# Patient Record
Sex: FEMALE | Race: BLACK OR AFRICAN AMERICAN | Employment: OTHER | URBAN - METROPOLITAN AREA
[De-identification: names, ages, dates, MRNs, and addresses within clinical notes are randomized per-mention and may not be internally consistent; named-entity substitution may affect disease eponyms.]

---

## 2017-03-13 LAB
FLUAV AG NPH QL IA: NEGATIVE
FLUBV AG NOSE QL IA: POSITIVE

## 2017-03-13 PROCEDURE — 87804 INFLUENZA ASSAY W/OPTIC: CPT | Performed by: EMERGENCY MEDICINE

## 2017-03-13 PROCEDURE — 99282 EMERGENCY DEPT VISIT SF MDM: CPT

## 2017-03-14 ENCOUNTER — HOSPITAL ENCOUNTER (EMERGENCY)
Age: 62
Discharge: HOME | End: 2017-03-14
Attending: EMERGENCY MEDICINE
Payer: COMMERCIAL

## 2017-03-14 ENCOUNTER — APPOINTMENT (OUTPATIENT)
Dept: GENERAL RADIOLOGY | Age: 62
End: 2017-03-14
Attending: PHYSICIAN ASSISTANT
Payer: COMMERCIAL

## 2017-03-14 VITALS
SYSTOLIC BLOOD PRESSURE: 159 MMHG | BODY MASS INDEX: 24.41 KG/M2 | OXYGEN SATURATION: 99 % | RESPIRATION RATE: 18 BRPM | HEART RATE: 68 BPM | TEMPERATURE: 99.3 F | WEIGHT: 125 LBS | DIASTOLIC BLOOD PRESSURE: 84 MMHG

## 2017-03-14 DIAGNOSIS — J10.1 INFLUENZA B: Primary | ICD-10-CM

## 2017-03-14 PROCEDURE — 71010 XR CHEST PORT: CPT

## 2017-03-14 RX ORDER — OSELTAMIVIR PHOSPHATE 75 MG/1
75 CAPSULE ORAL 2 TIMES DAILY
Qty: 10 CAP | Refills: 0 | Status: SHIPPED | OUTPATIENT
Start: 2017-03-14 | End: 2017-03-19

## 2017-03-14 NOTE — LETTER
NOTIFICATION OF RETURN TO WORK 
 
3/14/2017 2:42 AM 
 
Ms. Brian Garcia [address] [place] [id number] Malika Chavez To Whom It May Concern: 
 
Brian Garcia was under the care of [facility]. She will be able to return to work on Friday, 3/17/17 If there are questions or concerns please have the patient contact our office. Sincerely, Art Fuchs PA-C

## 2017-03-14 NOTE — ED NOTES
Discharge instruction included to drink plenty of fluids and rest.  Pt voiced understanding.   Pt ambulated out of er

## 2017-03-14 NOTE — DISCHARGE INSTRUCTIONS
Influenza (Flu): Care Instructions  Your Care Instructions  Influenza (flu) is an infection in the lungs and breathing passages. It is caused by the influenza virus. There are different strains, or types, of the flu virus from year to year. Unlike the common cold, the flu comes on suddenly and the symptoms, such as a cough, congestion, fever, chills, fatigue, aches, and pains, are more severe. These symptoms may last up to 10 days. Although the flu can make you feel very sick, it usually doesn't cause serious health problems. Home treatment is usually all you need for flu symptoms. But your doctor may prescribe antiviral medicine to prevent other health problems, such as pneumonia, from developing. Older people and those who have a long-term health condition, such as lung disease, are most at risk for having pneumonia or other health problems. Follow-up care is a key part of your treatment and safety. Be sure to make and go to all appointments, and call your doctor if you are having problems. Its also a good idea to know your test results and keep a list of the medicines you take. How can you care for yourself at home? · Get plenty of rest.  · Drink plenty of fluids, enough so that your urine is light yellow or clear like water. If you have kidney, heart, or liver disease and have to limit fluids, talk with your doctor before you increase the amount of fluids you drink. · Take an over-the-counter pain medicine if needed, such as acetaminophen (Tylenol), ibuprofen (Advil, Motrin), or naproxen (Aleve), to relieve fever, headache, and muscle aches. Read and follow all instructions on the label. No one younger than 20 should take aspirin. It has been linked to Reye syndrome, a serious illness. · Do not smoke. Smoking can make the flu worse. If you need help quitting, talk to your doctor about stop-smoking programs and medicines. These can increase your chances of quitting for good.   · Breathe moist air from a hot shower or from a sink filled with hot water to help clear a stuffy nose. · Before you use cough and cold medicines, check the label. These medicines may not be safe for young children or for people with certain health problems. · If the skin around your nose and lips becomes sore, put some petroleum jelly on the area. · To ease coughing:  ¨ Drink fluids to soothe a scratchy throat. ¨ Suck on cough drops or plain hard candy. ¨ Take an over-the-counter cough medicine that contains dextromethorphan to help you get some sleep. Read and follow all instructions on the label. ¨ Raise your head at night with an extra pillow. This may help you rest if coughing keeps you awake. · Take any prescribed medicine exactly as directed. Call your doctor if you think you are having a problem with your medicine. To avoid spreading the flu  · Wash your hands regularly, and keep your hands away from your face. · Stay home from school, work, and other public places until you are feeling better and your fever has been gone for at least 24 hours. The fever needs to have gone away on its own without the help of medicine. · Ask people living with you to talk to their doctors about preventing the flu. They may get antiviral medicine to keep from getting the flu from you. · To prevent the flu in the future, get a flu vaccine every fall. Encourage people living with you to get the vaccine. · Cover your mouth when you cough or sneeze. When should you call for help? Call 911 anytime you think you may need emergency care. For example, call if:  · You have severe trouble breathing. Call your doctor now or seek immediate medical care if:  · You have new or worse trouble breathing. · You seem to be getting much sicker. · You feel very sleepy or confused. · You have a new or higher fever. · You get a new rash.   Watch closely for changes in your health, and be sure to contact your doctor if:  · You begin to get better and then get worse. · You are not getting better after 1 week. Where can you learn more? Go to http://teresa-mehul.info/. Enter E462 in the search box to learn more about \"Influenza (Flu): Care Instructions. \"  Current as of: May 23, 2016  Content Version: 11.1  © 0596-2903 [employer]. Care instructions adapted under license by [employer] (which disclaims liability or warranty for this information). If you have questions about a medical condition or this instruction, always ask your healthcare professional. [address] any warranty or liability for your use of this information.

## 2017-03-14 NOTE — ED PROVIDER NOTES
HPI Comments: Claudean Hawthorn is a 60 y.o. female with a pertinent history of HTN, HLD, CVA, seizures who presents to the emergency department for evaluation of nasal congestion, cough productive of green sputum, fever, body aches, and chills x 2 days. Pt states she began having diarrhea today. Son at home tested positive for flu. Pt denies any headache, dizziness or light headedness, CP or discomfort, SOB, n/v/c, abd pain, back pain, diaphoresis, melena/hematochezia, dysuria, hematuria, frequency, focal weakness/numbness/tingling, or rash. Patient has no other complaints at this time. PCP: Renita Pereira MD           Past Medical History:   Diagnosis Date    Hypertension     Neurological disorder     Seizures ([facility]     Stroke ([facility] 2009       History reviewed. No pertinent surgical history. Family History:   Problem Relation Age of Onset    Diabetes Other     Stroke Other        Social History     Social History    Marital status:      Spouse name: N/A    Number of children: N/A    Years of education: N/A     Occupational History    Not on file. Social History Main Topics    Smoking status: Never Smoker    Smokeless tobacco: Not on file    Alcohol use Yes      Comment: Socially     Drug use: No    Sexual activity: Not on file     Other Topics Concern    Not on file     Social History Narrative         ALLERGIES: Tomato; Aspirin; Ciprofloxacin; Pcn [penicillins]; and Sulfa (sulfonamide antibiotics)    Review of Systems   Constitutional: Positive for fever. Negative for chills. HENT: Positive for congestion and rhinorrhea. Negative for sore throat. Respiratory: Positive for cough. Negative for shortness of breath. Cardiovascular: Negative for chest pain. Gastrointestinal: Positive for diarrhea. Negative for abdominal pain, blood in stool, constipation, nausea and vomiting. Genitourinary: Negative for dysuria, frequency and hematuria.    Musculoskeletal: Positive for myalgias. Negative for back pain. Skin: Negative for rash and wound. Neurological: Negative for dizziness, numbness and headaches. Vitals:    03/13/17 2130 03/14/17 0150 03/14/17 0152   BP: (!) 166/94  159/84   Pulse: 71  68   Resp: 17  18   Temp: (!) 100.5 °F (38.1 °C)  99.3 °F (37.4 °C)   SpO2: 98% 99% 99%   Weight: 56.7 kg (125 lb)              Physical Exam   Constitutional: She is oriented to person, place, and time. She appears well-developed and well-nourished. No distress. HENT:   Head: Normocephalic and atraumatic. Nose: Mucosal edema and rhinorrhea present. Mouth/Throat: Oropharynx is clear and moist.   Eyes: Conjunctivae and EOM are normal. Pupils are equal, round, and reactive to light. Right eye exhibits no discharge. Left eye exhibits no discharge. No scleral icterus. Neck: Normal range of motion. Neck supple. No thyromegaly present. Cardiovascular: Normal rate and intact distal pulses. Exam reveals no gallop and no friction rub. No murmur heard. Pulmonary/Chest: Effort normal and breath sounds normal. No respiratory distress. She has no wheezes. She has no rales. She exhibits no tenderness. Lungs CTAB   Lymphadenopathy:     She has no cervical adenopathy. Neurological: She is alert and oriented to person, place, and time. Skin: Skin is warm and dry. No rash noted. She is not diaphoretic. Psychiatric: She has a normal mood and affect. Her behavior is normal.   Nursing note and vitals reviewed. MDM  Number of Diagnoses or Management Options  Influenza B: new and requires workup  Diagnosis management comments: Differential Diagnosis:  influenza, mononucleosis, acute bronchitis, URI, streptococcal pharyngitis, pertussis, pneumonia, asthma exacerbation, allergic rhinitis      Plan:  Pt presents ambulatory in NAD, vitals wnl. Exam and HPI consistent with influenza. Pt is positive for Flu B.  CXR shows no acute process, pending radiology read.   Will DC home with tamiflu. At this time, patient is stable and appropriate for discharge home. Patient demonstrates understanding of current diagnoses and is in agreement with the treatment plan. They are advised that while the likelihood of serious underlying condition is low at this point given the evaluation performed today, we cannot fully rule it out. They are advised to immediately return with any new symptoms or worsening of current condition. All questions have been answered. Patient is given educational material regarding their diagnoses, including danger symptoms and when to return to the ED. Amount and/or Complexity of Data Reviewed  Clinical lab tests: ordered and reviewed  Tests in the radiology section of CPT®: ordered and reviewed  Review and summarize past medical records: yes  Independent visualization of images, tracings, or specimens: yes (NAP noted by me)    Risk of Complications, Morbidity, and/or Mortality  Presenting problems: moderate  Diagnostic procedures: moderate  Management options: moderate    Patient Progress  Patient progress: improved    ED Course       Procedures    -------------------------------------------------------------------------------------------------------------------  Orders:  Orders Placed This Encounter    INFLUENZA A & B AG (RAPID TEST)     Standing Status:   Standing     Number of Occurrences:   1    XR CHEST PORT     Standing Status:   Standing     Number of Occurrences:   1     Order Specific Question:   Reason for Exam     Answer:   productive cough    oseltamivir (TAMIFLU) 75 mg capsule     Sig: Take 1 Cap by mouth two (2) times a day for 5 days.      Dispense:  10 Cap     Refill:  0        Lab Results:   Recent Results (from the past 12 hour(s))   INFLUENZA A & B AG (RAPID TEST)    Collection Time: 03/13/17  9:35 PM   Result Value Ref Range    Influenza A Antigen NEGATIVE  NEG      Influenza B Antigen POSITIVE (A) NEG       Radiology Results:  XR CHEST PORT (Results Pending)     Progress Notes:  2:15 AM:  Breana Wright PA-C was at the pt's bedside, assessed the pt and answered the pt's questions regarding treatment.    -------------------------------------------------------------------------------------------------------------------    Disposition:  Diagnosis:   1. Influenza B        Disposition: [place] Home    Follow-up Information     Follow up With Details Comments [address], [place] Call in 2 days As needed For follow-up [address] [address] [address] [facility]      [facility] Go to As needed, If symptoms worsen [facility] Roslyn Ennis [facility]  [phone]          Patient's Medications   Start Taking    OSELTAMIVIR (TAMIFLU) 75 MG CAPSULE    Take 1 Cap by mouth two (2) times a day for 5 days. Continue Taking    AMLODIPINE (NORVASC) 10 MG TABLET    Take 1 Tab by mouth daily. ATORVASTATIN (LIPITOR) 40 MG TABLET    Take 1 Tab by mouth nightly. CLOPIDOGREL (PLAVIX) 75 MG TABLET    Take 75 mg by mouth daily. CYANOCOBALAMIN (VITAMIN B12) 500 MCG TABLET    Take 1 Tab by mouth daily. FOLIC ACID (FOLVITE) 1 MG TABLET    Take 1 Tab by mouth daily. HYDROCODONE-ACETAMINOPHEN (NORCO) 5-325 MG PER TABLET    Take 1 Tab by mouth every four (4) hours as needed for Pain. Max Daily Amount: 6 Tabs. HYDROXYPROPYL METHYLCELLULOSE (GENTEAL) 0.2 % OPHTHALMIC SOLUTION    Administer 2 Drops to both eyes as needed. LEVETIRACETAM (KEPPRA) 750 MG TABLET    Take 1 Tab by mouth two (2) times a day. LISINOPRIL (PRINIVIL, ZESTRIL) 10 MG TABLET    Take 10 mg by mouth daily. MELOXICAM (MOBIC) 15 MG TABLET    Take 1 Tab by mouth daily (with breakfast). METHOCARBAMOL (ROBAXIN-750) 750 MG TABLET    Take 1 Tab by mouth three (3) times daily. PANTOPRAZOLE (PROTONIX) 40 MG TABLET    Take 1 Tab by mouth Daily (before breakfast).    These Medications have changed    No medications on file   Stop Taking No medications on file

## 2017-05-15 ENCOUNTER — HOSPITAL ENCOUNTER (OUTPATIENT)
Dept: CT IMAGING | Age: 62
Discharge: HOME | End: 2017-05-15
Attending: NURSE PRACTITIONER
Payer: COMMERCIAL

## 2017-05-15 ENCOUNTER — HOSPITAL ENCOUNTER (OUTPATIENT)
Dept: NEUROLOGY | Age: 62
Discharge: HOME | End: 2017-05-15
Attending: NURSE PRACTITIONER
Payer: COMMERCIAL

## 2017-05-15 DIAGNOSIS — R56.9 SEIZURES (HCC): ICD-10-CM

## 2017-05-15 DIAGNOSIS — F05 CONFUSION AFTER A SEIZURE: ICD-10-CM

## 2017-05-15 LAB — CREAT UR-MCNC: 1.1 MG/DL (ref 0.6–1.3)

## 2017-05-15 PROCEDURE — 82565 ASSAY OF CREATININE: CPT

## 2017-05-15 PROCEDURE — 74011636320 HC RX REV CODE- 636/320

## 2017-05-15 PROCEDURE — 95819 EEG AWAKE AND ASLEEP: CPT

## 2017-05-15 PROCEDURE — 70470 CT HEAD/BRAIN W/O & W/DYE: CPT

## 2017-05-15 RX ADMIN — IOPAMIDOL 75 ML: 612 INJECTION, SOLUTION INTRAVENOUS at 14:21

## 2017-05-17 NOTE — PROCEDURES
Bill David    Name:  Mandy Mendez  MR#:  [id number]  :  1955  Account #:  [de-identified]  Date of Adm:  05/15/2017  Date of Service:  05/15/2017      ELECTROENCEPHALOGRAM NUMBER: Robby Leslie . REFERRING: Dr. Glenis Bhakta: This is an apparently wakeful EEG on this 23-year-old  patient being evaluated for seizure disorder. The patient's son stated  that seizures began after his first stroke. The patient has had a total of  2 strokes. The patient claims to have lost her ability to taste and smell  with her last grand mal seizure. PAST MEDICAL HISTORY: Includes stroke, hypertension. MEDICATIONS  1. Keppra. 2. Topamax. ELECTROENCEPHALOGRAM REPORT: The predominant apparently  wakeful background consists of a mixture of 20 to 25 microvolts,  irregular but symmetrical, 8-9 Hz waves mixed with frequently  occurring 15-20 microvolts, 4-5 Hz activity. Bifrontal 2 to 3 microvolts,  16-20 Hz activity is seen which is symmetrical.    During what appears to be drowsiness, the posterior rhythm consists of  a mixture of 10-20 microvolts, 7-8 and 10 to 20 microvolts, 3-4 Hz  waves. Central vertex sharp waves are identified which are  symmetrical.    Step-flash photic stimulation was performed which caused no driving  response. IMPRESSION: Abnormal wakeful and drowsy electroencephalogram  due to the presence of some generalized slow wave activity indicative  of a diffuse encephalopathic process. No epileptiform activity was  identified.         Brenda Rodriguez MD    MG / LLV  D:  2017   13:11  T:  2017   20:42  Job #:  [id number]

## 2017-06-22 ENCOUNTER — HOSPITAL ENCOUNTER (OUTPATIENT)
Dept: LAB | Age: 62
Discharge: HOME | End: 2017-06-22
Payer: COMMERCIAL

## 2017-06-22 LAB
25(OH)D3 SERPL-MCNC: 11.1 NG/ML (ref 30–100)
ALBUMIN SERPL BCP-MCNC: 4.1 G/DL (ref 3.4–5)
ALBUMIN/GLOB SERPL: 1.3 {RATIO} (ref 0.8–1.7)
ALP SERPL-CCNC: 97 U/L (ref 45–117)
ALT SERPL-CCNC: 15 U/L (ref 13–56)
AMMONIA PLAS-SCNC: 27 UMOL/L (ref 11–32)
AMYLASE SERPL-CCNC: 133 U/L (ref 25–115)
ANION GAP BLD CALC-SCNC: 9 MMOL/L (ref 3–18)
AST SERPL W P-5'-P-CCNC: 16 U/L (ref 15–37)
BASOPHILS # BLD AUTO: 0.1 K/UL (ref 0–0.1)
BASOPHILS # BLD: 1 % (ref 0–2)
BILIRUB SERPL-MCNC: 0.3 MG/DL (ref 0.2–1)
BUN SERPL-MCNC: 15 MG/DL (ref 7–18)
BUN/CREAT SERPL: 14 (ref 12–20)
CALCIUM SERPL-MCNC: 9 MG/DL (ref 8.5–10.1)
CHLORIDE SERPL-SCNC: 109 MMOL/L (ref 100–108)
CHOLEST SERPL-MCNC: 146 MG/DL
CO2 SERPL-SCNC: 23 MMOL/L (ref 21–32)
CREAT SERPL-MCNC: 1.1 MG/DL (ref 0.6–1.3)
DIFFERENTIAL METHOD BLD: ABNORMAL
EOSINOPHIL # BLD: 0.2 K/UL (ref 0–0.4)
EOSINOPHIL NFR BLD: 4 % (ref 0–5)
ERYTHROCYTE [DISTWIDTH] IN BLOOD BY AUTOMATED COUNT: 13.6 % (ref 11.6–14.5)
FOLATE SERPL-MCNC: 5.8 NG/ML (ref 3.1–17.5)
GLOBULIN SER CALC-MCNC: 3.1 G/DL (ref 2–4)
GLUCOSE SERPL-MCNC: 91 MG/DL (ref 74–99)
HCT VFR BLD AUTO: 36.1 % (ref 35–45)
HDLC SERPL-MCNC: 68 MG/DL (ref 40–60)
HDLC SERPL: 2.1 {RATIO} (ref 0–5)
HGB BLD-MCNC: 12.2 G/DL (ref 12–16)
LDLC SERPL CALC-MCNC: 68.2 MG/DL (ref 0–100)
LIPASE SERPL-CCNC: 456 U/L (ref 73–393)
LIPID PROFILE,FLP: ABNORMAL
LYMPHOCYTES # BLD AUTO: 23 % (ref 21–52)
LYMPHOCYTES # BLD: 1.4 K/UL (ref 0.9–3.6)
MCH RBC QN AUTO: 30 PG (ref 24–34)
MCHC RBC AUTO-ENTMCNC: 33.8 G/DL (ref 31–37)
MCV RBC AUTO: 88.9 FL (ref 74–97)
MONOCYTES # BLD: 0.6 K/UL (ref 0.05–1.2)
MONOCYTES NFR BLD AUTO: 9 % (ref 3–10)
NEUTS SEG # BLD: 3.9 K/UL (ref 1.8–8)
NEUTS SEG NFR BLD AUTO: 63 % (ref 40–73)
PLATELET # BLD AUTO: 226 K/UL (ref 135–420)
PMV BLD AUTO: 12 FL (ref 9.2–11.8)
POTASSIUM SERPL-SCNC: 3.6 MMOL/L (ref 3.5–5.5)
PROT SERPL-MCNC: 7.2 G/DL (ref 6.4–8.2)
RBC # BLD AUTO: 4.06 M/UL (ref 4.2–5.3)
SODIUM SERPL-SCNC: 141 MMOL/L (ref 136–145)
TRIGL SERPL-MCNC: 49 MG/DL (ref ?–150)
TSH SERPL DL<=0.05 MIU/L-ACNC: 2.38 UIU/ML (ref 0.36–3.74)
VIT B12 SERPL-MCNC: 507 PG/ML (ref 211–911)
VLDLC SERPL CALC-MCNC: 9.8 MG/DL
WBC # BLD AUTO: 6.2 K/UL (ref 4.6–13.2)

## 2017-06-22 PROCEDURE — 84443 ASSAY THYROID STIM HORMONE: CPT | Performed by: NURSE PRACTITIONER

## 2017-06-22 PROCEDURE — 82140 ASSAY OF AMMONIA: CPT | Performed by: NURSE PRACTITIONER

## 2017-06-22 PROCEDURE — 82150 ASSAY OF AMYLASE: CPT | Performed by: NURSE PRACTITIONER

## 2017-06-22 PROCEDURE — 82607 VITAMIN B-12: CPT | Performed by: NURSE PRACTITIONER

## 2017-06-22 PROCEDURE — 36415 COLL VENOUS BLD VENIPUNCTURE: CPT | Performed by: NURSE PRACTITIONER

## 2017-06-22 PROCEDURE — 80177 DRUG SCRN QUAN LEVETIRACETAM: CPT | Performed by: NURSE PRACTITIONER

## 2017-06-22 PROCEDURE — 82306 VITAMIN D 25 HYDROXY: CPT | Performed by: NURSE PRACTITIONER

## 2017-06-22 PROCEDURE — 80053 COMPREHEN METABOLIC PANEL: CPT | Performed by: NURSE PRACTITIONER

## 2017-06-22 PROCEDURE — 83690 ASSAY OF LIPASE: CPT | Performed by: NURSE PRACTITIONER

## 2017-06-22 PROCEDURE — 80061 LIPID PANEL: CPT | Performed by: NURSE PRACTITIONER

## 2017-06-22 PROCEDURE — 85025 COMPLETE CBC W/AUTO DIFF WBC: CPT | Performed by: NURSE PRACTITIONER

## 2017-06-25 LAB — LEVETIRACETAM SERPL-MCNC: 7.6 UG/ML (ref 10–40)

## 2017-07-02 ENCOUNTER — HOSPITAL ENCOUNTER (EMERGENCY)
Age: 62
Discharge: HOME | End: 2017-07-02
Attending: EMERGENCY MEDICINE
Payer: COMMERCIAL

## 2017-07-02 VITALS
OXYGEN SATURATION: 100 % | TEMPERATURE: 98.6 F | HEIGHT: 60 IN | BODY MASS INDEX: 23.56 KG/M2 | HEART RATE: 66 BPM | SYSTOLIC BLOOD PRESSURE: 146 MMHG | RESPIRATION RATE: 16 BRPM | DIASTOLIC BLOOD PRESSURE: 80 MMHG | WEIGHT: 120 LBS

## 2017-07-02 DIAGNOSIS — L50.9 URTICARIA: ICD-10-CM

## 2017-07-02 DIAGNOSIS — R21 RASH: Primary | ICD-10-CM

## 2017-07-02 PROCEDURE — 99281 EMR DPT VST MAYX REQ PHY/QHP: CPT

## 2017-07-02 RX ORDER — HYDROXYZINE PAMOATE 25 MG/1
25 CAPSULE ORAL
Qty: 20 CAP | Refills: 0 | Status: SHIPPED | OUTPATIENT
Start: 2017-07-02 | End: 2017-07-16

## 2017-07-02 RX ORDER — TRIAMCINOLONE ACETONIDE 1 MG/G
CREAM TOPICAL 2 TIMES DAILY
Qty: 15 G | Refills: 0 | Status: SHIPPED | OUTPATIENT
Start: 2017-07-02 | End: 2020-09-08

## 2017-07-02 RX ORDER — PREDNISONE 20 MG/1
20 TABLET ORAL DAILY
Qty: 5 TAB | Refills: 0 | Status: SHIPPED | OUTPATIENT
Start: 2017-07-02 | End: 2017-07-07

## 2017-07-02 NOTE — DISCHARGE INSTRUCTIONS
Hives: Care Instructions  Your Care Instructions  Hives are raised, red, itchy patches of skin. They are also called wheals or welts. They usually have red borders and pale centers. Hives range in size from ¼ inch to 3 inches or more across. They may seem to move from place to place on the skin. Several hives may form a large area of raised, red skin. You can get hives after an insect sting, after taking medicine or eating certain foods, or because of infection or stress. Other causes include plants, things you breathe in, makeup, heat, cold, sunlight, and latex. You cannot spread hives to other people. Hives may last a few minutes or a few days, but a single spot may last less than 36 hours. Follow-up care is a key part of your treatment and safety. Be sure to make and go to all appointments, and call your doctor if you are having problems. It's also a good idea to know your test results and keep a list of the medicines you take. How can you care for yourself at home? · Avoid whatever you think may have caused your hives, such as a certain food or medicine. However, you may not know the cause. · Put a cool, wet towel on the area to relieve itching. · Take an over-the-counter antihistamine, such as diphenhydramine (Benadryl), cetirizine (Zyrtec), or loratadine (Claritin), to help stop the hives and calm the itching. Read and follow directions on the label. These medicines can make you feel sleepy. Do not drive while using them. · Stay away from strong soaps, detergents, and chemicals. These can make itching worse. When should you call for help? Call 911 anytime you think you may need emergency care. For example, call if:  · You have symptoms of a severe allergic reaction. These may include:  ¨ Sudden raised, red areas (hives) all over your body. ¨ Swelling of the throat, mouth, lips, or tongue. ¨ Trouble breathing. ¨ Passing out (losing consciousness).  Or you may feel very lightheaded or suddenly feel weak, confused, or restless. Call your doctor now or seek immediate medical care if:  · You have symptoms of an allergic reaction, such as:  ¨ A rash or hives (raised, red areas on the skin). ¨ Itching. ¨ Swelling. ¨ Belly pain, nausea, or vomiting. · You get hives after you start a new medicine. · Hives have not gone away after 24 hours. Watch closely for changes in your health, and be sure to contact your doctor if:  · You do not get better as expected. Where can you learn more? Go to http://teresaBridge Semiconductormehul.info/. Enter P381 in the search box to learn more about \"Hives: Care Instructions. \"  Current as of: March 20, 2017  Content Version: 11.3  © 8669-2611 "[employer]". Care instructions adapted under license by [employer] (which disclaims liability or warranty for this information). If you have questions about a medical condition or this instruction, always ask your healthcare professional. [address] [address] any warranty or liability for your use of this information. Rash: Care Instructions  Your Care Instructions  A rash is any irritation or inflammation of the skin. Rashes have many possible causes, including allergy, infection, illness, heat, and emotional stress. Follow-up care is a key part of your treatment and safety. Be sure to make and go to all appointments, and call your doctor if you are having problems. Its also a good idea to know your test results and keep a list of the medicines you take. How can you care for yourself at home? · Wash the area with water only. Soap can make dryness and itching worse. Pat dry. · Put cold, wet cloths on the rash to reduce itching. · Keep cool, and stay out of the sun. · Leave the rash open to the air as much of the time as possible. · Sometimes petroleum jelly (Vaseline) can help relieve the discomfort caused by a rash. A moisturizing lotion, such as Cetaphil, also may help. Calamine lotion may help for rashes caused by contact with something (such as a plant or soap) that irritated the skin. Use it 3 or 4 times a day. · If your doctor prescribed a cream, use it as directed. If your doctor prescribed medicine, take it exactly as directed. · If your rash itches so badly that it interferes with your normal activities, take an over-the-counter antihistamine, such as diphenhydramine (Benadryl) or loratadine (Claritin). Read and follow all instructions on the label. When should you call for help? Call your doctor now or seek immediate medical care if:  · You have signs of infection, such as:  ¨ Increased pain, swelling, warmth, or redness. ¨ Red streaks leading from the area. ¨ Pus draining from the area. ¨ A fever. · You have joint pain along with the rash. Watch closely for changes in your health, and be sure to contact your doctor if:  · Your rash is changing or getting worse. For example, call if you have pain along with the rash, the rash is spreading, or you have new blisters. · You do not get better after 1 week. Where can you learn more? Go to http://teresa-mehul.info/. Enter R916 in the search box to learn more about \"Rash: Care Instructions. \"  Current as of: October 13, 2016  Content Version: 11.3  © 9079-2305 [employer]. Care instructions adapted under license by [employer] (which disclaims liability or warranty for this information). If you have questions about a medical condition or this instruction, always ask your healthcare professional. [address] [address] any warranty or liability for your use of this information. Ecorithm Activation    Thank you for requesting access to Ecorithm. Please follow the instructions below to securely access and download your online medical record.  Ecorithm allows you to send messages to your doctor, view your test results, renew your prescriptions, schedule appointments, and more.    How Do I Sign Up? 1. In your internet browser, go to [email or web address]  2. Click on the First Time User? Click Here link in the Sign In box. You will be redirect to the New Member Sign Up page. 3. Enter your Appy Couple Access Code exactly as it appears below. You will not need to use this code after youve completed the sign-up process. If you do not sign up before the expiration date, you must request a new code. MyChart Access Code: Activation code not generated  Current Appy Couple Status: Patient Declined (This is the date your MyChart access code will )    4. Enter the last four digits of your Social Security Number (xxxx) and Date of Birth (mm/dd/yyyy) as indicated and click Submit. You will be taken to the next sign-up page. 5. Create a [employer]t ID. This will be your Appy Couple login ID and cannot be changed, so think of one that is secure and easy to remember. 6. Create a Appy Couple password. You can change your password at any time. 7. Enter your Password Reset Question and Answer. This can be used at a later time if you forget your password. 8. Enter your e-mail address. You will receive e-mail notification when new information is available in [address] 9. Click Sign Up. You can now view and download portions of your medical record. 10. Click the Download Summary menu link to download a portable copy of your medical information. Additional Information    If you have questions, please visit the Frequently Asked Questions section of the Appy Couple website at [email or web address] Remember, Appy Couple is NOT to be used for urgent needs. For medical emergencies, dial 911.

## 2017-07-02 NOTE — ED PROVIDER NOTES
HPI Comments: 5:34 PM Bhargav Mccarty is a 60 y.o. Female with a hx of HTN who presents to the ED c/o intermittent rashes to all four extremities over the past week and a half. The rash is currently only present on her upper extremities. She describes her rash as itchy. She is concerned that she may be having an allergic reaction. She has tried taking Benadryl and applying an OTC anti-itch cream with no relief. There is no one at home with similar rashes. She denies changes in medications, changes in diet, SOB, facial swelling, N/V, and any further complaints. The history is provided by the patient. Past Medical History:   Diagnosis Date    Hypertension     Neurological disorder     [facility] [address]     Stroke ([facility] [address] 2009       No past surgical history on file. Family History:   Problem Relation Age of Onset    Diabetes Other     Stroke Other        Social History     Social History    Marital status:      Spouse name: N/A    Number of children: N/A    Years of education: N/A     Occupational History    Not on file. Social History Main Topics    Smoking status: Never Smoker    Smokeless tobacco: Not on file    Alcohol use Yes      Comment: Socially     Drug use: No    Sexual activity: Not on file     Other Topics Concern    Not on file     Social History Narrative         ALLERGIES: Tomato; Aspirin; Ciprofloxacin; Pcn [penicillins]; and Sulfa (sulfonamide antibiotics)    Review of Systems   Constitutional: Negative. Negative for chills and fever. HENT: Negative. Negative for congestion, facial swelling and sore throat. Eyes: Negative. Respiratory: Negative. Negative for cough, shortness of breath and wheezing. Cardiovascular: Negative. Negative for chest pain and leg swelling. Gastrointestinal: Negative. Negative for abdominal pain and nausea. Endocrine: Negative. Genitourinary: Negative. Negative for dysuria and hematuria. Musculoskeletal: Negative. Negative for back pain. Skin: Positive for rash (pruritic rash). Negative for wound. Allergic/Immunologic: Negative. Neurological: Negative. Negative for syncope, light-headedness and headaches. Hematological: Negative. Psychiatric/Behavioral: Negative. Negative for behavioral problems. The patient is not nervous/anxious. All other systems reviewed and are negative. Vitals:    07/02/17 1731   BP: 146/80   Pulse: 66   Resp: 16   Temp: 98.6 °F (37 °C)   SpO2: 100%   Weight: 54.4 kg (120 lb)   Height: 5' (1.524 m)            Physical Exam   Constitutional: She is oriented to person, place, and time. She appears well-developed and well-nourished. No distress. HENT:   Head: Normocephalic. Neck: Normal range of motion. Neck supple. Cardiovascular: Normal rate, regular rhythm and normal heart sounds. Exam reveals no gallop and no friction rub. No murmur heard. Pulmonary/Chest: Effort normal and breath sounds normal. No stridor. No respiratory distress. She has no wheezes. She has no rales. Musculoskeletal: Normal range of motion. Neurological: She is alert and oriented to person, place, and time. Coordination normal.   Gait is steady. Able to ambulate without difficulty. Skin: Skin is warm and dry. Rash noted. She is not diaphoretic. Mild urticarial rash noted to the arms bilaterally, no scabbing, purulence or lesions noted between the webspaces of the fingers   Psychiatric: She has a normal mood and affect. Her behavior is normal. Thought content normal.   Nursing note and vitals reviewed. MDM  Number of Diagnoses or Management Options  Diagnosis management comments: Impression:  Rash, urticaria     Patient is stable for discharge at this time. Rx for triamcinolone, vistaril and short course of prednisone given. Rest and follow-up with PCP this week. Return to the ED immediately for any new or worsening sx.   Nadeen Gore PA-C 5:55 PM     Risk of Complications, Morbidity, and/or Mortality  Presenting problems: low  Diagnostic procedures: low  Management options: low    Patient Progress  Patient progress: stable    ED Course       Procedures    Vitals:  Patient Vitals for the past 12 hrs:   Temp Pulse Resp BP SpO2   07/02/17 1731 98.6 °F (37 °C) 66 16 146/80 100 %     Pulse ox reviewed and WNL    Medications ordered:   Medications - No data to display      Progress notes, Consult notes or additional Procedure notes:   5:38 PM  Discussed findings, as well as, diagnosis and plan for discharge. Pt verbalizes understanding and agreement with plan. All questions addressed at this time. Disposition:  Diagnosis: No diagnosis found. Disposition: Discharge    Follow-up Information     None           Patient's Medications   Start Taking    No medications on file   Continue Taking    AMLODIPINE (NORVASC) 10 MG TABLET    Take 1 Tab by mouth daily. ATORVASTATIN (LIPITOR) 40 MG TABLET    Take 1 Tab by mouth nightly. CLOPIDOGREL (PLAVIX) 75 MG TABLET    Take 75 mg by mouth daily. CYANOCOBALAMIN (VITAMIN B12) 500 MCG TABLET    Take 1 Tab by mouth daily. FOLIC ACID (FOLVITE) 1 MG TABLET    Take 1 Tab by mouth daily. HYDROCODONE-ACETAMINOPHEN (NORCO) 5-325 MG PER TABLET    Take 1 Tab by mouth every four (4) hours as needed for Pain. Max Daily Amount: 6 Tabs. HYDROXYPROPYL METHYLCELLULOSE (GENTEAL) 0.2 % OPHTHALMIC SOLUTION    Administer 2 Drops to both eyes as needed. LEVETIRACETAM (KEPPRA) 750 MG TABLET    Take 1 Tab by mouth two (2) times a day. LISINOPRIL (PRINIVIL, ZESTRIL) 10 MG TABLET    Take 10 mg by mouth daily. MELOXICAM (MOBIC) 15 MG TABLET    Take 1 Tab by mouth daily (with breakfast). METHOCARBAMOL (ROBAXIN-750) 750 MG TABLET    Take 1 Tab by mouth three (3) times daily. PANTOPRAZOLE (PROTONIX) 40 MG TABLET    Take 1 Tab by mouth Daily (before breakfast).    These Medications have changed    No medications on file   Stop Taking    No medications on file         SCRIBE ATTESTATION STATEMENT  Documented by: Cammy Maurice for, and in the presence of, Nadeen Medina PA-C 5:37 PM     Signed by: Allan Reyna, 07/02/17 5:37 PM    PROVIDER ATTESTATION STATEMENT  I personally performed the services described in the documentation, reviewed the documentation, as recorded by the scribe in my presence, and it accurately and completely records my words and actions.   Nadeen Medina PA-C

## 2017-07-12 ENCOUNTER — HOSPITAL ENCOUNTER (OUTPATIENT)
Dept: MRI IMAGING | Age: 62
Discharge: HOME | End: 2017-07-12
Attending: NURSE PRACTITIONER
Payer: COMMERCIAL

## 2017-07-12 ENCOUNTER — HOSPITAL ENCOUNTER (OUTPATIENT)
Dept: CT IMAGING | Age: 62
Discharge: HOME | End: 2017-07-12
Attending: NURSE PRACTITIONER
Payer: COMMERCIAL

## 2017-07-12 DIAGNOSIS — R10.84 DIFFUSE ABDOMINAL PAIN: ICD-10-CM

## 2017-07-12 DIAGNOSIS — R41.0 CONFUSION AND DISORIENTATION: ICD-10-CM

## 2017-07-12 PROCEDURE — 74160 CT ABDOMEN W/CONTRAST: CPT

## 2017-07-12 PROCEDURE — 74011250636 HC RX REV CODE- 250/636: Performed by: RADIOLOGY

## 2017-07-12 PROCEDURE — 70553 MRI BRAIN STEM W/O & W/DYE: CPT

## 2017-07-12 PROCEDURE — 74011636320 HC RX REV CODE- 636/320: Performed by: RADIOLOGY

## 2017-07-12 PROCEDURE — A9585 GADOBUTROL INJECTION: HCPCS | Performed by: RADIOLOGY

## 2017-07-12 RX ADMIN — IOPAMIDOL 80 ML: 612 INJECTION, SOLUTION INTRAVENOUS at 17:40

## 2017-07-12 RX ADMIN — GADOBUTROL 5 ML: 604.72 INJECTION INTRAVENOUS at 17:05

## 2017-10-24 ENCOUNTER — HOSPITAL ENCOUNTER (EMERGENCY)
Age: 62
Discharge: HOME | End: 2017-10-24
Attending: EMERGENCY MEDICINE
Payer: COMMERCIAL

## 2017-10-24 ENCOUNTER — APPOINTMENT (OUTPATIENT)
Dept: CT IMAGING | Age: 62
End: 2017-10-24
Attending: EMERGENCY MEDICINE
Payer: COMMERCIAL

## 2017-10-24 VITALS
WEIGHT: 114 LBS | TEMPERATURE: 98.8 F | BODY MASS INDEX: 22.26 KG/M2 | OXYGEN SATURATION: 99 % | HEART RATE: 63 BPM | RESPIRATION RATE: 16 BRPM | DIASTOLIC BLOOD PRESSURE: 85 MMHG | SYSTOLIC BLOOD PRESSURE: 180 MMHG

## 2017-10-24 DIAGNOSIS — R56.9 SEIZURE (HCC): Primary | ICD-10-CM

## 2017-10-24 LAB
ANION GAP SERPL CALC-SCNC: 7 MMOL/L (ref 3–18)
BASOPHILS # BLD: 0 K/UL (ref 0–0.06)
BASOPHILS NFR BLD: 0 % (ref 0–2)
BUN SERPL-MCNC: 17 MG/DL (ref 7–18)
BUN/CREAT SERPL: 15 (ref 12–20)
CALCIUM SERPL-MCNC: 9.3 MG/DL (ref 8.5–10.1)
CHLORIDE SERPL-SCNC: 109 MMOL/L (ref 100–108)
CO2 SERPL-SCNC: 25 MMOL/L (ref 21–32)
CREAT SERPL-MCNC: 1.14 MG/DL (ref 0.6–1.3)
DIFFERENTIAL METHOD BLD: NORMAL
EOSINOPHIL # BLD: 0.1 K/UL (ref 0–0.4)
EOSINOPHIL NFR BLD: 1 % (ref 0–5)
ERYTHROCYTE [DISTWIDTH] IN BLOOD BY AUTOMATED COUNT: 13.8 % (ref 11.6–14.5)
GLUCOSE SERPL-MCNC: 91 MG/DL (ref 74–99)
HCT VFR BLD AUTO: 37.6 % (ref 35–45)
HGB BLD-MCNC: 12.5 G/DL (ref 12–16)
LYMPHOCYTES # BLD: 2.1 K/UL (ref 0.9–3.6)
LYMPHOCYTES NFR BLD: 24 % (ref 21–52)
MCH RBC QN AUTO: 29.8 PG (ref 24–34)
MCHC RBC AUTO-ENTMCNC: 33.2 G/DL (ref 31–37)
MCV RBC AUTO: 89.5 FL (ref 74–97)
MONOCYTES # BLD: 0.7 K/UL (ref 0.05–1.2)
MONOCYTES NFR BLD: 8 % (ref 3–10)
NEUTS SEG # BLD: 5.9 K/UL (ref 1.8–8)
NEUTS SEG NFR BLD: 67 % (ref 40–73)
PLATELET # BLD AUTO: 174 K/UL (ref 135–420)
PMV BLD AUTO: 11.6 FL (ref 9.2–11.8)
POTASSIUM SERPL-SCNC: 3.3 MMOL/L (ref 3.5–5.5)
RBC # BLD AUTO: 4.2 M/UL (ref 4.2–5.3)
SODIUM SERPL-SCNC: 141 MMOL/L (ref 136–145)
WBC # BLD AUTO: 8.9 K/UL (ref 4.6–13.2)

## 2017-10-24 PROCEDURE — 80048 BASIC METABOLIC PNL TOTAL CA: CPT | Performed by: EMERGENCY MEDICINE

## 2017-10-24 PROCEDURE — 80177 DRUG SCRN QUAN LEVETIRACETAM: CPT | Performed by: EMERGENCY MEDICINE

## 2017-10-24 PROCEDURE — 96375 TX/PRO/DX INJ NEW DRUG ADDON: CPT

## 2017-10-24 PROCEDURE — 96365 THER/PROPH/DIAG IV INF INIT: CPT

## 2017-10-24 PROCEDURE — 74011250636 HC RX REV CODE- 250/636: Performed by: EMERGENCY MEDICINE

## 2017-10-24 PROCEDURE — 70450 CT HEAD/BRAIN W/O DYE: CPT

## 2017-10-24 PROCEDURE — 99283 EMERGENCY DEPT VISIT LOW MDM: CPT

## 2017-10-24 PROCEDURE — 85025 COMPLETE CBC W/AUTO DIFF WBC: CPT | Performed by: EMERGENCY MEDICINE

## 2017-10-24 PROCEDURE — 96361 HYDRATE IV INFUSION ADD-ON: CPT

## 2017-10-24 RX ORDER — HYDRALAZINE HYDROCHLORIDE 50 MG/1
50 TABLET, FILM COATED ORAL 2 TIMES DAILY
Status: ON HOLD | COMMUNITY
End: 2020-09-17 | Stop reason: SDUPTHER

## 2017-10-24 RX ORDER — LORAZEPAM 2 MG/ML
1 INJECTION INTRAMUSCULAR
Status: COMPLETED | OUTPATIENT
Start: 2017-10-24 | End: 2017-10-24

## 2017-10-24 RX ORDER — LEVETIRACETAM 5 MG/ML
1000 INJECTION INTRAVASCULAR EVERY 12 HOURS
Status: DISCONTINUED | OUTPATIENT
Start: 2017-10-24 | End: 2017-10-24 | Stop reason: HOSPADM

## 2017-10-24 RX ORDER — LEVETIRACETAM 1000 MG/1
1000 TABLET ORAL 2 TIMES DAILY
Qty: 60 TAB | Refills: 0 | Status: SHIPPED | OUTPATIENT
Start: 2017-10-24 | End: 2022-01-13

## 2017-10-24 RX ORDER — ERGOCALCIFEROL 1.25 MG/1
50000 CAPSULE ORAL 2 TIMES WEEKLY
COMMUNITY

## 2017-10-24 RX ORDER — POTASSIUM CHLORIDE 20 MEQ/1
40 TABLET, EXTENDED RELEASE ORAL
Status: DISCONTINUED | OUTPATIENT
Start: 2017-10-24 | End: 2017-10-24 | Stop reason: HOSPADM

## 2017-10-24 RX ADMIN — LEVETIRACETAM 1000 MG: 5 INJECTION INTRAVENOUS at 15:57

## 2017-10-24 RX ADMIN — SODIUM CHLORIDE 1000 ML: 9 INJECTION, SOLUTION INTRAVENOUS at 13:21

## 2017-10-24 RX ADMIN — LORAZEPAM 1 MG: 2 INJECTION INTRAMUSCULAR; INTRAVENOUS at 13:20

## 2017-10-24 NOTE — ED NOTES
Pt stated that home prescription for keppra was the dose she currently takes, Dr Kathe Finch notified, pt updated that we will have to discuss further with her neurologist and that she will be discharged once we get an answer. Son at bedside, safety intact.

## 2017-10-24 NOTE — ED TRIAGE NOTES
Patient states she was supposed to go to her neurologist today, missed her appointment and was told to come here to have her blood checked. She has recently started having seizure in September and had another one in October. She has had 2 or 3 since then. Her doctor wanted to make sure her levels were ok. She can't remember having the seizures.

## 2017-10-24 NOTE — ED PROVIDER NOTES
HPI Comments: 12:47 PM  Tin Garcia is a 60 y.o. female with a PMHx of seizures, CVA, neurological disorder, and HTN presents to the ED sent by Dr. Leo Best (neurologist) for blood level check since pt missed her appointment today and her next appointment is in approx 1 week. States she has had approx 3-4 seizure episodes 1 month ago. When asked what kind of Szs she has, pt explains she has \"forgetfull Szs\" where she does not remember having seizure activity, notes she knows when they are coming on. States her son normally founds her on the floor after an episode. Explains that her Sz sxs are as follows: hiccups, stuttering, slurred speech, shaking, holding her arm across her chest as if it was broken, and staring into space. Verbalizes she is Rx'ed 500mg Keppra bid. Explains her first \"Sz episode was 2 months after her first CVA in 2012. \"     Pt also reports HA x 2 days and dizziness. States she took Tylenol this morning with some relief. Admits to occasional EtOH use, denies tobacco or recreational drug use. No other acute symptoms or complaints were noted. PCP: Marla Kennedy NP  Neurologist: Dr. Leo Best [facility])      The history is provided by the patient. Past Medical History:   Diagnosis Date    Hypertension     Neurological disorder     Seizures ([facility] [address]     Stroke ([facility] [address] 2009       No past surgical history on file. Family History:   Problem Relation Age of Onset    Diabetes Other     Stroke Other        Social History     Social History    Marital status:      Spouse name: N/A    Number of children: N/A    Years of education: N/A     Occupational History    Not on file.      Social History Main Topics    Smoking status: Never Smoker    Smokeless tobacco: Never Used    Alcohol use Yes      Comment: Socially     Drug use: No    Sexual activity: Not on file     Other Topics Concern    Not on file     Social History Narrative         ALLERGIES: Tomato; Aspirin; Ciprofloxacin; Pcn [penicillins]; and Sulfa (sulfonamide antibiotics)    Review of Systems   Constitutional: Negative for chills and fever. Respiratory: Negative for shortness of breath. Cardiovascular: Negative for chest pain. Gastrointestinal: Negative for diarrhea, nausea and vomiting. Neurological: Positive for dizziness and headaches. All other systems reviewed and are negative. Vitals:    10/24/17 1230   BP: 180/85   Pulse: 63   Resp: 16   Temp: 98.8 °F (37.1 °C)   SpO2: 99%   Weight: 51.7 kg (114 lb)            Physical Exam   Constitutional: She is oriented to person, place, and time. She appears well-developed and well-nourished. No distress. HENT:   Head: Normocephalic and atraumatic. Eyes: Conjunctivae and EOM are normal. Right eye exhibits no discharge. Left eye exhibits no discharge. No scleral icterus. Neck: Normal range of motion. Neck supple. No tracheal deviation present. Cardiovascular: Normal rate, regular rhythm and normal heart sounds. No murmur heard. Pulmonary/Chest: Effort normal and breath sounds normal. No respiratory distress. She has no wheezes. She has no rales. Abdominal: Soft. She exhibits no distension. There is no tenderness. There is no rebound and no guarding. Musculoskeletal: Normal range of motion. She exhibits no edema or deformity. Neurological: She is alert and oriented to person, place, and time. No cranial nerve deficit. Intermittent myoclonic jerks. Mild dysarthria    Skin: Skin is warm and dry. She is not diaphoretic. Psychiatric: She has a normal mood and affect. Her behavior is normal. Judgment and thought content normal.        MDM  Number of Diagnoses or Management Options  Seizure [facility]):   Diagnosis management comments: Pt with seizure d/o with increased seizure frequency s/p keppra load and dose to be increased to 1000mg BID.   5:42 PM  Upon discharge pt noted she actually already takes 1000mg of keppra twice daily.   Discussed with [facility] neurologist Dr. Naveed Fernandes who recommended continuing at this dose and follow up with Dr. Claude Castillo. Dr. Claude Castillo will call pt to discuss. ED Course       Procedures    Vitals:  Patient Vitals for the past 12 hrs:   Temp Pulse Resp BP SpO2   10/24/17 1230 98.8 °F (37.1 °C) 63 16 180/85 99 %       Medications Ordered:  Medications   levETIRAcetam (KEPPRA) 500 mg in saline (iso-osm) 100 ml IVPB (1,000 mg IntraVENous New Bag 10/24/17 1557)   LORazepam (ATIVAN) injection 1 mg (1 mg IntraVENous Given 10/24/17 1320)   sodium chloride 0.9 % bolus infusion 1,000 mL (0 mL IntraVENous IV Completed 10/24/17 1430)       Lab Findings:  Recent Results (from the past 12 hour(s))   CBC WITH AUTOMATED DIFF    Collection Time: 10/24/17  1:02 PM   Result Value Ref Range    WBC 8.9 4.6 - 13.2 K/uL    RBC 4.20 4. 20 - 5.30 M/uL    HGB 12.5 12.0 - 16.0 g/dL    HCT 37.6 35.0 - 45.0 %    MCV 89.5 74.0 - 97.0 FL    MCH 29.8 24.0 - 34.0 PG    MCHC 33.2 31.0 - 37.0 g/dL    RDW 13.8 11.6 - 14.5 %    PLATELET 487 425 - 944 K/uL    MPV 11.6 9.2 - 11.8 FL    NEUTROPHILS 67 40 - 73 %    LYMPHOCYTES 24 21 - 52 %    MONOCYTES 8 3 - 10 %    EOSINOPHILS 1 0 - 5 %    BASOPHILS 0 0 - 2 %    ABS. NEUTROPHILS 5.9 1.8 - 8.0 K/UL    ABS. LYMPHOCYTES 2.1 0.9 - 3.6 K/UL    ABS. MONOCYTES 0.7 0.05 - 1.2 K/UL    ABS. EOSINOPHILS 0.1 0.0 - 0.4 K/UL    ABS.  BASOPHILS 0.0 0.0 - 0.06 K/UL    DF AUTOMATED     METABOLIC PANEL, BASIC    Collection Time: 10/24/17  1:02 PM   Result Value Ref Range    Sodium 141 136 - 145 mmol/L    Potassium 3.3 (L) 3.5 - 5.5 mmol/L    Chloride 109 (H) 100 - 108 mmol/L    CO2 25 21 - 32 mmol/L    Anion gap 7 3.0 - 18 mmol/L    Glucose 91 74 - 99 mg/dL    BUN 17 7.0 - 18 MG/DL    Creatinine 1.14 0.6 - 1.3 MG/DL    BUN/Creatinine ratio 15 12 - 20      GFR est AA 58 (L) >60 ml/min/1.73m2    GFR est non-AA 48 (L) >60 ml/min/1.73m2    Calcium 9.3 8.5 - 10.1 MG/DL     X-ray, CT or radiology findings or impressions:  CT HEAD WO CONT   Final Result  IMPRESSION:   [No evidence of intracranial hemorrhages or any other definable acute  intracranial process.     Redemonstration of large right parietal old infarction with encephalomalacia  volume loss.     Redemonstration of old lacunar infarction in left thalamus and left lentiform  Nucleus. As read by the radiologist.       Progress notes, consult notes, or additional procedure notes:  2:40 PM Consult: I discussed care with Dr. Magui Handy (oncall neurologist for Dr. Maurisio Hagen). It was a standard discussion including patient history, chief complaint, available diagnostic results, and predicted treatment course. Recommends loading pt 1g Keppa IV increasing to 1000 bid. Discussed plan with pt. Pt understands this and will follow up with Dr. Baron Ordonez. Diagnosis:   1. Seizure [facility])        Disposition: Discharged      Follow-up Information     Follow up With Details Comments [address] Benjamin Munoz NP Schedule an appointment as soon as possible for a visit  [address] [facility]  [address]      [facility]  If symptoms worsen, As needed [address]  [phone]           Patient's Medications   Start Taking    LEVETIRACETAM (KEPPRA) 1,000 MG TABLET    Take 1 Tab by mouth two (2) times a day. Continue Taking    AMLODIPINE (NORVASC) 10 MG TABLET    Take 1 Tab by mouth daily. ATORVASTATIN (LIPITOR) 40 MG TABLET    Take 1 Tab by mouth nightly. CLOPIDOGREL (PLAVIX) 75 MG TABLET    Take 75 mg by mouth daily. CYANOCOBALAMIN (VITAMIN B12) 500 MCG TABLET    Take 1 Tab by mouth daily. ERGOCALCIFEROL (VITAMIN D2) 50,000 UNIT CAPSULE    Take 50,000 Units by mouth two (2) days a week. FOLIC ACID (FOLVITE) 1 MG TABLET    Take 1 Tab by mouth daily. HYDRALAZINE (APRESOLINE) 50 MG TABLET    Take 50 mg by mouth two (2) times a day.     HYDROCODONE-ACETAMINOPHEN (NORCO) 5-325 MG PER TABLET    Take 1 Tab by mouth every four (4) hours as needed for Pain. Max Daily Amount: 6 Tabs. HYDROXYPROPYL METHYLCELLULOSE (GENTEAL) 0.2 % OPHTHALMIC SOLUTION    Administer 2 Drops to both eyes as needed. LEVETIRACETAM (KEPPRA) 750 MG TABLET    Take 1 Tab by mouth two (2) times a day. LISINOPRIL (PRINIVIL, ZESTRIL) 10 MG TABLET    Take 10 mg by mouth daily. MELOXICAM (MOBIC) 15 MG TABLET    Take 1 Tab by mouth daily (with breakfast). METHOCARBAMOL (ROBAXIN-750) 750 MG TABLET    Take 1 Tab by mouth three (3) times daily. PANTOPRAZOLE (PROTONIX) 40 MG TABLET    Take 1 Tab by mouth Daily (before breakfast). TRIAMCINOLONE ACETONIDE (KENALOG) 0.1 % TOPICAL CREAM    Apply  to affected area two (2) times a day. use thin layer   These Medications have changed    No medications on file   Stop Taking    No medications on file       Scribe Attestation      Nick Denson acting as a scribe for and in the presence of Prince Ochoa MD     October 24, 2017 at 1:44 PM       Provider Attestation:      I personally performed the services described in the documentation, reviewed the documentation, as recorded by the scribe in my presence, and it accurately and completely records my words and actions.  October 24, 2017 at 1:44 PM - Prince Ochoa MD

## 2017-10-24 NOTE — ED NOTES
Pt discharged per MD order. Pt verbalized understanding of discharge instructions and follow up care. Prescription education given. Pt wheeled out of ED by ED staff.

## 2017-10-24 NOTE — DISCHARGE INSTRUCTIONS
Seizure: Care Instructions  Your Care Instructions    Seizures are caused by abnormal patterns of electrical signals in the brain. They are different for each person. Seizures can affect movement, speech, vision, or awareness. Some people have only slight shaking of a hand and do not pass out. Other people may pass out and have violent shaking of the whole body. Some people appear to stare into space. They are awake, but they can't respond normally. Later, they may not remember what happened. You may need tests to identify the type and cause of the seizures. A seizure may occur only once, or you may have them more than one time. Taking medicines as directed and following up with your doctor may help keep you from having more seizures. The doctor has checked you carefully, but problems can develop later. If you notice any problems or new symptoms, get medical treatment right away. Follow-up care is a key part of your treatment and safety. Be sure to make and go to all appointments, and call your doctor if you are having problems. It's also a good idea to know your test results and keep a list of the medicines you take. How can you care for yourself at home? · Be safe with medicines. Take your medicines exactly as prescribed. Call your doctor if you think you are having a problem with your medicine. · Do not do any activity that could be dangerous to you or others until your doctor says it is safe to do so. For example, do not drive a car, operate machinery, swim, or climb ladders. · Be sure that anyone treating you for any health problem knows that you have had a seizure and what medicines you are taking for it. · Identify and avoid things that may make you more likely to have a seizure. These may include lack of sleep, alcohol or drug use, stress, or not eating. · Make sure you go to your follow-up appointment. When should you call for help? Call 911 anytime you think you may need emergency care. For example, call if:  · You have another seizure. · You have more than one seizure in 24 hours. · You have new symptoms, such as trouble walking, speaking, or thinking clearly. Call your doctor now or seek immediate medical care if:  · You are not acting normally. Watch closely for changes in your health, and be sure to contact your doctor if you have any problems. Where can you learn more? Go to http://teresa-mehul.info/. Enter M232 in the search box to learn more about \"Seizure: Care Instructions. \"  Current as of: October 14, 2016  Content Version: 11.3  © 6168-5686 [employer]. Care instructions adapted under license by [employer] (which disclaims liability or warranty for this information). If you have questions about a medical condition or this instruction, always ask your healthcare professional. [address] any warranty or liability for your use of this information.

## 2017-10-24 NOTE — LETTER
[facility] 
[facility] EMERGENCY DEPT 
[address] [facility] [place] [id number] [phone] Work/School Note Date: 10/24/2017 To Whom It May concern: 
 
Aziza Gonzales was seen and treated today in the emergency room by the following provider(s): 
Attending Provider: Josr Recio MD. Aziza Gonzales may return to work after being cleared by her neurologist.  
 
Sincerely, Belinda Awan RN

## 2017-10-26 LAB — LEVETIRACETAM SERPL-MCNC: 89 UG/ML (ref 10–40)

## 2017-11-07 ENCOUNTER — HOSPITAL ENCOUNTER (EMERGENCY)
Age: 62
Discharge: HOME | End: 2017-11-07
Attending: EMERGENCY MEDICINE
Payer: COMMERCIAL

## 2017-11-07 VITALS
HEART RATE: 72 BPM | WEIGHT: 115 LBS | SYSTOLIC BLOOD PRESSURE: 132 MMHG | OXYGEN SATURATION: 98 % | TEMPERATURE: 98.6 F | HEIGHT: 60 IN | RESPIRATION RATE: 15 BRPM | BODY MASS INDEX: 22.58 KG/M2 | DIASTOLIC BLOOD PRESSURE: 101 MMHG

## 2017-11-07 DIAGNOSIS — G40.909 RECURRENT SEIZURES (HCC): Primary | ICD-10-CM

## 2017-11-07 LAB
ANION GAP BLD CALC-SCNC: 12 MMOL/L (ref 10–20)
APPEARANCE UR: CLEAR
BASOPHILS # BLD: 0.1 K/UL (ref 0–0.1)
BASOPHILS NFR BLD: 1 % (ref 0–2)
BILIRUB UR QL: NEGATIVE
BUN BLD-MCNC: 31 MG/DL (ref 7–18)
CA-I BLD-MCNC: 1.07 MMOL/L (ref 1.12–1.32)
CHLORIDE BLD-SCNC: 113 MMOL/L (ref 100–108)
CO2 BLD-SCNC: 24 MMOL/L (ref 19–24)
COLOR UR: YELLOW
CREAT UR-MCNC: 1.1 MG/DL (ref 0.6–1.3)
DIFFERENTIAL METHOD BLD: ABNORMAL
EOSINOPHIL # BLD: 0.2 K/UL (ref 0–0.4)
EOSINOPHIL NFR BLD: 3 % (ref 0–5)
EPITH CASTS URNS QL MICRO: NORMAL /LPF (ref 0–5)
ERYTHROCYTE [DISTWIDTH] IN BLOOD BY AUTOMATED COUNT: 13.9 % (ref 11.6–14.5)
GLUCOSE BLD STRIP.AUTO-MCNC: 84 MG/DL (ref 74–106)
GLUCOSE UR STRIP.AUTO-MCNC: NEGATIVE MG/DL
HCT VFR BLD AUTO: 35.1 % (ref 35–45)
HCT VFR BLD CALC: 39 % (ref 36–49)
HGB BLD-MCNC: 11.4 G/DL (ref 12–16)
HGB BLD-MCNC: 13.3 G/DL (ref 12–16)
HGB UR QL STRIP: NEGATIVE
KETONES UR QL STRIP.AUTO: NEGATIVE MG/DL
LEUKOCYTE ESTERASE UR QL STRIP.AUTO: ABNORMAL
LYMPHOCYTES # BLD: 1.9 K/UL (ref 0.9–3.6)
LYMPHOCYTES NFR BLD: 28 % (ref 21–52)
MCH RBC QN AUTO: 29.4 PG (ref 24–34)
MCHC RBC AUTO-ENTMCNC: 32.5 G/DL (ref 31–37)
MCV RBC AUTO: 90.5 FL (ref 74–97)
MONOCYTES # BLD: 0.4 K/UL (ref 0.05–1.2)
MONOCYTES NFR BLD: 6 % (ref 3–10)
NEUTS SEG # BLD: 4.2 K/UL (ref 1.8–8)
NEUTS SEG NFR BLD: 62 % (ref 40–73)
NITRITE UR QL STRIP.AUTO: NEGATIVE
PH UR STRIP: 6 [PH] (ref 5–8)
PLATELET # BLD AUTO: 182 K/UL (ref 135–420)
PMV BLD AUTO: 11.8 FL (ref 9.2–11.8)
POTASSIUM BLD-SCNC: 4.8 MMOL/L (ref 3.5–5.5)
PROT UR STRIP-MCNC: NEGATIVE MG/DL
RBC # BLD AUTO: 3.88 M/UL (ref 4.2–5.3)
RBC #/AREA URNS HPF: NORMAL /HPF (ref 0–5)
SODIUM BLD-SCNC: 143 MMOL/L (ref 136–145)
SP GR UR REFRACTOMETRY: 1.02 (ref 1–1.03)
UROBILINOGEN UR QL STRIP.AUTO: 1 EU/DL (ref 0.2–1)
WBC # BLD AUTO: 6.8 K/UL (ref 4.6–13.2)
WBC URNS QL MICRO: NORMAL /HPF (ref 0–4)

## 2017-11-07 PROCEDURE — 96374 THER/PROPH/DIAG INJ IV PUSH: CPT

## 2017-11-07 PROCEDURE — 99285 EMERGENCY DEPT VISIT HI MDM: CPT

## 2017-11-07 PROCEDURE — 74011250636 HC RX REV CODE- 250/636: Performed by: EMERGENCY MEDICINE

## 2017-11-07 PROCEDURE — 80047 BASIC METABLC PNL IONIZED CA: CPT

## 2017-11-07 PROCEDURE — 85025 COMPLETE CBC W/AUTO DIFF WBC: CPT | Performed by: EMERGENCY MEDICINE

## 2017-11-07 PROCEDURE — 81001 URINALYSIS AUTO W/SCOPE: CPT | Performed by: EMERGENCY MEDICINE

## 2017-11-07 RX ORDER — LEVETIRACETAM 10 MG/ML
1000 INJECTION INTRAVASCULAR
Status: COMPLETED | OUTPATIENT
Start: 2017-11-07 | End: 2017-11-07

## 2017-11-07 RX ADMIN — LEVETIRACETAM 1000 MG: 10 INJECTION INTRAVENOUS at 11:49

## 2017-11-07 NOTE — ED PROVIDER NOTES
HPI Comments: 11:29 AM: Cale Solomon is a 60y.o. year old female with hx of stroke, HTN, and seizures presenting to the ED via EMT with c/o multiple episodes of seizure sx onset 3 days. Family states pt had several episodes over the weekend. Per EMT, pt was seizing upon arrival and Ativan 2mg IV was given PTA. Pt denies any current pain. HPI limited by pt current condition. The history is provided by the patient, a relative and the EMS personnel. The history is limited by the condition of the patient. Past Medical History:   Diagnosis Date    Hypertension     Neurological disorder     Seizures ([facility]     Stroke ([facility] 2009       History reviewed. No pertinent surgical history. Family History:   Problem Relation Age of Onset    Diabetes Other     Stroke Other        Social History     Social History    Marital status:      Spouse name: N/A    Number of children: N/A    Years of education: N/A     Occupational History    Not on file. Social History Main Topics    Smoking status: Never Smoker    Smokeless tobacco: Never Used    Alcohol use Yes      Comment: Socially     Drug use: No    Sexual activity: Not on file     Other Topics Concern    Not on file     Social History Narrative         ALLERGIES: Tomato; Aspirin; Ciprofloxacin; Pcn [penicillins]; and Sulfa (sulfonamide antibiotics)    Review of Systems   Unable to perform ROS: Other       Vitals:    11/07/17 1134   BP: 157/70   Pulse: 69   Resp: 17   Temp: 98.6 °F (37 °C)   SpO2: 100%   Weight: 52.2 kg (115 lb)   Height: 5' (1.524 m)            Physical Exam   Constitutional: She appears well-developed and well-nourished. No distress. Drowsy but follows commands    HENT:   Head: Normocephalic and atraumatic.    Right Ear: External ear normal.   Left Ear: External ear normal.   Nose: Nose normal.   Mouth/Throat: Oropharynx is clear and moist.   Eyes: Conjunctivae and EOM are normal. Pupils are equal, round, and reactive to light. No scleral icterus. Neck: Normal range of motion. Neck supple. No JVD present. No tracheal deviation present. No thyromegaly present. Cardiovascular: Normal rate, regular rhythm, normal heart sounds and intact distal pulses. Exam reveals no gallop and no friction rub. No murmur heard. Pulmonary/Chest: Effort normal and breath sounds normal. She exhibits no tenderness. Abdominal: Soft. Bowel sounds are normal. She exhibits no distension. There is no tenderness. There is no rebound and no guarding. Musculoskeletal: She exhibits no edema or tenderness. No deformity    Lymphadenopathy:     She has no cervical adenopathy. Neurological: No cranial nerve deficit. Coordination normal.   Drowsy, moving all 4 extremities, globally weak   Skin: Skin is warm and dry. Nursing note and vitals reviewed. MDM  Number of Diagnoses or Management Options  Diagnosis management comments: Pt is a 60yo female with a hx of seizure disorder, HTN presents after having a witnessed seizure at home. EMS noted her to have active seizures so was given ativan. Pt is now drowsy without trauma. Will trend her labs, give IV keppra and discuss compliance with patient and family.  Adelaida Lindsey DO 11:56 AM      ED Course       Procedures    Vitals:  Patient Vitals for the past 12 hrs:   Temp Pulse Resp BP SpO2   11/07/17 1134 98.6 °F (37 °C) 69 17 157/70 100 %       Medications Ordered:  Medications   levETIRAcetam (KEPPRA) 1,000 mg in saline (iso-osm) 100 ml IVPB (0 mg IntraVENous IV Completed 11/7/17 1204)       Lab Findings:  Recent Results (from the past 12 hour(s))   CBC WITH AUTOMATED DIFF    Collection Time: 11/07/17 11:47 AM   Result Value Ref Range    WBC 6.8 4.6 - 13.2 K/uL    RBC 3.88 (L) 4.20 - 5.30 M/uL    HGB 11.4 (L) 12.0 - 16.0 g/dL    HCT 35.1 35.0 - 45.0 %    MCV 90.5 74.0 - 97.0 FL    MCH 29.4 24.0 - 34.0 PG    MCHC 32.5 31.0 - 37.0 g/dL    RDW 13.9 11.6 - 14.5 %    PLATELET 304 129 - 420 K/uL    MPV 11.8 9.2 - 11.8 FL    NEUTROPHILS 62 40 - 73 %    LYMPHOCYTES 28 21 - 52 %    MONOCYTES 6 3 - 10 %    EOSINOPHILS 3 0 - 5 %    BASOPHILS 1 0 - 2 %    ABS. NEUTROPHILS 4.2 1.8 - 8.0 K/UL    ABS. LYMPHOCYTES 1.9 0.9 - 3.6 K/UL    ABS. MONOCYTES 0.4 0.05 - 1.2 K/UL    ABS. EOSINOPHILS 0.2 0.0 - 0.4 K/UL    ABS. BASOPHILS 0.1 0.0 - 0.1 K/UL    DF AUTOMATED     POC CHEM8    Collection Time: 11/07/17  2:24 PM   Result Value Ref Range    CO2, POC 24 19 - 24 MMOL/L    Glucose, POC 84 74 - 106 MG/DL    BUN, POC 31 (H) 7 - 18 MG/DL    Creatinine, POC 1.1 0.6 - 1.3 MG/DL    GFRAA, POC >60 >60 ml/min/1.73m2    GFRNA, POC 50 (L) >60 ml/min/1.73m2    Sodium,  136 - 145 MMOL/L    Potassium, POC 4.8 3.5 - 5.5 MMOL/L    Calcium, ionized (POC) 1.07 (L) 1.12 - 1.32 MMOL/L    Chloride,  (H) 100 - 108 MMOL/L    Anion gap, POC 12 10 - 20      Hematocrit, POC 39 36 - 49 %    Hemoglobin, POC 13.3 12 - 16 G/DL   URINALYSIS W/ RFLX MICROSCOPIC    Collection Time: 11/07/17  5:49 PM   Result Value Ref Range    Color YELLOW      Appearance CLEAR      Specific gravity 1.016 1.005 - 1.030      pH (UA) 6.0 5.0 - 8.0      Protein NEGATIVE  NEG mg/dL    Glucose NEGATIVE  NEG mg/dL    Ketone NEGATIVE  NEG mg/dL    Bilirubin NEGATIVE  NEG      Blood NEGATIVE  NEG      Urobilinogen 1.0 0.2 - 1.0 EU/dL    Nitrites NEGATIVE  NEG      Leukocyte Esterase SMALL (A) NEG     URINE MICROSCOPIC ONLY    Collection Time: 11/07/17  5:49 PM   Result Value Ref Range    WBC 1 to 4 0 - 4 /hpf    RBC 0 to 2 0 - 5 /hpf    Epithelial cells FEW 0 - 5 /lpf         Progress notes, consult notes, or additional procedure notes:  3:52 PM  Discussed case with son. Pt had several seizures since Friday.  tried new medication plan and more seizures resulted. Pt ran out of meds on Thurs with son states might have triggered episodes. Pt is now taking all medications at night.   There is another medication pt Neurologist wants her to try but hasn't been picked up yet. Pt is sleeping well and son states she hasn't been recently. Reevaluation of the patient:   5:43 PM - Reviewed medications with pt son. Pt is better and back to baseline. Will discharge after UA results come back. Diagnosis:   1. Recurrent seizures ([address]        Disposition: Discharge    Follow-up Information     None           Patient's Medications   Start Taking    No medications on file   Continue Taking    AMLODIPINE (NORVASC) 10 MG TABLET    Take 1 Tab by mouth daily. ATORVASTATIN (LIPITOR) 40 MG TABLET    Take 1 Tab by mouth nightly. CLOPIDOGREL (PLAVIX) 75 MG TABLET    Take 75 mg by mouth daily. CYANOCOBALAMIN (VITAMIN B12) 500 MCG TABLET    Take 1 Tab by mouth daily. ERGOCALCIFEROL (VITAMIN D2) 50,000 UNIT CAPSULE    Take 50,000 Units by mouth two (2) days a week. FOLIC ACID (FOLVITE) 1 MG TABLET    Take 1 Tab by mouth daily. HYDRALAZINE (APRESOLINE) 50 MG TABLET    Take 50 mg by mouth two (2) times a day. HYDROCODONE-ACETAMINOPHEN (NORCO) 5-325 MG PER TABLET    Take 1 Tab by mouth every four (4) hours as needed for Pain. Max Daily Amount: 6 Tabs. HYDROXYPROPYL METHYLCELLULOSE (GENTEAL) 0.2 % OPHTHALMIC SOLUTION    Administer 2 Drops to both eyes as needed. LEVETIRACETAM (KEPPRA) 1,000 MG TABLET    Take 1 Tab by mouth two (2) times a day. LEVETIRACETAM (KEPPRA) 750 MG TABLET    Take 1 Tab by mouth two (2) times a day. LISINOPRIL (PRINIVIL, ZESTRIL) 10 MG TABLET    Take 10 mg by mouth daily. MELOXICAM (MOBIC) 15 MG TABLET    Take 1 Tab by mouth daily (with breakfast). METHOCARBAMOL (ROBAXIN-750) 750 MG TABLET    Take 1 Tab by mouth three (3) times daily. PANTOPRAZOLE (PROTONIX) 40 MG TABLET    Take 1 Tab by mouth Daily (before breakfast). TRIAMCINOLONE ACETONIDE (KENALOG) 0.1 % TOPICAL CREAM    Apply  to affected area two (2) times a day.  use thin layer   These Medications have changed    No medications on file   Stop Taking    No medications on file       Scribe Dea Oneill [address] acting as a scribe for and in the presence of Celia Laura MD      November 07, 2017 at 12:03 PM       Provider Attestation:      I personally performed the services described in the documentation, reviewed the documentation, as recorded by the scribe in my presence, and it accurately and completely records my words and actions.  November 07, 2017 at 12:03 PM - Celia Laura MD

## 2017-11-07 NOTE — ED TRIAGE NOTES
The patient presents with complaint of a seizure today. Per medic, James Jackson was seizing when we got there. We gave Ativan 2mg IV PTA. \"  The patient is currently responsive to verbal commands. Denies any pain at present.

## 2017-11-07 NOTE — DISCHARGE INSTRUCTIONS
Epilepsy: Care Instructions  Your Care Instructions    Epilepsy is a common condition that causes repeated seizures. The seizures are caused by bursts of electrical activity in the brain that aren't normal. Seizures may cause problems with muscle control, movement, speech, vision, or awareness. They can be scary. Epilepsy affects each person differently. Some people have only a few seizures. Others get them more often. If you know what triggers a seizure, you may be able to avoid having one. You can take medicines to control and reduce seizures. You and your doctor will need to find the right combination, schedule, and dose of medicine. This may take time and careful changes. Seizures may get worse and happen more often over time. Follow-up care is a key part of your treatment and safety. Be sure to make and go to all appointments, and call your doctor if you are having problems. It's also a good idea to know your test results and keep a list of the medicines you take. How can you care for yourself at home? · Be safe with medicines. Take your medicines exactly as prescribed. Call your doctor if you think you are having a problem with your medicine. · Make a treatment plan with your doctor. Be sure to follow your plan. · Try to identify and avoid things that may make you more likely to have a seizure. These may include:  ¨ Not getting enough sleep. ¨ Using drugs or alcohol. ¨ Being emotionally stressed. ¨ Skipping meals. · Keep a record of any seizures you have. Note the date, time of day, and any details about the seizure that you can remember. Your doctor can use this information to plan or adjust your medicine or other treatment. · Be sure that any doctor treating you for another condition knows that you have epilepsy. Each doctor should know what medicines you are taking, if any. · Wear a medical ID bracelet. You can buy this at most [employer].  If you have a seizure that leaves you unconscious or unable to speak for yourself, this bracelet will let those who are treating you know that you have epilepsy. · Talk to your doctor about whether it is safe for you to do certain activities, such as drive or swim. When should you call for help? Call 911 anytime you think you may need emergency care. For example, call if:  ? · A seizure does not stop as it normally does. ? · You have new symptoms such as:  ¨ Numbness, tingling, or weakness on one side of your body or face. ¨ Vision changes. ¨ Trouble speaking or thinking clearly. ?Call your doctor now or seek immediate medical care if:  ? · You have a fever. ? · You have a severe headache. ? Watch closely for changes in your health, and be sure to contact your doctor if:  ? · The normal pattern or features of your seizures change. Where can you learn more? Go to http://teresa-mehul.info/. Amber Carrillo in the search box to learn more about \"Epilepsy: Care Instructions. \"  Current as of: October 14, 2016  Content Version: 11.4  © 6905-4728 Healthwise, Incorporated. Care instructions adapted under license by [employer] (which disclaims liability or warranty for this information). If you have questions about a medical condition or this instruction, always ask your healthcare professional. [place] [address] any warranty or liability for your use of this information.

## 2017-11-08 NOTE — ED NOTES
Pt discharged per MD order. Pt verbalized understanding of discharge instructions and follow up care. Pt wheeled out of ED by ED staff.

## 2018-03-06 ENCOUNTER — APPOINTMENT (OUTPATIENT)
Dept: CT IMAGING | Age: 63
End: 2018-03-06
Attending: PHYSICIAN ASSISTANT
Payer: COMMERCIAL

## 2018-03-06 ENCOUNTER — HOSPITAL ENCOUNTER (EMERGENCY)
Age: 63
Discharge: HOME | End: 2018-03-06
Attending: EMERGENCY MEDICINE
Payer: COMMERCIAL

## 2018-03-06 VITALS
DIASTOLIC BLOOD PRESSURE: 85 MMHG | SYSTOLIC BLOOD PRESSURE: 173 MMHG | HEART RATE: 81 BPM | RESPIRATION RATE: 20 BRPM | TEMPERATURE: 98.1 F | OXYGEN SATURATION: 100 %

## 2018-03-06 DIAGNOSIS — Z86.73 HISTORY OF CVA (CEREBROVASCULAR ACCIDENT): ICD-10-CM

## 2018-03-06 DIAGNOSIS — R07.89 ATYPICAL CHEST PAIN: Primary | ICD-10-CM

## 2018-03-06 DIAGNOSIS — Z86.59 HISTORY OF ANXIETY: ICD-10-CM

## 2018-03-06 LAB
ALBUMIN SERPL-MCNC: 4.2 G/DL (ref 3.4–5)
ALBUMIN/GLOB SERPL: 1.2 {RATIO} (ref 0.8–1.7)
ALP SERPL-CCNC: 93 U/L (ref 45–117)
ALT SERPL-CCNC: 15 U/L (ref 13–56)
AMMONIA PLAS-SCNC: 18 UMOL/L (ref 11–32)
ANION GAP SERPL CALC-SCNC: 7 MMOL/L (ref 3–18)
AST SERPL-CCNC: 19 U/L (ref 15–37)
ATRIAL RATE: 68 BPM
BASOPHILS # BLD: 0.1 K/UL (ref 0–0.1)
BASOPHILS NFR BLD: 1 % (ref 0–2)
BILIRUB SERPL-MCNC: 0.4 MG/DL (ref 0.2–1)
BUN SERPL-MCNC: 16 MG/DL (ref 7–18)
BUN/CREAT SERPL: 14 (ref 12–20)
CALCIUM SERPL-MCNC: 9.6 MG/DL (ref 8.5–10.1)
CALCULATED P AXIS, ECG09: 49 DEGREES
CALCULATED R AXIS, ECG10: -15 DEGREES
CALCULATED T AXIS, ECG11: 105 DEGREES
CHLORIDE SERPL-SCNC: 107 MMOL/L (ref 100–108)
CK MB CFR SERPL CALC: NORMAL % (ref 0–4)
CK MB SERPL-MCNC: <1 NG/ML (ref 5–25)
CK SERPL-CCNC: 123 U/L (ref 26–192)
CO2 SERPL-SCNC: 27 MMOL/L (ref 21–32)
CREAT SERPL-MCNC: 1.16 MG/DL (ref 0.6–1.3)
DIAGNOSIS, 93000: NORMAL
DIFFERENTIAL METHOD BLD: ABNORMAL
EOSINOPHIL # BLD: 0.3 K/UL (ref 0–0.4)
EOSINOPHIL NFR BLD: 4 % (ref 0–5)
ERYTHROCYTE [DISTWIDTH] IN BLOOD BY AUTOMATED COUNT: 14.2 % (ref 11.6–14.5)
GLOBULIN SER CALC-MCNC: 3.5 G/DL (ref 2–4)
GLUCOSE SERPL-MCNC: 81 MG/DL (ref 74–99)
HCT VFR BLD AUTO: 39.3 % (ref 35–45)
HGB BLD-MCNC: 13.3 G/DL (ref 12–16)
INR PPP: 1 (ref 0.8–1.2)
LYMPHOCYTES # BLD: 1.2 K/UL (ref 0.9–3.6)
LYMPHOCYTES NFR BLD: 18 % (ref 21–52)
MAGNESIUM SERPL-MCNC: 2.2 MG/DL (ref 1.6–2.6)
MCH RBC QN AUTO: 29.7 PG (ref 24–34)
MCHC RBC AUTO-ENTMCNC: 33.8 G/DL (ref 31–37)
MCV RBC AUTO: 87.7 FL (ref 74–97)
MONOCYTES # BLD: 0.5 K/UL (ref 0.05–1.2)
MONOCYTES NFR BLD: 8 % (ref 3–10)
NEUTS SEG # BLD: 4.9 K/UL (ref 1.8–8)
NEUTS SEG NFR BLD: 69 % (ref 40–73)
P-R INTERVAL, ECG05: 160 MS
PLATELET # BLD AUTO: 214 K/UL (ref 135–420)
PMV BLD AUTO: 11.7 FL (ref 9.2–11.8)
POTASSIUM SERPL-SCNC: 3.2 MMOL/L (ref 3.5–5.5)
PROT SERPL-MCNC: 7.7 G/DL (ref 6.4–8.2)
PROTHROMBIN TIME: 12.4 SEC (ref 11.5–15.2)
Q-T INTERVAL, ECG07: 388 MS
QRS DURATION, ECG06: 74 MS
QTC CALCULATION (BEZET), ECG08: 412 MS
RBC # BLD AUTO: 4.48 M/UL (ref 4.2–5.3)
SODIUM SERPL-SCNC: 141 MMOL/L (ref 136–145)
TROPONIN I SERPL-MCNC: 0.03 NG/ML (ref 0–0.04)
TROPONIN I SERPL-MCNC: 0.03 NG/ML (ref 0–0.04)
VENTRICULAR RATE, ECG03: 68 BPM
WBC # BLD AUTO: 7 K/UL (ref 4.6–13.2)

## 2018-03-06 PROCEDURE — 99284 EMERGENCY DEPT VISIT MOD MDM: CPT

## 2018-03-06 PROCEDURE — 70450 CT HEAD/BRAIN W/O DYE: CPT

## 2018-03-06 PROCEDURE — 80053 COMPREHEN METABOLIC PANEL: CPT | Performed by: PHYSICIAN ASSISTANT

## 2018-03-06 PROCEDURE — 83735 ASSAY OF MAGNESIUM: CPT | Performed by: PHYSICIAN ASSISTANT

## 2018-03-06 PROCEDURE — 85610 PROTHROMBIN TIME: CPT | Performed by: PHYSICIAN ASSISTANT

## 2018-03-06 PROCEDURE — 82550 ASSAY OF CK (CPK): CPT | Performed by: PHYSICIAN ASSISTANT

## 2018-03-06 PROCEDURE — 82140 ASSAY OF AMMONIA: CPT | Performed by: PHYSICIAN ASSISTANT

## 2018-03-06 PROCEDURE — 93005 ELECTROCARDIOGRAM TRACING: CPT

## 2018-03-06 PROCEDURE — 85025 COMPLETE CBC W/AUTO DIFF WBC: CPT | Performed by: PHYSICIAN ASSISTANT

## 2018-03-06 PROCEDURE — 74011250637 HC RX REV CODE- 250/637: Performed by: PHYSICIAN ASSISTANT

## 2018-03-06 RX ORDER — HYDROXYZINE 50 MG/1
50 TABLET, FILM COATED ORAL
Qty: 20 TAB | Refills: 0 | Status: SHIPPED | OUTPATIENT
Start: 2018-03-06 | End: 2020-09-08

## 2018-03-06 RX ORDER — LORAZEPAM 1 MG/1
1 TABLET ORAL
Status: COMPLETED | OUTPATIENT
Start: 2018-03-06 | End: 2018-03-06

## 2018-03-06 RX ORDER — POTASSIUM CHLORIDE 20 MEQ/1
20 TABLET, EXTENDED RELEASE ORAL
Status: COMPLETED | OUTPATIENT
Start: 2018-03-06 | End: 2018-03-06

## 2018-03-06 RX ORDER — LORAZEPAM 2 MG/ML
1 INJECTION INTRAMUSCULAR
Status: DISCONTINUED | OUTPATIENT
Start: 2018-03-06 | End: 2018-03-06

## 2018-03-06 RX ADMIN — POTASSIUM CHLORIDE 20 MEQ: 20 TABLET, EXTENDED RELEASE ORAL at 12:20

## 2018-03-06 RX ADMIN — LORAZEPAM 1 MG: 1 TABLET ORAL at 12:37

## 2018-03-06 NOTE — ED TRIAGE NOTES
Per son: \"Since yesterday she has been having the hiccups today she woke up talking regular however by the time she got to work she started making these noises. \" Patient noted to have repetitive  \"Shrilling noises\"

## 2018-03-06 NOTE — DISCHARGE INSTRUCTIONS
Anxiety Disorder: Care Instructions  Your Care Instructions    Anxiety is a normal reaction to stress. Difficult situations can cause you to have symptoms such as sweaty palms and a nervous feeling. In an anxiety disorder, the symptoms are far more severe. Constant worry, muscle tension, trouble sleeping, nausea and diarrhea, and other symptoms can make normal daily activities difficult or impossible. These symptoms may occur for no reason, and they can affect your work, school, or social life. Medicines, counseling, and self-care can all help. Follow-up care is a key part of your treatment and safety. Be sure to make and go to all appointments, and call your doctor if you are having problems. It's also a good idea to know your test results and keep a list of the medicines you take. How can you care for yourself at home? · Take medicines exactly as directed. Call your doctor if you think you are having a problem with your medicine. · Go to your counseling sessions and follow-up appointments. · Recognize and accept your anxiety. Then, when you are in a situation that makes you anxious, say to yourself, \"This is not an emergency. I feel uncomfortable, but I am not in danger. I can keep going even if I feel anxious. \"  · Be kind to your body:  ¨ Relieve tension with exercise or a massage. ¨ Get enough rest.  ¨ Avoid alcohol, caffeine, nicotine, and illegal drugs. They can increase your anxiety level and cause sleep problems. ¨ Learn and do relaxation techniques. See below for more about these techniques. · Engage your mind. Get out and do something you enjoy. Go to a funny movie, or take a walk or hike. Plan your day. Having too much or too little to do can make you anxious. · Keep a record of your symptoms. Discuss your fears with a good friend or family member, or join a support group for people with similar problems. Talking to others sometimes relieves stress.   · Get involved in social groups, or volunteer to help others. Being alone sometimes makes things seem worse than they are. · Get at least 30 minutes of exercise on most days of the week to relieve stress. Walking is a good choice. You also may want to do other activities, such as running, swimming, cycling, or playing tennis or team sports. Relaxation techniques  Do relaxation exercises 10 to 20 minutes a day. You can play soothing, relaxing music while you do them, if you wish. · Tell others in your house that you are going to do your relaxation exercises. Ask them not to disturb you. · Find a comfortable place, away from all distractions and noise. · Lie down on your back, or sit with your back straight. · Focus on your breathing. Make it slow and steady. · Breathe in through your nose. Breathe out through either your nose or mouth. · Breathe deeply, filling up the area between your navel and your rib cage. Breathe so that your belly goes up and down. · Do not hold your breath. · Breathe like this for 5 to 10 minutes. Notice the feeling of calmness throughout your whole body. As you continue to breathe slowly and deeply, relax by doing the following for another 5 to 10 minutes:  · Tighten and relax each muscle group in your body. You can begin at your toes and work your way up to your head. · Imagine your muscle groups relaxing and becoming heavy. · Empty your mind of all thoughts. · Let yourself relax more and more deeply. · Become aware of the state of calmness that surrounds you. · When your relaxation time is over, you can bring yourself back to alertness by moving your fingers and toes and then your hands and feet and then stretching and moving your entire body. Sometimes people fall asleep during relaxation, but they usually wake up shortly afterward. · Always give yourself time to return to full alertness before you drive a car or do anything that might cause an accident if you are not fully alert.  Never play a relaxation tape while you drive a car. When should you call for help? Call 911 anytime you think you may need emergency care. For example, call if:  ? · You feel you cannot stop from hurting yourself or someone else. ? Keep the numbers for these national suicide hotlines: 8-312-710-TALK (3-367.106.8162) and 5-726-BNMYEHZ (5-975.638.6418). If you or someone you know talks about suicide or feeling hopeless, get help right away. ? Watch closely for changes in your health, and be sure to contact your doctor if:  ? · You have anxiety or fear that affects your life. ? · You have symptoms of anxiety that are new or different from those you had before. Where can you learn more? Go to http://teresa-mehul.info/. Enter P754 in the search box to learn more about \"Anxiety Disorder: Care Instructions. \"  Current as of: May 12, 2017  Content Version: 11.4  © 9055-9127 Healthwise, Incorporated. Care instructions adapted under license by [employer] (which disclaims liability or warranty for this information). If you have questions about a medical condition or this instruction, always ask your healthcare professional. [address] any warranty or liability for your use of this information.

## 2018-03-06 NOTE — ED TRIAGE NOTES
Patient arrived from home c/o anxiety and depression. Patient states she does not currently take medications at this time. Patient resting on stretcher.

## 2018-03-06 NOTE — LETTER
[address] [place] [address] Dr [facility] EMERGENCY DEPT 
[address] [facility] [id number] 
[phone] Work/School Note Date: 3/6/2018 To Whom It May concern: 
 
Karlos Pump was seen and treated today in the emergency room by the following provider(s): 
Physician Assistant: Melyssa Guallpa PA-C. Karlos Pump may return to work on Feb 8 th, 2018. Sincerely, MING Barlow

## 2018-03-06 NOTE — ED PROVIDER NOTES
EMERGENCY DEPARTMENT HISTORY AND PHYSICAL EXAM    Date: 3/6/2018  Patient Name: Royce Brunner    History of Presenting Illness     Chief Complaint   Patient presents with    Anxiety         History Provided By: Patient and family member    Chief Complaint: CP  Duration: 1 Days  Timing:  Constant  Location: generalized anterior chest  Quality: unable to specify  Severity: Moderate  Modifying Factors: none  Associated Symptoms: hiccuping, verbal outbursts, not speaking      Additional History (Context): Royce Brunner is a 60 y.o. female with CVA, HTN, seizures who presents with CP, HA, and difficulty communicating. Cp constant, tender, since 7:00 AM.  When asked questions, pt makes noises but does not answer; is able to nod or shake head. Per family member, believes pt is having a panic attack. States since yesterday pt has been \"hiccuping more\" and acting strangely, but able to communicate and speak normally; woke up this morning, got ready for work, was speaking normally with intermittent verbal outbursts and \"shrilling noises\"; work contacted family member stating that pt was not doing well and was sent home; has not been speaking normally (only making noises) since approx 7:30 AM.  + h/o seizure, no witnessed seizure.  + h/o CVA x 2. Pt admits to feeling anxiety at this time. PCP: Reece Sands NP    Current Outpatient Prescriptions   Medication Sig Dispense Refill    hydrOXYzine HCl (ATARAX) 50 mg tablet Take 1 Tab by mouth four (4) times daily as needed for Itching. 20 Tab 0    hydrALAZINE (APRESOLINE) 50 mg tablet Take 50 mg by mouth two (2) times a day.  ergocalciferol (VITAMIN D2) 50,000 unit capsule Take 50,000 Units by mouth two (2) days a week.  levETIRAcetam (KEPPRA) 1,000 mg tablet Take 1 Tab by mouth two (2) times a day. 60 Tab 0    triamcinolone acetonide (KENALOG) 0.1 % topical cream Apply  to affected area two (2) times a day.  use thin layer 15 g 0    HYDROcodone-acetaminophen (NORCO) 5-325 mg per tablet Take 1 Tab by mouth every four (4) hours as needed for Pain. Max Daily Amount: 6 Tabs. 12 Tab 0    methocarbamol (ROBAXIN-750) 750 mg tablet Take 1 Tab by mouth three (3) times daily. 15 Tab 0    meloxicam (MOBIC) 15 mg tablet Take 1 Tab by mouth daily (with breakfast). 30 Tab 0    clopidogrel (PLAVIX) 75 mg tablet Take 75 mg by mouth daily.  lisinopril (PRINIVIL, ZESTRIL) 10 mg tablet Take 10 mg by mouth daily.  amLODIPine (NORVASC) 10 mg tablet Take 1 Tab by mouth daily. 30 Tab 6    atorvastatin (LIPITOR) 40 mg tablet Take 1 Tab by mouth nightly. 30 Tab 0    levETIRAcetam (KEPPRA) 750 mg tablet Take 1 Tab by mouth two (2) times a day. (Patient taking differently: Take 500 mg by mouth two (2) times a day.) 60 Tab 0    hydroxypropyl methylcellulose (GENTEAL) 0.2 % ophthalmic solution Administer 2 Drops to both eyes as needed. 15 mL 0    cyanocobalamin (VITAMIN B12) 500 mcg tablet Take 1 Tab by mouth daily. 30 Tab 0    folic acid (FOLVITE) 1 mg tablet Take 1 Tab by mouth daily. 30 Tab 0    pantoprazole (PROTONIX) 40 mg tablet Take 1 Tab by mouth Daily (before breakfast). 30 Tab 0       Past History     Past Medical History:  Past Medical History:   Diagnosis Date    Hypertension     Neurological disorder     [facility] [address]     Stroke ([facility] [address] 2009       Past Surgical History:  No past surgical history on file. Family History:  Family History   Problem Relation Age of Onset    Diabetes Other     Stroke Other        Social History:  Social History   Substance Use Topics    Smoking status: Never Smoker    Smokeless tobacco: Never Used    Alcohol use Yes      Comment: Socially        Allergies: Allergies   Allergen Reactions    Tomato Hives     Allergic to eating tomato.     Aspirin Nausea and Vomiting    Ciprofloxacin Rash    Pcn [Penicillins] Rash and Hives    Sulfa (Sulfonamide Antibiotics) Hives and Rash         Review of Systems Review of Systems   Respiratory: Positive for chest tightness. Negative for cough and shortness of breath. Cardiovascular: Positive for chest pain. Gastrointestinal: Negative for abdominal pain, nausea and vomiting. Musculoskeletal: Negative for myalgias. Skin: Negative for color change and rash. Neurological: Positive for headaches. Psychiatric/Behavioral: The patient is nervous/anxious. All other systems reviewed and are negative. All Other Systems Negative  Physical Exam     Vitals:    03/06/18 0906   BP: 173/85   Pulse: 81   Resp: 20   Temp: 98.1 °F (36.7 °C)   SpO2: 100%     Physical Exam   Constitutional: She is oriented to person, place, and time. She appears well-developed and well-nourished. HENT:   Head: Normocephalic and atraumatic. Eyes: Conjunctivae are normal. No scleral icterus. Neck: Normal range of motion. Neck supple. No JVD present. Cardiovascular: Normal rate, regular rhythm, normal heart sounds and intact distal pulses. Pulmonary/Chest: Effort normal and breath sounds normal. No respiratory distress. She exhibits tenderness (diffuse, to light touch). She exhibits no mass, no bony tenderness, no laceration, no edema and no swelling. Abdominal: Soft. Normal appearance and bowel sounds are normal. There is no tenderness. There is no rigidity, no rebound, no guarding and no CVA tenderness. Musculoskeletal: Normal range of motion. Neurological: She is alert and oriented to person, place, and time. She has normal strength. She is not disoriented. No cranial nerve deficit (3-12 grossly intact) or sensory deficit. A&O x 4  Pt making noises but intermittently expressing appropriate words/answers  CN 3-12 grossly intact  Grossly symmetrical and intact movements in BUE/BLE, no appreciable unilateral weakness or pronator drift, hand  5/5 and symmetrical  BUE/BLE SILT   Skin: Skin is warm and dry.    Psychiatric: Judgment and thought content normal.   Nursing note and vitals reviewed. Diagnostic Study Results     Labs -     Recent Results (from the past 12 hour(s))   EKG, 12 LEAD, INITIAL    Collection Time: 03/06/18 10:14 AM   Result Value Ref Range    Ventricular Rate 68 BPM    Atrial Rate 68 BPM    P-R Interval 160 ms    QRS Duration 74 ms    Q-T Interval 388 ms    QTC Calculation (Bezet) 412 ms    Calculated P Axis 49 degrees    Calculated R Axis -15 degrees    Calculated T Axis 105 degrees    Diagnosis       Normal sinus rhythm  T wave abnormality, consider lateral ischemia  Abnormal ECG  When compared with ECG of 04-OCT-2016 15:24,  No significant change was found     CBC WITH AUTOMATED DIFF    Collection Time: 03/06/18 10:16 AM   Result Value Ref Range    WBC 7.0 4.6 - 13.2 K/uL    RBC 4.48 4.20 - 5.30 M/uL    HGB 13.3 12.0 - 16.0 g/dL    HCT 39.3 35.0 - 45.0 %    MCV 87.7 74.0 - 97.0 FL    MCH 29.7 24.0 - 34.0 PG    MCHC 33.8 31.0 - 37.0 g/dL    RDW 14.2 11.6 - 14.5 %    PLATELET 369 413 - 957 K/uL    MPV 11.7 9.2 - 11.8 FL    NEUTROPHILS 69 40 - 73 %    LYMPHOCYTES 18 (L) 21 - 52 %    MONOCYTES 8 3 - 10 %    EOSINOPHILS 4 0 - 5 %    BASOPHILS 1 0 - 2 %    ABS. NEUTROPHILS 4.9 1.8 - 8.0 K/UL    ABS. LYMPHOCYTES 1.2 0.9 - 3.6 K/UL    ABS. MONOCYTES 0.5 0.05 - 1.2 K/UL    ABS. EOSINOPHILS 0.3 0.0 - 0.4 K/UL    ABS. BASOPHILS 0.1 0.0 - 0.1 K/UL    DF AUTOMATED     METABOLIC PANEL, COMPREHENSIVE    Collection Time: 03/06/18 10:16 AM   Result Value Ref Range    Sodium 141 136 - 145 mmol/L    Potassium 3.2 (L) 3.5 - 5.5 mmol/L    Chloride 107 100 - 108 mmol/L    CO2 27 21 - 32 mmol/L    Anion gap 7 3.0 - 18 mmol/L    Glucose 81 74 - 99 mg/dL    BUN 16 7.0 - 18 MG/DL    Creatinine 1.16 0.6 - 1.3 MG/DL    BUN/Creatinine ratio 14 12 - 20      GFR est AA 57 (L) >60 ml/min/1.73m2    GFR est non-AA 47 (L) >60 ml/min/1.73m2    Calcium 9.6 8.5 - 10.1 MG/DL    Bilirubin, total 0.4 0.2 - 1.0 MG/DL    ALT (SGPT) 15 13 - 56 U/L    AST (SGOT) 19 15 - 37 U/L    Alk.  phosphatase 93 45 - 117 U/L    Protein, total 7.7 6.4 - 8.2 g/dL    Albumin 4.2 3.4 - 5.0 g/dL    Globulin 3.5 2.0 - 4.0 g/dL    A-G Ratio 1.2 0.8 - 1.7     PROTHROMBIN TIME + INR    Collection Time: 03/06/18 10:16 AM   Result Value Ref Range    Prothrombin time 12.4 11.5 - 15.2 sec    INR 1.0 0.8 - 1.2     CARDIAC PANEL,(CK, CKMB & TROPONIN)    Collection Time: 03/06/18 10:16 AM   Result Value Ref Range     26 - 192 U/L    CK - MB <1.0 <3.6 ng/ml    CK-MB Index  0.0 - 4.0 %     CALCULATION NOT PERFORMED WHEN RESULT IS BELOW LINEAR LIMIT    Troponin-I, Qt. 0.03 0.0 - 0.045 NG/ML   MAGNESIUM    Collection Time: 03/06/18 10:16 AM   Result Value Ref Range    Magnesium 2.2 1.6 - 2.6 mg/dL   TROPONIN I    Collection Time: 03/06/18 12:45 PM   Result Value Ref Range    Troponin-I, Qt. 0.03 0.0 - 0.045 NG/ML       Radiologic Studies -   CT HEAD WO CONT   Final Result        CT Results  (Last 48 hours)               03/06/18 1053  CT HEAD WO CONT Final result    Impression:  IMPRESSION:        No acute findings. Stable exam.       Chronic right parietal infarct. Chronic left thalamus lacunar infarct. Other   findings of chronic microvascular disease. Narrative:  CT OF THE HEAD WITHOUT CONTRAST. CLINICAL HISTORY: Speech changes or aphasia, new or progressive. TECHNIQUE: Helical scan obtained of the head were obtained from the skull vertex   through the base of the skull without intravenous contrast.    All CT scans are   performed using dose optimization techniques as appropriate to the performed   exam including the following: Automated exposure control, adjustment of mA   and/or kV according to patient size, and use of iterative reconstructive   technique. COMPARISON: 10/24/2017. FINDINGS:        Stable chronic right parietal infarct with encephalomalacia and compensatory   enlargement of the posterior horns of right lateral ventricle. Remainder of the   ventricle is within normal limits.  No acute intracranial hemorrhage. Additional   periventricular and deep white matter hypodensities, and small left thalamus   lacunar infarct are stable. No mass effect. The visualized paranasal sinuses are   clear. The mastoid air cells are clear. The visualized bony structures are   unremarkable. CXR Results  (Last 48 hours)    None            Medical Decision Making   I am the first provider for this patient. I reviewed the vital signs, available nursing notes, past medical history, past surgical history, family history and social history. Vital Signs-Reviewed the patient's vital signs. Pulse Oximetry Analysis - 100% on RA    EKG: Interpreted by the EP. Ventricular Rate 68 BPM Preliminary      Atrial Rate 68 BPM Preliminary     P-R Interval 160 ms Preliminary     QRS Duration 74 ms Preliminary     Q-T Interval 388 ms Preliminary     QTC Calculation (Bezet) 412 ms Preliminary     Calculated P Axis 49 degrees Preliminary     Calculated R Axis -15 degrees Preliminary     Calculated T Axis 105 degrees Preliminary     Diagnosis   Preliminary     Normal sinus rhythm   T wave abnormality, consider lateral ischemia   Abnormal ECG   When compared with ECG of 04-OCT-2016 15:24,   No significant change was found        Records Reviewed: Nursing Notes and Old Medical Records    Procedures:  Procedures    Provider Notes (Medical Decision Making):   60 y.o. presenting with CP and verbal outbursts/difficulty speaking. Does not appear CVA-like, exam benign. VSS. Will do labs, CT, ativan and reassess.      9:40 AM d/w attending Dr Teran Seen, pt with same strange speech patterns and no real communication, sternal rub performed and pt now verbalizing coherently; ?psychogenic versus anxiety, but will eval for intracranial abnormalities  12:36 PM checked on patient, still having strange verbal outbursts but speaking more clearly, has not has ativan yet; pending ammonia and repeat trop, IV line blew; ordered ativan PO  1:50 PM pt sleeping in room, rousable, speaking clearly without verbal outbursts or strange sounds, reported all labs and imaging negative (save slightly low K+ for which KDur was given), EKG WNL, trop x 2 negative, CT head without acute changes; per pt, all sx's (CP, HA, verbal outbursts) resolved. Presentation not c/w ACS, CVA, PE.  PERC only positive for age, but no SOB and VSS. Will d/c with hydroxyzine PRN for anxiety and rec'd close f/u ASAP with PCP, pt voiced understanding and agreement with plan    MED RECONCILIATION:  No current facility-administered medications for this encounter. Current Outpatient Prescriptions   Medication Sig    hydrOXYzine HCl (ATARAX) 50 mg tablet Take 1 Tab by mouth four (4) times daily as needed for Itching.  hydrALAZINE (APRESOLINE) 50 mg tablet Take 50 mg by mouth two (2) times a day.  ergocalciferol (VITAMIN D2) 50,000 unit capsule Take 50,000 Units by mouth two (2) days a week.  levETIRAcetam (KEPPRA) 1,000 mg tablet Take 1 Tab by mouth two (2) times a day.  triamcinolone acetonide (KENALOG) 0.1 % topical cream Apply  to affected area two (2) times a day. use thin layer    HYDROcodone-acetaminophen (NORCO) 5-325 mg per tablet Take 1 Tab by mouth every four (4) hours as needed for Pain. Max Daily Amount: 6 Tabs.  methocarbamol (ROBAXIN-750) 750 mg tablet Take 1 Tab by mouth three (3) times daily.  meloxicam (MOBIC) 15 mg tablet Take 1 Tab by mouth daily (with breakfast).  clopidogrel (PLAVIX) 75 mg tablet Take 75 mg by mouth daily.  lisinopril (PRINIVIL, ZESTRIL) 10 mg tablet Take 10 mg by mouth daily.  amLODIPine (NORVASC) 10 mg tablet Take 1 Tab by mouth daily.  atorvastatin (LIPITOR) 40 mg tablet Take 1 Tab by mouth nightly.  levETIRAcetam (KEPPRA) 750 mg tablet Take 1 Tab by mouth two (2) times a day.  (Patient taking differently: Take 500 mg by mouth two (2) times a day.)    hydroxypropyl methylcellulose (GENTEAL) 0.2 % ophthalmic solution Administer 2 Drops to both eyes as needed.  cyanocobalamin (VITAMIN B12) 500 mcg tablet Take 1 Tab by mouth daily.  folic acid (FOLVITE) 1 mg tablet Take 1 Tab by mouth daily.  pantoprazole (PROTONIX) 40 mg tablet Take 1 Tab by mouth Daily (before breakfast). Disposition:  Home    DISCHARGE NOTE:     Pt has been reexamined. Feeling much better. Patient has no new complaints, changes, or physical findings. Care plan outlined and precautions discussed. Results of exam, labs, imaging were reviewed with the patient. All medications were reviewed with the patient; will d/c home with hydroxyzine. All of pt's questions and concerns were addressed. Patient was instructed and agrees to follow up with PCP, as well as to return to the ED upon further deterioration. Patient is ready to go home. Follow-up Information     Follow up With Details Comments 137 Benjamin Munoz NP Schedule an appointment as soon as possible for a visit in 1 day  [address] [facility]  [address] [facility] EMERGENCY DEPT Go to As needed [address]  [phone]          Current Discharge Medication List      START taking these medications    Details   hydrOXYzine HCl (ATARAX) 50 mg tablet Take 1 Tab by mouth four (4) times daily as needed for Itching. Qty: 20 Tab, Refills: 0           Diagnosis     Clinical Impression:   1. Atypical chest pain    2. History of anxiety    3.  History of CVA (cerebrovascular accident)

## 2018-05-11 ENCOUNTER — HOSPITAL ENCOUNTER (OUTPATIENT)
Dept: PHYSICAL THERAPY | Age: 63
Discharge: HOME | End: 2018-05-11
Payer: COMMERCIAL

## 2018-05-11 PROCEDURE — G8978 MOBILITY CURRENT STATUS: HCPCS

## 2018-05-11 PROCEDURE — G8979 MOBILITY GOAL STATUS: HCPCS

## 2018-05-11 PROCEDURE — 97162 PT EVAL MOD COMPLEX 30 MIN: CPT

## 2018-05-11 PROCEDURE — 97110 THERAPEUTIC EXERCISES: CPT

## 2018-05-11 NOTE — PROGRESS NOTES
PT DAILY TREATMENT NOTE - [facility]     Patient Name: Yeni Newby  Date:2018  : 1955  [x]  Patient  Verified  Payor: Kimberly Booker / Plan: Patsie Belt PPO / Product Type: PPO /    In time:2:36  Out time:3:24  Total Treatment Time (min): 48  Visit #: 1 of 18    Treatment Area: Gait instability [R26.81]    SUBJECTIVE  Pain Level (0-10 scale): 0  Any medication changes, allergies to medications, adverse drug reactions, diagnosis change, or new procedure performed?: [x] No    [] Yes (see summary sheet for update)  Subjective functional status/changes:   [] No changes reported  See POC    OBJECTIVE    28 min [x]Eval                  []Re-Eval     20 min Therapeutic Exercise:  [] See flow sheet : HEP instruction and demonstration, pt education regarding anatomy and physiology of the LEs and of the brain and how it relates to the pt's condition, education on using an AD for safety. Rationale: increase ROM and increase strength to improve the patients ability to tolerate ADLs          With   [] TE   [] TA   [] neuro   [] other: Patient Education: [x] Review HEP    [] Progressed/Changed HEP based on:   [] positioning   [] body mechanics   [] transfers   [] heat/ice application    [] other:      Other Objective/Functional Measures: See evaluation. Pain Level (0-10 scale) post treatment: 0    ASSESSMENT/Changes in Function: Pt given HEP handout to perform. Pt understood exercises in HEP handout. Pt demonstrated decreased strength and impaired gait/balance. Tinetti score 12/28 points. Pt is a high fall risk. Educated pt and her son regarding obtaining an AD ([address] or [place]) to improve the pt's safety. Pt seems hesitent to use an AD secondary to affecting her \"independence\". Educated pt regarding the risks of falls and injury with ambulating without an AD and how it may help her independence. Speech therapy may be beneficial as well secondary to pt and pt's son report of pt having memory and cognitive deficits.  Pt would benefit from physical therapy to improve the above impairments to help the pt return to performing ADLs, function activities, and improve the pt's fall risk. Patient will continue to benefit from skilled PT services to modify and progress therapeutic interventions, address functional mobility deficits, address ROM deficits, address strength deficits, analyze and address soft tissue restrictions, analyze and cue movement patterns, analyze and modify body mechanics/ergonomics, address imbalance/dizziness and instruct in home and community integration to attain remaining goals.      [x]  See Plan of Care  []  See progress note/recertification  []  See Discharge Summary         Progress towards goals / Updated goals:  See POC    PLAN  [x]  Upgrade activities as tolerated     [x]  Continue plan of care  [x]  Update interventions per flow sheet       []  Discharge due to:_  []  Other:_      Jagdeep Vega, PT 5/11/2018  3:30 PM    Future Appointments  Date Time Provider Nuzhat Astudillo   5/11/2018 2:30 PM Jagdeep Vega PT [id number]

## 2018-05-11 NOTE — PROGRESS NOTES
[facility] [employer] [facility] [place]  [address] [place] [address]  [phone] fax    Plan of Care/ Statement of Necessity for Physical Therapy Services  Patient name: Candie Ross Start of Care: 2018   Referral source: Maikol Merrill MD : 1955    Medical Diagnosis: Gait instability [R26.81]   Onset Date:     Treatment Diagnosis: impaired gait and balance   Prior Hospitalization: see medical history Provider#: [id number]   Medications: Verified on Patient summary List    Comorbidities: Visual impairment, HTN, weight change of more than 10 lbs recently, hx seizures   Prior Level of Function: Independent with work tasks. Lives with son that helps pt with ADLs at times. Lives in 1 story home. The Plan of Care and following information is based on the information from the initial evaluation. Assessment/ key information:   Pt is a 58year old female who presents to therapy today with impaired gait/balance. Pt's son reports that the pt had a CVA in  and had therapy afterwards with good response. Pt's son reports that the pt has fallen several times over the past few months and refuses to use an AD. Pt also reports that the pt has been having seizures and the last seizure was a few weeks ago. Pt presents to the clinic with shuffling gait without an AD. Pt demonstrated decreased strength and impaired gait/balance. Tinetti score 12/28 points. Pt is a high fall risk. Educated pt and her son regarding obtaining an AD ([facility] or ) to improve the pt's safety. Pt seems hesitent to use an AD secondary to affecting her \"independence\". Educated pt regarding the risks of falls and injury with ambulating without an AD and how it may help her independence. Speech therapy may be beneficial as well secondary to pt and pt's son report of pt having memory and cognitive deficits.  Pt would benefit from physical therapy to improve the above impairments to help the pt return to performing ADLs, function activities, and improve the pt's fall risk. Evaluation Complexity History HIGH Complexity :3+ comorbidities / personal factors will impact the outcome/ POC ; Examination MEDIUM Complexity : 3 Standardized tests and measures addressing body structure, function, activity limitation and / or participation in recreation  ;Presentation MEDIUM Complexity : Evolving with changing characteristics  ; Clinical Decision Making MEDIUM Complexity : FOTO score of 26-74  Overall Complexity Rating: MEDIUM  Problem List: pain affecting function, decrease ROM, decrease strength, impaired gait/ balance, decrease ADL/ functional abilitiies, decrease activity tolerance, decrease flexibility/ joint mobility and decrease transfer abilities   Treatment Plan may include any combination of the following: Therapeutic exercise, Therapeutic activities, Neuromuscular re-education, Physical agent/modality, Gait/balance training, Manual therapy, Patient education, Self Care training, Functional mobility training, Home safety training and Stair training  Patient / Family readiness to learn indicated by: asking questions, trying to perform skills and interest  Persons(s) to be included in education: patient (P) and family support person (FSP);list pt's son  Barriers to Learning/Limitations: yes;  cognitive and sensory deficits-vision/hearing/speech  Patient Goal (s): walk better  Patient Self Reported Health Status: fair  Rehabilitation Potential: fair    Short Term Goals: To be accomplished in 4 treatments:  1. Pt will report compliance and independence to [facility] to help the pt manage their pain and symptoms. Long Term Goals: To be accomplished in 18 treatments:  1. Pt will improve tinetti score to 18/28 points to decrease the pt's fall risk. 2. Pt and/or pt's son will report having no falls over the past 4 weeks to improve safety around the home.   3. Pt will ambulate 200 ft with SPC safely with no LOB, SBA, on level surfaces with heel-to-toe gait pattern to improve gait quality needed for work tasks. Frequency / Duration: Patient to be seen 2-3 times per week for 18 treatments. Patient/ Caregiver education and instruction: Diagnosis, prognosis, self care, activity modification, exercises and other AD instruction/education   [x]  Plan of care has been reviewed with PTA    G-Codes (GP)  Mobility   Current  CK= 40-59%   Goal  CK= 40-59%    The severity rating is based on clinical judgment and the FOTO score. Certification Period: 5/11/2018 - 7/9/2018    Jim Arteaga, PT 5/11/2018 3:15 PM  _____________________________________________________________________  I certify that the above Therapy Services are being furnished while the patient is under my care. I agree with the treatment plan and certify that this therapy is necessary.     Physician's Signature:____________________  Date:__________Time:______    Please sign and return to [facility] [employer]  [address] [place] [address]  [phone] fax

## 2018-05-22 ENCOUNTER — HOSPITAL ENCOUNTER (OUTPATIENT)
Dept: PHYSICAL THERAPY | Age: 63
Discharge: HOME | End: 2018-05-22
Payer: COMMERCIAL

## 2018-05-22 PROCEDURE — 97530 THERAPEUTIC ACTIVITIES: CPT

## 2018-05-22 PROCEDURE — 97116 GAIT TRAINING THERAPY: CPT

## 2018-05-22 PROCEDURE — 97110 THERAPEUTIC EXERCISES: CPT

## 2018-05-22 NOTE — PROGRESS NOTES
PT DAILY TREATMENT NOTE - [facility]     Patient Name: Gaby Lopez  Date:2018  : 1955  [x]  Patient  Verified  Payor: Melissa Valentine / Plan: Anais Putnam PPO / Product Type: PPO /    In time: 1:05   Out time: 2:05  Total Treatment Time (min): 60  Total Timed Codes (min): 60  1:1 Treatment Time ( only): 45  Visit #: 2 of 18    Treatment Area: Gait instability [R26.81]  Unspecified abnormalities of gait and mobility [R26.9]    SUBJECTIVE  Pain Level (0-10 scale): 0  Any medication changes, allergies to medications, adverse drug reactions, diagnosis change, or new procedure performed?: [x] No    [] Yes (see summary sheet for update)  Subjective functional status/changes:   [] No changes reported  Reports having testing down for her seizures last week. OBJECTIVE    26 min Therapeutic Exercise:  [x] See flow sheet :    Rationale: increase ROM and increase strength to improve the patients ability to tolerate ADLs    20 min Therapeutic Activity:  []  See flow sheet :   Rationale: BP monitoring. 4 min Neuromuscular Re-education:  [x]  See flow sheet :    Rationale: increase strength, improve coordination, improve balance and increase proprioception  to improve the patients ability to tolerate functional tasks     10 min Gait Training:  Attempting ambulation with SPC (see assessment below); pt education regarding B ankle DF with gait. Rationale: improve safety with gait. With   [] TE   [] TA   [] neuro   [] other: Patient Education: [x] Review HEP    [] Progressed/Changed HEP based on:   [] positioning   [] body mechanics   [] transfers   [] heat/ice application    [] other:      Other Objective/Functional Measures: Initiated exercises/interventions in flow sheet. Limited safety awareness noted with gait. BP: 190/98 mmHg pre session. Took BP again 3 mins later and decreased to 182/94 mmHg. Had mild difficulty with following commands from therapist today with exercises.  BP post session 190/101 mmHg, took 3 mins later 192/104 mmHg. Took 2 mins later manually and was 194/94 mmHg. Attempted ambulation with SPC: pt at first carried the [facility]. Pt given verbal and visual cues for 2 and 3 point gait pattern with SPC and pt unable to place the [facility] correctly for the 2 point or 3 point pattern with ambulation even with these cues and had several LOB. Pain Level (0-10 scale) post treatment: 0    ASSESSMENT/Changes in Function: Discussed BP with the pt's son and the pt's son stated that the BP was elevated last week when the pt was having the tests done for the seizures. Pt's son stated that they will contact her PCP regarding her BP when they get home. We will continue to monitor the pt's BP in the clinic as well. Pt unsafe with ambulation with SPC at this time. Continue POC as tolerated. Patient will continue to benefit from skilled PT services to modify and progress therapeutic interventions, address functional mobility deficits, address ROM deficits, address strength deficits, analyze and address soft tissue restrictions, analyze and cue movement patterns, analyze and modify body mechanics/ergonomics, address imbalance/dizziness and instruct in home and community integration to attain remaining goals. []  See Plan of Care  []  See progress note/recertification  []  See Discharge Summary         Progress towards goals / Updated goals:  Short Term Goals: To be accomplished in 4 treatments:  1. Pt will report compliance and independence to HEP to help the pt manage their pain and symptoms. Long Term Goals: To be accomplished in 18 treatments:  1. Pt will improve tinetti score to 18/28 points to decrease the pt's fall risk. 2. Pt and/or pt's son will report having no falls over the past 4 weeks to improve safety around the home. 3. Pt will ambulate 200 ft with SPC safely with no LOB, SBA, on level surfaces with heel-to-toe gait pattern to improve gait quality needed for work tasks. PLAN  [x]  Upgrade activities as tolerated     [x]  Continue plan of care  [x]  Update interventions per flow sheet       []  Discharge due to:_  []  Other:_      Nabil Manuel, PT 5/22/2018  1:17 PM    Future Appointments  Date Time Provider Nuzhat Astudillo   5/25/2018 12:00 PM [facility] PT [facility] [address] [facility]   5/30/2018 12:00 PM [facility] PT [facility] [address] [facility]   6/1/2018 1:00 PM [facility] PT [facility] [address] [facility]   6/6/2018 12:30 PM Kelton Hughes, PTA [id number] [facility]   6/6/2018 1:00 PM Stacy Hodge SLP [facility]   6/8/2018 1:00 PM Kelton Hughes, PTA [facility]

## 2018-05-30 ENCOUNTER — HOSPITAL ENCOUNTER (OUTPATIENT)
Dept: PHYSICAL THERAPY | Age: 63
Discharge: HOME | End: 2018-05-30
Payer: COMMERCIAL

## 2018-05-30 NOTE — PROGRESS NOTES
PT DAILY TREATMENT NOTE - [facility]     Patient Name: Xiomy Rebolledo  Date:2018  : 1955  [x]  Patient  Verified  Payor: Diana Robles / Plan: Kale Masterson PPO / Product Type: PPO /    In time:12:00  Out time:12:25Total Treatment Time (min): NC  Total Timed Codes (min): NC  1:1 Treatment Time (MC only): NC   Visit #: NA of 10    Treatment Area: Gait instability [R26.81]  Unspecified abnormalities of gait and mobility [R26.9]    SUBJECTIVE  Pain Level (0-10 scale): 0/10  Any medication changes, allergies to medications, adverse drug reactions, diagnosis change, or new procedure performed?: [x] No    [] Yes (see summary sheet for update)  Subjective functional status/changes:   [] No changes reported  Pt not seen today 2/2 increased BP of 195/99 and 2nd reading at 205/107. Pt is advised as well as her son to take pt to the MD immediately. She is also advised that if the MD can not assess her , or they can not contact the MD to go the ER immediately. Educated pts CG son, to please call office to see if continued skilled PT is indicated at this time. Progress towards goals / Updated goals  Short Term Goals: To be accomplished in 4 treatments:  1. Pt will report compliance and independence to HEP to help the pt manage their pain and symptoms.                       MJJR Term Goals: To be accomplished in 18 treatments:  1. Pt will improve tinetti score to 18/28 points to decrease the pt's fall risk. 2. Pt and/or pt's son will report having no falls over the past 4 weeks to improve safety around the home. 3. Pt will ambulate 200 ft with SPC safely with no LOB, SBA, on level surfaces with heel-to-toe gait pattern to improve gait quality needed for work tasks.     PLAN  [x]  Upgrade activities as tolerated     [x]  Continue plan of care  []  Update interventions per flow sheet       []  Discharge due to:_  []  Other:_      Sharonangelicazane Reynolds 2018  10:53 AM    Future Appointments  Date Time Provider Nuzhat Astudillo 5/30/2018 12:00 PM [facility] PT [facility] [place] [address] [facility]   6/1/2018 1:00 PM [facility] PT [facility] [place] [address] [facility]   6/6/2018 12:30 PM Kina Mackey PTA [id number] [facility]   6/6/2018 1:00 PM TERESA Lund [facility]   6/8/2018 1:00 PM Kina Mackey PTA [facility]

## 2018-06-01 ENCOUNTER — HOSPITAL ENCOUNTER (OUTPATIENT)
Dept: PHYSICAL THERAPY | Age: 63
End: 2018-06-01
Payer: COMMERCIAL

## 2018-06-06 ENCOUNTER — HOSPITAL ENCOUNTER (OUTPATIENT)
Dept: PHYSICAL THERAPY | Age: 63
Discharge: HOME | End: 2018-06-06
Payer: COMMERCIAL

## 2018-06-06 PROCEDURE — G9186 MOTOR SPEECH GOAL STATUS: HCPCS

## 2018-06-06 PROCEDURE — 97110 THERAPEUTIC EXERCISES: CPT

## 2018-06-06 PROCEDURE — 92507 TX SP LANG VOICE COMM INDIV: CPT

## 2018-06-06 PROCEDURE — 92522 EVALUATE SPEECH PRODUCTION: CPT

## 2018-06-06 PROCEDURE — G8999 MOTOR SPEECH CURRENT STATUS: HCPCS

## 2018-06-06 NOTE — PROGRESS NOTES
ST DAILY TREATMENT NOTE    Patient Name: Edith Jack  Date:2018  : 1955  [x]  Patient  Verified  Payor: Debria Gaucher / Plan: Bonnie Huynh PPO / Product Type: PPO /   In time: 1:00  Out time:2:00  Total Treatment Time (min): 60  Visit #: 1 of 24    SUBJECTIVE  Pain Level (0-10 scale): 0  Any medication changes, allergies to medications, adverse drug reactions, diagnosis change, or new procedure performed?: [] No    [] Yes (see summary sheet for update)  Subjective functional status/changes:   [] No changes reported  Pt is unaware of her speech decline. However, her eldest son is present at today's [facility] reporting decreased speech intelligibility, reduced vocal intensity, and increased dysfluencies following multiple seizures. He reports she usually will have a seizure and her speech will return to baseline, however, it has not this time. Date of Onset: Mid may 2018  Social History: Part-time  at the [employer]; Patient lives with her son.    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; her son reports what is described to be  Mild-mod short term memory impairment at baseline    OBJECTIVE    OUTPATIENT SPEECH-LANGUAGE EVALUATION    Receptive Language:  Receptive vocabulary    [x] WNL    [] Impaired [] Mild [] Mod [] Severe  Reliability of yes/no responses to questions [x] WNL    [] Impaired [] Mild [] Mod [] Severe   Reliability of responses to complex ideation [] WNL    [x] Impaired [x] Mild [x] Mod [] Severe  Auditory retention/processing   [] WNL    [x] Impaired [x] Mild [] Mod [] Severe  Follow commands [x] 1 Step [x] 2 Step [] 3 Step [] complex  Objective Information:    Expressive Language  Automatic speech   [x] WNL    [] Impaired [] Mild [] Mod [] Severe  Able to identify objects/pictures  [x] WNL    [] Impaired [] Mild [] Mod [] Severe  Preservations    [x] WNL    [] Impaired [] Mild [] Mod [] Severe  Word retrieval    [] WNL    [x] Impaired [x] Mild [] Mod [] Severe  Paraphasias   [x]None[] Literal    [] Phenomic   [] Jargon   [] Semantic  Able to make needs known at level [x] Word   [x] Phrase   [x] Sentence   [x] Gesture        [] Unable   Objective Information:    Writing: [] L or [x] R [] WNL    [] Impaired @ [] Word [] Sentence [] Paragraph    Reading:  [] WNL    [x] Impaired @ [] Word [x] Sentence [x] Paragraph \" I can't recall what I read\". Cognition:  Attention: [x] Alert    [] Drowsy  Orientation: [x] Person   [x] Place    [] Time  Memory: [] WNL    [x] Impaired ST   [] Impaired LT  Comments:    Executive Functions:  Problem Solving: [] WNL     [x] Impaired [x] Mild [x] Mod [] Severe  Neglect:  [x] None    [] Left   [] Right  Self-regulation  [] Appropriate   [] Impulsive   [x] Requires verbal cues  Awareness:  [] Poor initiation   [x] Disinhibition   [] Constant supervision        Speech:  Oral Verbal Apraxia: [x] None    [] Mild    [] Moderate    [] Severe   Dysarthria:  [] None    [x] Mild    [x] Moderate    [] Severe   Intelligibility:  [] WNL    [x] Words   [x] Phrases   [x] Sentences   [x] Conversation      % Intelligible:70%  Voice:   [x] WNL    [] Hoarse   [] Breathy   Comments:  Fluency:  [] WNL    [x] Dysfluent: interjection and whole/ part word dysfluencies    The Frenchay Dysarthria Assessment 2nd Edition (FDA-2) was administered. Results are scored by a rating scale form from an A (normal function) to an E (no function) by 7 sections, including:  Reflexes, Respiration, lips, Palate, Laryngeal, Tongue, and Intelligibility. Results are as indicated:    Reflexes- Cough A, Swallow A, Dribble/Drool A  Respiration: At Rest A, At Speech D  Lips: At Rest A-B, Spread A, Seal C, Alternate A-B, In speech B-C  Palate: Fluids A, Maintenance B, In-Speech A  Laryngeal: Time C, Pitch B, Volume C-D ,  In speech B ,  Tongue:  At rest: C, Protrusion: B, Elevation: C-D, Lateral: B, Alternate: A, In Speech: B-C  Intelligibility: Words: C, Sentences: C, Conversation: C    A= Normal for age, B = Mild abnormality noticeable to skilled observer, C= Abnormality obvious but can perform task/movements with reasonable approximation, D= Some production of task but poor in quality, unable to sustain, inaccurate or extremely labored, E= Unable to undertake/ movement/ sound      Patient/Caregiver instruction/education: [x] Review HEP    [] Progressed/Changed    HEP/Handouts given: comp strategies to increase speech intelligibility. Pain Level (0-10 scale) post treatment: 0    ASSESSMENT  [x]  See Plan of Care     PLAN  []  Upgrade activities as tolerated     [x]  Continue plan of care  []  Discharge due to:__  [x] Other: Pt/ caregiver education provided in order to establish individualized POC with STGS and LTG. Additionally, tx initiated with teaching comp strategies to be implemented for HEP to increase speech intelligibility.      Carson Bruce, SLP 6/6/2018  1:09 PM  [place], [id number] [facility]  Speech-Language Pathologist

## 2018-06-06 NOTE — PROGRESS NOTES
PT DAILY TREATMENT NOTE - [facility]     Patient Name: Rupert Mckeon  Date:2018  : 1955  [x]  Patient  Verified  Payor: Allyssa Singer / Plan: Gabriel Sommer PPO / Product Type: PPO /    In time:12:30  Out time:1:00  Total Treatment Time (min): 30  Total Timed Codes (min): 30  1:1 Treatment Time ( only): 30   Visit #: 3 of 18    Treatment Area: Gait instability [R26.81]  Unspecified abnormalities of gait and mobility [R26.9]    SUBJECTIVE  Pain Level (0-10 scale): 5/10  Any medication changes, allergies to medications, adverse drug reactions, diagnosis change, or new procedure performed?: [x] No    [] Yes (see summary sheet for update)  Subjective functional status/changes:   [] No changes reported  Pt reports she has been working on picking up her feet more when she walks. She states that her son wanted her to start speech therapy for the stuttering and clicking of her mouth. OBJECTIVE    30 min Therapeutic Exercise:  [x] See flow sheet :   Rationale: increase ROM, increase strength, improve coordination, improve balance and increase proprioception to improve the patients ability to improve ease of ambulation with decreased fall risk          With   [] TE   [] TA   [] neuro   [] other: Patient Education: [x] Review HEP    [] Progressed/Changed HEP based on:   [] positioning   [] body mechanics   [] transfers   [] heat/ice application    [] other:      Other Objective/Functional Measures:   BP: 174/87  HR: 65 bpm  No LOB with balance challenges on foam  Continues to ambulate with decreased heel strike  Able to perform sit to stands from low table height  No difficulty with exercises  Able to perform step taps without LOB     Pain Level (0-10 scale) post treatment: 0/10    ASSESSMENT/Changes in Function: Pt progressing with static balance without difficulty  But continues to ambulate with decreased heel strike increasing her risk for falls. She is compliant with her HEP.  Will continue working on strength and dynamic balance for safety with ambulation. Patient will continue to benefit from skilled PT services to modify and progress therapeutic interventions, address functional mobility deficits, address ROM deficits, address strength deficits, analyze and address soft tissue restrictions, analyze and cue movement patterns, analyze and modify body mechanics/ergonomics, assess and modify postural abnormalities and address imbalance/dizziness to attain remaining goals. Progress towards goals / Updated goals:  Short Term Goals: To be accomplished in 4 treatments:  1. Pt will report compliance and independence to [facility] to help the pt manage their pain and symptoms.                       KTRT Term Goals: To be accomplished in 18 treatments:  1. Pt will improve tinetti score to 18/28 points to decrease the pt's fall risk. 2. Pt and/or pt's son will report having no falls over the past 4 weeks to improve safety around the home. 3. Pt will ambulate 200 ft with SPC safely with no LOB, SBA, on level surfaces with heel-to-toe gait pattern to improve gait quality needed for work tasks.      PLAN  [x]  Upgrade activities as tolerated     [x]  Continue plan of care  []  Update interventions per flow sheet       []  Discharge due to:_  []  Other:_      Myrna Constantino PTA 6/6/2018  1:02 PM    Future Appointments  Date Time Provider Nuzhat Astudillo   6/8/2018 1:00 PM Myrna Constantino PTA [facility]

## 2018-06-07 NOTE — PROGRESS NOTES
[facility] - [facility] [place]  [address] [facility]  [phone] fax    Plan of Care/ Statement of Necessity for Speech Therapy Services    Patient name: Mariel July Start of Care: 2018   Referral source: Gavi Mishra MD : 1955    Medical Diagnosis: Dysarthria and anarthria [R47.1]   Onset Date: 2018    Treatment Diagnosis: dysarthria R 47.1 and dysfluency disorder R47.82   Prior Hospitalization: see medical history Provider#: [id number]   Medications: Verified on Patient summary List    Comorbidities: Visual impairment, HTN, weight change of more than 10 lbs recently, hx seizures   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 Assessment/ key information: This 59 y/o female with a hx of CVA that impacted her speech previously was referred to O/P ST d/t pt's son c/o decreased speech intelligibility, reduced vocal intensity, and increased dysfluencies following multiple seizures. He reports she usually will have a seizure and her speech will return to baseline, however, it has not this time. Pt is unaware of her reduced intelligibility, vocal intensity, and dysfluencies. The Frenchay Dysarthria Assessment 2nd Edition (FDA-2) was administered. Results are scored by a rating scale form from an A (normal function) to an E (no function) by 7 sections, including:  Reflexes, Respiration, lips, Palate, Laryngeal, Tongue, and Intelligibility. Results are as indicated:     Reflexes- Cough A, Swallow A, Dribble/Drool A  Respiration: At Rest A, At Speech D  Lips:  At Rest A-B, Spread A, Seal C, Alternate A-B, In speech B-C  Palate: Fluids A, Maintenance B, In-Speech A  Laryngeal: Time C, Pitch B, Volume C-D ,  In speech B ,  Tongue: At rest: C, Protrusion: B, Elevation: C-D, Lateral: B, Alternate: A, In Speech: B-C  Intelligibility: Words: C, Sentences: C, Conversation: C     A= Normal for age, B = Mild abnormality noticeable to skilled observer, C= Abnormality obvious but can perform task/movements with reasonable approximation, D= Some production of task but poor in quality, unable to sustain, inaccurate or extremely labored, E= Unable to undertake/ movement/ sound    Based on the subjective and objective data stated above, Ms Xiomara Parada presents with a mild-mod dysarthria and dysfluency disorder likely to be related to her neurological history. Though she is unaware of her deficits, this SLP feels Ms Xiomara Parada would benefit from skilled ST to increase functional speech ineligibility and fluency warranted for improved and safe ADLS of vocal communication. Problem List:      []aphasic  [x]dysarthric  []dysphagic       []alexic  []agraphic  []dysphonia       [x]dysfluency   []Cognitive-Linguistic Disorder       []other   Treatment Plan may include any combination of the following: Oral Motor Therapeutic Excerise, Dysarthria Treatment, Patient Education and Other: Compensatory strategy training. Patient / Family readiness to learn indicated by: trying to perform skills    Persons(s) to be included in education:   patient (P) and family support person (FSP);list Nakia Kaba    Barriers to Learning/Limitations: yes;  cognitive and sensory deficits-vision/hearing/speech    Patient Goal (s): Pt's son reports he'd like to be able to understand her better and more efficently when she is trying to express herself    Patient Self Reported Health Status: fair    Rehabilitation Potential: good    Short Term Goals:  To be accomplished in 4-6 weeks   1) Pt will recall and demonstrate comp strategies (increase intensity, slow down, rate, and over-articulate) with 100% acc in 4/4 sessions  to improve speech intelligibility. 2. Pt will reduce dysfluencies by utilizing comp strategies to demonstrate fluent speech in oral reading and conversation in 80% off opportunities when provided with min-modA. 3. Pt will demonstrate controlled variation of vocal intensity by counting 1-5 from a whisper to yelling  with ~80% acc Min A to increase control of vocal intensity and self awareness warranted for improved speech intelligbility. 4. Pt will increase quality and length of phonation through use of diaphragmatic breathing by sustaining ah utilizing  for 15 seconds in 4/4 sessions to increase effective breath support/ coordination warranted for improved speech intelligibility. 5. The patient will utilize comp strategies at the word, phrase, and sentence level ar through articulatory drills with  90% acc ineligibility when provided with min-mod cues. 6. Pt will complete x20 repetitions of various oral-motor exercises, including: labial retraction, labial protrusion, alternate labial retraction/protrusion, buccal holds, tongue press,   Lingual elevation, lingual tracing, lingual depression, lingual alternate, etc to improve oral strength and ROM for speech. Long Term Goals: To be accomplished in 10-12 weeks   1. Patient will develop functional and intelligible speech with use of compensatory strategies through the use of adequate labial/ lingual function, adequate breath support for vocal intensity, increased articulatory precision, and speech prosody with North and >90% intelligibility. Frequency / Duration: Patient to be seen 2 times per week for 12 weeks:    G Codes: Motor:  Current  CK= 40-59%   Goal  CI= 1-19%      The severity rating is based on the following outcomes:    National Outcomes Measures (NOMS); clinical judgement; FDA-2    Patient/ Caregiver education and instruction: Diagnosis and  Prognosis. Pt/ caregiver education provided in order to establish individualized POC with STGS and LTG. Additionally, tx initiated with teaching comp strategies to be implemented for HEP to increase speech intelligibility.      Certification Period: 6/6/2018-10/10/2018    TERESA Johnson 6/6/18 4:07 PM  MA, CCC-SLP  Speech-Language Pathologist    ________________________________________________________________________    I certify that the above Therapy Services are being furnished while the patient is under my care. I agree with the treatment plan and certify that this therapy is necessary.     Physician's Signature:____________________  Date:___________Time:_________    Please sign and return to [facility] - [facility]  [address] [place] [address]  [phone] fax     Thank you

## 2018-06-08 ENCOUNTER — HOSPITAL ENCOUNTER (OUTPATIENT)
Dept: PHYSICAL THERAPY | Age: 63
Discharge: HOME | End: 2018-06-08
Payer: COMMERCIAL

## 2018-06-08 PROCEDURE — 97112 NEUROMUSCULAR REEDUCATION: CPT

## 2018-06-08 PROCEDURE — 97110 THERAPEUTIC EXERCISES: CPT

## 2018-06-08 NOTE — PROGRESS NOTES
PT DAILY TREATMENT NOTE - [facility]     Patient Name: Yeni Newby  Date:2018  : 1955  [x]  Patient  Verified  Payor: Kimberly Booker / Plan: Patsie Belt PPO / Product Type: PPO /    In time: 1:00  Out time:1:40  Total Treatment Time (min): 40  Total Timed Codes (min): 40  1:1 Treatment Time ( only): 30   Visit #: 4 of 18    Treatment Area: Gait instability [R26.81]  Unspecified abnormalities of gait and mobility [R26.9]    SUBJECTIVE  Pain Level (0-10 scale): 0/10  Any medication changes, allergies to medications, adverse drug reactions, diagnosis change, or new procedure performed?: [x] No    [] Yes (see summary sheet for update)  Subjective functional status/changes:   [] No changes reported  Pt reports no current pain and that she is trying to build up her leg muscles. She states no falls but still occasionally needs to remember to  her feet.        OBJECTIVE    20 min Therapeutic Exercise:  [x] See flow sheet :   Rationale: increase ROM, increase strength, improve coordination, improve balance and increase proprioception to improve the patients ability to ambulate with decreased fall risk    20 min Neuromuscular Re-education:  [x]  See flow sheet : dynamic gait (heel walking, toe walking, backwards walking, side stepping) balance challenges per flow sheet   Rationale: increase ROM, increase strength, improve coordination, improve balance and increase proprioception  to improve the patients ability to ambulate with decreased fall risk          With   [] TE   [] TA   [] neuro   [] other: Patient Education: [x] Review HEP    [] Progressed/Changed HEP based on:   [] positioning   [] body mechanics   [] transfers   [] heat/ice application    [] other:      Other Objective/Functional Measures:   No LOB with static balance activities  Challenged with heel walking  Cues with side stepping to keep toes pointing forwards  No LOB with ambulation  Added single leg bridges for glute strengthening     Pain Level (0-10 scale) post treatment: 0/10    ASSESSMENT/Changes in Function: Pt continues to progress towards goals with compliance of HEP and no recent falls. Her static balance has been good but she has some difficulty with dynamic balance. Will work to improve B LE strength and ease of ambulation for decreased fall risk. Patient will continue to benefit from skilled PT services to modify and progress therapeutic interventions, address functional mobility deficits, address ROM deficits, address strength deficits, analyze and address soft tissue restrictions, analyze and cue movement patterns, analyze and modify body mechanics/ergonomics, assess and modify postural abnormalities, address imbalance/dizziness and instruct in home and community integration to attain remaining goals. Progress towards goals / Updated goals:  Short Term Goals: To be accomplished in 4 treatments:  1. Pt will report compliance and independence to HEP to help the pt manage their pain and symptoms.   Met (6/8/18)                   Long Term Goals: To be accomplished in 18 treatments:  1. Pt will improve tinetti score to 18/28 points to decrease the pt's fall risk. 2. Pt and/or pt's son will report having no falls over the past 4 weeks to improve safety around the home. 3. Pt will ambulate 200 ft with SPC safely with no LOB, SBA, on level surfaces with heel-to-toe gait pattern to improve gait quality needed for work tasks.      PLAN  [x]  Upgrade activities as tolerated     [x]  Continue plan of care  []  Update interventions per flow sheet       []  Discharge due to:_  []  Other:_      Anupam Jv, PTA 6/8/2018  1:19 PM    Future Appointments  Date Time Provider Nuzhat Astudillo   6/18/2018 10:30 AM Claudette Or, TERESA [facility]   6/20/2018 2:30 PM Claudette OrTERESA  [facility]   6/20/2018 3:30 PM Valerio Clarke, PT [facility]   6/25/2018 3:15 PM Claudette Or, SLP [facility]   6/25/2018 4:00 PM Adilson Oneill Daisha Medina, PT [id number] [facility]   6/27/2018 11:15 AM Carine Tatum, SLP [facility]ODHLHL [facility]   6/27/2018 12:00 PM Chandra Kohli PTA [facility]

## 2018-06-18 ENCOUNTER — APPOINTMENT (OUTPATIENT)
Dept: PHYSICAL THERAPY | Age: 63
End: 2018-06-18
Payer: COMMERCIAL

## 2018-06-20 ENCOUNTER — HOSPITAL ENCOUNTER (OUTPATIENT)
Dept: PHYSICAL THERAPY | Age: 63
End: 2018-06-20
Payer: COMMERCIAL

## 2018-06-20 ENCOUNTER — HOSPITAL ENCOUNTER (OUTPATIENT)
Dept: PHYSICAL THERAPY | Age: 63
Discharge: HOME | End: 2018-06-20
Payer: COMMERCIAL

## 2018-06-20 PROCEDURE — 92507 TX SP LANG VOICE COMM INDIV: CPT

## 2018-06-20 NOTE — PROGRESS NOTES
ST DAILY TREATMENT NOTE    Patient Name: Marisol Marrufo  Date:2018  : 1955  [x]  Patient  Verified  Payor: Payor: Argelia Sierra / Plan: Katia Santacruz PPO / Product Type: PPO /   In time: 2:40  Out time: 3:40  Total Treatment Time (min): 60  Visit #: 2 of 24    Treatment Diagnosis: Dysarthria and anarthria [R47.1]    SUBJECTIVE   Pain Level (0-10 scale): headache 4  Any medication changes, allergies to medications, adverse drug reactions, diagnosis change, or new procedure performed?: [x] No    [] Yes (see summary sheet for update)    Subjective functional status/changes:   [] No changes reported  Pt c/o a headache and being dizzy    OBJECTIVE  Treatment provided includes:  Increase/Improve:  []  Voice Quality []  Cognitive Linguistic Skills []  Laryngeal/Pharyngeal Exercises   []  Vocal Loudness []  Reading Comprehension []  Swallowing Skills    []  Vocal Cord Function []  Auditory Comprehension [x]  Oral Motor Skills   []  Resonance []  Writing Skills []  Compensatory strategies    [x]  Speech Intelligibility []  Expressive Language []  Attention   []  Breath Support/Coord. []  Receptive language []  Memory   []  Articulation []  Safety Awareness [x] Pt caregiver   []  Fluency []  Word Retrieval []        Treatment Provided:  Re-teach comp strategies to improve overall speech intelligibility; Implemented OMES    Patient/Caregiver  Education: [x] Review HEP      HEP/Handouts given: OMES  Pain Level (0-10 scale) post treatment: headache 6    ASSESSMENT   At the end of today's session, pt was observed slightly twitching with increased dysfluencies. Pt c/o becoming very dizzy. Pt's son was in the waiting room and came to the treatment room. He reported she frequently has seizures, but is compliant with her medication. She was able to to walk so he took her to the car, as he suspected it may be a mild seizure coming on. Withheld physical therapy today.      []   Improving appropriately and progressing toward goals  [x]   Improving slowly and progressing toward goals  []   Approximating goals/maximum potential  [x]   Continues to benefit from skilled therapy to address remaining functional deficits  []   Not progressing toward goals and plan of care will be adjusted    Patient will continue to benefit from skilled therapy to address remaining functional deficits: dysarthria    Progress towards goals / Updated goals:  1) Pt will recall and demonstrate comp strategies (increase intensity, slow down, rate, and over-articulate) with 100% acc in 4/4 sessions  to improve speech intelligibility. Current 6/20:Re-teaching of comp strategies d/t 0% recall  2. Pt will reduce dysfluencies by utilizing comp strategies to demonstrate fluent speech in oral reading and conversation in 80% off opportunities when provided with min-modA. Current 6/20: Not targeted this date. 3. Pt will demonstrate controlled variation of vocal intensity by counting 1-5 from a whisper to yelling  with ~80% acc Min A to increase control of vocal intensity and self awareness warranted for improved speech intelligbility. Current 6/20:Not targeted this date. 4. Pt will increase quality and length of phonation through use of diaphragmatic breathing by sustaining ah utilizing  for 15 seconds in 4/4 sessions to increase effective breath support/ coordination warranted for improved speech intelligibility. Current 6/20:  Not targeted this date. 5. The patient will utilize comp strategies at the word, phrase, and sentence level ar through articulatory drills with  90% acc ineligibility when provided with min-mod cues. Current 6/20: Not targeted this date.    6. Pt will complete x20 repetitions of various oral-motor exercises, including: labial retraction, labial protrusion, alternate labial retraction/protrusion, buccal holds, tongue press, Lingual elevation, lingual tracing, lingual depression, lingual alternate, etc to improve oral strength and ROM for speech.   Current 6/20: Skilled Instruction provided to implement HEP with mod-maxA      PLAN  [x]  Continue plan of care  []  Modify Goals/Treatment Plan      []  Discharge due to:  [] Other:    TERESA Montenegro 6/20/2018  2:43 PM  MA, [address] Pathologist    Future Appointments  Date Time Provider Nuzhat Wilda   6/20/2018 3:30 PM Cammie Rivers, PT [facility]   6/25/2018 3:15 PM TERESA Mont[place]ro [facility]   6/25/2018 4:00 PM Cammie Rivers, PT [id number] [facility]   6/27/2018 11:15 AM TERESA Montenegro [facility]   6/27/2018 12:00 PM Salvador Celaya, PTA [facility]

## 2018-06-25 ENCOUNTER — HOSPITAL ENCOUNTER (OUTPATIENT)
Dept: PHYSICAL THERAPY | Age: 63
Discharge: HOME | End: 2018-06-25
Payer: COMMERCIAL

## 2018-06-25 PROCEDURE — 92507 TX SP LANG VOICE COMM INDIV: CPT

## 2018-06-25 PROCEDURE — 97112 NEUROMUSCULAR REEDUCATION: CPT

## 2018-06-25 NOTE — PROGRESS NOTES
PT DAILY TREATMENT NOTE - [facility]     Patient Name: Shannan Deluca  Date:2018  : 1955  [x]  Patient  Verified  Payor: Renetta Millan / Plan: Hennie Goodpasture PPO / Product Type: PPO /    In time: 4:00  Out time: 4:44  Total Treatment Time (min): 44  Total Timed Codes (min): 44  1:1 Treatment Time ( only): 20  Visit #: 5 of 18    Treatment Area: Gait instability [R26.81]  Unspecified abnormalities of gait and mobility [R26.9]    SUBJECTIVE  Pain Level (0-10 scale): 0, slight HA  Any medication changes, allergies to medications, adverse drug reactions, diagnosis change, or new procedure performed?: [x] No    [] Yes (see summary sheet for update)  Subjective functional status/changes:   [] No changes reported  Pt reports having a HA and some lightheadedness today. OBJECTIVE    36 min Therapeutic Exercise:  [x] See flow sheet :   Rationale: increase ROM, increase strength, improve coordination, improve balance and increase proprioception to improve the patients ability to ambulate with decreased fall risk    10 min Neuromuscular Re-education:  [x]  See flow sheet : tinetti test   Rationale: increase ROM, increase strength, improve coordination, improve balance and increase proprioception  to improve the patients ability to ambulate with decreased fall risk          With   [x] TE   [] TA   [] neuro   [] other: Patient Education: [x] Review HEP    [] Progressed/Changed HEP based on:   [] positioning   [] body mechanics   [] transfers   [] heat/ice application    [x] other: see assessment below. Other Objective/Functional Measures: See goals below. Pain Level (0-10 scale) post treatment: 0    ASSESSMENT/Changes in Function: See re-certification. Talked to the pt's son regarding the pt's seizure  frequency and he states that he thinks the pt is having some panic attacks with may be mimicking seizures.  Educated pt to discuss this with their MD. Also discussed the pt's BP with her son and that it is still elevated at times in the clinic. Pt has missed several appointments secondary to other health problems/issues. Pt's tinetti score did improve, but she continues to have impaired balance and tends to drag her feet at times with gait. Pt continues to have difficulty with sit to stands without her UEs and educated pt to continue to use her UEs at this time to avoid falling backward into the chair. We will plan on continuing therapy at this time to improve the pt's fall risk and safety. Patient will continue to benefit from skilled PT services to modify and progress therapeutic interventions, address functional mobility deficits, address ROM deficits, address strength deficits, analyze and address soft tissue restrictions, analyze and cue movement patterns, analyze and modify body mechanics/ergonomics, assess and modify postural abnormalities, address imbalance/dizziness and instruct in home and community integration to attain remaining goals. Progress towards goals / Updated goals:  Short Term Goals: To be accomplished in 4 treatments:  1. Pt will report compliance and independence to [facility] to help the pt manage their pain and symptoms.   Met (6/8/18)                   Long Term Goals: To be accomplished in 18 treatments:  1. Pt will improve tinetti score to 18/28 points to decrease the pt's fall risk. MET 18/28 points 6/25/2018  2. Pt and/or pt's son will report having no falls over the past 4 weeks to improve safety around the home. Not formally assessed but improvement in tinetti score noted 6/25/2018  3. Pt will ambulate 200 ft with SPC safely with no LOB, SBA, on level surfaces with heel-to-toe gait pattern to improve gait quality needed for work tasks.  Progressing, ambulated in the clinic today with no AD, mild foot drop on B feet (right>left today) with no LOB 6/25/2018    PLAN  [x]  Upgrade activities as tolerated     [x]  Continue plan of care  [x]  Update interventions per flow sheet       [] Discharge due to:_  []  Other:_      Freya Gomes, PT 6/25/2018  4:56 PM    Future Appointments  Date Time Provider Nuzhat Astudillo   6/25/2018 3:15 PM TERESA Mendez [facility]   6/25/2018 4:00 PM Freya Gomes, PT [id number] [facility]   6/27/2018 11:15 AM TERESA Mendez JRIMDCI [facility]   6/27/2018 12:00 PM Taryn Corona, PTA [facility]

## 2018-06-25 NOTE — PROGRESS NOTES
ST DAILY TREATMENT NOTE    Patient Name: Dashawn Alexandra  Date:2018  : 1955  [x]  Patient  Verified  Payor: Payor: Africa Rhodes / Plan: Nikia Thompson PPO / Product Type: PPO /   In time: 3:15 Out time: 4:00  Total Treatment Time (min): 45  Visit #: 3 of 24  Treatment Diagnosis: Dysarthria and anarthria [R47.1]    SUBJECTIVE  Pain Level (0-10 scale): 0  Any medication changes, allergies to medications, adverse drug reactions, diagnosis change, or new procedure performed?: [x] No    [] Yes (see summary sheet for update)    Subjective functional status/changes:   [] No changes reported  Pt reports feeling a little light headed, but \"okay\". She stated she worked a little on her OMES; SLP re-education HEP compliance for best results. OBJECTIVE  Treatment provided includes:  Increase/Improve:  []  Voice Quality []  Cognitive Linguistic Skills []  Laryngeal/Pharyngeal Exercises   [x]  Vocal Loudness []  Reading Comprehension []  Swallowing Skills    []  Vocal Cord Function []  Auditory Comprehension []  Oral Motor Skills   []  Resonance []  Writing Skills [x]  Compensatory strategies    [x]  Speech Intelligibility []  Expressive Language []  Attention   [x]  Breath Support/Coord. []  Receptive language []  Memory   []  Articulation []  Safety Awareness [x] Pt education   []  Fluency []  Word Retrieval []        Treatment Provided:  Review comp strategies; utilize comp strategies with bi syllabic words; intensity re-calibration/training with modeling and SPL meter. Patient/Caregiver  Education: [x] Review HEP      HEP/Handouts given: Increasing controlled vocal intensity; bu-syllabic words to read with comp strategies.      Pain Level (0-10 scale) post treatment: 0    ASSESSMENT     []   Improving appropriately and progressing toward goals  [x]   Improving slowly and progressing toward goals  []   Approximating goals/maximum potential  [x]   Continues to benefit from skilled therapy to address remaining functional deficits  []   Not progressing toward goals and plan of care will be adjusted    Patient will continue to benefit from skilled therapy to address remaining functional deficits: dysarthria; dysfluencies    Progress towards goals / Updated goals:  1) Pt will recall and demonstrate comp strategies (increase intensity, slow down, rate, and over-articulate) with 100% acc in 4/4 sessions  to improve speech intelligibility. Current 6/25:Re-teaching of comp strategies d/t 0% recall. Pt reported \"communication, motivation, sanitation\". 2. Pt will reduce dysfluencies by utilizing comp strategies to demonstrate fluent speech in oral reading and conversation in 80% off opportunities when provided with min-modA. Current 6/25: Not targeted this date. 3. Pt will demonstrate controlled variation of vocal intensity by counting 1-5 from a whisper to yelling  with ~80% acc Min A to increase control of vocal intensity and self awareness warranted for improved speech intelligbility. Current 6/25: Max instruction provided with re-calibration by modeling and use of SPL meter: 50% acc donna and increasing to 82% acc with maxA. 4. Pt will increase quality and length of phonation through use of diaphragmatic breathing by sustaining ah utilizing  for 15 seconds in 4/4 sessions to increase effective breath support/ coordination warranted for improved speech intelligibility. Current 6/25:  Not targeted this date. 5. The patient will utilize comp strategies at the word, phrase, and sentence level ar through articulatory drills with  90% acc ineligibility when provided with min-mod cues. Current 6/25: Bi-syllabic words: 82% acc following re-teaching of comp strategies and 90% acc when provided with mod cues/reps.    6. Pt will complete x20 repetitions of various oral-motor exercises, including: labial retraction, labial protrusion, alternate labial retraction/protrusion, buccal holds, tongue press, Lingual elevation, lingual tracing, lingual depression, lingual alternate, etc to improve oral strength and ROM for speech. Current 6/25:Not targeted this date.      PLAN  [x]  Continue plan of care  []  Modify Goals/Treatment Plan      []  Discharge due to:  [] Other:    TERESA Bailey 6/25/2018  3:21 PM  MA, [address] Pathologist    Future Appointments  Date Time Provider Nuzhat Astudillo   6/25/2018 4:00 PM Cliff Baird PT [facility]   6/27/2018 11:15 AM TERESA Bailey [facility]   6/27/2018 12:00 PM Sundar Zavaleta PTA [facility]

## 2018-06-25 NOTE — PROGRESS NOTES
[facility] - [facility] [place]  [address] [place] [address]  [phone] fax    Continued Plan of Care/ Re-certification for Physical Therapy Services    Patient name: Michael Lazar Start of Care: 2018   Referral source: Genny Lazar MD : 1955                         Medical Diagnosis: Gait instability [R26.81] Onset Date:                          Treatment Diagnosis: impaired gait and balance   Prior Hospitalization: see medical history Provider#: [id number]   Medications: Verified on Patient summary List    Comorbidities: Visual impairment, HTN, weight change of more than 10 lbs recently, hx seizures   Prior Level of Function: Independent with work tasks. Lives with son that helps pt with ADLs at times. Lives in 1 story home. Visits from Start of Care: 4    Missed Visits: 3    The Plan of Care and following information is based on the patient's current status:  Goal:will report compliance and independence to HEP to help the pt manage their pain and symptoms. Status at last note/certification: MET  Current Status: met    Goal:Pt will improve tinetti score to 18/28 points to decrease the pt's fall risk  Status at last note/certification: MET 69/59 points  Current Status: met    Goal:Pt and/or pt's son will report having no falls over the past 4 weeks to improve safety around the home  Status at last note/certification:Not formally assessed but improvement in tinetti score noted   Current Status: not met    Goal:Pt will ambulate 200 ft with SPC safely with no LOB, SBA, on level surfaces with heel-to-toe gait pattern to improve gait quality needed for work tasks. Status at last note/certification: Progressing, ambulated in the clinic today with no AD, mild foot drop on B feet (right>left today) with no LOB   Current Status: not met    Key functional changes: Tinetti score 18/28 points.  Continues to have instability/balance deficits with static and dynamic mobility. Pt continues to be a fall risk at this time. Problems/ barriers to goal attainment: other health conditions. Problem List: pain affecting function, decrease ROM, decrease strength, impaired gait/ balance, decrease ADL/ functional abilitiies, decrease activity tolerance, decrease flexibility/ joint mobility and decrease transfer abilities    Treatment Plan: Therapeutic exercise, Therapeutic activities, Neuromuscular re-education, Physical agent/modality, Gait/balance training, Manual therapy, Patient education, Self Care training, Functional mobility training, Home safety training and Stair training     Patient Goal (s) has been updated and includes: walk better     Goals for this certification period to be accomplished in 16 treatments:  1. Pt will improve tinetti score to 21/28 points to decrease the pt's fall risk. 2. Pt and/or pt's son will report having no falls over the past 4 weeks to improve safety around the home. 3. Pt will ambulate 200 ft with LRAD safely with no LOB, SBA, on level surfaces with heel-to-toe gait pattern to improve gait quality needed for work tasks. 4. Pt will perform a sit to stand from a chair/plinth with 1 or no UE, with 1 attempt, no posterior LOB to improve ease of transfers. Frequency / Duration: Patient to be seen 2 times per week for 16 treatments:    Assessment / Recommendations:  Talked to the pt's son regarding the pt's seizure  frequency and he states that he thinks the pt is having some panic attacks with may be mimicking seizures. Educated pt to discuss this with their MD. Also discussed the pt's BP with her son and that it is still elevated at times in the clinic. Pt has missed several appointments secondary to other health problems/issues. Pt's tinetti score did improve, but she continues to have impaired balance and tends to drag her feet at times with gait.  Pt continues to have difficulty with sit to stands without her UEs and educated pt to continue to use her UEs at this time to avoid falling backward into the chair. We will plan on continuing therapy at this time to improve the pt's fall risk and safety. G-Codes (GP)  Mobility  [id number] Current  CK= 40-59%  [id number] Goal  CK= 40-59%    The severity rating is based on clinical judgment and the FOTO score. Certification Period: 6/25/2018 - 8/24/2018    Cheyanne Blanchardlers, PT 6/25/2018 4:59 PM    ________________________________________________________________________  I certify that the above Therapy Services are being furnished while the patient is under my care. I agree with the treatment plan and certify that this therapy is necessary. [] I have read the above and request that my patient continue as recommended.   [] I have read the above report and request that my patient continue therapy with the following changes/special instructions: _______________________________________  [] I have read the above report and request that my patient be discharged from therapy    Physician's Signature:________________________________Date:___________Time:__________    Please sign and return to [facility] [employer] [facility] [place]  [address] [facility]  [phone] fax

## 2018-06-27 ENCOUNTER — HOSPITAL ENCOUNTER (OUTPATIENT)
Dept: PHYSICAL THERAPY | Age: 63
Discharge: HOME | End: 2018-06-27
Payer: COMMERCIAL

## 2018-06-27 PROCEDURE — 97110 THERAPEUTIC EXERCISES: CPT

## 2018-06-27 PROCEDURE — 92507 TX SP LANG VOICE COMM INDIV: CPT

## 2018-06-27 NOTE — PROGRESS NOTES
ST DAILY TREATMENT NOTE    Patient Name: Amaury Nelson  Date:2018  : 1955  [x]  Patient  Verified  Payor: Payor: Sb Lynch / Plan: Neal Thomas PPO / Product Type: PPO /   In time: 11:30 Out time:12:00  Total Treatment Time (min): 30  Visit #: 4 of 24    Treatment Diagnosis: Dysarthria and anarthria [R47.1]    SUBJECTIVE  Pain Level (0-10 scale): 0  Any medication changes, allergies to medications, adverse drug reactions, diagnosis change, or new procedure performed?: [] No    [] Yes (see summary sheet for update)    Subjective functional status/changes:   [] No changes reported  She reports her son said, \" yea, you are sounding a little better\". OBJECTIVE  Treatment provided includes:  Increase/Improve:  []  Voice Quality []  Cognitive Linguistic Skills []  Laryngeal/Pharyngeal Exercises   []  Vocal Loudness []  Reading Comprehension []  Swallowing Skills    []  Vocal Cord Function []  Auditory Comprehension []  Oral Motor Skills   []  Resonance []  Writing Skills [x]  Compensatory strategies    [x]  Speech Intelligibility []  Expressive Language []  Attention   []  Breath Support/Coord. []  Receptive language []  Memory   [x]  Articulation []  Safety Awareness []    []  Fluency []  Word Retrieval []        Treatment Provided:  Recall and implement comp strategies to increase speech intelligibility    Patient/Caregiver  Education: [x] Review HEP      HEP/Handouts given: multisyllabic words.        Pain Level (0-10 scale) post treatment: 0    ASSESSMENT     []   Improving appropriately and progressing toward goals  [x]   Improving slowly and progressing toward goals  []   Approximating goals/maximum potential  [x]   Continues to benefit from skilled therapy to address remaining functional deficits  []   Not progressing toward goals and plan of care will be adjusted    Patient will continue to benefit from skilled therapy to address remaining functional deficits: dysarthria; dysfluencies    Progress towards goals / Updated goals:  1) Pt will recall and demonstrate comp strategies (increase intensity, slow down, rate, and over-articulate) with 100% acc in 4/4 sessions  to improve speech intelligibility. Current 6/27: 33% acc donna   2. Pt will reduce dysfluencies by utilizing comp strategies to demonstrate fluent speech in oral reading and conversation in 80% off opportunities when provided with min-modA. Current 6/27: Not targeted this date. 3. Pt will demonstrate controlled variation of vocal intensity by counting 1-5 from a whisper to yelling  with ~80% acc Min A to increase control of vocal intensity and self awareness warranted for improved speech intelligbility. Current 6/27: Not targeted this date. 4. Pt will increase quality and length of phonation through use of diaphragmatic breathing by sustaining ah utilizing  for 15 seconds in 4/4 sessions to increase effective breath support/ coordination warranted for improved speech intelligibility. Current 6/27:  Not targeted this date. 5. The patient will utilize comp strategies at the word, phrase, and sentence level ar through articulatory drills with  90% acc ineligibility when provided with min-mod cues. Current 6/27: Bi-syllabic words: 80% acc donna and 90% acc when provided with [place]; max A to implement multisyllabic words. 6. Pt will complete x20 repetitions of various oral-motor exercises, including: labial retraction, labial protrusion, alternate labial retraction/protrusion, buccal holds, tongue press, Lingual elevation, lingual tracing, lingual depression, lingual alternate, etc to improve oral strength and ROM for speech. Current 6/27:Not targeted this date.      PLAN  [x]  Continue plan of care  []  Modify Goals/Treatment Plan      []  Discharge due to:  [] Other:    Madhuri Escalante, SLP 6/27/2018  11:34 AM  MA, [facility]  Speech-Language Pathologist    Future Appointments  Date Time Provider Nuzhat Astudillo   6/27/2018 12:00 PM Shauna Hudson, PTA [facility]

## 2018-06-27 NOTE — PROGRESS NOTES
PT DAILY TREATMENT NOTE - [facility]     Patient Name: Gregory Castellanos  Date:2018  : 1955  [x]  Patient  Verified  Payor: Lory Soriano / Plan: Letha Ganser PPO / Product Type: PPO /    In time: 12:04  Out time:12:40  Total Treatment Time (min): 36  Total Timed Codes (min): 36  1:1 Treatment Time ( only): 32   Visit #: 6 of 18    Treatment Area: Gait instability [R26.81]  Unspecified abnormalities of gait and mobility [R26.9]    SUBJECTIVE  Pain Level (0-10 scale): 0/10  Any medication changes, allergies to medications, adverse drug reactions, diagnosis change, or new procedure performed?: [x] No    [] Yes (see summary sheet for update)  Subjective functional status/changes:   [] No changes reported  Pt reports no pain today and was happy because the doctor told her she gained 7 pounds. She wants to get more strength and growth in her legs and butt for her balance and looks. OBJECTIVE    36 min Therapeutic Exercise:  [x] See flow sheet :   Rationale: increase ROM, increase strength, improve coordination, improve balance and increase proprioception to improve the patients ability to ambulate with decreased fall risk          With   [] TE   [] TA   [] neuro   [] other: Patient Education: [x] Review HEP    [] Progressed/Changed HEP based on:   [] positioning   [] body mechanics   [] transfers   [] heat/ice application    [] other:      Other Objective/Functional Measures:   /89  HR: 61 bpm  Ambulated from bars to clinic on heels to work on [employer] endurance/strength for carryover to gait; also ambulated on toes for calf strengthening  Challenged with SL bridges  Added step ups without UE assist for strength/balance  No increased pain with session     Pain Level (0-10 scale) post treatment: 0/10    ASSESSMENT/Changes in Function: Pt continues to ambulate with decreased heel strike which increases her risks for falls from catching toes on carpet. She has improving strength and static balance.  Will continue working on dynamic balance to decrease fall risk and improve ease of working. Patient will continue to benefit from skilled PT services to modify and progress therapeutic interventions, address functional mobility deficits, address ROM deficits, address strength deficits, analyze and address soft tissue restrictions, analyze and cue movement patterns, analyze and modify body mechanics/ergonomics, assess and modify postural abnormalities, address imbalance/dizziness and instruct in home and community integration to attain remaining goals. Goals for this certification period to be accomplished in 16 treatments:  1. Pt will improve tinetti score to 21/28 points to decrease the pt's fall risk.   2. Pt and/or pt's son will report having no falls over the past 4 weeks to improve safety around the home. 3. Pt will ambulate 200 ft with LRAD safely with no LOB, SBA, on level surfaces with heel-to-toe gait pattern to improve gait quality needed for work tasks. 4. Pt will perform a sit to stand from a chair/plinth with 1 or no UE, with 1 attempt, no posterior LOB to improve ease of transfers. PLAN  [x]  Upgrade activities as tolerated     [x]  Continue plan of care  []  Update interventions per flow sheet       []  Discharge due to:_  []  Other:_      [employer], PTA 6/27/2018  12:46 PM    No future appointments.

## 2018-07-06 ENCOUNTER — HOSPITAL ENCOUNTER (OUTPATIENT)
Dept: PHYSICAL THERAPY | Age: 63
End: 2018-07-06
Payer: COMMERCIAL

## 2018-07-06 ENCOUNTER — APPOINTMENT (OUTPATIENT)
Dept: PHYSICAL THERAPY | Age: 63
End: 2018-07-06
Payer: COMMERCIAL

## 2018-07-09 ENCOUNTER — HOSPITAL ENCOUNTER (OUTPATIENT)
Dept: PHYSICAL THERAPY | Age: 63
Discharge: HOME | End: 2018-07-09
Payer: COMMERCIAL

## 2018-07-09 PROCEDURE — 97110 THERAPEUTIC EXERCISES: CPT

## 2018-07-09 NOTE — PROGRESS NOTES
PT DAILY TREATMENT NOTE     Patient Name: Tom Riojas  Date:2018  : 1955  [x]  Patient  Verified  Payor: Antia Matson / Plan: Nomi Tian PPO / Product Type: PPO /    In time:1232  Out time:1310  Total Treatment Time (min): 38  Total Timed Codes: 18 min  1:1 Treatment time: 18 min  Visit #: 7 of 18    Treatment Area: Gait instability [R26.81]  Unspecified abnormalities of gait and mobility [R26.9]    SUBJECTIVE  Pain Level (0-10 scale): 0  Any medication changes, allergies to medications, adverse drug reactions, diagnosis change, or new procedure performed?: [x] No    [] Yes (see summary sheet for update)  Subjective functional status/changes:   [] No changes reported  Pt reports having HA, \"it's nothing that I can't stand. \"    OBJECTIVE      18 min Therapeutic Exercise:  [] See flow sheet :   Rationale: increase ROM, increase strength, improve coordination, improve balance and increase proprioception to improve the patients ability to ambulate safely in community. With   [x] TE   [] TA   [] neuro   [] other: Patient Education: [x] Review HEP    [] Progressed/Changed HEP based on:   [] positioning   [] body mechanics   [] transfers   [] heat/ice application    [] other:      Other Objective/Functional Measures: 174/87 BP 68 HR automatic cuff seated. FOTO score this session: 55    Pain Level (0-10 scale) post treatment: 0    ASSESSMENT/Changes in Function: Focused on LE strengthening and gait training in // bars to improve quality of gait and safety. Pt required extensive vc's and tc's to complete tasks correctly. Pt remains at risk of falls due to poor technique when walking, pt is not performing strong heel strike and walks with shortened steps. Pt would benefit to continue to address gait deficits next session to improve safety.     Patient will continue to benefit from skilled PT services to modify and progress therapeutic interventions, address functional mobility deficits, address strength deficits and analyze and cue movement patterns to attain remaining goals. [x]  See Plan of Care  []  See progress note/recertification  []  See Discharge Summary         Progress towards goals / Updated goals:  Goals for this certification period to be accomplished in 16 treatments:  1. Pt will improve tinetti score to 21/28 points to decrease the pt's fall risk.   2. Pt and/or pt's son will report having no falls over the past 4 weeks to improve safety around the home. 3. Pt will ambulate 200 ft with LRAD safely with no LOB, SBA, on level surfaces with heel-to-toe gait pattern to improve gait quality needed for work tasks. 4. Pt will perform a sit to stand from a chair/plinth with 1 or no UE, with 1 attempt, no posterior LOB to improve ease of transfers.      PLAN  []  Upgrade activities as tolerated     [x]  Continue plan of care  []  Update interventions per flow sheet       []  Discharge due to:_  []  Other:_      Sofiya Palomo 7/9/2018  12:28 PM    Future Appointments  Date Time Provider Nuzhat Astudillo   7/9/2018 12:30 PM Sofiya Palomo [facility]

## 2018-07-13 ENCOUNTER — HOSPITAL ENCOUNTER (OUTPATIENT)
Dept: PHYSICAL THERAPY | Age: 63
Discharge: HOME | End: 2018-07-13
Payer: COMMERCIAL

## 2018-07-13 PROCEDURE — 97110 THERAPEUTIC EXERCISES: CPT

## 2018-07-13 PROCEDURE — 97530 THERAPEUTIC ACTIVITIES: CPT

## 2018-07-13 PROCEDURE — 97112 NEUROMUSCULAR REEDUCATION: CPT

## 2018-07-13 NOTE — PROGRESS NOTES
PT DAILY TREATMENT NOTE     Patient Name: Enedelia Seed  Date:2018  : 1955  [x]  Patient  Verified  Payor: Esvin Ring / Plan: Jerolyn Gaucher PPO / Product Type: PPO /    In time: 3:00   Out time: 3:36  Total Treatment Time (min): 36  Total Timed Codes (min): 36  1:1 Treatment time (min): 23  Visit #: 8 of 18    Treatment Area: Gait instability [R26.81]  Unspecified abnormalities of gait and mobility [R26.9]    SUBJECTIVE  Pain Level (0-10 scale): 0  Any medication changes, allergies to medications, adverse drug reactions, diagnosis change, or new procedure performed?: [x] No    [] Yes (see summary sheet for update)  Subjective functional status/changes:   [] No changes reported  Pt reports she still has some trouble with gait. OBJECTIVE    28 min Therapeutic Exercise:  [x] See flow sheet :   Rationale: increase ROM, increase strength, improve coordination, improve balance and increase proprioception to improve the patients ability to ambulate safely in community. 8 min Neuromuscular Re-education:  [x]  See flow sheet : step over hurdles with cueing (see assessment below). Rationale: increase ROM, increase strength, improve coordination, improve balance and increase proprioception  to improve the patients ability to tolerate ADLs           With   [x] TE   [] TA   [] neuro   [] other: Patient Education: [x] Review HEP    [] Progressed/Changed HEP based on:   [] positioning   [] body mechanics   [] transfers   [] heat/ice application    [] other:      Other Objective/Functional Measures: Increased resistance on the stationary bike and added 2# to standing exercises to improve strength in the LEs. Added step step ups to improve foot placement when stepping. Pain Level (0-10 scale) post treatment: 0    ASSESSMENT/Changes in Function: Needed verbal cueing for placement of her feet with step overs to make sure she does not take too big of a step and hit the ren with her feet.  B UE support used for step over small hurdles. Performed step ups with no UE support and needed SBA to monitor for balance with this exercise (no LOB noted). Educated pt to use a handrail with stairs at home and at work to improve balance and safety. Continue POC as tolerated. Patient will continue to benefit from skilled PT services to modify and progress therapeutic interventions, address functional mobility deficits, address ROM deficits, address strength deficits, analyze and address soft tissue restrictions, analyze and cue movement patterns, analyze and modify body mechanics/ergonomics, address imbalance/dizziness and instruct in home and community integration to attain remaining goals. []  See Plan of Care  []  See progress note/recertification  []  See Discharge Summary         Progress towards goals / Updated goals:  Goals for this certification period to be accomplished in 16 treatments:  1. Pt will improve tinetti score to 21/28 points to decrease the pt's fall risk.   2. Pt and/or pt's son will report having no falls over the past 4 weeks to improve safety around the home. 3. Pt will ambulate 200 ft with LRAD safely with no LOB, SBA, on level surfaces with heel-to-toe gait pattern to improve gait quality needed for work tasks. Continues to have impaired gait pattern with ambulation 7/13/2018  4. Pt will perform a sit to stand from a chair/plinth with 1 or no UE, with 1 attempt, no posterior LOB to improve ease of transfers.      PLAN  [x]  Upgrade activities as tolerated     [x]  Continue plan of care  [x]  Update interventions per flow sheet       []  Discharge due to:_  []  Other:_      Heladio Sanchez PT 7/13/2018  3:14 PM    Future Appointments  Date Time Provider Nuzhat Astudillo   7/13/2018 3:00 PM Heladio Sanchez PT [facility]   7/17/2018 1:00 PM Carlos Yip, TERESA [facility]   7/17/2018 2:00 PM Heladio Sanchez PT [facility]   7/19/2018 12:30 PM [facility] [address] [facility]   7/24/2018 4:45 PM TERESA Devine [facility]   7/24/2018 5:30 PM Luz Elena Tadeo [facility]   7/26/2018 4:45 PM TERESA Devine [facility]   7/26/2018 5:30 PM [facility] [address] [facility]   7/31/2018 2:30 PM TERESA Devine [facility]   7/31/2018 3:30 PM Luz Elena Tadeo [facility]

## 2018-07-17 ENCOUNTER — HOSPITAL ENCOUNTER (OUTPATIENT)
Dept: PHYSICAL THERAPY | Age: 63
End: 2018-07-17
Payer: COMMERCIAL

## 2018-07-17 ENCOUNTER — APPOINTMENT (OUTPATIENT)
Dept: PHYSICAL THERAPY | Age: 63
End: 2018-07-17
Payer: COMMERCIAL

## 2018-07-19 ENCOUNTER — APPOINTMENT (OUTPATIENT)
Dept: PHYSICAL THERAPY | Age: 63
End: 2018-07-19
Payer: COMMERCIAL

## 2018-07-19 ENCOUNTER — HOSPITAL ENCOUNTER (OUTPATIENT)
Dept: PHYSICAL THERAPY | Age: 63
Discharge: HOME | End: 2018-07-19
Payer: COMMERCIAL

## 2018-07-19 PROCEDURE — G8999 MOTOR SPEECH CURRENT STATUS: HCPCS

## 2018-07-19 PROCEDURE — G9186 MOTOR SPEECH GOAL STATUS: HCPCS

## 2018-07-19 PROCEDURE — 92507 TX SP LANG VOICE COMM INDIV: CPT

## 2018-07-19 NOTE — PROGRESS NOTES
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 180/100. Her BP has been in vicinity for the last 2 weeks. She did get a BP machine and has been using it. She said that she documented her reading when it was high but she stopped when it started to come down. She said that when her BP was at 180/100 that he told her to take her meds and lie down. He did not notify her MD of the BP being that high. She has an appointment with  Rose Marquez NP for Dr. Ivette Wharton this coming Monday. When queried about drinking caffeine she stated that she still has 3 regular caffeinated  Pepsis daily. (This SLP remembers teaching her about the effects of caffeine when she was coming to tx several years ago and asked her to switch to decaf). Patient stated that she needs the caffeine to wake her up and get her going. Son said that he has been going over the multi-syllabic words with patient and she has been doing well with them.    OBJECTIVE  Treatment provided includes:  Increase/Improve:  []  Voice Quality []  Cognitive Linguistic Skills []  Laryngeal/Pharyngeal Exercises   []  Vocal Loudness []  Reading Comprehension []  Swallowing Skills    []  Vocal Cord Function []  Auditory Comprehension []  Oral Motor Skills   []  Resonance []  Writing Skills [x]  Compensatory strategies    [x]  Speech Intelligibility []  Expressive Language []  Attention   []  Breath Support/Coord. []  Receptive language []  Memory   [x]  Articulation [x]  Safety Awareness []    []  Fluency []  Word Retrieval []      Treatment Provided:  SLP took patient's blood pressure with automatic cuff. 201/100 HR 63. Had tech Bao take her BP manually and it was 200/100. Instructed patient regarding only have decaf cola and decrease the amount as caffeine can elevate BP. SLP contacted Dr. Ayush Pinto office regarding patient's recent and current BP. Office aid Romi Marshall said he would get the message to her and call back. Tech took patient to waiting area and kept her comfortable while trying to reach her son who had dropped her off, and also her best friend. She also contacted patient's PCP [facility]. She said that she was not all that worried about it that it runs high, to take it again here (taken  4:08) and was 201/100. Her son and he said that her BP has been running high for years, he keeps an eye on it and when it is high he gives her another BP pill. It is 25 mg. He was advised to give her the PB pill when she gets home, and if her BP has not come down to call PCP. He said that he has been keeping a log even though patient has not. Asked him if he knew the s/sx of a stroke and he was able to state them except for blurred vision (he and patient did not know that sign). They were educated regarding s/sx of a heart attack and that they signs can be different for a woman. PCP was called again and notified of patient's continued high BP reading. She that son should keep an eye on it. Patient/Caregiver  Education: [x] Review HEP  See above instructions. Patient to be able to tell speech strategies given to increase her speech intelligibility. HEP/Handouts given: multisyllabic words.        Pain Level (0-10 scale) post treatment: 0    ASSESSMENT Patient complained of elevated BP for the past two weeks. Taking it today in tx her BP was severely high. her referring MD and PCP were notified and aware of the situation. Patient did not exhibit further s/sx of new onset CVA, neurological changes while she was here. []   Improving appropriately and progressing toward goals  [x]   Improving slowly and progressing toward goals  []   Approximating goals/maximum potential  [x]   Continues to benefit from skilled therapy to address remaining functional deficits  []   Not progressing toward goals and plan of care will be adjusted    Patient will continue to benefit from skilled therapy to address remaining functional deficits: dysarthria; dysfluencies    Progress towards goals / Updated goals:  1) Pt will recall and demonstrate comp strategies (increase intensity, slow down, rate, and over-articulate) with 100% acc in 4/4 sessions  to improve speech intelligibility. Current 7/19: Not targeted this date. 2. Pt will reduce dysfluencies by utilizing comp strategies to demonstrate fluent speech in oral reading and conversation in 80% off opportunities when provided with min-modA. Current 7/19: Not targeted this date. 3. Pt will demonstrate controlled variation of vocal intensity by counting 1-5 from a whisper to yelling  with ~80% acc Min A to increase control of vocal intensity and self awareness warranted for improved speech intelligbility. Current 7/19: Not targeted this date. 4. Pt will increase quality and length of phonation through use of diaphragmatic breathing by sustaining ah utilizing  for 15 seconds in 4/4 sessions to increase effective breath support/ coordination warranted for improved speech intelligibility. Current 7/19:  Not targeted this date. 5. The patient will utilize comp strategies at the word, phrase, and sentence level ar through articulatory drills with  90% acc ineligibility when provided with min-mod cues.   Current 7/19: Not targeted this date. 6. Pt will complete x20 repetitions of various oral-motor exercises, including: labial retraction, labial protrusion, alternate labial retraction/protrusion, buccal holds, tongue press, Lingual elevation, lingual tracing, lingual depression, lingual alternate, etc to improve oral strength and ROM for speech. Current 7/19:Not targeted this date.      PLAN  [x]  Continue plan of care  []  Modify Goals/Treatment Plan      []  Discharge due to:  [] Other:    TERESA Hamilton 7/19/2018  2:30 PM  MA, [address] Pathologist    Future Appointments  Date Time Provider Nuzhat Astudillo   7/19/2018 3:30 PM [facility] PT [facility] [address] [facility]   7/24/2018 4:45 PM TERESA Hamilton [facility]   7/24/2018 5:30 PM Lyle Doyle [id number] [facility]   7/26/2018 4:45 PM TERESA Hamilton [facility]   7/26/2018 5:30 PM [facility] PT [facility] [address] [facility]   7/31/2018 2:30 PM TERESA Hamilton [facility]   7/31/2018 3:30 PM Caesar Jolley [facility]

## 2018-07-24 ENCOUNTER — APPOINTMENT (OUTPATIENT)
Dept: PHYSICAL THERAPY | Age: 63
End: 2018-07-24
Payer: COMMERCIAL

## 2018-07-26 ENCOUNTER — HOSPITAL ENCOUNTER (OUTPATIENT)
Dept: PHYSICAL THERAPY | Age: 63
Discharge: HOME | End: 2018-07-26
Payer: COMMERCIAL

## 2018-07-26 ENCOUNTER — HOSPITAL ENCOUNTER (OUTPATIENT)
Dept: PHYSICAL THERAPY | Age: 63
End: 2018-07-26
Payer: COMMERCIAL

## 2018-07-26 PROCEDURE — 92507 TX SP LANG VOICE COMM INDIV: CPT

## 2018-07-26 NOTE — PROGRESS NOTES
[facility] [place]  [address] [facility]  [phone] fax    Medicare Progress Report    Patient name: Amaury Nelson Start of Care: 2018   Referral source: Meenakshi Waller MD : 1955                         Medical Diagnosis: Dysarthria and anarthria [R47.1] Onset Date: 2018                         Treatment Diagnosis: dysarthria R 47.1 and dysfluency disorder R47.82   Prior Hospitalization: see medical history Provider#: [id number]   Medications: Verified on Patient summary List    Comorbidities: Visual impairment, HTN, weight change of more than 10 lbs recently, hx seizures   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 180/100. Her BP has been in vicinity for the last 2 weeks. She did get a BP machine and has been using it. She said that she documented her reading when it was high but she stopped when it started to come down. She said that when her BP was at 180/100 that he told her to take her meds and lie down. He did not notify her MD of the BP being that high. She has an appointment with  Austin Gallegos NP for Dr. Judy Salmon this coming Monday. When queried about drinking caffeine she stated that she still has 3 regular caffeinated  Pepsis daily. (This SLP remembers teaching her about the effects of caffeine when she was coming to tx several years ago and asked her to switch to decaf).  Patient stated that she needs the caffeine to wake her up and get her going. Son said that he has been going over the multi-syllabic words with patient and she has been doing well with them.        Goal:1) Pt will recall and demonstrate comp strategies (increase intensity, slow down, rate, and over-articulate) with 100% acc in 4/4 sessions  to improve speech intelligibility. Status at last note/certification: see initial evaluation. Patient exhibited mild-mod dysarthric speech and dysfluency disorder, unaware of her deficits, not using compensatory strategies, speaking at a very fast rate, blended word boundaries, inadequate loudness, poor respiratory speech coordination with shallow cervical breath. Current Status: not met 6/25/18: Re-teaching of comp strategies d/t 0% recall. Pt reported \"communication, motivation, sanitation\". 6/27/18: 33% acc donna  Revise goal    Goal:2. Pt will reduce dysfluencies by utilizing comp strategies to demonstrate fluent speech in oral reading and conversation in 80% of opportunities when provided with min-modA. Status at last note/certification: see initial evaluation. Patient exhibited mild-mod dysarthric speech and dysfluency disorder, unaware of her deficits, not using compensatory strategies, speaking at a very fast rate, blended word boundaries, inadequate loudness, poor respiratory speech coordination with shallow cervical breath. Current Status: not met. Goal not directly targeted due to limited attendance since the intital evaluation and work on other goals. The goal was indirectly targeted with the use of compensatory strategies related to slowing rate of speech. 4/5 sessions targeted tx goals, session 5 dealt with patient's elevated BP. Revise goal    Goal:3. Pt will demonstrate controlled variation of vocal intensity by counting 1-5 from a whisper to yelling  with ~80% acc Min A to increase control of vocal intensity and self awareness warranted for improved speech intelligbility.      Status at last note/certification: see initial evaluation. Moderately decreased difficulty controlling loudness, too soft which negatively impacts her speech intelligibility. Current Status:  not met Goal targeted 1 session due to limited attendance since the initial evaluation and work on other goals. Max instruction provided with re-calibration by modeling and use of SPL meter: 50% acc donna and increasing to 82% acc with maxA. Continue goal    Goal:4. Pt will increase quality and length of phonation through use of diaphragmatic breathing by sustaining ah utilizing  for 15 seconds in 4/4 sessions to increase effective breath support/ coordination warranted for improved speech intelligibility. Status at last note/certification: see initial evaluation. Patient exhibited mild-mod dysarthric speech and dysfluency disorder, unaware of her deficits, not using compensatory strategies, speaking at a very fast rate, blended word boundaries, inadequate loudness, poor respiratory speech coordination with shallow cervical breath. Current Status: not met Goal not targeted due to limited attendance since the initial evaluation and work on other goals. Continue goal    Goal:5. The patient will utilize comp strategies at the word, phrase, and sentence level through articulatory drills with  90% acc ineligibility when provided with min-mod cues. Status at last note/certification: see initial evaluation. Patient exhibited mild-mod dysarthric speech and dysfluency disorder, unaware of her deficits, not using compensatory strategies, speaking at a very fast rate, blended word boundaries, inadequate loudness, poor respiratory speech coordination with shallow cervical breath. Current Status: not met  Bi-syllabic words: 80% acc donna and 90% acc when provided with North; max A to implement multisyllabic words.   Continue goal    Goal:6. Pt will complete x20 repetitions of various oral-motor exercises, including: labial retraction, labial protrusion, alternate labial retraction/protrusion, buccal holds, tongue press,   Lingual elevation, lingual tracing, lingual depression, lingual alternate, etc to improve oral strength and ROM for speech. Status at last note/certification: see initial evaluation. Labial/lingual ROM strength/coordination A-C on FDA-2    Current Status: not met/ Goal not targeted due to limited attendance since the initial evaluation and work on other goals. Continue goal      Status at Long Term Goal:   Goal:1. Patient will develop functional and intelligible speech with use of compensatory strategies through the use of adequate labial/ lingual function, adequate breath support for vocal intensity, increased articulatory precision, and speech prosody with North and >90% intelligibility. Status at last note/certification: see initial evaluation. Patient exhibited mild-mod dysarthric speech and dysfluency disorder, unaware of her deficits, not using compensatory strategies, speaking at a very fast rate, blended word boundaries, inadequate loudness, poor respiratory speech coordination with shallow cervical breath. C= Abnormality obvious but can perform task/movements with reasonable approximation, D= Some production of task but poor in quality, unable to sustain, inaccurate or extremely labored,   Labial/lingual ROM strength/coordination A-C on FDA-2    Key functional changes: Patient is progressing with production of bi-syllabic words with good speech intelligibility in the structured setting in the treatment room. Patient son stated that patient is practicing them at home and improving with them in that setting as well. Patient remains motivated to improve her speech and fluency skills to improve her communication efficiency and QOL. Problems/ barriers to goal attainment: decreased attendance, disease process,cognitive and sensory deficits-vision/hearing/speech .      Assessment Patient has improved with use of compensatory speech strategies for production of bi-syllabic words. Recommendations: It is recommended that patient continue with medically necessary skilled speech therapy services to address the above mentioned deficits. Problem List:    []aphasic  [x]dysarthric  []dysphagic       []alexic  []agraphic  []dysphonia       [x]dysfluency   []Cognitive-Linguistic Disorder       []other        Treatment Plan: Oral Motor Therapeutic Excerise, Dysarthria Treatment and Patient Education, fluency treatment    Patient Goal (s) has been updated and includes:  Patient's son wants patient to be able to be more understood and have an easier time with communication. Updated Goals to be accomplished in 4-6  weeks:  Short Term Goals:   1) Pt will recall and demonstrate comp strategies (increase intensity, slow down, rate, and over-articulate) with 80% acc with min cues  in 4/4 sessions  to improve speech intelligibility. 2. Pt will reduce dysfluencies by utilizing comp strategies to demonstrate fluent speech in oral reading and conversation in 80% of opportunities when provided with min-modA. 3. Pt will demonstrate controlled variation of vocal intensity by counting 1-5 from a whisper to yelling  with ~80% acc Min A to increase control of vocal intensity and self awareness warranted for improved speech intelligbility. 4. Pt will increase quality and length of phonation through use of diaphragmatic breathing by sustaining ah utilizing  for 15 seconds in 4/4 sessions to increase effective breath support/ coordination warranted for improved speech intelligibility. 5. The patient will utilize comp strategies at the word, phrase, and sentence level ar through articulatory drills with  90% acc ineligibility when provided with min-mod cues.   6. Pt will complete x20 repetitions of various oral-motor exercises, including: labial retraction, labial protrusion, alternate labial retraction/protrusion, buccal holds, tongue press, Lingual elevation, lingual tracing, lingual depression, lingual alternate, etc with min cures to improve oral strength and ROM for speech. Long Term Goal:   Goal:1. Patient will develop functional and intelligible speech with use of compensatory strategies through the use of adequate labial/ lingual function, adequate breath support for vocal intensity, increased articulatory precision, and speech prosody with North and >90% intelligibility with min cues .     Frequency / Duration: Patient to be seen 2 times per week for 12 weeks:    G Codes:  [id number] Current  CK= 40-59%   Goal  CI= 1-19%      The severity rating is based on the following outcomes:    National Outcomes Measures (NOMS), clinical judgement, FDA-2      TERESA Castellanos 7/19/18  4:30 pm

## 2018-07-26 NOTE — PROGRESS NOTES
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 She could not recall the name of it. She did not change her BP and said that it is uncontrollable but made no changes. Subjective functional status/changes:   [] No changes reported  Patient went to her PCP on . She increased her seizure med at night to 2 full pills (increased one half). No other changes. Patient took her BP right before she came to therapy 170/90. She said that her son said that she is talking too soft. OBJECTIVE  Treatment provided includes:  Increase/Improve:  []  Voice Quality []  Cognitive Linguistic Skills []  Laryngeal/Pharyngeal Exercises   []  Vocal Loudness []  Reading Comprehension []  Swallowing Skills    []  Vocal Cord Function []  Auditory Comprehension []  Oral Motor Skills   []  Resonance []  Writing Skills [x]  Compensatory strategies    [x]  Speech Intelligibility []  Expressive Language []  Attention   []  Breath Support/Coord. []  Receptive language []  Memory   [x]  Articulation [x]  Safety Awareness []    []  Fluency []  Word Retrieval []      Treatment Provided:   pt state/demo comp speech strategies  Loudness variation with counting  Vowel /ah prolongations  Use of speech strategies for bi-syllabic words,  Fluency strategy of slowing rate of speech for words. Patient/Caregiver  Education: [x] Review HEP   Patient to be able to tell speech strategies given to increase her speech intelligibility. HEP/Handouts given: multisyllabic words, oral reading speaking at the top of your breath taking a good belly breath. Pain Level (0-10 scale) post treatment: 0    ASSESSMENT   Patient required mod -max A to state her speech strategies. She did use them when producing bi-syllabic words from visual reading stimulus. Her fluency improved when she slowed down and was instructed to speak at the top of her breath. She was able to provide 4-5 different loudness level with counting 1-5. ecall memory str  []   Improving appropriately and progressing toward goals  [x]   Improving slowly and progressing toward goals  []   Approximating goals/maximum potential  [x]   Continues to benefit from skilled therapy to address remaining functional deficits  []   Not progressing toward goals and plan of care will be adjusted    Patient will continue to benefit from skilled therapy to address remaining functional deficits: dysarthria; dysfluencies    Progress towards goals / Updated goals:  1) Pt will recall and demonstrate comp strategies (increase intensity, slow down, rate, and over-articulate) with 80% acc with min cues in 4/4 sessions  to improve speech intelligibility. Current 7/26:  33% acc with min-mod cues  2. Pt will reduce dysfluencies by utilizing comp strategies to demonstrate fluent speech in oral reading and conversation in 80% of opportunities when provided with min-modA. Current 7/26:  decreased dysfluency by 50% with production of bi-syllabic words with slowing her rate of speech and speaking at the top of her breath with mod cues  3. Pt will demonstrate controlled variation of vocal intensity by counting 1-5 from a whisper to yelling  with ~80% acc Min A to increase control of vocal intensity and self awareness warranted for improved speech intelligbility. Current 7/26:  60-80% acc with mod cues/modeling. 4. Pt will increase quality and length of phonation through use of diaphragmatic breathing by sustaining ah utilizing  for 15 seconds in 4/4 sessions to increase effective breath support/ coordination warranted for improved speech intelligibility. Current 7/26:  16 seconds, 13 sec with encouragement, min cues to relax upper body, shoulders, neck which she was tensing. 5. The patient will utilize comp strategies at the word, phrase, and sentence level  through articulatory drills with  90% acc intelligibility when provided with min-mod cues. Current 7/26: bi-syllabic words 85% acc ilyand 90% acc when provided with min A; max A to implement multisyllabic words. Continue goal   6. Pt will complete x20 repetitions of various oral-motor exercises, including: labial retraction, labial protrusion, alternate labial retraction/protrusion, buccal holds, tongue press, Lingual elevation, lingual tracing, lingual depression, lingual alternate, etc with min cues to improve oral strength and ROM for speech. Current 7/26:Not targeted this date. PLAN  [x]  Continue plan of care  []  Modify Goals/Treatment Plan      []  Discharge due to:  [] Other: asked patient to bring updated list of her meds.      TERESA Castellanos 7/26/2018  4:45 PM  MA, [facility]-SLP  Speech-Language Pathologist    Future Appointments  Date Time Provider Nuzhat Astudillo   7/26/2018 5:30 PM [facility] PT [facility] [address] [facility]   7/31/2018 2:30 PM TERESA Castellanos [facility]   7/31/2018 3:30 PM Forbes Riedel [facility]

## 2018-07-31 ENCOUNTER — APPOINTMENT (OUTPATIENT)
Dept: PHYSICAL THERAPY | Age: 63
End: 2018-07-31
Payer: COMMERCIAL

## 2018-08-14 ENCOUNTER — HOSPITAL ENCOUNTER (OUTPATIENT)
Dept: PHYSICAL THERAPY | Age: 63
Discharge: HOME | End: 2018-08-14
Payer: COMMERCIAL

## 2018-08-16 ENCOUNTER — HOSPITAL ENCOUNTER (OUTPATIENT)
Dept: PHYSICAL THERAPY | Age: 63
Discharge: HOME | End: 2018-08-16
Payer: COMMERCIAL

## 2018-08-16 PROCEDURE — 97110 THERAPEUTIC EXERCISES: CPT

## 2018-08-16 NOTE — PROGRESS NOTES
PT DAILY TREATMENT NOTE - [facility]     Patient Name: Wero Rey  Date:2018  : 1955  [x]  Patient  Verified  Payor: Jj Pickard / Plan: [employer] Payment PPO / Product Type: PPO /    In time:4:30  Out time:5:25  Total Treatment Time (min): 55  Total Timed Codes (min): 45  1:1 Treatment Time ( only): 45   Visit #:10  of 16    Treatment Area: Gait instability [R26.81]  Unspecified abnormalities of gait and mobility [R26.9]    SUBJECTIVE  Pain Level (0-10 scale): 0/10  Any medication changes, allergies to medications, adverse drug reactions, diagnosis change, or new procedure performed?: [] No    [x] Yes (see summary sheet for update) Pt with increase of BP medication to 3 times daily. She does not recall the name of the medication, but states she takes it morning, after work and at bedtime now. This is also confirmed by the patient's son. Subjective functional status/changes:   [] No changes reported    Pt reported that approximately3 wks ago, pt was found crawling across the grass by her neighbor and apparently she was unaware of her surroundings. She reports that her neighbor called for her son to come help get her up as she was about to crawl under the neighbors car. Pt had no recollection of this event. Pts son reportedly got her in the house and gave her medication according to the patient. She has not had an episode since this time. OBJECTIVE      45 min Therapeutic Exercise:  [x] See flow sheet :   Rationale: increase ROM, increase strength, improve coordination and improve balance to improve the patients ability to return to functional independence. With   [x] TE   [] TA   [] neuro   [] other: Patient Education: [x] Review HEP    [] Progressed/Changed HEP based on:   [] positioning   [] body mechanics   [] transfers   [] heat/ice application    [x] other:      Other Objective/Functional Measures: Pt was able to tolerate todays program without difficulty.  She was anxious to participate and she required slight cues with technique and pacing. Pain Level (0-10 scale) post treatment: 0/10     ASSESSMENT/Changes in Function: Pt with improved BP today, initial reading is 166/89 and ending session reading is 170/90. Pt should improve with additional frequency of medications. Pt twas educated at length regarding the need to take medications as directed and to take BP frequently throughout the day. Patient will continue to benefit from skilled PT services to modify and progress therapeutic interventions, address functional mobility deficits, address ROM deficits, address strength deficits, analyze and address soft tissue restrictions, analyze and cue movement patterns, analyze and modify body mechanics/ergonomics, assess and modify postural abnormalities and address imbalance/dizziness to attain remaining goals. [x]  See Plan of Care  []  See progress note/recertification  []  See Discharge Summary         Progress towards goals / Updated goals:  Goals for this certification period to be accomplished in 16 treatments:  1. Pt will improve tinetti score to 21/28 points to decrease the pt's fall risk.   2. Pt and/or pt's son will report having no falls over the past 4 weeks to improve safety around the home. Pt had event 3 weeks ago 8/16/18 [facility]  3. Pt will ambulate 200 ft with LRAD safely with no LOB, SBA, on level surfaces with heel-to-toe gait pattern to improve gait quality needed for work tasks. Continues to have impaired gait pattern with ambulation 7/13/2018, improving 8/16/18 without AD and 200 ft with good alternating swing, good step length and width and improved balance to fair+. 4. Pt will perform a sit to stand from a chair/plinth with 1 or no UE, with 1 attempt, no posterior LOB to improve ease of transfers. Goal met 8/16/18, pt was able to perform 10 sit to stand transfers with UE on knees in 26 seconds without posterior falling backwards into chair and good quad control.     PLAN  [x] Upgrade activities as tolerated     [x]  Continue plan of care  [x]  Update interventions per flow sheet       []  Discharge due to:_  []  Other:_      Guillermo Erickson 8/16/2018  4:49 PM    Future Appointments  Date Time Provider Nuzhat Astudillo   8/21/2018 3:45 PM TERESA Orellana [facility]   8/30/2018 5:15 PM TERESA Orellana [facility]   8/31/2018 4:30 PM [facility] PT [address] [facility]

## 2018-08-24 ENCOUNTER — HOSPITAL ENCOUNTER (EMERGENCY)
Age: 63
Discharge: HOME | End: 2018-08-24
Attending: EMERGENCY MEDICINE | Admitting: EMERGENCY MEDICINE
Payer: COMMERCIAL

## 2018-08-24 VITALS
TEMPERATURE: 98 F | OXYGEN SATURATION: 99 % | BODY MASS INDEX: 23.44 KG/M2 | DIASTOLIC BLOOD PRESSURE: 80 MMHG | HEART RATE: 75 BPM | WEIGHT: 120 LBS | SYSTOLIC BLOOD PRESSURE: 130 MMHG | RESPIRATION RATE: 16 BRPM

## 2018-08-24 DIAGNOSIS — M43.6 MUSCULAR TORTICOLLIS: Primary | ICD-10-CM

## 2018-08-24 PROCEDURE — 99283 EMERGENCY DEPT VISIT LOW MDM: CPT

## 2018-08-24 PROCEDURE — 74011250637 HC RX REV CODE- 250/637: Performed by: EMERGENCY MEDICINE

## 2018-08-24 RX ORDER — ACETAMINOPHEN 325 MG/1
650 TABLET ORAL
Status: COMPLETED | OUTPATIENT
Start: 2018-08-24 | End: 2018-08-24

## 2018-08-24 RX ORDER — BACLOFEN 10 MG/1
10 TABLET ORAL ONCE
Status: COMPLETED | OUTPATIENT
Start: 2018-08-24 | End: 2018-08-24

## 2018-08-24 RX ORDER — DIAZEPAM 5 MG/1
5 TABLET ORAL
Status: COMPLETED | OUTPATIENT
Start: 2018-08-24 | End: 2018-08-24

## 2018-08-24 RX ORDER — BACLOFEN 20 MG/1
20 TABLET ORAL 3 TIMES DAILY
Qty: 15 TAB | Refills: 0 | Status: SHIPPED | OUTPATIENT
Start: 2018-08-24 | End: 2018-08-29

## 2018-08-24 RX ADMIN — ACETAMINOPHEN 650 MG: 325 TABLET ORAL at 21:13

## 2018-08-24 RX ADMIN — BACLOFEN 10 MG: 10 TABLET ORAL at 21:14

## 2018-08-24 RX ADMIN — DIAZEPAM 5 MG: 5 TABLET ORAL at 21:13

## 2018-08-24 NOTE — LETTER
NOTIFICATION RETURN TO WORK / SCHOOL 
 
8/24/2018 10:44 PM 
 
Ms. Lisbeth Ferris [address] [place] [id number] To Whom It May Concern: 
 
Lisbeth Ferris is currently under the care of [facility] EM[facility]GENCY DE[facility]. She will return to work/school on: 08/28/2018 If there are questions or concerns please have the patient contact our office.  
 
 
 
Sincerely, 
 
 
 
 
 
Rogelio Del Castillo MSN, RN, CLARITA

## 2018-08-24 NOTE — ED TRIAGE NOTES
Pt complaining of neck pain since yesterday. Pt states she is having trouble moving her neck.  Pt speech is her normal.

## 2018-08-24 NOTE — LETTER
[address] [place] [address] Dr [facility] EMERGENCY DEPT 
[address] [facility] [id number] [phone] Work/School Note Date: 8/24/2018 To Whom It May concern: 
 
Anthony Bianchi was seen and treated today in the emergency room by the following provider(s): 
Attending Provider: Sara Merrill MD. Anthony Bianchi may return to work on 8/28/18. Sincerely, Sara Merrill MD

## 2018-08-25 NOTE — ED NOTES
Received patient in room 19. Patient appears in no distress. Patient does appears uncomfortable with her neck rotated to the right and increasing pain to move. Reviewed poc with patient and son at bedside. Questions encouraged and answered.   Both verbalized an understanding

## 2018-08-25 NOTE — ED PROVIDER NOTES
EMERGENCY DEPARTMENT HISTORY AND PHYSICAL EXAM    8:26 PM      Date: 8/24/2018  Patient Name: Jovan Swift    History of Presenting Illness     Chief Complaint   Patient presents with    Neck Pain         History Provided By: Patient and Patient's Son    Chief Complaint: neck pain  Duration:  2 days  Timing:  acute  Location: left sided neck  Quality: radiates downwards when leaning or turning. Severity: severe  Modifying Factors: pain with twisting, turning, leaning over  Associated Symptoms: no numbness, weakness, or tingling to any extremities. Additional History (Context): Jovan Swift is a 58 y.o. female with a history of stroke, uncontrolled hypertension, seizures presents to the ED complaining of acute, severe, left-sided neck pain that she woke up with 2 days ago. Patient states that she had woken up Wednesday morning with the neck pain and inability to turn her head without pain. Her pain is exacerbated by not only turning and twisting, but also leaning over. The pain radiates downwards when she moves around. Pain has not improved since onset. Nothing alleviates her pain. She denies any numbness, weakness, or tingling to her extremities. PCP: iWlber Patten NP    Current Outpatient Prescriptions   Medication Sig Dispense Refill    baclofen (LIORESAL) 20 mg tablet Take 1 Tab by mouth three (3) times daily for 5 days. 15 Tab 0    hydrOXYzine HCl (ATARAX) 50 mg tablet Take 1 Tab by mouth four (4) times daily as needed for Itching. 20 Tab 0    hydrALAZINE (APRESOLINE) 50 mg tablet Take 50 mg by mouth two (2) times a day.  ergocalciferol (VITAMIN D2) 50,000 unit capsule Take 50,000 Units by mouth two (2) days a week.  levETIRAcetam (KEPPRA) 1,000 mg tablet Take 1 Tab by mouth two (2) times a day. 60 Tab 0    triamcinolone acetonide (KENALOG) 0.1 % topical cream Apply  to affected area two (2) times a day.  use thin layer 15 g 0    HYDROcodone-acetaminophen (NORCO) 5-325 mg per tablet Take 1 Tab by mouth every four (4) hours as needed for Pain. Max Daily Amount: 6 Tabs. 12 Tab 0    meloxicam (MOBIC) 15 mg tablet Take 1 Tab by mouth daily (with breakfast). 30 Tab 0    clopidogrel (PLAVIX) 75 mg tablet Take 75 mg by mouth daily.  lisinopril (PRINIVIL, ZESTRIL) 10 mg tablet Take 10 mg by mouth daily.  amLODIPine (NORVASC) 10 mg tablet Take 1 Tab by mouth daily. 30 Tab 6    atorvastatin (LIPITOR) 40 mg tablet Take 1 Tab by mouth nightly. 30 Tab 0    levETIRAcetam (KEPPRA) 750 mg tablet Take 1 Tab by mouth two (2) times a day. (Patient taking differently: Take 500 mg by mouth two (2) times a day.) 60 Tab 0    hydroxypropyl methylcellulose (GENTEAL) 0.2 % ophthalmic solution Administer 2 Drops to both eyes as needed. 15 mL 0    cyanocobalamin (VITAMIN B12) 500 mcg tablet Take 1 Tab by mouth daily. 30 Tab 0    folic acid (FOLVITE) 1 mg tablet Take 1 Tab by mouth daily. 30 Tab 0    pantoprazole (PROTONIX) 40 mg tablet Take 1 Tab by mouth Daily (before breakfast). 30 Tab 0       Past History     Past Medical History:  Past Medical History:   Diagnosis Date    Hypertension     Neurological disorder     [facility] [address]     Stroke ([facility] [address] 2009       Past Surgical History:  No past surgical history on file. Family History:  Family History   Problem Relation Age of Onset    Diabetes Other     Stroke Other        Social History:  Social History   Substance Use Topics    Smoking status: Never Smoker    Smokeless tobacco: Never Used    Alcohol use Yes      Comment: Socially        Allergies: Allergies   Allergen Reactions    Tomato Hives     Allergic to eating tomato.  Aspirin Nausea and Vomiting    Ciprofloxacin Rash    Pcn [Penicillins] Rash and Hives    Sulfa (Sulfonamide Antibiotics) Hives and Rash         Review of Systems     Review of Systems   Constitutional: Negative for activity change and appetite change. HENT: Negative for congestion.     Eyes: Negative for visual disturbance. Respiratory: Negative for cough and shortness of breath. Cardiovascular: Negative for chest pain. Gastrointestinal: Negative for abdominal pain, diarrhea, nausea and vomiting. Genitourinary: Negative for dysuria. Musculoskeletal: Positive for neck pain. Negative for arthralgias and myalgias. Skin: Negative for rash. Neurological: Negative for weakness and numbness. (-) tingling to extremities   All other systems reviewed and are negative. Physical Exam     Visit Vitals    BP (!) 188/92    Pulse 75    Temp 97.8 °F (36.6 °C)    Resp 16    Wt 54.4 kg (120 lb)    SpO2 99%    BMI 23.44 kg/m2       Physical Exam   Constitutional: She is oriented to person, place, and time. She appears well-developed and well-nourished. HENT:   Head: Normocephalic and atraumatic. Mouth/Throat: Oropharynx is clear and moist.   Eyes: Conjunctivae are normal.   Neck: Normal range of motion. Neck supple. No JVD present. Left trapezius was tense and tender to touch. The head turned to the right with limited mobility. Cardiovascular: Normal rate, regular rhythm, normal heart sounds, intact distal pulses and normal pulses. No murmur heard. Pulses normal in all extremities. Pulmonary/Chest: Effort normal and breath sounds normal.   Abdominal: Soft. Bowel sounds are normal. She exhibits no distension. There is no tenderness. Musculoskeletal: Normal range of motion. She exhibits no deformity. Cervical back: She exhibits no tenderness. No c-spine tenderness    Lymphadenopathy:     She has no cervical adenopathy. Neurological: She is alert and oriented to person, place, and time. She has normal strength. No sensory deficit. Coordination normal.   Normal sensation and strength in all extremities. Skin: Skin is warm and dry. No rash noted. Psychiatric: She has a normal mood and affect. Nursing note and vitals reviewed.         Diagnostic Study Results     Labs -  No results found for this or any previous visit (from the past 12 hour(s)). Radiologic Studies -   No orders to display         Medical Decision Making   I am the first provider for this patient. I reviewed the vital signs, available nursing notes, past medical history, past surgical history, family history and social history. Vital Signs - Reviewed the patient's vital signs. Pulse Oximetry Analysis -  99% on room air (Interpretation) wnl    Cardiac Monitor:  Rate: 75 bpm  Rhythm:  Normal Sinus Rhythm    Records Reviewed: Nursing Notes (Time of Review: 8:26 PM)    ED Course: Progress Notes, Reevaluation, and Consults:    10:27 PM - I have reassessed the patient. Patient is feeling improved. Patient will be prescribed baclofen, in instructed to use moist heat and acetaminophen. Patient was discharged in stable condition. Patient is to return to emergency department if any new or worsening condition. Provider Notes (Medical Decision Making): The patient is a 58year old female with a history of stroke, uncontrolled hypertension presenting with left sided neck pain onset 2 days ago. Patient woke up with the neck pain on Wednesday morning and continues to have pain with minimal movement of head, neck. No numbness, weakness, or paraesthesias to extremities. No shortness of breath. Patient with noted speech complications which is her baseline. Differential Diagnosis: consistent with muscular torticollis caused by physical activity, sleeping position and constant teeth grinding. Not consistent with carotid dissection, infection or trauma. Treatments: Baclofen, Valium, Tylenol    Diagnosis     Clinical Impression:   1.  Muscular torticollis        Disposition: Discharge to home    Follow-up Information     Follow up With Details Comments 137 Benjamin Munoz NP Schedule an appointment as soon as possible for a visit in 2 days for follow up of your neck pain [address] [place] [address] Osteopathy [address]      [facility] Go to As needed, If symptoms worsen [address] [place] [id number]  [phone]           Patient's Medications   Start Taking    BACLOFEN (LIORESAL) 20 MG TABLET    Take 1 Tab by mouth three (3) times daily for 5 days. Continue Taking    AMLODIPINE (NORVASC) 10 MG TABLET    Take 1 Tab by mouth daily. ATORVASTATIN (LIPITOR) 40 MG TABLET    Take 1 Tab by mouth nightly. CLOPIDOGREL (PLAVIX) 75 MG TABLET    Take 75 mg by mouth daily. CYANOCOBALAMIN (VITAMIN B12) 500 MCG TABLET    Take 1 Tab by mouth daily. ERGOCALCIFEROL (VITAMIN D2) 50,000 UNIT CAPSULE    Take 50,000 Units by mouth two (2) days a week. FOLIC ACID (FOLVITE) 1 MG TABLET    Take 1 Tab by mouth daily. HYDRALAZINE (APRESOLINE) 50 MG TABLET    Take 50 mg by mouth two (2) times a day. HYDROCODONE-ACETAMINOPHEN (NORCO) 5-325 MG PER TABLET    Take 1 Tab by mouth every four (4) hours as needed for Pain. Max Daily Amount: 6 Tabs. HYDROXYPROPYL METHYLCELLULOSE (GENTEAL) 0.2 % OPHTHALMIC SOLUTION    Administer 2 Drops to both eyes as needed. HYDROXYZINE HCL (ATARAX) 50 MG TABLET    Take 1 Tab by mouth four (4) times daily as needed for Itching. LEVETIRACETAM (KEPPRA) 1,000 MG TABLET    Take 1 Tab by mouth two (2) times a day. LEVETIRACETAM (KEPPRA) 750 MG TABLET    Take 1 Tab by mouth two (2) times a day. LISINOPRIL (PRINIVIL, ZESTRIL) 10 MG TABLET    Take 10 mg by mouth daily. MELOXICAM (MOBIC) 15 MG TABLET    Take 1 Tab by mouth daily (with breakfast). PANTOPRAZOLE (PROTONIX) 40 MG TABLET    Take 1 Tab by mouth Daily (before breakfast). TRIAMCINOLONE ACETONIDE (KENALOG) 0.1 % TOPICAL CREAM    Apply  to affected area two (2) times a day. use thin layer   These Medications have changed    No medications on file   Stop Taking    METHOCARBAMOL (ROBAXIN-750) 750 MG TABLET    Take 1 Tab by mouth three (3) times daily. _______________________________    Attestations:  Scribe Attestation     Anay Printers acting as a scribe for and in the presence of Dimitri Middleton MD      August 24, 2018 at 8:26 PM       Provider Attestation:      I personally performed the services described in the documentation, reviewed the documentation, as recorded by the scribe in my presence, and it accurately and completely records my words and actions.  August 24, 2018 at 8:26 PM - Dimitri Middleton MD  _______________________________

## 2018-08-25 NOTE — ED NOTES
Reviewed discharge instructions with patient and son at bedside, including new medications and suggested follow-up. Questions encouraged and answered.   Patient and son verbalized an understanding

## 2018-08-25 NOTE — DISCHARGE INSTRUCTIONS
Torticollis: Care Instructions  Your Care Instructions  Torticollis is a severe tightness of the muscles on one side of the neck. The tight muscles can make the head turn to one side, lean to one side, or be pulled forward or backward. It is also called wryneck. Your doctor asked questions about your health and examined you. You may also have had X-rays or other tests. If your doctor thinks another medical problem is causing your tight neck muscles, you may need more tests. Torticollis usually gets better with home care. Your doctor may have you take medicine to relieve pain or relax your muscles. He or she may suggest exercise and physical therapy to help increase flexibility and relieve stress. Your doctor may also have you wear a special collar, called a cervical collar, for a day or two. The collar may help make your neck more comfortable. Follow-up care is a key part of your treatment and safety. Be sure to make and go to all appointments, and call your doctor if you are having problems. It's also a good idea to know your test results and keep a list of the medicines you take. How can you care for yourself at home? · Be safe with medicines. Read and follow all instructions on the label. ¨ If the doctor gave you a prescription medicine for pain, take it as prescribed. ¨ If you are not taking a prescription pain medicine, ask your doctor if you can take an over-the-counter medicine. · Try using a heating pad on a low or medium setting for 15 to 20 minutes every 2 or 3 hours. Try a warm shower in place of one session with the heating pad. · Try using an ice pack for 10 to 15 minutes every 2 to 3 hours. Put a thin cloth between the ice and your skin. · If your doctor recommends a cervical collar, wear it exactly as directed. When should you call for help?   Call your doctor now or seek immediate medical care if:    · You have new or worse numbness in your arms, buttocks, or legs.     · You have new or worse weakness in your arms or legs.     · Your neck pain gets worse.     · You lose bladder or bowel control.    Watch closely for changes in your health, and be sure to contact your doctor if:    · You do not get better as expected. Where can you learn more? Go to http://teresa-mehul.info/. Enter E928 in the search box to learn more about \"Torticollis: Care Instructions. \"  Current as of: November 29, 2017  Content Version: 11.7  © 8570-8681 [employer]. Care instructions adapted under license by [employer] (which disclaims liability or warranty for this information). If you have questions about a medical condition or this instruction, always ask your healthcare professional. [address] any warranty or liability for your use of this information.

## 2018-08-27 ENCOUNTER — HOSPITAL ENCOUNTER (OUTPATIENT)
Dept: PHYSICAL THERAPY | Age: 63
Discharge: HOME | End: 2018-08-27
Payer: COMMERCIAL

## 2018-08-27 PROCEDURE — G9186 MOTOR SPEECH GOAL STATUS: HCPCS

## 2018-08-27 PROCEDURE — 97110 THERAPEUTIC EXERCISES: CPT

## 2018-08-27 PROCEDURE — G8979 MOBILITY GOAL STATUS: HCPCS

## 2018-08-27 PROCEDURE — G8999 MOTOR SPEECH CURRENT STATUS: HCPCS

## 2018-08-27 PROCEDURE — G8978 MOBILITY CURRENT STATUS: HCPCS

## 2018-08-27 PROCEDURE — 92507 TX SP LANG VOICE COMM INDIV: CPT

## 2018-08-27 PROCEDURE — 97164 PT RE-EVAL EST PLAN CARE: CPT

## 2018-08-27 NOTE — PROGRESS NOTES
[facility] - [facility] [employer]  [address] [facility]  [phone] fax    Continued Plan of Care/ Re-certification for Physical Therapy Services    Patient name: Corinna Mancilla Start of Care:  18   Referral source: Pal Ring MD : [id number]   Medical/Treatment Diagnosis: Gait instability [R26.81]  Unspecified abnormalities of gait and mobility [R26.9] Onset Date:     Prior Hospitalization: see medical history Provider#: [id number]   Medications: Verified on Patient Summary List    Comorbidities: Visual impairment, HTN, weight change of more than 10 lbs recently, hx seizures   Prior Level of Function: Independent with work tasks. Lives with son that helps pt with ADLs at times. Lives in 1 story home. Visits from Start of Care: 11    Missed Visits: 3    The Plan of Care and following information is based on the patient's current status:  Goal: Pt will improve tinetti score to 21/28 points to decrease the pt's fall risk.   Status at last note/certification:   Current Status: met    Goal: Pt and/or pt's son will report having no falls over the past 4 weeks to improve safety around the home. Status at last note/certification: frequent falls  Current Status: not met    Goal: Pt will ambulate 200 ft with LRAD safely with no LOB, SBA, on level surfaces with heel-to-toe gait pattern to improve gait quality needed for work tasks. Status at last note/certification: unstable  Current Status: not met    Goal: Pt will perform a sit to stand from a chair/plinth with 1 or no UE, with 1 attempt, no posterior LOB to improve ease of transfers. Status at last note/certification: unable  Current Status: not met    Key functional changes:  Pt.  Has improving Tinetti score indicating improving balance      Problems/ barriers to goal attainment:  Gap in treatment     Problem List: pain affecting function, decrease ROM, decrease strength, impaired gait/ balance, decrease ADL/ functional abilitiies, decrease activity tolerance, decrease flexibility/ joint mobility and decrease transfer abilities    Treatment Plan: Therapeutic exercise, Therapeutic activities, Neuromuscular re-education, Physical agent/modality, Gait/balance training, Manual therapy, Patient education, Self Care training, Functional mobility training and Home safety training     Patient Goal (s) has been updated and includes: to walk better     Goals for this certification period to be accomplished in 10 treatments:  1. Patient will improve FOTO score by 2 points in order to demonstrate a significant improvement in function. 2. Patient will perform 5 sit to stand transfers in 30 seconds in order to increase ease of transfers at home. 3. Pt and/or pt's son will report having no falls over the past 4 weeks to improve safety around the home. 4. Pt will ambulate 200 ft with LRAD safely with no LOB, SBA, on level surfaces with heel-to-toe gait pattern to improve gait quality needed for work tasks. Frequency / Duration: Patient to be seen 2 times per week for 10 treatments    Assessment / Recommendations: pt. Is progressing slowly towards goals. She had significant gap in treatment which has affected her progress. She continues to have falls at home. She has no significant change in FOTO score at 55 points. She demonstrates improving sit to stand transfers without UE support but has significant posterior lean at times. She ambulates without AD with poor foot clearance at times, she is able to correct with verbal cues. She also continues to have difficulty with stair negotiation. Her Tinetti score improved to 23/28 indicating a significant improvement in her balance. Skilled PT is medically necessary in order to improve ambulation and transfers for improved safety at home.      G-Codes (GP)  Mobility  [id number] Current  CK= 40-59%  [id number] Goal  CK= 40-59%    The severity rating is based on clinical judgment and the FOTO score. Certification Period: 8/27/18-10/26/18    Ana Maria Roth, PT 8/27/2018 4:46 PM    ________________________________________________________________________  I certify that the above Therapy Services are being furnished while the patient is under my care. I agree with the treatment plan and certify that this therapy is necessary. [] I have read the above and request that my patient continue as recommended.   [] I have read the above report and request that my patient continue therapy with the following changes/special instructions: _______________________________________  [] I have read the above report and request that my patient be discharged from therapy    Physician's Signature:____________Date:_________TIME:________    ** Signature, Date and Time must be completed for valid certification **    Please sign and return to [facility] [employer]  [address] [facility]  [phone] fax

## 2018-08-27 NOTE — PROGRESS NOTES
PT DAILY TREATMENT NOTE - [facility]     Patient Name: Khurram Porter  Date:2018  : 1955  [x]  Patient  Verified  Payor: Malathi Gordillo / Plan: Chaya López PPO / Product Type: PPO /    In time: 4:02  Out time: 4:30  Total Treatment Time (min): 28  Total Timed Codes (min): 28  1:1 Treatment Time ( only): 28   Visit #: 11 of 16    Treatment Area: Gait instability [R26.81]  Unspecified abnormalities of gait and mobility [R26.9]    SUBJECTIVE  Pain Level (0-10 scale): 0/10  Any medication changes, allergies to medications, adverse drug reactions, diagnosis change, or new procedure performed?: [x] No    [] Yes (see summary sheet for update)  Subjective functional status/changes:   [] No changes reported  Pt. Reports she had a fall earlier this month. She reports she doesn't recall falling but her son found her. OBJECTIVE    18 min -Eval                  X -Re-Eval       10 min Therapeutic Exercise:  [x] See flow sheet :   Rationale: increase ROM and increase strength to improve the patients ability to increase ease of ADLs          With   [x] TE   [] TA   [] neuro   [] other: Patient Education: [x] Review HEP    [] Progressed/Changed HEP based on:   [] positioning   [] body mechanics   [] transfers   [] heat/ice application    [] other:      Other Objective/Functional Measures:   Tinetti:   Sit to stand transfer: 1st attempt excessive posterior lean. Second attempt performed independently with no UE support  Pt.  Was challenged with clearing steps descending and was unable to ascend with reciprocal pattern    Pain Level (0-10 scale) post treatment: 0/10    ASSESSMENT/Changes in Function:      []  See Plan of Care  [x]  See progress note/recertification  []  See Discharge Summary         Progress towards goals / Updated goals:  See re-cert    PLAN  []  Upgrade activities as tolerated     [x]  Continue plan of care  []  Update interventions per flow sheet       []  Discharge due to:_  []  Other:_ Blanka Ling, PT 8/27/2018  4:04 PM    Future Appointments  Date Time Provider Nuzhat Astudillo   8/30/2018 5:15 PM Angeles Lynch, SLP MMCPTPB [facility]   9/5/2018 4:45 PM Susan Credit, SLP FOCIEWP [facility]   9/5/2018 5:30 PM Thililibeth Marino ERUHOQL [facility]   9/7/2018 3:15 PM Susan Credit, SLP MMCPTPB [facility]   9/7/2018 4:00 PM [facility] [address] MMCPTPB [facility]   9/11/2018 4:15 PM Susan Credit, SLP MMCPTPB [facility]   9/11/2018 5:00 PM Roxy Marino XICWOHP [facility]   9/13/2018 2:00 PM Amy Mccrary MMCPTPB [facility]   9/13/2018 3:00 PM [facility] [address] MMCPTPB [facility]   9/18/2018 3:00 PM [facility] PT [address] [facility]   9/18/2018 3:30 PM Amy Mccrary ZDOPDCX [facility]   9/20/2018 3:00 PM [facility] PT [address] MMCPTPB [facility]   9/20/2018 3:30 PM Amy Mccrary MMCPTPB [facility]   9/25/2018 4:15 PM Clarke Birch MMCPTPB [facility]   9/25/2018 5:00 PM Diego Marino [facility]CPTPB [facility]

## 2018-08-27 NOTE — PROGRESS NOTES
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 Pt reports that her son told her she had x3 \"inside attacks\" and x2 seizures. Progress Towards Goals:  Goal: 1 ) Pt will recall and demonstrate comp strategies (increase intensity, slow down, rate, and over-articulate) with 80% acc with min cues in 4/4 sessions  to improve speech intelligibility  Status at last note/certification: Re-teaching of comp strategies d/t 0% recall. Pt reported \"communication, motivation, sanitation\". 18: 33% acc donna  Revise goal  Current Status: not met; 4-  Continue goal    Goal: 2. Pt will reduce dysfluencies by utilizing comp strategies to demonstrate fluent speech in oral reading and conversation in 80% of opportunities when provided with min-modA. Status at last note/certification: Goal not directly targeted due to limited attendance since the intital evaluation and work on other goals.  The goal was indirectly targeted with the use of compensatory strategies related to slowing rate of speech. 4/5 sessions targeted tx goals, session 5 dealt with patient's elevated BP. Revise goal  Current Status: not met 7/26 decreased dysfluency by 50% with production of bi-syllabic words with slowing her rate of speech and speaking at the top of her breath with mod cues and 8/27: decreased dysfluency with implementation of pacing board during simple structured conversational speech with 90% acc when given min-mod cues to  [employer] board effectively.  Continue goal    Goal: 3. Pt will demonstrate controlled variation of vocal intensity by counting 1-5 from a whisper to yelling  with ~80% acc Min A to increase control of vocal intensity and self awareness warranted for improved speech intelligbility.    Status at last note/certification: Goal targeted 1 session due to limited attendance since the initial evaluation and work on other goals. Max instruction provided with re-calibration by modeling and use of SPL meter: 50% acc donna and increasing to 82% acc with maxA. Continue goal  Current Status: not met; 60-80% acc with mod cues/modeling.   Continue goal    Goal:  4. Pt will increase quality and length of phonation through use of diaphragmatic breathing by sustaining ah utilizing  for 15 seconds in 4/4 sessions to increase effective breath support/ coordination warranted for improved speech intelligibility. Status at last note/certification:  Current Status: met; x4 trails with an average of 16.9 seconds; Max skilled instruction re diaphragmatic breathing to improve speech intelligibility and phonation. -MET/DC    Goal: 5. The patient will utilize comp strategies at the word, phrase, and sentence level  through articulatory drills with  90% acc intelligibility when provided with min-mod cues. Status at last note/certification: Goal not targeted due to limited attendance since the initial evaluation and work on other goals.  Continue goal   Current Status: not met; bi-syllabic words 85% acc ilyand 90% acc when provided with min A; max A to implement multisyllabic words.  Continue goal    Goal: 6. Pt will complete x20 repetitions of various oral-motor exercises, including: labial retraction, labial protrusion, alternate labial retraction/protrusion, buccal holds, tongue press, Lingual elevation, lingual tracing, lingual depression, lingual alternate, etc with min cues to improve oral strength and ROM for speech. Status at last note/certification:  not met/ Goal not targeted due to limited attendance since the initial evaluation and work on other goals. Continue goal  Current Status: not met/ Goal not targeted due to limited attendance since the initial evaluation and work on other goals. Continue goal        Key functional changes: Pt reports she's stuttering a lot more, to the point it distracts her and then she can't remember what she was trying to say in the first place. Pt reports that her son told her she had x3 \"inside attacks\" and x2 seizures. Increased stuttering in isolation, but improved with utilization of comp strategies. Problems/ barriers to goal attainment: Problems include limited attendance d/t poor health and recurrent seizures. Assessment / Recommendations: Pt would benefit from continued skilled ST as it is medically necessary to improve speech for communication, safety, independence and QOL. Problem List:    []aphasic  [x]dysarthric  []dysphagic       []alexic  []agraphic  []dysphonia       []dysfluency   []Cognitive-Linguistic Disorder       []other        Treatment Plan: Dysarthria Treatment and Patient Education    Patient Goal (s) has been updated and includes: STG had been MET. Continue all other STGS. Updated Goals to be accomplished in 4-6 weeks:  1) Pt will recall and demonstrate comp strategies (increase intensity, slow down, rate, and over-articulate) with 80% acc with min cues  in 4/4 sessions  to improve speech intelligibility.   2. Pt will reduce dysfluencies by utilizing comp strategies to demonstrate fluent speech in oral reading and conversation in 80% of opportunities when provided with min-modA. 3. Pt will demonstrate controlled variation of vocal intensity by counting 1-5 from a whisper to yelling  with ~80% acc Min A to increase control of vocal intensity and self awareness warranted for improved speech intelligbility. 4. The patient will utilize comp strategies at the word, phrase, and sentence level ar through articulatory drills with  90% acc ineligibility when provided with min-mod cues. 5. Pt will complete x20 repetitions of various oral-motor exercises, including: labial retraction, labial protrusion, alternate labial retraction/protrusion, buccal holds, tongue press,   Lingual elevation, lingual tracing, lingual depression, lingual alternate, etc with min cures to improve oral strength and ROM for speech.     Frequency / Duration: Patient to be seen  times per 2 week for 12 weeks:    G Codes:   Current  CK= 40-59%   Goal  CI= 1-19%      The severity rating is based on the following outcomes:    National Outcomes Measures (NOMS); clinical judgment          ASSESSMENT/RECOMMENDATIONS:   [x]Continue therapy per initial plan/protocol at a frequency of  2 x per week for 12weeks  []Continue therapy with the following recommended changes:_____________________      _____________________________________________________________________  []Discontinue therapy progressing towards or have reached established goals  []Discontinue therapy due to lack of appreciable progress towards goals  []Discontinue therapy due to lack of attendance or compliance  []Await Physician's recommendations/decisions regarding therapy  []Other:________________________________________________________________    Thank you for this referral.   Ankita Karimi SLP 8/27/2018 4:22 PM  MA, CCC-SLP  Speech-Language Pathologist    NOTE TO PHYSICIAN:  PLEASE COMPLETE THE ORDERS BELOW AND   FAX TO [facility]: [phone]  If you are unable to process this request in 24 hours please contact our office: [phone]    []  I have read the above report and request that my patient continue as recommended. []  I have read the above report and request that my patient continue therapy with the following changes/special instructions:________________________________________  []I have read the above report and request that my patient be discharged from therapy.     [de-identified] Signature:____________Date:_________TIME:________    [employer], Date and Time must be completed for valid certification **

## 2018-08-27 NOTE — PROGRESS NOTES
ST DAILY TREATMENT NOTE    Patient Name: Claudene Distad  Date:2018  : 1955  [x]  Patient  Verified  Payor: Payor: Nely Camacho / Plan: Yolanda Lantigua PPO / Product Type: PPO /   In time: 3:15 Out time: 4:00  Total Treatment Time (min): 45  Visit #: 7 of 24    Treatment Diagnosis: Dysarthria and anarthria [R47.1]    SUBJECTIVE   Pain Level (0-10 scale): 0  Any medication changes, allergies to medications, adverse drug reactions, diagnosis change, or new procedure performed?: [] No    [] Yes (see summary sheet for update)    Subjective functional status/changes:   [] No changes reported  Pt reports she's stuttering a lot more, to the point it distracts her and then she can't remember what she was trying to say in the first place. Pt reports that her son told her she had x3 \"inside attacks\" and x2 seizures. OBJECTIVE  Treatment provided includes:  Increase/Improve:  []  Voice Quality []  Cognitive Linguistic Skills []  Laryngeal/Pharyngeal Exercises   []  Vocal Loudness []  Reading Comprehension []  Swallowing Skills    []  Vocal Cord Function []  Auditory Comprehension []  Oral Motor Skills   []  Resonance []  Writing Skills [x]  Compensatory strategies    [x]  Speech Intelligibility []  Expressive Language []  Attention   [x]  Breath Support/Coord. []  Receptive language []  Memory   []  Articulation []  Safety Awareness [x] Pt eeucaiton   []  Fluency []  Word Retrieval []        Treatment Provided:  Pt education re pacing board and diaphragmatic breathing.  Review and utilize comp strategies for improved speech intelligibility    Patient/Caregiver  Education: [x] Review HEP      HEP/Handouts given: pacing board  Pain Level (0-10 scale) post treatment: 0    ASSESSMENT     []   Improving appropriately and progressing toward goals  [x]   Improving slowly and progressing toward goals  []   Approximating goals/maximum potential  [x]   Continues to benefit from skilled therapy to address remaining functional deficits  []   Not progressing toward goals and plan of care will be adjusted    Patient will continue to benefit from skilled therapy to address remaining functional deficits: dysarthria    Progress towards goals / Updated goals:  1) Pt will recall and demonstrate comp strategies (increase intensity, slow down, rate, and over-articulate) with 80% acc with min cues in 4/4 sessions  to improve speech intelligibility. Current 8/27: Recall/demo with 100% acc with min latency delay (recall)  2. Pt will reduce dysfluencies by utilizing comp strategies to demonstrate fluent speech in oral reading and conversation in 80% of opportunities when provided with min-modA. Current 8/27:  decreased dysfluency with implementation of pacing board during simple structured conversational speech with 90% acc when given min-mod cues to  [employer] effectively. 3. Pt will demonstrate controlled variation of vocal intensity by counting 1-5 from a whisper to yelling  with ~80% acc Min A to increase control of vocal intensity and self awareness warranted for improved speech intelligbility. Current 8/27: Not targeted this date. 4. Pt will increase quality and length of phonation through use of diaphragmatic breathing by sustaining ah utilizing  for 15 seconds in 4/4 sessions to increase effective breath support/ coordination warranted for improved speech intelligibility. Current 8/27: x4 trails with an average of 16.9 seconds; Max skilled instruction re diaphragmatic breathing to improve speech intelligibility and phonation. 5. The patient will utilize comp strategies at the word, phrase, and sentence level  through articulatory drills with  90% acc intelligibility when provided with min-mod cues.   Current 8/27: Not targeted this date.      6. Pt will complete x20 repetitions of various oral-motor exercises, including: labial retraction, labial protrusion, alternate labial retraction/protrusion, buccal holds, tongue press, Lingual elevation, lingual tracing, lingual depression, lingual alternate, etc with min cues to improve oral strength and ROM for speech. Current 8/27: Not targeted this date.         PLAN  []  Continue plan of care  []  Modify Goals/Treatment Plan      []  Discharge due to:  [] Other:    TERESA Cabrera 8/27/2018  3:33 PM  MA, [address] Pathologist    Future Appointments  Date Time Provider Nuzhat Astudillo   8/27/2018 4:00 PM Toro Rodrigues PT [facility]CPTP[facility]   8/30/2018 5:15 PM TERESA Cabrera [facility]   9/5/2018 4:45 PM TERESA Molina [facility]   9/5/2018 5:30 PM Thiterephchauncey Paredesa Raw [facility]   9/7/2018 3:15 PM TERESA Molina [facility]   9/7/2018 4:00 PM [facility] [address] [facility]   9/11/2018 4:15 PM TERESA Molina MSWSAFI [facility]   9/11/2018 5:00 PM Thienphuc Corinda Raw CNBBMQN [facility]   9/13/2018 2:00 PM [place] Ridgel [facility]   9/13/2018 3:00 PM [facility] PT [address] [facility]   9/18/2018 3:00 PM [facility] PT [address] [facility]   9/18/2018 3:30 PM [place] Ridgel QBJRRUA [facility]   9/20/2018 3:00 PM [facility] PT [address] [facility]   9/20/2018 3:30 PM Margie Gipson [facility]   9/25/2018 4:15 PM Celia Birch [facility]   9/25/2018 5:00 PM Roxy Delatorre [facility]

## 2018-08-31 ENCOUNTER — APPOINTMENT (OUTPATIENT)
Dept: PHYSICAL THERAPY | Age: 63
End: 2018-08-31
Payer: COMMERCIAL

## 2018-09-05 ENCOUNTER — APPOINTMENT (OUTPATIENT)
Dept: PHYSICAL THERAPY | Age: 63
End: 2018-09-05

## 2018-09-05 ENCOUNTER — HOSPITAL ENCOUNTER (OUTPATIENT)
Dept: PHYSICAL THERAPY | Age: 63
End: 2018-09-05

## 2018-09-05 ENCOUNTER — HOME HEALTH ADMISSION (OUTPATIENT)
Dept: HOME HEALTH SERVICES | Facility: HOME HEALTH | Age: 63
End: 2018-09-05
Payer: COMMERCIAL

## 2018-09-07 ENCOUNTER — APPOINTMENT (OUTPATIENT)
Dept: PHYSICAL THERAPY | Age: 63
End: 2018-09-07

## 2018-09-07 ENCOUNTER — HOME CARE VISIT (OUTPATIENT)
Dept: HOME HEALTH SERVICES | Facility: HOME HEALTH | Age: 63
End: 2018-09-07

## 2018-09-08 ENCOUNTER — HOME CARE VISIT (OUTPATIENT)
Dept: SCHEDULING | Facility: HOME HEALTH | Age: 63
End: 2018-09-08
Payer: COMMERCIAL

## 2018-09-08 PROCEDURE — 400013 HH SOC

## 2018-09-08 PROCEDURE — G0299 HHS/HOSPICE OF RN EA 15 MIN: HCPCS

## 2018-09-08 PROCEDURE — 3331090001 HH PPS REVENUE CREDIT

## 2018-09-08 PROCEDURE — 3331090002 HH PPS REVENUE DEBIT

## 2018-09-09 PROCEDURE — 3331090002 HH PPS REVENUE DEBIT

## 2018-09-09 PROCEDURE — 3331090001 HH PPS REVENUE CREDIT

## 2018-09-10 ENCOUNTER — HOME CARE VISIT (OUTPATIENT)
Dept: HOME HEALTH SERVICES | Facility: HOME HEALTH | Age: 63
End: 2018-09-10
Payer: COMMERCIAL

## 2018-09-10 PROCEDURE — 3331090001 HH PPS REVENUE CREDIT

## 2018-09-10 PROCEDURE — 3331090002 HH PPS REVENUE DEBIT

## 2018-09-10 NOTE — PROGRESS NOTES
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 Pt will recall and demonstrate comp strategies (increase intensity, slow down, rate, and over-articulate) with 80% acc with min cues in 4/4 sessions  to improve speech intelligibility  Status at last note/certification: not met; -  Continue goal   Status at discharge:  not met; change in medical status. Pt is now receiving  ST      Goal: 2. Pt will reduce dysfluencies by utilizing comp strategies to demonstrate fluent speech in oral reading and conversation in 80% of opportunities when provided with min-modA. Status at last note/certification: not met  decreased dysfluency by 50% with production of bi-syllabic words with slowing her rate of speech and speaking at the top of her breath with mod cues and : decreased dysfluency with implementation of pacing board during simple structured conversational speech with 90% acc when given min-mod cues to [employer] effectively.  Continue goal  Status at discharge: not met; change in medical status.  Pt is now receiving  ST      Goal: 3. Pt will demonstrate controlled variation of vocal intensity by counting 1-5 from a whisper to yelling  with ~80% acc Min A to increase control of vocal intensity and self awareness warranted for improved speech intelligbility.    Status at last note/certification: not met; 60-80% acc with mod cues/modeling.   Continue goal  Status at discharge: not met; change in medical status. Pt is now receiving  ST    Goal: 4. The patient will utilize comp strategies at the word, phrase, and sentence level  through articulatory drills with  90% acc intelligibility when provided with min-mod cues. Status at last note/certification: Goal not targeted due to limited attendance since the initial evaluation and work on other goals. Continue goal  Status at last note/certification: not met; bi-syllabic words 85% acc ilyand 90% acc when provided with min A; max A to implement multisyllabic words.  Continue goal   Status at discharge: not met; change in medical status. Pt is now receiving  ST      Goal5:. Pt will complete x20 repetitions of various oral-motor exercises, including: labial retraction, labial protrusion, alternate labial retraction/protrusion, buccal holds, tongue press, Lingual elevation, lingual tracing, lingual depression, lingual alternate, etc with min cues to improve oral strength and ROM for speech. Status at last note/certification: not met/ Goal not targeted due to limited attendance since the initial evaluation and work on other goals. Continue goal  Status at discharge: not met; change in medical status. Pt is now receiving  ST        ASSESSMENT:   Change in medical status, resulted to tx services being delivered through [facility]. D/C Outpatient services at this time. This SLP will signoff.        The severity rating is based on the following outcomes:    National Outcomes Measures (NOMS); clinical judgment    RECOMMENDATIONS:  [x]Discontinue therapy: []Patient has reached or is progressing toward set goals      []Patient is non-compliant or has abdicated      []Due to lack of appreciable progress towards set goals      [x]Other: change in services;  [id number] [address], SLP 9/10/2018 2:00 PM  MA, [address] [facility] [place]  Speech-Language Pathologist

## 2018-09-10 NOTE — PROGRESS NOTES
[facility] - [facility]  [address] [place] [address]  [phone] fax    Physical Therapy Discharge Summary    Patient name: Cale Solomon Start of Care: 18   Referral source: Vee Gonzalez MD : 1955   Medical/Treatment Diagnosis: Gait instability [R26.81]  Unspecified abnormalities of gait and mobility [R26.9] Onset Date:      Prior Hospitalization: see medical history Provider#: [id number]   Medications: Verified on Patient Summary List    Comorbidities:  Visual impairment, HTN, weight change of more than 10 lbs recently, hx seizures   Prior Level of Function: Independent with work tasks. Lives with son that helps pt with ADLs at times. Lives in [address] home. Visits from Start of Care: 11    Missed Visits: 4    Reporting Period : 18 to 18    Summary of Care:  Goal: Patient will improve FOTO score by 2 points in order to demonstrate a significant improvement in function. Status at last note/certification: 55  Status at discharge: not met    Goal: Patient will perform 5 sit to stand transfers in 30 seconds in order to increase ease of transfers at home. Status at last note/certification: unable  Status at discharge: not met    Goal: Pt and/or pt's son will report having no falls over the past 4 weeks to improve safety around the home. Status at last note/certification: reports having falls  Status at discharge: not met    Goal: Pt will ambulate 200 ft with LRAD safely with no LOB, SBA, on level surfaces with heel-to-toe gait pattern to improve gait quality needed for work tasks. Status at last note/certification: unable  Status at discharge: not met    Pt. Did not return to PT after last progress note. She reports she was admitted to a [facility] hospital because of breathing problems and anxiety.  She reports her physician ordered home health PT.       ASSESSMENT/RECOMMENDATIONS:  [x]Discontinue therapy: []Patient has reached or is progressing toward set goals      []Patient is non-compliant or has abdicated      []Due to lack of appreciable progress towards set goals      X: self D/C, getting home health PT    Rin Panda PT 9/10/2018 1:41 PM

## 2018-09-11 ENCOUNTER — HOME CARE VISIT (OUTPATIENT)
Dept: HOME HEALTH SERVICES | Facility: HOME HEALTH | Age: 63
End: 2018-09-11
Payer: COMMERCIAL

## 2018-09-11 ENCOUNTER — HOME CARE VISIT (OUTPATIENT)
Dept: SCHEDULING | Facility: HOME HEALTH | Age: 63
End: 2018-09-11
Payer: COMMERCIAL

## 2018-09-11 ENCOUNTER — APPOINTMENT (OUTPATIENT)
Dept: PHYSICAL THERAPY | Age: 63
End: 2018-09-11

## 2018-09-11 VITALS
RESPIRATION RATE: 20 BRPM | TEMPERATURE: 97 F | HEART RATE: 88 BPM | DIASTOLIC BLOOD PRESSURE: 80 MMHG | SYSTOLIC BLOOD PRESSURE: 150 MMHG | OXYGEN SATURATION: 92 %

## 2018-09-11 VITALS
SYSTOLIC BLOOD PRESSURE: 164 MMHG | OXYGEN SATURATION: 96 % | HEART RATE: 71 BPM | TEMPERATURE: 98.3 F | DIASTOLIC BLOOD PRESSURE: 86 MMHG

## 2018-09-11 VITALS
HEART RATE: 60 BPM | DIASTOLIC BLOOD PRESSURE: 77 MMHG | SYSTOLIC BLOOD PRESSURE: 167 MMHG | TEMPERATURE: 98.3 F | OXYGEN SATURATION: 99 %

## 2018-09-11 PROCEDURE — 3331090001 HH PPS REVENUE CREDIT

## 2018-09-11 PROCEDURE — 3331090002 HH PPS REVENUE DEBIT

## 2018-09-11 PROCEDURE — G0151 HHCP-SERV OF PT,EA 15 MIN: HCPCS

## 2018-09-11 PROCEDURE — G0153 HHCP-SVS OF S/L PATH,EA 15MN: HCPCS

## 2018-09-11 PROCEDURE — G0300 HHS/HOSPICE OF LPN EA 15 MIN: HCPCS

## 2018-09-12 ENCOUNTER — HOME CARE VISIT (OUTPATIENT)
Dept: HOME HEALTH SERVICES | Facility: HOME HEALTH | Age: 63
End: 2018-09-12
Payer: COMMERCIAL

## 2018-09-12 VITALS
OXYGEN SATURATION: 96 % | SYSTOLIC BLOOD PRESSURE: 135 MMHG | TEMPERATURE: 98.2 F | HEART RATE: 57 BPM | DIASTOLIC BLOOD PRESSURE: 90 MMHG

## 2018-09-12 PROCEDURE — 3331090002 HH PPS REVENUE DEBIT

## 2018-09-12 PROCEDURE — 3331090001 HH PPS REVENUE CREDIT

## 2018-09-13 ENCOUNTER — APPOINTMENT (OUTPATIENT)
Dept: PHYSICAL THERAPY | Age: 63
End: 2018-09-13

## 2018-09-13 ENCOUNTER — HOME CARE VISIT (OUTPATIENT)
Dept: HOME HEALTH SERVICES | Facility: HOME HEALTH | Age: 63
End: 2018-09-13
Payer: COMMERCIAL

## 2018-09-13 PROCEDURE — 3331090001 HH PPS REVENUE CREDIT

## 2018-09-13 PROCEDURE — 3331090002 HH PPS REVENUE DEBIT

## 2018-09-13 PROCEDURE — G0152 HHCP-SERV OF OT,EA 15 MIN: HCPCS

## 2018-09-14 ENCOUNTER — HOME CARE VISIT (OUTPATIENT)
Dept: SCHEDULING | Facility: HOME HEALTH | Age: 63
End: 2018-09-14
Payer: COMMERCIAL

## 2018-09-14 PROCEDURE — 3331090002 HH PPS REVENUE DEBIT

## 2018-09-14 PROCEDURE — 3331090001 HH PPS REVENUE CREDIT

## 2018-09-15 PROCEDURE — 3331090001 HH PPS REVENUE CREDIT

## 2018-09-15 PROCEDURE — 3331090002 HH PPS REVENUE DEBIT

## 2018-09-16 PROCEDURE — 3331090001 HH PPS REVENUE CREDIT

## 2018-09-16 PROCEDURE — 3331090002 HH PPS REVENUE DEBIT

## 2018-09-17 ENCOUNTER — HOME CARE VISIT (OUTPATIENT)
Dept: SCHEDULING | Facility: HOME HEALTH | Age: 63
End: 2018-09-17
Payer: COMMERCIAL

## 2018-09-17 PROCEDURE — 3331090002 HH PPS REVENUE DEBIT

## 2018-09-17 PROCEDURE — 3331090001 HH PPS REVENUE CREDIT

## 2018-09-17 PROCEDURE — G0158 HHC OT ASSISTANT EA 15: HCPCS

## 2018-09-18 ENCOUNTER — HOME CARE VISIT (OUTPATIENT)
Dept: SCHEDULING | Facility: HOME HEALTH | Age: 63
End: 2018-09-18
Payer: COMMERCIAL

## 2018-09-18 ENCOUNTER — APPOINTMENT (OUTPATIENT)
Dept: PHYSICAL THERAPY | Age: 63
End: 2018-09-18

## 2018-09-18 VITALS
DIASTOLIC BLOOD PRESSURE: 91 MMHG | HEART RATE: 60 BPM | SYSTOLIC BLOOD PRESSURE: 151 MMHG | TEMPERATURE: 98.4 F | OXYGEN SATURATION: 93 %

## 2018-09-18 VITALS — DIASTOLIC BLOOD PRESSURE: 82 MMHG | OXYGEN SATURATION: 75 % | HEART RATE: 89 BPM | SYSTOLIC BLOOD PRESSURE: 160 MMHG

## 2018-09-18 PROCEDURE — 3331090001 HH PPS REVENUE CREDIT

## 2018-09-18 PROCEDURE — 3331090002 HH PPS REVENUE DEBIT

## 2018-09-18 PROCEDURE — G0153 HHCP-SVS OF S/L PATH,EA 15MN: HCPCS

## 2018-09-18 PROCEDURE — G0300 HHS/HOSPICE OF LPN EA 15 MIN: HCPCS

## 2018-09-19 ENCOUNTER — HOME CARE VISIT (OUTPATIENT)
Dept: SCHEDULING | Facility: HOME HEALTH | Age: 63
End: 2018-09-19
Payer: COMMERCIAL

## 2018-09-19 VITALS — OXYGEN SATURATION: 99 % | DIASTOLIC BLOOD PRESSURE: 90 MMHG | HEART RATE: 66 BPM | SYSTOLIC BLOOD PRESSURE: 170 MMHG

## 2018-09-19 PROCEDURE — G0158 HHC OT ASSISTANT EA 15: HCPCS

## 2018-09-19 PROCEDURE — 3331090002 HH PPS REVENUE DEBIT

## 2018-09-19 PROCEDURE — 3331090001 HH PPS REVENUE CREDIT

## 2018-09-20 ENCOUNTER — APPOINTMENT (OUTPATIENT)
Dept: PHYSICAL THERAPY | Age: 63
End: 2018-09-20

## 2018-09-20 ENCOUNTER — HOME CARE VISIT (OUTPATIENT)
Dept: SCHEDULING | Facility: HOME HEALTH | Age: 63
End: 2018-09-20
Payer: COMMERCIAL

## 2018-09-20 VITALS
DIASTOLIC BLOOD PRESSURE: 90 MMHG | TEMPERATURE: 97.8 F | HEART RATE: 58 BPM | OXYGEN SATURATION: 99 % | SYSTOLIC BLOOD PRESSURE: 176 MMHG

## 2018-09-20 PROCEDURE — 3331090002 HH PPS REVENUE DEBIT

## 2018-09-20 PROCEDURE — 3331090001 HH PPS REVENUE CREDIT

## 2018-09-21 ENCOUNTER — HOME CARE VISIT (OUTPATIENT)
Dept: SCHEDULING | Facility: HOME HEALTH | Age: 63
End: 2018-09-21
Payer: COMMERCIAL

## 2018-09-21 PROCEDURE — 3331090001 HH PPS REVENUE CREDIT

## 2018-09-21 PROCEDURE — G0300 HHS/HOSPICE OF LPN EA 15 MIN: HCPCS

## 2018-09-21 PROCEDURE — 3331090002 HH PPS REVENUE DEBIT

## 2018-09-22 VITALS
TEMPERATURE: 97.4 F | SYSTOLIC BLOOD PRESSURE: 142 MMHG | HEART RATE: 60 BPM | DIASTOLIC BLOOD PRESSURE: 84 MMHG | OXYGEN SATURATION: 96 %

## 2018-09-22 PROCEDURE — 3331090001 HH PPS REVENUE CREDIT

## 2018-09-22 PROCEDURE — 3331090002 HH PPS REVENUE DEBIT

## 2018-09-23 PROCEDURE — 3331090001 HH PPS REVENUE CREDIT

## 2018-09-23 PROCEDURE — 3331090002 HH PPS REVENUE DEBIT

## 2018-09-24 ENCOUNTER — HOME CARE VISIT (OUTPATIENT)
Dept: SCHEDULING | Facility: HOME HEALTH | Age: 63
End: 2018-09-24
Payer: COMMERCIAL

## 2018-09-24 PROCEDURE — 3331090002 HH PPS REVENUE DEBIT

## 2018-09-24 PROCEDURE — G0153 HHCP-SVS OF S/L PATH,EA 15MN: HCPCS

## 2018-09-24 PROCEDURE — 3331090001 HH PPS REVENUE CREDIT

## 2018-09-25 ENCOUNTER — APPOINTMENT (OUTPATIENT)
Dept: PHYSICAL THERAPY | Age: 63
End: 2018-09-25

## 2018-09-25 VITALS
TEMPERATURE: 98.2 F | OXYGEN SATURATION: 96 % | HEART RATE: 72 BPM | SYSTOLIC BLOOD PRESSURE: 156 MMHG | DIASTOLIC BLOOD PRESSURE: 67 MMHG

## 2018-09-25 PROCEDURE — 3331090001 HH PPS REVENUE CREDIT

## 2018-09-25 PROCEDURE — 3331090002 HH PPS REVENUE DEBIT

## 2018-09-26 ENCOUNTER — HOME CARE VISIT (OUTPATIENT)
Dept: SCHEDULING | Facility: HOME HEALTH | Age: 63
End: 2018-09-26
Payer: COMMERCIAL

## 2018-09-26 PROCEDURE — 3331090002 HH PPS REVENUE DEBIT

## 2018-09-26 PROCEDURE — 3331090001 HH PPS REVENUE CREDIT

## 2018-09-27 ENCOUNTER — HOME CARE VISIT (OUTPATIENT)
Dept: SCHEDULING | Facility: HOME HEALTH | Age: 63
End: 2018-09-27
Payer: COMMERCIAL

## 2018-09-27 PROCEDURE — G0152 HHCP-SERV OF OT,EA 15 MIN: HCPCS

## 2018-09-27 PROCEDURE — 3331090002 HH PPS REVENUE DEBIT

## 2018-09-27 PROCEDURE — 3331090001 HH PPS REVENUE CREDIT

## 2018-09-28 ENCOUNTER — HOME CARE VISIT (OUTPATIENT)
Dept: SCHEDULING | Facility: HOME HEALTH | Age: 63
End: 2018-09-28
Payer: COMMERCIAL

## 2018-09-28 PROCEDURE — 3331090002 HH PPS REVENUE DEBIT

## 2018-09-28 PROCEDURE — G0153 HHCP-SVS OF S/L PATH,EA 15MN: HCPCS

## 2018-09-28 PROCEDURE — 3331090001 HH PPS REVENUE CREDIT

## 2018-09-28 PROCEDURE — 3331090003 HH PPS REVENUE ADJ

## 2018-09-29 PROCEDURE — 3331090001 HH PPS REVENUE CREDIT

## 2018-09-29 PROCEDURE — 3331090002 HH PPS REVENUE DEBIT

## 2018-09-30 PROCEDURE — 3331090002 HH PPS REVENUE DEBIT

## 2018-09-30 PROCEDURE — 3331090001 HH PPS REVENUE CREDIT

## 2018-10-01 PROCEDURE — 3331090002 HH PPS REVENUE DEBIT

## 2018-10-01 PROCEDURE — 3331090001 HH PPS REVENUE CREDIT

## 2018-10-02 PROCEDURE — 3331090002 HH PPS REVENUE DEBIT

## 2018-10-02 PROCEDURE — 3331090001 HH PPS REVENUE CREDIT

## 2018-10-03 PROCEDURE — 3331090002 HH PPS REVENUE DEBIT

## 2018-10-03 PROCEDURE — 3331090001 HH PPS REVENUE CREDIT

## 2018-10-04 PROCEDURE — 3331090002 HH PPS REVENUE DEBIT

## 2018-10-04 PROCEDURE — 3331090001 HH PPS REVENUE CREDIT

## 2018-10-05 PROCEDURE — 3331090001 HH PPS REVENUE CREDIT

## 2018-10-05 PROCEDURE — 3331090002 HH PPS REVENUE DEBIT

## 2018-10-06 PROCEDURE — 3331090001 HH PPS REVENUE CREDIT

## 2018-10-06 PROCEDURE — 3331090002 HH PPS REVENUE DEBIT

## 2018-10-07 PROCEDURE — 3331090002 HH PPS REVENUE DEBIT

## 2018-10-07 PROCEDURE — 3331090001 HH PPS REVENUE CREDIT

## 2018-10-08 PROCEDURE — 3331090002 HH PPS REVENUE DEBIT

## 2018-10-08 PROCEDURE — 3331090001 HH PPS REVENUE CREDIT

## 2018-10-09 PROCEDURE — 3331090002 HH PPS REVENUE DEBIT

## 2018-10-09 PROCEDURE — 3331090001 HH PPS REVENUE CREDIT

## 2018-10-10 PROCEDURE — 3331090002 HH PPS REVENUE DEBIT

## 2018-10-10 PROCEDURE — 3331090001 HH PPS REVENUE CREDIT

## 2018-10-11 PROCEDURE — 3331090002 HH PPS REVENUE DEBIT

## 2018-10-11 PROCEDURE — 3331090001 HH PPS REVENUE CREDIT

## 2018-10-12 PROCEDURE — 3331090002 HH PPS REVENUE DEBIT

## 2018-10-12 PROCEDURE — 3331090001 HH PPS REVENUE CREDIT

## 2018-10-13 PROCEDURE — 3331090002 HH PPS REVENUE DEBIT

## 2018-10-13 PROCEDURE — 3331090001 HH PPS REVENUE CREDIT

## 2018-10-14 PROCEDURE — 3331090002 HH PPS REVENUE DEBIT

## 2018-10-14 PROCEDURE — 3331090001 HH PPS REVENUE CREDIT

## 2018-10-15 PROCEDURE — 3331090002 HH PPS REVENUE DEBIT

## 2018-10-15 PROCEDURE — 3331090001 HH PPS REVENUE CREDIT

## 2018-10-16 PROCEDURE — 3331090002 HH PPS REVENUE DEBIT

## 2018-10-16 PROCEDURE — 3331090001 HH PPS REVENUE CREDIT

## 2018-10-17 PROCEDURE — 3331090001 HH PPS REVENUE CREDIT

## 2018-10-17 PROCEDURE — 3331090002 HH PPS REVENUE DEBIT

## 2018-10-18 PROCEDURE — 3331090001 HH PPS REVENUE CREDIT

## 2018-10-18 PROCEDURE — 3331090002 HH PPS REVENUE DEBIT

## 2018-10-19 PROCEDURE — 3331090001 HH PPS REVENUE CREDIT

## 2018-10-19 PROCEDURE — 3331090002 HH PPS REVENUE DEBIT

## 2018-10-20 PROCEDURE — 3331090002 HH PPS REVENUE DEBIT

## 2018-10-20 PROCEDURE — 3331090001 HH PPS REVENUE CREDIT

## 2018-10-21 PROCEDURE — 3331090001 HH PPS REVENUE CREDIT

## 2018-10-21 PROCEDURE — 3331090002 HH PPS REVENUE DEBIT

## 2018-10-22 PROCEDURE — 3331090001 HH PPS REVENUE CREDIT

## 2018-10-22 PROCEDURE — 3331090002 HH PPS REVENUE DEBIT

## 2018-10-23 PROCEDURE — 3331090002 HH PPS REVENUE DEBIT

## 2018-10-23 PROCEDURE — 3331090001 HH PPS REVENUE CREDIT

## 2018-10-24 PROCEDURE — 3331090001 HH PPS REVENUE CREDIT

## 2018-10-24 PROCEDURE — 3331090002 HH PPS REVENUE DEBIT

## 2018-10-25 PROCEDURE — 3331090001 HH PPS REVENUE CREDIT

## 2018-10-25 PROCEDURE — 3331090002 HH PPS REVENUE DEBIT

## 2018-10-26 PROCEDURE — 3331090001 HH PPS REVENUE CREDIT

## 2018-10-26 PROCEDURE — 3331090002 HH PPS REVENUE DEBIT

## 2018-10-27 PROCEDURE — 3331090002 HH PPS REVENUE DEBIT

## 2018-10-27 PROCEDURE — 3331090001 HH PPS REVENUE CREDIT

## 2018-10-28 VITALS
SYSTOLIC BLOOD PRESSURE: 162 MMHG | TEMPERATURE: 99 F | DIASTOLIC BLOOD PRESSURE: 70 MMHG | OXYGEN SATURATION: 100 % | HEART RATE: 63 BPM

## 2018-10-28 PROCEDURE — 3331090001 HH PPS REVENUE CREDIT

## 2018-10-28 PROCEDURE — 3331090002 HH PPS REVENUE DEBIT

## 2018-10-29 PROCEDURE — 3331090002 HH PPS REVENUE DEBIT

## 2018-10-29 PROCEDURE — 3331090001 HH PPS REVENUE CREDIT

## 2018-11-20 ENCOUNTER — HOSPITAL ENCOUNTER (EMERGENCY)
Age: 63
Discharge: NURSING, REHAB OR LONG-TERM CARE | End: 2018-11-20
Attending: EMERGENCY MEDICINE
Payer: MEDICARE

## 2018-11-20 ENCOUNTER — APPOINTMENT (OUTPATIENT)
Dept: CT IMAGING | Age: 63
End: 2018-11-20
Attending: EMERGENCY MEDICINE
Payer: MEDICARE

## 2018-11-20 VITALS
TEMPERATURE: 98.3 F | HEART RATE: 79 BPM | OXYGEN SATURATION: 97 % | WEIGHT: 117 LBS | SYSTOLIC BLOOD PRESSURE: 154 MMHG | BODY MASS INDEX: 18.8 KG/M2 | HEIGHT: 66 IN | DIASTOLIC BLOOD PRESSURE: 73 MMHG | RESPIRATION RATE: 16 BRPM

## 2018-11-20 DIAGNOSIS — S16.1XXA STRAIN OF NECK MUSCLE, INITIAL ENCOUNTER: Primary | ICD-10-CM

## 2018-11-20 DIAGNOSIS — S09.90XA CLOSED HEAD INJURY, INITIAL ENCOUNTER: ICD-10-CM
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
## 2018-11-20 PROCEDURE — 74011250637 HC RX REV CODE- 250/637: Performed by: EMERGENCY MEDICINE

## 2018-11-20 PROCEDURE — 70450 CT HEAD/BRAIN W/O DYE: CPT

## 2018-11-20 PROCEDURE — 72125 CT NECK SPINE W/O DYE: CPT

## 2018-11-20 PROCEDURE — 99284 EMERGENCY DEPT VISIT MOD MDM: CPT

## 2018-11-20 RX ORDER — CYCLOBENZAPRINE HCL 10 MG
10 TABLET ORAL
Qty: 15 TAB | Refills: 0 | Status: SHIPPED | OUTPATIENT
Start: 2018-11-20 | End: 2019-12-09

## 2018-11-20 RX ORDER — CYCLOBENZAPRINE HCL 10 MG
10 TABLET ORAL
Status: COMPLETED | OUTPATIENT
Start: 2018-11-20 | End: 2018-11-20

## 2018-11-20 RX ORDER — ACETAMINOPHEN AND CODEINE PHOSPHATE 300; 30 MG/1; MG/1
1 TABLET ORAL
Status: COMPLETED | OUTPATIENT
Start: 2018-11-20 | End: 2018-11-20

## 2018-11-20 RX ORDER — ACETAMINOPHEN AND CODEINE PHOSPHATE 300; 30 MG/1; MG/1
1 TABLET ORAL
Qty: 8 TAB | Refills: 0 | Status: SHIPPED | OUTPATIENT
Start: 2018-11-20 | End: 2019-12-09

## 2018-11-20 RX ADMIN — ACETAMINOPHEN AND CODEINE PHOSPHATE 1 TABLET: 300; 30 TABLET ORAL at 21:33

## 2018-11-20 RX ADMIN — CYCLOBENZAPRINE HYDROCHLORIDE 10 MG: 10 TABLET, FILM COATED ORAL at 20:14

## 2018-11-21 NOTE — ED NOTES
I have reviewed discharge instructions with patient. The patient verbalized understanding. Patient armband removed and shredded. No acute distress noted at this time, pt alert and oriented. Stable at time of discharge. Vital signs stable. Pt is being discharged with 2 prescriptions at this time. Son helped pt get dressed. RN wheeled pt to ruth and son to return pt to skilled nursing facility rehab.

## 2018-11-21 NOTE — DISCHARGE INSTRUCTIONS
Neck Strain: Care Instructions  Your Care Instructions    You have strained the muscles and ligaments in your neck. A sudden, awkward movement can strain the neck. This often occurs with falls or car accidents or during certain sports. Everyday activities like working on a computer or sleeping can also cause neck strain if they force you to hold your neck in an awkward position for a long time. It is common for neck pain to get worse for a day or two after an injury, but it should start to feel better after that. You may have more pain and stiffness for several days before it gets better. This is expected. It may take a few weeks or longer for it to heal completely. Good home treatment can help you get better faster and avoid future neck problems. Follow-up care is a key part of your treatment and safety. Be sure to make and go to all appointments, and call your doctor if you are having problems. It's also a good idea to know your test results and keep a list of the medicines you take. How can you care for yourself at home? · If you were given a neck brace (cervical collar) to limit neck motion, wear it as instructed for as many days as your doctor tells you to. Do not wear it longer than you were told to. Wearing a brace for too long can make neck stiffness worse and weaken the neck muscles. · You can try using heat or ice to see if it helps. ? Try using a heating pad on a low or medium setting for 15 to 20 minutes every 2 to 3 hours. Try a warm shower in place of one session with the heating pad. You can also buy single-use heat wraps that last up to 8 hours. ? You can also try an ice pack for 10 to 15 minutes every 2 to 3 hours. · Take pain medicines exactly as directed. ? If the doctor gave you a prescription medicine for pain, take it as prescribed. ? If you are not taking a prescription pain medicine, ask your doctor if you can take an over-the-counter medicine.   · Gently rub the area to relieve pain and help with blood flow. Do not massage the area if it hurts to do so. · Do not do anything that makes the pain worse. Take it easy for a couple of days. You can do your usual activities if they do not hurt your neck or put it at risk for more stress or injury. · Try sleeping on a special neck pillow. Place it under your neck, not under your head. Placing a tightly rolled-up towel under your neck while you sleep will also work. If you use a neck pillow or rolled towel, do not use your regular pillow at the same time. · To prevent future neck pain, do exercises to stretch and strengthen your neck and back. Learn how to use good posture, safe lifting techniques, and proper body mechanics. When should you call for help? Call 911 anytime you think you may need emergency care. For example, call if:    · You are unable to move an arm or a leg at all.   [facility] your doctor now or seek immediate medical care if:    · You have new or worse symptoms in your arms, legs, chest, belly, or buttocks. Symptoms may include:  ? Numbness or tingling. ? Weakness. ? Pain.     · You lose bladder or bowel control.    Watch closely for changes in your health, and be sure to contact your doctor if:    · You are not getting better as expected. Where can you learn more? Go to http://teresa-mehul.info/. Enter M253 in the search box to learn more about \"Neck Strain: Care Instructions. \"  Current as of: November 29, 2017  Content Version: 11.8  © 6166-2680 Healthwise, Incorporated. Care instructions adapted under license by [employer] (which disclaims liability or warranty for this information). If you have questions about a medical condition or this instruction, always ask your healthcare professional. [place] [address] any warranty or liability for your use of this information.            13 Ways to Prevent Falls in the Hospital  When you're in the hospital, your risk of falling may be higher than normal. Medicines can make you dizzy. Or illness and treatments may cause you to feel weak and confused, making it harder to move around. But there are ways you can prevent falls during your stay. Things you can do to prevent a fall at the hospital   Bring nonskid socks, slippers, or shoes that stay on your feet. If you do not have these, ask the nurse for a pair of nonskid socks. Bring your walker or cane, if you use one at home. Or ask the hospital to provide one during your stay. Ask your doctor or nurse if your treatments or medicines will increase your risk of a fall. Some hospitals will check your risk when you are admitted to the hospital.    Tell your doctor or nurse if medicines make you dizzy, weak, or lightheaded. If you feel this way, do not try to get up on your own. Call the nurse for help. Call the nurse for help getting out of bed if you are on crutches or have trouble walking. Do not try to do it on your own until the nurse says it's ok and you feel sure you are able. When your nurse says it's ok, get up slowly. Sit up first and count to 10 before you get out of bed. Put on your eyeglasses before you get out of bed, if you wear them. If you need help getting to the bathroom, call the nurse before you have an urgent need to go. If you get fluids through a vein (IV), you may need to go to the bathroom more often. Things the hospital staff can do to prevent a fall   Keep needed items close by. Your phone, the nurse call light or button, and anything you need to help you walk should be close to you. Keep your bed low and locked. Your bed should be low enough so that you don't have problems getting out of bed. The wheels on your bed should be locked so the bed can't move. Explain what is safe. Your nurse or doctor will tell you what you can safely do and how often you need to get up and move around. Keep your room neat.  Your room should not have any wet or slippery areas. There should be nothing in the way of going to the bathroom or hallway. Light up the room. Your room should have good lighting. Make sure there is a night light in your bathroom. Current as of: Isaura 3, 2018  Content Version: 11.8  © 9563-2223 [employer]. Care instructions adapted under license by [employer] (which disclaims liability or warranty for this information). If you have questions about a medical condition or this instruction, always ask your healthcare professional. [place] [address] any warranty or liability for your use of this information. Closed Head Injury: After Your Visit  Your Care Instructions  You have had a head injury. Often, people cannot remember what happened right before or right after a head injury. Some head injuries can make you pass out, or lose consciousness, for a few seconds or minutes right after the injury. You need to have someone watch you closely for the next 24 hours. Contact your regular doctor to discuss follow-up care. Follow-up care is a key part of your treatment and safety. Be sure to make and go to all appointments, and call your doctor if you are having problems. It's also a good idea to know your test results and keep a list of the medicines you take. How can you care for yourself at home? · Have another adult watch you closely for the next 24 hours. That person should check for signs that your head injury is getting worse. · Put ice or a cold pack on the sore area for 10 to 20 minutes at a time. Put a thin cloth between the ice and your skin. · Take an over-the-counter pain medicine, such as acetaminophen (Tylenol), ibuprofen (Advil, Motrin), or naproxen (Aleve). Read and follow all instructions on the label. · You may sleep. If your doctor tells you to, have another adult check you at the suggested times to make sure you are able to wake up, recognize the other adult, and act normally.   · Take it easy for the next few days or longer if you are not feeling well. · Do not drink any alcohol for at least the next 24 hours. What is postconcussive syndrome? If you have had a mild concussion, you may have a mild headache or just feel \"not quite right. \" These symptoms are common and usually go away on their own over a few days to 4 weeks. Sometimes after a concussion you may feel as if you are not functioning as well as you did before the injury, and you may develop new symptoms. This is called postconcussive syndrome. You may:  · Have changes in your ability to solve problems, think, concentrate, or remember. · Have headaches. · Have changes in your sleep patterns, such as not being able to sleep or sleeping all the time. · Have changes in your personality. · Lack interest in your daily activities. · Become easily angered or anxious for no clear reason. · Have changes in your sex drive. · Lose your sense of taste or smell. · Be dizzy, lightheaded, or unsteady and find it hard to stand or walk. When should you call for help? Call 911 anytime you think you may need emergency care. For example, call if:  · You have twitching, jerking, or a seizure. · You suddenly cannot walk or stand. · You passed out (lost consciousness). · You are confused, do not know where you are, or are very sleepy or hard to wake up. Call your doctor now or seek immediate medical care if:  · You continue to vomit after 2 hours, or you have new vomiting. · You have a new watery (not like mucus from a cold) or bloody fluid coming from your nose or ears. · You have new weakness or numbness in any part of your body. · You have trouble walking. · Your headaches get worse. · Your vision changes. Watch closely for changes in your health, and be sure to contact your doctor if:  · You do not get better as expected. Where can you learn more?    Go to Jianjian.be  Enter B594 in the search box to learn more about \"Closed Head Injury: After Your Visit. \"   © 3905-6004 [employer], Incorporated. Care instructions adapted under license by [employer] (which disclaims liability or warranty for this information). This care instruction is for use with your licensed healthcare professional. If you have questions about a medical condition or this instruction, always ask your healthcare professional. [place] [address] any warranty or liability for your use of this information.   Content Version: 6.5.562271; Last Revised: June 27, 2012

## 2018-11-21 NOTE — ED NOTES
Pt resting on stretcher in a position with some comfort, partial discomfort due to c-collar. Son at bedside. Call bell within reach. No needs expressed at this time. Will continue to monitor pt and carry out orders.

## 2018-11-21 NOTE — ED PROVIDER NOTES
EMERGENCY DEPARTMENT HISTORY AND PHYSICAL EXAM    7:35 PM      Date: 11/20/2018  Patient Name: Dominic Pino    History of Presenting Illness     Chief Complaint   Patient presents with    Fall     c/o head and neck pain         History Provided By: Patient    Chief Complaint: Neck pain  Duration:  Today  Timing:  Acute  Location: Head  Quality: Aching  Severity: Moderate  Modifying Factors: No modifying or aggravating factors were reported. Associated Symptoms: denies any other associated signs or symptoms      Additional History (Context): Dominic Pino is a 58 y.o. female with h/o seizures, pinched nerve and headaches who presents to the ED with c/o acute neck pain today. Describes neck pain when she turns her head to the side. Pt said that \"she was in the bathroom and turned to come back out and fell down on the floor and could not get back up so she held onto the counter and pulled herself back up and then went and lied down on the bed. \" 7 days ago Pt was in a rehab facility for a seizure she had. Pt's son states that 5 days ago [facility] Caban took her medication and when she came into the living room she was reaching out and grasping for things like she couldn't see and she was walking more to her right side\" for which she was admitted to [facility] for 4 days and has been at [facility] home care for physical therapy since then. Son also states that Pt is currently in speech therapy and currently her speech is \"low and off and when she has seizures she stutters but lately it is dragging. \" Denies hitting head. No modifying or aggravating factors were reported. No other concerns or symptoms at this time. PCP: Roya VALDEZ NP    Current Outpatient Medications   Medication Sig Dispense Refill    cyclobenzaprine (FLEXERIL) 10 mg tablet Take 1 Tab by mouth three (3) times daily as needed for Muscle Spasm(s). 15 Tab 0    acetaminophen (TYLENOL) 500 mg tablet every six (6) hours as needed for Pain. Take 2 caplets every 6 hrs      OXcarbaxepine (TRILEPTAL) 600 mg tablet Take 600 mg by mouth two (2) times a day.  OXcarbaxepine (TRILEPTAL) 600 mg tablet Take 600 mg by mouth two (2) times a day.  phenytoin ER (DILANTIN ER) 100 mg ER capsule Take 100 mg by mouth daily.  divalproex DR (DEPAKOTE) 250 mg tablet Take 250 mg by mouth daily.  hydrOXYzine HCl (ATARAX) 50 mg tablet Take 1 Tab by mouth four (4) times daily as needed for Itching. 20 Tab 0    hydrALAZINE (APRESOLINE) 50 mg tablet Take 50 mg by mouth two (2) times a day.  ergocalciferol (VITAMIN D2) 50,000 unit capsule Take 50,000 Units by mouth two (2) days a week.  levETIRAcetam (KEPPRA) 1,000 mg tablet Take 1 Tab by mouth two (2) times a day. 60 Tab 0    triamcinolone acetonide (KENALOG) 0.1 % topical cream Apply  to affected area two (2) times a day. use thin layer 15 g 0    clopidogrel (PLAVIX) 75 mg tablet Take 75 mg by mouth daily.  lisinopril (PRINIVIL, ZESTRIL) 10 mg tablet Take 10 mg by mouth daily.  amLODIPine (NORVASC) 10 mg tablet Take 1 Tab by mouth daily. 30 Tab 6    atorvastatin (LIPITOR) 40 mg tablet Take 1 Tab by mouth nightly. 30 Tab 0    hydroxypropyl methylcellulose (GENTEAL) 0.2 % ophthalmic solution Administer 2 Drops to both eyes as needed. 15 mL 0    cyanocobalamin (VITAMIN B12) 500 mcg tablet Take 1 Tab by mouth daily. 30 Tab 0    folic acid (FOLVITE) 1 mg tablet Take 1 Tab by mouth daily. 30 Tab 0       Past History     Past Medical History:  Past Medical History:   Diagnosis Date    Hypertension     Neurological disorder     [facility] [address] [facility]     Stroke ([facility] [address] [facility] 2009       Past Surgical History:  No past surgical history on file.     Family History:  Family History   Problem Relation Age of Onset    Diabetes Other     Stroke Other        Social History:  Social History     Tobacco Use    Smoking status: Never Smoker    Smokeless tobacco: Never Used   Substance Use Topics    Alcohol use: Yes     Comment: Socially     Drug use: No       Allergies: Allergies   Allergen Reactions    Tomato Hives     Allergic to eating tomato.  Aspirin Nausea and Vomiting    Ciprofloxacin Rash    Pcn [Penicillins] Rash and Hives    Sulfa (Sulfonamide Antibiotics) Hives and Rash         Review of Systems       Review of Systems   Constitutional: Negative for activity change, fatigue and fever. HENT: Negative for congestion and rhinorrhea. Eyes: Negative for visual disturbance. Respiratory: Negative for shortness of breath. Cardiovascular: Negative for chest pain and palpitations. Gastrointestinal: Negative for abdominal pain, diarrhea, nausea and vomiting. Genitourinary: Negative for dysuria and hematuria. Musculoskeletal: Positive for neck pain. Negative for back pain. Skin: Negative for rash. Neurological: Negative for dizziness and light-headedness. All other systems reviewed and are negative. Physical Exam     Visit Vitals  /73 (BP 1 Location: Left arm, BP Patient Position: Sitting)   Pulse 79   Temp 98.3 °F (36.8 °C)   Resp 16   Ht 5' 6\" (1.676 m)   Wt 53.1 kg (117 lb)   SpO2 97%   BMI 18.88 kg/m²       Physical Exam   Constitutional: She is oriented to person, place, and time. She appears well-developed and well-nourished. No distress. HENT:   Head: Normocephalic and atraumatic. Right Ear: External ear normal.   Left Ear: External ear normal.   Nose: Nose normal.   Mouth/Throat: Oropharynx is clear and moist.   Eyes: Conjunctivae and EOM are normal. Pupils are equal, round, and reactive to light. No scleral icterus. Neck: No JVD present. No tracheal deviation present. No thyromegaly present. C-collar in place, poorly localized pain    Cardiovascular: Normal rate, regular rhythm, normal heart sounds and intact distal pulses. Exam reveals no gallop and no friction rub. No murmur heard.   Pulmonary/Chest: Effort normal and breath sounds normal. She exhibits no tenderness. Abdominal: Soft. Bowel sounds are normal. She exhibits no distension. There is no tenderness. There is no rebound and no guarding. Musculoskeletal: Normal range of motion. She exhibits no edema or tenderness. Lymphadenopathy:     She has no cervical adenopathy. Neurological: She is alert and oriented to person, place, and time. No cranial nerve deficit. Coordination normal.   Stuttering speech, chronic weakness, no clear arm or leg drift, gait not observed    Skin: Skin is warm and dry. Psychiatric: She has a normal mood and affect. Her behavior is normal. Judgment and thought content normal.   Supportive family at the bedside    Nursing note and vitals reviewed. Diagnostic Study Results     Radiologic Studies -   CT SPINE CERV WO CONT   Final Result      CT HEAD WO CONT   Final Result      CT Head:  1. No evidence for acute intracranial process. .     2. 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     3. Moderate white matter disease, presumed chronic microvascular ischemic  changes. Colonic lacunar infarct at the left thalamus. white matter disease,  presumed chronic ischemic. CT C-Spine:  No evidence of acute fracture or malalignment.     Slight reversal of the usual cervical lordosis, may be positional or due to  muscle strain/spasm.     Degenerative findings are as described. Medical Decision Making   I am the first provider for this patient. I reviewed the vital signs, available nursing notes, past medical history, past surgical history, family history and social history. Vital Signs-Reviewed the patient's vital signs. Pulse Oximetry Analysis -  97% on room air, stable. Cardiac Monitor:  Rate: 79 BPM  Rhythm:  Normal Sinus Rhythm. Records Reviewed: Nursing Notes and Old Medical Records (Time of Review: 7:35 PM)    ED Course: Progress Notes, Reevaluation, and Consults:  CT scans are reassuring for acute process.   Will proceed with close outpatient care to her rehab facility with her son and to return if at all worsened or concerned. Astrid Jones, DO 11:34 AM    Provider Notes (Medical Decision Making):   MDM  Number of Diagnoses or Management Options  Diagnosis management comments: Pt is a 57 yo female with a hx of ICH, partial complex seizures, gait abnormality now is in acute care rehab facility and should walk with assistance. She decided to get up and fell. She complains of HA and neck pain. Pt has a c-collar in place, will CT head, CT spine, pain control then reevaluate. Astrid Jones, DO 7:55 PM        Diagnosis     Clinical Impression:   1. Strain of neck muscle, initial encounter    2. Fall, initial encounter    3. Closed head injury, initial encounter        Disposition: Discharge. Follow-up Information     Follow up With Specialties Details Why [address], NP Nurse Practitioner In 2 days For Emergency Department Follow-Up [address] [facility]  [address]      [facility] EMERGENCY DEPT Emergency Medicine  As needed, If symptoms worsen [address] [place] [id number]  [phone]              Medication List      START taking these medications    cyclobenzaprine 10 mg tablet  Commonly known as:  FLEXERIL  Take 1 Tab by mouth three (3) times daily as needed for Muscle Spasm(s). CHANGE how you take these medications    levETIRAcetam 1,000 mg tablet  Commonly known as:  KEPPRA  Take 1 Tab by mouth two (2) times a day. What changed:  Another medication with the same name was removed. Continue taking this medication, and follow the directions you see here. CONTINUE taking these medications    acetaminophen 500 mg tablet  Commonly known as:  TYLENOL     amLODIPine 10 mg tablet  Commonly known as:  NORVASC  Take 1 Tab by mouth daily. atorvastatin 40 mg tablet  Commonly known as:  LIPITOR  Take 1 Tab by mouth nightly.      clopidogrel 75 mg Tab  Commonly known as:  PLAVIX     cyanocobalamin 500 mcg tablet  Commonly known as:  VITAMIN B12  Take 1 Tab by mouth daily. divalproex  mg tablet  Commonly known as:  DEPAKOTE     folic acid 1 mg tablet  Commonly known as:  FOLVITE  Take 1 Tab by mouth daily. hydrALAZINE 50 mg tablet  Commonly known as:  APRESOLINE     hydroxypropyl methylcellulose 0.2 % ophthalmic solution  Commonly known as:  GENTEAL  Administer 2 Drops to both eyes as needed. hydrOXYzine HCl 50 mg tablet  Commonly known as:  ATARAX  Take 1 Tab by mouth four (4) times daily as needed for Itching. lisinopril 10 mg tablet  Commonly known as:  PRINIVIL, ZESTRIL     * OXcarbaxepine 600 mg tablet  Commonly known as:  TRILEPTAL     * OXcarbaxepine 600 mg tablet  Commonly known as:  TRILEPTAL     phenytoin  mg ER capsule  Commonly known as:  DILANTIN ER     triamcinolone acetonide 0.1 % topical cream  Commonly known as:  KENALOG  Apply  to affected area two (2) times a day. use thin layer     VITAMIN D2 50,000 unit capsule  Generic drug:  ergocalciferol         * This list has 2 medication(s) that are the same as other medications prescribed for you. Read the directions carefully, and ask your doctor or other care provider to review them with you.             STOP taking these medications    HYDROcodone-acetaminophen 5-325 mg per tablet  Commonly known as:  NORCO     meloxicam 15 mg tablet  Commonly known as:  MOBIC     pantoprazole 40 mg tablet  Commonly known as:  PROTONIX           Where to Get Your Medications      Information about where to get these medications is not yet available    Ask your nurse or doctor about these medications  · cyclobenzaprine 10 mg tablet       _______________________________       Scribe Ashley Waldron acting as a scribe for and in the presence of Destiny Owen MD      November 20, 2018 at 7:35 PM       Provider Attestation:      I personally performed the services described in the documentation, reviewed the documentation, as recorded by the scribe in my presence, and it accurately and completely records my words and actions.  November 20, 2018 at 7:35 PM - Ethan Devine MD    _______________________________

## 2018-12-17 ENCOUNTER — HOSPITAL ENCOUNTER (OUTPATIENT)
Dept: PHYSICAL THERAPY | Age: 63
Discharge: HOME | End: 2018-12-17
Payer: MEDICARE

## 2018-12-17 PROCEDURE — G8978 MOBILITY CURRENT STATUS: HCPCS

## 2018-12-17 PROCEDURE — 97110 THERAPEUTIC EXERCISES: CPT

## 2018-12-17 PROCEDURE — G8979 MOBILITY GOAL STATUS: HCPCS

## 2018-12-17 PROCEDURE — 97162 PT EVAL MOD COMPLEX 30 MIN: CPT

## 2018-12-17 NOTE — PROGRESS NOTES
[facility] - Jaz Contreras  [address] [facility]  [phone] fax    Plan of Care/ Statement of Necessity for Physical Therapy Services    Patient name: Gudelia Harley Start of Care: 2018   Referral source: Noah Frankel, MD : 1955    Medical Diagnosis: Gait instability [R26.81]  Unspecified abnormalities of gait and mobility [R26.9]  Payor: Laisha Solders / Plan: [facility] [employer] PPO / Product Type: PPO /  Onset Date: 10/15/18    Treatment Diagnosis: difficulty with ambulation   Prior Hospitalization: see medical history Provider#: [id number]   Medications: Verified on Patient summary List    Comorbidities: CVA, HTN   Prior Level of Function: Ind with ambulation, lives with 2 sons      The Plan of Care and following information is based on the information from the initial evaluation. Assessment/ key information:  Pt. Is a 58year old female c/o difficulty with walking and feeling off balance following a seizure in October. She reports she was in hospital for about a week and then had physical therapy at another facility. She is in a wheelchair mainly but does walk short distances at home. She reports leaning to right side when walking. She occasionally needs assistance to get dressed as well. She presents with mild decrease in B LE strength. She has decreased left ankle mobility compared to right side. During sit to stand transfer pt. Has excessive posterior lean. 30 second sit to stand test was 3x. She also has loss of balance with Rhomberg with eyes closed. During ambulation she has instability with shuffle pattern. Skilled PT is medically necessary in order to improve balance and ambulation for improved safety and independence at home.     Evaluation Complexity History MEDIUM  Complexity : 1-2 comorbidities / personal factors will impact the outcome/ POC ; Examination MEDIUM Complexity : 3 Standardized tests and measures addressing body structure, function, activity limitation and / or participation in recreation  ;Presentation MEDIUM Complexity : Evolving with changing characteristics  ; Clinical Decision Making MEDIUM Complexity : FOTO score of 26-74  Overall Complexity Rating: MEDIUM  Problem List: pain affecting function, decrease ROM, decrease strength, impaired gait/ balance, decrease ADL/ functional abilitiies, decrease activity tolerance, decrease flexibility/ joint mobility and decrease transfer abilities   Treatment Plan may include any combination of the following: Therapeutic exercise, Therapeutic activities, Neuromuscular re-education, Physical agent/modality, Gait/balance training, Manual therapy, Patient education, Self Care training, Functional mobility training and Home safety training  Patient / Family readiness to learn indicated by: asking questions  Persons(s) to be included in education: patient (P)  Barriers to Learning/Limitations: None  Patient Goal (s): to have better balance  Patient Self Reported Health Status: fair  Rehabilitation Potential: good    Short Term Goals: To be accomplished in 1 weeks:  1. Patient will demonstrate compliance with HEP in order to improve LE strength    Long Term Goals: To be accomplished in 8 weeks:  1. Patient will improve FOTO score by 10 points in order to demonstrate a significant improvement in function. 2. Patient will improve 30 second sit to stand test time to 6x in order to increase ease of transfers at home. 3. Patient will ambulate 200 feet with LRAD without listing in order to increase ease of ambulation at home. 4. Patient will perform Rhomberg eyes closed balance with eyes closed for 30 seconds without LOB in order to demonstrate improving balance. Frequency / Duration: Patient to be seen 2-3 times per week for 8 weeks.     Patient/ Caregiver education and instruction: Diagnosis, prognosis, exercises   [x]  Plan of care has been reviewed with DENIS    G-Codes (GP)  Mobility   Current CL= 60-79%   Goal  CK= 40-59%  The severity rating is based on clinical judgment and the FOTO score. Certification Period: 12/17/18-2/15/19    Gregory Baez PT 12/17/2018 11:04 AM    ________________________________________________________________________    I certify that the above Therapy Services are being furnished while the patient is under my care. I agree with the treatment plan and certify that this therapy is necessary.     Physician's Signature:____________Date:_________TIME:________    ** Signature, Date and Time must be completed for valid certification **    Please sign and return to [facility] - [employer]  [address] [facility]  [phone] fax

## 2018-12-17 NOTE — PROGRESS NOTES
PT DAILY TREATMENT NOTE/NEURO EVAL 10-18    Patient Name: Liliya Cruz  Date:2018  : 1955  [x]  Patient  Verified  Payor: Sara Motta / Plan: Estiven Patton PPO / Product Type: PPO /    In time: 10:05  Out time: 10:50  Total Treatment Time (min): 45  Visit #: 1 of     Medicare/BCBS Only   Total Timed Codes (min):  8 1:1 Treatment Time:  45     Treatment Area: Gait instability [R26.81]  Unspecified abnormalities of gait and mobility [R26.9]    SUBJECTIVE  Pain Level (0-10 scale):  0/10  []constant []intermittent []improving []worsening []no change since onset    Any medication changes, allergies to medications, adverse drug reactions, diagnosis change, or new procedure performed?: [x] No    [] Yes (see summary sheet for update)  Subjective functional status/changes:     Mechanism of Injury:  Reports off balance when walking. Had PT in [facility]. Had a seizure. Was in hospital for a little over a week. Also had rehab at another facility. Walks to bathroom at home and short distances but mainly in wheelchair. Goes to right side when walking. Leans to right in sitting. Help with supine to sit transfers. Occasional assistance with getting dressed. Doesn't have a walker or cane right now. Living Situation: lives with 2 sons. No stairs  Pt Goals: to walk better.    Cognition: A & O x 4    Other:    OBJECTIVE/EXAMINATION    10 min Therapeutic Exercise:  [] See flow sheet : HEP   Rationale: increase ROM and increase strength to improve the patients ability to increase ease of ambulation           With   [x] TE   [] TA   [] neuro   [] other: Patient Education: [x] Review HEP    [] Progressed/Changed HEP based on:   [] positioning   [] body mechanics   [] transfers   [] heat/ice application    [] other:      Physical Therapy Evaluation - Neurologic    Gait: [] Normal    [x] Abnormal    Device: no AD      Describe: decreased step length, shuffle pattern, instability     Strength (MMT): Hip L (1-5) R (1-5)   Hip Flexion 4 4   Hip Ext     Hip ABD     Hip ADD     Hip ER     Hip IR       Knee L (1-5) R (1-5)   Knee Flexion 4 4   Knee Extension 4 4   Ankle PF 4 4   Ankle DF 4 4   Other       Decreased left ankle DF PROM        Sitting Balance: Static:  [] Good    [x] Fair    [] Poor leans to right     Dynamic:   [] Good    [x] Fair    [] Poor        Standing Balance: Static:   [] Good    [x] Fair    [] Poor     Dynamic:   [] Good    [] Fair    [] Poor        Protective Extension:  [] Present    [] Delayed    [] Absent          Functional Mobility      Bed Mobility:      Scooting: mod I       Rolling: mod I       Sit-Supine: mod I      Transfers:       Sit-Stand: mod I with excessive posterior lean         Behavior: [x] Cooperative    [] Impulsive    [] Agitated    [] Perseverative    [] Confused   Oriented x:    Cognition: [] One Step Commands   [x] Multiple Commands   [] Displays Neglect [] R  [] L    Other test /comments:  Decreased balance with Rhomberg, LOB with eyes closed  30 second sit to stand test 3x       Pain Level (0-10 scale) post treatment: 0/10    ASSESSMENT/Changes in Function:      [x]  See Plan of Care  []  See progress note/recertification  []  See Discharge Summary         Progress towards goals / Updated goals:  See POC    PLAN  []  Upgrade activities as tolerated     [x]  Continue plan of care  []  Update interventions per flow sheet       []  Discharge due to:_  []  Other:_      Reece Ruiz PT 12/17/2018  10:11 AM

## 2018-12-19 ENCOUNTER — HOSPITAL ENCOUNTER (OUTPATIENT)
Dept: PHYSICAL THERAPY | Age: 63
End: 2018-12-19
Payer: MEDICARE

## 2018-12-21 ENCOUNTER — HOSPITAL ENCOUNTER (OUTPATIENT)
Dept: PHYSICAL THERAPY | Age: 63
Discharge: HOME | End: 2018-12-21
Payer: MEDICARE

## 2018-12-21 PROCEDURE — 97112 NEUROMUSCULAR REEDUCATION: CPT

## 2018-12-21 PROCEDURE — 97110 THERAPEUTIC EXERCISES: CPT

## 2018-12-21 NOTE — PROGRESS NOTES
PT DAILY TREATMENT NOTE 10-18    Patient Name: Grace Mcqueen  Date:2018  : 1955  [x]  Patient  Verified  Payor: Stanley Norton / Plan: Laron Phalen PPO / Product Type: PPO /    In time: 11:28  Out time: 12:00  Total Treatment Time (min): 32  Visit #: 2 of     Medicare/BC Only   Total Timed Codes (min):  32 1:1 Treatment Time:  32       Treatment Area: Gait instability [R26.81]  Unspecified abnormalities of gait and mobility [R26.9]    SUBJECTIVE  Pain Level (0-10 scale): 0/10  Any medication changes, allergies to medications, adverse drug reactions, diagnosis change, or new procedure performed?: [x] No    [] Yes (see summary sheet for update)  Subjective functional status/changes:   [] No changes reported  Pt. Reports she is doing pretty good today. She reports she has been doing her exercises at home. OBJECTIVE    22 min Therapeutic Exercise:  [x] See flow sheet :   Rationale: increase ROM and increase strength to improve the patients ability to increase ease of ADLs     10 min Neuromuscular Re-education:  [x]  See flow sheet : standing balance activities, side stepping, backward walking, stepping over small hurdles   Rationale: increase strength, improve coordination and improve balance  to improve the patients ability to increase ease of ambulation           With   [x] TE   [] TA   [] neuro   [] other: Patient Education: [x] Review HEP    [] Progressed/Changed HEP based on:   [] positioning   [] body mechanics   [] transfers   [] heat/ice application    [] other:      Other Objective/Functional Measures:   Pt. Ambulated into clinic today with no AD  She was challenged with Rhomberg eyes closed on foam  Pt. Had difficulty with stepping over small ren with left foot first     Pain Level (0-10 scale) post treatment: 0/10    ASSESSMENT/Changes in Function:  Pt.  Initiated PT and is compliant with her HEP    Patient will continue to benefit from skilled PT services to modify and progress therapeutic interventions, address functional mobility deficits, address ROM deficits, address strength deficits, analyze and address soft tissue restrictions, analyze and cue movement patterns, analyze and modify body mechanics/ergonomics and assess and modify postural abnormalities to attain remaining goals. Progress towards goals / Updated goals:  Short Term Goals: To be accomplished in 1 weeks:  1. Patient will demonstrate compliance with HEP in order to improve LE strength  Met (12/21/18)     Long Term Goals: To be accomplished in 8 weeks:  1. Patient will improve FOTO score by 10 points in order to demonstrate a significant improvement in function. 2. Patient will improve 30 second sit to stand test time to 6x in order to increase ease of transfers at home. 3. Patient will ambulate 200 feet with LRAD without listing in order to increase ease of ambulation at home.    4. Patient will perform Rhomberg eyes closed balance with eyes closed for 30 seconds without LOB in order to demonstrate improving balance.            PLAN  []  Upgrade activities as tolerated     [x]  Continue plan of care  []  Update interventions per flow sheet       []  Discharge due to:_  []  Other:_      Ashley Olmedoite, PT 12/21/2018  11:34 AM    Future Appointments   Date Time Provider Nuzhat Astudillo   1/3/2019 11:30 AM Cleavon Pat, PTA [facility]   1/7/2019 12:30 PM Cleavon Pat, PTA [facility]   1/10/2019  4:00 PM Gracy Bro, PT [facility]   1/14/2019 11:30 AM Gracy Bro PT [facility]   1/17/2019 12:30 PM Cleavon Pat, PTA [facility]   1/21/2019 11:00 AM Gracy Bro, PT [facility]   1/24/2019 11:30 AM Cleavon Pat, PTA [facility]   1/28/2019 12:00 PM Gracy Bro PT [facility]   1/31/2019 11:30 AM Cleavon Pat, PTA [facility]

## 2018-12-24 ENCOUNTER — APPOINTMENT (OUTPATIENT)
Dept: PHYSICAL THERAPY | Age: 63
End: 2018-12-24
Payer: MEDICARE

## 2018-12-27 ENCOUNTER — APPOINTMENT (OUTPATIENT)
Dept: PHYSICAL THERAPY | Age: 63
End: 2018-12-27
Payer: MEDICARE

## 2018-12-31 ENCOUNTER — APPOINTMENT (OUTPATIENT)
Dept: PHYSICAL THERAPY | Age: 63
End: 2018-12-31
Payer: MEDICARE

## 2019-01-03 ENCOUNTER — HOSPITAL ENCOUNTER (OUTPATIENT)
Dept: PHYSICAL THERAPY | Age: 64
Discharge: HOME | End: 2019-01-03
Payer: MEDICARE

## 2019-01-03 PROCEDURE — 97112 NEUROMUSCULAR REEDUCATION: CPT

## 2019-01-03 PROCEDURE — 97110 THERAPEUTIC EXERCISES: CPT

## 2019-01-03 NOTE — PROGRESS NOTES
PT DAILY TREATMENT NOTE 10-18    Patient Name: Светлана Coto  Date:1/3/2019  : 1955  [x]  Patient  Verified  Payor: Celso Mukherjee / Plan: Hung Jung PPO / Product Type: PPO /    In time: 11:35  Out time: 12:08  Total Treatment Time (min): 33  Visit #: 3 of 16-    Medicare/[facility] Only   Total Timed Codes (min):  33 1:1 Treatment Time:  28       Treatment Area: Gait instability [R26.81]  Unspecified abnormalities of gait and mobility [R26.9]    SUBJECTIVE  Pain Level (0-10 scale): 0/10  Any medication changes, allergies to medications, adverse drug reactions, diagnosis change, or new procedure performed?: [x] No    [] Yes (see summary sheet for update)  Subjective functional status/changes:   [] No changes reported  I got to learn to teach myself to walk in heels again. OBJECTIVE    23/18 min Therapeutic Exercise:  [x] See flow sheet :   Rationale: increase ROM and increase strength to improve the patients ability to increase ease of ADLs     10 min Neuromuscular Re-education:  [x]  See flow sheet : standing balance activities, side stepping, backward walking, stepping over small hurdles   Rationale: increase strength, improve coordination and improve balance  to improve the patients ability to increase ease of ambulation           With   [x] TE   [] TA   [] neuro   [] other: Patient Education: [x] Review HEP    [] Progressed/Changed HEP based on:   [] positioning   [] body mechanics   [] transfers   [] heat/ice application    [] other:      Other Objective/Functional Measures:   - Decreased balance with left foot posterior during MSR  - Decreased balance with EC/foam  - Difficulty with left LE stepping over hurdles, noting circumduction    Pain Level (0-10 scale) post treatment: 0/10    ASSESSMENT/Changes in Function:    Patient tolerated session well. Decreased balnce with increased left LE weightbearing. Noting trunk rotation with sidestepping and NBOS with all ambulation.     Patient will continue to benefit from skilled PT services to modify and progress therapeutic interventions, address functional mobility deficits, address ROM deficits, address strength deficits, analyze and address soft tissue restrictions, analyze and cue movement patterns, analyze and modify body mechanics/ergonomics and assess and modify postural abnormalities to attain remaining goals. Progress towards goals / Updated goals:  Short Term Goals: To be accomplished in 1 weeks:  1. Patient will demonstrate compliance with HEP in order to improve LE strength  Met (12/21/18)     Long Term Goals: To be accomplished in 8 weeks:  1. Patient will improve FOTO score by 10 points in order to demonstrate a significant improvement in function. 2. Patient will improve 30 second sit to stand test time to 6x in order to increase ease of transfers at home. 3. Patient will ambulate 200 feet with LRAD without listing in order to increase ease of ambulation at home. 4. Patient will perform Rhomberg eyes closed balance with eyes closed for 30 seconds without LOB in order to demonstrate improving balance.      PLAN  []  Upgrade activities as tolerated     [x]  Continue plan of care  []  Update interventions per flow sheet       []  Discharge due to:_  []  Other:_      LIU Espinosa 1/3/2019  12:08 PM    Future Appointments   Date Time Provider Nuzhat Astudillo   1/7/2019 12:30 PM Yolanda Georgia, PTA [facility]   1/10/2019  4:00 PM Paola Castro, PT [facility]   1/14/2019 11:30 AM Paola Castro, PT [facility]   1/17/2019 12:30 PM Yolanda Georgia, PTA [facility]   1/21/2019 11:00 AM Paola Castro, PT [facility]   1/24/2019 11:30 AM Yolanda Gun, PTA [facility]   1/28/2019 12:00 PM Paola Castro, PT [facility]   1/31/2019 11:30 AM [facility] Gun[facility]

## 2019-01-07 ENCOUNTER — HOSPITAL ENCOUNTER (OUTPATIENT)
Dept: PHYSICAL THERAPY | Age: 64
Discharge: HOME | End: 2019-01-07
Payer: MEDICARE

## 2019-01-07 PROCEDURE — 97110 THERAPEUTIC EXERCISES: CPT

## 2019-01-07 NOTE — PROGRESS NOTES
PT DAILY TREATMENT NOTE 10-18    Patient Name: Yung Zelaya  Date:2019  : 1955  [x]  Patient  Verified  Payor: Kaci Gonzalez / Plan: Carol Romero PPO / Product Type: PPO /    In time:12:30  Out time: 1:05  Total Treatment Time (min): 35  Visit #: 4 of     Medicare/BCBS Only   Total Timed Codes (min):  35 1:1 Treatment Time:  30       Treatment Area: Gait instability [R26.81]  Unspecified abnormalities of gait and mobility [R26.9]    SUBJECTIVE  Pain Level (0-10 scale): 0/10  Any medication changes, allergies to medications, adverse drug reactions, diagnosis change, or new procedure performed?: [x] No    [] Yes (see summary sheet for update)  Subjective functional status/changes:   [] No changes reported  Pt reports she had a seizure last Monday which resulted in a fall in the bathroom. Her son gave her her seizure medicine and helped her to bed. She states usually if she falls It is because of a seizure which she doesn't know when they are going to happen.      OBJECTIVE    35 min Therapeutic Exercise:  [x] See flow sheet :   Rationale: increase ROM, increase strength, improve coordination, improve balance and increase proprioception to improve the patients ability to decrease fall risk with ambulation          With   [] TE   [] TA   [] neuro   [] other: Patient Education: [x] Review HEP    [] Progressed/Changed HEP based on:   [] positioning   [] body mechanics   [] transfers   [] heat/ice application    [] other:      Other Objective/Functional Measures:   No LOB with balance exercises in the // today  Challenged with hip extension and abduction on the ana machine; guarding for safety and chair nearby for balance if needed  Educated on calf stretching to assist with lifting feet when walking  No pain or difficulty with walking in the gym today     Pain Level (0-10 scale) post treatment: 0/10    ASSESSMENT/Changes in Function: Pt is progressing with static balance but had a recent fall due to a seizure not LOB. She demonstrates decreased hip/ankle strength. Will work to improve static and dynamic balance for safety within the home and community. Progress towards goals / Updated goals:  Short Term Goals: To be accomplished in 1 weeks:  1. Patient will demonstrate compliance with HEP in order to improve LE strength  Met (12/21/18)     Long Term Goals: To be accomplished in 8 weeks:  1. Patient will improve FOTO score by 10 points in order to demonstrate a significant improvement in function. 2. Patient will improve 30 second sit to stand test time to 6x in order to increase ease of transfers at home. 3. Patient will ambulate 200 feet with LRAD without listing in order to increase ease of ambulation at home.    4. Patient will perform Rhomberg eyes closed balance with eyes closed for 30 seconds without LOB in order to demonstrate improving balance.     PLAN  [x]  Upgrade activities as tolerated     [x]  Continue plan of care  []  Update interventions per flow sheet       []  Discharge due to:_  []  Other:_      Dashawn Harley PTA 1/7/2019  9:01 AM    Future Appointments   Date Time Provider Nuzhat Astudillo   1/7/2019 12:30 PM Shalonda Clemons, PTA [facility]   1/10/2019  4:00 PM Tangela Sorto, PT [facility]   1/14/2019 11:30 AM Tangela Sorto, PT [facility]   1/17/2019 12:30 PM Shalonda Clemons, PTA [facility]   1/21/2019 11:00 AM Tangela Sorto, PT [facility]   1/24/2019 11:30 AM Shalonda Clemons, PTA [facility]   1/28/2019 12:00 PM Tangela Sorto, PT [facility]   1/31/2019 11:30 AM Shalonda Clemons, PTA [facility]

## 2019-01-10 ENCOUNTER — HOSPITAL ENCOUNTER (OUTPATIENT)
Dept: PHYSICAL THERAPY | Age: 64
Discharge: HOME | End: 2019-01-10
Payer: MEDICARE

## 2019-01-10 PROCEDURE — 97112 NEUROMUSCULAR REEDUCATION: CPT

## 2019-01-10 PROCEDURE — 97110 THERAPEUTIC EXERCISES: CPT

## 2019-01-10 NOTE — PROGRESS NOTES
PT DAILY TREATMENT NOTE 10-18    Patient Name: Yung Zelaya  Date:1/10/2019  : 1955  [x]  Patient  Verified  Payor: Kaci Gonzalez / Plan: Carol Romero PPO / Product Type: PPO /    In time: 4:00  Out time: 4:30  Total Treatment Time (min): 30  Visit #: 5 of     Medicare/BCBS Only   Total Timed Codes (min):  30 1:1 Treatment Time:  30       Treatment Area: Gait instability [R26.81]  Unspecified abnormalities of gait and mobility [R26.9]    SUBJECTIVE  Pain Level (0-10 scale):  0/10  Any medication changes, allergies to medications, adverse drug reactions, diagnosis change, or new procedure performed?: [x] No    [] Yes (see summary sheet for update)  Subjective functional status/changes:   [] No changes reported  Pt. Reports she is doing pretty good. She reports her walking is getting better but she continues to drag her feet sometimes. OBJECTIVE    20 min Therapeutic Exercise:  [x] See flow sheet :   Rationale: increase ROM and increase strength to improve the patients ability to increase ease of ambulation      10 min Neuromuscular Re-education:  [x]  See flow sheet : standing balance activities, ambulation with head turns, backwards walking, side steps   Rationale: increase strength, improve coordination and improve balance  to improve the patients ability to increase ease of ambulation           With   [x] TE   [] TA   [] neuro   [] other: Patient Education: [x] Review HEP    [] Progressed/Changed HEP based on:   [] positioning   [] body mechanics   [] transfers   [] heat/ice application    [] other:      Other Objective/Functional Measures:   30 second sit to stand test: 9x  Pt. Was challenged with hipx3 with orange band and was given orange band for HEP  Rhomberg eyes closed: 30 seconds   Pt. Had instability with ambulation with head turns  Mild decrease in gait speed with dual cognitive tasks    Pain Level (0-10 scale) post treatment: 0/10    ASSESSMENT/Changes in Function:  Pt.  Is progressing well with physical therapy. She demonstrates significant improvement in her sit to stand transfers and ambulation. She also demonstrates significant improvement in her balance. Patient will continue to benefit from skilled PT services to modify and progress therapeutic interventions, address functional mobility deficits, address ROM deficits, address strength deficits, analyze and address soft tissue restrictions, analyze and cue movement patterns, analyze and modify body mechanics/ergonomics and assess and modify postural abnormalities to attain remaining goals. Progress towards goals / Updated goals:  Short Term Goals: To be accomplished in 1 weeks:  1. Patient will demonstrate compliance with HEP in order to improve LE strength  Met (12/21/18)     Long Term Goals: To be accomplished in 8 weeks:  1. Patient will improve FOTO score by 10 points in order to demonstrate a significant improvement in function. 2. Patient will improve 30 second sit to stand test time to 6x in order to increase ease of transfers at home. Met: 9x (1/10/19)  3. Patient will ambulate 200 feet with LRAD without listing in order to increase ease of ambulation at home.    4. Patient will perform Rhomberg eyes closed balance with eyes closed for 30 seconds without LOB in order to demonstrate improving balance.   Met (1/10/19)      PLAN  []  Upgrade activities as tolerated     [x]  Continue plan of care  []  Update interventions per flow sheet       []  Discharge due to:_  []  Other:_      Araceli Owen PT 1/10/2019  3:59 PM    Future Appointments   Date Time Provider Nuzhat Astudillo   1/10/2019  4:00 PM Eleanor Engel PT [facility]   1/14/2019 11:30 AM Eleanor Engel PT [facility]   1/17/2019 12:30 PM Efraín Quiñones PTA [facility]   1/21/2019 11:00 AM Eleanor Engel PT [facility]   1/24/2019 11:30 AM Efraín Quiñones PTA [facility]   1/28/2019 12:00 PM Eleanor Engel PT [facility] 1/31/2019 11:30 AM Gabriela Aguilar PTA [facility]

## 2019-01-14 ENCOUNTER — HOSPITAL ENCOUNTER (OUTPATIENT)
Dept: PHYSICAL THERAPY | Age: 64
Discharge: HOME | End: 2019-01-14
Payer: MEDICARE

## 2019-01-14 PROCEDURE — 97112 NEUROMUSCULAR REEDUCATION: CPT

## 2019-01-14 PROCEDURE — 97110 THERAPEUTIC EXERCISES: CPT

## 2019-01-14 NOTE — PROGRESS NOTES
PT DAILY TREATMENT NOTE 10-18    Patient Name: Stevenson Jarrell  Date:2019  : 1955  [x]  Patient  Verified  Payor: Woodrow Felton / Plan: Charles Keita PPO / Product Type: PPO /    In time: 11:40  Out time: 12:05  Total Treatment Time (min): 25  Visit #: 6 of -    Medicare/BCBS Only   Total Timed Codes (min):  25 1:1 Treatment Time:  25       Treatment Area: Gait instability [R26.81]  Unspecified abnormalities of gait and mobility [R26.9]    SUBJECTIVE  Pain Level (0-10 scale): 0/10  Any medication changes, allergies to medications, adverse drug reactions, diagnosis change, or new procedure performed?: [x] No    [] Yes (see summary sheet for update)  Subjective functional status/changes:   [] No changes reported  Pt. Reports she has a little bit of a headache today. She reports having a panic attack last night and a seizure the other day    OBJECTIVE    15 min Therapeutic Exercise:  [x] See flow sheet :   Rationale: increase ROM and increase strength to improve the patients ability to increase ease of ADLs     10 min Neuromuscular Re-education:  [x]  See flow sheet : side stepping, backwards ambulation, ambulation with head turns, ambulation with eyes closed   Rationale: increase ROM, increase strength and improve coordination  to improve the patients ability to increase ease of ADLs          With   [x] TE   [] TA   [] neuro   [] other: Patient Education: [x] Review HEP    [] Progressed/Changed HEP based on:   [] positioning   [] body mechanics   [] transfers   [] heat/ice application    [] other:      Other Objective/Functional Measures:   Pt. Has significant list to right side during ambulation with eyes closed  She required no UE support for sit to stand transfers   LOB with MSR eyes closed on foam    Pain Level (0-10 scale) post treatment: 0/10    ASSESSMENT/Changes in Function:  Pt. Is progressing well with physical therapy. She demonstrates improving balance and improving ambulation.  She also has increased ease of transfers    Patient will continue to benefit from skilled PT services to modify and progress therapeutic interventions, address functional mobility deficits, address ROM deficits, address strength deficits, analyze and address soft tissue restrictions, analyze and cue movement patterns, analyze and modify body mechanics/ergonomics and assess and modify postural abnormalities to attain remaining goals. Progress towards goals / Updated goals:  Short Term Goals: To be accomplished in 1 weeks:  1. Patient will demonstrate compliance with HEP in order to improve LE strength  Met (12/21/18)     Long Term Goals: To be accomplished in 8 weeks:  1. Patient will improve FOTO score by 10 points in order to demonstrate a significant improvement in function. 2. Patient will improve 30 second sit to stand test time to 6x in order to increase ease of transfers at home. Met: 9x (1/10/19)  3. Patient will ambulate 200 feet with LRAD without listing in order to increase ease of ambulation at home.    4. Patient will perform Rhomberg eyes closed balance with eyes closed for 30 seconds without LOB in order to demonstrate improving balance.   Met (1/10/19)        PLAN  []  Upgrade activities as tolerated     [x]  Continue plan of care  [x]  Update interventions per flow sheet : curbs, ambulation on uneven surfaces  []  Discharge due to:_  []  Other:_      Maya Dodson, PT 1/14/2019  11:46 AM    Future Appointments   Date Time Provider Nuzhat Astudillo   1/17/2019 12:30 PM Alma Truong PTA [facility]   1/21/2019 11:00 AM Hilda Davis PT [facility]   1/24/2019 11:30 AM Alma Truong PTA [facility]   1/28/2019 12:00 PM Hilda Davis PT [facility]   1/31/2019 11:30 AM Alma Truong PTA [facility]

## 2019-01-17 ENCOUNTER — HOSPITAL ENCOUNTER (OUTPATIENT)
Dept: PHYSICAL THERAPY | Age: 64
Discharge: HOME | End: 2019-01-17
Payer: MEDICARE

## 2019-01-17 PROCEDURE — 97110 THERAPEUTIC EXERCISES: CPT

## 2019-01-17 NOTE — PROGRESS NOTES
PT DAILY TREATMENT NOTE 10-18    Patient Name: Jose Eduardo Way  Date:2019  : 1955  [x]  Patient  Verified  Payor: Royal Parry / Plan: Rebecca Conn PPO / Product Type: PPO /    In time:12:12  Out time:12:38  Total Treatment Time (min): 26  Visit #: 7 of     Medicare/BCBS Only   Total Timed Codes (min):  26 1:1 Treatment Time:  26       Treatment Area: Gait instability [R26.81]  Unspecified abnormalities of gait and mobility [R26.9]    SUBJECTIVE  Pain Level (0-10 scale): 0/10  Any medication changes, allergies to medications, adverse drug reactions, diagnosis change, or new procedure performed?: [x] No    [] Yes (see summary sheet for update)  Subjective functional status/changes:   [] No changes reported  Pt reports no current pain. She states she took her seizure medicine but was rushing and didn't take her BP medicine but will when she gets home. She states she is still having difficulty remembering to  her feet when she walks. She notes she only tends to fall when she has a seizure not due to LOB.     OBJECTIVE    26 min Therapeutic Exercise:  [x] See flow sheet :   Rationale: increase ROM, increase strength, improve coordination, improve balance and increase proprioception to improve the patients ability to decrease fall risk with ambulation          With   [] TE   [] TA   [] neuro   [] other: Patient Education: [x] Review HEP    [] Progressed/Changed HEP based on:   [] positioning   [] body mechanics   [] transfers   [] heat/ice application    [] other:      Other Objective/Functional Measures:   BP: 183/81  Educated on importance of taking medication on time  Challenged with left SLS compared to right  Occasional dragging of feet but corrects with verbal cues for heel strike  Challenged with coordination initially with TRX squats; improved with stool placed behind and told to sit to stool  Challenged with core strength for quadruped donkey and fire hydrants; tactile cues to perform correctly  Cues with side stepping to keep feet forward to correctly engage glutes     Pain Level (0-10 scale) post treatment: 0/10    ASSESSMENT/Changes in Function: Pt is progressing well towards goals in therapy with improved gait but still needing some cues for heel strike. She has improving balance with SLS and overall strength. Will continue to work on dynamic balance and strength for decreased fall risk with household and community ambulation. Progress towards goals / Updated goals:  Short Term Goals: To be accomplished in 1 weeks:  1. Patient will demonstrate compliance with HEP in order to improve LE strength  Met (12/21/18)     Long Term Goals: To be accomplished in 8 weeks:  1. Patient will improve FOTO score by 10 points in order to demonstrate a significant improvement in function. 2. Patient will improve 30 second sit to stand test time to 6x in order to increase ease of transfers at home.   Met: 9x (1/10/19)  3. Patient will ambulate 200 feet with LRAD without listing in order to increase ease of ambulation at home.    4. Patient will perform Rhomberg eyes closed balance with eyes closed for 30 seconds without LOB in order to demonstrate improving balance.   Met (1/10/19)        PLAN  [x]  Upgrade activities as tolerated     [x]  Continue plan of care  []  Update interventions per flow sheet       []  Discharge due to:_  []  Other:_      Checo Montoya PTA 1/17/2019  1:04 PM    Future Appointments   Date Time Provider Nuzhat Astudillo   1/21/2019 11:00 AM Antonieta Mendez PT [facility]   1/24/2019 11:30 AM April Hu PTA [facility]   1/28/2019 12:00 PM Antonieta Mendez PT [facility]   1/31/2019 11:30 AM April Hu PTA [facility]

## 2019-01-21 ENCOUNTER — HOSPITAL ENCOUNTER (OUTPATIENT)
Dept: PHYSICAL THERAPY | Age: 64
Discharge: HOME | End: 2019-01-21
Payer: MEDICARE

## 2019-01-21 PROCEDURE — 97164 PT RE-EVAL EST PLAN CARE: CPT

## 2019-01-21 PROCEDURE — 97110 THERAPEUTIC EXERCISES: CPT

## 2019-01-21 NOTE — PROGRESS NOTES
[facility] - [place]  [address]  [phone] fax    Medicare Progress Report    Patient name: Khadijah Mcintosh Start of Care:  18   Referral source: Yaneth Mehta MD : 1955   Medical/Treatment Diagnosis: Gait instability [R26.81]  Unspecified abnormalities of gait and mobility [R26.9]  Payor: Mirela Suttone / Plan: [employer] PPO / Product Type: PPO /  Onset Date: 10/15/18     Prior Hospitalization: see medical history Provider#: [id number]   Medications: Verified on Patient Summary List    Comorbidities:  CVA, HTN  Prior Level of Function: Ind with ambulation, lives with 2 sons  Visits from Start of Care: 8    Missed Visits: 1    Reporting Period: 18 to 19    Subjective Reports: pt. Reports she is doing pretty good    Current Status/ treatment goals Objective measures   1. Patient will improve FOTO score by 10 points in order to demonstrate a significant improvement in function. [] met                 [x] not met  [] progressing  eval: 62    Current: 58   2. Patient will improve 30 second sit to stand test time to 6x in order to increase ease of transfers at home. [x] met                 [] not met  [] progressing Eval: 3x    Current: 9x   3. Patient will ambulate 200 feet with LRAD without listing in order to increase ease of ambulation at home. [] met                 [] not met  [x] progressing Eval: short distances. Decreased step length, shuffle pattern, instability    Current: occasional decrease in step length   4. Patient will perform Rhomberg eyes closed balance with eyes closed for 30 seconds without LOB in order to demonstrate improving balance. [x] met                 [] not met  [] progressing Eval: LOB with eyes clsoed    Current: able to perform     Key functional changes: pt. Has improving balance and ambulation      Problems/ barriers to goal attainment: none     Assessment / Recommendations: pt.  Is progressing well with physical therapy. She demonstrates improving stability during ambulation but continues to have occasional foot drag. 30 second sit to stand test improved to 9x. She also demonstrates ability to perform Rhomberg EC balance. FOTO score had no significant change at 58 points. Skilled PT is medically necessary in order to improve balance and LE strength for increased ease of ambulation and improve independence at home. Problem List: pain affecting function, decrease ROM, decrease strength, impaired gait/ balance, decrease ADL/ functional abilitiies, decrease activity tolerance, decrease flexibility/ joint mobility and decrease transfer abilities   Treatment Plan: Therapeutic exercise, Therapeutic activities, Neuromuscular re-education, Physical agent/modality, Gait/balance training, Manual therapy, Patient education, Self Care training, Functional mobility training and Home safety training    Patient Goal (s) has been updated and includes: to have less pain     Updated Goals to be accomplished in  8-16 treatments:  1. Patient will improve FOTO score by 10 points in order to demonstrate a significant improvement in function. 2. Patient will improve 30 second sit to stand test time to 10x in order to increase ease of transfers at home. 3. Patient will improve FGA score by 4 points in order to demonstrate a significant improvement in balance.      Frequency / Duration: Patient to be seen 2-3 times per week for 8-16 visits    Maya Dodson, PT 1/21/2019 9:21 AM

## 2019-01-21 NOTE — PROGRESS NOTES
PT DAILY TREATMENT NOTE 10-18    Patient Name: Maida King  Date:2019  : 1955  [x]  Patient  Verified  Payor: Ethan Haines / Plan: Sigrid Douglas PPO / Product Type: PPO /    In time: 11:09  Out time: 11:55  Total Treatment Time (min): 46  Visit #: 8 of     Medicare/BCBS Only   Total Timed Codes (min):  30 1:1 Treatment Time:  46       Treatment Area: Gait instability [R26.81]  Unspecified abnormalities of gait and mobility [R26.9]    SUBJECTIVE  Pain Level (0-10 scale):  0/10  Any medication changes, allergies to medications, adverse drug reactions, diagnosis change, or new procedure performed?: [x] No    [] Yes (see summary sheet for update)  Subjective functional status/changes:   [] No changes reported  Pt. Reports she is doing pretty good today. She reports no recent falls    OBJECTIVE    16 min -Eval                  X -Re-Eval       30 min Therapeutic Exercise:  [x] See flow sheet :   Rationale: increase ROM and increase strength to improve the patients ability to increase ease of ADLs          With   [x] TE   [] TA   [] neuro   [] other: Patient Education: [x] Review HEP    [] Progressed/Changed HEP based on:   [] positioning   [] body mechanics   [] transfers   [] heat/ice application    [] other:      Other Objective/Functional Measures:    FOTO: 62  Ambulation: ambulates with occasional toe drag  FGA: 20/30    Pain Level (0-10 scale) post treatment:  0/10    ASSESSMENT/Changes in Function:      []  See Plan of Care  [x]  See progress note/recertification  []  See Discharge Summary         Progress towards goals / Updated goals:  See progress note    PLAN  []  Upgrade activities as tolerated     [x]  Continue plan of care  []  Update interventions per flow sheet       []  Discharge due to:_  []  Other:_      Ila Maynard, PT 2019  9:21 AM    Future Appointments   Date Time Provider Nuzhat Astudillo   2019 11:00 AM Jose Eduardo Ramirez, PT [facility]   2019 [facility]:30 [facility]   1/28/2019 12:00 PM [facility]   1/31/2019 11:30 AM [facility] Angel [facility]

## 2019-01-24 ENCOUNTER — HOSPITAL ENCOUNTER (OUTPATIENT)
Dept: PHYSICAL THERAPY | Age: 64
Discharge: HOME | End: 2019-01-24
Payer: MEDICARE

## 2019-01-28 ENCOUNTER — HOSPITAL ENCOUNTER (OUTPATIENT)
Dept: PHYSICAL THERAPY | Age: 64
Discharge: HOME | End: 2019-01-28
Payer: MEDICARE

## 2019-01-28 PROCEDURE — 97110 THERAPEUTIC EXERCISES: CPT

## 2019-01-28 PROCEDURE — 97116 GAIT TRAINING THERAPY: CPT

## 2019-01-28 PROCEDURE — 97112 NEUROMUSCULAR REEDUCATION: CPT

## 2019-01-28 NOTE — PROGRESS NOTES
PT DAILY TREATMENT NOTE 10-18    Patient Name: Robby Pandya  Date:2019  : 1955  [x]  Patient  Verified  Payor: Edgardo Wills / Plan: Gideon Mattson PPO / Product Type: PPO /    In time:12:00  Out time:12:56  Total Treatment Time (min): 56  Visit #: 9 of     Medicare/BCBS Only   Total Timed Codes (min):  56 1:1 Treatment Time:  56       Treatment Area: Gait instability [R26.81]  Unspecified abnormalities of gait and mobility [R26.9]    SUBJECTIVE  Pain Level (0-10 scale): 0/10  Any medication changes, allergies to medications, adverse drug reactions, diagnosis change, or new procedure performed?: [x] No    [] Yes (see summary sheet for update)  Subjective functional status/changes:   [] No changes reported  Feeling better (in response to seizure in clinic 19). Has contacted Dr to change seizure medication; has appointmentt in February (date uncertain). Pt reports she is also almost out of seizure medication. OBJECTIVE    20 min Therapeutic Exercise:  [x] See flow sheet :   Rationale: increase ROM and increase strength to improve the patients ability to return to PLOF. 21 min Neuromuscular Re-education: Balance: Rhomberg + MSR, on foam, EO + EC, dynamic balance on foam.    Rationale: improve coordination, improve balance and increase proprioception for increased ind with ADLs. 15 min Gait Trainin feet x 8 times with obstacle step overs & cone pick ups with SBA on level surfaces. & with cognitive distractions   Rationale: to improve the patients ability to ambulate home & community with decreased risk for falls. With   [x] TE   [] TA   [] neuro   [] other: Patient Education: [x] Review HEP    [] Progressed/Changed HEP based on:   [] positioning   [] body mechanics   [] transfers   [] heat/ice application    [] other:      Other Objective/Functional Measures:   BP: measured manually: 187/107mmHg, after 5 min rest: 185/92mmHg. following bike 185/90.     Pain Level (0-10 scale) post treatment: 0/10    ASSESSMENT/Changes in Function:   - pt able to complete balance & gait activities with SBA and minimal LOB; each self recovered. - pt requires verbal cues and demonstration to step over hurdles rather than circumducting LEs for clearance   - pt demos good balance on foam with EC; 0LOB, 0 UE support  - pt had no c/o dizziness, SOB or increased pain throughout session  - Patient will continue to benefit from skilled PT services to modify and progress therapeutic interventions, address functional mobility deficits, address ROM deficits, address strength deficits, analyze and cue movement patterns, analyze and modify body mechanics/ergonomics and address imbalance/dizziness to attain remaining goals. Progress towards goals / Updated goals:  Updated Goals to be accomplished in  8-16 treatments:  1. Patient will improve FOTO score by 10 points in order to demonstrate a significant improvement in function. 2. Patient will improve 30 second sit to stand test time to 10x in order to increase ease of transfers at home. 3. Patient will improve FGA score by 4 points in order to demonstrate a significant improvement in balance.      PLAN  [x]  Upgrade activities as tolerated     [x]  Continue plan of care  []  Update interventions per flow sheet       []  Discharge due to:_  []  Other:_      Augustin Perez, SPT  1/28/2019  8:56 AM    Future Appointments   Date Time Provider Nuzhat Astudillo   1/28/2019 12:00 PM Julio C Nicholas, PT [facility]   1/31/2019 11:30 AM Buzz Chen, PTA [facility]

## 2019-01-31 ENCOUNTER — HOSPITAL ENCOUNTER (OUTPATIENT)
Dept: PHYSICAL THERAPY | Age: 64
Discharge: HOME | End: 2019-01-31
Payer: MEDICARE

## 2019-01-31 PROCEDURE — 97112 NEUROMUSCULAR REEDUCATION: CPT

## 2019-01-31 PROCEDURE — 97110 THERAPEUTIC EXERCISES: CPT

## 2019-01-31 NOTE — PROGRESS NOTES
PT DAILY TREATMENT NOTE 10-18    Patient Name: Deniz Back  Date:2019  : 1955  [x]  Patient  Verified  Payor: Oralia Bowels / Plan: Tray Bermudez PPO / Product Type: PPO /    In time:11:31  Out time: 12:00  Total Treatment Time (min): 29  Visit #: 10 of 16-    Medicare/BCBS Only   Total Timed Codes (min):  29 1:1 Treatment Time:  25       Treatment Area: Gait instability [R26.81]  Unspecified abnormalities of gait and mobility [R26.9]    SUBJECTIVE  Pain Level (0-10 scale): 0/10  Any medication changes, allergies to medications, adverse drug reactions, diagnosis change, or new procedure performed?: [x] No    [] Yes (see summary sheet for update)  Subjective functional status/changes:   [] No changes reported  Pt reports no current pain. She states her son isn't having to tell her as much to  her feet. She wants to get back to wearing high heels. The son is asking how many more visits of therapy.     OBJECTIVE    14 min Therapeutic Exercise:  [x] See flow sheet :   Rationale: increase ROM, increase strength, improve coordination, improve balance and increase proprioception to improve the patients ability to decrease fall risk with ambulation     15 min Neuromuscular Re-education:  [x]  See flow sheet :   Rationale: increase ROM, increase strength, improve coordination, improve balance and increase proprioception  to improve the patients ability to decrease fall risk with ambulation          With   [] TE   [] TA   [] neuro   [] other: Patient Education: [x] Review HEP    [] Progressed/Changed HEP based on:   [] positioning   [] body mechanics   [] transfers   [] heat/ice application    [] other:      Other Objective/Functional Measures:   BP: 163/81  HR: 62 bpm  30 seconds sit to stand: 10 times  Ambulation with head turns, on toes to simulate heels, obstacle course, cone weaving, picking up items and fast/slow walking  No LOB with balance challenges  Educated son on 2 more weeks then D/C    Pain Level (0-10 scale) post treatment: 0/10    ASSESSMENT/Changes in Function: Pt is progressing with both static and dynamic balance. She has improved heel strike with ambulation with less verbal cueing. Will continue for 2 more weeks to work on higher level balance challenges to ensure safety for home and then D/C pending objective testing with FGA and pt compliance with HEP. Progress towards goals / Updated goals:  Updated Goals to be accomplished in  8-16 treatments:  1. Patient will improve FOTO score by 10 points in order to demonstrate a significant improvement in function. 2. Patient will improve 30 second sit to stand test time to 10x in order to increase ease of transfers at home. Met 10 times (1/31/19)  3.  Patient will improve FGA score by 4 points in order to demonstrate a significant improvement in balance.     PLAN  [x]  Upgrade activities as tolerated     [x]  Continue plan of care  []  Update interventions per flow sheet       []  Discharge due to:_  []  Other:_      Amanda Coffey PTA 1/31/2019  11:19 AM    Future Appointments   Date Time Provider Nuzhat Astudillo   1/31/2019 11:30 AM Almeda Osler, PTA [facility]

## 2019-02-06 ENCOUNTER — HOSPITAL ENCOUNTER (OUTPATIENT)
Dept: PHYSICAL THERAPY | Age: 64
End: 2019-02-06
Payer: MEDICARE

## 2019-02-07 ENCOUNTER — APPOINTMENT (OUTPATIENT)
Dept: GENERAL RADIOLOGY | Age: 64
End: 2019-02-07
Attending: EMERGENCY MEDICINE
Payer: MEDICARE

## 2019-02-07 ENCOUNTER — HOSPITAL ENCOUNTER (EMERGENCY)
Age: 64
Discharge: HOME | End: 2019-02-07
Attending: EMERGENCY MEDICINE
Payer: MEDICARE

## 2019-02-07 ENCOUNTER — APPOINTMENT (OUTPATIENT)
Dept: PHYSICAL THERAPY | Age: 64
End: 2019-02-07
Payer: MEDICARE

## 2019-02-07 VITALS
DIASTOLIC BLOOD PRESSURE: 83 MMHG | HEART RATE: 64 BPM | OXYGEN SATURATION: 100 % | RESPIRATION RATE: 16 BRPM | SYSTOLIC BLOOD PRESSURE: 161 MMHG | TEMPERATURE: 98 F
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
## 2019-02-07 DIAGNOSIS — S30.0XXA CONTUSION OF LOWER BACK, INITIAL ENCOUNTER: ICD-10-CM

## 2019-02-07 PROCEDURE — 72110 X-RAY EXAM L-2 SPINE 4/>VWS: CPT

## 2019-02-07 PROCEDURE — 99282 EMERGENCY DEPT VISIT SF MDM: CPT

## 2019-02-07 RX ORDER — HYDROCODONE BITARTRATE AND ACETAMINOPHEN 5; 325 MG/1; MG/1
TABLET ORAL
Qty: 8 TAB | Refills: 0 | Status: SHIPPED | OUTPATIENT
Start: 2019-02-07 | End: 2019-12-09

## 2019-02-07 RX ORDER — LIDOCAINE 50 MG/G
PATCH TOPICAL
Qty: 30 EACH | Refills: 0 | Status: SHIPPED | OUTPATIENT
Start: 2019-02-07 | End: 2019-08-04

## 2019-02-07 NOTE — ED TRIAGE NOTES
Pt arrived c/o lower back pain on the left side r/t falling out of bed on Tuesday, has been taking tylenol at home for pain, is able to ambulate, no issues with bowel or bladder

## 2019-02-07 NOTE — ED PROVIDER NOTES
EMERGENCY DEPARTMENT HISTORY AND PHYSICAL EXAM    Date: 2/7/2019  Patient Name: Yung Zelaya    History of Presenting Illness     Chief Complaint   Patient presents with    Back Pain         History Provided By: Patient    Chief Complaint: Back pain  Duration: 2 Days  Timing:  Constant  Location: Lower right back  Quality: Not obtained  Severity: Moderate  Modifying Factors: Tylenol did not provide relief. Associated Symptoms: denies any other associated signs or symptoms      Additional History (Context): 6:55 PM Yung Zelaya is a 58 y.o. female with h/o HTN and other nwho presents to ED complaining of constant, moderate, lower right back pain onset 2 days. The patient reports that she fell out of bed on Tuesday. She describes the pain as non-radiating. The patient notes that Tylenol did not provide relief. She denies fever, bowel incontinence, and IV drug use. No other concerns or symptoms at this time. PCP: Santo VALDEZ NP        Current Outpatient Medications   Medication Sig Dispense Refill    lidocaine (LIDODERM) 5 % Apply patch to the affected area for 12 hours a day and remove for 12 hours a day. 30 Each 0    HYDROcodone-acetaminophen (NORCO) 5-325 mg per tablet Take 1 tablet PO every 6 hours as needed for pain control. If over the counter ibuprofen or acetaminophen was suggested, then only take the vicodin for pain not well controlled with the over the counter medication. 8 Tab 0    cyclobenzaprine (FLEXERIL) 10 mg tablet Take 1 Tab by mouth three (3) times daily as needed for Muscle Spasm(s). 15 Tab 0    acetaminophen-codeine (TYLENOL-CODEINE #3) 300-30 mg per tablet Take 1 Tab by mouth every four (4) hours as needed for Pain for up to 8 doses. Max Daily Amount: 6 Tabs. 8 Tab 0    acetaminophen (TYLENOL) 500 mg tablet every six (6) hours as needed for Pain. Take 2 caplets every 6 hrs      OXcarbaxepine (TRILEPTAL) 600 mg tablet Take 600 mg by mouth two (2) times a day.       OXcarbaxepine (TRILEPTAL) 600 mg tablet Take 600 mg by mouth two (2) times a day.  phenytoin ER (DILANTIN ER) 100 mg ER capsule Take 100 mg by mouth daily.  divalproex DR (DEPAKOTE) 250 mg tablet Take 250 mg by mouth daily.  hydrOXYzine HCl (ATARAX) 50 mg tablet Take 1 Tab by mouth four (4) times daily as needed for Itching. 20 Tab 0    hydrALAZINE (APRESOLINE) 50 mg tablet Take 50 mg by mouth two (2) times a day.  ergocalciferol (VITAMIN D2) 50,000 unit capsule Take 50,000 Units by mouth two (2) days a week.  levETIRAcetam (KEPPRA) 1,000 mg tablet Take 1 Tab by mouth two (2) times a day. 60 Tab 0    triamcinolone acetonide (KENALOG) 0.1 % topical cream Apply  to affected area two (2) times a day. use thin layer 15 g 0    clopidogrel (PLAVIX) 75 mg tablet Take 75 mg by mouth daily.  lisinopril (PRINIVIL, ZESTRIL) 10 mg tablet Take 10 mg by mouth daily.  amLODIPine (NORVASC) 10 mg tablet Take 1 Tab by mouth daily. 30 Tab 6    atorvastatin (LIPITOR) 40 mg tablet Take 1 Tab by mouth nightly. 30 Tab 0    hydroxypropyl methylcellulose (GENTEAL) 0.2 % ophthalmic solution Administer 2 Drops to both eyes as needed. 15 mL 0    cyanocobalamin (VITAMIN B12) 500 mcg tablet Take 1 Tab by mouth daily. 30 Tab 0    folic acid (FOLVITE) 1 mg tablet Take 1 Tab by mouth daily. 30 Tab 0       Past History     Past Medical History:  Past Medical History:   Diagnosis Date    Hypertension     Neurological disorder     [facility] [address]     Stroke ([facility] [address] 2009       Past Surgical History:  No past surgical history on file. Family History:  Family History   Problem Relation Age of Onset    Diabetes Other     Stroke Other        Social History:  Social History     Tobacco Use    Smoking status: Never Smoker    Smokeless tobacco: Never Used   Substance Use Topics    Alcohol use: Yes     Comment: Socially     Drug use: No       Allergies:   Allergies   Allergen Reactions    Tomato Hives Allergic to eating tomato.  Aspirin Nausea and Vomiting    Ciprofloxacin Rash    Pcn [Penicillins] Rash and Hives    Sulfa (Sulfonamide Antibiotics) Hives and Rash         Review of Systems   Review of Systems   Constitutional: Negative for fever. Gastrointestinal:        Negative for bowel incontinence. Musculoskeletal: Positive for back pain. All other systems reviewed and are negative. All Other Systems Negative  Physical Exam     Vitals:    02/07/19 1744   BP: 161/83   Pulse: 64   Resp: 16   Temp: 98 °F (36.7 °C)   SpO2: 100%     Physical Exam   Constitutional: She is oriented to person, place, and time. She appears well-developed. HENT:   Head: Normocephalic and atraumatic. Eyes: Pupils are equal, round, and reactive to light. Neck: No JVD present. No tracheal deviation present. No thyromegaly present. Cardiovascular: Normal rate, regular rhythm and normal heart sounds. Exam reveals no gallop and no friction rub. No murmur heard. Pulmonary/Chest: Effort normal and breath sounds normal. No stridor. No respiratory distress. She has no wheezes. She has no rales. She exhibits no tenderness. Abdominal: Soft. She exhibits no distension and no mass. There is no tenderness. There is no rebound and no guarding. Musculoskeletal: She exhibits tenderness. She exhibits no edema. Mid-midline lumbar spine TTP. BLE strength 5/5. No visible contusion. Slow gait. Lymphadenopathy:     She has no cervical adenopathy. Neurological: She is alert and oriented to person, place, and time. Skin: Skin is warm and dry. No rash noted. No erythema. No pallor. Psychiatric: She has a normal mood and affect. Her behavior is normal. Thought content normal.   Nursing note and vitals reviewed. Diagnostic Study Results     Labs -   No results found for this or any previous visit (from the past 12 hour(s)).     Radiologic Studies -   XR SPINE LUMB MIN 4 V    (Results Pending)     CT Results (Last 48 hours)    None        CXR Results  (Last 48 hours)    None            Medical Decision Making   I am the first provider for this patient. I reviewed the vital signs, available nursing notes, past medical history, past surgical history, family history and social history. Vital Signs-Reviewed the patient's vital signs. Pulse Oximetry Analysis - 100% on room air    Cardiac Monitor:  Rate: 64  Rhythm: NSR    Records Reviewed: Nursing Notes    Procedures:  Procedures    Provider Notes (Medical Decision Making): nothing acute on films. Treat pain. MED RECONCILIATION:  No current facility-administered medications for this encounter. Current Outpatient Medications   Medication Sig    lidocaine (LIDODERM) 5 % Apply patch to the affected area for 12 hours a day and remove for 12 hours a day.  HYDROcodone-acetaminophen (NORCO) 5-325 mg per tablet Take 1 tablet PO every 6 hours as needed for pain control. If over the counter ibuprofen or acetaminophen was suggested, then only take the vicodin for pain not well controlled with the over the counter medication.  cyclobenzaprine (FLEXERIL) 10 mg tablet Take 1 Tab by mouth three (3) times daily as needed for Muscle Spasm(s).  acetaminophen-codeine (TYLENOL-CODEINE #3) 300-30 mg per tablet Take 1 Tab by mouth every four (4) hours as needed for Pain for up to 8 doses. Max Daily Amount: 6 Tabs.  acetaminophen (TYLENOL) 500 mg tablet every six (6) hours as needed for Pain. Take 2 caplets every 6 hrs    OXcarbaxepine (TRILEPTAL) 600 mg tablet Take 600 mg by mouth two (2) times a day.  OXcarbaxepine (TRILEPTAL) 600 mg tablet Take 600 mg by mouth two (2) times a day.  phenytoin ER (DILANTIN ER) 100 mg ER capsule Take 100 mg by mouth daily.  divalproex DR (DEPAKOTE) 250 mg tablet Take 250 mg by mouth daily.  hydrOXYzine HCl (ATARAX) 50 mg tablet Take 1 Tab by mouth four (4) times daily as needed for Itching.     hydrALAZINE (APRESOLINE) 50 mg tablet Take 50 mg by mouth two (2) times a day.  ergocalciferol (VITAMIN D2) 50,000 unit capsule Take 50,000 Units by mouth two (2) days a week.  levETIRAcetam (KEPPRA) 1,000 mg tablet Take 1 Tab by mouth two (2) times a day.  triamcinolone acetonide (KENALOG) 0.1 % topical cream Apply  to affected area two (2) times a day. use thin layer    clopidogrel (PLAVIX) 75 mg tablet Take 75 mg by mouth daily.  lisinopril (PRINIVIL, ZESTRIL) 10 mg tablet Take 10 mg by mouth daily.  amLODIPine (NORVASC) 10 mg tablet Take 1 Tab by mouth daily.  atorvastatin (LIPITOR) 40 mg tablet Take 1 Tab by mouth nightly.  hydroxypropyl methylcellulose (GENTEAL) 0.2 % ophthalmic solution Administer 2 Drops to both eyes as needed.  cyanocobalamin (VITAMIN B12) 500 mcg tablet Take 1 Tab by mouth daily.  folic acid (FOLVITE) 1 mg tablet Take 1 Tab by mouth daily. Disposition:  home    DISCHARGE NOTE:   7:06 PM    Pt has been reexamined. Patient has no new complaints, changes, or physical findings. Care plan outlined and precautions discussed. Results of x-rays were reviewed with the patient. All medications were reviewed with the patient; will d/c home with lidoderm, vicodin. All of pt's questions and concerns were addressed. Patient was instructed and agrees to follow up with PCP, as well as to return to the ED upon further deterioration. Patient is ready to go home. Follow-up Information     Follow up With Specialties Details Why [address], NP Nurse Practitioner Schedule an appointment as soon as possible for a visit in 1 day  [address] [phone]            Current Discharge Medication List      START taking these medications    Details   lidocaine (LIDODERM) 5 % Apply patch to the affected area for 12 hours a day and remove for 12 hours a day.   Qty: 30 Each, Refills: 0      HYDROcodone-acetaminophen (NORCO) 5-325 mg per tablet Take 1 tablet PO every 6 hours as needed for pain control. If over the counter ibuprofen or acetaminophen was suggested, then only take the vicodin for pain not well controlled with the over the counter medication. Qty: 8 Tab, Refills: 0    Associated Diagnoses: Contusion of lower back, initial encounter; Fall, initial encounter             Diagnosis     Clinical Impression:   1. Fall, initial encounter    2. Contusion of lower back, initial encounter          Scribe Attestation     Artemio Ramirez acting as a scribe for and in the presence of Ana María Shrestha      February 07, 2019 at 6:52 PM       Provider Attestation:      I personally performed the services described in the documentation, reviewed the documentation, as recorded by the scribe in my presence, and it accurately and completely records my words and actions.  February 07, 2019 at 6:52 PM - Ana María Shrestha

## 2019-02-07 NOTE — DISCHARGE INSTRUCTIONS
Patient Education        Preventing Falls: Care Instructions  Your Care Instructions    Getting around your home safely can be a challenge if you have injuries or health problems that make it easy for you to fall. Loose rugs and furniture in walkways are among the dangers for many older people who have problems walking or who have poor eyesight. People who have conditions such as arthritis, osteoporosis, or dementia also have to be careful not to fall. You can make your home safer with a few simple measures. Follow-up care is a key part of your treatment and safety. Be sure to make and go to all appointments, and call your doctor if you are having problems. It's also a good idea to know your test results and keep a list of the medicines you take. How can you care for yourself at home? Taking care of yourself  · You may get dizzy if you do not drink enough water. To prevent dehydration, drink plenty of fluids, enough so that your urine is light yellow or clear like water. Choose water and other caffeine-free clear liquids. If you have kidney, heart, or liver disease and have to limit fluids, talk with your doctor before you increase the amount of fluids you drink. · Exercise regularly to improve your strength, muscle tone, and balance. Walk if you can. Swimming may be a good choice if you cannot walk easily. · Have your vision and hearing checked each year or any time you notice a change. If you have trouble seeing and hearing, you might not be able to avoid objects and could lose your balance. · Know the side effects of the medicines you take. Ask your doctor or pharmacist whether the medicines you take can affect your balance. Sleeping pills or sedatives can affect your balance. · Limit the amount of alcohol you drink. Alcohol can impair your balance and other senses. · Ask your doctor whether calluses or corns on your feet need to be removed.  If you wear loose-fitting shoes because of calluses or corns, you can lose your balance and fall. · Talk to your doctor if you have numbness in your feet. Preventing falls at home  · Remove raised doorway thresholds, throw rugs, and clutter. Repair loose carpet or raised areas in the floor. · Move furniture and electrical cords to keep them out of walking paths. · Use nonskid floor wax, and wipe up spills right away, especially on ceramic tile floors. · If you use a walker or cane, put rubber tips on it. If you use crutches, clean the bottoms of them regularly with an abrasive pad, such as steel wool. · Keep your house well lit, especially [place], and outside walkways. Use night-lights in areas such as hallways and bathrooms. Add extra light switches or use remote switches (such as switches that go on or off when you clap your hands) to make it easier to turn lights on if you have to get up during the night. · Install sturdy handrails on stairways. · Move items in your cabinets so that the things you use a lot are on the lower shelves (about waist level). · Keep a cordless phone and a flashlight with new batteries by your bed. If possible, put a phone in each of the main rooms of your house, or carry a cell phone in case you fall and cannot reach a phone. Or, you can wear a device around your neck or wrist. You push a button that sends a signal for help. · Wear low-heeled shoes that fit well and give your feet good support. Use footwear with nonskid soles. Check the heels and soles of your shoes for wear. Repair or replace worn heels or soles. · Do not wear socks without shoes on wood floors. · Walk on the grass when the sidewalks are slippery. If you live in an area that gets snow and ice in the winter, sprinkle salt on slippery steps and sidewalks. Preventing falls in the bath  · Install grab bars and nonskid mats inside and outside your shower or tub and near the toilet and sinks. · Use shower chairs and bath benches.   · Use a hand-held shower head that will allow you to sit while showering. · Get into a tub or shower by putting the weaker leg in first. Get out of a tub or shower with your strong side first.  · Repair loose toilet seats and consider installing a raised toilet seat to make getting on and off the toilet easier. · Keep your bathroom door unlocked while you are in the shower. Where can you learn more? Go to http://teresa-mehul.info/. Enter [phone] [id number] [phone] in the search box to learn more about \"Preventing Falls: Care Instructions. \"  Current as of: March 15, 2018  Content Version: 11.9  © 9999-8459 [employer], [employer]. Care instructions adapted under license by [employer] (which disclaims liability or warranty for this information). If you have questions about a medical condition or this instruction, always ask your healthcare professional. [address] any warranty or liability for your use of this information.

## 2019-02-08 NOTE — ED NOTES
Patient was given discharge instruction and went over all paperwork. Patient signed discharge paperwork. Acknowledged understanding of all instructions.

## 2019-02-13 ENCOUNTER — HOSPITAL ENCOUNTER (OUTPATIENT)
Dept: PHYSICAL THERAPY | Age: 64
Discharge: HOME | End: 2019-02-13
Payer: MEDICARE

## 2019-02-13 PROCEDURE — 97112 NEUROMUSCULAR REEDUCATION: CPT

## 2019-02-13 PROCEDURE — 97110 THERAPEUTIC EXERCISES: CPT

## 2019-02-13 NOTE — PROGRESS NOTES
PT DAILY TREATMENT NOTE 10-18    Patient Name: Yun Knutson  Date:2019  : 1955  [x]  Patient  Verified  Payor: Jim Toribio / Plan: Deana Fabian PPO / Product Type: PPO /    In time:1:13  Out time:1:40  Total Treatment Time (min): 27  Visit #: 11 of -    Medicare/BCBS Only   Total Timed Codes (min):  27 1:1 Treatment Time:  27       Treatment Area: Gait instability [R26.81]  Unspecified abnormalities of gait and mobility [R26.9]    SUBJECTIVE  Pain Level (0-10 scale): 5/10  Any medication changes, allergies to medications, adverse drug reactions, diagnosis change, or new procedure performed?: [x] No    [] Yes (see summary sheet for update)  Subjective functional status/changes:   [] No changes reported  \"I fell yesterday and my back feels a little tighter\". OBJECTIVE    17 min Therapeutic Exercise:  [x] See flow sheet :   Rationale: increase ROM and increase strength to improve the patients ability to perform ADL's safely. 10 min Neuromuscular Re-education:  [x]  See flow sheet :   Rationale: improve coordination, improve balance and increase proprioception  to improve the patients ability to ambulate safely. With   [x] TE   [] TA   [] neuro   [] other: Patient Education: [x] Review HEP    [] Progressed/Changed HEP based on:   [] positioning   [] body mechanics   [] transfers   [] heat/ice application    [] other:      Other Objective/Functional Measures: Pt performed TE's and neuro re-ed without increased pain. Pain Level (0-10 scale) post treatment: 4/10    ASSESSMENT/Changes in Function: Pt performed TE's and neuro re-ed following verbal and tactile cues. During dynamic gait pt did well and did not stumble but took very small strides. Pt reports falling yesterday over her coffee table. She states her back has been a little tight today due to the fall.      Patient will continue to benefit from skilled PT services to modify and progress therapeutic interventions, address functional mobility deficits, address ROM deficits, address strength deficits, analyze and address soft tissue restrictions and analyze and cue movement patterns to attain remaining goals. []  See Plan of Care  []  See progress note/recertification  []  See Discharge Summary         Progress towards goals / Updated goals:  Updated Goals to be accomplished in  8-16 treatments:  1. Patient will improve FOTO score by 10 points in order to demonstrate a significant improvement in function. 2. Patient will improve 30 second sit to stand test time to 10x in order to increase ease of transfers at home. Met 10 times (1/31/19)  3.  Patient will improve FGA score by 4 points in order to demonstrate a significant improvement in balance.          PLAN  []  Upgrade activities as tolerated     []  Continue plan of care  []  Update interventions per flow sheet       []  Discharge due to:_  []  Other:_      Bianca Houston PTA 2/13/2019  12:38 PM    Future Appointments   Date Time Provider Nuzhat Astudillo   2/13/2019  1:00 PM Karen Koch [facility]   2/14/2019  3:30 PM Chasity Rodriguez PTA [facility]

## 2019-02-14 ENCOUNTER — HOSPITAL ENCOUNTER (OUTPATIENT)
Dept: PHYSICAL THERAPY | Age: 64
Discharge: HOME | End: 2019-02-14
Payer: MEDICARE

## 2019-02-14 PROCEDURE — 97116 GAIT TRAINING THERAPY: CPT

## 2019-02-14 PROCEDURE — 97112 NEUROMUSCULAR REEDUCATION: CPT

## 2019-02-14 NOTE — PROGRESS NOTES
PT DAILY TREATMENT NOTE 10-18    Patient Name: Marisol Marrufo  Date:2019  : 1955  [x]  Patient  Verified  Payor: Argelia Sierra / Plan: Katia Santacruz PPO / Product Type: PPO /    In time: 03:28  Out time: 04:02  Total Treatment Time (min): 34  Visit #: 12 of -    Medicare/BCBS Only   Total Timed Codes (min):  34 1:1 Treatment Time:  34       Treatment Area: Gait instability [R26.81]  Unspecified abnormalities of gait and mobility [R26.9]    SUBJECTIVE  Pain Level (0-10 scale): 0  Any medication changes, allergies to medications, adverse drug reactions, diagnosis change, or new procedure performed?: [x] No    [] Yes (see summary sheet for update)  Subjective functional status/changes:   [] No changes reported  Doing good; had a fall from reaching too far over the coffee table the other day, but no injuries. OBJECTIVE    15 min Neuromuscular Re-education: narrow HANSA + Romberg on foam EO + EC, reaching outside HANSA + across midline from various heights, ball toss on foam with narrow HANSA + Romberg    Rationale: improve coordination, improve balance and increase proprioception  to improve the patients ability to decrease risk of falls while completing ADLs. 19 min Gait Training:  Step over hurdles & cone pick ups from floor + reaching on non-compliant surface; 30\" x 2. Curb negotiation & walking on grass + concrete + up and down grassy hill; ~300\" no AD, supervision/congact guard assist at hill. Rationale: to increase ind with home & community ambulation with decreased risk for falls.            With   [] TE   [] TA   [x] neuro   [] other: Patient Education: [x] Review HEP    [] Progressed/Changed HEP based on:   [] positioning   [] body mechanics   [] transfers   [] heat/ice application    [x] other: reaching strategy; fall recovery from floor      Other Objective/Functional Measures:   - 0 LOB on foam balance   - LOB x 1 in reaching activity; self recovered with stepping strategy  - 0 LOB in outside walking + curb negotiation      Pain Level (0-10 scale) post treatment: 0/10    ASSESSMENT/Changes in Function:   Pt meets all static & dynamic balance challenges well; one LOB with reaching activity self recovered with appropriate stepping strategy. Outside ambulation with curb negotiations show good/fair balance; minor loss of foot un uneven grass with self recovery. Pt continues to demo decrease step width & length. Pt initially demos narrow HANSA in reaching activities; educated on significance for wider HANSA for improved balance. Discussed with pt working on fall recovery on subsequent visits due to her report of needing to call her son to help her get up from the floor following last fall. Patient will continue to benefit from skilled PT services to modify and progress therapeutic interventions, address functional mobility deficits, address strength deficits, analyze and cue movement patterns, analyze and modify body mechanics/ergonomics and address imbalance/dizziness to attain remaining goals. Progress towards goals / Updated goals:  Updated Goals to be accomplished in  8-16 treatments:  1. Patient will improve FOTO score by 10 points in order to demonstrate a significant improvement in function. 2. Patient will improve 30 second sit to stand test time to 10x in order to increase ease of transfers at home. Met 10 times (19)  3.  Patient will improve FGA score by 4 points in order to demonstrate a significant improvement in balance.     PLAN  [x]  Upgrade activities as tolerated     [x]  Continue plan of care  []  Update interventions per flow sheet       []  Discharge due to:_  []  Other:_      Douglas Bullock, SPT 2019  2:06 PM    Future Appointments   Date Time Provider Nuzhat Astudillo   2019  3:30 PM Selene Truong PT [facility]   2019 12:00 PM Iona Crouch PTA [facility]   2019  3:00 PM Iona Crouch PTA [facility]

## 2019-02-21 ENCOUNTER — HOSPITAL ENCOUNTER (OUTPATIENT)
Dept: PHYSICAL THERAPY | Age: 64
Discharge: HOME | End: 2019-02-21
Payer: MEDICARE

## 2019-02-21 PROCEDURE — 97530 THERAPEUTIC ACTIVITIES: CPT

## 2019-02-21 PROCEDURE — 97110 THERAPEUTIC EXERCISES: CPT

## 2019-02-21 NOTE — PROGRESS NOTES
PT DAILY TREATMENT NOTE 10-18    Patient Name: Doris Quezada  Date:2019  : 1955  [x]  Patient  Verified  Payor: Naheed Ghotra / Plan: Nicki Ou PPO / Product Type: PPO /    In time:2:56 Out time:3:30  Total Treatment Time (min): 34  Visit #: 13 of 16-    Medicare/BCBS Only   Total Timed Codes (min):  34 1:1 Treatment Time:  25       Treatment Area: Gait instability [R26.81]  Unspecified abnormalities of gait and mobility [R26.9]    SUBJECTIVE  Pain Level (0-10 scale): 0/10  Any medication changes, allergies to medications, adverse drug reactions, diagnosis change, or new procedure performed?: [x] No    [] Yes (see summary sheet for update)  Subjective functional status/changes:   [] No changes reported  Pt reports no current pain but did have some in her back making her bed the other day when lifting the mattress. She states she feels like her balance is better but doesn't think she would be able to walk long distances or wear heels. Her son states he will do exercises with her 2-3 times a week to maintain strength gains. OBJECTIVE    24 min Therapeutic Exercise:  [x] See flow sheet :   Rationale: increase ROM, increase strength, improve coordination, improve balance and increase proprioception to improve the patients ability to decrease fall risk with ambulation    10 min Therapeutic Activity:  [x]  See flow sheet : FGA   Rationale: increase ROM, increase strength, improve coordination, improve balance and increase proprioception  to improve the patients ability to decrease fall risk with ambulation           With   [] TE   [] TA   [] neuro   [] other: Patient Education: [x] Review HEP    [] Progressed/Changed HEP based on:   [] positioning   [] body mechanics   [] transfers   [] heat/ice application    [] other:      Other Objective/Functional Measures:    FOTO: 58  FGA:  Reviewed final HEP    Pain Level (0-10 scale) post treatment: 0/10    ASSESSMENT/Changes in Function: Pt progressed in therapy subjectively with improvement in strength and balance. She has improved ease of transfers with decreased fall risk. She did not have a significant change in her FGA or FOTO scores. She is independent with a HEP and has good familial support to continue at home. Will D/C at this time as skilled PT is no longer required. Progress towards goals / Updated goals:  Updated Goals to be accomplished in  8-16 treatments:  1. Patient will improve FOTO score by 10 points in order to demonstrate a significant improvement in function. Not met; no significant change  2. Patient will improve 30 second sit to stand test time to 10x in order to increase ease of transfers at home. Met 10 times (1/31/19)  3. Patient will improve FGA score by 4 points in order to demonstrate a significant improvement in balance.  NOt met 1 point improvement (2/21/19)    PLAN  [x]  Upgrade activities as tolerated     [x]  Continue plan of care  []  Update interventions per flow sheet       []  Discharge due to:_  []  Other:_      Mumtaz Cooper, PTA 2/21/2019  3:01 PM    No future appointments.

## 2019-02-27 NOTE — PROGRESS NOTES
[facility] - Rigoberto Tovar  [address] [facility]  [phone] fax    Physical Therapy Discharge Summary    Patient name: Yun Knutson Start of Care:  18   Referral source: Jaime Garibay MD : 1955   Medical/Treatment Diagnosis: Gait instability [R26.81]  Unspecified abnormalities of gait and mobility [R26.9]  Payor: Jim Toribio / [facility] / Product Type: PPO /  Onset Date: 10/15/18     Prior Hospitalization: see medical history Provider#: [id number]   Medications: Verified on Patient Summary List    Comorbidities:  CVA, HTN  Prior Level of Function: Ind with ambulation, lives with 2 sons    Visits from Start of Care: 14    Missed Visits: 3    Reporting Period : 19 to 19    Summary of Care:  Goal: Patient will improve FOTO score by 10 points in order to demonstrate a significant improvement in function. Status at last note/certification: 62  Status at discharge: not met    Goal: Patient will improve 30 second sit to stand test time to 10x in order to increase ease of transfers at home. Status at last note/certification: 9x  Status at discharge: not met    Goal: Patient will improve FGA score by 4 points in order to demonstrate a significant improvement in balance. Status at last note/certification: 8818  Status at discharge: met    Patient has progressed well with physical therapy. She demonstrates improving balance and increased ease of transfers despite no significant change in FOTO score at 62 points. 30 second sit to stand test improved to 10x. FGA score also improved slightly to 21/30 points. She was educated on continuing with her HEP following D/C.      ASSESSMENT/RECOMMENDATIONS:  [x]Discontinue therapy: [x]Patient has reached or is progressing toward set goals      []Patient is non-compliant or has abdicated      []Due to lack of appreciable progress towards set goals    Umu Lebron, PT 2019 5:17 PM

## 2019-08-02 ENCOUNTER — APPOINTMENT (OUTPATIENT)
Dept: GENERAL RADIOLOGY | Age: 64
End: 2019-08-02
Attending: EMERGENCY MEDICINE
Payer: MEDICARE

## 2019-08-02 ENCOUNTER — HOSPITAL ENCOUNTER (OUTPATIENT)
Age: 64
Setting detail: OBSERVATION
Discharge: HOME | End: 2019-08-04
Attending: EMERGENCY MEDICINE | Admitting: INTERNAL MEDICINE
Payer: MEDICARE

## 2019-08-02 DIAGNOSIS — G40.901 STATUS EPILEPTICUS (HCC): Primary | ICD-10-CM

## 2019-08-02 LAB
ALBUMIN SERPL-MCNC: 4.1 G/DL (ref 3.4–5)
ALBUMIN/GLOB SERPL: 1.2 {RATIO} (ref 0.8–1.7)
ALP SERPL-CCNC: 65 U/L (ref 45–117)
ALT SERPL-CCNC: 11 U/L (ref 13–56)
AMORPH CRY URNS QL MICRO: ABNORMAL
ANION GAP SERPL CALC-SCNC: 22 MMOL/L (ref 3–18)
APPEARANCE UR: ABNORMAL
AST SERPL-CCNC: 11 U/L (ref 10–38)
BACTERIA URNS QL MICRO: ABNORMAL /HPF
BASOPHILS # BLD: 0 K/UL (ref 0–0.06)
BASOPHILS NFR BLD: 0 % (ref 0–3)
BILIRUB SERPL-MCNC: 0.4 MG/DL (ref 0.2–1)
BILIRUB UR QL: NEGATIVE
BUN SERPL-MCNC: 13 MG/DL (ref 7–18)
BUN/CREAT SERPL: 9 (ref 12–20)
CALCIUM SERPL-MCNC: 9.4 MG/DL (ref 8.5–10.1)
CHLORIDE SERPL-SCNC: 110 MMOL/L (ref 100–111)
CO2 SERPL-SCNC: 11 MMOL/L (ref 21–32)
COLOR UR: YELLOW
CREAT SERPL-MCNC: 1.39 MG/DL (ref 0.6–1.3)
DIFFERENTIAL METHOD BLD: ABNORMAL
EOSINOPHIL # BLD: 0.2 K/UL (ref 0–0.4)
EOSINOPHIL NFR BLD: 1 % (ref 0–5)
EPITH CASTS URNS QL MICRO: ABNORMAL /LPF (ref 0–5)
ERYTHROCYTE [DISTWIDTH] IN BLOOD BY AUTOMATED COUNT: 14.7 % (ref 11.6–14.5)
GLOBULIN SER CALC-MCNC: 3.5 G/DL (ref 2–4)
GLUCOSE SERPL-MCNC: 119 MG/DL (ref 74–99)
GLUCOSE UR STRIP.AUTO-MCNC: NEGATIVE MG/DL
HCT VFR BLD AUTO: 37 % (ref 35–45)
HGB BLD-MCNC: 12.1 G/DL (ref 12–16)
HGB UR QL STRIP: NEGATIVE
KETONES UR QL STRIP.AUTO: ABNORMAL MG/DL
LEUKOCYTE ESTERASE UR QL STRIP.AUTO: NEGATIVE
LYMPHOCYTES # BLD: 3.6 K/UL (ref 0.8–3.5)
LYMPHOCYTES NFR BLD: 20 % (ref 20–51)
MCH RBC QN AUTO: 30.3 PG (ref 24–34)
MCHC RBC AUTO-ENTMCNC: 32.7 G/DL (ref 31–37)
MCV RBC AUTO: 92.7 FL (ref 74–97)
MONOCYTES # BLD: 2 K/UL (ref 0–1)
MONOCYTES NFR BLD: 11 % (ref 2–9)
NEUTS SEG # BLD: 12.3 K/UL (ref 1.8–8)
NEUTS SEG NFR BLD: 68 % (ref 42–75)
NITRITE UR QL STRIP.AUTO: NEGATIVE
PH UR STRIP: 5 [PH] (ref 5–8)
PLATELET # BLD AUTO: 254 K/UL (ref 135–420)
PLATELET COMMENTS,PCOM: ABNORMAL
PMV BLD AUTO: 11.7 FL (ref 9.2–11.8)
POTASSIUM SERPL-SCNC: 3.7 MMOL/L (ref 3.5–5.5)
PROT SERPL-MCNC: 7.6 G/DL (ref 6.4–8.2)
PROT UR STRIP-MCNC: 300 MG/DL
RBC # BLD AUTO: 3.99 M/UL (ref 4.2–5.3)
RBC #/AREA URNS HPF: ABNORMAL /HPF (ref 0–5)
RBC MORPH BLD: ABNORMAL
SODIUM SERPL-SCNC: 143 MMOL/L (ref 136–145)
SP GR UR REFRACTOMETRY: 1.02 (ref 1–1.03)
UROBILINOGEN UR QL STRIP.AUTO: 1 EU/DL (ref 0.2–1)
WBC # BLD AUTO: 18.1 K/UL (ref 4.6–13.2)
WBC URNS QL MICRO: ABNORMAL /HPF (ref 0–4)

## 2019-08-02 PROCEDURE — 74011250636 HC RX REV CODE- 250/636: Performed by: INTERNAL MEDICINE

## 2019-08-02 PROCEDURE — 96376 TX/PRO/DX INJ SAME DRUG ADON: CPT

## 2019-08-02 PROCEDURE — 74011000250 HC RX REV CODE- 250: Performed by: EMERGENCY MEDICINE

## 2019-08-02 PROCEDURE — 74011250636 HC RX REV CODE- 250/636

## 2019-08-02 PROCEDURE — 96375 TX/PRO/DX INJ NEW DRUG ADDON: CPT

## 2019-08-02 PROCEDURE — 74011250636 HC RX REV CODE- 250/636: Performed by: EMERGENCY MEDICINE

## 2019-08-02 PROCEDURE — 96361 HYDRATE IV INFUSION ADD-ON: CPT

## 2019-08-02 PROCEDURE — 81001 URINALYSIS AUTO W/SCOPE: CPT

## 2019-08-02 PROCEDURE — 71045 X-RAY EXAM CHEST 1 VIEW: CPT

## 2019-08-02 PROCEDURE — 85025 COMPLETE CBC W/AUTO DIFF WBC: CPT

## 2019-08-02 PROCEDURE — 65390000012 HC CONDITION CODE 44 OBSERVATION

## 2019-08-02 PROCEDURE — 65660000004 HC RM CVT STEPDOWN

## 2019-08-02 PROCEDURE — 96365 THER/PROPH/DIAG IV INF INIT: CPT

## 2019-08-02 PROCEDURE — 96368 THER/DIAG CONCURRENT INF: CPT

## 2019-08-02 PROCEDURE — 80053 COMPREHEN METABOLIC PANEL: CPT

## 2019-08-02 PROCEDURE — 74011000258 HC RX REV CODE- 258: Performed by: EMERGENCY MEDICINE

## 2019-08-02 PROCEDURE — 93005 ELECTROCARDIOGRAM TRACING: CPT

## 2019-08-02 PROCEDURE — 99285 EMERGENCY DEPT VISIT HI MDM: CPT

## 2019-08-02 RX ORDER — LORAZEPAM 2 MG/ML
INJECTION INTRAMUSCULAR
Status: COMPLETED
Start: 2019-08-02 | End: 2019-08-02

## 2019-08-02 RX ORDER — SODIUM CHLORIDE 9 MG/ML
75 INJECTION, SOLUTION INTRAVENOUS CONTINUOUS
Status: DISCONTINUED | OUTPATIENT
Start: 2019-08-02 | End: 2019-08-04 | Stop reason: HOSPADM

## 2019-08-02 RX ORDER — BISACODYL 5 MG
10 TABLET, DELAYED RELEASE (ENTERIC COATED) ORAL DAILY PRN
Status: DISCONTINUED | OUTPATIENT
Start: 2019-08-02 | End: 2019-08-04 | Stop reason: HOSPADM

## 2019-08-02 RX ORDER — ONDANSETRON 2 MG/ML
4 INJECTION INTRAMUSCULAR; INTRAVENOUS
Status: DISCONTINUED | OUTPATIENT
Start: 2019-08-02 | End: 2019-08-04 | Stop reason: HOSPADM

## 2019-08-02 RX ORDER — DIVALPROEX SODIUM 250 MG/1
500 TABLET, EXTENDED RELEASE ORAL DAILY
Status: DISCONTINUED | OUTPATIENT
Start: 2019-08-03 | End: 2019-08-03

## 2019-08-02 RX ORDER — MIDAZOLAM HYDROCHLORIDE 1 MG/ML
2 INJECTION, SOLUTION INTRAMUSCULAR; INTRAVENOUS ONCE
Status: DISCONTINUED | OUTPATIENT
Start: 2019-08-02 | End: 2019-08-02

## 2019-08-02 RX ORDER — LORAZEPAM 2 MG/ML
2 INJECTION INTRAMUSCULAR
Status: COMPLETED | OUTPATIENT
Start: 2019-08-02 | End: 2019-08-02

## 2019-08-02 RX ADMIN — SODIUM CHLORIDE 2000 MG: 900 INJECTION, SOLUTION INTRAVENOUS at 15:41

## 2019-08-02 RX ADMIN — SODIUM CHLORIDE 1000 MG PE: 900 INJECTION, SOLUTION INTRAVENOUS at 16:12

## 2019-08-02 RX ADMIN — VALPROATE SODIUM 250 MG: 100 INJECTION, SOLUTION INTRAVENOUS at 16:33

## 2019-08-02 RX ADMIN — LORAZEPAM 2 MG: 2 INJECTION INTRAMUSCULAR at 14:16

## 2019-08-02 RX ADMIN — LORAZEPAM 2 MG: 2 INJECTION INTRAMUSCULAR; INTRAVENOUS at 14:08

## 2019-08-02 RX ADMIN — LORAZEPAM 2 MG: 2 INJECTION INTRAMUSCULAR at 14:08

## 2019-08-02 RX ADMIN — LORAZEPAM 2 MG: 2 INJECTION INTRAMUSCULAR; INTRAVENOUS at 14:16

## 2019-08-02 RX ADMIN — SODIUM CHLORIDE 75 ML/HR: 900 INJECTION, SOLUTION INTRAVENOUS at 20:32

## 2019-08-02 NOTE — LETTER
NOTIFICATION RETURN TO WORK / SCHOOL 
 
8/2/2019 8:42 PM 
 
Ms. Daniela Herbert [address] [place] [id number] To Whom It May Concern: 
 
Daniela Herbert is currently under the care of [facility] EMERGENCY DEPT. Ezequiel Abad, family member, will return to work/school on: 8/4/19 If there are questions or concerns please have the patient contact our office. Sincerely, Long Turner PA-C

## 2019-08-02 NOTE — H&P
Date of Admission: 8/2/2019      Assessment:   Seizure disorder: at baseline has 2-3 seizures per month; no further seizures since initial presentation in the ED. Given IV keppra and Depakote as well as intra-nasal Ativan  HTN  Prior CVA: on ASA, lipitor, Plavix    Plan:   Discussed with Neurology- Dr. Herrera Fail  Will give Depakote 250 today, then 500mg starting in the am  Start Keppra 1000mg po bid as of tomorrow am  Aspiration precautions  Continue home medications  CBC, BMP in am      Braeden Reed D.O. Internal Medicine and Infectious Diseases      Subjective:    Patient is a 71 y. o.female who is being evaluated for seizures. Ms. Penelope Sexton is a 72 yo female with hx HTN, CVA and seizures who is presenting to day with seizures. Her history is provided by her son at bedside as the patient is confused and in a post-ictal state. Son states that the patient had run out of her seizure medications on Monday. He notes that this morning she had woken up with a headache and then had a seizure a few hours later. He notes that she had fallen but did not complain of any particular pain. She had at least 3 seizures today, two of which were generalized tonic-clonic seizures. Son notes that she has had 2-3 seizures over the past 2 months. Past Medical History:   Diagnosis Date    Hypertension     Neurological disorder     [facility] [address]     Stroke ([facility] [address] 2009     No past surgical history on file. Family History   Problem Relation Age of Onset    Diabetes Other     Stroke Other      Medications reviewed as below:   Current Facility-Administered Medications   Medication Dose Route Frequency Provider Last Rate Last Dose    [START ON 8/3/2019] divalproex ER (DEPAKOTE ER) 24 hour tablet 500 mg  500 mg Oral DAILY Caryle Hill, MD         Current Outpatient Medications   Medication Sig Dispense Refill    lidocaine (LIDODERM) 5 % Apply patch to the affected area for 12 hours a day and remove for 12 hours a day.  30 Each 0    HYDROcodone-acetaminophen (NORCO) 5-325 mg per tablet Take 1 tablet PO every 6 hours as needed for pain control. If over the counter ibuprofen or acetaminophen was suggested, then only take the vicodin for pain not well controlled with the over the counter medication. 8 Tab 0    cyclobenzaprine (FLEXERIL) 10 mg tablet Take 1 Tab by mouth three (3) times daily as needed for Muscle Spasm(s). 15 Tab 0    acetaminophen-codeine (TYLENOL-CODEINE #3) 300-30 mg per tablet Take 1 Tab by mouth every four (4) hours as needed for Pain for up to 8 doses. Max Daily Amount: 6 Tabs. 8 Tab 0    acetaminophen (TYLENOL) 500 mg tablet every six (6) hours as needed for Pain. Take 2 caplets every 6 hrs      OXcarbaxepine (TRILEPTAL) 600 mg tablet Take 600 mg by mouth two (2) times a day.  OXcarbaxepine (TRILEPTAL) 600 mg tablet Take 600 mg by mouth two (2) times a day.  phenytoin ER (DILANTIN ER) 100 mg ER capsule Take 100 mg by mouth daily.  divalproex DR (DEPAKOTE) 250 mg tablet Take 250 mg by mouth daily.  hydrOXYzine HCl (ATARAX) 50 mg tablet Take 1 Tab by mouth four (4) times daily as needed for Itching. 20 Tab 0    hydrALAZINE (APRESOLINE) 50 mg tablet Take 50 mg by mouth two (2) times a day.  ergocalciferol (VITAMIN D2) 50,000 unit capsule Take 50,000 Units by mouth two (2) days a week.  levETIRAcetam (KEPPRA) 1,000 mg tablet Take 1 Tab by mouth two (2) times a day. 60 Tab 0    triamcinolone acetonide (KENALOG) 0.1 % topical cream Apply  to affected area two (2) times a day. use thin layer 15 g 0    clopidogrel (PLAVIX) 75 mg tablet Take 75 mg by mouth daily.  lisinopril (PRINIVIL, ZESTRIL) 10 mg tablet Take 10 mg by mouth daily.  amLODIPine (NORVASC) 10 mg tablet Take 1 Tab by mouth daily. 30 Tab 6    atorvastatin (LIPITOR) 40 mg tablet Take 1 Tab by mouth nightly.  30 Tab 0    hydroxypropyl methylcellulose (GENTEAL) 0.2 % ophthalmic solution Administer 2 Drops to both eyes as needed. 15 mL 0    cyanocobalamin (VITAMIN B12) 500 mcg tablet Take 1 Tab by mouth daily. 30 Tab 0    folic acid (FOLVITE) 1 mg tablet Take 1 Tab by mouth daily. 30 Tab 0     Allergies   Allergen Reactions    Tomato Hives     Allergic to eating tomato.     Aspirin Nausea and Vomiting    Ciprofloxacin Rash    Pcn [Penicillins] Rash and Hives    Sulfa (Sulfonamide Antibiotics) Hives and Rash     Social History     Socioeconomic History    Marital status:      Spouse name: Not on file    Number of children: Not on file    Years of education: Not on file    Highest education level: Not on file   Occupational History    Not on file   Social Needs    Financial resource strain: Not on file    Food insecurity:     Worry: Not on file     Inability: Not on file    Transportation needs:     Medical: Not on file     Non-medical: Not on file   Tobacco Use    Smoking status: Never Smoker    Smokeless tobacco: Never Used   Substance and Sexual Activity    Alcohol use: Yes     Comment: Socially     Drug use: No    Sexual activity: Not on file   Lifestyle    Physical activity:     Days per week: Not on file     Minutes per session: Not on file    Stress: Not on file   Relationships    Social connections:     Talks on phone: Not on file     Gets together: Not on file     Attends Congregational service: Not on file     Active member of club or organization: Not on file     Attends meetings of clubs or organizations: Not on file     Relationship status: Not on file    Intimate partner violence:     Fear of current or ex partner: Not on file     Emotionally abused: Not on file     Physically abused: Not on file     Forced sexual activity: Not on file   Other Topics Concern    Not on file   Social History Narrative    Not on file        Review of Systems    unable to assess due to clinical condition        Objective:        Visit Vitals  /66   Pulse 73   Temp 99.6 °F (37.6 °C)   Resp 16   Wt 49 kg (108 lb)   SpO2 100%   BMI 17.43 kg/m²     Temp (24hrs), Av.6 °F (37.6 °C), Min:99.6 °F (37.6 °C), Max:99.6 °F (37.6 °C)        General:   lethargic, difficult to wake up   Skin:   no rashes or skin lesions noted on limited exam   HEENT:  Normocephalic, atraumatic, PERRL, EOMI, no scleral icterus or pallor; no conjunctival hemmohage;  nasal and oral mucous are moist and without evidence of lesions. No thrush. Dentitionfair. Neck supple, no bruits. Lymph Nodes:   no cervical, axillary or inguinal adenopathy   Lungs:   non-labored, bilaterally clear to aspiration- no crackles wheezes rales or rhonchi   Heart:  RRR, s1 and s2; no murmurs rubs or gallops, no edema, + pedal pulses   Abdomen:  soft, non-distended, active bowel sounds, no hepatomegaly, no splenomegaly. Non-tender   Genitourinary:  deferred   Extremities:   no clubbing, cyanosis; no joint effusions or swelling; Full ROM of all large joints to the upper and lower extremities; muscle mass appropriate for age   Neurologic:  Post-ictal; moves extremities independently, withdraws to pain. Psychiatric:   calm, arousalbe       Labs: Results:   Chemistry Recent Labs     19  1425   *      K 3.7      CO2 11*   BUN 13   CREA 1.39*   CA 9.4   AGAP 22*   BUCR 9*   AP 65   TP 7.6   ALB 4.1   GLOB 3.5   AGRAT 1.2      CBC w/Diff Recent Labs     19  1425   WBC 18.1*   RBC 3.99*   HGB 12.1   HCT 37.0      GRANS 68   LYMPH 20   EOS 1            Lab Results   Component Value Date/Time    Specimen Description: STOOL 2014 06:45 AM    Specimen Description: CLEAN CATCH 2014 03:00 PM    Specimen Description: BLOOD 2014 12:10 PM    Specimen Description: BLOOD 2014 11:48 AM    Lab Results   Component Value Date/Time    Culture result:  2014 06:45 AM     Toxigenic C. difficile NEGATIVE                         C. difficile target DNA sequences are not detected.     Culture result:  2014 03:00 PM     81010 COLONIES/mL STAPHYLOCOCCUS SPECIES, COAGULASE NEGATIVE    Culture result: NO GROWTH 6 DAYS 02/02/2014 12:10 PM    Culture result: NO GROWTH 6 DAYS 02/02/2014 11:48 AM          Imaging:      All imaging reviewed from Admission to present as per radiology interpretation in [facility]

## 2019-08-02 NOTE — ED PROVIDER NOTES
EMERGENCY DEPARTMENT HISTORY AND PHYSICAL EXAM    1:49 PM      Date: 8/2/2019  Patient Name: Meenakshi Santo    History of Presenting Illness     Chief Complaint   Patient presents with    Seizure         History Provided By: Patient's Son and EMS    Chief Complaint: seizure   Duration:  Minutes  Timing:  Acute  Location: NA  Quality: NA  Severity: Mild  Modifying Factors: none  Associated Symptoms: denies any other associated signs or symptoms      Additional History (Context): Meenakshi Santo is a 71 y.o. female with seizure and CVA who presents with seizure. Patient has now had 3 seizures today. First 1 was a focal seizure second 2 were generalized tonic-clonic. After the second seizure EMS was called, they report patient was postictal was starting to arouse, however when brought into the ED she then began to have a second generalized tonic-clonic seizure. Has not had any meds today from EMS. Last seizure was about 2 weeks ago. Chart review shows patient seen by neurology, Annia Knapp, per the office note is on Trileptal and Depakote. PCP: Inez VALDEZ NP    Current Facility-Administered Medications   Medication Dose Route Frequency Provider Last Rate Last Dose    fosphenytoin (CEREBYX) 1,000 mg PE in 0.9% sodium chloride 100 mL IVPB  1,000 mg PE IntraVENous NOW [place] Garrett, DO   Stopped at 08/02/19 1700    valproate (DEPACON) 250 mg in 0.9% sodium chloride 50 mL IVPB  250 mg IntraVENous ONCE Duchaparrita Lois M, DO 50 mL/hr at 08/02/19 1633 250 mg at 08/02/19 1633     Current Outpatient Medications   Medication Sig Dispense Refill    lidocaine (LIDODERM) 5 % Apply patch to the affected area for 12 hours a day and remove for 12 hours a day. 30 Each 0    HYDROcodone-acetaminophen (NORCO) 5-325 mg per tablet Take 1 tablet PO every 6 hours as needed for pain control.   If over the counter ibuprofen or acetaminophen was suggested, then only take the vicodin for pain not well controlled with the over the counter medication. 8 Tab 0    cyclobenzaprine (FLEXERIL) 10 mg tablet Take 1 Tab by mouth three (3) times daily as needed for Muscle Spasm(s). 15 Tab 0    acetaminophen-codeine (TYLENOL-CODEINE #3) 300-30 mg per tablet Take 1 Tab by mouth every four (4) hours as needed for Pain for up to 8 doses. Max Daily Amount: 6 Tabs. 8 Tab 0    acetaminophen (TYLENOL) 500 mg tablet every six (6) hours as needed for Pain. Take 2 caplets every 6 hrs      OXcarbaxepine (TRILEPTAL) 600 mg tablet Take 600 mg by mouth two (2) times a day.  OXcarbaxepine (TRILEPTAL) 600 mg tablet Take 600 mg by mouth two (2) times a day.  phenytoin ER (DILANTIN ER) 100 mg ER capsule Take 100 mg by mouth daily.  divalproex DR (DEPAKOTE) 250 mg tablet Take 250 mg by mouth daily.  hydrOXYzine HCl (ATARAX) 50 mg tablet Take 1 Tab by mouth four (4) times daily as needed for Itching. 20 Tab 0    hydrALAZINE (APRESOLINE) 50 mg tablet Take 50 mg by mouth two (2) times a day.  ergocalciferol (VITAMIN D2) 50,000 unit capsule Take 50,000 Units by mouth two (2) days a week.  levETIRAcetam (KEPPRA) 1,000 mg tablet Take 1 Tab by mouth two (2) times a day. 60 Tab 0    triamcinolone acetonide (KENALOG) 0.1 % topical cream Apply  to affected area two (2) times a day. use thin layer 15 g 0    clopidogrel (PLAVIX) 75 mg tablet Take 75 mg by mouth daily.  lisinopril (PRINIVIL, ZESTRIL) 10 mg tablet Take 10 mg by mouth daily.  amLODIPine (NORVASC) 10 mg tablet Take 1 Tab by mouth daily. 30 Tab 6    atorvastatin (LIPITOR) 40 mg tablet Take 1 Tab by mouth nightly. 30 Tab 0    hydroxypropyl methylcellulose (GENTEAL) 0.2 % ophthalmic solution Administer 2 Drops to both eyes as needed. 15 mL 0    cyanocobalamin (VITAMIN B12) 500 mcg tablet Take 1 Tab by mouth daily. 30 Tab 0    folic acid (FOLVITE) 1 mg tablet Take 1 Tab by mouth daily.  30 Tab 0       Past History     Past Medical History:  Past Medical History: Diagnosis Date    Hypertension     Neurological disorder     Se[facility] ([facility] [address]     Stroke ([facility] 2009       Past Surgical History:  No past surgical history on file. Family History:  Family History   Problem Relation Age of Onset    Diabetes Other     Stroke Other        Social History:  Social History     Tobacco Use    Smoking status: Never Smoker    Smokeless tobacco: Never Used   Substance Use Topics    Alcohol use: Yes     Comment: Socially     Drug use: No       Allergies: Allergies   Allergen Reactions    Tomato Hives     Allergic to eating tomato.  Aspirin Nausea and Vomiting    Ciprofloxacin Rash    Pcn [Penicillins] Rash and Hives    Sulfa (Sulfonamide Antibiotics) Hives and Rash         Review of Systems     Review of Systems   Unable to perform ROS: Acuity of condition         Physical Exam     Visit Vitals  /65   Pulse (!) 108   Temp 99.6 °F (37.6 °C)   Resp 18   Wt 49 kg (108 lb)   SpO2 100%   BMI 17.43 kg/m²       Physical Exam   Constitutional: She appears well-developed and well-nourished. HENT:   Head: Normocephalic and atraumatic. Neck: Neck supple. No JVD present. Cardiovascular: Normal rate and regular rhythm. Pulmonary/Chest: Effort normal and breath sounds normal. No respiratory distress. Abdominal: Soft. She exhibits no distension. There is no tenderness. There is no rebound and no guarding. Musculoskeletal: She exhibits no edema. No joint tenderness   Neurological:   Unresponsive, generalized tonic-clonic activity noted   Skin: Skin is warm and dry. No erythema.          Diagnostic Study Results     Labs -  Recent Results (from the past 12 hour(s))   CBC WITH AUTOMATED DIFF    Collection Time: 08/02/19  2:25 PM   Result Value Ref Range    WBC 18.1 (H) 4.6 - 13.2 K/uL    RBC 3.99 (L) 4.20 - 5.30 M/uL    HGB 12.1 12.0 - 16.0 g/dL    HCT 37.0 35.0 - 45.0 %    MCV 92.7 74.0 - 97.0 FL    MCH 30.3 24.0 - 34.0 PG    MCHC 32.7 31.0 - 37.0 g/dL    RDW 14.7 (H) 11.6 - 14.5 %    PLATELET 627 800 - 545 K/uL    MPV 11.7 9.2 - 11.8 FL    NEUTROPHILS 68 42 - 75 %    LYMPHOCYTES 20 20 - 51 %    MONOCYTES 11 (H) 2 - 9 %    EOSINOPHILS 1 0 - 5 %    BASOPHILS 0 0 - 3 %    ABS. NEUTROPHILS 12.3 (H) 1.8 - 8.0 K/UL    ABS. LYMPHOCYTES 3.6 (H) 0.8 - 3.5 K/UL    ABS. MONOCYTES 2.0 (H) 0 - 1.0 K/UL    ABS. EOSINOPHILS 0.2 0.0 - 0.4 K/UL    ABS. BASOPHILS 0.0 0.0 - 0.06 K/UL    DF MANUAL      PLATELET COMMENTS ADEQUATE PLATELETS      RBC COMMENTS OVALOCYTES  PRESENT        RBC COMMENTS POIKILOCYTOSIS  PRESENT        RBC COMMENTS ANISOCYTOSIS  1+       METABOLIC PANEL, COMPREHENSIVE    Collection Time: 08/02/19  2:25 PM   Result Value Ref Range    Sodium 143 136 - 145 mmol/L    Potassium 3.7 3.5 - 5.5 mmol/L    Chloride 110 100 - 111 mmol/L    CO2 11 (L) 21 - 32 mmol/L    Anion gap 22 (H) 3.0 - 18 mmol/L    Glucose 119 (H) 74 - 99 mg/dL    BUN 13 7.0 - 18 MG/DL    Creatinine 1.39 (H) 0.6 - 1.3 MG/DL    BUN/Creatinine ratio 9 (L) 12 - 20      GFR est AA 46 (L) >60 ml/min/1.73m2    GFR est non-AA 38 (L) >60 ml/min/1.73m2    Calcium 9.4 8.5 - 10.1 MG/DL    Bilirubin, total 0.4 0.2 - 1.0 MG/DL    ALT (SGPT) 11 (L) 13 - 56 U/L    AST (SGOT) 11 10 - 38 U/L    Alk. phosphatase 65 45 - 117 U/L    Protein, total 7.6 6.4 - 8.2 g/dL    Albumin 4.1 3.4 - 5.0 g/dL    Globulin 3.5 2.0 - 4.0 g/dL    A-G Ratio 1.2 0.8 - 1.7     EKG, 12 LEAD, INITIAL    Collection Time: 08/02/19  3:00 PM   Result Value Ref Range    Ventricular Rate 104 BPM    Atrial Rate 104 BPM    P-R Interval 126 ms    QRS Duration 90 ms    Q-T Interval 350 ms    QTC Calculation (Bezet) 460 ms    Calculated P Axis 54 degrees    Calculated R Axis 0 degrees    Calculated T Axis 102 degrees    Diagnosis       Sinus tachycardia  Nonspecific T wave abnormality  Abnormal ECG  When compared with ECG of 06-MAR-2018 10:14,  Vent.  rate has increased BY  36 BPM  Nonspecific T wave abnormality no longer evident in Anterior leads     URINALYSIS W/ RFLX MICROSCOPIC    Collection Time: 08/02/19  3:12 PM   Result Value Ref Range    Color YELLOW      Appearance CLOUDY      Specific gravity 1.021 1.005 - 1.030      pH (UA) 5.0 5.0 - 8.0      Protein 300 (A) NEG mg/dL    Glucose NEGATIVE  NEG mg/dL    Ketone TRACE (A) NEG mg/dL    Bilirubin NEGATIVE  NEG      Blood NEGATIVE  NEG      Urobilinogen 1.0 0.2 - 1.0 EU/dL    Nitrites NEGATIVE  NEG      Leukocyte Esterase NEGATIVE  NEG     URINE MICROSCOPIC ONLY    Collection Time: 08/02/19  3:12 PM   Result Value Ref Range    WBC NONE 0 - 4 /hpf    RBC NONE 0 - 5 /hpf    Epithelial cells 1+ 0 - 5 /lpf    Bacteria FEW (A) NEG /hpf    Amorphous Crystals 4+ (A) NEG       Radiologic Studies -   XR CHEST PORT   Final Result    IMPRESSION:      1. Sequela of minimal congestion. Follow-up with plain imaging. No acute process    Medical Decision Making   I am the first provider for this patient. I reviewed the vital signs, available nursing notes, past medical history, past surgical history, family history and social history. Vital Signs-Reviewed the patient's vital signs. Pulse Oximetry Analysis -  100 on room air (Interpretation)nl     Cardiac Monitor:  Rate: 108  Rhythm:  Sinus Tachycardia     EKG: Interpreted by the EP. Time Interpreted: 1500   Rate: 104   Rhythm: Sinus Tachycardia    Interpretation: Normal axis, normal intervals, no ST changes, T wave inversion in V5 and V6   Comparison: 3/6/18, unchanged    Records Reviewed: Nursing Notes and Old Medical Records (Time of Review: 1:49 PM)    ED Course: Progress Notes, Reevaluation, and Consults:    Provider Notes (Medical Decision Making): 41-year-old female presenting via EMS with seizures. Upon arrival to the room patient was having generalized tonic-clonic seizure, Ativan was ordered, seizure resolved on its own, still attempting IV access when patient had a another generalized tonic-clonic activity so intranasal Ativan was given.   IM Versed also ordered. Suspect this is due to her lack of compliance with medications, but will screen for infection with basic labs UA and chest x-ray. No indication for head CT, 3 of multiple prior chronic seizures. 2:38 PM  Consult with Dr. Cj Ferguson, tele-neurology, he recommends loading with IV fosphenytoin and IV Keppra. He will evaluate the patient. 2:47 PM  Discussed with Dr. Cj Ferguson, tele-neurology, he also recommends restarting the patient's home dose of Depakote IV.    4:31 PM  Discussed results with patient's son who is at the bedside. He does states she has been out of her Depakote since Monday. Has still been taking her Trileptal.  Discussed plan for admission. He is in agreement with this. Patient with no further seizure activity however still remains postictal.    No further seizure activity noted at this time. 4:41 PM Consult with Dr. Gavin Butler, hospitalist, accepts pt for admission. For Hospitalized Patients:    1. Hospitalization Decision Time:  The decision to hospitalize the patient was made by Dr. Gavin Butler at 441pm on 8/2/2019    2. Aspirin: Aspirin was not given because the patient did not present with a stroke at the time of their Emergency Department evaluation    Diagnosis     Clinical Impression:   1.  Status epilepticus ([address]        Disposition: admit

## 2019-08-02 NOTE — ED NOTES
Pt with 2 seizures, Dr. Kezia Cano at bedside and ativan given as ordered and pt postictal. Saliva suctioned from mouth.

## 2019-08-03 ENCOUNTER — APPOINTMENT (OUTPATIENT)
Dept: GENERAL RADIOLOGY | Age: 64
End: 2019-08-03
Attending: INTERNAL MEDICINE
Payer: MEDICARE

## 2019-08-03 PROBLEM — R56.9 SEIZURE (HCC): Status: ACTIVE | Noted: 2019-08-03

## 2019-08-03 LAB
ALBUMIN SERPL-MCNC: 3.8 G/DL (ref 3.4–5)
ALBUMIN/GLOB SERPL: 1.1 {RATIO} (ref 0.8–1.7)
ALP SERPL-CCNC: 64 U/L (ref 45–117)
ALT SERPL-CCNC: 11 U/L (ref 13–56)
ANION GAP SERPL CALC-SCNC: 8 MMOL/L (ref 3–18)
AST SERPL-CCNC: 16 U/L (ref 10–38)
BASOPHILS # BLD: 0 K/UL (ref 0–0.1)
BASOPHILS NFR BLD: 0 % (ref 0–2)
BILIRUB SERPL-MCNC: 0.5 MG/DL (ref 0.2–1)
BUN SERPL-MCNC: 11 MG/DL (ref 7–18)
BUN/CREAT SERPL: 11 (ref 12–20)
CALCIUM SERPL-MCNC: 9.2 MG/DL (ref 8.5–10.1)
CHLORIDE SERPL-SCNC: 112 MMOL/L (ref 100–111)
CK SERPL-CCNC: 261 U/L (ref 26–192)
CO2 SERPL-SCNC: 23 MMOL/L (ref 21–32)
CREAT SERPL-MCNC: 1.03 MG/DL (ref 0.6–1.3)
DIFFERENTIAL METHOD BLD: ABNORMAL
EOSINOPHIL # BLD: 0 K/UL (ref 0–0.4)
EOSINOPHIL NFR BLD: 0 % (ref 0–5)
ERYTHROCYTE [DISTWIDTH] IN BLOOD BY AUTOMATED COUNT: 14.7 % (ref 11.6–14.5)
GLOBULIN SER CALC-MCNC: 3.5 G/DL (ref 2–4)
GLUCOSE SERPL-MCNC: 81 MG/DL (ref 74–99)
HCT VFR BLD AUTO: 34.5 % (ref 35–45)
HGB BLD-MCNC: 11.4 G/DL (ref 12–16)
LACTATE BLD-SCNC: 1.08 MMOL/L (ref 0.4–2)
LYMPHOCYTES # BLD: 2 K/UL (ref 0.9–3.6)
LYMPHOCYTES NFR BLD: 19 % (ref 21–52)
MCH RBC QN AUTO: 29.6 PG (ref 24–34)
MCHC RBC AUTO-ENTMCNC: 33 G/DL (ref 31–37)
MCV RBC AUTO: 89.6 FL (ref 74–97)
MONOCYTES # BLD: 1.1 K/UL (ref 0.05–1.2)
MONOCYTES NFR BLD: 11 % (ref 3–10)
NEUTS SEG # BLD: 7.2 K/UL (ref 1.8–8)
NEUTS SEG NFR BLD: 70 % (ref 40–73)
PLATELET # BLD AUTO: 219 K/UL (ref 135–420)
PMV BLD AUTO: 11 FL (ref 9.2–11.8)
POTASSIUM SERPL-SCNC: 3.7 MMOL/L (ref 3.5–5.5)
PROT SERPL-MCNC: 7.3 G/DL (ref 6.4–8.2)
RBC # BLD AUTO: 3.85 M/UL (ref 4.2–5.3)
SODIUM SERPL-SCNC: 143 MMOL/L (ref 136–145)
WBC # BLD AUTO: 10.4 K/UL (ref 4.6–13.2)

## 2019-08-03 PROCEDURE — 65390000012 HC CONDITION CODE 44 OBSERVATION

## 2019-08-03 PROCEDURE — 96361 HYDRATE IV INFUSION ADD-ON: CPT

## 2019-08-03 PROCEDURE — 99218 HC RM OBSERVATION: CPT

## 2019-08-03 PROCEDURE — 74011250636 HC RX REV CODE- 250/636: Performed by: INTERNAL MEDICINE

## 2019-08-03 PROCEDURE — 82550 ASSAY OF CK (CPK): CPT

## 2019-08-03 PROCEDURE — 85025 COMPLETE CBC W/AUTO DIFF WBC: CPT

## 2019-08-03 PROCEDURE — 80053 COMPREHEN METABOLIC PANEL: CPT

## 2019-08-03 PROCEDURE — 74011250637 HC RX REV CODE- 250/637: Performed by: INTERNAL MEDICINE

## 2019-08-03 PROCEDURE — 73030 X-RAY EXAM OF SHOULDER: CPT

## 2019-08-03 PROCEDURE — 83605 ASSAY OF LACTIC ACID: CPT

## 2019-08-03 PROCEDURE — 74011250637 HC RX REV CODE- 250/637: Performed by: PSYCHIATRY & NEUROLOGY

## 2019-08-03 RX ORDER — LEVETIRACETAM 500 MG/1
1000 TABLET ORAL 2 TIMES DAILY
Status: DISCONTINUED | OUTPATIENT
Start: 2019-08-03 | End: 2019-08-03

## 2019-08-03 RX ORDER — AMLODIPINE BESYLATE 10 MG/1
10 TABLET ORAL DAILY
Status: DISCONTINUED | OUTPATIENT
Start: 2019-08-03 | End: 2019-08-04 | Stop reason: HOSPADM

## 2019-08-03 RX ORDER — CLOPIDOGREL BISULFATE 75 MG/1
75 TABLET ORAL DAILY
Status: DISCONTINUED | OUTPATIENT
Start: 2019-08-03 | End: 2019-08-03

## 2019-08-03 RX ORDER — GUAIFENESIN 100 MG/5ML
81 LIQUID (ML) ORAL DAILY
Status: DISCONTINUED | OUTPATIENT
Start: 2019-08-04 | End: 2019-08-04 | Stop reason: HOSPADM

## 2019-08-03 RX ORDER — HYDRALAZINE HYDROCHLORIDE 50 MG/1
50 TABLET, FILM COATED ORAL 2 TIMES DAILY
Status: DISCONTINUED | OUTPATIENT
Start: 2019-08-03 | End: 2019-08-04 | Stop reason: HOSPADM

## 2019-08-03 RX ORDER — DIVALPROEX SODIUM 500 MG/1
500 TABLET, EXTENDED RELEASE ORAL DAILY
Status: DISCONTINUED | OUTPATIENT
Start: 2019-08-04 | End: 2019-08-04 | Stop reason: HOSPADM

## 2019-08-03 RX ORDER — LISINOPRIL 10 MG/1
10 TABLET ORAL DAILY
Status: DISCONTINUED | OUTPATIENT
Start: 2019-08-03 | End: 2019-08-04 | Stop reason: HOSPADM

## 2019-08-03 RX ORDER — ATORVASTATIN CALCIUM 40 MG/1
40 TABLET, FILM COATED ORAL
Status: DISCONTINUED | OUTPATIENT
Start: 2019-08-03 | End: 2019-08-04 | Stop reason: HOSPADM

## 2019-08-03 RX ORDER — LANOLIN ALCOHOL/MO/W.PET/CERES
500 CREAM (GRAM) TOPICAL DAILY
Status: DISCONTINUED | OUTPATIENT
Start: 2019-08-03 | End: 2019-08-04 | Stop reason: HOSPADM

## 2019-08-03 RX ORDER — FOLIC ACID 1 MG/1
1 TABLET ORAL DAILY
Status: DISCONTINUED | OUTPATIENT
Start: 2019-08-03 | End: 2019-08-04 | Stop reason: HOSPADM

## 2019-08-03 RX ORDER — ACETAMINOPHEN 325 MG/1
650 TABLET ORAL
Status: DISCONTINUED | OUTPATIENT
Start: 2019-08-03 | End: 2019-08-04 | Stop reason: HOSPADM

## 2019-08-03 RX ORDER — ERGOCALCIFEROL 1.25 MG/1
50000 CAPSULE ORAL
Status: DISCONTINUED | OUTPATIENT
Start: 2019-08-03 | End: 2019-08-04 | Stop reason: HOSPADM

## 2019-08-03 RX ORDER — OXCARBAZEPINE 300 MG/1
600 TABLET, FILM COATED ORAL EVERY 12 HOURS
Status: DISCONTINUED | OUTPATIENT
Start: 2019-08-03 | End: 2019-08-04 | Stop reason: HOSPADM

## 2019-08-03 RX ADMIN — ATORVASTATIN CALCIUM 40 MG: 40 TABLET, FILM COATED ORAL at 21:17

## 2019-08-03 RX ADMIN — OXCARBAZEPINE 600 MG: 300 TABLET, FILM COATED ORAL at 14:20

## 2019-08-03 RX ADMIN — LEVETIRACETAM 1000 MG: 500 TABLET, FILM COATED ORAL at 08:21

## 2019-08-03 RX ADMIN — SODIUM CHLORIDE 75 ML/HR: 900 INJECTION, SOLUTION INTRAVENOUS at 11:00

## 2019-08-03 RX ADMIN — ATORVASTATIN CALCIUM 40 MG: 40 TABLET, FILM COATED ORAL at 00:44

## 2019-08-03 RX ADMIN — ACETAMINOPHEN 650 MG: 325 TABLET ORAL at 00:50

## 2019-08-03 RX ADMIN — HYDRALAZINE HYDROCHLORIDE 50 MG: 50 TABLET, FILM COATED ORAL at 20:33

## 2019-08-03 RX ADMIN — DIVALPROEX SODIUM 500 MG: 250 TABLET, FILM COATED, EXTENDED RELEASE ORAL at 08:20

## 2019-08-03 RX ADMIN — AMLODIPINE BESYLATE 10 MG: 10 TABLET ORAL at 08:22

## 2019-08-03 RX ADMIN — CYANOCOBALAMIN TAB 1000 MCG 500 MCG: 1000 TAB at 08:18

## 2019-08-03 RX ADMIN — HYDRALAZINE HYDROCHLORIDE 50 MG: 50 TABLET, FILM COATED ORAL at 00:44

## 2019-08-03 RX ADMIN — OXCARBAZEPINE 600 MG: 300 TABLET, FILM COATED ORAL at 20:34

## 2019-08-03 RX ADMIN — ACETAMINOPHEN 650 MG: 325 TABLET ORAL at 15:58

## 2019-08-03 RX ADMIN — LISINOPRIL 10 MG: 10 TABLET ORAL at 08:21

## 2019-08-03 RX ADMIN — ACETAMINOPHEN 650 MG: 325 TABLET ORAL at 20:34

## 2019-08-03 RX ADMIN — FOLIC ACID 1 MG: 1 TABLET ORAL at 08:22

## 2019-08-03 RX ADMIN — HYDRALAZINE HYDROCHLORIDE 50 MG: 50 TABLET, FILM COATED ORAL at 08:20

## 2019-08-03 NOTE — PROGRESS NOTES
NUTRITION    Nursing Referral: [facility]      RECOMMENDATIONS / PLAN:     - Add supplement: Ensure Enlive BID  - Continue RD inpatient monitoring and evaluation. NUTRITION INTERVENTIONS & DIAGNOSIS:     [x] Meals/snacks: modified composition  [x] Medical food supplement therapy: initiate     Nutrition Diagnosis:  Unintended weight loss related to predicted prolonged inadequate oral intake as evidenced by wt loss of 9 lb, 7.7% x 8.5 month and net wt loss of 42 lb, 28% x several years PTA. ASSESSMENT:     Pt reported good appetite and meal intake PTA. Fair since admission. Tolerating diet. Had unplanned wt loss PTA. Agreeable to nutrition supplement. NFPE conducted, mild fat loss in triceps, moderate muscle loss in calves; incomplete due to patient impaired mobility. Average po intake adequate to meet patients estimated nutritional needs:   [] Yes     [] No   [x] Unable to determine at this time    Diet: DIET CARDIAC Regular      Food Allergies: tomato  Current Appetite:   [] Good     [x] Fair     [] Poor     [] Other:  Appetite/meal intake prior to admission:   [x] Good     [x] Fair     [] Poor     [] Other:  Feeding Limitations:  [] Swallowing difficulty    [] Chewing difficulty    [x] Other: has a tooth ache, but reported tolerating diet consistency  Current Meal Intake: No data found.     BM: 8/1  Skin Integrity:  No pressure injury or wound noted  Edema:   [x] No     [] Yes   Pertinent Medications: Reviewed: NS at 75 mL/hr, bowel regimen, cyanocobalamin, ergocalciferol, folic acid, zofran    Recent Labs     08/03/19  0430 08/02/19  1425    143   K 3.7 3.7   * 110   CO2 23 11*   GLU 81 119*   BUN 11 13   CREA 1.03 1.39*   CA 9.2 9.4   ALB 3.8 4.1   SGOT 16 11   ALT 11* 11*       Intake/Output Summary (Last 24 hours) at 8/3/2019 1632  Last data filed at 8/3/2019 0517  Gross per 24 hour   Intake 656.25 ml   Output 200 ml   Net 456.25 ml       Anthropometrics:  Ht Readings from Last 1 Encounters: 11/20/18 5' 6\" (1.676 m)     Last 3 Recorded Weights in this Encounter    08/02/19 1436   Weight: 49 kg (108 lb)     Body mass index is 17.43 kg/m². Underweight    Weight History:  Pt reported UBW was 150 lb years ago. Unplanned wt loss of 9 lb, 7.7% x 8.5 month PTA per chart hx    Weight Metrics 8/2/2019 11/20/2018 8/24/2018 11/7/2017 10/24/2017 7/2/2017 3/14/2017   Weight 108 lb 117 lb 120 lb 115 lb 114 lb 120 lb -   BMI 17.43 kg/m2 18.88 kg/m2 23.44 kg/m2 22.46 kg/m2 22.26 kg/m2 23.44 kg/m2 24.41 kg/m2        Admitting Diagnosis: Status epilepticus ([facility] [address] [G40.901]  Seizure ([facility] [address] [R56.9]  Pertinent PMHx:  HTN, seizures, stroke, CVA    Education Needs:        [x] None identified  [] Identified - Not appropriate at this time  []  Identified and addressed - refer to education log  Learning Limitations:   [x] None identified  [] Identified    Cultural, Evangelical & ethnic food preferences:  [x] None identified    [] Identified and addressed     ESTIMATED NUTRITION NEEDS:     Calories: 3982-2459 kcal (25-35 kcal/kg) based on  [x] Actual BW: 49 kg      [] IBW   Protein: 39-49 gm (0.8-1 gm/kg) based on  [x] Actual BW      [] IBW   Fluid: 1 mL/kcal     MONITORING & EVALUATION:     Nutrition Goal(s):   1. Po intake of meals will meet >75% of patient estimated nutritional needs within the next 7 days.   Outcome:  [] Met/Ongoing    [] Progressing towards goal    [] Not progressing towards goal    [x] New/Initial goal     Monitoring:   [x] Food and beverage intake   [x] Diet order   [x] Nutrition-focused physical findings   [x] Treatment/therapy   [] Weight   [] Enteral nutrition intake        Previous Recommendations (for follow-up assessments only):     []   Implemented       []   Not Implemented (RD to address)      [] No Longer Appropriate     [] No Recommendation Made     Discharge Planning:  Cardiac diet  [x] Participated in care planning, discharge planning, & interdisciplinary rounds as appropriate      Parker Kyler Noel, [address] [place] [address]   Pager: [phone]

## 2019-08-03 NOTE — PROGRESS NOTES
Report from ED Kandis Pt brought to CVT SDU Pt placed in room 2304 and connected to monitor.  Baseline VS taken

## 2019-08-03 NOTE — PROGRESS NOTES
Problem: Pain  Goal: *Control of Pain  Outcome: Progressing Towards Goal     Problem: Patient Education: Go to Patient Education Activity  Goal: Patient/Family Education  Outcome: Progressing Towards Goal     Problem: Seizure Disorder (Adult)  Goal: *STG: Remains free of seizure activity  Outcome: Progressing Towards Goal  Goal: *STG: Maintains lab values within therapeutic range  Outcome: Progressing Towards Goal  Goal: *STG/LTG: Complies with medication therapy  Outcome: Progressing Towards Goal  Goal: *STG: Remains free of injury during seizure activity  Outcome: Progressing Towards Goal  Goal: *STG: Remains safe in hospital  Outcome: Progressing Towards Goal  Goal: Interventions  Outcome: Progressing Towards Goal     Problem: Patient Education: Go to Patient Education Activity  Goal: Patient/Family Education  Outcome: Progressing Towards Goal     Problem: Patient Education: Go to Patient Education Activity  Goal: Patient/Family Education  Outcome: Progressing Towards Goal

## 2019-08-03 NOTE — PROGRESS NOTES
pts son Jaison Menard given pts necklaces x2, rings x3 and 2 sets of earrings.   Pts son left pts cell phone and  in the room for patient to use

## 2019-08-03 NOTE — PROGRESS NOTES
[facility]  Progress Note    Patient: Tony Roberts Age: 71 y.o. : 1955 MR#: [id number] SSN: [id number]  Date/Time: 8/3/2019    Subjective:     Pt c/o left shoulder pain    Assessment/Plan:    71 y o female w/ h/o of CVA and seizures off seizure meds admitted after presenting w/ multiple episodes of seizures. -Breakthrough seizures in the setting of not taking depakote, per pt advised to do so in preparation for dental surgery.  Home regimen is depakote 500 mg daily and trileptal 600 mg bid.  -Complaints of left shoulder pain post seizure episodes, ?due to trauma    History of:  -CVA  -HTN      Plan:  -Re start home regimen of meds  -observe and d/c tomorrow if continues to be seizure free        Additional Notes:      Case discussed with:  [x]Patient  []Family  []Nursing  []Case Management  DVT Prophylaxis:  []Lovenox  []Hep SQ  []SCDs  []Coumadin   []On Heparin gtt    Objective:   VS:   Visit Vitals  /73   Pulse 99   Temp 99.1 °F (37.3 °C)   Resp 20   Wt 49 kg (108 lb)   SpO2 99%   BMI 17.43 kg/m²      Tmax/24hrs: Temp (24hrs), Av.1 °F (37.3 °C), Min:98.4 °F (36.9 °C), Max:99.5 °F (37.5 °C)    Input/Output:     Intake/Output Summary (Last 24 hours) at 8/3/2019 1524  Last data filed at 8/3/2019 0517  Gross per 24 hour   Intake 656.25 ml   Output 200 ml   Net 456.25 ml       General:  Alert, awake, in nad  Cardiovascular: rrr, no murmurs   Pulmonary:  ctab  GI:  Soft, nt, nd  Extremities:  Left shoulder limited ROM due to pain  Additional:  No focal neuro abnormalities    Labs:    Recent Results (from the past 24 hour(s))   POC LACTIC ACID    Collection Time: 19  4:04 AM   Result Value Ref Range    Lactic Acid (POC) 1.08 0.40 - 2.00 mmol/L   CBC WITH AUTOMATED DIFF    Collection Time: 19  4:30 AM   Result Value Ref Range    WBC 10.4 4.6 - 13.2 K/uL    RBC 3.85 (L) 4.20 - 5.30 M/uL    HGB 11.4 (L) 12.0 - 16.0 g/dL    HCT 34.5 (L) 35.0 - 45.0 %    MCV 89.6 74.0 - 97.0 FL    MCH 29.6 24.0 - 34.0 PG    MCHC 33.0 31.0 - 37.0 g/dL    RDW 14.7 (H) 11.6 - 14.5 %    PLATELET 398 210 - 186 K/uL    MPV 11.0 9.2 - 11.8 FL    NEUTROPHILS 70 40 - 73 %    LYMPHOCYTES 19 (L) 21 - 52 %    MONOCYTES 11 (H) 3 - 10 %    EOSINOPHILS 0 0 - 5 %    BASOPHILS 0 0 - 2 %    ABS. NEUTROPHILS 7.2 1.8 - 8.0 K/UL    ABS. LYMPHOCYTES 2.0 0.9 - 3.6 K/UL    ABS. MONOCYTES 1.1 0.05 - 1.2 K/UL    ABS. EOSINOPHILS 0.0 0.0 - 0.4 K/UL    ABS. BASOPHILS 0.0 0.0 - 0.1 K/UL    DF AUTOMATED     METABOLIC PANEL, COMPREHENSIVE    Collection Time: 08/03/19  4:30 AM   Result Value Ref Range    Sodium 143 136 - 145 mmol/L    Potassium 3.7 3.5 - 5.5 mmol/L    Chloride 112 (H) 100 - 111 mmol/L    CO2 23 21 - 32 mmol/L    Anion gap 8 3.0 - 18 mmol/L    Glucose 81 74 - 99 mg/dL    BUN 11 7.0 - 18 MG/DL    Creatinine 1.03 0.6 - 1.3 MG/DL    BUN/Creatinine ratio 11 (L) 12 - 20      GFR est AA >60 >60 ml/min/1.73m2    GFR est non-AA 54 (L) >60 ml/min/1.73m2    Calcium 9.2 8.5 - 10.1 MG/DL    Bilirubin, total 0.5 0.2 - 1.0 MG/DL    ALT (SGPT) 11 (L) 13 - 56 U/L    AST (SGOT) 16 10 - 38 U/L    Alk.  phosphatase 64 45 - 117 U/L    Protein, total 7.3 6.4 - 8.2 g/dL    Albumin 3.8 3.4 - 5.0 g/dL    Globulin 3.5 2.0 - 4.0 g/dL    A-G Ratio 1.1 0.8 - 1.7     CK    Collection Time: 08/03/19  4:30 AM   Result Value Ref Range     (H) 26 - 192 U/L     Additional Data Reviewed:      Signed By: Marjan Mercado MD     August 3, 2019 3:24 PM

## 2019-08-03 NOTE — PROGRESS NOTES
Bedside shift change report given to [facility], [facility] (oncoming nurse) by Reece Scott RN (offgoing nurse).  Report included the following information SBAR, ED Summary, Intake/Output, MAR, Recent Results and Cardiac Rhythm SR.

## 2019-08-04 VITALS
DIASTOLIC BLOOD PRESSURE: 68 MMHG | HEART RATE: 86 BPM | SYSTOLIC BLOOD PRESSURE: 120 MMHG | BODY MASS INDEX: 18.01 KG/M2 | OXYGEN SATURATION: 99 % | WEIGHT: 111.55 LBS | RESPIRATION RATE: 18 BRPM | TEMPERATURE: 99.5 F

## 2019-08-04 LAB
ATRIAL RATE: 104 BPM
CALCULATED P AXIS, ECG09: 54 DEGREES
CALCULATED R AXIS, ECG10: 0 DEGREES
CALCULATED T AXIS, ECG11: 102 DEGREES
DIAGNOSIS, 93000: NORMAL
P-R INTERVAL, ECG05: 126 MS
Q-T INTERVAL, ECG07: 350 MS
QRS DURATION, ECG06: 90 MS
QTC CALCULATION (BEZET), ECG08: 460 MS
VENTRICULAR RATE, ECG03: 104 BPM

## 2019-08-04 PROCEDURE — 74011250637 HC RX REV CODE- 250/637: Performed by: PSYCHIATRY & NEUROLOGY

## 2019-08-04 PROCEDURE — 74011250637 HC RX REV CODE- 250/637: Performed by: INTERNAL MEDICINE

## 2019-08-04 PROCEDURE — 99218 HC RM OBSERVATION: CPT

## 2019-08-04 RX ORDER — OXCARBAZEPINE 600 MG/1
600 TABLET, FILM COATED ORAL EVERY 12 HOURS
Qty: 60 TAB | Refills: 0 | Status: SHIPPED | OUTPATIENT
Start: 2019-08-04 | End: 2020-09-08

## 2019-08-04 RX ORDER — DIVALPROEX SODIUM 500 MG/1
500 TABLET, EXTENDED RELEASE ORAL DAILY
Qty: 30 TAB | Refills: 0 | Status: SHIPPED | OUTPATIENT
Start: 2019-08-05 | End: 2019-08-04

## 2019-08-04 RX ADMIN — LISINOPRIL 10 MG: 10 TABLET ORAL at 08:44

## 2019-08-04 RX ADMIN — CYANOCOBALAMIN TAB 1000 MCG 500 MCG: 1000 TAB at 08:44

## 2019-08-04 RX ADMIN — AMLODIPINE BESYLATE 10 MG: 10 TABLET ORAL at 08:44

## 2019-08-04 RX ADMIN — HYDRALAZINE HYDROCHLORIDE 50 MG: 50 TABLET, FILM COATED ORAL at 08:44

## 2019-08-04 RX ADMIN — ACETAMINOPHEN 650 MG: 325 TABLET ORAL at 08:47

## 2019-08-04 RX ADMIN — OXCARBAZEPINE 600 MG: 300 TABLET, FILM COATED ORAL at 08:44

## 2019-08-04 RX ADMIN — DIVALPROEX SODIUM 500 MG: 500 TABLET, EXTENDED RELEASE ORAL at 08:44

## 2019-08-04 RX ADMIN — FOLIC ACID 1 MG: 1 TABLET ORAL at 08:44

## 2019-08-04 RX ADMIN — ASPIRIN 81 MG 81 MG: 81 TABLET ORAL at 08:44

## 2019-08-04 NOTE — PROGRESS NOTES
0730- Bedside shift change report given to Meeta Hancock RN (oncoming nurse) by Bethany Bose RN (offgoing nurse). Report included the following information SBAR, Kardex, Procedure Summary, Intake/Output, MAR and Recent Results. 0800- AM assessment performed; meds passed; pt tolerated. Tylenol given for pt mild shoulder pain and headache.      1300- I have reviewed discharge instructions with the patient. The patient verbalized understanding. Discharge medications reviewed with patient and appropriate educational materials and side effects teaching were provided. Patient armband removed and shredded, Ivs removed, pt wheeled down to ride.

## 2019-08-04 NOTE — PROGRESS NOTES
Discharge:    Patient will discharge home today (8/4/2019) with family assistance. Patient has no home health orders or needs at this time. Patient's family will transport home at the time of discharge. There are no other care management concerns regarding her discharge. This writer will continue to closely monitor for discharge planning to ensure a safe discharge home from [facility]. Gallo Weems MSELLIS  Care Manager  Pager#: [phone]

## 2019-08-04 NOTE — ROUTINE PROCESS
Bedside and verbal report given to Nita Blackman RN from Pyeton Boyce RN. Dicussed skin, MAR, reacent results, MEWS score, intake and output.

## 2019-08-04 NOTE — DISCHARGE SUMMARY
[facility]  Discharge Summary       Patient: Sofiya Awad Age: 71 y.o. : 1955 MR#: [id number] [id number]: [id number]  PCP on record: Mason Francis NP  Admit date: 2019  Discharge date: 2019    Consults:  Bryce Wilkerson MD-neurology  -   Procedures: none  -     Significant Diagnostic Studies:   XR Results (most recent):  Results from Hospital Encounter encounter on 19   XR SHOULDER LT AP/LAT MIN 2 V    Narrative SHOULDER COMPLETE LEFT    CPT CODE: 64291    INDICATIONS: Left shoulder pain. Patient presents with left shoulder pain after  multiple seizures    COMPARISON: No prior left shoulder imaging. Reference to portable chest  radiograph 2019, 3/14/2017. FINDINGS:  2 views of the left shoulder including external rotated humerus and  transscapular view. The humeral head appears located. No visible fractures in  the humeral head or scapula. Clavicle is intact. No chest wall fractures seen.       Impression IMPRESSION:    No acute bony or joint abnormality.        -    Discharge Diagnoses:-Breakthrough seizures                                           Patient Active Problem List   Diagnosis Code    Pulmonary embolism (HCC) I26.99    Chest pain R07.9    Other and unspecified noninfectious gastroenteritis and colitis(558.9) K52.9    CVA (cerebral infarction) I63.9    Non compliance w medication regimen Z91.14    Essential hypertension, benign I10    Elevated Dilantin level R78.89    HTN (hypertension) I10    Convulsion ([address] R56.9    Essential hypertension I10    Left-sided weakness R53.1    Stroke ([address] I63.9    Dyslipidemia E78.5    TIA (transient ischemic attack) G45.9    Ataxia R27.0    Cerebrovascular accident (CVA) ([address] I63.9    Headache R51    Status epilepticus ([address] G40.901    Seizure ([address] R56.9       Hospital Course by Problem   71 y o female w/ h/o of CVA and seizures off seizure meds admitted after presenting w/ multiple episodes of seizures. Pt states she was told not to take her seizure meds in preparation for dental procedure, however per son, pt ran out of her medications and he has been able to fill the prescriptions. She remained seizure free during her stay here and on date of d/c is stable and pt in agreement w/ d/c. Per son, home regimen is depakote 250 mg daily and trileptal 600 mg bid.  -Complaints of left shoulder pain post seizure episodes, ?due to trauma? During seizure episodes. X-ray w/o acute abnormalities.      History of:  -CVA  -HTN                    Today's examination of the patient revealed:     Subjective:     Objective:   VS:   Visit Vitals  /68   Pulse 86   Temp 99.5 °F (37.5 °C)   Resp 18   Wt 50.6 kg (111 lb 8.8 oz)   SpO2 99%   BMI 18.01 kg/m²      Tmax/24hrs: Temp (24hrs), Av.2 °F (37.3 °C), Min:99.1 °F (37.3 °C), Max:99.5 °F (37.5 °C)     Input/Output:     Intake/Output Summary (Last 24 hours) at 2019 1109  Last data filed at 2019 0950  Gross per 24 hour   Intake 1655 ml   Output 300 ml   Net 1355 ml       General: alert, awake, in nad   Cardiovascular: rrr, no murmurs  Pulmonary:  ctab  GI: soft, nt, nd   Extremities:  No edema  Additional:      Labs:    No results found for this or any previous visit (from the past 24 hour(s)). Additional Data Reviewed:     Condition: stable  Disposition:    [x]Home   []Home with Home Health   []SNF/NH   []Rehab   []Home with family   []Alternate Facility:____________________      Discharge Medications:     Current Discharge Medication List      START taking these medications    Details   !! OXcarbazepine (TRILEPTAL) 600 mg tablet Take 1 Tab by mouth every twelve (12) hours. Qty: 60 Tab, Refills: 0       !! - Potential duplicate medications found. Please discuss with provider. CONTINUE these medications which have NOT CHANGED    Details   acetaminophen (TYLENOL) 500 mg tablet every six (6) hours as needed for Pain.  Take 2 caplets every 6 hrs !! OXcarbaxepine (TRILEPTAL) 600 mg tablet Take 600 mg by mouth two (2) times a day. divalproex DR (DEPAKOTE) 250 mg tablet Take 250 mg by mouth daily. hydrALAZINE (APRESOLINE) 50 mg tablet Take 50 mg by mouth two (2) times a day. clopidogrel (PLAVIX) 75 mg tablet Take 75 mg by mouth daily. lisinopril (PRINIVIL, ZESTRIL) 10 mg tablet Take 10 mg by mouth daily. amLODIPine (NORVASC) 10 mg tablet Take 1 Tab by mouth daily. Qty: 30 Tab, Refills: 6      atorvastatin (LIPITOR) 40 mg tablet Take 1 Tab by mouth nightly. Qty: 30 Tab, Refills: 0      hydroxypropyl methylcellulose (GENTEAL) 0.2 % ophthalmic solution Administer 2 Drops to both eyes as needed. Qty: 15 mL, Refills: 0      cyanocobalamin (VITAMIN B12) 500 mcg tablet Take 1 Tab by mouth daily. Qty: 30 Tab, Refills: 0      folic acid (FOLVITE) 1 mg tablet Take 1 Tab by mouth daily. Qty: 30 Tab, Refills: 0      HYDROcodone-acetaminophen (NORCO) 5-325 mg per tablet Take 1 tablet PO every 6 hours as needed for pain control. If over the counter ibuprofen or acetaminophen was suggested, then only take the vicodin for pain not well controlled with the over the counter medication. Qty: 8 Tab, Refills: 0    Associated Diagnoses: Contusion of lower back, initial encounter; Fall, initial encounter      cyclobenzaprine (FLEXERIL) 10 mg tablet Take 1 Tab by mouth three (3) times daily as needed for Muscle Spasm(s). Qty: 15 Tab, Refills: 0      acetaminophen-codeine (TYLENOL-CODEINE #3) 300-30 mg per tablet Take 1 Tab by mouth every four (4) hours as needed for Pain for up to 8 doses. Max Daily Amount: 6 Tabs. Qty: 8 Tab, Refills: 0    Associated Diagnoses: Strain of neck muscle, initial encounter; Fall, initial encounter      hydrOXYzine HCl (ATARAX) 50 mg tablet Take 1 Tab by mouth four (4) times daily as needed for Itching.   Qty: 20 Tab, Refills: 0      ergocalciferol (VITAMIN D2) 50,000 unit capsule Take 50,000 Units by mouth two (2) days a week. levETIRAcetam (KEPPRA) 1,000 mg tablet Take 1 Tab by mouth two (2) times a day. Qty: 60 Tab, Refills: 0      triamcinolone acetonide (KENALOG) 0.1 % topical cream Apply  to affected area two (2) times a day. use thin layer  Qty: 15 g, Refills: 0       !! - Potential duplicate medications found. Please discuss with provider. STOP taking these medications       lidocaine (LIDODERM) 5 % Comments:   Reason for Stopping:         phenytoin ER (DILANTIN ER) 100 mg ER capsule Comments:   Reason for Stopping: Follow-up Appointments:   1.  Your PCP: Courtney Esquivel NP, within 7-10days  2.       >30 minutes spent coordinating this discharge (review instructions/follow-up, prescriptions, preparing report for sign off)    Signed:  Michael Butler MD  8/4/2019  11:09 AM

## 2019-08-04 NOTE — DISCHARGE INSTRUCTIONS
Patient Education        Seizure: Care Instructions  Your Care Instructions    Seizures are caused by abnormal patterns of electrical signals in the brain. They are different for each person. Seizures can affect movement, speech, vision, or awareness. Some people have only slight shaking of a hand and do not pass out. Other people may pass out and have violent shaking of the whole body. Some people appear to stare into space. They are awake, but they can't respond normally. Later, they may not remember what happened. You may need tests to identify the type and cause of the seizures. A seizure may occur only once, or you may have them more than one time. Taking medicines as directed and following up with your doctor may help keep you from having more seizures. The doctor has checked you carefully, but problems can develop later. If you notice any problems or new symptoms, get medical treatment right away. Follow-up care is a key part of your treatment and safety. Be sure to make and go to all appointments, and call your doctor if you are having problems. It's also a good idea to know your test results and keep a list of the medicines you take. How can you care for yourself at home? · Be safe with medicines. Take your medicines exactly as prescribed. Call your doctor if you think you are having a problem with your medicine. · Do not do any activity that could be dangerous to you or others until your doctor says it is safe to do so. For example, do not drive a car, operate machinery, swim, or climb ladders. · Be sure that anyone treating you for any health problem knows that you have had a seizure and what medicines you are taking for it. · Identify and avoid things that may make you more likely to have a seizure. These may include lack of sleep, alcohol or drug use, stress, or not eating. · Make sure you go to your follow-up appointment. When should you call for help?   Call 911 anytime you think you may need emergency care. For example, call if:    · You have another seizure.     · You have more than one seizure in 24 hours.     · You have new symptoms, such as trouble walking, speaking, or thinking clearly.    Call your doctor now or seek immediate medical care if:    · You are not acting normally.    Watch closely for changes in your health, and be sure to contact your doctor if you have any problems. Where can you learn more? Go to http://teresa-mehul.info/. Enter T717 in the search box to learn more about \"Seizure: Care Instructions. \"  Current as of: March 28, 2019  Content Version: 12.1  © 0250-6223 [employer]. Care instructions adapted under license by [employer] (which disclaims liability or warranty for this information). If you have questions about a medical condition or this instruction, always ask your healthcare professional. [address] any warranty or liability for your use of this information. DISCHARGE SUMMARY from Nurse    PATIENT INSTRUCTIONS:    After general anesthesia or intravenous sedation, for 24 hours or while taking prescription Narcotics:  · Limit your activities  · Do not drive and operate hazardous machinery  · Do not make important personal or business decisions  · Do  not drink alcoholic beverages  · If you have not urinated within 8 hours after discharge, please contact your surgeon on call. Report the following to your surgeon:  · Excessive pain, swelling, redness or odor of or around the surgical area  · Temperature over 100.5  · Nausea and vomiting lasting longer than 4 hours or if unable to take medications  · Any signs of decreased circulation or nerve impairment to extremity: change in color, persistent  numbness, tingling, coldness or increase pain  · Any questions    What to do at Home:  Recommended activity: Activity as tolerated, per MD order.       *  Please give a list of your current medications to your Primary Care Provider. *  Please update this list whenever your medications are discontinued, doses are      changed, or new medications (including over-the-counter products) are added. *  Please carry medication information at all times in case of emergency situations. These are general instructions for a healthy lifestyle:    No smoking/ No tobacco products/ Avoid exposure to second hand smoke  Surgeon General's Warning:  Quitting smoking now greatly reduces serious risk to your health. Obesity, smoking, and sedentary lifestyle greatly increases your risk for illness    A healthy diet, regular physical exercise & weight monitoring are important for maintaining a healthy lifestyle    You may be retaining fluid if you have a history of heart failure or if you experience any of the following symptoms:  Weight gain of 3 pounds or more overnight or 5 pounds in a week, increased swelling in our hands or feet or shortness of breath while lying flat in bed. Please call your doctor as soon as you notice any of these symptoms; do not wait until your next office visit. The discharge information has been reviewed with the patient. The patient verbalized understanding. Discharge medications reviewed with the patient and appropriate educational materials and side effects teaching were provided.   ___________________________________________________________________________________________________________________________________

## 2019-08-04 NOTE — PROGRESS NOTES
Code 44:    Patient was issued the Code 44 letter, detailing how patient was downgraded from inpatient to observation status. Patient was explained how that affects billing, but not the level of care patient receives. Code 40 was signed by patient and [facility]. Gallo Weems [facility]  Care Manager  Pager#: [phone]

## 2019-08-04 NOTE — PHYSICIAN ADVISORY
Letter of Status Determination: Current Status   OBSERVATION is Appropriate         Pt Name:  Rodney Alvarez   MR#  [id number]   [facility]#   [id number]   [address] [place] [address]  2304/01  @ [facility]   Hospitalization date  8/2/2019  1:40 PM   Current Attending Physician  Brynn Ross MD   Principal diagnosis  seizures   Clinicals  71 y.o. y.o  female hospitalized with seizure she run out of meds/ stopped for procedures     [facility] criteria   Does NOT apply    STATUS DETERMINATION  On the basis of clinical data, available documentaion, we believe that the current status of this patient as OBSERVATION is Appropriate     The final decision of the patient's hospitalization status depends on the attending physician's judgment    Additional comments     Insurance  Payor: [facility] / Plan: [address] [place] [address] HMO / Product Type: Managed Care Medicare /    Insurance Information                [address] Phone:     Subscriber: Sania [employer] Subscriber#: [id number]    Group#:  Precert#:                    Yasmin Morales MD  Physician Advisor

## 2019-08-25 NOTE — ED TRIAGE NOTES
Pt. Ila Perera in by medics from [address] [place] [address]. Per medics, pt. Alyssa Nip from a standing position while in the bathroom; no LOC. On arrival, pt. Sitting up in wheelchair with C-collar in place. NAD observed.
No

## 2019-09-18 ENCOUNTER — HOSPITAL ENCOUNTER (OUTPATIENT)
Dept: LAB | Age: 64
Discharge: HOME | End: 2019-09-18
Payer: MEDICARE

## 2019-09-18 DIAGNOSIS — I10 ESSENTIAL HYPERTENSION, BENIGN: ICD-10-CM

## 2019-09-18 DIAGNOSIS — E78.00 HYPERCHOLESTEREMIA: ICD-10-CM

## 2019-09-18 LAB
ALBUMIN SERPL-MCNC: 4 G/DL (ref 3.4–5)
ALBUMIN/GLOB SERPL: 1.3 {RATIO} (ref 0.8–1.7)
ALP SERPL-CCNC: 79 U/L (ref 45–117)
ALT SERPL-CCNC: 10 U/L (ref 13–56)
ANION GAP SERPL CALC-SCNC: 6 MMOL/L (ref 3–18)
AST SERPL-CCNC: 20 U/L (ref 10–38)
BASOPHILS # BLD: 0 K/UL (ref 0–0.1)
BASOPHILS NFR BLD: 0 % (ref 0–2)
BILIRUB SERPL-MCNC: 0.4 MG/DL (ref 0.2–1)
BUN SERPL-MCNC: 18 MG/DL (ref 7–18)
BUN/CREAT SERPL: 18 (ref 12–20)
CALCIUM SERPL-MCNC: 9.4 MG/DL (ref 8.5–10.1)
CHLORIDE SERPL-SCNC: 111 MMOL/L (ref 100–111)
CHOLEST SERPL-MCNC: 212 MG/DL
CO2 SERPL-SCNC: 26 MMOL/L (ref 21–32)
CREAT SERPL-MCNC: 1.02 MG/DL (ref 0.6–1.3)
DIFFERENTIAL METHOD BLD: ABNORMAL
EOSINOPHIL # BLD: 0.1 K/UL (ref 0–0.4)
EOSINOPHIL NFR BLD: 1 % (ref 0–5)
ERYTHROCYTE [DISTWIDTH] IN BLOOD BY AUTOMATED COUNT: 14.4 % (ref 11.6–14.5)
GLOBULIN SER CALC-MCNC: 3.2 G/DL (ref 2–4)
GLUCOSE SERPL-MCNC: 78 MG/DL (ref 74–99)
HCT VFR BLD AUTO: 35.7 % (ref 35–45)
HDLC SERPL-MCNC: 61 MG/DL (ref 40–60)
HDLC SERPL: 3.5 {RATIO} (ref 0–5)
HGB BLD-MCNC: 11.7 G/DL (ref 12–16)
LDLC SERPL CALC-MCNC: 134.2 MG/DL (ref 0–100)
LIPID PROFILE,FLP: ABNORMAL
LYMPHOCYTES # BLD: 1.7 K/UL (ref 0.9–3.6)
LYMPHOCYTES NFR BLD: 23 % (ref 21–52)
MCH RBC QN AUTO: 30.2 PG (ref 24–34)
MCHC RBC AUTO-ENTMCNC: 32.8 G/DL (ref 31–37)
MCV RBC AUTO: 92 FL (ref 74–97)
MONOCYTES # BLD: 0.6 K/UL (ref 0.05–1.2)
MONOCYTES NFR BLD: 8 % (ref 3–10)
NEUTS SEG # BLD: 5.1 K/UL (ref 1.8–8)
NEUTS SEG NFR BLD: 68 % (ref 40–73)
PLATELET # BLD AUTO: 207 K/UL (ref 135–420)
PMV BLD AUTO: 11.8 FL (ref 9.2–11.8)
POTASSIUM SERPL-SCNC: 4.8 MMOL/L (ref 3.5–5.5)
PROT SERPL-MCNC: 7.2 G/DL (ref 6.4–8.2)
RBC # BLD AUTO: 3.88 M/UL (ref 4.2–5.3)
SODIUM SERPL-SCNC: 143 MMOL/L (ref 136–145)
TRIGL SERPL-MCNC: 84 MG/DL (ref ?–150)
TSH SERPL DL<=0.05 MIU/L-ACNC: 2.33 UIU/ML (ref 0.36–3.74)
VLDLC SERPL CALC-MCNC: 16.8 MG/DL
WBC # BLD AUTO: 7.6 K/UL (ref 4.6–13.2)

## 2019-09-18 PROCEDURE — 80053 COMPREHEN METABOLIC PANEL: CPT

## 2019-09-18 PROCEDURE — 84443 ASSAY THYROID STIM HORMONE: CPT

## 2019-09-18 PROCEDURE — 36415 COLL VENOUS BLD VENIPUNCTURE: CPT

## 2019-09-18 PROCEDURE — 80061 LIPID PANEL: CPT

## 2019-09-18 PROCEDURE — 85025 COMPLETE CBC W/AUTO DIFF WBC: CPT

## 2019-09-25 ENCOUNTER — HOSPITAL ENCOUNTER (OUTPATIENT)
Dept: PHYSICAL THERAPY | Age: 64
Discharge: HOME | End: 2019-09-25
Payer: MEDICARE

## 2019-09-25 PROCEDURE — 97530 THERAPEUTIC ACTIVITIES: CPT

## 2019-09-25 PROCEDURE — 97162 PT EVAL MOD COMPLEX 30 MIN: CPT

## 2019-09-25 PROCEDURE — 97112 NEUROMUSCULAR REEDUCATION: CPT

## 2019-09-25 NOTE — PROGRESS NOTES
[facility] [address]  [address] [facility], [address] [phone] fax    Plan of Care/ Statement of Necessity for Physical Therapy Services           Patient name: Maria De Jesus Cortez Start of Care: 2019   Referral source: Rodger Miller* : 1955    Medical Diagnosis: Other abnormalities of gait and mobility [R26.89]  Payor: Franko Peacock / Plan: [address] [place] [address] HMO / Product Type: Managed Care Medicare /  Onset Date:19    Treatment Diagnosis:   Balance and gait                                                                 Prior Hospitalization: see medical history Provider#: [id number]   Medications: Verified on Patient summary List    Comorbidities: CVA, epilepsy   Prior Level of Function: disabled. Lives with son in 1 level home. Independent with ADLs. Does not use AD. The Plan of Care and following information is based on the information from the initial evaluation. Assessment/ key information: Patient is a 71 y. o.female presenting with Other abnormalities of gait and mobility [R26.89]. Ms. Daniel Calderon presents to initial PT evaluation with c/o worsening LE weakness and balance/gait deficits since CVA in . She reports having seizures which limit her balance for a few days afterwards as well. noted LE weakness present right t> left upon time of exam. We will work to address LE weakness and balance limitations to improve safety in her home and community. Patient will benefit from skilled PT services to address deficits and facilitate return to premorbid activity level and promote improved quality of life. Due to high copay, patient has requested 1x/week frequency. Of note, patient c/o right posterior calf pain intermittently. Upon exam there was a firm nodule protruding from the calf muscle without discoloration or warmth to touch. It was tender to palpation.  Advised patient to f/u with MD in this regard for further investigation. Evaluation Complexity History MEDIUM  Complexity : 1-2 comorbidities / personal factors will impact the outcome/ POC ; Examination MEDIUM Complexity : 3 Standardized tests and measures addressing body structure, function, activity limitation and / or participation in recreation  ;Presentation HIGH Complexity : Unstable and unpredictable characteristics  ; Clinical Decision Making Other outcome measures tinetti balance. gait: 9/28 - high fall risk  HIGH   Overall Complexity Rating: MEDIUM  Problem List: pain affecting function, decrease ROM, decrease strength, edema affecting function, impaired gait/ balance, decrease ADL/ functional abilitiies, decrease activity tolerance, decrease flexibility/ joint mobility and decrease transfer abilities   Treatment Plan may include any combination of the following: Therapeutic exercise, Therapeutic activities, Neuromuscular re-education, Physical agent/modality, Gait/balance training, Manual therapy, Aquatic therapy, Patient education, Self Care training, Functional mobility training, Home safety training and Stair training  Patient / Family readiness to learn indicated by: asking questions, trying to perform skills and interest  Persons(s) to be included in education: patient (P)  Barriers to Learning/Limitations: None  Patient Goal (s): strengthen leg.   Patient Self Reported Health Status: fair  Rehabilitation Potential: fair  Short Term Goals: To be accomplished in 1 weeks:  1. Therapist to establish HEP for strength/gait & balance training to decrease fall risk. Long Term Goals: To be accomplished in 4 weeks:  1. Patient will be independent with HEP to improve carryover of functional gains with ADLs between visits. Eval Status:n/a  2. Pt will increase score on Tinetti Balance & Gait Assessment to > 19/28 to demonstrate decreased fall risk. Eval Status:9/28  3. . Pt will increase B hip flexion/knee extension to 5/5 with MMT to improve ease with gait & safety with ambulation. Eval Status:   right hip flexion: 3/5  Right knee extension: 4/5  left hip flexion: 4/5  Left knee extension: 5/5    Frequency / Duration: Patient to be seen1 times per week for 5 treatments. Patient/ Caregiver education and instruction: Diagnosis, prognosis, self care, activity modification and exercises   [x]  Plan of care has been reviewed with DENIS Wilson, PT 9/25/2019 1:40 PM    ________________________________________________________________________    I certify that the above Therapy Services are being furnished while the patient is under my care. I agree with the treatment plan and certify that this therapy is necessary.     Physician's Signature:____________Date:_________TIME:________    ** Signature, Date and Time must be completed for valid certification **    Please sign and return to [facility] [address], [address] [phone] fax

## 2019-09-25 NOTE — PROGRESS NOTES
PT DAILY TREATMENT NOTE - [facility]     Patient Name: Naveed Lacy  Date:2019  : 1955  [x]  Patient  Verified  Payor: Suhas Paige / Plan: [address] [place] [address] HMO / Product Type: Managed Care Medicare /    In time:1205  Out time:110  Total Treatment Time (min): 65  Total Timed Codes (min): 40  1:1 Treatment Time ( [address] only): 65   Visit #: 1 of 5    Treatment Area: Other abnormalities of gait and mobility [R26.89]    SUBJECTIVE  Pain Level (0-10 scale): 0  Any medication changes, allergies to medications, adverse drug reactions, diagnosis change, or new procedure performed?: [x] No    [] Yes (see summary sheet for update)  Subjective functional status:   [x] See Eval form in paper chart      OBJECTIVE    25 min [x]Eval                  []Re-Eval       25 min Therapeutic Activity:  [x]  See flow sheet :HEP, activity modification, sit ot stand transfers, bed mobility review,    Rationale: increase ROM, increase strength, improve coordination, improve balance and increase proprioception  to improve the patients ability to perform functional ADLs at home. 15 min Neuromuscular Re-education:  [x]  See flow sheet :balance training, weight shifting, LE coordination tasks   Rationale: increase ROM, increase strength, improve coordination, improve balance and increase proprioception  to improve the patients ability to normalzie gait and decrease fall risk. With   [] TE   [] TA   [] neuro   [] other: Patient Education: [x] Review HEP    [] Progressed/Changed HEP based on:   [] positioning   [] body mechanics   [] transfers   [] heat/ice application    [] other:                  Pain Level (0-10 scale) post treatment: 0    ASSESSMENT:   [x]  See Evaluation         Goals:  Short Term Goals: To be accomplished in 1 weeks:  1. Therapist to establish HEP for strength/gait & balance training to decrease fall risk. Long Term Goals: To be accomplished in 4 weeks:  1.  Patient will be independent with HEP to improve carryover of functional gains with ADLs between visits. Eval Status:n/a  2. Pt will increase score on Tinetti Balance & Gait Assessment to > 19/28 to demonstrate decreased fall risk. Eval Status:9/28  3. . Pt will increase B hip flexion/knee extension to 5/5 with MMT to improve ease with gait & safety with ambulation.    Eval Status:   right hip flexion: 3/5  Right knee extension: 4/5  left hip flexion: 4/5  Left knee extension: 5/5    PLAN      [x]  Continue plan of care    []  Other:_      Siddhrath Wilson, PT 9/25/2019  2:07 PM

## 2019-12-09 ENCOUNTER — APPOINTMENT (OUTPATIENT)
Dept: GENERAL RADIOLOGY | Age: 64
End: 2019-12-09
Attending: EMERGENCY MEDICINE
Payer: MEDICARE

## 2019-12-09 ENCOUNTER — HOSPITAL ENCOUNTER (EMERGENCY)
Age: 64
Discharge: HOME | End: 2019-12-09
Attending: EMERGENCY MEDICINE
Payer: MEDICARE

## 2019-12-09 VITALS
TEMPERATURE: 98.1 F | DIASTOLIC BLOOD PRESSURE: 83 MMHG | HEIGHT: 60 IN | OXYGEN SATURATION: 100 % | WEIGHT: 114 LBS | RESPIRATION RATE: 18 BRPM | HEART RATE: 76 BPM | BODY MASS INDEX: 22.38 KG/M2 | SYSTOLIC BLOOD PRESSURE: 143 MMHG

## 2019-12-09 DIAGNOSIS — S46.911A STRAIN OF RIGHT SHOULDER, INITIAL ENCOUNTER: ICD-10-CM

## 2019-12-09 DIAGNOSIS — S20.211A CONTUSION OF RIB ON RIGHT SIDE, INITIAL ENCOUNTER: Primary | ICD-10-CM

## 2019-12-09 PROCEDURE — 73030 X-RAY EXAM OF SHOULDER: CPT

## 2019-12-09 PROCEDURE — 71101 X-RAY EXAM UNILAT RIBS/CHEST: CPT

## 2019-12-09 PROCEDURE — 99283 EMERGENCY DEPT VISIT LOW MDM: CPT

## 2019-12-09 PROCEDURE — 74011250637 HC RX REV CODE- 250/637: Performed by: EMERGENCY MEDICINE

## 2019-12-09 RX ORDER — ACETAMINOPHEN AND CODEINE PHOSPHATE 300; 30 MG/1; MG/1
1 TABLET ORAL
Status: COMPLETED | OUTPATIENT
Start: 2019-12-09 | End: 2019-12-09

## 2019-12-09 RX ORDER — ACETAMINOPHEN AND CODEINE PHOSPHATE 300; 30 MG/1; MG/1
1 TABLET ORAL
Qty: 6 TAB | Refills: 0 | Status: SHIPPED | OUTPATIENT
Start: 2019-12-09 | End: 2019-12-12

## 2019-12-09 RX ORDER — CYCLOBENZAPRINE HCL 10 MG
10 TABLET ORAL
Qty: 10 TAB | Refills: 0 | Status: SHIPPED | OUTPATIENT
Start: 2019-12-09 | End: 2020-09-08

## 2019-12-09 RX ADMIN — ACETAMINOPHEN AND CODEINE PHOSPHATE 1 TABLET: 300; 30 TABLET ORAL at 17:29

## 2019-12-09 NOTE — ED TRIAGE NOTES
Patient arrived via triage stating \" yesterday she was in the bathroom and she feel and hit her back against the wall. \" Patient is complaining of right lower back pain and right shoulder pain.

## 2019-12-09 NOTE — DISCHARGE INSTRUCTIONS
Patient Education        Bruised Rib: Care Instructions  Overview    You can get a bruised rib if you fall or get hit, such as in an accident or while playing sports. The medical term for a bruise is \"contusion. \" Small blood vessels get torn and leak blood under the skin. Most people think of a bruise as a black-and-blue area. But bones and muscles can also get bruised. An injury may damage the rib but not cause a bruise that you can see. Sometimes it can be hard to tell if a rib is bruised or broken. The symptoms may be the same. And a broken bone can't always be seen on an X-ray. But the treatment for a bruised rib is often the same as treatment for a broken one. An injury to the ribs can cause pain. The pain may be worse when you breathe deeply, cough, or sneeze. In most cases, a bruised rib will heal on its own. You can take pain medicine while the rib mends. Pain relief allows you to take deep breaths. Follow-up care is a key part of your treatment and safety. Be sure to make and go to all appointments, and call your doctor if you are having problems. It's also a good idea to know your test results and keep a list of the medicines you take. How can you care for yourself at home? · Rest and protect the injured or sore area. Stop, change, or take a break from any activity that causes pain. · Put ice or a cold pack on the area for 10 to 20 minutes at a time. Put a thin cloth between the ice and your skin. · After 2 or 3 days, if your swelling is gone, put a heating pad set on low or a warm cloth on your chest. Some doctors suggest that you go back and forth between hot and cold. Put a thin cloth between the heating pad and your skin. · Ask your doctor if you can take an over-the-counter pain medicine, such as acetaminophen (Tylenol), ibuprofen (Advil, Motrin), or naproxen (Aleve). Be safe with medicines. Read and follow all instructions on the label.   · As your pain gets better, slowly return to your normal activities. Be patient. Rib bruises can take weeks or months to heal. If the pain gets worse, it may be a sign that you need to rest a while longer. When should you call for help? Call 911 anytime you think you may need emergency care. For example, call if:    · You have severe trouble breathing.     Call your doctor now or seek immediate medical care if:    · You have trouble breathing.     · You have a fever.     · You have a new or worse cough.     · You have new or worse pain.    Watch closely for changes in your health, and be sure to contact your doctor if:    · You do not get better as expected. Where can you learn more? Go to http://teresa-mehul.info/. Enter R125 in the search box to learn more about \"Bruised Rib: Care Instructions. \"  Current as of: June 26, 2019  Content Version: 12.2  © 6367-5361 [employer]. Care instructions adapted under license by [employer] (which disclaims liability or warranty for this information). If you have questions about a medical condition or this instruction, always ask your healthcare professional. [address] any warranty or liability for your use of this information.

## 2019-12-09 NOTE — ED NOTES
MD Pike has reviewed discharge instructions with the patient. The patient verbalized understanding. Patient was given the opportunity to ask questions and she stated that she did not have any at this time.

## 2019-12-09 NOTE — ED PROVIDER NOTES
EMERGENCY DEPARTMENT HISTORY AND PHYSICAL EXAM    4:32 PM      Date: 12/9/2019  Patient Name: Teresa Jones    History of Presenting Illness     Chief Complaint   Patient presents with    Back Pain    Shoulder Pain         History Provided By: Patient  Location/Duration/Severity/Modifying factors   Patient is a 43-year-old female with a history of stroke, seizures, and hypertension the presents emergency department with complaint of right-sided rib pain after falling yesterday in the shower. Patient know she is getting out of shower and she slipped hitting her right side on the tub. Patient was able to get up and get into bed however the pain is been persistent. Pain increases when she twists or when she takes a deep breath. Patient denies any shortness of breath or and denies any neck pain or hitting her head. Patient denies any loss of consciousness. Patient denies any medications for symptoms. Patient denies any other aggravating or alleviating factors. Patient denies any hip pain or low back pain. Patient notes she has pain in her right shoulder especially with movement. Patient is not currently a smoker or drinker and comes to the emergency department with her son. PCP: Sandee Patiño NP    Current Facility-Administered Medications   Medication Dose Route Frequency Provider Last Rate Last Dose    acetaminophen-codeine (TYLENOL #3) per tablet 1 Tab  1 Tab Oral NOW Zach Alcantara MD         Current Outpatient Medications   Medication Sig Dispense Refill    OXcarbazepine (TRILEPTAL) 600 mg tablet Take 1 Tab by mouth every twelve (12) hours. 60 Tab 0    HYDROcodone-acetaminophen (NORCO) 5-325 mg per tablet Take 1 tablet PO every 6 hours as needed for pain control. If over the counter ibuprofen or acetaminophen was suggested, then only take the vicodin for pain not well controlled with the over the counter medication.  8 Tab 0    cyclobenzaprine (FLEXERIL) 10 mg tablet Take 1 Tab by mouth three (3) times daily as needed for Muscle Spasm(s). 15 Tab 0    acetaminophen-codeine (TYLENOL-CODEINE #3) 300-30 mg per tablet Take 1 Tab by mouth every four (4) hours as needed for Pain for up to 8 doses. Max Daily Amount: 6 Tabs. 8 Tab 0    acetaminophen (TYLENOL) 500 mg tablet every six (6) hours as needed for Pain. Take 2 caplets every 6 hrs      OXcarbaxepine (TRILEPTAL) 600 mg tablet Take 600 mg by mouth two (2) times a day.  divalproex DR (DEPAKOTE) 250 mg tablet Take 250 mg by mouth daily.  hydrOXYzine HCl (ATARAX) 50 mg tablet Take 1 Tab by mouth four (4) times daily as needed for Itching. 20 Tab 0    hydrALAZINE (APRESOLINE) 50 mg tablet Take 50 mg by mouth two (2) times a day.  ergocalciferol (VITAMIN D2) 50,000 unit capsule Take 50,000 Units by mouth two (2) days a week.  levETIRAcetam (KEPPRA) 1,000 mg tablet Take 1 Tab by mouth two (2) times a day. 60 Tab 0    triamcinolone acetonide (KENALOG) 0.1 % topical cream Apply  to affected area two (2) times a day. use thin layer 15 g 0    clopidogrel (PLAVIX) 75 mg tablet Take 75 mg by mouth daily.  lisinopril (PRINIVIL, ZESTRIL) 10 mg tablet Take 10 mg by mouth daily.  amLODIPine (NORVASC) 10 mg tablet Take 1 Tab by mouth daily. 30 Tab 6    atorvastatin (LIPITOR) 40 mg tablet Take 1 Tab by mouth nightly. 30 Tab 0    hydroxypropyl methylcellulose (GENTEAL) 0.2 % ophthalmic solution Administer 2 Drops to both eyes as needed. 15 mL 0    cyanocobalamin (VITAMIN B12) 500 mcg tablet Take 1 Tab by mouth daily. 30 Tab 0    folic acid (FOLVITE) 1 mg tablet Take 1 Tab by mouth daily. 30 Tab 0       Past History     Past Medical History:  Past Medical History:   Diagnosis Date    Hypertension     Neurological disorder     Seizures ([facility] [address] [address]     Stroke ([facility] 2009       Past Surgical History:  No past surgical history on file.     Family History:  Family History   Problem Relation Age of Onset    Diabetes Other     Stroke Other        Social History:  Social History     Tobacco Use    Smoking status: Never Smoker    Smokeless tobacco: Never Used   Substance Use Topics    Alcohol use: Yes     Comment: Socially     Drug use: No       Allergies: Allergies   Allergen Reactions    Tomato Hives     Allergic to eating tomato.  Aspirin Nausea and Vomiting    Ciprofloxacin Rash    Pcn [Penicillins] Rash and Hives    Sulfa (Sulfonamide Antibiotics) Hives and Rash         Review of Systems       Review of Systems   Constitutional: Negative for chills, fatigue, fever and unexpected weight change. HENT: Negative for congestion and rhinorrhea. Respiratory: Negative for chest tightness and shortness of breath. Cardiovascular: Positive for chest pain. Negative for palpitations and leg swelling. Gastrointestinal: Negative for abdominal pain, nausea and vomiting. Genitourinary: Negative for dysuria. Musculoskeletal: Positive for arthralgias. Negative for back pain. Skin: Negative for rash. Neurological: Negative for dizziness and weakness. Psychiatric/Behavioral: The patient is not nervous/anxious. Physical Exam     Visit Vitals  /83 (BP 1 Location: Left arm, BP Patient Position: At rest;Sitting)   Pulse 76   Temp 98.1 °F (36.7 °C)   Resp 18   Ht 5' (1.524 m)   Wt 51.7 kg (114 lb)   SpO2 100%   BMI 22.26 kg/m²         Physical Exam  Vitals signs and nursing note reviewed. Constitutional:       General: She is not in acute distress. Appearance: She is well-developed. HENT:      Head: Normocephalic and atraumatic. Right Ear: External ear normal.      Left Ear: External ear normal.      Nose: Nose normal.   Eyes:      General: No scleral icterus. Conjunctiva/sclera: Conjunctivae normal.      Pupils: Pupils are equal, round, and reactive to light. Neck:      Musculoskeletal: Normal range of motion and neck supple. Thyroid: No thyromegaly. Vascular: No JVD.       Trachea: No tracheal deviation. Comments: No midline neck pain  Cardiovascular:      Rate and Rhythm: Normal rate and regular rhythm. Heart sounds: Normal heart sounds. No murmur. No friction rub. No gallop. Pulmonary:      Effort: Pulmonary effort is normal.      Breath sounds: Normal breath sounds. Comments: Right anterior mid axillary chest wall pain without crepitance  Chest:      Chest wall: Tenderness present. Abdominal:      General: Bowel sounds are normal. There is no distension. Palpations: Abdomen is soft. Tenderness: There is no tenderness. There is no guarding or rebound. Musculoskeletal: Normal range of motion. General: No tenderness. Lymphadenopathy:      Cervical: No cervical adenopathy. Skin:     General: Skin is warm and dry. Neurological:      Mental Status: She is alert and oriented to person, place, and time. Cranial Nerves: No cranial nerve deficit. Coordination: Coordination normal.      Comments: Mild global weakness, gait at her baseline with assistance   Psychiatric:         Behavior: Behavior normal.         Thought Content: Thought content normal.         Judgment: Judgment normal.           Diagnostic Study Results     Labs -  No results found for this or any previous visit (from the past 12 hour(s)). Radiologic Studies -   XR RIBS RT W PA CXR MIN 3 V   Final Result   IMPRESSION:      Normal right ribs and frontal chest roentgenogram.            XR SHOULDER RT AP/LAT MIN 2 V   Final Result   IMPRESSION:      No bony abnormality seen. Medical Decision Making   I am the first provider for this patient. I reviewed the vital signs, available nursing notes, past medical history, past surgical history, family history and social history. Vital Signs-Reviewed the patient's vital signs.         Records Reviewed: Nursing Notes and Old Medical Records (Time of Review: 4:32 PM)    ED Course: Progress Notes, Reevaluation, and Consults: The x-rays are reassuring and will proceed with close outpatient care. We will give a limited number of Tylenol 3 and have her follow closely with her primary doctor. Workup and recommendations were reviewed with the patient and all questions were answered. The patient understands the plan and will proceed with close outpatient care. I have encouraged the patient to return if at all worsened or concerned. Elda Vicente DO 5:19 PM      Provider Notes (Medical Decision Making):   MDM  Number of Diagnoses or Management Options  Diagnosis management comments: Patient is a 59-year-old female with a history of stroke, hypertension, seizure disorder presents after a fall onto her right side after getting in the shower. Patient has chest wall trauma and we will x-ray her ribs for fracture and evidence of pneumothorax or hemothorax. In addition we will x-ray her shoulder because she has diffuse pain with some pain with range of motion. Patient has not have any evidence of a neck or head injury. Will give a dose of Tylenol 3 as is help with her in the past and reevaluate. Procedures          Diagnosis     Clinical Impression:   1. Contusion of rib on right side, initial encounter    2. Strain of right shoulder, initial encounter        Disposition: DC    Follow-up Information    None          Patient's Medications   Start Taking    No medications on file   Continue Taking    ACETAMINOPHEN (TYLENOL) 500 MG TABLET    every six (6) hours as needed for Pain. Take 2 caplets every 6 hrs    ACETAMINOPHEN-CODEINE (TYLENOL-CODEINE #3) 300-30 MG PER TABLET    Take 1 Tab by mouth every four (4) hours as needed for Pain for up to 8 doses. Max Daily Amount: 6 Tabs. AMLODIPINE (NORVASC) 10 MG TABLET    Take 1 Tab by mouth daily. ATORVASTATIN (LIPITOR) 40 MG TABLET    Take 1 Tab by mouth nightly. CLOPIDOGREL (PLAVIX) 75 MG TABLET    Take 75 mg by mouth daily.     CYANOCOBALAMIN (VITAMIN B12) 500 MCG TABLET    Take 1 Tab by mouth daily. CYCLOBENZAPRINE (FLEXERIL) 10 MG TABLET    Take 1 Tab by mouth three (3) times daily as needed for Muscle Spasm(s). DIVALPROEX DR (DEPAKOTE) 250 MG TABLET    Take 250 mg by mouth daily. ERGOCALCIFEROL (VITAMIN D2) 50,000 UNIT CAPSULE    Take 50,000 Units by mouth two (2) days a week. FOLIC ACID (FOLVITE) 1 MG TABLET    Take 1 Tab by mouth daily. HYDRALAZINE (APRESOLINE) 50 MG TABLET    Take 50 mg by mouth two (2) times a day. HYDROCODONE-ACETAMINOPHEN (NORCO) 5-325 MG PER TABLET    Take 1 tablet PO every 6 hours as needed for pain control. If over the counter ibuprofen or acetaminophen was suggested, then only take the vicodin for pain not well controlled with the over the counter medication. HYDROXYPROPYL METHYLCELLULOSE (GENTEAL) 0.2 % OPHTHALMIC SOLUTION    Administer 2 Drops to both eyes as needed. HYDROXYZINE HCL (ATARAX) 50 MG TABLET    Take 1 Tab by mouth four (4) times daily as needed for Itching. LEVETIRACETAM (KEPPRA) 1,000 MG TABLET    Take 1 Tab by mouth two (2) times a day. LISINOPRIL (PRINIVIL, ZESTRIL) 10 MG TABLET    Take 10 mg by mouth daily. OXCARBAXEPINE (TRILEPTAL) 600 MG TABLET    Take 600 mg by mouth two (2) times a day. OXCARBAZEPINE (TRILEPTAL) 600 MG TABLET    Take 1 Tab by mouth every twelve (12) hours. TRIAMCINOLONE ACETONIDE (KENALOG) 0.1 % TOPICAL CREAM    Apply  to affected area two (2) times a day. use thin layer   These Medications have changed    No medications on file   Stop Taking    No medications on file     Disclaimer: Sections of this note are dictated using utilizing voice recognition software. Minor typographical errors may be present. If questions arise, please do not hesitate to contact me or call our department.

## 2020-01-03 ENCOUNTER — APPOINTMENT (OUTPATIENT)
Dept: CT IMAGING | Age: 65
End: 2020-01-03
Attending: EMERGENCY MEDICINE
Payer: MEDICARE

## 2020-01-03 ENCOUNTER — APPOINTMENT (OUTPATIENT)
Dept: GENERAL RADIOLOGY | Age: 65
End: 2020-01-03
Attending: EMERGENCY MEDICINE
Payer: MEDICARE

## 2020-01-03 ENCOUNTER — HOSPITAL ENCOUNTER (EMERGENCY)
Age: 65
Discharge: HOME | End: 2020-01-03
Attending: EMERGENCY MEDICINE
Payer: MEDICARE

## 2020-01-03 VITALS
SYSTOLIC BLOOD PRESSURE: 186 MMHG | DIASTOLIC BLOOD PRESSURE: 76 MMHG | OXYGEN SATURATION: 100 % | RESPIRATION RATE: 16 BRPM | TEMPERATURE: 98.6 F | HEART RATE: 79 BPM

## 2020-01-03 DIAGNOSIS — G40.909 RECURRENT SEIZURES (HCC): Primary | ICD-10-CM

## 2020-01-03 LAB
ALBUMIN SERPL-MCNC: 3.8 G/DL (ref 3.4–5)
ALBUMIN/GLOB SERPL: 1 {RATIO} (ref 0.8–1.7)
ALP SERPL-CCNC: 92 U/L (ref 45–117)
ALT SERPL-CCNC: 13 U/L (ref 13–56)
AMMONIA PLAS-SCNC: <10 UMOL/L (ref 11–32)
ANION GAP SERPL CALC-SCNC: 11 MMOL/L (ref 3–18)
AST SERPL-CCNC: 15 U/L (ref 10–38)
ATRIAL RATE: 83 BPM
BASOPHILS # BLD: 0 K/UL (ref 0–0.1)
BASOPHILS NFR BLD: 1 % (ref 0–2)
BILIRUB SERPL-MCNC: 0.3 MG/DL (ref 0.2–1)
BUN SERPL-MCNC: 14 MG/DL (ref 7–18)
BUN/CREAT SERPL: 10 (ref 12–20)
CALCIUM SERPL-MCNC: 9.2 MG/DL (ref 8.5–10.1)
CALCULATED P AXIS, ECG09: 32 DEGREES
CALCULATED R AXIS, ECG10: -18 DEGREES
CALCULATED T AXIS, ECG11: 132 DEGREES
CHLORIDE SERPL-SCNC: 108 MMOL/L (ref 100–111)
CO2 SERPL-SCNC: 22 MMOL/L (ref 21–32)
CREAT SERPL-MCNC: 1.38 MG/DL (ref 0.6–1.3)
DIAGNOSIS, 93000: NORMAL
DIFFERENTIAL METHOD BLD: ABNORMAL
EOSINOPHIL # BLD: 0.1 K/UL (ref 0–0.4)
EOSINOPHIL NFR BLD: 1 % (ref 0–5)
ERYTHROCYTE [DISTWIDTH] IN BLOOD BY AUTOMATED COUNT: 13.7 % (ref 11.6–14.5)
GLOBULIN SER CALC-MCNC: 3.9 G/DL (ref 2–4)
GLUCOSE BLD STRIP.AUTO-MCNC: 83 MG/DL (ref 70–110)
GLUCOSE SERPL-MCNC: 96 MG/DL (ref 74–99)
HCT VFR BLD AUTO: 36.2 % (ref 35–45)
HGB BLD-MCNC: 12.1 G/DL (ref 12–16)
LYMPHOCYTES # BLD: 1.5 K/UL (ref 0.9–3.6)
LYMPHOCYTES NFR BLD: 23 % (ref 21–52)
MAGNESIUM SERPL-MCNC: 1.9 MG/DL (ref 1.6–2.6)
MCH RBC QN AUTO: 30.1 PG (ref 24–34)
MCHC RBC AUTO-ENTMCNC: 33.4 G/DL (ref 31–37)
MCV RBC AUTO: 90 FL (ref 74–97)
MONOCYTES # BLD: 0.6 K/UL (ref 0.05–1.2)
MONOCYTES NFR BLD: 9 % (ref 3–10)
NEUTS SEG # BLD: 4.5 K/UL (ref 1.8–8)
NEUTS SEG NFR BLD: 66 % (ref 40–73)
P-R INTERVAL, ECG05: 144 MS
PLATELET # BLD AUTO: 186 K/UL (ref 135–420)
PMV BLD AUTO: 11.6 FL (ref 9.2–11.8)
POTASSIUM SERPL-SCNC: 3.3 MMOL/L (ref 3.5–5.5)
PROT SERPL-MCNC: 7.7 G/DL (ref 6.4–8.2)
Q-T INTERVAL, ECG07: 380 MS
QRS DURATION, ECG06: 82 MS
QTC CALCULATION (BEZET), ECG08: 446 MS
RBC # BLD AUTO: 4.02 M/UL (ref 4.2–5.3)
SODIUM SERPL-SCNC: 141 MMOL/L (ref 136–145)
VALPROATE SERPL-MCNC: 15 UG/ML (ref 50–100)
VENTRICULAR RATE, ECG03: 83 BPM
WBC # BLD AUTO: 6.6 K/UL (ref 4.6–13.2)

## 2020-01-03 PROCEDURE — 80164 ASSAY DIPROPYLACETIC ACD TOT: CPT

## 2020-01-03 PROCEDURE — 80183 DRUG SCRN QUANT OXCARBAZEPIN: CPT

## 2020-01-03 PROCEDURE — 70450 CT HEAD/BRAIN W/O DYE: CPT

## 2020-01-03 PROCEDURE — 85025 COMPLETE CBC W/AUTO DIFF WBC: CPT

## 2020-01-03 PROCEDURE — 83735 ASSAY OF MAGNESIUM: CPT

## 2020-01-03 PROCEDURE — 82962 GLUCOSE BLOOD TEST: CPT

## 2020-01-03 PROCEDURE — 82140 ASSAY OF AMMONIA: CPT

## 2020-01-03 PROCEDURE — 93005 ELECTROCARDIOGRAM TRACING: CPT

## 2020-01-03 PROCEDURE — 80053 COMPREHEN METABOLIC PANEL: CPT

## 2020-01-03 PROCEDURE — 71045 X-RAY EXAM CHEST 1 VIEW: CPT

## 2020-01-03 PROCEDURE — 99284 EMERGENCY DEPT VISIT MOD MDM: CPT

## 2020-01-03 PROCEDURE — 99285 EMERGENCY DEPT VISIT HI MDM: CPT

## 2020-01-03 NOTE — DISCHARGE INSTRUCTIONS
Patient Education        Epilepsy: Care Instructions  Your Care Instructions    Epilepsy is a common condition that causes repeated seizures. The seizures are caused by bursts of electrical activity in the brain that aren't normal. Seizures may cause problems with muscle control, movement, speech, vision, or awareness. They can be scary. Epilepsy affects each person differently. Some people have only a few seizures. Others get them more often. If you know what triggers a seizure, you may be able to avoid having one. You can take medicines to control and reduce seizures. You and your doctor will need to find the right combination, schedule, and dose of medicine. This may take time and careful changes. Seizures may get worse and happen more often over time. Follow-up care is a key part of your treatment and safety. Be sure to make and go to all appointments, and call your doctor if you are having problems. It's also a good idea to know your test results and keep a list of the medicines you take. How can you care for yourself at home? · Be safe with medicines. Take your medicines exactly as prescribed. Call your doctor if you think you are having a problem with your medicine. · Make a treatment plan with your doctor. Be sure to follow your plan. · Try to identify and avoid things that may make you more likely to have a seizure. These may include:  ? Not getting enough sleep. ? Using drugs or alcohol. ? Being emotionally stressed. ? Skipping meals. · Keep a record of any seizures you have. Note the date, time of day, and any details about the seizure that you can remember. Your doctor can use this information to plan or adjust your medicine or other treatment. · Be sure that any doctor treating you for another condition knows that you have epilepsy. Each doctor should know what medicines you are taking, if any. · Wear a medical ID bracelet. You can buy this at most [employer].  If you have a seizure that leaves you unconscious or unable to speak for yourself, this bracelet will let those who are treating you know that you have epilepsy. · Talk to your doctor about whether it is safe for you to do certain activities, such as drive or swim. When should you call for help? Call 911 anytime you think you may need emergency care. For example, call if:    · A seizure does not stop as it normally does.     · You have new symptoms such as:  ? Numbness, tingling, or weakness on one side of your body or face. ? Vision changes. ? Trouble speaking or thinking clearly.    Call your doctor now or seek immediate medical care if:    · You have a fever.     · You have a severe headache.    Watch closely for changes in your health, and be sure to contact your doctor if:    · The normal pattern or features of your seizures change. Where can you learn more? Go to http://teresa-mehul.info/. Cheng Morales in the search box to learn more about \"Epilepsy: Care Instructions. \"  Current as of: March 28, 2019  Content Version: 12.2  © 0707-0450 [employer]. Care instructions adapted under license by [employer] (which disclaims liability or warranty for this information). If you have questions about a medical condition or this instruction, always ask your healthcare professional. [address] any warranty or liability for your use of this information.

## 2020-01-03 NOTE — ED PROVIDER NOTES
EMERGENCY DEPARTMENT HISTORY AND PHYSICAL EXAM  This was created with voice recognition software and transcription errors may be present. 12:11 PM  Date: 1/3/2020  Patient Name: Corinna Mancilla    History of Presenting Illness     Chief Complaint:    History Provided By:     HPI: Corinna Mancilla is a 71 y.o. female past medical history of hypertension neurologic disorder seizure and stroke who presents with complaint of seizure. Patient reportedly stopped taking her seizure medications because it was making her have difficulty ambulating. She reportedly had a seizure and was sent in for it at her facility. However she states that she has been having trouble with her coordination since she woke up this morning she reaches for things and is unable to grasp them. PCP: Sandee Garza NP      Past History     Past Medical History:  Past Medical History:   Diagnosis Date    Hypertension     Neurological disorder     [facility]     Stroke ([facility] 2009       Past Surgical History:  No past surgical history on file. Family History:  Family History   Problem Relation Age of Onset    Diabetes Other     Stroke Other        Social History:  Social History     Tobacco Use    Smoking status: Never Smoker    Smokeless tobacco: Never Used   Substance Use Topics    Alcohol use: Yes     Comment: Socially     Drug use: No       Allergies: Allergies   Allergen Reactions    Tomato Hives     Allergic to eating tomato.  Aspirin Nausea and Vomiting    Ciprofloxacin Rash    Pcn [Penicillins] Rash and Hives    Sulfa (Sulfonamide Antibiotics) Hives and Rash       Review of Systems     Review of Systems   All other systems reviewed and are negative. 10 point review of systems otherwise negative unless noted in HPI. Physical Exam       Physical Exam  Constitutional:       Appearance: She is well-developed. HENT:      Head: Normocephalic and atraumatic.    Eyes:      Pupils: Pupils are equal, round, and reactive to light. Neck:      Musculoskeletal: Normal range of motion and neck supple. Cardiovascular:      Rate and Rhythm: Normal rate and regular rhythm. Heart sounds: Normal heart sounds. No murmur. No friction rub. Pulmonary:      Effort: Pulmonary effort is normal. No respiratory distress. Breath sounds: Normal breath sounds. No wheezing. Abdominal:      General: There is no distension. Palpations: Abdomen is soft. Tenderness: There is no tenderness. There is no guarding or rebound. Musculoskeletal: Normal range of motion. Skin:     General: Skin is warm and dry. Neurological:      Mental Status: She is alert and oriented to person, place, and time. Comments: Mildly slurred speech cerebellar dysfunction in the upper extremity   Psychiatric:         Behavior: Behavior normal.         Thought Content: Thought content normal.         Diagnostic Study Results     Vital Signs  EKG: EKG shows sinus at 83 with a normal axis and normal intervals there is no ST elevation or depression no hypertrophy. T wave inversions V4 5 and 6, unchanged from 1819  Labs: Ammonia less than 10, CBC is normal, chemistry normal  Imaging:   cxr napd. IMPRESSION:                1.  No acute intracranial process.     2. 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     3. Chronic small vessel ischemic changes.     4. No significant interval change.      - Discussed the above findings with Dr. Zach Kendrick at 12:35. Medical Decision Making     ED Course: Progress Notes, Reevaluation, and Consults:      Provider Notes (Medical Decision Making): This is a 62-year-old female presents status post seizure. She stopped taking her medications because of some difficulty ambulating. May be an overdose of medications. She coordination issues this morning. Questionable cerebellar stroke she does have several dysfunction on exam.    Son is here, he states she is back to baseline.   She has some cerebellar dysfunction at baseline reportedly she is about 80 to 85%    Patient is noncompliant with meds they feel that she is better off them and they are likely not to restart them so I am not going to give her any medications    Masha with the radiologist CT head was unchanged  Discussed with neurology given the history of clear prior stroke and seizures if she is back to baseline okay to follow-up outpatient unlikely acute stroke if there is any persistent deficit potentially admit. Patient is back to baseline so we will discharge       Diagnosis     Clinical Impression: No diagnosis found. Disposition:    Patient's Medications   Start Taking    No medications on file   Continue Taking    AMLODIPINE (NORVASC) 10 MG TABLET    Take 1 Tab by mouth daily. ATORVASTATIN (LIPITOR) 40 MG TABLET    Take 1 Tab by mouth nightly. CLOPIDOGREL (PLAVIX) 75 MG TABLET    Take 75 mg by mouth daily. CYANOCOBALAMIN (VITAMIN B12) 500 MCG TABLET    Take 1 Tab by mouth daily. CYCLOBENZAPRINE (FLEXERIL) 10 MG TABLET    Take 1 Tab by mouth three (3) times daily as needed for Muscle Spasm(s). DIVALPROEX DR (DEPAKOTE) 250 MG TABLET    Take 250 mg by mouth daily. ERGOCALCIFEROL (VITAMIN D2) 50,000 UNIT CAPSULE    Take 50,000 Units by mouth two (2) days a week. FOLIC ACID (FOLVITE) 1 MG TABLET    Take 1 Tab by mouth daily. HYDRALAZINE (APRESOLINE) 50 MG TABLET    Take 50 mg by mouth two (2) times a day. HYDROXYPROPYL METHYLCELLULOSE (GENTEAL) 0.2 % OPHTHALMIC SOLUTION    Administer 2 Drops to both eyes as needed. HYDROXYZINE HCL (ATARAX) 50 MG TABLET    Take 1 Tab by mouth four (4) times daily as needed for Itching. LEVETIRACETAM (KEPPRA) 1,000 MG TABLET    Take 1 Tab by mouth two (2) times a day. LISINOPRIL (PRINIVIL, ZESTRIL) 10 MG TABLET    Take 10 mg by mouth daily. OXCARBAXEPINE (TRILEPTAL) 600 MG TABLET    Take 600 mg by mouth two (2) times a day.     OXCARBAZEPINE (TRILEPTAL) 600 MG TABLET    Take 1 Tab by mouth every twelve (12) hours. TRIAMCINOLONE ACETONIDE (KENALOG) 0.1 % TOPICAL CREAM    Apply  to affected area two (2) times a day.  use thin layer   These Medications have changed    No medications on file   Stop Taking    No medications on file

## 2020-01-03 NOTE — PROGRESS NOTES
No answer at any Rad read station in [facility], No answer [facility].

## 2020-01-03 NOTE — ED TRIAGE NOTES
Per medic: \"patient's son called stating that his mom was yelling out and complaining of left leg weakness and left hand numbness.  Upon arrival to the ER patient was noted to have stuttering speech, and bilat. hand tremors

## 2020-01-06 LAB — OXCARBAZEPINE SERPL-MCNC: 5 UG/ML (ref 10–35)

## 2020-02-17 ENCOUNTER — HOSPITAL ENCOUNTER (OUTPATIENT)
Dept: VASCULAR SURGERY | Age: 65
Discharge: HOME | End: 2020-02-17
Attending: NURSE PRACTITIONER
Payer: MEDICARE

## 2020-02-17 DIAGNOSIS — M79.662 PAIN IN LEFT LOWER LEG: ICD-10-CM

## 2020-02-17 PROCEDURE — 93971 EXTREMITY STUDY: CPT

## 2020-03-23 ENCOUNTER — APPOINTMENT (OUTPATIENT)
Dept: GENERAL RADIOLOGY | Age: 65
End: 2020-03-23
Attending: PHYSICIAN ASSISTANT
Payer: MEDICARE

## 2020-03-23 ENCOUNTER — HOSPITAL ENCOUNTER (EMERGENCY)
Age: 65
Discharge: HOME | End: 2020-03-23
Attending: EMERGENCY MEDICINE | Admitting: EMERGENCY MEDICINE
Payer: MEDICARE

## 2020-03-23 VITALS
TEMPERATURE: 99.2 F | DIASTOLIC BLOOD PRESSURE: 80 MMHG | HEIGHT: 60 IN | SYSTOLIC BLOOD PRESSURE: 191 MMHG | HEART RATE: 103 BPM | RESPIRATION RATE: 20 BRPM | BODY MASS INDEX: 22.58 KG/M2 | WEIGHT: 115 LBS | OXYGEN SATURATION: 100 %

## 2020-03-23 DIAGNOSIS — R68.89 FLU-LIKE SYMPTOMS: Primary | ICD-10-CM

## 2020-03-23 LAB
ALBUMIN SERPL-MCNC: 3.7 G/DL (ref 3.4–5)
ALBUMIN/GLOB SERPL: 1 {RATIO} (ref 0.8–1.7)
ALP SERPL-CCNC: 76 U/L (ref 45–117)
ALT SERPL-CCNC: 10 U/L (ref 13–56)
ANION GAP SERPL CALC-SCNC: 7 MMOL/L (ref 3–18)
AST SERPL-CCNC: 15 U/L (ref 10–38)
BASOPHILS # BLD: 0.1 K/UL (ref 0–0.1)
BASOPHILS NFR BLD: 1 % (ref 0–2)
BILIRUB SERPL-MCNC: 0.4 MG/DL (ref 0.2–1)
BUN SERPL-MCNC: 15 MG/DL (ref 7–18)
BUN/CREAT SERPL: 11 (ref 12–20)
CALCIUM SERPL-MCNC: 9 MG/DL (ref 8.5–10.1)
CHLORIDE SERPL-SCNC: 113 MMOL/L (ref 100–111)
CO2 SERPL-SCNC: 24 MMOL/L (ref 21–32)
CREAT SERPL-MCNC: 1.37 MG/DL (ref 0.6–1.3)
DIFFERENTIAL METHOD BLD: ABNORMAL
EOSINOPHIL # BLD: 0.2 K/UL (ref 0–0.4)
EOSINOPHIL NFR BLD: 2 % (ref 0–5)
ERYTHROCYTE [DISTWIDTH] IN BLOOD BY AUTOMATED COUNT: 14.8 % (ref 11.6–14.5)
FLUAV AG NPH QL IA: NEGATIVE
FLUBV AG NOSE QL IA: NEGATIVE
GLOBULIN SER CALC-MCNC: 3.7 G/DL (ref 2–4)
GLUCOSE SERPL-MCNC: 85 MG/DL (ref 74–99)
HCT VFR BLD AUTO: 37.4 % (ref 35–45)
HGB BLD-MCNC: 12.6 G/DL (ref 12–16)
LYMPHOCYTES # BLD: 2.9 K/UL (ref 0.9–3.6)
LYMPHOCYTES NFR BLD: 29 % (ref 21–52)
MAGNESIUM SERPL-MCNC: 2.2 MG/DL (ref 1.6–2.6)
MCH RBC QN AUTO: 30.1 PG (ref 24–34)
MCHC RBC AUTO-ENTMCNC: 33.7 G/DL (ref 31–37)
MCV RBC AUTO: 89.5 FL (ref 74–97)
MONOCYTES # BLD: 0.9 K/UL (ref 0.05–1.2)
MONOCYTES NFR BLD: 10 % (ref 3–10)
NEUTS SEG # BLD: 5.8 K/UL (ref 1.8–8)
NEUTS SEG NFR BLD: 58 % (ref 40–73)
PLATELET # BLD AUTO: 218 K/UL (ref 135–420)
PMV BLD AUTO: 11.5 FL (ref 9.2–11.8)
POTASSIUM SERPL-SCNC: 3.7 MMOL/L (ref 3.5–5.5)
PROT SERPL-MCNC: 7.4 G/DL (ref 6.4–8.2)
RBC # BLD AUTO: 4.18 M/UL (ref 4.2–5.3)
SODIUM SERPL-SCNC: 144 MMOL/L (ref 136–145)
TROPONIN I SERPL-MCNC: 0.05 NG/ML (ref 0–0.04)
WBC # BLD AUTO: 9.8 K/UL (ref 4.6–13.2)

## 2020-03-23 PROCEDURE — 87804 INFLUENZA ASSAY W/OPTIC: CPT

## 2020-03-23 PROCEDURE — 71046 X-RAY EXAM CHEST 2 VIEWS: CPT

## 2020-03-23 PROCEDURE — 93005 ELECTROCARDIOGRAM TRACING: CPT

## 2020-03-23 PROCEDURE — 99283 EMERGENCY DEPT VISIT LOW MDM: CPT

## 2020-03-23 PROCEDURE — 84484 ASSAY OF TROPONIN QUANT: CPT

## 2020-03-23 PROCEDURE — 85025 COMPLETE CBC W/AUTO DIFF WBC: CPT

## 2020-03-23 PROCEDURE — 83735 ASSAY OF MAGNESIUM: CPT

## 2020-03-23 PROCEDURE — 80053 COMPREHEN METABOLIC PANEL: CPT

## 2020-03-23 RX ORDER — DOXYCYCLINE 100 MG/1
100 CAPSULE ORAL 2 TIMES DAILY
Qty: 20 CAP | Refills: 0 | Status: SHIPPED | OUTPATIENT
Start: 2020-03-23 | End: 2020-04-02

## 2020-03-23 RX ORDER — GUAIFENESIN 600 MG/1
600 TABLET, EXTENDED RELEASE ORAL 2 TIMES DAILY
Qty: 20 TAB | Refills: 0 | Status: SHIPPED | OUTPATIENT
Start: 2020-03-23 | End: 2020-09-08

## 2020-03-23 NOTE — ED TRIAGE NOTES
The patient presents for evaluation of a productive cough, fever, and intermittent shortness of breath that began three days ago.

## 2020-03-24 ENCOUNTER — PATIENT OUTREACH (OUTPATIENT)
Dept: FAMILY MEDICINE CLINIC | Facility: CLINIC | Age: 65
End: 2020-03-24

## 2020-03-24 NOTE — ED PROVIDER NOTES
EMERGENCY DEPARTMENT HISTORY AND PHYSICAL EXAM    Date: 3/23/2020  Patient Name: Sherman Doll    History of Presenting Illness     Chief Complaint   Patient presents with    Cough         History Provided By: Patient    Additional History (Context): Sherman Doll is a 76 y.o. female with hypertension, stroke and seizure who presents with productive cough chills diarrhea myalgias now for the past few days. Since Thursday her cough is been productive with yellow sputum. Denies tobacco.    PCP: Estrellita VALDEZ NP    Current Outpatient Medications   Medication Sig Dispense Refill    guaiFENesin ER (Mucinex) 600 mg ER tablet Take 1 Tab by mouth two (2) times a day. 20 Tab 0    doxycycline (MONODOX) 100 mg capsule Take 1 Cap by mouth two (2) times a day for 10 days. 20 Cap 0    cyclobenzaprine (FLEXERIL) 10 mg tablet Take 1 Tab by mouth three (3) times daily as needed for Muscle Spasm(s). 10 Tab 0    OXcarbazepine (TRILEPTAL) 600 mg tablet Take 1 Tab by mouth every twelve (12) hours. 60 Tab 0    OXcarbaxepine (TRILEPTAL) 600 mg tablet Take 600 mg by mouth two (2) times a day.  divalproex DR (DEPAKOTE) 250 mg tablet Take 250 mg by mouth daily.  hydrOXYzine HCl (ATARAX) 50 mg tablet Take 1 Tab by mouth four (4) times daily as needed for Itching. 20 Tab 0    hydrALAZINE (APRESOLINE) 50 mg tablet Take 50 mg by mouth two (2) times a day.  ergocalciferol (VITAMIN D2) 50,000 unit capsule Take 50,000 Units by mouth two (2) days a week.  levETIRAcetam (KEPPRA) 1,000 mg tablet Take 1 Tab by mouth two (2) times a day. 60 Tab 0    triamcinolone acetonide (KENALOG) 0.1 % topical cream Apply  to affected area two (2) times a day. use thin layer 15 g 0    clopidogrel (PLAVIX) 75 mg tablet Take 75 mg by mouth daily.  lisinopril (PRINIVIL, ZESTRIL) 10 mg tablet Take 10 mg by mouth daily.  amLODIPine (NORVASC) 10 mg tablet Take 1 Tab by mouth daily.  30 Tab 6    atorvastatin (LIPITOR) 40 mg tablet Take 1 Tab by mouth nightly. 30 Tab 0    hydroxypropyl methylcellulose (GENTEAL) 0.2 % ophthalmic solution Administer 2 Drops to both eyes as needed. 15 mL 0    cyanocobalamin (VITAMIN B12) 500 mcg tablet Take 1 Tab by mouth daily. 30 Tab 0    folic acid (FOLVITE) 1 mg tablet Take 1 Tab by mouth daily. 30 Tab 0       Past History     Past Medical History:  Past Medical History:   Diagnosis Date    Hypertension     Neurological disorder     [facility]     [facility] ([facility] 2009       Past Surgical History:  History reviewed. No pertinent surgical history. Family History:  Family History   Problem Relation Age of Onset    Diabetes Other     Stroke Other        Social History:  Social History     Tobacco Use    Smoking status: Never Smoker    Smokeless tobacco: Never Used   Substance Use Topics    Alcohol use: Yes     Comment: Socially     Drug use: No       Allergies: Allergies   Allergen Reactions    Tomato Hives     Allergic to eating tomato.  Aspirin Nausea and Vomiting    Ciprofloxacin Rash    Pcn [Penicillins] Rash and Hives    Sulfa (Sulfonamide Antibiotics) Hives and Rash         Review of Systems   Review of Systems   Constitutional: Positive for chills and fatigue. Respiratory: Positive for cough. Gastrointestinal: Negative for abdominal pain, nausea and vomiting. Genitourinary: Negative for dysuria and flank pain. Musculoskeletal: Positive for myalgias. All Other Systems Negative  Physical Exam     Vitals:    03/23/20 1626   BP: 191/80   Pulse: (!) 103   Resp: 20   Temp: 99.2 °F (37.3 °C)   SpO2: 100%   Weight: 52.2 kg (115 lb)   Height: 5' (1.524 m)     Physical Exam  Vitals signs and nursing note reviewed. Constitutional:       Appearance: She is well-developed. HENT:      Head: Normocephalic and atraumatic. Eyes:      Pupils: Pupils are equal, round, and reactive to light. Neck:      Thyroid: No thyromegaly. Vascular: No JVD.       Trachea: No tracheal deviation. Cardiovascular:      Rate and Rhythm: Normal rate and regular rhythm. Heart sounds: Normal heart sounds. No murmur. No friction rub. No gallop. Pulmonary:      Effort: Pulmonary effort is normal. No respiratory distress. Breath sounds: No stridor. Rhonchi present. No wheezing or rales. Comments: Bibasilar rhonchi  Chest:      Chest wall: No tenderness. Abdominal:      General: There is no distension. Palpations: Abdomen is soft. There is no mass. Tenderness: There is no abdominal tenderness. There is no guarding or rebound. Musculoskeletal:         General: No tenderness. Lymphadenopathy:      Cervical: No cervical adenopathy. Skin:     General: Skin is warm and dry. Coloration: Skin is not pale. Findings: No erythema or rash. Neurological:      Mental Status: She is alert and oriented to person, place, and time. Psychiatric:         Behavior: Behavior normal.         Thought Content:  Thought content normal.            Diagnostic Study Results     Labs -     Recent Results (from the past 12 hour(s))   EKG, 12 LEAD, INITIAL    Collection Time: 03/23/20  5:18 PM   Result Value Ref Range    Ventricular Rate 85 BPM    Atrial Rate 85 BPM    P-R Interval 132 ms    QRS Duration 72 ms    Q-T Interval 350 ms    QTC Calculation (Bezet) 416 ms    Calculated P Axis 55 degrees    Calculated R Axis 3 degrees    Calculated T Axis 135 degrees    Diagnosis       Normal sinus rhythm  T wave abnormality, consider lateral ischemia  Abnormal ECG  When compared with ECG of 03-JAN-2020 12:41,  T wave inversion less evident in Lateral leads     CBC WITH AUTOMATED DIFF    Collection Time: 03/23/20  8:34 PM   Result Value Ref Range    WBC 9.8 4.6 - 13.2 K/uL    RBC 4.18 (L) 4.20 - 5.30 M/uL    HGB 12.6 12.0 - 16.0 g/dL    HCT 37.4 35.0 - 45.0 %    MCV 89.5 74.0 - 97.0 FL    MCH 30.1 24.0 - 34.0 PG    MCHC 33.7 31.0 - 37.0 g/dL    RDW 14.8 (H) 11.6 - 14.5 %    PLATELET 218 135 - 420 K/uL    MPV 11.5 9.2 - 11.8 FL    NEUTROPHILS 58 40 - 73 %    LYMPHOCYTES 29 21 - 52 %    MONOCYTES 10 3 - 10 %    EOSINOPHILS 2 0 - 5 %    BASOPHILS 1 0 - 2 %    ABS. NEUTROPHILS 5.8 1.8 - 8.0 K/UL    ABS. LYMPHOCYTES 2.9 0.9 - 3.6 K/UL    ABS. MONOCYTES 0.9 0.05 - 1.2 K/UL    ABS. EOSINOPHILS 0.2 0.0 - 0.4 K/UL    ABS. BASOPHILS 0.1 0.0 - 0.1 K/UL    DF AUTOMATED     METABOLIC PANEL, COMPREHENSIVE    Collection Time: 03/23/20  8:34 PM   Result Value Ref Range    Sodium 144 136 - 145 mmol/L    Potassium 3.7 3.5 - 5.5 mmol/L    Chloride 113 (H) 100 - 111 mmol/L    CO2 24 21 - 32 mmol/L    Anion gap 7 3.0 - 18 mmol/L    Glucose 85 74 - 99 mg/dL    BUN 15 7.0 - 18 MG/DL    Creatinine 1.37 (H) 0.6 - 1.3 MG/DL    BUN/Creatinine ratio 11 (L) 12 - 20      GFR est AA 47 (L) >60 ml/min/1.73m2    GFR est non-AA 39 (L) >60 ml/min/1.73m2    Calcium 9.0 8.5 - 10.1 MG/DL    Bilirubin, total 0.4 0.2 - 1.0 MG/DL    ALT (SGPT) 10 (L) 13 - 56 U/L    AST (SGOT) 15 10 - 38 U/L    Alk. phosphatase 76 45 - 117 U/L    Protein, total 7.4 6.4 - 8.2 g/dL    Albumin 3.7 3.4 - 5.0 g/dL    Globulin 3.7 2.0 - 4.0 g/dL    A-G Ratio 1.0 0.8 - 1.7     MAGNESIUM    Collection Time: 03/23/20  8:34 PM   Result Value Ref Range    Magnesium 2.2 1.6 - 2.6 mg/dL   TROPONIN I    Collection Time: 03/23/20  8:34 PM   Result Value Ref Range    Troponin-I, QT 0.05 (H) 0.0 - 0.045 NG/ML   INFLUENZA A & B AG (RAPID TEST)    Collection Time: 03/23/20  8:34 PM   Result Value Ref Range    Influenza A Antigen NEGATIVE  NEG      Influenza B Antigen NEGATIVE  NEG         Radiologic Studies -   XR CHEST PA LAT   Final Result   IMPRESSION:    No acute finding. .              CT Results  (Last 48 hours)    None        CXR Results  (Last 48 hours)               03/23/20 1654  XR CHEST PA LAT Final result    Impression:  IMPRESSION:    No acute finding. .               Narrative:  Chest  PA and lateral views       INDICATION:  Cough, shortness of breath, chest pain       COMPARISON:  Prior chest x-rays, most recent 1/3/2020       FINDINGS:  The cardiac silhouette is normal in size. The pulmonary vasculature   is unremarkable. No focal consolidation, pleural effusion, or pneumothorax. No   acute osseous abnormalities are identified. Mild spondylosis. Medical Decision Making   I am the first provider for this patient. I reviewed the vital signs, available nursing notes, past medical history, past surgical history, family history and social history. Vital Signs-Reviewed the patient's vital signs. Records Reviewed: Nursing Notes    Procedures:  Procedures    Provider Notes (Medical Decision Making):   Nothing acute on CXR; Rx sent for Mucinex and doxycycline for her flulike symptoms primarily cough congestion. She can follow-up with her primary care provider. MED RECONCILIATION:  No current facility-administered medications for this encounter. Current Outpatient Medications   Medication Sig    guaiFENesin ER (Mucinex) 600 mg ER tablet Take 1 Tab by mouth two (2) times a day.  doxycycline (MONODOX) 100 mg capsule Take 1 Cap by mouth two (2) times a day for 10 days.  cyclobenzaprine (FLEXERIL) 10 mg tablet Take 1 Tab by mouth three (3) times daily as needed for Muscle Spasm(s).  OXcarbazepine (TRILEPTAL) 600 mg tablet Take 1 Tab by mouth every twelve (12) hours.  OXcarbaxepine (TRILEPTAL) 600 mg tablet Take 600 mg by mouth two (2) times a day.  divalproex DR (DEPAKOTE) 250 mg tablet Take 250 mg by mouth daily.  hydrOXYzine HCl (ATARAX) 50 mg tablet Take 1 Tab by mouth four (4) times daily as needed for Itching.  hydrALAZINE (APRESOLINE) 50 mg tablet Take 50 mg by mouth two (2) times a day.  ergocalciferol (VITAMIN D2) 50,000 unit capsule Take 50,000 Units by mouth two (2) days a week.  levETIRAcetam (KEPPRA) 1,000 mg tablet Take 1 Tab by mouth two (2) times a day.     triamcinolone acetonide (KENALOG) 0.1 % topical cream Apply  to affected area two (2) times a day. use thin layer    clopidogrel (PLAVIX) 75 mg tablet Take 75 mg by mouth daily.  lisinopril (PRINIVIL, ZESTRIL) 10 mg tablet Take 10 mg by mouth daily.  amLODIPine (NORVASC) 10 mg tablet Take 1 Tab by mouth daily.  atorvastatin (LIPITOR) 40 mg tablet Take 1 Tab by mouth nightly.  hydroxypropyl methylcellulose (GENTEAL) 0.2 % ophthalmic solution Administer 2 Drops to both eyes as needed.  cyanocobalamin (VITAMIN B12) 500 mcg tablet Take 1 Tab by mouth daily.  folic acid (FOLVITE) 1 mg tablet Take 1 Tab by mouth daily. Disposition:  home    DISCHARGE NOTE:   10:27 PM    Pt has been reexamined. Patient has no new complaints, changes, or physical findings. Care plan outlined and precautions discussed. Results of labs, CXR were reviewed with the patient. All medications were reviewed with the patient; will d/c home with doxycycline, mucinex. All of pt's questions and concerns were addressed. Patient was instructed and agrees to follow up with PCP, as well as to return to the ED upon further deterioration. Patient is ready to go home. Follow-up Information     Follow up With Specialties Details Why [address], NP Nurse Practitioner Schedule an appointment as soon as possible for a visit in 1 day  [address] [place] [address]  [address] [facility] EMERGENCY DEPT Emergency Medicine  If symptoms worsen return immediately [address] [place] [address] [address] [address]  [phone]          Current Discharge Medication List      START taking these medications    Details   guaiFENesin ER (Mucinex) 600 mg ER tablet Take 1 Tab by mouth two (2) times a day. Qty: 20 Tab, Refills: 0      doxycycline (MONODOX) 100 mg capsule Take 1 Cap by mouth two (2) times a day for 10 days. Qty: 20 Cap, Refills: 0               Diagnosis     Clinical Impression:   1.  Flu-like symptoms

## 2020-03-24 NOTE — PROGRESS NOTES
COVID-19 Screening Initial Follow-up Note    Patient contacted regarding COVID-19  risk. Care Transition Nurse/ Ambulatory Care Manager contacted the family by telephone to perform post discharge assessment. Verified name and  with family as identifiers. Provided introduction to self, and explanation of the CTN/ACM role, and reason for call due to risk factors for infection and/or exposure to COVID-19. Symptoms reviewed with family who verbalized the following symptoms: fatigue, cough       Due to no new or worsening symptoms encounter was not routed to provider for escalation. Patient has following risk factors of: flu like symptoms. CTN/ACM reviewed discharge instructions, medical action plan and red flags such as increased shortness of breath, increasing fever and signs of decompensation with family who verbalized understanding. Discussed exposure protocols and quarantine with CDC Guidelines What to do if you are sick with coronavirus disease 2019 Family who was given an opportunity for questions and concerns. The family agrees to contact the [facility] exposure line [phone], [facility] department [facility]  [phone] and PCP office for questions related to their healthcare. CTN/ACM provided contact information for future reference.     Reviewed and educated family on any new and changed medications related to discharge diagnosis     Plan for follow-up call in 14 days based on severity of symptoms and risk factors

## 2020-03-25 LAB
ATRIAL RATE: 85 BPM
CALCULATED P AXIS, ECG09: 55 DEGREES
CALCULATED R AXIS, ECG10: 3 DEGREES
CALCULATED T AXIS, ECG11: 135 DEGREES
DIAGNOSIS, 93000: NORMAL
P-R INTERVAL, ECG05: 132 MS
Q-T INTERVAL, ECG07: 350 MS
QRS DURATION, ECG06: 72 MS
QTC CALCULATION (BEZET), ECG08: 416 MS
VENTRICULAR RATE, ECG03: 85 BPM

## 2020-04-08 ENCOUNTER — PATIENT OUTREACH (OUTPATIENT)
Dept: FAMILY MEDICINE CLINIC | Facility: CLINIC | Age: 65
End: 2020-04-08

## 2020-04-08 NOTE — PROGRESS NOTES
Patient contacted regarding recent discharge and COVID-19 risk   Care Transition Nurse/ Ambulatory Care Manager contacted the patient by telephone to perform post discharge assessment. Verified name and  with patient as identifiers. Patient has following risk factors of: cva, htn. CTN/ACM reviewed discharge instructions, medical action plan and red flags related to discharge diagnosis. Reviewed and educated them on any new and changed medications related to discharge diagnosis. Advised obtaining a 90-day supply of all daily and as-needed medications. Education provided regarding infection prevention, and signs and symptoms of COVID-19 and when to seek medical attention with patient who verbalized understanding. Discussed exposure protocols and quarantine from [address] you at higher risk for severe illness  and given an opportunity for questions and concerns. The patient agrees to contact the COVID-19 hotline [phone] or PCP office for questions related to their healthcare. CTN/ACM provided contact information for future reference. From CDC: Are you at higher risk for severe illness?  Wash your hands often.  Avoid close contact (6 feet, which is about two arm lengths) with people who are sick.  Put distance between yourself and other people if COVID-19 is spreading in your community.  Clean and disinfect frequently touched surfaces.  Avoid all cruise travel and non-essential air travel.  Call your healthcare professional if you have concerns about COVID-19 and your underlying condition or if you are sick.     For more information on steps you can take to protect yourself, see CDC's How to Protect Yourself      Patient/family/caregiver given information for [facility] and agrees to enroll n/a  Patient's preferred e-mail:  n/a  Patient's preferred phone number: n/a  Based on Loop alert triggers, patient will be contacted by nurse care manager for worsening symptoms.     Plan for follow-up call in:episode resolved

## 2020-09-07 PROCEDURE — 99285 EMERGENCY DEPT VISIT HI MDM: CPT

## 2020-09-08 ENCOUNTER — HOSPITAL ENCOUNTER (INPATIENT)
Age: 65
LOS: 6 days | Discharge: HOME HEALTH | DRG: 100 | End: 2020-09-17
Attending: EMERGENCY MEDICINE | Admitting: INTERNAL MEDICINE
Payer: MEDICARE

## 2020-09-08 ENCOUNTER — HOSPITAL ENCOUNTER (EMERGENCY)
Age: 65
Discharge: HOME | End: 2020-09-08
Attending: EMERGENCY MEDICINE | Admitting: EMERGENCY MEDICINE
Payer: MEDICARE

## 2020-09-08 ENCOUNTER — APPOINTMENT (OUTPATIENT)
Dept: CT IMAGING | Age: 65
DRG: 100 | End: 2020-09-08
Attending: PHYSICIAN ASSISTANT
Payer: MEDICARE

## 2020-09-08 VITALS
SYSTOLIC BLOOD PRESSURE: 156 MMHG | OXYGEN SATURATION: 99 % | RESPIRATION RATE: 17 BRPM | DIASTOLIC BLOOD PRESSURE: 66 MMHG | HEART RATE: 82 BPM

## 2020-09-08 DIAGNOSIS — R56.9 POST-ICTAL STATE (HCC): Primary | ICD-10-CM

## 2020-09-08 DIAGNOSIS — R56.9 SEIZURE (HCC): ICD-10-CM

## 2020-09-08 DIAGNOSIS — G40.909 SEIZURE DISORDER (HCC): Primary | ICD-10-CM

## 2020-09-08 LAB
ANION GAP SERPL CALC-SCNC: 9 MMOL/L (ref 3–18)
APPEARANCE UR: CLEAR
BASOPHILS # BLD: 0 K/UL (ref 0–0.1)
BASOPHILS NFR BLD: 0 % (ref 0–2)
BILIRUB UR QL: NEGATIVE
BUN SERPL-MCNC: 16 MG/DL (ref 7–18)
BUN/CREAT SERPL: 10 (ref 12–20)
CALCIUM SERPL-MCNC: 9 MG/DL (ref 8.5–10.1)
CARBAMAZEPINE SERPL-MCNC: <0.5 UG/ML (ref 4–12)
CHLORIDE SERPL-SCNC: 110 MMOL/L (ref 100–111)
CO2 SERPL-SCNC: 22 MMOL/L (ref 21–32)
COLOR UR: YELLOW
CREAT SERPL-MCNC: 1.56 MG/DL (ref 0.6–1.3)
DIFFERENTIAL METHOD BLD: ABNORMAL
EOSINOPHIL # BLD: 0.3 K/UL (ref 0–0.4)
EOSINOPHIL NFR BLD: 3 % (ref 0–5)
ERYTHROCYTE [DISTWIDTH] IN BLOOD BY AUTOMATED COUNT: 13.4 % (ref 11.6–14.5)
GLUCOSE SERPL-MCNC: 108 MG/DL (ref 74–99)
GLUCOSE UR STRIP.AUTO-MCNC: NEGATIVE MG/DL
HCT VFR BLD AUTO: 36.8 % (ref 35–45)
HGB BLD-MCNC: 12 G/DL (ref 12–16)
HGB UR QL STRIP: NEGATIVE
KETONES UR QL STRIP.AUTO: NEGATIVE MG/DL
LEUKOCYTE ESTERASE UR QL STRIP.AUTO: NEGATIVE
LYMPHOCYTES # BLD: 3.1 K/UL (ref 0.9–3.6)
LYMPHOCYTES NFR BLD: 32 % (ref 21–52)
MAGNESIUM SERPL-MCNC: 1.9 MG/DL (ref 1.6–2.6)
MCH RBC QN AUTO: 30.3 PG (ref 24–34)
MCHC RBC AUTO-ENTMCNC: 32.6 G/DL (ref 31–37)
MCV RBC AUTO: 92.9 FL (ref 74–97)
MONOCYTES # BLD: 0.7 K/UL (ref 0.05–1.2)
MONOCYTES NFR BLD: 7 % (ref 3–10)
NEUTS SEG # BLD: 5.6 K/UL (ref 1.8–8)
NEUTS SEG NFR BLD: 58 % (ref 40–73)
NITRITE UR QL STRIP.AUTO: NEGATIVE
PH UR STRIP: 7 [PH] (ref 5–8)
PLATELET # BLD AUTO: 209 K/UL (ref 135–420)
PMV BLD AUTO: 11.2 FL (ref 9.2–11.8)
POTASSIUM SERPL-SCNC: 3.5 MMOL/L (ref 3.5–5.5)
PROT UR STRIP-MCNC: NEGATIVE MG/DL
RBC # BLD AUTO: 3.96 M/UL (ref 4.2–5.3)
SODIUM SERPL-SCNC: 141 MMOL/L (ref 136–145)
SP GR UR REFRACTOMETRY: 1.01 (ref 1–1.03)
UROBILINOGEN UR QL STRIP.AUTO: 0.2 EU/DL (ref 0.2–1)
VALPROATE SERPL-MCNC: <3 UG/ML (ref 50–100)
WBC # BLD AUTO: 9.8 K/UL (ref 4.6–13.2)

## 2020-09-08 PROCEDURE — 96365 THER/PROPH/DIAG IV INF INIT: CPT

## 2020-09-08 PROCEDURE — 99285 EMERGENCY DEPT VISIT HI MDM: CPT

## 2020-09-08 PROCEDURE — 96376 TX/PRO/DX INJ SAME DRUG ADON: CPT

## 2020-09-08 PROCEDURE — 70450 CT HEAD/BRAIN W/O DYE: CPT

## 2020-09-08 PROCEDURE — 74011000258 HC RX REV CODE- 258: Performed by: PHYSICIAN ASSISTANT

## 2020-09-08 PROCEDURE — 80164 ASSAY DIPROPYLACETIC ACD TOT: CPT

## 2020-09-08 PROCEDURE — 74011250636 HC RX REV CODE- 250/636: Performed by: PHYSICIAN ASSISTANT

## 2020-09-08 PROCEDURE — 74011250636 HC RX REV CODE- 250/636: Performed by: INTERNAL MEDICINE

## 2020-09-08 PROCEDURE — 74011000250 HC RX REV CODE- 250: Performed by: PHYSICIAN ASSISTANT

## 2020-09-08 PROCEDURE — 65390000012 HC CONDITION CODE 44 OBSERVATION

## 2020-09-08 PROCEDURE — 81003 URINALYSIS AUTO W/O SCOPE: CPT

## 2020-09-08 PROCEDURE — 85025 COMPLETE CBC W/AUTO DIFF WBC: CPT

## 2020-09-08 PROCEDURE — 80048 BASIC METABOLIC PNL TOTAL CA: CPT

## 2020-09-08 PROCEDURE — 65270000029 HC RM PRIVATE

## 2020-09-08 PROCEDURE — 83735 ASSAY OF MAGNESIUM: CPT

## 2020-09-08 PROCEDURE — 80177 DRUG SCRN QUAN LEVETIRACETAM: CPT

## 2020-09-08 PROCEDURE — 80156 ASSAY CARBAMAZEPINE TOTAL: CPT

## 2020-09-08 PROCEDURE — 96375 TX/PRO/DX INJ NEW DRUG ADDON: CPT

## 2020-09-08 RX ORDER — PROMETHAZINE HYDROCHLORIDE 25 MG/1
12.5 TABLET ORAL
Status: DISCONTINUED | OUTPATIENT
Start: 2020-09-08 | End: 2020-09-17 | Stop reason: HOSPADM

## 2020-09-08 RX ORDER — POLYETHYLENE GLYCOL 3350 17 G/17G
17 POWDER, FOR SOLUTION ORAL DAILY PRN
Status: DISCONTINUED | OUTPATIENT
Start: 2020-09-08 | End: 2020-09-17 | Stop reason: HOSPADM

## 2020-09-08 RX ORDER — LEVETIRACETAM 500 MG/1
1000 TABLET ORAL
Status: DISCONTINUED | OUTPATIENT
Start: 2020-09-08 | End: 2020-09-08

## 2020-09-08 RX ORDER — ENOXAPARIN SODIUM 100 MG/ML
40 INJECTION SUBCUTANEOUS DAILY
Status: DISCONTINUED | OUTPATIENT
Start: 2020-09-09 | End: 2020-09-09

## 2020-09-08 RX ORDER — ERGOCALCIFEROL 1.25 MG/1
50000 CAPSULE ORAL
Status: DISCONTINUED | OUTPATIENT
Start: 2020-09-10 | End: 2020-09-17 | Stop reason: HOSPADM

## 2020-09-08 RX ORDER — LANOLIN ALCOHOL/MO/W.PET/CERES
500 CREAM (GRAM) TOPICAL DAILY
Status: DISCONTINUED | OUTPATIENT
Start: 2020-09-09 | End: 2020-09-17 | Stop reason: HOSPADM

## 2020-09-08 RX ORDER — OXCARBAZEPINE 300 MG/1
600 TABLET, FILM COATED ORAL 2 TIMES DAILY
Status: DISCONTINUED | OUTPATIENT
Start: 2020-09-08 | End: 2020-09-17 | Stop reason: HOSPADM

## 2020-09-08 RX ORDER — AMLODIPINE BESYLATE 10 MG/1
10 TABLET ORAL DAILY
Status: DISCONTINUED | OUTPATIENT
Start: 2020-09-09 | End: 2020-09-17 | Stop reason: HOSPADM

## 2020-09-08 RX ORDER — HYDRALAZINE HYDROCHLORIDE 50 MG/1
50 TABLET, FILM COATED ORAL 2 TIMES DAILY
Status: DISCONTINUED | OUTPATIENT
Start: 2020-09-08 | End: 2020-09-16

## 2020-09-08 RX ORDER — LISINOPRIL 10 MG/1
10 TABLET ORAL DAILY
Status: DISCONTINUED | OUTPATIENT
Start: 2020-09-09 | End: 2020-09-17

## 2020-09-08 RX ORDER — LEVETIRACETAM 10 MG/ML
1000 INJECTION INTRAVASCULAR EVERY 12 HOURS
Status: DISCONTINUED | OUTPATIENT
Start: 2020-09-08 | End: 2020-09-13 | Stop reason: CLARIF

## 2020-09-08 RX ORDER — SODIUM CHLORIDE 0.9 % (FLUSH) 0.9 %
5-40 SYRINGE (ML) INJECTION AS NEEDED
Status: DISCONTINUED | OUTPATIENT
Start: 2020-09-08 | End: 2020-09-17 | Stop reason: HOSPADM

## 2020-09-08 RX ORDER — ONDANSETRON 2 MG/ML
4 INJECTION INTRAMUSCULAR; INTRAVENOUS
Status: DISCONTINUED | OUTPATIENT
Start: 2020-09-08 | End: 2020-09-17 | Stop reason: HOSPADM

## 2020-09-08 RX ORDER — ACETAMINOPHEN 325 MG/1
650 TABLET ORAL
Status: DISCONTINUED | OUTPATIENT
Start: 2020-09-08 | End: 2020-09-17 | Stop reason: HOSPADM

## 2020-09-08 RX ORDER — SODIUM CHLORIDE 0.9 % (FLUSH) 0.9 %
5-40 SYRINGE (ML) INJECTION EVERY 8 HOURS
Status: DISCONTINUED | OUTPATIENT
Start: 2020-09-08 | End: 2020-09-17 | Stop reason: HOSPADM

## 2020-09-08 RX ORDER — ATORVASTATIN CALCIUM 40 MG/1
40 TABLET, FILM COATED ORAL
Status: DISCONTINUED | OUTPATIENT
Start: 2020-09-08 | End: 2020-09-17 | Stop reason: HOSPADM

## 2020-09-08 RX ORDER — ACETAMINOPHEN 650 MG/1
650 SUPPOSITORY RECTAL
Status: DISCONTINUED | OUTPATIENT
Start: 2020-09-08 | End: 2020-09-17 | Stop reason: HOSPADM

## 2020-09-08 RX ADMIN — LEVETIRACETAM 1000 MG: 10 INJECTION INTRAVENOUS at 08:34

## 2020-09-08 RX ADMIN — LEVETIRACETAM 1000 MG: 10 INJECTION INTRAVENOUS at 23:48

## 2020-09-08 RX ADMIN — VALPROATE SODIUM 500 MG: 100 INJECTION, SOLUTION INTRAVENOUS at 15:33

## 2020-09-08 NOTE — COMMUNITY CARE MANAGEMENT
Patient has Transitional Care follow up with [facility] on 9/14/2020 at 8:30 am.

## 2020-09-08 NOTE — ED NOTES
I have reviewed discharge instructions with the patient. The patient verbalized understanding. No further questions at this time.  Patient taken to son's car via wheelchair

## 2020-09-08 NOTE — ED NOTES
Patient left emergency department laying on stretcher to get a CT of the head, accompanied by hospital transport, in stable condition.

## 2020-09-08 NOTE — H&P
Date of Admission: 9/8/2020      Assessment:   Seizure disorder with 2 seizures in the last 24 hours  Post-ictal state  HTN  Prior CVA with left sided weakness:  Not taking Plavix per son  Dyslipidemia    Plan:   24hr Observation  Neurology consulted by ED- Dr. Karen Herndon with home AED's.    - keppra levels pending  NPO until able to pass SLP  Aspiration precautions  Seizure precautions  Restart home medications    Discussed plan of care with her son Micky Askew. Micky Askew states that patient is at her baseline and is willing to take her home tomorrow morning after additional observation overnight. Prior to admission medication list reviewed with Micky Askew and confirmed. Full code as per medical power of       Jessica Preciado D.O. Internal Medicine and Infectious Diseases      Subjective:    Patient is a 76 y. o.female who is being evaluated for change in mentation after seizure. Ms. Douglas Hunter is a 75 yo female with hx of HTN, CVA and seizure disorder who was brought to the ED by her son Micky Askew after having a seizure on Monday around 9 pm. He states that she had called him into her room and that she needed help. He noted that she had laid down and started to have a seizure. He checked her BP and gave her an additional dose of her seizure medication. He notes that she then had another seizure which he describes as a \"grand mal\" seizure at which time he had called the ambulance. She was brought to the ED and was discharged around 5am. He notes that she was awake but very drowsy. She had another seizure and he felt uncomfortable about giving her an extra dose of medication. She had 2 additional seizures when he finally decided to call EMS again. He notes that her normal behavior after seizures is to sleep for about 24-48 hours. He notes that she generally is nervous and stuttering and confused.  He advises that her next phase of her post-ictal phase is ongoing weakness with gait instability, stuttering (for 2-7 days in duration), irritability and confusion/hallucinations with hyperactivity. He states that he usually has to give her additional sedatives to calm her down. He states that her         Past Medical History:   Diagnosis Date    Hypertension     Neurological disorder     [facility] ([facility] [address]     Stroke ([facility] [address] 2009     No past surgical history on file. Family History   Problem Relation Age of Onset    Diabetes Other     Stroke Other      Medications reviewed as below:   Current Facility-Administered Medications   Medication Dose Route Frequency Provider Last Rate Last Dose    levETIRAcetam in saline (iso-os) (KEPPRA) infusion 1,000 mg  1,000 mg IntraVENous Q12H Mishel Easley PA-C   1,000 mg at 09/08/20 5411     Current Outpatient Medications   Medication Sig Dispense Refill    guaiFENesin ER (Mucinex) 600 mg ER tablet Take 1 Tab by mouth two (2) times a day. 20 Tab 0    cyclobenzaprine (FLEXERIL) 10 mg tablet Take 1 Tab by mouth three (3) times daily as needed for Muscle Spasm(s). 10 Tab 0    OXcarbazepine (TRILEPTAL) 600 mg tablet Take 1 Tab by mouth every twelve (12) hours. 60 Tab 0    OXcarbaxepine (TRILEPTAL) 600 mg tablet Take 600 mg by mouth two (2) times a day.  divalproex DR (DEPAKOTE) 250 mg tablet Take 250 mg by mouth daily.  hydrOXYzine HCl (ATARAX) 50 mg tablet Take 1 Tab by mouth four (4) times daily as needed for Itching. 20 Tab 0    hydrALAZINE (APRESOLINE) 50 mg tablet Take 50 mg by mouth two (2) times a day.  ergocalciferol (VITAMIN D2) 50,000 unit capsule Take 50,000 Units by mouth two (2) days a week.  levETIRAcetam (KEPPRA) 1,000 mg tablet Take 1 Tab by mouth two (2) times a day. 60 Tab 0    triamcinolone acetonide (KENALOG) 0.1 % topical cream Apply  to affected area two (2) times a day. use thin layer 15 g 0    clopidogrel (PLAVIX) 75 mg tablet Take 75 mg by mouth daily.  lisinopril (PRINIVIL, ZESTRIL) 10 mg tablet Take 10 mg by mouth daily.       amLODIPine (NORVASC) 10 mg tablet Take 1 Tab by mouth daily. 30 Tab 6    atorvastatin (LIPITOR) 40 mg tablet Take 1 Tab by mouth nightly. 30 Tab 0    hydroxypropyl methylcellulose (GENTEAL) 0.2 % ophthalmic solution Administer 2 Drops to both eyes as needed. 15 mL 0    cyanocobalamin (VITAMIN B12) 500 mcg tablet Take 1 Tab by mouth daily. 30 Tab 0    folic acid (FOLVITE) 1 mg tablet Take 1 Tab by mouth daily. 30 Tab 0     Allergies   Allergen Reactions    Tomato Hives     Allergic to eating tomato.     Aspirin Nausea and Vomiting    Ciprofloxacin Rash    Pcn [Penicillins] Rash and Hives    Sulfa (Sulfonamide Antibiotics) Hives and Rash     Social History     Socioeconomic History    Marital status:      Spouse name: Not on file    Number of children: Not on file    Years of education: Not on file    Highest education level: Not on file   Occupational History    Not on file   Social Needs    Financial resource strain: Not on file    Food insecurity     Worry: Not on file     Inability: Not on file    Transportation needs     Medical: Not on file     Non-medical: Not on file   Tobacco Use    Smoking status: Never Smoker    Smokeless tobacco: Never Used   Substance and Sexual Activity    Alcohol use: Yes     Comment: Socially     Drug use: No    Sexual activity: Not on file   Lifestyle    Physical activity     Days per week: Not on file     Minutes per session: Not on file    Stress: Not on file   Relationships    Social connections     Talks on phone: Not on file     Gets together: Not on file     Attends L[employer]-day service: Not on file     Active member of club or organization: Not on file     Attends meetings of clubs or organizations: Not on file     Relationship status: Not on file    Intimate partner violence     Fear of current or ex partner: Not on file     Emotionally abused: Not on file     Physically abused: Not on file     Forced sexual activity: Not on file   Other Topics Concern    Not on file   Social History Narrative    Not on file        Review of Systems    unable to assess due to clinical condition      Objective:        Visit Vitals  /84 (BP 1 Location: Right arm, BP Patient Position: At rest)   Pulse 76   Temp 98.6 °F (37 °C)   Resp 18   SpO2 100%     Temp (24hrs), Av.5 °F (36.9 °C), Min:98.3 °F (36.8 °C), Max:98.6 °F (37 °C)        General:   awake alert and oriented to self but confused   Skin:   no rashes or skin lesions noted on limited exam   HEENT:  Normocephalic, atraumatic, PERRL, EOMI, no scleral icterus or pallor; no conjunctival hemmohage;  nasal and oral mucous are moist and without evidence of lesions. No thrush. Dentition fair. Neck supple, no bruits. Lymph Nodes:   no cervical, axillary or inguinal adenopathy   Lungs:   non-labored, bilaterally clear to aspiration- no crackles wheezes rales or rhonchi   Heart:  RRR, s1 and s2; no murmurs rubs or gallops, no edema, + pedal pulses   Abdomen:  soft, non-distended, active bowel sounds, no hepatomegaly, no splenomegaly. Non-tender   Genitourinary:  deferred   Extremities:   no clubbing, cyanosis; no joint effusions or swelling; Full ROM of all large joints to the upper and lower extremities; muscle mass appropriate for age   Neurologic:  Wakes up with verbal stimuli, stutters, moves independently, no evidence of ongoing seizures.  Answers some questions, repeats answers   Psychiatric:   calm       Labs: Results:   Chemistry Recent Labs     20  0028   *      K 3.5      CO2 22   BUN 16   CREA 1.56*   CA 9.0   AGAP 9   BUCR 10*      CBC w/Diff Recent Labs     20  0028   WBC 9.8   RBC 3.96*   HGB 12.0   HCT 36.8      GRANS 58   LYMPH 32   EOS 3            Lab Results   Component Value Date/Time    Specimen Description: STOOL 2014 06:45 AM    Specimen Description: CLEAN CATCH 2014 03:00 PM    Specimen Description: BLOOD 2014 12:10 PM    Specimen Description: BLOOD 02/02/2014 11:48 AM    Lab Results   Component Value Date/Time    Culture result:  02/06/2014 06:45 AM     Toxigenic C. difficile NEGATIVE                         C. difficile target DNA sequences are not detected. Culture result:  02/04/2014 03:00 PM     [address] COLONIES/mL STAPHYLOCOCCUS SPECIES, COAGULASE NEGATIVE    Culture result: NO GROWTH 6 DAYS 02/02/2014 12:10 PM    Culture result: NO GROWTH 6 DAYS 02/02/2014 11:48 AM          Imaging:      All imaging reviewed from Admission to present as per radiology interpretation in [address] [place] [address]

## 2020-09-08 NOTE — ED NOTES
Incontinent of urine. Patient cleaned, new brief applied, then repositioned to comfort. Patient is complaining of pain, but is unable to provided further details regarding her pain, I.e., location or type of pain.

## 2020-09-08 NOTE — ED NOTES
Patient has been brought back into the emergency department from [place] [facility] [place] VIA hospital transport. She stated that she had the need to urinate. When turned, she expressed an increase in pain and had urinated on the bed. Chux pad was saturated and changed. She was provided with a bed pan and no urine was obtained.

## 2020-09-08 NOTE — ED TRIAGE NOTES
PT arrived via ems from home after a witnessed seizure. Per medic, pt was seizing, not following commands, but talking gibberish.  Pt was given 5 mg intra nasal versed and one sublingual clonazepam 0.25 mg

## 2020-09-08 NOTE — ED PROVIDER NOTES
EMERGENCY DEPARTMENT HISTORY AND PHYSICAL EXAM    12:29 AM      Date: 9/8/2020  Patient Name: Hien Bhakta    History of Presenting Illness     Chief Complaint   Patient presents with    Seizure         History Provided By: Patient  Location/Duration/Severity/Modifying factors   Patient arrived via EMS. Patient had a witnessed seizure at home and EMS was called. On speaking with the patient she has some difficulty conversing although it is not clear if this is her baseline or more due to anxiety. Per patient she has been prescribed antiepileptics but does not take them as she is supposed to. She states that she takes her antiseizure medications as needed, but it is not clear how often she does take it. She also was prescribed antihypertensive and anti-anxiety medications which she states she also takes as needed and does not know exactly how often. Per patient she is also given a medication for stroke which she does take daily. She has been counseled on the need to take her medication as prescribed before per her son. PCP: Mukul VALDEZ NP    Current Outpatient Medications   Medication Sig Dispense Refill    guaiFENesin ER (Mucinex) 600 mg ER tablet Take 1 Tab by mouth two (2) times a day. 20 Tab 0    cyclobenzaprine (FLEXERIL) 10 mg tablet Take 1 Tab by mouth three (3) times daily as needed for Muscle Spasm(s). 10 Tab 0    OXcarbazepine (TRILEPTAL) 600 mg tablet Take 1 Tab by mouth every twelve (12) hours. 60 Tab 0    OXcarbaxepine (TRILEPTAL) 600 mg tablet Take 600 mg by mouth two (2) times a day.  divalproex DR (DEPAKOTE) 250 mg tablet Take 250 mg by mouth daily.  hydrOXYzine HCl (ATARAX) 50 mg tablet Take 1 Tab by mouth four (4) times daily as needed for Itching. 20 Tab 0    hydrALAZINE (APRESOLINE) 50 mg tablet Take 50 mg by mouth two (2) times a day.  ergocalciferol (VITAMIN D2) 50,000 unit capsule Take 50,000 Units by mouth two (2) days a week.       levETIRAcetam (KEPPRA) 1,000 mg tablet Take 1 Tab by mouth two (2) times a day. 60 Tab 0    triamcinolone acetonide (KENALOG) 0.1 % topical cream Apply  to affected area two (2) times a day. use thin layer 15 g 0    clopidogrel (PLAVIX) 75 mg tablet Take 75 mg by mouth daily.  lisinopril (PRINIVIL, ZESTRIL) 10 mg tablet Take 10 mg by mouth daily.  amLODIPine (NORVASC) 10 mg tablet Take 1 Tab by mouth daily. 30 Tab 6    atorvastatin (LIPITOR) 40 mg tablet Take 1 Tab by mouth nightly. 30 Tab 0    hydroxypropyl methylcellulose (GENTEAL) 0.2 % ophthalmic solution Administer 2 Drops to both eyes as needed. 15 mL 0    cyanocobalamin (VITAMIN B12) 500 mcg tablet Take 1 Tab by mouth daily. 30 Tab 0    folic acid (FOLVITE) 1 mg tablet Take 1 Tab by mouth daily. 30 Tab 0       Past History     Past Medical History:  Past Medical History:   Diagnosis Date    Hypertension     Neurological disorder     [facility] [address]     Stroke ([facility] [address] 2009       Past Surgical History:  No past surgical history on file. Family History:  Family History   Problem Relation Age of Onset    Diabetes Other     Stroke Other        Social History:  Social History     Tobacco Use    Smoking status: Never Smoker    Smokeless tobacco: Never Used   Substance Use Topics    Alcohol use: Yes     Comment: Socially     Drug use: No       Allergies: Allergies   Allergen Reactions    Tomato Hives     Allergic to eating tomato.  Aspirin Nausea and Vomiting    Ciprofloxacin Rash    Pcn [Penicillins] Rash and Hives    Sulfa (Sulfonamide Antibiotics) Hives and Rash         Review of Systems     Review of Systems   Unable to perform ROS: Other   Constitutional: Negative for diaphoresis and fever. Neurological: Positive for seizures, syncope and speech difficulty. Negative for dizziness, facial asymmetry, weakness and numbness.    Attempted to get review of systems but was unable to get full review of systems/very limited due to patient's difficulty in speaking. Patient had severe stutter which seemed mostly anxiety/post ictal related which prevented her from forming more than a few words at a time. Physical Exam   There were no vitals taken for this visit. Physical Exam  Constitutional:       General: She is not in acute distress. Appearance: She is not ill-appearing or diaphoretic. HENT:      Head: Normocephalic and atraumatic. Eyes:      Extraocular Movements: Extraocular movements intact. Pupils: Pupils are equal, round, and reactive to light. Cardiovascular:      Rate and Rhythm: Regular rhythm. Tachycardia present. Pulses: Normal pulses. Heart sounds: Normal heart sounds. No murmur. Pulmonary:      Effort: Pulmonary effort is normal. No respiratory distress. Breath sounds: Normal breath sounds. Neurological:      Mental Status: She is alert. She is disoriented and confused. Cranial Nerves: No facial asymmetry. Motor: No weakness. Deep Tendon Reflexes:      Reflex Scores:       Patellar reflexes are 2+ on the right side and 2+ on the left side. Comments: Physical exam was limited due to post ictal state along with increased anxiety. Patient was having twitching on the left side which decreased once speaking to her. She did have significant stutter that seemed to be worse with anxiety. Diagnostic Study Results     Labs -  Recent Results (from the past 12 hour(s))   CBC WITH AUTOMATED DIFF    Collection Time: 09/08/20 12:28 AM   Result Value Ref Range    WBC 9.8 4.6 - 13.2 K/uL    RBC 3.96 (L) 4.20 - 5.30 M/uL    HGB 12.0 12.0 - 16.0 g/dL    HCT 36.8 35.0 - 45.0 %    MCV 92.9 74.0 - 97.0 FL    MCH 30.3 24.0 - 34.0 PG    MCHC 32.6 31.0 - 37.0 g/dL    RDW 13.4 11.6 - 14.5 %    PLATELET 990 781 - 107 K/uL    MPV 11.2 9.2 - 11.8 FL    NEUTROPHILS 58 40 - 73 %    LYMPHOCYTES 32 21 - 52 %    MONOCYTES 7 3 - 10 %    EOSINOPHILS 3 0 - 5 %    BASOPHILS 0 0 - 2 %    ABS.  NEUTROPHILS 5.6 1.8 - 8.0 K/UL    ABS. LYMPHOCYTES 3.1 0.9 - 3.6 K/UL    ABS. MONOCYTES 0.7 0.05 - 1.2 K/UL    ABS. EOSINOPHILS 0.3 0.0 - 0.4 K/UL    ABS. BASOPHILS 0.0 0.0 - 0.1 K/UL    DF AUTOMATED     METABOLIC PANEL, BASIC    Collection Time: 09/08/20 12:28 AM   Result Value Ref Range    Sodium 141 136 - 145 mmol/L    Potassium 3.5 3.5 - 5.5 mmol/L    Chloride 110 100 - 111 mmol/L    CO2 22 21 - 32 mmol/L    Anion gap 9 3.0 - 18 mmol/L    Glucose 108 (H) 74 - 99 mg/dL    BUN 16 7.0 - 18 MG/DL    Creatinine 1.56 (H) 0.6 - 1.3 MG/DL    BUN/Creatinine ratio 10 (L) 12 - 20      GFR est AA 40 (L) >60 ml/min/1.73m2    GFR est non-AA 33 (L) >60 ml/min/1.73m2    Calcium 9.0 8.5 - 10.1 MG/DL   MAGNESIUM    Collection Time: 09/08/20 12:28 AM   Result Value Ref Range    Magnesium 1.9 1.6 - 2.6 mg/dL   CARBAMAZEPINE    Collection Time: 09/08/20 12:28 AM   Result Value Ref Range    Carbamazepine <0.5 (L) 4.0 - 12.0 ug/mL   VALPROIC ACID    Collection Time: 09/08/20 12:28 AM   Result Value Ref Range    Valproic acid <3 (L) 50 - 100 ug/ml       Radiologic Studies -   No orders to display         Medical Decision Making   I am the first provider for this patient. I reviewed the vital signs, available nursing notes, past medical history, past surgical history, family history and social history. Vital Signs-Reviewed the patient's vital signs. Records Reviewed: Old Medical Records (Time of Review: 12:29 AM)    ED Course: Progress Notes, Reevaluation, and Consults:  Patient arrived in postictal state after witnessed seizure at home. After discussion with son was able to ascertain that patient intermittently takes her epilepsy medications, and has had 3 strokes to date. Patient will have seizures at home and is able to be cared for by her son but in this case the son was concerned about the possibility of stroke and so decided to seek further care.   In the ED on initial evaluation the patient showed normal symptoms of a post ictal state including fatigue confusion along with some dysarthric speech and stuttering. She also had twitching primarily of her left side that seemed to improve when patient was not paying attention to it. Patient says that she was very anxious at the time. Differential diagnosis include seizure with postictal state, CVA, TIA.  CVA and TIA were significantly less likely given the history of patient's repeated seizure and noncompliance with seizure medications. Labs were drawn to check medication levels. 2:58 AM reassessed and spoke with the patient. Patient had improved mentation. She was less sleepy overall and was more responsive and able to explain her medical history. Spoke with son at patient's request and was able to determine that this is very similar to her baseline after a seizure. Various antiepileptic levels have come back as subtherapeutic. Per the patient's son the patient will take her antiepileptics for a few weeks and then stop for a few weeks depending on how she specifically feels. She has been counseled on this before by her son and other providers. 3:56 AM patient has continued to improve and although still some anxiety and mild twitching and stutter has overall been continually improving and is at this point stable for discharge. Patient will need to follow-up with her PCP or neurologist in order to better control her epilepsy and prevent further worsening seizures. Patient and son have been made aware of this.            Provider Notes (Medical Decision Making):   MDM  Number of Diagnoses or Management Options  Post-ictal state [facility]): established, improving  Seizure ([facility] [address]): established, improving     Amount and/or Complexity of Data Reviewed  Clinical lab tests: ordered and reviewed  Discussion of test results with the performing providers: no  Decide to obtain previous medical records or to obtain history from someone other than the patient: yes  Obtain history from someone other than the patient: yes  Discuss the patient with other providers: no    Risk of Complications, Morbidity, and/or Mortality  Presenting problems: low  Diagnostic procedures: low  Management options: low    Patient Progress  Patient progress: improved      Procedures    Critical Care Time: 0      Diagnosis     Clinical Impression:   1. Post-ictal state ([address]    2. Seizure ([address]        Disposition: Home    Follow-up Information     Follow up With Specialties Details Why [address], NP Nurse Practitioner In 1 week ED follow up Nell [address]ing 44  [phone]      Neurologist  Schedule an appointment as soon as possible for a visit For review of anti-seizure medication            Patient's Medications   Start Taking    No medications on file   Continue Taking    AMLODIPINE (NORVASC) 10 MG TABLET    Take 1 Tab by mouth daily. ATORVASTATIN (LIPITOR) 40 MG TABLET    Take 1 Tab by mouth nightly. CLOPIDOGREL (PLAVIX) 75 MG TABLET    Take 75 mg by mouth daily. CYANOCOBALAMIN (VITAMIN B12) 500 MCG TABLET    Take 1 Tab by mouth daily. CYCLOBENZAPRINE (FLEXERIL) 10 MG TABLET    Take 1 Tab by mouth three (3) times daily as needed for Muscle Spasm(s). DIVALPROEX DR (DEPAKOTE) 250 MG TABLET    Take 250 mg by mouth daily. ERGOCALCIFEROL (VITAMIN D2) 50,000 UNIT CAPSULE    Take 50,000 Units by mouth two (2) days a week. FOLIC ACID (FOLVITE) 1 MG TABLET    Take 1 Tab by mouth daily. GUAIFENESIN ER (MUCINEX) 600 MG ER TABLET    Take 1 Tab by mouth two (2) times a day. HYDRALAZINE (APRESOLINE) 50 MG TABLET    Take 50 mg by mouth two (2) times a day. HYDROXYPROPYL METHYLCELLULOSE (GENTEAL) 0.2 % OPHTHALMIC SOLUTION    Administer 2 Drops to both eyes as needed. HYDROXYZINE HCL (ATARAX) 50 MG TABLET    Take 1 Tab by mouth four (4) times daily as needed for Itching. LEVETIRACETAM (KEPPRA) 1,000 MG TABLET    Take 1 Tab by mouth two (2) times a day. LISINOPRIL (PRINIVIL, ZESTRIL) 10 MG TABLET    Take 10 mg by mouth daily. OXCARBAXEPINE (TRILEPTAL) 600 MG TABLET    Take 600 mg by mouth two (2) times a day. OXCARBAZEPINE (TRILEPTAL) 600 MG TABLET    Take 1 Tab by mouth every twelve (12) hours. TRIAMCINOLONE ACETONIDE (KENALOG) 0.1 % TOPICAL CREAM    Apply  to affected area two (2) times a day. use thin layer   These Medications have changed    No medications on file   Stop Taking    No medications on file     Disclaimer: Sections of this note are dictated using utilizing voice recognition software. Minor typographical errors may be present. If questions arise, please do not hesitate to contact me or call our department.

## 2020-09-08 NOTE — ED TRIAGE NOTES
Patient's family stated that she had a seizure lasting 5 minutes. Patient is compliant with medication but has about 2 seizures per month. CVA 2017 with left sided weakness.

## 2020-09-08 NOTE — ED NOTES
Went into patient room to obtain a urine specimen, straight cath was performed. Urine obtained and sent to lab. Brief was put on and linens were adjusted.

## 2020-09-08 NOTE — ED NOTES
Assumed care of patient as she arrives VIA medics into the emergency department, she was given versed and is sleeping on stretcher. Labs were obtained and sent to lab. Will continue to monitor.

## 2020-09-08 NOTE — DISCHARGE INSTRUCTIONS
Patient Education        Seizure: Care Instructions  Your Care Instructions     Seizures are caused by abnormal patterns of electrical signals in the brain. They are different for each person. Seizures can affect movement, speech, vision, or awareness. Some people have only slight shaking of a hand and do not pass out. Other people may pass out and have violent shaking of the whole body. Some people appear to stare into space. They are awake, but they can't respond normally. Later, they may not remember what happened. You may need tests to identify the type and cause of the seizures. A seizure may occur only once, or you may have them more than one time. Taking medicines as directed and following up with your doctor may help keep you from having more seizures. The doctor has checked you carefully, but problems can develop later. If you notice any problems or new symptoms, get medical treatment right away. Follow-up care is a key part of your treatment and safety. Be sure to make and go to all appointments, and call your doctor if you are having problems. It's also a good idea to know your test results and keep a list of the medicines you take. How can you care for yourself at home? · Be safe with medicines. Take your medicines exactly as prescribed. Call your doctor if you think you are having a problem with your medicine. · Do not do any activity that could be dangerous to you or others until your doctor says it is safe to do so. For example, do not drive a car, operate machinery, swim, or climb ladders. · Be sure that anyone treating you for any health problem knows that you have had a seizure and what medicines you are taking for it. · Identify and avoid things that may make you more likely to have a seizure. These may include lack of sleep, alcohol or drug use, stress, or not eating. · Make sure you go to your follow-up appointment. When should you call for help?    Call 911 anytime you think you may need emergency care. For example, call if:    · You have another seizure.     · You have new symptoms, such as trouble walking, speaking, or thinking clearly. Call your doctor now or seek immediate medical care if:    · You are not acting normally. Watch closely for changes in your health, and be sure to contact your doctor if you have any problems. Where can you learn more? Go to http://teresa-mehul.info/  Enter M769 in the search box to learn more about \"Seizure: Care Instructions. \"  Current as of: November 20, 2019               Content Version: 12.6  © 7309-7027 [employer]. Care instructions adapted under license by [employer] (which disclaims liability or warranty for this information). If you have questions about a medical condition or this instruction, always ask your healthcare professional. [address] any warranty or liability for your use of this information.

## 2020-09-08 NOTE — ED PROVIDER NOTES
EMERGENCY DEPARTMENT HISTORY AND PHYSICAL EXAM    Date: 9/8/2020  Patient Name: Jerry Arevalo    History of Presenting Illness     Chief Complaint   Patient presents with    Seizure         History Provided By: patient        Additional History (Context): Jerry Arevalo is a 59-year-old female with history of seizure disorder, strokes, and hypertension presenting to the emergency department due to seizure that was witnessed by son at home prior to arrival.  Patient arrives via EMS. Patient was discharged from this facility 4 hours prior to this visit. Son states patient had a typical seizure, describes it is entire body specifically both hands shaking, falling up and patient becoming unresponsive. Per son and chart review, patient is noncompliant with her seizure medications and takes them \"intermittently\". Limited history as patient is unable to answer any my questions. Per chart review, patient is supposed to be taking Trileptal, Depakote and Keppra. PCP: Nelda VALDEZ NP    Current Facility-Administered Medications   Medication Dose Route Frequency Provider Last Rate Last Dose    levETIRAcetam in saline (iso-os) (KEPPRA) infusion 1,000 mg  1,000 mg IntraVENous Q12H Donald Galvan PA-C   1,000 mg at 09/08/20 9363     Current Outpatient Medications   Medication Sig Dispense Refill    guaiFENesin ER (Mucinex) 600 mg ER tablet Take 1 Tab by mouth two (2) times a day. 20 Tab 0    cyclobenzaprine (FLEXERIL) 10 mg tablet Take 1 Tab by mouth three (3) times daily as needed for Muscle Spasm(s). 10 Tab 0    OXcarbazepine (TRILEPTAL) 600 mg tablet Take 1 Tab by mouth every twelve (12) hours. 60 Tab 0    OXcarbaxepine (TRILEPTAL) 600 mg tablet Take 600 mg by mouth two (2) times a day.  divalproex DR (DEPAKOTE) 250 mg tablet Take 250 mg by mouth daily.  hydrOXYzine HCl (ATARAX) 50 mg tablet Take 1 Tab by mouth four (4) times daily as needed for Itching.  20 Tab 0    hydrALAZINE (APRESOLINE) 50 mg tablet Take 50 mg by mouth two (2) times a day.  ergocalciferol (VITAMIN D2) 50,000 unit capsule Take 50,000 Units by mouth two (2) days a week.  levETIRAcetam (KEPPRA) 1,000 mg tablet Take 1 Tab by mouth two (2) times a day. 60 Tab 0    triamcinolone acetonide (KENALOG) 0.1 % topical cream Apply  to affected area two (2) times a day. use thin layer 15 g 0    clopidogrel (PLAVIX) 75 mg tablet Take 75 mg by mouth daily.  lisinopril (PRINIVIL, ZESTRIL) 10 mg tablet Take 10 mg by mouth daily.  amLODIPine (NORVASC) 10 mg tablet Take 1 Tab by mouth daily. 30 Tab 6    atorvastatin (LIPITOR) 40 mg tablet Take 1 Tab by mouth nightly. 30 Tab 0    hydroxypropyl methylcellulose (GENTEAL) 0.2 % ophthalmic solution Administer 2 Drops to both eyes as needed. 15 mL 0    cyanocobalamin (VITAMIN B12) 500 mcg tablet Take 1 Tab by mouth daily. 30 Tab 0    folic acid (FOLVITE) 1 mg tablet Take 1 Tab by mouth daily. 30 Tab 0       Past History     Past Medical History:  Past Medical History:   Diagnosis Date    Hypertension     Neurological disorder     [facility]ures ([facility] [address]     Stroke ([facility] [address] 2009       Past Surgical History:  No past surgical history on file. Family History:  Family History   Problem Relation Age of Onset    Diabetes Other     Stroke Other        Social History:  Social History     Tobacco Use    Smoking status: Never Smoker    Smokeless tobacco: Never Used   Substance Use Topics    Alcohol use: Yes     Comment: Socially     Drug use: No       Allergies: Allergies   Allergen Reactions    Tomato Hives     Allergic to eating tomato.     Aspirin Nausea and Vomiting    Ciprofloxacin Rash    Pcn [Penicillins] Rash and Hives    Sulfa (Sulfonamide Antibiotics) Hives and Rash         Review of Systems       Unable to obtain due to clinical condition  All Other Systems Negative  Physical Exam     Vitals:    09/08/20 0845 09/08/20 0900 09/08/20 0915 09/08/20 0930   BP: 157/86 172/80 160/84 168/73   Pulse: 84 81 82 78   Resp: 19 18 13 17   Temp:       SpO2: 100% 100% 100% 100%     Physical Exam  Vitals signs and nursing note reviewed. Constitutional:       Appearance: She is well-developed. HENT:      Head: Normocephalic and atraumatic. Nose: Nose normal.   Eyes:      Conjunctiva/sclera: Conjunctivae normal.      Pupils: Pupils are equal, round, and reactive to light. Neck:      Musculoskeletal: Normal range of motion and neck supple. No neck rigidity. Cardiovascular:      Rate and Rhythm: Normal rate and regular rhythm. Pulmonary:      Effort: Pulmonary effort is normal. No respiratory distress. Breath sounds: Normal breath sounds. Abdominal:      Palpations: Abdomen is soft. There is no mass. Tenderness: There is no guarding. Hernia: No hernia is present. Musculoskeletal: Normal range of motion. Skin:     General: Skin is warm. Findings: No rash. Neurological:      Mental Status: She is alert. She is disoriented. Cranial Nerves: No cranial nerve deficit. Sensory: No sensory deficit. Diagnostic Study Results     Labs -     Recent Results (from the past 12 hour(s))   CBC WITH AUTOMATED DIFF    Collection Time: 09/08/20 12:28 AM   Result Value Ref Range    WBC 9.8 4.6 - 13.2 K/uL    RBC 3.96 (L) 4.20 - 5.30 M/uL    HGB 12.0 12.0 - 16.0 g/dL    HCT 36.8 35.0 - 45.0 %    MCV 92.9 74.0 - 97.0 FL    MCH 30.3 24.0 - 34.0 PG    MCHC 32.6 31.0 - 37.0 g/dL    RDW 13.4 11.6 - 14.5 %    PLATELET 001 882 - 243 K/uL    MPV 11.2 9.2 - 11.8 FL    NEUTROPHILS 58 40 - 73 %    LYMPHOCYTES 32 21 - 52 %    MONOCYTES 7 3 - 10 %    EOSINOPHILS 3 0 - 5 %    BASOPHILS 0 0 - 2 %    ABS. NEUTROPHILS 5.6 1.8 - 8.0 K/UL    ABS. LYMPHOCYTES 3.1 0.9 - 3.6 K/UL    ABS. MONOCYTES 0.7 0.05 - 1.2 K/UL    ABS. EOSINOPHILS 0.3 0.0 - 0.4 K/UL    ABS.  BASOPHILS 0.0 0.0 - 0.1 K/UL    DF AUTOMATED     METABOLIC PANEL, BASIC    Collection Time: 09/08/20 12:28 AM   Result Value Ref Range    Sodium 141 136 - 145 mmol/L    Potassium 3.5 3.5 - 5.5 mmol/L    Chloride 110 100 - 111 mmol/L    CO2 22 21 - 32 mmol/L    Anion gap 9 3.0 - 18 mmol/L    Glucose 108 (H) 74 - 99 mg/dL    BUN 16 7.0 - 18 MG/DL    Creatinine 1.56 (H) 0.6 - 1.3 MG/DL    BUN/Creatinine ratio 10 (L) 12 - 20      GFR est AA 40 (L) >60 ml/min/1.73m2    GFR est non-AA 33 (L) >60 ml/min/1.73m2    Calcium 9.0 8.5 - 10.1 MG/DL   MAGNESIUM    Collection Time: 09/08/20 12:28 AM   Result Value Ref Range    Magnesium 1.9 1.6 - 2.6 mg/dL   CARBAMAZEPINE    Collection Time: 09/08/20 12:28 AM   Result Value Ref Range    Carbamazepine <0.5 (L) 4.0 - 12.0 ug/mL   VALPROIC ACID    Collection Time: 09/08/20 12:28 AM   Result Value Ref Range    Valproic acid <3 (L) 50 - 100 ug/ml       Radiologic Studies -   CT HEAD WO CONT   Final Result   IMPRESSION:      In the setting of motion artifact, no definite acute intracranial hemorrhage or   mass effect. Note: If clinical concern of acute stroke, MRI with diffusion weighted images is   recommended for better evaluation. Remote infarct in right parietal/occipital lobes and moderate burden of   microvascular disease. Thank you for your referral.        CT Results  (Last 48 hours)               09/08/20 1006  CT HEAD WO CONT Final result    Impression:  IMPRESSION:       In the setting of motion artifact, no definite acute intracranial hemorrhage or   mass effect. Note: If clinical concern of acute stroke, MRI with diffusion weighted images is   recommended for better evaluation. Remote infarct in right parietal/occipital lobes and moderate burden of   microvascular disease. Thank you for your referral.       Narrative:  CT of the head without contrast       HISTORY: Witnessed seizure       COMPARISON: 1/3/20       TECHNIQUE: Helical axial scan to the head was performed from the skull base to   the vertex without IV contrast administration.        All CT scans at this facility performed using dose optimization techniques as   appreciated to a performed exam, to include automated exposure control,   adjustment of the mA and or KU according to patient size (including appropriate   matching for site specific examination), or use of iterative reconstruction   technique. FINDINGS:       Motion artifact noted. Mild global atrophy with mild ventricular dilatation   appear unchanged remote infarct in right parietal/occipital lobes with negative   mass effect and retraction of right parietal horn. Moderate and extensive   periventricular white matter hypodensities also present. . There is no evidence   of acute intracranial hemorrhage,  mass effect or midline shift identified. No   skull fracture or extra axial fluid collections identified. Visualized sinuses   and mastoid air cells appear unremarkable. CXR Results  (Last 48 hours)    None            Medical Decision Making   I am the first provider for this patient. I reviewed the vital signs, available nursing notes, past medical history, past surgical history, family history and social history. Vital Signs-Reviewed the patient's vital signs. Records Reviewed: Nursing notes, old medical records and any previous labs, imaging, visits, consultations pertinent to patient care    Procedures:  Procedures      ED Course: Progress Notes, Reevaluation, and Consults:      Provider Notes (Medical Decision Making):   50-year-old female with known seizure disorder as well as noncompliance presenting to the ED after having repeat seizure. Patient was seen initially in the ED this morning, discharged and returned immediately upon repeat seizure. Both witnessed by son who reports seizure is consistent with patient's baseline seizures. No abnormal activity noted by him. He does live with patient and helps to take care of her. Patient is nonverbal, alert and has a slight twitch noted to face.   After discussion with son, states this is patient's baseline postictal status. Explained to him what I observed, he states that patient sometimes stays like this for 1 day to up to 1 week. Dr. Flores Kowalski also evaluated patient, he agrees with CAT scan of brain, urine analysis and to give loading doses of Keppra and dose of Depakote. Patient also evaluated by telemetry neurology, who agree with admission to hospitalist to monitor and to continue home medications. Tele-neurologist, agree that this appeared to be a postictal state and did not have any further recommendations. I discussed case with Dr. Yanci Pollack, hospitalist, she does not want to admit patient as this is a known seizure disorder and patient is noncompliant with medications. Patient is appearing to be in a postictal state which is consistent with her previous and no different from her norm. She is recommending in-house neurology consult and will dispel per what they recommend. Consult: Discussed care with  (in-house neurologist). Standard discussion; including history of patients chief complaint, available diagnostic results, and treatment course. He recommends continuing home medications and to discharge home. He does not feel patient warrants admission at this time and that if she has family member to help her at home she is appropriate for discharge. Dr. Flores Kowalski, ED attending, aware of recommendations and agrees to discharge patient after we have observed her for more time. After discussing again with son, patient's current clinical condition seems consistent with all previous postictal phases after her known seizures and son understands the importance of being compliant with her medications. States she usually stutters her words when she is post ictal which is what she is doing at this time. 4:24 PM  Upon rechekc, pt awake, arousable however unable to form sentences or say her name/son's name.   I once again informed Dr. Deedra Bernheim who agrees at this time the patient most likely needs admission. Once again I spoke to patient's son confirms that after a few hours after seizure, patient is usually able to at least identify his name and her own name. Patient at this time is unable to do so. Baseline mental status is unknown as patient son is a poor historian however after speaking with him twice, patient's current condition does not appear to be her baseline. At this time  will admit to hospitalist.   Doni Benz PA-C     MED RECONCILIATION:  Current Facility-Administered Medications   Medication Dose Route Frequency    levETIRAcetam in saline (iso-os) (KEPPRA) infusion 1,000 mg  1,000 mg IntraVENous Q12H     Current Outpatient Medications   Medication Sig    guaiFENesin ER (Mucinex) 600 mg ER tablet Take 1 Tab by mouth two (2) times a day.  cyclobenzaprine (FLEXERIL) 10 mg tablet Take 1 Tab by mouth three (3) times daily as needed for Muscle Spasm(s).  OXcarbazepine (TRILEPTAL) 600 mg tablet Take 1 Tab by mouth every twelve (12) hours.  OXcarbaxepine (TRILEPTAL) 600 mg tablet Take 600 mg by mouth two (2) times a day.  divalproex DR (DEPAKOTE) 250 mg tablet Take 250 mg by mouth daily.  hydrOXYzine HCl (ATARAX) 50 mg tablet Take 1 Tab by mouth four (4) times daily as needed for Itching.  hydrALAZINE (APRESOLINE) 50 mg tablet Take 50 mg by mouth two (2) times a day.  ergocalciferol (VITAMIN D2) 50,000 unit capsule Take 50,000 Units by mouth two (2) days a week.  levETIRAcetam (KEPPRA) 1,000 mg tablet Take 1 Tab by mouth two (2) times a day.  triamcinolone acetonide (KENALOG) 0.1 % topical cream Apply  to affected area two (2) times a day. use thin layer    clopidogrel (PLAVIX) 75 mg tablet Take 75 mg by mouth daily.  lisinopril (PRINIVIL, ZESTRIL) 10 mg tablet Take 10 mg by mouth daily.  amLODIPine (NORVASC) 10 mg tablet Take 1 Tab by mouth daily.  atorvastatin (LIPITOR) 40 mg tablet Take 1 Tab by mouth nightly.     hydroxypropyl methylcellulose (GENTEAL) 0.2 % ophthalmic solution Administer 2 Drops to both eyes as needed.  cyanocobalamin (VITAMIN B12) 500 mcg tablet Take 1 Tab by mouth daily.  folic acid (FOLVITE) 1 mg tablet Take 1 Tab by mouth daily. Disposition:  home    DISCHARGE NOTE:     Pt has been reexamined. Patient has no new complaints, changes, or physical findings. Care plan outlined and precautions discussed. Discussed proper way to take medications. Discussed treatment plan, return precautions, symptomatic relief, and expected time to improvement. All questions answered. Patient is stable for discharge and outpatient management. Patient is ready to go home. Follow-up Information     Follow up With Specialties Details Why [address], NP Nurse Practitioner On 9/14/2020 at 8:30 am Nell [address]ing 43  [phone]            Current Discharge Medication List                Diagnosis     Clinical Impression:   1. Seizure disorder [facility])            Dictation disclaimer:  Please note that this dictation was completed with [employer], the computer voice recognition software. Quite often unanticipated grammatical, syntax, homophones, and other interpretive errors are inadvertently transcribed by the computer software. Please disregard these errors. Please excuse any errors that have escaped final proofreading.

## 2020-09-09 PROCEDURE — 96372 THER/PROPH/DIAG INJ SC/IM: CPT

## 2020-09-09 PROCEDURE — 65390000012 HC CONDITION CODE 44 OBSERVATION

## 2020-09-09 PROCEDURE — 74011250636 HC RX REV CODE- 250/636: Performed by: INTERNAL MEDICINE

## 2020-09-09 PROCEDURE — 96376 TX/PRO/DX INJ SAME DRUG ADON: CPT

## 2020-09-09 PROCEDURE — 92610 EVALUATE SWALLOWING FUNCTION: CPT

## 2020-09-09 PROCEDURE — 74011250636 HC RX REV CODE- 250/636: Performed by: HOSPITALIST

## 2020-09-09 PROCEDURE — 99218 HC RM OBSERVATION: CPT

## 2020-09-09 PROCEDURE — 96375 TX/PRO/DX INJ NEW DRUG ADDON: CPT

## 2020-09-09 RX ORDER — ENOXAPARIN SODIUM 100 MG/ML
30 INJECTION SUBCUTANEOUS DAILY
Status: DISCONTINUED | OUTPATIENT
Start: 2020-09-09 | End: 2020-09-13

## 2020-09-09 RX ORDER — LORAZEPAM 2 MG/ML
1 INJECTION INTRAMUSCULAR
Status: DISCONTINUED | OUTPATIENT
Start: 2020-09-09 | End: 2020-09-14

## 2020-09-09 RX ADMIN — Medication 10 ML: at 05:44

## 2020-09-09 RX ADMIN — LEVETIRACETAM 1000 MG: 10 INJECTION INTRAVENOUS at 09:18

## 2020-09-09 RX ADMIN — LORAZEPAM 1 MG: 2 INJECTION INTRAMUSCULAR; INTRAVENOUS at 00:22

## 2020-09-09 RX ADMIN — LORAZEPAM 1 MG: 2 INJECTION INTRAMUSCULAR; INTRAVENOUS at 20:45

## 2020-09-09 RX ADMIN — LEVETIRACETAM 1000 MG: 10 INJECTION INTRAVENOUS at 20:45

## 2020-09-09 RX ADMIN — Medication 10 ML: at 17:45

## 2020-09-09 RX ADMIN — Medication 10 ML: at 23:11

## 2020-09-09 RX ADMIN — ENOXAPARIN SODIUM 30 MG: 30 INJECTION SUBCUTANEOUS at 10:31

## 2020-09-09 NOTE — CONSULTS
Neurology Consult    Patient ID:  Jeffrey Hansen  [id number]  61 y.o.  1955    Subjective:     Date of Consultation:  September 9, 2020    Referring Physician: Dr. Yanci Pollack    Reason for Consultation:  seizure    History of Present Illness:   Jeffrey Hansen is a 76 y.o.  AA female with a history of stroke, HTN and  epilepsy , who presents with recurrent seizures. Patient is unable to provide history. Obtained history from chart. She was diagnosed with seizure disorder since age of 39 years and has been treated with various AEDs. Over the years she continous have recurrence, especially increased frequency in the past 4-5 years. In 2018 MRI reported right mesial temporal sclerosis. He has several hospitalization for repetitive seizure with prolonged postictal state. She is being followed by a PA at [facility]. She is prescribed with trileptal 300 mg 2x/day, Depakote  mg hs, Keppra 1000 mg 2x/day ,  Klonopin prn ( recorded in July note from [facility]). Her seizure frequency: from several time a week to several a day, with postictal encephalopathic state lasted fro hours to days. She has very limited physical activity. Her son is the primary caregiver. On the day of this admission, she has several convulsive type seizure and brought to ER, where she was treated with IV Keppra and she remains in lethargic state for many hours and prompted this admission. Other detail referred to this EMR elsewhere. Past Medical History:   Diagnosis Date    Hypertension     Neurological disorder     [facility] ([facility] [facility]     Stroke ([facility] [address] 2009      No past surgical history on file. Family History   Problem Relation Age of Onset    Diabetes Other     Stroke Other       Social History     Tobacco Use    Smoking status: Never Smoker    Smokeless tobacco: Never Used   Substance Use Topics    Alcohol use: Yes     Comment: Socially       Allergies   Allergen Reactions    Tomato Hives     Allergic to eating tomato.  Aspirin Nausea and Vomiting    Ciprofloxacin Rash    Pcn [Penicillins] Rash and Hives    Sulfa (Sulfonamide Antibiotics) Hives and Rash      Prior to Admission medications    Medication Sig Start Date End Date Taking? Authorizing Provider   OXcarbaxepine (TRILEPTAL) 600 mg tablet Take 600 mg by mouth two (2) times a day. Yes Provider, Historical   hydrALAZINE (APRESOLINE) 50 mg tablet Take 50 mg by mouth two (2) times a day. Yes Other, MD Yumiko   ergocalciferol (VITAMIN D2) 50,000 unit capsule Take 50,000 Units by mouth two (2) days a week. Yes Other, MD Yumiko   levETIRAcetam (KEPPRA) 1,000 mg tablet Take 1 Tab by mouth two (2) times a day. 10/24/17  Yes Brandon Neri MD   amLODIPine (NORVASC) 10 mg tablet Take 1 Tab by mouth daily. 12/1/15  Yes Rajeev Basilio MD   atorvastatin (LIPITOR) 40 mg tablet Take 1 Tab by mouth nightly. 11/2/15  Yes Tracy Razo MD   cyanocobalamin (VITAMIN B12) 500 mcg tablet Take 1 Tab by mouth daily. 3/13/14  Yes Rani Alaniz MD   clopidogrel (PLAVIX) 75 mg tablet Take 75 mg by mouth daily. Provider, Historical   lisinopril (PRINIVIL, ZESTRIL) 10 mg tablet Take 10 mg by mouth daily. Provider, Historical       Review of Systems:  Unable , patient is lethargic    Objective:     Patient Vitals for the past 8 hrs:   BP Temp Pulse Resp SpO2 Height Weight   09/09/20 0927 -- -- -- -- -- 5' (1.524 m) 60.6 kg (133 lb 11.2 oz)   09/09/20 0800 149/87 98.1 °F (36.7 °C) 79 18 100 % -- --     Neurological Exam:  Lethargic/ somnolent, aroused by repetitive noxious stimulation, but only brief wakeful period and unable to answer questions and giving verbal response. Eyes closed, with passive eye open, pupil in small size, reactive. Positive oculocephalic reflex  No facial weakness /asymmetry  Gag present  Moving extremities to noxious stimulation. Absent DTR      Labs: reviewed  Results for Barbie Gibbs (MRN [id number]) as of 9/9/2020 11:17   Ref.  Range 9/8/2020 00:28   WBC Latest Ref Range: 4.6 - 13.2 K/uL 9.8   RBC Latest Ref Range: 4.20 - 5.30 M/uL 3.96 (L)   HGB Latest Ref Range: 12.0 - 16.0 g/dL 12.0   HCT Latest Ref Range: 35.0 - 45.0 % 36.8   MCV Latest Ref Range: 74.0 - 97.0 FL 92.9   MCH Latest Ref Range: 24.0 - 34.0 PG 30.3   MCHC Latest Ref Range: 31.0 - 37.0 g/dL 32.6   RDW Latest Ref Range: 11.6 - 14.5 % 13.4   PLATELET Latest Ref Range: 135 - 420 K/uL 209   MPV Latest Ref Range: 9.2 - 11.8 FL 11.2   NEUTROPHILS Latest Ref Range: 40 - 73 % 58   LYMPHOCYTES Latest Ref Range: 21 - 52 % 32   MONOCYTES Latest Ref Range: 3 - 10 % 7   EOSINOPHILS Latest Ref Range: 0 - 5 % 3   BASOPHILS Latest Ref Range: 0 - 2 % 0   DF Latest Units:   AUTOMATED   ABS. NEUTROPHILS Latest Ref Range: 1.8 - 8.0 K/UL 5.6   ABS. LYMPHOCYTES Latest Ref Range: 0.9 - 3.6 K/UL 3.1   ABS. MONOCYTES Latest Ref Range: 0.05 - 1.2 K/UL 0.7   ABS. EOSINOPHILS Latest Ref Range: 0.0 - 0.4 K/UL 0.3   ABS. BASOPHILS Latest Ref Range: 0.0 - 0.1 K/UL 0.0   Sodium Latest Ref Range: 136 - 145 mmol/L 141   Potassium Latest Ref Range: 3.5 - 5.5 mmol/L 3.5   Chloride Latest Ref Range: 100 - 111 mmol/L 110   CO2 Latest Ref Range: 21 - 32 mmol/L 22   Anion gap Latest Ref Range: 3.0 - 18 mmol/L 9   Glucose Latest Ref Range: 74 - 99 mg/dL 108 (H)   BUN Latest Ref Range: 7.0 - 18 MG/DL 16   Creatinine Latest Ref Range: 0.6 - 1.3 MG/DL 1.56 (H)   BUN/Creatinine ratio Latest Ref Range: 12 - 20   10 (L)   Calcium Latest Ref Range: 8.5 - 10.1 MG/DL 9.0   Magnesium Latest Ref Range: 1.6 - 2.6 mg/dL 1.9   GFR est non-AA Latest Ref Range: >60 ml/min/1.73m2 33 (L)   GFR est AA Latest Ref Range: >60 ml/min/1.73m2 40 (L)   Carbamazepine Latest Ref Range: 4.0 - 12.0 ug/mL <0.5 (L)   Valproic acid Latest Ref Range: 50 - 100 ug/ml <3 (L)   LEVETIRACETAM (KEPPRA) Unknown Rpt     CT Head:   In the setting of motion artifact, no definite acute intracranial hemorrhage or  mass effect.   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       Assessment:   Seizure disorder, partial complex  Recurrent seizure, refractory seizure, subtherapeutic AEDs    Trileptal and depakote level , near non detectable. Plan:     Recurrent seizure related  to subtherapeutic AEDs, will need to bring up AED to therapeutic to determine if seizure is refractory or non-responsive. (defer to her neurologist on our patient)  Question of compliance,   Depakote DR is generic form and should be dosed  2-3 times a day. If LFT is normal, may increase to 1000 mg 2x/day and continue Keppra as current 1000 mg 2x/day.   Defer definitive AED management to her neurologist    Signed By:  Nathan Ta Essex)  Nia Coto MD     September 9, 2020

## 2020-09-09 NOTE — ED NOTES
TRANSFER - OUT REPORT:    Verbal report given to Loren Johnson RN on Marivel Penny  being transferred to , Room 419 for routine progression of care       Report consisted of patients Situation, Background, Assessment and   Recommendations(SBAR). Information from the following report(s) SBAR was reviewed with the receiving nurse. Lines:   Peripheral IV 09/08/20 Left Wrist (Active)   Site Assessment Clean, dry, & intact 09/08/20 0755   Phlebitis Assessment 0 09/08/20 0755   Infiltration Assessment 0 09/08/20 0755        Opportunity for questions and clarification was provided.       Patient transported with:  Hospital Transport

## 2020-09-09 NOTE — PROGRESS NOTES
Reason for Admission:  Recurrent seizures ([facility] [address] [G40.909]  Recurrent seizures ([facility] [address] [G40.909]                 RUR Score:   12$%           Plan for utilizing home health: To be determined                    Likelihood of Readmission:   LOW                         Transition of Care Plan:              Initial assessment completed with wilber Shields via phone. Cognitive status of patient: Unable to assess. Patient just moans. Face sheet information confirmed:  yes. Esha Sorensen son ([phone]) provided needed information. This patient lives in a apartment with son. Patient was able to navigate steps as needed. Prior to hospitalization, patient was considered to be independent with ADLs/IADLS : yes . Patient has a current ACP document on file: no  The patient's son, Brooke Null or Tomas Nathan, sister ([phone]) will be available to transport patient home upon discharge. The son report no medical equipment available in the home. Patient is not currently active with home health. Patient has not stayed in a skilled nursing facility or rehab. This patient is on dialysis :no     Freedom of choice signed: no     Currently, the discharge plan is Home with family assistance. CM will continue to monitor for transitional needs for a safe discharge. The patient states that she can obtain her medications from the pharmacy, and take her medications as directed. Patient's current insurance is [employer]      Care Management Interventions  PCP Verified by CM: Yes  Mode of Transport at Discharge: Self  Transition of Care Consult (CM Consult): Discharge Planning  Discharge Durable Medical Equipment: No  Physical Therapy Consult: No  Occupational Therapy Consult: No  Speech Therapy Consult: Yes  Current Support Network: Other(Maddy Erazo lives with patient.)  Confirm Follow Up Transport: Self  Discharge Location  Discharge Placement: Home with hospice      SAMSON Greenfield, RN  Pager # [phone]  Care Manager

## 2020-09-09 NOTE — ED NOTES
Patient resting on stretcher. No signs of distress. Chest rise and fall present. Will continue to monitor patient.

## 2020-09-09 NOTE — PROGRESS NOTES
Observation notice provided in writing to patient and/or caregiver as well as verbal explanation of the policy. Patients who are in outpatient status also receive the Observation notice. Patient has been changed from inpatient to observation and a Condition Code 44 has been completed based on Medicare criteria. The patient / or next of kin has been made aware of this change in status and provided with a copy of Condition Code 44 letter.       Yonathan Ashford RN BSN  Care Manager  [phone]

## 2020-09-09 NOTE — PROGRESS NOTES
Problem: Dysphagia (Adult)  Goal: *Acute Goals and Plan of Care (Insert Text)  Description: Patient will:  1. Tolerate PO trials with 0 s/s overt distress in 4/5 trials  2. Utilize compensatory swallow strategies/maneuvers (decrease bite/sip, size/rate, alt. liq/sol) with min cues in 4/5 trials  3. Perform oral-motor/laryngeal exercises to increase oropharyngeal swallow function with min cues  4. Complete an objective swallow study (i.e., MBSS) to assess swallow integrity, r/o aspiration, and determine of safest LRD, min A as indicated/ordered by MD     Recommend:   NPO; may need to consider alternative nutrition/hydration source if no improvement   Strict aspiration precautions (HOB >30 degrees at all times, Oral care TID)    Outcome: Progressing Towards Goal    SPEECH LANGUAGE PATHOLOGY BEDSIDE SWALLOW EVALUATION    Patient: Amanda Baker (25 y.o. female)  Date: 9/9/2020  Primary Diagnosis: Recurrent seizures ([facility] [address] [G40.909]  Precautions: Aspiration      PLOF: Unknown     ASSESSMENT :  Based on the objective data described below, the patient presents with severe oral and suspected severe pharyngeal dysphagia suspect secondary to post ictal state. Pt able to open eyes briefly x1. Unable to follow commands. Oral mech exam limited but structures functional for speech/deglutition upon cursory examination. Pt tolerating oral care via toothette with no overt distress s/sx. Pt with absent bolus propulsion of ice chip despite max multi-modal cues. Removed by SLP. Pt demo inadequate sensorimotor awareness/control for safe and efficient po intake. Recommend continue NPO; may need to consider alternative nutrition/hydration source pending mental status improvement. Will follow as indicated for further dysphagia management. D/w RN. Patient will benefit from skilled intervention to address the above impairments.   Patient's rehabilitation potential is considered to be Guarded  Factors which may influence rehabilitation potential include:   []            None noted  [x]            Mental ability/status  [x]            Medical condition  []            Home/family situation and support systems  [x]            Safety awareness  []            Pain tolerance/management  []            Other:      PLAN :  Recommendations and Planned Interventions:  As above   Frequency/Duration: Patient will be followed by speech-language pathology 1-2 times per day/4-7 days per week to address goals. Discharge Recommendations: To Be Determined     SUBJECTIVE:   Patient aphonic     OBJECTIVE:     Past Medical History:   Diagnosis Date    Hypertension     Neurological disorder     Seizures ([facility] [address]     St[facility] 2009   No past surgical history on file. Prior Level of Function/Home Situation: unknown   Diet prior to admission: unknown   Current Diet:  NPO   Cognitive and Communication Status:  Orientation Level: Unable to verbalize  Cognition: No command following  Oral Assessment:  Oral Assessment  Labial: No impairment  Dentition: Natural  Oral Hygiene: Good  Lingual: Incoordinated  Velum: Unable to visualize  Mandible: Restricted  P.O. Trials:  Patient Position: [facility] at [facility]  Vocal quality prior to P.O.: Aphonic  Consistency Presented: Ice chips  How Presented: SLP-fed/presented;Spoon  Bolus Acceptance: Impaired  Bolus Formation/Control: Impaired  Type of Impairment: Incomplete  Oral Residue: Greater than 50% of bolus; Lingual  Initiation of Swallow: Absent  Laryngeal Elevation: Absent  Oral Phase Severity: Severe  Pharyngeal Phase Severity : Severe    PAIN:  Start of Eval: pt aphonic   End of Eval: pt aphonic     After treatment:   []            Patient left in no apparent distress sitting up in chair  [x]            Patient left in no apparent distress in bed  [x]            Call bell left within reach  [x]            Nursing notified  []            Family present  []            Caregiver present  []            Bed alarm activated    COMMUNICATION/EDUCATION:   [x]            Aspiration precautions; swallow safety; compensatory techniques. []            Patient/family have participated as able in goal setting and plan of care. []            Patient/family agree to work toward stated goals and plan of care. []            Patient understands intent and goals of therapy; neutral about participation. [x]            Patient unable to participate in goal setting/plan of care; educ ongoing with interdisciplinary staff  [x]         Posted safety precautions in patient's room.     Thank you for this referral,  JOSÉ MIGUEL Cervantes.S., [address] [facility]  Speech-Language Pathologist

## 2020-09-09 NOTE — PROGRESS NOTES
[facility]  Progress Note    Patient: Paz Jacinto Age: 76 y.o. : 1955 MR#: [id number] SSN: [id number]  Date/Time: 2020     Subjective:     Patient seen and evaluated, lying in bed, unable to give significant information        Assessment/Plan:     1. Seizure disorder with 2 seizures in the last 24 hours-continue Keppra and Trileptal.  Neurology note reviewed-recommended to add Depakote however it appears that the patient is not taking Depakote at home will confirm with patient's son. I spoke with the patient's son who mentions she is only taking Keppra and Trileptal and not taking Depakote. 2. History of hypertension-resume home medications, monitor blood pressure  3. History of dyslipidemia   4. History of CKD 3-stable, continue to monitor creatinine  DVT prophylaxis- Lovenox  Full code          Case discussed with:  []Patient  []Family  [x]Nursing  []Case Management  DVT Prophylaxis:  [x]Lovenox  []Hep SQ  []SCDs  []Coumadin   []On Heparin gtt    Objective:   VS:   Visit Vitals  /71 (BP 1 Location: Right arm, BP Patient Position: At rest)   Pulse 90   Temp 97.5 °F (36.4 °C)   Resp 18   Ht 5' (1.524 m)   Wt 60.6 kg (133 lb 11.2 oz)   SpO2 100%   BMI 26.11 kg/m²      Tmax/24hrs: Temp (24hrs), Av.7 °F (37.1 °C), Min:97.5 °F (36.4 °C), Max:100.3 °F (37.9 °C)  IOBRIEF    Intake/Output Summary (Last 24 hours) at 2020 1441  Last data filed at 2020 0130  Gross per 24 hour   Intake 10 ml   Output --   Net 10 ml       General: Sleepy   HEENT: PERRLA, anicteric sclerae. Pulmonary:  CTA Bilaterally. No Wheezing/Rhonchi/Rales. Cardiovascular: Regular rate and Rhythm. GI:  Soft, Non distended, Non tender. + Bowel sounds. Extremities:  No edema, cyanosis, clubbing. No calf tenderness.    Neurologic: Sleepy  Additional:    Medications:   Current Facility-Administered Medications   Medication Dose Route Frequency    LORazepam (ATIVAN) injection 1 mg 1 mg IntraVENous Q6H PRN    enoxaparin (LOVENOX) injection 30 mg  30 mg SubCUTAneous DAILY    levETIRAcetam in saline (iso-os) (KEPPRA) infusion 1,000 mg  1,000 mg IntraVENous Q12H    amLODIPine (NORVASC) tablet 10 mg  10 mg Oral DAILY    atorvastatin (LIPITOR) tablet 40 mg  40 mg Oral QHS    cyanocobalamin tablet 500 mcg  500 mcg Oral DAILY    [START ON 9/10/2020] ergocalciferol capsule 50,000 Units  50,000 Units Oral Once per day on Thu Sat    hydrALAZINE (APRESOLINE) tablet 50 mg  50 mg Oral BID    lisinopriL (PRINIVIL, ZESTRIL) tablet 10 mg  10 mg Oral DAILY    OXcarbazepine (TRILEPTAL) tablet 600 mg  600 mg Oral BID    sodium chloride (NS) flush 5-40 mL  5-40 mL IntraVENous Q8H    sodium chloride (NS) flush 5-40 mL  5-40 mL IntraVENous PRN    acetaminophen (TYLENOL) tablet 650 mg  650 mg Oral Q6H PRN    Or    acetaminophen (TYLENOL) suppository 650 mg  650 mg Rectal Q6H PRN    polyethylene glycol (MIRALAX) packet 17 g  17 g Oral DAILY PRN    promethazine (PHENERGAN) tablet 12.5 mg  12.5 mg Oral Q6H PRN    Or    ondansetron (ZOFRAN) injection 4 mg  4 mg IntraVENous Q6H PRN       Imaging:   XR Results (most recent):  Results from Hospital Encounter encounter on 03/23/20   XR CHEST PA LAT    Narrative Chest  PA and lateral views    INDICATION:  Cough, shortness of breath, chest pain    COMPARISON:  Prior chest x-rays, most recent 1/3/2020    FINDINGS:  The cardiac silhouette is normal in size. The pulmonary vasculature  is unremarkable. No focal consolidation, pleural effusion, or pneumothorax. No  acute osseous abnormalities are identified. Mild spondylosis. Impression IMPRESSION:   No acute finding. .            CT Results (most recent):  Results from Hospital Encounter encounter on 09/08/20   CT HEAD WO CONT    Narrative CT of the head without contrast    HISTORY: Witnessed seizure    COMPARISON: 1/3/20    TECHNIQUE: Helical axial scan to the head was performed from the skull base to  the vertex without IV contrast administration. All CT scans at this facility performed using dose optimization techniques as  appreciated to a performed exam, to include automated exposure control,  adjustment of the mA and or KU according to patient size (including appropriate  matching for site specific examination), or use of iterative reconstruction  technique. FINDINGS:    Motion artifact noted. Mild global atrophy with mild ventricular dilatation  appear unchanged remote infarct in right parietal/occipital lobes with negative  mass effect and retraction of right parietal horn. Moderate and extensive  periventricular white matter hypodensities also present. . There is no evidence  of acute intracranial hemorrhage,  mass effect or midline shift identified. No  skull fracture or extra axial fluid collections identified. Visualized sinuses  and mastoid air cells appear unremarkable. Impression IMPRESSION:    In the setting of motion artifact, no definite acute intracranial hemorrhage or  mass effect. Note: If clinical concern of acute stroke, MRI with diffusion weighted images is  recommended for better evaluation. 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 Thank you for your referral.             Labs:    Recent Results (from the past 48 hour(s))   CBC WITH AUTOMATED DIFF    Collection Time: 09/08/20 12:28 AM   Result Value Ref Range    WBC 9.8 4.6 - 13.2 K/uL    RBC 3.96 (L) 4.20 - 5.30 M/uL    HGB 12.0 12.0 - 16.0 g/dL    HCT 36.8 35.0 - 45.0 %    MCV 92.9 74.0 - 97.0 FL    MCH 30.3 24.0 - 34.0 PG    MCHC 32.6 31.0 - 37.0 g/dL    RDW 13.4 11.6 - 14.5 %    PLATELET 591 950 - 634 K/uL    MPV 11.2 9.2 - 11.8 FL    NEUTROPHILS 58 40 - 73 %    LYMPHOCYTES 32 21 - 52 %    MONOCYTES 7 3 - 10 %    EOSINOPHILS 3 0 - 5 %    BASOPHILS 0 0 - 2 %    ABS. NEUTROPHILS 5.6 1.8 - 8.0 K/UL    ABS. LYMPHOCYTES 3.1 0.9 - 3.6 K/UL    ABS.  MONOCYTES 0.7 0.05 - 1.2 K/UL    ABS. EOSINOPHILS 0.3 0.0 - 0.4 K/UL    ABS. BASOPHILS 0.0 0.0 - 0.1 K/UL    DF AUTOMATED     METABOLIC PANEL, BASIC    Collection Time: 09/08/20 12:28 AM   Result Value Ref Range    Sodium 141 136 - 145 mmol/L    Potassium 3.5 3.5 - 5.5 mmol/L    Chloride 110 100 - 111 mmol/L    CO2 22 21 - 32 mmol/L    Anion gap 9 3.0 - 18 mmol/L    Glucose 108 (H) 74 - 99 mg/dL    BUN 16 7.0 - 18 MG/DL    Creatinine 1.56 (H) 0.6 - 1.3 MG/DL    BUN/Creatinine ratio 10 (L) 12 - 20      GFR est AA 40 (L) >60 ml/min/1.73m2    GFR est non-AA 33 (L) >60 ml/min/1.73m2    Calcium 9.0 8.5 - 10.1 MG/DL   MAGNESIUM    Collection Time: 09/08/20 12:28 AM   Result Value Ref Range    Magnesium 1.9 1.6 - 2.6 mg/dL   CARBAMAZEPINE    Collection Time: 09/08/20 12:28 AM   Result Value Ref Range    Carbamazepine <0.5 (L) 4.0 - 12.0 ug/mL   VALPROIC ACID    Collection Time: 09/08/20 12:28 AM   Result Value Ref Range    Valproic acid <3 (L) 50 - 100 ug/ml   URINALYSIS W/ RFLX MICROSCOPIC    Collection Time: 09/08/20 12:32 PM   Result Value Ref Range    Color YELLOW      Appearance CLEAR      Specific gravity 1.012 1.005 - 1.030      pH (UA) 7.0 5.0 - 8.0      Protein Negative NEG mg/dL    Glucose Negative NEG mg/dL    Ketone Negative NEG mg/dL    Bilirubin Negative NEG      Blood Negative NEG      Urobilinogen 0.2 0.2 - 1.0 EU/dL    Nitrites Negative NEG      Leukocyte Esterase Negative NEG         Signed By: Brandon Araujo MD     September 9, 2020      I spent 35 minutes with the patient in face-to-face consultation, of which greater than 50% was spent in counseling and coordination of care as described above    Disclaimer: Sections of this note are dictated using utilizing voice recognition software. Minor typographical errors may be present. If questions arise, please do not hesitate to contact me or call our department.

## 2020-09-09 NOTE — PROGRESS NOTES
Comprehensive Nutrition Assessment    Type and Reason for Visit: Initial, Positive nutrition screen    Nutrition Recommendations/Plan:   - Recommend NGT placement to provide enteral feeding if consistent with goals of care- recommend starting Jevity 1.5 at 10 mL/hr and advance as tolerated by 10 mL q 8 hours to goal rate of 45 mL/hr with 150 mL q 4 hour water flushes. Nutrition Assessment:  NPO per SLP with altered mentation, aphasia. Receiving cyanocobalamin, ergocalciferol, ondansetron, promethazine and miralax prn. Malnutrition Assessment:  Malnutrition Status:  No malnutrition      Nutrition History and Allergies: Hx of HTN, CVA and epilepsy admitted with recurrent seizures and remains NPO per SLP after swallow evaluation today. Tomato allergy. Estimated Daily Nutrient Needs:  Energy (kcal):  0119-5450  Protein (g):  49-73       Fluid (ml/day):  4228-1008    Nutrition Related Findings:  BM PTA      Wounds:  None       Additional Caloric Sources: none     Current Nutrition Therapies:   DIET NPO    Tube Feeding (TF) Regimen:   · Feeding Route: Nasogastric  · Formula: Jevity 1.5  · Schedule:Continuous    · Regimen: at 10 mL/hr and advance as tolerated by 10 mL q 8 hours to goal rate of 45 mL/hr  · Water Flushes: 150 mL q 4 hours (900 mL)  · Current TF & Flush Orders Provides: not applicable, no orders at present  · Goal TF & Flush Orders Provides: 1620 kcal, 69 gm protein, 820 mL free water, 100% RDIs      Anthropometric Measures:  · Height:  5' (152.4 cm)  · Current Body Wt:  60.6 kg (133 lb 9.6 oz)   · Admission Body Wt:  133 lb 9.6 oz    · Usual Body Wt:  115 lb(3/23/20)     · Ideal Body Wt:  100 lbs:  133.6 %   · BMI Category: Overweight (BMI 25.0-29. 9)       Nutrition Diagnosis:   · Inadequate oral intake related to cognitive or neurological impairment, swallowing difficulty as evidenced by NPO or clear liquid status due to medical condition, swallowing study results      Nutrition Interventions: Food and/or Nutrient Delivery: Continue NPO, Start tube feeding  Nutrition Education and Counseling: No recommendations at this time  Coordination of Nutrition Care: Continued inpatient monitoring, Coordination of community care(discussed recommendation to place NGT and start tube feeding with Dr Luis Miguel Rock)    Goals:  Nutritional needs will be met through adequate oral intake or nutrition support within the next 7 days. Nutrition Monitoring and Evaluation:   Food/Nutrient Intake Outcomes: Enteral nutrition intake/tolerance, Diet advancement/tolerance  Physical Signs/Symptoms Outcomes: Biochemical data, Chewing or swallowing, Fluid status or edema, Hemodynamic status, Nutrition focused physical findings, Weight    Discharge Planning:     Too soon to determine     Electronically signed by April Iglesias RD, [address]  on 9/9/2020 at 1:39 PM    Contact: [phone]

## 2020-09-09 NOTE — ROUTINE PROCESS
TRANSFER - IN REPORT:    Verbal report received from [facility] on Josef Fee  being received from [facility]  for routine progression of care      Report consisted of patients Situation, Background, Assessment and   Recommendations(SBAR). Information from the following report(s) SBAR, ED Summary, Procedure Summary, Intake/Output, MAR and Recent Results was reviewed with the receiving nurse. Opportunity for questions and clarification was provided. Assessment completed upon patients arrival to unit and care assumed.

## 2020-09-10 LAB
ANION GAP SERPL CALC-SCNC: 14 MMOL/L (ref 3–18)
BUN SERPL-MCNC: 33 MG/DL (ref 7–18)
BUN/CREAT SERPL: 20 (ref 12–20)
CALCIUM SERPL-MCNC: 10 MG/DL (ref 8.5–10.1)
CHLORIDE SERPL-SCNC: 110 MMOL/L (ref 100–111)
CO2 SERPL-SCNC: 15 MMOL/L (ref 21–32)
CREAT SERPL-MCNC: 1.64 MG/DL (ref 0.6–1.3)
GLUCOSE SERPL-MCNC: 94 MG/DL (ref 74–99)
LEVETIRACETAM SERPL-MCNC: <1 UG/ML (ref 10–40)
POTASSIUM SERPL-SCNC: 3.9 MMOL/L (ref 3.5–5.5)
SODIUM SERPL-SCNC: 139 MMOL/L (ref 136–145)

## 2020-09-10 PROCEDURE — 80048 BASIC METABOLIC PNL TOTAL CA: CPT

## 2020-09-10 PROCEDURE — 92526 ORAL FUNCTION THERAPY: CPT

## 2020-09-10 PROCEDURE — 96372 THER/PROPH/DIAG INJ SC/IM: CPT

## 2020-09-10 PROCEDURE — 74011250637 HC RX REV CODE- 250/637: Performed by: INTERNAL MEDICINE

## 2020-09-10 PROCEDURE — 96376 TX/PRO/DX INJ SAME DRUG ADON: CPT

## 2020-09-10 PROCEDURE — 74011250636 HC RX REV CODE- 250/636: Performed by: INTERNAL MEDICINE

## 2020-09-10 PROCEDURE — 36415 COLL VENOUS BLD VENIPUNCTURE: CPT

## 2020-09-10 PROCEDURE — 99218 HC RM OBSERVATION: CPT

## 2020-09-10 RX ADMIN — Medication 10 ML: at 05:49

## 2020-09-10 RX ADMIN — LEVETIRACETAM 1000 MG: 10 INJECTION INTRAVENOUS at 07:36

## 2020-09-10 RX ADMIN — Medication 10 ML: at 21:25

## 2020-09-10 RX ADMIN — CYANOCOBALAMIN TAB 500 MCG 500 MCG: 500 TAB at 09:32

## 2020-09-10 RX ADMIN — ENOXAPARIN SODIUM 30 MG: 30 INJECTION SUBCUTANEOUS at 09:33

## 2020-09-10 RX ADMIN — ATORVASTATIN CALCIUM 40 MG: 40 TABLET, FILM COATED ORAL at 21:23

## 2020-09-10 RX ADMIN — HYDRALAZINE HYDROCHLORIDE 50 MG: 50 TABLET ORAL at 09:32

## 2020-09-10 RX ADMIN — OXCARBAZEPINE 600 MG: 300 TABLET, FILM COATED ORAL at 09:31

## 2020-09-10 RX ADMIN — LISINOPRIL 10 MG: 10 TABLET ORAL at 09:32

## 2020-09-10 RX ADMIN — LEVETIRACETAM 1000 MG: 10 INJECTION INTRAVENOUS at 21:24

## 2020-09-10 RX ADMIN — OXCARBAZEPINE 600 MG: 300 TABLET, FILM COATED ORAL at 18:01

## 2020-09-10 RX ADMIN — AMLODIPINE BESYLATE 10 MG: 10 TABLET ORAL at 09:32

## 2020-09-10 RX ADMIN — Medication 10 ML: at 14:00

## 2020-09-10 NOTE — PROGRESS NOTES
[facility] Hospitalist Group  Progress Note    Patient: Claudia Dinh Age: 76 y.o. : 1955 MR#: [id number] SSN: [id number]  Date/Time: 9/10/2020     Subjective:     Patient seen and evaluated, lying in bed, unable to give significant information  Patient is sleepy but arousable-Per my discussion with nurse patient has been having episodes of increased drowsiness. Assessment/Plan:     1. Seizure disorder with 2 seizures in the last 24 hours-continue Keppra and Trileptal.  Neurology note reviewed-recommended to add Depakote however it appears that the patient is not taking Depakote at home will confirm with patient's son. I spoke with the patient's son who mentions she is only taking Keppra and Trileptal and not taking Depakote. Will recheck Keppra and Trileptal levels in a.m.  2. History of hypertension-resume home medications, monitor blood pressure  3. History of dyslipidemia   4. History of CKD 3-stable, continue to monitor creatinine  DVT prophylaxis- Lovenox  Full code    PT and OT eval      Case discussed with:  []Patient  []Family  [x]Nursing  []Case Management  DVT Prophylaxis:  [x]Lovenox  []Hep SQ  []SCDs  []Coumadin   []On Heparin gtt    Objective:   VS:   Visit Vitals  /60 (BP 1 Location: Left arm, BP Patient Position: Supine)   Pulse 100   Temp 98.5 °F (36.9 °C)   Resp 16   Ht 5' (1.524 m)   Wt 60.6 kg (133 lb 11.2 oz)   SpO2 100%   BMI 26.11 kg/m²      Tmax/24hrs: Temp (24hrs), Av °F (36.7 °C), Min:97.4 °F (36.3 °C), Max:98.6 °F (37 °C)  IOBRIEF    Intake/Output Summary (Last 24 hours) at 9/10/2020 1410  Last data filed at 9/10/2020 0400  Gross per 24 hour   Intake 420 ml   Output --   Net 420 ml       General: Sleepy   HEENT: PERRLA, anicteric sclerae. Pulmonary:  CTA Bilaterally. No Wheezing/Rhonchi/Rales. Cardiovascular: Regular rate and Rhythm. GI:  Soft, Non distended, Non tender. + Bowel sounds. Extremities:  No edema, cyanosis, clubbing.  No calf tenderness. Neurologic: Sleepy  Additional:    Medications:   Current Facility-Administered Medications   Medication Dose Route Frequency    LORazepam (ATIVAN) injection 1 mg  1 mg IntraVENous Q6H PRN    enoxaparin (LOVENOX) injection 30 mg  30 mg SubCUTAneous DAILY    levETIRAcetam in saline (iso-os) (KEPPRA) infusion 1,000 mg  1,000 mg IntraVENous Q12H    amLODIPine (NORVASC) tablet 10 mg  10 mg Oral DAILY    atorvastatin (LIPITOR) tablet 40 mg  40 mg Oral QHS    cyanocobalamin tablet 500 mcg  500 mcg Oral DAILY    ergocalciferol capsule 50,000 Units  50,000 Units Oral Once per day on Thu Sat    hydrALAZINE (APRESOLINE) tablet 50 mg  50 mg Oral BID    lisinopriL (PRINIVIL, ZESTRIL) tablet 10 mg  10 mg Oral DAILY    OXcarbazepine (TRILEPTAL) tablet 600 mg  600 mg Oral BID    sodium chloride (NS) flush 5-40 mL  5-40 mL IntraVENous Q8H    sodium chloride (NS) flush 5-40 mL  5-40 mL IntraVENous PRN    acetaminophen (TYLENOL) tablet 650 mg  650 mg Oral Q6H PRN    Or    acetaminophen (TYLENOL) suppository 650 mg  650 mg Rectal Q6H PRN    polyethylene glycol (MIRALAX) packet 17 g  17 g Oral DAILY PRN    promethazine (PHENERGAN) tablet 12.5 mg  12.5 mg Oral Q6H PRN    Or    ondansetron (ZOFRAN) injection 4 mg  4 mg IntraVENous Q6H PRN       Imaging:   XR Results (most recent):  Results from Hospital Encounter encounter on 03/23/20   XR CHEST PA LAT    Narrative Chest  PA and lateral views    INDICATION:  Cough, shortness of breath, chest pain    COMPARISON:  Prior chest x-rays, most recent 1/3/2020    FINDINGS:  The cardiac silhouette is normal in size. The pulmonary vasculature  is unremarkable. No focal consolidation, pleural effusion, or pneumothorax. No  acute osseous abnormalities are identified. Mild spondylosis. Impression IMPRESSION:   No acute finding. .            CT Results (most recent):  Results from Hospital Encounter encounter on 09/08/20   CT HEAD WO CONT    Narrative CT of the head without contrast    HISTORY: Witnessed seizure    COMPARISON: 1/3/20    TECHNIQUE: Helical axial scan to the head was performed from the skull base to  the vertex without IV contrast administration. All CT scans at this facility performed using dose optimization techniques as  appreciated to a performed exam, to include automated exposure control,  adjustment of the mA and or KU according to patient size (including appropriate  matching for site specific examination), or use of iterative reconstruction  technique. FINDINGS:    Motion artifact noted. Mild global atrophy with mild ventricular dilatation  appear unchanged remote infarct in right parietal/occipital lobes with negative  mass effect and retraction of right parietal horn. Moderate and extensive  periventricular white matter hypodensities also present. . There is no evidence  of acute intracranial hemorrhage,  mass effect or midline shift identified. No  skull fracture or extra axial fluid collections identified. Visualized sinuses  and mastoid air cells appear unremarkable. Impression IMPRESSION:    In the setting of motion artifact, no definite acute intracranial hemorrhage or  mass effect. Note: If clinical concern of acute stroke, MRI with diffusion weighted images is  recommended for better evaluation. 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     Thank you for your referral.             Labs:    Recent Results (from the past 48 hour(s))   METABOLIC PANEL, BASIC    Collection Time: 09/10/20  5:38 AM   Result Value Ref Range    Sodium 139 136 - 145 mmol/L    Potassium 3.9 3.5 - 5.5 mmol/L    Chloride 110 100 - 111 mmol/L    CO2 15 (L) 21 - 32 mmol/L    Anion gap 14 3.0 - 18 mmol/L    Glucose 94 74 - 99 mg/dL    BUN 33 (H) 7.0 - 18 MG/DL    Creatinine 1.64 (H) 0.6 - 1.3 MG/DL    BUN/Creatinine ratio 20 12 - 20      GFR est AA 38 (L) >60 ml/min/1.73m2    GFR est non-AA 31 (L) >60 ml/min/1.73m2    Calcium 10.0 8.5 - 10.1 MG/DL       Signed By: Susan Calvin MD     September 10, 2020      I spent 35 minutes with the patient in face-to-face consultation, of which greater than 50% was spent in counseling and coordination of care as described above    Disclaimer: Sections of this note are dictated using utilizing voice recognition software. Minor typographical errors may be present. If questions arise, please do not hesitate to contact me or call our department.

## 2020-09-10 NOTE — ROUTINE PROCESS
Bedside shift change report given to Saul Victor RN (oncoming nurse) by MIGUEL Shay RN (offgoing nurse). Report included the following information SBAR, Kardex, MAR and Recent Results.

## 2020-09-10 NOTE — PROGRESS NOTES
Problem: Dysphagia (Adult)  Goal: *Acute Goals and Plan of Care (Insert Text)  Description: Patient will:  1. Tolerate PO trials with 0 s/s overt distress in 4/5 trials  2. Utilize compensatory swallow strategies/maneuvers (decrease bite/sip, size/rate, alt. liq/sol) with min cues in 4/5 trials  3. Perform oral-motor/laryngeal exercises to increase oropharyngeal swallow function with min cues  4. Complete an objective swallow study (i.e., MBSS) to assess swallow integrity, r/o aspiration, and determine of safest LRD, min A    Rec:     Full thin liquid diet  Aspiration precautions  HOB >45 during po intake, remain >30 for 30-45 minutes after po   Small bites/sips; alternate liquid/solid with slow feeding rate   Oral care TID  Meds in puree  Assistance with PO      9/10/2020 1111 by Isaak HERNÁNDEZ  Outcome: Progressing Towards Goal     SPEECH LANGUAGE PATHOLOGY DYSPHAGIA TREATMENT    Patient: Jerry Arevalo (28 y.o. female)  Date: 9/10/2020  Diagnosis: Recurrent seizures ([facility] [address] [G40.909]  Recurrent seizures ([facility] [address] [C11.599]   <principal problem not specified>       Precautions: aspiration    PLOF: As per H&P      ASSESSMENT:  Pt was seen at bedside for follow up dysphagia management. She continues to be lethargic, but would alert to sternal rub with verbal stimuli. She tolerated nectar-thick and thin liquid trials +/- straw with no overt s/sx of aspiration. Laryngeal elevation was functional/timely to palpation. Pt with delayed bolus manipulation of puree with labored a-p transit, which required mod-max cues for pt to initiate swallow. Recommend initiation of full liquid diet, aspiration precautions, oral care TID, and meds crushed. She will require assistance with Mark Low will continue to follow.      Progression toward goals:  []         Improving appropriately and progressing toward goals  [x]         Improving slowly and progressing toward goals  []         Not making progress toward goals and plan of care will be adjusted     PLAN:  Recommendations and Planned Interventions: See above  Patient continues to benefit from skilled intervention to address the above impairments. Continue treatment per established plan of care. Discharge Recommendations:  [address], and To Be Determined     SUBJECTIVE:   Patient stated What's that? .    OBJECTIVE:   Cognitive and Communication Status:  Neurologic State: Postictal  Orientation Level: Unable to verbalize  Cognition: No command following    Dysphagia Treatment: see above    PAIN:  Start of Tx: 0  End of Tx: 0     After treatment:   []              Patient left in no apparent distress sitting up in chair  [x]              Patient left in no apparent distress in bed  [x]              Call bell left within reach  [x]              Nursing notified  []              Family present  []              Caregiver present  []              Bed alarm activated      COMMUNICATION/EDUCATION:   [x] Aspiration precautions; swallow safety; compensatory techniques  [x]        Patient/family able to participate in training and education   []  Patient unable to participate in training and education, education ongoing with staff   [] Patient understands goals and intent of therapy; neutral about participation     Thank you for this referral.    Dhiraj Wheatley M.S. CCC-SLP/L  Speech-Language Pathologist

## 2020-09-11 LAB
ANION GAP SERPL CALC-SCNC: 10 MMOL/L (ref 3–18)
BASOPHILS # BLD: 0 K/UL (ref 0–0.1)
BASOPHILS NFR BLD: 0 % (ref 0–2)
BUN SERPL-MCNC: 46 MG/DL (ref 7–18)
BUN/CREAT SERPL: 18 (ref 12–20)
CALCIUM SERPL-MCNC: 9.4 MG/DL (ref 8.5–10.1)
CHLORIDE SERPL-SCNC: 110 MMOL/L (ref 100–111)
CK SERPL-CCNC: 2699 U/L (ref 26–192)
CO2 SERPL-SCNC: 21 MMOL/L (ref 21–32)
CREAT SERPL-MCNC: 2.55 MG/DL (ref 0.6–1.3)
DIFFERENTIAL METHOD BLD: ABNORMAL
EOSINOPHIL # BLD: 0.1 K/UL (ref 0–0.4)
EOSINOPHIL NFR BLD: 1 % (ref 0–5)
ERYTHROCYTE [DISTWIDTH] IN BLOOD BY AUTOMATED COUNT: 13.9 % (ref 11.6–14.5)
GLUCOSE SERPL-MCNC: 96 MG/DL (ref 74–99)
HCT VFR BLD AUTO: 37.8 % (ref 35–45)
HGB BLD-MCNC: 12.9 G/DL (ref 12–16)
LYMPHOCYTES # BLD: 2.1 K/UL (ref 0.9–3.6)
LYMPHOCYTES NFR BLD: 21 % (ref 21–52)
MCH RBC QN AUTO: 31.2 PG (ref 24–34)
MCHC RBC AUTO-ENTMCNC: 34.1 G/DL (ref 31–37)
MCV RBC AUTO: 91.5 FL (ref 74–97)
MONOCYTES # BLD: 0.9 K/UL (ref 0.05–1.2)
MONOCYTES NFR BLD: 9 % (ref 3–10)
NEUTS SEG # BLD: 6.8 K/UL (ref 1.8–8)
NEUTS SEG NFR BLD: 69 % (ref 40–73)
PLATELET # BLD AUTO: 244 K/UL (ref 135–420)
PMV BLD AUTO: 11.1 FL (ref 9.2–11.8)
POTASSIUM SERPL-SCNC: 3.8 MMOL/L (ref 3.5–5.5)
RBC # BLD AUTO: 4.13 M/UL (ref 4.2–5.3)
SODIUM SERPL-SCNC: 141 MMOL/L (ref 136–145)
WBC # BLD AUTO: 10 K/UL (ref 4.6–13.2)

## 2020-09-11 PROCEDURE — 82550 ASSAY OF CK (CPK): CPT

## 2020-09-11 PROCEDURE — 51798 US URINE CAPACITY MEASURE: CPT

## 2020-09-11 PROCEDURE — 96376 TX/PRO/DX INJ SAME DRUG ADON: CPT

## 2020-09-11 PROCEDURE — 80177 DRUG SCRN QUAN LEVETIRACETAM: CPT

## 2020-09-11 PROCEDURE — 80048 BASIC METABOLIC PNL TOTAL CA: CPT

## 2020-09-11 PROCEDURE — 74011250636 HC RX REV CODE- 250/636: Performed by: INTERNAL MEDICINE

## 2020-09-11 PROCEDURE — 74011250636 HC RX REV CODE- 250/636: Performed by: HOSPITALIST

## 2020-09-11 PROCEDURE — 96372 THER/PROPH/DIAG INJ SC/IM: CPT

## 2020-09-11 PROCEDURE — 80183 DRUG SCRN QUANT OXCARBAZEPIN: CPT

## 2020-09-11 PROCEDURE — 65270000029 HC RM PRIVATE

## 2020-09-11 PROCEDURE — 92526 ORAL FUNCTION THERAPY: CPT

## 2020-09-11 PROCEDURE — 85025 COMPLETE CBC W/AUTO DIFF WBC: CPT

## 2020-09-11 PROCEDURE — 74011250637 HC RX REV CODE- 250/637: Performed by: INTERNAL MEDICINE

## 2020-09-11 PROCEDURE — 99218 HC RM OBSERVATION: CPT

## 2020-09-11 PROCEDURE — 36415 COLL VENOUS BLD VENIPUNCTURE: CPT

## 2020-09-11 RX ORDER — SODIUM CHLORIDE 9 MG/ML
75 INJECTION, SOLUTION INTRAVENOUS CONTINUOUS
Status: DISPENSED | OUTPATIENT
Start: 2020-09-11 | End: 2020-09-12

## 2020-09-11 RX ADMIN — Medication 10 ML: at 16:23

## 2020-09-11 RX ADMIN — HYDRALAZINE HYDROCHLORIDE 50 MG: 50 TABLET ORAL at 09:21

## 2020-09-11 RX ADMIN — LISINOPRIL 10 MG: 10 TABLET ORAL at 09:21

## 2020-09-11 RX ADMIN — Medication 10 ML: at 05:54

## 2020-09-11 RX ADMIN — AMLODIPINE BESYLATE 10 MG: 10 TABLET ORAL at 09:21

## 2020-09-11 RX ADMIN — OXCARBAZEPINE 600 MG: 300 TABLET, FILM COATED ORAL at 09:21

## 2020-09-11 RX ADMIN — LEVETIRACETAM 1000 MG: 10 INJECTION INTRAVENOUS at 21:13

## 2020-09-11 RX ADMIN — OXCARBAZEPINE 600 MG: 300 TABLET, FILM COATED ORAL at 18:19

## 2020-09-11 RX ADMIN — ATORVASTATIN CALCIUM 40 MG: 40 TABLET, FILM COATED ORAL at 21:12

## 2020-09-11 RX ADMIN — ENOXAPARIN SODIUM 30 MG: 30 INJECTION SUBCUTANEOUS at 09:22

## 2020-09-11 RX ADMIN — Medication 10 ML: at 21:14

## 2020-09-11 RX ADMIN — LEVETIRACETAM 1000 MG: 10 INJECTION INTRAVENOUS at 09:23

## 2020-09-11 RX ADMIN — SODIUM CHLORIDE 75 ML/HR: 900 INJECTION, SOLUTION INTRAVENOUS at 16:23

## 2020-09-11 RX ADMIN — CYANOCOBALAMIN TAB 500 MCG 500 MCG: 500 TAB at 09:21

## 2020-09-11 NOTE — ROUTINE PROCESS
Bedside shift change report given to [address] [place] [address] (oncoming nurse) by Andres Hodges (offgoing nurse). Report included the following information SBAR, Procedure Summary, MAR and Recent Results.

## 2020-09-11 NOTE — ROUTINE PROCESS
Bedside and Verbal shift change report given to Jose Antonio Gee RN (oncoming nurse) by Naheed Hightower RN (offgoing nurse). Report included the following information SBAR, Kardex and Recent Results.

## 2020-09-11 NOTE — PROGRESS NOTES
[facility]    Patient: Natlaya Scott Age: 76 y.o. : 1955 MR#: [id number] SSN: [id number]  Date/Time: 2020     Subjective:     Patient more alert awake but still slightly confused. Slow to respond. Per RN she was able to tolerate p.o. better. Still not very interactive. Patient admits she did miss seizure medications few days. Assessment/Plan:     1. Seizure disorder with 2 seizures PTA due to noncompliance: continue Keppra and Trileptal.  Discussed with neurology, recommend no change in medications due to noncompliance. 2. Acute metabolic encephalopathy due to postictal from #1, improving  3. Dysphagia: SLP re-eval, discussed with speech pathologist.  Will advance diet per SLP recommendation. 4. Mild prerenal azotemia on CKD stage III: Creatinine trending up, will start IV fluids, hold lisinopril. 5. History of hypertension: BP stable, will monitor  6. History of dyslipidemia: Continue statin  DVT prophylaxis- Lovenox  Full code   discharge planning  PT and OT eval  Discussed with the patient and also with the son Khalida Garcia over the phone in detail. Discussed about possible discharge in 1 to 2 days. Case discussed with:  [x]Patient  [x]Family  [x]Nursing  []Case Management  DVT Prophylaxis:  [x]Lovenox  []Hep SQ  []SCDs  []Coumadin   []On Heparin gtt    Objective:   VS:   Visit Vitals  /61 (BP 1 Location: Left arm, BP Patient Position: At rest)   Pulse 90   Temp 98.2 °F (36.8 °C)   Resp 20   Ht 5' (1.524 m)   Wt 63.5 kg (140 lb)   SpO2 98%   BMI 27.34 kg/m²      Tmax/24hrs: Temp (24hrs), Av.9 °F (36.6 °C), Min:97.5 °F (36.4 °C), Max:98.3 °F (36.8 °C)  IOBRIEF    Intake/Output Summary (Last 24 hours) at 2020 1210  Last data filed at 2020 0948  Gross per 24 hour   Intake 25 ml   Output --   Net 25 ml       General: Alert awake  HEENT: PERRLA, anicteric sclerae. Pulmonary:  CTA Bilaterally.  No Wheezing//Rales. Cardiovascular: Regular rate and Rhythm. GI:  Soft, Non distended, Non tender. + Bowel sounds. Extremities:  No edema. Neurologic: S alert awake, slow to respond, slight confusion. Moves all extremities. Additional:    Medications:   Current Facility-Administered Medications   Medication Dose Route Frequency    0.9% sodium chloride infusion  75 mL/hr IntraVENous CONTINUOUS    LORazepam (ATIVAN) injection 1 mg  1 mg IntraVENous Q6H PRN    enoxaparin (LOVENOX) injection 30 mg  30 mg SubCUTAneous DAILY    levETIRAcetam in saline (iso-os) (KEPPRA) infusion 1,000 mg  1,000 mg IntraVENous Q12H    amLODIPine (NORVASC) tablet 10 mg  10 mg Oral DAILY    atorvastatin (LIPITOR) tablet 40 mg  40 mg Oral QHS    cyanocobalamin tablet 500 mcg  500 mcg Oral DAILY    ergocalciferol capsule 50,000 Units  50,000 Units Oral Once per day on Thu Sat    hydrALAZINE (APRESOLINE) tablet 50 mg  50 mg Oral BID    [Held by provider] lisinopriL (PRINIVIL, ZESTRIL) tablet 10 mg  10 mg Oral DAILY    OXcarbazepine (TRILEPTAL) tablet 600 mg  600 mg Oral BID    sodium chloride (NS) flush 5-40 mL  5-40 mL IntraVENous Q8H    sodium chloride (NS) flush 5-40 mL  5-40 mL IntraVENous PRN    acetaminophen (TYLENOL) tablet 650 mg  650 mg Oral Q6H PRN    Or    acetaminophen (TYLENOL) suppository 650 mg  650 mg Rectal Q6H PRN    polyethylene glycol (MIRALAX) packet 17 g  17 g Oral DAILY PRN    promethazine (PHENERGAN) tablet 12.5 mg  12.5 mg Oral Q6H PRN    Or    ondansetron (ZOFRAN) injection 4 mg  4 mg IntraVENous Q6H PRN       Imaging:   XR Results (most recent):  Results from Hospital Encounter encounter on 03/23/20   XR CHEST PA LAT    Narrative Chest  PA and lateral views    INDICATION:  Cough, shortness of breath, chest pain    COMPARISON:  Prior chest x-rays, most recent 1/3/2020    FINDINGS:  The cardiac silhouette is normal in size. The pulmonary vasculature  is unremarkable.   No focal consolidation, pleural effusion, or pneumothorax. No  acute osseous abnormalities are identified. Mild spondylosis. Impression IMPRESSION:   No acute finding. .            CT Results (most recent):  Results from Hospital Encounter encounter on 09/08/20   CT HEAD WO CONT    Narrative CT of the head without contrast    HISTORY: Witnessed seizure    COMPARISON: 1/3/20    TECHNIQUE: Helical axial scan to the head was performed from the skull base to  the vertex without IV contrast administration. All CT scans at this facility performed using dose optimization techniques as  appreciated to a performed exam, to include automated exposure control,  adjustment of the mA and or KU according to patient size (including appropriate  matching for site specific examination), or use of iterative reconstruction  technique. FINDINGS:    Motion artifact noted. Mild global atrophy with mild ventricular dilatation  appear unchanged remote infarct in right parietal/occipital lobes with negative  mass effect and retraction of right parietal horn. Moderate and extensive  periventricular white matter hypodensities also present. . There is no evidence  of acute intracranial hemorrhage,  mass effect or midline shift identified. No  skull fracture or extra axial fluid collections identified. Visualized sinuses  and mastoid air cells appear unremarkable. Impression IMPRESSION:    In the setting of motion artifact, no definite acute intracranial hemorrhage or  mass effect. Note: If clinical concern of acute stroke, MRI with diffusion weighted images is  recommended for better evaluation. 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     Thank you for your referral.             Labs:    Recent Results (from the past 48 hour(s))   METABOLIC PANEL, BASIC    Collection Time: 09/10/20  5:38 AM   Result Value Ref Range    Sodium 139 136 - 145 mmol/L    Potassium 3.9 3.5 - 5.5 mmol/L    Chloride 110 100 - 111 mmol/L    CO2 15 (L) 21 - 32 mmol/L    Anion gap 14 3.0 - 18 mmol/L    Glucose 94 74 - 99 mg/dL    BUN 33 (H) 7.0 - 18 MG/DL    Creatinine 1.64 (H) 0.6 - 1.3 MG/DL    BUN/Creatinine ratio 20 12 - 20      GFR est AA 38 (L) >60 ml/min/1.73m2    GFR est non-AA 31 (L) >60 ml/min/1.73m2    Calcium 10.0 8.5 - 10.1 MG/DL   CBC WITH AUTOMATED DIFF    Collection Time: 09/11/20  4:36 AM   Result Value Ref Range    WBC 10.0 4.6 - 13.2 K/uL    RBC 4.13 (L) 4.20 - 5.30 M/uL    HGB 12.9 12.0 - 16.0 g/dL    HCT 37.8 35.0 - 45.0 %    MCV 91.5 74.0 - 97.0 FL    MCH 31.2 24.0 - 34.0 PG    MCHC 34.1 31.0 - 37.0 g/dL    RDW 13.9 11.6 - 14.5 %    PLATELET 372 805 - 658 K/uL    MPV 11.1 9.2 - 11.8 FL    NEUTROPHILS 69 40 - 73 %    LYMPHOCYTES 21 21 - 52 %    MONOCYTES 9 3 - 10 %    EOSINOPHILS 1 0 - 5 %    BASOPHILS 0 0 - 2 %    ABS. NEUTROPHILS 6.8 1.8 - 8.0 K/UL    ABS. LYMPHOCYTES 2.1 0.9 - 3.6 K/UL    ABS. MONOCYTES 0.9 0.05 - 1.2 K/UL    ABS. EOSINOPHILS 0.1 0.0 - 0.4 K/UL    ABS. BASOPHILS 0.0 0.0 - 0.1 K/UL    DF AUTOMATED     METABOLIC PANEL, BASIC    Collection Time: 09/11/20  4:36 AM   Result Value Ref Range    Sodium 141 136 - 145 mmol/L    Potassium 3.8 3.5 - 5.5 mmol/L    Chloride 110 100 - 111 mmol/L    CO2 21 21 - 32 mmol/L    Anion gap 10 3.0 - 18 mmol/L    Glucose 96 74 - 99 mg/dL    BUN 46 (H) 7.0 - 18 MG/DL    Creatinine 2.55 (H) 0.6 - 1.3 MG/DL    BUN/Creatinine ratio 18 12 - 20      GFR est AA 23 (L) >60 ml/min/1.73m2    GFR est non-AA 19 (L) >60 ml/min/1.73m2    Calcium 9.4 8.5 - 10.1 MG/DL       Signed By: Elda Truong MD     September 11, 2020      I spent 35 minutes with the patient in face-to-face consultation, of which greater than 50% was spent in counseling and coordination of care as described above    Disclaimer: Sections of this note are dictated using utilizing voice recognition software. Minor typographical errors may be present.  If questions arise, please do not hesitate to contact me or call our department.

## 2020-09-11 NOTE — PROGRESS NOTES
Nutrition Assessment     Type and Reason for Visit: Reassess, Positive nutrition screen    Nutrition Recommendations/Plan:  - Add supplements: Ensure Enlive, TID  - Monitor and encourage po intake, nursing to assist with meals as needed. - Continue IVF. Nutrition Assessment:  Full liquid diet yesterday with minimal intake. Diet advanced today per SLP as pt with improving altertness and ability to tolerate po. Per CNA pt still with poor to fair intake, doing better with liquids and requiring assistance. IVF started today. Will start supplements. Malnutrition Assessment:  Malnutrition Status: No malnutrition     Estimated Daily Nutrient Needs:  Energy (kcal):  3526-9775  Protein (g):  49-73       Fluid (ml/day):  4803-2084    Nutrition Related Findings:  BM 9/10. Confused      Current Nutrition Therapies:  DIET CARDIAC Mechanical Soft    Anthropometric Measures:  · Height:  5' (152.4 cm)  · Current Body Wt:  63.5 kg (140 lb)  · BMI: 27.3    Nutrition Diagnosis:   · Inadequate oral intake related to cognitive or neurological impairment, swallowing difficulty as evidenced by intake 0-25%, swallowing study results   ·   Nutrition Intervention:  Food and/or Nutrient Delivery: Continue current diet, Start oral nutrition supplement, IV fluid delivery  Nutrition Education and Counseling: Education not indicated  Coordination of Nutrition Care: Continued inpatient monitoring, Feeding assistance/environmental change, Speech therapy    Goals:  Nutritional needs will be met through adequate oral intake or nutrition support within the next 7 days. Nutrition Monitoring and Evaluation:   Food/Nutrient Intake Outcomes: Diet advancement/tolerance, Food and nutrient intake, Supplement intake, IVF intake  Physical Signs/Symptoms Outcomes: Biochemical data, Chewing or swallowing, Fluid status or edema, Meal time behavior, Nutrition focused physical findings    Discharge Planning:     Too soon to determine     Electronically signed by Octavia Hernandez RD on 9/11/2020 at 2:06 PM    Contact Number: [phone]

## 2020-09-11 NOTE — PROGRESS NOTES
Went in to change patient. Noticed that patient has not urinated since change of shift. Conducted a bladder scan. Bladder scan revealed 763 mL of urine retained. Paged Hospitalist and awaiting call. Will continue to monitor.

## 2020-09-11 NOTE — ROUTINE PROCESS
Bedside and Verbal shift change report given to [address] [place] [address] (oncoming nurse) by Kathie Awan RN (offgoing nurse). Report given with SBAR, Kardex, Intake/Output, MAR, Accordion and Recent Results.

## 2020-09-11 NOTE — PROGRESS NOTES
Problem: Dysphagia (Adult)  Goal: *Acute Goals and Plan of Care (Insert Text)  Description: Patient will:  1. Tolerate PO trials with 0 s/s overt distress in 4/5 trials  2. Utilize compensatory swallow strategies/maneuvers (decrease bite/sip, size/rate, alt. liq/sol) with min cues in 4/5 trials  3. Perform oral-motor/laryngeal exercises to increase oropharyngeal swallow function with min cues  4. Complete an objective swallow study (i.e., MBSS) to assess swallow integrity, r/o aspiration, and determine of safest LRD, min A    Rec:     Mech soft diet with thin   Aspiration precautions  HOB >45 during po intake, remain >30 for 30-45 minutes after po   Small bites/sips; alternate liquid/solid with slow feeding rate   Oral care TID  Meds in puree  Assistance with PO      9/11/2020 1354 by Isaak HERNÁNDEZ  Outcome: Progressing Towards Goal    SPEECH LANGUAGE PATHOLOGY DYSPHAGIA TREATMENT    Patient: Jerry Arevalo (73 y.o. female)  Date: 9/11/2020  Diagnosis: Recurrent seizures ([facility] [G40.909]  Recurrent seizures ([facility] [Q46.021]  Seizure ([facility] [R56.9]   <principal problem not specified>       Precautions: aspiration    PLOF: As per H&P      ASSESSMENT:  Pt was seen at bedside for follow up dysphagia management. She tolerated reg solid, puree, and thin liquids without any overt s/sx of aspiration. Increased mastication of solids observed with min lingual spread post swallow, improved with mech soft presentations. Laryngeal elevation appeared functional/timely to palpation. Recommend diet upgrade to mech soft with thin liquids, aspiration precautions, oral care TID, and meds as tolerated. She may continue to require assistance with Mark Low will continue to follow for further dysphagia management.      Progression toward goals:  [x]         Improving appropriately and progressing toward goals  []         Improving slowly and progressing toward goals  []         Not making progress toward goals and plan of care will be adjusted     PLAN:  Recommendations and Planned Interventions: See above  Patient continues to benefit from skilled intervention to address the above impairments. Continue treatment per established plan of care. Discharge Recommendations:  Michael Howard and To Be Determined     SUBJECTIVE:   Patient stated That tastes pretty good. OBJECTIVE:   Cognitive and Communication Status:  Neurologic State: Alert  Orientation Level: Unable to verbalize  Cognition: Follows commands  Dysphagia Treatment:  Oral Assessment:  Oral Assessment  Labial: No impairment  Dentition: Natural  Oral Hygiene: adequate  Lingual: Decreased strength  Velum: No impairment  Mandible: No impairment  P.O. Trials:   Patient Position: 55 at [facility]   Vocal quality prior to P.O.: No impairment   Consistency Presented:  Thin liquid, Solid, Puree   How Presented: Self-fed/presented, Cup/sip, Spoon, Straw   Bolus Acceptance: No impairment   Bolus Formation/Control: Impaired   Type of Impairment: Mastication, Delayed   Propulsion: Delayed (# of seconds)   Oral Residue: None   Initiation of Swallow: Delayed (# of seconds)   Laryngeal Elevation: Functional   Aspiration Signs/Symptoms: None   Pharyngeal Phase Characteristics: No impairment, issues, or problems    Effective Modifications: Small sips and bites, Alternate liquids/solids   Cues for Modifications: Minimal       Oral Phase Severity: Mild-moderate   Pharyngeal Phase Severity : No impairment      PAIN:  Start of Tx: 0  End of Tx: 0     After treatment:   []              Patient left in no apparent distress sitting up in chair  [x]              Patient left in no apparent distress in bed  [x]              Call bell left within reach  [x]              Nursing notified  []              Family present  []              Caregiver present  []              Bed alarm activated    COMMUNICATION/EDUCATION:   [x] Aspiration precautions; swallow safety; compensatory techniques  [x] Patient/family able to participate in training and education   []  Patient unable to participate in training and education, education ongoing with staff   [] Patient understands goals and intent of therapy; neutral about participation     Thank you for this referral.    Meseret Yadav M.S. CCC-SLP/L  Speech-Language Pathologist

## 2020-09-11 NOTE — PROGRESS NOTES
Problem: Falls - Risk of  Goal: *Absence of Falls  Description: Document Liliane Schmitt Fall Risk and appropriate interventions in the flowsheet.   Outcome: Progressing Towards Goal  Note: Fall Risk Interventions:  Mobility Interventions: PT Consult for mobility concerns, PT Consult for assist device competence, Patient to call before getting OOB, Utilize walker, cane, or other assistive device    Mentation Interventions: Bed/chair exit alarm, Door open when patient unattended, More frequent rounding    Medication Interventions: Bed/chair exit alarm, Assess postural VS orthostatic hypotension    Elimination Interventions: Bed/chair exit alarm, Call light in reach, Toileting schedule/hourly rounds, Toilet paper/wipes in reach              Problem: Patient Education: Go to Patient Education Activity  Goal: Patient/Family Education  Outcome: Progressing Towards Goal     Problem: Seizure Disorder (Adult)  Goal: *STG: Remains free of seizure activity  Outcome: Progressing Towards Goal  Goal: *STG: Maintains lab values within therapeutic range  Outcome: Progressing Towards Goal  Goal: *STG/LTG: Complies with medication therapy  Outcome: Progressing Towards Goal  Goal: *STG: Remains free of injury during seizure activity  Outcome: Progressing Towards Goal  Goal: *STG: Remains safe in hospital  Outcome: Progressing Towards Goal  Goal: Interventions  Outcome: Progressing Towards Goal     Problem: Patient Education: Go to Patient Education Activity  Goal: Patient/Family Education  Outcome: Progressing Towards Goal     Problem: Pain  Goal: *Control of Pain  Outcome: Progressing Towards Goal     Problem: Patient Education: Go to Patient Education Activity  Goal: Patient/Family Education  Outcome: Progressing Towards Goal     Problem: Patient Education: Go to Patient Education Activity  Goal: Patient/Family Education  Outcome: Progressing Towards Goal     Problem: Nutrition Deficit  Goal: *Optimize nutritional status  Outcome: Progressing Towards Goal     Problem: Pressure Injury - Risk of  Goal: *Prevention of pressure injury  Description: Document John Scale and appropriate interventions in the flowsheet. Outcome: Progressing Towards Goal  Note: Pressure Injury Interventions:       Moisture Interventions: Absorbent underpads, Check for incontinence Q2 hours and as needed, Maintain skin hydration (lotion/cream)    Activity Interventions: Pressure redistribution bed/mattress(bed type)    Mobility Interventions: Turn and reposition approx.  every two hours(pillow and wedges), Assess need for specialty bed    Nutrition Interventions: Document food/fluid/supplement intake, Offer support with meals,snacks and hydration    Friction and Shear Interventions: Apply protective barrier, creams and emollients                Problem: Patient Education: Go to Patient Education Activity  Goal: Patient/Family Education  Outcome: Progressing Towards Goal

## 2020-09-11 NOTE — ROUTINE PROCESS
Pt received resting quietly in bed with no noted S/SX of pain or distress. Morning medication tolerated appropriately and incontinence care given as per protocol. IV N/S started and infusing @75cc/hr which is well tolerated. Pt has poor appetitive as she ate about 25% of lunch and dinner. Nutritional supplements added to per dietitian. Pt bladder scanned and recorded 137mls at this time, denies pain or discomfort. Will continue to follow plan of care.

## 2020-09-12 LAB
ANION GAP SERPL CALC-SCNC: 8 MMOL/L (ref 3–18)
BASOPHILS # BLD: 0 K/UL (ref 0–0.1)
BASOPHILS NFR BLD: 0 % (ref 0–2)
BUN SERPL-MCNC: 50 MG/DL (ref 7–18)
BUN/CREAT SERPL: 23 (ref 12–20)
CALCIUM SERPL-MCNC: 9 MG/DL (ref 8.5–10.1)
CHLORIDE SERPL-SCNC: 113 MMOL/L (ref 100–111)
CK SERPL-CCNC: 2139 U/L (ref 26–192)
CO2 SERPL-SCNC: 20 MMOL/L (ref 21–32)
CREAT SERPL-MCNC: 2.2 MG/DL (ref 0.6–1.3)
DIFFERENTIAL METHOD BLD: ABNORMAL
EOSINOPHIL # BLD: 0.1 K/UL (ref 0–0.4)
EOSINOPHIL NFR BLD: 2 % (ref 0–5)
ERYTHROCYTE [DISTWIDTH] IN BLOOD BY AUTOMATED COUNT: 14.1 % (ref 11.6–14.5)
GLUCOSE BLD STRIP.AUTO-MCNC: 79 MG/DL (ref 70–110)
GLUCOSE BLD STRIP.AUTO-MCNC: 80 MG/DL (ref 70–110)
GLUCOSE SERPL-MCNC: 85 MG/DL (ref 74–99)
HCT VFR BLD AUTO: 35.4 % (ref 35–45)
HGB BLD-MCNC: 11.6 G/DL (ref 12–16)
LYMPHOCYTES # BLD: 1.9 K/UL (ref 0.9–3.6)
LYMPHOCYTES NFR BLD: 25 % (ref 21–52)
MCH RBC QN AUTO: 30.4 PG (ref 24–34)
MCHC RBC AUTO-ENTMCNC: 32.8 G/DL (ref 31–37)
MCV RBC AUTO: 92.7 FL (ref 74–97)
MONOCYTES # BLD: 0.7 K/UL (ref 0.05–1.2)
MONOCYTES NFR BLD: 10 % (ref 3–10)
NEUTS SEG # BLD: 4.9 K/UL (ref 1.8–8)
NEUTS SEG NFR BLD: 63 % (ref 40–73)
PLATELET # BLD AUTO: 208 K/UL (ref 135–420)
PMV BLD AUTO: 11.8 FL (ref 9.2–11.8)
POTASSIUM SERPL-SCNC: 3.6 MMOL/L (ref 3.5–5.5)
RBC # BLD AUTO: 3.82 M/UL (ref 4.2–5.3)
SODIUM SERPL-SCNC: 141 MMOL/L (ref 136–145)
WBC # BLD AUTO: 7.7 K/UL (ref 4.6–13.2)

## 2020-09-12 PROCEDURE — 74011250636 HC RX REV CODE- 250/636: Performed by: INTERNAL MEDICINE

## 2020-09-12 PROCEDURE — 82962 GLUCOSE BLOOD TEST: CPT

## 2020-09-12 PROCEDURE — 80048 BASIC METABOLIC PNL TOTAL CA: CPT

## 2020-09-12 PROCEDURE — 77030040830 HC CATH URETH FOL MDII -A

## 2020-09-12 PROCEDURE — 85025 COMPLETE CBC W/AUTO DIFF WBC: CPT

## 2020-09-12 PROCEDURE — 2709999900 HC NON-CHARGEABLE SUPPLY

## 2020-09-12 PROCEDURE — 36415 COLL VENOUS BLD VENIPUNCTURE: CPT

## 2020-09-12 PROCEDURE — 74011250636 HC RX REV CODE- 250/636: Performed by: HOSPITALIST

## 2020-09-12 PROCEDURE — 51798 US URINE CAPACITY MEASURE: CPT

## 2020-09-12 PROCEDURE — 74011250637 HC RX REV CODE- 250/637: Performed by: INTERNAL MEDICINE

## 2020-09-12 PROCEDURE — 82550 ASSAY OF CK (CPK): CPT

## 2020-09-12 PROCEDURE — 65270000029 HC RM PRIVATE

## 2020-09-12 RX ORDER — SODIUM CHLORIDE 9 MG/ML
75 INJECTION, SOLUTION INTRAVENOUS CONTINUOUS
Status: DISPENSED | OUTPATIENT
Start: 2020-09-12 | End: 2020-09-13

## 2020-09-12 RX ADMIN — CYANOCOBALAMIN TAB 500 MCG 500 MCG: 500 TAB at 08:53

## 2020-09-12 RX ADMIN — HYDRALAZINE HYDROCHLORIDE 50 MG: 50 TABLET ORAL at 08:53

## 2020-09-12 RX ADMIN — SODIUM CHLORIDE 75 ML/HR: 900 INJECTION, SOLUTION INTRAVENOUS at 05:53

## 2020-09-12 RX ADMIN — ENOXAPARIN SODIUM 30 MG: 30 INJECTION SUBCUTANEOUS at 08:54

## 2020-09-12 RX ADMIN — ERGOCALCIFEROL 50000 UNITS: 1.25 CAPSULE ORAL at 17:59

## 2020-09-12 RX ADMIN — OXCARBAZEPINE 600 MG: 300 TABLET, FILM COATED ORAL at 08:53

## 2020-09-12 RX ADMIN — HYDRALAZINE HYDROCHLORIDE 50 MG: 50 TABLET ORAL at 21:24

## 2020-09-12 RX ADMIN — Medication 10 ML: at 21:29

## 2020-09-12 RX ADMIN — SODIUM CHLORIDE 75 ML/HR: 900 INJECTION, SOLUTION INTRAVENOUS at 19:25

## 2020-09-12 RX ADMIN — AMLODIPINE BESYLATE 10 MG: 10 TABLET ORAL at 08:53

## 2020-09-12 RX ADMIN — SODIUM CHLORIDE 75 ML/HR: 900 INJECTION, SOLUTION INTRAVENOUS at 13:05

## 2020-09-12 RX ADMIN — LEVETIRACETAM 1000 MG: 10 INJECTION INTRAVENOUS at 21:24

## 2020-09-12 RX ADMIN — ACETAMINOPHEN 650 MG: 325 TABLET ORAL at 14:11

## 2020-09-12 RX ADMIN — LEVETIRACETAM 1000 MG: 10 INJECTION INTRAVENOUS at 08:51

## 2020-09-12 RX ADMIN — ATORVASTATIN CALCIUM 40 MG: 40 TABLET, FILM COATED ORAL at 21:24

## 2020-09-12 RX ADMIN — Medication 10 ML: at 17:52

## 2020-09-12 RX ADMIN — OXCARBAZEPINE 600 MG: 300 TABLET, FILM COATED ORAL at 17:43

## 2020-09-12 RX ADMIN — Medication 10 ML: at 05:54

## 2020-09-12 NOTE — PROGRESS NOTES
Problem: Falls - Risk of  Goal: *Absence of Falls  Description: Document Devere Bennington Fall Risk and appropriate interventions in the flowsheet.   Outcome: Progressing Towards Goal  Note: Fall Risk Interventions:  Mobility Interventions: Bed/chair exit alarm    Mentation Interventions: Bed/chair exit alarm, Self-releasing belt, Room close to nurse's station    Medication Interventions: Patient to call before getting OOB, Teach patient to arise slowly, Bed/chair exit alarm    Elimination Interventions: Bed/chair exit alarm, Call light in reach, Patient to call for help with toileting needs              Problem: Patient Education: Go to Patient Education Activity  Goal: Patient/Family Education  Outcome: Progressing Towards Goal     Problem: Seizure Disorder (Adult)  Goal: *STG: Remains free of seizure activity  Outcome: Progressing Towards Goal  Goal: *STG: Maintains lab values within therapeutic range  Outcome: Progressing Towards Goal  Goal: *STG/LTG: Complies with medication therapy  Outcome: Progressing Towards Goal  Goal: *STG: Remains free of injury during seizure activity  Outcome: Progressing Towards Goal  Goal: *STG: Remains safe in hospital  Outcome: Progressing Towards Goal  Goal: Interventions  Outcome: Progressing Towards Goal     Problem: Patient Education: Go to Patient Education Activity  Goal: Patient/Family Education  Outcome: Progressing Towards Goal     Problem: Pain  Goal: *Control of Pain  Outcome: Progressing Towards Goal     Problem: Patient Education: Go to Patient Education Activity  Goal: Patient/Family Education  Outcome: Progressing Towards Goal     Problem: Patient Education: Go to Patient Education Activity  Goal: Patient/Family Education  Outcome: Progressing Towards Goal     Problem: Nutrition Deficit  Goal: *Optimize nutritional status  Outcome: Progressing Towards Goal     Problem: Pressure Injury - Risk of  Goal: *Prevention of pressure injury  Description: Document John Scale and appropriate interventions in the flowsheet.   Outcome: Progressing Towards Goal  Note: Pressure Injury Interventions:  Sensory Interventions: Minimize linen layers, Assess need for specialty bed, Assess changes in LOC    Moisture Interventions: Absorbent underpads, Apply protective barrier, creams and emollients    Activity Interventions: Pressure redistribution bed/mattress(bed type)    Mobility Interventions: Assess need for specialty bed, Pressure redistribution bed/mattress (bed type)    Nutrition Interventions: Document food/fluid/supplement intake, Offer support with meals,snacks and hydration    Friction and Shear Interventions: Apply protective barrier, creams and emollients                Problem: Patient Education: Go to Patient Education Activity  Goal: Patient/Family Education  Outcome: Progressing Towards Goal

## 2020-09-12 NOTE — PROGRESS NOTES
[facility]  [facility]    Patient: Amanda Baker Age: 76 y.o. : 1955 MR#: [id number] SSN: [id number]  Date/Time: 2020     Subjective:     Patient alert awake but still slow to respond and slightly confused. Complains of diffuse body pain. Per RN she was able to tolerate p.o. better. Still not very interactive. Assessment/Plan:     1. Seizure disorder with 2 seizures PTA due to noncompliance: continue Keppra and Trileptal.  no change in medications due to noncompliance. 2. Acute metabolic encephalopathy due to postictal from #1, improving  3. Mild acute rhabdomyolysis due to #1: We will continue IV fluids and monitor CK  4. Dysphagia: Advance diet per SLP  5. Mild prerenal azotemia on CKD stage III: Creatinine trending down, continue IV fluids, hold lisinopril. 6. Concern for urinary retention: Discussed with RN to bladder scan, straight cath or Cain as needed. 7. History of hypertension: BP stable, will monitor  8. History of dyslipidemia: Continue statin  DVT prophylaxis- Lovenox  Full code   discharge planning  PT and OT eval pending  Discussed with the patient   Left a message to son Micky Askew over the phone in detail. Case discussed with:  [x]Patient  []Family  [x]Nursing  []Case Management  DVT Prophylaxis:  [x]Lovenox  []Hep SQ  []SCDs  []Coumadin   []On Heparin gtt    Objective:   VS:   Visit Vitals  /64 (BP 1 Location: Left arm, BP Patient Position: Supine)   Pulse 98   Temp 98.2 °F (36.8 °C)   Resp 18   Ht 5' (1.524 m)   Wt 64.9 kg (143 lb)   SpO2 100%   BMI 27.93 kg/m²      Tmax/24hrs: Temp (24hrs), Av °F (36.7 °C), Min:97.7 °F (36.5 °C), Max:98.3 °F (36.8 °C)  IOBRIEF    Intake/Output Summary (Last 24 hours) at 2020 1302  Last data filed at 2020  Gross per 24 hour   Intake 371.25 ml   Output --   Net 371.25 ml       General: Alert awake  Pulmonary:  CTA Bilaterally. No Wheezing//Rales.   Cardiovascular: Regular rate and Rhythm. GI:  Soft, Non distended, Non tender. + Bowel sounds. Extremities:  No edema. Neurologic: alert awake, slow to respond, slight confusion. Moves all extremities. Additional:    Medications:   Current Facility-Administered Medications   Medication Dose Route Frequency    0.9% sodium chloride infusion  75 mL/hr IntraVENous CONTINUOUS    LORazepam (ATIVAN) injection 1 mg  1 mg IntraVENous Q6H PRN    enoxaparin (LOVENOX) injection 30 mg  30 mg SubCUTAneous DAILY    levETIRAcetam in saline (iso-os) (KEPPRA) infusion 1,000 mg  1,000 mg IntraVENous Q12H    amLODIPine (NORVASC) tablet 10 mg  10 mg Oral DAILY    atorvastatin (LIPITOR) tablet 40 mg  40 mg Oral QHS    cyanocobalamin tablet 500 mcg  500 mcg Oral DAILY    ergocalciferol capsule 50,000 Units  50,000 Units Oral Once per day on Thu Sat    hydrALAZINE (APRESOLINE) tablet 50 mg  50 mg Oral BID    [Held by provider] lisinopriL (PRINIVIL, ZESTRIL) tablet 10 mg  10 mg Oral DAILY    OXcarbazepine (TRILEPTAL) tablet 600 mg  600 mg Oral BID    sodium chloride (NS) flush 5-40 mL  5-40 mL IntraVENous Q8H    sodium chloride (NS) flush 5-40 mL  5-40 mL IntraVENous PRN    acetaminophen (TYLENOL) tablet 650 mg  650 mg Oral Q6H PRN    Or    acetaminophen (TYLENOL) suppository 650 mg  650 mg Rectal Q6H PRN    polyethylene glycol (MIRALAX) packet 17 g  17 g Oral DAILY PRN    promethazine (PHENERGAN) tablet 12.5 mg  12.5 mg Oral Q6H PRN    Or    ondansetron (ZOFRAN) injection 4 mg  4 mg IntraVENous Q6H PRN       Imaging:   XR Results (most recent):  Results from Hospital Encounter encounter on 03/23/20   XR CHEST PA LAT    Narrative Chest  PA and lateral views    INDICATION:  Cough, shortness of breath, chest pain    COMPARISON:  Prior chest x-rays, most recent 1/3/2020    FINDINGS:  The cardiac silhouette is normal in size. The pulmonary vasculature  is unremarkable. No focal consolidation, pleural effusion, or pneumothorax. No  acute osseous abnormalities are identified. Mild spondylosis. Impression IMPRESSION:   No acute finding. .            CT Results (most recent):  Results from Hospital Encounter encounter on 09/08/20   CT HEAD WO CONT    Narrative CT of the head without contrast    HISTORY: Witnessed seizure    COMPARISON: 1/3/20    TECHNIQUE: Helical axial scan to the head was performed from the skull base to  the vertex without IV contrast administration. All CT scans at this facility performed using dose optimization techniques as  appreciated to a performed exam, to include automated exposure control,  adjustment of the mA and or KU according to patient size (including appropriate  matching for site specific examination), or use of iterative reconstruction  technique. FINDINGS:    Motion artifact noted. Mild global atrophy with mild ventricular dilatation  appear unchanged remote infarct in right parietal/occipital lobes with negative  mass effect and retraction of right parietal horn. Moderate and extensive  periventricular white matter hypodensities also present. . There is no evidence  of acute intracranial hemorrhage,  mass effect or midline shift identified. No  skull fracture or extra axial fluid collections identified. Visualized sinuses  and mastoid air cells appear unremarkable. Impression IMPRESSION:    In the setting of motion artifact, no definite acute intracranial hemorrhage or  mass effect. Note: If clinical concern of acute stroke, MRI with diffusion weighted images is  recommended for better evaluation. 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     Thank you for your referral.             Labs:    Recent Results (from the past 48 hour(s))   CBC WITH AUTOMATED DIFF    Collection Time: 09/11/20  4:36 AM   Result Value Ref Range    WBC 10.0 4.6 - 13.2 K/uL    RBC 4.13 (L) 4.20 - 5.30 M/uL    HGB 12.9 12.0 - 16.0 g/dL    HCT 37.8 35.0 - 45.0 %    MCV 91.5 74.0 - 97.0 FL    MCH 31.2 24.0 - 34.0 PG    MCHC 34.1 31.0 - 37.0 g/dL    RDW 13.9 11.6 - 14.5 %    PLATELET 928 414 - 841 K/uL    MPV 11.1 9.2 - 11.8 FL    NEUTROPHILS 69 40 - 73 %    LYMPHOCYTES 21 21 - 52 %    MONOCYTES 9 3 - 10 %    EOSINOPHILS 1 0 - 5 %    BASOPHILS 0 0 - 2 %    ABS. NEUTROPHILS 6.8 1.8 - 8.0 K/UL    ABS. LYMPHOCYTES 2.1 0.9 - 3.6 K/UL    ABS. MONOCYTES 0.9 0.05 - 1.2 K/UL    ABS. EOSINOPHILS 0.1 0.0 - 0.4 K/UL    ABS. BASOPHILS 0.0 0.0 - 0.1 K/UL    DF AUTOMATED     METABOLIC PANEL, BASIC    Collection Time: 09/11/20  4:36 AM   Result Value Ref Range    Sodium 141 136 - 145 mmol/L    Potassium 3.8 3.5 - 5.5 mmol/L    Chloride 110 100 - 111 mmol/L    CO2 21 21 - 32 mmol/L    Anion gap 10 3.0 - 18 mmol/L    Glucose 96 74 - 99 mg/dL    BUN 46 (H) 7.0 - 18 MG/DL    Creatinine 2.55 (H) 0.6 - 1.3 MG/DL    BUN/Creatinine ratio 18 12 - 20      GFR est AA 23 (L) >60 ml/min/1.73m2    GFR est non-AA 19 (L) >60 ml/min/1.73m2    Calcium 9.4 8.5 - 10.1 MG/DL   CK    Collection Time: 09/11/20  4:36 AM   Result Value Ref Range    CK 2,699 (H) 26 - 192 U/L   CBC WITH AUTOMATED DIFF    Collection Time: 09/12/20  4:18 AM   Result Value Ref Range    WBC 7.7 4.6 - 13.2 K/uL    RBC 3.82 (L) 4.20 - 5.30 M/uL    HGB 11.6 (L) 12.0 - 16.0 g/dL    HCT 35.4 35.0 - 45.0 %    MCV 92.7 74.0 - 97.0 FL    MCH 30.4 24.0 - 34.0 PG    MCHC 32.8 31.0 - 37.0 g/dL    RDW 14.1 11.6 - 14.5 %    PLATELET 930 713 - 826 K/uL    MPV 11.8 9.2 - 11.8 FL    NEUTROPHILS 63 40 - 73 %    LYMPHOCYTES 25 21 - 52 %    MONOCYTES 10 3 - 10 %    EOSINOPHILS 2 0 - 5 %    BASOPHILS 0 0 - 2 %    ABS. NEUTROPHILS 4.9 1.8 - 8.0 K/UL    ABS. LYMPHOCYTES 1.9 0.9 - 3.6 K/UL    ABS. MONOCYTES 0.7 0.05 - 1.2 K/UL    ABS. EOSINOPHILS 0.1 0.0 - 0.4 K/UL    ABS.  BASOPHILS 0.0 0.0 - 0.1 K/UL    DF AUTOMATED     METABOLIC PANEL, BASIC    Collection Time: 09/12/20  4:18 AM   Result Value Ref Range    Sodium 141 136 - 145 mmol/L    Potassium 3.6 3.5 - 5.5 mmol/L    Chloride 113 (H) 100 - 111 mmol/L    CO2 20 (L) 21 - 32 mmol/L    Anion gap 8 3.0 - 18 mmol/L    Glucose 85 74 - 99 mg/dL    BUN 50 (H) 7.0 - 18 MG/DL    Creatinine 2.20 (H) 0.6 - 1.3 MG/DL    BUN/Creatinine ratio 23 (H) 12 - 20      GFR est AA 27 (L) >60 ml/min/1.73m2    GFR est non-AA 22 (L) >60 ml/min/1.73m2    Calcium 9.0 8.5 - 10.1 MG/DL   GLUCOSE, POC    Collection Time: 09/12/20 12:19 PM   Result Value Ref Range    Glucose (POC) 80 70 - 110 mg/dL       Signed By: Elda Truong MD     September 12, 2020      I spent 35 minutes with the patient in face-to-face consultation, of which greater than 50% was spent in counseling and coordination of care as described above    Disclaimer: Sections of this note are dictated using utilizing voice recognition software. Minor typographical errors may be present. If questions arise, please do not hesitate to contact me or call our department.

## 2020-09-12 NOTE — ROUTINE PROCESS
Bedside shift change report given to Vitor Anguiano (oncoming nurse) by Dayton Silva RN (offgoing nurse). Report included the following information SBAR, Procedure Summary, MAR and Recent Results.

## 2020-09-12 NOTE — ROUTINE PROCESS
Bedside and Verbal shift change report given to Vitor Anguiano (oncoming nurse) by Ethan Blanco RN (offgoing nurse). Report given with SBAR, Kardex, Intake/Output, MAR, Accordion and Recent Results.      Bladder scanned patient >342ml found  Called Dr. Lenora Simms wants to wait for a \"couple of hours and scan >500 for straight cath  Sarabjit Chiu made aware

## 2020-09-12 NOTE — ROUTINE PROCESS
Bedside shift change report given to David Lowe RN and Irving Oscar RN (oncoming nurse) by MIGUEL Rangel RN (offgoing nurse). Report included the following information SBAR, Kardex, Intake/Output, MAR and Recent Results.

## 2020-09-12 NOTE — PROGRESS NOTES
Bladder scan found >429 mL at 1400. Tate inserted and 425 voided. Removed tate as Dr. Syeda Smith verbally ordered to maintain only if greater than 500 mL.

## 2020-09-13 LAB
ANION GAP SERPL CALC-SCNC: 8 MMOL/L (ref 3–18)
APPEARANCE UR: CLEAR
BACTERIA URNS QL MICRO: ABNORMAL /HPF
BASOPHILS # BLD: 0 K/UL (ref 0–0.1)
BASOPHILS NFR BLD: 0 % (ref 0–2)
BILIRUB UR QL: NEGATIVE
BUN SERPL-MCNC: 44 MG/DL (ref 7–18)
BUN/CREAT SERPL: 30 (ref 12–20)
CALCIUM SERPL-MCNC: 9.3 MG/DL (ref 8.5–10.1)
CHLORIDE SERPL-SCNC: 119 MMOL/L (ref 100–111)
CK SERPL-CCNC: 1526 U/L (ref 26–192)
CO2 SERPL-SCNC: 19 MMOL/L (ref 21–32)
COLOR UR: YELLOW
CREAT SERPL-MCNC: 1.45 MG/DL (ref 0.6–1.3)
DIFFERENTIAL METHOD BLD: ABNORMAL
EOSINOPHIL # BLD: 0.1 K/UL (ref 0–0.4)
EOSINOPHIL NFR BLD: 2 % (ref 0–5)
EPITH CASTS URNS QL MICRO: ABNORMAL /LPF (ref 0–5)
ERYTHROCYTE [DISTWIDTH] IN BLOOD BY AUTOMATED COUNT: 14.1 % (ref 11.6–14.5)
GLUCOSE SERPL-MCNC: 87 MG/DL (ref 74–99)
GLUCOSE UR STRIP.AUTO-MCNC: NEGATIVE MG/DL
HCT VFR BLD AUTO: 35.4 % (ref 35–45)
HGB BLD-MCNC: 11.5 G/DL (ref 12–16)
HGB UR QL STRIP: NEGATIVE
KETONES UR QL STRIP.AUTO: 15 MG/DL
LEUKOCYTE ESTERASE UR QL STRIP.AUTO: ABNORMAL
LYMPHOCYTES # BLD: 2.2 K/UL (ref 0.9–3.6)
LYMPHOCYTES NFR BLD: 29 % (ref 21–52)
MCH RBC QN AUTO: 30.5 PG (ref 24–34)
MCHC RBC AUTO-ENTMCNC: 32.5 G/DL (ref 31–37)
MCV RBC AUTO: 93.9 FL (ref 74–97)
MONOCYTES # BLD: 0.6 K/UL (ref 0.05–1.2)
MONOCYTES NFR BLD: 8 % (ref 3–10)
NEUTS SEG # BLD: 4.6 K/UL (ref 1.8–8)
NEUTS SEG NFR BLD: 61 % (ref 40–73)
NITRITE UR QL STRIP.AUTO: NEGATIVE
PH UR STRIP: 5 [PH] (ref 5–8)
PLATELET # BLD AUTO: 200 K/UL (ref 135–420)
PMV BLD AUTO: 11.9 FL (ref 9.2–11.8)
POTASSIUM SERPL-SCNC: 4.4 MMOL/L (ref 3.5–5.5)
PROT UR STRIP-MCNC: NEGATIVE MG/DL
RBC # BLD AUTO: 3.77 M/UL (ref 4.2–5.3)
RBC #/AREA URNS HPF: ABNORMAL /HPF (ref 0–5)
SODIUM SERPL-SCNC: 146 MMOL/L (ref 136–145)
SP GR UR REFRACTOMETRY: 1.02 (ref 1–1.03)
UROBILINOGEN UR QL STRIP.AUTO: 1 EU/DL (ref 0.2–1)
WBC # BLD AUTO: 7.6 K/UL (ref 4.6–13.2)
WBC URNS QL MICRO: ABNORMAL /HPF (ref 0–4)

## 2020-09-13 PROCEDURE — 2709999900 HC NON-CHARGEABLE SUPPLY

## 2020-09-13 PROCEDURE — 74011250636 HC RX REV CODE- 250/636: Performed by: INTERNAL MEDICINE

## 2020-09-13 PROCEDURE — 74011250637 HC RX REV CODE- 250/637: Performed by: INTERNAL MEDICINE

## 2020-09-13 PROCEDURE — 87077 CULTURE AEROBIC IDENTIFY: CPT

## 2020-09-13 PROCEDURE — 87186 SC STD MICRODIL/AGAR DIL: CPT

## 2020-09-13 PROCEDURE — 74011250636 HC RX REV CODE- 250/636: Performed by: HOSPITALIST

## 2020-09-13 PROCEDURE — 85025 COMPLETE CBC W/AUTO DIFF WBC: CPT

## 2020-09-13 PROCEDURE — 51798 US URINE CAPACITY MEASURE: CPT

## 2020-09-13 PROCEDURE — 81001 URINALYSIS AUTO W/SCOPE: CPT

## 2020-09-13 PROCEDURE — 80048 BASIC METABOLIC PNL TOTAL CA: CPT

## 2020-09-13 PROCEDURE — 74011000258 HC RX REV CODE- 258: Performed by: HOSPITALIST

## 2020-09-13 PROCEDURE — 65270000029 HC RM PRIVATE

## 2020-09-13 PROCEDURE — 82550 ASSAY OF CK (CPK): CPT

## 2020-09-13 PROCEDURE — 36415 COLL VENOUS BLD VENIPUNCTURE: CPT

## 2020-09-13 PROCEDURE — 74011250637 HC RX REV CODE- 250/637: Performed by: HOSPITALIST

## 2020-09-13 PROCEDURE — 87086 URINE CULTURE/COLONY COUNT: CPT

## 2020-09-13 RX ORDER — ENOXAPARIN SODIUM 100 MG/ML
40 INJECTION SUBCUTANEOUS DAILY
Status: DISCONTINUED | OUTPATIENT
Start: 2020-09-13 | End: 2020-09-17 | Stop reason: HOSPADM

## 2020-09-13 RX ORDER — TAMSULOSIN HYDROCHLORIDE 0.4 MG/1
0.4 CAPSULE ORAL DAILY
Status: DISCONTINUED | OUTPATIENT
Start: 2020-09-13 | End: 2020-09-17 | Stop reason: HOSPADM

## 2020-09-13 RX ORDER — LEVETIRACETAM 500 MG/1
1000 TABLET ORAL EVERY 12 HOURS
Status: DISCONTINUED | OUTPATIENT
Start: 2020-09-13 | End: 2020-09-17 | Stop reason: HOSPADM

## 2020-09-13 RX ADMIN — AZTREONAM 1 G: 1 INJECTION, POWDER, LYOPHILIZED, FOR SOLUTION INTRAMUSCULAR; INTRAVENOUS at 22:57

## 2020-09-13 RX ADMIN — CYANOCOBALAMIN TAB 500 MCG 500 MCG: 500 TAB at 09:44

## 2020-09-13 RX ADMIN — ATORVASTATIN CALCIUM 40 MG: 40 TABLET, FILM COATED ORAL at 22:57

## 2020-09-13 RX ADMIN — Medication 10 ML: at 14:32

## 2020-09-13 RX ADMIN — Medication 10 ML: at 23:06

## 2020-09-13 RX ADMIN — HYDRALAZINE HYDROCHLORIDE 50 MG: 50 TABLET ORAL at 22:57

## 2020-09-13 RX ADMIN — LEVETIRACETAM 1000 MG: 500 TABLET ORAL at 09:44

## 2020-09-13 RX ADMIN — AZTREONAM 1 G: 1 INJECTION, POWDER, LYOPHILIZED, FOR SOLUTION INTRAMUSCULAR; INTRAVENOUS at 14:32

## 2020-09-13 RX ADMIN — ENOXAPARIN SODIUM 40 MG: 40 INJECTION SUBCUTANEOUS at 09:48

## 2020-09-13 RX ADMIN — HYDRALAZINE HYDROCHLORIDE 50 MG: 50 TABLET ORAL at 09:44

## 2020-09-13 RX ADMIN — Medication 10 ML: at 05:29

## 2020-09-13 RX ADMIN — TAMSULOSIN HYDROCHLORIDE 0.4 MG: 0.4 CAPSULE ORAL at 14:32

## 2020-09-13 RX ADMIN — OXCARBAZEPINE 600 MG: 300 TABLET, FILM COATED ORAL at 09:44

## 2020-09-13 RX ADMIN — LEVETIRACETAM 1000 MG: 500 TABLET ORAL at 22:58

## 2020-09-13 RX ADMIN — AMLODIPINE BESYLATE 10 MG: 10 TABLET ORAL at 09:43

## 2020-09-13 RX ADMIN — SODIUM CHLORIDE 75 ML/HR: 900 INJECTION, SOLUTION INTRAVENOUS at 09:42

## 2020-09-13 RX ADMIN — OXCARBAZEPINE 600 MG: 300 TABLET, FILM COATED ORAL at 18:25

## 2020-09-13 NOTE — PROGRESS NOTES
Problem: Falls - Risk of  Goal: *Absence of Falls  Description: Document Adolfo Lovett Fall Risk and appropriate interventions in the flowsheet. Outcome: Progressing Towards Goal  Note: Fall Risk Interventions:  Mobility Interventions: Bed/chair exit alarm, Patient to call before getting OOB    Mentation Interventions: Adequate sleep, hydration, pain control, Family/sitter at bedside    Medication Interventions: Patient to call before getting OOB    Elimination Interventions:  Toilet paper/wipes in reach, Call light in reach, Stay With Me (per policy)              Problem: Patient Education: Go to Patient Education Activity  Goal: Patient/Family Education  Outcome: Progressing Towards Goal     Problem: Seizure Disorder (Adult)  Goal: *STG: Remains free of seizure activity  Outcome: Progressing Towards Goal  Goal: *STG: Maintains lab values within therapeutic range  Outcome: Progressing Towards Goal  Goal: *STG/LTG: Complies with medication therapy  Outcome: Progressing Towards Goal  Goal: *STG: Remains free of injury during seizure activity  Outcome: Progressing Towards Goal  Goal: *STG: Remains safe in hospital  Outcome: Progressing Towards Goal  Goal: Interventions  Outcome: Progressing Towards Goal     Problem: Patient Education: Go to Patient Education Activity  Goal: Patient/Family Education  Outcome: Progressing Towards Goal     Problem: Pain  Goal: *Control of Pain  Outcome: Progressing Towards Goal     Problem: Patient Education: Go to Patient Education Activity  Goal: Patient/Family Education  Outcome: Progressing Towards Goal     Problem: Patient Education: Go to Patient Education Activity  Goal: Patient/Family Education  Outcome: Progressing Towards Goal     Problem: Nutrition Deficit  Goal: *Optimize nutritional status  Outcome: Progressing Towards Goal     Problem: Pressure Injury - Risk of  Goal: *Prevention of pressure injury  Description: Document John Scale and appropriate interventions in the flowsheet.   Outcome: Progressing Towards Goal  Note: Pressure Injury Interventions:  Sensory Interventions: Assess changes in LOC    Moisture Interventions: Absorbent underpads, Internal/External urinary devices, Check for incontinence Q2 hours and as needed    Activity Interventions: PT/OT evaluation, Pressure redistribution bed/mattress(bed type), Increase time out of bed    Mobility Interventions: Assess need for specialty bed, Pressure redistribution bed/mattress (bed type)    Nutrition Interventions: Document food/fluid/supplement intake, Offer support with meals,snacks and hydration    Friction and Shear Interventions: Apply protective barrier, creams and emollients                Problem: Patient Education: Go to Patient Education Activity  Goal: Patient/Family Education  Outcome: Progressing Towards Goal

## 2020-09-13 NOTE — ROUTINE PROCESS
Bedside and Verbal shift change report given to Peter Hinojosa RN (oncoming nurse) by Bertin Zavaleta RN (offgoing nurse). Report included the following information SBAR, Kardex and Recent Results.

## 2020-09-13 NOTE — PROGRESS NOTES
[facility]  Progress Note    Patient: Jerry Arevalo Age: 76 y.o. : 1955 MR#: [id number] SSN: [id number]  Date/Time: 2020     Subjective:     Patient more alert awake but still slightly confused. Per RN she was able to tolerate p.o. better. Patient had a Tate catheter placed on more than 500 mL of urine output. Assessment/Plan:     1. Seizure disorder with 2 seizures PTA due to noncompliance: continue Keppra and Trileptal.  no change in medications due to noncompliance. 2. Acute metabolic encephalopathy due to postictal from #1, improving  3. UTI: Follow-up urine culture, will start aztreonam since patient is allergic to penicillin and ciprofloxacin. Per patient and daughter patient had tongue swelling and hives with penicillin. 4. Mild acute rhabdomyolysis due to #1: We will continue IV fluids and monitor CK  5. Dysphagia: Advance diet per SLP  6. Mild prerenal azotemia on CKD stage III: Creatinine trending down, continue IV fluids, hold lisinopril. 7. urinary retention: s/p tate cath, will start flomax. 8. History of hypertension: BP stable, will monitor  9. History of dyslipidemia: Continue statin  DVT prophylaxis- Lovenox  Full code   discharge planning  PT and OT eval pending  Discussed with the patient and son Patel Desai over the phone in detail.        Case discussed with:  [x]Patient  [x]Family  [x]Nursing  []Case Management  DVT Prophylaxis:  [x]Lovenox  []Hep SQ  []SCDs  []Coumadin   []On Heparin gtt    Objective:   VS:   Visit Vitals  BP (!) 149/73   Pulse 92   Temp 98.8 °F (37.1 °C)   Resp 18   Ht 5' (1.524 m)   Wt 64.9 kg (143 lb 1.3 oz)   SpO2 98%   BMI 27.94 kg/m²      Tmax/24hrs: Temp (24hrs), Av.4 °F (36.9 °C), Min:97.4 °F (36.3 °C), Max:99.4 °F (37.4 °C)  IOBRIEF    Intake/Output Summary (Last 24 hours) at 2020 1334  Last data filed at 2020 0800  Gross per 24 hour   Intake 406.25 ml   Output 500 ml   Net -93.75 ml General: Alert awake  Pulmonary:  CTA Bilaterally. No Wheezing//Rales. Cardiovascular: Regular rate and Rhythm. GI:  Soft, Non distended, Non tender. + Bowel sounds. Extremities:  No edema. Neurologic: alert awake, Ox2, confusion. Moves all extremities. Additional:    Medications:   Current Facility-Administered Medications   Medication Dose Route Frequency    enoxaparin (LOVENOX) injection 40 mg  40 mg SubCUTAneous DAILY    levETIRAcetam (KEPPRA) tablet 1,000 mg  1,000 mg Oral Q12H    aztreonam (AZACTAM) 1 g in 0.9% sodium chloride (MBP/ADV) 100 mL MBP  1 g IntraVENous Q8H    LORazepam (ATIVAN) injection 1 mg  1 mg IntraVENous Q6H PRN    amLODIPine (NORVASC) tablet 10 mg  10 mg Oral DAILY    atorvastatin (LIPITOR) tablet 40 mg  40 mg Oral QHS    cyanocobalamin tablet 500 mcg  500 mcg Oral DAILY    ergocalciferol capsule 50,000 Units  50,000 Units Oral Once per day on Thu Sat    hydrALAZINE (APRESOLINE) tablet 50 mg  50 mg Oral BID    [Held by provider] lisinopriL (PRINIVIL, ZESTRIL) tablet 10 mg  10 mg Oral DAILY    OXcarbazepine (TRILEPTAL) tablet 600 mg  600 mg Oral BID    sodium chloride (NS) flush 5-40 mL  5-40 mL IntraVENous Q8H    sodium chloride (NS) flush 5-40 mL  5-40 mL IntraVENous PRN    acetaminophen (TYLENOL) tablet 650 mg  650 mg Oral Q6H PRN    Or    acetaminophen (TYLENOL) suppository 650 mg  650 mg Rectal Q6H PRN    polyethylene glycol (MIRALAX) packet 17 g  17 g Oral DAILY PRN    promethazine (PHENERGAN) tablet 12.5 mg  12.5 mg Oral Q6H PRN    Or    ondansetron (ZOFRAN) injection 4 mg  4 mg IntraVENous Q6H PRN       Imaging:   XR Results (most recent):  Results from Hospital Encounter encounter on 03/23/20   XR CHEST PA LAT    Narrative Chest  PA and lateral views    INDICATION:  Cough, shortness of breath, chest pain    COMPARISON:  Prior chest x-rays, most recent 1/3/2020    FINDINGS:  The cardiac silhouette is normal in size.   The pulmonary vasculature  is unremarkable. No focal consolidation, pleural effusion, or pneumothorax. No  acute osseous abnormalities are identified. Mild spondylosis. Impression IMPRESSION:   No acute finding. .            CT Results (most recent):  Results from Hospital Encounter encounter on 09/08/20   CT HEAD WO CONT    Narrative CT of the head without contrast    HISTORY: Witnessed seizure    COMPARISON: 1/3/20    TECHNIQUE: Helical axial scan to the head was performed from the skull base to  the vertex without IV contrast administration. All CT scans at this facility performed using dose optimization techniques as  appreciated to a performed exam, to include automated exposure control,  adjustment of the mA and or KU according to patient size (including appropriate  matching for site specific examination), or use of iterative reconstruction  technique. FINDINGS:    Motion artifact noted. Mild global atrophy with mild ventricular dilatation  appear unchanged remote infarct in right parietal/occipital lobes with negative  mass effect and retraction of right parietal horn. Moderate and extensive  periventricular white matter hypodensities also present. . There is no evidence  of acute intracranial hemorrhage,  mass effect or midline shift identified. No  skull fracture or extra axial fluid collections identified. Visualized sinuses  and mastoid air cells appear unremarkable. Impression IMPRESSION:    In the setting of motion artifact, no definite acute intracranial hemorrhage or  mass effect. Note: If clinical concern of acute stroke, MRI with diffusion weighted images is  recommended for better evaluation. 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     Thank you for your referral.             Labs:    Recent Results (from the past 48 hour(s))   CBC WITH AUTOMATED DIFF    Collection Time: 09/12/20  4:18 AM   Result Value Ref Range    WBC 7.7 4.6 - 13.2 K/uL    RBC 3.82 (L) 4.20 - 5.30 M/uL    HGB 11.6 (L) 12.0 - 16.0 g/dL    HCT 35.4 35.0 - 45.0 %    MCV 92.7 74.0 - 97.0 FL    MCH 30.4 24.0 - 34.0 PG    MCHC 32.8 31.0 - 37.0 g/dL    RDW 14.1 11.6 - 14.5 %    PLATELET 128 920 - 778 K/uL    MPV 11.8 9.2 - 11.8 FL    NEUTROPHILS 63 40 - 73 %    LYMPHOCYTES 25 21 - 52 %    MONOCYTES 10 3 - 10 %    EOSINOPHILS 2 0 - 5 %    BASOPHILS 0 0 - 2 %    ABS. NEUTROPHILS 4.9 1.8 - 8.0 K/UL    ABS. LYMPHOCYTES 1.9 0.9 - 3.6 K/UL    ABS. MONOCYTES 0.7 0.05 - 1.2 K/UL    ABS. EOSINOPHILS 0.1 0.0 - 0.4 K/UL    ABS. BASOPHILS 0.0 0.0 - 0.1 K/UL    DF AUTOMATED     METABOLIC PANEL, BASIC    Collection Time: 09/12/20  4:18 AM   Result Value Ref Range    Sodium 141 136 - 145 mmol/L    Potassium 3.6 3.5 - 5.5 mmol/L    Chloride 113 (H) 100 - 111 mmol/L    CO2 20 (L) 21 - 32 mmol/L    Anion gap 8 3.0 - 18 mmol/L    Glucose 85 74 - 99 mg/dL    BUN 50 (H) 7.0 - 18 MG/DL    Creatinine 2.20 (H) 0.6 - 1.3 MG/DL    BUN/Creatinine ratio 23 (H) 12 - 20      GFR est AA 27 (L) >60 ml/min/1.73m2    GFR est non-AA 22 (L) >60 ml/min/1.73m2    Calcium 9.0 8.5 - 10.1 MG/DL   CK    Collection Time: 09/12/20  4:18 AM   Result Value Ref Range    CK 2,139 (H) 26 - 192 U/L   GLUCOSE, POC    Collection Time: 09/12/20 12:19 PM   Result Value Ref Range    Glucose (POC) 80 70 - 110 mg/dL   GLUCOSE, POC    Collection Time: 09/12/20  3:51 PM   Result Value Ref Range    Glucose (POC) 79 70 - 110 mg/dL   CBC WITH AUTOMATED DIFF    Collection Time: 09/13/20  5:04 AM   Result Value Ref Range    WBC 7.6 4.6 - 13.2 K/uL    RBC 3.77 (L) 4.20 - 5.30 M/uL    HGB 11.5 (L) 12.0 - 16.0 g/dL    HCT 35.4 35.0 - 45.0 %    MCV 93.9 74.0 - 97.0 FL    MCH 30.5 24.0 - 34.0 PG    MCHC 32.5 31.0 - 37.0 g/dL    RDW 14.1 11.6 - 14.5 %    PLATELET 919 696 - 838 K/uL    MPV 11.9 (H) 9.2 - 11.8 FL    NEUTROPHILS 61 40 - 73 %    LYMPHOCYTES 29 21 - 52 %    MONOCYTES 8 3 - 10 %    EOSINOPHILS 2 0 - 5 %    BASOPHILS 0 0 - 2 %    ABS.  NEUTROPHILS 4.6 1.8 - 8.0 K/UL    ABS. LYMPHOCYTES 2.2 0.9 - 3.6 K/UL    ABS. MONOCYTES 0.6 0.05 - 1.2 K/UL    ABS. EOSINOPHILS 0.1 0.0 - 0.4 K/UL    ABS. BASOPHILS 0.0 0.0 - 0.1 K/UL    DF AUTOMATED     METABOLIC PANEL, BASIC    Collection Time: 09/13/20  5:04 AM   Result Value Ref Range    Sodium 146 (H) 136 - 145 mmol/L    Potassium 4.4 3.5 - 5.5 mmol/L    Chloride 119 (H) 100 - 111 mmol/L    CO2 19 (L) 21 - 32 mmol/L    Anion gap 8 3.0 - 18 mmol/L    Glucose 87 74 - 99 mg/dL    BUN 44 (H) 7.0 - 18 MG/DL    Creatinine 1.45 (H) 0.6 - 1.3 MG/DL    BUN/Creatinine ratio 30 (H) 12 - 20      GFR est AA 44 (L) >60 ml/min/1.73m2    GFR est non-AA 36 (L) >60 ml/min/1.73m2    Calcium 9.3 8.5 - 10.1 MG/DL   CK    Collection Time: 09/13/20  5:04 AM   Result Value Ref Range    CK 1,526 (H) 26 - 192 U/L   URINALYSIS W/ RFLX MICROSCOPIC    Collection Time: 09/13/20  7:35 AM   Result Value Ref Range    Color YELLOW      Appearance CLEAR      Specific gravity 1.017 1.005 - 1.030      pH (UA) 5.0 5.0 - 8.0      Protein Negative NEG mg/dL    Glucose Negative NEG mg/dL    Ketone 15 (A) NEG mg/dL    Bilirubin Negative NEG      Blood Negative NEG      Urobilinogen 1.0 0.2 - 1.0 EU/dL    Nitrites Negative NEG      Leukocyte Esterase MODERATE (A) NEG     URINE MICROSCOPIC ONLY    Collection Time: 09/13/20  7:35 AM   Result Value Ref Range    WBC 25 to 35 0 - 4 /hpf    RBC NONE 0 - 5 /hpf    Epithelial cells 1+ 0 - 5 /lpf    Bacteria 1+ (A) NEG /hpf       Signed By: Neisha Chau MD     September 13, 2020      I spent 35 minutes with the patient in face-to-face consultation, of which greater than 50% was spent in counseling and coordination of care as described above    Disclaimer: Sections of this note are dictated using utilizing voice recognition software. Minor typographical errors may be present. If questions arise, please do not hesitate to contact me or call our department.

## 2020-09-13 NOTE — PROGRESS NOTES
Patient has not voided all night. Bladder scanned patient. Bladder scan revealed 513 mL of urine in bladder. Paged Dr Larry Suarez. Given verbal order to insert tate for bladder retention.

## 2020-09-13 NOTE — ROUTINE PROCESS
Bedside shift change report given to Vitor Anguiano (oncoming nurse) by Freddie Santiago RN (offgoing nurse). Report included the following information SBAR, Procedure Summary, MAR and Recent Results.

## 2020-09-14 LAB
ANION GAP SERPL CALC-SCNC: 5 MMOL/L (ref 3–18)
BASOPHILS # BLD: 0 K/UL (ref 0–0.1)
BASOPHILS NFR BLD: 1 % (ref 0–2)
BUN SERPL-MCNC: 31 MG/DL (ref 7–18)
BUN/CREAT SERPL: 28 (ref 12–20)
CALCIUM SERPL-MCNC: 9.4 MG/DL (ref 8.5–10.1)
CHLORIDE SERPL-SCNC: 119 MMOL/L (ref 100–111)
CK SERPL-CCNC: 771 U/L (ref 26–192)
CO2 SERPL-SCNC: 19 MMOL/L (ref 21–32)
CREAT SERPL-MCNC: 1.11 MG/DL (ref 0.6–1.3)
DIFFERENTIAL METHOD BLD: ABNORMAL
EOSINOPHIL # BLD: 0.2 K/UL (ref 0–0.4)
EOSINOPHIL NFR BLD: 3 % (ref 0–5)
ERYTHROCYTE [DISTWIDTH] IN BLOOD BY AUTOMATED COUNT: 14.3 % (ref 11.6–14.5)
GLUCOSE SERPL-MCNC: 85 MG/DL (ref 74–99)
HCT VFR BLD AUTO: 33.1 % (ref 35–45)
HGB BLD-MCNC: 10.6 G/DL (ref 12–16)
LEVETIRACETAM SERPL-MCNC: 69 UG/ML (ref 10–40)
LYMPHOCYTES # BLD: 1.6 K/UL (ref 0.9–3.6)
LYMPHOCYTES NFR BLD: 26 % (ref 21–52)
MCH RBC QN AUTO: 30 PG (ref 24–34)
MCHC RBC AUTO-ENTMCNC: 32 G/DL (ref 31–37)
MCV RBC AUTO: 93.8 FL (ref 74–97)
MONOCYTES # BLD: 0.6 K/UL (ref 0.05–1.2)
MONOCYTES NFR BLD: 9 % (ref 3–10)
NEUTS SEG # BLD: 3.8 K/UL (ref 1.8–8)
NEUTS SEG NFR BLD: 61 % (ref 40–73)
OXCARBAZEPINE SERPL-MCNC: 27 UG/ML (ref 10–35)
PLATELET # BLD AUTO: 185 K/UL (ref 135–420)
PMV BLD AUTO: 11 FL (ref 9.2–11.8)
POTASSIUM SERPL-SCNC: 3.9 MMOL/L (ref 3.5–5.5)
RBC # BLD AUTO: 3.53 M/UL (ref 4.2–5.3)
SODIUM SERPL-SCNC: 143 MMOL/L (ref 136–145)
WBC # BLD AUTO: 6.3 K/UL (ref 4.6–13.2)

## 2020-09-14 PROCEDURE — 74011000258 HC RX REV CODE- 258: Performed by: HOSPITALIST

## 2020-09-14 PROCEDURE — 74011250636 HC RX REV CODE- 250/636: Performed by: INTERNAL MEDICINE

## 2020-09-14 PROCEDURE — 2709999900 HC NON-CHARGEABLE SUPPLY

## 2020-09-14 PROCEDURE — 85025 COMPLETE CBC W/AUTO DIFF WBC: CPT

## 2020-09-14 PROCEDURE — 97535 SELF CARE MNGMENT TRAINING: CPT

## 2020-09-14 PROCEDURE — 74011250636 HC RX REV CODE- 250/636: Performed by: HOSPITALIST

## 2020-09-14 PROCEDURE — 74011250637 HC RX REV CODE- 250/637: Performed by: HOSPITALIST

## 2020-09-14 PROCEDURE — 80048 BASIC METABOLIC PNL TOTAL CA: CPT

## 2020-09-14 PROCEDURE — 97530 THERAPEUTIC ACTIVITIES: CPT

## 2020-09-14 PROCEDURE — 97166 OT EVAL MOD COMPLEX 45 MIN: CPT

## 2020-09-14 PROCEDURE — 36415 COLL VENOUS BLD VENIPUNCTURE: CPT

## 2020-09-14 PROCEDURE — 74011250637 HC RX REV CODE- 250/637: Performed by: INTERNAL MEDICINE

## 2020-09-14 PROCEDURE — 65270000029 HC RM PRIVATE

## 2020-09-14 PROCEDURE — 97162 PT EVAL MOD COMPLEX 30 MIN: CPT

## 2020-09-14 PROCEDURE — 82550 ASSAY OF CK (CPK): CPT

## 2020-09-14 RX ADMIN — LEVETIRACETAM 1000 MG: 500 TABLET ORAL at 09:44

## 2020-09-14 RX ADMIN — AZTREONAM 1 G: 1 INJECTION, POWDER, LYOPHILIZED, FOR SOLUTION INTRAMUSCULAR; INTRAVENOUS at 14:18

## 2020-09-14 RX ADMIN — AMLODIPINE BESYLATE 10 MG: 10 TABLET ORAL at 09:44

## 2020-09-14 RX ADMIN — Medication 10 ML: at 21:07

## 2020-09-14 RX ADMIN — ACETAMINOPHEN 650 MG: 325 TABLET ORAL at 13:44

## 2020-09-14 RX ADMIN — AZTREONAM 1 G: 1 INJECTION, POWDER, LYOPHILIZED, FOR SOLUTION INTRAMUSCULAR; INTRAVENOUS at 06:13

## 2020-09-14 RX ADMIN — AZTREONAM 1 G: 1 INJECTION, POWDER, LYOPHILIZED, FOR SOLUTION INTRAMUSCULAR; INTRAVENOUS at 21:03

## 2020-09-14 RX ADMIN — OXCARBAZEPINE 600 MG: 300 TABLET, FILM COATED ORAL at 18:03

## 2020-09-14 RX ADMIN — Medication 10 ML: at 14:28

## 2020-09-14 RX ADMIN — TAMSULOSIN HYDROCHLORIDE 0.4 MG: 0.4 CAPSULE ORAL at 09:44

## 2020-09-14 RX ADMIN — HYDRALAZINE HYDROCHLORIDE 50 MG: 50 TABLET ORAL at 09:44

## 2020-09-14 RX ADMIN — HYDRALAZINE HYDROCHLORIDE 50 MG: 50 TABLET ORAL at 21:03

## 2020-09-14 RX ADMIN — CYANOCOBALAMIN TAB 500 MCG 500 MCG: 500 TAB at 09:45

## 2020-09-14 RX ADMIN — LEVETIRACETAM 1000 MG: 500 TABLET ORAL at 21:03

## 2020-09-14 RX ADMIN — ATORVASTATIN CALCIUM 40 MG: 40 TABLET, FILM COATED ORAL at 21:03

## 2020-09-14 RX ADMIN — Medication 10 ML: at 06:13

## 2020-09-14 RX ADMIN — OXCARBAZEPINE 600 MG: 300 TABLET, FILM COATED ORAL at 09:44

## 2020-09-14 RX ADMIN — ENOXAPARIN SODIUM 40 MG: 40 INJECTION SUBCUTANEOUS at 09:52

## 2020-09-14 NOTE — ADT AUTH CERT NOTES
Patient Demographics        Patient Name   Moisés Koch   [id number]  Sex   Female     1955  Address   [address] [place] [id number]  Phone   [phone] [facility])   [phone] (Work)   [phone] (Mobile) *Preferred*                CSN:       [id number]                Admit Date:     Admit Time     Room     Bed       Sep 8, 2020   7:51 AM  [phone]                  Attending Providers          Provider     Pager     From     To       Sanjana Castillo DO   20    Elie Zhang MD   20    Meena Tinajero MD   20    Zoya Del Toro MD   20    Elie Zhang MD   20    Zoya Del Toro MD   20                   Emergency Contact(s)          Name     Relation     Home     Work     [facility]  [place]    [phone]    Trino Buenrostro  [phone]                    Utilization Reviews                    Requested clinicals 20 by Erin Gibson RN                Review Entered     Review Status       2020 09:34  In Primary              Criteria Review           20 IM notes:       :            Patient more alert awake but still slightly confused. Slow to respond. Per RN she was able to tolerate p.o. better. Still not very interactive. Patient admits she did miss seizure medications few days. Assessment/Plan: 1. Seizure disorder with 2 seizures PTA due to noncompliance: continue Keppra and Trileptal.  Discussed with neurology, recommend no change in medications due to noncompliance. 2.Acute metabolic encephalopathy due to postictal from #1, improving      3. Dysphagia: SLP re-eval, discussed with speech pathologist.  Will advance diet per SLP recommendation.       4.Mild prerenal azotemia on CKD stage III: Creatinine trending up, will start IV fluids, hold lisinopril. 5.History of hypertension: BP stable, will monitor      6. History of dyslipidemia: Continue statin      DVT prophylaxis- Lovenox    Full code     discharge planning    PT and OT eval    Discussed with the patient and also with the son Dov Hoffman over the phone in detail. Discussed about possible discharge in 1 to 2 days. Case discussed with: ? ? Patient  ? ? Family  ? ? Nursing  ? ? Case Management    DVT Prophylaxis: ? ? Lovenox  ? ? Hep SQ  ? ? SCDs  ? ? Coumadin   ? ? On Heparin gtt           Objective:       VS:     Visit Vitals      BP     114/61 (BP 1 Location: Left arm, BP Patient Position: At rest)       Pulse     90       Temp     98.2 °F (36.8 °C)       Resp     20       Ht     5' (1.524 m)       Wt     63.5 kg (140 lb)       SpO2     98%       BMI     27.34 kg/m²            Tmax/24hrs: Temp (24hrs), Av.9 °F (36.6 °C), Min:97.5 °F (36.4 °C), Max:98.3 °F (36.8 °C)    IOBRIEF         Intake/Output Summary (Last 24 hours) at 2020 1210    Last data filed at 2020 0948                    Gross per 24 hour       Intake     25 ml       Output     --       Net     25 ml                 General: Alert awake    HEENT: PERRLA, anicteric sclerae. Pulmonary:  CTA Bilaterally. No Wheezing//Rales. Cardiovascular: Regular rate and Rhythm. GI:  Soft, Non distended, Non tender. + Bowel sounds. Extremities:  No edema. Neurologic: S alert awake, slow to respond, slight confusion. Moves all extremities. Additional:         20 IM notes:       Subjective:            Patient alert awake but still slow to respond and slightly confused. Complains of diffuse body pain. Per RN she was able to tolerate p.o. better. Still not very interactive. Assessment/Plan: 1. Seizure disorder with 2 seizures PTA due to noncompliance: continue Keppra and Trileptal.  no change in medications due to noncompliance.       2.Acute metabolic encephalopathy due to postictal from #1, improving      3. Mild acute rhabdomyolysis due to #1: We will continue IV fluids and monitor CK      4. Dysphagia: Advance diet per SLP      5. Mild prerenal azotemia on CKD stage III: Creatinine trending down, continue IV fluids, hold lisinopril. 6.Concern for urinary retention: Discussed with RN to bladder scan, straight cath or Cain as needed. 7.History of hypertension: BP stable, will monitor      8. History of dyslipidemia: Continue statin      DVT prophylaxis- Lovenox    Full code     discharge planning    PT and OT eval pending    Discussed with the patient     Left a message to son Ronnie Araiza over the phone in detail. Case discussed with: ? ? Patient  ? ? Family  ? ? Nursing  ? ? Case Management    DVT Prophylaxis: ? ? Lovenox  ? ? Hep SQ  ? ? SCDs  ? ? Coumadin   ? ? On Heparin gtt           Objective:       VS:     Visit Vitals      BP     128/64 (BP 1 Location: Left arm, BP Patient Position: Supine)       Pulse     98       Temp     98.2 °F (36.8 °C)       Resp     18       Ht     5' (1.524 m)       Wt     64.9 kg (143 lb)       SpO2     100%       BMI     27.93 kg/m²            Tmax/24hrs: Temp (24hrs), Av °F (36.7 °C), Min:97.7 °F (36.5 °C), Max:98.3 °F (36.8 °C)    IOBRIEF         Intake/Output Summary (Last 24 hours) at 2020 1302    Last data filed at 2020 2000                    Gross per 24 hour       Intake     371.25 ml       Output     --       Net     371.25 ml                 General: Alert awake    Pulmonary:  CTA Bilaterally. No Wheezing//Rales. Cardiovascular: Regular rate and Rhythm. GI:  Soft, Non distended, Non tender. + Bowel sounds. Extremities:  No edema. Neurologic: alert awake, slow to respond, slight confusion. Moves all extremities. Additional:         20         IM notes:       :            Patient more alert awake but still slightly confused. Per RN she was able to tolerate p.o. better.      Patient had a Cain catheter placed on more than 500 mL of urine output. Assessment/Plan: 1. Seizure disorder with 2 seizures PTA due to noncompliance: continue Keppra and Trileptal.  no change in medications due to noncompliance. 2.Acute metabolic encephalopathy due to postictal from #1, improving      3. UTI: Follow-up urine culture, will start aztreonam since patient is allergic to penicillin and ciprofloxacin. Per patient and daughter patient had tongue swelling and hives with penicillin. 4.Mild acute rhabdomyolysis due to #1: We will continue IV fluids and monitor CK      5. Dysphagia: Advance diet per SLP      6. Mild prerenal azotemia on CKD stage III: Creatinine trending down, continue IV fluids, hold lisinopril. 7.urinary retention: s/p tate cath, will start flomax. 8.History of hypertension: BP stable, will monitor      9. History of dyslipidemia: Continue statin      DVT prophylaxis- Lovenox    Full code     discharge planning    PT and OT eval pending    Discussed with the patient and son Annia Pride over the phone in detail. Case discussed with: ? ? Patient  ? ? Family  ? ? Nursing  ? ? Case Management    DVT Prophylaxis: ? ? Lovenox  ? ? Hep SQ  ? ? SCDs  ? ? Coumadin   ? ? On Heparin gtt           Objective:       VS:     Visit Vitals      BP     (!) 149/73       Pulse     92       Temp     98.8 °F (37.1 °C)       Resp     18       Ht     5' (1.524 m)       Wt     64.9 kg (143 lb 1.3 oz)       SpO2     98%       BMI     27.94 kg/m²            Tmax/24hrs: Temp (24hrs), Av.4 °F (36.9 °C), Min:97.4 °F (36.3 °C), Max:99.4 °F (37.4 °C)    IOBRIEF         Intake/Output Summary (Last 24 hours) at 2020 1334    Last data filed at 2020 0800                    Gross per 24 hour       Intake     406.25 ml       Output     500 ml       Net     -93.75 ml                 General: Alert awake    Pulmonary:  CTA Bilaterally. No Wheezing//Rales. Cardiovascular: Regular rate and Rhythm.     GI:  Soft, Non distended, Non tender. + Bowel sounds. Extremities:  No edema. Neurologic: alert awake, Ox2, confusion. Moves all extremities.     Additional:         48 Hr-Vitals/Weight (last 2 days)              Date/Time        Temp        Pulse        BP        MAP (Calculated)        Arterial Line 1 BP (mmHg)        BP Patient Position        Resp        SpO2        O2 Device        O2 Flow Rate (L/min)        Pre/Post Ductal        Weight          09/14/20 0400      99.5 °F (37.5 °C)      94      150/74Abnormal        99      --      --      18      100 %      --      --      --      --        09/14/20 0000      99.4 °F (37.4 °C)      114Abnormal        128/60      83      --      --      19      96 %      --      --      --      --        09/13/20 1950      98.8 °F (37.1 °C)      100      150/74Abnormal        99      --      Supine      18      99 %      Room air      --      --      --        09/13/20 1200      98.8 °F (37.1 °C)      92      149/73Abnormal        98      --      --      18      98 %      --      --      --      --        09/13/20 0800      98.3 °F (36.8 °C)      97      172/76Abnormal        108      --      --      18      97 %      --      --      --      --        09/13/20 0400      97.9 °F (36.6 °C)      95      140/70Abnormal        93      --      --      19      100 %      --      --      --      --        09/13/20 0000      99.4 °F (37.4 °C)      99      146/73Abnormal        97      --      --      19      99 %      --      --      --      --        09/12/20 2000      --      --      --      --      --      --      --      --      Room air      --      --      64.9 kg (143 lb 1.3 oz)        09/12/20 1915      97.4 °F (36.3 °C)      98      127/69      88      --      --      18      99 %      --      --      --      --        09/12/20 1548      98.3 °F (36.8 °C)      106Abnormal        132/76      95      --      Supine      18      99 %      --      --      --      --        09/12/20 1214      98.2 °F (36.8 °C)      98      128/64      85      --      Supine      18      100 %      --      --      --      --        20 0800      --      --      --      --      --      --      --      --      Room air      --      --      --        20 0752      98.1 °F (36.7 °C)      92      142/72Abnormal        95      --      Supine      18      99 %      --      --      --      --        20 0420      98.1 °F (36.7 °C)      93      120/69      86      --      At rest      18      98 %      --      --      --      64.9 kg (143 lb)        20 0007      97.8 °F (36.6 °C)      102Abnormal        133/75      94      --      At rest      19      98 %      --      --      --      --        labs: 20  rbc 3.53 h/h 10.6/33.1     urine: ketone 15 mod leuko +1 bacteria,     chl 119 co2 19 bun 31 bbun ratio 28 gfr 49 ck 771                    PA rec by [facility] RN                Review Entered     Review Status       2020 09:33  In Primary              Criteria Review           We recommend that the following pt's hospitalization under OBSERVATION [104]  status is upgraded to INPATIENT If you agree, please place a new ADMIT order  in [address] [facility] as recommended.       Name: Zach Gordon   : 1955   VIJAYA# : [id number]  Insurance: [facility] [employer] Medicare     Clinical summary patient with seizures  Vitals baseline  Labs and Imaging creatinine trending up  MCG criteria applies NO  Comments patient with seizures, medications being optimized, patient with a  jump in creatinine today, unclear etiology of KEYLA, needing medical optimization      This chart was reviewed at 9:50 AM 2020    Sabra Slade MD MD   Physician Nishi Campbell [address] [place] [address] [phone]

## 2020-09-14 NOTE — PROGRESS NOTES
Discharge planning    Reviewed chart. PT recommending [facility] pending progress. OT recommending rehab. Will continue to monitor for final recommendation to assist with discharge planning.     BLANCA FonsecaN, RN  Pager # [phone]  Care Manager

## 2020-09-14 NOTE — PROGRESS NOTES
Problem: Mobility Impaired (Adult and Pediatric)  Goal: *Acute Goals and Plan of Care (Insert Text)  Description: Physical Therapy Goals  Initiated 9/14/2020 and to be accomplished within 7 day(s)  1. Patient will move from supine to sit and sit to supine , scoot up and down, and roll side to side in bed with modified independence. 2.  Patient will transfer from bed to chair and chair to bed with supervision/set-up using the least restrictive device. 3.  Patient will perform sit to stand with supervision/set-up. 4.  Patient will ambulate with supervision/set-up for 100 feet with the least restrictive device. 5.  Patient will ascend/descend stairs pending progress and necessity for safe discharge. PLOF: Pt lives with her son, was mod ind/ind PTA, states she does not have any DME however unclear due to pt confusion this date. Outcome: Progressing Towards Goal     PHYSICAL THERAPY EVALUATION    Patient: Umu Gay (89 y.o. female)  Date: 9/14/2020  Primary Diagnosis: Recurrent seizures ([address] [G40.909]  Recurrent seizures ([address] [X02.523]  Seizure ([address] [R56.9]        Precautions:      WBAT      ASSESSMENT :  Based on the objective data described below, the patient presents with decreased activity tolerance, impaired balance, impaired gait, generalized weakness, LLE pain, decreased functional mobility from baseline. Pt is confused but oriented to self and place but throughout session is talking to family members she thinks are in room and given redirection throughout, pt able to report some of her PLOF but unclear due to her being confused. Pt is able to come to sitting EOB with mod A and stand up to RW with min A. Pt is able to take approx 4 steps laterally to the R with min A for safety and walker management however pt exhibits decreased step clearance  and increased stance time on the R 2/2 L sided LE pain.  Pt takes brief sitting break and then stands x 1 more trial to take 3-4 steps fwd/back with walker. Pt remaining confused throughout talking to people in the room but is able to follow commands. At end of session pt returns to supine with min A for LLE management back onto bed where she is positioned for comfort with all needs met. At this time recommend return home with [facility] pending progress in the acute setting however may require rehab. Pt left in bed with all needs met and bed alarm on. Patient will benefit from skilled intervention to address the above impairments. Patient's rehabilitation potential is considered to be Good  Factors which may influence rehabilitation potential include:   []         None noted  [x]         Mental ability/status  []         Medical condition  []         Home/family situation and support systems  []         Safety awareness  []         Pain tolerance/management  []         Other:      PLAN :  Recommendations and Planned Interventions:   [x]           Bed Mobility Training             [x]    Neuromuscular Re-Education  [x]           Transfer Training                   []    Orthotic/Prosthetic Training  [x]           Gait Training                          []    Modalities  [x]           Therapeutic Exercises           []    Edema Management/Control  [x]           Therapeutic Activities            [x]    Family Training/Education  [x]           Patient Education  []           Other (comment):    Frequency/Duration: Patient will be followed by physical therapy 1-2 times per day/4-7 days per week to address goals. Discharge Recommendations: Home Health pending progress with therapy in the acute setting  Further Equipment Recommendations for Discharge: rolling walker pending progress     SUBJECTIVE:   Patient stated Craig, cut that out right now before I beat your butt!     OBJECTIVE DATA SUMMARY:     Past Medical History:   Diagnosis Date    Hypertension     Neurological disorder     Seizures ([facility]     Stroke ([facility] 2009   No past surgical history on file.  Barriers to Learning/Limitations: None  Compensate with: N/A  Home Situation:  Home Situation  Home Environment: Private residence  One/Two Story Residence: One story  Living Alone: No  Support Systems: Child(quiana)  Patient Expects to be Discharged to[de-identified] Private residence  Current DME Used/Available at Home: Other (comment)  Critical Behavior:  Neurologic State: Alert  Orientation Level: Oriented to person;Oriented to place; Disoriented to situation;Disoriented to time  Cognition: Decreased attention/concentration  Safety/Judgement: Decreased insight into deficits  Psychosocial  Patient Behaviors: Calm; Cooperative;Confused  Purposeful Interaction: Yes  Pt Identified Daily Priority: Clinical issues (comment)  Caritas Process: Nurture loving kindness;Enable pushpa/hope  Caring Interventions: Therapeutic modalities  Reassure: Prayer;Caring rounds  Therapeutic Modalities: Intentional therapeutic touch;Humor                 Strength:    Strength: Generally decreased, functional       Tone & Sensation:   Tone: Normal    Sensation: Intact     Range Of Motion:  AROM: Generally decreased, functional(decreased to LLE 2/2 pain, RLE WFL )    Functional Mobility:  Bed Mobility:     Supine to Sit: Moderate assistance  Sit to Supine: Minimum assistance     Transfers:  Sit to Stand: Minimum assistance  Stand to Sit: Minimum assistance    Balance:   Sitting: Impaired; With support  Sitting - Static: Poor (constant support)  Sitting - Dynamic: Poor (constant support)    Ambulation/Gait Training:    Gait Description (WDL): (lateral along EOB x 4 steps with min A )    Pain:  Pain level pre-treatment: not rated numercially (L knee/LE pain)    Pain level post-treatment: \"    \"   Pain Intervention(s) : Medication (see MAR); Rest,  Repositioning  Response to intervention: Nurse notified    Activity Tolerance:   Good    Please refer to the flowsheet for vital signs taken during this treatment.   After treatment:   []         Patient left in no apparent distress sitting up in chair  [x]         Patient left in no apparent distress in bed  [x]         Call bell left within reach  []         Nursing notified  []         Caregiver present  [x]         Bed alarm activated  []         SCDs applied    COMMUNICATION/EDUCATION:   [x]         Role of Physical Therapy in the acute care setting. [x]         Fall prevention education was provided and the patient/caregiver indicated understanding. [x]         Patient/family have participated as able in goal setting and plan of care. [x]         Patient/family agree to work toward stated goals and plan of care. []         Patient understands intent and goals of therapy, but is neutral about his/her participation. []         Patient is unable to participate in goal setting/plan of care: ongoing with therapy staff.  []         Other:     Thank you for this referral.  Yudelka Valdovinos   Time Calculation: 15 mins      Eval Complexity: History: MEDIUM  Complexity : 1-2 comorbidities / personal factors will impact the outcome/ POC Exam:MEDIUM Complexity : 3 Standardized tests and measures addressing body structure, function, activity limitation and / or participation in recreation  Presentation: MEDIUM Complexity : Evolving with changing characteristics  Clinical Decision Making:Medium Complexity    Overall Complexity:MEDIUM

## 2020-09-14 NOTE — ROUTINE PROCESS
Bedside and Verbal shift change report given to GALO Ribera RN by PATRICIA Tatum RN . Report included the following information SBAR, Kardex, Intake/Output, MAR and Recent Results.

## 2020-09-14 NOTE — PROGRESS NOTES
conducted an initial consultation and Spiritual Assessment for Josef Matthews, who is a 76 y.o.,female. Patients Primary Language is: [place]. According to the patients EMR Mosque Affiliation is: [facility].     The reason the Patient came to the hospital is:   Patient Active Problem List    Diagnosis Date Noted    Recurrent seizures ([address] 09/08/2020    Seizure ([address] 08/03/2019    Status epilepticus ([address] 08/02/2019    Cerebrovascular accident (CVA) ([address] 04/04/2016    TIA (transient ischemic attack) 01/07/2016    Dyslipidemia 12/01/2015    Left-sided weakness 10/30/2015    Stroke ([address] 10/30/2015    Essential hypertension 09/11/2015    Convulsion ([address] 07/21/2015    Elevated Dilantin level 02/20/2015    HTN (hypertension) 02/20/2015    Essential hypertension, benign 03/12/2014    CVA (cerebral infarction) 03/10/2014    Non compliance w medication regimen 03/10/2014    Pulmonary embolism ([address] 02/02/2014    Chest pain 02/02/2014    Other and unspecified noninfectious gastroenteritis and colitis(558.9) 02/02/2014    Ataxia 12/30/2011    Headache 12/30/2011        The  provided the following Interventions:  Initiated a relationship of care and support. Explored issues of pushpa, belief, spirituality and Denominational/ritual needs while hospitalized. Listened empathically. Provided chaplaincy education. Provided information about Spiritual Care Services. Offered prayer and assurance of continued prayers on patient's behalf. Chart reviewed. The following outcomes where achieved:  Patient shared limited information about both their medical narrative and spiritual journey/beliefs.  confirmed Patient's Mosque Affiliation. Patient processed feeling about current hospitalization. Patient expressed gratitude for 's visit. Assessment:  Patient does not have any Denominational/cultural needs that will affect patients preferences in health care.   There are no spiritual or Tenriism issues which require intervention at this time. Patient responds well to prayer. Plan:  Chaplains will continue to follow and will provide pastoral care on an as needed/requested basis.  recommends bedside caregivers page  on duty if patient shows signs of acute spiritual or emotional distress.      LATASHA/ Arnaldo Muñoz Certified [address] [facility] [place]   [phone]

## 2020-09-14 NOTE — PROGRESS NOTES
Problem: Self Care Deficits Care Plan (Adult)  Goal: *Acute Goals and Plan of Care (Insert Text)  Description: Occupational Therapy Goals  Initiated 9/14/2020 within 7 day(s). 1.  Patient will perform self-feeding with supervision/set-up. 2.  Patient will perform grooming with supervision/set-up. 3.  Patient will perform upper body dressing with supervision/setup. 4.  Patient will perform bed mobility with supervision/setup in preparation for further self-care. 5.  Patient will perform a functional activity/ADL sitting EOB presenting with F+ sitting balance for 3-5 minutes. 6.  Patient will participate in upper extremity therapeutic exercise/activities with supervision/set-up for 8 minutes. Prior Level of Function: Pt is a poor historian, but reports she was (I) with basic self-care/ADLs and functional mobility without AD PTA. Pt lives with her son in an apartment on the 1st floor. Pt does not have AD/DME at home. Outcome: Progressing Towards Goal   OCCUPATIONAL THERAPY EVALUATION    Patient: Bubba Raza (55 y.o. female)  Date: 9/14/2020  Primary Diagnosis: Recurrent seizures ([address] [Z19.947]  Recurrent seizures ([address] [M20.696]  Seizure ([facility] [R56.9]        Precautions: Falls; Aspiration       ASSESSMENT :   Upon entering room, pt received semi-reclined in bed, alert, and agreeable to OT eval. Based on the objective data described below, the patient presents with increased pain, decreased strength, decreased activity tolerance, decreased sitting balance, decreased ability to safely perform functional transfers and mobility, impaired cognition, impaired vision, and decreased independence in ADLs, increasing the need for assistance from others. Pt oriented to person and place, but not time; pt stating it was 2002; re-oriented pt to correct date. Pt c/o pain in L knee, but did not provide a number on the pain level scale; pt denied falls.  Pt presenting with generally decreased BUE AROM/strength, also noted tremors. Pt requiring mod-max assist for sup>sit towards R to participate in further self-care. Pt educated on hand placement to sit in upright position, however pt unable to follow commands despite max cueing. As pt presented with poor sitting balance when sitting EOB, deferred standing activities thi session and safely returned pt to supine position in bed. At bed level with HOB elevated >45, initiated grooming tasks. Pt requiring mod assist to grasp onto wash cloth and wash face; pt seen dropping wash cloth but continuing to do wiping motion with R hand, also then attempting to [facility] gown. Pt given meal with pt requiring mod-max assist to properly hold feeding utensil and bring from plate to mouth; pt presenting with difficulty locating feeding utensils and food on plate despite max cueing. Pt denied swallowing difficulty. Educated pt on the role of Ot, evaluation process, goals for therapy, and use of a call bell; pt demo's the need for reinforcement. The patient requires skilled OT services to assess safety and increase independence with basic self-care/ADLs, enhancing the patients quality of life by improving their ability to return to [facility]. At this time, recommending rehab at d/c. At the end of the session, pt left resting comfortably in bed, call bell in reach, with all needs met. Patient will benefit from skilled intervention to address the above impairments.   Patient's rehabilitation potential is considered to be Good  Factors which may influence rehabilitation potential include:   []             None noted  [x]             Mental ability/status  [x]             Medical condition  [x]             Home/family situation and support systems  [x]             Safety awareness  [x]             Pain tolerance/management  []             Other:      PLAN :  Recommendations and Planned Interventions:   [x]               Self Care Training                  [x]      Therapeutic Activities  [x] Functional Mobility Training   [x]      Cognitive Retraining  [x]               Therapeutic Exercises           [x]      Endurance Activities  [x]               Balance Training                    [x]      Neuromuscular Re-Education  [x]               Visual/Perceptual Training     [x]      Home Safety Training  [x]               Patient Education                   [x]      Family Training/Education  []               Other (comment):    Frequency/Duration: Patient will be followed by occupational therapy 1-2 times per day/4-7 days per week to address goals. Discharge Recommendations: Rehab  Further Equipment Recommendations for Discharge: TBD at the next level of care     SUBJECTIVE:   Patient stated I can't lift that (left) knee up\"; \"Call my son for my shoes\"    OBJECTIVE DATA SUMMARY:     Past Medical History:   Diagnosis Date    Hypertension     Neurological disorder     [facility]     Stroke ([facility] 2009   No past surgical history on file. Barriers to Learning/Limitations: yes;  cognitive, sensory deficits-vision/hearing/speech, and altered mental status (i.e.Sedation, Confusion)  Compensate with: visual, verbal, tactile, kinesthetic cues/model    Home Situation:   Home Situation  Home Environment: Private residence  One/Two Story Residence: One story  Living Alone: No  Support Systems: Child(quiana), Family member(s), Friends \ neighbors  Patient Expects to be Discharged to[de-identified] Private residence  Current DME Used/Available at Home: Other (comment)  []  Right hand dominant   []  Left hand dominant    Cognitive/Behavioral Status:  Neurologic State: Alert  Orientation Level: Oriented to person;Oriented to place; Disoriented to time  Cognition: Decreased command following;Decreased attention/concentration  Safety/Judgement: Decreased insight into deficits; Decreased awareness of need for assistance    Skin: Visible skin appeared intact.   Edema: None noted    Vision/Perceptual:    Acuity: Impaired far vision; Impaired near vision(Unable to read clock correctly nor see feeding utensils, with pt demo difficulty during self-feed tasks)        Coordination: BUE  Coordination: Generally decreased, functional  Fine Motor Skills-Upper: Left Intact; Right Intact    Gross Motor Skills-Upper: Left Intact; Right Intact    Balance:  Sitting: Impaired; With support  Sitting - Static: Poor (constant support)  Sitting - Dynamic: Poor (constant support)    Strength: BUE  Strength: Generally decreased, functional    Tone & Sensation: BUE  Tone: Normal  Sensation: Intact      Range of Motion: BUE  AROM: Generally decreased, functional    Functional Mobility and Transfers for ADLs:  Bed Mobility:  Supine to Sit: Moderate assistance;Maximum assistance  Sit to Supine: Maximum assistance    Transfers:  Sit to Stand: (Deferred for safety as pt presented with poor sitting balance)      ADL Assessment:   Feeding: Moderate-max assistance    Oral Facial Hygiene/Grooming: Moderate-max  assistance    Bathing: Maximum assistance    Upper Body Dressing: Maximum assistance    Lower Body Dressing: Maximum assistance    Toileting: Maximum assistance    ADL Intervention:  Feeding  Feeding Assistance: Moderate assistance  Container Management: Moderate assistance  Food to Mouth: Moderate-max assistance  Cues: Tactile cues provided;Verbal cues provided;Visual cues provided    Grooming  Grooming Assistance: Moderate assistance  Position Performed: (Supine with HOB elevated)  Washing Face: Moderate assistance    Lower Body Dressing Assistance  Dressing Assistance: Maximum assistance  Socks: Maximum assistance    Cognitive Retraining  Safety/Judgement: Decreased insight into deficits; Decreased awareness of need for assistance    Pain:  Pain level pre-treatment: -/10 (Pt c/o pain in L knee but did not provide a number on the pain level scale)  Pain level post-treatment: -/10   Pain Intervention(s): Medication (see MAR);  Rest, Ice, Repositioning Response to intervention: Nurse notified, See doc flow    Activity Tolerance:   Fair    Please refer to the flowsheet for vital signs taken during this treatment. After treatment:   [] Patient left in no apparent distress sitting up in chair  [x] Patient left in no apparent distress in bed  [x] Call bell left within reach  [x] Nursing Kathy Lopez) notified  [] Caregiver present  [] Bed alarm activated    COMMUNICATION/EDUCATION:   [x] Role of Occupational Therapy in the acute care setting  [] Home safety education was provided and the patient/caregiver indicated understanding. [x] Patient/family have participated as able in goal setting and plan of care. [x] Patient/family agree to work toward stated goals and plan of care. [] Patient understands intent and goals of therapy, but is neutral about his/her participation. [] Patient is unable to participate in goal setting and plan of care. Thank you for this referral.  Cheryl Meeks MS, OTR/L  Time Calculation: 39 mins    Eval Complexity: History: LOW Complexity : Brief history review ; Examination: MEDIUM Complexity : 3-5 performance deficits relating to physical, cognitive , or psychosocial skils that result in activity limitations and / or participation restrictions; Decision Making:MEDIUM Complexity : Patient may present with comorbidities that affect occupational performnce.  Miniml to moderate modification of tasks or assistance (eg, physical or verbal ) with assesment(s) is necessary to enable patient to complete evaluation

## 2020-09-14 NOTE — PROGRESS NOTES
[facility] Hospitalist Group  Progress Note    Patient: Irving Huffman Age: 76 y.o. : 1955 MR#: [id number] SSN: [id number]  Date/Time: 2020     Subjective:     Patient more alert awake but still slightly confused. Slightly better from yesterday. Per daughter she spoke to her mother, patient was asking about her dad and is been  per daughter. Daughter thinks patient is not baseline yet. Assessment/Plan:     1. Seizure disorder with 2 seizures PTA due to noncompliance: continue Keppra and Trileptal.  no change in medications due to noncompliance. 2. Acute metabolic encephalopathy due to postictal from #1, slow improvement. 3. UTI: Follow-up urine culture, will cont aztreonam.  4. Mild acute rhabdomyolysis due to #1: improving, DC IV fluids and monitor CK  5. Dysphagia: Advance diet per SLP  6. Mild prerenal azotemia on CKD stage III: Creatinine improved, hold lisinopril. 7. urinary retention: s/p tate cath, will cont flomax. Will do voiding trial tomorrow. 8. History of hypertension: BP stable, will monitor  9. History of dyslipidemia: Continue statin  DVT prophylaxis- Lovenox  Full code   discharge planning  PT and OT eval noted  Discussed with the patient and son Caroline Leroy over the phone in detail. Case discussed with:  [x]Patient  [x]Family  [x]Nursing  []Case Management  DVT Prophylaxis:  [x]Lovenox  []Hep SQ  []SCDs  []Coumadin   []On Heparin gtt    Objective:   VS:   Visit Vitals  BP (!) 121/58   Pulse (!) 111   Temp 98.8 °F (37.1 °C)   Resp 20   Ht 5' (1.524 m)   Wt 64.9 kg (143 lb 1.3 oz)   SpO2 98%   BMI 27.94 kg/m²      Tmax/24hrs: Temp (24hrs), Av.1 °F (37.3 °C), Min:98.8 °F (37.1 °C), Max:99.5 °F (37.5 °C)  IOBRIEF    Intake/Output Summary (Last 24 hours) at 2020 1614  Last data filed at 2020 1529  Gross per 24 hour   Intake 100 ml   Output 1450 ml   Net -1350 ml       General: Alert awake  Pulmonary:  CTA Bilaterally.  No Wheezing//Rales. Cardiovascular: Regular rate and Rhythm. GI:  Soft, Non distended, Non tender. + Bowel sounds. Extremities:  No edema. Neurologic: alert awake, Ox2, confusion. Moves all extremities. Additional:    Medications:   Current Facility-Administered Medications   Medication Dose Route Frequency    enoxaparin (LOVENOX) injection 40 mg  40 mg SubCUTAneous DAILY    levETIRAcetam (KEPPRA) tablet 1,000 mg  1,000 mg Oral Q12H    aztreonam (AZACTAM) 1 g in 0.9% sodium chloride (MBP/ADV) 100 mL MBP  1 g IntraVENous Q8H    tamsulosin (FLOMAX) capsule 0.4 mg  0.4 mg Oral DAILY    amLODIPine (NORVASC) tablet 10 mg  10 mg Oral DAILY    atorvastatin (LIPITOR) tablet 40 mg  40 mg Oral QHS    cyanocobalamin tablet 500 mcg  500 mcg Oral DAILY    ergocalciferol capsule 50,000 Units  50,000 Units Oral Once per day on Thu Sat    hydrALAZINE (APRESOLINE) tablet 50 mg  50 mg Oral BID    [Held by provider] lisinopriL (PRINIVIL, ZESTRIL) tablet 10 mg  10 mg Oral DAILY    OXcarbazepine (TRILEPTAL) tablet 600 mg  600 mg Oral BID    sodium chloride (NS) flush 5-40 mL  5-40 mL IntraVENous Q8H    sodium chloride (NS) flush 5-40 mL  5-40 mL IntraVENous PRN    acetaminophen (TYLENOL) tablet 650 mg  650 mg Oral Q6H PRN    Or    acetaminophen (TYLENOL) suppository 650 mg  650 mg Rectal Q6H PRN    polyethylene glycol (MIRALAX) packet 17 g  17 g Oral DAILY PRN    promethazine (PHENERGAN) tablet 12.5 mg  12.5 mg Oral Q6H PRN    Or    ondansetron (ZOFRAN) injection 4 mg  4 mg IntraVENous Q6H PRN       Imaging:   XR Results (most recent):  Results from Hospital Encounter encounter on 03/23/20   XR CHEST PA LAT    Narrative Chest  PA and lateral views    INDICATION:  Cough, shortness of breath, chest pain    COMPARISON:  Prior chest x-rays, most recent 1/3/2020    FINDINGS:  The cardiac silhouette is normal in size. The pulmonary vasculature  is unremarkable. No focal consolidation, pleural effusion, or pneumothorax. No  acute osseous abnormalities are identified. Mild spondylosis. Impression IMPRESSION:   No acute finding. .            CT Results (most recent):  Results from Hospital Encounter encounter on 09/08/20   CT HEAD WO CONT    Narrative CT of the head without contrast    HISTORY: Witnessed seizure    COMPARISON: 1/3/20    TECHNIQUE: Helical axial scan to the head was performed from the skull base to  the vertex without IV contrast administration. All CT scans at this facility performed using dose optimization techniques as  appreciated to a performed exam, to include automated exposure control,  adjustment of the mA and or KU according to patient size (including appropriate  matching for site specific examination), or use of iterative reconstruction  technique. FINDINGS:    Motion artifact noted. Mild global atrophy with mild ventricular dilatation  appear unchanged remote infarct in right parietal/occipital lobes with negative  mass effect and retraction of right parietal horn. Moderate and extensive  periventricular white matter hypodensities also present. . There is no evidence  of acute intracranial hemorrhage,  mass effect or midline shift identified. No  skull fracture or extra axial fluid collections identified. Visualized sinuses  and mastoid air cells appear unremarkable. Impression IMPRESSION:    In the setting of motion artifact, no definite acute intracranial hemorrhage or  mass effect. Note: If clinical concern of acute stroke, MRI with diffusion weighted images is  recommended for better evaluation. 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     Thank you for your referral.             Labs:    Recent Results (from the past 48 hour(s))   CBC WITH AUTOMATED DIFF    Collection Time: 09/13/20  5:04 AM   Result Value Ref Range    WBC 7.6 4.6 - 13.2 K/uL    RBC 3.77 (L) 4.20 - 5.30 M/uL    HGB 11.5 (L) 12.0 - 16.0 g/dL    HCT 35.4 35.0 - 45.0 %    MCV 93.9 74.0 - 97.0 FL    MCH 30.5 24.0 - 34.0 PG    MCHC 32.5 31.0 - 37.0 g/dL    RDW 14.1 11.6 - 14.5 %    PLATELET 414 935 - 797 K/uL    MPV 11.9 (H) 9.2 - 11.8 FL    NEUTROPHILS 61 40 - 73 %    LYMPHOCYTES 29 21 - 52 %    MONOCYTES 8 3 - 10 %    EOSINOPHILS 2 0 - 5 %    BASOPHILS 0 0 - 2 %    ABS. NEUTROPHILS 4.6 1.8 - 8.0 K/UL    ABS. LYMPHOCYTES 2.2 0.9 - 3.6 K/UL    ABS. MONOCYTES 0.6 0.05 - 1.2 K/UL    ABS. EOSINOPHILS 0.1 0.0 - 0.4 K/UL    ABS.  BASOPHILS 0.0 0.0 - 0.1 K/UL    DF AUTOMATED     METABOLIC PANEL, BASIC    Collection Time: 09/13/20  5:04 AM   Result Value Ref Range    Sodium 146 (H) 136 - 145 mmol/L    Potassium 4.4 3.5 - 5.5 mmol/L    Chloride 119 (H) 100 - 111 mmol/L    CO2 19 (L) 21 - 32 mmol/L    Anion gap 8 3.0 - 18 mmol/L    Glucose 87 74 - 99 mg/dL    BUN 44 (H) 7.0 - 18 MG/DL    Creatinine 1.45 (H) 0.6 - 1.3 MG/DL    BUN/Creatinine ratio 30 (H) 12 - 20      GFR est AA 44 (L) >60 ml/min/1.73m2    GFR est non-AA 36 (L) >60 ml/min/1.73m2    Calcium 9.3 8.5 - 10.1 MG/DL   CK    Collection Time: 09/13/20  5:04 AM   Result Value Ref Range    CK 1,526 (H) 26 - 192 U/L   URINALYSIS W/ RFLX MICROSCOPIC    Collection Time: 09/13/20  7:35 AM   Result Value Ref Range    Color YELLOW      Appearance CLEAR      Specific gravity 1.017 1.005 - 1.030      pH (UA) 5.0 5.0 - 8.0      Protein Negative NEG mg/dL    Glucose Negative NEG mg/dL    Ketone 15 (A) NEG mg/dL    Bilirubin Negative NEG      Blood Negative NEG      Urobilinogen 1.0 0.2 - 1.0 EU/dL    Nitrites Negative NEG      Leukocyte Esterase MODERATE (A) NEG     URINE MICROSCOPIC ONLY    Collection Time: 09/13/20  7:35 AM   Result Value Ref Range    WBC 25 to 35 0 - 4 /hpf    RBC NONE 0 - 5 /hpf    Epithelial cells 1+ 0 - 5 /lpf    Bacteria 1+ (A) NEG /hpf   METABOLIC PANEL, BASIC    Collection Time: 09/14/20  5:46 AM   Result Value Ref Range    Sodium 143 136 - 145 mmol/L    Potassium 3.9 3.5 - 5.5 mmol/L    Chloride 119 (H) 100 - 111 mmol/L    CO2 19 (L) 21 - 32 mmol/L    Anion gap 5 3.0 - 18 mmol/L    Glucose 85 74 - 99 mg/dL    BUN 31 (H) 7.0 - 18 MG/DL    Creatinine 1.11 0.6 - 1.3 MG/DL    BUN/Creatinine ratio 28 (H) 12 - 20      GFR est AA 60 (L) >60 ml/min/1.73m2    GFR est non-AA 49 (L) >60 ml/min/1.73m2    Calcium 9.4 8.5 - 10.1 MG/DL   CK    Collection Time: 09/14/20  5:46 AM   Result Value Ref Range     (H) 26 - 192 U/L   CBC WITH AUTOMATED DIFF    Collection Time: 09/14/20  5:46 AM   Result Value Ref Range    WBC 6.3 4.6 - 13.2 K/uL    RBC 3.53 (L) 4.20 - 5.30 M/uL    HGB 10.6 (L) 12.0 - 16.0 g/dL    HCT 33.1 (L) 35.0 - 45.0 %    MCV 93.8 74.0 - 97.0 FL    MCH 30.0 24.0 - 34.0 PG    MCHC 32.0 31.0 - 37.0 g/dL    RDW 14.3 11.6 - 14.5 %    PLATELET 772 489 - 691 K/uL    MPV 11.0 9.2 - 11.8 FL    NEUTROPHILS 61 40 - 73 %    LYMPHOCYTES 26 21 - 52 %    MONOCYTES 9 3 - 10 %    EOSINOPHILS 3 0 - 5 %    BASOPHILS 1 0 - 2 %    ABS. NEUTROPHILS 3.8 1.8 - 8.0 K/UL    ABS. LYMPHOCYTES 1.6 0.9 - 3.6 K/UL    ABS. MONOCYTES 0.6 0.05 - 1.2 K/UL    ABS. EOSINOPHILS 0.2 0.0 - 0.4 K/UL    ABS. BASOPHILS 0.0 0.0 - 0.1 K/UL    DF AUTOMATED         Signed By: Murrell Hamman, MD     September 14, 2020      I spent 35 minutes with the patient in face-to-face consultation, of which greater than 50% was spent in counseling and coordination of care as described above    Disclaimer: Sections of this note are dictated using utilizing voice recognition software. Minor typographical errors may be present. If questions arise, please do not hesitate to contact me or call our department.

## 2020-09-14 NOTE — PROGRESS NOTES
Problem: Falls - Risk of  Goal: *Absence of Falls  Description: Document Maritza Dear Fall Risk and appropriate interventions in the flowsheet.   Outcome: Progressing Towards Goal  Note: Fall Risk Interventions:  Mobility Interventions: OT consult for ADLs, Patient to call before getting OOB, PT Consult for mobility concerns    Mentation Interventions: Adequate sleep, hydration, pain control, Door open when patient unattended, Reorient patient, Room close to nurse's station, Update white board    Medication Interventions: Patient to call before getting OOB    Elimination Interventions: Call light in reach, Patient to call for help with toileting needs, Bed/chair exit alarm              Problem: Patient Education: Go to Patient Education Activity  Goal: Patient/Family Education  Outcome: Progressing Towards Goal     Problem: Seizure Disorder (Adult)  Goal: *STG: Remains free of seizure activity  Outcome: Progressing Towards Goal  Goal: *STG: Maintains lab values within therapeutic range  Outcome: Progressing Towards Goal  Goal: *STG/LTG: Complies with medication therapy  Outcome: Progressing Towards Goal  Goal: *STG: Remains free of injury during seizure activity  Outcome: Progressing Towards Goal  Goal: *STG: Remains safe in hospital  Outcome: Progressing Towards Goal  Goal: Interventions  Outcome: Progressing Towards Goal     Problem: Patient Education: Go to Patient Education Activity  Goal: Patient/Family Education  Outcome: Progressing Towards Goal     Problem: Pain  Goal: *Control of Pain  Outcome: Progressing Towards Goal     Problem: Patient Education: Go to Patient Education Activity  Goal: Patient/Family Education  Outcome: Progressing Towards Goal     Problem: Patient Education: Go to Patient Education Activity  Goal: Patient/Family Education  Outcome: Progressing Towards Goal     Problem: Nutrition Deficit  Goal: *Optimize nutritional status  Outcome: Progressing Towards Goal     Problem: Pressure Injury - Risk of  Goal: *Prevention of pressure injury  Description: Document John Scale and appropriate interventions in the flowsheet.   Outcome: Progressing Towards Goal  Note: Pressure Injury Interventions:  Sensory Interventions: Assess changes in LOC, Keep linens dry and wrinkle-free, Minimize linen layers    Moisture Interventions: Absorbent underpads, Internal/External urinary devices, Minimize layers    Activity Interventions: PT/OT evaluation, Pressure redistribution bed/mattress(bed type), Increase time out of bed, Assess need for specialty bed    Mobility Interventions: Assess need for specialty bed    Nutrition Interventions: Document food/fluid/supplement intake    Friction and Shear Interventions: Apply protective barrier, creams and emollients, Minimize layers, Foam dressings/transparent film/skin sealants                Problem: Patient Education: Go to Patient Education Activity  Goal: Patient/Family Education  Outcome: Progressing Towards Goal

## 2020-09-14 NOTE — ROUTINE PROCESS
Bedside and Verbal shift change report given to Via Bk Redding RN (oncoming nurse) by Quinton Colvin RN (offgoing nurse). Report given with SBAR, Kardex, Intake/Output, MAR, Accordion and Recent Results.

## 2020-09-15 LAB
ANION GAP SERPL CALC-SCNC: 7 MMOL/L (ref 3–18)
BUN SERPL-MCNC: 27 MG/DL (ref 7–18)
BUN/CREAT SERPL: 23 (ref 12–20)
CALCIUM SERPL-MCNC: 9.4 MG/DL (ref 8.5–10.1)
CHLORIDE SERPL-SCNC: 118 MMOL/L (ref 100–111)
CK SERPL-CCNC: 523 U/L (ref 26–192)
CO2 SERPL-SCNC: 18 MMOL/L (ref 21–32)
CREAT SERPL-MCNC: 1.16 MG/DL (ref 0.6–1.3)
GLUCOSE SERPL-MCNC: 79 MG/DL (ref 74–99)
MAGNESIUM SERPL-MCNC: 1.9 MG/DL (ref 1.6–2.6)
PHOSPHATE SERPL-MCNC: 3.2 MG/DL (ref 2.5–4.9)
POTASSIUM SERPL-SCNC: 3.8 MMOL/L (ref 3.5–5.5)
SODIUM SERPL-SCNC: 143 MMOL/L (ref 136–145)

## 2020-09-15 PROCEDURE — 84100 ASSAY OF PHOSPHORUS: CPT

## 2020-09-15 PROCEDURE — 74011250636 HC RX REV CODE- 250/636: Performed by: HOSPITALIST

## 2020-09-15 PROCEDURE — 82550 ASSAY OF CK (CPK): CPT

## 2020-09-15 PROCEDURE — 74011250637 HC RX REV CODE- 250/637: Performed by: HOSPITALIST

## 2020-09-15 PROCEDURE — 80048 BASIC METABOLIC PNL TOTAL CA: CPT

## 2020-09-15 PROCEDURE — 74011000258 HC RX REV CODE- 258: Performed by: HOSPITALIST

## 2020-09-15 PROCEDURE — 65270000029 HC RM PRIVATE

## 2020-09-15 PROCEDURE — 74011250636 HC RX REV CODE- 250/636: Performed by: INTERNAL MEDICINE

## 2020-09-15 PROCEDURE — 74011250637 HC RX REV CODE- 250/637: Performed by: INTERNAL MEDICINE

## 2020-09-15 PROCEDURE — 83735 ASSAY OF MAGNESIUM: CPT

## 2020-09-15 PROCEDURE — 36415 COLL VENOUS BLD VENIPUNCTURE: CPT

## 2020-09-15 PROCEDURE — 97116 GAIT TRAINING THERAPY: CPT

## 2020-09-15 PROCEDURE — 92526 ORAL FUNCTION THERAPY: CPT

## 2020-09-15 RX ORDER — VANCOMYCIN HYDROCHLORIDE
1250 ONCE
Status: COMPLETED | OUTPATIENT
Start: 2020-09-15 | End: 2020-09-15

## 2020-09-15 RX ADMIN — HYDRALAZINE HYDROCHLORIDE 50 MG: 50 TABLET ORAL at 09:04

## 2020-09-15 RX ADMIN — AMLODIPINE BESYLATE 10 MG: 10 TABLET ORAL at 09:04

## 2020-09-15 RX ADMIN — LEVETIRACETAM 1000 MG: 500 TABLET ORAL at 21:31

## 2020-09-15 RX ADMIN — AZTREONAM 1 G: 1 INJECTION, POWDER, LYOPHILIZED, FOR SOLUTION INTRAMUSCULAR; INTRAVENOUS at 13:29

## 2020-09-15 RX ADMIN — Medication 10 ML: at 05:21

## 2020-09-15 RX ADMIN — TAMSULOSIN HYDROCHLORIDE 0.4 MG: 0.4 CAPSULE ORAL at 09:04

## 2020-09-15 RX ADMIN — VANCOMYCIN HYDROCHLORIDE 1250 MG: 10 INJECTION, POWDER, LYOPHILIZED, FOR SOLUTION INTRAVENOUS at 18:41

## 2020-09-15 RX ADMIN — AZTREONAM 1 G: 1 INJECTION, POWDER, LYOPHILIZED, FOR SOLUTION INTRAMUSCULAR; INTRAVENOUS at 05:21

## 2020-09-15 RX ADMIN — OXCARBAZEPINE 600 MG: 300 TABLET, FILM COATED ORAL at 17:36

## 2020-09-15 RX ADMIN — Medication 10 ML: at 14:14

## 2020-09-15 RX ADMIN — Medication 10 ML: at 21:34

## 2020-09-15 RX ADMIN — ATORVASTATIN CALCIUM 40 MG: 40 TABLET, FILM COATED ORAL at 21:31

## 2020-09-15 RX ADMIN — ACETAMINOPHEN 650 MG: 325 TABLET ORAL at 12:24

## 2020-09-15 RX ADMIN — LEVETIRACETAM 1000 MG: 500 TABLET ORAL at 09:04

## 2020-09-15 RX ADMIN — ACETAMINOPHEN 650 MG: 325 TABLET ORAL at 00:50

## 2020-09-15 RX ADMIN — HYDRALAZINE HYDROCHLORIDE 50 MG: 50 TABLET ORAL at 21:30

## 2020-09-15 RX ADMIN — ENOXAPARIN SODIUM 40 MG: 40 INJECTION SUBCUTANEOUS at 09:13

## 2020-09-15 RX ADMIN — CYANOCOBALAMIN TAB 500 MCG 500 MCG: 500 TAB at 09:04

## 2020-09-15 RX ADMIN — OXCARBAZEPINE 600 MG: 300 TABLET, FILM COATED ORAL at 09:04

## 2020-09-15 NOTE — PROGRESS NOTES
Called Son about Home health VS SNF. Son Lupe Cevallos refusing SNF at this time due to Covid and wanting patient home in her own surrounding to help lessen her confusion. Son Brie Mrcae states that he or his brother is home at all times, so patient would have family assistance 24/7. Plan home with home health. [place] of choice obtained for [facility] home care.     Vianey Carter RN BSN  Care Manager  [phone]

## 2020-09-15 NOTE — PROGRESS NOTES
Kinetic Dosing- Initial Progress Note    Pharmacy Consult ordered by Dr. Ramy Christine     Indication: UTI    Patient clinical status and labs ordered/reviewed. Pt Weight Weight: 64.9 kg (143 lb 1.3 oz)   Serum Creatinine Lab Results   Component Value Date/Time    Creatinine 1.16 09/15/2020 05:03 AM    Creatinine, POC 1.1 11/07/2017 02:24 PM       Creatinine Clearance Estimated Creatinine Clearance: 40.1 mL/min (based on SCr of 1.16 mg/dL). BUN Lab Results   Component Value Date/Time    BUN 27 (H) 09/15/2020 05:03 AM    BUN, POC 31 (H) 11/07/2017 02:24 PM       WBC Lab Results   Component Value Date/Time    WBC 6.3 09/14/2020 05:46 AM      Temperature Temp: 98.2 °F (36.8 °C)   HR Pulse (Heart Rate): 99     BP BP: 103/63           Kinetic Dosing Parameters:   Vd = 54L     K = .031 hr-1              t ½ = 22h    Drug Levels:   Vancomycin    No results for input(s): VANCP, VANCT, VANCR, VANRA in the last 72 hours. Gentamicin   No results for input(s): GENP, GENT in the last 72 hours. No lab exists for component:  GENR   Tobramycin   No results for input(s): TOBP, TOBT, TOBR in the last 72 hours. Amikacin   No results for input(s): Danton Rhyme in the last 72 hours.     No lab exists for component:  Armstead Angelucci, AMIKR,  DAMIKR     Dose for naïve patient was initiated at: Vancomycin 1250mg IV x1 then 1000mg q24h    Continue to monitor    Sign: DEMETRI [facility]  Date: 9/15/2020  Time: 4:06 PM

## 2020-09-15 NOTE — PROGRESS NOTES
Problem: Dysphagia (Adult)  Goal: *Acute Goals and Plan of Care (Insert Text)  Description: Patient will:  1. Tolerate PO trials with 0 s/s overt distress in 4/5 trials  2. Utilize compensatory swallow strategies/maneuvers (decrease bite/sip, size/rate, alt. liq/sol) with min cues in 4/5 trials  3. Perform oral-motor/laryngeal exercises to increase oropharyngeal swallow function with min cues  4. Complete an objective swallow study (i.e., MBSS) to assess swallow integrity, r/o aspiration, and determine of safest LRD, min A    Rec:     Soft solid diet with thin   Aspiration precautions  HOB >45 during po intake, remain >30 for 30-45 minutes after po   Small bites/sips; alternate liquid/solid with slow feeding rate   Oral care TID  Meds in puree  Assistance with PO      Outcome: Progressing Towards Goal    SPEECH LANGUAGE PATHOLOGY DYSPHAGIA TREATMENT    Patient: Yordan Gray (11 y.o. female)  Date: 9/15/2020  Diagnosis: Recurrent seizures ([address] [G40.909]  Recurrent seizures ([address] [M29.541]  Seizure ([address] [R56.9]   <principal problem not specified>       Precautions: aspiration    PLOF: As per H&P      ASSESSMENT:  Pt was seen at bedside for follow up dysphagia management. She looked more alert this date and was talking on the phone with her son upon SLP arrival. Per pt request, SLP spoke with son and answered questions regarding therapy to the best of this therapist's ability. Pt with minimally increased mastication of solid trials with positive oral clearance. She tolerated all PO with no overt s/sx of aspiration. Laryngeal elevation was functional/timely to palpation. Recommend diet upgrade to reg solid with thin liquids, aspiration precautions, oral care TID, and meds as tolerated. ST to follow up x 1-2 more visits to ensure safety of PO.      Progression toward goals:  [x]         Improving appropriately and progressing toward goals  []         Improving slowly and progressing toward goals  []         Not making progress toward goals and plan of care will be adjusted     PLAN:  Recommendations and Planned Interventions: See above  Patient continues to benefit from skilled intervention to address the above impairments. Continue treatment per established plan of care. Discharge Recommendations:  Home Health and To Be Determined     SUBJECTIVE:   Patient stated Can you talk to my son for me? . OBJECTIVE:   Cognitive and Communication Status:  Neurologic State: Alert, Confused  Orientation Level: Oriented to person, Oriented to place  Cognition: Decreased attention/concentration, Decreased command following  Perception: Appears intact  Perseveration: Perseverates during conversation  Safety/Judgement: Decreased insight into deficits  Dysphagia Treatment:  Oral Assessment:  Oral Assessment  Labial: No impairment  Dentition: Natural  Oral Hygiene: adequate  Lingual: Decreased strength  Velum: No impairment  Mandible: No impairment  P.O. Trials:   Patient Position: [facility] at [facility]   Vocal quality prior to P.O.: No impairment   Consistency Presented:  Thin liquid, Solid, Puree   How Presented: Self-fed/presented, Cup/sip, Spoon, Straw   Bolus Acceptance: No impairment   Bolus Formation/Control: Impaired   Type of Impairment: Mastication, Delayed   Propulsion: Delayed (# of seconds)   Oral Residue: None   Initiation of Swallow: Functional   Laryngeal Elevation: Functional   Aspiration Signs/Symptoms: None   Pharyngeal Phase Characteristics: No impairment, issues, or problems    Effective Modifications: Small sips and bites, Alternate liquids/solids   Cues for Modifications: Minimal    Oral Phase Severity: Mild   Pharyngeal Phase Severity : No impairment    PAIN:  Start of Tx: 0  End of Tx: 0     After treatment:   []              Patient left in no apparent distress sitting up in chair  [x]              Patient left in no apparent distress in bed  [x]              Call bell left within reach  [x]              Nursing notified  []              Family present  []              Caregiver present  []              Bed alarm activated      COMMUNICATION/EDUCATION:   [x] Aspiration precautions; swallow safety; compensatory techniques  [x]        Patient/family able to participate in training and education   []  Patient unable to participate in training and education, education ongoing with staff   [] Patient understands goals and intent of therapy; neutral about participation     Thank you for this referral.    Bob Hernandes M.S. CCC-SLP/L  Speech-Language Pathologist

## 2020-09-15 NOTE — PROGRESS NOTES
Nutrition Assessment    Type and Reason for Visit: Reassess, Positive nutrition screen    Nutrition Recommendations/Plan:   - Specify flavor preference in nutrition supplement order  - Encourage po intake     Nutrition Assessment:  Pt reported fair appetite. tolerating po diet with poor/fair meal intake. Stated ate about 50% of meals today. Likes nutrition supplement; fair/good po intake; flavor preference specified. Malnutrition Assessment:  Malnutrition Status:  No malnutrition        Estimated Daily Nutrient Needs:  Energy (kcal):  9953-7679  Protein (g):  49-73       Fluid (ml/day):  1751-1380    Nutrition Related Findings:  BM 9/13. Lethargic. Medications: cyanocobalamin, ergocalciferol      Wounds:    None       Current Nutrition Therapies:   DIET CARDIAC Mechanical Soft      Anthropometric Measures:  · Height:  5' (152.4 cm)  · Current Body Wt:  63.5 kg (140 lb)   · Admission Body Wt:  133 lb 9.6 oz    · Usual Body Wt:  115 lb(3/23/20)     · Ideal Body Wt:  100 lbs:  133.6 %    · BMI Category: Overweight (BMI 25.0-29. 9)       Nutrition Diagnosis:   · Inadequate oral intake related to cognitive or neurological impairment, swallowing difficulty as evidenced by intake 26-50%, swallowing study results      Nutrition Interventions:   Food and/or Nutrient Delivery: Continue current diet, Continue oral nutrition supplement, Vitamin supplement  Nutrition Education and Counseling: Education not indicated  Coordination of Nutrition Care: Continued inpatient monitoring, Feeding assistance/environmental change, Speech therapy    Goals:  Nutritional needs will be met through adequate oral intake or nutrition support within the next 7 days.        Nutrition Monitoring and Evaluation:  Food/Nutrient Intake Outcomes: Diet advancement/tolerance, Food and nutrient intake, Supplement intake, Vitamin/mineral intake  Physical Signs/Symptoms Outcomes: Biochemical data, Chewing or swallowing, Fluid status or edema, Meal time behavior, Nutrition focused physical findings    Discharge Planning:     Too soon to determine     Electronically signed by Hakan Charlton RD on 9/15/2020 at 3:09 PM    Contact:  Pager [phone]

## 2020-09-15 NOTE — PROGRESS NOTES
[facility]  [facility]    Patient: Estefany Morales Age: 76 y.o. : 1955 MR#: [id number] SSN: [id number]  Date/Time: 9/15/2020     Subjective:     Patient AAOx3, no confused. Slightly slow. Per son spoke to her mother, still not baseline yet. Assessment/Plan:     1. Seizure disorder with 2 seizures PTA due to noncompliance: continue Keppra and Trileptal.  no change in medications due to noncompliance. 2. Acute metabolic encephalopathy due to postictal from #1, slow improvement. 3. UTI: urine culture enterococcus, ID eval and change Abx to vancomycin. 4. Mild acute rhabdomyolysis, improving  5. Dysphagia: Advance diet per SLP  6. Mild prerenal azotemia on CKD stage III: Creatinine improved, hold lisinopril. 7. urinary retention: s/p tate cath, will cont flomax. Will do voiding trial tomorrow. 8. History of hypertension: BP stable, will monitor  9. History of dyslipidemia: Continue statin  DVT prophylaxis- Lovenox  Full code   discharge planning  PT and OT eval noted, recommend SNF  Discussed with the patient and son Brooke Null over the phone in detail. Both does not want SNF. Will continue PT OT treatment daily. If patient improves then consider discharge home with home of care.       Case discussed with:  [x]Patient  [x]Family  [x]Nursing  []Case Management  DVT Prophylaxis:  [x]Lovenox  []Hep SQ  []SCDs  []Coumadin   []On Heparin gtt    Objective:   VS:   Visit Vitals  /63 (BP 1 Location: Left arm, BP Patient Position: At rest;Sitting)   Pulse 99   Temp 98.2 °F (36.8 °C)   Resp 18   Ht 5' (1.524 m)   Wt 64.9 kg (143 lb 1.3 oz)   SpO2 98%   BMI 27.94 kg/m²      Tmax/24hrs: Temp (24hrs), Av.8 °F (37.1 °C), Min:98.2 °F (36.8 °C), Max:99.2 °F (37.3 °C)  IOBRIEF    Intake/Output Summary (Last 24 hours) at 9/15/2020 1510  Last data filed at 9/15/2020 1156  Gross per 24 hour   Intake 545 ml   Output 600 ml   Net -55 ml       General: Alert awake  Pulmonary:  CTA Bilaterally. No Wheezing//Rales. Cardiovascular: Regular rate and Rhythm. GI:  Soft, Non distended, Non tender. + Bowel sounds. Extremities:  No edema. Neurologic: alert awake, Ox3, slow to respond with minimal confusion, moves all extremities. Additional:    Medications:   Current Facility-Administered Medications   Medication Dose Route Frequency    enoxaparin (LOVENOX) injection 40 mg  40 mg SubCUTAneous DAILY    levETIRAcetam (KEPPRA) tablet 1,000 mg  1,000 mg Oral Q12H    aztreonam (AZACTAM) 1 g in 0.9% sodium chloride (MBP/ADV) 100 mL MBP  1 g IntraVENous Q8H    tamsulosin (FLOMAX) capsule 0.4 mg  0.4 mg Oral DAILY    amLODIPine (NORVASC) tablet 10 mg  10 mg Oral DAILY    atorvastatin (LIPITOR) tablet 40 mg  40 mg Oral QHS    cyanocobalamin tablet 500 mcg  500 mcg Oral DAILY    ergocalciferol capsule 50,000 Units  50,000 Units Oral Once per day on Thu Sat    hydrALAZINE (APRESOLINE) tablet 50 mg  50 mg Oral BID    [Held by provider] lisinopriL (PRINIVIL, ZESTRIL) tablet 10 mg  10 mg Oral DAILY    OXcarbazepine (TRILEPTAL) tablet 600 mg  600 mg Oral BID    sodium chloride (NS) flush 5-40 mL  5-40 mL IntraVENous Q8H    sodium chloride (NS) flush 5-40 mL  5-40 mL IntraVENous PRN    acetaminophen (TYLENOL) tablet 650 mg  650 mg Oral Q6H PRN    Or    acetaminophen (TYLENOL) suppository 650 mg  650 mg Rectal Q6H PRN    polyethylene glycol (MIRALAX) packet 17 g  17 g Oral DAILY PRN    promethazine (PHENERGAN) tablet 12.5 mg  12.5 mg Oral Q6H PRN    Or    ondansetron (ZOFRAN) injection 4 mg  4 mg IntraVENous Q6H PRN       Imaging:   XR Results (most recent):  Results from Hospital Encounter encounter on 03/23/20   XR CHEST PA LAT    Narrative Chest  PA and lateral views    INDICATION:  Cough, shortness of breath, chest pain    COMPARISON:  Prior chest x-rays, most recent 1/3/2020    FINDINGS:  The cardiac silhouette is normal in size.   The pulmonary vasculature  is unremarkable. No focal consolidation, pleural effusion, or pneumothorax. No  acute osseous abnormalities are identified. Mild spondylosis. Impression IMPRESSION:   No acute finding. .            CT Results (most recent):  Results from Hospital Encounter encounter on 09/08/20   CT HEAD WO CONT    Narrative CT of the head without contrast    HISTORY: Witnessed seizure    COMPARISON: 1/3/20    TECHNIQUE: Helical axial scan to the head was performed from the skull base to  the vertex without IV contrast administration. All CT scans at this facility performed using dose optimization techniques as  appreciated to a performed exam, to include automated exposure control,  adjustment of the mA and or KU according to patient size (including appropriate  matching for site specific examination), or use of iterative reconstruction  technique. FINDINGS:    Motion artifact noted. Mild global atrophy with mild ventricular dilatation  appear unchanged remote infarct in right parietal/occipital lobes with negative  mass effect and retraction of right parietal horn. Moderate and extensive  periventricular white matter hypodensities also present. . There is no evidence  of acute intracranial hemorrhage,  mass effect or midline shift identified. No  skull fracture or extra axial fluid collections identified. Visualized sinuses  and mastoid air cells appear unremarkable. Impression IMPRESSION:    In the setting of motion artifact, no definite acute intracranial hemorrhage or  mass effect. Note: If clinical concern of acute stroke, MRI with diffusion weighted images is  recommended for better evaluation. 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     Thank you for your referral.             Labs:    Recent Results (from the past 48 hour(s))   METABOLIC PANEL, BASIC    Collection Time: 09/14/20  5:46 AM   Result Value Ref Range    Sodium 143 136 - 145 mmol/L Potassium 3.9 3.5 - 5.5 mmol/L    Chloride 119 (H) 100 - 111 mmol/L    CO2 19 (L) 21 - 32 mmol/L    Anion gap 5 3.0 - 18 mmol/L    Glucose 85 74 - 99 mg/dL    BUN 31 (H) 7.0 - 18 MG/DL    Creatinine 1.11 0.6 - 1.3 MG/DL    BUN/Creatinine ratio 28 (H) 12 - 20      GFR est AA 60 (L) >60 ml/min/1.73m2    GFR est non-AA 49 (L) >60 ml/min/1.73m2    Calcium 9.4 8.5 - 10.1 MG/DL   CK    Collection Time: 09/14/20  5:46 AM   Result Value Ref Range     (H) 26 - 192 U/L   CBC WITH AUTOMATED DIFF    Collection Time: 09/14/20  5:46 AM   Result Value Ref Range    WBC 6.3 4.6 - 13.2 K/uL    RBC 3.53 (L) 4.20 - 5.30 M/uL    HGB 10.6 (L) 12.0 - 16.0 g/dL    HCT 33.1 (L) 35.0 - 45.0 %    MCV 93.8 74.0 - 97.0 FL    MCH 30.0 24.0 - 34.0 PG    MCHC 32.0 31.0 - 37.0 g/dL    RDW 14.3 11.6 - 14.5 %    PLATELET 012 849 - 313 K/uL    MPV 11.0 9.2 - 11.8 FL    NEUTROPHILS 61 40 - 73 %    LYMPHOCYTES 26 21 - 52 %    MONOCYTES 9 3 - 10 %    EOSINOPHILS 3 0 - 5 %    BASOPHILS 1 0 - 2 %    ABS. NEUTROPHILS 3.8 1.8 - 8.0 K/UL    ABS. LYMPHOCYTES 1.6 0.9 - 3.6 K/UL    ABS. MONOCYTES 0.6 0.05 - 1.2 K/UL    ABS. EOSINOPHILS 0.2 0.0 - 0.4 K/UL    ABS.  BASOPHILS 0.0 0.0 - 0.1 K/UL    DF AUTOMATED     METABOLIC PANEL, BASIC    Collection Time: 09/15/20  5:03 AM   Result Value Ref Range    Sodium 143 136 - 145 mmol/L    Potassium 3.8 3.5 - 5.5 mmol/L    Chloride 118 (H) 100 - 111 mmol/L    CO2 18 (L) 21 - 32 mmol/L    Anion gap 7 3.0 - 18 mmol/L    Glucose 79 74 - 99 mg/dL    BUN 27 (H) 7.0 - 18 MG/DL    Creatinine 1.16 0.6 - 1.3 MG/DL    BUN/Creatinine ratio 23 (H) 12 - 20      GFR est AA 57 (L) >60 ml/min/1.73m2    GFR est non-AA 47 (L) >60 ml/min/1.73m2    Calcium 9.4 8.5 - 10.1 MG/DL   CK    Collection Time: 09/15/20  5:03 AM   Result Value Ref Range     (H) 26 - 192 U/L   PHOSPHORUS    Collection Time: 09/15/20  5:03 AM   Result Value Ref Range    Phosphorus 3.2 2.5 - 4.9 MG/DL   MAGNESIUM    Collection Time: 09/15/20  5:03 AM   Result Value Ref Range    Magnesium 1.9 1.6 - 2.6 mg/dL       Signed By: Sakshi Guillaume MD     September 15, 2020      I spent 35 minutes with the patient in face-to-face consultation, of which greater than 50% was spent in counseling and coordination of care as described above    Disclaimer: Sections of this note are dictated using utilizing voice recognition software. Minor typographical errors may be present. If questions arise, please do not hesitate to contact me or call our department.

## 2020-09-15 NOTE — PROGRESS NOTES
Problem: Mobility Impaired (Adult and Pediatric)  Goal: *Acute Goals and Plan of Care (Insert Text)  Description: Physical Therapy Goals  Initiated 9/14/2020 and to be accomplished within 7 day(s)  1. Patient will move from supine to sit and sit to supine , scoot up and down, and roll side to side in bed with modified independence. 2.  Patient will transfer from bed to chair and chair to bed with supervision/set-up using the least restrictive device. 3.  Patient will perform sit to stand with supervision/set-up. 4.  Patient will ambulate with supervision/set-up for 100 feet with the least restrictive device. 5.  Patient will ascend/descend stairs pending progress and necessity for safe discharge. PLOF: Pt lives with her son, was mod ind/ind PTA, states she does not have any DME however unclear due to pt confusion this date. Outcome: Progressing Towards Goal     PHYSICAL THERAPY TREATMENT    Patient: Mere Reyes (8 y.o. female)  Date: 9/15/2020  Diagnosis: Recurrent seizures ([address] [G40.909]  Recurrent seizures ([address] [U57.393]  Seizure ([facility] [address] [R56.9]   <principal problem not specified>       Precautions:    Fall, WBAT    ASSESSMENT:  Pt received in bed in NAD, agreeable to PT tx session. Pt is oriented to place but remains confused and continues to talk to people in the room who arent there and perseverating on not \"getting a needle\" throughout entire session. Pt is able to come to sitting EOB with min A this date and stands up with min A but is posteriorly leaning with unsteadiness, is placed with RW in front of her for safety. Pt progresses to ambulation and is noted to have severe shuffling of feet with decreased step clearance, hunched posture over walker. Pt requires min to mod A for RW management for safety as she pushes it too far forward in front of her and cannot grasp keeping it close to her despite maximal cues.  Pt also needs assist with obstacle negotiation and where to go within her room and at end of each gait trial, turning to sit back on bed. Pt has decreased safety awareness and is at an increased risk of falls at this time, recommendation of rehab following discharge to continue in recovery process. Pt left in bed in NAD with all needs met and call bell within reach. Progression toward goals:   []      Improving appropriately and progressing toward goals  [x]      Improving slowly and progressing toward goals  []      Not making progress toward goals and plan of care will be adjusted     PLAN:  Patient continues to benefit from skilled intervention to address the above impairments. Continue treatment per established plan of care. Discharge Recommendations:  Rehab  Further Equipment Recommendations for Discharge:  rolling walker     SUBJECTIVE:   Patient stated I dont want a needle!     OBJECTIVE DATA SUMMARY:   Critical Behavior:  Neurologic State: Alert, Confused  Orientation Level: Oriented to person, Oriented to place  Cognition: Decreased attention/concentration, Decreased command following  Safety/Judgement: Decreased insight into deficits  Functional Mobility Training:  Bed Mobility:     Supine to Sit: Minimum assistance  Sit to Supine: Minimum assistance  Scooting: Minimum assistance         Transfers:  Sit to Stand: Minimum assistance  Stand to Sit: Minimum assistance    Balance:  Sitting: Impaired; With support  Standing: Impaired; With support        Ambulation/Gait Training:  Distance (ft): 15 Feet (ft)(x2)  Assistive Device: Walker, rolling  Ambulation - Level of Assistance: Minimal assistance; Moderate assistance        Gait Abnormalities: Decreased step clearance; Step to gait        Base of Support: Widened;Center of gravity altered  Stance: Right increased; Left increased  Speed/Josefina: Slow;Shuffled  Step Length: Right shortened;Left shortened  Swing Pattern: Left asymmetrical;Right asymmetrical        Pain:  Pain level pre-treatment: 3/10  Pain level post-treatment: 3/10   Pain Intervention(s): Medication (see MAR); Rest,  Repositioning  Response to intervention: Nurse notified    Activity Tolerance:   Good    Please refer to the flowsheet for vital signs taken during this treatment. After treatment:   [] Patient left in no apparent distress sitting up in chair  [x] Patient left in no apparent distress in bed  [x] Call bell left within reach  [x] Nursing notified  [] Caregiver present  [x] Bed alarm activated  [] SCDs applied      COMMUNICATION/EDUCATION:   [x]         Role of Physical Therapy in the acute care setting. [x]         Fall prevention education was provided and the patient/caregiver indicated understanding. [x]         Patient/family have participated as able in working toward goals and plan of care. [x]         Patient/family agree to work toward stated goals and plan of care. []         Patient understands intent and goals of therapy, but is neutral about his/her participation.   []         Patient is unable to participate in stated goals/plan of care: ongoing with therapy staff.  []         Other:        Orpah Big   Time Calculation: 23 mins

## 2020-09-15 NOTE — PROGRESS NOTES
Pt seen for follow-up treatment session. Pt remains confused and was unsafe with her mobility at this time. Will recommend rehab upon discharge. Full note to follow.  Ana Luisa Gibbs, PT, DPT

## 2020-09-15 NOTE — ROUTINE PROCESS
Bedside and Verbal shift change report given to Danny Casas RN (oncoming nurse) by PATRICIA Tatum RN (offgoing nurse). Report included the following information SBAR, Kardex, Intake/Output, MAR and Recent Results.

## 2020-09-16 ENCOUNTER — APPOINTMENT (OUTPATIENT)
Dept: CT IMAGING | Age: 65
DRG: 100 | End: 2020-09-16
Attending: INTERNAL MEDICINE
Payer: MEDICARE

## 2020-09-16 LAB
ANION GAP SERPL CALC-SCNC: 6 MMOL/L (ref 3–18)
BASOPHILS # BLD: 0 K/UL (ref 0–0.1)
BASOPHILS NFR BLD: 1 % (ref 0–2)
BUN SERPL-MCNC: 27 MG/DL (ref 7–18)
BUN/CREAT SERPL: 26 (ref 12–20)
CALCIUM SERPL-MCNC: 9.8 MG/DL (ref 8.5–10.1)
CHLORIDE SERPL-SCNC: 114 MMOL/L (ref 100–111)
CO2 SERPL-SCNC: 19 MMOL/L (ref 21–32)
CREAT SERPL-MCNC: 1.05 MG/DL (ref 0.6–1.3)
DIFFERENTIAL METHOD BLD: ABNORMAL
EOSINOPHIL # BLD: 0.2 K/UL (ref 0–0.4)
EOSINOPHIL NFR BLD: 3 % (ref 0–5)
ERYTHROCYTE [DISTWIDTH] IN BLOOD BY AUTOMATED COUNT: 13.6 % (ref 11.6–14.5)
GLUCOSE BLD STRIP.AUTO-MCNC: 97 MG/DL (ref 70–110)
GLUCOSE SERPL-MCNC: 82 MG/DL (ref 74–99)
HCT VFR BLD AUTO: 33.7 % (ref 35–45)
HGB BLD-MCNC: 11 G/DL (ref 12–16)
LYMPHOCYTES # BLD: 1.9 K/UL (ref 0.9–3.6)
LYMPHOCYTES NFR BLD: 29 % (ref 21–52)
MCH RBC QN AUTO: 30.2 PG (ref 24–34)
MCHC RBC AUTO-ENTMCNC: 32.6 G/DL (ref 31–37)
MCV RBC AUTO: 92.6 FL (ref 74–97)
MONOCYTES # BLD: 0.6 K/UL (ref 0.05–1.2)
MONOCYTES NFR BLD: 10 % (ref 3–10)
NEUTS SEG # BLD: 3.8 K/UL (ref 1.8–8)
NEUTS SEG NFR BLD: 57 % (ref 40–73)
PLATELET # BLD AUTO: 205 K/UL (ref 135–420)
PMV BLD AUTO: 11.2 FL (ref 9.2–11.8)
POTASSIUM SERPL-SCNC: 3.8 MMOL/L (ref 3.5–5.5)
RBC # BLD AUTO: 3.64 M/UL (ref 4.2–5.3)
SODIUM SERPL-SCNC: 139 MMOL/L (ref 136–145)
WBC # BLD AUTO: 6.5 K/UL (ref 4.6–13.2)

## 2020-09-16 PROCEDURE — 74011250637 HC RX REV CODE- 250/637: Performed by: INTERNAL MEDICINE

## 2020-09-16 PROCEDURE — 97535 SELF CARE MNGMENT TRAINING: CPT

## 2020-09-16 PROCEDURE — 97110 THERAPEUTIC EXERCISES: CPT

## 2020-09-16 PROCEDURE — 80048 BASIC METABOLIC PNL TOTAL CA: CPT

## 2020-09-16 PROCEDURE — 36415 COLL VENOUS BLD VENIPUNCTURE: CPT

## 2020-09-16 PROCEDURE — 85025 COMPLETE CBC W/AUTO DIFF WBC: CPT

## 2020-09-16 PROCEDURE — 2709999900 HC NON-CHARGEABLE SUPPLY

## 2020-09-16 PROCEDURE — 74011250636 HC RX REV CODE- 250/636: Performed by: INTERNAL MEDICINE

## 2020-09-16 PROCEDURE — 97530 THERAPEUTIC ACTIVITIES: CPT

## 2020-09-16 PROCEDURE — 74011250636 HC RX REV CODE- 250/636: Performed by: HOSPITALIST

## 2020-09-16 PROCEDURE — 74176 CT ABD & PELVIS W/O CONTRAST: CPT

## 2020-09-16 PROCEDURE — 65270000029 HC RM PRIVATE

## 2020-09-16 PROCEDURE — 74011250637 HC RX REV CODE- 250/637: Performed by: HOSPITALIST

## 2020-09-16 PROCEDURE — 97116 GAIT TRAINING THERAPY: CPT

## 2020-09-16 PROCEDURE — 82962 GLUCOSE BLOOD TEST: CPT

## 2020-09-16 RX ORDER — HYDRALAZINE HYDROCHLORIDE 50 MG/1
50 TABLET, FILM COATED ORAL 3 TIMES DAILY
Status: DISCONTINUED | OUTPATIENT
Start: 2020-09-16 | End: 2020-09-17 | Stop reason: HOSPADM

## 2020-09-16 RX ADMIN — CYANOCOBALAMIN TAB 500 MCG 500 MCG: 500 TAB at 11:51

## 2020-09-16 RX ADMIN — ATORVASTATIN CALCIUM 40 MG: 40 TABLET, FILM COATED ORAL at 21:20

## 2020-09-16 RX ADMIN — OXCARBAZEPINE 600 MG: 300 TABLET, FILM COATED ORAL at 20:46

## 2020-09-16 RX ADMIN — HYDRALAZINE HYDROCHLORIDE 50 MG: 50 TABLET ORAL at 11:52

## 2020-09-16 RX ADMIN — LEVETIRACETAM 1000 MG: 500 TABLET ORAL at 21:20

## 2020-09-16 RX ADMIN — ENOXAPARIN SODIUM 40 MG: 40 INJECTION SUBCUTANEOUS at 11:52

## 2020-09-16 RX ADMIN — OXCARBAZEPINE 600 MG: 300 TABLET, FILM COATED ORAL at 11:51

## 2020-09-16 RX ADMIN — TAMSULOSIN HYDROCHLORIDE 0.4 MG: 0.4 CAPSULE ORAL at 11:52

## 2020-09-16 RX ADMIN — Medication 10 ML: at 21:20

## 2020-09-16 RX ADMIN — LEVETIRACETAM 1000 MG: 500 TABLET ORAL at 11:52

## 2020-09-16 RX ADMIN — AMLODIPINE BESYLATE 10 MG: 10 TABLET ORAL at 11:52

## 2020-09-16 RX ADMIN — Medication 10 ML: at 20:47

## 2020-09-16 RX ADMIN — Medication 10 ML: at 06:09

## 2020-09-16 RX ADMIN — HYDRALAZINE HYDROCHLORIDE 50 MG: 50 TABLET, FILM COATED ORAL at 20:46

## 2020-09-16 NOTE — CONSULTS
Infectious Disease Consultation Note        Reason: enterococcus UTI    Current abx Prior abx   Vancomycin since 9/15 Aztreonam 9/13-9/15     Lines:       Assessment :    77 yo female with hx of HTN, CVA and seizure disorder who was brought to the ED on 9/16/20 by her son after having a seizure on Monday around 9 pm.     Now with pyuria, urine culture positive for enterococcus    Clinical presentation consistent with enterococcus cystitis. Antibiotic management complicated by documented history of penicillin allergy. Patient states that she had rash with penicillin as a child. Denies life-threatening reaction. However reliability of her history is questionable. Recommendations:    1. Continue vancomycin  2. Follow-up susceptibility of enterococcus in urine culture and modify antibiotics as needed  3. Follow-up neurology recommendations regarding seizure  4. Obtain CT scan abdomen/pelvis to rule out obstructive uropathy, complicated UTI    Thank you for consultation request. Above plan was discussed in details with patient,  and dr Mariano Barlow. Please call me if any further questions or concerns. Will continue to participate in the care of this patient. HPI:    77 yo female with hx of HTN, CVA and seizure disorder who was brought to the ED on 9/16/20 by her son after having a seizure on Monday around 9 pm.     Patient does not recollect the events which led to her hospitalization. She had crying episodes while I was in the room. She keeps on stating that she is afraid that she has stroke and that we should call his son. Per admitting hospitalist , he obtained history from patient's son. \"He states that she had called him into her room and that she needed help. He noted that she had laid down and started to have a seizure. He checked her BP and gave her an additional dose of her seizure medication.  He notes that she then had another seizure which he describes as a \"grand mal\" seizure at which time he had called the ambulance. She was brought to the ED and was discharged around 5am. He notes that she was awake but very drowsy. She had another seizure and he felt uncomfortable about giving her an extra dose of medication. She had 2 additional seizures when he finally decided to call EMS again\". She was evaluated by neurologist.  Started on appropriate antiseizure medication. She continued to have episodes of confusion. Urine analysis, urine culture were obtained. She was started on aztreonam for possible urinary tract infection. Urine culture is now positive for greater than 100,000 colonies of enterococcus. Patient is allergic to penicillin. I have been consulted for further recommendations. Patient had crying episodes while I was in the room. She was anxious about having stroke.            Past Medical History:   Diagnosis Date    Hypertension     Neurological disorder     Seizures ([facility] [address]     Stroke ([facility] [address] 2009       No past surgical history on file. Home Medication List    Details   OXcarbaxepine (TRILEPTAL) 600 mg tablet Take 600 mg by mouth two (2) times a day. hydrALAZINE (APRESOLINE) 50 mg tablet Take 50 mg by mouth two (2) times a day. ergocalciferol (VITAMIN D2) 50,000 unit capsule Take 50,000 Units by mouth two (2) days a week. levETIRAcetam (KEPPRA) 1,000 mg tablet Take 1 Tab by mouth two (2) times a day. Qty: 60 Tab, Refills: 0      amLODIPine (NORVASC) 10 mg tablet Take 1 Tab by mouth daily. Qty: 30 Tab, Refills: 6      atorvastatin (LIPITOR) 40 mg tablet Take 1 Tab by mouth nightly. Qty: 30 Tab, Refills: 0      cyanocobalamin (VITAMIN B12) 500 mcg tablet Take 1 Tab by mouth daily. Qty: 30 Tab, Refills: 0      clopidogrel (PLAVIX) 75 mg tablet Take 75 mg by mouth daily. lisinopril (PRINIVIL, ZESTRIL) 10 mg tablet Take 10 mg by mouth daily.              Current Facility-Administered Medications   Medication Dose Route Frequency    [START ON 9/18/2020] Vancomycin Lab Information  1 Each Other Once per day on Fri    vancomycin (VANCOCIN) 1,000 mg in 0.9% sodium chloride (MBP/ADV) 250 mL adv  1,000 mg IntraVENous Q24H    enoxaparin (LOVENOX) injection 40 mg  40 mg SubCUTAneous DAILY    levETIRAcetam (KEPPRA) tablet 1,000 mg  1,000 mg Oral Q12H    tamsulosin (FLOMAX) capsule 0.4 mg  0.4 mg Oral DAILY    amLODIPine (NORVASC) tablet 10 mg  10 mg Oral DAILY    atorvastatin (LIPITOR) tablet 40 mg  40 mg Oral QHS    cyanocobalamin tablet 500 mcg  500 mcg Oral DAILY    ergocalciferol capsule 50,000 Units  50,000 Units Oral Once per day on Thu Sat    hydrALAZINE (APRESOLINE) tablet 50 mg  50 mg Oral BID    [Held by provider] lisinopriL (PRINIVIL, ZESTRIL) tablet 10 mg  10 mg Oral DAILY    OXcarbazepine (TRILEPTAL) tablet 600 mg  600 mg Oral BID    sodium chloride (NS) flush 5-40 mL  5-40 mL IntraVENous Q8H    sodium chloride (NS) flush 5-40 mL  5-40 mL IntraVENous PRN    acetaminophen (TYLENOL) tablet 650 mg  650 mg Oral Q6H PRN    Or    acetaminophen (TYLENOL) suppository 650 mg  650 mg Rectal Q6H PRN    polyethylene glycol (MIRALAX) packet 17 g  17 g Oral DAILY PRN    promethazine (PHENERGAN) tablet 12.5 mg  12.5 mg Oral Q6H PRN    Or    ondansetron (ZOFRAN) injection 4 mg  4 mg IntraVENous Q6H PRN       Allergies: Tomato; Aspirin; Ciprofloxacin; Pcn [penicillins]; and Sulfa (sulfonamide antibiotics)    Family History   Problem Relation Age of Onset    Diabetes Other     Stroke Other      Social History     Socioeconomic History    Marital status:      Spouse name: Not on file    Number of children: Not on file    Years of education: Not on file    Highest education level: Not on file   Occupational History    Not on file   Social Needs    Financial resource strain: Not on file    Food insecurity     Worry: Not on file     Inability: Not on file    Transportation needs     Medical: Not on file     Non-medical: Not on file   Tobacco Use    Smoking status: Never Smoker    Smokeless tobacco: Never Used   Substance and Sexual Activity    Alcohol use: Yes     Comment: Socially     Drug use: No    Sexual activity: Not on file   Lifestyle    Physical activity     Days per week: Not on file     Minutes per session: Not on file    Stress: Not on file   Relationships    Social connections     Talks on phone: Not on file     Gets together: Not on file     Attends [employer] service: Not on file     Active member of club or organization: Not on file     Attends meetings of clubs or organizations: Not on file     Relationship status: Not on file    Intimate partner violence     Fear of current or ex partner: Not on file     Emotionally abused: Not on file     Physically abused: Not on file     Forced sexual activity: Not on file   Other Topics Concern    Not on file   Social History Narrative    Not on file     Social History     Tobacco Use   Smoking Status Never Smoker   Smokeless Tobacco Never Used        Temp (24hrs), Av.4 °F (36.9 °C), Min:97.8 °F (36.6 °C), Max:98.7 °F (37.1 °C)    Visit Vitals  BP (!) 156/82 (BP 1 Location: Left arm, BP Patient Position: Supine)   Pulse 93   Temp 98.2 °F (36.8 °C)   Resp 20   Ht 5' (1.524 m)   Wt 64.9 kg (143 lb 1.3 oz)   SpO2 98%   BMI 27.94 kg/m²       ROS: 12 point ROS obtained in details. Pertinent positives as mentioned in HPI,   otherwise negative    Physical Exam:    General:   awake alert and oriented to self but confused   Skin:   no rashes or skin lesions noted on limited exam   HEENT:  Normocephalic, atraumatic, PERRL, EOMI, no scleral icterus or pallor; no conjunctival hemmohage;  nasal and oral mucous are moist and without evidence of lesions. Neck supple, no bruits.    Lymph Nodes:   no cervical, axillary or inguinal adenopathy   Lungs:   non-labored, bilaterally clear to aspiration- no crackles wheezes rales or rhonchi   Heart:  RRR, s1 and s2; no rubs or gallops, no edema, + pedal pulses   Abdomen: soft, non-distended, active bowel sounds, no hepatomegaly, no splenomegaly. Non-tender   Genitourinary:  deferred   Extremities:   no clubbing, cyanosis; no joint effusions or swelling; ; muscle mass appropriate for age   Neurologic:  Wakes up with verbal stimuli, stutters, moves independently, no evidence of ongoing seizures. Answers some questions, repeats answers   Psychiatric:   calm           Labs: Results:   Chemistry Recent Labs     09/16/20  0517 09/15/20  0503 09/14/20  0546   GLU 82 79 85    143 143   K 3.8 3.8 3.9   * 118* 119*   CO2 19* 18* 19*   BUN 27* 27* 31*   CREA 1.05 1.16 1.11   CA 9.8 9.4 9.4   AGAP 6 7 5   BUCR 26* 23* 28*      CBC w/Diff Recent Labs     09/16/20  0517 09/14/20  0546   WBC 6.5 6.3   RBC 3.64* 3.53*   HGB 11.0* 10.6*   HCT 33.7* 33.1*    185   GRANS 57 61   LYMPH 29 26   EOS 3 3      Microbiology No results for input(s): CULT in the last 72 hours.        RADIOLOGY:    All available imaging studies/reports in [facility] for this admission were reviewed    Dr. Rl López, Infectious Disease Specialist  [phone]  September 16, 2020  10:31 AM

## 2020-09-16 NOTE — PROGRESS NOTES
Problem: Pressure Injury - Risk of  Goal: *Prevention of pressure injury  Description: Document John Scale and appropriate interventions in the flowsheet. Outcome: Progressing Towards Goal  Note: Pressure Injury Interventions:  Sensory Interventions: Minimize linen layers, Assess changes in LOC    Moisture Interventions: Absorbent underpads, Check for incontinence Q2 hours and as needed, Internal/External urinary devices, Minimize layers    Activity Interventions: Chair cushion, PT/OT evaluation    Mobility Interventions: Assess need for specialty bed, HOB 30 degrees or less, Pressure redistribution bed/mattress (bed type), Turn and reposition approx. every two hours(pillow and wedges)    Nutrition Interventions: Document food/fluid/supplement intake, Offer support with meals,snacks and hydration    Friction and Shear Interventions: HOB 30 degrees or less, Foam dressings/transparent film/skin sealants, Minimize layers                Problem: Nutrition Deficit  Goal: *Optimize nutritional status  Outcome: Progressing Towards Goal     Problem: Falls - Risk of  Goal: *Absence of Falls  Description: Document Kandy Fall Risk and appropriate interventions in the flowsheet.   Outcome: Progressing Towards Goal  Note: Fall Risk Interventions:  Mobility Interventions: Assess mobility with egress test, Bed/chair exit alarm, Communicate number of staff needed for ambulation/transfer, OT consult for ADLs, Patient to call before getting OOB, PT Consult for mobility concerns, Utilize walker, cane, or other assistive device    Mentation Interventions: Door open when patient unattended, Bed/chair exit alarm, Adequate sleep, hydration, pain control, Reorient patient, Room close to nurse's station, Toileting rounds    Medication Interventions: Assess postural VS orthostatic hypotension, Bed/chair exit alarm, Patient to call before getting OOB, Teach patient to arise slowly    Elimination Interventions: Bed/chair exit alarm, Call light in reach, Toilet paper/wipes in reach, Toileting schedule/hourly rounds, Stay With Me (per policy)

## 2020-09-16 NOTE — PROGRESS NOTES
Problem: Mobility Impaired (Adult and Pediatric)  Goal: *Acute Goals and Plan of Care (Insert Text)  Description: Physical Therapy Goals  Initiated 9/14/2020 and to be accomplished within 7 day(s)  1. Patient will move from supine to sit and sit to supine , scoot up and down, and roll side to side in bed with modified independence. 2.  Patient will transfer from bed to chair and chair to bed with supervision/set-up using the least restrictive device. 3.  Patient will perform sit to stand with supervision/set-up. 4.  Patient will ambulate with supervision/set-up for 100 feet with the least restrictive device. 5.  Patient will ascend/descend stairs pending progress and necessity for safe discharge. PLOF: Pt lives with her son, was mod ind/ind PTA, states she does not have any DME however unclear due to pt confusion this date. Outcome: Progressing Towards Goal     PHYSICAL THERAPY TREATMENT    Patient: Chase Hanna (89 y.o. female)  Date: 9/16/2020  Diagnosis: Recurrent seizures ([address] [G40.909]  Recurrent seizures ([address] [O26.205]  Seizure ([address] [R56.9]   <principal problem not specified>       Precautions:    WBAT     ASSESSMENT:  Pt received up in chair with alarm on. She continues to be confused but does show some improvement this date. Pt is agreeable to participate with PT after cleaning up spilled fruit on the floor as this was falls risk. Pt stands this date with min/CGA and RW placed in front of her for safety and she takes 2-3 shuffling steps forward before she just sits back down without warning and barely makes the edge of the chair. Pt was then educated on safety awareness techniques for stand > sit going forward. PT then takes RW away from pt as she seems to be have difficulty coordinating and clearing her feet when walking with it and provided HHA and min A for safety.  With verbal cues pt shows some improvement in foot clearance with more exaggerated hip flexion noted but does improve with her shuffling of steps. She amb approx 20 ft around room to turn in sit in chair for rest however needs min A for safe guidance into chair. Pt takes same route back to her recliner with verbal and visual cues for obstacle negotiation, where to go, and head up posture. Pt then positioned in chair for therex for LE strengthening exercises to promote strengthening. Throughout session, pt is noted to not know that she is at [facility], stating she was there but this is not the same place now. Pt also stating she was not unconscious or else she wouldn't be able to talk about all of this information about her family. Pt was re-oriented and left  with all needs met and call bell within reach, chair alarm on. Progression toward goals:   []      Improving appropriately and progressing toward goals  [x]      Improving slowly and progressing toward goals  []      Not making progress toward goals and plan of care will be adjusted     PLAN:  Patient continues to benefit from skilled intervention to address the above impairments. Continue treatment per established plan of care. Discharge Recommendations:  Rehab vs Home Health with 24/7 family support  Further Equipment Recommendations for Discharge:  rolling walker     SUBJECTIVE:   Patient stated I was not unconscious if I know all of this information still.     OBJECTIVE DATA SUMMARY:   Critical Behavior:  Neurologic State: Alert, Confused  Orientation Level: Oriented to person  Cognition: Decreased attention/concentration, Decreased command following  Safety/Judgement: Decreased insight into deficits  Functional Mobility Training:  Bed Mobility:     Supine to Sit: Contact guard assistance     Scooting: Supervision         Transfers:  Sit to Stand: Minimum assistance;Contact guard assistance  Stand to Sit: Minimum assistance       Balance:  Sitting: Impaired  Sitting - Static: Fair (occasional); Good (unsupported)  Sitting - Dynamic: Fair (occasional)  Standing: Impaired; With support  Standing - Static: Fair  Standing - Dynamic : Poor        Ambulation/Gait Training:  Distance (ft): 20 Feet (ft)(x2, )  Assistive Device: (HHA )  Ambulation - Level of Assistance: Minimal assistance        Gait Abnormalities: Decreased step clearance        Base of Support: Widened  Stance: Right increased; Left increased  Speed/Josefina: Slow;Shuffled  Step Length: Right shortened;Left shortened  Swing Pattern: Right symmetrical;Left symmetrical     Interventions: Safety awareness training    Therapeutic Exercises:   Sitting in recliner with legs elevated, verbal and visual cues provided for proper completion      EXERCISE   Sets   Reps   Active Active Assist   Passive Self ROM   Comments   Ankle Pumps 2 10  [x] [] [] []    Quad Sets/Glut Sets    [] [] [] [] Hold for 5 secs   Hamstring Sets   [] [] [] []    Short Arc Quads   [] [] [] []    Heel Slides 2 10 [x] [] [] []    Straight Leg Raises 2 10 [x] [] [] []    Hip Add   [] [] [] [] Hold for 5 secs, w/ pillow squeeze   Long Arc Quads   [] [] [] []    Seated Marching   [] [] [] []    Standing Marching   [] [] [] []       [] [] [] []        Pain:  Pain level pre-treatment: 0/10  Pain level post-treatment: 0/10     Activity Tolerance:   Good    Please refer to the flowsheet for vital signs taken during this treatment. After treatment:   [x] Patient left in no apparent distress sitting up in chair  [] Patient left in no apparent distress in bed  [x] Call bell left within reach  [x] Nursing notified  [] Caregiver present  [x] Chair alarm activated  [] SCDs applied      COMMUNICATION/EDUCATION:   [x]         Role of Physical Therapy in the acute care setting. [x]         Fall prevention education was provided and the patient/caregiver indicated understanding. [x]         Patient/family have participated as able in working toward goals and plan of care. [x]         Patient/family agree to work toward stated goals and plan of care.   [] Patient understands intent and goals of therapy, but is neutral about his/her participation.   []         Patient is unable to participate in stated goals/plan of care: ongoing with therapy staff.  []         Other:        Penelope Hernandez   Time Calculation: 23 mins

## 2020-09-16 NOTE — PROGRESS NOTES
Problem: Self Care Deficits Care Plan (Adult)  Goal: *Acute Goals and Plan of Care (Insert Text)  Description: Occupational Therapy Goals  Initiated 9/14/2020 within 7 day(s). 1.  Patient will perform self-feeding with supervision/set-up. 2.  Patient will perform grooming with supervision/set-up. 3.  Patient will perform upper body dressing with supervision/setup. 4.  Patient will perform bed mobility with supervision/setup in preparation for further self-care. 5.  Patient will perform a functional activity/ADL sitting EOB presenting with F+ sitting balance for 3-5 minutes. 6.  Patient will participate in upper extremity therapeutic exercise/activities with supervision/set-up for 8 minutes. Prior Level of Function: Pt is a poor historian, but reports she was (I) with basic self-care/ADLs and functional mobility without AD PTA. Pt lives with her son in an apartment on the 1st floor. Pt does not have AD/DME at home. Outcome: Progressing Towards Goal  OCCUPATIONAL THERAPY TREATMENT    Patient: Josef Matthews (61 y.o. female)  Date: 9/16/2020  Diagnosis: Recurrent seizures ([address] [G40.909]  Recurrent seizures ([address] [Q47.663]  Seizure ([facility] [address] [R56.9]   <principal problem not specified>       Precautions:  Fall, seizure    Chart, occupational therapy assessment, plan of care, and goals were reviewed. ASSESSMENT:  Pt is supine in bed upon arrival, finishing up morning ADLs with CNA present. Pt appears confused, requiring mult redirections to task throughout the session as pt perseverates on speaking with her family regarding placing food in refrigerator. Pt demonstrates improvements with functional mobility in preparation for ADLs, however, requiring Max VCs for step-by-step directions. Pt maneuvered to EOB with CGA, demonstrating improved sitting balance, was able to sit unsupported for ~ 2 min prior to maneuvering to the recliner.  Pt performed simple grooming task while sitting unsupported in the recliner chair with CGA, following which was challenged with LB dressing task in order to work on improving pt's dynamic sitting balance. Pt was able to doff/alicia socks in the chair with Mod A, due to difficulty grasping despite mult attempts due to decreased coordination in BUE, however, was able to right self during reaching out of HANSA. Progression toward goals:  [x]          Improving appropriately and progressing toward goals  []          Improving slowly and progressing toward goals  []          Not making progress toward goals and plan of care will be adjusted     PLAN:  Patient continues to benefit from skilled intervention to address the above impairments. Continue treatment per established plan of care. Discharge Recommendations:  Rehab  Further Equipment Recommendations for Discharge:  N/A     SUBJECTIVE:   Patient stated I am not going to eat until I talk to my sister about refrigerator.     OBJECTIVE DATA SUMMARY:   Cognitive/Behavioral Status:  Neurologic State: Alert, Confused  Orientation Level: Oriented to person  Cognition: Decreased attention/concentration, Decreased command following  Safety/Judgement: Decreased insight into deficits    Functional Mobility and Transfers for ADLs:   Bed Mobility:     Supine to Sit: Contact guard assistance     Scooting: Supervision   Transfers:  Sit to Stand: Minimum assistance   Balance:  Sitting: Impaired  Sitting - Static: Fair (occasional)  Sitting - Dynamic: Fair (occasional)  Standing: Impaired; With support  Standing - Static: Fair(minus)  Standing - Dynamic : Poor    ADL Intervention:   Grooming  Washing Hands: Contact guard assistance  Lower Body Dressing Assistance  Socks: Moderate assistance    Pain:  Pain level pre-treatment: not rated  Pain level post-treatment: not rated    Activity Tolerance:    Fair  Please refer to the flowsheet for vital signs taken during this treatment.   After treatment:   [x]  Patient left in no apparent distress sitting up in chair  []  Patient left in no apparent distress in bed  [x]  Call bell left within reach  [x]  Nursing notified  []  Caregiver present  [x]  Chair alarm activated    COMMUNICATION/EDUCATION:   [x] Role of Occupational Therapy in the acute care setting  [x] Home safety education was provided and the patient/caregiver indicated understanding. [x] Patient/family have participated as able in working towards goals and plan of care. [] Patient/family agree to work toward stated goals and plan of care. [] Patient understands intent and goals of therapy, but is neutral about his/her participation. [] Patient is unable to participate in goal setting and plan of care.       Thank you for this referral.  JOSE JUAN Samuels/L  Time Calculation: 24 mins

## 2020-09-16 NOTE — PROGRESS NOTES
Hospitalist Progress Note    Patient: Estefany Morales Age: 76 y.o. : 1955 MR#: [id number] SSN: [id number]  Date/Time: 2020 11:06 AM    DOA: 2020  PCP: Ari Fulton NP    Subjective:     She sat in her chair without distress. She asked why she cannot go home. She does not remains why she is in the hospital but she does not want people to think that she is \"crazy\". She started to cry but easily consoled   No fever overnight. She tolerated vancomycin for her UTI. No leukocytosis   Renal function recovered. Elevated Na resolved       Interval Hospital Course:  76 y.o female with HTN, h/o CVA, seizure disorder, presented from home after having seizure. She was initially evaluated in the ER and discharged home but she sustained recurrent seizure, hence her son brought her back to the ER. She was placed on observation and neurology was consulted for further care. There was concern for non-compliance, hence her antiseizure medications were adjusted. Her electrolytes were corrected. Her urine grew Enterococcus hence she was started on IV antibiotics         ROS: No current fever/chills, no headache, no dizziness, no facial pain, no sinus congestion,   No swallowing pain, No chest pain, no palpitation, no shortness of breath, no abd pain,  No diarrhea, no urinary complaint, no leg pain or swelling      Assessment/Plan:   1. Acute metabolic encephalopathy due to post ictal state and UTI, now improved  2. UTI with enterococcus   3. Seizure disorder with breakthrough seizure due to non compliance   4. Acute renal insufficiency on CKD3, resolved   5. Mild acute rhabdomyolysis, resolving   6. HTN  7. Hyperlipidemia   8. Dysphagia   9. Acute urinary retention, resolved     Continue IV antibiotics for now, follow up on urine cultures for antibiotic selection. appreciated ID consult  Continue current seizure medications. PT/OT/SPT  Resume lisinopril.  Increase hydralazine   Full code     Attempts to call son, [phone], no answer, will call back for update tomorrow.      Additional Notes:    Time spent >30 minutes    Case discussed with:  [x]Patient  [x]Family  [x]Nursing  [x]Case Management  DVT Prophylaxis:  [x]Lovenox  []Hep SQ  []SCDs  []Coumadin   []On Heparin gtt    Signed By: David Landeros MD     2020 11:06 AM              Objective:   VS:   Visit Vitals  BP (!) 156/82 (BP 1 Location: Left arm, BP Patient Position: Supine)   Pulse 93   Temp 98.2 °F (36.8 °C)   Resp 20   Ht 5' (1.524 m)   Wt 64.9 kg (143 lb 1.3 oz)   SpO2 98%   BMI 27.94 kg/m²      Tmax/24hrs: Temp (24hrs), Av.4 °F (36.9 °C), Min:97.8 °F (36.6 °C), Max:98.7 °F (37.1 °C)      Intake/Output Summary (Last 24 hours) at 2020 1106  Last data filed at 2020 0423  Gross per 24 hour   Intake 325 ml   Output 750 ml   Net -425 ml       Tele: sinus  General:  Cooperative, Not in acute distress, speaks in full sentence while in bed  HEENT: PERRL, EOMI, supple neck, no JVD, dry oral mucosa  Cardiovascular: S1S2 regular, no rub/gallop   Pulmonary: Clear air entry bilaterally, no wheezing, no crackle  GI:  Soft, non tender, non distended, +bs, no guarding   Extremities:  No pedal edema, +distal pulses appreciated   Neuro: AOx3, moving all extremities    Additional:       Current Facility-Administered Medications   Medication Dose Route Frequency    [START ON 2020] Vancomycin Lab Information  1 Each Other Once per day on Fri    vancomycin (VANCOCIN) 1,000 mg in 0.9% sodium chloride (MBP/ADV) 250 mL adv  1,000 mg IntraVENous Q24H    enoxaparin (LOVENOX) injection 40 mg  40 mg SubCUTAneous DAILY    levETIRAcetam (KEPPRA) tablet 1,000 mg  1,000 mg Oral Q12H    tamsulosin (FLOMAX) capsule 0.4 mg  0.4 mg Oral DAILY    amLODIPine (NORVASC) tablet 10 mg  10 mg Oral DAILY    atorvastatin (LIPITOR) tablet 40 mg  40 mg Oral QHS    cyanocobalamin tablet 500 mcg  500 mcg Oral DAILY    ergocalciferol capsule 50,000 Units 50,000 Units Oral Once per day on Thu Sat    hydrALAZINE (APRESOLINE) tablet 50 mg  50 mg Oral BID    [Held by provider] lisinopriL (PRINIVIL, ZESTRIL) tablet 10 mg  10 mg Oral DAILY    OXcarbazepine (TRILEPTAL) tablet 600 mg  600 mg Oral BID    sodium chloride (NS) flush 5-40 mL  5-40 mL IntraVENous Q8H    sodium chloride (NS) flush 5-40 mL  5-40 mL IntraVENous PRN    acetaminophen (TYLENOL) tablet 650 mg  650 mg Oral Q6H PRN    Or    acetaminophen (TYLENOL) suppository 650 mg  650 mg Rectal Q6H PRN    polyethylene glycol (MIRALAX) packet 17 g  17 g Oral DAILY PRN    promethazine (PHENERGAN) tablet 12.5 mg  12.5 mg Oral Q6H PRN    Or    ondansetron (ZOFRAN) injection 4 mg  4 mg IntraVENous Q6H PRN            Lab/Data Review:  Labs: Results:       Chemistry Recent Labs     09/16/20  0517 09/15/20  0503 09/14/20  0546   GLU 82 79 85    143 143   K 3.8 3.8 3.9   * 118* 119*   CO2 19* 18* 19*   BUN 27* 27* 31*   CREA 1.05 1.16 1.11   BUCR 26* 23* 28*   AGAP 6 7 5   CA 9.8 9.4 9.4   PHOS  --  3.2  --      No results for input(s): TBIL, ALT, ALKP, TP, ALB, GLOB, AGRAT in the last 72 hours. No lab exists for component: SGOT   CBC w/Diff Recent Labs     09/16/20  0517 09/14/20  0546   WBC 6.5 6.3   RBC 3.64* 3.53*   HGB 11.0* 10.6*   HCT 33.7* 33.1*   MCV 92.6 93.8   MCH 30.2 30.0   MCHC 32.6 32.0   RDW 13.6 14.3    185   GRANS 57 61   LYMPH 29 26   EOS 3 3      Coagulation No results for input(s): PTP, INR, APTT, INREXT in the last 72 hours.     Iron/Ferritin Lab Results   Component Value Date/Time    Iron 29 (L) 02/04/2014 03:09 AM    TIBC 114 (L) 02/04/2014 03:09 AM    Iron % saturation 25 02/04/2014 03:09 AM       BNP    Cardiac Enzymes Lab Results   Component Value Date/Time     (H) 09/15/2020 05:03 AM    CK - MB <1.0 03/06/2018 10:16 AM    CK-MB Index  03/06/2018 10:16 AM     CALCULATION NOT PERFORMED WHEN RESULT IS BELOW LINEAR LIMIT    Troponin-I, QT 0.05 (H) 03/23/2020 08:34 PM Lactic Acid    Thyroid Studies          All Micro Results     Procedure Component Value Units Date/Time    CULTURE, URINE [[id number]]  (Abnormal) Collected:  09/13/20 0723    Order Status:  Completed Specimen:  Clean catch Updated:  09/15/20 1407     Special Requests: NO SPECIAL REQUESTS        [place] Count --        >100,000  COLONIES/mL       Culture result:       PROBABLE ENTEROCOCCUS SPECIES                  Images:    CT (Most Recent). CT Results (most recent):  Results from Hospital Encounter encounter on 09/08/20   CT HEAD WO CONT    Narrative CT of the head without contrast    HISTORY: Witnessed seizure    COMPARISON: 1/3/20    TECHNIQUE: Helical axial scan to the head was performed from the skull base to  the vertex without IV contrast administration. All CT scans at this facility performed using dose optimization techniques as  appreciated to a performed exam, to include automated exposure control,  adjustment of the mA and or KU according to patient size (including appropriate  matching for site specific examination), or use of iterative reconstruction  technique. FINDINGS:    Motion artifact noted. Mild global atrophy with mild ventricular dilatation  appear unchanged remote infarct in right parietal/occipital lobes with negative  mass effect and retraction of right parietal horn. Moderate and extensive  periventricular white matter hypodensities also present. . There is no evidence  of acute intracranial hemorrhage,  mass effect or midline shift identified. No  skull fracture or extra axial fluid collections identified. Visualized sinuses  and mastoid air cells appear unremarkable. Impression IMPRESSION:    In the setting of motion artifact, no definite acute intracranial hemorrhage or  mass effect. Note: If clinical concern of acute stroke, MRI with diffusion weighted images is  recommended for better evaluation.     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 Thank you for your referral.          Jere Latosha (Most Recent)      EKG No results found for this or any previous visit.      2D ECHO

## 2020-09-16 NOTE — ROUTINE PROCESS
Bedside and Verbal shift change report given to GALO Galarza RN (oncoming nurse) by ANKITA Grajeda RN (offgoing nurse). Report included the following information SBAR.

## 2020-09-17 ENCOUNTER — HOME HEALTH ADMISSION (OUTPATIENT)
Dept: HOME HEALTH SERVICES | Facility: HOME HEALTH | Age: 65
End: 2020-09-17
Payer: MEDICARE

## 2020-09-17 VITALS
RESPIRATION RATE: 20 BRPM | TEMPERATURE: 98 F | HEART RATE: 96 BPM | WEIGHT: 140 LBS | SYSTOLIC BLOOD PRESSURE: 145 MMHG | HEIGHT: 60 IN | BODY MASS INDEX: 27.48 KG/M2 | OXYGEN SATURATION: 100 % | DIASTOLIC BLOOD PRESSURE: 76 MMHG

## 2020-09-17 LAB
ANION GAP SERPL CALC-SCNC: 10 MMOL/L (ref 3–18)
BACTERIA SPEC CULT: ABNORMAL
BUN SERPL-MCNC: 26 MG/DL (ref 7–18)
BUN/CREAT SERPL: 26 (ref 12–20)
CALCIUM SERPL-MCNC: 9.8 MG/DL (ref 8.5–10.1)
CC UR VC: ABNORMAL
CHLORIDE SERPL-SCNC: 112 MMOL/L (ref 100–111)
CO2 SERPL-SCNC: 18 MMOL/L (ref 21–32)
CREAT SERPL-MCNC: 1 MG/DL (ref 0.6–1.3)
ERYTHROCYTE [DISTWIDTH] IN BLOOD BY AUTOMATED COUNT: 13.6 % (ref 11.6–14.5)
GLUCOSE SERPL-MCNC: 71 MG/DL (ref 74–99)
HCT VFR BLD AUTO: 33.3 % (ref 35–45)
HGB BLD-MCNC: 10.8 G/DL (ref 12–16)
MAGNESIUM SERPL-MCNC: 1.8 MG/DL (ref 1.6–2.6)
MCH RBC QN AUTO: 29.9 PG (ref 24–34)
MCHC RBC AUTO-ENTMCNC: 32.4 G/DL (ref 31–37)
MCV RBC AUTO: 92.2 FL (ref 74–97)
PLATELET # BLD AUTO: 227 K/UL (ref 135–420)
PMV BLD AUTO: 11.5 FL (ref 9.2–11.8)
POTASSIUM SERPL-SCNC: 3.7 MMOL/L (ref 3.5–5.5)
RBC # BLD AUTO: 3.61 M/UL (ref 4.2–5.3)
SERVICE CMNT-IMP: ABNORMAL
SODIUM SERPL-SCNC: 140 MMOL/L (ref 136–145)
WBC # BLD AUTO: 6.2 K/UL (ref 4.6–13.2)

## 2020-09-17 PROCEDURE — 74011250637 HC RX REV CODE- 250/637: Performed by: INTERNAL MEDICINE

## 2020-09-17 PROCEDURE — 80048 BASIC METABOLIC PNL TOTAL CA: CPT

## 2020-09-17 PROCEDURE — 97530 THERAPEUTIC ACTIVITIES: CPT

## 2020-09-17 PROCEDURE — 97116 GAIT TRAINING THERAPY: CPT

## 2020-09-17 PROCEDURE — 92526 ORAL FUNCTION THERAPY: CPT

## 2020-09-17 PROCEDURE — 36415 COLL VENOUS BLD VENIPUNCTURE: CPT

## 2020-09-17 PROCEDURE — 74011250637 HC RX REV CODE- 250/637: Performed by: HOSPITALIST

## 2020-09-17 PROCEDURE — 83735 ASSAY OF MAGNESIUM: CPT

## 2020-09-17 PROCEDURE — 85027 COMPLETE CBC AUTOMATED: CPT

## 2020-09-17 PROCEDURE — 2709999900 HC NON-CHARGEABLE SUPPLY

## 2020-09-17 PROCEDURE — 97110 THERAPEUTIC EXERCISES: CPT

## 2020-09-17 PROCEDURE — 97535 SELF CARE MNGMENT TRAINING: CPT

## 2020-09-17 PROCEDURE — 74011250636 HC RX REV CODE- 250/636: Performed by: INTERNAL MEDICINE

## 2020-09-17 RX ORDER — GRANULES FOR ORAL 3 G/1
3 POWDER ORAL
Status: COMPLETED | OUTPATIENT
Start: 2020-09-17 | End: 2020-09-17

## 2020-09-17 RX ORDER — HYDRALAZINE HYDROCHLORIDE 50 MG/1
50 TABLET, FILM COATED ORAL 3 TIMES DAILY
Qty: 90 TAB | Refills: 0 | Status: SHIPPED | OUTPATIENT
Start: 2020-09-17 | End: 2020-09-30

## 2020-09-17 RX ORDER — SPIRONOLACTONE 25 MG/1
25 TABLET ORAL DAILY
COMMUNITY
End: 2020-09-17

## 2020-09-17 RX ORDER — TAMSULOSIN HYDROCHLORIDE 0.4 MG/1
0.4 CAPSULE ORAL
Qty: 30 CAP | Refills: 0 | Status: SHIPPED | OUTPATIENT
Start: 2020-09-17 | End: 2022-01-13

## 2020-09-17 RX ORDER — LISINOPRIL 20 MG/1
20 TABLET ORAL DAILY
Status: DISCONTINUED | OUTPATIENT
Start: 2020-09-18 | End: 2020-09-17 | Stop reason: HOSPADM

## 2020-09-17 RX ORDER — CLOPIDOGREL BISULFATE 75 MG/1
75 TABLET ORAL DAILY
COMMUNITY

## 2020-09-17 RX ORDER — LISINOPRIL 20 MG/1
20 TABLET ORAL DAILY
Qty: 30 TAB | Refills: 1 | Status: ON HOLD | OUTPATIENT
Start: 2020-09-17 | End: 2021-09-08 | Stop reason: SDUPTHER

## 2020-09-17 RX ORDER — LISINOPRIL 10 MG/1
10 TABLET ORAL ONCE
Status: COMPLETED | OUTPATIENT
Start: 2020-09-17 | End: 2020-09-17

## 2020-09-17 RX ADMIN — CYANOCOBALAMIN TAB 500 MCG 500 MCG: 500 TAB at 10:22

## 2020-09-17 RX ADMIN — LEVETIRACETAM 1000 MG: 500 TABLET ORAL at 10:21

## 2020-09-17 RX ADMIN — LISINOPRIL 10 MG: 10 TABLET ORAL at 10:22

## 2020-09-17 RX ADMIN — OXCARBAZEPINE 600 MG: 300 TABLET, FILM COATED ORAL at 10:21

## 2020-09-17 RX ADMIN — ENOXAPARIN SODIUM 40 MG: 40 INJECTION SUBCUTANEOUS at 10:22

## 2020-09-17 RX ADMIN — TAMSULOSIN HYDROCHLORIDE 0.4 MG: 0.4 CAPSULE ORAL at 10:22

## 2020-09-17 RX ADMIN — FOSFOMYCIN TROMETHAMINE 3 G: 3 POWDER ORAL at 15:56

## 2020-09-17 RX ADMIN — AMLODIPINE BESYLATE 10 MG: 10 TABLET ORAL at 10:22

## 2020-09-17 RX ADMIN — LISINOPRIL 10 MG: 10 TABLET ORAL at 12:36

## 2020-09-17 RX ADMIN — HYDRALAZINE HYDROCHLORIDE 50 MG: 50 TABLET, FILM COATED ORAL at 10:21

## 2020-09-17 NOTE — PROGRESS NOTES
Discharge order noted for today. Pt has been accepted to [facility] agency. Met with patient and son over the phone and are agreeable to the transition plan today. Transport has been arranged through son herbert. Patient's discharge summary and home health  orders have been forwarded to [facility] home health  agency via [facility] Updated bedside RN, Alberto Davidson,  to the transition plan. Discharge information has been documented on the AVS.     Claude Self delivered to patient in room from [facility].        Jassi Whiting RN BSN  Care Manager  [phone]

## 2020-09-17 NOTE — ROUTINE PROCESS
Bedside and Verbal shift change report given to Daniela Priest (oncoming nurse) by Maria Del Carmen Azul RN (offgoing nurse). Report included the following information SBAR, Kardex, Intake/Output and MAR.

## 2020-09-17 NOTE — PROGRESS NOTES
Patient has not voided since catheter removal. Bladder scan done and recorded 55 ml of urine in bladder. MD notified and gave orders to proceed with discharge.

## 2020-09-17 NOTE — PROGRESS NOTES
Problem: Self Care Deficits Care Plan (Adult)  Goal: *Acute Goals and Plan of Care (Insert Text)  Description: Occupational Therapy Goals  Initiated 9/14/2020 within 7 day(s). 1.  Patient will perform self-feeding with supervision/set-up. 2.  Patient will perform grooming with supervision/set-up. 3.  Patient will perform upper body dressing with supervision/setup. 4.  Patient will perform bed mobility with supervision/setup in preparation for further self-care. 5.  Patient will perform a functional activity/ADL sitting EOB presenting with F+ sitting balance for 3-5 minutes. 6.  Patient will participate in upper extremity therapeutic exercise/activities with supervision/set-up for 8 minutes. Prior Level of Function: Pt is a poor historian, but reports she was (I) with basic self-care/ADLs and functional mobility without AD PTA. Pt lives with her son in an apartment on the 1st floor. Pt does not have AD/DME at home. Outcome: Progressing Towards Goal     Problem: Patient Education: Go to Patient Education Activity  Goal: Patient/Family Education  Outcome: Progressing Towards Goal   OCCUPATIONAL THERAPY TREATMENT    Patient: Teresa Jones (19 y.o. female)  Date: 9/17/2020  Diagnosis: Recurrent seizures ([address] [G40.909]  Recurrent seizures ([address] [V12.489]  Seizure ([address] [R56.9]   <principal problem not specified>       Precautions:    PLOF: Pt is a poor historian, but reports she was (I) with basic self-care/ADLs and functional mobility without AD PTA. Pt lives with her son in an apartment on the 1st floor. Pt does not have AD/DME at home. Chart, occupational therapy assessment, plan of care, and goals were reviewed. ASSESSMENT:  Pt presented supine in bed upon therapist arrival and agreeable for tx at this time. Pt reports fatigue 2/2 clean up before therapist arrival requesting bed level tx despite encouragement. Pt performed B UE exercises with red t-band reaching in mult planes ~ 7 mins.  Pt with notable confusion however able to follow simple step commands. Provided education for energy conservation techniques and strategies to increase activity tolerance for self cares. Pt remained supine in bed with all needs left within reach at end of tx session. Progression toward goals:  []          Improving appropriately and progressing toward goals  [x]          Improving slowly and progressing toward goals  []          Not making progress toward goals and plan of care will be adjusted     PLAN:  Patient continues to benefit from skilled intervention to address the above impairments. Continue treatment per established plan of care. Discharge Recommendations:  Rehab vs Home Health with 24/7 family support  Further Equipment Recommendations for Discharge: TBA pending progress     SUBJECTIVE:   Patient stated  I am sleepy. \"    OBJECTIVE DATA SUMMARY:   Cognitive/Behavioral Status:  Neurologic State: Alert  Orientation Level: Oriented X4  Cognition: Follows commands  Safety/Judgement: Decreased insight into deficits    Functional Mobility and Transfers for ADLs:      Transfers:   Deferred 2/2 fatigue (closing eyes during session)    Balance:  Sitting: Impaired  Sitting - Static: Fair (occasional)      UE Therapeutic Exercises:   B UE ex with red t-band 4 x 15 reaching in mult planes to increase strength and ROM for ADL carryover. Pt required vc's and tactile cueing for instruction and sequencing of each set. Pain:  Pt reports no pain at this time    Activity Tolerance:    Fair  Please refer to the flowsheet for vital signs taken during this treatment.   After treatment:   []  Patient left in no apparent distress sitting up in chair  [x]  Patient left in no apparent distress in bed  [x]  Call bell left within reach  [x]  Nursing notified  []  Caregiver present  [x]  Bed alarm activated    COMMUNICATION/EDUCATION:   [] Role of Occupational Therapy in the acute care setting  [] Home safety education was provided and the patient/caregiver indicated understanding. [] Patient/family have participated as able in working towards goals and plan of care. [x] Patient/family agree to work toward stated goals and plan of care. [] Patient understands intent and goals of therapy, but is neutral about his/her participation. [] Patient is unable to participate in goal setting and plan of care.       Thank you for this referral.  JOSE JUAN Shepherd  Time Calculation: 27 mins

## 2020-09-17 NOTE — PROGRESS NOTES
2100..this RN attempted to start 3 PIVs on this pt, all PIVs \"blew\" upon flushing. Will call ED for assistance as pt needs IV vancomycin. \        3039. ..this RN re-attempted PIV. Successful 24g in left hand.  Vancomycin infusing

## 2020-09-17 NOTE — HOME CARE
Discharge noted for today. Received home health referral for [address] for SN, PT, and OT. Spoke with patient, explained services and answered all questions. Demographics verified. Referral processed and emailed to central office. Patient has the following DME: front wheeled walker delivered to the room by JESSE Rodríguez.  Camila Tolbert, [address] Liaison

## 2020-09-17 NOTE — PROGRESS NOTES
Problem: Dysphagia (Adult)  Goal: *Acute Goals and Plan of Care (Insert Text)  Description: Patient will:  1. Tolerate PO trials with 0 s/s overt distress in 4/5 trials - met  2. Utilize compensatory swallow strategies/maneuvers (decrease bite/sip, size/rate, alt. liq/sol) with min cues in 4/5 trials - met  3. Perform oral-motor/laryngeal exercises to increase oropharyngeal swallow function with min cues - n/a  4. Complete an objective swallow study (i.e., MBSS) to assess swallow integrity, r/o aspiration, and determine of safest LRD, min A - n/a    Rec:     Reg solid diet with thin   Aspiration precautions  HOB >45 during po intake, remain >30 for 30-45 minutes after po   Small bites/sips; alternate liquid/solid with slow feeding rate   Oral care TID  Meds in puree  Assistance with PO      Outcome: Resolved/Met     SPEECH LANGUAGE PATHOLOGY DYSPHAGIA TREATMENT & DISCHARGE    Patient: Hien Bhakta (86 y.o. female)  Date: 9/17/2020  Diagnosis: Recurrent seizures ([facility] [address] [G40.909]  Recurrent seizures ([facility] [I13.054]  Seizure ([facility] [address] [R56.9]   <principal problem not specified>       Precautions: aspiration    PLOF: As per H&P     ASSESSMENT:  Pt was seen for follow up dysphagia management. Pt tolerated reg solid, puree, and thin liquids +/- straw consecutive swallows without any overt s/sx of aspiration. Mastication was appropriate with timely a-p transit. Positive oral clearance observed across all trials. Pt safe for initiation of reg solid diet with thin liquids, aspiration precautions, oral care TID, and meds as tolerated. She continues to require assistance with feeds. No further skilled SLP services are indicated at this time. Please re-consult if needed. Progression toward goals:  [x]         Goals met/approximated  []         Not making progress/Not appropriate - will d/c POC     PLAN:  Recommendations and Planned Interventions:  Maximum therapeutic gains met; safest, least restrictive diet achieved.  D/C ST intervention at this time. Discharge Recommendations:  Home Health and To Be Determined     SUBJECTIVE:   Patient stated I'm doing much better. OBJECTIVE:   Cognitive and Communication Status:  Neurologic State: Alert  Orientation Level: Oriented X4  Cognition: Follows commands  Perception: Appears intact  Perseveration: Perseverates during conversation  Safety/Judgement: Decreased insight into deficits  Dysphagia Treatment:  Oral Assessment:  Oral Assessment  Labial: No impairment  Dentition: Natural  Oral Hygiene: adequate  Lingual: Decreased strength  Velum: No impairment  Mandible: No impairment  P.O. Trials:   Patient Position: [facility] at [facility]   Vocal quality prior to P.O.: No impairment   Consistency Presented:  Thin liquid, Solid, Puree   How Presented: Self-fed/presented, Cup/sip, Spoon, Straw   Bolus Acceptance: No impairment   Bolus Formation/Control: No impairment   Type of Impairment: none   Propulsion: None   Oral Residue: None   Initiation of Swallow: No impairment   Laryngeal Elevation: Functional   Aspiration Signs/Symptoms: None   Pharyngeal Phase Characteristics: No impairment, issues, or problems    Effective Modifications: Small sips and bites, Alternate liquids/solids   Cues for Modifications: Minimal       Oral Phase Severity: No impairment   Pharyngeal Phase Severity : No impairment    PAIN:  Start of Tx: 0  End of Tx: 0     After treatment:   []              Patient left in no apparent distress sitting up in chair  [x]              Patient left in no apparent distress in bed  [x]              Call bell left within reach  [x]              Nursing notified  []              Caregiver present  []              Bed alarm activated      COMMUNICATION/EDUCATION:   [x] Aspiration precautions; swallow safety; compensatory techniques  [x]        Patient/family able to participate in training and education   []  Patient unable to participate in education; education ongoing with staff  [] Patient understands goals/intent of therapy; neutral about participation     Thank you for this referral.    Harjeet Castellano M.S. CCC-SLP/L  Speech-Language Pathologist

## 2020-09-17 NOTE — PROGRESS NOTES
Infectious Disease progress Note        Reason: enterococcus UTI    Current abx Prior abx   Vancomycin since 9/15 Aztreonam 9/13-9/15     Lines:       Assessment :    75 yo female with hx of HTN, CVA and seizure disorder who was brought to the ED on 9/16/20 by her son after having a seizure on Monday around 9 pm.     Now with pyuria, urine culture positive for enterococcus    Clinical presentation consistent with enterococcus cystitis. No obstructive uropathy noted on CT scan 9/16/2020. Antibiotic management complicated by documented history of penicillin allergy. Patient states that she had rash with penicillin as a child. Denies life-threatening reaction. Clinically better. Improved mentation. Recommendations:    1. Discontinue vancomycin. Give fosfomycin 3 mg p.o. single dose to complete treatment of enterococcus cystitis. 2. Follow-up neurology recommendations regarding seizure  3.  DC planning per primary team    Above plan was discussed in details with patient,  and dr Cat Drummond. Please call me if any further questions or concerns. Will continue to participate in the care of this patient. HPI:  Feels better. Charlie Cardenass to go home. Denies any chest pain, shortness of breath, abdominal pain          Home Medication List    Details   OXcarbaxepine (TRILEPTAL) 600 mg tablet Take 600 mg by mouth two (2) times a day. hydrALAZINE (APRESOLINE) 50 mg tablet Take 50 mg by mouth two (2) times a day. ergocalciferol (VITAMIN D2) 50,000 unit capsule Take 50,000 Units by mouth two (2) days a week. levETIRAcetam (KEPPRA) 1,000 mg tablet Take 1 Tab by mouth two (2) times a day. Qty: 60 Tab, Refills: 0      amLODIPine (NORVASC) 10 mg tablet Take 1 Tab by mouth daily. Qty: 30 Tab, Refills: 6      atorvastatin (LIPITOR) 40 mg tablet Take 1 Tab by mouth nightly. Qty: 30 Tab, Refills: 0      cyanocobalamin (VITAMIN B12) 500 mcg tablet Take 1 Tab by mouth daily.   Qty: 30 Tab, Refills: 0 clopidogrel (PLAVIX) 75 mg tablet Take 75 mg by mouth daily. lisinopril (PRINIVIL, ZESTRIL) 10 mg tablet Take 10 mg by mouth daily.              Current Facility-Administered Medications   Medication Dose Route Frequency    [START ON 2020] lisinopriL (PRINIVIL, ZESTRIL) tablet 20 mg  20 mg Oral DAILY    lisinopriL (PRINIVIL, ZESTRIL) tablet 10 mg  10 mg Oral ONCE    [START ON 2020] Vancomycin Lab Information  1 Each Other Once per day on Fri    hydrALAZINE (APRESOLINE) tablet 50 mg  50 mg Oral TID    vancomycin (VANCOCIN) 1,000 mg in 0.9% sodium chloride (MBP/ADV) 250 mL adv  1,000 mg IntraVENous Q24H    enoxaparin (LOVENOX) injection 40 mg  40 mg SubCUTAneous DAILY    levETIRAcetam (KEPPRA) tablet 1,000 mg  1,000 mg Oral Q12H    tamsulosin (FLOMAX) capsule 0.4 mg  0.4 mg Oral DAILY    amLODIPine (NORVASC) tablet 10 mg  10 mg Oral DAILY    atorvastatin (LIPITOR) tablet 40 mg  40 mg Oral QHS    cyanocobalamin tablet 500 mcg  500 mcg Oral DAILY    ergocalciferol capsule 50,000 Units  50,000 Units Oral Once per day on u Sat    OXcarbazepine (TRILEPTAL) tablet 600 mg  600 mg Oral BID    sodium chloride (NS) flush 5-40 mL  5-40 mL IntraVENous Q8H    sodium chloride (NS) flush 5-40 mL  5-40 mL IntraVENous PRN    acetaminophen (TYLENOL) tablet 650 mg  650 mg Oral Q6H PRN    Or    acetaminophen (TYLENOL) suppository 650 mg  650 mg Rectal Q6H PRN    polyethylene glycol (MIRALAX) packet 17 g  17 g Oral DAILY PRN    promethazine (PHENERGAN) tablet 12.5 mg  12.5 mg Oral Q6H PRN    Or    ondansetron (ZOFRAN) injection 4 mg  4 mg IntraVENous Q6H PRN       Allergies: Tomato; Aspirin; Ciprofloxacin; Pcn [penicillins]; and Sulfa (sulfonamide antibiotics)    Temp (24hrs), Av.8 °F (36.6 °C), Min:97.2 °F (36.2 °C), Max:98.2 °F (36.8 °C)    Visit Vitals  BP (!) 167/70 (BP 1 Location: Right arm, BP Patient Position: At rest)   Pulse 91   Temp 97.2 °F (36.2 °C)   Resp 20   Ht 5' (1.524 m)   Wt 63.5 kg (140 lb)   SpO2 100%   BMI 27.34 kg/m²       ROS: 12 point ROS obtained in details. Pertinent positives as mentioned in HPI,   otherwise negative    Physical Exam:    General:   awake alert and oriented to self. More communicative   Skin:   no rashes or skin lesions noted on limited exam   HEENT:  Normocephalic, atraumatic, PERRL, EOMI, no scleral icterus or pallor; no conjunctival hemmohage;  nasal and oral mucous are moist and without evidence of lesions. Neck supple, no bruits. Lungs:   non-labored, bilaterally clear to aspiration- no crackles wheezes rales or rhonchi   Heart:  RRR, s1 and s2; no rubs or gallops, no edema, + pedal pulses   Abdomen:  soft, non-distended, active bowel sounds, no hepatomegaly, no splenomegaly. Non-tender   Genitourinary:  deferred   Extremities:   no clubbing, cyanosis; no joint effusions or swelling; ; muscle mass appropriate for age   Neurologic:   Alert awake oriented x3, moves all 4 extremities   Psychiatric:   calm           Labs: Results:   Chemistry Recent Labs     09/17/20  0517 09/16/20  0517 09/15/20  0503   GLU 71* 82 79    139 143   K 3.7 3.8 3.8   * 114* 118*   CO2 18* 19* 18*   BUN 26* 27* 27*   CREA 1.00 1.05 1.16   CA 9.8 9.8 9.4   AGAP 10 6 7   BUCR 26* 26* 23*      CBC w/Diff Recent Labs     09/17/20  0517 09/16/20  0517   WBC 6.2 6.5   RBC 3.61* 3.64*   HGB 10.8* 11.0*   HCT 33.3* 33.7*    205   GRANS  --  57   LYMPH  --  29   EOS  --  3      Microbiology No results for input(s): CULT in the last 72 hours.        RADIOLOGY:    All available imaging studies/reports in [facility] for this admission were reviewed    Dr. Joseph Pringle, Infectious Disease Specialist  [phone]  September 17, 2020  10:31 AM

## 2020-09-17 NOTE — DISCHARGE INSTRUCTIONS
DISCHARGE SUMMARY from Nurse    PATIENT INSTRUCTIONS:    After general anesthesia or intravenous sedation, for 24 hours or while taking prescription Narcotics:  · Limit your activities  · Do not drive and operate hazardous machinery  · Do not make important personal or business decisions  · Do  not drink alcoholic beverages  · If you have not urinated within 8 hours after discharge, please contact your surgeon on call. Report the following to your surgeon:  · Excessive pain, swelling, redness or odor of or around the surgical area  · Temperature over 100.5  · Nausea and vomiting lasting longer than 4 hours or if unable to take medications  · Any signs of decreased circulation or nerve impairment to extremity: change in color, persistent  numbness, tingling, coldness or increase pain  · Any questions    What to do at Home:  Recommended activity: Activity as tolerated. If you experience any of the following symptoms change in LOC, AMS or convulsions please follow up with closest ED and primary care provider. *  Please give a list of your current medications to your Primary Care Provider. *  Please update this list whenever your medications are discontinued, doses are      changed, or new medications (including over-the-counter products) are added. *  Please carry medication information at all times in case of emergency situations. These are general instructions for a healthy lifestyle:    No smoking/ No tobacco products/ Avoid exposure to second hand smoke  Surgeon General's Warning:  Quitting smoking now greatly reduces serious risk to your health.     Obesity, smoking, and sedentary lifestyle greatly increases your risk for illness    A healthy diet, regular physical exercise & weight monitoring are important for maintaining a healthy lifestyle    You may be retaining fluid if you have a history of heart failure or if you experience any of the following symptoms:  Weight gain of 3 pounds or more overnight or 5 pounds in a week, increased swelling in our hands or feet or shortness of breath while lying flat in bed. Please call your doctor as soon as you notice any of these symptoms; do not wait until your next office visit. The discharge information has been reviewed with the patient. The patient verbalized understanding. Discharge medications reviewed with the patient and appropriate educational materials and side effects teaching were provided. ___________________________________________________________________________________________________________________________________  Discharge Instructions    Patient: Natalya Scott MRN: [id number]  CSN: [id number]    YOB: 1955  Age: 76 y.o. Sex: female    DOA: 9/8/2020 LOS:  LOS: 8 days   Discharge Date:      ACUTE DIAGNOSES:  1. Acute metabolic encephalopathy due to post-ictal state and UTI, RESOLVED  2. UTI with enterococcus, treated   3. Seizure disorder with breakthrough seizure due to non compliance   4. Acute renal insufficiency with dehydration, resolved   5. Mild acute rhabdomyolysis, resolved   6. Hypertension  7. Hyperlipidemia   8. Hypernatremia, resolved    9. Acute urinary retention, resolved         DISCHARGE MEDICATIONS:       · It is important that you take the medication exactly as they are prescribed. · Keep your medication in the bottles provided by the pharmacist and keep a list of the medication names, dosages, and times to be taken in your wallet. · Do not take other medications without consulting your doctor. DIET:  Cardiac Diet and Low fat, Low cholesterol    ACTIVITY: Activity as tolerated    ADDITIONAL INFORMATION: If you experience any of the following symptoms then please call your primary care physician or return to the emergency room if you cannot get hold of your doctor: Fever, chills, nausea, vomiting, diarrhea, change in mentation, falling, bleeding, shortness of breath.     FOLLOW UP CARE:  Dr. Carolynn Sims NP  you are to call and set up an appointment to see them in 1-2 weeks. Follow-up with your neurologist in 2-4 weeks       Information obtained by :  I understand that if any problems occur once I am at home I am to contact my physician. I understand and acknowledge receipt of the instructions indicated above. [de-identified] or R.N.'s Signature                                                                  Date/Time                                                                                                                                              Patient or Representative Signature                                                          Date/Time    Christian Lisa MD  2020  2:09 PM    [employer] Activation    Thank you for requesting access to [employer]. Please follow the instructions below to securely access and download your online medical record. [employer] allows you to send messages to your doctor, view your test results, renew your prescriptions, schedule appointments, and more. How Do I Sign Up? 1. In your internet browser, go to [email or web address]  2. Click on the First Time User? Click Here link in the Sign In box. You will be redirect to the New Member Sign Up page. 3. Enter your [employer] Access Code exactly as it appears below. You will not need to use this code after youve completed the sign-up process. If you do not sign up before the expiration date, you must request a new code. [employer] Access Code: [email or web address]  Expires: 10/31/2020  6:31 PM (This is the date your [employer] access code will )    4. Enter the last four digits of your Social Security Number (xxxx) and Date of Birth (mm/dd/yyyy) as indicated and click Submit. You will be taken to the next sign-up page. 5. Create a [employer] ID.  This will be your [facility] login ID and cannot be changed, so think of one that is secure and easy to remember. 6. Create a [employer] password. You can change your password at any time. 7. Enter your Password Reset Question and Answer. This can be used at a later time if you forget your password. 8. Enter your e-mail address. You will receive e-mail notification when new information is available in [address] 9. Click Sign Up. You can now view and download portions of your medical record. 10. Click the Download Summary menu link to download a portable copy of your medical information. Additional Information    If you have questions, please visit the Frequently Asked Questions section of the [employer] website at [email or web address] Remember, [employer] is NOT to be used for urgent needs. For medical emergencies, dial 911.

## 2020-09-17 NOTE — PROGRESS NOTES
Problem: Mobility Impaired (Adult and Pediatric)  Goal: *Acute Goals and Plan of Care (Insert Text)  Description: Physical Therapy Goals  Initiated 9/14/2020 and to be accomplished within 7 day(s)  1. Patient will move from supine to sit and sit to supine , scoot up and down, and roll side to side in bed with modified independence. 2.  Patient will transfer from bed to chair and chair to bed with supervision/set-up using the least restrictive device. 3.  Patient will perform sit to stand with supervision/set-up. 4.  Patient will ambulate with supervision/set-up for 100 feet with the least restrictive device. 5.  Patient will ascend/descend stairs pending progress and necessity for safe discharge. PLOF: Pt lives with her son, was mod ind/ind PTA, states she does not have any DME however unclear due to pt confusion this date. Outcome: Progressing Towards Goal     PHYSICAL THERAPY TREATMENT    Patient: Bubba Raza (72 y.o. female)  Date: 9/17/2020  Diagnosis: Recurrent seizures ([facility] [address] [G40.909]  Recurrent seizures ([address] [O46.835]  Seizure ([facility] [address] [R56.9]   <principal problem not specified>       Precautions: Fall    ASSESSMENT:  Pt presents with decreased endurance, functional mobility capacity, cognition and orientation. Pt found supine in bed, willing to work with PT. She was able to irnet to self and place but states nonsensical phrases at times. Pt able to perform bed mobility SBA and sit EOB without difficulty. Pt required CGA to stand and once standing required modAx1 for support. Pt very unsteady while standing and observed to have increased sway. Pt performed side steps to R/L total of 10 feet with hand held assist as pt could not coordinate using a rolling walker well. Pt performed x3 more stands with hand held assist. Pt back supine SBA and performed exercises per flow chart. Pt left supine in bed, bed alarm on, call bell nearby, no new questions or concerns.  Recommend discharge to SNF as patient has significant functional mobility deficits and safety concerns. Progression toward goals:   []      Improving appropriately and progressing toward goals  [x]      Improving slowly and progressing toward goals  []      Not making progress toward goals and plan of care will be adjusted     PLAN:  Patient continues to benefit from skilled intervention to address the above impairments. Continue treatment per established plan of care. Discharge Recommendations:  Michael Howard  Further Equipment Recommendations for Discharge:  N/A     SUBJECTIVE:   Patient stated my R knee hurts.     OBJECTIVE DATA SUMMARY:   Critical Behavior:  Neurologic State: Alert  Orientation Level: Oriented to place, Oriented to person  Cognition: Follows commands, Impulsive, Poor safety awareness  Safety/Judgement: Lack of insight into deficits  Functional Mobility Training:  Bed Mobility:     Supine to Sit: Stand-by assistance  Sit to Supine: Stand-by assistance  Scooting: Contact guard assistance    Transfers:  Sit to Stand: Contact guard assistance  Stand to Sit: Stand-by assistance       Balance:  Sitting: Impaired  Sitting - Static: Fair (occasional)  Sitting - Dynamic: Poor (constant support)  Standing: Impaired  Standing - Static: Poor  Standing - Dynamic : Poor   Ambulation/Gait Training:  Distance (ft): 10 Feet (ft)  Assistive Device: Other (comment)(Hand held asssit)  Ambulation - Level of Assistance:  Moderate assistance;Assist x1        Gait Abnormalities: Trunk sway increased;Decreased step clearance        Base of Support: Narrowed     Speed/Josefina: Shuffled  Step Length: Right shortened;Left shortened    Therapeutic Exercises:   Pt performed exercises per flow sheet sitting EOB and supine in bed at end of session      EXERCISE   Sets   Reps   Active Active Assist   Passive Self ROM   Comments   Ankle Pumps    [] [] [] []    Quad Sets/Glut Sets    [] [] [] [] Hold for 5 secs   Hamstring Sets   [] [] [] [] Short Arc Quads   [] [] [] []    Heel Slides 1 10 [x] [] [] []    Straight Leg Raises 1 10 [x] [] [] []    Hip Add   [] [] [] [] Hold for 5 secs, w/ pillow squeeze   Long Arc Quads 1 10 [x] [] [] []    Seated Marching 1 15 [x] [] [] []    Standing Marching   [] [] [] []    Red theraband scapula;ra adduction 1 10 [x] [] [] []        Pain:  Pain level pre-treatment: 0/10  Pain level post-treatment: 0/10   Pain Intervention(s): Medication (see MAR); Rest, Ice, Repositioning   Response to intervention: Nurse notified, See doc flow    Activity Tolerance:   Pt tolerated mobility okay, very unstable standing/walking  Please refer to the flowsheet for vital signs taken during this treatment. After treatment:   [] Patient left in no apparent distress sitting up in chair  [x] Patient left in no apparent distress in bed  [x] Call bell left within reach  [x] Nursing notified  [] Caregiver present  [x] Bed alarm activated  [] SCDs applied      COMMUNICATION/EDUCATION:   [x]         Role of Physical Therapy in the acute care setting. [x]         Fall prevention education was provided and the patient/caregiver indicated understanding. [x]         Patient/family have participated as able in working toward goals and plan of care. []         Patient/family agree to work toward stated goals and plan of care. []         Patient understands intent and goals of therapy, but is neutral about his/her participation.   []         Patient is unable to participate in stated goals/plan of care: ongoing with therapy staff.  []         Other:        Hank Clemons   Time Calculation: 23 mins

## 2020-09-17 NOTE — DISCHARGE SUMMARY
Discharge Summary    Patient: Johnie Jj               Sex: female          DOA: 9/8/2020         YOB: 1955      Age:  76 y.o.        LOS:  LOS: 8 days                Admit Date: 9/8/2020    Discharge Date: 9/17/2020    Primary care physician: Modesto Foley NP    Discharge Diagnoses:    1. Acute metabolic encephalopathy due to post ictal state and UTI, resolved   2. UTI with enterococcus, finished treatment   3. Seizure disorder with breakthrough seizure due to non compliance   4. Acute renal insufficiency on CKD3, resolved   5. Mild acute rhabdomyolysis, resolved   6. HTN  7. Hyperlipidemia   8. Dysphagia   9. Acute urinary retention, resolved     Discharge Condition: Good  Disposition: home with home health  Code Status:full code     Follow up for Primary Care Physician:  1) she needs follow up for antihypertensive medication adjustment. Her diuretic has been stopped  2) she has finished her antibiotics per ID, please monitor  3) she needs follow up with her neurologist for further care     Hospital Course:   76 y.o female with HTN, h/o CVA, seizure disorder, presented from home after having seizure. She was initially evaluated in the ER and discharged home but she sustained recurrent seizure, hence her son brought her back to the ER. She was placed on observation and neurology was consulted for further care. There was concern for non-compliance, hence her antiseizure medications were adjusted. Her electrolytes were corrected. Her urine grew Enterococcus hence she was started on IV vancomycin due to her allergy profile. ID consulted for further care. She has improvement of her mentation. No further seizure. Last 24 Hours: urine grew Enterococcus faecalis, she has 3 days of IV vancomycin. ID ok with fosfomycin x1 today since her CT abd/pelv is without complication   She has no complaint, she wants to go home today. Spoke with her son and reviewed her medications.  Educated on recent change of her medication including stopping diuretic. ROS: No current fever/chills, no headache, no dizziness, no facial pain, no sinus congestion,   No swallowing pain, No chest pain, no palpitation, no shortness of breath, no abd pain,  No diarrhea, no urinary complaint, no leg pain or swelling    VS:   Visit Vitals  BP (!) 145/76 (BP 1 Location: Right arm, BP Patient Position: At rest)   Pulse 96   Temp 98 °F (36.7 °C)   Resp 20   Ht 5' (1.524 m)   Wt 63.5 kg (140 lb)   SpO2 100%   BMI 27.34 kg/m²      Tmax/24hrs: Temp (24hrs), Av.8 °F (36.6 °C), Min:97.2 °F (36.2 °C), Max:98.2 °F (36.8 °C)      Intake/Output Summary (Last 24 hours) at 2020 1903  Last data filed at 2020 0800  Gross per 24 hour   Intake 240 ml   Output 300 ml   Net -60 ml       Tele:   General:  Cooperative, Not in acute distress, speaks in full sentence while in bed  HEENT: PERRL, EOMI, supple neck, no JVD, dry oral mucosa  Cardiovascular: S1S2 regular, no rub/gallop   Pulmonary: Clear air entry bilaterally, no wheezing, no crackle  GI:  Soft, non tender, non distended, +bs, no guarding   Extremities:  No pedal edema, +distal pulses appreciated   Neuro: AOx3, moving all extremities, no gross deficit. Consults:   ID dr. Kirt Parra:   CT Results (most recent):  Results from Hospital Encounter encounter on 20   CT ABD PELV WO CONT    Narrative CT abdomen and pelvis for stone study     HISTORY: Flank pain. Hydronephrosis. Evaluation of bladder stone. COMPARISON: None. TECHNIQUE: Helical scan to the abdomen and pelvis is obtained without oral or IV  contrast administration per stone protocol.      All CT scans at this facility performed using dose optimization techniques as  appreciated to a performed exam, to include automated exposure control,  adjustment of the mA and or KU according to patient size (including appropriate  matching for site specific examination), or use of iterative reconstruction  technique. FINDINGS: Imaging portion to the lung bases appear clear. KIDNEYS, URETERS AND BLADDER:     The bilateral kidneys appear normal. No evidence of renal calculi or  hydronephrosis  identified. The bilateral ureters are nondilated with no ureteral calculi present. The bladder is decompressed with a Cain catheter placed. No definite calculus  identified in the johny distended bladder. CT ABDOMEN AND PELVIS:     The liver, spleen, pancreas, gallbladder and both adrenal glands appear grossly  normal on this noncontrast study. The small and large bowel appear nondilated. The appendix is not clearly seen. There is mild fecal impaction. No  retroperitoneal adenopathy identified. Abdominal aorta appears unremarkable for  age. No pelvic free fluid identified. .     CT OSSEOUS STRUCTURES:     Unremarkable for age. Impression IMPRESSION:     1. No evidence of obstructive uropathy or nephrolithiasis identified. 2. Decompressed bladder with Cain catheter. No definite bladder calculi  visualized. Thank you for this referral.     CT (Most Recent). CT Results (most recent):       Results from Hospital Encounter encounter on 09/08/20   CT HEAD WO CONT     Narrative CT of the head without contrast     HISTORY: Witnessed seizure     COMPARISON: 1/3/20     TECHNIQUE: Helical axial scan to the head was performed from the skull base to  the vertex without IV contrast administration.     All CT scans at this facility performed using dose optimization techniques as  appreciated to a performed exam, to include automated exposure control,  adjustment of the mA and or KU according to patient size (including appropriate  matching for site specific examination), or use of iterative reconstruction  technique.     FINDINGS:     Motion artifact noted.  Mild global atrophy with mild ventricular dilatation  appear unchanged remote infarct in right parietal/occipital lobes with negative  mass effect and retraction of right parietal horn. Moderate and extensive  periventricular white matter hypodensities also present. . There is no evidence  of acute intracranial hemorrhage,  mass effect or midline shift identified. No  skull fracture or extra axial fluid collections identified. Visualized sinuses  and mastoid air cells appear unremarkable.         Impression IMPRESSION:     In the setting of motion artifact, no definite acute intracranial hemorrhage or  mass effect. Note: If clinical concern of acute stroke, MRI with diffusion weighted images is  recommended for better evaluation.     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     Thank you for your referral.            Lab/Data Review:  Labs: Results:       Chemistry Recent Labs     09/17/20 0517 09/16/20 0517 09/15/20  0503   GLU 71* 82 79    139 143   K 3.7 3.8 3.8   * 114* 118*   CO2 18* 19* 18*   BUN 26* 27* 27*   CREA 1.00 1.05 1.16   CA 9.8 9.8 9.4   AGAP 10 6 7   BUCR 26* 26* 23*      CBC w/Diff Recent Labs     09/17/20 0517 09/16/20  0517   WBC 6.2 6.5   RBC 3.61* 3.64*   HGB 10.8* 11.0*   HCT 33.3* 33.7*    205   GRANS  --  57   LYMPH  --  29   EOS  --  3      Coagulation No results for input(s): PTP, INR, APTT, INREXT in the last 72 hours. Iron/Ferritin No results for input(s): IRON in the last 72 hours. No lab exists for component: TIBCCALC   BNP No results for input(s): BNPP in the last 72 hours. Cardiac Enzymes Recent Labs     09/15/20  0503   *      Liver Enzymes No results for input(s): TP, ALB, TBIL, AP in the last 72 hours. No lab exists for component: SGOT, GPT, DBIL   Thyroid Studies No results for input(s): T4, T3U, TSH, TSHEXT in the last 72 hours.     No lab exists for component: T3RU       All Micro Results     Procedure Component Value Units Date/Time    CULTURE, URINE [[id number]]  (Abnormal)  (Susceptibility) Collected: 09/13/20 0735    Order Status:  Completed Specimen:  Clean catch Updated:  09/17/20 1028     Special Requests: NO SPECIAL REQUESTS        [place] Count --        >100,000  COLONIES/mL       Culture result: ENTEROCOCCUS FAECALIS                   Medications at discharge  including reasons for change and indications for new ones:   Discharge Medication List as of 9/17/2020  5:01 PM      START taking these medications    Details   tamsulosin (FLOMAX) 0.4 mg capsule Take 1 Cap by mouth nightly. , Print, Disp-30 Cap,R-0         CONTINUE these medications which have CHANGED    Details   hydrALAZINE (APRESOLINE) 50 mg tablet Take 1 Tab by mouth three (3) times daily. Indications: high blood pressure, Print, Disp-90 Tab,R-0      lisinopriL (PRINIVIL, ZESTRIL) 20 mg tablet Take 1 Tab by mouth daily. Indications: high blood pressure, Print, Disp-30 Tab,R-1         CONTINUE these medications which have NOT CHANGED    Details   clopidogreL (PLAVIX) 75 mg tab Take 75 mg by mouth daily. Indications: prevention for a blood clot going to the brain, Historical Med      OXcarbaxepine (TRILEPTAL) 600 mg tablet Take 600 mg by mouth two (2) times a day., Historical Med      ergocalciferol (VITAMIN D2) 50,000 unit capsule Take 50,000 Units by mouth two (2) days a week., Historical Med      levETIRAcetam (KEPPRA) 1,000 mg tablet Take 1 Tab by mouth two (2) times a day., Print, Disp-60 Tab,R-0      amLODIPine (NORVASC) 10 mg tablet Take 1 Tab by mouth daily. , Normal, Disp-30 Tab,R-6      cyanocobalamin (VITAMIN B12) 500 mcg tablet Take 1 Tab by mouth daily. , Print, Disp-30 Tab, R-0         STOP taking these medications       spironolactone (ALDACTONE) 25 mg tablet Comments:   Reason for Stopping:         atorvastatin (LIPITOR) 40 mg tablet Comments:   Reason for Stopping:                       Pending laboratory work and tests: none    Activity: Activity as tolerated, seizure precaution, fall precaution    Diet: Cardiac Diet and Low fat, Low cholesterol  Keep hydrated with 1.5 liter fluid daily    Wound Care: None needed        Josh Schuster MD  9/17/2020  7:03 PM

## 2020-09-19 ENCOUNTER — HOME CARE VISIT (OUTPATIENT)
Dept: SCHEDULING | Facility: HOME HEALTH | Age: 65
End: 2020-09-19
Payer: MEDICARE

## 2020-09-19 VITALS
DIASTOLIC BLOOD PRESSURE: 70 MMHG | TEMPERATURE: 99.9 F | RESPIRATION RATE: 18 BRPM | SYSTOLIC BLOOD PRESSURE: 138 MMHG | HEART RATE: 92 BPM | OXYGEN SATURATION: 97 %

## 2020-09-19 PROCEDURE — 3331090002 HH PPS REVENUE DEBIT

## 2020-09-19 PROCEDURE — 3331090001 HH PPS REVENUE CREDIT

## 2020-09-19 PROCEDURE — G0299 HHS/HOSPICE OF RN EA 15 MIN: HCPCS

## 2020-09-19 PROCEDURE — 400013 HH SOC

## 2020-09-20 PROCEDURE — 3331090002 HH PPS REVENUE DEBIT

## 2020-09-20 PROCEDURE — 3331090001 HH PPS REVENUE CREDIT

## 2020-09-21 ENCOUNTER — HOME CARE VISIT (OUTPATIENT)
Dept: SCHEDULING | Facility: HOME HEALTH | Age: 65
End: 2020-09-21
Payer: MEDICARE

## 2020-09-21 ENCOUNTER — APPOINTMENT (OUTPATIENT)
Dept: CT IMAGING | Age: 65
DRG: 125 | End: 2020-09-21
Attending: EMERGENCY MEDICINE
Payer: MEDICARE

## 2020-09-21 ENCOUNTER — APPOINTMENT (OUTPATIENT)
Dept: VASCULAR SURGERY | Age: 65
DRG: 125 | End: 2020-09-21
Attending: PHYSICIAN ASSISTANT
Payer: MEDICARE

## 2020-09-21 ENCOUNTER — APPOINTMENT (OUTPATIENT)
Dept: MRI IMAGING | Age: 65
DRG: 125 | End: 2020-09-21
Attending: INTERNAL MEDICINE
Payer: MEDICARE

## 2020-09-21 ENCOUNTER — HOSPITAL ENCOUNTER (INPATIENT)
Age: 65
LOS: 9 days | Discharge: NURSING, REHAB OR LONG-TERM CARE | DRG: 125 | End: 2020-09-30
Attending: EMERGENCY MEDICINE | Admitting: INTERNAL MEDICINE
Payer: MEDICARE

## 2020-09-21 ENCOUNTER — APPOINTMENT (OUTPATIENT)
Dept: GENERAL RADIOLOGY | Age: 65
DRG: 125 | End: 2020-09-21
Attending: EMERGENCY MEDICINE
Payer: MEDICARE

## 2020-09-21 DIAGNOSIS — E83.52 HYPERCALCEMIA: ICD-10-CM

## 2020-09-21 DIAGNOSIS — I10 ESSENTIAL HYPERTENSION, BENIGN: ICD-10-CM

## 2020-09-21 DIAGNOSIS — R56.9 SEIZURE (HCC): ICD-10-CM

## 2020-09-21 DIAGNOSIS — G40.219 PARTIAL SYMPTOMATIC EPILEPSY WITH COMPLEX PARTIAL SEIZURES, INTRACTABLE, WITHOUT STATUS EPILEPTICUS (HCC): ICD-10-CM

## 2020-09-21 DIAGNOSIS — R44.3 HALLUCINATIONS: ICD-10-CM

## 2020-09-21 DIAGNOSIS — R41.82 ALTERED MENTAL STATUS, UNSPECIFIED ALTERED MENTAL STATUS TYPE: ICD-10-CM

## 2020-09-21 DIAGNOSIS — G93.40 ENCEPHALOPATHY: ICD-10-CM

## 2020-09-21 PROBLEM — R27.8 ABNORMAL COORDINATION: Status: ACTIVE | Noted: 2020-09-21

## 2020-09-21 LAB
AMMONIA PLAS-SCNC: <10 UMOL/L (ref 11–32)
AMPHET UR QL SCN: NEGATIVE
ANION GAP SERPL CALC-SCNC: 8 MMOL/L (ref 3–18)
APPEARANCE UR: CLEAR
ATRIAL RATE: 100 BPM
BARBITURATES UR QL SCN: NEGATIVE
BASOPHILS # BLD: 0 K/UL (ref 0–0.1)
BASOPHILS NFR BLD: 0 % (ref 0–2)
BENZODIAZ UR QL: NEGATIVE
BILIRUB UR QL: NEGATIVE
BUN SERPL-MCNC: 12 MG/DL (ref 7–18)
BUN/CREAT SERPL: 10 (ref 12–20)
CALCIUM SERPL-MCNC: 10.1 MG/DL (ref 8.5–10.1)
CALCULATED P AXIS, ECG09: 55 DEGREES
CALCULATED R AXIS, ECG10: -21 DEGREES
CALCULATED T AXIS, ECG11: 112 DEGREES
CANNABINOIDS UR QL SCN: NEGATIVE
CHLORIDE SERPL-SCNC: 112 MMOL/L (ref 100–111)
CK MB CFR SERPL CALC: 1 % (ref 0–4)
CK MB SERPL-MCNC: 2.1 NG/ML (ref 5–25)
CK SERPL-CCNC: 212 U/L (ref 26–192)
CO2 SERPL-SCNC: 22 MMOL/L (ref 21–32)
COCAINE UR QL SCN: NEGATIVE
COLOR UR: YELLOW
CREAT SERPL-MCNC: 1.19 MG/DL (ref 0.6–1.3)
DIAGNOSIS, 93000: NORMAL
DIFFERENTIAL METHOD BLD: ABNORMAL
EOSINOPHIL # BLD: 0.2 K/UL (ref 0–0.4)
EOSINOPHIL NFR BLD: 2 % (ref 0–5)
ERYTHROCYTE [DISTWIDTH] IN BLOOD BY AUTOMATED COUNT: 13.5 % (ref 11.6–14.5)
GLUCOSE SERPL-MCNC: 93 MG/DL (ref 74–99)
GLUCOSE UR STRIP.AUTO-MCNC: NEGATIVE MG/DL
HCT VFR BLD AUTO: 34.4 % (ref 35–45)
HDSCOM,HDSCOM: NORMAL
HGB BLD-MCNC: 11.5 G/DL (ref 12–16)
HGB UR QL STRIP: NEGATIVE
KETONES UR QL STRIP.AUTO: NEGATIVE MG/DL
LEUKOCYTE ESTERASE UR QL STRIP.AUTO: NEGATIVE
LYMPHOCYTES # BLD: 2.5 K/UL (ref 0.9–3.6)
LYMPHOCYTES NFR BLD: 33 % (ref 21–52)
MCH RBC QN AUTO: 30.2 PG (ref 24–34)
MCHC RBC AUTO-ENTMCNC: 33.4 G/DL (ref 31–37)
MCV RBC AUTO: 90.3 FL (ref 74–97)
METHADONE UR QL: NEGATIVE
MONOCYTES # BLD: 0.7 K/UL (ref 0.05–1.2)
MONOCYTES NFR BLD: 9 % (ref 3–10)
NEUTS SEG # BLD: 4.3 K/UL (ref 1.8–8)
NEUTS SEG NFR BLD: 56 % (ref 40–73)
NITRITE UR QL STRIP.AUTO: NEGATIVE
OPIATES UR QL: NEGATIVE
P-R INTERVAL, ECG05: 138 MS
PCP UR QL: NEGATIVE
PH UR STRIP: 5.5 [PH] (ref 5–8)
PLATELET # BLD AUTO: 310 K/UL (ref 135–420)
PMV BLD AUTO: 11 FL (ref 9.2–11.8)
POTASSIUM SERPL-SCNC: 3.5 MMOL/L (ref 3.5–5.5)
PROT UR STRIP-MCNC: NEGATIVE MG/DL
Q-T INTERVAL, ECG07: 334 MS
QRS DURATION, ECG06: 74 MS
QTC CALCULATION (BEZET), ECG08: 430 MS
RBC # BLD AUTO: 3.81 M/UL (ref 4.2–5.3)
SODIUM SERPL-SCNC: 142 MMOL/L (ref 136–145)
SP GR UR REFRACTOMETRY: 1.01 (ref 1–1.03)
TROPONIN I SERPL-MCNC: 0.03 NG/ML (ref 0–0.04)
UROBILINOGEN UR QL STRIP.AUTO: 0.2 EU/DL (ref 0.2–1)
VENTRICULAR RATE, ECG03: 100 BPM
WBC # BLD AUTO: 7.7 K/UL (ref 4.6–13.2)

## 2020-09-21 PROCEDURE — 99222 1ST HOSP IP/OBS MODERATE 55: CPT | Performed by: PHYSICIAN ASSISTANT

## 2020-09-21 PROCEDURE — 3331090001 HH PPS REVENUE CREDIT

## 2020-09-21 PROCEDURE — 74011250637 HC RX REV CODE- 250/637: Performed by: PHYSICIAN ASSISTANT

## 2020-09-21 PROCEDURE — 81003 URINALYSIS AUTO W/O SCOPE: CPT

## 2020-09-21 PROCEDURE — 82140 ASSAY OF AMMONIA: CPT

## 2020-09-21 PROCEDURE — 65660000000 HC RM CCU STEPDOWN

## 2020-09-21 PROCEDURE — 82553 CREATINE MB FRACTION: CPT

## 2020-09-21 PROCEDURE — 80307 DRUG TEST PRSMV CHEM ANLYZR: CPT

## 2020-09-21 PROCEDURE — 87086 URINE CULTURE/COLONY COUNT: CPT

## 2020-09-21 PROCEDURE — 80177 DRUG SCRN QUAN LEVETIRACETAM: CPT

## 2020-09-21 PROCEDURE — 80048 BASIC METABOLIC PNL TOTAL CA: CPT

## 2020-09-21 PROCEDURE — 70450 CT HEAD/BRAIN W/O DYE: CPT

## 2020-09-21 PROCEDURE — 70551 MRI BRAIN STEM W/O DYE: CPT

## 2020-09-21 PROCEDURE — 97162 PT EVAL MOD COMPLEX 30 MIN: CPT

## 2020-09-21 PROCEDURE — 97535 SELF CARE MNGMENT TRAINING: CPT

## 2020-09-21 PROCEDURE — 71045 X-RAY EXAM CHEST 1 VIEW: CPT

## 2020-09-21 PROCEDURE — 97530 THERAPEUTIC ACTIVITIES: CPT

## 2020-09-21 PROCEDURE — 93005 ELECTROCARDIOGRAM TRACING: CPT

## 2020-09-21 PROCEDURE — 99285 EMERGENCY DEPT VISIT HI MDM: CPT

## 2020-09-21 PROCEDURE — 97166 OT EVAL MOD COMPLEX 45 MIN: CPT

## 2020-09-21 PROCEDURE — 3331090002 HH PPS REVENUE DEBIT

## 2020-09-21 PROCEDURE — 93971 EXTREMITY STUDY: CPT

## 2020-09-21 PROCEDURE — 85025 COMPLETE CBC W/AUTO DIFF WBC: CPT

## 2020-09-21 RX ORDER — HYDRALAZINE HYDROCHLORIDE 50 MG/1
50 TABLET, FILM COATED ORAL 3 TIMES DAILY
Status: DISCONTINUED | OUTPATIENT
Start: 2020-09-21 | End: 2020-09-25

## 2020-09-21 RX ORDER — LISINOPRIL 20 MG/1
20 TABLET ORAL DAILY
Status: DISCONTINUED | OUTPATIENT
Start: 2020-09-21 | End: 2020-09-30 | Stop reason: HOSPADM

## 2020-09-21 RX ORDER — LEVETIRACETAM 500 MG/1
1000 TABLET ORAL 2 TIMES DAILY
Status: DISCONTINUED | OUTPATIENT
Start: 2020-09-21 | End: 2020-09-30 | Stop reason: HOSPADM

## 2020-09-21 RX ORDER — OXCARBAZEPINE 300 MG/1
600 TABLET, FILM COATED ORAL 2 TIMES DAILY
Status: DISCONTINUED | OUTPATIENT
Start: 2020-09-21 | End: 2020-09-30 | Stop reason: HOSPADM

## 2020-09-21 RX ORDER — SODIUM CHLORIDE 0.9 % (FLUSH) 0.9 %
5-40 SYRINGE (ML) INJECTION AS NEEDED
Status: DISCONTINUED | OUTPATIENT
Start: 2020-09-21 | End: 2020-09-30 | Stop reason: HOSPADM

## 2020-09-21 RX ORDER — ACETAMINOPHEN 650 MG/1
650 SUPPOSITORY RECTAL
Status: DISCONTINUED | OUTPATIENT
Start: 2020-09-21 | End: 2020-09-30 | Stop reason: HOSPADM

## 2020-09-21 RX ORDER — PROMETHAZINE HYDROCHLORIDE 25 MG/1
12.5 TABLET ORAL
Status: DISCONTINUED | OUTPATIENT
Start: 2020-09-21 | End: 2020-09-30 | Stop reason: HOSPADM

## 2020-09-21 RX ORDER — AMLODIPINE BESYLATE 10 MG/1
10 TABLET ORAL DAILY
Status: DISCONTINUED | OUTPATIENT
Start: 2020-09-21 | End: 2020-09-30 | Stop reason: HOSPADM

## 2020-09-21 RX ORDER — LANOLIN ALCOHOL/MO/W.PET/CERES
500 CREAM (GRAM) TOPICAL DAILY
Status: DISCONTINUED | OUTPATIENT
Start: 2020-09-21 | End: 2020-09-30 | Stop reason: HOSPADM

## 2020-09-21 RX ORDER — ACETAMINOPHEN 325 MG/1
650 TABLET ORAL
Status: DISCONTINUED | OUTPATIENT
Start: 2020-09-21 | End: 2020-09-30 | Stop reason: HOSPADM

## 2020-09-21 RX ORDER — ONDANSETRON 2 MG/ML
4 INJECTION INTRAMUSCULAR; INTRAVENOUS
Status: DISCONTINUED | OUTPATIENT
Start: 2020-09-21 | End: 2020-09-30 | Stop reason: HOSPADM

## 2020-09-21 RX ORDER — ERGOCALCIFEROL 1.25 MG/1
50000 CAPSULE ORAL
Status: DISCONTINUED | OUTPATIENT
Start: 2020-09-24 | End: 2020-09-30 | Stop reason: HOSPADM

## 2020-09-21 RX ORDER — SODIUM CHLORIDE 0.9 % (FLUSH) 0.9 %
5-40 SYRINGE (ML) INJECTION EVERY 8 HOURS
Status: DISCONTINUED | OUTPATIENT
Start: 2020-09-21 | End: 2020-09-30 | Stop reason: HOSPADM

## 2020-09-21 RX ORDER — TAMSULOSIN HYDROCHLORIDE 0.4 MG/1
0.4 CAPSULE ORAL
Status: DISCONTINUED | OUTPATIENT
Start: 2020-09-21 | End: 2020-09-30 | Stop reason: HOSPADM

## 2020-09-21 RX ORDER — DIVALPROEX SODIUM 250 MG/1
250 TABLET, DELAYED RELEASE ORAL 3 TIMES DAILY
Status: DISCONTINUED | OUTPATIENT
Start: 2020-09-21 | End: 2020-09-30 | Stop reason: HOSPADM

## 2020-09-21 RX ORDER — LORAZEPAM 2 MG/ML
1 INJECTION INTRAMUSCULAR
Status: DISCONTINUED | OUTPATIENT
Start: 2020-09-21 | End: 2020-09-30 | Stop reason: HOSPADM

## 2020-09-21 RX ORDER — POLYETHYLENE GLYCOL 3350 17 G/17G
17 POWDER, FOR SOLUTION ORAL DAILY PRN
Status: DISCONTINUED | OUTPATIENT
Start: 2020-09-21 | End: 2020-09-25

## 2020-09-21 RX ORDER — CLOPIDOGREL BISULFATE 75 MG/1
75 TABLET ORAL DAILY
Status: DISCONTINUED | OUTPATIENT
Start: 2020-09-21 | End: 2020-09-30 | Stop reason: HOSPADM

## 2020-09-21 RX ADMIN — OXCARBAZEPINE 600 MG: 300 TABLET, FILM COATED ORAL at 13:12

## 2020-09-21 RX ADMIN — HYDRALAZINE HYDROCHLORIDE 50 MG: 50 TABLET, FILM COATED ORAL at 16:57

## 2020-09-21 RX ADMIN — HYDRALAZINE HYDROCHLORIDE 50 MG: 50 TABLET, FILM COATED ORAL at 10:27

## 2020-09-21 RX ADMIN — DIVALPROEX SODIUM 250 MG: 250 TABLET, DELAYED RELEASE ORAL at 10:29

## 2020-09-21 RX ADMIN — LEVETIRACETAM 1000 MG: 500 TABLET ORAL at 10:26

## 2020-09-21 RX ADMIN — CYANOCOBALAMIN TAB 1000 MCG 500 MCG: 1000 TAB at 10:27

## 2020-09-21 RX ADMIN — Medication 10 ML: at 07:30

## 2020-09-21 RX ADMIN — CLOPIDOGREL BISULFATE 75 MG: 75 TABLET ORAL at 10:29

## 2020-09-21 RX ADMIN — DIVALPROEX SODIUM 250 MG: 250 TABLET, DELAYED RELEASE ORAL at 16:57

## 2020-09-21 RX ADMIN — LISINOPRIL 20 MG: 20 TABLET ORAL at 10:26

## 2020-09-21 RX ADMIN — OXCARBAZEPINE 600 MG: 300 TABLET, FILM COATED ORAL at 18:54

## 2020-09-21 RX ADMIN — Medication 10 ML: at 16:57

## 2020-09-21 RX ADMIN — LEVETIRACETAM 1000 MG: 500 TABLET ORAL at 18:54

## 2020-09-21 RX ADMIN — AMLODIPINE BESYLATE 10 MG: 10 TABLET ORAL at 10:28

## 2020-09-21 NOTE — ED TRIAGE NOTES
Patient discharged 3 days ago after a 10 day admission. Son report AMS and not ambulatory. Family also report slurred speech x3 days.

## 2020-09-21 NOTE — PROGRESS NOTES
Problem: Self Care Deficits Care Plan (Adult)  Goal: *Acute Goals and Plan of Care (Insert Text)  Description: Occupational Therapy Goals  Initiated 9/21/2020 within 7 day(s). 1.  Patient will perform self-feeding with modified independence. 2.  Patient will perform grooming with supervision/set-up. 3.  Patient will perform upper body dressing with supervision/set-up. 4.  Patient will perform lower body dressing with supervision/setup. 5.  Patient will perform bed mobility with minimal assistance in preparation for ADLs. 6.  Patient will participate in upper extremity therapeutic exercise/activities with supervision/set-up for 8 minutes. Prior Level of Function: Pt recently discharged from hospital 9/17/2020. Prior to d/c, OT/PT were recommending Rehab. This admission, pt reports coming from home, living with her sons. Pt reports sons have been providing assistance with UB/LB dressing/bathing PTA. Per OT eval on 9/14/2020, pt reports she was (I) with basic self-care/ADLs prior to that admission however appeared to be a poor historian at that time. Outcome: Progressing Towards Goal   OCCUPATIONAL THERAPY EVALUATION    Patient: Olena Hancock (37 y.o. female)  Date: 9/21/2020  Primary Diagnosis: Encephalopathy [G93.40]        Precautions:  Fall    ASSESSMENT :  Pt cleared to participate in OT evaluation by RN. Upon entering room, pt received semi-reclined in stretcher, alert, and agreeable to OT eval. Pt seen in conjunction with PT to maximize safety of patient and staff members. Based on the objective data described below, the patient presents with increased pain, decreased strength, decreased activity tolerance, decreased sitting balance, decreased BUE coordination, and decreased independence in ADLs, increasing the need for assistance from others. Pt c/o pain in LLE, rating 8-9/10. Pt requiring max assist x2 for bed mob to participate in further self-care.  Pt requiring max assist to don B socks and pants over BLEs. When sitting EOB, pt educated on hand placement to increase sitting balance as pt presented with posterior lean. Pt requiring constant support, therefore did not perform functional standing activities at this time for safety and safely returned pt to supine position in bed. After activity, /103 mmHg. Pt very talkative, requiring constant redirection to tasks throughout session. The patient requires skilled OT services to assess safety and increase performance with basic self-care/ADLs, enhancing the patients quality of life by improving their ability to return to PLOF. At the end of the session, pt left resting comfortably in stretcher, call bell in reach, with all needs met. Patient will benefit from skilled intervention to address the above impairments. Patient's rehabilitation potential is considered to be Good  Factors which may influence rehabilitation potential include:   []             None noted  [x]             Mental ability/status  [x]             Medical condition  [x]             Home/family situation and support systems  [x]             Safety awareness  []             Pain tolerance/management  []             Other:      PLAN :  Recommendations and Planned Interventions:   [x]               Self Care Training                  [x]      Therapeutic Activities  [x]               Functional Mobility Training   [x]      Cognitive Retraining  [x]               Therapeutic Exercises           [x]      Endurance Activities  [x]               Balance Training                    [x]      Neuromuscular Re-Education  [x]               Visual/Perceptual Training     [x]      Home Safety Training  [x]               Patient Education                   [x]      Family Training/Education  []               Other (comment):    Frequency/Duration: Patient will be followed by occupational therapy 1-2 times per day/4-7 days per week to address goals.   Discharge Recommendations: Rehab  Further Equipment Recommendations for Discharge: BSC, RW, and shower chair to decrease the risk of falls. SUBJECTIVE:   Patient stated I haven't slept for two days; \"I fell flat on my face\" (pt referring to PTA)    OBJECTIVE DATA SUMMARY:     Past Medical History:   Diagnosis Date    Abnormal coordination 9/21/2020    Hallucinations 9/21/2020    Hypertension     Neurological disorder     Seizures ([facility] [address]     Stroke ([facility] [address] 2009   History reviewed. No pertinent surgical history. Barriers to Learning/Limitations: yes;  altered mental status (i.e.Sedation, Confusion)  Compensate with: visual, verbal, tactile, kinesthetic cues/model    Home Situation:   Home Situation  Home Environment: Private residence  # Steps to Enter: 0  One/Two Story Residence: One story  Living Alone: No  Support Systems: Child(quiana)  Patient Expects to be Discharged to[de-identified] Private residence  Current DME Used/Available at Home: Selestino Healy, rolling  [x]  Right hand dominant   []  Left hand dominant    Cognitive/Behavioral Status:  Neurologic State: Alert  Orientation Level: Oriented X4  Cognition: Poor safety awareness; Follows commands  Safety/Judgement: Fall prevention    Skin: Visible skin appeared intact. Edema: None noted      Coordination: BUE  Coordination: Generally decreased, functional  Fine Motor Skills-Upper: Left Impaired;Right Impaired    Gross Motor Skills-Upper: Left Impaired;Right Impaired    Balance:  Sitting: Impaired; With support  Sitting - Static: Poor (constant support)  Sitting - Dynamic: Poor (constant support)    Strength: BUE  Strength: Generally decreased, functional    Tone & Sensation: BUE  Tone: Normal  Sensation: Intact      Range of Motion: BUE  AROM: Generally decreased, functional      Functional Mobility and Transfers for ADLs:  Bed Mobility:  Supine to Sit: Maximum assistance;Assist x2  Sit to Supine: Maximum assistance;Assist x2  Scooting: Maximum assistance;Assist x2  Transfers:  Not assessed for safety as pt presented with poor sitting balance this session. ADL Assessment:   Feeding: Minimum assistance    Oral Facial Hygiene/Grooming: Moderate assistance    Bathing: Maximum assistance    Upper Body Dressing: Moderate assistance    Lower Body Dressing: Maximum assistance    Toileting: Maximum assistance      ADL Intervention:  Lower Body Dressing Assistance  Dressing Assistance: Maximum assistance  Pants With Elastic Waist: Maximum assistance  Socks: Maximum assistance    Cognitive Retraining  Safety/Judgement: Fall prevention      Pain:  Pain level pre-treatment: 8-9/10 (LLE)   Pain level post-treatment: 8-9/10   Pain Intervention(s): Medication (see MAR); Rest, Ice, Repositioning   Response to intervention: Nurse notified, See doc flow    Activity Tolerance:   Fair    Please refer to the flowsheet for vital signs taken during this treatment. After treatment:   [] Patient left in no apparent distress sitting up in chair  [x] Patient left in no apparent distress in stretcher  [x] Call bell left within reach  [x] Nursing notified  [] Caregiver present  [] Bed alarm activated    COMMUNICATION/EDUCATION:   [x] Role of Occupational Therapy in the acute care setting  [] Home safety education was provided and the patient/caregiver indicated understanding. [x] Patient/family have participated as able in goal setting and plan of care. [x] Patient/family agree to work toward stated goals and plan of care. [] Patient understands intent and goals of therapy, but is neutral about his/her participation. [] Patient is unable to participate in goal setting and plan of care. Thank you for this referral.  Jacqueline Bro MS, OTR/L  Time Calculation: 26 mins    Eval Complexity: History: MEDIUM Complexity : Expanded review of history including physical, cognitive and psychosocial  history ;    Examination: MEDIUM Complexity : 3-5 performance deficits relating to physical, cognitive , or psychosocial skils that result in activity limitations and / or participation restrictions; Decision Making:MEDIUM Complexity : Patient may present with comorbidities that affect occupational performnce.  Miniml to moderate modification of tasks or assistance (eg, physical or verbal ) with assesment(s) is necessary to enable patient to complete evaluation

## 2020-09-21 NOTE — PROGRESS NOTES
Reason for Admission:  Encephalopathy [G93.40]                 RUR Score:    13%            Plan for utilizing home health:      Yes, FOC verbal consent for [facility]. Resumption of Care Orders will be needed for Home Health. Likelihood of Readmission:   LOW                         Transition of Care Plan:              Initial assessment completed with relative(s), patient's son Fernandez Chávez. Cognitive status of patient: Unable to assess at this time, patient sleeping with eyes closed. Face sheet information confirmed:  yes. The patient's son Fernandez Chávez [phone] agrees to participate in her discharge plan and to receive any needed information. This patient lives in an apartment with her two sons, with no steps to enter. Her son Don Galvan has had Cancer for 2 years, he is at home with the patient. The older brother Johnny Smith works a lot. Patient is able to navigate steps as needed. Prior to hospitalization, patient was considered to be independent with ADLs/IADLS : yes . Patient has a current ACP document on file: yes  The family may be available or patient may need medical transport or a ride  to transport patient home upon discharge. The patient already has [employer], and [employer]  medical equipment available in the home. Patient is currently active with home health. If active, agency name is [facility]. Patient has stayed in a skilled nursing facility or rehab. Was  stay within last 60 days : no. This patient is on dialysis :no    List of available Home Health agencies were provided and reviewed with the patient prior to discharge. [place] of choice verbal consent given: yes, for [facility]. Resumption of Care Orders will be needed at time of discharge. Currently, the discharge plan is Home with [facility].     The patient states that she can obtain her medications from the pharmacy, and take her medications as directed. Patient's current insurance is [employer]. Care Management Interventions  PCP Verified by CM: Yes  Mode of Transport at Discharge: Other (see comment)(Patient's son Bree Matthews said family may be able to pick her up or she may need  need a ride.)  Transition of Care Consult (CM Consult): [address]: Yes  Discharge Durable Medical Equipment: No  Physical Therapy Consult: Yes  Occupational Therapy Consult: No  Speech Therapy Consult: No  Current Support Network:  Other(Patient lives in an apartment with her 2 sons. )  Confirm Follow Up Transport: Family  The Plan for Transition of Care is Related to the Following Treatment Goals : Home with [address] [place] [address]  The Patient and/or Patient Representative was Provided with a Choice of Provider and Agrees with the Discharge Plan?: Yes  Name of the Patient Representative Who was Provided with a Choice of Provider and Agrees with the Discharge Plan: Guzman Matteo (Patient's son)  Freedom of Choice List was Provided with Basic Dialogue that Supports the Patient's Individualized Plan of Care/Goals, Treatment Preferences and Shares the Quality Data Associated with the Providers?: Yes  Discharge Location  Discharge Placement: Home with home health        Rani Dickerson RN  Case Management [phone]      Readmission Assessment  Number of days since last admission?: 1-7 days  Previous disposition: Home with Family  Who is being interviewed?: Caregiver  What was the patient's/caregiver's perception as to why they think they needed to return back to the hospital?: Other (Comment)(Patient's son said patient did not get out of bed for 2 days, and this was not like her.)  Did you visit your Primary Care Physician after you left the hospital, before you returned this time?: No  Why weren't you able to visit your PCP?: Other (Comment)(Patient was just discharged and returned to the ED within 4 days.)  Did you see a specialist, such as Cardiac, Pulmonary, Orthopedic Physician, etc. after you left the hospital?: No  Who advised the patient to return to the hospital?: Other (Comment)(Patient's son Shell Tran.)  Does the patient report anything that got in the way of taking their medications?: No  In our efforts to provide the best possible care to you and others like you, can you think of anything that we could have done to help you after you left the hospital the first time, so that you might not have needed to return so soon?: Other (Comment)(\"No\". )

## 2020-09-21 NOTE — PROGRESS NOTES
Problem: Mobility Impaired (Adult and Pediatric)  Goal: *Acute Goals and Plan of Care (Insert Text)  Description: Physical Therapy Goals  Initiated 9/21/2020 and to be accomplished within 7 day(s)  1. Patient will move from supine to sit and sit to supine , scoot up and down, and roll side to side in bed with minimal assistance/contact guard assist.    2.  Patient will sit edge of bed for 8 minutes with fair+ sitting balance for improved mobility. 3.  Patient will transfer from bed to chair and chair to bed with minimal assistance/contact guard assist using the least restrictive device. 4.  Patient will perform sit to stand with minimal assistance/contact guard assist.  5.  Patient will ambulate with contact guard assist for 50 feet with the least restrictive device. Prior Level of Function:   Patient reports she was discharged from the hospital ~7 days ago and has been unable to walk or stand since due to bilateral LE weakness and left LE pain. Prior to that stay, she reports being independent with mobility. She lives in single story home with 0 steps to enter with her 2 sons who are not with her all the time. Outcome: Progressing Towards Goal     PHYSICAL THERAPY EVALUATION    Patient: Sharmaine Weinstein (9 y.o. female)  Date: 9/21/2020  Primary Diagnosis: Encephalopathy [G93.40]  Precautions: Fall  WBAT      ASSESSMENT :  Based on the objective data described below, the patient presents with decreased LE range of motion, decreased LE strength, impaired balance, decreased LE coordination, and impaired functional mobility. Patient cleared by nursing prior to evaluation. Evaluation completed with OT to maximize patient safety and participation. Patient presents in supine and is agreeable to therapy, however reports 8-9/10 left LE pain. Patient is alert and oriented x4. Patient requires maximal assistance x2 with supine<>sit. She requires frequent re-attention and VC to complete task.  Patient sat edge of bed for ~5 minutes with poor sitting balance requiring constant support due to posterior lean. Educated on using bilateral UE to assist with support to improve balance, however unsuccessful. Deferred standing due to strong posterior lean. Patient repositioned in supine with maximal assist x2 for scooting up in supine. She attempted to assist with scooting in supine using bridging technique. Patient repositioned with HOB elevated, call button within reach, and nursing notified of patient's status/progress. At end of session, BP was 145/103, HR was 96 bpm, SpO2 100% on room air. Strongly recommend rehab at discharge. Patient will benefit from skilled intervention to address the above impairments. Patient's rehabilitation potential is considered to be Fair  Factors which may influence rehabilitation potential include:   []         None noted  [x]         Mental ability/status  [x]         Medical condition  [x]         Home/family situation and support systems  []         Safety awareness  []         Pain tolerance/management  []         Other:      PLAN :  Recommendations and Planned Interventions:   [x]           Bed Mobility Training             [x]    Neuromuscular Re-Education  [x]           Transfer Training                   []    Orthotic/Prosthetic Training  [x]           Gait Training                          []    Modalities  [x]           Therapeutic Exercises           []    Edema Management/Control  [x]           Therapeutic Activities            []    Family Training/Education  [x]           Patient Education  []           Other (comment):    Frequency/Duration: Patient will be followed by physical therapy 1-2 times per day/4-7 days per week to address goals.   Discharge Recommendations: Rehab  Further Equipment Recommendations for Discharge: to be determined at next level of care - patient has RW      SUBJECTIVE:   Patient stated I thought I was dreaming when I saw cats and dogs coming through my TV but they told me that was real and not a dream.    OBJECTIVE DATA SUMMARY:     Past Medical History:   Diagnosis Date    Abnormal coordination 9/21/2020    Hallucinations 9/21/2020    Hypertension     Neurological disorder     Seizures ([facility] [address]     Stroke [facility]) 2009   History reviewed. No pertinent surgical history. Barriers to Learning/Limitations: yes;  altered mental status (i.e.Sedation, Confusion)  Compensate with: Visual Cues, Verbal Cues, and Tactile Cues  Home Situation:  Home Situation  Home Environment: Private residence  # Steps to Enter: 0  One/Two Story Residence: One story  Living Alone: No  Support Systems: Child(quiana)  Patient Expects to be Discharged to[de-identified] Private residence  Current DME Used/Available at Home: Walker, rolling  Critical Behavior:  Neurologic State: Alert  Orientation Level: Oriented X4  Cognition: Poor safety awareness; Follows commands  Safety/Judgement: Fall prevention  Psychosocial  Patient Behaviors: Cooperative; Talkative    Strength:    Strength: Generally decreased, functional    Tone & Sensation:   Sensation: Intact(left hypersensitive )    Range Of Motion:  AROM: Generally decreased, functional  PROM: Within functional limits    Posture:  Posture (WDL): Exceptions to WDL  Posture Assessment: Forward head;Rounded shoulders  Functional Mobility:  Bed Mobility:  Supine to Sit: Maximum assistance;Assist x2  Sit to Supine: Maximum assistance;Assist x2  Scooting: Maximum assistance;Assist x2    Balance:   Sitting: Impaired; With support  Sitting - Static: Poor (constant support)  Sitting - Dynamic: Poor (constant support)    Pain:  Pain level pre-treatment: 8-9/10 in left LE   Pain level post-treatment: 8-9/10 in left LE  Pain Intervention(s) : Medication (see MAR); Rest, Repositioning  Response to intervention: Nurse notified, See doc flow    Activity Tolerance:   Good   Please refer to the flowsheet for vital signs taken during this treatment.   After treatment:   []         Patient left in no apparent distress sitting up in chair  [x]         Patient left in no apparent distress in bed  [x]         Call bell left within reach  [x]         Nursing notified  []         Caregiver present  []         Bed alarm activated  []         SCDs applied    COMMUNICATION/EDUCATION:   [x]         Role of Physical Therapy in the acute care setting. [x]         Fall prevention education was provided and the patient/caregiver indicated understanding. [x]         Patient/family have participated as able in goal setting and plan of care. [x]         Patient/family agree to work toward stated goals and plan of care. []         Patient understands intent and goals of therapy, but is neutral about his/her participation. []         Patient is unable to participate in goal setting/plan of care: ongoing with therapy staff.  []         Other:     Thank you for this referral.  Jaclyn Mariscal, PT, DPT    Time Calculation: 26 mins      Eval Complexity: History: MEDIUM  Complexity : 1-2 comorbidities / personal factors will impact the outcome/ POC Exam:MEDIUM Complexity : 3 Standardized tests and measures addressing body structure, function, activity limitation and / or participation in recreation  Presentation: MEDIUM Complexity : Evolving with changing characteristics  Clinical Decision Making:Medium Complexity    Overall Complexity:MEDIUM

## 2020-09-21 NOTE — ED NOTES
Patient is talking about cats, dogs, and mice walking through the walls.  Provider is aware of hallucinations

## 2020-09-21 NOTE — PROGRESS NOTES
PT order received and chart was reviewed. Pt is currently pending LE ultrasound to r/o DVT, will hold evaluation until results are known.   Steve John, PT

## 2020-09-21 NOTE — H&P
History & Physical    Patient: Bethany Bradley MRN: [id number]  CSN: [id number]    YOB: 1955  Age: 76 y.o. Sex: female      DOA: 9/21/2020  CC: Hallucinations and left sided weakness    PCP: Stanford Toribio NP       HPI:     Bethany Bradley is a 76 y.o. female who has a PMH of HTN, CVA and seizure d/o. Patient is presenting today with poor coordination, poor left sided control, LLE pain and hallucinations. Patient presented to the ED on 9/8/2020 for multiple seizures. Her post-ictal state usually consists of weakness, stuttering, irritability, confusion/hallucinations and hyperactivity for a 2-7 day duration. She was ultimately admitted and followed by Neurology who determined subtherapeutic AEDs. They recommended Depakote DR 2-3 times daily and Keppra 1000mg 2x/day with deferment of definitive management to her outpatient neurologist.  Patient was also followed by ID for enterococcus UTI, she was started on vanc, aztreonam and was switched to fosfomycin to complete therapy. Patient denies recent exposure to anyone who has been sick, recent fever or chills, cough, loss of taste or smell and recent travel. Past Medical History:   Diagnosis Date    Hypertension     Neurological disorder     Se[facility]ures ([facility] [address]     Stroke ([facility] 2009       History reviewed. No pertinent surgical history. Family History   Problem Relation Age of Onset    Diabetes Other     Stroke Other        Social History     Socioeconomic History    Marital status:      Spouse name: Not on file    Number of children: Not on file    Years of education: Not on file    Highest education level: Not on file   Tobacco Use    Smoking status: Never Smoker    Smokeless tobacco: Never Used   Substance and Sexual Activity    Alcohol use: Yes     Comment: Socially     Drug use: No       Prior to Admission medications    Medication Sig Start Date End Date Taking?  Authorizing Provider   divalproex DR (DEPAKOTE) 250 mg tablet Take 250 mg by mouth three (3) times daily. one tab every 8 hours 9/19/20  Yes Provider, Historical   clopidogreL (PLAVIX) 75 mg tab Take 75 mg by mouth daily. Indications: prevention for a blood clot going to the brain   Yes Provider, Historical   hydrALAZINE (APRESOLINE) 50 mg tablet Take 1 Tab by mouth three (3) times daily. Indications: high blood pressure 9/17/20  Yes Kimble Essex, MD   lisinopriL (PRINIVIL, ZESTRIL) 20 mg tablet Take 1 Tab by mouth daily. Indications: high blood pressure 9/17/20  Yes Kimble Essex, MD   tamsulosin (FLOMAX) 0.4 mg capsule Take 1 Cap by mouth nightly. 9/17/20  Yes Kimble Essex, MD   OXcarbaxepine (TRILEPTAL) 600 mg tablet Take 600 mg by mouth two (2) times a day. Yes Provider, Historical   ergocalciferol (VITAMIN D2) 50,000 unit capsule Take 50,000 Units by mouth two (2) days a week. Yes Other, MD Yumiko   levETIRAcetam (KEPPRA) 1,000 mg tablet Take 1 Tab by mouth two (2) times a day. 10/24/17  Yes Danisha Kulkarni MD   amLODIPine (NORVASC) 10 mg tablet Take 1 Tab by mouth daily. 12/1/15  Yes Santiago Crandall MD   cyanocobalamin (VITAMIN B12) 500 mcg tablet Take 1 Tab by mouth daily. 3/13/14  Yes Susan Alaniz MD       Allergies   Allergen Reactions    Tomato Hives     Allergic to eating tomato.  Aspirin Nausea and Vomiting    Ciprofloxacin Rash    Pcn [Penicillins] Rash and Hives    Sulfa (Sulfonamide Antibiotics) Hives and Rash     Review of Systems   Constitutional: Negative for chills and fever. HENT: Negative for sore throat. Eyes: Negative for blurred vision and double vision. Respiratory: Negative for cough, shortness of breath and stridor. Cardiovascular: Positive for claudication. Negative for chest pain and palpitations. Gastrointestinal: Negative for constipation, diarrhea, heartburn, nausea and vomiting. Genitourinary: Negative for dysuria, frequency and urgency.    Musculoskeletal: Negative for falls and myalgias. Skin: Negative for rash. Neurological: Positive for seizures and weakness. Negative for dizziness and headaches. Endo/Heme/Allergies: Does not bruise/bleed easily. Psychiatric/Behavioral: Negative for depression. Physical Exam:      Visit Vitals  BP (!) 176/79   Pulse 87   Temp 98.6 °F (37 °C)   Resp 17   SpO2 100%       Physical Exam:  GENERAL: alert, cooperative, no distress, appears stated age  EYE: pt unable to coordinate EOM on command, negative, conjunctivae/corneas clear. PERRL. Fundi benign  LYMPHATIC: Cervical, supraclavicular, and axillary nodes normal.   THROAT & NECK: normal and no erythema or exudates noted. LUNG: clear to auscultation bilaterally  HEART: regular rate and rhythm, S1, S2 normal, no murmur, click, rub or gallop  ABDOMEN: soft, non-tender. Bowel sounds normal. No masses,  no organomegaly  EXTREMITIES:  LLE generalized ttp, equal strength, atraumatic, no cyanosis or edema  SKIN: Normal. and no rash or abnormalities  NEUROLOGIC: positive findings: abnormality of coordination difficulty following commands due to coordination dysfunction  PSYCHIATRIC: hallucinations    Lab/Data Review:  Labs: Results:       Chemistry Recent Labs     09/21/20  0031   GLU 93      K 3.5   *   CO2 22   BUN 12   CREA 1.19   CA 10.1   AGAP 8   BUCR 10*      CBC w/Diff Recent Labs     09/21/20  0031   WBC 7.7   RBC 3.81*   HGB 11.5*   HCT 34.4*      GRANS 56   LYMPH 33   EOS 2      Coagulation No results for input(s): PTP, INR, APTT, INREXT in the last 72 hours. Iron/Ferritin No results for input(s): IRON in the last 72 hours. No lab exists for component: TIBCCALC   BNP No results for input(s): BNPP in the last 72 hours. Cardiac Enzymes Recent Labs     09/21/20  0031   *   CKND1 1.0      Liver Enzymes No results for input(s): TP, ALB, TBIL, AP in the last 72 hours.     No lab exists for component: SGOT, GPT, DBIL   Thyroid Studies Lab Results Component Value Date/Time    TSH 2.33 09/18/2019 12:19 PM          All Micro Results     None          Imaging Reviewed:  CT HEAD:   FINDINGS: No focal mass effect or shift of midline structures. No abnormal extra-axial collection identified. Right parietal convexity remote infarct with adjacent ex vacuo dilatation of the right posterior horn. Background moderate burden of periventricular white matter low-attenuation. Probable small remote  basal ganglia region lacunar infarcts bilaterally. No acute intracranial hemorrhage identified.     Calvarium intact. Imaged paranasal sinuses and mastoids are well-aerated. Probable cerumen in the left external auditory canal. Superficial soft tissues  unremarkable.     IMPRESSION  IMPRESSION:     No clearly acute findings. Of note, MRI would be more sensitive for detecting acute infarct.     Remote right parietal infarct similar to prior.  -Moderate burden of periventricular low-attenuation, likely chronic  microvascular ischemic change.  -Probable remote basal ganglia region lacunar infarcts similar to prior. CXR:  Results pending    MRI:  pending      Assessment:   Active problems:  Encephalopathy  Hallucinations  Poor coordination  LLE pain    Known medical conditions:  Seizure d/o  CVA 2007  HTN    Plan:     Consults:  Neurology: pending Dr. Robert Huynh    Will consult psychiatry if neuro workup is negative  Continue home AED's   - keppra levels pending  MRI - pending  US of LLE - pending  NPO  Aspiration precautions  Seizure precautions  Restart home meds   Hold DVT prophylaxis until MRI results are posted, then reassess for Lovenox. Code status: Full Code    Spoke with her son, Shelbi Erazo, regarding plan of care.     Goals of care:   Disposition:  [x]PT/OT ordered   [x] Case management referral    Case discussed with:  [x]Patient  [x]Family  []Nursing  []Case Management  DVT Prophylaxis:  []Lovenox  []Hep SQ  []SCDs  []Coumadin   []On Heparin [facility]  9/21/2020, 7:54 AM

## 2020-09-21 NOTE — H&P
Attestation Note     [facility]  Progress Note    Patient: Sanjana Solorzano Age: 76 y.o. : 1955 MR#: [id number] SSN: [id number]  Date/Time: 2020     Subjective:     Review of systems    No CP   NO NVD  No SOB  NO Cough     Assessment/Plan:   Recently discharged from hospital for seizures and now speech disturbance and ataxia with right sided weakness and paraesthesia. 1. Acute encephalopathy - CT head - no acute findings / normal ammonia level     2 Seizure disorder     3   HTN     4 H/o Non compliance to meds     5 H/o CVA in past - also multiple Lacunar infarcts       PLAN   - Continue Anti seizure meds   - Monitor patient / supportive care   - ? Post ictal stage versus non organic brain dysfunction   - Order MRI of Head     Case discussed with:  []Patient  [] Family  []Nursing  []Case Management  DVT Prophylaxis:  []Lovenox  []Hep SQ  []SCDs  []Coumadin   []On Heparin gtt          Objective:   VS:   Visit Vitals  BP (!) 162/66   Pulse 79   Temp 98.6 °F (37 °C)   Resp 13   SpO2 100%      Tmax/24hrs: Temp (24hrs), Av.6 °F (37 °C), Min:98.6 °F (37 °C), Max:98.6 °F (37 °C)  IOBRIEFNo intake or output data in the 24 hours ending 20 1040    General:  Alert, cooperative, no acute distress   Cardiovascular: S1S2 - regular , No Murmur   Pulmonary: Equal expansion , No Use of accessory muscles , No Rales No Rhonchi    GI:  +BS in all four quadrants, soft, non-tender  Extremities:  No edema; 2+ dorsalis pedis pulses bilaterally  Neuro: Alert and oriented X 2.        Medications:   Current Facility-Administered Medications   Medication Dose Route Frequency    cyanocobalamin tablet 500 mcg  500 mcg Oral DAILY    amLODIPine (NORVASC) tablet 10 mg  10 mg Oral DAILY    [START ON 2020] ergocalciferol capsule 50,000 Units  50,000 Units Oral Once per day on Thu Sat    levETIRAcetam (KEPPRA) tablet 1,000 mg  1,000 mg Oral BID    OXcarbazepine (TRILEPTAL) tablet 600 mg  600 mg Oral BID    clopidogreL (PLAVIX) tablet 75 mg  75 mg Oral DAILY    hydrALAZINE (APRESOLINE) tablet 50 mg  50 mg Oral TID    lisinopriL (PRINIVIL, ZESTRIL) tablet 20 mg  20 mg Oral DAILY    tamsulosin (FLOMAX) capsule 0.4 mg  0.4 mg Oral QHS    divalproex DR (DEPAKOTE) tablet 250 mg  250 mg Oral TID    sodium chloride (NS) flush 5-40 mL  5-40 mL IntraVENous Q8H    sodium chloride (NS) flush 5-40 mL  5-40 mL IntraVENous PRN    acetaminophen (TYLENOL) tablet 650 mg  650 mg Oral Q6H PRN    Or    acetaminophen (TYLENOL) suppository 650 mg  650 mg Rectal Q6H PRN    polyethylene glycol (MIRALAX) packet 17 g  17 g Oral DAILY PRN    promethazine (PHENERGAN) tablet 12.5 mg  12.5 mg Oral Q6H PRN    Or    ondansetron (ZOFRAN) injection 4 mg  4 mg IntraVENous Q6H PRN     Current Outpatient Medications   Medication Sig    divalproex DR (DEPAKOTE) 250 mg tablet Take 250 mg by mouth three (3) times daily. one tab every 8 hours    clopidogreL (PLAVIX) 75 mg tab Take 75 mg by mouth daily. Indications: prevention for a blood clot going to the brain    hydrALAZINE (APRESOLINE) 50 mg tablet Take 1 Tab by mouth three (3) times daily. Indications: high blood pressure    lisinopriL (PRINIVIL, ZESTRIL) 20 mg tablet Take 1 Tab by mouth daily. Indications: high blood pressure    tamsulosin (FLOMAX) 0.4 mg capsule Take 1 Cap by mouth nightly.  OXcarbaxepine (TRILEPTAL) 600 mg tablet Take 600 mg by mouth two (2) times a day.  ergocalciferol (VITAMIN D2) 50,000 unit capsule Take 50,000 Units by mouth two (2) days a week.  levETIRAcetam (KEPPRA) 1,000 mg tablet Take 1 Tab by mouth two (2) times a day.  amLODIPine (NORVASC) 10 mg tablet Take 1 Tab by mouth daily.  cyanocobalamin (VITAMIN B12) 500 mcg tablet Take 1 Tab by mouth daily.        Labs:    Recent Labs     09/21/20  0031   WBC 7.7   HGB 11.5*   HCT 34.4*        Recent Labs     09/21/20  0031      K 3.5   *   CO2 22 GLU 93   BUN 12   CREA 1.19   CA 10.1         Disclaimer: Sections of this note are dictated utilizing voice recognition software, which may have resulted in some phonetic based errors in grammar and contents. Even though attempts were made to correct all the mistakes, some may have been missed, and remained in the body of the document. If questions arise, please contact our department.     Signed By: Cinthia Garibay MD     September 21, 2020

## 2020-09-21 NOTE — PROGRESS NOTES
MRI Screening form needs to be filled out and faxed to [phone] MRI can be scheduled. If unable to obtain information from pt, [facility] needs to be contacted.  If pt is claustro or will need pain meds, please have ordered in advance in order to facilitate exam.

## 2020-09-22 ENCOUNTER — HOME CARE VISIT (OUTPATIENT)
Dept: HOME HEALTH SERVICES | Facility: HOME HEALTH | Age: 65
End: 2020-09-22
Payer: MEDICARE

## 2020-09-22 LAB
ANION GAP SERPL CALC-SCNC: 7 MMOL/L (ref 3–18)
BACTERIA SPEC CULT: NORMAL
BUN SERPL-MCNC: 10 MG/DL (ref 7–18)
BUN/CREAT SERPL: 9 (ref 12–20)
CALCIUM SERPL-MCNC: 10.3 MG/DL (ref 8.5–10.1)
CHLORIDE SERPL-SCNC: 112 MMOL/L (ref 100–111)
CO2 SERPL-SCNC: 22 MMOL/L (ref 21–32)
CREAT SERPL-MCNC: 1.17 MG/DL (ref 0.6–1.3)
ERYTHROCYTE [DISTWIDTH] IN BLOOD BY AUTOMATED COUNT: 14 % (ref 11.6–14.5)
GLUCOSE SERPL-MCNC: 84 MG/DL (ref 74–99)
HCT VFR BLD AUTO: 38.8 % (ref 35–45)
HGB BLD-MCNC: 13 G/DL (ref 12–16)
MAGNESIUM SERPL-MCNC: 2.1 MG/DL (ref 1.6–2.6)
MCH RBC QN AUTO: 31 PG (ref 24–34)
MCHC RBC AUTO-ENTMCNC: 33.5 G/DL (ref 31–37)
MCV RBC AUTO: 92.4 FL (ref 74–97)
PHOSPHATE SERPL-MCNC: 3.4 MG/DL (ref 2.5–4.9)
PLATELET # BLD AUTO: 354 K/UL (ref 135–420)
PMV BLD AUTO: 10.4 FL (ref 9.2–11.8)
POTASSIUM SERPL-SCNC: 4 MMOL/L (ref 3.5–5.5)
RBC # BLD AUTO: 4.2 M/UL (ref 4.2–5.3)
SERVICE CMNT-IMP: NORMAL
SODIUM SERPL-SCNC: 141 MMOL/L (ref 136–145)
VALPROATE SERPL-MCNC: 54 UG/ML (ref 50–100)
WBC # BLD AUTO: 7.5 K/UL (ref 4.6–13.2)

## 2020-09-22 PROCEDURE — 97530 THERAPEUTIC ACTIVITIES: CPT

## 2020-09-22 PROCEDURE — 36415 COLL VENOUS BLD VENIPUNCTURE: CPT

## 2020-09-22 PROCEDURE — 3331090002 HH PPS REVENUE DEBIT

## 2020-09-22 PROCEDURE — 65660000000 HC RM CCU STEPDOWN

## 2020-09-22 PROCEDURE — 99232 SBSQ HOSP IP/OBS MODERATE 35: CPT | Performed by: INTERNAL MEDICINE

## 2020-09-22 PROCEDURE — 80183 DRUG SCRN QUANT OXCARBAZEPIN: CPT

## 2020-09-22 PROCEDURE — 74011250636 HC RX REV CODE- 250/636: Performed by: INTERNAL MEDICINE

## 2020-09-22 PROCEDURE — 3331090001 HH PPS REVENUE CREDIT

## 2020-09-22 PROCEDURE — 99232 SBSQ HOSP IP/OBS MODERATE 35: CPT | Performed by: STUDENT IN AN ORGANIZED HEALTH CARE EDUCATION/TRAINING PROGRAM

## 2020-09-22 PROCEDURE — 97116 GAIT TRAINING THERAPY: CPT

## 2020-09-22 PROCEDURE — 83735 ASSAY OF MAGNESIUM: CPT

## 2020-09-22 PROCEDURE — 85027 COMPLETE CBC AUTOMATED: CPT

## 2020-09-22 PROCEDURE — 80048 BASIC METABOLIC PNL TOTAL CA: CPT

## 2020-09-22 PROCEDURE — 77030038269 HC DRN EXT URIN PURWCK BARD -A

## 2020-09-22 PROCEDURE — 80164 ASSAY DIPROPYLACETIC ACD TOT: CPT

## 2020-09-22 PROCEDURE — 84100 ASSAY OF PHOSPHORUS: CPT

## 2020-09-22 PROCEDURE — 74011250637 HC RX REV CODE- 250/637: Performed by: PHYSICIAN ASSISTANT

## 2020-09-22 PROCEDURE — 2709999900 HC NON-CHARGEABLE SUPPLY

## 2020-09-22 RX ORDER — ENOXAPARIN SODIUM 100 MG/ML
40 INJECTION SUBCUTANEOUS EVERY 24 HOURS
Status: DISCONTINUED | OUTPATIENT
Start: 2020-09-22 | End: 2020-09-28

## 2020-09-22 RX ADMIN — ENOXAPARIN SODIUM 40 MG: 40 INJECTION SUBCUTANEOUS at 11:48

## 2020-09-22 RX ADMIN — DIVALPROEX SODIUM 250 MG: 250 TABLET, DELAYED RELEASE ORAL at 00:13

## 2020-09-22 RX ADMIN — HYDRALAZINE HYDROCHLORIDE 50 MG: 50 TABLET, FILM COATED ORAL at 00:13

## 2020-09-22 RX ADMIN — HYDRALAZINE HYDROCHLORIDE 50 MG: 50 TABLET, FILM COATED ORAL at 09:06

## 2020-09-22 RX ADMIN — CYANOCOBALAMIN TAB 1000 MCG 500 MCG: 1000 TAB at 09:06

## 2020-09-22 RX ADMIN — Medication 10 ML: at 06:00

## 2020-09-22 RX ADMIN — OXCARBAZEPINE 600 MG: 300 TABLET, FILM COATED ORAL at 18:07

## 2020-09-22 RX ADMIN — DIVALPROEX SODIUM 250 MG: 250 TABLET, DELAYED RELEASE ORAL at 09:06

## 2020-09-22 RX ADMIN — HYDRALAZINE HYDROCHLORIDE 50 MG: 50 TABLET, FILM COATED ORAL at 16:26

## 2020-09-22 RX ADMIN — Medication 10 ML: at 14:22

## 2020-09-22 RX ADMIN — OXCARBAZEPINE 600 MG: 300 TABLET, FILM COATED ORAL at 09:06

## 2020-09-22 RX ADMIN — CLOPIDOGREL BISULFATE 75 MG: 75 TABLET ORAL at 09:06

## 2020-09-22 RX ADMIN — LEVETIRACETAM 1000 MG: 500 TABLET ORAL at 18:07

## 2020-09-22 RX ADMIN — DIVALPROEX SODIUM 250 MG: 250 TABLET, DELAYED RELEASE ORAL at 16:26

## 2020-09-22 RX ADMIN — Medication 10 ML: at 00:13

## 2020-09-22 RX ADMIN — LEVETIRACETAM 1000 MG: 500 TABLET ORAL at 09:06

## 2020-09-22 RX ADMIN — LISINOPRIL 20 MG: 20 TABLET ORAL at 09:06

## 2020-09-22 RX ADMIN — TAMSULOSIN HYDROCHLORIDE 0.4 MG: 0.4 CAPSULE ORAL at 00:13

## 2020-09-22 RX ADMIN — AMLODIPINE BESYLATE 10 MG: 10 TABLET ORAL at 09:06

## 2020-09-22 NOTE — PROGRESS NOTES
Re:  Juani Lozoya up visit     9/22/2020 6:23 PM    SSN: [id number]    Subjective:   Victorino Faustin was seen on follow-up. No acute changes overnight. No seizure. No hallucinations since admission. She has occasional confusion: Asking why her son during gait hallucinations while he is the one giving her seizure medications. No weakness. No fever. Had MRI of the brain: No acute changes; showed large old right frontal/MCA territory infarction. VPA level within therapeutic range. Keppra and Trileptal levels pending. No marked BMP changes.     Medications:    Current Facility-Administered Medications   Medication Dose Route Frequency Provider Last Rate Last Dose    enoxaparin (LOVENOX) injection 40 mg  40 mg SubCUTAneous Q24H Luzma Gutiérrez MD   40 mg at 09/22/20 1148    cyanocobalamin tablet 500 mcg  500 mcg Oral DAILY [place] Olsz[place]ski, [place]   500 mcg at 09/22/20 2002    amLODIPine (NORVASC) tablet 10 mg  10 mg Oral DAILY Marsh Olszewski, [place]   10 mg at 09/22/20 0906    [START ON 9/24/2020] ergocalciferol capsule 50,000 Units  50,000 Units Oral Once per day on Thu Sat Marsh Olszewski, [place]        levETIRAcetam (KEPPRA) tablet 1,000 mg  1,000 mg Oral BID Marsh Olszewski, [place]   1,000 mg at 09/22/20 1807    OXcarbazepine (TRILEPTAL) tablet 600 mg  600 mg Oral BID Marsh Olszewski, [place]   600 mg at 09/22/20 1807    clopidogreL (PLAVIX) tablet 75 mg  75 mg Oral DAILY Marsh Olszewski, [place]   75 mg at 09/22/20 7769    hydrALAZINE (APRESOLINE) tablet 50 mg  50 mg Oral TID MING Perez   50 mg at 09/22/20 1626    lisinopriL (PRINIVIL, ZESTRIL) tablet 20 mg  20 mg Oral DAILY MING Perez   20 mg at 09/22/20 4173    tamsulosin (FLOMAX) capsule 0.4 mg  0.4 mg Oral QHS MING Perez   0.4 mg at 09/22/20 0013    divalproex DR (DEPAKOTE) tablet 250 mg  250 mg Oral TID MING Perez   250 mg at 09/22/20 1626    sodium chloride (NS) flush 5-40 mL  5-40 mL IntraVENous Q8H Maria Del Carmen Cooley Alasavita   10 mL at 09/22/20 1422    sodium chloride (NS) flush 5-40 mL  5-40 mL IntraVENous PRN MING Guillory        acetaminophen (TYLENOL) tablet 650 mg  650 mg Oral Q6H PRN MING Guillory        Or    acetaminophen (TYLENOL) suppository 650 mg  650 mg Rectal Q6H PRN MING Guillory        polyethylene glycol (MIRALAX) packet 17 g  17 g Oral DAILY PRN MING Guillory        promethazine (PHENERGAN) tablet 12.5 mg  12.5 mg Oral Q6H PRN MING Guillory        Or    ondansetron (ZOFRAN) injection 4 mg  4 mg IntraVENous Q6H PRN MING Guillory        LORazepam (ATIVAN) injection 1 mg  1 mg IntraVENous Q6H PRN Wilda Rodarte MD        influenza vaccine 2020-21 (6 mos+)(PF) (FLUARIX/FLULAVAL/FLUZONE QUAD) injection 0.5 mL  0.5 mL IntraMUSCular PRIOR TO DISCHARGE Danielito Jauregui MD           Vital signs:    Visit Vitals  /69   Pulse 90   Temp 98.1 °F (36.7 °C)   Resp 18   Ht 5' (1.524 m)   Wt 62.8 kg (138 lb 8 oz)   SpO2 98%   BMI 27.05 kg/m²       Review of Systems:   Constitutional no fever or chills  Skin denies rash or itching  HENT  Denies tinnitus, hearing lose  Eyes denies  Has blurred vision; visual hallucinations.    Respiratory denies shortness of breath  Cardiovascular denies chest pain, dyspnea on exertion  Gastrointestinal denies nausea, vomiting, diarrhea  Genitourinary denies incontinence  Musculoskeletal denies joint pain or swelling  Hematology denies easy bruising  Neurological as above in HPI      Patient Active Problem List   Diagnosis Code    Pulmonary embolism (HCC) I26.99    Chest pain R07.9    Other and unspecified noninfectious gastroenteritis and colitis(558.9) K52.9    CVA (cerebral infarction) I63.9    Non compliance w medication regimen Z91.14    Essential hypertension, benign I10    Elevated Dilantin level R78.89    HTN (hypertension) I10    Convulsion ([address] R56.9    Essential hypertension I10    Left-sided weakness R53.1    Stroke ([facility]) I63.9    Dyslipidemia E78.5    TIA (transient ischemic attack) G45.9    Ataxia R27.0    Cerebrovascular accident (CVA) ([facility]) I63.9    Headache R51    Status epilepticus ([facility] G40.901    Seizure ([facility] [facility] R56.9    Recurrent seizures ([facility]) G40.909    Encephalopathy G93.40    Hallucinations R44.3    Abnormal coordination R27.8         Objective:   GENERAL:                   in no apparent distress. EXTREMITIES:            Muscle tone is normal.  HEAD:                         The patient is normocephalic.     NEUROLOGIC EXAMINATION    Mental status: Awake, alert, oriented x3, follows simple and complex; mild left side neglect; No gaze deviation. Speech and languge: fluent, coherent,  and comprehension intact  CN: ? Impaired peripheral vision bilaterally;  EOMI, PERRLA, face sensation intact , no facial asymmetry noted, palate elevation symmetric bilat, SS+SCM 5/5 bilat, tongue midline  Motor: no pronator drift, tone normal throughout, strength 5/5 throughout except 4+/5 over the LUE  Sensory: decreased LT on the left(slight); ?extinction on the left. DTR: 3+ through out; toes down going bilaterally. Gait: not tested     Result Information      Status: Final result (Exam End: 9/21/2020 15:59)  Provider Status: Open    Study Result      EXAM: MRI BRAIN WO CONT     CLINICAL INDICATION/HISTORY: r/o CVA // Acute encephalopathy     COMPARISON: CT head from earlier the same day. MRI brain 7/12/2017     TECHNIQUE: MR imaging was performed through the brain without contrast.     FINDINGS:      Exam is motion degraded. There is a chronic infarct in the right  parieto-occipital region. There are scattered patchy T2/FLAIR hyperintensities  throughout the subcortical and periventricular white matter of both cerebral  hemispheres, likely on the basis of chronic microangiopathic change. There is  generalized parenchymal volume loss.  There is right hippocampal atrophy and  wallerian degeneration across the anterior body of corpus callosum. Chronic  lacunar infarcts are present in left corona radiata, right basal ganglia,  thalami, and right paramedian brittni. There are numerous punctate SWI  hypointensity is with predilection for deep gray nuclei and brainstem.      There is no abnormal diffusion restriction within the brain parenchyma. No acute  intracranial hemorrhage, acute infarct, mass or hydrocephalus is present. Limited detail examination of the extracranial structures, including orbits,  sinuses, scalp and calvarium is within normal limits.     IMPRESSION  IMPRESSION:  1. Large chronic infarct in the right parieto-occipital region. 2. Findings suggest moderate to severe manifestations of chronic hypertensive  microangiopathy, including confluent deep cerebral white matter changes, deep  brain lacunar infarcts and numerous chronic microhemorrhages. 3. Asymmetric right hippocampal atrophy, likely related to chronic infarction or  microangiopathy.   4. No acute infarct or acute intracranial hemorrhage         CBC:   Lab Results   Component Value Date/Time    WBC 7.5 09/22/2020 01:00 AM    RBC 4.20 09/22/2020 01:00 AM    HGB 13.0 09/22/2020 01:00 AM    HCT 38.8 09/22/2020 01:00 AM    PLATELET 161 19/79/7755 01:00 AM     BMP:   Lab Results   Component Value Date/Time    Glucose 84 09/22/2020 01:00 AM    Sodium 141 09/22/2020 01:00 AM    Potassium 4.0 09/22/2020 01:00 AM    Chloride 112 (H) 09/22/2020 01:00 AM    CO2 22 09/22/2020 01:00 AM    BUN 10 09/22/2020 01:00 AM    Creatinine 1.17 09/22/2020 01:00 AM    Calcium 10.3 (H) 09/22/2020 01:00 AM     CMP:   Lab Results   Component Value Date/Time    Glucose 84 09/22/2020 01:00 AM    Sodium 141 09/22/2020 01:00 AM    Potassium 4.0 09/22/2020 01:00 AM    Chloride 112 (H) 09/22/2020 01:00 AM    CO2 22 09/22/2020 01:00 AM    BUN 10 09/22/2020 01:00 AM    Creatinine 1.17 09/22/2020 01:00 AM    Calcium 10.3 (H) 09/22/2020 01:00 AM    Anion gap 7 09/22/2020 01:00 AM    BUN/Creatinine ratio 9 (L) 09/22/2020 01:00 AM    Alk. phosphatase 76 03/23/2020 08:34 PM    Protein, total 7.4 03/23/2020 08:34 PM    Albumin 3.7 03/23/2020 08:34 PM    Globulin 3.7 03/23/2020 08:34 PM    A-G Ratio 1.0 03/23/2020 08:34 PM       Impression:      A 76years old female patient with history of right side parieto-occipital stroke and seizure disorder currently on Depakote 250 mg p.o. daily, Trileptal 600 mg p.o. twice daily, and Keppra 1000 mg p.o. twice daily. Came to the hospital for altered mental status with visual hallucinations [seeing animals]. Also complained of weakness of the left lower extremity and right upper extremity. MRI of the brain did not show any acute stroke. She had an old large right parieto-occipital chronic infarction. No hemorrhage. Taking her medications. Discharged from the hospital about a week ago after her last seizure. This time did not have any witnessed seizure activity. No episode of loss of consciousness. Patient does not have any signs of infection. Unremarkable metabolic panels.      Plan:     - C/w Keppra 1000 mg PO BID; Depakote 250 mg PO TID; Oxcarbazepine 600 mg PO BID.  -Follow CMP  -Follow results of Keppra and Trleptal level. -MRI of the brain: No acute stroke  -Call for questions  -We will sign off    Sincerely,      Heriberto Oquendo M.D. PLEASE NOTE:   This document has been produced using voice recognition software. Unrecognized errors in transcription may be present.

## 2020-09-22 NOTE — PROGRESS NOTES
*ATTENTION:  This note has been created by a medical student for educational purposes only. Please do not refer to the content of this note for clinical decision-making, billing, or other purposes. Please see attending physicians note to obtain clinical information on this patient. *     Student Progress Note  Please refer to attendings daily rounding note for full details    Patient: King Glory MRN: [id number]  CSN: [id number]    YOB: 1955  Age: 76 y.o. Sex: female    DOA: 9/21/2020 LOS:  LOS: 1 day        Subjective:     Jas Sanchez, a 77 yo female, is being seen here on admission day 2. The pt was recently discharged from [facility], was admitted for seizures and had a prolonged post-ictal period. The pt states that beginning yesterday, she started having visual hallucinations and had weakness in her bilateral upper extremities, was not able to hold on to objects. This morning, the pt states that she was not able to eat her breakfast because she could not hold her fork very well due to her weakness. Pt does not report any seizure-like activity. The pt denies any fevers, chills, N/V/D, CP, or SOB. The pt states that she believes her symptoms are because of her anti-epileptic medications. Objective:      Visit Vitals  /74   Pulse 91   Temp 97 °F (36.1 °C)   Resp 18   SpO2 99%     Physical Exam:  Gen: Afebrile. In NAD. HEENT: Mucous membranes moist. PERRL. CV: RRR. No murmurs. Resp: CTA. Normal respiratory effort. Abd: No TTP. Soft and non-distended. Active bowel sounds. Extrem: No LE edema. PT pulses 2+. Skin: No rashes or lesions. Neuro: EOMI. Motor 5/5 bilateral upper extremities and lower extremities. Answers questions appropriately. Alert and interactive.     Results for [facility] (MRN [id number]) as of 9/22/2020 11:04   9/22/2020 01:00   WBC 7.5   RBC 4.20   HGB 13.0   HCT 38.8   MCV 92.4   MCH 31.0   MCHC 33.5   RDW 14.0   PLATELET 523   MPV 97.1     Results for Randal Beyer (MRN [id number]) as of 9/22/2020 11:04   9/22/2020 01:00   Sodium 141   Potassium 4.0   Chloride 112 (H)   CO2 22   Anion gap 7   Glucose 84   BUN 10   Creatinine 1.17   BUN/Creatinine ratio 9 (L)   Calcium 10.3 (H)   Phosphorus 3.4   Magnesium 2.1   GFR est non-AA 46 (L)   GFR est AA 56 (L)     Results for Randal Beyer (MRN [id number]) as of 9/22/2020 11:04   9/22/2020 09:56   Valproic acid 54     Results for Randal Beyer (MRN [id number]) as of 9/22/2020 11:04   9/22/2020 01:00   OXCARBAZEPINE(TRILEPTAL) Rpt pending     Results for Randal Beyer (MRN [id number]) as of 9/22/2020 11:04   9/21/2020 10:50   AMPHETAMINES Negative   BARBITURATES Negative   BENZODIAZEPINES Negative   COCAINE Negative   HDSCOM (NOTE)   METHADONE Negative   OPIATES Negative   PCP(PHENCYCLIDINE) Negative   THC (TH-CANNABINOL) Negative   DRUG SCREEN, URINE Rpt     Results for Randal Beyer (MRN [id number]) as of 9/22/2020 11:04   9/22/2020 01:00   Glucose 84     Results for Randal Beyer (MRN [id number]) as of 9/22/2020 11:04   9/21/2020 10:50   CULTURE, URINE Pending     Results for Randal Beyer (MRN [id number]) as of 9/22/2020 11:04   9/21/2020 03:23   CT HEAD WO CONT IMPRESSION:     No clearly acute findings. Of note, MRI would be more sensitive for detecting  acute infarct.     Remote right parietal infarct similar to prior.  -Moderate burden of periventricular low-attenuation, likely chronic  microvascular ischemic change.  -Probable remote basal ganglia region lacunar infarcts similar to prior.        Results for Randal Beyer (MRN [id number]) as of 9/22/2020 11:04   9/21/2020 15:59   MRI BRAIN WO CONT IMPRESSION:  1. Large chronic infarct in the right parieto-occipital region. 2. Findings suggest moderate to severe manifestations of chronic hypertensive  microangiopathy, including confluent deep cerebral white matter changes, deep  brain lacunar infarcts and numerous chronic microhemorrhages.   3. Asymmetric right hippocampal atrophy, likely related to chronic infarction or  microangiopathy. 4. No acute infarct or acute intracranial hemorrhage. Results for Lyndsey Bae (MRN [id number]) as of 9/22/2020 11:04   9/21/2020 10:53   DUPLEX LOWER EXT VENOUS LEFT · No evidence of acute deep vein thrombosis in the left common femoral, superficial femoral, popliteal, posterior tibial, and peroneal veins. The veins were imaged in the transverse and longitudinal planes. The vessels showed normal color filling and compressibility. Doppler interrogation showed phasic and spontaneous flow. There is no evidence of a deep venous thrombosis in the left lower extremity. Assessment:     1. Encephalopathy   2. Visual hallucinations   3. Abnormal coordination  4. Hx of CVA / TIA  5. Hx of status epilepticus  6. Hx of recurrent seizures  7. Essential HTN    Plan:     > Brain MRI w/o contrast negative for acute infarct or acute hemorrhage, head CT w/o contrast negative for acute findings. > Continue Keppra, Depakote, and Oxcarbazepine regimen. Will need to order levels for each drug. Valproic acid has resulted and is therapeutic, at 54. Keppra and Oxcarbazepine pending. Will need to emphasize medication compliance to patient and family before discharge. > Appreciate continued Neurology consultation. > PT / OT consult  > DVT ppx - Lovenox sub-Q  > Seizure precautions     Mirta Granda  9/22/2020  10:55 AM    *ATTENTION:  This note has been created by a medical student for educational purposes only. Please do not refer to the content of this note for clinical decision-making, billing, or other purposes. Please see attending physicians note to obtain clinical information on this patient. *

## 2020-09-22 NOTE — PROGRESS NOTES
Bedside shift change report given to [facility]. RN (oncoming nurse) by Arnoldo Cabrera RN (offgoing nurse). Report included the following information SBAR, Kardex and MAR.

## 2020-09-22 NOTE — CONSULTS
A 76years old female patient a history of stroke, HTN and  epilepsy currently on Depakote, Keppra, and Trileptal.  Patient was discharged last week from this hospital after presenting with a seizure. Had a prolonged postictal confusion. Yesterday, patient developed some confusion. She endorsed having visual hallucinations: She was seeing animals coming through the wall in her house. Did not have any auditory lacerations. No skin crawling sensation. She also felt weak on the left lower extremity and right upper extremity. There is no change in her speech. There was no witnessed seizure-like activity or no staring spells. Did not have any fever or chills. No cough or difficulty breathing. CT scan of the head did not show any acute changes. MRI of the brain did not show any acute stroke; there is a large chronic right parieto-occipital infarct and chronic low angiopathic ischemic changes. Negative urine drug screen. BMP unremarkable except for slightly increased creatinine. Urinalysis is negative.     Social History     Socioeconomic History    Marital status:      Spouse name: Not on file    Number of children: Not on file    Years of education: Not on file    Highest education level: Not on file   Occupational History    Not on file   Social Needs    Financial resource strain: Not on file    Food insecurity     Worry: Not on file     Inability: Not on file    Transportation needs     Medical: Not on file     Non-medical: Not on file   Tobacco Use    Smoking status: Never Smoker    Smokeless tobacco: Never Used   Substance and Sexual Activity    Alcohol use: Yes     Comment: Socially     Drug use: No    Sexual activity: Not on file   Lifestyle    Physical activity     Days per week: Not on file     Minutes per session: Not on file    Stress: Not on file   Relationships    Social connections     Talks on phone: Not on file     Gets together: Not on file     Attends Gnosticist service: Not on file     Active member of club or organization: Not on file     Attends meetings of clubs or organizations: Not on file     Relationship status: Not on file    Intimate partner violence     Fear of current or ex partner: Not on file     Emotionally abused: Not on file     Physically abused: Not on file     Forced sexual activity: Not on file   Other Topics Concern    Not on file   Social History Narrative    Not on file       Family History   Problem Relation Age of Onset    Diabetes Other     Stroke Other         Current Facility-Administered Medications   Medication Dose Route Frequency Provider Last Rate Last Dose    cyanocobalamin tablet 500 mcg  500 mcg Oral DAILY MING Tenorio   500 mcg at 09/21/20 1027    amLODIPine (NORVASC) tablet 10 mg  10 mg Oral DAILY MING Valle   10 mg at 09/21/20 1028    [START ON 9/24/2020] ergocalciferol capsule 50,000 Units  50,000 Units Oral Once per day on Thu Sat Ana María Tenorio        levETIRAcetam (KEPPRA) tablet 1,000 mg  1,000 mg Oral BID MING Valle   1,000 mg at 09/21/20 1854    OXcarbazepine (TRILEPTAL) tablet 600 mg  600 mg Oral BID MING Tenorio   600 mg at 09/21/20 1854    clopidogreL (PLAVIX) tablet 75 mg  75 mg Oral DAILY MING Tenorio   75 mg at 09/21/20 1029    hydrALAZINE (APRESOLINE) tablet 50 mg  50 mg Oral TID MING Valle   50 mg at 09/21/20 1657    lisinopriL (PRINIVIL, ZESTRIL) tablet 20 mg  20 mg Oral DAILY Noy ALBERT PA   20 mg at 09/21/20 1026    tamsulosin (FLOMAX) capsule 0.4 mg  0.4 mg Oral QHS Maria Del Carmen Cooley [place]        divalproex DR (DEPAKOTE) tablet 250 mg  250 mg Oral TID MING Valle   250 mg at 09/21/20 1657    sodium chloride (NS) flush 5-40 mL  5-40 mL IntraVENous Q8H Maria Del Carmen Cooley PA   10 mL at 09/21/20 1657    sodium chloride (NS) flush 5-40 mL  5-40 mL IntraVENous PRN MING Tenorio        acetaminophen (TYLENOL) tablet 650 mg  650 mg Oral Q6H PRN Krishna MING Cleary        Or    acetaminophen (TYLENOL) suppository 650 mg  650 mg Rectal Q6H PRN Wendover Madiha, PA        polyethylene glycol (MIRALAX) packet 17 g  17 g Oral DAILY PRN MING Fonseca        promethazine (PHENERGAN) tablet 12.5 mg  12.5 mg Oral Q6H PRN MING Fonseca        Or    [facility]ron [facility]) injection 4 mg  4 mg IntraVENous Q6H PRN Wendover FlMING duggan        LORazepam (ATIVAN) injection 1 mg  1 mg IntraVENous Q6H PRN Cheryl Batista MD        influenza vaccine 2020-21 (6 mos+)(PF) (FLUARIX/FLULAVAL/FLUZONE QUAD) injection 0.5 mL  0.5 mL IntraMUSCular PRIOR TO DISCHARGE Dionte Calvert MD           Past Medical History:   Diagnosis Date    Abnormal coordination 9/21/2020    Hallucinations 9/21/2020    Hypertension     Neurological disorder     [facility]     [facility]roke [facility] 2009       History reviewed. No pertinent surgical history. Allergies   Allergen Reactions    Tomato Hives     Allergic to eating tomato.     Aspirin Nausea and Vomiting    Ciprofloxacin Rash    Pcn [Penicillins] Rash and Hives    Sulfa (Sulfonamide Antibiotics) Hives and Rash       Patient Active Problem List   Diagnosis Code    Pulmonary embolism (HCC) I26.99    Chest pain R07.9    Other and unspecified noninfectious gastroenteritis and colitis(558.9) K52.9    CVA (cerebral infarction) I63.9    Non compliance w medication regimen Z91.14    Essential hypertension, benign I10    Elevated Dilantin level R78.89    HTN (hypertension) I10    Convulsion (HCC) R56.9    Essential hypertension I10    Left-sided weakness R53.1    Stroke (HCC) I63.9    Dyslipidemia E78.5    TIA (transient ischemic attack) G45.9    Ataxia R27.0    Cerebrovascular accident (CVA) (HCC) I63.9    Headache R51    Status epilepticus (HCC) G40.901    Seizure ([facility] R56.9    Recurrent seizures ([facility]) G40.909    Encephalopathy G93.40    Hallucinations R44.3    Abnormal coordination R27.8         Review of Systems:   Constitutional no fever or chills  Skin denies rash or itching  HENT  Denies tinnitus, hearing lose  Eyes denies  Has blurred vision; visual hallucinations. Respiratory denies shortness of breath  Cardiovascular denies chest pain, dyspnea on exertion  Gastrointestinal denies nausea, vomiting, diarrhea  Genitourinary denies incontinence  Musculoskeletal denies joint pain or swelling  Hematology denies easy bruising  Neurological as above in HPI      PHYSICAL EXAMINATION:      VITAL SIGNS:    Visit Vitals  /81 (BP 1 Location: Left arm, BP Patient Position: At rest)   Pulse 83   Temp 97.8 °F (36.6 °C)   Resp 16   SpO2 98%       GENERAL:  in no apparent distress. EXTREMITIES:  Muscle tone is normal.  HEAD:   The patient is normocephalic. NEUROLOGIC EXAMINATION  Mental status: Awake, alert, oriented x3, follows simple and complex; mild left side neglect; No gaze deviation. Speech and languge: fluent, coherent,  and comprehension intact  CN: ? Impaired peripheral vision bilaterally;  EOMI, PERRLA, face sensation intact , no facial asymmetry noted, palate elevation symmetric bilat, SS+SCM 5/5 bilat, tongue midline  Motor: no pronator drift, tone normal throughout, strength 5/5 throughout except 4+/5 over the LUE  Sensory: decreased LT on the left(slight); ?extinction on the left. DTR: 3+ through out; toes down going bilaterally. Gait: not tested     Result Information     Status: Final result (Exam End: 9/21/2020 15:59)  Provider Status: Open    Study Result     EXAM: MRI BRAIN WO CONT     CLINICAL INDICATION/HISTORY: r/o CVA // Acute encephalopathy     COMPARISON: CT head from earlier the same day. MRI brain 7/12/2017     TECHNIQUE: MR imaging was performed through the brain without contrast.     FINDINGS:      Exam is motion degraded. There is a chronic infarct in the right  parieto-occipital region.  There are scattered patchy T2/FLAIR hyperintensities  throughout the subcortical and periventricular white matter of both cerebral  hemispheres, likely on the basis of chronic microangiopathic change. There is  generalized parenchymal volume loss. There is right hippocampal atrophy and  wallerian degeneration across the anterior body of corpus callosum. Chronic  lacunar infarcts are present in left corona radiata, right basal ganglia,  thalami, and right paramedian brittni. There are numerous punctate SWI  hypointensity is with predilection for deep gray nuclei and brainstem.      There is no abnormal diffusion restriction within the brain parenchyma. No acute  intracranial hemorrhage, acute infarct, mass or hydrocephalus is present. Limited detail examination of the extracranial structures, including orbits,  sinuses, scalp and calvarium is within normal limits.     IMPRESSION  IMPRESSION:  1. Large chronic infarct in the right parieto-occipital region. 2. Findings suggest moderate to severe manifestations of chronic hypertensive  microangiopathy, including confluent deep cerebral white matter changes, deep  brain lacunar infarcts and numerous chronic microhemorrhages. 3. Asymmetric right hippocampal atrophy, likely related to chronic infarction or  microangiopathy. 4. No acute infarct or acute intracranial hemorrhage.               CBC:   Lab Results   Component Value Date/Time    WBC 7.7 09/21/2020 12:31 AM    RBC 3.81 (L) 09/21/2020 12:31 AM    HGB 11.5 (L) 09/21/2020 12:31 AM    HCT 34.4 (L) 09/21/2020 12:31 AM    PLATELET 357 74/56/9854 12:31 AM     BMP:   Lab Results   Component Value Date/Time    Glucose 93 09/21/2020 12:31 AM    Sodium 142 09/21/2020 12:31 AM    Potassium 3.5 09/21/2020 12:31 AM    Chloride 112 (H) 09/21/2020 12:31 AM    CO2 22 09/21/2020 12:31 AM    BUN 12 09/21/2020 12:31 AM    Creatinine 1.19 09/21/2020 12:31 AM    Calcium 10.1 09/21/2020 12:31 AM     CMP:   Lab Results   Component Value Date/Time Glucose 93 09/21/2020 12:31 AM    Sodium 142 09/21/2020 12:31 AM    Potassium 3.5 09/21/2020 12:31 AM    Chloride 112 (H) 09/21/2020 12:31 AM    CO2 22 09/21/2020 12:31 AM    BUN 12 09/21/2020 12:31 AM    Creatinine 1.19 09/21/2020 12:31 AM    Calcium 10.1 09/21/2020 12:31 AM    Anion gap 8 09/21/2020 12:31 AM    BUN/Creatinine ratio 10 (L) 09/21/2020 12:31 AM    Alk. phosphatase 76 03/23/2020 08:34 PM    Protein, total 7.4 03/23/2020 08:34 PM    Albumin 3.7 03/23/2020 08:34 PM    Globulin 3.7 03/23/2020 08:34 PM    A-G Ratio 1.0 03/23/2020 08:34 PM     Coagulation:   Lab Results   Component Value Date/Time    Prothrombin time 12.4 03/06/2018 10:16 AM    INR 1.0 03/06/2018 10:16 AM    aPTT <20.0 (L) 10/04/2016 02:37 PM       Impression:   A 76years old female patient with history of right side parieto-occipital stroke and seizure disorder currently on Depakote 250 mg p.o. daily, Trileptal 600 mg p.o. twice daily, and Keppra 1000 mg p.o. twice daily. Came to the hospital for altered mental status with visual hallucinations [seeing animals]. Also complained of weakness of the left lower extremity and right upper extremity. MRI of the brain did not show any acute stroke. She had an old large right parieto-occipital chronic infarction. No hemorrhage. Taking her medications. Discharged from the hospital about a week ago after her last seizure. This time did not have any witnessed seizure activity. No episode of loss of consciousness. Patient does not have any signs of infection. Unremarkable metabolic panels. Possible delirium; ?post ictal.     Plan:   - C/w Keppra 100 mg PO BID; Depakote 250 mg PO TID; Oxcarbazepine 600 mg PO BID.  -Add Mg and Phos; and follow other metabolic changes.   -Check VPA, Keppra, and Trleptal level. -MRI of the brain didn't show an acute stroke. -Will follow patient. PLEASE NOTE:   This document has been produced using voice recognition software.  Unrecognized errors in transcription may be present.

## 2020-09-22 NOTE — PROGRESS NOTES
Problem: Mobility Impaired (Adult and Pediatric)  Goal: *Acute Goals and Plan of Care (Insert Text)  Description: Physical Therapy Goals  Initiated 9/21/2020 and to be accomplished within 7 day(s)  1. Patient will move from supine to sit and sit to supine , scoot up and down, and roll side to side in bed with minimal assistance/contact guard assist.    2.  Patient will sit edge of bed for 8 minutes with fair+ sitting balance for improved mobility. 3.  Patient will transfer from bed to chair and chair to bed with minimal assistance/contact guard assist using the least restrictive device. 4.  Patient will perform sit to stand with minimal assistance/contact guard assist.  5.  Patient will ambulate with contact guard assist for 50 feet with the least restrictive device. Prior Level of Function:   Patient reports she was discharged from the hospital ~7 days ago and has been unable to walk or stand since due to bilateral LE weakness and left LE pain. Prior to that stay, she reports being independent with mobility. She lives in single story home with 0 steps to enter with her 2 sons who are not with her all the time. Outcome: Progressing Towards Goal     PHYSICAL THERAPY TREATMENT    Patient: Luiza Olivares (31 y.o. female)  Date: 9/22/2020  Diagnosis: Encephalopathy [G93.40]   Encephalopathy       Precautions: Fall, Seizure(Hallucinations)    ASSESSMENT:  Pt presents with generalized weakness, hallucinations, and poor safety awareness. RN cleared PT to work with pt. Pt found supine in bed, sleeping, but awaken ready to work with PT. Pt states nonsensical phrases at times but mostly able to understand what she is saying. She is very confused and only oriented to her name. She often talks out loud about how her son hasn't let her know why she is back in the hospital. Pure wick in tact, but patient states she needs to go to the bathroom. Pt helped up to sitting EOB with minAx1.  Once sitting, pure wick leaking and fell out onto floor. Pt helped up to standing with HHA modAx1 for safety and unsteadiness once standing. Trial with walker not successful due to coordination deficits. Pt performed BSC transfer with HHA, max verbal cueing for safety and positioning in front of BSC before sitting. Pt voided urine. Pt able to perform pericare independently. Pt helped back up to standing and ambulated 50 feet total in room HHA and pt observed to have very narrow stance with some scissoring of feet at times. Pt seems unsteady walking around and required constant HHA support and firm hand of PT on gait belt. Pt needed redirection while navigating around bed in room. Pt helped back sitting where she performed exercises per flow sheet with max cueing. Pt helped back supine in bed, lunch in front of her, all needs met. Call bell nearby and bed alarm on. Pt would benefit from rehab before returning home at this time. Progression toward goals:   []      Improving appropriately and progressing toward goals  [x]      Improving slowly and progressing toward goals  []      Not making progress toward goals and plan of care will be adjusted     PLAN:  Patient continues to benefit from skilled intervention to address the above impairments. Continue treatment per established plan of care. Discharge Recommendations:  Rehab  Further Equipment Recommendations for Discharge:  N/A     SUBJECTIVE:   Patient stated Adilson Mcgowan think that shelf is moving closer to the cabinet. Do you hear that water running? Vincent Session    OBJECTIVE DATA SUMMARY:   Critical Behavior:  Neurologic State: Confused, Alert  Orientation Level: Oriented to person, Disoriented to place, Disoriented to situation, Disoriented to time  Cognition: Impaired decision making, Poor safety awareness  Safety/Judgement: Lack of insight into deficits, Fall prevention  Functional Mobility Training:  Bed Mobility:     Supine to Sit: Minimum assistance  Sit to Supine: Minimum assistance  Scooting: Moderate assistance         Transfers:  Sit to Stand: Moderate assistance  Stand to Sit: Moderate assistance       Balance:  Sitting: Intact; Without support  Standing: Impaired; With support  Standing - Static: Fair  Standing - Dynamic : Poor       Ambulation/Gait Training:  Distance (ft): 50 Feet (ft)  Assistive Device: Other (comment);Gait belt(Hand held assist)  Ambulation - Level of Assistance: Moderate assistance;Assist x1        Gait Abnormalities: Scissoring;Decreased step clearance        Base of Support: Narrowed     Speed/Josefina: Shuffled;Pace decreased (<100 feet/min)  Step Length: Right shortened;Left shortened  Therapeutic Exercises:   Pt performed exercises per flow sheet sitting EOB at end of session      EXERCISE   Sets   Reps   Active Active Assist   Passive Self ROM   Comments   Ankle Pumps 1 10  [x] [] [] []    Quad Sets/Glut Sets    [] [] [] [] Hold for 5 secs   Hamstring Sets   [] [] [] []    Short Arc Quads   [] [] [] []    Heel Slides   [] [] [] []    Straight Leg Raises   [] [] [] []    Hip Abd 1 10 [x] [] [] [] Manual resistance   Long Arc Quads 1 15 [x] [] [] []    Seated Marching 1 15 [x] [] [] []    Standing Marching   [] [] [] []       [] [] [] []        Pain:  Pain level pre-treatment: 0/10  Pain level post-treatment: 0/10   Pain Intervention(s): Medication (see MAR); Rest, Repositioning   Response to intervention: Nurse notified, See doc flow    Activity Tolerance:   Pt tolerated PT okay, poor safety awareness and unsteady on her feet without hand held support. Please refer to the flowsheet for vital signs taken during this treatment. After treatment:   [] Patient left in no apparent distress sitting up in chair  [x] Patient left in no apparent distress in bed  [x] Call bell left within reach  [x] Nursing notified  [] Caregiver present  [x] Bed alarm activated  [] SCDs applied      COMMUNICATION/EDUCATION:   [x]         Role of Physical Therapy in the acute care setting.   [x] Fall prevention education was provided and the patient/caregiver indicated understanding. []         Patient/family have participated as able in working toward goals and plan of care. []         Patient/family agree to work toward stated goals and plan of care. []         Patient understands intent and goals of therapy, but is neutral about his/her participation.   []         Patient is unable to participate in stated goals/plan of care: ongoing with therapy staff.  []         Other:        Carley Safe   Time Calculation: 29 mins

## 2020-09-22 NOTE — PROGRESS NOTES
Physician Progress Note      Everton Diggs  CSN #:                  [id number]  :                       1955  ADMIT DATE:       2020 12:24 AM  DISCH DATE:  RESPONDING  PROVIDER #:        Sydney Mello MD          QUERY TEXT:    Pt admitted with encephalopathy . If possible, please document in progress notes and discharge summary further specificity regarding the type of encephalopathy:      The medical record reflects the following:  Risk Factors:  previous stroke ; H/O   seizures, recent admit   for seizures  Clinical Indicators: - visual hallucinations;  weakness in her  upper   extremities, was not able to hold objects  B/P level high in ER   max  190/91; Treatment: CT; MRI; neuro consult  antihypertensive meds    Thank you,   Javier Abreu RN   CCDS  x [phone]  Options provided:  -- Hypertensive encephalopathy  -- Metabolic encephalopathy  -- Encephalopathy due to ***  -- Toxic metabolic encephalopathy  -- Other - I will add my own diagnosis  -- Disagree - Not applicable / Not valid  -- Disagree - Clinically unable to determine / Unknown  -- Refer to Clinical Documentation Reviewer    PROVIDER RESPONSE TEXT:    This patient has metabolic encephalopathy.     Query created by: Aravind Lopez on 2020 2:16 PM      Electronically signed by:  Sydney Mello MD 2020 4:37 PM

## 2020-09-22 NOTE — PROGRESS NOTES
[facility]  Progress Note    Patient: Danette Smalls Age: 76 y.o. : 1955 MR#: [id number] SSN: [id number]  Date/Time: 2020    Subjective:   Pt is oriented to self, place and year, but she is tangential w/ answers. Assessment/Plan:     -Hallucinations: work up thus fare has included brain MRI-->w/o acute findings. Neurology has evaluated. Mild hypercalcemia but not sure if degree of elevation would explain her presentation, ua w/o evidence of UTI. No other clear derangement to explain her presentation found thus far. Valproic acid within therapeutic range, trileptal level pending.    -Mild hypercalcemia: on vit D supplement.     HISTORY OF:  -Seizure d/o on AED  -CVA on plavix  -HTN on bp meds  -On vit D supplement    PLAN:  -Psych consult  -Repeat Ca++  Level, hold Vit D supplement  -Cont outpt med regimen including AED  -Await result of trileptal and keppra levels  -PT/OT          Called son Mr Harriett King [phone]      Additional Notes:      Case discussed with:  [x]Patient  []Family  [x]Nursing  [x]Case Management  DVT Prophylaxis:  [x]Lovenox  []Hep SQ  []SCDs  []Coumadin   []On Heparin gtt    Objective:   VS:   Visit Vitals  /69   Pulse 90   Temp 98.1 °F (36.7 °C)   Resp 18   Ht 5' (1.524 m)   Wt 62.8 kg (138 lb 8 oz)   SpO2 98%   BMI 27.05 kg/m²      Tmax/24hrs: Temp (24hrs), Av °F (36.7 °C), Min:97 °F (36.1 °C), Max:98.7 °F (37.1 °C)    Input/Output: No intake or output data in the 24 hours ending 20 1608    General: alert, awake, in NAD   Cardiovascular:  Rrr, no murmurs  Pulmonary:  ctab  GI:  Soft, nt, nd  Extremities: no edema    Additional:      Labs:    Recent Results (from the past 24 hour(s))   METABOLIC PANEL, BASIC    Collection Time: 20  1:00 AM   Result Value Ref Range    Sodium 141 136 - 145 mmol/L    Potassium 4.0 3.5 - 5.5 mmol/L    Chloride 112 (H) 100 - 111 mmol/L    CO2 22 21 - 32 mmol/L    Anion gap 7 3.0 - 18 mmol/L    Glucose 84 74 - 99 mg/dL    BUN 10 7.0 - 18 MG/DL    Creatinine 1.17 0.6 - 1.3 MG/DL    BUN/Creatinine ratio 9 (L) 12 - 20      GFR est AA 56 (L) >60 ml/min/1.73m2    GFR est non-AA 46 (L) >60 ml/min/1.73m2    Calcium 10.3 (H) 8.5 - 10.1 MG/DL   CBC W/O DIFF    Collection Time: 09/22/20  1:00 AM   Result Value Ref Range    WBC 7.5 4.6 - 13.2 K/uL    RBC 4.20 4. 20 - 5.30 M/uL    HGB 13.0 12.0 - 16.0 g/dL    HCT 38.8 35.0 - 45.0 %    MCV 92.4 74.0 - 97.0 FL    MCH 31.0 24.0 - 34.0 PG    MCHC 33.5 31.0 - 37.0 g/dL    RDW 14.0 11.6 - 14.5 %    PLATELET 618 190 - 690 K/uL    MPV 10.4 9.2 - 11.8 FL   MAGNESIUM    Collection Time: 09/22/20  1:00 AM   Result Value Ref Range    Magnesium 2.1 1.6 - 2.6 mg/dL   PHOSPHORUS    Collection Time: 09/22/20  1:00 AM   Result Value Ref Range    Phosphorus 3.4 2.5 - 4.9 MG/DL   VALPROIC ACID    Collection Time: 09/22/20  9:56 AM   Result Value Ref Range    Valproic acid 54 50 - 100 ug/ml     Additional Data Reviewed:      Signed By: Aleksandra Mcwilliams MD     September 22, 2020 4:08 PM

## 2020-09-23 ENCOUNTER — HOME CARE VISIT (OUTPATIENT)
Dept: HOME HEALTH SERVICES | Facility: HOME HEALTH | Age: 65
End: 2020-09-23
Payer: MEDICARE

## 2020-09-23 LAB
25(OH)D3 SERPL-MCNC: 69.7 NG/ML (ref 30–100)
ANION GAP SERPL CALC-SCNC: 6 MMOL/L (ref 3–18)
BUN SERPL-MCNC: 14 MG/DL (ref 7–18)
BUN/CREAT SERPL: 10 (ref 12–20)
CALCIUM SERPL-MCNC: 10 MG/DL (ref 8.5–10.1)
CALCIUM SERPL-MCNC: 9.9 MG/DL (ref 8.5–10.1)
CHLORIDE SERPL-SCNC: 113 MMOL/L (ref 100–111)
CO2 SERPL-SCNC: 23 MMOL/L (ref 21–32)
CREAT SERPL-MCNC: 1.44 MG/DL (ref 0.6–1.3)
ERYTHROCYTE [DISTWIDTH] IN BLOOD BY AUTOMATED COUNT: 14.1 % (ref 11.6–14.5)
GLUCOSE SERPL-MCNC: 113 MG/DL (ref 74–99)
HCT VFR BLD AUTO: 34.2 % (ref 35–45)
HGB BLD-MCNC: 11.3 G/DL (ref 12–16)
MCH RBC QN AUTO: 30.4 PG (ref 24–34)
MCHC RBC AUTO-ENTMCNC: 33 G/DL (ref 31–37)
MCV RBC AUTO: 91.9 FL (ref 74–97)
PLATELET # BLD AUTO: 353 K/UL (ref 135–420)
PMV BLD AUTO: 10.9 FL (ref 9.2–11.8)
POTASSIUM SERPL-SCNC: 3.8 MMOL/L (ref 3.5–5.5)
PTH-INTACT SERPL-MCNC: 81.3 PG/ML (ref 18.4–88)
RBC # BLD AUTO: 3.72 M/UL (ref 4.2–5.3)
SODIUM SERPL-SCNC: 142 MMOL/L (ref 136–145)
WBC # BLD AUTO: 6.6 K/UL (ref 4.6–13.2)

## 2020-09-23 PROCEDURE — 97116 GAIT TRAINING THERAPY: CPT

## 2020-09-23 PROCEDURE — 82306 VITAMIN D 25 HYDROXY: CPT

## 2020-09-23 PROCEDURE — 2709999900 HC NON-CHARGEABLE SUPPLY

## 2020-09-23 PROCEDURE — 74011250636 HC RX REV CODE- 250/636: Performed by: INTERNAL MEDICINE

## 2020-09-23 PROCEDURE — 90686 IIV4 VACC NO PRSV 0.5 ML IM: CPT | Performed by: INTERNAL MEDICINE

## 2020-09-23 PROCEDURE — 3331090002 HH PPS REVENUE DEBIT

## 2020-09-23 PROCEDURE — 36415 COLL VENOUS BLD VENIPUNCTURE: CPT

## 2020-09-23 PROCEDURE — 77030038269 HC DRN EXT URIN PURWCK BARD -A

## 2020-09-23 PROCEDURE — 65660000000 HC RM CCU STEPDOWN

## 2020-09-23 PROCEDURE — 97535 SELF CARE MNGMENT TRAINING: CPT

## 2020-09-23 PROCEDURE — 83970 ASSAY OF PARATHORMONE: CPT

## 2020-09-23 PROCEDURE — 80048 BASIC METABOLIC PNL TOTAL CA: CPT

## 2020-09-23 PROCEDURE — 3331090001 HH PPS REVENUE CREDIT

## 2020-09-23 PROCEDURE — 99232 SBSQ HOSP IP/OBS MODERATE 35: CPT | Performed by: HOSPITALIST

## 2020-09-23 PROCEDURE — 90471 IMMUNIZATION ADMIN: CPT

## 2020-09-23 PROCEDURE — 74011250637 HC RX REV CODE- 250/637: Performed by: PHYSICIAN ASSISTANT

## 2020-09-23 PROCEDURE — 97530 THERAPEUTIC ACTIVITIES: CPT

## 2020-09-23 PROCEDURE — 85027 COMPLETE CBC AUTOMATED: CPT

## 2020-09-23 RX ADMIN — TAMSULOSIN HYDROCHLORIDE 0.4 MG: 0.4 CAPSULE ORAL at 00:08

## 2020-09-23 RX ADMIN — OXCARBAZEPINE 600 MG: 300 TABLET, FILM COATED ORAL at 09:34

## 2020-09-23 RX ADMIN — LISINOPRIL 20 MG: 20 TABLET ORAL at 09:42

## 2020-09-23 RX ADMIN — DIVALPROEX SODIUM 250 MG: 250 TABLET, DELAYED RELEASE ORAL at 00:07

## 2020-09-23 RX ADMIN — DIVALPROEX SODIUM 250 MG: 250 TABLET, DELAYED RELEASE ORAL at 09:33

## 2020-09-23 RX ADMIN — INFLUENZA VIRUS VACCINE 0.5 ML: 15; 15; 15; 15 SUSPENSION INTRAMUSCULAR at 09:42

## 2020-09-23 RX ADMIN — DIVALPROEX SODIUM 250 MG: 250 TABLET, DELAYED RELEASE ORAL at 16:39

## 2020-09-23 RX ADMIN — Medication 10 ML: at 09:34

## 2020-09-23 RX ADMIN — Medication 5 ML: at 14:00

## 2020-09-23 RX ADMIN — Medication 10 ML: at 22:05

## 2020-09-23 RX ADMIN — CLOPIDOGREL BISULFATE 75 MG: 75 TABLET ORAL at 09:33

## 2020-09-23 RX ADMIN — AMLODIPINE BESYLATE 10 MG: 10 TABLET ORAL at 09:34

## 2020-09-23 RX ADMIN — ENOXAPARIN SODIUM 40 MG: 40 INJECTION SUBCUTANEOUS at 13:46

## 2020-09-23 RX ADMIN — HYDRALAZINE HYDROCHLORIDE 50 MG: 50 TABLET, FILM COATED ORAL at 00:08

## 2020-09-23 RX ADMIN — HYDRALAZINE HYDROCHLORIDE 50 MG: 50 TABLET, FILM COATED ORAL at 16:39

## 2020-09-23 RX ADMIN — HYDRALAZINE HYDROCHLORIDE 50 MG: 50 TABLET, FILM COATED ORAL at 09:34

## 2020-09-23 RX ADMIN — TAMSULOSIN HYDROCHLORIDE 0.4 MG: 0.4 CAPSULE ORAL at 22:03

## 2020-09-23 RX ADMIN — LEVETIRACETAM 1000 MG: 500 TABLET ORAL at 09:33

## 2020-09-23 RX ADMIN — CYANOCOBALAMIN TAB 1000 MCG 500 MCG: 1000 TAB at 09:34

## 2020-09-23 RX ADMIN — OXCARBAZEPINE 600 MG: 300 TABLET, FILM COATED ORAL at 19:36

## 2020-09-23 RX ADMIN — LEVETIRACETAM 1000 MG: 500 TABLET ORAL at 19:36

## 2020-09-23 RX ADMIN — Medication 10 ML: at 00:08

## 2020-09-23 RX ADMIN — HYDRALAZINE HYDROCHLORIDE 50 MG: 50 TABLET, FILM COATED ORAL at 22:03

## 2020-09-23 RX ADMIN — DIVALPROEX SODIUM 250 MG: 250 TABLET, DELAYED RELEASE ORAL at 22:10

## 2020-09-23 NOTE — PROGRESS NOTES
New OT order received and chart reviewed. Patient evaluated and currently on skilled OT caseload. Will acknowledge the order.   Thank you for the referral.  Samantha Garcia MS OTR/L

## 2020-09-23 NOTE — PROGRESS NOTES
[facility] Hospitalist Group  Progress Note    Patient: Marco Antonio Morales Age: 76 y.o. : 1955 MR#: [id number] SSN: [id number]  Date/Time: 2020     Subjective: Patient feels better, Denies any hallucinations today. Patient complains that she is not able to hold things in her hand for long duration. Denies any weakness or numbness. Assessment/Plan:   1.  visual and auditory hallucinations: Unclear reason, psych eval pending. 2.  Acute metabolic encephalopathy: Improved now, MRI/CT negative, neuro input noted. 3.  Mild hypercalcemia due to dehydration: improved now   4. seizure disorder: no seizure episodes   5. CKD3, will monitor  6. History of UTI with enterococcus, s/p treatment   6.  HTN: BP stable, continue hypertensive medication  7.  Hyperlipidemia:   8. Full code  Discussed with the patient at the bedside, discussed with patient's son Tim Ryan over the phone in length in detail. Discussed about options of going to SNF, both patient and son prefer going home with home health care. Case discussed with:  [x]Patient  [x]Family  [x]Nursing  []Case Management  DVT Prophylaxis:  [x]Lovenox  []Hep SQ  []SCDs  []Coumadin   []Eliquis/Xarelto     Objective:   VS:   Visit Vitals  /62   Pulse 80   Temp 97.4 °F (36.3 °C)   Resp 18   Ht 5' (1.524 m)   Wt 62.8 kg (138 lb 8 oz)   SpO2 99%   BMI 27.05 kg/m²      Tmax/24hrs: Temp (24hrs), Av.6 °F (36.4 °C), Min:97 °F (36.1 °C), Max:98 °F (36.7 °C)  IOBRIEFNo intake or output data in the 24 hours ending 20 1621    General:  Alert, cooperative, no acute distress    HEENT: PERRLA, anicteric sclerae. Pulmonary:  CTA Bilaterally. No Wheezing/Rales. Cardiovascular: Regular rate and Rhythm. GI:  Soft, Non distended, Non tender. + Bowel sounds. Extremities:  No edema. No calf tenderness. Neurologic: Alert and oriented X 3. Speech clear, slow to respond, moves all extremities well, good handgrip.       Medications: Current Facility-Administered Medications   Medication Dose Route Frequency    enoxaparin (LOVENOX) injection 40 mg  40 mg SubCUTAneous Q24H    cyanocobalamin tablet 500 mcg  500 mcg Oral DAILY    amLODIPine (NORVASC) tablet 10 mg  10 mg Oral DAILY    [START ON 9/24/2020] ergocalciferol capsule 50,000 Units  50,000 Units Oral Once per day on Thu Sat    levETIRAcetam (KEPPRA) tablet 1,000 mg  1,000 mg Oral BID    OXcarbazepine (TRILEPTAL) tablet 600 mg  600 mg Oral BID    clopidogreL (PLAVIX) tablet 75 mg  75 mg Oral DAILY    hydrALAZINE (APRESOLINE) tablet 50 mg  50 mg Oral TID    lisinopriL (PRINIVIL, ZESTRIL) tablet 20 mg  20 mg Oral DAILY    tamsulosin (FLOMAX) capsule 0.4 mg  0.4 mg Oral QHS    divalproex DR (DEPAKOTE) tablet 250 mg  250 mg Oral TID    sodium chloride (NS) flush 5-40 mL  5-40 mL IntraVENous Q8H    sodium chloride (NS) flush 5-40 mL  5-40 mL IntraVENous PRN    acetaminophen (TYLENOL) tablet 650 mg  650 mg Oral Q6H PRN    Or    acetaminophen (TYLENOL) suppository 650 mg  650 mg Rectal Q6H PRN    polyethylene glycol (MIRALAX) packet 17 g  17 g Oral DAILY PRN    promethazine (PHENERGAN) tablet 12.5 mg  12.5 mg Oral Q6H PRN    Or    ondansetron (ZOFRAN) injection 4 mg  4 mg IntraVENous Q6H PRN    LORazepam (ATIVAN) injection 1 mg  1 mg IntraVENous Q6H PRN       Labs:    Recent Results (from the past 24 hour(s))   PTH INTACT    Collection Time: 09/23/20  3:35 AM   Result Value Ref Range    Calcium 10.0 8.5 - 10.1 MG/DL    PTH, Intact 81.3 18.4 - 88.0 pg/mL   VITAMIN D, 25 HYDROXY    Collection Time: 09/23/20  3:35 AM   Result Value Ref Range    Vitamin D 25-Hydroxy 69.7 30 - 797 ng/mL   METABOLIC PANEL, BASIC    Collection Time: 09/23/20  3:35 AM   Result Value Ref Range    Sodium 142 136 - 145 mmol/L    Potassium 3.8 3.5 - 5.5 mmol/L    Chloride 113 (H) 100 - 111 mmol/L    CO2 23 21 - 32 mmol/L    Anion gap 6 3.0 - 18 mmol/L    Glucose 113 (H) 74 - 99 mg/dL    BUN 14 7.0 - 18 MG/DL    Creatinine 1.44 (H) 0.6 - 1.3 MG/DL    BUN/Creatinine ratio 10 (L) 12 - 20      GFR est AA 44 (L) >60 ml/min/1.73m2    GFR est non-AA 36 (L) >60 ml/min/1.73m2    Calcium 9.9 8.5 - 10.1 MG/DL   CBC W/O DIFF    Collection Time: 09/23/20  3:35 AM   Result Value Ref Range    WBC 6.6 4.6 - 13.2 K/uL    RBC 3.72 (L) 4.20 - 5.30 M/uL    HGB 11.3 (L) 12.0 - 16.0 g/dL    HCT 34.2 (L) 35.0 - 45.0 %    MCV 91.9 74.0 - 97.0 FL    MCH 30.4 24.0 - 34.0 PG    MCHC 33.0 31.0 - 37.0 g/dL    RDW 14.1 11.6 - 14.5 %    PLATELET 353 470 - 942 K/uL    MPV 10.9 9.2 - 11.8 FL       Signed By: Dwayne Sanchez MD     September 23, 2020      Disclaimer: Sections of this note are dictated using utilizing voice recognition software. Minor typographical errors may be present. If questions arise, please do not hesitate to contact me or call our department.

## 2020-09-23 NOTE — PROGRESS NOTES
*ATTENTION:  This note has been created by a medical student for educational purposes only. Please do not refer to the content of this note for clinical decision-making, billing, or other purposes. Please see attending physicians note to obtain clinical information on this patient. *     Student Progress Note  Please refer to attendings daily rounding note for full details    Patient: Sanjana Solorzano MRN: [id number]  CSN: [id number]    YOB: 1955  Age: 76 y.o. Sex: female    DOA: 9/21/2020 LOS:  LOS: 2 days        Subjective:     Ruthie Rodarte, a 75 yo female, is being seen on admission day 3. She denies any seizures or hallucinations overnight. The pt is still c/o difficulty using her hands to  items and is very confused as to why this is happening. She denies any pain, fevers, chills, N/V/D, CP, or SOB. The pt reports trying to have a BM this AM but was not able to. She states that her last BM was \"before I came here\". No other acute concerns. Objective:      Visit Vitals  BP (!) 160/79   Pulse 91   Temp 97.8 °F (36.6 °C)   Resp 16   Ht 5' (1.524 m)   Wt 62.8 kg (138 lb 8 oz)   SpO2 100%   BMI 27.05 kg/m²       Physical Exam:  Gen: Afebrile. In NAD. HEENT: Mucous membranes moist. PERRL. CV: RRR. No murmurs. Resp: CTA. Normal respiratory effort. Abd: No TTP. Soft and non-distended. Active bowel sounds. Extrem: Trace pedal edema, L>R. PT pulses 2+. Skin: No rashes or lesions. Neuro: Alert and interactive. Answers questions appropriately. Motor and sensory not assessed.     Results for Nima Dixon (MRN [id number]) as of 9/23/2020 10:36   9/23/2020 03:35   WBC 6.6   RBC 3.72 (L)   HGB 11.3 (L)   HCT 34.2 (L)   MCV 91.9   MCH 30.4   MCHC 33.0   RDW 14.1   PLATELET 356   MPV 85.9     Results for Nima Dixon (MRN [id number]) as of 9/23/2020 10:36   9/23/2020 03:35   Sodium 142   Potassium 3.8   Chloride 113 (H)   CO2 23   Anion gap 6   Glucose 113 (H)   BUN 14   Creatinine 1.44 (H)   BUN/Creatinine ratio 10 (L)   Calcium 9.9   GFR est non-AA 36 (L)   GFR est AA 44 (L)     Results for Jesse Jimenez (MRN [id number]) as of 9/23/2020 10:36   9/23/2020 03:35   Calcium 10.0     Results for Jesse Jimenez (MRN [id number]) as of 9/23/2020 10:36   9/23/2020 03:35   PTH INTACT Calcium  10.0   8.5 - 10.1  MG/DL  Final    PTH, Intact  81.3   18.4 - 88.0  pg/mL  Final           Results for Jesse Jimenez (MRN [id number]) as of 9/23/2020 10:36   9/21/2020 10:50   CULTURE, URINE Culture result:        Final    No growth (<1,000 CFU/ML)             Assessment:     1. Encephalopathy   2. Visual hallucinations   3. Abnormal coordination  4. Hx of CVA / TIA  5. Hx of status epilepticus  6. Hx of recurrent seizures  7. Essential HTN    Plan:     > Brain MRI w/o contrast negative for acute infarct or acute hemorrhage, head CT w/o contrast negative for acute findings. > Continue Keppra, Depakote, and Oxcarbazepine regimen. Will need to order levels for each drug. Valproic acid has resulted and is therapeutic, at 54. Keppra and Oxcarbazepine still pending. Will need to emphasize medication compliance to patient and family before discharge. > Appreciate continued Neurology consultation. Patient has been signed off from a Neurology standpoint, per Neurology note. > PT / OT evaluation - await placement recommendations  > DVT ppx - Lovenox sub-Q  > Seizure precautions     Clyda Sissy  9/23/2020  10:32 AM    *ATTENTION:  This note has been created by a medical student for educational purposes only. Please do not refer to the content of this note for clinical decision-making, billing, or other purposes. Please see attending physicians note to obtain clinical information on this patient. *

## 2020-09-23 NOTE — PROGRESS NOTES
Problem: Self Care Deficits Care Plan (Adult)  Goal: *Acute Goals and Plan of Care (Insert Text)  Description: Occupational Therapy Goals  Initiated 9/21/2020 within 7 day(s). 1.  Patient will perform self-feeding with modified independence. 2.  Patient will perform grooming with supervision/set-up. 3.  Patient will perform upper body dressing with supervision/set-up. 4.  Patient will perform lower body dressing with supervision/setup. 5.  Patient will perform bed mobility with minimal assistance in preparation for ADLs. 6.  Patient will participate in upper extremity therapeutic exercise/activities with supervision/set-up for 8 minutes. Prior Level of Function: Pt recently discharged from hospital 9/17/2020. Prior to d/c, OT/PT were recommending Rehab. This admission, pt reports coming from home, living with her sons. Pt reports sons have been providing assistance with UB/LB dressing/bathing PTA. Per OT gino on 9/14/2020, pt reports she was (I) with basic self-care/ADLs prior to that admission however appeared to be a poor historian at that time. Outcome: Progressing Towards Goal     Problem: Patient Education: Go to Patient Education Activity  Goal: Patient/Family Education  Outcome: Progressing Towards Goal   OCCUPATIONAL THERAPY TREATMENT    Patient: Marck Lugo (27 y.o. female)  Date: 9/23/2020  Diagnosis: Encephalopathy [G93.40]   Encephalopathy       Precautions: Fall, Seizure(Hallucinations)  PLOF: Pt recently discharged from hospital 9/17/2020. Prior to d/c, OT/PT were recommending Rehab. This admission, pt reports coming from home, living with her sons. Pt reports sons have been providing assistance with UB/LB dressing/bathing PTA. Per OT gino on 9/14/2020, pt reports she was (I) with basic self-care/ADLs prior to that admission however appeared to be a poor historian at that time. Chart, occupational therapy assessment, plan of care, and goals were reviewed.   ASSESSMENT:  Pt presented supine in bed upon therapist arrival and agreeable for participation at this time. PT co tx to maximize pt safety and participation. Pt reported having decreased B UE strength and FM coordination throughout night, dropping mult items off bed and bedside tray. Pt performed functional bed mobility MIN A supine-sitting EOB with increased and vc's for proper hand placement. STS transfer MIN A with functional mobility to [facility] ~ 5 ft with HHA. Pt performed toileting routine CGA with 1 UE support. MANZANO noticed height needed to be decreased on BSC and adjusted properly. Pt performed functional mobility in room ~ 20 ft with RW MIN A 2/2 RW mgmt to increase functional activity tolerance needed for self cares. Pt returned sitting EOB requiring MIN A sit-supine. Pt left supine in bed with HOB elevated and all needs left within reach. Progression toward goals:  []          Improving appropriately and progressing toward goals  [x]          Improving slowly and progressing toward goals  []          Not making progress toward goals and plan of care will be adjusted     PLAN:  Patient continues to benefit from skilled intervention to address the above impairments. Continue treatment per established plan of care. Discharge Recommendations:  Rehab  Further Equipment Recommendations for Discharge:  TBA pending progress     SUBJECTIVE:   Patient stated I was up all night dropping things. \"    OBJECTIVE DATA SUMMARY:   Cognitive/Behavioral Status:  Neurologic State: Alert, Confused  Orientation Level: Oriented to person, Oriented to place, Disoriented to time, Oriented to situation  Cognition: Impaired decision making, Follows commands  Safety/Judgement: Lack of insight into deficits, Fall prevention    Functional Mobility and Transfers for ADLs:   Bed Mobility:     Supine to Sit: Minimum assistance  Sit to Supine: Minimum assistance      Transfers:  Sit to Stand: Minimum assistance  Stand to Sit: Minimum assistance Toilet Transfer : Minimum assistance(to [facility])              Balance:  Sitting: Intact; Without support  Standing: Impaired; With support  Standing - Static: Fair  Standing - Dynamic : Poor    ADL Intervention:   Toileting: CGA @ stand with 1 UE support    Pain:  Pt reports no pain at this time    Activity Tolerance:    Fair  Please refer to the flowsheet for vital signs taken during this treatment. After treatment:   []  Patient left in no apparent distress sitting up in chair  [x]  Patient left in no apparent distress in bed  [x]  Call bell left within reach  [x]  Nursing notified  []  Caregiver present  []  Bed alarm activated    COMMUNICATION/EDUCATION:   [] Role of Occupational Therapy in the acute care setting  [] Home safety education was provided and the patient/caregiver indicated understanding. [] Patient/family have participated as able in working towards goals and plan of care. [x] Patient/family agree to work toward stated goals and plan of care. [] Patient understands intent and goals of therapy, but is neutral about his/her participation. [] Patient is unable to participate in goal setting and plan of care.       Thank you for this referral.  JOSE JUAN Huff  Time Calculation: 23 mins

## 2020-09-23 NOTE — PROGRESS NOTES
Problem: Mobility Impaired (Adult and Pediatric)  Goal: *Acute Goals and Plan of Care (Insert Text)  Description: Physical Therapy Goals  Initiated 9/21/2020 and to be accomplished within 7 day(s)  1. Patient will move from supine to sit and sit to supine , scoot up and down, and roll side to side in bed with minimal assistance/contact guard assist.    2.  Patient will sit edge of bed for 8 minutes with fair+ sitting balance for improved mobility. 3.  Patient will transfer from bed to chair and chair to bed with minimal assistance/contact guard assist using the least restrictive device. 4.  Patient will perform sit to stand with minimal assistance/contact guard assist.  5.  Patient will ambulate with contact guard assist for 50 feet with the least restrictive device. Prior Level of Function:   Patient reports she was discharged from the hospital ~7 days ago and has been unable to walk or stand since due to bilateral LE weakness and left LE pain. Prior to that stay, she reports being independent with mobility. She lives in single story home with 0 steps to enter with her 2 sons who are not with her all the time. Outcome: Progressing Towards Goal     PHYSICAL THERAPY TREATMENT    Patient: Milly Tejada (70 y.o. female)  Date: 9/23/2020  Diagnosis: Encephalopathy [G93.40]   Encephalopathy       Precautions: Fall, Seizure(Hallucinations)    ASSESSMENT:  Pt seen in bed in NAD, cleared by nursing to participate. Pt was seen with OT to maximize functional mobility and safety. Pt continues to be oriented but confused, very talkative during sessions with cues to redirect. Pt is waking up but is agreeable to [facility] transfer in which she completes with min/mod A for hand placement. Pt is able to  half squat position to help with pericare. Pt then progressed to ambulation within the room and first provided HHA with min A x30 ft around the bed and back.  Pt shows better foot clearance with HHA but when given verbal cues for picking up feet pt shows exaggerated hip flexion/marching steps. After this trial pt given RW for assessment once again and pt shuffling increases. Attempted to show visually pt how to kick feet out in front of her to advance feet to normal gait pattern but pt continued with the short/choppy steps with walker too far in front of her and need for min A for obstacle negotiation. Pt continues to be most safe with HHA at this time and returned to bed with all needs met and call bell within reach at end of session as she wanted to try to sleep. Will continue to follow and recommend Rehab upon discharge to continue in her rehabilitation progress to ensure safe discharge home. Progression toward goals:   []      Improving appropriately and progressing toward goals  [x]      Improving slowly and progressing toward goals  []      Not making progress toward goals and plan of care will be adjusted     PLAN:  Patient continues to benefit from skilled intervention to address the above impairments. Continue treatment per established plan of care. Discharge Recommendations:  Rehab  Further Equipment Recommendations for Discharge:  TBD      SUBJECTIVE:   Patient stated no one told me why I came back here.     OBJECTIVE DATA SUMMARY:   Critical Behavior:  Neurologic State: Alert, Confused  Orientation Level: Oriented to person, Oriented to place, Disoriented to time, Oriented to situation  Cognition: Impaired decision making, Follows commands  Safety/Judgement: Lack of insight into deficits, Fall prevention  Functional Mobility Training:  Bed Mobility:     Supine to Sit: Minimum assistance  Sit to Supine: Minimum assistance            Transfers:  Sit to Stand: Minimum assistance  Stand to Sit: Minimum assistance          Balance:  Sitting: Intact; With support  Standing: Impaired; With support; Without support  Standing - Static: Fair  Standing - Dynamic : Poor     Ambulation/Gait Training:  Distance (ft): 30 Feet (ft)(30 ft x 1 with HHA, 15 ft x 1 with RW )  Assistive Device: Other (comment)(with and without RW trials this date )  Ambulation - Level of Assistance: Minimal assistance        Gait Abnormalities: Decreased step clearance;Shuffling gait        Base of Support: Narrowed     Speed/Josefina: Shuffled;Pace decreased (<100 feet/min)  Step Length: Right shortened;Left shortened  Swing Pattern: Left asymmetrical;Right asymmetrical    Pain:  Pain level pre-treatment: 0/10  Pain level post-treatment: 0/10       Activity Tolerance:   Good    Please refer to the flowsheet for vital signs taken during this treatment. After treatment:   [] Patient left in no apparent distress sitting up in chair  [x] Patient left in no apparent distress in bed  [x] Call bell left within reach  [x] Nursing notified  [] Caregiver present  [x] Bed alarm activated  [] SCDs applied      COMMUNICATION/EDUCATION:   [x]         Role of Physical Therapy in the acute care setting. [x]         Fall prevention education was provided and the patient/caregiver indicated understanding. [x]         Patient/family have participated as able in working toward goals and plan of care. [x]         Patient/family agree to work toward stated goals and plan of care. []         Patient understands intent and goals of therapy, but is neutral about his/her participation.   []         Patient is unable to participate in stated goals/plan of care: ongoing with therapy staff.  []         Other:        Johan Ledesma   Time Calculation: 23 mins

## 2020-09-23 NOTE — PROGRESS NOTES
New PT order received and chart reviewed. Patient evaluated and currently on skilled PT caseload. Will acknowledge the order.   Thank you for the referral.  Georgeanne Bamberger, PT, DPT

## 2020-09-24 LAB
ABO + RH BLD: NORMAL
ANION GAP SERPL CALC-SCNC: 6 MMOL/L (ref 3–18)
BLOOD GROUP ANTIBODIES SERPL: NORMAL
BUN SERPL-MCNC: 16 MG/DL (ref 7–18)
BUN/CREAT SERPL: 13 (ref 12–20)
CALCIUM SERPL-MCNC: 9.2 MG/DL (ref 8.5–10.1)
CHLORIDE SERPL-SCNC: 112 MMOL/L (ref 100–111)
CO2 SERPL-SCNC: 22 MMOL/L (ref 21–32)
CREAT SERPL-MCNC: 1.19 MG/DL (ref 0.6–1.3)
ERYTHROCYTE [DISTWIDTH] IN BLOOD BY AUTOMATED COUNT: 14.1 % (ref 11.6–14.5)
GLUCOSE SERPL-MCNC: 82 MG/DL (ref 74–99)
HCT VFR BLD AUTO: 30.3 % (ref 35–45)
HCT VFR BLD AUTO: 30.6 % (ref 35–45)
HGB BLD-MCNC: 10 G/DL (ref 12–16)
HGB BLD-MCNC: 9.9 G/DL (ref 12–16)
LEVETIRACETAM SERPL-MCNC: <1 UG/ML (ref 10–40)
MAGNESIUM SERPL-MCNC: 1.9 MG/DL (ref 1.6–2.6)
MCH RBC QN AUTO: 29.8 PG (ref 24–34)
MCHC RBC AUTO-ENTMCNC: 32.7 G/DL (ref 31–37)
MCV RBC AUTO: 91.3 FL (ref 74–97)
OXCARBAZEPINE SERPL-MCNC: 37 UG/ML (ref 10–35)
PHOSPHATE SERPL-MCNC: 3 MG/DL (ref 2.5–4.9)
PLATELET # BLD AUTO: 348 K/UL (ref 135–420)
PMV BLD AUTO: 10.8 FL (ref 9.2–11.8)
POTASSIUM SERPL-SCNC: 3.8 MMOL/L (ref 3.5–5.5)
RBC # BLD AUTO: 3.32 M/UL (ref 4.2–5.3)
SODIUM SERPL-SCNC: 140 MMOL/L (ref 136–145)
SPECIMEN EXP DATE BLD: NORMAL
WBC # BLD AUTO: 6.2 K/UL (ref 4.6–13.2)

## 2020-09-24 PROCEDURE — 80048 BASIC METABOLIC PNL TOTAL CA: CPT

## 2020-09-24 PROCEDURE — 74011250637 HC RX REV CODE- 250/637: Performed by: PHYSICIAN ASSISTANT

## 2020-09-24 PROCEDURE — 65660000000 HC RM CCU STEPDOWN

## 2020-09-24 PROCEDURE — 85027 COMPLETE CBC AUTOMATED: CPT

## 2020-09-24 PROCEDURE — 85018 HEMOGLOBIN: CPT

## 2020-09-24 PROCEDURE — 3331090002 HH PPS REVENUE DEBIT

## 2020-09-24 PROCEDURE — 97535 SELF CARE MNGMENT TRAINING: CPT

## 2020-09-24 PROCEDURE — 74011250636 HC RX REV CODE- 250/636: Performed by: INTERNAL MEDICINE

## 2020-09-24 PROCEDURE — 84100 ASSAY OF PHOSPHORUS: CPT

## 2020-09-24 PROCEDURE — 2709999900 HC NON-CHARGEABLE SUPPLY

## 2020-09-24 PROCEDURE — 86901 BLOOD TYPING SEROLOGIC RH(D): CPT

## 2020-09-24 PROCEDURE — 97530 THERAPEUTIC ACTIVITIES: CPT

## 2020-09-24 PROCEDURE — 36415 COLL VENOUS BLD VENIPUNCTURE: CPT

## 2020-09-24 PROCEDURE — 99232 SBSQ HOSP IP/OBS MODERATE 35: CPT | Performed by: HOSPITALIST

## 2020-09-24 PROCEDURE — 83735 ASSAY OF MAGNESIUM: CPT

## 2020-09-24 PROCEDURE — 97116 GAIT TRAINING THERAPY: CPT

## 2020-09-24 PROCEDURE — 3331090001 HH PPS REVENUE CREDIT

## 2020-09-24 PROCEDURE — 74011250637 HC RX REV CODE- 250/637: Performed by: HOSPITALIST

## 2020-09-24 RX ORDER — RISPERIDONE 0.25 MG/1
0.5 TABLET, FILM COATED ORAL 2 TIMES DAILY
Status: DISCONTINUED | OUTPATIENT
Start: 2020-09-24 | End: 2020-09-30 | Stop reason: HOSPADM

## 2020-09-24 RX ADMIN — LEVETIRACETAM 1000 MG: 500 TABLET ORAL at 17:37

## 2020-09-24 RX ADMIN — HYDRALAZINE HYDROCHLORIDE 50 MG: 50 TABLET, FILM COATED ORAL at 22:10

## 2020-09-24 RX ADMIN — TAMSULOSIN HYDROCHLORIDE 0.4 MG: 0.4 CAPSULE ORAL at 22:10

## 2020-09-24 RX ADMIN — AMLODIPINE BESYLATE 10 MG: 10 TABLET ORAL at 10:01

## 2020-09-24 RX ADMIN — Medication 10 ML: at 06:47

## 2020-09-24 RX ADMIN — CYANOCOBALAMIN TAB 1000 MCG 500 MCG: 1000 TAB at 10:02

## 2020-09-24 RX ADMIN — Medication 10 ML: at 22:12

## 2020-09-24 RX ADMIN — ENOXAPARIN SODIUM 40 MG: 40 INJECTION SUBCUTANEOUS at 13:01

## 2020-09-24 RX ADMIN — OXCARBAZEPINE 600 MG: 300 TABLET, FILM COATED ORAL at 17:37

## 2020-09-24 RX ADMIN — HYDRALAZINE HYDROCHLORIDE 50 MG: 50 TABLET, FILM COATED ORAL at 10:02

## 2020-09-24 RX ADMIN — DIVALPROEX SODIUM 250 MG: 250 TABLET, DELAYED RELEASE ORAL at 17:37

## 2020-09-24 RX ADMIN — OXCARBAZEPINE 600 MG: 300 TABLET, FILM COATED ORAL at 10:02

## 2020-09-24 RX ADMIN — RISPERIDONE 0.5 MG: 0.25 TABLET ORAL at 17:37

## 2020-09-24 RX ADMIN — RISPERIDONE 0.5 MG: 0.25 TABLET ORAL at 13:01

## 2020-09-24 RX ADMIN — LEVETIRACETAM 1000 MG: 500 TABLET ORAL at 10:01

## 2020-09-24 RX ADMIN — DIVALPROEX SODIUM 250 MG: 250 TABLET, DELAYED RELEASE ORAL at 10:01

## 2020-09-24 RX ADMIN — LISINOPRIL 20 MG: 20 TABLET ORAL at 10:01

## 2020-09-24 RX ADMIN — HYDRALAZINE HYDROCHLORIDE 50 MG: 50 TABLET, FILM COATED ORAL at 17:37

## 2020-09-24 RX ADMIN — Medication 10 ML: at 15:52

## 2020-09-24 RX ADMIN — DIVALPROEX SODIUM 250 MG: 250 TABLET, DELAYED RELEASE ORAL at 22:10

## 2020-09-24 RX ADMIN — CLOPIDOGREL BISULFATE 75 MG: 75 TABLET ORAL at 10:02

## 2020-09-24 NOTE — PROGRESS NOTES
Problem: Mobility Impaired (Adult and Pediatric)  Goal: *Acute Goals and Plan of Care (Insert Text)  Description: Physical Therapy Goals  Initiated 9/21/2020 and to be accomplished within 7 day(s)  1. Patient will move from supine to sit and sit to supine , scoot up and down, and roll side to side in bed with minimal assistance/contact guard assist.    2.  Patient will sit edge of bed for 8 minutes with fair+ sitting balance for improved mobility. 3.  Patient will transfer from bed to chair and chair to bed with minimal assistance/contact guard assist using the least restrictive device. 4.  Patient will perform sit to stand with minimal assistance/contact guard assist.  5.  Patient will ambulate with contact guard assist for 50 feet with the least restrictive device. Prior Level of Function:   Patient reports she was discharged from the hospital ~7 days ago and has been unable to walk or stand since due to bilateral LE weakness and left LE pain. Prior to that stay, she reports being independent with mobility. She lives in single story home with 0 steps to enter with her 2 sons who are not with her all the time. Outcome: Progressing Towards Goal     PHYSICAL THERAPY TREATMENT    Patient: Sharmaine Weinstein (46 y.o. female)  Date: 9/24/2020  Diagnosis: Encephalopathy [G93.40]   Encephalopathy       Precautions: Fall, Seizure(Hallucinations)      ASSESSMENT:  Pt received in bed in NAD. Pt is able to perform bed mobility this date with supervision and stands up with min A for steadying. Pt endorses needing to use the bathroom so amb in/out of the bathroom x 20 ft each way providing HHA for steadying as pt continues with shuffling gait. Pt is able to  feet when asked but still having trouble advancing LEs anteriorly forward in more normal/reciprocal pattern despite verbal and visual cues. Pt is able to utilize grab bars in bathroom for safe transfer on and off toilet.  Pt noted to be pushing heavily trying to have a BM while on toilet due to feeling like she is constipated. Pt returned to supine in bed with all needs met. Will continue to follow per POC and recommend rehab upon discharge. Progression toward goals:   []      Improving appropriately and progressing toward goals  [x]      Improving slowly and progressing toward goals  []      Not making progress toward goals and plan of care will be adjusted     PLAN:  Patient continues to benefit from skilled intervention to address the above impairments. Continue treatment per established plan of care. Discharge Recommendations:  Rehab  Further Equipment Recommendations for Discharge:  N/A     SUBJECTIVE:   Patient stated I will use the bathroom.     OBJECTIVE DATA SUMMARY:   Critical Behavior:  Neurologic State: Alert, Confused  Orientation Level: Disoriented to situation  Cognition: Memory loss  Safety/Judgement: Lack of insight into deficits, Fall prevention  Functional Mobility Training:  Bed Mobility:     Supine to Sit: Supervision  Sit to Supine: Supervision            Transfers:  Sit to Stand: Minimum assistance  Stand to Sit: Minimum assistance       Balance:  Sitting: Intact  Standing: Impaired; With support  Standing - Static: Fair  Standing - Dynamic : Poor    Ambulation/Gait Training:  Distance (ft): 20 Feet (ft)(x2)  Assistive Device: Other (comment)(HHA )           Gait Abnormalities: Decreased step clearance        Base of Support: Narrowed     Speed/Josefina: Shuffled;Pace decreased (<100 feet/min)  Step Length: Right shortened;Left shortened        Pain:  Pain level pre-treatment: 0/10  Pain level post-treatment: 0/10       Activity Tolerance:   Good    Please refer to the flowsheet for vital signs taken during this treatment.   After treatment:   [] Patient left in no apparent distress sitting up in chair  [x] Patient left in no apparent distress in bed  [x] Call bell left within reach  [x] Nursing notified  [] Caregiver present  [x] Bed alarm activated  [] SCDs applied      COMMUNICATION/EDUCATION:   [x]         Role of Physical Therapy in the acute care setting. [x]         Fall prevention education was provided and the patient/caregiver indicated understanding. [x]         Patient/family have participated as able in working toward goals and plan of care. [x]         Patient/family agree to work toward stated goals and plan of care. []         Patient understands intent and goals of therapy, but is neutral about his/her participation.   []         Patient is unable to participate in stated goals/plan of care: ongoing with therapy staff.  []         Other:        Chris Hassan   Time Calculation: 13 mins

## 2020-09-24 NOTE — CONSULTS
[address] [facility]    Name:  Peggy Moy  MR#:   [id number]  :  1955  ACCOUNT #:  [de-identified]  DATE OF SERVICE:  2020      IDENTIFYING INFORMATION/HISTORY OF PRESENT ILLNESS:  Thank you for the opportunity to participate in the care of this patient. As you know, she is a 77-year-old female who presents with encephalopathy/altered mental status. We were consulted because of concerns about visual hallucinations. I met with the patient and reviewed the chart. I also spoke with her attending physician. Apparently, she has seizure disorder and a history of stroke. She presented with altered mental status and has had some concerns about visual hallucinations (seeing animals.)  When I met with the patient, she was fully oriented (except for being off on the exact date by two days). She was aware of her location and her reasons for being in the hospital.  She initially denied any hallucinations but then reported seeing someone in the room out of the corner of her eye, that was not actually there. When she was asked to focus on the individual, she noted that it was no longer visible. She denied any auditory hallucinations. She did report some concerns about depression and endorsed some suicidal thinking several weeks ago, she denied any recently, and voiced her willingness to seek help should those symptoms return or she have suicidal thoughts. In reviewing the patient's lab work, there were some minor abnormalities on chemistry and CBC, Depakote level 64, glucose was very mildly elevated, Tegretol level pending. Her MRI showed an old stroke, but no obvious cause of her current symptoms. MENTAL STATUS EXAM:  This is an [facility] American female appearing older than stated age. She is oriented as above. She is not agitated. Her mood is [place]. \"  Her affect is congruent. Speech is soft and monotone, she is occasionally dysarthric/difficult to understand.   Thought processes are generally logical.  Cognitive function returning to baseline. DIAGNOSES:  Altered mental status, seizure disorder, unspecified psychotic disorder. TREATMENT RECOMMENDATIONS:  Onset of psychosis in older adults is almost always of a medical origin. The likelihood of this being a primary psychiatric illness is low. The patient is experiencing visual hallucinations which is atypical for primary psychiatric illness. I am concerned that it may be related to her seizure disorder. The most recent EEG I could find in the chart was from 07/2020 and it may be worth repeating (though I certainly defer to Neurology's recommendations on that.)  It is also possible that this is persistent altered mental status which can also cause illusion or visual hallucinations; however, her intact orientation would suggest otherwise. I do not think that antipsychotic medication would be particularly useful in this case; however, you may wish to try a few doses of risperidone 0.5 mg twice a day or possibly Haldol 2 to 5 mg twice a day to see if there is any benefit. Again, I think the likelihood of that resolving the issue is fairly low, but the risk of a trial is also low and so it may be worth the attempt. Presently, the patient does not meet any acute psychiatric criteria and does not need to be held for psychiatric reasons; however, I do recommend consulting Social Work to have her set up with an outpatient therapist and to notify her primary care physician of the recent suicidal thoughts. Thank you for the consultation. Should you have further questions, please feel free to contact our service.         Yudith Mendes MD      [id number]  D:  09/23/2020 19:23  T:  09/23/2020 22:55  JOB #:  [id number]

## 2020-09-24 NOTE — PROGRESS NOTES
Patient resting in bed. A&O x2. Bed alarm on. Bed locked in lowest position. Call bell within reach. Problem: Falls - Risk of  Goal: *Absence of Falls  Description: Document Xander Kerri Fall Risk and appropriate interventions in the flowsheet. Outcome: Progressing Towards Goal  Note: Fall Risk Interventions:  Mobility Interventions: Bed/chair exit alarm, Patient to call before getting OOB    Mentation Interventions: Bed/chair exit alarm, More frequent rounding, Door open when patient unattended    Medication Interventions: Bed/chair exit alarm, Evaluate medications/consider consulting pharmacy, Patient to call before getting OOB    Elimination Interventions: Bed/chair exit alarm, Call light in reach, Patient to call for help with toileting needs              Problem: Patient Education: Go to Patient Education Activity  Goal: Patient/Family Education  Outcome: Progressing Towards Goal     Problem: Patient Education: Go to Patient Education Activity  Goal: Patient/Family Education  Outcome: Progressing Towards Goal     Problem: Patient Education: Go to Patient Education Activity  Goal: Patient/Family Education  Outcome: Progressing Towards Goal     Problem: Pressure Injury - Risk of  Goal: *Prevention of pressure injury  Description: Document John Scale and appropriate interventions in the flowsheet.   Outcome: Progressing Towards Goal  Note: Pressure Injury Interventions:  Sensory Interventions: Assess changes in LOC, Float heels, Minimize linen layers, Monitor skin under medical devices, Pad between skin to skin    Moisture Interventions: Maintain skin hydration (lotion/cream), Minimize layers    Activity Interventions: Pressure redistribution bed/mattress(bed type), Increase time out of bed    Mobility Interventions: HOB 30 degrees or less, Pressure redistribution bed/mattress (bed type)    Nutrition Interventions: Document food/fluid/supplement intake    Friction and Shear Interventions: HOB 30 degrees or less, Lift sheet, Minimize layers                Problem: Patient Education: Go to Patient Education Activity  Goal: Patient/Family Education  Outcome: Progressing Towards Goal

## 2020-09-24 NOTE — PROGRESS NOTES
conducted an initial consultation and Spiritual Assessment for Juliette Salazar, who is a 76 y.o.,female. Patient's Primary Language is: [place]. According to the patient's EMR Congregational Affiliation is: [facility].     The reason the Patient came to the hospital is:   Patient Active Problem List    Diagnosis Date Noted    Encephalopathy 09/21/2020    Hallucinations 09/21/2020    Abnormal coordination 09/21/2020    Recurrent seizures ([address] 09/08/2020    Seizure ([address] 08/03/2019    Status epilepticus ([address] 08/02/2019    Cerebrovascular accident (CVA) ([address] 04/04/2016    TIA (transient ischemic attack) 01/07/2016    Dyslipidemia 12/01/2015    Left-sided weakness 10/30/2015    Stroke ([address] 10/30/2015    Essential hypertension 09/11/2015    Convulsion ([address] 07/21/2015    Elevated Dilantin level 02/20/2015    HTN (hypertension) 02/20/2015    Essential hypertension, benign 03/12/2014    CVA (cerebral infarction) 03/10/2014    Non compliance w medication regimen 03/10/2014    Pulmonary embolism ([address] 02/02/2014    Chest pain 02/02/2014    Other and unspecified noninfectious gastroenteritis and colitis(558.9) 02/02/2014    Ataxia 12/30/2011    Headache 12/30/2011        The  provided the following Interventions:  Initiated a relationship of care and support. Explored issues of pushpa, belief, spirituality and Baptism/ritual needs while hospitalized. Listened empathically. Provided chaplaincy education. Provided information about Spiritual Care Services. Offered prayer and assurance of continued prayers on patient's behalf. Chart reviewed. The following outcomes where achieved:  Patient shared limited information about both their medical narrative and spiritual journey/beliefs.  confirmed Patient's Congregational Affiliation. Patient processed feeling about current hospitalization. Patient expressed gratitude for 's visit.     Assessment:  Patient does not have any Restoration/cultural needs that will affect patient's preferences in health care. There are no spiritual or Restoration issues which require intervention at this time. Plan:  Chaplains will continue to follow and will provide pastoral care on an as needed/requested basis.  recommends bedside caregivers page  on duty if patient shows signs of acute spiritual or emotional distress. Patient request a video visit. Chaplain COLEMAN  [address] [place] [address]   [phone]

## 2020-09-24 NOTE — PROGRESS NOTES
Bedside shift change report given to Nel Badillo RN (oncoming nurse) by Connor Schreiber RN (offgoing nurse). Report included the following information SBAR, Kardex, Intake/Output, MAR and Recent Results.

## 2020-09-24 NOTE — PROGRESS NOTES
*ATTENTION:  This note has been created by a medical student for educational purposes only. Please do not refer to the content of this note for clinical decision-making, billing, or other purposes. Please see attending physicians note to obtain clinical information on this patient. *       Student Progress Note  Please refer to attendings daily rounding note for full details      Patient: Carolina Gaines MRN: [id number]  CSN: [id number]    YOB: 1955  Age: 76 y.o. Sex: female    DOA: 9/21/2020 LOS:  LOS: 3 days          Subjective:      Kasandra Cruz, a 75 yo female, is being seen on admission day 4. The pt denies any seizures today. But states that she keeps seeing 3 men coming in to her room and pouring water over her. The patient denies any pain, or any fevers, chills, N/V/D, CP, or SOB. The pt states that she has been eating some of the food from her meal trays and states that she still has not had a BM. Objective:      Visit Vitals  /88 (BP 1 Location: Right arm, BP Patient Position: At rest)   Pulse 91   Temp 98.1 °F (36.7 °C)   Resp 15   Ht 5' (1.524 m)   Wt 62.8 kg (138 lb 8 oz)   SpO2 95%   BMI 27.05 kg/m²     Physical Exam:  Gen: Afebrile. In NAD. HEENT: Mucous membranes moist. PERRL. No scleral icterus. CV: RRR. No murmurs. Resp: CTA. No wheezes, rales, or rhonchi. Abd: No TTP. Soft and non-distended. Active bowel sounds. Extrem: Trace pedal edema, bilateral LE. PT pulses 2+. Skin: No visible rashes or lesions. Neuro: Alert and interactive. Answers questions appropriately. Motor and sensory grossly intact.     Results for Christiane Olla (MRN [id number]) as of 9/24/2020 10:46   9/24/2020 04:46   WBC 6.2   RBC 3.32 (L)   HGB 9.9 (L)   HCT 30.3 (L)   MCV 91.3   MCH 29.8   MCHC 32.7   RDW 14.1   PLATELET 991   MPV 67.4     Results for Christiane Odin (MRN [id number]) as of 9/24/2020 10:46   9/24/2020 04:46   Sodium 140   Potassium 3.8   Chloride 112 (H)   CO2 22   Anion gap 6   Glucose 82   BUN 16   Creatinine 1.19   BUN/Creatinine ratio 13   Calcium 9.2   Phosphorus 3.0   Magnesium 1.9   GFR est non-AA 45 (L)   GFR est AA 55 (L)     Results for Goldie Menjivar (MRN [id number]) as of 9/24/2020 10:46   9/22/2020 01:00   Oxcarbazepine 37 (H)     Results for Goldie Menjivar (MRN [id number]) as of 9/24/2020 10:46   9/21/2020 00:31   LEVETIRACETAM (KEPPRA) Pending     Results for Goldie Menjivar (MRN [id number]) as of 9/24/2020 10:46   9/22/2020 09:56   Valproic acid 54       Assessment:     1. Encephalopathy   2. Visual hallucinations   3. Abnormal coordination  4. Hx of CVA / TIA  5. Hx of status epilepticus  6. Hx of recurrent seizures  7. Essential HTN    Plan:     > Brain MRI w/o contrast negative for acute infarct or acute hemorrhage, head CT w/o contrast negative for acute findings. > Continue Keppra, Depakote, and Oxcarbazepine regimen. Will need to order levels for each drug. Valproic acid has resulted and is therapeutic, at 54. Oxcarbazepine 37. Keppra still pending. Will need to emphasize medication compliance to patient and family before discharge. > Appreciate continued Neurology consultation. Patient has been signed off from a Neurology standpoint, per Neurology note.  > Appreciate Psychiatry evaluation and consultation - per note, patient was noted to have visual hallucinations during interview and endorses to feeling depressed and having suicidal thoughts several weeks ago. Patient states that she will seek help if these symptoms return of if her suicidal thoughts return. Patient does not meet any acute psychiatric criteria and does not need to be held for psychiatric reasons.  Recommendation is for SW to set her up with outpatient therapist and to notify PCP of suicidal thoughts.  > PT / OT evaluation - recommendation is rehab  > DVT ppx - Lovenox sub-Q  > Seizure precautions     Selwyn Chavira  9/24/2020  10:40 AM    *ATTENTION:  This note has been created by a medical student for educational purposes only. Please do not refer to the content of this note for clinical decision-making, billing, or other purposes. Please see attending physicians note to obtain clinical information on this patient. *

## 2020-09-24 NOTE — PROGRESS NOTES
conducted an initial consultation and Spiritual Assessment for Danette Smalls, who is a 76 y.o.,female. Patient's Primary Language is: [place]. According to the patient's EMR Mu-ism Affiliation is: [facility].     The reason the Patient came to the hospital is:   Patient Active Problem List    Diagnosis Date Noted    Encephalopathy 09/21/2020    Hallucinations 09/21/2020    Abnormal coordination 09/21/2020    Recurrent seizures ([address] 09/08/2020    Seizure ([address] 08/03/2019    Status epilepticus ([address] 08/02/2019    Cerebrovascular accident (CVA) ([address] 04/04/2016    TIA (transient ischemic attack) 01/07/2016    Dyslipidemia 12/01/2015    Left-sided weakness 10/30/2015    Stroke ([address] 10/30/2015    Essential hypertension 09/11/2015    Convulsion ([address] 07/21/2015    Elevated Dilantin level 02/20/2015    HTN (hypertension) 02/20/2015    Essential hypertension, benign 03/12/2014    CVA (cerebral infarction) 03/10/2014    Non compliance w medication regimen 03/10/2014    Pulmonary embolism ([address] 02/02/2014    Chest pain 02/02/2014    Other and unspecified noninfectious gastroenteritis and colitis(558.9) 02/02/2014    Ataxia 12/30/2011    Headache 12/30/2011        The  provided the following Interventions:  Initiated a relationship of care and support. Explored issues of pushpa, and spirituality while hospitalized. Listened empathically. Offered prayer and assurance of continued prayers on patient's behalf. The following outcomes where achieved:  Patient shared information about her medical narrative and spiritual journey/beliefs.  confirmed Patient's Mu-ism Affiliation. Patient processed feeling about current hospitalization. Patient expressed gratitude for 's visit. Assessment:  There are no spiritual or Pentecostal issues which require intervention at this time.      Plan:  Chaplains will continue to follow and will provide pastoral care on an as needed/requested basis.  recommends bedside caregivers page  on duty if patient shows signs of acute spiritual or emotional distress.     [facility]   [phone]

## 2020-09-24 NOTE — PROGRESS NOTES
Problem: Self Care Deficits Care Plan (Adult)  Goal: *Acute Goals and Plan of Care (Insert Text)  Description: Occupational Therapy Goals  Initiated 9/21/2020 within 7 day(s). 1.  Patient will perform self-feeding with modified independence. 2.  Patient will perform grooming with supervision/set-up. 3.  Patient will perform upper body dressing with supervision/set-up. 4.  Patient will perform lower body dressing with supervision/setup. 5.  Patient will perform bed mobility with minimal assistance in preparation for ADLs. 6.  Patient will participate in upper extremity therapeutic exercise/activities with supervision/set-up for 8 minutes. Prior Level of Function: Pt recently discharged from hospital 9/17/2020. Prior to d/c, OT/PT were recommending Rehab. This admission, pt reports coming from home, living with her sons. Pt reports sons have been providing assistance with UB/LB dressing/bathing PTA. Per OT gino on 9/14/2020, pt reports she was (I) with basic self-care/ADLs prior to that admission however appeared to be a poor historian at that time. Outcome: Progressing Towards Goal     Problem: Patient Education: Go to Patient Education Activity  Goal: Patient/Family Education  Outcome: Progressing Towards Goal   OCCUPATIONAL THERAPY TREATMENT    Patient: Sanjana Solorzano (89 y.o. female)  Date: 9/24/2020  Diagnosis: Encephalopathy [G93.40]   Encephalopathy       Precautions: Fall, Seizure(Hallucinations)  PLOF: Pt recently discharged from hospital 9/17/2020. Prior to d/c, OT/PT were recommending Rehab. This admission, pt reports coming from home, living with her sons. Pt reports sons have been providing assistance with UB/LB dressing/bathing PTA. Per OT gino on 9/14/2020, pt reports she was (I) with basic self-care/ADLs prior to that admission however appeared to be a poor historian at that time. Chart, occupational therapy assessment, plan of care, and goals were reviewed.   ASSESSMENT:  Pt presented alert, slumped down in bed upon therapist arrival and agreeable for participation at this time. Pt performed functional bed mobility supine-sitting EOB requiring assist with B UE's. STS transfer MIN A requiring vc's for anterior weight shift and proper hand placement. Pt demonstrated ~ 4 side steps to [facility] with CGA and HHA for optimal bed positioning in prep for self feeding task. Pt sat EOB with F balance during self feeding task requiring S/U/Supervision. Pt with increased B UE coordination this date with minimal spillage, MANZANO observed pt utilizes spoon over fork 2/2 difficulty with spearing foods. Pt reports feeling fatigued requesting to return to supine position requiring MIN A. Pt left supine in bed with HOB elevated and all needs left within reach. Progression toward goals:  []          Improving appropriately and progressing toward goals  [x]          Improving slowly and progressing toward goals  []          Not making progress toward goals and plan of care will be adjusted     PLAN:  Patient continues to benefit from skilled intervention to address the above impairments. Continue treatment per established plan of care. Discharge Recommendations: Rehab  Further Equipment Recommendations for Discharge:  TBA pending progress     SUBJECTIVE:   Patient stated  I am slow this morning. \"    OBJECTIVE DATA SUMMARY:   Cognitive/Behavioral Status:  Neurologic State: Alert, Confused  Orientation Level: Disoriented to situation  Cognition: Memory loss  Safety/Judgement: Lack of insight into deficits, Fall prevention    Functional Mobility and Transfers for ADLs:   Bed Mobility:     Supine to Sit: Minimum assistance  Sit to Supine: Minimum assistance      Transfers:  Sit to Stand: Minimum assistance  Stand to Sit: Minimum assistance          Balance:  Sitting: Intact; With support  Standing: Impaired; With support  Standing - Static: Fair  Standing - Dynamic : Poor    ADL Intervention:  Feeding  Feeding Assistance: Set-up  Container Management: Set-up  Utensil Management: Supervision  Food to Mouth: Supervision  Cues: Verbal cues provided;Visual cues provided      Pain:  Pt reports no pain at this time    Activity Tolerance:    Fair  Please refer to the flowsheet for vital signs taken during this treatment. After treatment:   []  Patient left in no apparent distress sitting up in chair  [x]  Patient left in no apparent distress in bed  [x]  Call bell left within reach  [x]  Nursing notified  []  Caregiver present  []  Bed alarm activated    COMMUNICATION/EDUCATION:   [] Role of Occupational Therapy in the acute care setting  [] Home safety education was provided and the patient/caregiver indicated understanding. [] Patient/family have participated as able in working towards goals and plan of care. [x] Patient/family agree to work toward stated goals and plan of care. [] Patient understands intent and goals of therapy, but is neutral about his/her participation. [] Patient is unable to participate in goal setting and plan of care.       Thank you for this referral.  JOSE JUAN Lainez  Time Calculation: 25 mins

## 2020-09-24 NOTE — ROUTINE PROCESS
Bedside and Verbal shift change report given to Sixto Curran RN and PEGGY Gacria (oncoming nurse) by Queenie Rodriguez RN (offgoing nurse). Report included the following information SBAR, Kardex, MAR and Recent Results.

## 2020-09-24 NOTE — PROGRESS NOTES
Problem: Falls - Risk of  Goal: *Absence of Falls  Description: Document Chivo Stubbs Fall Risk and appropriate interventions in the flowsheet. Outcome: Progressing Towards Goal  Note: Fall Risk Interventions:  Mobility Interventions: Bed/chair exit alarm, Communicate number of staff needed for ambulation/transfer, OT consult for ADLs, Patient to call before getting OOB, PT Consult for mobility concerns, PT Consult for assist device competence, Utilize walker, cane, or other assistive device    Mentation Interventions: Adequate sleep, hydration, pain control, Bed/chair exit alarm, Door open when patient unattended, Evaluate medications/consider consulting pharmacy, Increase mobility, More frequent rounding, Reorient patient, Room close to nurse's station, Toileting rounds    Medication Interventions: Bed/chair exit alarm, Evaluate medications/consider consulting pharmacy, Patient to call before getting OOB, Teach patient to arise slowly    Elimination Interventions: Bed/chair exit alarm, Call light in reach, Elevated toilet seat, Patient to call for help with toileting needs, Toilet paper/wipes in reach, Toileting schedule/hourly rounds, Stay With Me (per policy)              Problem: Patient Education: Go to Patient Education Activity  Goal: Patient/Family Education  Outcome: Progressing Towards Goal     Problem: Patient Education: Go to Patient Education Activity  Goal: Patient/Family Education  Outcome: Progressing Towards Goal     Problem: Patient Education: Go to Patient Education Activity  Goal: Patient/Family Education  Outcome: Progressing Towards Goal     Problem: Pressure Injury - Risk of  Goal: *Prevention of pressure injury  Description: Document John Scale and appropriate interventions in the flowsheet.   Outcome: Progressing Towards Goal  Note: Pressure Injury Interventions:  Sensory Interventions: Assess changes in LOC    Moisture Interventions: Maintain skin hydration (lotion/cream)    Activity Interventions: Pressure redistribution bed/mattress(bed type)    Mobility Interventions: HOB 30 degrees or less    Nutrition Interventions: Document food/fluid/supplement intake    Friction and Shear Interventions: HOB 30 degrees or less                Problem: Patient Education: Go to Patient Education Activity  Goal: Patient/Family Education  Outcome: Progressing Towards Goal

## 2020-09-24 NOTE — PROGRESS NOTES
[facility] Hospitalist Group  Progress Note    Patient: Margarita Verma Age: 76 y.o. : 1955 MR#: [id number] SSN: [id number]  Date/Time: 2020     Subjective: Patient feels better, no hallucinations today. Still continues to feel weak and tired, denies any weakness or numbness. Per RN and medical student, patient did have some hallucinations but patient denies. Assessment/Plan:   1. Visual hallucinations: Unclear reason, psych eval noted. Will start Risperdal.  2.  Acute metabolic encephalopathy: Improved now, MRI/CT negative, neuro input noted. 3.  Mild hypercalcemia due to dehydration: improved now   4. Seizure disorder: no seizure episodes   5. CKD3, will monitor  6. History of UTI with enterococcus, s/p treatment   6.  HTN: BP stable, continue hypertensive medication  7.  Hyperlipidemia   8. Full code  Discussed with the patient at the bedside, discussed with patient's son Taras Segura over the phone in length in detail. Discussed about options of going to SNF, both patient and son, agree with the SNF placement. Will get COVID-19 testing for placement. Case discussed with:  [x]Patient  [x]Family  [x]Nursing  []Case Management  DVT Prophylaxis:  [x]Lovenox  []Hep SQ  []SCDs  []Coumadin   []Eliquis/Xarelto     Objective:   VS:   Visit Vitals  BP (!) 164/77   Pulse 90   Temp 98.9 °F (37.2 °C)   Resp 16   Ht 5' (1.524 m)   Wt 62.8 kg (138 lb 8 oz)   SpO2 100%   BMI 27.05 kg/m²      Tmax/24hrs: Temp (24hrs), Av.5 °F (36.9 °C), Min:97.4 °F (36.3 °C), Max:99 °F (37.2 °C)  IOBRIEFNo intake or output data in the 24 hours ending 20 1428    General:  Alert, cooperative, no acute distress    Pulmonary:  CTA Bilaterally. No Wheezing/Rales. Cardiovascular: Regular rate and Rhythm. GI:  Soft, Non distended, Non tender. + Bowel sounds. Extremities:  No edema. No calf tenderness. Neurologic: Alert and oriented X 3.   Speech clear, slow to respond, moves all extremities well, good handgrip.       Medications:   Current Facility-Administered Medications   Medication Dose Route Frequency    risperiDONE (RisperDAL) tablet 0.5 mg  0.5 mg Oral BID    enoxaparin (LOVENOX) injection 40 mg  40 mg SubCUTAneous Q24H    cyanocobalamin tablet 500 mcg  500 mcg Oral DAILY    amLODIPine (NORVASC) tablet 10 mg  10 mg Oral DAILY    ergocalciferol capsule 50,000 Units  50,000 Units Oral Once per day on Thu Sat    levETIRAcetam (KEPPRA) tablet 1,000 mg  1,000 mg Oral BID    OXcarbazepine (TRILEPTAL) tablet 600 mg  600 mg Oral BID    clopidogreL (PLAVIX) tablet 75 mg  75 mg Oral DAILY    hydrALAZINE (APRESOLINE) tablet 50 mg  50 mg Oral TID    lisinopriL (PRINIVIL, ZESTRIL) tablet 20 mg  20 mg Oral DAILY    tamsulosin (FLOMAX) capsule 0.4 mg  0.4 mg Oral QHS    divalproex DR (DEPAKOTE) tablet 250 mg  250 mg Oral TID    sodium chloride (NS) flush 5-40 mL  5-40 mL IntraVENous Q8H    sodium chloride (NS) flush 5-40 mL  5-40 mL IntraVENous PRN    acetaminophen (TYLENOL) tablet 650 mg  650 mg Oral Q6H PRN    Or    acetaminophen (TYLENOL) suppository 650 mg  650 mg Rectal Q6H PRN    polyethylene glycol (MIRALAX) packet 17 g  17 g Oral DAILY PRN    promethazine (PHENERGAN) tablet 12.5 mg  12.5 mg Oral Q6H PRN    Or    ondansetron (ZOFRAN) injection 4 mg  4 mg IntraVENous Q6H PRN    LORazepam (ATIVAN) injection 1 mg  1 mg IntraVENous Q6H PRN       Labs:    Recent Results (from the past 24 hour(s))   METABOLIC PANEL, BASIC    Collection Time: 09/24/20  4:46 AM   Result Value Ref Range    Sodium 140 136 - 145 mmol/L    Potassium 3.8 3.5 - 5.5 mmol/L    Chloride 112 (H) 100 - 111 mmol/L    CO2 22 21 - 32 mmol/L    Anion gap 6 3.0 - 18 mmol/L    Glucose 82 74 - 99 mg/dL    BUN 16 7.0 - 18 MG/DL    Creatinine 1.19 0.6 - 1.3 MG/DL    BUN/Creatinine ratio 13 12 - 20      GFR est AA 55 (L) >60 ml/min/1.73m2    GFR est non-AA 45 (L) >60 ml/min/1.73m2    Calcium 9.2 8.5 - 10.1 MG/DL   CBC W/O DIFF    Collection Time: 09/24/20  4:46 AM   Result Value Ref Range    WBC 6.2 4.6 - 13.2 K/uL    RBC 3.32 (L) 4.20 - 5.30 M/uL    HGB 9.9 (L) 12.0 - 16.0 g/dL    HCT 30.3 (L) 35.0 - 45.0 %    MCV 91.3 74.0 - 97.0 FL    MCH 29.8 24.0 - 34.0 PG    MCHC 32.7 31.0 - 37.0 g/dL    RDW 14.1 11.6 - 14.5 %    PLATELET 604 173 - 244 K/uL    MPV 10.8 9.2 - 11.8 FL   MAGNESIUM    Collection Time: 09/24/20  4:46 AM   Result Value Ref Range    Magnesium 1.9 1.6 - 2.6 mg/dL   PHOSPHORUS    Collection Time: 09/24/20  4:46 AM   Result Value Ref Range    Phosphorus 3.0 2.5 - 4.9 MG/DL   HGB & HCT    Collection Time: 09/24/20  9:47 AM   Result Value Ref Range    HGB 10.0 (L) 12.0 - 16.0 g/dL    HCT 30.6 (L) 35.0 - 45.0 %   TYPE & SCREEN    Collection Time: 09/24/20  9:47 AM   Result Value Ref Range    Crossmatch Expiration 09/27/2020     ABO/Rh(D) Fausto Willharesh POSITIVE     Antibody screen NEG        Signed By: Zofia Negro MD     September 24, 2020      Disclaimer: Sections of this note are dictated using utilizing voice recognition software. Minor typographical errors may be present. If questions arise, please do not hesitate to contact me or call our department.

## 2020-09-25 LAB
ANION GAP SERPL CALC-SCNC: 7 MMOL/L (ref 3–18)
BUN SERPL-MCNC: 13 MG/DL (ref 7–18)
BUN/CREAT SERPL: 12 (ref 12–20)
CALCIUM SERPL-MCNC: 9.5 MG/DL (ref 8.5–10.1)
CHLORIDE SERPL-SCNC: 111 MMOL/L (ref 100–111)
CO2 SERPL-SCNC: 21 MMOL/L (ref 21–32)
CREAT SERPL-MCNC: 1.08 MG/DL (ref 0.6–1.3)
ERYTHROCYTE [DISTWIDTH] IN BLOOD BY AUTOMATED COUNT: 13.9 % (ref 11.6–14.5)
GLUCOSE SERPL-MCNC: 91 MG/DL (ref 74–99)
HCT VFR BLD AUTO: 30.2 % (ref 35–45)
HGB BLD-MCNC: 9.9 G/DL (ref 12–16)
MCH RBC QN AUTO: 30.2 PG (ref 24–34)
MCHC RBC AUTO-ENTMCNC: 32.8 G/DL (ref 31–37)
MCV RBC AUTO: 92.1 FL (ref 74–97)
PLATELET # BLD AUTO: 306 K/UL (ref 135–420)
PMV BLD AUTO: 10.5 FL (ref 9.2–11.8)
POTASSIUM SERPL-SCNC: 3.6 MMOL/L (ref 3.5–5.5)
RBC # BLD AUTO: 3.28 M/UL (ref 4.2–5.3)
SODIUM SERPL-SCNC: 139 MMOL/L (ref 136–145)
WBC # BLD AUTO: 5.4 K/UL (ref 4.6–13.2)

## 2020-09-25 PROCEDURE — 74011250637 HC RX REV CODE- 250/637: Performed by: PHYSICIAN ASSISTANT

## 2020-09-25 PROCEDURE — 97110 THERAPEUTIC EXERCISES: CPT

## 2020-09-25 PROCEDURE — 74011250636 HC RX REV CODE- 250/636: Performed by: INTERNAL MEDICINE

## 2020-09-25 PROCEDURE — 3331090002 HH PPS REVENUE DEBIT

## 2020-09-25 PROCEDURE — 36415 COLL VENOUS BLD VENIPUNCTURE: CPT

## 2020-09-25 PROCEDURE — 65660000000 HC RM CCU STEPDOWN

## 2020-09-25 PROCEDURE — 97530 THERAPEUTIC ACTIVITIES: CPT

## 2020-09-25 PROCEDURE — 87635 SARS-COV-2 COVID-19 AMP PRB: CPT

## 2020-09-25 PROCEDURE — 99232 SBSQ HOSP IP/OBS MODERATE 35: CPT | Performed by: HOSPITALIST

## 2020-09-25 PROCEDURE — 80048 BASIC METABOLIC PNL TOTAL CA: CPT

## 2020-09-25 PROCEDURE — 85027 COMPLETE CBC AUTOMATED: CPT

## 2020-09-25 PROCEDURE — 3331090001 HH PPS REVENUE CREDIT

## 2020-09-25 PROCEDURE — 74011250637 HC RX REV CODE- 250/637: Performed by: HOSPITALIST

## 2020-09-25 RX ORDER — POLYETHYLENE GLYCOL 3350 17 G/17G
17 POWDER, FOR SOLUTION ORAL DAILY
Status: DISCONTINUED | OUTPATIENT
Start: 2020-09-25 | End: 2020-09-30 | Stop reason: HOSPADM

## 2020-09-25 RX ORDER — FACIAL-BODY WIPES
10 EACH TOPICAL DAILY PRN
Status: DISCONTINUED | OUTPATIENT
Start: 2020-09-25 | End: 2020-09-30 | Stop reason: HOSPADM

## 2020-09-25 RX ORDER — DOCUSATE SODIUM 100 MG/1
100 CAPSULE, LIQUID FILLED ORAL
Status: DISCONTINUED | OUTPATIENT
Start: 2020-09-25 | End: 2020-09-30 | Stop reason: HOSPADM

## 2020-09-25 RX ADMIN — Medication 10 ML: at 14:29

## 2020-09-25 RX ADMIN — RISPERIDONE 0.5 MG: 0.25 TABLET ORAL at 17:30

## 2020-09-25 RX ADMIN — LEVETIRACETAM 1000 MG: 500 TABLET ORAL at 17:31

## 2020-09-25 RX ADMIN — AMLODIPINE BESYLATE 10 MG: 10 TABLET ORAL at 08:32

## 2020-09-25 RX ADMIN — LEVETIRACETAM 1000 MG: 500 TABLET ORAL at 08:32

## 2020-09-25 RX ADMIN — DIVALPROEX SODIUM 250 MG: 250 TABLET, DELAYED RELEASE ORAL at 23:33

## 2020-09-25 RX ADMIN — OXCARBAZEPINE 600 MG: 300 TABLET, FILM COATED ORAL at 17:30

## 2020-09-25 RX ADMIN — OXCARBAZEPINE 600 MG: 300 TABLET, FILM COATED ORAL at 08:32

## 2020-09-25 RX ADMIN — HYDRALAZINE HYDROCHLORIDE 75 MG: 50 TABLET ORAL at 23:33

## 2020-09-25 RX ADMIN — CLOPIDOGREL BISULFATE 75 MG: 75 TABLET ORAL at 08:33

## 2020-09-25 RX ADMIN — Medication 10 ML: at 06:36

## 2020-09-25 RX ADMIN — POLYETHYLENE GLYCOL 3350 17 G: 17 POWDER, FOR SOLUTION ORAL at 12:59

## 2020-09-25 RX ADMIN — DIVALPROEX SODIUM 250 MG: 250 TABLET, DELAYED RELEASE ORAL at 08:32

## 2020-09-25 RX ADMIN — TAMSULOSIN HYDROCHLORIDE 0.4 MG: 0.4 CAPSULE ORAL at 23:33

## 2020-09-25 RX ADMIN — HYDRALAZINE HYDROCHLORIDE 50 MG: 50 TABLET, FILM COATED ORAL at 08:32

## 2020-09-25 RX ADMIN — LISINOPRIL 20 MG: 20 TABLET ORAL at 08:32

## 2020-09-25 RX ADMIN — RISPERIDONE 0.5 MG: 0.25 TABLET ORAL at 08:32

## 2020-09-25 RX ADMIN — Medication 10 ML: at 23:34

## 2020-09-25 RX ADMIN — DIVALPROEX SODIUM 250 MG: 250 TABLET, DELAYED RELEASE ORAL at 17:31

## 2020-09-25 RX ADMIN — CYANOCOBALAMIN TAB 1000 MCG 500 MCG: 1000 TAB at 08:32

## 2020-09-25 RX ADMIN — ENOXAPARIN SODIUM 40 MG: 40 INJECTION SUBCUTANEOUS at 12:59

## 2020-09-25 NOTE — PROGRESS NOTES
[facility] Hospitalist Group  Progress Note    Patient: Adal Mccain Age: 76 y.o. : 1955 MR#: [id number] SSN: [id number]  Date/Time: 2020     Subjective: Patient feels better, no hallucinations today. Per medical student, patient did have some hallucinations. Assessment/Plan:   1. Visual hallucinations: Unclear reason, Continue Risperdal per psych. 2.  Acute metabolic encephalopathy: Improved now, MRI/CT negative, neuro input noted. 3.  Mild hypercalcemia due to dehydration: improved now   4. Seizure disorder: no seizure episodes   5. CKD3, will monitor  6. History of UTI with enterococcus, s/p treatment   6.  HTN: BP high, increase hydralazine, continue amlodipine  7.  Hyperlipidemia   8. Full code  Discussed with the patient at the bedside, discussed with patient's son Cecily Rodarte over the phone in length in detail. Awaiting placement  pending COVID-19 testing for placement. Case discussed with:  [x]Patient  [x]Family  [x]Nursing  []Case Management  DVT Prophylaxis:  [x]Lovenox  []Hep SQ  []SCDs  []Coumadin   []Eliquis/Xarelto     Objective:   VS:   Visit Vitals  BP (!) 165/72 (BP 1 Location: Right arm, BP Patient Position: At rest)   Pulse 69   Temp 98.7 °F (37.1 °C)   Resp 15   Ht 5' (1.524 m)   Wt 62.8 kg (138 lb 8 oz)   SpO2 100%   BMI 27.05 kg/m²      Tmax/24hrs: Temp (24hrs), Av.4 °F (36.9 °C), Min:97.6 °F (36.4 °C), Max:98.7 °F (37.1 °C)  IOBRIEFNo intake or output data in the 24 hours ending 20 1617    General:  Alert, cooperative, no acute distress    Pulmonary:  CTA Bilaterally. No Wheezing/Rales. Cardiovascular: Regular rate and Rhythm. GI:  Soft, Non distended, Non tender. + Bowel sounds. Extremities:  No edema. No calf tenderness. Neurologic: Alert and oriented X 3. Speech clear, slow to respond, moves all extremities well, good handgrip.       Medications:   Current Facility-Administered Medications   Medication Dose Route Frequency    polyethylene glycol (MIRALAX) packet 17 g  17 g Oral DAILY    docusate sodium (COLACE) capsule 100 mg  100 mg Oral BID PRN    bisacodyL (DULCOLAX) suppository 10 mg  10 mg Rectal DAILY PRN    risperiDONE (RisperDAL) tablet 0.5 mg  0.5 mg Oral BID    enoxaparin (LOVENOX) injection 40 mg  40 mg SubCUTAneous Q24H    cyanocobalamin tablet 500 mcg  500 mcg Oral DAILY    amLODIPine (NORVASC) tablet 10 mg  10 mg Oral DAILY    ergocalciferol capsule 50,000 Units  50,000 Units Oral Once per day on Thu Sat    levETIRAcetam (KEPPRA) tablet 1,000 mg  1,000 mg Oral BID    OXcarbazepine (TRILEPTAL) tablet 600 mg  600 mg Oral BID    clopidogreL (PLAVIX) tablet 75 mg  75 mg Oral DAILY    hydrALAZINE (APRESOLINE) tablet 50 mg  50 mg Oral TID    lisinopriL (PRINIVIL, ZESTRIL) tablet 20 mg  20 mg Oral DAILY    tamsulosin (FLOMAX) capsule 0.4 mg  0.4 mg Oral QHS    divalproex DR (DEPAKOTE) tablet 250 mg  250 mg Oral TID    sodium chloride (NS) flush 5-40 mL  5-40 mL IntraVENous Q8H    sodium chloride (NS) flush 5-40 mL  5-40 mL IntraVENous PRN    acetaminophen (TYLENOL) tablet 650 mg  650 mg Oral Q6H PRN    Or    acetaminophen (TYLENOL) suppository 650 mg  650 mg Rectal Q6H PRN    promethazine (PHENERGAN) tablet 12.5 mg  12.5 mg Oral Q6H PRN    Or    ondansetron (ZOFRAN) injection 4 mg  4 mg IntraVENous Q6H PRN    LORazepam (ATIVAN) injection 1 mg  1 mg IntraVENous Q6H PRN       Labs:    Recent Results (from the past 24 hour(s))   METABOLIC PANEL, BASIC    Collection Time: 09/25/20  4:49 AM   Result Value Ref Range    Sodium 139 136 - 145 mmol/L    Potassium 3.6 3.5 - 5.5 mmol/L    Chloride 111 100 - 111 mmol/L    CO2 21 21 - 32 mmol/L    Anion gap 7 3.0 - 18 mmol/L    Glucose 91 74 - 99 mg/dL    BUN 13 7.0 - 18 MG/DL    Creatinine 1.08 0.6 - 1.3 MG/DL    BUN/Creatinine ratio 12 12 - 20      GFR est AA >60 >60 ml/min/1.73m2    GFR est non-AA 51 (L) >60 ml/min/1.73m2    Calcium 9.5 8.5 - 10.1 MG/DL CBC W/O DIFF    Collection Time: 09/25/20  4:49 AM   Result Value Ref Range    WBC 5.4 4.6 - 13.2 K/uL    RBC 3.28 (L) 4.20 - 5.30 M/uL    HGB 9.9 (L) 12.0 - 16.0 g/dL    HCT 30.2 (L) 35.0 - 45.0 %    MCV 92.1 74.0 - 97.0 FL    MCH 30.2 24.0 - 34.0 PG    MCHC 32.8 31.0 - 37.0 g/dL    RDW 13.9 11.6 - 14.5 %    PLATELET 454 727 - 089 K/uL    MPV 10.5 9.2 - 11.8 FL   SARS-COV-2    Collection Time: 09/25/20  6:48 AM   Result Value Ref Range    SARS-CoV-2 PENDING     Specimen source Nasopharyngeal      Specimen type NP Swab         Signed By: Sergo Wills MD     September 25, 2020      Disclaimer: Sections of this note are dictated using utilizing voice recognition software. Minor typographical errors may be present. If questions arise, please do not hesitate to contact me or call our department.

## 2020-09-25 NOTE — HOME CARE
Patient is Active to [facility] for SN,PT,OT;  per notes patient for possible SNF placement . RL TAPIA.

## 2020-09-25 NOTE — PROGRESS NOTES
*ATTENTION:  This note has been created by a medical student for educational purposes only. Please do not refer to the content of this note for clinical decision-making, billing, or other purposes. Please see attending physicians note to obtain clinical information on this patient. *       Student Progress Note  Please refer to attendings daily rounding note for full details    Patient: Victorino Faustin MRN: [id number]  CSN: [id number]    YOB: 1955  Age: 76 y.o. Sex: female    DOA: 9/21/2020 LOS:  LOS: 4 days          Subjective:      Oapl Crum, a 77 yo female, is being seen on admission day 5. The pt denies any seizures but does report having visual hallucinations of people coming into the room and pouring water on her and wetting the sheets. She reports having some chills this morning as well. She denies any fever, CP, SOB, or N/V/D. The patient still has not had a BM and states that when she is in the bed she feels like she wants to go but when she goes to the toilet, she can't. Objective:      Visit Vitals  BP (!) 163/78 (BP 1 Location: Right arm, BP Patient Position: At rest)   Pulse 89   Temp 98.7 °F (37.1 °C)   Resp 19   Ht 5' (1.524 m)   Wt 62.8 kg (138 lb 8 oz)   SpO2 100%   BMI 27.05 kg/m²     Physical Exam:  Gen: Afebrile. In NAD. HEENT: Mucous membranes moist. PERRL. No scleral icterus. CV: RRR. No murmurs. Resp: CTA. No wheezes, rales, or rhonchi. Abd: No TTP. Abdomen soft and non-distended. Active bowel sounds. Extrem: Trace pedal edema. No calf tenderness. Skin: No rashes or lesions. Neuro: Alert and interactive. Answers questions appropriately. Motor and sensory grossly intact.     Results for Azeb Forte (MRN [id number]) as of 9/25/2020 10:36   9/25/2020 04:49   WBC 5.4   RBC 3.28 (L)   HGB 9.9 (L)   HCT 30.2 (L)   MCV 92.1   MCH 30.2   MCHC 32.8   RDW 13.9   PLATELET 633   MPV 23.9     Results for Azeb Pouch (MRN [id number]) as of 9/25/2020 10:36   9/25/2020 04:49   Sodium 139   Potassium 3.6   Chloride 111   CO2 21   Anion gap 7   Glucose 91   BUN 13   Creatinine 1.08   BUN/Creatinine ratio 12   Calcium 9.5   GFR est non-AA 51 (L)   GFR est AA >60     Results for Augustin Rosas (MRN [id number]) as of 9/25/2020 10:36   9/21/2020 00:31   LEVETIRACETAM (KEPPRA) Levetiracetam (Keppra)  <1.0  Low    10.0 - 40.0  ug/mL  Final             Assessment:     1. Encephalopathy   2. Visual hallucinations   3. Abnormal coordination  4. Hx of CVA / TIA  5. Hx of status epilepticus  6. Hx of recurrent seizures  7. Essential HTN    Plan:     > Brain MRI w/o contrast negative for acute infarct or acute hemorrhage, head CT w/o contrast negative for acute findings. > Continue Keppra, Depakote, and Oxcarbazepine regimen. Will need to order levels for each drug. Valproic acid has resulted and is therapeutic, at 54. Oxcarbazepine 37. Keppra level has resulted and is <1, not in therapeutic range. Will need to emphasize medication compliance to patient and family before discharge. > Appreciate continued Neurology consultation. Patient has been signed off from a Neurology standpoint, per Neurology note.  > Appreciate Psychiatry evaluation and consultation - per note, patient was noted to have visual hallucinations during interview and endorses to feeling depressed and having suicidal thoughts several weeks ago. Patient states that she will seek help if these symptoms return of if her suicidal thoughts return. Patient does not meet any acute psychiatric criteria and does not need to be held for psychiatric reasons. Recommendation is for SW to set her up with outpatient therapist and to notify PCP of suicidal thoughts.  Per psychiatry recommendation, have started Risperidone 0.5 mg BID.  > PT / OT evaluation - recommendation is SNF, will be discharged to SNF pending negative COVID-19  > DVT ppx - Lovenox sub-Q  > Seizure precautions     Moira Ennis  9/25/2020  10:31 AM    *ATTENTION:  This note has been created by a medical student for educational purposes only. Please do not refer to the content of this note for clinical decision-making, billing, or other purposes. Please see attending physicians note to obtain clinical information on this patient. *

## 2020-09-25 NOTE — PROGRESS NOTES
Bedside shift change report given to Juan Baer (oncoming nurse) by Eleni Marquez RN (offgoing nurse). Report included the following information SBAR, Kardex, ED Summary, Intake/Output and Recent Results.

## 2020-09-25 NOTE — PROGRESS NOTES
Discharge planning: This writer spoke with patient's son who has now agreed to send patient to a skilled nursing facility ([facility]). He has chosen [address] and [address]. His last choice would be [facility]. This writer completed the referral to those facilities, via [address]. This writer will continue to monitor for discharge planning to ensure a safe discharge from [facility]. Gallo Butler MSW  Care Manager  Pager#: [phone]

## 2020-09-25 NOTE — PROGRESS NOTES
Patient resting in bed. A&O x1. Up with assist. Bed locked in lowest position. Bed alarm on. Call bell within reach. Problem: Falls - Risk of  Goal: *Absence of Falls  Description: Document [place] Fall Risk and appropriate interventions in the flowsheet. Outcome: Progressing Towards Goal  Note: Fall Risk Interventions:  Mobility Interventions: Bed/chair exit alarm    Mentation Interventions: Bed/chair exit alarm, More frequent rounding, Door open when patient unattended    Medication Interventions: Bed/chair exit alarm, Evaluate medications/consider consulting pharmacy    Elimination Interventions: Bed/chair exit alarm, Call light in reach, Patient to call for help with toileting needs              Problem: Patient Education: Go to Patient Education Activity  Goal: Patient/Family Education  Outcome: Progressing Towards Goal     Problem: Patient Education: Go to Patient Education Activity  Goal: Patient/Family Education  Outcome: Progressing Towards Goal     Problem: Patient Education: Go to Patient Education Activity  Goal: Patient/Family Education  Outcome: Progressing Towards Goal     Problem: Pressure Injury - Risk of  Goal: *Prevention of pressure injury  Description: Document John Scale and appropriate interventions in the flowsheet.   Outcome: Progressing Towards Goal  Note: Pressure Injury Interventions:  Sensory Interventions: Assess changes in LOC    Moisture Interventions: Maintain skin hydration (lotion/cream), Apply protective barrier, creams and emollients    Activity Interventions: Pressure redistribution bed/mattress(bed type), Increase time out of bed    Mobility Interventions: HOB 30 degrees or less, Pressure redistribution bed/mattress (bed type)    Nutrition Interventions: Document food/fluid/supplement intake    Friction and Shear Interventions: HOB 30 degrees or less                Problem: Patient Education: Go to Patient Education Activity  Goal: Patient/Family Education  Outcome: Progressing Towards Goal

## 2020-09-25 NOTE — PROGRESS NOTES
Problem: Self Care Deficits Care Plan (Adult)  Goal: *Acute Goals and Plan of Care (Insert Text)  Description: Occupational Therapy Goals  Initiated 9/21/2020 within 7 day(s). 1.  Patient will perform self-feeding with modified independence. 2.  Patient will perform grooming with supervision/set-up. 3.  Patient will perform upper body dressing with supervision/set-up. 4.  Patient will perform lower body dressing with supervision/setup. 5.  Patient will perform bed mobility with minimal assistance in preparation for ADLs. 6.  Patient will participate in upper extremity therapeutic exercise/activities with supervision/set-up for 8 minutes. Prior Level of Function: Pt recently discharged from hospital 9/17/2020. Prior to d/c, OT/PT were recommending Rehab. This admission, pt reports coming from home, living with her sons. Pt reports sons have been providing assistance with UB/LB dressing/bathing PTA. Per OT gino on 9/14/2020, pt reports she was (I) with basic self-care/ADLs prior to that admission however appeared to be a poor historian at that time. Outcome: Progressing Towards Goal   OCCUPATIONAL THERAPY TREATMENT    Patient: Marck Lugo (63 y.o. female)  Date: 9/25/2020  Diagnosis: Encephalopathy [G93.40]   Encephalopathy       Precautions: Fall, Seizure(Hallucinations)  PLOF: Pt recently discharged from hospital 9/17/2020. Prior to d/c, OT/PT were recommending Rehab. This admission, pt reports coming from home, living with her sons. Pt reports sons have been providing assistance with UB/LB dressing/bathing PTA. Per OT gino on 9/14/2020, pt reports she was (I) with basic self-care/ADLs prior to that admission however appeared to be a poor historian at that time. Chart, occupational therapy assessment, plan of care, and goals were reviewed. ASSESSMENT:  Pt presented supine in bed upon therapist arrival slumped in bed and agreeable for participation at this time.  Functional bed mobility supine-sitting EOB Supervision with vc's for proper hand placement and increased time for processing. STS transfer MIN A and performed side steps to [facility] ~ 4 steps for optimal bed positioning. Pt requesting to return to supine 2/2 fatigue requiring Supervision. Provided pt with blue resistive foam block to increase BECKY hand strength for ADL carryover. Pt with increased vc's this date 2/2 visual impairments and confusion, pt trying to eat foam block because she thought it was a sandwich. Pt left supine in bed, bed alarm active, and all needs left within reach. Progression toward goals:  [x]          Improving appropriately and progressing toward goals  []          Improving slowly and progressing toward goals  []          Not making progress toward goals and plan of care will be adjusted     PLAN:  Patient continues to benefit from skilled intervention to address the above impairments. Continue treatment per established plan of care. Discharge Recommendations:  Rehab  Further Equipment Recommendations for Discharge: TBA pending progress     SUBJECTIVE:   Patient stated I have been sleeping. \"    OBJECTIVE DATA SUMMARY:   Cognitive/Behavioral Status:  Neurologic State: Alert, Confused  Orientation Level: Oriented to person, Oriented to place, Disoriented to situation  Cognition: Memory loss  Safety/Judgement: Lack of insight into deficits, Fall prevention    Functional Mobility and Transfers for ADLs:   Bed Mobility:     Supine to Sit: Supervision  Sit to Supine: Supervision      Transfers:  Sit to Stand: Minimum assistance  Stand to Sit: Minimum assistance          Balance:  Sitting: Intact  Standing: Impaired; With support      UE Therapeutic Exercises:   Resistive blue block to increase instrinisic muscle strength of BECKY hand & digits/thumb e.g. gross grasp, pincer grasp, flexor and extensor muscles.     Pain:  Pt reports no pain at this time    Activity Tolerance:    Fair  Please refer to the flowsheet for vital signs taken during this treatment. After treatment:   []  Patient left in no apparent distress sitting up in chair  [x]  Patient left in no apparent distress in bed  [x]  Call bell left within reach  [x]  Nursing notified  []  Caregiver present  [x]  Bed alarm activated    COMMUNICATION/EDUCATION:   [] Role of Occupational Therapy in the acute care setting  [] Home safety education was provided and the patient/caregiver indicated understanding. [] Patient/family have participated as able in working towards goals and plan of care. [x] Patient/family agree to work toward stated goals and plan of care. [] Patient understands intent and goals of therapy, but is neutral about his/her participation. [] Patient is unable to participate in goal setting and plan of care.       Thank you for this referral.  JOSE JUAN Lundberg  Time Calculation: 25 mins

## 2020-09-25 NOTE — PROGRESS NOTES
Problem: Mobility Impaired (Adult and Pediatric)  Goal: *Acute Goals and Plan of Care (Insert Text)  Description: Physical Therapy Goals  Initiated 9/21/2020 and to be accomplished within 7 day(s)  1. Patient will move from supine to sit and sit to supine , scoot up and down, and roll side to side in bed with minimal assistance/contact guard assist.    2.  Patient will sit edge of bed for 8 minutes with fair+ sitting balance for improved mobility. 3.  Patient will transfer from bed to chair and chair to bed with minimal assistance/contact guard assist using the least restrictive device. 4.  Patient will perform sit to stand with minimal assistance/contact guard assist.  5.  Patient will ambulate with contact guard assist for 50 feet with the least restrictive device. Prior Level of Function:   Patient reports she was discharged from the hospital ~7 days ago and has been unable to walk or stand since due to bilateral LE weakness and left LE pain. Prior to that stay, she reports being independent with mobility. She lives in single story home with 0 steps to enter with her 2 sons who are not with her all the time. Outcome: Progressing Towards Goal   PHYSICAL THERAPY TREATMENT    Patient: Olena Hancock (50 y.o. female)  Date: 9/25/2020  Diagnosis: Encephalopathy [G93.40]   Encephalopathy       Precautions: Aspiration, Fall, Seizure, Other (comment)(droplet +, hallucination)  PLOF: independent    ASSESSMENT:  Nurse cleared patient for participation in skilled physical therapy. Patient received reclined in bed, awake, alert, confused, agreeable to physical therapy treatment. Patient on room air, +telemetry. Oriented to person only, stating, \"[place] Poot. \" Voice difficult to hear as she was very soft spoken. Patient wouldn't focus on PT, look PT in the face despite attempts to have patient track visually. Patient remained staring out of focus throughout session with flat affect.  Continued with hallucinations seeing objects on the floor that she was trying to kick or step on. Required moderate assist with poor sequencing and overall coordination of supine to sit transfer with max verbal and tactile cues. Leaned to left upon immediate sitting with neglect of LUE but was able to correct to midline with verbal cues and used LUE with hand-over-hand cues to hold on to railing with LUE.  strength equal left vs right. Moderate assist for scooting to edge of bed. Fair sitting balance at edge of bed and was able to lean to right and back to midline with verbal cues and CGA. Sit to stand transfer training provided with max verbal and tactile cues for safe hand placement as she tried to keep grabbing onto the lower bar of the walker. Completed with CGA with RUE on walker and LUE on bed hand rail. CGA for maintaining midline in standing. Attempted ambulation forward x 1 foot but LLE unable to weight shift and was planted on the floor despite verbal, visual, and tactile cues. Patient returned to sitting at edge of bed with CGA. Stated that she had to go to the bathroom. Completed stand pivot transfer to [facility] with max assist for weight shifting and pivoting. Voided bladder. Attempted self pericare with RUE but required max assist to complete. Patient returnedt to bed with max assist from [facility] and min assist for sit to supine transfer. Demonstrated good bridging technique in bed without cueing and repositioned in bed with min assist and increased time. Patient left reclined in bed, bed alarm on, call bell in reach, all needs in reach, in no apparent distress. Nurse notified of patient performance and disposition.      Progression toward goals:   []      Improving appropriately and progressing toward goals  [x]      Improving slowly and progressing toward goals  []      Not making progress toward goals and plan of care will be adjusted     PLAN:  Patient continues to benefit from skilled intervention to address the above impairments. Continue treatment per established plan of care. Discharge Recommendations:  [facility]  Further Equipment Recommendations for Discharge:  hospital bed     SUBJECTIVE:   Patient stated Jinny Hall. I have to go to the bathroom.     OBJECTIVE DATA SUMMARY:   Critical Behavior:  Neurologic State: Alert, Confused  Orientation Level: Oriented to person, Disoriented to place, Disoriented to situation, Disoriented to time  Cognition: Decreased attention/concentration, Decreased command following, Poor safety awareness, Impulsive  Safety/Judgement: Fall prevention, Decreased insight into deficits, Decreased awareness of environment, Decreased awareness of need for safety, Decreased awareness of need for assistance  Functional Mobility Training:  Bed Mobility:   Supine to Sit: Moderate assistance  Sit to Supine: Minimum assistance  Scooting: Moderate assistance  Transfers:  Sit to Stand: Contact guard assistance  Stand to Sit: Contact guard assistance  Stand Pivot Transfers: Maximum assistance       Balance:  Sitting: Impaired; Without support  Sitting - Static: Good (unsupported)  Sitting - Dynamic: Fair (occasional)  Standing: Impaired; With support  Standing - Static: Fair  Standing - Dynamic : Poor   Range Of Motion:   AROM: Generally decreased, functional   Posture:   Posture (WDL): Exceptions to WDL   Posture Assessment: (lean to right in sitting but corrects with verbal cues)   Ambulation/Gait Training:  Gait Description (WDL): Exceptions to WDL(attempted but unable to place LLE this date)  Pain:  Pain level pre-treatment: unable to rate or understand pain scale; stated \"my neck and feet\"  Pain level post-treatment: denied pain or discomfort after session   Pain Intervention(s): Medication (see MAR);  Rest, Repositioning   Response to intervention: Nurse notified, See doc flow    Activity Tolerance:   fair  Please refer to the flowsheet for vital signs taken during this treatment. After treatment:   [] Patient left in no apparent distress sitting up in chair  [x] Patient left in no apparent distress in bed  [x] Call bell left within reach  [x] Nursing notified  [] Caregiver present  [x] Bed alarm activated  [] SCDs applied      COMMUNICATION/EDUCATION:   [x]         Role of Physical Therapy in the acute care setting. [x]         Fall prevention education was provided and the patient/caregiver indicated understanding. [x]         Patient/family have participated as able in working toward goals and plan of care. []         Patient/family agree to work toward stated goals and plan of care. []         Patient understands intent and goals of therapy, but is neutral about his/her participation.   []         Patient is unable to participate in stated goals/plan of care: ongoing with therapy staff.  []         Other:        Yanick Rodriguez, PT, DPT   Time Calculation: 33 mins

## 2020-09-25 NOTE — ROUTINE PROCESS
Bedside and Verbal shift change report given to Neo barros RN and PEGGY Malagon (oncoming nurse) by aPrviz Huddleston RN (offgoing nurse). Report included the following information SBAR, Kardex, MAR and Recent Results.

## 2020-09-25 NOTE — PROGRESS NOTES
Problem: Falls - Risk of  Goal: *Absence of Falls  Description: Document Rohith Saravia Fall Risk and appropriate interventions in the flowsheet. Outcome: Progressing Towards Goal  Note: Fall Risk Interventions:  Mobility Interventions: Bed/chair exit alarm    Mentation Interventions: Bed/chair exit alarm, More frequent rounding, Door open when patient unattended    Medication Interventions: Bed/chair exit alarm, Evaluate medications/consider consulting pharmacy    Elimination Interventions: Bed/chair exit alarm, Call light in reach, Patient to call for help with toileting needs              Problem: Patient Education: Go to Patient Education Activity  Goal: Patient/Family Education  Outcome: Progressing Towards Goal     Problem: Patient Education: Go to Patient Education Activity  Goal: Patient/Family Education  Outcome: Progressing Towards Goal     Problem: Patient Education: Go to Patient Education Activity  Goal: Patient/Family Education  Outcome: Progressing Towards Goal     Problem: Pressure Injury - Risk of  Goal: *Prevention of pressure injury  Description: Document John Scale and appropriate interventions in the flowsheet.   Outcome: Progressing Towards Goal  Note: Pressure Injury Interventions:  Sensory Interventions: Assess changes in LOC, Float heels, Minimize linen layers, Monitor skin under medical devices, Pad between skin to skin    Moisture Interventions: Maintain skin hydration (lotion/cream)    Activity Interventions: Pressure redistribution bed/mattress(bed type)    Mobility Interventions: HOB 30 degrees or less    Nutrition Interventions: Document food/fluid/supplement intake    Friction and Shear Interventions: HOB 30 degrees or less, Minimize layers                Problem: Patient Education: Go to Patient Education Activity  Goal: Patient/Family Education  Outcome: Progressing Towards Goal

## 2020-09-26 LAB
ANION GAP SERPL CALC-SCNC: 8 MMOL/L (ref 3–18)
BUN SERPL-MCNC: 16 MG/DL (ref 7–18)
BUN/CREAT SERPL: 13 (ref 12–20)
CALCIUM SERPL-MCNC: 9.6 MG/DL (ref 8.5–10.1)
CHLORIDE SERPL-SCNC: 108 MMOL/L (ref 100–111)
CO2 SERPL-SCNC: 20 MMOL/L (ref 21–32)
CREAT SERPL-MCNC: 1.19 MG/DL (ref 0.6–1.3)
ERYTHROCYTE [DISTWIDTH] IN BLOOD BY AUTOMATED COUNT: 14.1 % (ref 11.6–14.5)
GLUCOSE SERPL-MCNC: 85 MG/DL (ref 74–99)
HCT VFR BLD AUTO: 31.1 % (ref 35–45)
HGB BLD-MCNC: 10.1 G/DL (ref 12–16)
MCH RBC QN AUTO: 29.7 PG (ref 24–34)
MCHC RBC AUTO-ENTMCNC: 32.5 G/DL (ref 31–37)
MCV RBC AUTO: 91.5 FL (ref 74–97)
PLATELET # BLD AUTO: 328 K/UL (ref 135–420)
PMV BLD AUTO: 10.7 FL (ref 9.2–11.8)
POTASSIUM SERPL-SCNC: 4.3 MMOL/L (ref 3.5–5.5)
RBC # BLD AUTO: 3.4 M/UL (ref 4.2–5.3)
SARS-COV-2, COV2NT: NOT DETECTED
SODIUM SERPL-SCNC: 136 MMOL/L (ref 136–145)
SOURCE, COVRS: NORMAL
SPECIMEN TYPE, XMCV1T: NORMAL
WBC # BLD AUTO: 8.2 K/UL (ref 4.6–13.2)

## 2020-09-26 PROCEDURE — 85027 COMPLETE CBC AUTOMATED: CPT

## 2020-09-26 PROCEDURE — 74011250636 HC RX REV CODE- 250/636: Performed by: INTERNAL MEDICINE

## 2020-09-26 PROCEDURE — 74011250637 HC RX REV CODE- 250/637: Performed by: PHYSICIAN ASSISTANT

## 2020-09-26 PROCEDURE — 65660000000 HC RM CCU STEPDOWN

## 2020-09-26 PROCEDURE — 3331090001 HH PPS REVENUE CREDIT

## 2020-09-26 PROCEDURE — 36415 COLL VENOUS BLD VENIPUNCTURE: CPT

## 2020-09-26 PROCEDURE — 74011250637 HC RX REV CODE- 250/637: Performed by: HOSPITALIST

## 2020-09-26 PROCEDURE — 3331090002 HH PPS REVENUE DEBIT

## 2020-09-26 PROCEDURE — 97535 SELF CARE MNGMENT TRAINING: CPT

## 2020-09-26 PROCEDURE — 99232 SBSQ HOSP IP/OBS MODERATE 35: CPT | Performed by: HOSPITALIST

## 2020-09-26 PROCEDURE — 80048 BASIC METABOLIC PNL TOTAL CA: CPT

## 2020-09-26 RX ORDER — HYDRALAZINE HYDROCHLORIDE 50 MG/1
100 TABLET, FILM COATED ORAL 3 TIMES DAILY
Status: DISCONTINUED | OUTPATIENT
Start: 2020-09-26 | End: 2020-09-30 | Stop reason: HOSPADM

## 2020-09-26 RX ADMIN — RISPERIDONE 0.5 MG: 0.25 TABLET ORAL at 17:29

## 2020-09-26 RX ADMIN — LISINOPRIL 20 MG: 20 TABLET ORAL at 09:16

## 2020-09-26 RX ADMIN — CYANOCOBALAMIN TAB 1000 MCG 500 MCG: 1000 TAB at 09:16

## 2020-09-26 RX ADMIN — DIVALPROEX SODIUM 250 MG: 250 TABLET, DELAYED RELEASE ORAL at 17:29

## 2020-09-26 RX ADMIN — HYDRALAZINE HYDROCHLORIDE 75 MG: 50 TABLET ORAL at 16:00

## 2020-09-26 RX ADMIN — OXCARBAZEPINE 600 MG: 300 TABLET, FILM COATED ORAL at 09:16

## 2020-09-26 RX ADMIN — OXCARBAZEPINE 600 MG: 300 TABLET, FILM COATED ORAL at 17:29

## 2020-09-26 RX ADMIN — HYDRALAZINE HYDROCHLORIDE 75 MG: 50 TABLET ORAL at 09:16

## 2020-09-26 RX ADMIN — DIVALPROEX SODIUM 250 MG: 250 TABLET, DELAYED RELEASE ORAL at 09:16

## 2020-09-26 RX ADMIN — RISPERIDONE 0.5 MG: 0.25 TABLET ORAL at 09:16

## 2020-09-26 RX ADMIN — AMLODIPINE BESYLATE 10 MG: 10 TABLET ORAL at 09:16

## 2020-09-26 RX ADMIN — CLOPIDOGREL BISULFATE 75 MG: 75 TABLET ORAL at 09:16

## 2020-09-26 RX ADMIN — ERGOCALCIFEROL 50000 UNITS: 1.25 CAPSULE ORAL at 17:00

## 2020-09-26 RX ADMIN — Medication 10 ML: at 15:14

## 2020-09-26 RX ADMIN — POLYETHYLENE GLYCOL 3350 17 G: 17 POWDER, FOR SOLUTION ORAL at 09:16

## 2020-09-26 RX ADMIN — LEVETIRACETAM 1000 MG: 500 TABLET ORAL at 09:16

## 2020-09-26 RX ADMIN — ENOXAPARIN SODIUM 40 MG: 40 INJECTION SUBCUTANEOUS at 12:41

## 2020-09-26 RX ADMIN — LEVETIRACETAM 1000 MG: 500 TABLET ORAL at 17:29

## 2020-09-26 RX ADMIN — Medication 10 ML: at 05:57

## 2020-09-26 NOTE — ROUTINE PROCESS
Per Dr. Penny Truong, Speech therapy contacted via [employer] [phone]. Will need to follow up Monday when in house for swallow eval. Nursing admin also contacted via [phone]. Per Sallie Leahy, pt does not meet criteria for outside visitors at this time.

## 2020-09-26 NOTE — PROGRESS NOTES
[facility] Hospitalist Group  Progress Note    Patient: Milind Hughes Age: 76 y.o. : 1955 MR#: [id number] SSN: [id number]  Date/Time: 2020     Subjective: Patient feels better, no hallucinations today. No complaints     Assessment/Plan:   1. Visual hallucinations: Unclear reason, Continue Risperdal per psych. 2.  Acute metabolic encephalopathy: Improved now, MRI/CT negative, neuro input noted. 3.  Mild hypercalcemia due to dehydration: improved now   4. Seizure disorder: no seizure episodes   5. CKD3, will monitor  6. History of UTI with enterococcus, s/p treatment   6.  HTN: BP high, increase hydralazine, continue amlodipine  7.  Hyperlipidemia   8. Full code  Discussed with the patient at the bedside  Awaiting placement  pending COVID-19 testing for placement. Case discussed with:  [x]Patient  []Family  [x]Nursing  []Case Management  DVT Prophylaxis:  [x]Lovenox  []Hep SQ  []SCDs  []Coumadin   []Eliquis/Xarelto     Objective:   VS:   Visit Vitals  BP (!) 155/74 (BP 1 Location: Right arm, BP Patient Position: At rest)   Pulse 98   Temp 98.3 °F (36.8 °C)   Resp 17   Ht 5' (1.524 m)   Wt 62.8 kg (138 lb 8 oz)   SpO2 100%   BMI 27.05 kg/m²      Tmax/24hrs: Temp (24hrs), Av.5 °F (36.9 °C), Min:97.8 °F (36.6 °C), Max:98.8 °F (37.1 °C)  IOBRIEF    Intake/Output Summary (Last 24 hours) at 2020 1341  Last data filed at 2020 1147  Gross per 24 hour   Intake 50 ml   Output --   Net 50 ml       General:  Alert, cooperative, no acute distress    Pulmonary:  CTA Bilaterally. No Wheezing/Rales. Cardiovascular: Regular rate and Rhythm. GI:  Soft, Non distended, Non tender. + Bowel sounds. Extremities:  No edema. No calf tenderness. Neurologic: Alert and oriented X 3. Speech clear, slow to respond, moves all extremities well, good handgrip.       Medications:   Current Facility-Administered Medications   Medication Dose Route Frequency    polyethylene glycol (MIRALAX) packet 17 g  17 g Oral DAILY    docusate sodium (COLACE) capsule 100 mg  100 mg Oral BID PRN    bisacodyL (DULCOLAX) suppository 10 mg  10 mg Rectal DAILY PRN    hydrALAZINE (APRESOLINE) tablet 75 mg  75 mg Oral TID    risperiDONE (RisperDAL) tablet 0.5 mg  0.5 mg Oral BID    enoxaparin (LOVENOX) injection 40 mg  40 mg SubCUTAneous Q24H    cyanocobalamin tablet 500 mcg  500 mcg Oral DAILY    amLODIPine (NORVASC) tablet 10 mg  10 mg Oral DAILY    ergocalciferol capsule 50,000 Units  50,000 Units Oral Once per day on Thu Sat    levETIRAcetam (KEPPRA) tablet 1,000 mg  1,000 mg Oral BID    OXcarbazepine (TRILEPTAL) tablet 600 mg  600 mg Oral BID    clopidogreL (PLAVIX) tablet 75 mg  75 mg Oral DAILY    lisinopriL (PRINIVIL, ZESTRIL) tablet 20 mg  20 mg Oral DAILY    tamsulosin (FLOMAX) capsule 0.4 mg  0.4 mg Oral QHS    divalproex DR (DEPAKOTE) tablet 250 mg  250 mg Oral TID    sodium chloride (NS) flush 5-40 mL  5-40 mL IntraVENous Q8H    sodium chloride (NS) flush 5-40 mL  5-40 mL IntraVENous PRN    acetaminophen (TYLENOL) tablet 650 mg  650 mg Oral Q6H PRN    Or    acetaminophen (TYLENOL) suppository 650 mg  650 mg Rectal Q6H PRN    promethazine (PHENERGAN) tablet 12.5 mg  12.5 mg Oral Q6H PRN    Or    ondansetron (ZOFRAN) injection 4 mg  4 mg IntraVENous Q6H PRN    LORazepam (ATIVAN) injection 1 mg  1 mg IntraVENous Q6H PRN       Labs:    Recent Results (from the past 24 hour(s))   METABOLIC PANEL, BASIC    Collection Time: 09/26/20  5:43 AM   Result Value Ref Range    Sodium 136 136 - 145 mmol/L    Potassium 4.3 3.5 - 5.5 mmol/L    Chloride 108 100 - 111 mmol/L    CO2 20 (L) 21 - 32 mmol/L    Anion gap 8 3.0 - 18 mmol/L    Glucose 85 74 - 99 mg/dL    BUN 16 7.0 - 18 MG/DL    Creatinine 1.19 0.6 - 1.3 MG/DL    BUN/Creatinine ratio 13 12 - 20      GFR est AA 55 (L) >60 ml/min/1.73m2    GFR est non-AA 45 (L) >60 ml/min/1.73m2    Calcium 9.6 8.5 - 10.1 MG/DL   CBC W/O DIFF    Collection Time: 09/26/20  5:43 AM   Result Value Ref Range    WBC 8.2 4.6 - 13.2 K/uL    RBC 3.40 (L) 4.20 - 5.30 M/uL    HGB 10.1 (L) 12.0 - 16.0 g/dL    HCT 31.1 (L) 35.0 - 45.0 %    MCV 91.5 74.0 - 97.0 FL    MCH 29.7 24.0 - 34.0 PG    MCHC 32.5 31.0 - 37.0 g/dL    RDW 14.1 11.6 - 14.5 %    PLATELET 473 799 - 041 K/uL    MPV 10.7 9.2 - 11.8 FL       Signed By: Nicklas Olszewski, MD     September 26, 2020      Disclaimer: Sections of this note are dictated using utilizing voice recognition software. Minor typographical errors may be present. If questions arise, please do not hesitate to contact me or call our department.

## 2020-09-26 NOTE — PROGRESS NOTES
Problem: Falls - Risk of  Goal: *Absence of Falls  Description: Document Chandler Lundy Fall Risk and appropriate interventions in the flowsheet. Outcome: Progressing Towards Goal  Note: Fall Risk Interventions:  Mobility Interventions: Bed/chair exit alarm    Mentation Interventions: Bed/chair exit alarm    Medication Interventions: Bed/chair exit alarm    Elimination Interventions: Bed/chair exit alarm, Call light in reach              Problem: Patient Education: Go to Patient Education Activity  Goal: Patient/Family Education  Outcome: Progressing Towards Goal     Problem: Patient Education: Go to Patient Education Activity  Goal: Patient/Family Education  Outcome: Progressing Towards Goal     Problem: Patient Education: Go to Patient Education Activity  Goal: Patient/Family Education  Outcome: Progressing Towards Goal     Problem: Pressure Injury - Risk of  Goal: *Prevention of pressure injury  Description: Document John Scale and appropriate interventions in the flowsheet.   Outcome: Progressing Towards Goal  Note: Pressure Injury Interventions:  Sensory Interventions: Assess changes in LOC    Moisture Interventions: Maintain skin hydration (lotion/cream)    Activity Interventions: Pressure redistribution bed/mattress(bed type)    Mobility Interventions: HOB 30 degrees or less    Nutrition Interventions: Document food/fluid/supplement intake    Friction and Shear Interventions: HOB 30 degrees or less                Problem: Patient Education: Go to Patient Education Activity  Goal: Patient/Family Education  Outcome: Progressing Towards Goal

## 2020-09-26 NOTE — PROGRESS NOTES
Problem: Self Care Deficits Care Plan (Adult)  Goal: *Acute Goals and Plan of Care (Insert Text)  Description: Occupational Therapy Goals  Initiated 9/21/2020 within 7 day(s). 1.  Patient will perform self-feeding with modified independence. 2.  Patient will perform grooming with supervision/set-up. 3.  Patient will perform upper body dressing with supervision/set-up. 4.  Patient will perform lower body dressing with supervision/setup. 5.  Patient will perform bed mobility with minimal assistance in preparation for ADLs. 6.  Patient will participate in upper extremity therapeutic exercise/activities with supervision/set-up for 8 minutes. Prior Level of Function: Pt recently discharged from hospital 9/17/2020. Prior to d/c, OT/PT were recommending Rehab. This admission, pt reports coming from home, living with her sons. Pt reports sons have been providing assistance with UB/LB dressing/bathing PTA. Per OT gino on 9/14/2020, pt reports she was (I) with basic self-care/ADLs prior to that admission however appeared to be a poor historian at that time. Outcome: Progressing Towards Goal   OCCUPATIONAL THERAPY TREATMENT    Patient: Az Blanc (9 y.o. female)  Date: 9/26/2020  Diagnosis: Encephalopathy [G93.40]   Encephalopathy       Precautions: Aspiration, Fall, Seizure, Other (comment)(droplet +, hallucination)  PLOF: Pt recently discharged from hospital 9/17/2020. Prior to d/c, OT/PT were recommending Rehab. This admission, pt reports coming from home, living with her sons. Pt reports sons have been providing assistance with UB/LB dressing/bathing PTA. Per OT gino on 9/14/2020, pt reports she was (I) with basic self-care/ADLs prior to that admission however appeared to be a poor historian at that time. Chart, occupational therapy assessment, plan of care, and goals were reviewed. ASSESSMENT:  Pt cleared for participation by RN.  Pt presented supine in bed with HOB elevated upon therapist arrival and agreeable for tx at this. Pt with slightly increased alertness this day however still has difficulty with visually tracking. Pt seen during self feeding task @ bed level with HOB elevated ~ 50 degrees and provided pt with build up red foam for increased  and for visual input. Pt required MOD A for loading food on spoon with poor B UE coordination and control with over and under shooting noted with food to mouth and drink to mouth. Pt reports she has decreased sensation on R UE, NSG staff made aware. Pt continues to demonstrate flat affect with decreased command follow. Pt left supine in bed with HOB elevated and all needs left within reach. Progression toward goals:  []          Improving appropriately and progressing toward goals  [x]          Improving slowly and progressing toward goals  []          Not making progress toward goals and plan of care will be adjusted     PLAN:  Patient continues to benefit from skilled intervention to address the above impairments. Continue treatment per established plan of care. Discharge Recommendations: SNF  Further Equipment Recommendations for Discharge:  TBA pending progress     SUBJECTIVE:   Patient stated This is embarrassing. \"     OBJECTIVE DATA SUMMARY:   Cognitive/Behavioral Status:  Neurologic State: Alert, Confused  Orientation Level: Disoriented to place, Disoriented to situation, Disoriented to time, Oriented to person  Cognition: Decreased attention/concentration, Decreased command following, Poor safety awareness  Safety/Judgement: Fall prevention, Decreased insight into deficits, Decreased awareness of environment, Decreased awareness of need for safety, Decreased awareness of need for assistance    Functional Mobility and Transfers for ADLs:     Balance:  Sitting: Intact; With support    ADL Intervention:  Feeding  Feeding Assistance: Moderate assistance  Utensil Management: Moderate assistance  Food to Mouth:  Moderate assistance  Drink to Mouth: Moderate assistance  Cues: Physical assistance;Verbal cues provided;Visual/perceptual training/retraining  Adaptive Equipment: Built up spoon(red foam)    Pain:  Pt reports no pain at this time. Activity Tolerance:    Poor, decreased attention to task and command follow    Please refer to the flowsheet for vital signs taken during this treatment. After treatment:   []  Patient left in no apparent distress sitting up in chair  [x]  Patient left in no apparent distress in bed  [x]  Call bell left within reach  [x]  Nursing notified  []  Caregiver present  [x]  Bed alarm activated    COMMUNICATION/EDUCATION:   [] Role of Occupational Therapy in the acute care setting  [] Home safety education was provided and the patient/caregiver indicated understanding. [] Patient/family have participated as able in working towards goals and plan of care. [x] Patient/family agree to work toward stated goals and plan of care. [] Patient understands intent and goals of therapy, but is neutral about his/her participation. [] Patient is unable to participate in goal setting and plan of care.       Thank you for this referral.  JOSE JUAN Gustafson  Time Calculation: 40 mins

## 2020-09-26 NOTE — ROUTINE PROCESS
Bedside shift change report given to Chaparro Marroquin RN (oncoming nurse) by  Anibal Rene (offgoing nurse). Report included the following information SBAR, Kardex, ED Summary, Intake/Output and Recent Results. n/a

## 2020-09-27 ENCOUNTER — APPOINTMENT (OUTPATIENT)
Dept: GENERAL RADIOLOGY | Age: 65
DRG: 125 | End: 2020-09-27
Attending: HOSPITALIST
Payer: MEDICARE

## 2020-09-27 LAB
ANION GAP SERPL CALC-SCNC: 8 MMOL/L (ref 3–18)
BASOPHILS # BLD: 0 K/UL (ref 0–0.1)
BASOPHILS NFR BLD: 0 % (ref 0–2)
BUN SERPL-MCNC: 25 MG/DL (ref 7–18)
BUN/CREAT SERPL: 14 (ref 12–20)
CALCIUM SERPL-MCNC: 9.7 MG/DL (ref 8.5–10.1)
CHLORIDE SERPL-SCNC: 111 MMOL/L (ref 100–111)
CO2 SERPL-SCNC: 21 MMOL/L (ref 21–32)
CREAT SERPL-MCNC: 1.74 MG/DL (ref 0.6–1.3)
DIFFERENTIAL METHOD BLD: ABNORMAL
EOSINOPHIL # BLD: 0.1 K/UL (ref 0–0.4)
EOSINOPHIL NFR BLD: 1 % (ref 0–5)
ERYTHROCYTE [DISTWIDTH] IN BLOOD BY AUTOMATED COUNT: 14.3 % (ref 11.6–14.5)
GLUCOSE SERPL-MCNC: 99 MG/DL (ref 74–99)
HCT VFR BLD AUTO: 31.3 % (ref 35–45)
HGB BLD-MCNC: 10.3 G/DL (ref 12–16)
LYMPHOCYTES # BLD: 1.3 K/UL (ref 0.9–3.6)
LYMPHOCYTES NFR BLD: 16 % (ref 21–52)
MCH RBC QN AUTO: 30 PG (ref 24–34)
MCHC RBC AUTO-ENTMCNC: 32.9 G/DL (ref 31–37)
MCV RBC AUTO: 91.3 FL (ref 74–97)
MONOCYTES # BLD: 0.9 K/UL (ref 0.05–1.2)
MONOCYTES NFR BLD: 11 % (ref 3–10)
NEUTS SEG # BLD: 6 K/UL (ref 1.8–8)
NEUTS SEG NFR BLD: 72 % (ref 40–73)
PLATELET # BLD AUTO: 313 K/UL (ref 135–420)
PMV BLD AUTO: 10.6 FL (ref 9.2–11.8)
POTASSIUM SERPL-SCNC: 4.4 MMOL/L (ref 3.5–5.5)
RBC # BLD AUTO: 3.43 M/UL (ref 4.2–5.3)
SODIUM SERPL-SCNC: 140 MMOL/L (ref 136–145)
WBC # BLD AUTO: 8.3 K/UL (ref 4.6–13.2)

## 2020-09-27 PROCEDURE — 85025 COMPLETE CBC W/AUTO DIFF WBC: CPT

## 2020-09-27 PROCEDURE — 3331090001 HH PPS REVENUE CREDIT

## 2020-09-27 PROCEDURE — 3331090002 HH PPS REVENUE DEBIT

## 2020-09-27 PROCEDURE — 99232 SBSQ HOSP IP/OBS MODERATE 35: CPT | Performed by: HOSPITALIST

## 2020-09-27 PROCEDURE — 2709999900 HC NON-CHARGEABLE SUPPLY

## 2020-09-27 PROCEDURE — 80048 BASIC METABOLIC PNL TOTAL CA: CPT

## 2020-09-27 PROCEDURE — 74011250636 HC RX REV CODE- 250/636: Performed by: INTERNAL MEDICINE

## 2020-09-27 PROCEDURE — 36415 COLL VENOUS BLD VENIPUNCTURE: CPT

## 2020-09-27 PROCEDURE — 74011250637 HC RX REV CODE- 250/637: Performed by: PHYSICIAN ASSISTANT

## 2020-09-27 PROCEDURE — 77030027138 HC INCENT SPIROMETER -A

## 2020-09-27 PROCEDURE — 65660000000 HC RM CCU STEPDOWN

## 2020-09-27 PROCEDURE — 71045 X-RAY EXAM CHEST 1 VIEW: CPT

## 2020-09-27 PROCEDURE — 74011250637 HC RX REV CODE- 250/637: Performed by: HOSPITALIST

## 2020-09-27 RX ADMIN — Medication 10 ML: at 23:58

## 2020-09-27 RX ADMIN — HYDRALAZINE HYDROCHLORIDE 100 MG: 50 TABLET ORAL at 18:43

## 2020-09-27 RX ADMIN — HYDRALAZINE HYDROCHLORIDE 100 MG: 50 TABLET ORAL at 23:58

## 2020-09-27 RX ADMIN — CYANOCOBALAMIN TAB 1000 MCG 500 MCG: 1000 TAB at 09:29

## 2020-09-27 RX ADMIN — TAMSULOSIN HYDROCHLORIDE 0.4 MG: 0.4 CAPSULE ORAL at 00:01

## 2020-09-27 RX ADMIN — DIVALPROEX SODIUM 250 MG: 250 TABLET, DELAYED RELEASE ORAL at 23:58

## 2020-09-27 RX ADMIN — AMLODIPINE BESYLATE 10 MG: 10 TABLET ORAL at 09:30

## 2020-09-27 RX ADMIN — DIVALPROEX SODIUM 250 MG: 250 TABLET, DELAYED RELEASE ORAL at 09:29

## 2020-09-27 RX ADMIN — Medication 10 ML: at 00:02

## 2020-09-27 RX ADMIN — RISPERIDONE 0.5 MG: 0.25 TABLET ORAL at 09:29

## 2020-09-27 RX ADMIN — TAMSULOSIN HYDROCHLORIDE 0.4 MG: 0.4 CAPSULE ORAL at 23:58

## 2020-09-27 RX ADMIN — DIVALPROEX SODIUM 250 MG: 250 TABLET, DELAYED RELEASE ORAL at 00:01

## 2020-09-27 RX ADMIN — ACETAMINOPHEN 650 MG: 325 TABLET ORAL at 09:34

## 2020-09-27 RX ADMIN — LEVETIRACETAM 1000 MG: 500 TABLET ORAL at 18:43

## 2020-09-27 RX ADMIN — LISINOPRIL 20 MG: 20 TABLET ORAL at 09:29

## 2020-09-27 RX ADMIN — RISPERIDONE 0.5 MG: 0.25 TABLET ORAL at 18:44

## 2020-09-27 RX ADMIN — OXCARBAZEPINE 600 MG: 300 TABLET, FILM COATED ORAL at 18:43

## 2020-09-27 RX ADMIN — PROMETHAZINE HYDROCHLORIDE 12.5 MG: 25 TABLET ORAL at 00:01

## 2020-09-27 RX ADMIN — POLYETHYLENE GLYCOL 3350 17 G: 17 POWDER, FOR SOLUTION ORAL at 09:29

## 2020-09-27 RX ADMIN — LEVETIRACETAM 1000 MG: 500 TABLET ORAL at 09:29

## 2020-09-27 RX ADMIN — Medication 10 ML: at 06:00

## 2020-09-27 RX ADMIN — Medication 10 ML: at 14:30

## 2020-09-27 RX ADMIN — DIVALPROEX SODIUM 250 MG: 250 TABLET, DELAYED RELEASE ORAL at 18:44

## 2020-09-27 RX ADMIN — HYDRALAZINE HYDROCHLORIDE 100 MG: 50 TABLET ORAL at 09:29

## 2020-09-27 RX ADMIN — HYDRALAZINE HYDROCHLORIDE 100 MG: 50 TABLET ORAL at 00:01

## 2020-09-27 RX ADMIN — OXCARBAZEPINE 600 MG: 300 TABLET, FILM COATED ORAL at 09:29

## 2020-09-27 RX ADMIN — CLOPIDOGREL BISULFATE 75 MG: 75 TABLET ORAL at 09:29

## 2020-09-27 RX ADMIN — ENOXAPARIN SODIUM 40 MG: 40 INJECTION SUBCUTANEOUS at 14:30

## 2020-09-27 NOTE — PROGRESS NOTES
[facility]  Progress Note    Patient: Joesph Euceda Age: 76 y.o. : 1955 MR#: [id number] SSN: [id number]  Date/Time: 2020     Subjective: Patient feels ok, slow to respond, eating BF with RN assistance, no hallucinations today. Patient denies any coughing, no dysuria, no chest pain, no abdominal pain. Low-grade temperature this morning     Assessment/Plan:   1. Low-grade temperature: will get CXR, UA, Cx, monitor off Abx, start IS, will also get out of bed to chair. 2. Visual hallucinations: Unclear reason, Continue Risperdal per psych. 3. Acute metabolic encephalopathy: Improved now, MRI/CT negative, neuro input noted. 4.  Mild hypercalcemia due to dehydration: improved now   5. Seizure disorder: no seizure episodes   6. CKD3, will monitor  7. History of UTI with enterococcus, s/p treatment   8.  HTN: BP better, cont hydralazine and amlodipine  7.  Hyperlipidemia   8. Full code  Discussed with the patient and son Kash Rey over the phone and updated the above information. Awaiting placement  Neg COVID-19 testing for placement. Stable for SNF once bed available       Case discussed with:  [x]Patient  []Family  [x]Nursing  []Case Management  DVT Prophylaxis:  [x]Lovenox  []Hep SQ  []SCDs  []Coumadin   []Eliquis/Xarelto     Objective:   VS:   Visit Vitals  /67 (BP 1 Location: Right arm, BP Patient Position: At rest)   Pulse (!) 101   Temp (!) 100.6 °F (38.1 °C)   Resp 18   Ht 5' (1.524 m)   Wt 62.8 kg (138 lb 8 oz)   SpO2 98%   BMI 27.05 kg/m²      Tmax/24hrs: Temp (24hrs), Av.1 °F (37.3 °C), Min:97.4 °F (36.3 °C), Max:100.6 °F (38.1 °C)  IOBRIEF    Intake/Output Summary (Last 24 hours) at 2020 1113  Last data filed at 2020 2116  Gross per 24 hour   Intake 150 ml   Output 0 ml   Net 150 ml       General:  Alert, cooperative, no acute distress    Pulmonary:  CTA Bilaterally. No Wheezing/Rales.   Cardiovascular: Regular rate and Rhythm. GI:  Soft, Non distended, Non tender. + Bowel sounds. Extremities:  No edema. No calf tenderness. Neurologic: Alert and oriented X 3. Speech clear, slow to respond, moves all extremities well, good handgrip. Medications:   Current Facility-Administered Medications   Medication Dose Route Frequency    hydrALAZINE (APRESOLINE) tablet 100 mg  100 mg Oral TID    polyethylene glycol (MIRALAX) packet 17 g  17 g Oral DAILY    docusate sodium (COLACE) capsule 100 mg  100 mg Oral BID PRN    bisacodyL (DULCOLAX) suppository 10 mg  10 mg Rectal DAILY PRN    risperiDONE (RisperDAL) tablet 0.5 mg  0.5 mg Oral BID    enoxaparin (LOVENOX) injection 40 mg  40 mg SubCUTAneous Q24H    cyanocobalamin tablet 500 mcg  500 mcg Oral DAILY    amLODIPine (NORVASC) tablet 10 mg  10 mg Oral DAILY    ergocalciferol capsule 50,000 Units  50,000 Units Oral Once per day on Thu Sat    levETIRAcetam (KEPPRA) tablet 1,000 mg  1,000 mg Oral BID    OXcarbazepine (TRILEPTAL) tablet 600 mg  600 mg Oral BID    clopidogreL (PLAVIX) tablet 75 mg  75 mg Oral DAILY    lisinopriL (PRINIVIL, ZESTRIL) tablet 20 mg  20 mg Oral DAILY    tamsulosin (FLOMAX) capsule 0.4 mg  0.4 mg Oral QHS    divalproex DR (DEPAKOTE) tablet 250 mg  250 mg Oral TID    sodium chloride (NS) flush 5-40 mL  5-40 mL IntraVENous Q8H    sodium chloride (NS) flush 5-40 mL  5-40 mL IntraVENous PRN    acetaminophen (TYLENOL) tablet 650 mg  650 mg Oral Q6H PRN    Or    acetaminophen (TYLENOL) suppository 650 mg  650 mg Rectal Q6H PRN    promethazine (PHENERGAN) tablet 12.5 mg  12.5 mg Oral Q6H PRN    Or    ondansetron (ZOFRAN) injection 4 mg  4 mg IntraVENous Q6H PRN    LORazepam (ATIVAN) injection 1 mg  1 mg IntraVENous Q6H PRN       Labs:    No results found for this or any previous visit (from the past 24 hour(s)).     Signed By: Nicklas Olszewski, MD     September 27, 2020      Disclaimer: Sections of this note are dictated using utilizing voice recognition software. Minor typographical errors may be present. If questions arise, please do not hesitate to contact me or call our department.

## 2020-09-27 NOTE — PROGRESS NOTES
Physician Progress Note      Calvin Aleman  CSN #:                  [id number]  :                       1955  ADMIT DATE:       2020 12:24 AM  DISCH DATE:  RESPONDING  PROVIDER #:        Leydi Diaz MD          QUERY TEXT:    Patient admitted with hallucinations  Noted documentation of   acute metabolic encephalopathy. If possible, please document in progress notes and discharge summary:    The medical record reflects the following:    Risk Factors: recent admit for seizures; having ataxia,  right sided weakness   and paresthesias on admit  non compliance   with meds    Clinical Indicators:;  Hallucinations ;  mild  hypercalcemia   due to dehydration noted  ;  MRI ruled out  CVA; H/O  UTI  Treatment: CT ,  MRI;  Psych consult ; Thank you,   Davonte Oquendo RN   CCDS  x [phone]  Options provided:  -- acute metabolic encephalopathy  present as evidenced by,  -- acute metabolic encephalopathy  was ruled out  -- Other - I will add my own diagnosis  -- Disagree - Not applicable / Not valid  -- Disagree - Clinically unable to determine / Unknown  -- Refer to Clinical Documentation Reviewer    PROVIDER RESPONSE TEXT:    acute metabolic encephalopathy is present as evidenced by- Please document evidence.     Query created by: Arjun Siu on 2020 11:59 AM      Electronically signed by:  Leydi Diaz MD 2020 11:37 AM

## 2020-09-27 NOTE — PROGRESS NOTES
Problem: Falls - Risk of  Goal: *Absence of Falls  Description: Document Dyan Claros Fall Risk and appropriate interventions in the flowsheet. Outcome: Progressing Towards Goal  Note: Fall Risk Interventions:  Mobility Interventions: Bed/chair exit alarm    Mentation Interventions: Bed/chair exit alarm    Medication Interventions: Bed/chair exit alarm    Elimination Interventions: Bed/chair exit alarm              Problem: Patient Education: Go to Patient Education Activity  Goal: Patient/Family Education  Outcome: Progressing Towards Goal     Problem: Patient Education: Go to Patient Education Activity  Goal: Patient/Family Education  Outcome: Progressing Towards Goal     Problem: Patient Education: Go to Patient Education Activity  Goal: Patient/Family Education  Outcome: Progressing Towards Goal     Problem: Pressure Injury - Risk of  Goal: *Prevention of pressure injury  Description: Document John Scale and appropriate interventions in the flowsheet.   Outcome: Progressing Towards Goal  Note: Pressure Injury Interventions:  Sensory Interventions: Assess changes in LOC    Moisture Interventions: Maintain skin hydration (lotion/cream)    Activity Interventions: Pressure redistribution bed/mattress(bed type)    Mobility Interventions: HOB 30 degrees or less    Nutrition Interventions: Document food/fluid/supplement intake    Friction and Shear Interventions: HOB 30 degrees or less                Problem: Patient Education: Go to Patient Education Activity  Goal: Patient/Family Education  Outcome: Progressing Towards Goal

## 2020-09-27 NOTE — PROGRESS NOTES
Bedside shift change report given to Kalee Gill RN (oncoming nurse) by Sly Shelton RN (offgoing nurse). Report included the following information SBAR, Kardex and MAR.

## 2020-09-28 LAB
APPEARANCE UR: CLEAR
BACTERIA URNS QL MICRO: NEGATIVE /HPF
BILIRUB UR QL: NEGATIVE
COLOR UR: ABNORMAL
EPITH CASTS URNS QL MICRO: NORMAL /LPF (ref 0–5)
GLUCOSE UR STRIP.AUTO-MCNC: NEGATIVE MG/DL
HGB UR QL STRIP: NEGATIVE
KETONES UR QL STRIP.AUTO: ABNORMAL MG/DL
LEUKOCYTE ESTERASE UR QL STRIP.AUTO: ABNORMAL
NITRITE UR QL STRIP.AUTO: NEGATIVE
PH UR STRIP: 5.5 [PH] (ref 5–8)
PROT UR STRIP-MCNC: NEGATIVE MG/DL
RBC #/AREA URNS HPF: NEGATIVE /HPF (ref 0–5)
SP GR UR REFRACTOMETRY: 1.02 (ref 1–1.03)
UROBILINOGEN UR QL STRIP.AUTO: 0.2 EU/DL (ref 0.2–1)
WBC URNS QL MICRO: NORMAL /HPF (ref 0–5)

## 2020-09-28 PROCEDURE — 87635 SARS-COV-2 COVID-19 AMP PRB: CPT

## 2020-09-28 PROCEDURE — 74011250637 HC RX REV CODE- 250/637: Performed by: HOSPITALIST

## 2020-09-28 PROCEDURE — 74011250637 HC RX REV CODE- 250/637: Performed by: PHYSICIAN ASSISTANT

## 2020-09-28 PROCEDURE — 65660000000 HC RM CCU STEPDOWN

## 2020-09-28 PROCEDURE — 74011250636 HC RX REV CODE- 250/636: Performed by: INTERNAL MEDICINE

## 2020-09-28 PROCEDURE — 97535 SELF CARE MNGMENT TRAINING: CPT

## 2020-09-28 PROCEDURE — 2709999900 HC NON-CHARGEABLE SUPPLY

## 2020-09-28 PROCEDURE — 74011250636 HC RX REV CODE- 250/636: Performed by: HOSPITALIST

## 2020-09-28 PROCEDURE — 3331090001 HH PPS REVENUE CREDIT

## 2020-09-28 PROCEDURE — 99232 SBSQ HOSP IP/OBS MODERATE 35: CPT | Performed by: HOSPITALIST

## 2020-09-28 PROCEDURE — 97164 PT RE-EVAL EST PLAN CARE: CPT

## 2020-09-28 PROCEDURE — 87086 URINE CULTURE/COLONY COUNT: CPT

## 2020-09-28 PROCEDURE — 3331090002 HH PPS REVENUE DEBIT

## 2020-09-28 PROCEDURE — 97168 OT RE-EVAL EST PLAN CARE: CPT

## 2020-09-28 PROCEDURE — 81001 URINALYSIS AUTO W/SCOPE: CPT

## 2020-09-28 PROCEDURE — 97116 GAIT TRAINING THERAPY: CPT

## 2020-09-28 RX ORDER — ENOXAPARIN SODIUM 100 MG/ML
30 INJECTION SUBCUTANEOUS EVERY 24 HOURS
Status: DISCONTINUED | OUTPATIENT
Start: 2020-09-28 | End: 2020-09-29

## 2020-09-28 RX ORDER — SODIUM CHLORIDE 9 MG/ML
75 INJECTION, SOLUTION INTRAVENOUS CONTINUOUS
Status: DISPENSED | OUTPATIENT
Start: 2020-09-28 | End: 2020-09-29

## 2020-09-28 RX ADMIN — LISINOPRIL 20 MG: 20 TABLET ORAL at 09:01

## 2020-09-28 RX ADMIN — OXCARBAZEPINE 600 MG: 300 TABLET, FILM COATED ORAL at 17:19

## 2020-09-28 RX ADMIN — SODIUM CHLORIDE 75 ML/HR: 900 INJECTION, SOLUTION INTRAVENOUS at 23:47

## 2020-09-28 RX ADMIN — SODIUM CHLORIDE 75 ML/HR: 900 INJECTION, SOLUTION INTRAVENOUS at 10:49

## 2020-09-28 RX ADMIN — LEVETIRACETAM 1000 MG: 500 TABLET ORAL at 09:02

## 2020-09-28 RX ADMIN — DIVALPROEX SODIUM 250 MG: 250 TABLET, DELAYED RELEASE ORAL at 21:44

## 2020-09-28 RX ADMIN — TAMSULOSIN HYDROCHLORIDE 0.4 MG: 0.4 CAPSULE ORAL at 21:44

## 2020-09-28 RX ADMIN — CLOPIDOGREL BISULFATE 75 MG: 75 TABLET ORAL at 09:01

## 2020-09-28 RX ADMIN — Medication 10 ML: at 15:17

## 2020-09-28 RX ADMIN — DIVALPROEX SODIUM 250 MG: 250 TABLET, DELAYED RELEASE ORAL at 09:01

## 2020-09-28 RX ADMIN — HYDRALAZINE HYDROCHLORIDE 100 MG: 50 TABLET ORAL at 09:01

## 2020-09-28 RX ADMIN — AMLODIPINE BESYLATE 10 MG: 10 TABLET ORAL at 09:02

## 2020-09-28 RX ADMIN — DIVALPROEX SODIUM 250 MG: 250 TABLET, DELAYED RELEASE ORAL at 17:19

## 2020-09-28 RX ADMIN — HYDRALAZINE HYDROCHLORIDE 100 MG: 50 TABLET ORAL at 17:19

## 2020-09-28 RX ADMIN — ENOXAPARIN SODIUM 30 MG: 30 INJECTION SUBCUTANEOUS at 12:07

## 2020-09-28 RX ADMIN — HYDRALAZINE HYDROCHLORIDE 100 MG: 50 TABLET ORAL at 21:44

## 2020-09-28 RX ADMIN — Medication 10 ML: at 21:45

## 2020-09-28 RX ADMIN — RISPERIDONE 0.5 MG: 0.25 TABLET ORAL at 17:19

## 2020-09-28 RX ADMIN — POLYETHYLENE GLYCOL 3350 17 G: 17 POWDER, FOR SOLUTION ORAL at 09:01

## 2020-09-28 RX ADMIN — Medication 10 ML: at 09:04

## 2020-09-28 RX ADMIN — LEVETIRACETAM 1000 MG: 500 TABLET ORAL at 17:19

## 2020-09-28 RX ADMIN — OXCARBAZEPINE 600 MG: 300 TABLET, FILM COATED ORAL at 09:02

## 2020-09-28 RX ADMIN — RISPERIDONE 0.5 MG: 0.25 TABLET ORAL at 09:01

## 2020-09-28 RX ADMIN — CYANOCOBALAMIN TAB 1000 MCG 500 MCG: 1000 TAB at 09:01

## 2020-09-28 NOTE — PROGRESS NOTES
Physician Progress Note      PATIENTCodarlene MARTÍNEZ #:                  [id number]  :                       1955  ADMIT DATE:       2020 12:24 AM  DISCH DATE:  RESPONDING  PROVIDER #:        Arsh Barcenas MD          QUERY TEXT:    Pt admitted with metabolic encephalopathy . Pt noted to have elevated creat level . Ulus Reusing If possible, please document in the progress notes and discharge summary if you are evaluating and/or treating any of the following: The medical record reflects the following:  Risk Factors: HTN;   CKD 3;  Clinical Indicators: - creat- 1.19  -  creat  1.08  -  creat   1.19  -  creat  1.74    Treatment:   IVF  NS  at 75cc/hr x 24 hrs   ordered  monitor labs    Thank you,    Dwain Horowitz  RN  CCDS  x [phone]  Options provided:  -- Acute kidney failure  -- Acute kidney failure with acute tubular necrosis  -- Acute kidney injury  -- Other - I will add my own diagnosis  -- Disagree - Not applicable / Not valid  -- Disagree - Clinically unable to determine / Unknown  -- Refer to Clinical Documentation Reviewer    PROVIDER RESPONSE TEXT:    This patient is in Acute kidney failure.     Query created by: Karla York on 2020 9:28 AM      Electronically signed by:  Arsh Barcenas MD 2020 10:44 AM

## 2020-09-28 NOTE — PROGRESS NOTES
Comprehensive Nutrition Assessment    Type and Reason for Visit: Initial, RD nutrition re-screen/LOS    Nutrition Recommendations/Plan:   - Add supplement: Ensure Enlive TID  - Encourage po intake of meals/supplements  - MD to address bowel regimen      Nutrition Assessment:  Pt admitted with encephalopathy; improving per MD note. Remained a little confused today during visit; unable to obtain nutrition hx from pt. Has poor meal intake. tolerating diet. Per RN, pt with good intake of fluids; discussed adding nutrition supplement. No BM since admission; on bowel regimen. Discussed with MD recommendation for starting more agressive bowel regimen; MD agreed with plan and will address. Last seen by SLP on 9/17/2020; recommended regular consistency diet and thin liquids    Malnutrition Assessment:  Malnutrition Status: At risk for malnutrition (specify)(poor po intake since admission)        Nutrition History and Allergies: Hx of HTN, CVA and epilepsy. Food allergy:  tomato      Estimated Daily Nutrient Needs:  Energy (kcal):  9477-0030  Protein (g):  50-63       Fluid (ml/day):  1 mL/kcal    Nutrition Related Findings:  Per chart hx, last noted BM was on 9/13. No BM since admission; pt unable to provide date of more recent BM. On bowel regimen: dulcolax prn (not given), colace BID prn (not given), miralax daily. Medications: cyanocobalamin, ergocalciferol. IVF: NS at 75 mL/hr      Wounds:    None       Current Nutrition Therapies:  DIET CARDIAC Regular    Anthropometric Measures:  · Height:  5' (152.4 cm)  · Current Body Wt:  62.8 kg (138 lb 7.2 oz)   · Admission Body Wt:  138 lb 8 oz    · Ideal Body Wt:  100 lbs:  138.4 %    · BMI Category: Overweight (BMI 25.0-29. 9)       Nutrition Diagnosis:   · Inadequate oral intake related to cognitive or neurological impairment as evidenced by intake 0-25%      Nutrition Interventions:   Food and/or Nutrient Delivery: Continue current diet, Vitamin supplement, Start oral nutrition supplement  Nutrition Education and Counseling: No recommendations at this time  Coordination of Nutrition Care: Continued inpatient monitoring, Interdisciplinary rounds    Goals:  PO nutrition intake will meet >75% of patient's estimated nutrition needs within the next 7 days       Nutrition Monitoring and Evaluation:  Food/Nutrient Intake Outcomes: Food and nutrient intake, Supplement intake, Vitamin/mineral intake, IVF intake  Physical Signs/Symptoms Outcomes: Biochemical data, GI status, Constipation, Fluid status or edema, Meal time behavior, Nutrition focused physical findings    Discharge Planning:     Too soon to determine     Electronically signed by Ford Hernández RD on 9/28/2020 at 12:40 PM    Contact: [phone]

## 2020-09-28 NOTE — PROGRESS NOTES
Bladder scan performed. Greater than 597 mL retained urine. Straight cath performed with 600mL output, dark clay urine. Urine specimen sent to lab. Will continue to monitor patient.

## 2020-09-28 NOTE — PROGRESS NOTES
Problem: Mobility Impaired (Adult and Pediatric)  Goal: *Acute Goals and Plan of Care (Insert Text)  Description: Physical Therapy Goals  Initiated 9/21/2020, re-evaluated 9/28/2020 and goals adjusted and to be accomplished within 7 day(s)  1. Patient will move from supine to sit and sit to supine , scoot up and down, and roll side to side in bed with minimal assistance/contact guard assist.    2.  Patient will sit edge of bed for 8 minutes with fair+ sitting balance for improved mobility. 3.  Patient will transfer from bed to chair and chair to bed with minimal assistance/contact guard assist using the least restrictive device. 4.  Patient will perform sit to stand with contact guard assist.  5.  Patient will ambulate with North for 20 feet with the least restrictive device. Prior Level of Function:   Patient reports she was discharged from the hospital ~7 days ago and has been unable to walk or stand since due to bilateral LE weakness and left LE pain. Prior to that stay, she reports being independent with mobility. She lives in single story home with 0 steps to enter with her 2 sons who are not with her all the time. Outcome: Progressing Towards Goal  PHYSICAL THERAPY RE-EVALUATION    Patient: Yobani Lawrence (82 y.o. female)  Date: 9/28/2020  Primary Diagnosis: Encephalopathy [G93.40]        Precautions:  Aspiration, Fall, Skin, Seizure  PLOF: see above     ASSESSMENT :  Based on the objective data described below, the patient presents with increased confusion, decreased balance reactions, increased pain, decreased strength and decreased independence in functional mobility. Pt seen in conjunction with OT to maximize safety and mobility. Pt not oriented to place, thinking she was no longer in the hospital, attempted to reorient but patient did not believe clinician. Pt agreeable to PT session with encouragement. Transition from supine with HOB elevated to sit with maxAx2.  Sitting on EOB with CGA-North throughout, posterior trunk lean with knee extension in short sitting. Strength slightly decreased through gross MMT of LEs. Pt requesting to use BSC, CGA-North for sit to stand and SPT to [facility]. Toileting with supervision. SPT from [facility] to recliner with Blanco Patience, reaching for support but unable to reach to appropriate place, depth perception may be effected. Pt returned to sitting in bedside recliner with heel floating due to reports of heel pain in bed. Pt left with all needs met and call bell in reach. Patient will benefit from skilled intervention to address the above impairments. Patient's rehabilitation potential is considered to be Fair  Factors which may influence rehabilitation potential include:   []         None noted  [x]         Mental ability/status  [x]         Medical condition  [x]         Home/family situation and support systems  []         Safety awareness  []         Pain tolerance/management  []         Other:      PLAN :  Recommendations and Planned Interventions:   []           Bed Mobility Training             [x]    Neuromuscular Re-Education  [x]           Transfer Training                   []    Orthotic/Prosthetic Training  [x]           Gait Training                          []    Modalities  [x]           Therapeutic Exercises           []    Edema Management/Control  [x]           Therapeutic Activities            [x]    Family Training/Education  [x]           Patient Education  []           Other (comment):    Frequency/Duration: Patient will be followed by physical therapy 1-2 times per day/4-7 days per week to address goals. Discharge Recommendations: [facility]  Further Equipment Recommendations for Discharge: rolling walker     SUBJECTIVE:   Patient stated I am supposed to be in , I don't know where I am now.     OBJECTIVE DATA SUMMARY:   Hospital course since last seen and reason for re-evaluation: Pt is demonstrating varied performance of the last week. She continues to require support in sitting but is able to ambulate household distance with handhold assist. Pt would continue to benefit from skilled inpatient PT to continue to progress towards goals. Past Medical History:   Diagnosis Date    Abnormal coordination 9/21/2020    Hallucinations 9/21/2020    Hypertension     Neurological disorder     Seizures ([address]     Stroke [facility]) 2009   History reviewed. No pertinent surgical history. Barriers to Learning/Limitations: yes;  sensory deficits-vision/hearing/speech and altered mental status (i.e.Sedation, Confusion)  Compensate with: Visual Cues, Verbal Cues, and Tactile Cues  Home Situation:   Home Situation  Home Environment: Apartment  # Steps to Enter: 0  One/Two Story Residence: One story  Living Alone: No  Support Systems: Child(quiana)  Patient Expects to be Discharged to[de-identified] Apartment  Current DME Used/Available at Home: Walker, rolling  Critical Behavior:  Neurologic State: Confused  Psychosocial  Patient Behaviors: Calm;Confused; Cooperative  Strength:    Strength: Generally decreased, functional(BLEs )  Sensation: Intact  Range Of Motion:  AROM: Generally decreased, functional(BLEs )  Posture:  Posture (WDL): Exceptions to WDL  Posture Assessment: Forward head; Increased;Trunk flexion  Functional Mobility:  Bed Mobility:  Supine to Sit: Moderate assistance;Maximum assistance(for LE and trunk management )  Scooting: Maximum assistance  Transfers:  Sit to Stand: Contact guard assistance;Minimum assistance  Stand to Sit: Contact guard assistance(poor eccentric control )  Stand Pivot Transfers: Moderate assistance     Balance:   Sitting: Intact; With support  Standing: Impaired; With support  Standing - Static: Fair  Standing - Dynamic : Poor  Ambulation/Gait Training:  Distance (ft): 1 Feet (ft)  Assistive Device: (handhold )  Ambulation - Level of Assistance:  Moderate assistance  Gait Abnormalities: Decreased step clearance;Trunk sway increased  Base of Support: Narrowed  Speed/Josefina: Shuffled  Step Length: Right shortened;Left shortened  Pain:  Pt did not rate pain but did endorse bilateral heel pain in supine. Activity Tolerance:   Fair activity tolerance, decreased mobility this session as patient IV line connected to bed. Please refer to the flowsheet for vital signs taken during this treatment. After treatment:   [x]         Patient left in no apparent distress sitting up in chair  []         Patient left in no apparent distress in bed  [x]         Call bell left within reach  [x]         Nursing notified  []         Caregiver present  []         Bed alarm activated  []         SCDs applied    COMMUNICATION/EDUCATION:   [x]         Role of Physical Therapy in the acute care setting. [x]         Fall prevention education was provided and the patient/caregiver indicated understanding. [x]         Patient/family have participated as able in goal setting and plan of care. [x]         Patient/family agree to work toward stated goals and plan of care. []         Patient understands intent and goals of therapy, but is neutral about his/her participation. []         Patient is unable to participate in goal setting/plan of care: ongoing with therapy staff.  []         Other:     Thank you for this referral.  Maggy Duval, PT   Time Calculation: 23 mins

## 2020-09-28 NOTE — PROGRESS NOTES
Problem: Falls - Risk of  Goal: *Absence of Falls  Description: Document Isamar Horowitz Fall Risk and appropriate interventions in the flowsheet.   Outcome: Progressing Towards Goal  Note: Fall Risk Interventions:  Mobility Interventions: Bed/chair exit alarm    Mentation Interventions: Adequate sleep, hydration, pain control, Bed/chair exit alarm, Door open when patient unattended    Medication Interventions: Bed/chair exit alarm    Elimination Interventions: Call light in reach

## 2020-09-28 NOTE — PROGRESS NOTES
[facility]  Progress Note    Patient: Troy Cole Age: 76 y.o. : 1955 MR#: [id number] SSN: [id number]  Date/Time: 2020     Subjective: Patient feels ok, slow to respond,still having hallucinations on and off. UA and urine culture not sent yesterday. Assessment/Plan:   1. Low-grade temperature: CXR noted, UA, Cx not sent, monitor off Abx, continue IS and out of bed to chair. 2. Acute renal failure: Suspect dehydration versus urinary retention, will get bladder checks and straight cath if needed. Discussed with PEGGY Dillon  2. Visual hallucinations: Unclear reason, Continue Risperdal per psych. 3. Acute metabolic encephalopathy: Improved now, MRI/CT negative, neuro input noted. 4.  Mild hypercalcemia due to dehydration: improved now   5. Seizure disorder: no seizure episodes   6. CKD3, will monitor  7. History of UTI with enterococcus, s/p treatment   8.  HTN: BP better, cont hydralazine and amlodipine  7.  Hyperlipidemia   8. Full code  Discussed with the patient and son Tevin Whitt over the phone and updated the above information. Awaiting placement  Neg COVID-19 testing for placement. Case discussed with:  [x]Patient  []Family  [x]Nursing  []Case Management  DVT Prophylaxis:  [x]Lovenox  []Hep SQ  []SCDs  []Coumadin   []Eliquis/Xarelto     Objective:   VS:   Visit Vitals  /81 (BP 1 Location: Right arm, BP Patient Position: At rest)   Pulse 100   Temp 98.3 °F (36.8 °C)   Resp 18   Ht 5' (1.524 m)   Wt 62.8 kg (138 lb 8 oz)   SpO2 99%   BMI 27.05 kg/m²      Tmax/24hrs: Temp (24hrs), Av.5 °F (36.9 °C), Min:97.4 °F (36.3 °C), Max:99.8 °F (37.7 °C)  IOBRIEF  No intake or output data in the 24 hours ending 20 1228    General:  Alert, cooperative, no acute distress    Pulmonary:  CTA Bilaterally. No Wheezing/Rales. Cardiovascular: Regular rate and Rhythm. GI:  Soft, Non distended, Non tender. + Bowel sounds. Extremities:  No edema. No calf tenderness. Neurologic: Alert and oriented X 2-3. Speech clear, slow to respond, moves all extremities well, good handgrip. Medications:   Current Facility-Administered Medications   Medication Dose Route Frequency    0.9% sodium chloride infusion  75 mL/hr IntraVENous CONTINUOUS    enoxaparin (LOVENOX) injection 30 mg  30 mg SubCUTAneous Q24H    hydrALAZINE (APRESOLINE) tablet 100 mg  100 mg Oral TID    polyethylene glycol (MIRALAX) packet 17 g  17 g Oral DAILY    docusate sodium (COLACE) capsule 100 mg  100 mg Oral BID PRN    bisacodyL (DULCOLAX) suppository 10 mg  10 mg Rectal DAILY PRN    risperiDONE (RisperDAL) tablet 0.5 mg  0.5 mg Oral BID    cyanocobalamin tablet 500 mcg  500 mcg Oral DAILY    amLODIPine (NORVASC) tablet 10 mg  10 mg Oral DAILY    ergocalciferol capsule 50,000 Units  50,000 Units Oral Once per day on Thu Sat    levETIRAcetam (KEPPRA) tablet 1,000 mg  1,000 mg Oral BID    OXcarbazepine (TRILEPTAL) tablet 600 mg  600 mg Oral BID    clopidogreL (PLAVIX) tablet 75 mg  75 mg Oral DAILY    lisinopriL (PRINIVIL, ZESTRIL) tablet 20 mg  20 mg Oral DAILY    tamsulosin (FLOMAX) capsule 0.4 mg  0.4 mg Oral QHS    divalproex DR (DEPAKOTE) tablet 250 mg  250 mg Oral TID    sodium chloride (NS) flush 5-40 mL  5-40 mL IntraVENous Q8H    sodium chloride (NS) flush 5-40 mL  5-40 mL IntraVENous PRN    acetaminophen (TYLENOL) tablet 650 mg  650 mg Oral Q6H PRN    Or    acetaminophen (TYLENOL) suppository 650 mg  650 mg Rectal Q6H PRN    promethazine (PHENERGAN) tablet 12.5 mg  12.5 mg Oral Q6H PRN    Or    ondansetron (ZOFRAN) injection 4 mg  4 mg IntraVENous Q6H PRN    LORazepam (ATIVAN) injection 1 mg  1 mg IntraVENous Q6H PRN       Labs:    No results found for this or any previous visit (from the past 24 hour(s)).     Signed By: Jonathan Alexander MD     September 28, 2020      Disclaimer: Sections of this note are dictated using utilizing voice recognition software. Minor typographical errors may be present. If questions arise, please do not hesitate to contact me or call our department.

## 2020-09-29 LAB
ANION GAP SERPL CALC-SCNC: 11 MMOL/L (ref 3–18)
BACTERIA SPEC CULT: NORMAL
BASOPHILS # BLD: 0.1 K/UL (ref 0–0.1)
BASOPHILS NFR BLD: 1 % (ref 0–2)
BUN SERPL-MCNC: 27 MG/DL (ref 7–18)
BUN/CREAT SERPL: 24 (ref 12–20)
CALCIUM SERPL-MCNC: 9.5 MG/DL (ref 8.5–10.1)
CHLORIDE SERPL-SCNC: 111 MMOL/L (ref 100–111)
CO2 SERPL-SCNC: 18 MMOL/L (ref 21–32)
CREAT SERPL-MCNC: 1.11 MG/DL (ref 0.6–1.3)
DIFFERENTIAL METHOD BLD: ABNORMAL
EOSINOPHIL # BLD: 0.1 K/UL (ref 0–0.4)
EOSINOPHIL NFR BLD: 2 % (ref 0–5)
ERYTHROCYTE [DISTWIDTH] IN BLOOD BY AUTOMATED COUNT: 14.1 % (ref 11.6–14.5)
GLUCOSE BLD STRIP.AUTO-MCNC: 73 MG/DL (ref 70–110)
GLUCOSE SERPL-MCNC: 66 MG/DL (ref 74–99)
HCT VFR BLD AUTO: 30.3 % (ref 35–45)
HGB BLD-MCNC: 9.8 G/DL (ref 12–16)
LYMPHOCYTES # BLD: 1.5 K/UL (ref 0.9–3.6)
LYMPHOCYTES NFR BLD: 20 % (ref 21–52)
MCH RBC QN AUTO: 29.9 PG (ref 24–34)
MCHC RBC AUTO-ENTMCNC: 32.3 G/DL (ref 31–37)
MCV RBC AUTO: 92.4 FL (ref 74–97)
MONOCYTES # BLD: 0.8 K/UL (ref 0.05–1.2)
MONOCYTES NFR BLD: 11 % (ref 3–10)
NEUTS SEG # BLD: 4.9 K/UL (ref 1.8–8)
NEUTS SEG NFR BLD: 66 % (ref 40–73)
PLATELET # BLD AUTO: 266 K/UL (ref 135–420)
PMV BLD AUTO: 10.8 FL (ref 9.2–11.8)
POTASSIUM SERPL-SCNC: 4.3 MMOL/L (ref 3.5–5.5)
RBC # BLD AUTO: 3.28 M/UL (ref 4.2–5.3)
SARS-COV-2, COV2NT: NOT DETECTED
SERVICE CMNT-IMP: NORMAL
SODIUM SERPL-SCNC: 140 MMOL/L (ref 136–145)
SOURCE, COVRS: NORMAL
SPECIMEN TYPE, XMCV1T: NORMAL
WBC # BLD AUTO: 7.4 K/UL (ref 4.6–13.2)

## 2020-09-29 PROCEDURE — 97530 THERAPEUTIC ACTIVITIES: CPT

## 2020-09-29 PROCEDURE — 3331090002 HH PPS REVENUE DEBIT

## 2020-09-29 PROCEDURE — 74011250636 HC RX REV CODE- 250/636: Performed by: INTERNAL MEDICINE

## 2020-09-29 PROCEDURE — 97535 SELF CARE MNGMENT TRAINING: CPT

## 2020-09-29 PROCEDURE — 74011250637 HC RX REV CODE- 250/637: Performed by: PHYSICIAN ASSISTANT

## 2020-09-29 PROCEDURE — 80048 BASIC METABOLIC PNL TOTAL CA: CPT

## 2020-09-29 PROCEDURE — 97116 GAIT TRAINING THERAPY: CPT

## 2020-09-29 PROCEDURE — 3331090001 HH PPS REVENUE CREDIT

## 2020-09-29 PROCEDURE — 36415 COLL VENOUS BLD VENIPUNCTURE: CPT

## 2020-09-29 PROCEDURE — 74011250637 HC RX REV CODE- 250/637: Performed by: HOSPITALIST

## 2020-09-29 PROCEDURE — 2709999900 HC NON-CHARGEABLE SUPPLY

## 2020-09-29 PROCEDURE — 99232 SBSQ HOSP IP/OBS MODERATE 35: CPT | Performed by: HOSPITALIST

## 2020-09-29 PROCEDURE — 85025 COMPLETE CBC W/AUTO DIFF WBC: CPT

## 2020-09-29 PROCEDURE — 82962 GLUCOSE BLOOD TEST: CPT

## 2020-09-29 PROCEDURE — 65660000000 HC RM CCU STEPDOWN

## 2020-09-29 RX ORDER — DEXTROSE 50 % IN WATER (D50W) INTRAVENOUS SYRINGE
25-50 AS NEEDED
Status: DISCONTINUED | OUTPATIENT
Start: 2020-09-29 | End: 2020-09-30 | Stop reason: HOSPADM

## 2020-09-29 RX ORDER — MAGNESIUM SULFATE 100 %
4 CRYSTALS MISCELLANEOUS AS NEEDED
Status: DISCONTINUED | OUTPATIENT
Start: 2020-09-29 | End: 2020-09-30 | Stop reason: HOSPADM

## 2020-09-29 RX ORDER — ENOXAPARIN SODIUM 100 MG/ML
40 INJECTION SUBCUTANEOUS EVERY 24 HOURS
Status: DISCONTINUED | OUTPATIENT
Start: 2020-09-29 | End: 2020-09-30 | Stop reason: HOSPADM

## 2020-09-29 RX ADMIN — OXCARBAZEPINE 600 MG: 300 TABLET, FILM COATED ORAL at 17:43

## 2020-09-29 RX ADMIN — LEVETIRACETAM 1000 MG: 500 TABLET ORAL at 09:35

## 2020-09-29 RX ADMIN — Medication 10 ML: at 08:14

## 2020-09-29 RX ADMIN — Medication 10 ML: at 23:07

## 2020-09-29 RX ADMIN — DIVALPROEX SODIUM 250 MG: 250 TABLET, DELAYED RELEASE ORAL at 23:04

## 2020-09-29 RX ADMIN — HYDRALAZINE HYDROCHLORIDE 100 MG: 50 TABLET ORAL at 09:35

## 2020-09-29 RX ADMIN — HYDRALAZINE HYDROCHLORIDE 100 MG: 50 TABLET ORAL at 17:45

## 2020-09-29 RX ADMIN — DIVALPROEX SODIUM 250 MG: 250 TABLET, DELAYED RELEASE ORAL at 09:35

## 2020-09-29 RX ADMIN — CLOPIDOGREL BISULFATE 75 MG: 75 TABLET ORAL at 09:35

## 2020-09-29 RX ADMIN — OXCARBAZEPINE 600 MG: 300 TABLET, FILM COATED ORAL at 09:35

## 2020-09-29 RX ADMIN — CYANOCOBALAMIN TAB 1000 MCG 500 MCG: 1000 TAB at 09:36

## 2020-09-29 RX ADMIN — AMLODIPINE BESYLATE 10 MG: 10 TABLET ORAL at 09:36

## 2020-09-29 RX ADMIN — ACETAMINOPHEN 650 MG: 325 TABLET ORAL at 23:04

## 2020-09-29 RX ADMIN — RISPERIDONE 0.5 MG: 0.25 TABLET ORAL at 17:43

## 2020-09-29 RX ADMIN — POLYETHYLENE GLYCOL 3350 17 G: 17 POWDER, FOR SOLUTION ORAL at 09:36

## 2020-09-29 RX ADMIN — RISPERIDONE 0.5 MG: 0.25 TABLET ORAL at 09:35

## 2020-09-29 RX ADMIN — Medication 10 ML: at 15:11

## 2020-09-29 RX ADMIN — DIVALPROEX SODIUM 250 MG: 250 TABLET, DELAYED RELEASE ORAL at 17:43

## 2020-09-29 RX ADMIN — LISINOPRIL 20 MG: 20 TABLET ORAL at 09:35

## 2020-09-29 RX ADMIN — LEVETIRACETAM 1000 MG: 500 TABLET ORAL at 17:43

## 2020-09-29 RX ADMIN — ENOXAPARIN SODIUM 40 MG: 40 INJECTION SUBCUTANEOUS at 12:54

## 2020-09-29 RX ADMIN — HYDRALAZINE HYDROCHLORIDE 100 MG: 50 TABLET ORAL at 23:03

## 2020-09-29 RX ADMIN — TAMSULOSIN HYDROCHLORIDE 0.4 MG: 0.4 CAPSULE ORAL at 23:04

## 2020-09-29 NOTE — PROGRESS NOTES
Bedside shift change report given to Belen Razo RN (oncoming nurse) by Alix Toro RN (offgoing nurse). Report included the following information SBAR, Kardex and MAR.

## 2020-09-29 NOTE — PROGRESS NOTES
[facility]  Progress Note    Patient: Adam Gonzales Age: 76 y.o. : 1955 MR#: [id number] SSN: [id number]  Date/Time: 2020     Subjective: Patient feels ok, slow to respond, able to answer questions appropriately. UA and urine culture not sent yesterday. Assessment/Plan:   1. Low-grade temperature: Better, CXR noted, UA negative, monitor off Abx, continue IS and out of bed to chair. 2. Acute renal failure: Suspect dehydration versus urinary retention, patient had mild urinary retention but improved with straight cath. Creatinine better now. We will continue to monitor bladder scans every shift. Discussed with RN. 2. Visual hallucinations: Unclear reason, Continue Risperdal per psych. 3. Acute metabolic encephalopathy: Improved now, MRI/CT negative, neuro input noted. 4.  Mild hypercalcemia due to dehydration: improved now   5. Seizure disorder: no seizure episodes   6. CKD3, will monitor  7. History of UTI with enterococcus, s/p treatment   8.  HTN: BP better, cont hydralazine and amlodipine  7.  Hyperlipidemia   8. Full code  Discussed with the patient and son Theodora John over the phone and updated the above information. Awaiting placement  Pending COVID-19 testing for placement. SNF tomorrow if remains stable.         Case discussed with:  [x]Patient  []Family  [x]Nursing  []Case Management  DVT Prophylaxis:  [x]Lovenox  []Hep SQ  []SCDs  []Coumadin   []Eliquis/Xarelto     Objective:   VS:   Visit Vitals  BP (!) 146/73 (BP 1 Location: Right arm, BP Patient Position: At rest)   Pulse 87   Temp 98.3 °F (36.8 °C)   Resp 14   Ht 5' (1.524 m)   Wt 62.8 kg (138 lb 8 oz)   SpO2 98%   BMI 27.05 kg/m²      Tmax/24hrs: Temp (24hrs), Av.4 °F (36.9 °C), Min:98.1 °F (36.7 °C), Max:98.9 °F (37.2 °C)  IOBRIEF    Intake/Output Summary (Last 24 hours) at 2020 1743  Last data filed at 2020 1040  Gross per 24 hour   Intake 320 ml   Output 300 ml   Net 20 ml       General:  Alert, cooperative, no acute distress    Pulmonary:  CTA Bilaterally. No Wheezing/Rales. Cardiovascular: Regular rate and Rhythm. GI:  Soft, Non distended, Non tender. + Bowel sounds. Extremities:  No edema. No calf tenderness. Neurologic: Alert and oriented X 2-3. Speech clear, slow to respond, moves all extremities well, good handgrip.       Medications:   Current Facility-Administered Medications   Medication Dose Route Frequency    enoxaparin (LOVENOX) injection 40 mg  40 mg SubCUTAneous Q24H    glucose chewable tablet 16 g  4 Tab Oral PRN    glucagon (GLUCAGEN) injection 1 mg  1 mg IntraMUSCular PRN    dextrose (D50W) injection syrg 12.5-25 g  25-50 mL IntraVENous PRN    hydrALAZINE (APRESOLINE) tablet 100 mg  100 mg Oral TID    polyethylene glycol (MIRALAX) packet 17 g  17 g Oral DAILY    docusate sodium (COLACE) capsule 100 mg  100 mg Oral BID PRN    bisacodyL (DULCOLAX) suppository 10 mg  10 mg Rectal DAILY PRN    risperiDONE (RisperDAL) tablet 0.5 mg  0.5 mg Oral BID    cyanocobalamin tablet 500 mcg  500 mcg Oral DAILY    amLODIPine (NORVASC) tablet 10 mg  10 mg Oral DAILY    ergocalciferol capsule 50,000 Units  50,000 Units Oral Once per day on Thu Sat    levETIRAcetam (KEPPRA) tablet 1,000 mg  1,000 mg Oral BID    OXcarbazepine (TRILEPTAL) tablet 600 mg  600 mg Oral BID    clopidogreL (PLAVIX) tablet 75 mg  75 mg Oral DAILY    lisinopriL (PRINIVIL, ZESTRIL) tablet 20 mg  20 mg Oral DAILY    tamsulosin (FLOMAX) capsule 0.4 mg  0.4 mg Oral QHS    divalproex DR (DEPAKOTE) tablet 250 mg  250 mg Oral TID    sodium chloride (NS) flush 5-40 mL  5-40 mL IntraVENous Q8H    sodium chloride (NS) flush 5-40 mL  5-40 mL IntraVENous PRN    acetaminophen (TYLENOL) tablet 650 mg  650 mg Oral Q6H PRN    Or    acetaminophen (TYLENOL) suppository 650 mg  650 mg Rectal Q6H PRN    promethazine (PHENERGAN) tablet 12.5 mg  12.5 mg Oral Q6H PRN    Or    ondansetron (ZOFRAN) injection 4 mg  4 mg IntraVENous Q6H PRN    LORazepam (ATIVAN) injection 1 mg  1 mg IntraVENous Q6H PRN       Labs:    Recent Results (from the past 24 hour(s))   METABOLIC PANEL, BASIC    Collection Time: 09/29/20  5:05 AM   Result Value Ref Range    Sodium 140 136 - 145 mmol/L    Potassium 4.3 3.5 - 5.5 mmol/L    Chloride 111 100 - 111 mmol/L    CO2 18 (L) 21 - 32 mmol/L    Anion gap 11 3.0 - 18 mmol/L    Glucose 66 (L) 74 - 99 mg/dL    BUN 27 (H) 7.0 - 18 MG/DL    Creatinine 1.11 0.6 - 1.3 MG/DL    BUN/Creatinine ratio 24 (H) 12 - 20      GFR est AA 60 (L) >60 ml/min/1.73m2    GFR est non-AA 49 (L) >60 ml/min/1.73m2    Calcium 9.5 8.5 - 10.1 MG/DL   CBC WITH AUTOMATED DIFF    Collection Time: 09/29/20  5:05 AM   Result Value Ref Range    WBC 7.4 4.6 - 13.2 K/uL    RBC 3.28 (L) 4.20 - 5.30 M/uL    HGB 9.8 (L) 12.0 - 16.0 g/dL    HCT 30.3 (L) 35.0 - 45.0 %    MCV 92.4 74.0 - 97.0 FL    MCH 29.9 24.0 - 34.0 PG    MCHC 32.3 31.0 - 37.0 g/dL    RDW 14.1 11.6 - 14.5 %    PLATELET 118 810 - 456 K/uL    MPV 10.8 9.2 - 11.8 FL    NEUTROPHILS 66 40 - 73 %    LYMPHOCYTES 20 (L) 21 - 52 %    MONOCYTES 11 (H) 3 - 10 %    EOSINOPHILS 2 0 - 5 %    BASOPHILS 1 0 - 2 %    ABS. NEUTROPHILS 4.9 1.8 - 8.0 K/UL    ABS. LYMPHOCYTES 1.5 0.9 - 3.6 K/UL    ABS. MONOCYTES 0.8 0.05 - 1.2 K/UL    ABS. EOSINOPHILS 0.1 0.0 - 0.4 K/UL    ABS. BASOPHILS 0.1 0.0 - 0.1 K/UL    DF AUTOMATED     GLUCOSE, POC    Collection Time: 09/29/20  8:20 AM   Result Value Ref Range    Glucose (POC) 73 70 - 110 mg/dL       Signed By: Micael Sacks, MD     September 29, 2020      Disclaimer: Sections of this note are dictated using utilizing voice recognition software. Minor typographical errors may be present. If questions arise, please do not hesitate to contact me or call our department.

## 2020-09-29 NOTE — PROGRESS NOTES
Problem: Self Care Deficits Care Plan (Adult)  Goal: *Acute Goals and Plan of Care (Insert Text)  Description: Occupational Therapy Goals  Initiated 9/21/2020, re-evaluated on 9/28/2020 within 7 day(s). Continue all previously set goals. 1.  Patient will perform self-feeding with modified independence. 2.  Patient will perform grooming with supervision/set-up. 3.  Patient will perform upper body dressing with supervision/set-up. 4.  Patient will perform lower body dressing with supervision/setup. 5.  Patient will perform bed mobility with minimal assistance in preparation for ADLs. 6.  Patient will participate in upper extremity therapeutic exercise/activities with supervision/set-up for 8 minutes. Prior Level of Function: Pt recently discharged from hospital 9/17/2020. Prior to d/c, OT/PT were recommending Rehab. This admission, pt reports coming from home, living with her sons. Pt reports sons have been providing assistance with UB/LB dressing/bathing PTA. Per OT gino on 9/14/2020, pt reports she was (I) with basic self-care/ADLs prior to that admission however appeared to be a poor historian at that time. Outcome: Progressing Towards Goal   OCCUPATIONAL THERAPY TREATMENT    Patient: Sharmaine Weinstein (7 y.o. female)  Date: 9/29/2020  Diagnosis: Encephalopathy [G93.40]   Encephalopathy       Precautions: Aspiration, Fall, Skin, Seizure  PLOF: Pt recently discharged from hospital 9/17/2020. Prior to d/c, OT/PT were recommending Rehab. This admission, pt reports coming from home, living with her sons. Pt reports sons have been providing assistance with UB/LB dressing/bathing PTA. Per OT gino on 9/14/2020, pt reports she was (I) with basic self-care/ADLs prior to that admission however appeared to be a poor historian at that time. Chart, occupational therapy assessment, plan of care, and goals were reviewed.   ASSESSMENT:  Pt presented supine in bed with HOB elevated and agreeable for participation. Vitals assessed /72, O2 on RA 95%, and HR 91 bpm. Pt overall more alert this date with increased command follow. Pt engaged in simple grooming tasks @ bed level 2/2 fatigue despite encouragement for OOB activity. Pt required SBA for washing face requiring vc's to track to Rt and utilize R UE and MIN A for brushing teeth requiring assist squeezing toothpaste on toothbrush, pt continues to demonstrate poor hand eye coordination with notable undershooting to mouth with toothbrush. Pt emotional this session with bouts of tears wanting to go home or see /son. PT left supine in bed with HOB elevated and all needs left within reach. Progression toward goals:  []          Improving appropriately and progressing toward goals  [x]          Improving slowly and progressing toward goals  []          Not making progress toward goals and plan of care will be adjusted     PLAN:  Patient continues to benefit from skilled intervention to address the above impairments. Continue treatment per established plan of care. Discharge Recommendations:  SNF  Further Equipment Recommendations for Discharge: N/A     SUBJECTIVE:   Patient stated  This just doesn't make sense. \"    OBJECTIVE DATA SUMMARY:   Cognitive/Behavioral Status:  Neurologic State: Alert, Confused  Orientation Level: Oriented to person, Oriented to time  Cognition: Impaired decision making  Safety/Judgement: Fall prevention      Balance:  Sitting: With support  Sitting - Static: Fair (occasional)    ADL Intervention:       Grooming  Grooming Assistance: Minimum assistance  Position Performed: Long sitting on bed  Washing Face: Stand-by assistance  Brushing Teeth: Minimum assistance    Pain:  Pt reports no pain at this time. Activity Tolerance:    Fair  Please refer to the flowsheet for vital signs taken during this treatment.   After treatment:   []  Patient left in no apparent distress sitting up in chair  [x]  Patient left in no apparent distress in bed  [x]  Call bell left within reach  [x]  Nursing notified  []  Caregiver present  [x]  Bed alarm activated    COMMUNICATION/EDUCATION:   [] Role of Occupational Therapy in the acute care setting  [] Home safety education was provided and the patient/caregiver indicated understanding. [] Patient/family have participated as able in working towards goals and plan of care. [x] Patient/family agree to work toward stated goals and plan of care. [] Patient understands intent and goals of therapy, but is neutral about his/her participation. [] Patient is unable to participate in goal setting and plan of care.       Thank you for this referral.  JOSE JUAN Lainez  Time Calculation: 24 mins

## 2020-09-29 NOTE — PROGRESS NOTES
Problem: Falls - Risk of  Goal: *Absence of Falls  Description: Document Vianneyjakefilipe Romero Fall Risk and appropriate interventions in the flowsheet. Outcome: Progressing Towards Goal  Note: Fall Risk Interventions:  Mobility Interventions: Bed/chair exit alarm    Mentation Interventions: Adequate sleep, hydration, pain control, Bed/chair exit alarm    Medication Interventions: Bed/chair exit alarm    Elimination Interventions: Call light in reach              Problem: Pressure Injury - Risk of  Goal: *Prevention of pressure injury  Description: Document John Scale and appropriate interventions in the flowsheet.   Outcome: Progressing Towards Goal  Note: Pressure Injury Interventions:  Sensory Interventions: Keep linens dry and wrinkle-free    Moisture Interventions: Absorbent underpads    Activity Interventions: Increase time out of bed    Mobility Interventions: HOB 30 degrees or less    Nutrition Interventions: Document food/fluid/supplement intake    Friction and Shear Interventions: HOB 30 degrees or less

## 2020-09-29 NOTE — PROGRESS NOTES
Problem: Mobility Impaired (Adult and Pediatric)  Goal: *Acute Goals and Plan of Care (Insert Text)  Description: Physical Therapy Goals  Initiated 9/21/2020, re-evaluated 9/28/2020 and goals adjusted and to be accomplished within 7 day(s)  1. Patient will move from supine to sit and sit to supine , scoot up and down, and roll side to side in bed with minimal assistance/contact guard assist.    2.  Patient will sit edge of bed for 8 minutes with fair+ sitting balance for improved mobility. 3.  Patient will transfer from bed to chair and chair to bed with minimal assistance/contact guard assist using the least restrictive device. 4.  Patient will perform sit to stand with contact guard assist.  5.  Patient will ambulate with North for 20 feet with the least restrictive device. Prior Level of Function:   Patient reports she was discharged from the hospital ~7 days ago and has been unable to walk or stand since due to bilateral LE weakness and left LE pain. Prior to that stay, she reports being independent with mobility. She lives in single story home with 0 steps to enter with her 2 sons who are not with her all the time. Outcome: Progressing Towards Goal     PHYSICAL THERAPY TREATMENT    Patient: Milly Tejada (89 y.o. female)  Date: 9/29/2020  Diagnosis: Encephalopathy [G93.40]   Encephalopathy       Precautions: Aspiration, Fall, Skin, Seizure      ASSESSMENT:  Pt received in bed in NAD. Agreeable to PT  tx session. Pt remains confused saying she has been in and out of 5 hospitals for 2 weeks and they keep taking her blood and should know whats wrong by now. Attempted to re-oriented pt to keep on task. Pt is able to come to sitting EOB this date with SBA and is able to stand with verbal and tactile cues for hand placement and once standing, pt with strong posterior lean and is oriented forward to help keep her balance.  With HHA pt attempts to walk with cues from PT but pt only picking up her feet minorly from floor and thus is turned to sit on BSC via stand pivot transfer. Pt performs her own pericare and transfers back to sit on EOB with cues for lateral stepping along EOB to get up higher in the bed prior to returning to supine. Pt continues to make slow progress towards goals and will continue to follow in the acute setting. Pt left in bed with all needs met, recommend rehab upon discharge. Progression toward goals:   []      Improving appropriately and progressing toward goals  [x]      Improving slowly and progressing toward goals  []      Not making progress toward goals and plan of care will be adjusted     PLAN:  Patient continues to benefit from skilled intervention to address the above impairments. Continue treatment per established plan of care. Discharge Recommendations:  Rehab  Further Equipment Recommendations for Discharge:  TBD at next level of care     SUBJECTIVE:   Patient stated Castillo Ruiz keep switching me in and out of hospitals.     OBJECTIVE DATA SUMMARY:   Critical Behavior:  Neurologic State: Alert, Confused  Orientation Level: Oriented to person  Cognition: Impaired decision making, Follows commands  Safety/Judgement: Fall prevention  Functional Mobility Training:  Bed Mobility:     Supine to Sit: Stand-by assistance        Transfers:  Sit to Stand: Minimum assistance  Stand to Sit: Contact guard assistance    Balance:  Sitting: Impaired; Without support  Sitting - Static: Fair (occasional)  Sitting - Dynamic: Fair (occasional)  Standing: Impaired; Without support  Standing - Static: Fair  Standing - Dynamic : Poor     Pain:  Pain level pre-treatment: 0/10  Pain level post-treatment: 0/10       Activity Tolerance:   Good    Please refer to the flowsheet for vital signs taken during this treatment.   After treatment:   [] Patient left in no apparent distress sitting up in chair  [x] Patient left in no apparent distress in bed  [x] Call bell left within reach  [x] Nursing notified  [] Caregiver present  [] Bed alarm activated  [] SCDs applied      COMMUNICATION/EDUCATION:   [x]         Role of Physical Therapy in the acute care setting. [x]         Fall prevention education was provided and the patient/caregiver indicated understanding. [x]         Patient/family have participated as able in working toward goals and plan of care. [x]         Patient/family agree to work toward stated goals and plan of care. []         Patient understands intent and goals of therapy, but is neutral about his/her participation.   []         Patient is unable to participate in stated goals/plan of care: ongoing with therapy staff.  []         Other:        Telly Mijares   Time Calculation: 26 mins

## 2020-09-29 NOTE — PROGRESS NOTES
Bedside shift change report given to Michelle Perez RN (oncoming nurse) by Juli Abad RN (offgoing nurse). Report included the following information SBAR, Kardex and MAR.

## 2020-09-29 NOTE — PROGRESS NOTES
Problem: Self Care Deficits Care Plan (Adult)  Goal: *Acute Goals and Plan of Care (Insert Text)  Description: Occupational Therapy Goals  Initiated 9/21/2020, re-evaluated on 9/28/2020 within 7 day(s). Continue all previously set goals. 1.  Patient will perform self-feeding with modified independence. 2.  Patient will perform grooming with supervision/set-up. 3.  Patient will perform upper body dressing with supervision/set-up. 4.  Patient will perform lower body dressing with supervision/setup. 5.  Patient will perform bed mobility with minimal assistance in preparation for ADLs. 6.  Patient will participate in upper extremity therapeutic exercise/activities with supervision/set-up for 8 minutes. Prior Level of Function: Pt recently discharged from hospital 9/17/2020. Prior to d/c, OT/PT were recommending Rehab. This admission, pt reports coming from home, living with her sons. Pt reports sons have been providing assistance with UB/LB dressing/bathing PTA. Per OT eval on 9/14/2020, pt reports she was (I) with basic self-care/ADLs prior to that admission however appeared to be a poor historian at that time. Outcome: Progressing Towards Goal   OCCUPATIONAL THERAPY RE-EVALUATION    Patient: Tdo Schmidt (23 y.o. female)  Date: 9/28/2020  Primary Diagnosis: Encephalopathy [G93.40]        Precautions:   Aspiration, Fall, Skin, Seizure    ASSESSMENT :  Based on the objective data described below, the patient continues to present with decreased ADLs, decreased functional mobility and muscle weakness, complicated by confusion. Mod to max assist needed for self care tasks but min assist given for functional standing/transfers. She has a left gaze preference and requires max verbal and tactile cueing to track to the right. Due to level of care, recommend continued therapy at SNF to maximize her functional independence for safe return to home with her sons.        Patient will benefit from skilled intervention to address the above impairments. Patient's rehabilitation potential is considered to be Good  Factors which may influence rehabilitation potential include:   []             None noted  [x]             Mental ability/status  [x]             Medical condition  []             Home/family situation and support systems  []             Safety awareness  []             Pain tolerance/management  []             Other:      PLAN :  Recommendations and Planned Interventions:   [x]               Self Care Training                  [x]      Therapeutic Activities  [x]               Functional Mobility Training   [x]      Cognitive Retraining  [x]               Therapeutic Exercises           [x]      Endurance Activities  [x]               Balance Training                    [x]      Neuromuscular Re-Education  [x]               Visual/Perceptual Training     [x]      Home Safety Training  [x]               Patient Education                   [x]      Family Training/Education  []               Other (comment):    Frequency/Duration: Patient will be followed by occupational therapy 1-2 times per day/4-7 days per week to address goals. Discharge Recommendations: Skilled Nursing Facility  Further Equipment Recommendations for Discharge: N/A     SUBJECTIVE:   Patient stated I need to pee.     OBJECTIVE DATA SUMMARY:   Hospital course since last seen and reason for reevaluation: Patient making slow progress toward goals and continues to need increased assist with ADLs. Past Medical History:   Diagnosis Date    Abnormal coordination 9/21/2020    Hallucinations 9/21/2020    Hypertension     Neurological disorder     Seizures ([facility] [address]     Stroke [facility]) 2009   History reviewed. No pertinent surgical history.   Barriers to Learning/Limitations: yes;  altered mental status (i.e.Sedation, Confusion)  Compensate with: visual, verbal, tactile, kinesthetic cues/model    Home Situation:   Home Situation  Home Environment: Apartment  # Steps to Enter: 0  One/Two Story Residence: One story  Living Alone: No  Support Systems: Child(quiana)  Patient Expects to be Discharged to[de-identified] Apartment  Current DME Used/Available at Home: Negin Champion, chayo  [x]  Right hand dominant   []  Left hand dominant    Cognitive/Behavioral Status:  Neurologic State: Alert;Confused  Orientation Level: Oriented to person  Cognition: Follows commands  Safety/Judgement: Fall prevention    Skin: Intact on UEs  Edema: None noted in UEs    Vision/Perceptual:     Acuity: Able to read employee name badge without difficulty      Coordination: BUE  Coordination: Generally decreased, functional  Fine Motor Skills-Upper: Left Impaired;Right Impaired    Gross Motor Skills-Upper: Left Intact; Right Intact    Balance:  Sitting: Intact; With support  Standing: Impaired; With support  Standing - Static: Fair  Standing - Dynamic : Poor    Strength: BUE  Strength: Generally decreased, functional(3/5 throughout)    Tone & Sensation: BUE  Tone: Normal  Sensation: Impaired(RUE)    Range of Motion: BUE  AROM: Generally decreased, functional    Functional Mobility and Transfers for ADLs:  Bed Mobility:  Supine to Sit: Moderate assistance;Maximum assistance(for LE and trunk management )  Scooting: Maximum assistance  Transfers:  Sit to Stand: Contact guard assistance;Minimum assistance  Stand to Sit: Contact guard assistance(poor eccentric control )  Stand Pivot Transfers: Moderate assistance   Toilet Transfer : Minimum assistance(to [facility])    ADL Assessment:   Feeding: Setup;Supervision(tactile/verbal/visual cuing)    Oral Facial Hygiene/Grooming: Moderate assistance    Bathing: Maximum assistance    Upper Body Dressing: Moderate assistance    Lower Body Dressing: Maximum assistance    Toileting: Maximum assistance    ADL Intervention:  Patient noted she had to urinate as she was seated on the EOB.   She was able to transfer to the [facility] with min assist.  Hygiene performed with mod assist while patient standing. Cognitive Retraining  Safety/Judgement: Fall prevention    Pain:  Pain level pre-treatment: 0/10   Pain level post-treatment: 0/10  Pain Intervention(s): NA  Response to intervention: NA    Activity Tolerance:   Good  Please refer to the flowsheet for vital signs taken during this treatment. After treatment:   [] Patient left in no apparent distress sitting up in chair  [x] Patient left in no apparent distress in bed  [x] Call bell left within reach  [x] Nursing notified  [] Caregiver present  [] Bed alarm activated    COMMUNICATION/EDUCATION:   [x] Role of Occupational Therapy in the acute care setting  [x] Home safety education was provided and the patient/caregiver indicated understanding. [x] Patient/family have participated as able in goal setting and plan of care. [x] Patient/family agree to work toward stated goals and plan of care. [] Patient understands intent and goals of therapy, but is neutral about his/her participation. [] Patient is unable to participate in goal setting and plan of care.     Thank you for this referral.  Mik Carlson MS OTR/L   Time Calculation: 23 mins

## 2020-09-30 VITALS
OXYGEN SATURATION: 100 % | HEART RATE: 84 BPM | BODY MASS INDEX: 27.19 KG/M2 | TEMPERATURE: 97.4 F | SYSTOLIC BLOOD PRESSURE: 140 MMHG | RESPIRATION RATE: 18 BRPM | HEIGHT: 60 IN | WEIGHT: 138.5 LBS | DIASTOLIC BLOOD PRESSURE: 86 MMHG

## 2020-09-30 LAB
ANION GAP SERPL CALC-SCNC: 10 MMOL/L (ref 3–18)
BUN SERPL-MCNC: 19 MG/DL (ref 7–18)
BUN/CREAT SERPL: 20 (ref 12–20)
CALCIUM SERPL-MCNC: 9.9 MG/DL (ref 8.5–10.1)
CHLORIDE SERPL-SCNC: 109 MMOL/L (ref 100–111)
CO2 SERPL-SCNC: 20 MMOL/L (ref 21–32)
CREAT SERPL-MCNC: 0.96 MG/DL (ref 0.6–1.3)
GLUCOSE SERPL-MCNC: 68 MG/DL (ref 74–99)
POTASSIUM SERPL-SCNC: 3.6 MMOL/L (ref 3.5–5.5)
SODIUM SERPL-SCNC: 139 MMOL/L (ref 136–145)

## 2020-09-30 PROCEDURE — 74011250636 HC RX REV CODE- 250/636: Performed by: INTERNAL MEDICINE

## 2020-09-30 PROCEDURE — 74011250637 HC RX REV CODE- 250/637: Performed by: PHYSICIAN ASSISTANT

## 2020-09-30 PROCEDURE — 3331090001 HH PPS REVENUE CREDIT

## 2020-09-30 PROCEDURE — 36415 COLL VENOUS BLD VENIPUNCTURE: CPT

## 2020-09-30 PROCEDURE — 51798 US URINE CAPACITY MEASURE: CPT

## 2020-09-30 PROCEDURE — 2709999900 HC NON-CHARGEABLE SUPPLY

## 2020-09-30 PROCEDURE — 74011250637 HC RX REV CODE- 250/637: Performed by: HOSPITALIST

## 2020-09-30 PROCEDURE — 77030019905 HC CATH URETH INTMIT MDII -A

## 2020-09-30 PROCEDURE — 74011250636 HC RX REV CODE- 250/636: Performed by: FAMILY MEDICINE

## 2020-09-30 PROCEDURE — 3331090002 HH PPS REVENUE DEBIT

## 2020-09-30 PROCEDURE — 97110 THERAPEUTIC EXERCISES: CPT

## 2020-09-30 PROCEDURE — 80048 BASIC METABOLIC PNL TOTAL CA: CPT

## 2020-09-30 RX ORDER — RISPERIDONE 0.5 MG/1
0.5 TABLET, FILM COATED ORAL 2 TIMES DAILY
Qty: 60 TAB | Refills: 0 | Status: SHIPPED
Start: 2020-09-30 | End: 2020-10-30

## 2020-09-30 RX ORDER — LABETALOL HCL 20 MG/4 ML
5 SYRINGE (ML) INTRAVENOUS ONCE
Status: COMPLETED | OUTPATIENT
Start: 2020-09-30 | End: 2020-09-30

## 2020-09-30 RX ORDER — HYDRALAZINE HYDROCHLORIDE 100 MG/1
100 TABLET, FILM COATED ORAL 3 TIMES DAILY
Qty: 90 TAB | Refills: 0 | Status: SHIPPED
Start: 2020-09-30 | End: 2020-10-30

## 2020-09-30 RX ADMIN — LABETALOL HYDROCHLORIDE 5 MG: 5 INJECTION, SOLUTION INTRAVENOUS at 05:44

## 2020-09-30 RX ADMIN — DIVALPROEX SODIUM 250 MG: 250 TABLET, DELAYED RELEASE ORAL at 09:47

## 2020-09-30 RX ADMIN — CLOPIDOGREL BISULFATE 75 MG: 75 TABLET ORAL at 09:40

## 2020-09-30 RX ADMIN — OXCARBAZEPINE 600 MG: 300 TABLET, FILM COATED ORAL at 09:40

## 2020-09-30 RX ADMIN — POLYETHYLENE GLYCOL 3350 17 G: 17 POWDER, FOR SOLUTION ORAL at 09:43

## 2020-09-30 RX ADMIN — LEVETIRACETAM 1000 MG: 500 TABLET ORAL at 09:40

## 2020-09-30 RX ADMIN — LISINOPRIL 20 MG: 20 TABLET ORAL at 09:40

## 2020-09-30 RX ADMIN — RISPERIDONE 0.5 MG: 0.25 TABLET ORAL at 09:40

## 2020-09-30 RX ADMIN — ENOXAPARIN SODIUM 40 MG: 40 INJECTION SUBCUTANEOUS at 13:45

## 2020-09-30 RX ADMIN — HYDRALAZINE HYDROCHLORIDE 100 MG: 50 TABLET ORAL at 09:40

## 2020-09-30 RX ADMIN — AMLODIPINE BESYLATE 10 MG: 10 TABLET ORAL at 09:40

## 2020-09-30 RX ADMIN — CYANOCOBALAMIN TAB 1000 MCG 500 MCG: 1000 TAB at 09:40

## 2020-09-30 RX ADMIN — Medication 10 ML: at 13:45

## 2020-09-30 RX ADMIN — Medication 10 ML: at 06:34

## 2020-09-30 NOTE — ROUTINE PROCESS
Bedside and Verbal shift change report given to Ke Corona RN (oncoming nurse) by Nahid Chase RN (offgoing nurse). Report included the following information SBAR, Kardex, MAR and Recent Results.     SITUATION:  Code Status: Full Code  Reason for Admission: Encephalopathy [G93.40]  Hospital day: 9  Problem List:       Hospital Problems  Date Reviewed: 11/29/2016          Codes Class Noted POA    * (Principal) Encephalopathy ICD-10-CM: G93.40  ICD-9-CM: 348.30  9/21/2020 Unknown        Hallucinations ICD-10-CM: R44.3  ICD-9-CM: 780.1  9/21/2020 Unknown        Abnormal coordination ICD-10-CM: R27.8  ICD-9-CM: 781.3  9/21/2020 Unknown              BACKGROUND:   Past Medical History:   Past Medical History:   Diagnosis Date    Abnormal coordination 9/21/2020    Hallucinations 9/21/2020    Hypertension     Neurological disorder     Seizures ([facility] [address] [facility]     Stroke ([facility] [address] 2009      Patient taking anticoagulants yes   Patient has a defibrillator: no    History of shots YES for example, flu, pneumonia, tetanus   Isolation History NO for example, MRSA, CDiff    ASSESSMENT:  Changes in Assessment Throughout Shift: No  Significant Changes in 24 hours (for example, RR/code, fall)  Patient has Central Line: no   Patient has Cain Cath: no   Mobility Issues  PT  IV Patency  OR Checklist  Pending Tests    Last Vitals:  Vitals w/ MEWS Score (last day)     Date/Time MEWS Score Pulse Resp Temp BP Level of Consciousness SpO2    09/30/20 0635  --  --  --  --  (!) 152/76  --  --    09/30/20 0433  --  --  --  --  (!) 173/82  --  --    09/30/20 0424  1  100  18  98.7 °F (37.1 °C)  (!) 184/75  Alert  97 %    09/30/20 0005  1  86  18  98.6 °F (37 °C)  (!) 149/73  Alert  99 %    09/29/20 2053  1  91  18  98.7 °F (37.1 °C)  (!) 165/84  Alert  97 %    09/29/20 1615  0  87  14  98.3 °F (36.8 °C)  (!) 146/73  Alert  98 %    09/29/20 1131  1  85  16  98.3 °F (36.8 °C)  (!) 142/72  Alert  97 %    09/29/20 0821  1  91  19  98.6 °F (37 °C)  (!) 157/72  Alert  96 %    09/29/20 0440  1  97  16  98.9 °F (37.2 °C)  129/71  Alert  96 %            PAIN    Pain Assessment    Pain Intensity 1: 0 (09/30/20 0433)    Pain Location 1: Leg    Pain Intervention(s) 1: Medication (see MAR)    Patient Stated Pain Goal: 0  Intervention effective: yes  Time of last intervention: 2304 Reassessment Completed: yes   Other actions taken for pain: Distraction    Last 3 Weights:  Last 3 Recorded Weights in this Encounter    09/22/20 1145   Weight: 62.8 kg (138 lb 8 oz)   Weight change:     INTAKE/OUPUT    Current Shift: No intake/output data recorded. Last three shifts: 09/28 1901 - 09/30 0700  In: 560 [P.O.:560]  Out: 700 [Urine:700]    RECOMMENDATIONS AND DISCHARGE PLANNING  Patient needs and requests: Assistance with ADL's    Pending tests/procedures: labs     Discharge plan for patient: SNF    Discharge planning Needs or Barriers: Placement    Estimated Discharge Date: 9/30/2020 Posted on Whiteboard in Patients Room: yes       \"HEALS\" SAFETY CHECK  A safety check occurred in the patient's room between off going nurse and oncoming nurse listed above. The safety check included the below items:    H  High Alert Medications Verify all high alert medication drips (heparin, PCA, etc.)  E  Equipment Suction is set up for ALL patients (with lisa)  Red plugs utilized for all equipment (IV pumps, etc.)  WOWs wiped down at end of shift. Room stocked with oxygen, suction, and other unit-specific supplies  A  Alarms Bed alarm is set for fall risk patients  Ensure chair alarm is in place and activated if patient is up in a chair  L  Lines Check IV for any infiltration  Cain bag is empty if patient has a Cain   Tubing and IV bags are labeled  S  Safety  Room is clean, patient is clean, and equipment is clean. Hallways are clear from equipment besides carts.    Fall bracelet on for fall risk patients  Ensure room is clear and free of clutter  Suction is set up for ALL patients (with lisa)  Hallways are clear from equipment besides carts.    Isolation precautions followed, supplies available outside room, sign posted    Nahid Chase RN

## 2020-09-30 NOTE — HOME CARE
Notified [address] [place] [address] central Intake that this Active patient to [address] [place] [address] has transferred to [facility] today. RL TAPIA.

## 2020-09-30 NOTE — PROGRESS NOTES
Problem: Self Care Deficits Care Plan (Adult)  Goal: *Acute Goals and Plan of Care (Insert Text)  Description: Occupational Therapy Goals  Initiated 9/21/2020, re-evaluated on 9/28/2020 within 7 day(s). Continue all previously set goals. 1.  Patient will perform self-feeding with modified independence. 2.  Patient will perform grooming with supervision/set-up. 3.  Patient will perform upper body dressing with supervision/set-up. 4.  Patient will perform lower body dressing with supervision/setup. 5.  Patient will perform bed mobility with minimal assistance in preparation for ADLs. 6.  Patient will participate in upper extremity therapeutic exercise/activities with supervision/set-up for 8 minutes. Prior Level of Function: Pt recently discharged from hospital 9/17/2020. Prior to d/c, OT/PT were recommending Rehab. This admission, pt reports coming from home, living with her sons. Pt reports sons have been providing assistance with UB/LB dressing/bathing PTA. Per OT gino on 9/14/2020, pt reports she was (I) with basic self-care/ADLs prior to that admission however appeared to be a poor historian at that time. Outcome: Progressing Towards Goal   OCCUPATIONAL THERAPY TREATMENT    Patient: Marcelle Maher (64 y.o. female)  Date: 9/30/2020  Diagnosis: Encephalopathy [G93.40]   Encephalopathy       Precautions: Aspiration, Fall, Skin, Seizure  PLOF: Prior to d/c, OT/PT were recommending Rehab. This admission, pt reports coming from home, living with her sons. Pt reports sons have been providing assistance with UB/LB dressing/bathing PTA. Per OT gino on 9/14/2020, pt reports she was (I) with basic self-care/ADLs prior to that admission however appeared to be a poor historian at that time. Chart, occupational therapy assessment, plan of care, and goals were reviewed. ASSESSMENT:  Pt presented alert, supine in bed upon therapist arrival, and agreeable for participation at this time.  Pt continues to demonstrate confusion with some hallucinations throughout session. Pt required required MAX A scooting to [facility] for optimal bed postioning in prep for purposeful task. Pt participated in B hand strengthening exercises to facilitate increased independence with self-care tasks. Pt used therapy putty (pink) to perform squeezes, palm rolls, and digit pinches. Pt required vc's and visual instruction with increased time to complete activity. Pt left supine in bed with HOB elevated and all needs within reach. Progression toward goals:  []          Improving appropriately and progressing toward goals  [x]          Improving slowly and progressing toward goals  []          Not making progress toward goals and plan of care will be adjusted     PLAN:  Patient continues to benefit from skilled intervention to address the above impairments. Continue treatment per established plan of care. Discharge Recommendations:  SNF  Further Equipment Recommendations for Discharge:  TBA pending progress     SUBJECTIVE:   Patient stated  I think Anson Xiong is sleeping in the other room. \"    OBJECTIVE DATA SUMMARY:   Cognitive/Behavioral Status:  Neurologic State: Alert, Confused, Eyes open spontaneously  Orientation Level: Disoriented to time, Disoriented to situation, Disoriented to place, Oriented to person  Cognition: Follows commands, Impaired decision making, Poor safety awareness  Safety/Judgement: Fall prevention    Functional Mobility and Transfers for ADLs:   Bed Mobility:     Scooting: Maximum assistance(scooting to [facility])       Balance:  Sitting: Impaired; With support    Pain:  Pt reports no pain at this time. Activity Tolerance:    Fair  Please refer to the flowsheet for vital signs taken during this treatment.   After treatment:   []  Patient left in no apparent distress sitting up in chair  [x]  Patient left in no apparent distress in bed  [x]  Call bell left within reach  [x]  Nursing notified  []  Caregiver present  []  Bed alarm activated    COMMUNICATION/EDUCATION:   [] Role of Occupational Therapy in the acute care setting  [] Home safety education was provided and the patient/caregiver indicated understanding. [] Patient/family have participated as able in working towards goals and plan of care. [] Patient/family agree to work toward stated goals and plan of care. [x] Patient understands intent and goals of therapy, but is neutral about his/her participation. [] Patient is unable to participate in goal setting and plan of care.       Thank you for this referral.  JOSE JUAN Lazo  Time Calculation: 25 mins

## 2020-09-30 NOTE — PROGRESS NOTES
Report called to Marissa Koch LPN at [facility]. Scheduled stretcher transfer at 1600. IV removed, cath tip intact. No complaints of pain at this time. [facility] on scene to transport at time of this note.

## 2020-09-30 NOTE — PROGRESS NOTES
Problem: Falls - Risk of  Goal: *Absence of Falls  Description: Document Hanna De La Torre Fall Risk and appropriate interventions in the flowsheet. Outcome: Progressing Towards Goal  Note: Fall Risk Interventions:  Mobility Interventions: Bed/chair exit alarm, Patient to call before getting OOB, Utilize walker, cane, or other assistive device    Mentation Interventions: Adequate sleep, hydration, pain control, Bed/chair exit alarm, Door open when patient unattended, More frequent rounding, Reorient patient, Room close to nurse's station, Toileting rounds, Update white board    Medication Interventions: Bed/chair exit alarm, Patient to call before getting OOB, Teach patient to arise slowly    Elimination Interventions: Bed/chair exit alarm, Call light in reach, Elevated toilet seat, Patient to call for help with toileting needs, Stay With Me (per policy), Toilet paper/wipes in reach, Toileting schedule/hourly rounds, Urinal in reach              Problem: Pressure Injury - Risk of  Goal: *Prevention of pressure injury  Description: Document John Scale and appropriate interventions in the flowsheet. Outcome: Progressing Towards Goal  Note: Pressure Injury Interventions:  Sensory Interventions: Assess changes in LOC, Assess need for specialty bed, Check visual cues for pain, Float heels, Keep linens dry and wrinkle-free, Minimize linen layers, Pressure redistribution bed/mattress (bed type), Turn and reposition approx. every two hours (pillows and wedges if needed)    Moisture Interventions: Absorbent underpads, Check for incontinence Q2 hours and as needed, Minimize layers    Activity Interventions: Increase time out of bed    Mobility Interventions: Float heels, HOB 30 degrees or less, Pressure redistribution bed/mattress (bed type), Turn and reposition approx.  every two hours(pillow and wedges)    Nutrition Interventions: Document food/fluid/supplement intake    Friction and Shear Interventions: HOB 30 degrees or less, Lift sheet, Minimize layers

## 2020-09-30 NOTE — PROGRESS NOTES
Problem: Falls - Risk of  Goal: *Absence of Falls  Description: Document Alana Birmingham Fall Risk and appropriate interventions in the flowsheet. Outcome: Progressing Towards Goal  Note: Fall Risk Interventions:  Mobility Interventions: Bed/chair exit alarm, Patient to call before getting OOB, Utilize walker, cane, or other assistive device    Mentation Interventions: Adequate sleep, hydration, pain control, Bed/chair exit alarm, Door open when patient unattended, More frequent rounding, Reorient patient, Room close to nurse's station, Toileting rounds, Update white board    Medication Interventions: Bed/chair exit alarm, Patient to call before getting OOB, Teach patient to arise slowly    Elimination Interventions: Bed/chair exit alarm, Call light in reach, Elevated toilet seat, Patient to call for help with toileting needs, Stay With Me (per policy), Toilet paper/wipes in reach, Toileting schedule/hourly rounds, Urinal in reach              Problem: Patient Education: Go to Patient Education Activity  Goal: Patient/Family Education  Outcome: Progressing Towards Goal     Problem: Patient Education: Go to Patient Education Activity  Goal: Patient/Family Education  Outcome: Progressing Towards Goal     Problem: Patient Education: Go to Patient Education Activity  Goal: Patient/Family Education  Outcome: Progressing Towards Goal     Problem: Pressure Injury - Risk of  Goal: *Prevention of pressure injury  Description: Document John Scale and appropriate interventions in the flowsheet. Outcome: Progressing Towards Goal  Note: Pressure Injury Interventions:  Sensory Interventions: Assess changes in LOC, Assess need for specialty bed, Check visual cues for pain, Float heels, Keep linens dry and wrinkle-free, Minimize linen layers, Pressure redistribution bed/mattress (bed type), Turn and reposition approx.  every two hours (pillows and wedges if needed)    Moisture Interventions: Absorbent underpads, Check for incontinence Q2 hours and as needed, Minimize layers    Activity Interventions: Increase time out of bed    Mobility Interventions: Float heels, HOB 30 degrees or less, Pressure redistribution bed/mattress (bed type), Turn and reposition approx.  every two hours(pillow and wedges)    Nutrition Interventions: Document food/fluid/supplement intake    Friction and Shear Interventions: HOB 30 degrees or less, Lift sheet, Minimize layers                Problem: Patient Education: Go to Patient Education Activity  Goal: Patient/Family Education  Outcome: Progressing Towards Goal     Problem: Nutrition Deficit  Goal: *Optimize nutritional status  Outcome: Progressing Towards Goal

## 2020-09-30 NOTE — DISCHARGE INSTRUCTIONS
Discharge Instructions    Patient: Harry Lopez MRN: [id number]  CSN: [id number]    YOB: 1955  Age: 76 y.o.   Sex: female    DOA: 9/21/2020 LOS:  LOS: 9 days   Discharge Date:      DIET:  Cardiac Diet    ACTIVITY: Activity as tolerated  Skilled care for Hypertension and medication management    ·    PT/OT consult    ADDITIONAL INFORMATION: If you experience any of the following symptoms but not limited to Fever, chills, nausea, vomiting, diarrhea, change in mentation, falling, bleeding, shortness of breath, chest pain, please call your primary care physician or return to the emergency room if you cannot get hold of your doctor:     FOLLOW UP CARE:  Provider at SNF within 1 day [facility] Neurology (MING galloway MD) in 3-4 weeks Psychiatry in 4 weeks    Darrell Ferreira NP  9/30/2020 8:58 AM

## 2020-09-30 NOTE — PROGRESS NOTES
LTSS:    This writer complete an LTSS screening for patient to be placed in a nursing facility. The screening was submitted for processing. She will discharge to [address] [place] [address]. This writer will continue to monitor for discharge planning to ensure a safe discharge from [facility]. Gallo Jarvis MSW  Care Manager  Pager#: [phone]

## 2020-09-30 NOTE — PROGRESS NOTES
No urine output since start of shift. Bladder scan =     09/30/20 0449   Urine Assessment   $$ Bladder Scan (mL of urine) 461 mL   Patient voided right after bladder check. Incontinent, could not quantify. Bladder check done again = 390 ml. At [phone], Dr. Jhon Orr was informed of above. He said to do straight cath x 1. He was also informed of BP = 184/75 mmHg. New order for Labetalol 5 mg IV x 1 dose was received.

## 2020-09-30 NOTE — PROGRESS NOTES
Transition of Care Plan to SNF/Rehab    SNF/Rehab Transition:  Patient has been accepted to [facility] and meets criteria for admission. Patient will  be transported by [facility] and expected to leave at [facility].    Communication to Patient/Family:  Met with patient and spoke with patient's son and they are agreeable to the transition plan. Communication to SNF/Rehab:  Bedside RN, Cristofer Gonzalez, has been notified of the transition plan to the facility and to call report (phone number [phone]). Discharge information has been updated on the AVS. And communicated to facility via [facility], or CC link. SNF/Rehab Transition:    PCP/Specialist:     Nursing Please include all hard scripts for controlled substances, med rec and dc summary, and AVS in packet. Reviewed and confirmed with facility representative, Sabrina at [facility], that they can manage the patient care needs for the following:     Neva Booth with (X) only those applicable:    Medication:  [x]  Medications will be available at the facility  []  IV Antibiotics   []  Controlled Substance - hard copy to be sent with patient   []  Weekly Labs    Documents:  [] Hard RX  Number sent   [] MAR  [] Kardex  [x] AVS  [] Wound care note  [x]Transfer Summary/Discharge Summary    Equipment:  []  CPAP/BiPAP  []  Wound Vacuum  []  Cain or Urinary Device  []  PICC/Central Line  []  Nebulizer  []  Ventilator    Treatment:  []Isolation (for MRSA, VRE, etc.)  []Surgical Drain Management  []Tracheostomy Care  []Dressing Changes  []Dialysis with transportation and chair time. []PEG Care  []Oxygen  []Daily Weights for Heart Failure    Dietary:  []Any diet limitations  []Tube Feedings   []Total Parenteral Management (TPN)    Eligible for Medicaid Long Term Services and Supports  Yes:  [x] Eligible for medical assistance or will become eligible within 180 days and LTSS completed.    [] Provider/Patient and/or support system has requested screening. [x] LTSS copy provided to patient or responsible party and to the facility. [] LTSS unavailable at discharge will send once processed to SNF provider.  [] LTSS  unavailable at discharged mailed to patient  [] LTSS performed by outside agency. No:   [] Private pay and is not financially eligible for Medicaid within the next 180 days. [] Reside out-of-state. [] A residents of a state owned/operated facility that is licensed  by [facility] and [facility] Services or [facility]  [] Enrollment in [facility] hospice services  [] [address] [place] [address]  [] Patient /Family declines to have screening completed or provide financial information for screening          Financial Resources:  Medicaid    [] Initiated and application pending   [] Full coverage      Advanced Care Plan:  []Surrogate Decision Maker of Care  []POA  []Communicated Code Status/ sent FULL (DDNR, POST, Advance Directive)     Other      Hailey Doran.  Martha Newsome MSW  Care Manager  Pager#: [phone]

## 2020-09-30 NOTE — DISCHARGE SUMMARY
[facility]  Discharge Summary       Patient: Dain Burton Age: 76 y.o. : 1955 MR#: [id number] [id number]: [id number]  PCP on record: Sharlon Litten, NP  Admit date: 2020  Discharge date: 2020    Disposition:    []Home   []Home with Home Health   [x]SNF/NH   []Rehab   []Home with family   []Alternate Facility:____________________    Admission Diagnoses:  Encephalopathy [G93.40]    Discharge Diagnoses:                             Acute renal failure on CKD stage 3  Visual hallucinations   Acute metabolic encephalopathy   Mild hypercalcemia due to dehydration   Seizure disorder   History of UTI with enterococcus, s/p treatment   HTN   Debility  Low grade temperature    Discharge Medications:     Current Discharge Medication List      START taking these medications    Details   risperiDONE (RisperDAL) 0.5 mg tablet Take 1 Tab by mouth two (2) times a day for 30 days. Qty: 60 Tab, Refills: 0         CONTINUE these medications which have CHANGED    Details   hydrALAZINE (APRESOLINE) 100 mg tablet Take 1 Tab by mouth three (3) times daily for 30 days. Indications: high blood pressure  Qty: 90 Tab, Refills: 0         CONTINUE these medications which have NOT CHANGED    Details   divalproex DR (DEPAKOTE) 250 mg tablet Take 250 mg by mouth three (3) times daily. one tab every 8 hours      clopidogreL (PLAVIX) 75 mg tab Take 75 mg by mouth daily. Indications: prevention for a blood clot going to the brain      lisinopriL (PRINIVIL, ZESTRIL) 20 mg tablet Take 1 Tab by mouth daily. Indications: high blood pressure  Qty: 30 Tab, Refills: 1      tamsulosin (FLOMAX) 0.4 mg capsule Take 1 Cap by mouth nightly. Qty: 30 Cap, Refills: 0      OXcarbaxepine (TRILEPTAL) 600 mg tablet Take 600 mg by mouth two (2) times a day. ergocalciferol (VITAMIN D2) 50,000 unit capsule Take 50,000 Units by mouth two (2) days a week.       levETIRAcetam (KEPPRA) 1,000 mg tablet Take 1 Tab by mouth two (2) times a day. Qty: 60 Tab, Refills: 0      amLODIPine (NORVASC) 10 mg tablet Take 1 Tab by mouth daily. Qty: 30 Tab, Refills: 6      cyanocobalamin (VITAMIN B12) 500 mcg tablet Take 1 Tab by mouth daily. Qty: 30 Tab, Refills: 0           Consults:    - Neurology   - Psychiatry   - Infectious Disease     Procedures:  -   None    Significant Diagnostic Studies:   -  Mri Brain Wo Cont    Result Date: 9/21/2020  IMPRESSION: 1. Large chronic infarct in the right parieto-occipital region. 2. Findings suggest moderate to severe manifestations of chronic hypertensive microangiopathy, including confluent deep cerebral white matter changes, deep brain lacunar infarcts and numerous chronic microhemorrhages. 3. Asymmetric right hippocampal atrophy, likely related to chronic infarction or microangiopathy. 4. No acute infarct or acute intracranial hemorrhage. Ct Head Wo Cont    Result Date: 9/21/2020  IMPRESSION: No clearly acute findings. Of note, MRI would be more sensitive for detecting acute infarct. Remote right parietal infarct similar to prior. -Moderate burden of periventricular low-attenuation, likely chronic microvascular ischemic change. -Probable remote basal ganglia region lacunar infarcts similar to prior. Xr Chest Port    Result Date: 9/27/2020  IMPRESSION:  1. No acute findings      Xr Chest Port    Result Date: 9/21/2020  IMPRESSION: 1. No radiographic evidence of acute cardiopulmonary disease. Thank you for enabling us to participate in the care of this patient. Hospital Course by Problem   1. Acute renal failure on CKD stage 3 - Suspect dehydration versus urinary retention, patient had mild urinary retention but improved with straight cath. Please continue to monitor creatinine and consider straight catheterization if evidence of retention. BUN / creatinine on day of discharge is 19 / 0.96 respectively.     2. Visual hallucinations - Unclear reason, Continue Risperdal per psych; discussed with patient and son Mikayla Mendez - may have impact of sleep deprivation / alternate environment / follow with continued risperdal use. 3. Acute metabolic encephalopathy - Improved now, MRI/CT negative, neuro input noted. 4. Mild hypercalcemia due to dehydration - calcium level at 10.3 at time of admission and at time of discharge improved to 9.9  5. Seizure disorder - some concerns for compliance prior to admission. Continued on keppra, depakote and oxcarbazepine during admission and upon discharge. 6. History of UTI with enterococcus, s/p treatment   7. HTN - mild elevation intermittently, continue home regimen of norvasc, lisinopril and hydralazine at time of discharge. 8. Debility - patient agreeable to SNF at time of discharge / Chery howard on 20  9.  Low grade temperature - a couple days back, with normal  Chest xray and urinalysis, encouraged incentive spirometer and mobility, no abx and no recurrence    Today's examination of the patient revealed:     Subjective:   Denies pain, SOB, n/v/abd pain or extremity swelling / pain; states she believed her son was staying with her last night but aware he was not there   Objective:   VS:   Visit Vitals  BP (!) 152/76 (BP 1 Location: Right arm, BP Patient Position: At rest;Head of bed elevated (Comment degrees))   Pulse 100   Temp 98.7 °F (37.1 °C)   Resp 18   Ht 5' (1.524 m)   Wt 62.8 kg (138 lb 8 oz)   SpO2 97%   BMI 27.05 kg/m²      Tmax/24hrs: Temp (24hrs), Av.5 °F (36.9 °C), Min:98.3 °F (36.8 °C), Max:98.7 °F (37.1 °C)     Input/Output:     Intake/Output Summary (Last 24 hours) at 2020 0900  Last data filed at 2020 0610  Gross per 24 hour   Intake 560 ml   Output 400 ml   Net 160 ml     General:  Alert, NAD  Cardiovascular:  RRR  Pulmonary:  LSC throughout; respiratory effort WNL  GI:  +BS in all four quadrants, soft, non-tender  Extremities:  No edema; 2+ dorsalis pedis pulses bilaterally  Neuro: alert and oriented to person, place and year     Labs:    Recent Results (from the past 24 hour(s))   METABOLIC PANEL, BASIC    Collection Time: 09/30/20  4:18 AM   Result Value Ref Range    Sodium 139 136 - 145 mmol/L    Potassium 3.6 3.5 - 5.5 mmol/L    Chloride 109 100 - 111 mmol/L    CO2 20 (L) 21 - 32 mmol/L    Anion gap 10 3.0 - 18 mmol/L    Glucose 68 (L) 74 - 99 mg/dL    BUN 19 (H) 7.0 - 18 MG/DL    Creatinine 0.96 0.6 - 1.3 MG/DL    BUN/Creatinine ratio 20 12 - 20      GFR est AA >60 >60 ml/min/1.73m2    GFR est non-AA 58 (L) >60 ml/min/1.73m2    Calcium 9.9 8.5 - 10.1 MG/DL     Additional Data Reviewed:     Condition: Stable  Follow-up Appointments:   1.  Provider at SNF within 1 day [facility] ([id number] [id number] or MD) in 3-4 weeks Psychiatry in 4 weeks    >30 minutes spent coordinating this discharge (review instructions/follow-up, prescriptions, preparing report for sign off)    Signed:  Zaria Galeano NP  9/30/2020  9:00 AM

## 2020-09-30 NOTE — ED PROVIDER NOTES
EMERGENCY DEPARTMENT HISTORY AND PHYSICAL EXAM      Date: 9/21/2020  Patient Name: Milly Tejada    History of Presenting Illness     Chief Complaint   Patient presents with    Altered mental status       History (Context): Milly Tejada is a 76 y.o. Vivica Alamin who presents with acute in onset, persistent, mild to moderate altered mental status that has persisted since she had her seizure 1 week ago. The patient was admitted for prolonged postictal state, but was discharged home apparently normal.  Per the patient's son, the patient has not been normal and has continued to be confused after her seizure. In my interview of the patient, the patient states that she has been feeling fine \"but cats and dogs and mouses[sic] were walking through my walls earlier, and is scared me. \"  This prompted her son to bring her to the emergency room.     Limited history provided by: Patient and son        Due to the degree of altered mental status, the patient denies nausea vomiting, weakness, chest pain, back pain, abdominal pain, confusion  PCP: Hilario Harry NP    Current Facility-Administered Medications   Medication Dose Route Frequency Provider Last Rate Last Dose    enoxaparin (LOVENOX) injection 40 mg  40 mg SubCUTAneous Q24H Roshan Galvin MD   40 mg at 09/30/20 1345    glucose chewable tablet 16 g  4 Tab Oral PRN Calin Staples MD        glucagon (GLUCAGEN) injection 1 mg  1 mg IntraMUSCular PRN Calin Staples MD        dextrose (D50W) injection syrg 12.5-25 g  25-50 mL IntraVENous PRN Calin Staples MD        hydrALAZINE (APRESOLINE) tablet 100 mg  100 mg Oral TID Calin Staples MD   100 mg at 09/30/20 0940    polyethylene glycol (MIRALAX) packet 17 g  17 g Oral DAILY Calin Staples MD   17 g at 09/30/20 8533    docusate sodium (COLACE) capsule 100 mg  100 mg Oral BID PRN Calin Staples MD        bisacodyL (DULCOLAX) suppository 10 mg  10 mg Rectal DAILY PRN Jose Whyte MD        risperiDONE (RisperDAL) tablet 0.5 mg  0.5 mg Oral BID Melyssa Correa MD   0.5 mg at 09/30/20 0940    cyanocobalamin tablet 500 mcg  500 mcg Oral DAILY Fredis Dills, PA   500 mcg at 09/30/20 0940    amLODIPine (NORVASC) tablet 10 mg  10 mg Oral DAILY Fredis Dills, PA   10 mg at 09/30/20 0940    ergocalciferol capsule 50,000 Units  50,000 Units Oral Once per day on Thu Sat Fredis Dills, [place]   50,000 Units at 09/26/20 1700    levETIRAcetam (KEPPRA) tablet 1,000 mg  1,000 mg Oral BID Fredis Dills, PA   1,000 mg at 09/30/20 0940    OXcarbazepine (TRILEPTAL) tablet 600 mg  600 mg Oral BID Fredis Dills, PA   600 mg at 09/30/20 0940    clopidogreL (PLAVIX) tablet 75 mg  75 mg Oral DAILY Fredis Dills, PA   75 mg at 09/30/20 0940    lisinopriL (PRINIVIL, ZESTRIL) tablet 20 mg  20 mg Oral DAILY Fredis Dills, PA   20 mg at 09/30/20 0940    tamsulosin (FLOMAX) capsule 0.4 mg  0.4 mg Oral QHS Roxanna ALBERT PA   0.4 mg at 09/29/20 2304    divalproex DR (DEPAKOTE) tablet 250 mg  250 mg Oral TID Fredis Dills, PA   250 mg at 09/30/20 0388    sodium chloride (NS) flush 5-40 mL  5-40 mL IntraVENous Q8H Maria Del Carmen Cooley PA   10 mL at 09/30/20 1345    sodium chloride (NS) flush 5-40 mL  5-40 mL IntraVENous PRN Fredis Dills, PA        acetaminophen (TYLENOL) tablet 650 mg  650 mg Oral Q6H PRN Fredis Dills, PA   650 mg at 09/29/20 2304    Or    acetaminophen (TYLENOL) suppository 650 mg  650 mg Rectal Q6H PRN Fredis Dills, PA        promethazine (PHENERGAN) tablet 12.5 mg  12.5 mg Oral Q6H PRN MING Arguello   12.5 mg at 09/27/20 0001    Or    ondansetron (ZOFRAN) injection 4 mg  4 mg IntraVENous Q6H PRN MING Scott        LORazepam (ATIVAN) injection 1 mg  1 mg IntraVENous Q6H PRN Ольга Garcia MD         Current Outpatient Medications   Medication Sig Dispense Refill    hydrALAZINE (APRESOLINE) 100 mg tablet Take 1 Tab by mouth three (3) times daily for 30 days. Indications: high blood pressure 90 Tab 0    risperiDONE (RisperDAL) 0.5 mg tablet Take 1 Tab by mouth two (2) times a day for 30 days. 60 Tab 0    divalproex DR (DEPAKOTE) 250 mg tablet Take 250 mg by mouth three (3) times daily. one tab every 8 hours      clopidogreL (PLAVIX) 75 mg tab Take 75 mg by mouth daily. Indications: prevention for a blood clot going to the brain      lisinopriL (PRINIVIL, ZESTRIL) 20 mg tablet Take 1 Tab by mouth daily. Indications: high blood pressure 30 Tab 1    tamsulosin (FLOMAX) 0.4 mg capsule Take 1 Cap by mouth nightly. 30 Cap 0    OXcarbaxepine (TRILEPTAL) 600 mg tablet Take 600 mg by mouth two (2) times a day.  ergocalciferol (VITAMIN D2) 50,000 unit capsule Take 50,000 Units by mouth two (2) days a week.  levETIRAcetam (KEPPRA) 1,000 mg tablet Take 1 Tab by mouth two (2) times a day. 60 Tab 0    amLODIPine (NORVASC) 10 mg tablet Take 1 Tab by mouth daily. 30 Tab 6    cyanocobalamin (VITAMIN B12) 500 mcg tablet Take 1 Tab by mouth daily. 30 Tab 0       Past History     Past Medical History:  Past Medical History:   Diagnosis Date    Abnormal coordination 9/21/2020    Hallucinations 9/21/2020    Hypertension     Neurological disorder     [facility]ures [facility] [address]     Stroke ([facility] [address] 2009       Past Surgical History:  History reviewed. No pertinent surgical history. Family History:  Family History   Problem Relation Age of Onset    Diabetes Other     Stroke Other        Social History:  Social History     Tobacco Use    Smoking status: Never Smoker    Smokeless tobacco: Never Used   Substance Use Topics    Alcohol use: Yes     Comment: Socially     Drug use: No       Allergies: Allergies   Allergen Reactions    Tomato Hives     Allergic to eating tomato.     Aspirin Nausea and Vomiting    Ciprofloxacin Rash    Pcn [Penicillins] Rash and Hives    Sulfa (Sulfonamide Antibiotics) Hives and Rash       PMH, PSH, family history, social history, allergies reviewed with the patient with significant items noted above. Review of Systems   Could not be reliably obtained due to mental status    Physical Exam     Vitals:    09/30/20 0433 09/30/20 0635 09/30/20 0948 09/30/20 1613   BP: (!) 173/82 (!) 152/76 (!) 150/84 (!) 140/86   Pulse:   82 84   Resp:   18 18   Temp:   97.6 °F (36.4 °C) 97.4 °F (36.3 °C)   SpO2:   100% 100%   Weight:       Height:           Gen: Well-appearing in no acute distress  HEENT: Normocephalic, sclera anicteric  Cardiovascular: Normal rate, regular rhythm, no murmurs, rubs, gallops. Pulses intact and equal distally. Pulmonary: No respiratory distress. No stridor. Clear lungs. ABD: Soft, nontender, nondistended. Neuro: Alert. Normal speech (outside of content). Altered mentation. Fully oriented. PERRLA. Cranial nerves II through XII intact. Strength: Full. Sensation full   Psych: Organized thought processes. However, thought content demonstrates a perception of reality where cats and dogs could walk through her walls. She perceives this is real.  : No CVA tenderness  EXT: No rashes. Moves all extremities well. No cyanosis or clubbing. Skin: Warm and well-perfused. Diagnostic Study Results     Labs -   No results found for this or any previous visit (from the past 12 hour(s)). Radiologic Studies -   XR CHEST PORT   Final Result   IMPRESSION:     1. No acute findings                MRI BRAIN WO CONT   Final Result   IMPRESSION:   1. Large chronic infarct in the right parieto-occipital region. 2. Findings suggest moderate to severe manifestations of chronic hypertensive   microangiopathy, including confluent deep cerebral white matter changes, deep   brain lacunar infarcts and numerous chronic microhemorrhages.    3. Asymmetric right hippocampal atrophy, likely related to chronic infarction or   microangiopathy. 4. No acute infarct or acute intracranial hemorrhage. XR CHEST PORT   Final Result   IMPRESSION:   1. No radiographic evidence of acute cardiopulmonary disease. Thank you for enabling us to participate in the care of this patient. CT HEAD WO CONT   Final Result   IMPRESSION:      No clearly acute findings. Of note, MRI would be more sensitive for detecting   acute infarct. Remote right parietal infarct similar to prior.   -Moderate burden of periventricular low-attenuation, likely chronic   microvascular ischemic change.   -Probable remote basal ganglia region lacunar infarcts similar to prior. CT Results  (Last 48 hours)    None        CXR Results  (Last 48 hours)    None            Medical Decision Making   I am the first provider for this patient. I reviewed the vital signs, available nursing notes, past medical history, past surgical history, family history and social history. Vital Signs-Reviewed the patient's vital signs. EKG: Interpreted by myself. Records Reviewed: Personally, on initial evaluation    MDM:   Patient presents with altered mental status. Exam significant for normal exam with the exception of these perceptions that are odd. DDX considered: The differential is broad but includes toxic, metabolic, infectious, primary neurologic, endocrine, functional etiologies. We will need to perform a broad diagnostic work-up.     Patient condition on initial evaluation: Moderately ill    Plan:   Close Observation  Cardiac monitoring    Orders as below:  Orders Placed This Encounter    MECHANICAL PROPHYLAXIS IS CONTRAINDICATED Other, please document Already on Anticoagulation    CULTURE, URINE    CULTURE, URINE    CT HEAD WO CONT    XR CHEST PORT    MRI BRAIN WO CONT    XR CHEST PORT    CBC WITH AUTOMATED DIFF    METABOLIC PANEL, BASIC    CARDIAC PANEL,(CK, CKMB & TROPONIN)    AMMONIA    LEVETIRACETAM (KEPPRA)    URINALYSIS W/ RFLX MICROSCOPIC    DRUG SCREEN, URINE    MAGNESIUM    PHOSPHORUS    OXCARBAZEPINE(TRILEPTAL)    VALPROIC ACID    PTH INTACT    VITAMIN D, 25 HYDROXY    MAGNESIUM    PHOSPHORUS    HGB & HCT    SARS-COV-2    CBC WITH AUTOMATED DIFF    METABOLIC PANEL, BASIC    SARS-COV-2    METABOLIC PANEL, BASIC    CBC WITH AUTOMATED DIFF    URINALYSIS W/ RFLX MICROSCOPIC    URINE MICROSCOPIC ONLY    METABOLIC PANEL, BASIC    DIET CARDIAC Regular    Cardiac Monitor    VITAL SIGNS    NOTIFY PROVIDER: SPECIFY Notify physician for pulse less than 50 or greater than 120, respiratory rate less than 12 or greater than 25, oral temperature greater than 101.3 F (43.6 C), systolic BP less than 90 or greater than 541, diastolic BP less. ..    ACTIVITY AS TOLERATED W/ASSIST    INTAKE AND OUTPUT    ELEVATE HEAD OF BED    NURSING-MISCELLANEOUS: Palpate, scan or straight cath and document bladder residual as needed CONTINUOUS    INCENTIVE SPIROMETRY    INCENTIVE SPIROMETRY    AMBULATE IN ROOM    BLADDER CHECKS    STRAIGHT CATHETER (NURSING) ONE TIME Routine    FULL CODE    IP CONSULT TO NEUROLOGY    IP CONSULT TO PSYCHIATRY    DROPLET PLUS    DIET NUTRITIONAL SUPPLEMENTS Breakfast, Lunch, Dinner; [employer]    OT--EVAL, DEVISE PLAN OF CARE AND TREAT    IP CONSULT TO OCCUPATIONAL THERAPY    PT--EVAL, DEVISE PLAN OF CARE AND TREAT    IP CONSULT TO PHYSICAL THERAPY    OXIMETRY, SPOT CHECK    GLUCOSE, POC    EKG, 12 LEAD, INITIAL    TYPE & SCREEN    DISCHARGE PATIENT    ASPIRATION PRECAUTIONS    SEIZURE PRECAUTIONS    cyanocobalamin tablet 500 mcg    amLODIPine (NORVASC) tablet 10 mg    ergocalciferol capsule 50,000 Units    levETIRAcetam (KEPPRA) tablet 1,000 mg    OXcarbazepine (TRILEPTAL) tablet 600 mg    clopidogreL (PLAVIX) tablet 75 mg    DISCONTD: hydrALAZINE (APRESOLINE) tablet 50 mg    lisinopriL (PRINIVIL, ZESTRIL) tablet 20 mg    tamsulosin (FLOMAX) capsule 0.4 mg    divalproex DR (DEPAKOTE) tablet 250 mg    sodium chloride (NS) flush 5-40 mL    sodium chloride (NS) flush 5-40 mL    OR Linked Order Group     acetaminophen (TYLENOL) tablet 650 mg     acetaminophen (TYLENOL) suppository 650 mg    DISCONTD: polyethylene glycol (MIRALAX) packet 17 g    OR Linked Order Group     promethazine (PHENERGAN) tablet 12.5 mg     ondansetron (ZOFRAN) injection 4 mg    LORazepam (ATIVAN) injection 1 mg    influenza vaccine 2020-21 (6 mos+)(PF) (FLUARIX/FLULAVAL/FLUZONE QUAD) injection 0.5 mL    DISCONTD: enoxaparin (LOVENOX) injection 40 mg    . PLEASE ENTER THE PATIENT'S WEIGHT IN CONNECT CARE    risperiDONE (RisperDAL) tablet 0.5 mg    polyethylene glycol (MIRALAX) packet 17 g    docusate sodium (COLACE) capsule 100 mg    bisacodyL (DULCOLAX) suppository 10 mg    DISCONTD: hydrALAZINE (APRESOLINE) tablet 75 mg    hydrALAZINE (APRESOLINE) tablet 100 mg    0.9% sodium chloride infusion    DISCONTD: enoxaparin (LOVENOX) injection 30 mg    enoxaparin (LOVENOX) injection 40 mg    glucose chewable tablet 16 g    glucagon (GLUCAGEN) injection 1 mg    dextrose (D50W) injection syrg 12.5-25 g    labetaloL (NORMODYNE;TRANDATE) 20 mg/4 mL (5 mg/mL) injection 5 mg    hydrALAZINE (APRESOLINE) 100 mg tablet    risperiDONE (RisperDAL) 0.5 mg tablet    INITIAL PHYSICIAN ORDER: INPATIENT Telemetry; 3. Patient receiving treatment that can only be provided in an inpatient setting (further clarification in H&P documentation)    IP CONSULT TO CASE MANAGEMENT        ED Course:   I spoke to tele-neurology, who agrees that we should admit the patient and get MRI. Initial work-up was negative. Consulted the hospitalist.  ED Course as of Sep 30 1841   Mon Sep 21, 2020   0261 Spoke to the hospitalist.  He will reassess the patient. [DT]      ED Course User Index  [DT] Fabiana Denis MD     Hospitalist will admit the patient.     Patient condition at time of disposition: Stable    Disposition: Admit    Diagnosis     Clinical Impression:   1. Hallucinations    2. Encephalopathy    3. Essential hypertension, benign    4. Seizure [facility])        Signed,  Linda Blevins MD  Emergency Physician  [facility]    As a voice dictation software was utilized to dictate this note, minor word transpositions can occur. I apologize for confusing wording and typographic errors. Please feel free to contact me for clarification.

## 2020-10-01 PROCEDURE — 3331090002 HH PPS REVENUE DEBIT

## 2020-10-01 PROCEDURE — 3331090001 HH PPS REVENUE CREDIT

## 2020-10-02 PROCEDURE — 3331090001 HH PPS REVENUE CREDIT

## 2020-10-02 PROCEDURE — 3331090002 HH PPS REVENUE DEBIT

## 2020-10-03 PROCEDURE — 3331090002 HH PPS REVENUE DEBIT

## 2020-10-03 PROCEDURE — 3331090001 HH PPS REVENUE CREDIT

## 2020-10-04 PROCEDURE — 3331090002 HH PPS REVENUE DEBIT

## 2020-10-04 PROCEDURE — 3331090001 HH PPS REVENUE CREDIT

## 2020-10-05 PROCEDURE — 3331090002 HH PPS REVENUE DEBIT

## 2020-10-05 PROCEDURE — 3331090001 HH PPS REVENUE CREDIT

## 2020-10-06 PROCEDURE — 3331090002 HH PPS REVENUE DEBIT

## 2020-10-06 PROCEDURE — 3331090001 HH PPS REVENUE CREDIT

## 2020-10-07 PROCEDURE — 3331090002 HH PPS REVENUE DEBIT

## 2020-10-07 PROCEDURE — 3331090001 HH PPS REVENUE CREDIT

## 2020-10-08 PROCEDURE — 3331090002 HH PPS REVENUE DEBIT

## 2020-10-08 PROCEDURE — 3331090001 HH PPS REVENUE CREDIT

## 2020-10-09 PROCEDURE — 3331090001 HH PPS REVENUE CREDIT

## 2020-10-09 PROCEDURE — 3331090002 HH PPS REVENUE DEBIT

## 2020-10-10 PROCEDURE — 3331090001 HH PPS REVENUE CREDIT

## 2020-10-10 PROCEDURE — 3331090002 HH PPS REVENUE DEBIT

## 2020-10-11 PROCEDURE — 3331090002 HH PPS REVENUE DEBIT

## 2020-10-11 PROCEDURE — 3331090001 HH PPS REVENUE CREDIT

## 2020-10-12 PROCEDURE — 3331090002 HH PPS REVENUE DEBIT

## 2020-10-12 PROCEDURE — 3331090001 HH PPS REVENUE CREDIT

## 2020-10-13 PROCEDURE — 3331090001 HH PPS REVENUE CREDIT

## 2020-10-13 PROCEDURE — 3331090002 HH PPS REVENUE DEBIT

## 2020-10-14 PROCEDURE — 3331090001 HH PPS REVENUE CREDIT

## 2020-10-14 PROCEDURE — 3331090002 HH PPS REVENUE DEBIT

## 2020-10-15 PROCEDURE — 3331090002 HH PPS REVENUE DEBIT

## 2020-10-15 PROCEDURE — 3331090001 HH PPS REVENUE CREDIT

## 2020-10-16 PROCEDURE — 3331090002 HH PPS REVENUE DEBIT

## 2020-10-16 PROCEDURE — 3331090001 HH PPS REVENUE CREDIT

## 2020-10-17 PROCEDURE — 3331090002 HH PPS REVENUE DEBIT

## 2020-10-17 PROCEDURE — 3331090001 HH PPS REVENUE CREDIT

## 2020-10-18 PROCEDURE — 3331090002 HH PPS REVENUE DEBIT

## 2020-10-18 PROCEDURE — 3331090001 HH PPS REVENUE CREDIT

## 2020-10-18 PROCEDURE — 3331090003 HH PPS REVENUE ADJ

## 2020-10-19 PROCEDURE — 3331090002 HH PPS REVENUE DEBIT

## 2020-10-19 PROCEDURE — 3331090001 HH PPS REVENUE CREDIT

## 2020-10-20 PROCEDURE — 3331090002 HH PPS REVENUE DEBIT

## 2020-10-20 PROCEDURE — 3331090001 HH PPS REVENUE CREDIT

## 2020-10-21 PROCEDURE — 3331090001 HH PPS REVENUE CREDIT

## 2020-10-21 PROCEDURE — 3331090002 HH PPS REVENUE DEBIT

## 2020-10-22 ENCOUNTER — HOME CARE VISIT (OUTPATIENT)
Dept: SCHEDULING | Facility: HOME HEALTH | Age: 65
End: 2020-10-22
Payer: MEDICARE

## 2020-10-22 PROCEDURE — 3331090002 HH PPS REVENUE DEBIT

## 2020-10-22 PROCEDURE — 3331090001 HH PPS REVENUE CREDIT

## 2020-10-22 PROCEDURE — G0299 HHS/HOSPICE OF RN EA 15 MIN: HCPCS

## 2020-10-22 PROCEDURE — 400013 HH SOC

## 2020-10-23 PROCEDURE — 3331090002 HH PPS REVENUE DEBIT

## 2020-10-23 PROCEDURE — 3331090001 HH PPS REVENUE CREDIT

## 2020-10-24 PROCEDURE — 3331090002 HH PPS REVENUE DEBIT

## 2020-10-24 PROCEDURE — 3331090001 HH PPS REVENUE CREDIT

## 2020-10-25 PROCEDURE — 3331090001 HH PPS REVENUE CREDIT

## 2020-10-25 PROCEDURE — 3331090002 HH PPS REVENUE DEBIT

## 2020-10-26 ENCOUNTER — HOME CARE VISIT (OUTPATIENT)
Dept: HOME HEALTH SERVICES | Facility: HOME HEALTH | Age: 65
End: 2020-10-26
Payer: MEDICARE

## 2020-10-26 PROCEDURE — 3331090002 HH PPS REVENUE DEBIT

## 2020-10-26 PROCEDURE — 3331090001 HH PPS REVENUE CREDIT

## 2020-10-27 ENCOUNTER — HOME CARE VISIT (OUTPATIENT)
Dept: HOME HEALTH SERVICES | Facility: HOME HEALTH | Age: 65
End: 2020-10-27
Payer: MEDICARE

## 2020-10-27 PROCEDURE — 3331090001 HH PPS REVENUE CREDIT

## 2020-10-27 PROCEDURE — 3331090002 HH PPS REVENUE DEBIT

## 2020-10-28 ENCOUNTER — HOME CARE VISIT (OUTPATIENT)
Dept: SCHEDULING | Facility: HOME HEALTH | Age: 65
End: 2020-10-28
Payer: MEDICARE

## 2020-10-28 PROCEDURE — 3331090002 HH PPS REVENUE DEBIT

## 2020-10-28 PROCEDURE — G0155 HHCP-SVS OF CSW,EA 15 MIN: HCPCS

## 2020-10-28 PROCEDURE — 3331090001 HH PPS REVENUE CREDIT

## 2020-10-29 PROCEDURE — 3331090001 HH PPS REVENUE CREDIT

## 2020-10-29 PROCEDURE — 3331090002 HH PPS REVENUE DEBIT

## 2020-10-30 ENCOUNTER — HOME CARE VISIT (OUTPATIENT)
Dept: HOME HEALTH SERVICES | Facility: HOME HEALTH | Age: 65
End: 2020-10-30
Payer: MEDICARE

## 2020-10-30 VITALS
TEMPERATURE: 97.3 F | RESPIRATION RATE: 16 BRPM | OXYGEN SATURATION: 97 % | HEART RATE: 80 BPM | DIASTOLIC BLOOD PRESSURE: 78 MMHG | SYSTOLIC BLOOD PRESSURE: 130 MMHG

## 2020-10-30 PROCEDURE — 3331090002 HH PPS REVENUE DEBIT

## 2020-10-30 PROCEDURE — 3331090001 HH PPS REVENUE CREDIT

## 2020-10-31 PROCEDURE — 3331090001 HH PPS REVENUE CREDIT

## 2020-10-31 PROCEDURE — 3331090002 HH PPS REVENUE DEBIT

## 2020-11-01 PROCEDURE — 3331090002 HH PPS REVENUE DEBIT

## 2020-11-01 PROCEDURE — 3331090001 HH PPS REVENUE CREDIT

## 2020-11-02 PROCEDURE — 3331090002 HH PPS REVENUE DEBIT

## 2020-11-02 PROCEDURE — 3331090001 HH PPS REVENUE CREDIT

## 2020-11-03 PROCEDURE — 3331090001 HH PPS REVENUE CREDIT

## 2020-11-03 PROCEDURE — 3331090002 HH PPS REVENUE DEBIT

## 2020-11-04 ENCOUNTER — HOME CARE VISIT (OUTPATIENT)
Dept: SCHEDULING | Facility: HOME HEALTH | Age: 65
End: 2020-11-04
Payer: MEDICARE

## 2020-11-04 VITALS
HEART RATE: 72 BPM | TEMPERATURE: 98.8 F | OXYGEN SATURATION: 99 % | DIASTOLIC BLOOD PRESSURE: 58 MMHG | RESPIRATION RATE: 17 BRPM | SYSTOLIC BLOOD PRESSURE: 110 MMHG

## 2020-11-04 PROCEDURE — 3331090002 HH PPS REVENUE DEBIT

## 2020-11-04 PROCEDURE — 3331090001 HH PPS REVENUE CREDIT

## 2020-11-04 PROCEDURE — G0299 HHS/HOSPICE OF RN EA 15 MIN: HCPCS

## 2020-11-05 PROCEDURE — 3331090001 HH PPS REVENUE CREDIT

## 2020-11-05 PROCEDURE — 3331090002 HH PPS REVENUE DEBIT

## 2020-11-06 ENCOUNTER — HOME CARE VISIT (OUTPATIENT)
Dept: SCHEDULING | Facility: HOME HEALTH | Age: 65
End: 2020-11-06
Payer: MEDICARE

## 2020-11-06 PROCEDURE — 3331090002 HH PPS REVENUE DEBIT

## 2020-11-06 PROCEDURE — 3331090001 HH PPS REVENUE CREDIT

## 2020-11-06 PROCEDURE — G0299 HHS/HOSPICE OF RN EA 15 MIN: HCPCS

## 2020-11-06 RX ADMIN — Medication 500 MG: at 14:45

## 2020-11-07 VITALS
DIASTOLIC BLOOD PRESSURE: 60 MMHG | TEMPERATURE: 99 F | RESPIRATION RATE: 18 BRPM | SYSTOLIC BLOOD PRESSURE: 122 MMHG | OXYGEN SATURATION: 98 % | HEART RATE: 99 BPM

## 2020-11-07 PROCEDURE — 3331090002 HH PPS REVENUE DEBIT

## 2020-11-07 PROCEDURE — 3331090001 HH PPS REVENUE CREDIT

## 2020-11-08 PROCEDURE — 3331090001 HH PPS REVENUE CREDIT

## 2020-11-08 PROCEDURE — 3331090002 HH PPS REVENUE DEBIT

## 2020-11-09 ENCOUNTER — HOME CARE VISIT (OUTPATIENT)
Dept: HOME HEALTH SERVICES | Facility: HOME HEALTH | Age: 65
End: 2020-11-09
Payer: MEDICARE

## 2020-11-09 PROCEDURE — 3331090001 HH PPS REVENUE CREDIT

## 2020-11-09 PROCEDURE — 3331090002 HH PPS REVENUE DEBIT

## 2020-11-10 PROCEDURE — 3331090002 HH PPS REVENUE DEBIT

## 2020-11-10 PROCEDURE — 3331090001 HH PPS REVENUE CREDIT

## 2020-11-11 ENCOUNTER — HOME CARE VISIT (OUTPATIENT)
Dept: HOME HEALTH SERVICES | Facility: HOME HEALTH | Age: 65
End: 2020-11-11
Payer: MEDICARE

## 2020-11-11 PROCEDURE — 3331090002 HH PPS REVENUE DEBIT

## 2020-11-11 PROCEDURE — 3331090001 HH PPS REVENUE CREDIT

## 2020-11-12 ENCOUNTER — HOME CARE VISIT (OUTPATIENT)
Dept: HOME HEALTH SERVICES | Facility: HOME HEALTH | Age: 65
End: 2020-11-12
Payer: MEDICARE

## 2020-11-12 PROCEDURE — 3331090002 HH PPS REVENUE DEBIT

## 2020-11-12 PROCEDURE — 3331090001 HH PPS REVENUE CREDIT

## 2020-11-13 PROCEDURE — 3331090002 HH PPS REVENUE DEBIT

## 2020-11-13 PROCEDURE — 3331090001 HH PPS REVENUE CREDIT

## 2020-11-14 PROCEDURE — 3331090001 HH PPS REVENUE CREDIT

## 2020-11-14 PROCEDURE — 3331090002 HH PPS REVENUE DEBIT

## 2020-11-15 PROCEDURE — 3331090002 HH PPS REVENUE DEBIT

## 2020-11-15 PROCEDURE — 3331090001 HH PPS REVENUE CREDIT

## 2020-11-16 ENCOUNTER — HOME CARE VISIT (OUTPATIENT)
Dept: SCHEDULING | Facility: HOME HEALTH | Age: 65
End: 2020-11-16
Payer: MEDICARE

## 2020-11-16 PROCEDURE — 3331090001 HH PPS REVENUE CREDIT

## 2020-11-16 PROCEDURE — 3331090002 HH PPS REVENUE DEBIT

## 2020-11-16 PROCEDURE — G0299 HHS/HOSPICE OF RN EA 15 MIN: HCPCS

## 2020-11-17 ENCOUNTER — HOME CARE VISIT (OUTPATIENT)
Dept: SCHEDULING | Facility: HOME HEALTH | Age: 65
End: 2020-11-17
Payer: MEDICARE

## 2020-11-17 VITALS
TEMPERATURE: 98.7 F | DIASTOLIC BLOOD PRESSURE: 70 MMHG | HEART RATE: 75 BPM | RESPIRATION RATE: 16 BRPM | OXYGEN SATURATION: 98 % | SYSTOLIC BLOOD PRESSURE: 120 MMHG

## 2020-11-17 PROCEDURE — G0151 HHCP-SERV OF PT,EA 15 MIN: HCPCS

## 2020-11-17 PROCEDURE — 3331090002 HH PPS REVENUE DEBIT

## 2020-11-17 PROCEDURE — 3331090001 HH PPS REVENUE CREDIT

## 2020-11-18 PROCEDURE — 3331090001 HH PPS REVENUE CREDIT

## 2020-11-18 PROCEDURE — 3331090002 HH PPS REVENUE DEBIT

## 2020-11-19 ENCOUNTER — HOME CARE VISIT (OUTPATIENT)
Dept: SCHEDULING | Facility: HOME HEALTH | Age: 65
End: 2020-11-19
Payer: MEDICARE

## 2020-11-19 VITALS
TEMPERATURE: 96.8 F | HEART RATE: 80 BPM | RESPIRATION RATE: 20 BRPM | OXYGEN SATURATION: 93 % | DIASTOLIC BLOOD PRESSURE: 72 MMHG | SYSTOLIC BLOOD PRESSURE: 120 MMHG

## 2020-11-19 PROCEDURE — G0152 HHCP-SERV OF OT,EA 15 MIN: HCPCS

## 2020-11-19 PROCEDURE — 400014 HH F/U

## 2020-11-19 PROCEDURE — 3331090002 HH PPS REVENUE DEBIT

## 2020-11-19 PROCEDURE — 3331090001 HH PPS REVENUE CREDIT

## 2020-11-20 ENCOUNTER — HOME CARE VISIT (OUTPATIENT)
Dept: SCHEDULING | Facility: HOME HEALTH | Age: 65
End: 2020-11-20
Payer: MEDICARE

## 2020-11-20 PROCEDURE — 3331090002 HH PPS REVENUE DEBIT

## 2020-11-20 PROCEDURE — 3331090001 HH PPS REVENUE CREDIT

## 2020-11-20 PROCEDURE — G0157 HHC PT ASSISTANT EA 15: HCPCS

## 2020-11-21 PROCEDURE — 3331090001 HH PPS REVENUE CREDIT

## 2020-11-21 PROCEDURE — 3331090002 HH PPS REVENUE DEBIT

## 2020-11-22 VITALS
TEMPERATURE: 98.3 F | OXYGEN SATURATION: 99 % | HEART RATE: 57 BPM | SYSTOLIC BLOOD PRESSURE: 157 MMHG | DIASTOLIC BLOOD PRESSURE: 96 MMHG

## 2020-11-22 PROCEDURE — 3331090002 HH PPS REVENUE DEBIT

## 2020-11-22 PROCEDURE — 3331090001 HH PPS REVENUE CREDIT

## 2020-11-23 ENCOUNTER — HOME CARE VISIT (OUTPATIENT)
Dept: SCHEDULING | Facility: HOME HEALTH | Age: 65
End: 2020-11-23
Payer: MEDICARE

## 2020-11-23 VITALS
SYSTOLIC BLOOD PRESSURE: 158 MMHG | HEART RATE: 86 BPM | OXYGEN SATURATION: 100 % | TEMPERATURE: 99 F | RESPIRATION RATE: 18 BRPM | DIASTOLIC BLOOD PRESSURE: 70 MMHG

## 2020-11-23 PROCEDURE — 3331090002 HH PPS REVENUE DEBIT

## 2020-11-23 PROCEDURE — 3331090001 HH PPS REVENUE CREDIT

## 2020-11-23 PROCEDURE — G0157 HHC PT ASSISTANT EA 15: HCPCS

## 2020-11-24 VITALS
DIASTOLIC BLOOD PRESSURE: 80 MMHG | SYSTOLIC BLOOD PRESSURE: 144 MMHG | OXYGEN SATURATION: 94 % | RESPIRATION RATE: 18 BRPM | HEART RATE: 77 BPM | TEMPERATURE: 98.5 F

## 2020-11-24 PROCEDURE — 3331090001 HH PPS REVENUE CREDIT

## 2020-11-24 PROCEDURE — 3331090002 HH PPS REVENUE DEBIT

## 2020-11-25 ENCOUNTER — HOME CARE VISIT (OUTPATIENT)
Dept: SCHEDULING | Facility: HOME HEALTH | Age: 65
End: 2020-11-25
Payer: MEDICARE

## 2020-11-25 VITALS
HEART RATE: 78 BPM | OXYGEN SATURATION: 96 % | SYSTOLIC BLOOD PRESSURE: 138 MMHG | RESPIRATION RATE: 17 BRPM | TEMPERATURE: 100.1 F | DIASTOLIC BLOOD PRESSURE: 72 MMHG

## 2020-11-25 PROCEDURE — 3331090001 HH PPS REVENUE CREDIT

## 2020-11-25 PROCEDURE — 3331090002 HH PPS REVENUE DEBIT

## 2020-11-25 PROCEDURE — G0157 HHC PT ASSISTANT EA 15: HCPCS

## 2020-11-26 PROCEDURE — 3331090001 HH PPS REVENUE CREDIT

## 2020-11-26 PROCEDURE — 3331090002 HH PPS REVENUE DEBIT

## 2020-11-27 ENCOUNTER — HOME CARE VISIT (OUTPATIENT)
Dept: SCHEDULING | Facility: HOME HEALTH | Age: 65
End: 2020-11-27
Payer: MEDICARE

## 2020-11-27 PROCEDURE — 3331090002 HH PPS REVENUE DEBIT

## 2020-11-27 PROCEDURE — G0157 HHC PT ASSISTANT EA 15: HCPCS

## 2020-11-27 PROCEDURE — 3331090001 HH PPS REVENUE CREDIT

## 2020-11-28 PROCEDURE — 3331090002 HH PPS REVENUE DEBIT

## 2020-11-28 PROCEDURE — 3331090001 HH PPS REVENUE CREDIT

## 2020-11-29 VITALS
HEART RATE: 89 BPM | OXYGEN SATURATION: 100 % | DIASTOLIC BLOOD PRESSURE: 70 MMHG | SYSTOLIC BLOOD PRESSURE: 130 MMHG | TEMPERATURE: 99.5 F | RESPIRATION RATE: 18 BRPM

## 2020-11-29 PROCEDURE — 3331090002 HH PPS REVENUE DEBIT

## 2020-11-29 PROCEDURE — 3331090001 HH PPS REVENUE CREDIT

## 2020-11-30 ENCOUNTER — HOME CARE VISIT (OUTPATIENT)
Dept: SCHEDULING | Facility: HOME HEALTH | Age: 65
End: 2020-11-30
Payer: MEDICARE

## 2020-11-30 PROCEDURE — 3331090001 HH PPS REVENUE CREDIT

## 2020-11-30 PROCEDURE — G0157 HHC PT ASSISTANT EA 15: HCPCS

## 2020-11-30 PROCEDURE — 3331090002 HH PPS REVENUE DEBIT

## 2020-12-01 PROCEDURE — 3331090001 HH PPS REVENUE CREDIT

## 2020-12-01 PROCEDURE — 3331090002 HH PPS REVENUE DEBIT

## 2020-12-02 ENCOUNTER — HOME CARE VISIT (OUTPATIENT)
Dept: SCHEDULING | Facility: HOME HEALTH | Age: 65
End: 2020-12-02
Payer: MEDICARE

## 2020-12-02 VITALS
OXYGEN SATURATION: 96 % | TEMPERATURE: 99 F | RESPIRATION RATE: 17 BRPM | DIASTOLIC BLOOD PRESSURE: 80 MMHG | HEART RATE: 62 BPM | SYSTOLIC BLOOD PRESSURE: 128 MMHG

## 2020-12-02 VITALS
HEART RATE: 99 BPM | OXYGEN SATURATION: 96 % | SYSTOLIC BLOOD PRESSURE: 160 MMHG | TEMPERATURE: 98.3 F | RESPIRATION RATE: 17 BRPM | DIASTOLIC BLOOD PRESSURE: 82 MMHG

## 2020-12-02 PROCEDURE — 3331090001 HH PPS REVENUE CREDIT

## 2020-12-02 PROCEDURE — G0157 HHC PT ASSISTANT EA 15: HCPCS

## 2020-12-02 PROCEDURE — 3331090002 HH PPS REVENUE DEBIT

## 2020-12-03 PROCEDURE — 3331090002 HH PPS REVENUE DEBIT

## 2020-12-03 PROCEDURE — 3331090001 HH PPS REVENUE CREDIT

## 2020-12-04 ENCOUNTER — HOME CARE VISIT (OUTPATIENT)
Dept: SCHEDULING | Facility: HOME HEALTH | Age: 65
End: 2020-12-04
Payer: MEDICARE

## 2020-12-04 VITALS
SYSTOLIC BLOOD PRESSURE: 158 MMHG | HEART RATE: 84 BPM | OXYGEN SATURATION: 98 % | TEMPERATURE: 99.9 F | DIASTOLIC BLOOD PRESSURE: 100 MMHG | RESPIRATION RATE: 16 BRPM

## 2020-12-04 PROCEDURE — 3331090002 HH PPS REVENUE DEBIT

## 2020-12-04 PROCEDURE — 3331090001 HH PPS REVENUE CREDIT

## 2020-12-04 PROCEDURE — G0151 HHCP-SERV OF PT,EA 15 MIN: HCPCS

## 2020-12-05 PROCEDURE — 3331090001 HH PPS REVENUE CREDIT

## 2020-12-05 PROCEDURE — 3331090002 HH PPS REVENUE DEBIT

## 2020-12-06 ENCOUNTER — HOME CARE VISIT (OUTPATIENT)
Dept: SCHEDULING | Facility: HOME HEALTH | Age: 65
End: 2020-12-06
Payer: MEDICARE

## 2020-12-06 VITALS
RESPIRATION RATE: 16 BRPM | DIASTOLIC BLOOD PRESSURE: 84 MMHG | OXYGEN SATURATION: 94 % | SYSTOLIC BLOOD PRESSURE: 172 MMHG | HEART RATE: 88 BPM | TEMPERATURE: 98.9 F

## 2020-12-06 PROCEDURE — 3331090001 HH PPS REVENUE CREDIT

## 2020-12-06 PROCEDURE — 3331090002 HH PPS REVENUE DEBIT

## 2020-12-06 PROCEDURE — G0299 HHS/HOSPICE OF RN EA 15 MIN: HCPCS

## 2020-12-07 ENCOUNTER — HOME CARE VISIT (OUTPATIENT)
Dept: SCHEDULING | Facility: HOME HEALTH | Age: 65
End: 2020-12-07
Payer: MEDICARE

## 2020-12-07 VITALS
TEMPERATURE: 100.1 F | DIASTOLIC BLOOD PRESSURE: 82 MMHG | HEART RATE: 89 BPM | SYSTOLIC BLOOD PRESSURE: 154 MMHG | OXYGEN SATURATION: 98 % | RESPIRATION RATE: 18 BRPM

## 2020-12-07 PROCEDURE — G0157 HHC PT ASSISTANT EA 15: HCPCS

## 2020-12-07 PROCEDURE — 3331090002 HH PPS REVENUE DEBIT

## 2020-12-07 PROCEDURE — 3331090001 HH PPS REVENUE CREDIT

## 2020-12-08 PROCEDURE — 3331090002 HH PPS REVENUE DEBIT

## 2020-12-08 PROCEDURE — 3331090001 HH PPS REVENUE CREDIT

## 2020-12-09 ENCOUNTER — HOME CARE VISIT (OUTPATIENT)
Dept: SCHEDULING | Facility: HOME HEALTH | Age: 65
End: 2020-12-09
Payer: MEDICARE

## 2020-12-09 PROCEDURE — 3331090001 HH PPS REVENUE CREDIT

## 2020-12-09 PROCEDURE — G0157 HHC PT ASSISTANT EA 15: HCPCS

## 2020-12-09 PROCEDURE — 3331090002 HH PPS REVENUE DEBIT

## 2020-12-10 VITALS
SYSTOLIC BLOOD PRESSURE: 123 MMHG | HEART RATE: 79 BPM | TEMPERATURE: 98.6 F | RESPIRATION RATE: 15 BRPM | OXYGEN SATURATION: 95 % | DIASTOLIC BLOOD PRESSURE: 68 MMHG

## 2020-12-10 PROCEDURE — 3331090002 HH PPS REVENUE DEBIT

## 2020-12-10 PROCEDURE — 3331090001 HH PPS REVENUE CREDIT

## 2020-12-11 PROCEDURE — 3331090002 HH PPS REVENUE DEBIT

## 2020-12-11 PROCEDURE — 3331090001 HH PPS REVENUE CREDIT

## 2020-12-12 PROCEDURE — 3331090001 HH PPS REVENUE CREDIT

## 2020-12-12 PROCEDURE — 3331090002 HH PPS REVENUE DEBIT

## 2020-12-13 PROCEDURE — 3331090002 HH PPS REVENUE DEBIT

## 2020-12-13 PROCEDURE — 3331090001 HH PPS REVENUE CREDIT

## 2020-12-14 ENCOUNTER — HOME CARE VISIT (OUTPATIENT)
Dept: SCHEDULING | Facility: HOME HEALTH | Age: 65
End: 2020-12-14
Payer: MEDICARE

## 2020-12-14 VITALS
HEART RATE: 67 BPM | DIASTOLIC BLOOD PRESSURE: 64 MMHG | OXYGEN SATURATION: 98 % | RESPIRATION RATE: 17 BRPM | TEMPERATURE: 100.7 F | SYSTOLIC BLOOD PRESSURE: 120 MMHG

## 2020-12-14 PROCEDURE — 3331090001 HH PPS REVENUE CREDIT

## 2020-12-14 PROCEDURE — 3331090002 HH PPS REVENUE DEBIT

## 2020-12-14 PROCEDURE — G0157 HHC PT ASSISTANT EA 15: HCPCS

## 2020-12-15 PROCEDURE — 3331090002 HH PPS REVENUE DEBIT

## 2020-12-15 PROCEDURE — 3331090001 HH PPS REVENUE CREDIT

## 2020-12-16 ENCOUNTER — HOME CARE VISIT (OUTPATIENT)
Dept: SCHEDULING | Facility: HOME HEALTH | Age: 65
End: 2020-12-16
Payer: MEDICARE

## 2020-12-16 VITALS
TEMPERATURE: 98.9 F | HEART RATE: 82 BPM | SYSTOLIC BLOOD PRESSURE: 142 MMHG | DIASTOLIC BLOOD PRESSURE: 90 MMHG | OXYGEN SATURATION: 98 % | RESPIRATION RATE: 18 BRPM

## 2020-12-16 PROCEDURE — 3331090001 HH PPS REVENUE CREDIT

## 2020-12-16 PROCEDURE — G0157 HHC PT ASSISTANT EA 15: HCPCS

## 2020-12-16 PROCEDURE — 3331090002 HH PPS REVENUE DEBIT

## 2020-12-17 PROCEDURE — 3331090002 HH PPS REVENUE DEBIT

## 2020-12-17 PROCEDURE — 3331090001 HH PPS REVENUE CREDIT

## 2020-12-18 PROCEDURE — 3331090001 HH PPS REVENUE CREDIT

## 2020-12-18 PROCEDURE — 3331090002 HH PPS REVENUE DEBIT

## 2020-12-19 PROCEDURE — 3331090002 HH PPS REVENUE DEBIT

## 2020-12-19 PROCEDURE — 3331090001 HH PPS REVENUE CREDIT

## 2020-12-20 PROCEDURE — 3331090001 HH PPS REVENUE CREDIT

## 2020-12-20 PROCEDURE — 3331090002 HH PPS REVENUE DEBIT

## 2020-12-21 PROCEDURE — 3331090001 HH PPS REVENUE CREDIT

## 2020-12-21 PROCEDURE — 3331090002 HH PPS REVENUE DEBIT

## 2020-12-22 ENCOUNTER — HOME CARE VISIT (OUTPATIENT)
Dept: SCHEDULING | Facility: HOME HEALTH | Age: 65
End: 2020-12-22
Payer: MEDICARE

## 2020-12-22 VITALS
TEMPERATURE: 98 F | SYSTOLIC BLOOD PRESSURE: 136 MMHG | HEART RATE: 78 BPM | DIASTOLIC BLOOD PRESSURE: 70 MMHG | OXYGEN SATURATION: 98 % | RESPIRATION RATE: 16 BRPM

## 2020-12-22 PROCEDURE — 3331090003 HH PPS REVENUE ADJ

## 2020-12-22 PROCEDURE — 3331090002 HH PPS REVENUE DEBIT

## 2020-12-22 PROCEDURE — 400014 HH F/U

## 2020-12-22 PROCEDURE — G0151 HHCP-SERV OF PT,EA 15 MIN: HCPCS

## 2020-12-22 PROCEDURE — 3331090001 HH PPS REVENUE CREDIT

## 2020-12-26 ENCOUNTER — HOSPITAL ENCOUNTER (EMERGENCY)
Age: 65
Discharge: HOME | End: 2020-12-27
Attending: EMERGENCY MEDICINE
Payer: MEDICARE

## 2020-12-26 ENCOUNTER — APPOINTMENT (OUTPATIENT)
Dept: CT IMAGING | Age: 65
End: 2020-12-26
Attending: EMERGENCY MEDICINE
Payer: MEDICARE

## 2020-12-26 VITALS
RESPIRATION RATE: 18 BRPM | HEART RATE: 82 BPM | DIASTOLIC BLOOD PRESSURE: 83 MMHG | SYSTOLIC BLOOD PRESSURE: 176 MMHG | OXYGEN SATURATION: 100 % | TEMPERATURE: 97.9 F

## 2020-12-26 DIAGNOSIS — G40.919 BREAKTHROUGH SEIZURE (HCC): Primary | ICD-10-CM

## 2020-12-26 DIAGNOSIS — N17.9 AKI (ACUTE KIDNEY INJURY) (HCC): ICD-10-CM

## 2020-12-26 LAB
ANION GAP SERPL CALC-SCNC: 7 MMOL/L (ref 3–18)
BASOPHILS # BLD: 0 K/UL (ref 0–0.1)
BASOPHILS NFR BLD: 0 % (ref 0–2)
BUN SERPL-MCNC: 34 MG/DL (ref 7–18)
BUN/CREAT SERPL: 16 (ref 12–20)
CALCIUM SERPL-MCNC: 9.2 MG/DL (ref 8.5–10.1)
CHLORIDE SERPL-SCNC: 112 MMOL/L (ref 100–111)
CO2 SERPL-SCNC: 25 MMOL/L (ref 21–32)
CREAT SERPL-MCNC: 2.08 MG/DL (ref 0.6–1.3)
DIFFERENTIAL METHOD BLD: ABNORMAL
EOSINOPHIL # BLD: 0.1 K/UL (ref 0–0.4)
EOSINOPHIL NFR BLD: 1 % (ref 0–5)
ERYTHROCYTE [DISTWIDTH] IN BLOOD BY AUTOMATED COUNT: 14.5 % (ref 11.6–14.5)
GLUCOSE SERPL-MCNC: 111 MG/DL (ref 74–99)
HCT VFR BLD AUTO: 32.8 % (ref 35–45)
HGB BLD-MCNC: 10.8 G/DL (ref 12–16)
LYMPHOCYTES # BLD: 1.6 K/UL (ref 0.9–3.6)
LYMPHOCYTES NFR BLD: 20 % (ref 21–52)
MAGNESIUM SERPL-MCNC: 2.3 MG/DL (ref 1.6–2.6)
MCH RBC QN AUTO: 29.6 PG (ref 24–34)
MCHC RBC AUTO-ENTMCNC: 32.9 G/DL (ref 31–37)
MCV RBC AUTO: 89.9 FL (ref 74–97)
MONOCYTES # BLD: 1 K/UL (ref 0.05–1.2)
MONOCYTES NFR BLD: 12 % (ref 3–10)
NEUTS SEG # BLD: 5.3 K/UL (ref 1.8–8)
NEUTS SEG NFR BLD: 67 % (ref 40–73)
PLATELET # BLD AUTO: 224 K/UL (ref 135–420)
PMV BLD AUTO: 11.7 FL (ref 9.2–11.8)
POTASSIUM SERPL-SCNC: 4.4 MMOL/L (ref 3.5–5.5)
RBC # BLD AUTO: 3.65 M/UL (ref 4.2–5.3)
SODIUM SERPL-SCNC: 144 MMOL/L (ref 136–145)
VALPROATE SERPL-MCNC: 46 UG/ML (ref 50–100)
WBC # BLD AUTO: 8 K/UL (ref 4.6–13.2)

## 2020-12-26 PROCEDURE — 85025 COMPLETE CBC W/AUTO DIFF WBC: CPT

## 2020-12-26 PROCEDURE — 82550 ASSAY OF CK (CPK): CPT

## 2020-12-26 PROCEDURE — 80048 BASIC METABOLIC PNL TOTAL CA: CPT

## 2020-12-26 PROCEDURE — 83735 ASSAY OF MAGNESIUM: CPT

## 2020-12-26 PROCEDURE — 80164 ASSAY DIPROPYLACETIC ACD TOT: CPT

## 2020-12-26 PROCEDURE — 70450 CT HEAD/BRAIN W/O DYE: CPT

## 2020-12-26 PROCEDURE — 99284 EMERGENCY DEPT VISIT MOD MDM: CPT

## 2020-12-27 LAB
APPEARANCE UR: CLEAR
BILIRUB UR QL: NEGATIVE
CK SERPL-CCNC: 193 U/L (ref 26–192)
COLOR UR: YELLOW
GLUCOSE UR STRIP.AUTO-MCNC: NEGATIVE MG/DL
HGB UR QL STRIP: NEGATIVE
KETONES UR QL STRIP.AUTO: NEGATIVE MG/DL
LEUKOCYTE ESTERASE UR QL STRIP.AUTO: NEGATIVE
NITRITE UR QL STRIP.AUTO: NEGATIVE
PH UR STRIP: 6.5 [PH] (ref 5–8)
PROT UR STRIP-MCNC: NEGATIVE MG/DL
SP GR UR REFRACTOMETRY: 1.01 (ref 1–1.03)
UROBILINOGEN UR QL STRIP.AUTO: 1 EU/DL (ref 0.2–1)

## 2020-12-27 PROCEDURE — 81003 URINALYSIS AUTO W/O SCOPE: CPT

## 2020-12-27 PROCEDURE — 74011250637 HC RX REV CODE- 250/637: Performed by: EMERGENCY MEDICINE

## 2020-12-27 RX ORDER — ACETAMINOPHEN 500 MG
1000 TABLET ORAL
Status: COMPLETED | OUTPATIENT
Start: 2020-12-27 | End: 2020-12-27

## 2020-12-27 RX ADMIN — ACETAMINOPHEN 1000 MG: 500 TABLET ORAL at 02:32

## 2020-12-27 NOTE — ED TRIAGE NOTES
Patient brought in by medics after she was found on the side of her bed [patient with a history of seizures and appears to have had a seizure since she was post ictal upon their arrival.

## 2020-12-27 NOTE — ED PROVIDER NOTES
Yessy Stapleton is a 76 y.o. female with a past medical history of hypertension, previous CVA, and seizure disorder coming in via EMS after reported seizure at home. EMS reported a seizure lasting less than 5 to 10 minutes as she was no longer seizing when they arrived. Seizure was described as generalized tonic-clonic seizure consistent with her baseline. Patient's grandson states that she did roll off the bed onto the floor. Patient states that she does not remember much except for waking up here in the hospital.  States she is not sure how she got here. States she has been feeling fine up until this happened. States that she has not any recent trauma, fevers, cough, vomiting, abdominal pain, chest pain, shortness of breath, headache, or other symptoms. States that she has been taking her medications as prescribed. States she was at home with her grandson and his girlfriend prior to waking up in the emergency department. Has no other complaints at this time. Past Medical History:   Diagnosis Date    Abnormal coordination 9/21/2020    Hallucinations 9/21/2020    Hypertension     Neurological disorder     Seizures ([facility] [address]     Stroke [facility]) 2009       History reviewed. No pertinent surgical history.       Family History:   Problem Relation Age of Onset    Diabetes Other     Stroke Other        Social History     Socioeconomic History    Marital status:      Spouse name: Not on file    Number of children: Not on file    Years of education: Not on file    Highest education level: Not on file   Occupational History    Not on file   Social Needs    Financial resource strain: Not on file    Food insecurity     Worry: Not on file     Inability: Not on file   [employer] needs     Medical: Not on file     Non-medical: Not on file   Tobacco Use    Smoking status: Never Smoker    Smokeless tobacco: Never Used   Substance and Sexual Activity    Alcohol use: Yes     Comment: Socially  Drug use: No    Sexual activity: Not on file   Lifestyle    Physical activity     Days per week: Not on file     Minutes per session: Not on file    Stress: Not on file   Relationships    Social connections     Talks on phone: Not on file     Gets together: Not on file     Attends [employer]ism service: Not on file     Active member of club or organization: Not on file     Attends meetings of clubs or organizations: Not on file     Relationship status: Not on file    Intimate partner violence     Fear of current or ex partner: Not on file     Emotionally abused: Not on file     Physically abused: Not on file     Forced sexual activity: Not on file   Other Topics Concern    Not on file   Social History Narrative    Not on file         ALLERGIES: Tomato, Aspirin, Ciprofloxacin, Pcn [penicillins], and Sulfa (sulfonamide antibiotics)    Review of Systems   Constitutional: Negative. Negative for chills and fever. Eyes: Negative. Negative for visual disturbance. Respiratory: Negative. Negative for cough and shortness of breath. Cardiovascular: Negative. Negative for chest pain. Gastrointestinal: Negative. Negative for abdominal pain and vomiting. Genitourinary: Negative. Negative for dysuria. Musculoskeletal: Negative. Negative for arthralgias and myalgias. Skin: Negative. Negative for rash. Neurological: Positive for seizures. Negative for dizziness, weakness and headaches. All other systems reviewed and are negative. Vitals:    12/26/20 2203   BP: (!) 176/83   Pulse: 82   Resp: 18   Temp: 97.9 °F (36.6 °C)   SpO2: 100%            Physical Exam  Vitals signs reviewed. Constitutional:       General: She is not in acute distress. Appearance: Normal appearance. She is well-developed. HENT:      Head: Normocephalic and atraumatic. Mouth/Throat:      Pharynx: Oropharynx is clear. Comments: No evidence of tongue biting or bleeding in the mouth.   Eyes:      Extraocular Movements: Extraocular movements intact. Conjunctiva/sclera: Conjunctivae normal.      Pupils: Pupils are equal, round, and reactive to light. Neck:      Musculoskeletal: Normal range of motion and neck supple. Cardiovascular:      Rate and Rhythm: Normal rate and regular rhythm. Heart sounds: S1 normal and S2 normal. No murmur. No friction rub. No gallop. Pulmonary:      Effort: Pulmonary effort is normal. No accessory muscle usage or respiratory distress. Breath sounds: Normal breath sounds. Abdominal:      General: There is no distension. Tenderness: There is no abdominal tenderness. Musculoskeletal: Normal range of motion. General: No tenderness. Skin:     General: Skin is warm. Findings: No rash. Neurological:      General: No focal deficit present. Mental Status: She is alert and oriented to person, place, and time. Comments: Equal  strength bilaterally. Normal finger-to-nose, no cerebellar signs or ataxia. No dysarthria. No facial asymmetry. Psychiatric:         Speech: Speech normal.          MDM  Number of Diagnoses or Management Options  Breakthrough seizure ([facility] [address]  Diagnosis management comments: Winston Alcantara is a 76 y.o. female with a history of seizure disorder. Her grandson reported to EMS that he is concerned she may not have been taking her medication as prescribed. She is supposed to be on Depakote and Keppra. Patient does appear to be on Plavix, will get CT head as she did well up in bed, no objective external evidence of trauma. No neck pain or tenderness. Low suspicion of intracranial hemorrhage as she appears to be back at her baseline and this seizure is consistent with her history.          Procedures      Vitals:  Patient Vitals for the past 12 hrs:   Temp Pulse Resp BP SpO2   12/26/20 2203 97.9 °F (36.6 °C) 82 18 (!) 176/83 100 %       Medications ordered:   Medications   sodium chloride 0.9 % bolus infusion 1,000 mL (has no administration in time range)   acetaminophen (TYLENOL) tablet 1,000 mg (has no administration in time range)         Lab findings:  Recent Results (from the past 12 hour(s))   VALPROIC ACID    Collection Time: 12/26/20 11:05 PM   Result Value Ref Range    Valproic acid 46 (L) 50 - 100 ug/ml   CBC WITH AUTOMATED DIFF    Collection Time: 12/26/20 11:05 PM   Result Value Ref Range    WBC 8.0 4.6 - 13.2 K/uL    RBC 3.65 (L) 4.20 - 5.30 M/uL    HGB 10.8 (L) 12.0 - 16.0 g/dL    HCT 32.8 (L) 35.0 - 45.0 %    MCV 89.9 74.0 - 97.0 FL    MCH 29.6 24.0 - 34.0 PG    MCHC 32.9 31.0 - 37.0 g/dL    RDW 14.5 11.6 - 14.5 %    PLATELET 004 374 - 697 K/uL    MPV 11.7 9.2 - 11.8 FL    NEUTROPHILS 67 40 - 73 %    LYMPHOCYTES 20 (L) 21 - 52 %    MONOCYTES 12 (H) 3 - 10 %    EOSINOPHILS 1 0 - 5 %    BASOPHILS 0 0 - 2 %    ABS. NEUTROPHILS 5.3 1.8 - 8.0 K/UL    ABS. LYMPHOCYTES 1.6 0.9 - 3.6 K/UL    ABS. MONOCYTES 1.0 0.05 - 1.2 K/UL    ABS. EOSINOPHILS 0.1 0.0 - 0.4 K/UL    ABS.  BASOPHILS 0.0 0.0 - 0.1 K/UL    DF AUTOMATED     METABOLIC PANEL, BASIC    Collection Time: 12/26/20 11:05 PM   Result Value Ref Range    Sodium 144 136 - 145 mmol/L    Potassium 4.4 3.5 - 5.5 mmol/L    Chloride 112 (H) 100 - 111 mmol/L    CO2 25 21 - 32 mmol/L    Anion gap 7 3.0 - 18 mmol/L    Glucose 111 (H) 74 - 99 mg/dL    BUN 34 (H) 7.0 - 18 MG/DL    Creatinine 2.08 (H) 0.6 - 1.3 MG/DL    BUN/Creatinine ratio 16 12 - 20      GFR est AA 29 (L) >60 ml/min/1.73m2    GFR est non-AA 24 (L) >60 ml/min/1.73m2    Calcium 9.2 8.5 - 10.1 MG/DL   MAGNESIUM    Collection Time: 12/26/20 11:05 PM   Result Value Ref Range    Magnesium 2.3 1.6 - 2.6 mg/dL   CK    Collection Time: 12/26/20 11:05 PM   Result Value Ref Range     (H) 26 - 192 U/L   URINALYSIS W/ RFLX MICROSCOPIC    Collection Time: 12/27/20  1:51 AM   Result Value Ref Range    Color YELLOW      Appearance CLEAR      Specific gravity 1.015 1.005 - 1.030      pH (UA) 6.5 5.0 - 8.0      Protein Negative NEG mg/dL    Glucose Negative NEG mg/dL    Ketone Negative NEG mg/dL    Bilirubin Negative NEG      Blood Negative NEG      Urobilinogen 1.0 0.2 - 1.0 EU/dL    Nitrites Negative NEG      Leukocyte Esterase Negative NEG       X-Ray, CT or other radiology findings or impressions:  CT HEAD WO CONT   Final Result   IMPRESSION:      1. No new or acute intracranial abnormalities. 2.  Large chronic right parieto-occipital stroke. 3.  Stable chronic ischemic white matter changes. Progress notes, Consult notes or additional Procedure notes:     Reevaluation of patient:   I have reassessed the patient. Patient is feeling feeling fine and resting comfortably in bed. Patient does report a mild global headache, however CT head was negative for any acute traumatic injury. Patient was noted have a mildly elevated creatinine at two. Appears to have a normal baseline creatinine, elevated BUN as well likely prerenal, UA is reassuring without any proteinuria or glucosuria. No evidence of pyuria or infectious process. Do not feel that she needs admission and emergent inpatient work-up that she is still making urine normally. Patient was advised to drink plenty of fluids at home and she was counseled on this elevated creatinine and the need to have it rechecked and evaluated by her nephrologist.  She was given a referral to [place] [facility] and verbalizes understanding of the importance of close outpatient follow-up. Disposition:  Diagnosis:   1. Breakthrough seizure ([address]    2.  KEYLA (acute kidney injury) [facility])        Disposition: Discharge    Follow-up Information     Follow up With Specialties Details Why [address], NP Nurse Practitioner Schedule an appointment as soon as possible for a visit  for office follow up Nell Fatima  [facility]  Schedule an appointment as soon as possible for a visit  for office follow up, for rechvaishali [phone] [facility] EMERGENCY DEPT Emergency Medicine  As needed, If symptoms worsen [address] [address]  [phone]           Patient's Medications   Start Taking    No medications on file   Continue Taking    ACETAMINOPHEN (TYLENOL) 500 MG TABLET    Take 500 mg by mouth as needed for Pain. AMLODIPINE (NORVASC) 10 MG TABLET    Take 1 Tab by mouth daily. ATORVASTATIN (LIPITOR) 40 MG TABLET    Take 40 mg by mouth daily. CLONAZEPAM (KLONOPIN) 0.25 MG TBDI    Take 0.25 mg by mouth as needed for Other (seizure). CLOPIDOGREL (PLAVIX) 75 MG TAB    Take 75 mg by mouth daily. Indications: prevention for a blood clot going to the brain    CYANOCOBALAMIN (VITAMIN B12) 500 MCG TABLET    Take 1 Tab by mouth daily. DIVALPROEX DR (DEPAKOTE) 250 MG TABLET    Take 250 mg by mouth three (3) times daily. one tab every 8 hours    ERGOCALCIFEROL (VITAMIN D2) 50,000 UNIT CAPSULE    Take 50,000 Units by mouth every seven (7) days. HYDRALAZINE (APRESOLINE) 100 MG TABLET    Take 100 mg by mouth three (3) times daily. LEVETIRACETAM (KEPPRA) 1,000 MG TABLET    Take 1 Tab by mouth two (2) times a day. LISINOPRIL (PRINIVIL, ZESTRIL) 20 MG TABLET    Take 1 Tab by mouth daily. Indications: high blood pressure    OXCARBAXEPINE (TRILEPTAL) 600 MG TABLET    Take 600 mg by mouth two (2) times a day. SPIRONOLACTONE (ALDACTONE) 25 MG TABLET    Take 25 mg by mouth daily. TAMSULOSIN (FLOMAX) 0.4 MG CAPSULE    Take 1 Cap by mouth nightly.    These Medications have changed    No medications on file   Stop Taking    No medications on file

## 2021-05-04 PROCEDURE — 99283 EMERGENCY DEPT VISIT LOW MDM: CPT

## 2021-05-05 ENCOUNTER — HOSPITAL ENCOUNTER (EMERGENCY)
Age: 66
Discharge: HOME | End: 2021-05-06
Attending: EMERGENCY MEDICINE
Payer: MEDICARE

## 2021-05-05 VITALS
OXYGEN SATURATION: 100 % | SYSTOLIC BLOOD PRESSURE: 148 MMHG | RESPIRATION RATE: 18 BRPM | HEART RATE: 82 BPM | DIASTOLIC BLOOD PRESSURE: 82 MMHG | BODY MASS INDEX: 27.05 KG/M2 | TEMPERATURE: 98.1 F | WEIGHT: 138.5 LBS

## 2021-05-05 DIAGNOSIS — R44.3 HALLUCINATIONS: Primary | ICD-10-CM

## 2021-05-05 DIAGNOSIS — G40.909 RECURRENT SEIZURES (HCC): ICD-10-CM

## 2021-05-05 DIAGNOSIS — N30.01 ACUTE CYSTITIS WITH HEMATURIA: ICD-10-CM

## 2021-05-05 LAB
ALBUMIN SERPL-MCNC: 4.1 G/DL (ref 3.4–5)
ALBUMIN/GLOB SERPL: 1.1 {RATIO} (ref 0.8–1.7)
ALP SERPL-CCNC: 67 U/L (ref 45–117)
ALT SERPL-CCNC: 9 U/L (ref 13–56)
AMPHET UR QL SCN: NEGATIVE
ANION GAP SERPL CALC-SCNC: 7 MMOL/L (ref 3–18)
APPEARANCE UR: CLEAR
AST SERPL-CCNC: 11 U/L (ref 10–38)
BACTERIA URNS QL MICRO: ABNORMAL /HPF
BARBITURATES UR QL SCN: NEGATIVE
BASOPHILS # BLD: 0.1 K/UL (ref 0–0.1)
BASOPHILS NFR BLD: 1 % (ref 0–2)
BENZODIAZ UR QL: NEGATIVE
BILIRUB SERPL-MCNC: 0.7 MG/DL (ref 0.2–1)
BILIRUB UR QL: NEGATIVE
BUN SERPL-MCNC: 25 MG/DL (ref 7–18)
BUN/CREAT SERPL: 15 (ref 12–20)
CALCIUM SERPL-MCNC: 9.3 MG/DL (ref 8.5–10.1)
CANNABINOIDS UR QL SCN: NEGATIVE
CHLORIDE SERPL-SCNC: 109 MMOL/L (ref 100–111)
CO2 SERPL-SCNC: 22 MMOL/L (ref 21–32)
COCAINE UR QL SCN: NEGATIVE
COLOR UR: ABNORMAL
CREAT SERPL-MCNC: 1.71 MG/DL (ref 0.6–1.3)
DIFFERENTIAL METHOD BLD: ABNORMAL
EOSINOPHIL # BLD: 0.1 K/UL (ref 0–0.4)
EOSINOPHIL NFR BLD: 1 % (ref 0–5)
EPITH CASTS URNS QL MICRO: ABNORMAL /LPF (ref 0–5)
ERYTHROCYTE [DISTWIDTH] IN BLOOD BY AUTOMATED COUNT: 14.5 % (ref 11.6–14.5)
ETHANOL SERPL-MCNC: <3 MG/DL (ref 0–3)
GLOBULIN SER CALC-MCNC: 3.7 G/DL (ref 2–4)
GLUCOSE SERPL-MCNC: 97 MG/DL (ref 74–99)
GLUCOSE UR STRIP.AUTO-MCNC: NEGATIVE MG/DL
HCT VFR BLD AUTO: 33.1 % (ref 35–45)
HDSCOM,HDSCOM: NORMAL
HGB BLD-MCNC: 11.1 G/DL (ref 12–16)
HGB UR QL STRIP: NEGATIVE
HYALINE CASTS URNS QL MICRO: ABNORMAL /LPF (ref 0–2)
KETONES UR QL STRIP.AUTO: ABNORMAL MG/DL
LEUKOCYTE ESTERASE UR QL STRIP.AUTO: NEGATIVE
LYMPHOCYTES # BLD: 2.3 K/UL (ref 0.9–3.6)
LYMPHOCYTES NFR BLD: 21 % (ref 21–52)
MCH RBC QN AUTO: 30.8 PG (ref 24–34)
MCHC RBC AUTO-ENTMCNC: 33.5 G/DL (ref 31–37)
MCV RBC AUTO: 91.9 FL (ref 74–97)
METHADONE UR QL: NEGATIVE
MONOCYTES # BLD: 1 K/UL (ref 0.05–1.2)
MONOCYTES NFR BLD: 9 % (ref 3–10)
NEUTS SEG # BLD: 7.4 K/UL (ref 1.8–8)
NEUTS SEG NFR BLD: 68 % (ref 40–73)
NITRITE UR QL STRIP.AUTO: NEGATIVE
OPIATES UR QL: NEGATIVE
PCP UR QL: NEGATIVE
PH UR STRIP: 5 [PH] (ref 5–8)
PLATELET # BLD AUTO: 267 K/UL (ref 135–420)
PMV BLD AUTO: 10.6 FL (ref 9.2–11.8)
POTASSIUM SERPL-SCNC: 3.7 MMOL/L (ref 3.5–5.5)
PROT SERPL-MCNC: 7.8 G/DL (ref 6.4–8.2)
PROT UR STRIP-MCNC: ABNORMAL MG/DL
RBC # BLD AUTO: 3.6 M/UL (ref 4.2–5.3)
SODIUM SERPL-SCNC: 138 MMOL/L (ref 136–145)
SP GR UR REFRACTOMETRY: 1.02 (ref 1–1.03)
TSH SERPL DL<=0.05 MIU/L-ACNC: 2.23 UIU/ML (ref 0.36–3.74)
UROBILINOGEN UR QL STRIP.AUTO: 1 EU/DL (ref 0.2–1)
VALPROATE SERPL-MCNC: 67 UG/ML (ref 50–100)
VALPROATE SERPL-MCNC: 78 UG/ML (ref 50–100)
WBC # BLD AUTO: 10.8 K/UL (ref 4.6–13.2)
WBC URNS QL MICRO: ABNORMAL /HPF (ref 0–5)

## 2021-05-05 PROCEDURE — 84443 ASSAY THYROID STIM HORMONE: CPT

## 2021-05-05 PROCEDURE — 82077 ASSAY SPEC XCP UR&BREATH IA: CPT

## 2021-05-05 PROCEDURE — 80183 DRUG SCRN QUANT OXCARBAZEPIN: CPT

## 2021-05-05 PROCEDURE — 80164 ASSAY DIPROPYLACETIC ACD TOT: CPT

## 2021-05-05 PROCEDURE — 80053 COMPREHEN METABOLIC PANEL: CPT

## 2021-05-05 PROCEDURE — 80307 DRUG TEST PRSMV CHEM ANLYZR: CPT

## 2021-05-05 PROCEDURE — 85025 COMPLETE CBC W/AUTO DIFF WBC: CPT

## 2021-05-05 PROCEDURE — 81001 URINALYSIS AUTO W/SCOPE: CPT

## 2021-05-05 RX ORDER — GRANULES FOR ORAL 3 G/1
3 POWDER ORAL ONCE
Status: DISCONTINUED | OUTPATIENT
Start: 2021-05-05 | End: 2021-05-06 | Stop reason: HOSPADM

## 2021-05-05 NOTE — ED NOTES
Patient turned over to me. She is a very pleasant 69-year-old who was brought in for seen aunts. Noted to have a history of hallucinations since September. At that time the etiology was unclear and psychiatry had recommended continuing her Risperdal.  CBC is unremarkable  Urine shows 3-4 whites negative leukocytes negative nitrites  Unremarkable will treat for potential UTI discharge for outpatient follow-up. Case was discussed with Estefania from [facility] who discussed with psychiatry okay to follow-up outpatient    Patient is awake and alert without complaint. She is noted to have a history of seizures and reportedly had a seizure yesterday  She is on Trileptal Keppra and Depakote for her seizures  Aside from the hallucinations which appear constant I do not think we need a head CT at this time     d/w chemistry; keppra is send out. Will send depakote and trileptal.      Valproic therapeutic; keppra/trileptal are send outs. For UTI: pcn allergy; CRCl ~ 32 per md calc will avoid macrobid;  Quinolone allergy.  Will tx fosphomycin    3:44 PM attempted to call the son for notification of dialysis but the number was an accurate

## 2021-05-05 NOTE — BSMART NOTE
Comprehensive Assessment Form Part 1    Section I - Disposition    The plan is for patient to follow up with outpatient services. The Medical Doctor is in agreement with Psychiatrist disposition. Section II - Integrated Summary  The information is given by the patient. The Chief Complaint is visual hallucinations. Previous Hospitalizations: none    Patient is a 78 y.o. female who has a PMH of HTN, CVA and seizure d/o who reprots visual hallucinations of seeing ants. Patient endorses seeing ants at home and in ED. Per chart notes patient under care of neurology for seizures and patient's son reported patient having visual hallucinations in January 2021 and advised to follow up with PCP. Patient advised she has seen her PCP but is not sure of any medication changes. Patient advises due to her memory issues her son handles her medications. Patient is oriented to self and time but not year. Patient advises she can not provide history due to her memory issues. Per chart notes from 9/2020 medical admission after seizure activity states \"Her post-ictal state usually consists of weakness, stuttering, irritability, confusion/hallucinations and hyperactivity for a 2-7 day duration. \" Patient advises she is able to eat but does have some sleep disturbance. Patient advises due to  physical limitations she has minimal task she is able to perform, such as making her bed but she has been able to complete these tasks as usual. No phychiatric history noted in patient's chart. Patient denies feeling depressed. Patient denies SI, HI, and auditory hallucinations. Discussed with on call psychiatrist, patient does not meet inpatient criteria and can follow up outpatient. Expressed concern over patient's history of hallucinations in postictal state. Discussed with patient who advised her son told her she has a seizure yesterday.  Patient advises she did not notify ER physician because she was not asked about seizures only hallucinations. Notified ER physician, Dr Joe Burton of patient's report of seizure yesterday.           Estefania Brandon

## 2021-05-05 NOTE — Clinical Note
[address] [place] [address] Dr Jennifer Arboleda [facility] EMERGENCY DEPT  [phone] [facility] [address] [phone]    Work/School Note    Date: 5/4/2021    To Whom It May concern:      Victorino Faustin was seen and treated today in the emergency room by the following provider(s):  Attending Provider: Daniel Panda MD.      Victorino Faustin is excused from work/school on 05/05/21. She is clear to return to work/school on 05/06/21.         Sincerely,          Soniya Jacobo MD

## 2021-05-05 NOTE — ED TRIAGE NOTES
Patient brought in by medic with complaint of seeing bugs all over her bed and house, also seeing them on triage door. Pt has stuttering words. States this is from previous stroke.

## 2021-05-05 NOTE — ED NOTES
Patient called sister to obtain a ride from family, sister is not able to take the patient home, is attempting to call the son, son's number is incorrect in the chart

## 2021-05-05 NOTE — ED TRIAGE NOTES
Contacted the sister, [phone], has contacted the son to bring the patient home, son is at work and the sister does not know when he will be able to  the patient, son's number is [phone]

## 2021-05-05 NOTE — ED PROVIDER NOTES
EMERGENCY DEPARTMENT HISTORY AND PHYSICAL EXAM    12:36 AM      Date: (Not on file)  Patient Name: Phil Cloud    History of Presenting Illness     Chief Complaint   Patient presents with    Hallucinations         History Provided By: patient    Additional History (Context): Phil Cloud is a 78 y.o. female presents with today started with hallucinations. She sees bugs all over her house per floor furniture she sees them in the triage room. No auditory or command hallucinations. No mood disorder. She is otherwise at the baseline with a stutter from a previous stroke. No suicidal or homicidal ideation    PCP: Mike Pierre NP    Chief Complaint:   Duration:    Timing:    Location:   Quality:   Severity:   Modifying Factors:   Associated Symptoms:       Current Outpatient Medications   Medication Sig Dispense Refill    clonazePAM (KlonoPIN) 0.25 mg TbDi Take 0.25 mg by mouth as needed for Other (seizure).  spironolactone (ALDACTONE) 25 mg tablet Take 25 mg by mouth daily.  hydrALAZINE (APRESOLINE) 100 mg tablet Take 100 mg by mouth three (3) times daily.  atorvastatin (LIPITOR) 40 mg tablet Take 40 mg by mouth daily.  acetaminophen (TYLENOL) 500 mg tablet Take 500 mg by mouth as needed for Pain.  divalproex DR (DEPAKOTE) 250 mg tablet Take 250 mg by mouth three (3) times daily. one tab every 8 hours      clopidogreL (PLAVIX) 75 mg tab Take 75 mg by mouth daily. Indications: prevention for a blood clot going to the brain      lisinopriL (PRINIVIL, ZESTRIL) 20 mg tablet Take 1 Tab by mouth daily. Indications: high blood pressure 30 Tab 1    tamsulosin (FLOMAX) 0.4 mg capsule Take 1 Cap by mouth nightly. 30 Cap 0    OXcarbaxepine (TRILEPTAL) 600 mg tablet Take 600 mg by mouth two (2) times a day.  ergocalciferol (VITAMIN D2) 50,000 unit capsule Take 50,000 Units by mouth every seven (7) days.       levETIRAcetam (KEPPRA) 1,000 mg tablet Take 1 Tab by mouth two (2) times a day. 60 Tab 0    amLODIPine (NORVASC) 10 mg tablet Take 1 Tab by mouth daily. 30 Tab 6    cyanocobalamin (VITAMIN B12) 500 mcg tablet Take 1 Tab by mouth daily. 30 Tab 0       Past History     Past Medical History:  Past Medical History:   Diagnosis Date    Abnormal coordination 9/21/2020    Hallucinations 9/21/2020    Hypertension     Neurological disorder     Se[facility]ures ([facility] [address]     Stroke ([facility] [address] 2009       Past Surgical History:  No past surgical history on file. Family History:  Family History   Problem Relation Age of Onset    Diabetes Other     Stroke Other        Social History:  Social History     Tobacco Use    Smoking status: Never Smoker    Smokeless tobacco: Never Used   Substance Use Topics    Alcohol use: Yes     Comment: Socially     Drug use: No       Allergies: Allergies   Allergen Reactions    Tomato Hives     Allergic to eating tomato.  Aspirin Nausea and Vomiting    Ciprofloxacin Rash    Pcn [Penicillins] Rash and Hives    Sulfa (Sulfonamide Antibiotics) Hives and Rash         Review of Systems     Review of Systems   Constitutional: Negative for diaphoresis and fever. HENT: Negative for congestion and sore throat. Eyes: Negative for pain and itching. Respiratory: Negative for cough and shortness of breath. Cardiovascular: Negative for chest pain and palpitations. Gastrointestinal: Negative for abdominal pain and diarrhea. Endocrine: Negative for polydipsia and polyuria. Genitourinary: Negative for dysuria and hematuria. Musculoskeletal: Negative for arthralgias and myalgias. Skin: Negative for rash and wound. Neurological: Negative for seizures and syncope. Hematological: Does not bruise/bleed easily. Psychiatric/Behavioral: Positive for hallucinations. Negative for agitation.          Physical Exam       Patient Vitals for the past 12 hrs:   Temp Pulse Resp BP   05/05/21 0030 99.1 °F (37.3 °C) 99 18 135/71       Physical Exam  Vitals and nursing note reviewed. Constitutional:       Appearance: She is well-developed. HENT:      Head: Normocephalic and atraumatic. Eyes:      General: No scleral icterus. Conjunctiva/sclera: Conjunctivae normal.   Neck:      Vascular: No JVD. Cardiovascular:      Rate and Rhythm: Normal rate and regular rhythm. Pulmonary:      Effort: Pulmonary effort is normal. No respiratory distress. Musculoskeletal:         General: Normal range of motion. Cervical back: Normal range of motion and neck supple. Skin:     General: Skin is warm and dry. Neurological:      Mental Status: She is alert. Psychiatric:         Thought Content: Thought content normal.         Judgment: Judgment normal.      Comments: No psychosis or behavior distubrance or thought disturbance. Insight is good. Diagnostic Study Results   Labs -  No results found for this or any previous visit (from the past 12 hour(s)). Radiologic Studies -   No orders to display     No results found. Medications ordered:   Medications - No data to display      Medical Decision Making   Initial Medical Decision Making and DDx:   get basic labs, doubt metabolic cause for symptoms, hypoglycemia, uremia etc.  vill d/w psychiatry service. ED Course: Progress Notes, Reevaluation, and Consults:         I am the first provider for this patient. I reviewed the vital signs, available nursing notes, past medical history, past surgical history, family history and social history. Patient Vitals for the past 12 hrs:   Temp Pulse Resp BP   05/05/21 0030 99.1 °F (37.3 °C) 99 18 135/71       Vital Signs-Reviewed the patient's vital signs. Pulse Oximetry Analysis, Cardiac Monitor, 12 lead ekg:      Interpreted by the EP. Records Reviewed: Nursing notes reviewed (Time of Review: 12:36 AM)    Procedures:   Critical Care Time:   Aspirin: (was aspirin given for stroke?)    Diagnosis     Clinical Impression:   1. Hallucinations    2.  Acute cystitis with hematuria    3. Recurrent seizures ([facility] [address]        Disposition:       Follow-up Information    None          Patient's Medications   Start Taking    No medications on file   Continue Taking    ACETAMINOPHEN (TYLENOL) 500 MG TABLET    Take 500 mg by mouth as needed for Pain. AMLODIPINE (NORVASC) 10 MG TABLET    Take 1 Tab by mouth daily. ATORVASTATIN (LIPITOR) 40 MG TABLET    Take 40 mg by mouth daily. CLONAZEPAM (KLONOPIN) 0.25 MG TBDI    Take 0.25 mg by mouth as needed for Other (seizure). CLOPIDOGREL (PLAVIX) 75 MG TAB    Take 75 mg by mouth daily. Indications: prevention for a blood clot going to the brain    CYANOCOBALAMIN (VITAMIN B12) 500 MCG TABLET    Take 1 Tab by mouth daily. DIVALPROEX DR (DEPAKOTE) 250 MG TABLET    Take 250 mg by mouth three (3) times daily. one tab every 8 hours    ERGOCALCIFEROL (VITAMIN D2) 50,000 UNIT CAPSULE    Take 50,000 Units by mouth every seven (7) days. HYDRALAZINE (APRESOLINE) 100 MG TABLET    Take 100 mg by mouth three (3) times daily. LEVETIRACETAM (KEPPRA) 1,000 MG TABLET    Take 1 Tab by mouth two (2) times a day. LISINOPRIL (PRINIVIL, ZESTRIL) 20 MG TABLET    Take 1 Tab by mouth daily. Indications: high blood pressure    OXCARBAXEPINE (TRILEPTAL) 600 MG TABLET    Take 600 mg by mouth two (2) times a day. SPIRONOLACTONE (ALDACTONE) 25 MG TABLET    Take 25 mg by mouth daily. TAMSULOSIN (FLOMAX) 0.4 MG CAPSULE    Take 1 Cap by mouth nightly.    These Medications have changed    No medications on file   Stop Taking    No medications on file     _______________________________    Notes:    Armando Francois MD using Dragon dictation      _______________________________

## 2021-05-08 LAB — OXCARBAZEPINE SERPL-MCNC: <1 UG/ML (ref 10–35)

## 2021-05-14 ENCOUNTER — HOSPITAL ENCOUNTER (OUTPATIENT)
Dept: LAB | Age: 66
Discharge: HOME | End: 2021-05-14
Payer: MEDICARE

## 2021-05-14 LAB
ALBUMIN SERPL-MCNC: 3.9 G/DL (ref 3.4–5)
ALBUMIN/GLOB SERPL: 1.3 {RATIO} (ref 0.8–1.7)
ALP SERPL-CCNC: 68 U/L (ref 45–117)
ALT SERPL-CCNC: 8 U/L (ref 13–56)
ANION GAP SERPL CALC-SCNC: 10 MMOL/L (ref 3–18)
AST SERPL-CCNC: 9 U/L (ref 10–38)
BASOPHILS # BLD: 0.1 K/UL (ref 0–0.1)
BASOPHILS NFR BLD: 1 % (ref 0–2)
BILIRUB SERPL-MCNC: 0.4 MG/DL (ref 0.2–1)
BUN SERPL-MCNC: 23 MG/DL (ref 7–18)
BUN/CREAT SERPL: 16 (ref 12–20)
CALCIUM SERPL-MCNC: 9.5 MG/DL (ref 8.5–10.1)
CHLORIDE SERPL-SCNC: 108 MMOL/L (ref 100–111)
CHOLEST SERPL-MCNC: 144 MG/DL
CO2 SERPL-SCNC: 26 MMOL/L (ref 21–32)
CREAT SERPL-MCNC: 1.4 MG/DL (ref 0.6–1.3)
DIFFERENTIAL METHOD BLD: ABNORMAL
EOSINOPHIL # BLD: 0.1 K/UL (ref 0–0.4)
EOSINOPHIL NFR BLD: 1 % (ref 0–5)
ERYTHROCYTE [DISTWIDTH] IN BLOOD BY AUTOMATED COUNT: 13.6 % (ref 11.6–14.5)
GLOBULIN SER CALC-MCNC: 2.9 G/DL (ref 2–4)
GLUCOSE SERPL-MCNC: 75 MG/DL (ref 74–99)
HCT VFR BLD AUTO: 33 % (ref 35–45)
HDLC SERPL-MCNC: 46 MG/DL (ref 40–60)
HDLC SERPL: 3.1 {RATIO} (ref 0–5)
HGB BLD-MCNC: 10.5 G/DL (ref 12–16)
LDLC SERPL CALC-MCNC: 77.6 MG/DL (ref 0–100)
LIPID PROFILE,FLP: NORMAL
LYMPHOCYTES # BLD: 2.4 K/UL (ref 0.9–3.6)
LYMPHOCYTES NFR BLD: 33 % (ref 21–52)
MCH RBC QN AUTO: 30 PG (ref 24–34)
MCHC RBC AUTO-ENTMCNC: 31.8 G/DL (ref 31–37)
MCV RBC AUTO: 94.3 FL (ref 74–97)
MONOCYTES # BLD: 0.6 K/UL (ref 0.05–1.2)
MONOCYTES NFR BLD: 8 % (ref 3–10)
NEUTS SEG # BLD: 4.3 K/UL (ref 1.8–8)
NEUTS SEG NFR BLD: 57 % (ref 40–73)
PLATELET # BLD AUTO: 284 K/UL (ref 135–420)
PMV BLD AUTO: 11.2 FL (ref 9.2–11.8)
POTASSIUM SERPL-SCNC: 4.2 MMOL/L (ref 3.5–5.5)
PROT SERPL-MCNC: 6.8 G/DL (ref 6.4–8.2)
RBC # BLD AUTO: 3.5 M/UL (ref 4.2–5.3)
SODIUM SERPL-SCNC: 144 MMOL/L (ref 136–145)
TRIGL SERPL-MCNC: 102 MG/DL (ref ?–150)
TSH SERPL DL<=0.05 MIU/L-ACNC: 0.93 UIU/ML (ref 0.36–3.74)
VLDLC SERPL CALC-MCNC: 20.4 MG/DL
WBC # BLD AUTO: 7.4 K/UL (ref 4.6–13.2)

## 2021-05-14 PROCEDURE — 80053 COMPREHEN METABOLIC PANEL: CPT

## 2021-05-14 PROCEDURE — 80061 LIPID PANEL: CPT

## 2021-05-14 PROCEDURE — 85025 COMPLETE CBC W/AUTO DIFF WBC: CPT

## 2021-05-14 PROCEDURE — 84443 ASSAY THYROID STIM HORMONE: CPT

## 2021-05-14 PROCEDURE — 36415 COLL VENOUS BLD VENIPUNCTURE: CPT

## 2021-09-04 ENCOUNTER — HOSPITAL ENCOUNTER (EMERGENCY)
Age: 66
Discharge: HOME | End: 2021-09-05
Attending: STUDENT IN AN ORGANIZED HEALTH CARE EDUCATION/TRAINING PROGRAM
Payer: MEDICARE

## 2021-09-04 ENCOUNTER — APPOINTMENT (OUTPATIENT)
Dept: GENERAL RADIOLOGY | Age: 66
End: 2021-09-04
Attending: PHYSICIAN ASSISTANT
Payer: MEDICARE

## 2021-09-04 VITALS
BODY MASS INDEX: 20.03 KG/M2 | SYSTOLIC BLOOD PRESSURE: 127 MMHG | HEART RATE: 69 BPM | HEIGHT: 60 IN | OXYGEN SATURATION: 100 % | DIASTOLIC BLOOD PRESSURE: 73 MMHG | RESPIRATION RATE: 18 BRPM | WEIGHT: 102 LBS | TEMPERATURE: 99.3 F

## 2021-09-04 DIAGNOSIS — R07.9 CHEST PAIN, UNSPECIFIED TYPE: Primary | ICD-10-CM

## 2021-09-04 DIAGNOSIS — R79.89 ELEVATED SERUM CREATININE: ICD-10-CM

## 2021-09-04 LAB
ALBUMIN SERPL-MCNC: 4.4 G/DL (ref 3.4–5)
ALBUMIN/GLOB SERPL: 1 {RATIO} (ref 0.8–1.7)
ALP SERPL-CCNC: 62 U/L (ref 45–117)
ALT SERPL-CCNC: 15 U/L (ref 13–56)
ANION GAP SERPL CALC-SCNC: 6 MMOL/L (ref 3–18)
ANION GAP SERPL CALC-SCNC: 8 MMOL/L (ref 3–18)
AST SERPL-CCNC: 49 U/L (ref 10–38)
BASOPHILS # BLD: 0.1 K/UL (ref 0–0.1)
BASOPHILS NFR BLD: 1 % (ref 0–2)
BILIRUB SERPL-MCNC: 0.4 MG/DL (ref 0.2–1)
BNP SERPL-MCNC: 513 PG/ML (ref 0–900)
BUN SERPL-MCNC: 15 MG/DL (ref 7–18)
BUN SERPL-MCNC: 16 MG/DL (ref 7–18)
BUN/CREAT SERPL: 11 (ref 12–20)
BUN/CREAT SERPL: 9 (ref 12–20)
CALCIUM SERPL-MCNC: 10.1 MG/DL (ref 8.5–10.1)
CALCIUM SERPL-MCNC: 9.7 MG/DL (ref 8.5–10.1)
CHLORIDE SERPL-SCNC: 105 MMOL/L (ref 100–111)
CHLORIDE SERPL-SCNC: 107 MMOL/L (ref 100–111)
CO2 SERPL-SCNC: 21 MMOL/L (ref 21–32)
CO2 SERPL-SCNC: 22 MMOL/L (ref 21–32)
CREAT SERPL-MCNC: 1.48 MG/DL (ref 0.6–1.3)
CREAT SERPL-MCNC: 1.61 MG/DL (ref 0.6–1.3)
DIFFERENTIAL METHOD BLD: ABNORMAL
EOSINOPHIL # BLD: 0 K/UL (ref 0–0.4)
EOSINOPHIL NFR BLD: 1 % (ref 0–5)
ERYTHROCYTE [DISTWIDTH] IN BLOOD BY AUTOMATED COUNT: 15.5 % (ref 11.6–14.5)
GLOBULIN SER CALC-MCNC: 4.5 G/DL (ref 2–4)
GLUCOSE SERPL-MCNC: 88 MG/DL (ref 74–99)
GLUCOSE SERPL-MCNC: 89 MG/DL (ref 74–99)
HCT VFR BLD AUTO: 33.7 % (ref 35–45)
HGB BLD-MCNC: 11.2 G/DL (ref 12–16)
LYMPHOCYTES # BLD: 2 K/UL (ref 0.9–3.6)
LYMPHOCYTES NFR BLD: 26 % (ref 21–52)
MAGNESIUM SERPL-MCNC: 2.3 MG/DL (ref 1.6–2.6)
MCH RBC QN AUTO: 30.5 PG (ref 24–34)
MCHC RBC AUTO-ENTMCNC: 33.2 G/DL (ref 31–37)
MCV RBC AUTO: 91.8 FL (ref 78–100)
MONOCYTES # BLD: 0.6 K/UL (ref 0.05–1.2)
MONOCYTES NFR BLD: 7 % (ref 3–10)
NEUTS SEG # BLD: 5.1 K/UL (ref 1.8–8)
NEUTS SEG NFR BLD: 65 % (ref 40–73)
PLATELET # BLD AUTO: 222 K/UL (ref 135–420)
PMV BLD AUTO: 11.1 FL (ref 9.2–11.8)
POTASSIUM SERPL-SCNC: 5.2 MMOL/L (ref 3.5–5.5)
POTASSIUM SERPL-SCNC: 5.8 MMOL/L (ref 3.5–5.5)
PROT SERPL-MCNC: 8.9 G/DL (ref 6.4–8.2)
RBC # BLD AUTO: 3.67 M/UL (ref 4.2–5.3)
SODIUM SERPL-SCNC: 134 MMOL/L (ref 136–145)
SODIUM SERPL-SCNC: 135 MMOL/L (ref 136–145)
TROPONIN I SERPL-MCNC: <0.02 NG/ML (ref 0–0.04)
TROPONIN I SERPL-MCNC: <0.02 NG/ML (ref 0–0.04)
WBC # BLD AUTO: 7.8 K/UL (ref 4.6–13.2)

## 2021-09-04 PROCEDURE — 83735 ASSAY OF MAGNESIUM: CPT

## 2021-09-04 PROCEDURE — 84484 ASSAY OF TROPONIN QUANT: CPT

## 2021-09-04 PROCEDURE — 93005 ELECTROCARDIOGRAM TRACING: CPT

## 2021-09-04 PROCEDURE — 71046 X-RAY EXAM CHEST 2 VIEWS: CPT

## 2021-09-04 PROCEDURE — 85025 COMPLETE CBC W/AUTO DIFF WBC: CPT

## 2021-09-04 PROCEDURE — 83880 ASSAY OF NATRIURETIC PEPTIDE: CPT

## 2021-09-04 PROCEDURE — 74011250636 HC RX REV CODE- 250/636: Performed by: PHYSICIAN ASSISTANT

## 2021-09-04 PROCEDURE — 99284 EMERGENCY DEPT VISIT MOD MDM: CPT

## 2021-09-04 PROCEDURE — 80053 COMPREHEN METABOLIC PANEL: CPT

## 2021-09-04 RX ADMIN — SODIUM CHLORIDE 1000 ML: 900 INJECTION, SOLUTION INTRAVENOUS at 23:41

## 2021-09-04 RX ADMIN — SODIUM CHLORIDE 1000 ML: 900 INJECTION, SOLUTION INTRAVENOUS at 23:42

## 2021-09-04 NOTE — ED PROVIDER NOTES
EMERGENCY DEPARTMENT HISTORY AND PHYSICAL EXAM    5:45 PM      Date: 9/4/2021  Patient Name: Shara Ramesh    History of Presenting Illness     Chief Complaint   Patient presents with    Chest Pain     CP x1 day       History Provided By: Patient    Additional History (Context): Shara Ramesh is a 78 y.o. female with hx of CAD, epilepsy, HTN, and other noted PMH who presents with complaint of diffuse chest pain associated with shortness of breath intermittently x 2 days. Patient denies fever or chills, radiation of pain into neck or arm, diaphoresis, cough, pleuritic or exertional symptoms, orthopnea, nausea or vomiting, leg edema. Denies history of DVT or PE, hemoptysis, recent surgery or travel. Did not take any medication for symptoms PTA. PCP: Dmitri VALDEZ NP    Current Facility-Administered Medications   Medication Dose Route Frequency Provider Last Rate Last Admin    sodium chloride 0.9 % bolus infusion 1,000 mL  1,000 mL IntraVENous Anahi Olmos, [place] 1,000 mL/hr at 09/04/21 2341 1,000 mL at 09/04/21 2341    sodium chloride 0.9 % bolus infusion 1,000 mL  1,000 mL IntraVENous Anahi Olmos, [place] 1,000 mL/hr at 09/04/21 2342 1,000 mL at 09/04/21 2342     Current Outpatient Medications   Medication Sig Dispense Refill    clonazePAM (KlonoPIN) 0.25 mg TbDi Take 0.25 mg by mouth as needed for Other (seizure).  spironolactone (ALDACTONE) 25 mg tablet Take 25 mg by mouth daily.  hydrALAZINE (APRESOLINE) 100 mg tablet Take 100 mg by mouth three (3) times daily.  atorvastatin (LIPITOR) 40 mg tablet Take 40 mg by mouth daily.  acetaminophen (TYLENOL) 500 mg tablet Take 500 mg by mouth as needed for Pain.  divalproex DR (DEPAKOTE) 250 mg tablet Take 250 mg by mouth three (3) times daily. one tab every 8 hours      clopidogreL (PLAVIX) 75 mg tab Take 75 mg by mouth daily.  Indications: prevention for a blood clot going to the brain      lisinopriL (Calin Fitting) 20 mg tablet Take 1 Tab by mouth daily. Indications: high blood pressure 30 Tab 1    tamsulosin (FLOMAX) 0.4 mg capsule Take 1 Cap by mouth nightly. 30 Cap 0    OXcarbaxepine (TRILEPTAL) 600 mg tablet Take 600 mg by mouth two (2) times a day.  ergocalciferol (VITAMIN D2) 50,000 unit capsule Take 50,000 Units by mouth every seven (7) days.  levETIRAcetam (KEPPRA) 1,000 mg tablet Take 1 Tab by mouth two (2) times a day. 60 Tab 0    amLODIPine (NORVASC) 10 mg tablet Take 1 Tab by mouth daily. 30 Tab 6    cyanocobalamin (VITAMIN B12) 500 mcg tablet Take 1 Tab by mouth daily. 30 Tab 0       Past History     Past Medical History:  Past Medical History:   Diagnosis Date    Abnormal coordination 9/21/2020    Hallucinations 9/21/2020    Hypertension     Neurological disorder     [facility] ([facility] [address]     Stroke ([facility] [address] 2009       Past Surgical History:  No past surgical history on file. Family History:  Family History   Problem Relation Age of Onset    Diabetes Other     Stroke Other        Social History:  Social History     Tobacco Use    Smoking status: Never Smoker    Smokeless tobacco: Never Used   Vaping Use    Vaping Use: Never used   Substance Use Topics    Alcohol use: Yes     Comment: Socially     Drug use: No       Allergies: Allergies   Allergen Reactions    Tomato Hives     Allergic to eating tomato.  Aspirin Nausea and Vomiting    Ciprofloxacin Rash    Pcn [Penicillins] Rash and Hives    Sulfa (Sulfonamide Antibiotics) Hives and Rash         Review of Systems       Review of Systems   Constitutional: Negative for chills and fever. Respiratory: Positive for shortness of breath. Cardiovascular: Positive for chest pain. Gastrointestinal: Negative for abdominal pain, nausea and vomiting. Skin: Negative for rash. Neurological: Negative for weakness. All other systems reviewed and are negative.         Physical Exam     Visit Vitals  /73 (BP 1 Location: Left upper arm, BP Patient Position: At rest)   Pulse 69   Temp 99.3 °F (37.4 °C)   Resp 18   Ht 5' (1.524 m)   Wt 46.3 kg (102 lb)   SpO2 100%   BMI 19.92 kg/m²         Physical Exam  Vitals and nursing note reviewed. Constitutional:       General: She is not in acute distress. Appearance: She is well-developed. She is not diaphoretic. Comments: Thin     HENT:      Head: Normocephalic and atraumatic. Cardiovascular:      Rate and Rhythm: Normal rate and regular rhythm. Heart sounds: Normal heart sounds. No murmur heard. No friction rub. No gallop. Pulmonary:      Effort: Pulmonary effort is normal. No respiratory distress. Breath sounds: Normal breath sounds. No wheezing or rales. Musculoskeletal:         General: Normal range of motion. Cervical back: Normal range of motion and neck supple. Right lower leg: No edema. Left lower leg: No edema. Skin:     General: Skin is warm. Findings: No rash. Neurological:      Mental Status: She is alert.            Diagnostic Study Results     Labs -  Recent Results (from the past 12 hour(s))   EKG, 12 LEAD, INITIAL    Collection Time: 09/04/21  3:29 PM   Result Value Ref Range    Ventricular Rate 76 BPM    Atrial Rate 76 BPM    P-R Interval 126 ms    QRS Duration 90 ms    Q-T Interval 422 ms    QTC Calculation (Bezet) 474 ms    Calculated P Axis 34 degrees    Calculated R Axis 38 degrees    Calculated T Axis 15 degrees    Diagnosis       Normal sinus rhythm  ST & T wave abnormality, consider lateral ischemia  Prolonged QT  Abnormal ECG  When compared with ECG of 21-SEP-2020 02:10,  Non-specific change in ST segment in Inferior leads  T wave inversion now evident in Inferior leads     CBC WITH AUTOMATED DIFF    Collection Time: 09/04/21  9:15 PM   Result Value Ref Range    WBC 7.8 4.6 - 13.2 K/uL    RBC 3.67 (L) 4.20 - 5.30 M/uL    HGB 11.2 (L) 12.0 - 16.0 g/dL    HCT 33.7 (L) 35.0 - 45.0 %    MCV 91.8 78.0 - 100.0 FL    MCH 30.5 24.0 - 34.0 PG    MCHC 33.2 31.0 - 37.0 g/dL    RDW 15.5 (H) 11.6 - 14.5 %    PLATELET 671 224 - 620 K/uL    MPV 11.1 9.2 - 11.8 FL    NEUTROPHILS 65 40 - 73 %    LYMPHOCYTES 26 21 - 52 %    MONOCYTES 7 3 - 10 %    EOSINOPHILS 1 0 - 5 %    BASOPHILS 1 0 - 2 %    ABS. NEUTROPHILS 5.1 1.8 - 8.0 K/UL    ABS. LYMPHOCYTES 2.0 0.9 - 3.6 K/UL    ABS. MONOCYTES 0.6 0.05 - 1.2 K/UL    ABS. EOSINOPHILS 0.0 0.0 - 0.4 K/UL    ABS. BASOPHILS 0.1 0.0 - 0.1 K/UL    DF AUTOMATED     TROPONIN I    Collection Time: 09/04/21  9:15 PM   Result Value Ref Range    Troponin-I, QT <0.02 0.0 - 0.045 NG/ML   NT-PRO BNP    Collection Time: 09/04/21  9:15 PM   Result Value Ref Range    NT pro- 0 - 900 PG/ML   MAGNESIUM    Collection Time: 09/04/21  9:15 PM   Result Value Ref Range    Magnesium 2.3 1.6 - 2.6 mg/dL   METABOLIC PANEL, COMPREHENSIVE    Collection Time: 09/04/21  9:15 PM   Result Value Ref Range    Sodium 135 (L) 136 - 145 mmol/L    Potassium 5.8 (H) 3.5 - 5.5 mmol/L    Chloride 105 100 - 111 mmol/L    CO2 22 21 - 32 mmol/L    Anion gap 8 3.0 - 18 mmol/L    Glucose 88 74 - 99 mg/dL    BUN 15 7.0 - 18 MG/DL    Creatinine 1.61 (H) 0.6 - 1.3 MG/DL    BUN/Creatinine ratio 9 (L) 12 - 20      GFR est AA 39 (L) >60 ml/min/1.73m2    GFR est non-AA 32 (L) >60 ml/min/1.73m2    Calcium 10.1 8.5 - 10.1 MG/DL    Bilirubin, total 0.4 0.2 - 1.0 MG/DL    ALT (SGPT) 15 13 - 56 U/L    AST (SGOT) 49 (H) 10 - 38 U/L    Alk.  phosphatase 62 45 - 117 U/L    Protein, total 8.9 (H) 6.4 - 8.2 g/dL    Albumin 4.4 3.4 - 5.0 g/dL    Globulin 4.5 (H) 2.0 - 4.0 g/dL    A-G Ratio 1.0 0.8 - 1.7     METABOLIC PANEL, BASIC    Collection Time: 09/04/21 11:27 PM   Result Value Ref Range    Sodium 134 (L) 136 - 145 mmol/L    Potassium 5.2 3.5 - 5.5 mmol/L    Chloride 107 100 - 111 mmol/L    CO2 21 21 - 32 mmol/L    Anion gap 6 3.0 - 18 mmol/L    Glucose 89 74 - 99 mg/dL    BUN 16 7.0 - 18 MG/DL    Creatinine 1.48 (H) 0.6 - 1.3 MG/DL    BUN/Creatinine ratio 11 (L) 12 - 20 GFR est AA 43 (L) >60 ml/min/1.73m2    GFR est non-AA 35 (L) >60 ml/min/1.73m2    Calcium 9.7 8.5 - 10.1 MG/DL   TROPONIN I    Collection Time: 09/04/21 11:27 PM   Result Value Ref Range    Troponin-I, QT <0.02 0.0 - 0.045 NG/ML   EKG, 12 LEAD, SUBSEQUENT    Collection Time: 09/04/21 11:32 PM   Result Value Ref Range    Ventricular Rate 69 BPM    Atrial Rate 69 BPM    P-R Interval 142 ms    QRS Duration 72 ms    Q-T Interval 412 ms    QTC Calculation (Bezet) 441 ms    Calculated P Axis 67 degrees    Calculated R Axis 6 degrees    Calculated T Axis 94 degrees    Diagnosis       Normal sinus rhythm  Minimal voltage criteria for LVH, may be normal variant ( Joice product )  T wave abnormality, consider lateral ischemia  Abnormal ECG  When compared with ECG of 04-SEP-2021 15:29,  Non-specific change in ST segment in Inferior leads  T wave inversion no longer evident in Inferior leads         Radiologic Studies -   XR CHEST PA LAT   Final Result      1. No radiographic evidence for an acute cardiopulmonary abnormality. Medical Decision Making   I am the first provider for this patient. I reviewed the vital signs, available nursing notes, past medical history, past surgical history, family history and social history. Vital Signs-Reviewed the patient's vital signs. Pulse Oximetry Analysis -  100% on room air     Records Reviewed: Nursing Notes, Old Medical Records and Previous electrocardiograms (Time of Review: 5:45 PM)    ED Course: Progress Notes, Reevaluation, and Consults:  9:15 PM: Labs being drawn now  10:30 PM: K 5.8, slightly hemolyzed. Will order IVF, repeat BMP. 12:00 AM: Repeat BMP with K 5.2    Pt resting comfortably, no distress. Denies chest pain, dyspnea. Reviewed results with patient and son via cell phone. Discussed need for close outpatient follow-up this week for reassessment.  Discussed strict return precautions, including fever, chest pain, shortness of breath, or any other medical concerns. Pt and son in agreement with plan. Provider Notes (Medical Decision Making): 59-year-old female who presents to the ED due to diffuse chest pain and shortness of breath x2 days. Afebrile, nontoxic-appearing, looks well. No evidence of tachycardia, tachypnea, hypoxia. EKG without evidence of acute ischemic changes, troponin negative x 2. BNP WNL. CXR without acute process. Do not suspect ACS, PE, or aortic pathology. Do not feel further labs or imaging are warranted in the ED. Stable for discharge with close outpatient follow-up for further assessment. Strict return precautions provided. Diagnosis     Clinical Impression:   1. Chest pain, unspecified type    2. Elevated serum creatinine        Disposition: home     Follow-up Information     Follow up With Specialties Details Why [address] [facility]    [facility] EMERGENCY DEPT Emergency Medicine  If symptoms worsen [address] [address]  R Direita-Igrgui [address], NP Nurse Practitioner Schedule an appointment as soon as possible for a visit   [address] [facility]  [address] [facility]      Rhiannon Ramey MD Cardiology, Internal Medicine Schedule an appointment as soon as possible for a visit   [address] [place] [address] [address] [facility] [id number]  [phone]             Patient's Medications   Start Taking    No medications on file   Continue Taking    ACETAMINOPHEN (TYLENOL) 500 MG TABLET    Take 500 mg by mouth as needed for Pain. AMLODIPINE (NORVASC) 10 MG TABLET    Take 1 Tab by mouth daily. ATORVASTATIN (LIPITOR) 40 MG TABLET    Take 40 mg by mouth daily. CLONAZEPAM (KLONOPIN) 0.25 MG TBDI    Take 0.25 mg by mouth as needed for Other (seizure). CLOPIDOGREL (PLAVIX) 75 MG TAB    Take 75 mg by mouth daily. Indications: prevention for a blood clot going to the brain    CYANOCOBALAMIN (VITAMIN B12) 500 MCG TABLET    Take 1 Tab by mouth daily.     DIV[facility]PROEX  ([facility]) 250 MG TABLET    Take 250 mg by mouth three (3) times daily. one tab every 8 hours    ERGOCALCIFEROL (VITAMIN D2) 50,000 UNIT CAPSULE    Take 50,000 Units by mouth every seven (7) days. HYDRALAZINE (APRESOLINE) 100 MG TABLET    Take 100 mg by mouth three (3) times daily. LEVETIRACETAM (KEPPRA) 1,000 MG TABLET    Take 1 Tab by mouth two (2) times a day. LISINOPRIL (PRINIVIL, ZESTRIL) 20 MG TABLET    Take 1 Tab by mouth daily. Indications: high blood pressure    OXCARBAXEPINE (TRILEPTAL) 600 MG TABLET    Take 600 mg by mouth two (2) times a day. SPIRONOLACTONE (ALDACTONE) 25 MG TABLET    Take 25 mg by mouth daily. TAMSULOSIN (FLOMAX) 0.4 MG CAPSULE    Take 1 Cap by mouth nightly. These Medications have changed    No medications on file   Stop Taking    No medications on file       Dictation disclaimer:  Please note that this dictation was completed with [employer], the [email or web address] voice recognition software. Quite often unanticipated grammatical, syntax, homophones, and other interpretive errors are inadvertently transcribed by the computer software. Please disregard these errors. Please excuse any errors that have escaped final proofreading.

## 2021-09-05 LAB
ATRIAL RATE: 69 BPM
ATRIAL RATE: 76 BPM
CALCULATED P AXIS, ECG09: 34 DEGREES
CALCULATED P AXIS, ECG09: 67 DEGREES
CALCULATED R AXIS, ECG10: 38 DEGREES
CALCULATED R AXIS, ECG10: 6 DEGREES
CALCULATED T AXIS, ECG11: 15 DEGREES
CALCULATED T AXIS, ECG11: 94 DEGREES
DIAGNOSIS, 93000: NORMAL
DIAGNOSIS, 93000: NORMAL
P-R INTERVAL, ECG05: 126 MS
P-R INTERVAL, ECG05: 142 MS
Q-T INTERVAL, ECG07: 412 MS
Q-T INTERVAL, ECG07: 422 MS
QRS DURATION, ECG06: 72 MS
QRS DURATION, ECG06: 90 MS
QTC CALCULATION (BEZET), ECG08: 441 MS
QTC CALCULATION (BEZET), ECG08: 474 MS
VENTRICULAR RATE, ECG03: 69 BPM
VENTRICULAR RATE, ECG03: 76 BPM

## 2021-09-05 NOTE — DISCHARGE INSTRUCTIONS
Follow-up with your primary care physician within 2 days for reassessment. Bring the results from this visit with you for their review. Return to the ED immediately for any new, worsening, or persistent symptoms, including chest pain, shortness of breath, or any other medical concerns.

## 2021-09-05 NOTE — ED NOTES
12:27 AM  09/05/21     Discharge instructions given to PATIENT (name) with verbalization of understanding. Patient accompanied by SELF. Patient discharged with the following prescriptions NONE. Patient discharged to HOME (destination).       Preet Hallman RN

## 2021-09-05 NOTE — ED NOTES
Attempted to bring patient back to get line and labs. Patient states that she needs to be lifted up out of the chair and can not get up by herself.

## 2021-09-06 ENCOUNTER — APPOINTMENT (OUTPATIENT)
Dept: CT IMAGING | Age: 66
DRG: 056 | End: 2021-09-06
Attending: STUDENT IN AN ORGANIZED HEALTH CARE EDUCATION/TRAINING PROGRAM
Payer: MEDICARE

## 2021-09-06 ENCOUNTER — APPOINTMENT (OUTPATIENT)
Dept: GENERAL RADIOLOGY | Age: 66
DRG: 056 | End: 2021-09-06
Attending: STUDENT IN AN ORGANIZED HEALTH CARE EDUCATION/TRAINING PROGRAM
Payer: MEDICARE

## 2021-09-06 ENCOUNTER — HOSPITAL ENCOUNTER (INPATIENT)
Age: 66
LOS: 2 days | Discharge: NURSING, REHAB OR LONG-TERM CARE | DRG: 056 | End: 2021-09-14
Attending: STUDENT IN AN ORGANIZED HEALTH CARE EDUCATION/TRAINING PROGRAM | Admitting: FAMILY MEDICINE
Payer: MEDICARE

## 2021-09-06 DIAGNOSIS — Z71.89 GOALS OF CARE, COUNSELING/DISCUSSION: ICD-10-CM

## 2021-09-06 DIAGNOSIS — R41.82 ALTERED MENTAL STATUS, UNSPECIFIED ALTERED MENTAL STATUS TYPE: ICD-10-CM

## 2021-09-06 DIAGNOSIS — G40.919 BREAKTHROUGH SEIZURE (HCC): Primary | ICD-10-CM

## 2021-09-06 DIAGNOSIS — N17.9 AKI (ACUTE KIDNEY INJURY) (HCC): ICD-10-CM

## 2021-09-06 DIAGNOSIS — R53.81 DEBILITY: ICD-10-CM

## 2021-09-06 DIAGNOSIS — G40.909 SEIZURE DISORDER (HCC): ICD-10-CM

## 2021-09-06 DIAGNOSIS — D64.9 ANEMIA, UNSPECIFIED TYPE: ICD-10-CM

## 2021-09-06 PROBLEM — E86.0 DEHYDRATION: Status: ACTIVE | Noted: 2021-09-06

## 2021-09-06 LAB
ALBUMIN SERPL-MCNC: 3.7 G/DL (ref 3.4–5)
ALBUMIN/GLOB SERPL: 1.1 {RATIO} (ref 0.8–1.7)
ALP SERPL-CCNC: 55 U/L (ref 45–117)
ALT SERPL-CCNC: 11 U/L (ref 13–56)
AMORPH CRY URNS QL MICRO: ABNORMAL
AMPHET UR QL SCN: NEGATIVE
ANION GAP SERPL CALC-SCNC: 8 MMOL/L (ref 3–18)
APPEARANCE UR: CLEAR
AST SERPL-CCNC: 119 U/L (ref 10–38)
ATRIAL RATE: 101 BPM
BACTERIA URNS QL MICRO: NEGATIVE /HPF
BARBITURATES UR QL SCN: NEGATIVE
BASOPHILS # BLD: 0 K/UL (ref 0–0.1)
BASOPHILS NFR BLD: 1 % (ref 0–2)
BENZODIAZ UR QL: POSITIVE
BILIRUB SERPL-MCNC: 0.3 MG/DL (ref 0.2–1)
BILIRUB UR QL: ABNORMAL
BUN SERPL-MCNC: 15 MG/DL (ref 7–18)
BUN/CREAT SERPL: 8 (ref 12–20)
CALCIUM SERPL-MCNC: 9.4 MG/DL (ref 8.5–10.1)
CALCULATED P AXIS, ECG09: 67 DEGREES
CALCULATED R AXIS, ECG10: -11 DEGREES
CALCULATED T AXIS, ECG11: 108 DEGREES
CANNABINOIDS UR QL SCN: NEGATIVE
CHLORIDE SERPL-SCNC: 113 MMOL/L (ref 100–111)
CK MB CFR SERPL CALC: NORMAL % (ref 0–4)
CK MB SERPL-MCNC: <1 NG/ML (ref 5–25)
CK SERPL-CCNC: 124 U/L (ref 26–192)
CO2 SERPL-SCNC: 21 MMOL/L (ref 21–32)
COCAINE UR QL SCN: NEGATIVE
COLOR UR: ABNORMAL
CREAT SERPL-MCNC: 1.95 MG/DL (ref 0.6–1.3)
DIAGNOSIS, 93000: NORMAL
DIFFERENTIAL METHOD BLD: ABNORMAL
EOSINOPHIL # BLD: 0 K/UL (ref 0–0.4)
EOSINOPHIL NFR BLD: 1 % (ref 0–5)
EPITH CASTS URNS QL MICRO: ABNORMAL /LPF (ref 0–5)
ERYTHROCYTE [DISTWIDTH] IN BLOOD BY AUTOMATED COUNT: 16.1 % (ref 11.6–14.5)
ETHANOL SERPL-MCNC: <3 MG/DL (ref 0–3)
GLOBULIN SER CALC-MCNC: 3.4 G/DL (ref 2–4)
GLUCOSE SERPL-MCNC: 96 MG/DL (ref 74–99)
GLUCOSE UR STRIP.AUTO-MCNC: NEGATIVE MG/DL
HCT VFR BLD AUTO: 29.3 % (ref 35–45)
HDSCOM,HDSCOM: ABNORMAL
HGB BLD-MCNC: 9.7 G/DL (ref 12–16)
HGB UR QL STRIP: NEGATIVE
HYALINE CASTS URNS QL MICRO: ABNORMAL /LPF (ref 0–2)
KETONES UR QL STRIP.AUTO: 15 MG/DL
LEUKOCYTE ESTERASE UR QL STRIP.AUTO: ABNORMAL
LYMPHOCYTES # BLD: 1.2 K/UL (ref 0.9–3.6)
LYMPHOCYTES NFR BLD: 15 % (ref 21–52)
MAGNESIUM SERPL-MCNC: 2 MG/DL (ref 1.6–2.6)
MCH RBC QN AUTO: 30.9 PG (ref 24–34)
MCHC RBC AUTO-ENTMCNC: 33.1 G/DL (ref 31–37)
MCV RBC AUTO: 93.3 FL (ref 78–100)
METHADONE UR QL: NEGATIVE
MONOCYTES # BLD: 0.9 K/UL (ref 0.05–1.2)
MONOCYTES NFR BLD: 11 % (ref 3–10)
NEUTS SEG # BLD: 6 K/UL (ref 1.8–8)
NEUTS SEG NFR BLD: 73 % (ref 40–73)
NITRITE UR QL STRIP.AUTO: NEGATIVE
OPIATES UR QL: NEGATIVE
P-R INTERVAL, ECG05: 132 MS
PCP UR QL: NEGATIVE
PH UR STRIP: 5 [PH] (ref 5–8)
PLATELET # BLD AUTO: 196 K/UL (ref 135–420)
PMV BLD AUTO: 11.1 FL (ref 9.2–11.8)
POTASSIUM SERPL-SCNC: 3.8 MMOL/L (ref 3.5–5.5)
PROT SERPL-MCNC: 7.1 G/DL (ref 6.4–8.2)
PROT UR STRIP-MCNC: 100 MG/DL
Q-T INTERVAL, ECG07: 364 MS
QRS DURATION, ECG06: 82 MS
QTC CALCULATION (BEZET), ECG08: 471 MS
RBC # BLD AUTO: 3.14 M/UL (ref 4.2–5.3)
RBC #/AREA URNS HPF: NEGATIVE /HPF (ref 0–5)
SODIUM SERPL-SCNC: 142 MMOL/L (ref 136–145)
SP GR UR REFRACTOMETRY: 1.02 (ref 1–1.03)
TROPONIN I SERPL-MCNC: <0.02 NG/ML (ref 0–0.04)
UROBILINOGEN UR QL STRIP.AUTO: 1 EU/DL (ref 0.2–1)
VALPROATE SERPL-MCNC: 3 UG/ML (ref 50–100)
VENTRICULAR RATE, ECG03: 101 BPM
WBC # BLD AUTO: 8.2 K/UL (ref 4.6–13.2)
WBC URNS QL MICRO: ABNORMAL /HPF (ref 0–5)

## 2021-09-06 PROCEDURE — 85025 COMPLETE CBC W/AUTO DIFF WBC: CPT

## 2021-09-06 PROCEDURE — 82553 CREATINE MB FRACTION: CPT

## 2021-09-06 PROCEDURE — 70480 CT ORBIT/EAR/FOSSA W/O DYE: CPT

## 2021-09-06 PROCEDURE — 81001 URINALYSIS AUTO W/SCOPE: CPT

## 2021-09-06 PROCEDURE — 72125 CT NECK SPINE W/O DYE: CPT

## 2021-09-06 PROCEDURE — 70450 CT HEAD/BRAIN W/O DYE: CPT

## 2021-09-06 PROCEDURE — 80307 DRUG TEST PRSMV CHEM ANLYZR: CPT

## 2021-09-06 PROCEDURE — 83735 ASSAY OF MAGNESIUM: CPT

## 2021-09-06 PROCEDURE — APPSS60 APP SPLIT SHARED TIME 46-60 MINUTES: Performed by: NURSE PRACTITIONER

## 2021-09-06 PROCEDURE — 99223 1ST HOSP IP/OBS HIGH 75: CPT | Performed by: FAMILY MEDICINE

## 2021-09-06 PROCEDURE — 99218 HC RM OBSERVATION: CPT

## 2021-09-06 PROCEDURE — 99285 EMERGENCY DEPT VISIT HI MDM: CPT

## 2021-09-06 PROCEDURE — 96360 HYDRATION IV INFUSION INIT: CPT

## 2021-09-06 PROCEDURE — 74011250636 HC RX REV CODE- 250/636: Performed by: STUDENT IN AN ORGANIZED HEALTH CARE EDUCATION/TRAINING PROGRAM

## 2021-09-06 PROCEDURE — 93005 ELECTROCARDIOGRAM TRACING: CPT

## 2021-09-06 PROCEDURE — 80164 ASSAY DIPROPYLACETIC ACD TOT: CPT

## 2021-09-06 PROCEDURE — 82077 ASSAY SPEC XCP UR&BREATH IA: CPT

## 2021-09-06 PROCEDURE — 71045 X-RAY EXAM CHEST 1 VIEW: CPT

## 2021-09-06 PROCEDURE — 80053 COMPREHEN METABOLIC PANEL: CPT

## 2021-09-06 PROCEDURE — 80177 DRUG SCRN QUAN LEVETIRACETAM: CPT

## 2021-09-06 PROCEDURE — 96361 HYDRATE IV INFUSION ADD-ON: CPT

## 2021-09-06 RX ORDER — ONDANSETRON 2 MG/ML
4 INJECTION INTRAMUSCULAR; INTRAVENOUS
Status: DISCONTINUED | OUTPATIENT
Start: 2021-09-06 | End: 2021-09-14 | Stop reason: HOSPADM

## 2021-09-06 RX ORDER — FAMOTIDINE 20 MG/1
20 TABLET, FILM COATED ORAL 2 TIMES DAILY
Status: DISCONTINUED | OUTPATIENT
Start: 2021-09-06 | End: 2021-09-10

## 2021-09-06 RX ORDER — SPIRONOLACTONE 25 MG/1
25 TABLET ORAL DAILY
Status: DISCONTINUED | OUTPATIENT
Start: 2021-09-07 | End: 2021-09-14 | Stop reason: HOSPADM

## 2021-09-06 RX ORDER — AMLODIPINE BESYLATE 10 MG/1
10 TABLET ORAL DAILY
Status: DISCONTINUED | OUTPATIENT
Start: 2021-09-07 | End: 2021-09-14 | Stop reason: HOSPADM

## 2021-09-06 RX ORDER — SODIUM CHLORIDE 0.9 % (FLUSH) 0.9 %
5-40 SYRINGE (ML) INJECTION AS NEEDED
Status: DISCONTINUED | OUTPATIENT
Start: 2021-09-06 | End: 2021-09-14 | Stop reason: HOSPADM

## 2021-09-06 RX ORDER — ACETAMINOPHEN 325 MG/1
650 TABLET ORAL
Status: DISCONTINUED | OUTPATIENT
Start: 2021-09-06 | End: 2021-09-14 | Stop reason: HOSPADM

## 2021-09-06 RX ORDER — ATORVASTATIN CALCIUM 40 MG/1
40 TABLET, FILM COATED ORAL DAILY
Status: DISCONTINUED | OUTPATIENT
Start: 2021-09-07 | End: 2021-09-14 | Stop reason: HOSPADM

## 2021-09-06 RX ORDER — DIVALPROEX SODIUM 250 MG/1
250 TABLET, DELAYED RELEASE ORAL 3 TIMES DAILY
Status: DISCONTINUED | OUTPATIENT
Start: 2021-09-06 | End: 2021-09-08

## 2021-09-06 RX ORDER — LANOLIN ALCOHOL/MO/W.PET/CERES
500 CREAM (GRAM) TOPICAL DAILY
Status: DISCONTINUED | OUTPATIENT
Start: 2021-09-07 | End: 2021-09-14 | Stop reason: HOSPADM

## 2021-09-06 RX ORDER — ACETAMINOPHEN 650 MG/1
650 SUPPOSITORY RECTAL
Status: DISCONTINUED | OUTPATIENT
Start: 2021-09-06 | End: 2021-09-14 | Stop reason: HOSPADM

## 2021-09-06 RX ORDER — ONDANSETRON 4 MG/1
4 TABLET, ORALLY DISINTEGRATING ORAL
Status: DISCONTINUED | OUTPATIENT
Start: 2021-09-06 | End: 2021-09-14 | Stop reason: HOSPADM

## 2021-09-06 RX ORDER — LISINOPRIL 20 MG/1
20 TABLET ORAL DAILY
Status: DISCONTINUED | OUTPATIENT
Start: 2021-09-07 | End: 2021-09-07

## 2021-09-06 RX ORDER — SODIUM CHLORIDE 0.9 % (FLUSH) 0.9 %
5-40 SYRINGE (ML) INJECTION EVERY 8 HOURS
Status: DISCONTINUED | OUTPATIENT
Start: 2021-09-06 | End: 2021-09-14 | Stop reason: HOSPADM

## 2021-09-06 RX ORDER — POLYETHYLENE GLYCOL 3350 17 G/17G
17 POWDER, FOR SOLUTION ORAL DAILY PRN
Status: DISCONTINUED | OUTPATIENT
Start: 2021-09-06 | End: 2021-09-14 | Stop reason: HOSPADM

## 2021-09-06 RX ORDER — OXCARBAZEPINE 300 MG/1
600 TABLET, FILM COATED ORAL 2 TIMES DAILY
Status: DISCONTINUED | OUTPATIENT
Start: 2021-09-07 | End: 2021-09-07

## 2021-09-06 RX ORDER — TAMSULOSIN HYDROCHLORIDE 0.4 MG/1
0.4 CAPSULE ORAL
Status: DISCONTINUED | OUTPATIENT
Start: 2021-09-06 | End: 2021-09-14 | Stop reason: HOSPADM

## 2021-09-06 RX ORDER — CLOPIDOGREL BISULFATE 75 MG/1
75 TABLET ORAL DAILY
Status: DISCONTINUED | OUTPATIENT
Start: 2021-09-07 | End: 2021-09-14 | Stop reason: HOSPADM

## 2021-09-06 RX ORDER — LEVETIRACETAM 10 MG/ML
1000 INJECTION INTRAVASCULAR EVERY 12 HOURS
Status: DISCONTINUED | OUTPATIENT
Start: 2021-09-06 | End: 2021-09-08

## 2021-09-06 RX ADMIN — Medication 10 ML: at 22:00

## 2021-09-06 RX ADMIN — SODIUM CHLORIDE 500 ML: 900 INJECTION, SOLUTION INTRAVENOUS at 19:39

## 2021-09-06 NOTE — ED TRIAGE NOTES
Pt presents to the ED via EMS for evaluation of a potential fall secondary to a seizure per EMS. EMS report that upon their arrival pt had a tonic clonic witnessed seizure that lasted for 5 minutes. Pt received 5 mg of Versed IM. Unsure of pt baseline due to son being a poor historian. Son reports that the pt fell 3 times today and had 1 seizure today. Pt with swollen right eye. Pt with hx dementia.

## 2021-09-06 NOTE — ED NOTES
EMERGENCY DEPARTMENT HISTORY AND PHYSICAL EXAM      Patient Name: Tank Orr    History of Presenting Illness     HPI:     [de-identified] 8year-old female with a history of dementia, known seizure disorder on Keppra and Depakote, previous CVA on Plavix, presents to the ED via EMS for 2 witnessed seizures prior to arrival.  EMS report, the patient saw her mother fall 3 times today and noticed 1 seizure, described as generalized tonic/clonic, lasted several minutes. On EMS arrival, they also noted he had a generalized tonic/clonic-like seizure that lasted approximately 5 minutes. They gave her 5 mg intramuscular Versed which resolved the seizure. Blood sugar 124 in the field. Upon arrival, patient is postictal/sedated from Versed and unable to provide history due to her medical condition. She does have a contusion/hematoma on her forehead. PCP: Kenia Crooks NP    No current facility-administered medications on file prior to encounter. Current Outpatient Medications on File Prior to Encounter   Medication Sig Dispense Refill    clonazePAM (KlonoPIN) 0.25 mg TbDi Take 0.25 mg by mouth as needed for Other (seizure).  spironolactone (ALDACTONE) 25 mg tablet Take 25 mg by mouth daily.  hydrALAZINE (APRESOLINE) 100 mg tablet Take 100 mg by mouth three (3) times daily.  atorvastatin (LIPITOR) 40 mg tablet Take 40 mg by mouth daily.  acetaminophen (TYLENOL) 500 mg tablet Take 500 mg by mouth as needed for Pain.  divalproex DR (DEPAKOTE) 250 mg tablet Take 250 mg by mouth three (3) times daily. one tab every 8 hours      clopidogreL (PLAVIX) 75 mg tab Take 75 mg by mouth daily. Indications: prevention for a blood clot going to the brain      lisinopriL (PRINIVIL, ZESTRIL) 20 mg tablet Take 1 Tab by mouth daily. Indications: high blood pressure 30 Tab 1    tamsulosin (FLOMAX) 0.4 mg capsule Take 1 Cap by mouth nightly.  30 Cap 0    OXcarbaxepine (TRILEPTAL) 600 mg tablet Take 600 mg by mouth two (2) times a day.  ergocalciferol (VITAMIN D2) 50,000 unit capsule Take 50,000 Units by mouth every seven (7) days.  levETIRAcetam (KEPPRA) 1,000 mg tablet Take 1 Tab by mouth two (2) times a day. 60 Tab 0    amLODIPine (NORVASC) 10 mg tablet Take 1 Tab by mouth daily. 30 Tab 6    cyanocobalamin (VITAMIN B12) 500 mcg tablet Take 1 Tab by mouth daily. 30 Tab 0       Past History     Past Medical History:  Past Medical History:   Diagnosis Date    Abnormal coordination 9/21/2020    Hallucinations 9/21/2020    Hypertension     Neurological disorder     [facility]ures [facility] [address]     Stroke ([facility] [address] 2009       Past Surgical History:  No past surgical history on file. Family History:  Family History   Problem Relation Age of Onset    Diabetes Other     Stroke Other        Social History:  Social History     Tobacco Use    Smoking status: Never Smoker    Smokeless tobacco: Never Used   Vaping Use    Vaping Use: Never used   Substance Use Topics    Alcohol use: Yes     Comment: Socially     Drug use: No       Allergies: Allergies   Allergen Reactions    Tomato Hives     Allergic to eating tomato.  Aspirin Nausea and Vomiting    Ciprofloxacin Rash    Pcn [Penicillins] Rash and Hives    Sulfa (Sulfonamide Antibiotics) Hives and Rash       Review of Nursing Notes: I have reviewed the relevant nursing notes that were available at the time of this entry. Portions of the Family and Social history, as well as medications and allergies, may have been entered into my documentation by nursing or other ancillary staff; I have confirmed and may have supplemented that information to the best of my ability at the time the note was reviewed. There are some disagreements between the nursing notes and my evaluation at times. Some of the above referenced nursing documentation appears in my note after completion of my review.     Review of Systems     Patient unable to provide due to medical condition. Physical Exam     General: In no apparent distress. Post-ictal appearing. Head/Neck: Normocephalic, periorbital swelling without evidence of cellulitis/infection. Nontender midline neck, normal ROM. EENT: PERRLA. EOM intact bilaterally. Oropharyngeal mucosa is dry, lesions or erythema. No nasal discharge. Cardiovascular: RRR, no murmurs, gallops, or rubs. Peripheral pulses normal and intact in BUE and BLE. Lungs/Chest: CTAB, no wheezing, rhonchi, or rales. Abdomen: No distention. No organomegaly. No rebound, rigidity, or guarding. Nontender. Extremities/Skin: Warm distal extremities No deformities. No edema. No rashes. Neuro: GCS 12 (T6H1E6). Post-ictal, intermittently following commands and moving all 4 extremities some, mainly to noxious stimulus.   Psych: Post-ictal, flat affect    Diagnostic Study Results     Labs -     Recent Results (from the past 12 hour(s))   EKG, 12 LEAD, INITIAL    Collection Time: 09/06/21  5:45 PM   Result Value Ref Range    Ventricular Rate 101 BPM    Atrial Rate 101 BPM    P-R Interval 132 ms    QRS Duration 82 ms    Q-T Interval 364 ms    QTC Calculation (Bezet) 471 ms    Calculated P Axis 67 degrees    Calculated R Axis -11 degrees    Calculated T Axis 108 degrees    Diagnosis       Sinus tachycardia  Septal infarct , age undetermined  Abnormal ECG  When compared with ECG of 04-SEP-2021 23:32,  Septal infarct is now present  T wave inversion less evident in Lateral leads  Confirmed by Kareem Foot ([id number]) on 9/6/2021 1:37:55 PM     METABOLIC PANEL, COMPREHENSIVE    Collection Time: 09/06/21  6:17 PM   Result Value Ref Range    Sodium 142 136 - 145 mmol/L    Potassium 3.8 3.5 - 5.5 mmol/L    Chloride 113 (H) 100 - 111 mmol/L    CO2 21 21 - 32 mmol/L    Anion gap 8 3.0 - 18 mmol/L    Glucose 96 74 - 99 mg/dL    BUN 15 7.0 - 18 MG/DL    Creatinine 1.95 (H) 0.6 - 1.3 MG/DL    BUN/Creatinine ratio 8 (L) 12 - 20      GFR est AA 31 (L) >60 ml/min/1.73m2    GFR est non-AA 26 (L) >60 ml/min/1.73m2    Calcium 9.4 8.5 - 10.1 MG/DL    Bilirubin, total 0.3 0.2 - 1.0 MG/DL    ALT (SGPT) 11 (L) 13 - 56 U/L    AST (SGOT) 119 (H) 10 - 38 U/L    Alk. phosphatase 55 45 - 117 U/L    Protein, total 7.1 6.4 - 8.2 g/dL    Albumin 3.7 3.4 - 5.0 g/dL    Globulin 3.4 2.0 - 4.0 g/dL    A-G Ratio 1.1 0.8 - 1.7     ETHYL ALCOHOL    Collection Time: 09/06/21  6:17 PM   Result Value Ref Range    ALCOHOL(ETHYL),SERUM <3 0 - 3 MG/DL   MAGNESIUM    Collection Time: 09/06/21  6:17 PM   Result Value Ref Range    Magnesium 2.0 1.6 - 2.6 mg/dL   VALPROIC ACID    Collection Time: 09/06/21  6:17 PM   Result Value Ref Range    Valproic acid 3 (L) 50 - 100 ug/ml   CARDIAC PANEL,(CK, CKMB & TROPONIN)    Collection Time: 09/06/21  6:17 PM   Result Value Ref Range    CK - MB <1.0 <3.6 ng/ml    CK-MB Index  0.0 - 4.0 %     CALCULATION NOT PERFORMED WHEN RESULT IS BELOW LINEAR LIMIT     26 - 192 U/L    Troponin-I, QT <0.02 0.0 - 0.045 NG/ML   CBC WITH AUTOMATED DIFF    Collection Time: 09/06/21  6:17 PM   Result Value Ref Range    WBC 8.2 4.6 - 13.2 K/uL    RBC 3.14 (L) 4.20 - 5.30 M/uL    HGB 9.7 (L) 12.0 - 16.0 g/dL    HCT 29.3 (L) 35.0 - 45.0 %    MCV 93.3 78.0 - 100.0 FL    MCH 30.9 24.0 - 34.0 PG    MCHC 33.1 31.0 - 37.0 g/dL    RDW 16.1 (H) 11.6 - 14.5 %    PLATELET 235 115 - 191 K/uL    MPV 11.1 9.2 - 11.8 FL    NEUTROPHILS 73 40 - 73 %    LYMPHOCYTES 15 (L) 21 - 52 %    MONOCYTES 11 (H) 3 - 10 %    EOSINOPHILS 1 0 - 5 %    BASOPHILS 1 0 - 2 %    ABS. NEUTROPHILS 6.0 1.8 - 8.0 K/UL    ABS. LYMPHOCYTES 1.2 0.9 - 3.6 K/UL    ABS. MONOCYTES 0.9 0.05 - 1.2 K/UL    ABS. EOSINOPHILS 0.0 0.0 - 0.4 K/UL    ABS.  BASOPHILS 0.0 0.0 - 0.1 K/UL    DF AUTOMATED     URINALYSIS W/ RFLX MICROSCOPIC    Collection Time: 09/06/21  7:55 PM   Result Value Ref Range    Color DARK YELLOW      Appearance CLEAR      Specific gravity 1.024 1.005 - 1.030      Specific gravity PENDING     pH (UA) 5.0 5.0 - 8.0      Protein 100 (A) NEG mg/dL    Glucose Negative NEG mg/dL    Ketone 15 (A) NEG mg/dL    Bilirubin MODERATE (A) NEG      Blood Negative NEG      Urobilinogen 1.0 0.2 - 1.0 EU/dL    Nitrites Negative NEG      Leukocyte Esterase SMALL (A) NEG     DRUG SCREEN, URINE    Collection Time: 09/06/21  7:55 PM   Result Value Ref Range    BENZODIAZEPINES Positive (A) NEG      BARBITURATES Negative NEG      THC (TH-CANNABINOL) Negative NEG      OPIATES Negative NEG      PCP(PHENCYCLIDINE) Negative NEG      COCAINE Negative NEG      AMPHETAMINES Negative NEG      METHADONE Negative NEG      HDSCOM (NOTE)        Radiologic Studies -   CT HEAD WO CONT   Final Result   1. No noncontrast CT evidence of acute intracranial process. Please note that   MRI is more sensitive for ischemia in the first 24 to 48 hours. 2.  Mild right periorbital swelling. 3.  Chronic right parieto-occipital encephalomalacia. Moderate burden of   presumed sequela of chronic white matter microvascular ischemic disease. Chronic   basal ganglia infarcts. CT SPINE CERV WO CONT   Final Result   1. No evidence of acute cervical spine fracture or traumatic malalignment. 2.  Moderate multilevel degenerative changes of the cervical spine and unchanged   mild multilevel spondylolisthesis. XR CHEST SNGL V   Final Result      No radiographic finding for an acute cardiopulmonary process. CT ORBIT POST FOSSA WO CONT    (Results Pending)     CT Results  (Last 48 hours)               09/06/21 1850  CT HEAD WO CONT Final result    Impression:  1. No noncontrast CT evidence of acute intracranial process. Please note that   MRI is more sensitive for ischemia in the first 24 to 48 hours. 2.  Mild right periorbital swelling. 3.  Chronic right parieto-occipital encephalomalacia. Moderate burden of   presumed sequela of chronic white matter microvascular ischemic disease. Chronic   basal ganglia infarcts.        Narrative:  EXAM: CT HEAD WO CONT       CLINICAL INDICATION/HISTORY: 78 years Female. Seizure with potential fall. ADDITIONAL HISTORY: None       TECHNIQUE: CT of the head from the vertex to the skull base performed. No IV   contrast administered. All CT scans at this facility are performed using dose optimization technique as   appropriate to a performed exam, to include automated exposure control,   adjustment of the mA and/or kV according to patient size (including appropriate   matching for site specific examination) or use of iterative reconstruction   technique. COMPARISON: CT head without contrast 4/26/2020       FINDINGS:        Encephalomalacia within the right parieto-occipital region with ex vacuo   dilatation of the right lateral ventricle, similar in appearance to prior. Moderate periventricular and subcortical white matter hypodensities are grossly   unchanged. Small chronic infarct in the small chronic bilateral basal ganglia infarcts. No   evidence of acute intracranial hemorrhage. The gray-white differentiation is   preserved. The ventricles, sulci, and cisterns are concordant with cerebral   volume. There is no mass effect. There is no midline shift. The visualized   paranasal sinuses and mastoid air cells are unremarkable. There is no evidence   of acute fracture. Mild right periorbital swelling. 09/06/21 1850  CT SPINE CERV WO CONT Final result    Impression:  1. No evidence of acute cervical spine fracture or traumatic malalignment. 2.  Moderate multilevel degenerative changes of the cervical spine and unchanged   mild multilevel spondylolisthesis. Narrative:  EXAM: CT SPINE CERV WO CONT       CLINICAL INDICATION/HISTORY: 78 years Female. Possible fall    ADDITIONAL HISTORY: None           TECHNIQUE: CT of the cervical spine without contrast. Sagittal and coronal   reformations obtained.        All CT scans at this facility are performed using dose optimization technique as   appropriate to a performed exam, to include automated exposure control,   adjustment of the mA and/or kV according to patient size (including appropriate   matching for site specific examination) or use of iterative reconstruction   technique. Comparison: CT cervical spine 11/20/2018       FINDINGS:       [place] leftward curvature of the cervical spine. Trace retrolisthesis of C4 on C5   and C5 on C6, unchanged, likely degenerative. Multilevel disc space narrowing and endplate spurring, most pronounced at C3/C4   through C5/C6. Multilevel uncovertebral and facet joint hypertrophy. Mild to   moderate multilevel foraminal stenosis. No evidence of high-grade spinal canal   stenosis. There is no evidence of acute fracture. Facet joints are well aligned. Vertebral body heights are maintained. The craniocervical junction is   congruent. There is no prevertebral edema. The visualized soft tissues are unremarkable. CXR Results  (Last 48 hours)               09/06/21 1804  XR CHEST SNGL V Final result    Impression:      No radiographic finding for an acute cardiopulmonary process. Narrative:  Portable Chest       CPT CODE: 26654       HISTORY: Fall. FINDINGS:        Single frontal view of the chest compared with 9/4/2021. Overlying clothing artifact. Heart size and mediastinal contours within normal   limits. No pulmonary vascular congestion, effusion, or pneumothorax. No acute   lung consolidation. No acute fracture. Medical Decision Making     I am the first provider for this patient. Vital Signs- I personally reviewed and interpreted the patient's vital signs. Patient Vitals for the past 12 hrs:   Temp Pulse Resp BP SpO2   09/06/21 1940 -- 90 21 127/61 100 %   09/06/21 1900 -- 81 16 (!) 119/58 100 %   09/06/21 1800 99.4 °F (37.4 °C) 92 23 (!) 122/47 100 %       Pulse Oximetry Analysis - 100% on room air .  ED interpretation: normal saturation    Cardiac Monitor:   Rate: 92 bpm  Rhythm: Normal Sinus Rhythm    EKG interpretation: Sinus tachycardia. Rate 101. Normal axis. Normal intervals. Nonspecific ST changes. No STEMI. EKG read and interpreted by ED physician at 5:47pm    Records Reviewed: I reviewed the patient's records to interpret any previous medical data available to me including EKGs, previous medical records, previous images, previous labs. Provider Notes (Medical Decision Making):     Six 8year-old female with a history of seizure disorder Keppra and Depakote, prior CVA on Plavix, presents to the ED via EMS for 2 seizures prior to arrival. First tonic-clonic/generalized seizure witnessed by her son, and the second seizure was witnessed by EMS, they report it lasted about 5 minutes and resolve after administration of 5 mg Versed. Upon my examination, patient is post-ictal appearing, GCS 12. Slow to respond. Given today's clinical picture, my differential diagnoses indlude: Breakthrough seizure, electrolyte of normality, KEYLA, dehydration, intracranial hemorrhage. To further refine my diagnosis, I will order Labs including CBC, CMP, UA, UDS, alcohol, EKG, CXR, CT brain, CT cervical.    ED Course:     ED Course as of Sep 06 2031   Mon Sep 06, 2021   [address] Patient reevaluated, she is slowly improving terms of her mentation however still confused. She states that she does not really remember what happened earlier however does have some pain above her right eye area of some swelling. Does not appear cellulitic, likely hematoma. She does report that she lives with her son, Sheila Wynne. [EK]   1907 CT brain without acute intracranial abnormalities. Does comment on a mild right periorbital swelling. Labs do show an anemia of 9.7 (hg) compared to two days ago (no complaint of melena/hematochezia per patient). Additionally she has an KEYLA, creatinine today is 1.95.  UA pending. IV fluids initiated.      Given the patient's 2 breakthrough seizures today, prolonged postictal period, KEYLA, anemia, I do feel this patient would benefit from admission to inpatient setting and may need a neurology consult. Will discuss with hospitalist.    [EK]   2016 Patient remained stable throughout their ED course. I discussed relevant findings with patient. My plan is to admit patient to the hospital for further evaluation and management of their condition. Discussed with admitting hospitalist (Dr. Uriel Sorensen) who agrees and accepts patient for admission to their service (observation). Patient verbalized understanding, agrees to plan for admission, and all of their questions were answered. [EK]      ED Course User Index  [EK] Belgica Sharif DO       Critical Care Time:   28    Critical Care Time:  The services I provided to this patient were to treat and/or prevent clinically significant deterioration that could result in the failure of one or more body systems and/or organ systems due to breakthrough seizures, prolonged post-ictal state. Services included the following:  -reviewing nursing notes and old charts  -vital sign assessments  -direct patient care  -medication orders and management  -interpreting and reviewing diagnostic studies/labs  -re-evaluations  -documentation time    Aggregate critical care time was 35 minutes, which includes only time during which I was engaged in work directly related to the patient's care as described above, whether I was at bedside or elsewhere in the Emergency Department. It did not include time spent performing other reported procedures or the services of residents, students, nurses, or advance practice providers.     Jason Sierra DO  Date: 9/6/2021

## 2021-09-07 ENCOUNTER — APPOINTMENT (OUTPATIENT)
Dept: CT IMAGING | Age: 66
DRG: 056 | End: 2021-09-07
Attending: INTERNAL MEDICINE
Payer: MEDICARE

## 2021-09-07 ENCOUNTER — APPOINTMENT (OUTPATIENT)
Dept: MRI IMAGING | Age: 66
DRG: 056 | End: 2021-09-07
Attending: NURSE PRACTITIONER
Payer: MEDICARE

## 2021-09-07 LAB
ALBUMIN SERPL-MCNC: 3.5 G/DL (ref 3.4–5)
ALBUMIN/GLOB SERPL: 1.3 {RATIO} (ref 0.8–1.7)
ALP SERPL-CCNC: 52 U/L (ref 45–117)
ALT SERPL-CCNC: 9 U/L (ref 13–56)
ANION GAP SERPL CALC-SCNC: 10 MMOL/L (ref 3–18)
AST SERPL-CCNC: 11 U/L (ref 10–38)
BASOPHILS # BLD: 0.1 K/UL (ref 0–0.1)
BASOPHILS NFR BLD: 1 % (ref 0–2)
BILIRUB SERPL-MCNC: 0.4 MG/DL (ref 0.2–1)
BUN SERPL-MCNC: 13 MG/DL (ref 7–18)
BUN/CREAT SERPL: 8 (ref 12–20)
CALCIUM SERPL-MCNC: 9 MG/DL (ref 8.5–10.1)
CHLORIDE SERPL-SCNC: 113 MMOL/L (ref 100–111)
CO2 SERPL-SCNC: 21 MMOL/L (ref 21–32)
CREAT SERPL-MCNC: 1.58 MG/DL (ref 0.6–1.3)
DIFFERENTIAL METHOD BLD: ABNORMAL
EOSINOPHIL # BLD: 0.1 K/UL (ref 0–0.4)
EOSINOPHIL NFR BLD: 1 % (ref 0–5)
ERYTHROCYTE [DISTWIDTH] IN BLOOD BY AUTOMATED COUNT: 16.5 % (ref 11.6–14.5)
FOLATE SERPL-MCNC: 2.5 NG/ML (ref 3.1–17.5)
GLOBULIN SER CALC-MCNC: 2.8 G/DL (ref 2–4)
GLUCOSE SERPL-MCNC: 71 MG/DL (ref 74–99)
HCT VFR BLD AUTO: 25.9 % (ref 35–45)
HGB BLD-MCNC: 8.5 G/DL (ref 12–16)
LIPASE SERPL-CCNC: 75 U/L (ref 73–393)
LYMPHOCYTES # BLD: 1.9 K/UL (ref 0.9–3.6)
LYMPHOCYTES NFR BLD: 31 % (ref 21–52)
MCH RBC QN AUTO: 30.1 PG (ref 24–34)
MCHC RBC AUTO-ENTMCNC: 32.8 G/DL (ref 31–37)
MCV RBC AUTO: 91.8 FL (ref 78–100)
MONOCYTES # BLD: 0.8 K/UL (ref 0.05–1.2)
MONOCYTES NFR BLD: 13 % (ref 3–10)
NEUTS SEG # BLD: 3.2 K/UL (ref 1.8–8)
NEUTS SEG NFR BLD: 54 % (ref 40–73)
PLATELET # BLD AUTO: 187 K/UL (ref 135–420)
PMV BLD AUTO: 11.4 FL (ref 9.2–11.8)
POTASSIUM SERPL-SCNC: 3.7 MMOL/L (ref 3.5–5.5)
PROT SERPL-MCNC: 6.3 G/DL (ref 6.4–8.2)
RBC # BLD AUTO: 2.82 M/UL (ref 4.2–5.3)
SODIUM SERPL-SCNC: 144 MMOL/L (ref 136–145)
VIT B12 SERPL-MCNC: 324 PG/ML (ref 211–911)
WBC # BLD AUTO: 6 K/UL (ref 4.6–13.2)

## 2021-09-07 PROCEDURE — 96376 TX/PRO/DX INJ SAME DRUG ADON: CPT

## 2021-09-07 PROCEDURE — 36415 COLL VENOUS BLD VENIPUNCTURE: CPT

## 2021-09-07 PROCEDURE — 74176 CT ABD & PELVIS W/O CONTRAST: CPT

## 2021-09-07 PROCEDURE — 92610 EVALUATE SWALLOWING FUNCTION: CPT

## 2021-09-07 PROCEDURE — 74011250637 HC RX REV CODE- 250/637: Performed by: PSYCHIATRY & NEUROLOGY

## 2021-09-07 PROCEDURE — 97166 OT EVAL MOD COMPLEX 45 MIN: CPT

## 2021-09-07 PROCEDURE — 99233 SBSQ HOSP IP/OBS HIGH 50: CPT | Performed by: INTERNAL MEDICINE

## 2021-09-07 PROCEDURE — 82746 ASSAY OF FOLIC ACID SERUM: CPT

## 2021-09-07 PROCEDURE — 97161 PT EVAL LOW COMPLEX 20 MIN: CPT

## 2021-09-07 PROCEDURE — 96374 THER/PROPH/DIAG INJ IV PUSH: CPT

## 2021-09-07 PROCEDURE — 96365 THER/PROPH/DIAG IV INF INIT: CPT

## 2021-09-07 PROCEDURE — 99221 1ST HOSP IP/OBS SF/LOW 40: CPT | Performed by: PSYCHIATRY & NEUROLOGY

## 2021-09-07 PROCEDURE — 85025 COMPLETE CBC W/AUTO DIFF WBC: CPT

## 2021-09-07 PROCEDURE — 97530 THERAPEUTIC ACTIVITIES: CPT

## 2021-09-07 PROCEDURE — 80053 COMPREHEN METABOLIC PANEL: CPT

## 2021-09-07 PROCEDURE — 99218 HC RM OBSERVATION: CPT

## 2021-09-07 PROCEDURE — 70551 MRI BRAIN STEM W/O DYE: CPT

## 2021-09-07 PROCEDURE — 83690 ASSAY OF LIPASE: CPT

## 2021-09-07 PROCEDURE — 96361 HYDRATE IV INFUSION ADD-ON: CPT

## 2021-09-07 PROCEDURE — 74011250637 HC RX REV CODE- 250/637: Performed by: NURSE PRACTITIONER

## 2021-09-07 PROCEDURE — 74011250636 HC RX REV CODE- 250/636: Performed by: INTERNAL MEDICINE

## 2021-09-07 PROCEDURE — 74011250636 HC RX REV CODE- 250/636: Performed by: NURSE PRACTITIONER

## 2021-09-07 PROCEDURE — 97535 SELF CARE MNGMENT TRAINING: CPT

## 2021-09-07 RX ORDER — OXCARBAZEPINE 300 MG/1
900 TABLET, FILM COATED ORAL 2 TIMES DAILY
Status: DISCONTINUED | OUTPATIENT
Start: 2021-09-07 | End: 2021-09-14 | Stop reason: HOSPADM

## 2021-09-07 RX ORDER — LEVETIRACETAM 500 MG/1
1000 TABLET ORAL 2 TIMES DAILY
Status: DISCONTINUED | OUTPATIENT
Start: 2021-09-08 | End: 2021-09-09

## 2021-09-07 RX ORDER — FOLIC ACID 1 MG/1
1 TABLET ORAL DAILY
Status: DISCONTINUED | OUTPATIENT
Start: 2021-09-08 | End: 2021-09-14 | Stop reason: HOSPADM

## 2021-09-07 RX ORDER — LISINOPRIL 10 MG/1
10 TABLET ORAL DAILY
Status: DISCONTINUED | OUTPATIENT
Start: 2021-09-08 | End: 2021-09-14 | Stop reason: HOSPADM

## 2021-09-07 RX ADMIN — FAMOTIDINE 20 MG: 20 TABLET, FILM COATED ORAL at 00:11

## 2021-09-07 RX ADMIN — LEVETIRACETAM 1000 MG: 1000 INJECTION, SOLUTION INTRAVENOUS at 22:54

## 2021-09-07 RX ADMIN — DIVALPROEX SODIUM 250 MG: 250 TABLET, DELAYED RELEASE ORAL at 08:07

## 2021-09-07 RX ADMIN — FAMOTIDINE 20 MG: 20 TABLET, FILM COATED ORAL at 08:07

## 2021-09-07 RX ADMIN — CLOPIDOGREL BISULFATE 75 MG: 75 TABLET ORAL at 08:07

## 2021-09-07 RX ADMIN — LEVETIRACETAM 1000 MG: 1000 INJECTION, SOLUTION INTRAVENOUS at 08:07

## 2021-09-07 RX ADMIN — ACETAMINOPHEN 650 MG: 325 TABLET ORAL at 08:07

## 2021-09-07 RX ADMIN — TAMSULOSIN HYDROCHLORIDE 0.4 MG: 0.4 CAPSULE ORAL at 00:11

## 2021-09-07 RX ADMIN — AMLODIPINE BESYLATE 10 MG: 10 TABLET ORAL at 08:07

## 2021-09-07 RX ADMIN — FAMOTIDINE 20 MG: 20 TABLET, FILM COATED ORAL at 17:26

## 2021-09-07 RX ADMIN — CYANOCOBALAMIN TAB 1000 MCG 500 MCG: 1000 TAB at 08:07

## 2021-09-07 RX ADMIN — OXCARBAZEPINE 600 MG: 300 TABLET, FILM COATED ORAL at 08:09

## 2021-09-07 RX ADMIN — DIVALPROEX SODIUM 250 MG: 250 TABLET, DELAYED RELEASE ORAL at 17:26

## 2021-09-07 RX ADMIN — LISINOPRIL 20 MG: 20 TABLET ORAL at 08:07

## 2021-09-07 RX ADMIN — SPIRONOLACTONE 25 MG: 25 TABLET ORAL at 08:07

## 2021-09-07 RX ADMIN — ATORVASTATIN CALCIUM 40 MG: 40 TABLET, FILM COATED ORAL at 08:07

## 2021-09-07 RX ADMIN — DIVALPROEX SODIUM 250 MG: 250 TABLET, DELAYED RELEASE ORAL at 22:53

## 2021-09-07 RX ADMIN — DIVALPROEX SODIUM 250 MG: 250 TABLET, DELAYED RELEASE ORAL at 00:11

## 2021-09-07 RX ADMIN — TAMSULOSIN HYDROCHLORIDE 0.4 MG: 0.4 CAPSULE ORAL at 22:53

## 2021-09-07 RX ADMIN — OXCARBAZEPINE 900 MG: 300 TABLET, FILM COATED ORAL at 17:26

## 2021-09-07 NOTE — PROGRESS NOTES
completed the initial Spiritual Assessment of the patient in bed 18 of the emergency room and offered Pastoral Care support to the patient. Patient does not have an advance directive at this time. . Patient does not have any Sabianist/cultural needs that will affect patients preferences in health care. Chaplains will continue to follow and will provide pastoral care on an as needed/requested basis.     [facility] Care Department  [phone]

## 2021-09-07 NOTE — PROGRESS NOTES
Problem: Self Care Deficits Care Plan (Adult)  Goal: *Acute Goals and Plan of Care (Insert Text)  Description: Occupational Therapy Goals  Initiated 9/7/2021 within 7 day(s). 1.  Patient will perform grooming with supervision/set-up seated on edge of bed with good sitting balance. 2.  Patient will perform bathing with supervision/set-up. 3.  Patient will perform upper body dressing and lower body dressing with supervision/set-up. 4.  Patient will perform bedside commode transfers with minimal assistance/contact guard assist using LRAD. 5.  Patient will perform all aspects of toileting with minimal assistance/contact guard assist.  6.  Patient will participate in upper extremity therapeutic exercise/activities with supervision/set-up for 8 minutes. 7.  Patient will utilize energy conservation techniques during functional activities with min verbal cues. Prior Level of Function: pt reports she bathes seated on shower seat and in stance at sink, hand held assist for functional mobility with assist provided by son(s)       9/7/2021 1452 by Malathi Matta  Outcome: Progressing Towards Goal  OCCUPATIONAL THERAPY EVALUATION    Patient: Ashley Larose (41 y.o. female)  Date: 9/7/2021  Primary Diagnosis: Breakthrough seizure ([facility] [address] [G40.919]  KEYLA (acute kidney injury) ([facility] [address] [N17.9]  Dehydration [E86.0]  Anemia [D64.9]        Precautions:  Fall  PLOF:  pt reports she bathes seated on shower seat and in stance at sink, hand held assist for functional mobility with assist provided by son(s)    ASSESSMENT :  Nursing/RN cleared for pt to participate in OT evaluation and tx session. Patient presents lying semi-reclined on ED stretcher, c/o 5/10 LEs and back pain-nursing notified. Pt tearful throughout tx sessions, states her son passed away on April from cancer. Bed mobility: CGA supine <-> sit edge of bed,Min A STS, Mod A with HHA side-step towards left, trunk lean noted towards left.  Patient able to wash face with SBA, assist to use phone to call family. Call bell within reach & pt verbalized understanding and provided return demonstration to utilize for assist e.g. functional transfers in order to prevent falls. Patient will benefit from skilled intervention to address the above impairments. Patient's rehabilitation potential is considered to be Good  Factors which may influence rehabilitation potential include:   []             None noted  [x]             Mental ability/status  []             Medical condition  [x]             Home/family situation and support systems  []             Safety awareness  [x]             Pain tolerance/management  []             Other:      PLAN :  Recommendations and Planned Interventions:   [x]               Self Care Training                  [x]      Therapeutic Activities  [x]               Functional Mobility Training   []      Cognitive Retraining  [x]               Therapeutic Exercises           [x]      Endurance Activities  [x]               Balance Training                    [x]      Neuromuscular Re-Education  []               Visual/Perceptual Training     [x]      Home Safety Training  [x]               Patient Education                   [x]      Family Training/Education  []               Other (comment):    Frequency/Duration: Patient will be followed by occupational therapy 1-2 times per day/4-7 days per week to address goals. Discharge Recommendations: Rehab  Further Equipment Recommendations for Discharge: to be determined as progress is made with therapy       SUBJECTIVE:   Patient stated Some times I'm alone.     OBJECTIVE DATA SUMMARY:     Past Medical History:   Diagnosis Date    Abnormal coordination 9/21/2020    Hallucinations 9/21/2020    Hypertension     Neurological disorder     Seizures ([facility] [address]     Stroke ([facility] [address] 2009   No past surgical history on file.   Barriers to Learning/Limitations: yes;  emotional, cognitive, and altered mental status (i.e.Sedation, Confusion)  Compensate with: visual, verbal, tactile, kinesthetic cues/model    Home Situation:   Home Situation  Home Environment: Private residence  # Steps to Enter: 0  One/Two Story Residence: One story  Living Alone: No  Support Systems: Child(quiana), Family member(s), Spouse/Significant Other/Partner  Patient Expects to be Discharged to[de-identified] House  Current DME Used/Available at Home: Shower chair  Tub or Shower Type: Tub (does not use, only spongebathed seated/standing at sink)  []  Right hand dominant   []  Left hand dominant    Cognitive/Behavioral Status:  Neurologic State: Alert  Orientation Level: Oriented to place;Oriented to person;Oriented to situation;Disoriented to time  Cognition: Decreased attention/concentration; Follows commands  Safety/Judgement: Fall prevention    Skin: appears intact    Edema: none noted    Vision/Perceptual:  appears intact    Coordination: BUE  Coordination: Within functional limits  Fine Motor Skills-Upper: Left Intact; Right Intact    Gross Motor Skills-Upper: Left Intact; Right Intact    Balance:  Sitting: Impaired  Sitting - Static: Good (unsupported)  Sitting - Dynamic: Fair (occasional)  Standing: Impaired; With support  Standing - Static: Fair  Standing - Dynamic : Poor    Strength: BUE  Strength: Generally decreased, functional     Tone & Sensation: BUE  Tone: Normal  Sensation: Intact       Range of Motion: BUE  AROM: Within functional limits     Functional Mobility and Transfers for ADLs:  Bed Mobility:     Supine to Sit: Contact guard assistance  Sit to Supine: Contact guard assistance  Scooting: Contact guard assistance  Transfers:  Sit to Stand: Minimum assistance  Stand to Sit: Minimum assistance     ADL Assessment:   Feeding: Modified independent    Oral Facial Hygiene/Grooming: Stand-by assistance    Bathing:  Moderate assistance    Upper Body Dressing: Minimum assistance    Lower Body Dressing: Maximum assistance    Toileting: Maximum assistance     ADL Intervention:    Grooming  Position Performed: Long sitting on bed  Washing Face: Stand-by assistance    Lower Body Dressing Assistance  Socks: Maximum assistance         Cognitive Retraining  Safety/Judgement: Fall prevention  Pain:  Pain level pre-treatment: 5/10 legs and stomach  Pain level post-treatment: 5/10 legs and stomach  Pain Intervention(s): Medication (see MAR); Rest, Repositioning   Response to intervention: Nurse notified, See doc flow    Activity Tolerance:   poor  Please refer to the flowsheet for vital signs taken during this treatment. After treatment:   [] Patient left in no apparent distress sitting up in chair  [x] Patient left in no apparent distress in bed  [x] Call bell left within reach  [x] Nursing notified  [] Caregiver present  [] Bed alarm activated    COMMUNICATION/EDUCATION:   [x] Role of Occupational Therapy in the acute care setting  [x] Home safety education was provided and the patient/caregiver indicated understanding. [x] Patient/family have participated as able in goal setting and plan of care. [x] Patient/family agree to work toward stated goals and plan of care. [] Patient understands intent and goals of therapy, but is neutral about his/her participation. [] Patient is unable to participate in goal setting and plan of care. Thank you for this referral.  Kika Pill  Time Calculation: 20 mins    Eval Complexity: History: MEDIUM Complexity : Expanded review of history including physical, cognitive and psychosocial  history ; Examination: MEDIUM Complexity : 3-5 performance deficits relating to physical, cognitive , or psychosocial skils that result in activity limitations and / or participation restrictions; Decision Making:MEDIUM Complexity : Patient may present with comorbidities that affect occupational performnce.  Miniml to moderate modification of tasks or assistance (eg, physical or verbal ) with assesment(s) is necessary to enable patient to complete evaluation

## 2021-09-07 NOTE — PROGRESS NOTES
Hospitalist Progress Note    Subjective:   Daily Progress Note: 9/7/2021     Patient just came back from MRI department. Continues to have right facial pain. ,  Also complains of having some abdominal pain but no nausea or vomiting. Denies any chest pain or shortness of breath. She states sometimes she misses to take her medication. She presented to the emergency room yesterday with complaint of seizures x2. Current Facility-Administered Medications   Medication Dose Route Frequency    OXcarbazepine (TRILEPTAL) tablet 900 mg  900 mg Oral BID    sodium chloride (NS) flush 5-40 mL  5-40 mL IntraVENous Q8H    sodium chloride (NS) flush 5-40 mL  5-40 mL IntraVENous PRN    acetaminophen (TYLENOL) tablet 650 mg  650 mg Oral Q6H PRN    Or    acetaminophen (TYLENOL) suppository 650 mg  650 mg Rectal Q6H PRN    polyethylene glycol (MIRALAX) packet 17 g  17 g Oral DAILY PRN    ondansetron (ZOFRAN ODT) tablet 4 mg  4 mg Oral Q8H PRN    Or    ondansetron (ZOFRAN) injection 4 mg  4 mg IntraVENous Q6H PRN    famotidine (PEPCID) tablet 20 mg  20 mg Oral BID    levETIRAcetam in saline (iso-os) (KEPPRA) infusion 1,000 mg  1,000 mg IntraVENous Q12H    amLODIPine (NORVASC) tablet 10 mg  10 mg Oral DAILY    atorvastatin (LIPITOR) tablet 40 mg  40 mg Oral DAILY    clopidogreL (PLAVIX) tablet 75 mg  75 mg Oral DAILY    cyanocobalamin tablet 500 mcg  500 mcg Oral DAILY    divalproex DR (DEPAKOTE) tablet 250 mg  250 mg Oral TID    lisinopriL (PRINIVIL, ZESTRIL) tablet 20 mg  20 mg Oral DAILY    spironolactone (ALDACTONE) tablet 25 mg  25 mg Oral DAILY    tamsulosin (FLOMAX) capsule 0.4 mg  0.4 mg Oral QHS     Current Outpatient Medications   Medication Sig    clonazePAM (KlonoPIN) 0.25 mg TbDi Take 0.25 mg by mouth as needed for Other (seizure).  spironolactone (ALDACTONE) 25 mg tablet Take 25 mg by mouth daily.  hydrALAZINE (APRESOLINE) 100 mg tablet Take 100 mg by mouth three (3) times daily.     atorvastatin (LIPITOR) 40 mg tablet Take 40 mg by mouth daily.  acetaminophen (TYLENOL) 500 mg tablet Take 500 mg by mouth as needed for Pain.  divalproex DR (DEPAKOTE) 250 mg tablet Take 250 mg by mouth three (3) times daily. one tab every 8 hours    clopidogreL (PLAVIX) 75 mg tab Take 75 mg by mouth daily. Indications: prevention for a blood clot going to the brain    lisinopriL (PRINIVIL, ZESTRIL) 20 mg tablet Take 1 Tab by mouth daily. Indications: high blood pressure    tamsulosin (FLOMAX) 0.4 mg capsule Take 1 Cap by mouth nightly.  OXcarbaxepine (TRILEPTAL) 600 mg tablet Take 600 mg by mouth two (2) times a day.  ergocalciferol (VITAMIN D2) 50,000 unit capsule Take 50,000 Units by mouth every seven (7) days.  levETIRAcetam (KEPPRA) 1,000 mg tablet Take 1 Tab by mouth two (2) times a day.  amLODIPine (NORVASC) 10 mg tablet Take 1 Tab by mouth daily.  cyanocobalamin (VITAMIN B12) 500 mcg tablet Take 1 Tab by mouth daily. Review of Systems  Pertinent items are noted in HPI. Objective:     Visit Vitals  /75   Pulse 76   Temp 99.4 °F (37.4 °C)   Resp 14   SpO2 99%           Temp (24hrs), Av.4 °F (37.4 °C), Min:99.4 °F (37.4 °C), Max:99.4 °F (37.4 °C)      No intake/output data recorded. No intake/output data recorded. General appearance -awake, follows commands appropriately, not in acute distress  Eyes/Face - sclera anicteric, no pallor, right facial edema  Nose - no obvious nasal discharge.    Neck - supple, no JVD, trachea is midline  Chest -clear air entry noted in bases, no wheezes  Heart - S1 and S2 normal  Abdomen - soft, tenderness on deep palpation, nondistended, Bowel sounds present  Neurological - awake, follows commands, moves all extremities  Musculoskeletal - no joint tenderness or erythema of knees bilaterally  Extremities - no pedal edema noted      Data Review    Recent Results (from the past 24 hour(s))   EKG, 12 LEAD, INITIAL    Collection Time: 09/06/21  5:45 PM   Result Value Ref Range    Ventricular Rate 101 BPM    Atrial Rate 101 BPM    P-R Interval 132 ms    QRS Duration 82 ms    Q-T Interval 364 ms    QTC Calculation (Bezet) 471 ms    Calculated P Axis 67 degrees    Calculated R Axis -11 degrees    Calculated T Axis 108 degrees    Diagnosis       Sinus tachycardia  Septal infarct , age undetermined  Abnormal ECG  When compared with ECG of 04-SEP-2021 23:32,  Septal infarct is now present  T wave inversion less evident in Lateral leads  Confirmed by aGyle Bhatt ([id number]) on 9/6/2021 6:83:72 PM     METABOLIC PANEL, COMPREHENSIVE    Collection Time: 09/06/21  6:17 PM   Result Value Ref Range    Sodium 142 136 - 145 mmol/L    Potassium 3.8 3.5 - 5.5 mmol/L    Chloride 113 (H) 100 - 111 mmol/L    CO2 21 21 - 32 mmol/L    Anion gap 8 3.0 - 18 mmol/L    Glucose 96 74 - 99 mg/dL    BUN 15 7.0 - 18 MG/DL    Creatinine 1.95 (H) 0.6 - 1.3 MG/DL    BUN/Creatinine ratio 8 (L) 12 - 20      GFR est AA 31 (L) >60 ml/min/1.73m2    GFR est non-AA 26 (L) >60 ml/min/1.73m2    Calcium 9.4 8.5 - 10.1 MG/DL    Bilirubin, total 0.3 0.2 - 1.0 MG/DL    ALT (SGPT) 11 (L) 13 - 56 U/L    AST (SGOT) 119 (H) 10 - 38 U/L    Alk.  phosphatase 55 45 - 117 U/L    Protein, total 7.1 6.4 - 8.2 g/dL    Albumin 3.7 3.4 - 5.0 g/dL    Globulin 3.4 2.0 - 4.0 g/dL    A-G Ratio 1.1 0.8 - 1.7     ETHYL ALCOHOL    Collection Time: 09/06/21  6:17 PM   Result Value Ref Range    ALCOHOL(ETHYL),SERUM <3 0 - 3 MG/DL   MAGNESIUM    Collection Time: 09/06/21  6:17 PM   Result Value Ref Range    Magnesium 2.0 1.6 - 2.6 mg/dL   VALPROIC ACID    Collection Time: 09/06/21  6:17 PM   Result Value Ref Range    Valproic acid 3 (L) 50 - 100 ug/ml   CARDIAC PANEL,(CK, CKMB & TROPONIN)    Collection Time: 09/06/21  6:17 PM   Result Value Ref Range    CK - MB <1.0 <3.6 ng/ml    CK-MB Index  0.0 - 4.0 %     CALCULATION NOT PERFORMED WHEN RESULT IS BELOW LINEAR LIMIT     26 - 192 U/L    Troponin-I, QT <0.02 0.0 - 0.045 NG/ML   CBC WITH AUTOMATED DIFF    Collection Time: 09/06/21  6:17 PM   Result Value Ref Range    WBC 8.2 4.6 - 13.2 K/uL    RBC 3.14 (L) 4.20 - 5.30 M/uL    HGB 9.7 (L) 12.0 - 16.0 g/dL    HCT 29.3 (L) 35.0 - 45.0 %    MCV 93.3 78.0 - 100.0 FL    MCH 30.9 24.0 - 34.0 PG    MCHC 33.1 31.0 - 37.0 g/dL    RDW 16.1 (H) 11.6 - 14.5 %    PLATELET 656 110 - 448 K/uL    MPV 11.1 9.2 - 11.8 FL    NEUTROPHILS 73 40 - 73 %    LYMPHOCYTES 15 (L) 21 - 52 %    MONOCYTES 11 (H) 3 - 10 %    EOSINOPHILS 1 0 - 5 %    BASOPHILS 1 0 - 2 %    ABS. NEUTROPHILS 6.0 1.8 - 8.0 K/UL    ABS. LYMPHOCYTES 1.2 0.9 - 3.6 K/UL    ABS. MONOCYTES 0.9 0.05 - 1.2 K/UL    ABS. EOSINOPHILS 0.0 0.0 - 0.4 K/UL    ABS.  BASOPHILS 0.0 0.0 - 0.1 K/UL    DF AUTOMATED     URINALYSIS W/ RFLX MICROSCOPIC    Collection Time: 09/06/21  7:55 PM   Result Value Ref Range    Color DARK YELLOW      Appearance CLEAR      Specific gravity 1.024 1.005 - 1.030      pH (UA) 5.0 5.0 - 8.0      Protein 100 (A) NEG mg/dL    Glucose Negative NEG mg/dL    Ketone 15 (A) NEG mg/dL    Bilirubin MODERATE (A) NEG      Blood Negative NEG      Urobilinogen 1.0 0.2 - 1.0 EU/dL    Nitrites Negative NEG      Leukocyte Esterase SMALL (A) NEG     DRUG SCREEN, URINE    Collection Time: 09/06/21  7:55 PM   Result Value Ref Range    BENZODIAZEPINES Positive (A) NEG      BARBITURATES Negative NEG      THC (TH-CANNABINOL) Negative NEG      OPIATES Negative NEG      PCP(PHENCYCLIDINE) Negative NEG      COCAINE Negative NEG      AMPHETAMINES Negative NEG      METHADONE Negative NEG      HDSCOM (NOTE)    URINE MICROSCOPIC ONLY    Collection Time: 09/06/21  7:55 PM   Result Value Ref Range    WBC 0 to 3 0 - 5 /hpf    RBC Negative 0 - 5 /hpf    Epithelial cells FEW 0 - 5 /lpf    Bacteria Negative NEG /hpf    Amorphous Crystals 2+ (A) NEG    Hyaline cast 0 to 3 0 - 2 /lpf   CBC WITH AUTOMATED DIFF    Collection Time: 09/07/21  5:19 AM   Result Value Ref Range    WBC 6.0 4.6 - 13.2 K/uL    RBC 2.82 (L) 4.20 - 5.30 M/uL    HGB 8.5 (L) 12.0 - 16.0 g/dL    HCT 25.9 (L) 35.0 - 45.0 %    MCV 91.8 78.0 - 100.0 FL    MCH 30.1 24.0 - 34.0 PG    MCHC 32.8 31.0 - 37.0 g/dL    RDW 16.5 (H) 11.6 - 14.5 %    PLATELET 662 931 - 822 K/uL    MPV 11.4 9.2 - 11.8 FL    NEUTROPHILS 54 40 - 73 %    LYMPHOCYTES 31 21 - 52 %    MONOCYTES 13 (H) 3 - 10 %    EOSINOPHILS 1 0 - 5 %    BASOPHILS 1 0 - 2 %    ABS. NEUTROPHILS 3.2 1.8 - 8.0 K/UL    ABS. LYMPHOCYTES 1.9 0.9 - 3.6 K/UL    ABS. MONOCYTES 0.8 0.05 - 1.2 K/UL    ABS. EOSINOPHILS 0.1 0.0 - 0.4 K/UL    ABS. BASOPHILS 0.1 0.0 - 0.1 K/UL    DF AUTOMATED     METABOLIC PANEL, COMPREHENSIVE    Collection Time: 09/07/21  5:19 AM   Result Value Ref Range    Sodium 144 136 - 145 mmol/L    Potassium 3.7 3.5 - 5.5 mmol/L    Chloride 113 (H) 100 - 111 mmol/L    CO2 21 21 - 32 mmol/L    Anion gap 10 3.0 - 18 mmol/L    Glucose 71 (L) 74 - 99 mg/dL    BUN 13 7.0 - 18 MG/DL    Creatinine 1.58 (H) 0.6 - 1.3 MG/DL    BUN/Creatinine ratio 8 (L) 12 - 20      GFR est AA 40 (L) >60 ml/min/1.73m2    GFR est non-AA 33 (L) >60 ml/min/1.73m2    Calcium 9.0 8.5 - 10.1 MG/DL    Bilirubin, total 0.4 0.2 - 1.0 MG/DL    ALT (SGPT) 9 (L) 13 - 56 U/L    AST (SGOT) 11 10 - 38 U/L    Alk. phosphatase 52 45 - 117 U/L    Protein, total 6.3 (L) 6.4 - 8.2 g/dL    Albumin 3.5 3.4 - 5.0 g/dL    Globulin 2.8 2.0 - 4.0 g/dL    A-G Ratio 1.3 0.8 - 1.7     VITAMIN B12 & FOLATE    Collection Time: 09/07/21  5:19 AM   Result Value Ref Range    Vitamin B12 324 211 - 911 pg/mL    Folate 2.5 (L) 3.10 - 17.50 ng/mL   LIPASE    Collection Time: 09/07/21  5:19 AM   Result Value Ref Range    Lipase 75 73 - 393 U/L         Assessment/Plan:     ASSESSMENT:    1. Breakthrough seizures x2, no recurrence  2. Fall  3. Right periorbital contusion/edema  4. Abdominal pain  5. History of seizures  6. Hypertension  7. Acute on stage III chronic kidney disease due to dehydration, back to baseline  8. Low folic acid level  9. Anemia of chronic medical disease  10. History of stroke    PLAN:    Pending MRI report  Neurology input noted about continuing Keppra and Trileptal only  We will obtain CT abdomen pelvis without contrast  Discontinue IV fluid and monitor renal function  Patient be started on folic acid and multivitamin  PT, OT and speech therapist to follow this patient  Continue lisinopril, Flomax, Norvasc, Plavix, and statin     will be consulted for discharge planning. Patient can be discharged from the hospital tomorrow if remains stable overnight    Total time to take care of this patient was 35 minutes and more than 50% of time was spent counseling and coordinating care. Disclaimer: Sections of this note are dictated using utilizing voice recognition software, which may have resulted in some phonetic based errors in grammar and contents. Even though attempts were made to correct all the mistakes, some may have been missed, and remained in the body of the document. If questions arise, please contact our department.

## 2021-09-07 NOTE — PROGRESS NOTES
OT orders received and medical chart review completed.    [id number] Filler, OT to follow    Thank you for this referral,  Zohreh Baird MS OTR/L

## 2021-09-07 NOTE — PROGRESS NOTES
Progress Note    Patient: Ashley Larose MRN: [id number]  CSN: [id number]    YOB: 1955  Age: 78 y.o. Sex: female    DOA: 9/6/2021 LOS:  LOS: 0 days                    Subjective:     Patient is lying in bed in no acute distress but tearful. She endorses that in addition to two seizures over the last few days [one occurred four days prior to her second], she had an additional seizure in [id number] surrounding her son's passing. When asked if she was adherent to her medication regimen, she admitted to missing multiple doses of her medication weekly. Chief Complaint:   Chief Complaint   Patient presents with    Seizure    Fall       Review of systems  General: No fevers or chills. Cardiovascular: No chest pain or pressure. No palpitations. Pulmonary: No shortness of breath, cough or wheeze. Gastrointestinal: No abdominal pain, nausea, vomiting or diarrhea. Genitourinary: No urinary frequency, urgency, hesitancy or dysuria. Musculoskeletal: No joint or muscle pain, no back pain, no recent trauma. Neurologic: No headache, numbness, tingling or weakness. Objective:     Physical Exam:  Visit Vitals  BP (!) 136/56   Pulse 89   Temp 99.4 °F (37.4 °C)   Resp 18   SpO2 100%        Physical Exam:  General:         right side facial/orbit edema     HEENT:           NC, Atraumatic.  PERRLA, anicteric sclerae. Lungs:            CTA bilaterally. No Wheezing/Rhonchi/Rales  Heart:              RRR, No murmur, No Rubs, No Gallops  Abdomen:      Soft, Non distended, Non tender.  +Bowel sounds, no HSM  Extremities:   No c/c/e  Psych:              Good insight. Not anxious or agitated. Neurologic:     Alert and oriented X 3.  No acute neurological deficits    Intake and Output:  Current Shift:  No intake/output data recorded. Last three shifts:  No intake/output data recorded.     Labs: Results:       Chemistry Recent Labs     09/07/21  0519 09/06/21  1817 09/04/21 2327 09/04/21 2115 09/04/21 2115   GLU 71* 96 89   < > 88    142 134*   < > 135*   K 3.7 3.8 5.2   < > 5.8*   * 113* 107   < > 105   CO2 21 21 21   < > 22   BUN 13 15 16   < > 15   CREA 1.58* 1.95* 1.48*   < > 1.61*   CA 9.0 9.4 9.7   < > 10.1   AGAP 10 8 6   < > 8   BUCR 8* 8* 11*   < > 9*   AP 52 55  --   --  62   TP 6.3* 7.1  --   --  8.9*   ALB 3.5 3.7  --   --  4.4   GLOB 2.8 3.4  --   --  4.5*   AGRAT 1.3 1.1  --   --  1.0    < > = values in this interval not displayed. CBC w/Diff Recent Labs     09/07/21 0519 09/06/21 1817 09/04/21 2115   WBC 6.0 8.2 7.8   RBC 2.82* 3.14* 3.67*   HGB 8.5* 9.7* 11.2*   HCT 25.9* 29.3* 33.7*    196 222   GRANS 54 73 65   LYMPH 31 15* 26   EOS 1 1 1      Cardiac Enzymes Recent Labs     09/06/21 1817      CKND1 CALCULATION NOT PERFORMED WHEN RESULT IS BELOW LINEAR LIMIT      Coagulation No results for input(s): PTP, INR, APTT, INREXT in the last 72 hours. Lipid Panel Lab Results   Component Value Date/Time    Cholesterol, total 144 05/14/2021 01:22 PM    HDL Cholesterol 46 05/14/2021 01:22 PM    LDL, calculated 77.6 05/14/2021 01:22 PM    VLDL, calculated 20.4 05/14/2021 01:22 PM    Triglyceride 102 05/14/2021 01:22 PM    CHOL/HDL Ratio 3.1 05/14/2021 01:22 PM      BNP No results for input(s): BNPP in the last 72 hours. Liver Enzymes Recent Labs     09/07/21 0519   TP 6.3*   ALB 3.5   AP 52      Thyroid Studies Lab Results   Component Value Date/Time    TSH 0.93 05/14/2021 01:22 PM          Procedures/imaging: see electronic medical records for all procedures/Xrays and details which were not copied into this note but were reviewed prior to creation of Plan    Medications:   Current Facility-Administered Medications   Medication Dose Route Frequency    OXcarbazepine (TRILEPTAL) tablet 900 mg  900 mg Oral BID    . Please enter patient's Height and Weight for drug/monitoring purposes. Thank you.   1 Each Other ONCE    sodium chloride (NS) flush 5-40 mL  5-40 mL IntraVENous Q8H    sodium chloride (NS) flush 5-40 mL  5-40 mL IntraVENous PRN    acetaminophen (TYLENOL) tablet 650 mg  650 mg Oral Q6H PRN    Or    acetaminophen (TYLENOL) suppository 650 mg  650 mg Rectal Q6H PRN    polyethylene glycol (MIRALAX) packet 17 g  17 g Oral DAILY PRN    ondansetron (ZOFRAN ODT) tablet 4 mg  4 mg Oral Q8H PRN    Or    ondansetron (ZOFRAN) injection 4 mg  4 mg IntraVENous Q6H PRN    famotidine (PEPCID) tablet 20 mg  20 mg Oral BID    levETIRAcetam in saline (iso-os) (KEPPRA) infusion 1,000 mg  1,000 mg IntraVENous Q12H    amLODIPine (NORVASC) tablet 10 mg  10 mg Oral DAILY    atorvastatin (LIPITOR) tablet 40 mg  40 mg Oral DAILY    clopidogreL (PLAVIX) tablet 75 mg  75 mg Oral DAILY    cyanocobalamin tablet 500 mcg  500 mcg Oral DAILY    divalproex DR (DEPAKOTE) tablet 250 mg  250 mg Oral TID    lisinopriL (PRINIVIL, ZESTRIL) tablet 20 mg  20 mg Oral DAILY    spironolactone (ALDACTONE) tablet 25 mg  25 mg Oral DAILY    tamsulosin (FLOMAX) capsule 0.4 mg  0.4 mg Oral QHS     Current Outpatient Medications   Medication Sig    clonazePAM (KlonoPIN) 0.25 mg TbDi Take 0.25 mg by mouth as needed for Other (seizure).  spironolactone (ALDACTONE) 25 mg tablet Take 25 mg by mouth daily.  hydrALAZINE (APRESOLINE) 100 mg tablet Take 100 mg by mouth three (3) times daily.  atorvastatin (LIPITOR) 40 mg tablet Take 40 mg by mouth daily.  acetaminophen (TYLENOL) 500 mg tablet Take 500 mg by mouth as needed for Pain.  divalproex DR (DEPAKOTE) 250 mg tablet Take 250 mg by mouth three (3) times daily. one tab every 8 hours    clopidogreL (PLAVIX) 75 mg tab Take 75 mg by mouth daily. Indications: prevention for a blood clot going to the brain    lisinopriL (PRINIVIL, ZESTRIL) 20 mg tablet Take 1 Tab by mouth daily. Indications: high blood pressure    tamsulosin (FLOMAX) 0.4 mg capsule Take 1 Cap by mouth nightly.     OXcarbaxepine (TRILEPTAL) 600 mg tablet Take 600 mg by mouth two (2) times a day.  ergocalciferol (VITAMIN D2) 50,000 unit capsule Take 50,000 Units by mouth every seven (7) days.  levETIRAcetam (KEPPRA) 1,000 mg tablet Take 1 Tab by mouth two (2) times a day.  amLODIPine (NORVASC) 10 mg tablet Take 1 Tab by mouth daily.  cyanocobalamin (VITAMIN B12) 500 mcg tablet Take 1 Tab by mouth daily. Assessment/Plan     Active Problems:    Dehydration (9/6/2021)      Anemia (9/6/2021)      KEYLA (acute kidney injury) ([facility] [address] (9/6/2021)      Breakthrough seizure ([facility] [address] (9/6/2021)      79 y/o with h/o generalized tonic-clonic seizures managed with x3 AED admitted for breakthrough seizure likely 2/2 medication non-adherence. 1. Breakthrough seizure: Post-ictal upon arrival; now improved back to baseline. - Appreciate neurology recommendation  - Will continue Trileptal 900mg BID; Depakote 250mg TID; Keppra 1000mg BID  - Will monitor for electrolyte derangements   - No need for EEG    2. Acute kidney injury: Pre-renal azotemia with 2/2 ATN vs AIN now improving with volume repletion and oral intake. CK normal.   - Trend daily    3. Anemia: Normocytic, downtrending. No e/o acute bleeding or hemolysis  - Ensure she is UTD on colon cancer screening  - Transfuse for Hgb < 7  - VItamin B12/folate level    4.  History of CVA:  - Continue Plavix, statin, lisinopril, Aldactone      Case discussed with:  [x]Patient  []Family  [x]Nursing  []Case Management  DVT Prophylaxis:  []Lovenox  []Hep SQ  [x]SCDs  []Coumadin   []On Heparin gtt    Shakir Hopson MD  9/7/2021 1:37 PM

## 2021-09-07 NOTE — PROGRESS NOTES
MRI Safety Screening form needs to be filled out and FAXED to [phone] BEFORE MRI can be scheduled. If unable to acquire information from patient, [facility] must be contacted, or screening xrays will need to be ordred.     If pt is Claustrophobic or needs Pain Meds, please have these ordered in advance to help facilitate MRI exam.

## 2021-09-07 NOTE — CONSULTS
NEUROLOGY CONSULT NOTE    Patient ID:  Stephane Castillo  [id number]  89 y.o.  1955    Date of Consultation:  September 7, 2021    Referring Physician: Dr Annemarie Blood    Reason for Consultation:  seizures        Subjective:       History of Present Illness:     Stephane Castillo is a 78 y.o. female who presented yesterday after a seizure and fall at home. EMS called after a witness seizure and fall at home. On initial presentation she was reportedly post-ictal. Information taken from EMR. Reported 2 witness seizures prior to ER arrival. One seizure, tonic/clonic lasted several minutes. 2nd witness seizure, tonic/clonic lasted approximately 5 minutes. 5 mg versed IM given for seizure activity. Multiple falls at home reported. Patient currently states she feels \"terrible and the right side of face hurts\". She does not remember the day of admission. Review of her EMR indicates a known history of epilepsy with occasional breakthrough seizures. It appears she is on a rather complex regimen of medications for seizures which includes three agents, two of which are dosed rather low. Her depakote level was also very low this admission. Presently she is in the ER and somewhat disoriented.        Patient Active Problem List    Diagnosis Date Noted    Dehydration 09/06/2021    Anemia 09/06/2021    KEYLA (acute kidney injury) ([address] 09/06/2021    Breakthrough seizure ([address] 09/06/2021    Encephalopathy 09/21/2020    Hallucinations 09/21/2020    Abnormal coordination 09/21/2020    Recurrent seizures ([address] 09/08/2020    Seizure ([address] 08/03/2019    Status epilepticus ([address] 08/02/2019    TIA (transient ischemic attack) 01/07/2016    Dyslipidemia 12/01/2015    Left-sided weakness 10/30/2015    Essential hypertension 09/11/2015    Convulsion ([address] 07/21/2015    Elevated Dilantin level 02/20/2015    CVA (cerebral infarction) 03/10/2014    Non compliance w medication regimen 03/10/2014    Pulmonary embolism (HCC) 02/02/2014  Chest pain 02/02/2014    Other and unspecified noninfectious gastroenteritis and colitis(558.9) 02/02/2014    Ataxia 12/30/2011    Headache 12/30/2011     Past Medical History:   Diagnosis Date    Abnormal coordination 9/21/2020    Hallucinations 9/21/2020    Hypertension     Neurological disorder     Seizures ([facility] [address]     Stroke ([facility] [address] 2009      No past surgical history on file. Prior to Admission medications    Medication Sig Start Date End Date Taking? Authorizing Provider   clonazePAM (KlonoPIN) 0.25 mg TbDi Take 0.25 mg by mouth as needed for Other (seizure). Provider, Historical   spironolactone (ALDACTONE) 25 mg tablet Take 25 mg by mouth daily. Provider, Historical   hydrALAZINE (APRESOLINE) 100 mg tablet Take 100 mg by mouth three (3) times daily. Provider, Historical   atorvastatin (LIPITOR) 40 mg tablet Take 40 mg by mouth daily. Provider, Historical   acetaminophen (TYLENOL) 500 mg tablet Take 500 mg by mouth as needed for Pain. Provider, Historical   divalproex DR (DEPAKOTE) 250 mg tablet Take 250 mg by mouth three (3) times daily. one tab every 8 hours 9/19/20   Provider, Historical   clopidogreL (PLAVIX) 75 mg tab Take 75 mg by mouth daily. Indications: prevention for a blood clot going to the brain    Provider, Historical   lisinopriL (PRINIVIL, ZESTRIL) 20 mg tablet Take 1 Tab by mouth daily. Indications: high blood pressure 9/17/20   Maria L Butler MD   tamsulosin [facility]) 0.4 mg capsule Take 1 Cap by mouth nightly. 9/17/20   Maria L Butler MD   OXcarbaxepine (TRILEPTAL) 600 mg tablet Take 600 mg by mouth two (2) times a day. Provider, Historical   ergocalciferol (VITAMIN D2) 50,000 unit capsule Take 50,000 Units by mouth every seven (7) days. Other, MD Yumiko   levETIRAcetam (KEPPRA) 1,000 mg tablet Take 1 Tab by mouth two (2) times a day. 10/24/17   Herbert Felder MD   amLODIPine (NORVASC) 10 mg tablet Take 1 Tab by mouth daily.  12/1/15   Eusebia Mares MD cyanocobalamin (VITAMIN B12) 500 mcg tablet Take 1 Tab by mouth daily. 3/13/14   Lamar Alaniz MD     Allergies   Allergen Reactions    Tomato Hives     Allergic to eating tomato.  Aspirin Nausea and Vomiting    Ciprofloxacin Rash    Pcn [Penicillins] Rash and Hives    Sulfa (Sulfonamide Antibiotics) Hives and Rash      Social History     Tobacco Use    Smoking status: Never Smoker    Smokeless tobacco: Never Used   Substance Use Topics    Alcohol use: Yes     Comment: Socially       Family History   Problem Relation Age of Onset    Diabetes Other     Stroke Other               Review of Systems      GENERAL: \"feeling terrible\" right side of face hurts   HEENT: No change in vision, no earache, no tinnitus, no sore throat or sinus congestion. NECK: No pain or stiffness. PULMONARY: No shortness of breath, no cough or wheeze. Cardiovascular: no pnd or orthopnea, no CP  GASTROINTESTINAL: No abdominal pain, no nausea, no vomiting or diarrhea, no melena or bright red blood per rectum. GENITOURINARY: No urinary frequency, no urgency, no hesitancy or dysuria. MUSCULOSKELETAL: No joint or muscle pain, no back pain, no recent trauma. DERMATOLOGIC: No rash, no itching, no lesions. ENDOCRINE: No polyuria, no polydipsia, no heat or cold intolerance. No recent change in weight. HEMATOLOGICAL: No anemia or easy bruising or bleeding. NEUROLOGIC: No headache, no seizures, no numbness, no tingling or weakness.          Objective:     Patient Vitals for the past 8 hrs:   BP Pulse Resp SpO2   09/07/21 0745 (!) 136/56 89 18 100 %   09/07/21 0510 -- 91 16 100 %   09/07/21 0500 129/65 95 17 100 %   09/07/21 0450 -- 96 15 100 %   09/07/21 0440 -- 96 14 100 %   09/07/21 0430 138/66 92 15 100 %   09/07/21 0420 -- (!) 109 21 100 %   09/07/21 0410 -- (!) 102 24 100 %       General Exam  No acute distress, normal body habitus    HEENT: Normocephalic, atraumatic, Sclera anicteric, normal conjunctiva  Mucous membranes: normal color and hydration, no evidence of tongue biting. Lungs: clear anteriorly  Skin: no lesions no rashes, normal color  CV: Heart: regular to rate and rhythm. No murmurs     Neurologic Exam:    Mental status:  Alert, oriented to person, place only, time is 2020  No visual spatial neglect or overt apraxia\  She will occasional make a noise suggesting painful distress. Language: normal fluency and comprehension to simple commands.      Cranial nerves: PERRL, Extraocular movements intact and full, face symmetric to movement, Tongue midline with normal strength, palat symmetric    Motor: strength >4/5 throughout and symmetric  No abnormal movements    Coordination: No overt tremors    DTRs (R/L) 3+ throughout    Data Review:    Recent Results (from the past 24 hour(s))   EKG, 12 LEAD, INITIAL    Collection Time: 09/06/21  5:45 PM   Result Value Ref Range    Ventricular Rate 101 BPM    Atrial Rate 101 BPM    P-R Interval 132 ms    QRS Duration 82 ms    Q-T Interval 364 ms    QTC Calculation (Bezet) 471 ms    Calculated P Axis 67 degrees    Calculated R Axis -11 degrees    Calculated T Axis 108 degrees    Diagnosis       Sinus tachycardia  Septal infarct , age undetermined  Abnormal ECG  When compared with ECG of 04-SEP-2021 23:32,  Septal infarct is now present  T wave inversion less evident in Lateral leads  Confirmed by Cecily Cabot [id number]) on 9/6/2021 5:93:64 PM     METABOLIC PANEL, COMPREHENSIVE    Collection Time: 09/06/21  6:17 PM   Result Value Ref Range    Sodium 142 136 - 145 mmol/L    Potassium 3.8 3.5 - 5.5 mmol/L    Chloride 113 (H) 100 - 111 mmol/L    CO2 21 21 - 32 mmol/L    Anion gap 8 3.0 - 18 mmol/L    Glucose 96 74 - 99 mg/dL    BUN 15 7.0 - 18 MG/DL    Creatinine 1.95 (H) 0.6 - 1.3 MG/DL    BUN/Creatinine ratio 8 (L) 12 - 20      GFR est AA 31 (L) >60 ml/min/1.73m2    GFR est non-AA 26 (L) >60 ml/min/1.73m2    Calcium 9.4 8.5 - 10.1 MG/DL    Bilirubin, total 0.3 0.2 - 1.0 MG/DL ALT (SGPT) 11 (L) 13 - 56 U/L    AST (SGOT) 119 (H) 10 - 38 U/L    Alk. phosphatase 55 45 - 117 U/L    Protein, total 7.1 6.4 - 8.2 g/dL    Albumin 3.7 3.4 - 5.0 g/dL    Globulin 3.4 2.0 - 4.0 g/dL    A-G Ratio 1.1 0.8 - 1.7     ETHYL ALCOHOL    Collection Time: 09/06/21  6:17 PM   Result Value Ref Range    ALCOHOL(ETHYL),SERUM <3 0 - 3 MG/DL   MAGNESIUM    Collection Time: 09/06/21  6:17 PM   Result Value Ref Range    Magnesium 2.0 1.6 - 2.6 mg/dL   VALPROIC ACID    Collection Time: 09/06/21  6:17 PM   Result Value Ref Range    Valproic acid 3 (L) 50 - 100 ug/ml   CARDIAC PANEL,(CK, CKMB & TROPONIN)    Collection Time: 09/06/21  6:17 PM   Result Value Ref Range    CK - MB <1.0 <3.6 ng/ml    CK-MB Index  0.0 - 4.0 %     CALCULATION NOT PERFORMED WHEN RESULT IS BELOW LINEAR LIMIT     26 - 192 U/L    Troponin-I, QT <0.02 0.0 - 0.045 NG/ML   CBC WITH AUTOMATED DIFF    Collection Time: 09/06/21  6:17 PM   Result Value Ref Range    WBC 8.2 4.6 - 13.2 K/uL    RBC 3.14 (L) 4.20 - 5.30 M/uL    HGB 9.7 (L) 12.0 - 16.0 g/dL    HCT 29.3 (L) 35.0 - 45.0 %    MCV 93.3 78.0 - 100.0 FL    MCH 30.9 24.0 - 34.0 PG    MCHC 33.1 31.0 - 37.0 g/dL    RDW 16.1 (H) 11.6 - 14.5 %    PLATELET 155 935 - 619 K/uL    MPV 11.1 9.2 - 11.8 FL    NEUTROPHILS 73 40 - 73 %    LYMPHOCYTES 15 (L) 21 - 52 %    MONOCYTES 11 (H) 3 - 10 %    EOSINOPHILS 1 0 - 5 %    BASOPHILS 1 0 - 2 %    ABS. NEUTROPHILS 6.0 1.8 - 8.0 K/UL    ABS. LYMPHOCYTES 1.2 0.9 - 3.6 K/UL    ABS. MONOCYTES 0.9 0.05 - 1.2 K/UL    ABS. EOSINOPHILS 0.0 0.0 - 0.4 K/UL    ABS.  BASOPHILS 0.0 0.0 - 0.1 K/UL    DF AUTOMATED     URINALYSIS W/ RFLX MICROSCOPIC    Collection Time: 09/06/21  7:55 PM   Result Value Ref Range    Color DARK YELLOW      Appearance CLEAR      Specific gravity 1.024 1.005 - 1.030      pH (UA) 5.0 5.0 - 8.0      Protein 100 (A) NEG mg/dL    Glucose Negative NEG mg/dL    Ketone 15 (A) NEG mg/dL    Bilirubin MODERATE (A) NEG      Blood Negative NEG Urobilinogen 1.0 0.2 - 1.0 EU/dL    Nitrites Negative NEG      Leukocyte Esterase SMALL (A) NEG     DRUG SCREEN, URINE    Collection Time: 09/06/21  7:55 PM   Result Value Ref Range    BENZODIAZEPINES Positive (A) NEG      BARBITURATES Negative NEG      THC (TH-CANNABINOL) Negative NEG      OPIATES Negative NEG      PCP(PHENCYCLIDINE) Negative NEG      COCAINE Negative NEG      AMPHETAMINES Negative NEG      METHADONE Negative NEG      HDSCOM (NOTE)    URINE MICROSCOPIC ONLY    Collection Time: 09/06/21  7:55 PM   Result Value Ref Range    WBC 0 to 3 0 - 5 /hpf    RBC Negative 0 - 5 /hpf    Epithelial cells FEW 0 - 5 /lpf    Bacteria Negative NEG /hpf    Amorphous Crystals 2+ (A) NEG    Hyaline cast 0 to 3 0 - 2 /lpf   CBC WITH AUTOMATED DIFF    Collection Time: 09/07/21  5:19 AM   Result Value Ref Range    WBC 6.0 4.6 - 13.2 K/uL    RBC 2.82 (L) 4.20 - 5.30 M/uL    HGB 8.5 (L) 12.0 - 16.0 g/dL    HCT 25.9 (L) 35.0 - 45.0 %    MCV 91.8 78.0 - 100.0 FL    MCH 30.1 24.0 - 34.0 PG    MCHC 32.8 31.0 - 37.0 g/dL    RDW 16.5 (H) 11.6 - 14.5 %    PLATELET 510 130 - 622 K/uL    MPV 11.4 9.2 - 11.8 FL    NEUTROPHILS 54 40 - 73 %    LYMPHOCYTES 31 21 - 52 %    MONOCYTES 13 (H) 3 - 10 %    EOSINOPHILS 1 0 - 5 %    BASOPHILS 1 0 - 2 %    ABS. NEUTROPHILS 3.2 1.8 - 8.0 K/UL    ABS. LYMPHOCYTES 1.9 0.9 - 3.6 K/UL    ABS. MONOCYTES 0.8 0.05 - 1.2 K/UL    ABS. EOSINOPHILS 0.1 0.0 - 0.4 K/UL    ABS.  BASOPHILS 0.1 0.0 - 0.1 K/UL    DF AUTOMATED     METABOLIC PANEL, COMPREHENSIVE    Collection Time: 09/07/21  5:19 AM   Result Value Ref Range    Sodium 144 136 - 145 mmol/L    Potassium 3.7 3.5 - 5.5 mmol/L    Chloride 113 (H) 100 - 111 mmol/L    CO2 21 21 - 32 mmol/L    Anion gap 10 3.0 - 18 mmol/L    Glucose 71 (L) 74 - 99 mg/dL    BUN 13 7.0 - 18 MG/DL    Creatinine 1.58 (H) 0.6 - 1.3 MG/DL    BUN/Creatinine ratio 8 (L) 12 - 20      GFR est AA 40 (L) >60 ml/min/1.73m2    GFR est non-AA 33 (L) >60 ml/min/1.73m2    Calcium 9.0 8.5 - 10.1 MG/DL    Bilirubin, total 0.4 0.2 - 1.0 MG/DL    ALT (SGPT) 9 (L) 13 - 56 U/L    AST (SGOT) 11 10 - 38 U/L    Alk. phosphatase 52 45 - 117 U/L    Protein, total 6.3 (L) 6.4 - 8.2 g/dL    Albumin 3.5 3.4 - 5.0 g/dL    Globulin 2.8 2.0 - 4.0 g/dL    A-G Ratio 1.3 0.8 - 1.7           Radiology studies: MRI reviewed and is negative for any acute stroke      Assessment: This is a 79 y/o AAF with a history of a prior large right parietal stroke complicated by epilepsy. She presents with breakthrough seizures. Her records indicate a complex regimen with a good reasonable dose of Keppra at 1000mg BID and valproic acid 250 TID (low dose) and Trileptal 600mg BID (moderate dose). She reports that she takes her own medicine but this is either inaccurate or she is not taking them appropriately. Her VPA level was quite low which means she may not be taking this as she should. Still a regimen with 2 BID medications and 1 TID medications is very difficult to maintain and in this case looks unnecessary. Given the current dosing, would recommend d/c VPA altogether and increasing Trileptal to 900 BID and mantaining Keppra at 1000mg BID. There is no concern for stroke. Active Problems:    Dehydration (9/6/2021)      Anemia (9/6/2021)      KEYLA (acute kidney injury) ([facility] [address] (9/6/2021)      Breakthrough seizure ([facility] [address] (9/6/2021)        Plan:     1. D/c VPA  2. Increase trileptal to 900mg BID, will need to watch for hyponatremia  3. Keppra at 1000mg BID  4. I have d/c'd the EEG because it will no affect management  5. OK for d/c to home when back to baseline. Indy Pennington M.D.   Clinical Neurophysiology  Neuromuscular specialist

## 2021-09-07 NOTE — PROGRESS NOTES
Speech Therapy Note:    Eval & Tx acknowledged. Could not progress with ST because patient :     [ ] was unresponsive. [ ] deferred due:   [X] was off the unit. [ ] on hold. [ ] NPO for [facility] this afternoon  [X] Other: for procedure      D/w RN. Speech Therapist will follow up later this day as indicated by Cristo Roberto.  Dewey House M.S., [id number] [place] [address]  [phone]

## 2021-09-07 NOTE — H&P
History & Physical    Patient: Nick Anne MRN: [id number]  CSN: [id number]    YOB: 1955  Age: 78 y.o. Sex: female      DOA: 9/6/2021  CC:seizure and fall     PCP: Linda Webb NP       HPI:     Nick Anne is a 78 y.o. female who presented after a seizure and fall at home. EMS called after a witness seizure and fall at home. Currently patient is post-ictal and limited history. Information taken from EMR. Reported 2 witness seizures prior to ER arrival. One seizure, tonic/clonic lasted several minutes. 2nd witness seizure, tonic/clonic lasted approximately 5 minutes. 5 mg versed IM given for seizure activity. Multiple falls at home reported. Patient currently states she feels \"terrible and the right side of face hurts\". She does not remember today. She was unaware she was in the hospital. She recalls coming to ER last week but can't remember why. She lives in the home with older son, Mel Johnson and younger son, Kevon Estrella. ROS limited due to postictal   GENERAL: \"feeling terrible\" right side of face hurts   HEENT: No change in vision, no earache, no tinnitus, no sore throat or sinus congestion. NECK: No pain or stiffness. PULMONARY: No shortness of breath, no cough or wheeze. Cardiovascular: no pnd or orthopnea, no CP  GASTROINTESTINAL: No abdominal pain, no nausea, no vomiting or diarrhea, no melena or bright red blood per rectum. GENITOURINARY: No urinary frequency, no urgency, no hesitancy or dysuria. MUSCULOSKELETAL: No joint or muscle pain, no back pain, no recent trauma. DERMATOLOGIC: No rash, no itching, no lesions. ENDOCRINE: No polyuria, no polydipsia, no heat or cold intolerance. No recent change in weight. HEMATOLOGICAL: No anemia or easy bruising or bleeding. NEUROLOGIC: No headache, no seizures, no numbness, no tingling or weakness.      Past Medical History:   Diagnosis Date    Abnormal coordination 9/21/2020    Hallucinations 9/21/2020    Hypertension  Neurological disorder     Se[facility] ([facility]     Stroke ([facility] 2009       No past surgical history on file. Family History   Problem Relation Age of Onset    Diabetes Other     Stroke Other        Social History     Socioeconomic History    Marital status:      Spouse name: Not on file    Number of children: Not on file    Years of education: Not on file    Highest education level: Not on file   Tobacco Use    Smoking status: Never Smoker    Smokeless tobacco: Never Used   Vaping Use    Vaping Use: Never used   Substance and Sexual Activity    Alcohol use: Yes     Comment: Socially     Drug use: No     Social Determinants of Health     Financial Resource Strain:     Difficulty of Paying Living Expenses:    Food Insecurity:     Worried About Running Out of Food in the Last Year:     [address] in the Last Year:    Transportation Needs:     Lack of Transportation (Medical):  Lack of Transportation (Non-Medical):    Physical Activity:     Days of Exercise per Week:     Minutes of Exercise per Session:    Stress:     Feeling of Stress :    Social Connections:     Frequency of Communication with Friends and Family:     Frequency of Social Gatherings with Friends and Family:     Attends Mosque Services:     Active Member of Clubs or Organizations:     Attends Club or Organization Meetings:     Marital Status:        Prior to Admission medications    Medication Sig Start Date End Date Taking? Authorizing Provider   clonazePAM (KlonoPIN) 0.25 mg TbDi Take 0.25 mg by mouth as needed for Other (seizure). Provider, Historical   spironolactone (ALDACTONE) 25 mg tablet Take 25 mg by mouth daily. Provider, Historical   hydrALAZINE (APRESOLINE) 100 mg tablet Take 100 mg by mouth three (3) times daily. Provider, Historical   atorvastatin (LIPITOR) 40 mg tablet Take 40 mg by mouth daily.     Provider, Historical   acetaminophen (TYLENOL) 500 mg tablet Take 500 mg by mouth as needed for Pain. Provider, Historical   divalproex DR (DEPAKOTE) 250 mg tablet Take 250 mg by mouth three (3) times daily. one tab every 8 hours 9/19/20   Provider, Historical   clopidogreL (PLAVIX) 75 mg tab Take 75 mg by mouth daily. Indications: prevention for a blood clot going to the brain    Provider, Historical   lisinopriL (PRINIVIL, ZESTRIL) 20 mg tablet Take 1 Tab by mouth daily. Indications: high blood pressure 9/17/20   Debbie Liu MD   tamsulosin [facility]) 0.4 mg capsule Take 1 Cap by mouth nightly. 9/17/20   Debbie Liu MD   OXcarbaxepine (TRILEPTAL) 600 mg tablet Take 600 mg by mouth two (2) times a day. Provider, Historical   ergocalciferol (VITAMIN D2) 50,000 unit capsule Take 50,000 Units by mouth every seven (7) days. Other, MD Yumiko   levETIRAcetam (KEPPRA) 1,000 mg tablet Take 1 Tab by mouth two (2) times a day. 10/24/17   Allyson Lentz MD   amLODIPine (NORVASC) 10 mg tablet Take 1 Tab by mouth daily. 12/1/15   Wilfred Padilla MD   cyanocobalamin (VITAMIN B12) 500 mcg tablet Take 1 Tab by mouth daily. 3/13/14   Karla Alaniz MD       Allergies   Allergen Reactions    Tomato Hives     Allergic to eating tomato.  Aspirin Nausea and Vomiting    Ciprofloxacin Rash    Pcn [Penicillins] Rash and Hives    Sulfa (Sulfonamide Antibiotics) Hives and Rash              Physical Exam:      Visit Vitals  /78   Pulse 91   Temp 99.4 °F (37.4 °C)   Resp 14   SpO2 100%       Physical Exam:  General:         right side facial/orbit edema     HEENT:           NC, Atraumatic. PERRLA, anicteric sclerae. Lungs:            CTA bilaterally. No Wheezing/Rhonchi/Rales  Heart:              RRR, No murmur, No Rubs, No Gallops  Abdomen:      Soft, Non distended, Non tender. +Bowel sounds, no HSM  Extremities:   No c/c/e  Psych:              Good insight. Not anxious or agitated. Neurologic:     Alert and oriented X 3.   No acute neurological deficits    Lab/Data Review:  Labs: Results:       Chemistry Recent Labs     09/06/21 1817 09/04/21 2327 09/04/21 2115   GLU 96 89 88    134* 135*   K 3.8 5.2 5.8*   * 107 105   CO2 21 21 22   BUN 15 16 15   CREA 1.95* 1.48* 1.61*   CA 9.4 9.7 10.1   AGAP 8 6 8   BUCR 8* 11* 9*   AP 55  --  62   TP 7.1  --  8.9*   ALB 3.7  --  4.4   GLOB 3.4  --  4.5*   AGRAT 1.1  --  1.0      CBC w/Diff Recent Labs     09/06/21 1817 09/04/21 2115   WBC 8.2 7.8   RBC 3.14* 3.67*   HGB 9.7* 11.2*   HCT 29.3* 33.7*    222   GRANS 73 65   LYMPH 15* 26   EOS 1 1      Coagulation No results for input(s): PTP, INR, APTT, INREXT in the last 72 hours. Iron/Ferritin No results for input(s): IRON in the last 72 hours. No lab exists for component: TIBCCALC   BNP No results for input(s): BNPP in the last 72 hours. Cardiac Enzymes Recent Labs     09/06/21 1817      CKND1 CALCULATION NOT PERFORMED WHEN RESULT IS BELOW LINEAR LIMIT      Liver Enzymes Recent Labs     09/06/21 1817   TP 7.1   ALB 3.7   AP 55      Thyroid Studies Lab Results   Component Value Date/Time    TSH 0.93 05/14/2021 01:22 PM          All Micro Results     None          Imaging Reviewed:  XR Results (most recent):  Results from Hospital Encounter encounter on 09/06/21    XR CHEST SNGL V    Narrative  Portable Chest    CPT CODE: 27708    HISTORY: Fall. FINDINGS:    Single frontal view of the chest compared with 9/4/2021. Overlying clothing artifact. Heart size and mediastinal contours within normal  limits. No pulmonary vascular congestion, effusion, or pneumothorax. No acute  lung consolidation. No acute fracture. Impression  No radiographic finding for an acute cardiopulmonary process.     CT Results (most recent):  Results from Hospital Encounter encounter on 09/06/21    CT ORBIT POST FOSSA WO CONT    Narrative  EXAM: CT of the orbits without contrast.    INDICATION: Status post fall    TECHNIQUE: CT of the orbits obtained without contrast. Sagittal and coronal  reformations obtained. All CT scans at this facility are performed using dose optimization technique as  appropriate to a performed exam, to include automated exposure control,  adjustment of the mA and/or kV according to patient size (including appropriate  matching for site specific examination) or use of iterative reconstruction  technique. IV Contrast: None    FINDINGS:    Small 2 moderate size right periorbital contusion. There is mild right-sided  preseptal fat stranding. No evidence of postseptal fat stranding. The orbital globes are symmetrical. Prior bilateral lens replacements. The optic  nerves appear within normal limits. The extraocular muscles are symmetric. The  retrobulbar fat is unremarkable. The periorbital soft tissues are unremarkable. The orbital walls and rims appear intact. Mild bilateral maxillary sinus mucosal thickening. The visualized paranasal  sinuses and mastoid air cells are otherwise essentially clear. Sigmoid shaped  shaped nasal septum. The nasal bones are unremarkable. The hard palate is  unremarkable. The temporomandibular joints are congruent. Cerumen in the left greater than right external auditory canals. Please refer to the separately dictated CT head report from the same day for  discussion of intracranial structures. Impression  1. Small to moderate right periorbital contusion/hematoma. 2.  Mild right-sided preseptal fat stranding. No evidence of retrobulbar  hemorrhage. 3.  No evidence of acute fracture. Assessment:   Seizure  Fall   Right periorbital contusion/hematoma     Past medical history   History of seizures  HTN  History of stroke  Dementia? CKD 3        Plan:   1. Neurology consult. Follow in am. Seizure precautions. Continue antiseizure medications keppra, depakote, and trileptal. EEG ordered. Valproic acid levels subtherapeutic. MRI brain ordered   2. PT/OT eval and treat. CM for discharge planning  3.  Monitor metabolic panel and renal function and hepatic panel   4. Pain management as needed  5. NPO, swallow eval  6. Full code  7. Attempted to call sons. Michael Meals [phone], number is not accepting calls. Peter Massiel [phone], no answer and unable to leave a message.            Carolina Diggs NP  9/6/2021, 9:18 PM

## 2021-09-07 NOTE — PROGRESS NOTES
Problem: Mobility Impaired (Adult and Pediatric)  Goal: *Acute Goals and Plan of Care (Insert Text)  Description: Physical Therapy Goals  Initiated 9/7/2021 and to be accomplished within 7 day(s)  1. Patient will move from supine to sit and sit to supine  in bed with modified independence. 2.  Patient will transfer from bed to chair and chair to bed with modified independence using the least restrictive device. 3.  Patient will perform sit to stand with supervision/set-up. 4.  Patient will ambulate with supervision/set-up for 100 feet with the least restrictive device. PLOF: Pt lives in a [facility] [place] with no JOHAN with her 2 sons rotating to be with her. She is able to amb short distances with HHA from her sons. Her sons assist with dressing/bathing. Outcome: Progressing Towards Goal     PHYSICAL THERAPY EVALUATION    Patient: Anand Montgomery (89 y.o. female)  Date: 9/7/2021  Primary Diagnosis: Breakthrough seizure ([facility] [G40.919]  KEYLA (acute kidney injury) ([facility] [N17.9]  Dehydration [E86.0]  Anemia [D64.9]        Precautions:  Fall    ASSESSMENT :  Based on the objective data described below, the patient presents with generalized weakness, decreased coordination, and slight confusion. Pt found semi-reclined on stretcher, in NAD, willing to work with pt. She reports feeling \"sore\" all over. Pt seems slightly confused, but able to explain how she fell from seizures. Small R orbital scratch noted, Sup-sit with SBA. Grossly 4/5 strength B Le's. Pt able to alicia new socks with modA. STS with CGA and RW, pt amb 8 ft of side steps with North for walker management and step directions. Slight unsteadiness. Pt returned back sitting on EOB. HR highest 132 bpm. Pt returned back semi-reclined with North, transport outside room to take pt to MRI. Pt left with HOB elevated, call bell nearby, all needs met. Recommend d/c home with 24/7 supervision/assist vs rehab.      Patient will benefit from skilled intervention to address the above impairments. Patient's rehabilitation potential is considered to be Fair  Factors which may influence rehabilitation potential include:   []         None noted  []         Mental ability/status  [x]         Medical condition  [x]         Home/family situation and support systems  [x]         Safety awareness  []         Pain tolerance/management  []         Other:      PLAN :  Recommendations and Planned Interventions:   [x]           Bed Mobility Training             [x]    Neuromuscular Re-Education  [x]           Transfer Training                   []    Orthotic/Prosthetic Training  [x]           Gait Training                          []    Modalities  [x]           Therapeutic Exercises           []    Edema Management/Control  [x]           Therapeutic Activities            [x]    Family Training/Education  [x]           Patient Education  []           Other (comment):    Frequency/Duration: Patient will be followed by physical therapy 1-2 times per day/4-7 days per week to address goals. Discharge Recommendations: Rehab vs Home Health with 24/7 supervision/assist  Further Equipment Recommendations for Discharge: rolling walker     SUBJECTIVE:   Patient stated I fell and my girlfriend picked me up to bring me here.     OBJECTIVE DATA SUMMARY:     Past Medical History:   Diagnosis Date    Abnormal coordination 9/21/2020    Hallucinations 9/21/2020    Hypertension     Neurological disorder     Seizures ([facility]     Stroke ([facility] 2009   No past surgical history on file.   Barriers to Learning/Limitations: yes;  altered mental status (i.e.Sedation, Confusion)  Compensate with: N/A  Home Situation:  Home Situation  Home Environment: Private residence  # Steps to Enter: 0  One/Two Story Residence: One story  Living Alone: No  Support Systems: Child(quiana)  Patient Expects to be Discharged to[de-identified] [employer]  Current DME Used/Available at Home: Wheelchair  Critical Behavior:  Neurologic State: Alert  Orientation Level: Oriented to person;Oriented to place;Oriented to situation  Cognition: Follows commands  Safety/Judgement: Fall prevention  Psychosocial  Patient Behaviors: Calm; Cooperative;Confused     Strength:    Strength: Generally decreased, functional    Tone & Sensation:   Tone: Normal    Sensation: Intact    Range Of Motion:  AROM: Within functional limits     Functional Mobility:  Bed Mobility:     Supine to Sit: Stand-by assistance  Sit to Supine: Minimum assistance  Scooting: Contact guard assistance  Transfers:  Sit to Stand: Contact guard assistance  Stand to Sit: Contact guard assistance    Balance:   Sitting: Impaired; With support  Sitting - Static: Good (unsupported)  Sitting - Dynamic: Fair (occasional)  Standing: Impaired; With support  Standing - Static: Fair  Standing - Dynamic : Fair       Ambulation/Gait Training:  Distance (ft): 8 Feet (ft)  Assistive Device: Walker, rolling  Ambulation - Level of Assistance: Minimal assistance        Gait Abnormalities: Decreased step clearance;Trunk sway increased        Base of Support: Center of gravity altered;Narrowed     Speed/Josefina: Slow;Shuffled  Step Length: Right shortened;Left shortened       Pain:  Pain level pre-treatment: 0/10   Pain level post-treatment: 0/10   Pain Intervention(s) : Medication (see MAR); Rest, Ice, Repositioning  Response to intervention: Nurse notified, See doc flow    Activity Tolerance:   Pt tolerated mobility well  Please refer to the flowsheet for vital signs taken during this treatment. After treatment:   []         Patient left in no apparent distress sitting up in chair  [x]         Patient left in no apparent distress in bed  [x]         Call bell left within reach  [x]         Nursing notified  []         Caregiver present  []         Bed alarm activated  []         SCDs applied    COMMUNICATION/EDUCATION:   [x]         Role of Physical Therapy in the acute care setting.   [x]         Fall prevention education was provided and the patient/caregiver indicated understanding. [x]         Patient/family have participated as able in goal setting and plan of care. []         Patient/family agree to work toward stated goals and plan of care. []         Patient understands intent and goals of therapy, but is neutral about his/her participation. []         Patient is unable to participate in goal setting/plan of care: ongoing with therapy staff.  []         Other:     Thank you for this referral.  Arelis Cm   Time Calculation: 20 mins      Eval Complexity: History: LOW Complexity : Zero comorbidities / personal factors that will impact the outcome / POCExam:LOW Complexity : 1-2 Standardized tests and measures addressing body structure, function, activity limitation and / or participation in recreation  Presentation: LOW Complexity : Stable, uncomplicated  Clinical Decision Making:Low Complexity    Overall Complexity:LOW

## 2021-09-08 ENCOUNTER — APPOINTMENT (OUTPATIENT)
Dept: GENERAL RADIOLOGY | Age: 66
DRG: 056 | End: 2021-09-08
Attending: INTERNAL MEDICINE
Payer: MEDICARE

## 2021-09-08 LAB
ALBUMIN SERPL-MCNC: 3.2 G/DL (ref 3.4–5)
ALBUMIN/GLOB SERPL: 1.1 {RATIO} (ref 0.8–1.7)
ALP SERPL-CCNC: 49 U/L (ref 45–117)
ALT SERPL-CCNC: 7 U/L (ref 13–56)
ANION GAP SERPL CALC-SCNC: 9 MMOL/L (ref 3–18)
AST SERPL-CCNC: 15 U/L (ref 10–38)
BASOPHILS # BLD: 0 K/UL (ref 0–0.1)
BASOPHILS NFR BLD: 1 % (ref 0–2)
BILIRUB SERPL-MCNC: 0.4 MG/DL (ref 0.2–1)
BUN SERPL-MCNC: 15 MG/DL (ref 7–18)
BUN/CREAT SERPL: 9 (ref 12–20)
CALCIUM SERPL-MCNC: 8.8 MG/DL (ref 8.5–10.1)
CHLORIDE SERPL-SCNC: 114 MMOL/L (ref 100–111)
CO2 SERPL-SCNC: 19 MMOL/L (ref 21–32)
CREAT SERPL-MCNC: 1.75 MG/DL (ref 0.6–1.3)
DIFFERENTIAL METHOD BLD: ABNORMAL
EOSINOPHIL # BLD: 0.1 K/UL (ref 0–0.4)
EOSINOPHIL NFR BLD: 1 % (ref 0–5)
ERYTHROCYTE [DISTWIDTH] IN BLOOD BY AUTOMATED COUNT: 16.4 % (ref 11.6–14.5)
GLOBULIN SER CALC-MCNC: 3 G/DL (ref 2–4)
GLUCOSE BLD STRIP.AUTO-MCNC: 117 MG/DL (ref 70–110)
GLUCOSE BLD STRIP.AUTO-MCNC: 91 MG/DL (ref 70–110)
GLUCOSE SERPL-MCNC: 43 MG/DL (ref 74–99)
HCT VFR BLD AUTO: 25.7 % (ref 35–45)
HGB BLD-MCNC: 8.4 G/DL (ref 12–16)
LYMPHOCYTES # BLD: 2.5 K/UL (ref 0.9–3.6)
LYMPHOCYTES NFR BLD: 43 % (ref 21–52)
MAGNESIUM SERPL-MCNC: 1.9 MG/DL (ref 1.6–2.6)
MCH RBC QN AUTO: 30.9 PG (ref 24–34)
MCHC RBC AUTO-ENTMCNC: 32.7 G/DL (ref 31–37)
MCV RBC AUTO: 94.5 FL (ref 78–100)
MONOCYTES # BLD: 0.6 K/UL (ref 0.05–1.2)
MONOCYTES NFR BLD: 10 % (ref 3–10)
NEUTS SEG # BLD: 2.7 K/UL (ref 1.8–8)
NEUTS SEG NFR BLD: 45 % (ref 40–73)
PLATELET # BLD AUTO: 195 K/UL (ref 135–420)
PMV BLD AUTO: 11.2 FL (ref 9.2–11.8)
POTASSIUM SERPL-SCNC: 3.8 MMOL/L (ref 3.5–5.5)
PROT SERPL-MCNC: 6.2 G/DL (ref 6.4–8.2)
RBC # BLD AUTO: 2.72 M/UL (ref 4.2–5.3)
SODIUM SERPL-SCNC: 142 MMOL/L (ref 136–145)
WBC # BLD AUTO: 5.9 K/UL (ref 4.6–13.2)

## 2021-09-08 PROCEDURE — 99218 HC RM OBSERVATION: CPT

## 2021-09-08 PROCEDURE — 92611 MOTION FLUOROSCOPY/SWALLOW: CPT

## 2021-09-08 PROCEDURE — 36415 COLL VENOUS BLD VENIPUNCTURE: CPT

## 2021-09-08 PROCEDURE — 2709999900 HC NON-CHARGEABLE SUPPLY

## 2021-09-08 PROCEDURE — 97535 SELF CARE MNGMENT TRAINING: CPT

## 2021-09-08 PROCEDURE — 85025 COMPLETE CBC W/AUTO DIFF WBC: CPT

## 2021-09-08 PROCEDURE — 74230 X-RAY XM SWLNG FUNCJ C+: CPT

## 2021-09-08 PROCEDURE — 80053 COMPREHEN METABOLIC PANEL: CPT

## 2021-09-08 PROCEDURE — 74011250637 HC RX REV CODE- 250/637: Performed by: PSYCHIATRY & NEUROLOGY

## 2021-09-08 PROCEDURE — 97530 THERAPEUTIC ACTIVITIES: CPT

## 2021-09-08 PROCEDURE — 74011250637 HC RX REV CODE- 250/637: Performed by: INTERNAL MEDICINE

## 2021-09-08 PROCEDURE — 74011000250 HC RX REV CODE- 250: Performed by: INTERNAL MEDICINE

## 2021-09-08 PROCEDURE — 99232 SBSQ HOSP IP/OBS MODERATE 35: CPT | Performed by: INTERNAL MEDICINE

## 2021-09-08 PROCEDURE — 74011250637 HC RX REV CODE- 250/637: Performed by: NURSE PRACTITIONER

## 2021-09-08 PROCEDURE — 82962 GLUCOSE BLOOD TEST: CPT

## 2021-09-08 PROCEDURE — 92526 ORAL FUNCTION THERAPY: CPT

## 2021-09-08 PROCEDURE — 83735 ASSAY OF MAGNESIUM: CPT

## 2021-09-08 PROCEDURE — 96375 TX/PRO/DX INJ NEW DRUG ADDON: CPT

## 2021-09-08 RX ORDER — FOLIC ACID 1 MG/1
1 TABLET ORAL DAILY
Qty: 30 TABLET | Refills: 0 | Status: SHIPPED | OUTPATIENT
Start: 2021-09-09 | End: 2022-01-13

## 2021-09-08 RX ORDER — MAGNESIUM SULFATE 100 %
16 CRYSTALS MISCELLANEOUS
Status: DISCONTINUED | OUTPATIENT
Start: 2021-09-08 | End: 2021-09-14 | Stop reason: HOSPADM

## 2021-09-08 RX ORDER — MAGNESIUM SULFATE 100 %
16 CRYSTALS MISCELLANEOUS
Qty: 120 TABLET | Refills: 0 | Status: SHIPPED | OUTPATIENT
Start: 2021-09-08 | End: 2022-01-13

## 2021-09-08 RX ORDER — DEXTROSE 50 % IN WATER (D50W) INTRAVENOUS SYRINGE
25 AS NEEDED
Status: DISCONTINUED | OUTPATIENT
Start: 2021-09-08 | End: 2021-09-11 | Stop reason: SDUPTHER

## 2021-09-08 RX ORDER — MAGNESIUM SULFATE 100 %
4 CRYSTALS MISCELLANEOUS AS NEEDED
Status: DISCONTINUED | OUTPATIENT
Start: 2021-09-08 | End: 2021-09-14 | Stop reason: HOSPADM

## 2021-09-08 RX ORDER — OXCARBAZEPINE 300 MG/1
900 TABLET, FILM COATED ORAL 2 TIMES DAILY
Qty: 180 TABLET | Refills: 0 | Status: SHIPPED | OUTPATIENT
Start: 2021-09-08 | End: 2021-10-08

## 2021-09-08 RX ORDER — LISINOPRIL 20 MG/1
10 TABLET ORAL DAILY
Qty: 30 TABLET | Refills: 1 | Status: SHIPPED
Start: 2021-09-08 | End: 2022-01-13

## 2021-09-08 RX ORDER — DEXTROSE 50 % IN WATER (D50W) INTRAVENOUS SYRINGE
25-50 AS NEEDED
Status: DISCONTINUED | OUTPATIENT
Start: 2021-09-08 | End: 2021-09-14 | Stop reason: HOSPADM

## 2021-09-08 RX ADMIN — ATORVASTATIN CALCIUM 40 MG: 40 TABLET, FILM COATED ORAL at 10:47

## 2021-09-08 RX ADMIN — BARIUM SULFATE 15 ML: 400 SUSPENSION ORAL at 12:42

## 2021-09-08 RX ADMIN — DEXTROSE MONOHYDRATE 12.5 G: 25 INJECTION, SOLUTION INTRAVENOUS at 21:55

## 2021-09-08 RX ADMIN — TAMSULOSIN HYDROCHLORIDE 0.4 MG: 0.4 CAPSULE ORAL at 21:32

## 2021-09-08 RX ADMIN — OXCARBAZEPINE 900 MG: 300 TABLET, FILM COATED ORAL at 10:46

## 2021-09-08 RX ADMIN — FAMOTIDINE 20 MG: 20 TABLET, FILM COATED ORAL at 18:13

## 2021-09-08 RX ADMIN — FOLIC ACID 1 MG: 1 TABLET ORAL at 10:47

## 2021-09-08 RX ADMIN — Medication 10 ML: at 21:32

## 2021-09-08 RX ADMIN — BARIUM SULFATE 176 G: 960 POWDER, FOR SUSPENSION ORAL at 12:43

## 2021-09-08 RX ADMIN — LEVETIRACETAM 1000 MG: 500 TABLET ORAL at 21:31

## 2021-09-08 RX ADMIN — AMLODIPINE BESYLATE 10 MG: 10 TABLET ORAL at 10:47

## 2021-09-08 RX ADMIN — DIVALPROEX SODIUM 250 MG: 250 TABLET, DELAYED RELEASE ORAL at 10:47

## 2021-09-08 RX ADMIN — CYANOCOBALAMIN TAB 1000 MCG 500 MCG: 1000 TAB at 10:47

## 2021-09-08 RX ADMIN — LEVETIRACETAM 1000 MG: 500 TABLET ORAL at 10:46

## 2021-09-08 RX ADMIN — Medication 10 ML: at 18:22

## 2021-09-08 RX ADMIN — Medication 10 ML: at 06:00

## 2021-09-08 RX ADMIN — BARIUM SULFATE 15 ML: 400 PASTE ORAL at 12:41

## 2021-09-08 RX ADMIN — FAMOTIDINE 20 MG: 20 TABLET, FILM COATED ORAL at 10:47

## 2021-09-08 RX ADMIN — CLOPIDOGREL BISULFATE 75 MG: 75 TABLET ORAL at 10:47

## 2021-09-08 RX ADMIN — Medication 1 TABLET: at 10:47

## 2021-09-08 RX ADMIN — LISINOPRIL 10 MG: 10 TABLET ORAL at 10:47

## 2021-09-08 RX ADMIN — OXCARBAZEPINE 900 MG: 300 TABLET, FILM COATED ORAL at 18:13

## 2021-09-08 NOTE — PROGRESS NOTES
Problem: Pressure Injury - Risk of  Goal: *Prevention of pressure injury  Outcome: Progressing Towards Goal  Note: Pressure Injury Interventions:  Sensory Interventions: Assess changes in LOC    Moisture Interventions: Absorbent underpads    Activity Interventions: Increase time out of bed    Mobility Interventions: HOB 30 degrees or less    Nutrition Interventions: Document food/fluid/supplement intake    Friction and Shear Interventions: HOB 30 degrees or less                Problem: Patient Education: Go to Patient Education Activity  Goal: Patient/Family Education  Outcome: Progressing Towards Goal     Problem: Falls - Risk of  Goal: *Absence of Falls  Outcome: Progressing Towards Goal  Note: Fall Risk Interventions:  Mobility Interventions: Bed/chair exit alarm    Mentation Interventions: Adequate sleep, hydration, pain control, Bed/chair exit alarm    Medication Interventions: Bed/chair exit alarm, Patient to call before getting OOB    Elimination Interventions: Bed/chair exit alarm, Call light in reach, Patient to call for help with toileting needs              Problem: Patient Education: Go to Patient Education Activity  Goal: Patient/Family Education  Outcome: Progressing Towards Goal

## 2021-09-08 NOTE — PROGRESS NOTES
Problem: Self Care Deficits Care Plan (Adult)  Goal: *Acute Goals and Plan of Care (Insert Text)  Description: Occupational Therapy Goals  Initiated 9/7/2021 within 7 day(s). 1.  Patient will perform grooming with supervision/set-up seated on edge of bed with good sitting balance. 2.  Patient will perform bathing with supervision/set-up. 3.  Patient will perform upper body dressing and lower body dressing with supervision/set-up. 4.  Patient will perform bedside commode transfers with minimal assistance/contact guard assist using LRAD. 5.  Patient will perform all aspects of toileting with minimal assistance/contact guard assist.  6.  Patient will participate in upper extremity therapeutic exercise/activities with supervision/set-up for 8 minutes. 7.  Patient will utilize energy conservation techniques during functional activities with min verbal cues. Prior Level of Function: pt reports she bathes seated on shower seat and in stance at sink, hand held assist for functional mobility with assist provided by son(s)       Outcome: Progressing Towards Goal   OCCUPATIONAL THERAPY TREATMENT    Patient: Guzman Khan (28 y.o. female)  Date: 9/8/2021  Diagnosis: Breakthrough seizure ([facility] [G40.919]  KEYLA (acute kidney injury) ([facility] [N17.9]  Dehydration [E86.0]  Anemia [D64.9] <principal problem not specified>       Precautions: Fall  PLOF: pt reports she bathes seated on shower seat and in stance at sink, hand held assist for functional mobility with assist provided by son(s)    Chart, occupational therapy assessment, plan of care, and goals were reviewed. ASSESSMENT:  Pt cleared by RN for OT tx at this time. Pt presented supine in bed upon therapist arrival with confusion. Pt maneuvered to EOB from supine with CGA in prep for functional task. Pt requires verbal and visual cues for hand placement for transitions to ensure safe completion.  Once sitting EOB she demo F balance with occasional posterior lean requiring MIN A for correction. Pt impulsively stood requiring MIN A. Pt took lateral steps towards [facility]  CGA w/ HHA 2/2 unsteadiness. She required CGA to return to supine. She rolled L/R CGA with vc's for proper hand placement to adjust chux pads. At end of session pt was positioned for comfort and left with HOB elevated, bed alarm active, and all needs left within reach. RN made aware of pt's participation and position. Progression toward goals:  []          Improving appropriately and progressing toward goals  [x]          Improving slowly and progressing toward goals  []          Not making progress toward goals and plan of care will be adjusted     PLAN:  Patient continues to benefit from skilled intervention to address the above impairments. Continue treatment per established plan of care. Discharge Recommendations: Rehab   Further Equipment Recommendations for Discharge:  TBA     SUBJECTIVE:   Patient stated  I hurt my eye. \"     OBJECTIVE DATA SUMMARY:   Cognitive/Behavioral Status:  Neurologic State: Alert, Confused  Orientation Level: Disoriented to time  Cognition: Decreased command following, Poor safety awareness  Safety/Judgement: Fall prevention    Functional Mobility and Transfers for ADLs:   Bed Mobility:  Rolling: Contact guard assistance  Supine to Sit: Contact guard assistance  Sit to Supine: Contact guard assistance  Scooting: Contact guard assistance   Transfers:  Sit to Stand: Minimum assistance  Stand to Sit: Minimum assistance      Balance:  Sitting: Impaired  Sitting - Static: Fair (occasional)  Sitting - Dynamic: Fair (occasional)  Standing: Impaired; With support  Standing - Static: Fair  Standing - Dynamic : Poor    ADL Intervention:     Cognitive Retraining  Safety/Judgement: Fall prevention    Pain:  Pain level pre-treatment: 0/10   Pain level post-treatment: 0/10    Activity Tolerance:    Fair   Please refer to the flowsheet for vital signs taken during this treatment.   After treatment:   []  Patient left in no apparent distress sitting up in chair  [x]  Patient left in no apparent distress in bed  [x]  Call bell left within reach  [x]  Nursing notified  []  Caregiver present  [x]  Bed alarm activated    COMMUNICATION/EDUCATION:   [x] Role of Occupational Therapy in the acute care setting  [] Home safety education was provided and the patient/caregiver indicated understanding. [x] Patient/family have participated as able in working towards goals and plan of care. [x] Patient/family agree to work toward stated goals and plan of care. [] Patient understands intent and goals of therapy, but is neutral about his/her participation. [] Patient is unable to participate in goal setting and plan of care.       Thank you for this referral.  JOSE JUAN Waite  Time Calculation: 23 mins

## 2021-09-08 NOTE — PROGRESS NOTES
Son Sohail Gibbons would like patient to come home with [facility]. Received call from other son Judy Phillips who would like SNF. Patient lives with both [address] [place] [address]. Son Sohail Gibbons does not have a car and Son Judy Phillips will not accept patient at home because she keeps falling. Dr Afshan Portillo informed of this social issue. Vara Mortimer who spoke with patients sister who is coming to [facility] to discuss SNF. Son would like patient to go to [facility]. Judy Phillips stated he would speak to his brother Sohail Gibbons.      Vee Ken, RN BSN  Care Manager  [phone]

## 2021-09-08 NOTE — DISCHARGE SUMMARY
[address] [facility]  Face to Face Encounter    Patients Name: Maykel Shea    YOB: 1955    Primary Diagnosis:    1. Breakthrough seizures x2, no recurrence. Could be due to hypoglycemia  2. Fall  3. Right periorbital contusion/edema  4. Abdominal pain, resolved currently  5. History of seizures  6. Hypertension  7. Acute on stage III chronic kidney disease due to dehydration, back to baseline  8. Low folic acid level  9. Anemia of chronic medical disease, stable H&H  10.  History of stroke  11. Asymptomatic hypoglycemia    Date of Face to Face:   9/8/2021                                    Face to Face Encounter findings are related to primary reason for home care:   yes    1. I certify that the patient needs intermittent skilled nursing care, physical therapy and/or speech therapy. I will not be following this patient in the Community and Dr. Venancio Mar, NP  will be responsible for signing the [address] 2. Initial Orders for Care: Must be completed only if Face to Face MD will not be signing the [address] Skilled Nursing, Physical Therapy, Speech Language Pathology  and Occupational Therapy    3. I certify that this patient is homebound for the following reason(s): Requires considerable and taxing effort to leave the home , Requires the assistance of 1 or more persons to leave the home  and Decreased mental status requiring 24 hour supervision     4. I certify that this patient is under my care and that I, or a nurse practitioner or  [id number] working with me, had a Face-to-Face Encounter that meets the physician Face-to-Face Encounter requirements.   Document the physical findings from the Face-to-Face Encounter that support the need for skilled services: Has new medications that requires skilled nursing teaching and monitoring for understanding and compliance  and Has new finding of weakness and altered mobility that requires skilled physical/occupational and/or speech therapy services for evaluation and interventions.      Luisito Miles MD  9/8/2021

## 2021-09-08 NOTE — PROGRESS NOTES
Reason for Admission:  Breakthrough seizure ([facility] [address] [G40.919]  KEYLA (acute kidney injury) ([facility] [address] [N17.9]  Dehydration [E86.0]  Anemia [D64.9]                 RUR Score:    n/a            Plan for utilizing home health: If needed                      Likelihood of Readmission:   LOW                         Transition of Care Plan:              Initial assessment completed with patient. and son Cognitive status of patient: disoriented. Face sheet information confirmed:  yes. The patient designates son to participate in her discharge plan and to receive any needed information. This patient lives in a single family home with patient and sons. Patient is not able to navigate steps as needed. Prior to hospitalization, patient was considered to be independent with ADLs/IADLS : no . If not independent,  patient needs assist with : dressing, bathing, food preparation, cooking and toileting    Patient has a current ACP document on file: yes      Healthcare Decision Maker:   Secondary Decision Maker: Femi Hutchinson - Nicola - [phone]    Click here to complete [employer] including selection of the Healthcare Decision Maker Relationship (ie \"Primary\")    The patient and son will be available to transport patient home upon discharge. The patient already has Norlene Amas, W/C, and  medical equipment available in the home. Patient is not currently active with home health. Patient has stayed in a skilled nursing facility or rehab. Was  stay within last 60 days : no. This patient is on dialysis :no    List of available Home Health agencies were provided and reviewed with the patient prior to discharge. Freedom of choice signed: yes, for [facility]. Currently, the discharge plan is Home with [address] [place] [address] Umberto Brenner and [facility]. The patient states that she can obtain her medications from the pharmacy, and take her medications as directed.     Patient's current insurance is [facility] and [facility] Management Interventions  Support Systems: Child(quiana), Family member(s), Spouse/Significant Other/Partner        Farooq Linares RN BSN  Care Manager  [phone]

## 2021-09-08 NOTE — PROGRESS NOTES
Speech Therapy Note:    MBS completed with x 1 instance of trace silent aspiration on thin. No aspiration with nectar. Pt given trial of pudding but expelled. Pt would not swallow pudding. At this time, pt safest for full liquid, nectar-thick liquid diet; meds should be crushed. May need to consider PEG if unable to meet nutritional needs via po diet alone. Full report to follow. Rossana A. Kit Peabody M.S., [address] - Cardiopulmonary Rehab  Ph: [phone]

## 2021-09-08 NOTE — PROGRESS NOTES
Hospitalist Progress Note    Subjective:   Daily Progress Note: 9/8/2021     Patient feels fine. Denies any pain currently. No nausea vomiting. Patient can only provide limited history. No headaches or dizziness currently but right facial pain patient. She told me her son lives with her.     Current Facility-Administered Medications   Medication Dose Route Frequency    dextrose (D50W) injection syrg 12.5 g  25 mL IntraVENous PRN    glucose chewable tablet 16 g  4 Tablet Oral PRN    glucagon (GLUCAGEN) injection 1 mg  1 mg IntraMUSCular PRN    dextrose (D50W) injection syrg 12.5-25 g  25-50 mL IntraVENous PRN    glucose chewable tablet 16 g  16 g Oral QHS    OXcarbazepine (TRILEPTAL) tablet 900 mg  900 mg Oral BID    lisinopriL (PRINIVIL, ZESTRIL) tablet 10 mg  10 mg Oral DAILY    levETIRAcetam (KEPPRA) tablet 1,000 mg  1,000 mg Oral BID    folic acid (FOLVITE) tablet 1 mg  1 mg Oral DAILY    multivitamin, tx-iron-ca-min (THERA-M w/ IRON) tablet 1 Tablet  1 Tablet Oral DAILY    sodium chloride (NS) flush 5-40 mL  5-40 mL IntraVENous Q8H    sodium chloride (NS) flush 5-40 mL  5-40 mL IntraVENous PRN    acetaminophen (TYLENOL) tablet 650 mg  650 mg Oral Q6H PRN    Or    acetaminophen (TYLENOL) suppository 650 mg  650 mg Rectal Q6H PRN    polyethylene glycol (MIRALAX) packet 17 g  17 g Oral DAILY PRN    ondansetron (ZOFRAN ODT) tablet 4 mg  4 mg Oral Q8H PRN    Or    ondansetron (ZOFRAN) injection 4 mg  4 mg IntraVENous Q6H PRN    famotidine (PEPCID) tablet 20 mg  20 mg Oral BID    amLODIPine (NORVASC) tablet 10 mg  10 mg Oral DAILY    atorvastatin (LIPITOR) tablet 40 mg  40 mg Oral DAILY    clopidogreL (PLAVIX) tablet 75 mg  75 mg Oral DAILY    cyanocobalamin tablet 500 mcg  500 mcg Oral DAILY    divalproex DR (DEPAKOTE) tablet 250 mg  250 mg Oral TID    [Held by provider] spironolactone (ALDACTONE) tablet 25 mg  25 mg Oral DAILY    tamsulosin (FLOMAX) capsule 0.4 mg  0.4 mg Oral QHS Review of Systems  Pertinent items are noted in HPI. Objective:     Visit Vitals  /72   Pulse 92   Temp 98.8 °F (37.1 °C)   Resp 17   SpO2 100%           Temp (24hrs), Av.8 °F (37.1 °C), Min:98.7 °F (37.1 °C), Max:99 °F (37.2 °C)      No intake/output data recorded. No intake/output data recorded. General appearance -awake, follows commands appropriately, not in acute distress  Eyes/Face - sclera anicteric, no pallor, right facial edema  Chest -clear air entry noted in bases, no wheezes  Heart - S1 and S2 normal  Abdomen - soft, nontender today, nondistended, Bowel sounds present  Neurological - awake, follows commands, moves all extremities  Extremities - no pedal edema noted      Data Review    Recent Results (from the past 24 hour(s))   CBC WITH AUTOMATED DIFF    Collection Time: 21  5:14 AM   Result Value Ref Range    WBC 5.9 4.6 - 13.2 K/uL    RBC 2.72 (L) 4.20 - 5.30 M/uL    HGB 8.4 (L) 12.0 - 16.0 g/dL    HCT 25.7 (L) 35.0 - 45.0 %    MCV 94.5 78.0 - 100.0 FL    MCH 30.9 24.0 - 34.0 PG    MCHC 32.7 31.0 - 37.0 g/dL    RDW 16.4 (H) 11.6 - 14.5 %    PLATELET 426 462 - 810 K/uL    MPV 11.2 9.2 - 11.8 FL    NEUTROPHILS 45 40 - 73 %    LYMPHOCYTES 43 21 - 52 %    MONOCYTES 10 3 - 10 %    EOSINOPHILS 1 0 - 5 %    BASOPHILS 1 0 - 2 %    ABS. NEUTROPHILS 2.7 1.8 - 8.0 K/UL    ABS. LYMPHOCYTES 2.5 0.9 - 3.6 K/UL    ABS. MONOCYTES 0.6 0.05 - 1.2 K/UL    ABS. EOSINOPHILS 0.1 0.0 - 0.4 K/UL    ABS.  BASOPHILS 0.0 0.0 - 0.1 K/UL    DF AUTOMATED     METABOLIC PANEL, COMPREHENSIVE    Collection Time: 21  5:14 AM   Result Value Ref Range    Sodium 142 136 - 145 mmol/L    Potassium 3.8 3.5 - 5.5 mmol/L    Chloride 114 (H) 100 - 111 mmol/L    CO2 19 (L) 21 - 32 mmol/L    Anion gap 9 3.0 - 18 mmol/L    Glucose 43 (LL) 74 - 99 mg/dL    BUN 15 7.0 - 18 MG/DL    Creatinine 1.75 (H) 0.6 - 1.3 MG/DL    BUN/Creatinine ratio 9 (L) 12 - 20      GFR est AA 35 (L) >60 ml/min/1.73m2    GFR est non-AA 29 (L) >60 ml/min/1.73m2    Calcium 8.8 8.5 - 10.1 MG/DL    Bilirubin, total 0.4 0.2 - 1.0 MG/DL    ALT (SGPT) 7 (L) 13 - 56 U/L    AST (SGOT) 15 10 - 38 U/L    Alk. phosphatase 49 45 - 117 U/L    Protein, total 6.2 (L) 6.4 - 8.2 g/dL    Albumin 3.2 (L) 3.4 - 5.0 g/dL    Globulin 3.0 2.0 - 4.0 g/dL    A-G Ratio 1.1 0.8 - 1.7     MAGNESIUM    Collection Time: 09/08/21  5:14 AM   Result Value Ref Range    Magnesium 1.9 1.6 - 2.6 mg/dL   GLUCOSE, POC    Collection Time: 09/08/21  7:30 AM   Result Value Ref Range    Glucose (POC) 117 (H) 70 - 110 mg/dL         Assessment/Plan:     ASSESSMENT:    1. Breakthrough seizures x2, no recurrence. Could be due to hypoglycemia  2. Fall  3. Right periorbital contusion/edema  4. Abdominal pain, resolved currently  5. History of seizures  6. Hypertension  7. Acute on stage III chronic kidney disease due to dehydration, back to baseline  8. Low folic acid level  9. Anemia of chronic medical disease, stable H&H  10.  History of stroke  11. Asymptomatic hypoglycemia    PLAN:    MRI report reviewed  Neurology input noted about continuing Keppra and Trileptal only  CT abdomen pelvis report reviewed  Will add glucose tablets to be given nighttime in order to avoid hypoglycemia in the morning  Continue folic acid and multivitamin  Discontinue lisinopril and and Aldactone with monitor renal function  Continue Flomax, Norvasc, Plavix, and statin  Speech therapy input noted about modified barium swallow to be done today  PT and OT input noted about ARU/SNF placement  Discussed with case management about discharge planning as patient is medically stable from my side  Spoke to patient's son over the phone and he will check with his brother before making final decision about patient coming home versus going to skilled nursing facility.     Anticipated date of discharge: September 9/8/2021    Total time to take care of this patient was 30 minutes and more than 50% of time was spent counseling and coordinating care. Disclaimer: Sections of this note are dictated using utilizing voice recognition software, which may have resulted in some phonetic based errors in grammar and contents. Even though attempts were made to correct all the mistakes, some may have been missed, and remained in the body of the document. If questions arise, please contact our department.

## 2021-09-08 NOTE — DISCHARGE SUMMARY
Physician Discharge Summary       Patient: Tracie Escalante MRN: [id number]  SSN: [id number]    YOB: 1955  Age: 78 y.o. Sex: female    PCP: Jamie Cerda NP    Allergies: Tomato, Aspirin, Ciprofloxacin, Pcn [penicillins], and Sulfa (sulfonamide antibiotics)    Admit date: 9/6/2021  Admitting Provider: Librado Mills MD    Discharge date: 9/8/2021  Discharging Provider: Miguel Reis MD    * Admission Diagnoses: Breakthrough seizure ([facility] [address] [G40.919]  KEYLA (acute kidney injury) ([facility] [address] [N17.9]  Dehydration [E86.0]  Anemia [D64.9]    * Discharge Diagnoses:      1. Breakthrough seizures x2, no recurrence. Could be due to hypoglycemia  2. Fall  3. Right periorbital contusion/edema  4. Abdominal pain, resolved currently  5. History of seizures  6. Hypertension  7. Acute on stage III chronic kidney disease due to dehydration, back to baseline  8. Low folic acid level  9. Anemia of chronic medical disease, stable H&H  10.  History of stroke  11. Asymptomatic hypoglycemia    * Hospital Course: Patient presented to the hospital on September 6, 2021 with a complaint of seizures x2 as well as fall at home. Please refer hospital admission H&P for further detail. Patient's Depakote level was subtherapeutic. CT head done in the emergency room was negative for acute intracranial process. However it showed patient having mild right periorbital swelling and chronic right parieto-occipital encephalomalacia. CT C-spine was also negative for fracture. CT orbit showed small to moderate right periorbital contusion/hematoma. MRI brain was negative for acute finding. Patient was seen by neurology service recommended to go up on carbamazepine from 600 to 900 mg 2 times daily and continue Keppra 1000 mg twice daily and discontinue Depakote. Patient was also found out to have low blood sugar in the morning.   She will benefit having glucose tablet at the bedtime to prevent early morning hypoglycemia and hypoglycemia induced breakthrough seizures. Patient did not have any more seizure here. Patient was complaining abdominal pain and CT abdomen pelvis without contrast was done and it reported no bowel dilatation or obstruction or inflammation. Patient was continue her home medication for the comorbidities. She was found out to have low folic acid level was started on supplement. She was seen by PT OT recommended SNF placement. Family does not want her to go to SNF and wanted to take her home with home health care that will be set up. To avoid hypotension induced fall, she was taken off hydralazine and will be continued on below mentioned antihypertensive medication. However, she needs to be followed by PCP closely to adjust her home antihypertensive medications. * Procedures: None  * No surgery found *      Consults: Neurology    Significant Diagnostic Studies: CT abdomen pelvis without contrast, MRI brain, CT orbit, CT C-spine, CT head    Discharge Exam:  Please refer my progress note from September 2021 for further detail    * Discharge Condition: improved  * Disposition: Home    Discharge Medications:  Current Discharge Medication List      START taking these medications    Details   folic acid (FOLVITE) 1 mg tablet Take 1 Tablet by mouth daily. Qty: 30 Tablet, Refills: 0  Start date: 9/9/2021      glucose 4 gram chewable tablet Take 4 Tablets by mouth nightly. Qty: 120 Tablet, Refills: 0  Start date: 9/8/2021      multivitamin, tx-iron-ca-min (THERA-M w/ IRON) 9 mg iron-400 mcg tab tablet Take 1 Tablet by mouth daily. Qty: 30 Tablet, Refills: 0  Start date: 9/9/2021         CONTINUE these medications which have CHANGED    Details   lisinopriL (PRINIVIL, ZESTRIL) 20 mg tablet Take 0.5 Tablets by mouth daily. Indications: high blood pressure  Qty: 30 Tablet, Refills: 1  Start date: 9/8/2021      OXcarbazepine (TRILEPTAL) 300 mg tablet Take 3 Tablets by mouth two (2) times a day for 30 days. Indications: additional medication to treat partial seizures  Qty: 180 Tablet, Refills: 0  Start date: 9/8/2021, End date: 10/8/2021         CONTINUE these medications which have NOT CHANGED    Details   clonazePAM (KlonoPIN) 0.25 mg TbDi Take 0.25 mg by mouth as needed for Other (seizure). atorvastatin (LIPITOR) 40 mg tablet Take 40 mg by mouth daily. acetaminophen (TYLENOL) 500 mg tablet Take 500 mg by mouth as needed for Pain. clopidogreL (PLAVIX) 75 mg tab Take 75 mg by mouth daily. Indications: prevention for a blood clot going to the brain      tamsulosin (FLOMAX) 0.4 mg capsule Take 1 Cap by mouth nightly. Qty: 30 Cap, Refills: 0      ergocalciferol (VITAMIN D2) 50,000 unit capsule Take 50,000 Units by mouth every seven (7) days. levETIRAcetam (KEPPRA) 1,000 mg tablet Take 1 Tab by mouth two (2) times a day. Qty: 60 Tab, Refills: 0      amLODIPine (NORVASC) 10 mg tablet Take 1 Tab by mouth daily. Qty: 30 Tab, Refills: 6      cyanocobalamin (VITAMIN B12) 500 mcg tablet Take 1 Tab by mouth daily. Qty: 30 Tab, Refills: 0         STOP taking these medications       spironolactone (ALDACTONE) 25 mg tablet Comments:   Reason for Stopping:         hydrALAZINE (APRESOLINE) 100 mg tablet Comments:   Reason for Stopping:         divalproex DR (DEPAKOTE) 250 mg tablet Comments:   Reason for Stopping:               * Follow-up Care/Patient Instructions:   Activity: PT/OT per Home Health  Diet: Cardiac Diet, dysphagia soft diet with aspiration precaution  Wound Care: None needed    Follow-up Information     Follow up With Specialties Details Why Contact Info    Jian Medina NP Nurse Practitioner   [address] [phone]          Follow-up Appointments   Procedures    FOLLOW UP VISIT Appointment in: Other ([address]) With dr. Ellie Serna in 2-3 weeks for seizures With PCP in one week     With dr. Ellie Serna in 2-3 weeks for seizures  With PCP in one week     Standing Status:   Standing     Number of Occurrences:   1     Order Specific Question:   Appointment in     Answer: Other (Specify)         Total time of discharge greater than 35 minutes    Disclaimer: Sections of this note are dictated using utilizing voice recognition software, which may have resulted in some phonetic based errors in grammar and contents. Even though attempts were made to correct all the mistakes, some may have been missed, and remained in the body of the document. If questions arise, please contact our department.     Signed:  Cherise Martinez MD  9/8/2021  2:49 PM

## 2021-09-08 NOTE — ROUTINE PROCESS
Bedside shift change report given to Joaquim Martinez (oncoming nurse) by Wilder Montes (offgoing nurse). Report included the following information SBAR, Kardex and Intake/Output.

## 2021-09-09 ENCOUNTER — APPOINTMENT (OUTPATIENT)
Dept: CT IMAGING | Age: 66
DRG: 056 | End: 2021-09-09
Attending: INTERNAL MEDICINE
Payer: MEDICARE

## 2021-09-09 LAB
ALBUMIN SERPL-MCNC: 3.5 G/DL (ref 3.4–5)
ALBUMIN/GLOB SERPL: 1.1 {RATIO} (ref 0.8–1.7)
ALP SERPL-CCNC: 53 U/L (ref 45–117)
ALT SERPL-CCNC: 7 U/L (ref 13–56)
ANION GAP SERPL CALC-SCNC: 7 MMOL/L (ref 3–18)
AST SERPL-CCNC: 15 U/L (ref 10–38)
BASOPHILS # BLD: 0 K/UL (ref 0–0.1)
BASOPHILS NFR BLD: 0 % (ref 0–2)
BILIRUB SERPL-MCNC: 0.4 MG/DL (ref 0.2–1)
BUN SERPL-MCNC: 13 MG/DL (ref 7–18)
BUN/CREAT SERPL: 9 (ref 12–20)
CALCIUM SERPL-MCNC: 9.3 MG/DL (ref 8.5–10.1)
CHLORIDE SERPL-SCNC: 113 MMOL/L (ref 100–111)
CO2 SERPL-SCNC: 20 MMOL/L (ref 21–32)
CREAT SERPL-MCNC: 1.52 MG/DL (ref 0.6–1.3)
DIFFERENTIAL METHOD BLD: ABNORMAL
EOSINOPHIL # BLD: 0.1 K/UL (ref 0–0.4)
EOSINOPHIL NFR BLD: 1 % (ref 0–5)
ERYTHROCYTE [DISTWIDTH] IN BLOOD BY AUTOMATED COUNT: 16.4 % (ref 11.6–14.5)
GLOBULIN SER CALC-MCNC: 3.3 G/DL (ref 2–4)
GLUCOSE BLD STRIP.AUTO-MCNC: 90 MG/DL (ref 70–110)
GLUCOSE SERPL-MCNC: 81 MG/DL (ref 74–99)
HCT VFR BLD AUTO: 28.8 % (ref 35–45)
HGB BLD-MCNC: 9.3 G/DL (ref 12–16)
LYMPHOCYTES # BLD: 2.1 K/UL (ref 0.9–3.6)
LYMPHOCYTES NFR BLD: 29 % (ref 21–52)
MCH RBC QN AUTO: 30.5 PG (ref 24–34)
MCHC RBC AUTO-ENTMCNC: 32.3 G/DL (ref 31–37)
MCV RBC AUTO: 94.4 FL (ref 78–100)
MONOCYTES # BLD: 0.8 K/UL (ref 0.05–1.2)
MONOCYTES NFR BLD: 11 % (ref 3–10)
NEUTS SEG # BLD: 4.2 K/UL (ref 1.8–8)
NEUTS SEG NFR BLD: 59 % (ref 40–73)
PLATELET # BLD AUTO: 197 K/UL (ref 135–420)
PMV BLD AUTO: 11.6 FL (ref 9.2–11.8)
POTASSIUM SERPL-SCNC: 3.6 MMOL/L (ref 3.5–5.5)
PROT SERPL-MCNC: 6.8 G/DL (ref 6.4–8.2)
RBC # BLD AUTO: 3.05 M/UL (ref 4.2–5.3)
SODIUM SERPL-SCNC: 140 MMOL/L (ref 136–145)
WBC # BLD AUTO: 7.2 K/UL (ref 4.6–13.2)

## 2021-09-09 PROCEDURE — 70450 CT HEAD/BRAIN W/O DYE: CPT

## 2021-09-09 PROCEDURE — 36415 COLL VENOUS BLD VENIPUNCTURE: CPT

## 2021-09-09 PROCEDURE — 94761 N-INVAS EAR/PLS OXIMETRY MLT: CPT

## 2021-09-09 PROCEDURE — 74011250637 HC RX REV CODE- 250/637: Performed by: PSYCHIATRY & NEUROLOGY

## 2021-09-09 PROCEDURE — 74011000258 HC RX REV CODE- 258: Performed by: INTERNAL MEDICINE

## 2021-09-09 PROCEDURE — 92526 ORAL FUNCTION THERAPY: CPT

## 2021-09-09 PROCEDURE — 96361 HYDRATE IV INFUSION ADD-ON: CPT

## 2021-09-09 PROCEDURE — 85025 COMPLETE CBC W/AUTO DIFF WBC: CPT

## 2021-09-09 PROCEDURE — 74011250637 HC RX REV CODE- 250/637: Performed by: INTERNAL MEDICINE

## 2021-09-09 PROCEDURE — 94640 AIRWAY INHALATION TREATMENT: CPT

## 2021-09-09 PROCEDURE — 97530 THERAPEUTIC ACTIVITIES: CPT

## 2021-09-09 PROCEDURE — 80053 COMPREHEN METABOLIC PANEL: CPT

## 2021-09-09 PROCEDURE — 99232 SBSQ HOSP IP/OBS MODERATE 35: CPT | Performed by: INTERNAL MEDICINE

## 2021-09-09 PROCEDURE — 82962 GLUCOSE BLOOD TEST: CPT

## 2021-09-09 PROCEDURE — 99218 HC RM OBSERVATION: CPT

## 2021-09-09 PROCEDURE — 74011250637 HC RX REV CODE- 250/637: Performed by: NURSE PRACTITIONER

## 2021-09-09 PROCEDURE — 2709999900 HC NON-CHARGEABLE SUPPLY

## 2021-09-09 PROCEDURE — 74011000250 HC RX REV CODE- 250: Performed by: INTERNAL MEDICINE

## 2021-09-09 RX ORDER — IPRATROPIUM BROMIDE AND ALBUTEROL SULFATE 2.5; .5 MG/3ML; MG/3ML
3 SOLUTION RESPIRATORY (INHALATION)
Status: DISCONTINUED | OUTPATIENT
Start: 2021-09-09 | End: 2021-09-09

## 2021-09-09 RX ORDER — IPRATROPIUM BROMIDE AND ALBUTEROL SULFATE 2.5; .5 MG/3ML; MG/3ML
3 SOLUTION RESPIRATORY (INHALATION)
Status: DISCONTINUED | OUTPATIENT
Start: 2021-09-10 | End: 2021-09-14 | Stop reason: HOSPADM

## 2021-09-09 RX ORDER — DEXTROSE MONOHYDRATE AND SODIUM CHLORIDE 5; .9 G/100ML; G/100ML
100 INJECTION, SOLUTION INTRAVENOUS CONTINUOUS
Status: DISPENSED | OUTPATIENT
Start: 2021-09-09 | End: 2021-09-09

## 2021-09-09 RX ADMIN — LEVETIRACETAM 1000 MG: 500 TABLET ORAL at 09:29

## 2021-09-09 RX ADMIN — AMLODIPINE BESYLATE 10 MG: 10 TABLET ORAL at 09:29

## 2021-09-09 RX ADMIN — LEVETIRACETAM 1000 MG: 500 SOLUTION ORAL at 23:09

## 2021-09-09 RX ADMIN — CYANOCOBALAMIN TAB 1000 MCG 500 MCG: 1000 TAB at 09:29

## 2021-09-09 RX ADMIN — IPRATROPIUM BROMIDE AND ALBUTEROL SULFATE 3 ML: .5; 3 SOLUTION RESPIRATORY (INHALATION) at 19:52

## 2021-09-09 RX ADMIN — FAMOTIDINE 20 MG: 20 TABLET, FILM COATED ORAL at 17:05

## 2021-09-09 RX ADMIN — Medication 10 ML: at 06:06

## 2021-09-09 RX ADMIN — Medication 1 TABLET: at 09:29

## 2021-09-09 RX ADMIN — Medication 16 G: at 23:09

## 2021-09-09 RX ADMIN — Medication 10 ML: at 23:12

## 2021-09-09 RX ADMIN — CLOPIDOGREL BISULFATE 75 MG: 75 TABLET ORAL at 09:30

## 2021-09-09 RX ADMIN — OXCARBAZEPINE 900 MG: 300 TABLET, FILM COATED ORAL at 09:29

## 2021-09-09 RX ADMIN — Medication 10 ML: at 15:43

## 2021-09-09 RX ADMIN — FAMOTIDINE 20 MG: 20 TABLET, FILM COATED ORAL at 09:29

## 2021-09-09 RX ADMIN — DEXTROSE MONOHYDRATE AND SODIUM CHLORIDE 100 ML/HR: 5; .9 INJECTION, SOLUTION INTRAVENOUS at 16:28

## 2021-09-09 RX ADMIN — LISINOPRIL 10 MG: 10 TABLET ORAL at 09:29

## 2021-09-09 RX ADMIN — OXCARBAZEPINE 900 MG: 300 TABLET, FILM COATED ORAL at 17:04

## 2021-09-09 RX ADMIN — ATORVASTATIN CALCIUM 40 MG: 40 TABLET, FILM COATED ORAL at 09:29

## 2021-09-09 RX ADMIN — IPRATROPIUM BROMIDE AND ALBUTEROL SULFATE 3 ML: .5; 3 SOLUTION RESPIRATORY (INHALATION) at 16:28

## 2021-09-09 NOTE — PROGRESS NOTES
1945: Bedside and Verbal shift change report given to Lucio Antony RN (oncoming nurse) by Kenia Rahman RN (offgoing nurse). Report included the following information SBAR, Kardex, ED Summary, Intake/Output, MAR and Recent Results. Assumed care of patient. Patient resting in bed, patient drowsy with easily awakened. No distress noted. 2130: Patient medicated per MAR. Patient was able to swallow pills without difficulty. When attempted to have patient chew the glucose tablets, patient would not chew, would only suck on tablet. Had patient to spit out tablet. Contacted hospitalist and informed that patient was not able to chew the tablet and asked if D20W IVP could be given instead. Spoke with Dr. Han Warren, per MD, ok to give D50W in place of glucose tablet. 0015: Checked patient's blood sugar to make sure patient BG did not drop, BG 90.    0200: Patient awake in bed watching TV, no distress noted. Patient denies any needs at this time. 0400: Vitals obtained, VSS.     0530: Patient resting in bed with eyes closed, no distress noted. Safety measures in place and verified. 0730: Bedside and Verbal shift change report given to Ilsa Moon RN (oncoming nurse) by Lucio Antony RN (offgoing nurse). Report included the following information SBAR, Kardex, ED Summary, Intake/Output, MAR and Recent Results.      Patient Vitals for the past 12 hrs:   Temp Pulse Resp BP SpO2   09/09/21 0751 98.5 °F (36.9 °C) 98 16 (!) 160/84 100 %   09/09/21 0400 98.1 °F (36.7 °C) 75 16 (!) 147/84 93 %   09/09/21 0000 98.7 °F (37.1 °C) 80 17 118/71 100 %

## 2021-09-09 NOTE — PROGRESS NOTES
Problem: Mobility Impaired (Adult and Pediatric)  Goal: *Acute Goals and Plan of Care (Insert Text)  Description: Physical Therapy Goals  Initiated 9/7/2021 and to be accomplished within 7 day(s)  1. Patient will move from supine to sit and sit to supine  in bed with modified independence. 2.  Patient will transfer from bed to chair and chair to bed with modified independence using the least restrictive device. 3.  Patient will perform sit to stand with supervision/set-up. 4.  Patient will ambulate with supervision/set-up for 100 feet with the least restrictive device. PLOF: Pt lives in a [facility] [place] with no JOHAN with her 2 sons rotating to be with her. She is able to amb short distances with HHA from her sons. Her sons assist with dressing/bathing. Outcome: Progressing Towards Goal   PHYSICAL THERAPY TREATMENT    Patient: Luda Morley (30 y.o. female)  Date: 9/9/2021  Diagnosis: Breakthrough seizure ([facility] [address] [G40.919]  KEYLA (acute kidney injury) ([facility] [address] [N17.9]  Dehydration [E86.0]  Anemia [D64.9] <principal problem not specified>       Precautions: Fall      ASSESSMENT:  Pt less responsive this visit to communication and instruction for mobility. Pt very sleepy and did not verbally respond to any questioning. Nursing at bedside as well and displayed the same pt presentation. EOB and OOB mobility deemed unsafe secondary to pt presentation, thus treatment focused on bed mobility to ensure safety and comfort of pt. Pt displayed great difficulty following verbal and tactile commands for bed mobility tasks, requiring total A for rolling and scooting HOB. Nursing notified this LPTA that pt sometimes takes an hour or two in the morning to improve alertness and functioning, thus will follow up later in day to see if status has improved and is more therapeutically appropriate. Pt left safely in bed with bed alarm activated.    Progression toward goals: good  []      Improving appropriately and progressing toward goals  [x]      Improving slowly and progressing toward goals  []      Not making progress toward goals and plan of care will be adjusted     PLAN:  Patient continues to benefit from skilled intervention to address the above impairments. Continue treatment per established plan of care. Discharge Recommendations:  Rehab  Further Equipment Recommendations for Discharge: TBD     SUBJECTIVE:   Patient did not verbalize any responses this visit. OBJECTIVE DATA SUMMARY:   Critical Behavior:  Neurologic State: Alert, Confused  Orientation Level: Oriented to person, Disoriented to time, Disoriented to situation, Disoriented to place  Cognition: Impaired decision making  Safety/Judgement: Fall prevention  Functional Mobility Training:  Bed Mobility:  Rolling: Total assistance  Scooting: Total assistance      Pain:  Unable to verbalize pain scale this visit, pt NAD    Activity Tolerance:   Fair  Please refer to the flowsheet for vital signs taken during this treatment. After treatment:   [] Patient left in no apparent distress sitting up in chair  [x] Patient left in no apparent distress in bed  [x] Call bell left within reach  [x] Nursing notified  [] Caregiver present  [x] Bed alarm activated  [] SCDs applied      COMMUNICATION/EDUCATION:   [x]         Role of Physical Therapy in the acute care setting. [x]         Fall prevention education was provided and the patient/caregiver indicated understanding. [x]         Patient/family have participated as able in working toward goals and plan of care. [x]         Patient/family agree to work toward stated goals and plan of care. []         Patient understands intent and goals of therapy, but is neutral about his/her participation.   []         Patient is unable to participate in stated goals/plan of care: ongoing with therapy staff.  []         Other:        Zenia Rebolledo PTA   Time Calculation: 13 mins

## 2021-09-09 NOTE — PROGRESS NOTES
Problem: Seizure Disorder (Adult)  Goal: *STG: Remains free of seizure activity  Outcome: Progressing Towards Goal

## 2021-09-09 NOTE — PROGRESS NOTES
Spoke with Vee Smalls at [facility]. They are starting the auth. Plan for transfer for rehab when auth obtained.   Rhea Kearns RN - Outcomes Manager  [phone]

## 2021-09-09 NOTE — PROGRESS NOTES
Problem: Dysphagia (Adult)  Goal: *Acute Goals and Plan of Care (Insert Text)  Description: Recommendations:  Diet: full liquid/ nectar- thick  Meds: crushed  Aspiration Precautions  Oral Care TID  Other: PEG vs QOL diet    Goals:  Patient will:  1. Tolerate diet and/or diet upgrade without overt s/sx of aspiration under SLP supervision  2. Utilize compensatory swallow strategies of small bite/sip, alternate liquid/solid with min cues   3. Perform oral-motor and laryngeal elevation exercises 10 reps/each to increase oropharyngeal swallow function with min cues  4. Complete an objective swallow study (i.e., MBSS) to assess swallow integrity, r/o aspiration, and determine of safest LRD, min A    9/8/2021 2138 by Benjamin Francis SLP  Outcome: Not Progressing Towards Goal  9/8/2021 2137 by Benjamin Francis, SLP  Outcome: Not Progressing Towards [address] [facility] STUDY & TREATMENT    Patient: Gold Dave (4 y.o. female)  Date: 9/8/2021  Primary Diagnosis: Breakthrough seizure ([facility] [address] [G40.919]  KEYLA (acute kidney injury) ([facility] [address] [N17.9]  Dehydration [E86.0]  Anemia [D64.9]        Precautions: aspiration   Fall  PLOF: unable to determine    ASSESSMENT :  MBS completed with x 1 instance of trace silent aspiration on thin. No aspiration with nectar. Pt given trial of pudding but expelled. Pt would not swallow pudding despite max multimodal cues. SLP terminated procedure at that point and cleared pt's oral cavity. Pt with severely labored A-P transit with premature spillage into vallecula & pyriforms; swallow delay ~5 seconds for liquid trials; severely decreased hyolaryngeal excursion with minimal epiglottic inversion with airway compromise. Minimal laryngeal residuals in both valleculae and pyriforms. Pt presents with mod-severe oropharyngeal dysphagia, as evidenced above, which places pt at risk for aspiration.  At this time, safest for full liquids/nectar-thick; meds should be presented crushed in pudding. SLP utilized video of study for visual feedback and recommendations for pt; impaired cognition impeding comprehension. May need to consider alternate nutritional means to meet nutritional goals. Treatment:  Skilled therapy initiated: Educated pt on MBS results, aspiration precautions, and importance of compensatory swallow techniques to decrease aspiration risk (decrease rate of intake & sip/bite size, upright @HOB for all po intake and ~30 minutes after po); impaired cognition impeding comprehension. SLP to follow as indicated. Patient will benefit from skilled intervention to address the above impairments. Patient's rehabilitation potential is considered to be Fair  Factors which may influence rehabilitation potential include:   []              None noted  [x]              Mental ability/status  [x]              Medical condition  []              Home/family situation and support systems  [x]              Safety awareness  []              Pain tolerance/management  []              Other:      PLAN :  Recommendations and Planned Interventions:  Full liquids/nectar-thick   Frequency/Duration: Patient will be followed by speech-language pathology 1-2 times per day/3-5 days per week to address goals. Discharge Recommendations: Skilled Nursing Facility     SUBJECTIVE:   Patient stated I don't want to swallow. OBJECTIVE:     Past Medical History:   Diagnosis Date    Abnormal coordination 9/21/2020    Hallucinations 9/21/2020    Hypertension     Neurological disorder     Seizures ([facility] [address]     Stroke ([facility] [address] 2009   No past surgical history on file.   Home Situation:   Home Situation  Home Environment: Private residence  # Steps to Enter: 0  One/Two Story Residence: One story  Living Alone: No  Support Systems: Child(quiana), Family member(s), Spouse/Significant Other/Partner  Patient Expects to be Discharged to[de-identified] [employer]  Current DME Used/Available at Home: Shower chair  Tub or HardDrones Type: Tub (does not use, only spongebathed seated/standing at sink)  Diet prior to admission: unable to determine  Current Diet:  nectar-thick full liquids     8-point Penetration-Aspiration Scale: Score 8    PAIN:  Pain level pre-treatment: 0/10   Pain level post-treatment: 0/10       COMMUNICATION/EDUCATION:   [x]  Patient educated regarding MBS results and diet recommendations. [x]  Patient/family have participated as able in goal setting and plan of care. []  Patient/family agree to work toward stated goals and plan of care. []  Patient understands intent and goals of therapy, but is neutral about his/her participation. [x]  Patient is unable to participate in goal setting and plan of care; ongoing with staff.     Thank you for this referral.  TERESA Wagner  Time Calculation: 26 mins  MBS Time: 17 minutes  Treatment Time: 9 minutes

## 2021-09-09 NOTE — PROGRESS NOTES
Hospitalist Progress Note    Subjective:   Daily Progress Note: 9/9/2021     Patient is somewhat more lethargic currently. However, she took her medications and ate little bit breakfast time per nursing. No nausea vomiting. As per nursing: No witnessed seizure episode today. Current Facility-Administered Medications   Medication Dose Route Frequency    . PLEASE ENTER THE PATIENT'S HEIGHT AND WEIGHT IN CONNECT CARE  1 Each Other ONCE    albuterol-ipratropium (DUO-NEB) 2.5 MG-0.5 MG/3 ML  3 mL Nebulization Q4H RT    dextrose 5% and 0.9% NaCl infusion  100 mL/hr IntraVENous CONTINUOUS    dextrose (D50W) injection syrg 12.5 g  25 mL IntraVENous PRN    glucose chewable tablet 16 g  4 Tablet Oral PRN    glucagon (GLUCAGEN) injection 1 mg  1 mg IntraMUSCular PRN    dextrose (D50W) injection syrg 12.5-25 g  25-50 mL IntraVENous PRN    glucose chewable tablet 16 g  16 g Oral QHS    OXcarbazepine (TRILEPTAL) tablet 900 mg  900 mg Oral BID    lisinopriL (PRINIVIL, ZESTRIL) tablet 10 mg  10 mg Oral DAILY    levETIRAcetam (KEPPRA) tablet 1,000 mg  1,000 mg Oral BID    folic acid (FOLVITE) tablet 1 mg  1 mg Oral DAILY    multivitamin, tx-iron-ca-min (THERA-M w/ IRON) tablet 1 Tablet  1 Tablet Oral DAILY    sodium chloride (NS) flush 5-40 mL  5-40 mL IntraVENous Q8H    sodium chloride (NS) flush 5-40 mL  5-40 mL IntraVENous PRN    acetaminophen (TYLENOL) tablet 650 mg  650 mg Oral Q6H PRN    Or    acetaminophen (TYLENOL) suppository 650 mg  650 mg Rectal Q6H PRN    polyethylene glycol (MIRALAX) packet 17 g  17 g Oral DAILY PRN    ondansetron (ZOFRAN ODT) tablet 4 mg  4 mg Oral Q8H PRN    Or    ondansetron (ZOFRAN) injection 4 mg  4 mg IntraVENous Q6H PRN    famotidine (PEPCID) tablet 20 mg  20 mg Oral BID    amLODIPine (NORVASC) tablet 10 mg  10 mg Oral DAILY    atorvastatin (LIPITOR) tablet 40 mg  40 mg Oral DAILY    clopidogreL (PLAVIX) tablet 75 mg  75 mg Oral DAILY    cyanocobalamin tablet 500 mcg  500 mcg Oral DAILY    [Held by provider] spironolactone (ALDACTONE) tablet 25 mg  25 mg Oral DAILY    tamsulosin (FLOMAX) capsule 0.4 mg  0.4 mg Oral QHS        Review of Systems  Pertinent items are noted in HPI. Objective:     Visit Vitals  BP (!) 160/84 (BP 1 Location: Right arm, BP Patient Position: At rest)   Pulse 98   Temp 98.5 °F (36.9 °C)   Resp 16   SpO2 100%           Temp (24hrs), Av.2 °F (36.8 °C), Min:97.5 °F (36.4 °C), Max:98.7 °F (37.1 °C)      No intake/output data recorded. No intake/output data recorded. General appearance -sleepy, opens eyes on tactile commands, not in acute distress  Eyes/Face -improving right facial edema  Chest -diminished air entry bibasilar without any wheezing. Heart - S1 and S2 normal  Abdomen - soft, nontender today, nondistended, Bowel sounds present  Neurological -sleepy, opens eyes on tactile commands, no tremors noted. Extremities - no pedal edema noted      Data Review    Recent Results (from the past 24 hour(s))   GLUCOSE, POC    Collection Time: 21  6:50 PM   Result Value Ref Range    Glucose (POC) 91 70 - 110 mg/dL   GLUCOSE, POC    Collection Time: 21 12:16 AM   Result Value Ref Range    Glucose (POC) 90 70 - 110 mg/dL   CBC WITH AUTOMATED DIFF    Collection Time: 21  5:27 AM   Result Value Ref Range    WBC 7.2 4.6 - 13.2 K/uL    RBC 3.05 (L) 4.20 - 5.30 M/uL    HGB 9.3 (L) 12.0 - 16.0 g/dL    HCT 28.8 (L) 35.0 - 45.0 %    MCV 94.4 78.0 - 100.0 FL    MCH 30.5 24.0 - 34.0 PG    MCHC 32.3 31.0 - 37.0 g/dL    RDW 16.4 (H) 11.6 - 14.5 %    PLATELET 358 146 - 992 K/uL    MPV 11.6 9.2 - 11.8 FL    NEUTROPHILS 59 40 - 73 %    LYMPHOCYTES 29 21 - 52 %    MONOCYTES 11 (H) 3 - 10 %    EOSINOPHILS 1 0 - 5 %    BASOPHILS 0 0 - 2 %    ABS. NEUTROPHILS 4.2 1.8 - 8.0 K/UL    ABS. LYMPHOCYTES 2.1 0.9 - 3.6 K/UL    ABS. MONOCYTES 0.8 0.05 - 1.2 K/UL    ABS. EOSINOPHILS 0.1 0.0 - 0.4 K/UL    ABS.  BASOPHILS 0.0 0.0 - 0.1 K/UL    DF AUTOMATED METABOLIC PANEL, COMPREHENSIVE    Collection Time: 09/09/21  5:27 AM   Result Value Ref Range    Sodium 140 136 - 145 mmol/L    Potassium 3.6 3.5 - 5.5 mmol/L    Chloride 113 (H) 100 - 111 mmol/L    CO2 20 (L) 21 - 32 mmol/L    Anion gap 7 3.0 - 18 mmol/L    Glucose 81 74 - 99 mg/dL    BUN 13 7.0 - 18 MG/DL    Creatinine 1.52 (H) 0.6 - 1.3 MG/DL    BUN/Creatinine ratio 9 (L) 12 - 20      GFR est AA 41 (L) >60 ml/min/1.73m2    GFR est non-AA 34 (L) >60 ml/min/1.73m2    Calcium 9.3 8.5 - 10.1 MG/DL    Bilirubin, total 0.4 0.2 - 1.0 MG/DL    ALT (SGPT) 7 (L) 13 - 56 U/L    AST (SGOT) 15 10 - 38 U/L    Alk. phosphatase 53 45 - 117 U/L    Protein, total 6.8 6.4 - 8.2 g/dL    Albumin 3.5 3.4 - 5.0 g/dL    Globulin 3.3 2.0 - 4.0 g/dL    A-G Ratio 1.1 0.8 - 1.7     GLUCOSE, POC    Collection Time: 09/09/21  1:22 PM   Result Value Ref Range    Glucose (POC) 90 70 - 110 mg/dL         Assessment/Plan:     ASSESSMENT:    1. Breakthrough seizures x2, no recurrence. Could be due to hypoglycemia  2. Fall  3. Right periorbital contusion/edema  4. Abdominal pain, resolved currently  5. History of seizures  6. Hypertension  7. Acute on stage III chronic kidney disease due to dehydration, back to baseline  8. Low folic acid level  9. Anemia of chronic medical disease, stable H&H  10.  History of stroke  11. Asymptomatic hypoglycemia, resolved  12 drowsiness, unknown etiology    PLAN:    CT head, ABG ordered  BMP in the morning  Patient will be started on IV fluid  Continue Keppra and Trileptal per neurologist recommendation. Continue glucose target nighttime. Discontinue lisinopril and and Aldactone with monitor renal function  Continue Flomax, Norvasc, Plavix, and statin  Modified barium swallow report reviewed. Family initially wanted to take her home but then they change mind and now they want patient to go to skilled nursing facility.   Patient will be medically ready to be discharged to skilled nursing facility if no new issues overnight. Preliminary discharge summary has been done as there will be change in clinical service from tomorrow. Anticipated date of discharge: September 9/10/2021    Total time to take care of this patient was 30 minutes and more than 50% of time was spent counseling and coordinating care. Disclaimer: Sections of this note are dictated using utilizing voice recognition software, which may have resulted in some phonetic based errors in grammar and contents. Even though attempts were made to correct all the mistakes, some may have been missed, and remained in the body of the document. If questions arise, please contact our department.

## 2021-09-09 NOTE — PROGRESS NOTES
ADULT PROTOCOL: JET AEROSOL ASSESSMENT    Patient  Kendra Nunn     78 y.o.   female     9/9/2021  7:57 PM    Breath Sounds Pre Procedure:  Breath Sounds Bilateral: Clear                                            Breath Sounds Post Procedure:                                                 Breathing pattern: Pre procedure  Breathing Pattern: Regular          Post procedure       Cough: Pre procedure                  Post procedure      Heart Rate: Pre procedure Pulse: 89           Post procedure      Resp Rate: Pre procedure  Respirations: 18           Post procedure          Nebulizer Therapy: Current medications Aerosolized Medications: DuoNeb       Problem List:   Patient Active Problem List   Diagnosis Code    Pulmonary embolism (HCC) I26.99    Chest pain R07.9    Other and unspecified noninfectious gastroenteritis and colitis(558.9) K52.9    CVA (cerebral infarction) I63.9    Non compliance w medication regimen Z91.14    Elevated Dilantin level R78.89    Convulsion (HCC) R56.9    Essential hypertension I10    Left-sided weakness R53.1    Dyslipidemia E78.5    TIA (transient ischemic attack) G45.9    Ataxia R27.0    Headache R51.9    Status epilepticus (HCC) G40.901    Seizure ([facility] [address] R56.9    Recurrent seizures (HCC) G40.909    Encephalopathy G93.40    Hallucinations R44.3    Abnormal coordination R27.8    Dehydration E86.0    Anemia D64.9    KEYLA (acute kidney injury) ([facility] [address] N17.9    Breakthrough seizure ([facility] [address] G40.919       Patient alert and cooperative to use MDI: Yes    Home Respiratory Therapy Regimen/Frequency:  NO  Medication   Device  Frequency     SEVERITY INDEX:    ITEM 0 1 2 3 4 Score   Respiratory Pattern and or Rate Regular  10-19 Regular  20-24   24-30    30-34 Severe SOB or   Greater than 35 0   Breath Sounds Clear Occasional Wheeze Mild Wheezing Moderate Wheezing  wheezing/Absent breath sounds 0   Shortness of Breath None Dyspnea on Exertion Dyspnea at Rest Moderate Shortness of Breath at Rest Severe Shortness of Breath - Limited Speech 1       Total Score:  1    * Scoring Guidelines  0-4 pts:  PRN-BID   5-7 pts:  BID, TID, QID  8-9 pts:  TID, QID, Q6  10-12 pts:  Q4-Q6  * - Guidelines used with clinical judgement. PRN Treatments can be ordered to supplement scheduled treatments. Regardless of score, frequency should not be less than normal home regimen.     Recommended Order/Frequency:  Change to BID    Comments:          Respiratory Therapist: Renetta Veras, RT

## 2021-09-09 NOTE — PROGRESS NOTES
Bedside and Verbal shift change report given to Juan Dangelo RN (oncoming nurse) by Jewell Wilson RN (offgoing nurse). Report included the following information SBAR and Intake/Output.

## 2021-09-09 NOTE — PROGRESS NOTES
Problem: Dysphagia (Adult)  Goal: *Acute Goals and Plan of Care (Insert Text)  Description: Recommendations:  Diet: full liquid/ nectar- thick  Meds: crushed  Aspiration Precautions  Oral Care TID  Other: PEG vs QOL diet    Goals:  Patient will:  1. Tolerate diet and/or diet upgrade without overt s/sx of aspiration under SLP supervision  2. Utilize compensatory swallow strategies of small bite/sip, alternate liquid/solid with min cues   3. Perform oral-motor and laryngeal elevation exercises 10 reps/each to increase oropharyngeal swallow function with min cues  4. Complete an objective swallow study (i.e., MBSS) to assess swallow integrity, r/o aspiration, and determine of safest LRD, min A    Outcome: Not Progressing Towards Goal     SPEECH LANGUAGE PATHOLOGY DYSPHAGIA TREATMENT    Patient: Cass Colbert (58 y.o. female)  Date: 9/9/2021  Diagnosis: Breakthrough seizure ([facility] [address] [G40.919]  KEYLA (acute kidney injury) ([facility] [address] [N17.9]  Dehydration [E86.0]  Anemia [D64.9] <principal problem not specified>       Precautions: aspiration  Fall     ASSESSMENT:  Follow up this am with pt for po trials. Max encouragement and assistance nec for pt to accept po trials of nectar-thick. Pt with severely labored A-P transit with tongue pumping; swallow delay ~5 seconds across each trial; severely decreased hyolaryngeal excursion. No overt aspiration s/s. However, pt demo inadequate sensory/motor awareness/strength for safe and adequate nutritional intake of solids due to high aspiration risk. SLP recommending continue with full liquid/nectar-thick. May need to consider PEG if unable to meet nutritional needs via po diet alone; however, preferred recommendation is QOL/comfort approach to nutrition due to current comorbidities. SLP will continue to follow.     Progression toward goals:  []         Improving appropriately and progressing toward goals  []         Improving slowly and progressing toward goals  [x]         Not making progress toward goals and plan of care will be adjusted     PLAN:  Recommendations and Planned Interventions:    Patient continues to benefit from skilled intervention to address the above impairments. Continue treatment per established plan of care. Discharge Recommendations:  Skilled Nursing Facility     SUBJECTIVE:   Patient stated Alicia Marino. OBJECTIVE:   Cognitive and Communication Status:  Neurologic State: Alert, Confused  Orientation Level: Oriented to person, Disoriented to time, Disoriented to situation, Disoriented to place  Cognition: Impaired decision making  Perception: Appears intact  Perseveration: No perseveration noted  Safety/Judgement: Fall prevention  Dysphagia Treatment:   See above     PAIN:  Pain level pre-treatment: 0/10   Pain level post-treatment: 0/10     After treatment:   []              Patient left in no apparent distress sitting up in chair  [x]              Patient left in no apparent distress in bed  [x]              Call bell left within reach  []              Nursing notified  []              Family present  []              Caregiver present  []              Bed alarm activated      COMMUNICATION/EDUCATION:   [x] Aspiration precautions; swallow safety; compensatory techniques  [x]        Patient unable to participate in education; education ongoing with staff  []  Posted safety precautions in patient's room.   [] Oral-motor/laryngeal strengthening exercises      TERESA Hayes  Time Calculation: 11 mins

## 2021-09-09 NOTE — ROUTINE PROCESS
Bedside and Verbal shift change report given to [address] (oncoming nurse) by Mary Carmen Connell (offgoing nurse). Report included the following information SBAR, Kardex and Recent Results.

## 2021-09-10 LAB
ANION GAP SERPL CALC-SCNC: 8 MMOL/L (ref 3–18)
BUN SERPL-MCNC: 10 MG/DL (ref 7–18)
BUN/CREAT SERPL: 7 (ref 12–20)
CALCIUM SERPL-MCNC: 9.5 MG/DL (ref 8.5–10.1)
CHLORIDE SERPL-SCNC: 112 MMOL/L (ref 100–111)
CO2 SERPL-SCNC: 20 MMOL/L (ref 21–32)
CREAT SERPL-MCNC: 1.36 MG/DL (ref 0.6–1.3)
GLUCOSE BLD STRIP.AUTO-MCNC: 97 MG/DL (ref 70–110)
GLUCOSE SERPL-MCNC: 90 MG/DL (ref 74–99)
POTASSIUM SERPL-SCNC: 3.7 MMOL/L (ref 3.5–5.5)
SODIUM SERPL-SCNC: 140 MMOL/L (ref 136–145)

## 2021-09-10 PROCEDURE — 74011000250 HC RX REV CODE- 250: Performed by: INTERNAL MEDICINE

## 2021-09-10 PROCEDURE — 74011250637 HC RX REV CODE- 250/637: Performed by: INTERNAL MEDICINE

## 2021-09-10 PROCEDURE — 74011250636 HC RX REV CODE- 250/636: Performed by: FAMILY MEDICINE

## 2021-09-10 PROCEDURE — 80048 BASIC METABOLIC PNL TOTAL CA: CPT

## 2021-09-10 PROCEDURE — 99233 SBSQ HOSP IP/OBS HIGH 50: CPT | Performed by: FAMILY MEDICINE

## 2021-09-10 PROCEDURE — 74011250637 HC RX REV CODE- 250/637: Performed by: NURSE PRACTITIONER

## 2021-09-10 PROCEDURE — 94761 N-INVAS EAR/PLS OXIMETRY MLT: CPT

## 2021-09-10 PROCEDURE — 2709999900 HC NON-CHARGEABLE SUPPLY

## 2021-09-10 PROCEDURE — 97535 SELF CARE MNGMENT TRAINING: CPT

## 2021-09-10 PROCEDURE — 94640 AIRWAY INHALATION TREATMENT: CPT

## 2021-09-10 PROCEDURE — 74011250637 HC RX REV CODE- 250/637: Performed by: PSYCHIATRY & NEUROLOGY

## 2021-09-10 PROCEDURE — 82962 GLUCOSE BLOOD TEST: CPT

## 2021-09-10 PROCEDURE — 96375 TX/PRO/DX INJ NEW DRUG ADDON: CPT

## 2021-09-10 PROCEDURE — 96372 THER/PROPH/DIAG INJ SC/IM: CPT

## 2021-09-10 PROCEDURE — 36415 COLL VENOUS BLD VENIPUNCTURE: CPT

## 2021-09-10 PROCEDURE — 99218 HC RM OBSERVATION: CPT

## 2021-09-10 RX ORDER — HYDRALAZINE HYDROCHLORIDE 20 MG/ML
10 INJECTION INTRAMUSCULAR; INTRAVENOUS
Status: DISCONTINUED | OUTPATIENT
Start: 2021-09-10 | End: 2021-09-14 | Stop reason: HOSPADM

## 2021-09-10 RX ORDER — DEXTROSE, SODIUM CHLORIDE, AND POTASSIUM CHLORIDE 5; .9; .15 G/100ML; G/100ML; G/100ML
75 INJECTION INTRAVENOUS CONTINUOUS
Status: DISCONTINUED | OUTPATIENT
Start: 2021-09-10 | End: 2021-09-14 | Stop reason: HOSPADM

## 2021-09-10 RX ORDER — ENOXAPARIN SODIUM 100 MG/ML
40 INJECTION SUBCUTANEOUS EVERY 24 HOURS
Status: DISCONTINUED | OUTPATIENT
Start: 2021-09-10 | End: 2021-09-14 | Stop reason: HOSPADM

## 2021-09-10 RX ORDER — FAMOTIDINE 20 MG/1
20 TABLET, FILM COATED ORAL EVERY EVENING
Status: DISCONTINUED | OUTPATIENT
Start: 2021-09-10 | End: 2021-09-14 | Stop reason: HOSPADM

## 2021-09-10 RX ADMIN — FAMOTIDINE 20 MG: 20 TABLET, FILM COATED ORAL at 18:32

## 2021-09-10 RX ADMIN — LISINOPRIL 10 MG: 10 TABLET ORAL at 11:12

## 2021-09-10 RX ADMIN — LEVETIRACETAM 1000 MG: 500 SOLUTION ORAL at 11:12

## 2021-09-10 RX ADMIN — ENOXAPARIN SODIUM 40 MG: 40 INJECTION SUBCUTANEOUS at 22:20

## 2021-09-10 RX ADMIN — Medication 16 G: at 22:33

## 2021-09-10 RX ADMIN — LEVETIRACETAM 1000 MG: 500 SOLUTION ORAL at 22:20

## 2021-09-10 RX ADMIN — IPRATROPIUM BROMIDE AND ALBUTEROL SULFATE 3 ML: .5; 3 SOLUTION RESPIRATORY (INHALATION) at 09:29

## 2021-09-10 RX ADMIN — POTASSIUM CHLORIDE, DEXTROSE MONOHYDRATE AND SODIUM CHLORIDE 100 ML/HR: 150; 5; 900 INJECTION, SOLUTION INTRAVENOUS at 22:17

## 2021-09-10 RX ADMIN — OXCARBAZEPINE 900 MG: 300 TABLET, FILM COATED ORAL at 18:32

## 2021-09-10 RX ADMIN — FAMOTIDINE 20 MG: 20 TABLET, FILM COATED ORAL at 11:13

## 2021-09-10 RX ADMIN — Medication 10 ML: at 22:23

## 2021-09-10 RX ADMIN — CLOPIDOGREL BISULFATE 75 MG: 75 TABLET ORAL at 11:12

## 2021-09-10 RX ADMIN — Medication 1 TABLET: at 11:12

## 2021-09-10 RX ADMIN — OXCARBAZEPINE 900 MG: 300 TABLET, FILM COATED ORAL at 11:12

## 2021-09-10 RX ADMIN — IPRATROPIUM BROMIDE AND ALBUTEROL SULFATE 3 ML: .5; 3 SOLUTION RESPIRATORY (INHALATION) at 20:27

## 2021-09-10 RX ADMIN — CYANOCOBALAMIN TAB 1000 MCG 500 MCG: 1000 TAB at 11:12

## 2021-09-10 RX ADMIN — FOLIC ACID 1 MG: 1 TABLET ORAL at 11:12

## 2021-09-10 RX ADMIN — ATORVASTATIN CALCIUM 40 MG: 40 TABLET, FILM COATED ORAL at 11:13

## 2021-09-10 RX ADMIN — AMLODIPINE BESYLATE 10 MG: 10 TABLET ORAL at 11:12

## 2021-09-10 NOTE — ROUTINE PROCESS
Bedside and Verbal shift change report given to Felicia Huntley RN (oncoming nurse) by Joanie Escalante RN (offgoing nurse). Report given with SBAR, Kardex, Intake/Output, MAR, Accordion and Recent Results.

## 2021-09-10 NOTE — ROUTINE PROCESS
Bedside and Verbal shift change report given to PEGGY phan (oncoming nurse) by Geoff Marin RN (offgoing nurse). Report included the following information SBAR, Kardex and Recent Results.

## 2021-09-10 NOTE — CONSULTS
Comprehensive Nutrition Assessment    Type and Reason for Visit: Initial, Consult      Nutrition Recommendations/Plan:  - Add supplement: Magic Cup TID  - Encourage/ monitor po intake of meals and the need for NGT placement/ supplemental EN support  - Plan to start calorie count for 1 day (9/11/2021); discussed with MD and RN. Form posted in patient's room    - If pt continues to have inadequate nutrition intake/ refusing po intake, recommend NGT placement and starting tube feeds of Jevity 1.5 at 20 mL/hr, advancing as pt tolerates by 10 mL q 8 hours to goal rate of 45 mL/hr with water flushes of 125 mL q 4 hours -- MD to address/ reach out to Nutrition as medically appropriate.       (goal EN regimen provides: 1620 kcal, 69 gm protein, 821 mL free water, 100% RDIs)       Nutrition Assessment:  Pt with poor appetite; not eating anything. Was refusing medications per RN note. Consult received for calorie count. Upon visiting pt, her son was discussing with her possible NGT placement. Explained NGT placement and supplemental tube feeds/ hydration via feeding tube. Patient's son was agreeable to possible plan pending MD decision. Spoke with Dr Kathi Rees; per MD, want to hold off for 1-2 more days and document patient's nutrition intake prior to NGT placement. Plan for calorie count discussed with RN; form posted in patient's room. Had discussed with MD about providing tube feeding recommended regimen if plan to start tube feeds. Per RN, pt has 3 son's, one of which passed away in April 2021 (this son was a caretaker for pt); may be depressed per family report per RN. Pt was not answering any of this writer's questions upon visit; per her son report, she is usually very verbal; per RN, pt converses with family when they visit. Nutrition hx obtained from son.     Malnutrition Assessment:  Malnutrition Status:  Mild malnutrition    Context:  Acute illness     Findings of the 6 clinical characteristics of malnutrition: Energy Intake:  Mild decrease in energy intake (specify) (poor/ no po intake since admission)  Weight Loss:  7.00 - Greater than 7.5% over 3 months     Body Fat Loss:  Unable to assess,     Muscle Mass Loss:  Unable to assess,        Nutrition History and Allergies: Past medical hx: HTN, seizures, stroke, neurological disorder. Per chart hx:  138 lb on 5/5/2021,    118-120 lb in June 2021 (per son report),   106 lb on 9/9/2021;   -12 lb, 10.2% x 3 month PTA; net wt loss of 32 lb, 23.2% x 4 months PTA. Usually good appetite and meal intake PTA per son report. Food allergies: tomato  (lactose intolerance)     Estimated Daily Nutrient Needs:  Energy (kcal): 4037-9752; Weight Used for Energy Requirements: Other (specify) (SBW 58 kg)  Protein (g): 38-58; Weight Used for Protein Requirements: Admission (48.1 kg x0.8-1.2)  Fluid (ml/day): 8703-7270; Method Used for Fluid Requirements: 1 ml/kcal      Nutrition Related Findings:  BM 9/8 soft, 9/6. No edema. Meds: folic acid, MVI with iron. On dysphagia diet, SLP following.      RN reported recommending to MD about starting IVF for adequate hydration      Wounds:    None       Current Nutrition Therapies:  ADULT DIET Full Liquid; Mildly Thick (Nectar)    Anthropometric Measures:  · Height:  5' 3\" (160 cm)  · Current Body Wt:  48.1 kg (106 lb 0.7 oz)   · Admission Body Wt:  106 lb 0.7 oz    · Usual Body Wt:   (118-120 lb in June 2021 per son report)     · Ideal Body Wt:  115 lbs:  92.2 %   · Adjusted Body Weight:   ; Weight Adjustment for: No adjustment   · BMI Category:  Underweight (BMI less than 22) age over 72       Nutrition Diagnosis:   · Inadequate oral intake related to early satiety (poor appetite) as evidenced by intake 0-25% (refusing meals)    · Mild malnutrition, In context of acute illness or injury related to inadequate protein-energy intake as evidenced by  (wt loss of 10.2% x 3 month PTA)       Nutrition Interventions:   Food and/or Nutrient Delivery: Continue current diet, Mineral supplement, Vitamin supplement, Start oral nutrition supplement  Nutrition Education and Counseling: No recommendations at this time  Coordination of Nutrition Care: Continue to monitor while inpatient (pt discussed with RN and MD)    Goals:  - Nutritional needs will be met through adequate oral intake or nutrition support within the next 7 days. Nutrition Monitoring and Evaluation:   Behavioral-Environmental Outcomes: None identified  Food/Nutrient Intake Outcomes: Diet advancement/tolerance, Food and nutrient intake, Supplement intake, Vitamin/mineral intake  Physical Signs/Symptoms Outcomes: Biochemical data, Chewing or swallowing, Meal time behavior, Nutrition focused physical findings    Discharge Planning:     Too soon to determine     Electronically signed by Gisell Jhaveri RD on 9/10/2021 at [address] [place] [address] Dr: [phone]

## 2021-09-10 NOTE — PROGRESS NOTES
Problem: Self Care Deficits Care Plan (Adult)  Goal: *Acute Goals and Plan of Care (Insert Text)  Description: Occupational Therapy Goals  Initiated 9/7/2021 within 7 day(s). 1.  Patient will perform grooming with supervision/set-up seated on edge of bed with good sitting balance. 2.  Patient will perform bathing with supervision/set-up. 3.  Patient will perform upper body dressing and lower body dressing with supervision/set-up. 4.  Patient will perform bedside commode transfers with minimal assistance/contact guard assist using LRAD. 5.  Patient will perform all aspects of toileting with minimal assistance/contact guard assist.  6.  Patient will participate in upper extremity therapeutic exercise/activities with supervision/set-up for 8 minutes. 7.  Patient will utilize energy conservation techniques during functional activities with min verbal cues. Prior Level of Function: pt reports she bathes seated on shower seat and in stance at sink, hand held assist for functional mobility with assist provided by son(s)       Outcome: Not Progressing Towards Goal   OCCUPATIONAL THERAPY TREATMENT    Patient: Cass Colbert (55 y.o. female)  Date: 9/10/2021  Diagnosis: Breakthrough seizure ([facility] [address] [G40.919]  KEYLA (acute kidney injury) ([facility] [address] [N17.9]  Dehydration [E86.0]  Anemia [D64.9] Breakthrough seizure ([facility] [address]       Precautions: Fall    Chart, occupational therapy assessment, plan of care, and goals were reviewed. ASSESSMENT:  Patient alert upon entering room but grasping in the air with her RUE. Attempted to redirect her and engage her in verbalization but she continued to reach for something. She had a bladder accident in bed. Total assist given for rolling to change pad and to perform hygiene. Mod assist provided for supine to sit on EOB in prep for changing hospital gown. Patient unable to sit upright and had a forward flexed posture requiring constant support.   She was returned to supine in bed and doffed/donned gown with max assist.  Total assist needed for scooting up in bed and patient left with all needs met and HOB elevated at end of session. Progression toward goals:  []          Improving appropriately and progressing toward goals  [x]          Improving slowly and progressing toward goals  []          Not making progress toward goals and plan of care will be adjusted     PLAN:  Patient continues to benefit from skilled intervention to address the above impairments. Continue treatment per established plan of care. Discharge Recommendations:  [facility]  Further Equipment Recommendations for Discharge:  TBD at next level of care     SUBJECTIVE:   Patient did not verbalize or attempt to communicate during session. OBJECTIVE DATA SUMMARY:   Cognitive/Behavioral Status:  Neurologic State: Alert, Confused  Orientation Level: Oriented to person  Cognition: Impaired decision making, Decreased command following  Safety/Judgement: Fall prevention, Decreased awareness of environment    Functional Mobility and Transfers for ADLs:   Bed Mobility:  Rolling: Total assistance  Supine to Sit: Moderate assistance  Sit to Supine: Moderate assistance    Balance:  Sitting: Impaired  Sitting - Static: Poor (constant support)  Sitting - Dynamic: Poor (constant support)    ADL Intervention:  Upper Body Dressing Assistance  Hospital Gown: Maximum assistance (Pt minimally assisted threading UEs through sleeves.)    Toileting  Toileting Assistance: Total assistance(dependent)  Bladder Hygiene:  Total assistance (dependent)  Clothing Management: Total assistance (dependent)    Cognitive Retraining  Safety/Judgement: Fall prevention;Decreased awareness of environment    Pain: Non verbal pain scale  Pain level pre-treatment: 0/10   Pain level post-treatment: 0/10  Pain Intervention(s): NA  Response to intervention: NA    Activity Tolerance:    Fair  Please refer to the flowsheet for vital signs taken during this treatment. After treatment:   []  Patient left in no apparent distress sitting up in chair  [x]  Patient left in no apparent distress in bed  [x]  Call bell left within reach  [x]  Nursing notified  []  Caregiver present  [x]  Bed alarm activated    COMMUNICATION/EDUCATION:   [x] Role of Occupational Therapy in the acute care setting  [x] Home safety education was provided and the patient/caregiver indicated understanding. [x] Patient/family have participated as able in working towards goals and plan of care. [x] Patient/family agree to work toward stated goals and plan of care. [] Patient understands intent and goals of therapy, but is neutral about his/her participation. [] Patient is unable to participate in goal setting and plan of care.       Thank you for this referral.  Adam Eastman MS OTR/L   Time Calculation: 26 mins

## 2021-09-10 NOTE — PROGRESS NOTES
Called [facility] and [place] at [address] still pending with [facility].      Peri Randle RN BSN  Care Manager  [phone]

## 2021-09-10 NOTE — PROGRESS NOTES
Problem: Patient Education: Go to Patient Education Activity  Goal: Patient/Family Education  Outcome: Progressing Towards Goal     Problem: Patient Education: Go to Patient Education Activity  Goal: Patient/Family Education  Outcome: Progressing Towards Goal     Problem: Patient Education: Go to Patient Education Activity  Goal: Patient/Family Education  Outcome: Progressing Towards Goal     Problem: Pressure Injury - Risk of  Goal: *Prevention of pressure injury  Description: Document John Scale and appropriate interventions in the flowsheet. Outcome: Progressing Towards Goal  Note: Pressure Injury Interventions:  Sensory Interventions: Keep linens dry and wrinkle-free    Moisture Interventions: Absorbent underpads    Activity Interventions: Pressure redistribution bed/mattress(bed type)    Mobility Interventions: HOB 30 degrees or less    Nutrition Interventions: Document food/fluid/supplement intake    Friction and Shear Interventions: HOB 30 degrees or less                Problem: Patient Education: Go to Patient Education Activity  Goal: Patient/Family Education  Outcome: Progressing Towards Goal     Problem: Falls - Risk of  Goal: *Absence of Falls  Description: Document Kandy Fall Risk and appropriate interventions in the flowsheet.   Outcome: Progressing Towards Goal  Note: Fall Risk Interventions:  Mobility Interventions: Bed/chair exit alarm, OT consult for ADLs, Patient to call before getting OOB, PT Consult for mobility concerns    Mentation Interventions: Bed/chair exit alarm    Medication Interventions: Patient to call before getting OOB    Elimination Interventions: Call light in reach    History of Falls Interventions: Bed/chair exit alarm         Problem: Patient Education: Go to Patient Education Activity  Goal: Patient/Family Education  Outcome: Progressing Towards Goal     Problem: Seizure Disorder (Adult)  Goal: *STG: Remains free of seizure activity  Outcome: Progressing Towards Goal  Goal: *STG: Maintains lab values within therapeutic range  Outcome: Progressing Towards Goal  Goal: *STG/LTG: Complies with medication therapy  Outcome: Progressing Towards Goal  Goal: *STG: Remains free of injury during seizure activity  Outcome: Progressing Towards Goal  Goal: *STG: Remains safe in hospital  Outcome: Progressing Towards Goal  Goal: Interventions  Outcome: Progressing Towards Goal

## 2021-09-11 LAB
ALBUMIN SERPL-MCNC: 3.4 G/DL (ref 3.4–5)
ALBUMIN/GLOB SERPL: 1.1 {RATIO} (ref 0.8–1.7)
ALP SERPL-CCNC: 56 U/L (ref 45–117)
ALT SERPL-CCNC: 7 U/L (ref 13–56)
AMMONIA PLAS-SCNC: 22 UMOL/L (ref 11–32)
ANION GAP SERPL CALC-SCNC: 5 MMOL/L (ref 3–18)
AST SERPL-CCNC: 12 U/L (ref 10–38)
BASOPHILS # BLD: 0 K/UL (ref 0–0.1)
BASOPHILS NFR BLD: 1 % (ref 0–2)
BILIRUB SERPL-MCNC: 0.6 MG/DL (ref 0.2–1)
BUN SERPL-MCNC: 8 MG/DL (ref 7–18)
BUN/CREAT SERPL: 6 (ref 12–20)
CALCIUM SERPL-MCNC: 9.1 MG/DL (ref 8.5–10.1)
CHLORIDE SERPL-SCNC: 113 MMOL/L (ref 100–111)
CO2 SERPL-SCNC: 23 MMOL/L (ref 21–32)
CREAT SERPL-MCNC: 1.24 MG/DL (ref 0.6–1.3)
CRP SERPL-MCNC: 1.4 MG/DL (ref 0–0.3)
DIFFERENTIAL METHOD BLD: ABNORMAL
EOSINOPHIL # BLD: 0 K/UL (ref 0–0.4)
EOSINOPHIL NFR BLD: 0 % (ref 0–5)
ERYTHROCYTE [DISTWIDTH] IN BLOOD BY AUTOMATED COUNT: 16.4 % (ref 11.6–14.5)
ERYTHROCYTE [SEDIMENTATION RATE] IN BLOOD: 20 MM/HR (ref 0–30)
EST. AVERAGE GLUCOSE BLD GHB EST-MCNC: 94 MG/DL
FOLATE SERPL-MCNC: 3.4 NG/ML (ref 3.1–17.5)
GLOBULIN SER CALC-MCNC: 3.2 G/DL (ref 2–4)
GLUCOSE BLD STRIP.AUTO-MCNC: 88 MG/DL (ref 70–110)
GLUCOSE SERPL-MCNC: 103 MG/DL (ref 74–99)
HBA1C MFR BLD: 4.9 % (ref 4.2–5.6)
HCT VFR BLD AUTO: 28.1 % (ref 35–45)
HGB BLD-MCNC: 9.2 G/DL (ref 12–16)
LEVETIRACETAM SERPL-MCNC: <1 UG/ML (ref 10–40)
LYMPHOCYTES # BLD: 1.7 K/UL (ref 0.9–3.6)
LYMPHOCYTES NFR BLD: 25 % (ref 21–52)
MCH RBC QN AUTO: 30 PG (ref 24–34)
MCHC RBC AUTO-ENTMCNC: 32.7 G/DL (ref 31–37)
MCV RBC AUTO: 91.5 FL (ref 78–100)
MONOCYTES # BLD: 0.6 K/UL (ref 0.05–1.2)
MONOCYTES NFR BLD: 10 % (ref 3–10)
NEUTS SEG # BLD: 4.2 K/UL (ref 1.8–8)
NEUTS SEG NFR BLD: 64 % (ref 40–73)
PLATELET # BLD AUTO: 211 K/UL (ref 135–420)
PMV BLD AUTO: 11.2 FL (ref 9.2–11.8)
POTASSIUM SERPL-SCNC: 3.9 MMOL/L (ref 3.5–5.5)
PROT SERPL-MCNC: 6.6 G/DL (ref 6.4–8.2)
RBC # BLD AUTO: 3.07 M/UL (ref 4.2–5.3)
SODIUM SERPL-SCNC: 141 MMOL/L (ref 136–145)
TSH SERPL DL<=0.05 MIU/L-ACNC: 0.53 UIU/ML (ref 0.36–3.74)
VIT B12 SERPL-MCNC: 832 PG/ML (ref 211–911)
WBC # BLD AUTO: 6.6 K/UL (ref 4.6–13.2)

## 2021-09-11 PROCEDURE — 94640 AIRWAY INHALATION TREATMENT: CPT

## 2021-09-11 PROCEDURE — 74011250637 HC RX REV CODE- 250/637: Performed by: INTERNAL MEDICINE

## 2021-09-11 PROCEDURE — 74011250637 HC RX REV CODE- 250/637: Performed by: PSYCHIATRY & NEUROLOGY

## 2021-09-11 PROCEDURE — 82140 ASSAY OF AMMONIA: CPT

## 2021-09-11 PROCEDURE — 36415 COLL VENOUS BLD VENIPUNCTURE: CPT

## 2021-09-11 PROCEDURE — 74011000250 HC RX REV CODE- 250: Performed by: INTERNAL MEDICINE

## 2021-09-11 PROCEDURE — 80053 COMPREHEN METABOLIC PANEL: CPT

## 2021-09-11 PROCEDURE — 85025 COMPLETE CBC W/AUTO DIFF WBC: CPT

## 2021-09-11 PROCEDURE — 96376 TX/PRO/DX INJ SAME DRUG ADON: CPT

## 2021-09-11 PROCEDURE — 2709999900 HC NON-CHARGEABLE SUPPLY

## 2021-09-11 PROCEDURE — 82746 ASSAY OF FOLIC ACID SERUM: CPT

## 2021-09-11 PROCEDURE — 99233 SBSQ HOSP IP/OBS HIGH 50: CPT | Performed by: FAMILY MEDICINE

## 2021-09-11 PROCEDURE — 74011250636 HC RX REV CODE- 250/636: Performed by: FAMILY MEDICINE

## 2021-09-11 PROCEDURE — 96372 THER/PROPH/DIAG INJ SC/IM: CPT

## 2021-09-11 PROCEDURE — 74011250637 HC RX REV CODE- 250/637: Performed by: NURSE PRACTITIONER

## 2021-09-11 PROCEDURE — 85652 RBC SED RATE AUTOMATED: CPT

## 2021-09-11 PROCEDURE — 84443 ASSAY THYROID STIM HORMONE: CPT

## 2021-09-11 PROCEDURE — 99218 HC RM OBSERVATION: CPT

## 2021-09-11 PROCEDURE — 83036 HEMOGLOBIN GLYCOSYLATED A1C: CPT

## 2021-09-11 PROCEDURE — 86140 C-REACTIVE PROTEIN: CPT

## 2021-09-11 PROCEDURE — 82962 GLUCOSE BLOOD TEST: CPT

## 2021-09-11 RX ADMIN — Medication 1 TABLET: at 10:17

## 2021-09-11 RX ADMIN — LISINOPRIL 10 MG: 10 TABLET ORAL at 10:18

## 2021-09-11 RX ADMIN — OXCARBAZEPINE 900 MG: 300 TABLET, FILM COATED ORAL at 17:41

## 2021-09-11 RX ADMIN — CLOPIDOGREL BISULFATE 75 MG: 75 TABLET ORAL at 10:18

## 2021-09-11 RX ADMIN — POTASSIUM CHLORIDE, DEXTROSE MONOHYDRATE AND SODIUM CHLORIDE 100 ML/HR: 150; 5; 900 INJECTION, SOLUTION INTRAVENOUS at 21:31

## 2021-09-11 RX ADMIN — Medication 10 ML: at 23:09

## 2021-09-11 RX ADMIN — ATORVASTATIN CALCIUM 40 MG: 40 TABLET, FILM COATED ORAL at 10:17

## 2021-09-11 RX ADMIN — IPRATROPIUM BROMIDE AND ALBUTEROL SULFATE 3 ML: .5; 3 SOLUTION RESPIRATORY (INHALATION) at 19:59

## 2021-09-11 RX ADMIN — LEVETIRACETAM 1000 MG: 500 SOLUTION ORAL at 10:17

## 2021-09-11 RX ADMIN — Medication 16 G: at 23:12

## 2021-09-11 RX ADMIN — CYANOCOBALAMIN TAB 1000 MCG 500 MCG: 1000 TAB at 10:16

## 2021-09-11 RX ADMIN — LEVETIRACETAM 1000 MG: 500 SOLUTION ORAL at 23:06

## 2021-09-11 RX ADMIN — FOLIC ACID 1 MG: 1 TABLET ORAL at 10:18

## 2021-09-11 RX ADMIN — OXCARBAZEPINE 900 MG: 300 TABLET, FILM COATED ORAL at 10:17

## 2021-09-11 RX ADMIN — POTASSIUM CHLORIDE, DEXTROSE MONOHYDRATE AND SODIUM CHLORIDE 100 ML/HR: 150; 5; 900 INJECTION, SOLUTION INTRAVENOUS at 10:19

## 2021-09-11 RX ADMIN — ENOXAPARIN SODIUM 40 MG: 40 INJECTION SUBCUTANEOUS at 23:10

## 2021-09-11 RX ADMIN — AMLODIPINE BESYLATE 10 MG: 10 TABLET ORAL at 10:18

## 2021-09-11 RX ADMIN — Medication 10 ML: at 06:46

## 2021-09-11 RX ADMIN — FAMOTIDINE 20 MG: 20 TABLET, FILM COATED ORAL at 17:41

## 2021-09-11 RX ADMIN — IPRATROPIUM BROMIDE AND ALBUTEROL SULFATE 3 ML: .5; 3 SOLUTION RESPIRATORY (INHALATION) at 09:39

## 2021-09-11 NOTE — PROGRESS NOTES
Bedside and Verbal shift change report given to Ritchie Odom (oncoming nurse) by Jose Jones RN   (offgoing nurse). Report given with SBAR, Kardex, MAR and Recent Results.

## 2021-09-11 NOTE — PROGRESS NOTES
Nutrition Note    Continues with poor, inadequate intake. 1 day calorie count in progress however upon review today pt only consuming bites of cream of wheat today at breakfast and did not consume any of lunch despite nursing encouragement/assistance & not consuming supplements. Discussed recommendation for NGT placement with Dr. Juli Turpin MD to speak to family prior to placement & MD to consult nutrition for management of tube feeding as appropriate. Remain on D5 NS with 20 mEq/L KCL at 100 mL/hr providing 120 gm dextrose, 408 kcal per day. No progress towards nutritional goals. Nutrition Recommendations/Plan:  - Continue to monitor and encourage po intake with nursing assistance at all meals. Calorie count in progress.  - Recommend NGT placement to provide supplemental EN support- MD to discuss with family prior to placement. Nutrition available for consult to manage tube feeds when appropriate.  - Recommended tube feeding regimen as needed: Jevity 1.5 at 20 mL/hr, advancing as pt tolerates by 10 mL q 8 hours to goal rate of 45 mL/hr with water flushes of 125 mL q 4 hours. (goal EN regimen to provide: 1620 kcal, 69 gm protein, 821 mL free water, 100% RDIs).   - Continue IVF per MD.    Electronically signed by Deric Avila RD on 9/11/2021 at 1:24 PM    Contact: [phone]

## 2021-09-11 NOTE — PROGRESS NOTES
Authorization for [facility] received. Spoke with Dr Sakina Irene who stated patient is not ready for discharge. Palliative consult and possible PEG. Authoization expires on Monday 9/13.     Janny Stern RN BSN  Care Manager  [phone]

## 2021-09-11 NOTE — PROGRESS NOTES
Progress Note         Patient: Garett Dugan MRN: [id number]  CSN: [id number]    YOB: 1955  Age: 78 y.o. Sex: female    DOA: 2021 LOS:  LOS: 0 days                    Subjective:     Garett Dugan is a 78 y.o. female with a PMHx of seizure disorder, status epilepticus, stroke, PE, HLD, and hallucinations who is admitted for recurrent breakthrough seizures and acute metabolic encephalopathy. Seen in room today  Lying in bed, awake  Is able to mumble her name and that she is in the hospital  The rest of her words are mumbled and not understandable  Further evaluation and HPI extremely limited as patient is either not able to or refuses to participate    From previous discussion with patient's son:  He reports that she is compliant with her seizure medications at home, she is talkative and interactive, and can basically complete almost all of her ADLs without any assistance    Nursing was able to learn that patient's son who she was very very close has  in the recent past    Patient having almost no oral intake again today      Objective:     Physical Exam:  Visit Vitals  BP (!) 155/89 (BP 1 Location: Left upper arm, BP Patient Position: At rest)   Pulse 95   Temp 98.3 °F (36.8 °C)   Resp 18   Ht 5' 3\" (1.6 m)   Wt 51.3 kg (113 lb)   SpO2 100%   BMI 20.02 kg/m²        General:         Awake, alert, mumbles not understandable words, uncooperative, no acute distress    HEENT: NC, Atraumatic. Anicteric sclerae. Lungs: CTA Bilaterally. No Wheezing/Rhonchi/Rales. Heart:  Regular  rhythm,  No murmur, No Rubs, No Gallops  Abdomen: Soft, Non distended, Non tender. +Bowel sounds, no HSM  Extremities: No c/c/e  Neurologic:  Patient either cannot or will not participate in exam    Intake and Output:  Current Shift:  No intake/output data recorded. Last three shifts:  No intake/output data recorded.     Labs: Results:       Chemistry Recent Labs     21  0330 09/10/21  0100 21  8310 * 90 81    140 140   K 3.9 3.7 3.6   * 112* 113*   CO2 23 20* 20*   BUN 8 10 13   CREA 1.24 1.36* 1.52*   CA 9.1 9.5 9.3   AGAP 5 8 7   BUCR 6* 7* 9*   AP 56  --  53   TP 6.6  --  6.8   ALB 3.4  --  3.5   GLOB 3.2  --  3.3   AGRAT 1.1  --  1.1      CBC w/Diff Recent Labs     09/11/21 0330 09/09/21  0527   WBC 6.6 7.2   RBC 3.07* 3.05*   HGB 9.2* 9.3*   HCT 28.1* 28.8*    197   GRANS 64 59   LYMPH 25 29   EOS 0 1      Cardiac Enzymes No results for input(s): CPK, CKND1, GWENDOLYN in the last 72 hours. No lab exists for component: CKRMB, TROIP   Coagulation No results for input(s): PTP, INR, APTT, INREXT, INREXT in the last 72 hours. Lipid Panel Lab Results   Component Value Date/Time    Cholesterol, total 144 05/14/2021 01:22 PM    HDL Cholesterol 46 05/14/2021 01:22 PM    LDL, calculated 77.6 05/14/2021 01:22 PM    VLDL, calculated 20.4 05/14/2021 01:22 PM    Triglyceride 102 05/14/2021 01:22 PM    CHOL/HDL Ratio 3.1 05/14/2021 01:22 PM      BNP No results for input(s): BNPP in the last 72 hours. Liver Enzymes Recent Labs     09/11/21  0330   TP 6.6   ALB 3.4   AP 56      Thyroid Studies Lab Results   Component Value Date/Time    TSH 0.53 09/11/2021 03:30 AM                Assessment and Plan:     Ardella Lundborg is a 78 y.o. female with a PMHx of seizure disorder, status epilepticus, stroke, PE, HLD, and hallucinations who is admitted for recurrent breakthrough seizures and acute metabolic encephalopathy. 1. Breakthrough seizures x2  2. Acute metabolic encephalopathy-delirium  3. Fall  4. Right periorbital contusion/edema  5. Abdominal pain-resolved  6. KEYLA on CKD stage III-resolved  7. Asymptomatic hypoglycemia-resolved  8. Seizure disorder  9. Hx status epilepticus  10. Hx stroke  11. Hx PE  12. Concern for significant depression  13. Hx medical noncompliance  14.  Extremely poor oral intake    Neurology consulted and signed off  Nutrition consulted for calorie count  We will consider placing NG tube for tube feeds over the weekend if patient's mental status or ability to participate in her care does not improve  CT head from 9/9 reviewed and shows no acute changes  Continue IVF-D5 normal saline with 20 mEq KCl at 100 mL/h  Continue Keppra and oxcarbazepine  Continue other home meds  Duo nebs as needed  Follow-up CBC, CMP  PT, OT-recommending SNF/rehab  Palliative care consulted    Attempted to call and discuss with patient's son over the phone. There was no answer, voicemail left. Will attempt again tomorrow. Case discussed with:  []Patient  []Family  [x]Nursing  [x]Case Management  DVT prophylaxis: Lovenox  Diet: Dysphagia  Contact: Kaylee Colmenares (son)     [phone]  Code Status: Full  Disposition: Continue current care, greater than 2 nights      H. Kaleb Lennon DO  9/11/2021       Dragon medical dictation software was used for portions of this report. Unintended errors may occur.

## 2021-09-12 ENCOUNTER — APPOINTMENT (OUTPATIENT)
Dept: GENERAL RADIOLOGY | Age: 66
DRG: 056 | End: 2021-09-12
Attending: FAMILY MEDICINE
Payer: MEDICARE

## 2021-09-12 LAB
GLUCOSE BLD STRIP.AUTO-MCNC: 101 MG/DL (ref 70–110)
GLUCOSE BLD STRIP.AUTO-MCNC: 103 MG/DL (ref 70–110)
GLUCOSE BLD STRIP.AUTO-MCNC: 87 MG/DL (ref 70–110)
GLUCOSE BLD STRIP.AUTO-MCNC: 99 MG/DL (ref 70–110)

## 2021-09-12 PROCEDURE — 65270000029 HC RM PRIVATE

## 2021-09-12 PROCEDURE — 74011250637 HC RX REV CODE- 250/637: Performed by: PSYCHIATRY & NEUROLOGY

## 2021-09-12 PROCEDURE — 94640 AIRWAY INHALATION TREATMENT: CPT

## 2021-09-12 PROCEDURE — 96376 TX/PRO/DX INJ SAME DRUG ADON: CPT

## 2021-09-12 PROCEDURE — 99233 SBSQ HOSP IP/OBS HIGH 50: CPT | Performed by: FAMILY MEDICINE

## 2021-09-12 PROCEDURE — 74011250637 HC RX REV CODE- 250/637: Performed by: NURSE PRACTITIONER

## 2021-09-12 PROCEDURE — 2709999900 HC NON-CHARGEABLE SUPPLY

## 2021-09-12 PROCEDURE — 97535 SELF CARE MNGMENT TRAINING: CPT

## 2021-09-12 PROCEDURE — 74011250636 HC RX REV CODE- 250/636: Performed by: FAMILY MEDICINE

## 2021-09-12 PROCEDURE — 74011250637 HC RX REV CODE- 250/637: Performed by: INTERNAL MEDICINE

## 2021-09-12 PROCEDURE — 74011000250 HC RX REV CODE- 250: Performed by: INTERNAL MEDICINE

## 2021-09-12 PROCEDURE — 99218 HC RM OBSERVATION: CPT

## 2021-09-12 PROCEDURE — 74018 RADEX ABDOMEN 1 VIEW: CPT

## 2021-09-12 PROCEDURE — 82962 GLUCOSE BLOOD TEST: CPT

## 2021-09-12 RX ADMIN — ENOXAPARIN SODIUM 40 MG: 40 INJECTION SUBCUTANEOUS at 22:25

## 2021-09-12 RX ADMIN — POTASSIUM CHLORIDE, DEXTROSE MONOHYDRATE AND SODIUM CHLORIDE 100 ML/HR: 150; 5; 900 INJECTION, SOLUTION INTRAVENOUS at 08:56

## 2021-09-12 RX ADMIN — LEVETIRACETAM 1000 MG: 500 SOLUTION ORAL at 22:25

## 2021-09-12 RX ADMIN — Medication 10 ML: at 08:58

## 2021-09-12 RX ADMIN — CYANOCOBALAMIN TAB 1000 MCG 500 MCG: 1000 TAB at 08:57

## 2021-09-12 RX ADMIN — ATORVASTATIN CALCIUM 40 MG: 40 TABLET, FILM COATED ORAL at 08:57

## 2021-09-12 RX ADMIN — FOLIC ACID 1 MG: 1 TABLET ORAL at 08:57

## 2021-09-12 RX ADMIN — CLOPIDOGREL BISULFATE 75 MG: 75 TABLET ORAL at 08:57

## 2021-09-12 RX ADMIN — OXCARBAZEPINE 900 MG: 300 TABLET, FILM COATED ORAL at 08:57

## 2021-09-12 RX ADMIN — LISINOPRIL 10 MG: 10 TABLET ORAL at 08:57

## 2021-09-12 RX ADMIN — Medication 10 ML: at 22:32

## 2021-09-12 RX ADMIN — LEVETIRACETAM 1000 MG: 500 SOLUTION ORAL at 08:58

## 2021-09-12 RX ADMIN — IPRATROPIUM BROMIDE AND ALBUTEROL SULFATE 3 ML: .5; 3 SOLUTION RESPIRATORY (INHALATION) at 20:45

## 2021-09-12 RX ADMIN — Medication 1 TABLET: at 08:57

## 2021-09-12 RX ADMIN — Medication 16 G: at 22:26

## 2021-09-12 RX ADMIN — POTASSIUM CHLORIDE, DEXTROSE MONOHYDRATE AND SODIUM CHLORIDE 100 ML/HR: 150; 5; 900 INJECTION, SOLUTION INTRAVENOUS at 18:30

## 2021-09-12 RX ADMIN — AMLODIPINE BESYLATE 10 MG: 10 TABLET ORAL at 08:57

## 2021-09-12 NOTE — ROUTINE PROCESS
Bedside and Verbal shift change report given to [facility] RN (oncoming nurse) by Ryder Peck RN (offgoing nurse). Report given with SBAR, Kardex, Intake/Output, MAR, Accordion and Recent Results.

## 2021-09-12 NOTE — PROGRESS NOTES
Problem: Self Care Deficits Care Plan (Adult)  Goal: *Acute Goals and Plan of Care (Insert Text)  Description: Occupational Therapy Goals  Initiated 9/7/2021 within 7 day(s). 1.  Patient will perform grooming with supervision/set-up seated on edge of bed with good sitting balance. 2.  Patient will perform bathing with supervision/set-up. 3.  Patient will perform upper body dressing and lower body dressing with supervision/set-up. 4.  Patient will perform bedside commode transfers with minimal assistance/contact guard assist using LRAD. 5.  Patient will perform all aspects of toileting with minimal assistance/contact guard assist.  6.  Patient will participate in upper extremity therapeutic exercise/activities with supervision/set-up for 8 minutes. 7.  Patient will utilize energy conservation techniques during functional activities with min verbal cues. Prior Level of Function: pt reports she bathes seated on shower seat and in stance at sink, hand held assist for functional mobility with assist provided by son(s)       Outcome: Progressing Towards Goal   OCCUPATIONAL THERAPY TREATMENT    Patient: Jeff Knapp (6 y.o. female)  Date: 9/12/2021  Diagnosis: Breakthrough seizure ([facility] [address] [G40.919]  KEYLA (acute kidney injury) ([address] [N17.9]  Dehydration [E86.0]  Anemia [D64.9] Breakthrough seizure ([facility] [address]       Precautions: Fall  PLOF: pt reports she bathes seated on shower seat and in stance at sink, hand held assist for functional mobility with assist provided by son(s)    Chart, occupational therapy assessment, plan of care, and goals were reviewed. ASSESSMENT:  Pt cleared by RN for OT tx at this time. Pt presented supine in bed upon therapist arrival with confusion. Patient with limited command following/comprehension, despite visual/tactile cues provided. She required MAX A for facial hygiene w/ max cueing. She required TOTAL A rolling L/R and to scoot to [facility] for optimal bed positioning.  Pt was left with HOB elevated, bed alarm active, and all needs left within reach. RN made aware of pt's participation and position. Progression toward goals:  []          Improving appropriately and progressing toward goals  [x]          Improving slowly and progressing toward goals  []          Not making progress toward goals and plan of care will be adjusted     PLAN:  Patient continues to benefit from skilled intervention to address the above impairments. Continue treatment per established plan of care. Discharge Recommendations: SNF  Further Equipment Recommendations for Discharge: TBA     SUBJECTIVE:   Patient did not verbalize this session    OBJECTIVE DATA SUMMARY:   Cognitive/Behavioral Status:  Neurologic State: Lethargic  Orientation Level: Oriented to person  Cognition: Decreased attention/concentration, Decreased command following  Safety/Judgement: Fall prevention, Decreased awareness of environment    Functional Mobility and Transfers for ADLs:   Bed Mobility:  Rolling: Total assistance        Scooting: Total assistance (scooting to [facility] )     Balance:  Sitting: Impaired    ADL Intervention:       Grooming  Position Performed: Other (comment) (supine in bed with HOB elevated)  Washing Face: Maximum assistance    Pain:  Pain level pre-treatment: 0/10   Pain level post-treatment: 0/10      Activity Tolerance:    Poor   Please refer to the flowsheet for vital signs taken during this treatment. After treatment:   []  Patient left in no apparent distress sitting up in chair  [x]  Patient left in no apparent distress in bed  [x]  Call bell left within reach  [x]  Nursing notified  []  Caregiver present  [x]  Bed alarm activated    COMMUNICATION/EDUCATION:   [x] Role of Occupational Therapy in the acute care setting  [] Home safety education was provided and the patient/caregiver indicated understanding. [x] Patient/family have participated as able in working towards goals and plan of care.   [x] Patient/family agree to work toward stated goals and plan of care. [] Patient understands intent and goals of therapy, but is neutral about his/her participation. [] Patient is unable to participate in goal setting and plan of care.       Thank you for this referral.  JOSE JUAN Flor  Time Calculation: 10 mins

## 2021-09-12 NOTE — PROGRESS NOTES
Progress Note         Patient: Loren Mcdonough MRN: [id number]  CSN: [id number]    YOB: 1955  Age: 78 y.o. Sex: female    DOA: 2021 LOS:  LOS: 0 days                    Subjective:     Loren Mcdonough is a 78 y.o. female with a PMHx of seizure disorder, status epilepticus, stroke, PE, HLD, and hallucinations who is admitted for recurrent breakthrough seizures and acute metabolic encephalopathy. No significant change in patient condition for the past 3 days  Seen in room today  Lying in bed, lethargic, barely opens eyes  Mumbles indecipherable sounds  Further evaluation and HPI extremely limited as patient is either not able to or refuses to participate    From previous discussion with patient's son:  He reports that she is compliant with her seizure medications at home, she is talkative and interactive, and can basically complete almost all of her ADLs without any assistance    Patient's son who she was very very close  in the recent past    Patient with essentially no oral intake for days      Objective:     Physical Exam:  Visit Vitals  BP 96/67 (BP 1 Location: Right arm, BP Patient Position: Lying)   Pulse 93   Temp 98.9 °F (37.2 °C)   Resp 17   Ht 5' 3\" (1.6 m)   Wt 51.3 kg (113 lb)   SpO2 98%   BMI 20.02 kg/m²        General:         Awake, alert, mumbles indecipherable sounds, uncooperative, no acute distress    HEENT: NC, Atraumatic. Anicteric sclerae. Lungs: CTA Bilaterally. No Wheezing/Rhonchi/Rales. Heart:  Regular  rhythm,  No murmur, No Rubs, No Gallops  Abdomen: Soft, Non distended, Non tender.   +Bowel sounds, no HSM  Extremities: No c/c/e  Neurologic:  Patient either cannot or will not participate in exam    Intake and Output:  Current Shift:  701 - 1900  In: 120 [P.O.:120]  Out: -   Last three shifts:  09/10 1901 -  07  In: 768.3 [I.V.:768.3]  Out: -     Labs: Results:       Chemistry Recent Labs     21  0330 09/10/21  0100   * 90   NA 141 140   K 3.9 3.7   * 112*   CO2 23 20*   BUN 8 10   CREA 1.24 1.36*   CA 9.1 9.5   AGAP 5 8   BUCR 6* 7*   AP 56  --    TP 6.6  --    ALB 3.4  --    GLOB 3.2  --    AGRAT 1.1  --       CBC w/Diff Recent Labs     09/11/21  0330   WBC 6.6   RBC 3.07*   HGB 9.2*   HCT 28.1*      GRANS 64   LYMPH 25   EOS 0      Cardiac Enzymes No results for input(s): CPK, CKND1, GWENDOLYN in the last 72 hours. No lab exists for component: CKRMB, TROIP   Coagulation No results for input(s): PTP, INR, APTT, INREXT, INREXT in the last 72 hours. Lipid Panel Lab Results   Component Value Date/Time    Cholesterol, total 144 05/14/2021 01:22 PM    HDL Cholesterol 46 05/14/2021 01:22 PM    LDL, calculated 77.6 05/14/2021 01:22 PM    VLDL, calculated 20.4 05/14/2021 01:22 PM    Triglyceride 102 05/14/2021 01:22 PM    CHOL/HDL Ratio 3.1 05/14/2021 01:22 PM      BNP No results for input(s): BNPP in the last 72 hours. Liver Enzymes Recent Labs     09/11/21  0330   TP 6.6   ALB 3.4   AP 56      Thyroid Studies Lab Results   Component Value Date/Time    TSH 0.53 09/11/2021 03:30 AM                Assessment and Plan:     Carrie Tovar is a 78 y.o. female with a PMHx of seizure disorder, status epilepticus, stroke, PE, HLD, and hallucinations who is admitted for recurrent breakthrough seizures and acute metabolic encephalopathy. 1. Breakthrough seizures x2  2. Acute metabolic encephalopathy-delirium  3. Fall  4. Right periorbital contusion/edema  5. Abdominal pain-resolved  6. KEYLA on CKD stage III-resolved  7. Asymptomatic hypoglycemia-resolved  8. Seizure disorder  9. Hx status epilepticus  10. Hx stroke  11. Hx PE  12. Concern for significant depression  13. Hx medical noncompliance  14. Extremely poor oral intake  15. Failure to thrive    Neurology consulted and signed off  Nutrition consulted for calorie count   Order placed for NG tube today  We will start tube feeds in a.m.   CT head from 9/9 reviewed and shows no acute changes  Continue IVF-D5 normal saline with 20 mEq KCl at 100 mL/h  Continue Keppra and oxcarbazepine  Continue other home meds  Duo nebs as needed  Follow-up MP, , Brittnee  PT, OT-recommending SNF/rehab  Palliative care consulted    Discussed case with patient's sister at the bedside. Attempted to call and discuss with patient's son over the phone. There was no answer, have left previous voicemails. Will attempt again tomorrow. Case discussed with:  []Patient  []Family  [x]Nursing  [x]Case Management  DVT prophylaxis: Lovenox  Diet: Dysphagia   Contact: Rajinder Wagner (son)     [phone]  Code Status: Full  Disposition: Continue current care, greater than 2 nights      H. Lyric Desir DO  9/12/2021       Dragon medical dictation software was used for portions of this report. Unintended errors may occur.

## 2021-09-12 NOTE — PROGRESS NOTES
Bedside and Verbal shift change report given to Kervin Garcia (oncoming nurse) by Brenda Cho RN   (offgoing nurse). Report given with SBAR, Iain, MAR and Recent Results.

## 2021-09-12 NOTE — PROGRESS NOTES
Problem: Patient Education: Go to Patient Education Activity  Goal: Patient/Family Education  Outcome: Progressing Towards Goal     Problem: Patient Education: Go to Patient Education Activity  Goal: Patient/Family Education  Outcome: Progressing Towards Goal     Problem: Patient Education: Go to Patient Education Activity  Goal: Patient/Family Education  Outcome: Progressing Towards Goal     Problem: Pressure Injury - Risk of  Goal: *Prevention of pressure injury  Description: Document John Scale and appropriate interventions in the flowsheet. Outcome: Progressing Towards Goal  Note: Pressure Injury Interventions:  Sensory Interventions: Assess need for specialty bed    Moisture Interventions: Absorbent underpads    Activity Interventions: Increase time out of bed    Mobility Interventions: Assess need for specialty bed    Nutrition Interventions: Offer support with meals,snacks and hydration, Document food/fluid/supplement intake, Discuss nutritional consult with provider    Friction and Shear Interventions: HOB 30 degrees or less                Problem: Patient Education: Go to Patient Education Activity  Goal: Patient/Family Education  Outcome: Progressing Towards Goal     Problem: Falls - Risk of  Goal: *Absence of Falls  Description: Document Kandy Fall Risk and appropriate interventions in the flowsheet.   Outcome: Progressing Towards Goal  Note: Fall Risk Interventions:  Mobility Interventions: Bed/chair exit alarm    Mentation Interventions: Bed/chair exit alarm, Adequate sleep, hydration, pain control, Door open when patient unattended, More frequent rounding, Reorient patient, Room close to nurse's station    Medication Interventions: Patient to call before getting OOB    Elimination Interventions: Call light in reach, Patient to call for help with toileting needs    History of Falls Interventions: Bed/chair exit alarm, Door open when patient unattended         Problem: Patient Education: Go to Patient Education Activity  Goal: Patient/Family Education  Outcome: Progressing Towards Goal     Problem: Seizure Disorder (Adult)  Goal: *STG: Remains free of seizure activity  Outcome: Progressing Towards Goal  Goal: *STG: Maintains lab values within therapeutic range  Outcome: Progressing Towards Goal  Goal: *STG/LTG: Complies with medication therapy  Outcome: Progressing Towards Goal  Goal: *STG: Remains free of injury during seizure activity  Outcome: Progressing Towards Goal  Goal: *STG: Remains safe in hospital  Outcome: Progressing Towards Goal  Goal: Interventions  Outcome: Progressing Towards Goal     Problem: Nutrition Deficit  Goal: *Optimize nutritional status  Outcome: Progressing Towards Goal

## 2021-09-13 LAB
AMPHET UR QL SCN: NEGATIVE
ANION GAP SERPL CALC-SCNC: 6 MMOL/L (ref 3–18)
BARBITURATES UR QL SCN: NEGATIVE
BENZODIAZ UR QL: NEGATIVE
BUN SERPL-MCNC: 5 MG/DL (ref 7–18)
BUN/CREAT SERPL: 5 (ref 12–20)
CALCIUM SERPL-MCNC: 9 MG/DL (ref 8.5–10.1)
CANNABINOIDS UR QL SCN: NEGATIVE
CHLORIDE SERPL-SCNC: 114 MMOL/L (ref 100–111)
CO2 SERPL-SCNC: 18 MMOL/L (ref 21–32)
COCAINE UR QL SCN: NEGATIVE
CREAT SERPL-MCNC: 0.98 MG/DL (ref 0.6–1.3)
GLUCOSE BLD STRIP.AUTO-MCNC: 103 MG/DL (ref 70–110)
GLUCOSE BLD STRIP.AUTO-MCNC: 87 MG/DL (ref 70–110)
GLUCOSE BLD STRIP.AUTO-MCNC: 92 MG/DL (ref 70–110)
GLUCOSE BLD STRIP.AUTO-MCNC: 92 MG/DL (ref 70–110)
GLUCOSE BLD STRIP.AUTO-MCNC: 99 MG/DL (ref 70–110)
GLUCOSE SERPL-MCNC: 91 MG/DL (ref 74–99)
HDSCOM,HDSCOM: NORMAL
MAGNESIUM SERPL-MCNC: 1.7 MG/DL (ref 1.6–2.6)
METHADONE UR QL: NEGATIVE
OPIATES UR QL: NEGATIVE
PCP UR QL: NEGATIVE
PHOSPHATE SERPL-MCNC: 2.1 MG/DL (ref 2.5–4.9)
POTASSIUM SERPL-SCNC: 4.5 MMOL/L (ref 3.5–5.5)
SODIUM SERPL-SCNC: 138 MMOL/L (ref 136–145)

## 2021-09-13 PROCEDURE — 94640 AIRWAY INHALATION TREATMENT: CPT

## 2021-09-13 PROCEDURE — 97530 THERAPEUTIC ACTIVITIES: CPT

## 2021-09-13 PROCEDURE — 74011250637 HC RX REV CODE- 250/637: Performed by: PSYCHIATRY & NEUROLOGY

## 2021-09-13 PROCEDURE — 65270000029 HC RM PRIVATE

## 2021-09-13 PROCEDURE — 83735 ASSAY OF MAGNESIUM: CPT

## 2021-09-13 PROCEDURE — 80048 BASIC METABOLIC PNL TOTAL CA: CPT

## 2021-09-13 PROCEDURE — 84100 ASSAY OF PHOSPHORUS: CPT

## 2021-09-13 PROCEDURE — 82962 GLUCOSE BLOOD TEST: CPT

## 2021-09-13 PROCEDURE — 36415 COLL VENOUS BLD VENIPUNCTURE: CPT

## 2021-09-13 PROCEDURE — 74011250637 HC RX REV CODE- 250/637: Performed by: NURSE PRACTITIONER

## 2021-09-13 PROCEDURE — 92526 ORAL FUNCTION THERAPY: CPT

## 2021-09-13 PROCEDURE — 80307 DRUG TEST PRSMV CHEM ANLYZR: CPT

## 2021-09-13 PROCEDURE — 74011000250 HC RX REV CODE- 250: Performed by: INTERNAL MEDICINE

## 2021-09-13 PROCEDURE — 97164 PT RE-EVAL EST PLAN CARE: CPT

## 2021-09-13 PROCEDURE — 74011250637 HC RX REV CODE- 250/637: Performed by: INTERNAL MEDICINE

## 2021-09-13 PROCEDURE — 99221 1ST HOSP IP/OBS SF/LOW 40: CPT | Performed by: NURSE PRACTITIONER

## 2021-09-13 PROCEDURE — 2709999900 HC NON-CHARGEABLE SUPPLY

## 2021-09-13 PROCEDURE — 76450000000

## 2021-09-13 PROCEDURE — 74011250636 HC RX REV CODE- 250/636: Performed by: FAMILY MEDICINE

## 2021-09-13 PROCEDURE — 99233 SBSQ HOSP IP/OBS HIGH 50: CPT | Performed by: FAMILY MEDICINE

## 2021-09-13 RX ADMIN — LEVETIRACETAM 1000 MG: 500 SOLUTION ORAL at 21:54

## 2021-09-13 RX ADMIN — POTASSIUM CHLORIDE, DEXTROSE MONOHYDRATE AND SODIUM CHLORIDE 100 ML/HR: 150; 5; 900 INJECTION, SOLUTION INTRAVENOUS at 05:47

## 2021-09-13 RX ADMIN — LEVETIRACETAM 1000 MG: 500 SOLUTION ORAL at 10:44

## 2021-09-13 RX ADMIN — TAMSULOSIN HYDROCHLORIDE 0.4 MG: 0.4 CAPSULE ORAL at 21:54

## 2021-09-13 RX ADMIN — Medication 1 TABLET: at 10:44

## 2021-09-13 RX ADMIN — ATORVASTATIN CALCIUM 40 MG: 40 TABLET, FILM COATED ORAL at 10:45

## 2021-09-13 RX ADMIN — OXCARBAZEPINE 900 MG: 300 TABLET, FILM COATED ORAL at 19:50

## 2021-09-13 RX ADMIN — POLYETHYLENE GLYCOL 3350 17 G: 17 POWDER, FOR SOLUTION ORAL at 21:53

## 2021-09-13 RX ADMIN — AMLODIPINE BESYLATE 10 MG: 10 TABLET ORAL at 10:45

## 2021-09-13 RX ADMIN — Medication 10 ML: at 22:00

## 2021-09-13 RX ADMIN — OXCARBAZEPINE 900 MG: 300 TABLET, FILM COATED ORAL at 10:45

## 2021-09-13 RX ADMIN — ENOXAPARIN SODIUM 40 MG: 40 INJECTION SUBCUTANEOUS at 21:53

## 2021-09-13 RX ADMIN — IPRATROPIUM BROMIDE AND ALBUTEROL SULFATE 3 ML: .5; 3 SOLUTION RESPIRATORY (INHALATION) at 21:06

## 2021-09-13 RX ADMIN — Medication 10 ML: at 05:52

## 2021-09-13 RX ADMIN — CYANOCOBALAMIN TAB 1000 MCG 500 MCG: 1000 TAB at 10:44

## 2021-09-13 RX ADMIN — FOLIC ACID 1 MG: 1 TABLET ORAL at 10:45

## 2021-09-13 RX ADMIN — CLOPIDOGREL BISULFATE 75 MG: 75 TABLET ORAL at 10:45

## 2021-09-13 RX ADMIN — FAMOTIDINE 20 MG: 20 TABLET, FILM COATED ORAL at 19:50

## 2021-09-13 RX ADMIN — LISINOPRIL 10 MG: 10 TABLET ORAL at 10:45

## 2021-09-13 NOTE — PROGRESS NOTES
Per Pamela Lenz (JESSE) called [place] transportation at [phone] scheduled 12:00 noon stretcher transport for tomorrow (Tuesday, September 14, 2021) to [facility] ([address] [place] [address]) with Beth Bales and received confirmation number [id number]. Informed Pamela Lenz of transportation conversation and arrangements.

## 2021-09-13 NOTE — PROGRESS NOTES
Problem: Patient Education: Go to Patient Education Activity  Goal: Patient/Family Education  Outcome: Progressing Towards Goal     Problem: Patient Education: Go to Patient Education Activity  Goal: Patient/Family Education  Outcome: Progressing Towards Goal     Problem: Patient Education: Go to Patient Education Activity  Goal: Patient/Family Education  Outcome: Progressing Towards Goal     Problem: Pressure Injury - Risk of  Goal: *Prevention of pressure injury  Description: Document John Scale and appropriate interventions in the flowsheet. Outcome: Progressing Towards Goal  Note: Pressure Injury Interventions:  Sensory Interventions: Pressure redistribution bed/mattress (bed type)    Moisture Interventions: Absorbent underpads, Maintain skin hydration (lotion/cream)    Activity Interventions: Pressure redistribution bed/mattress(bed type)    Mobility Interventions: HOB 30 degrees or less, Pressure redistribution bed/mattress (bed type)    Nutrition Interventions: Document food/fluid/supplement intake    Friction and Shear Interventions: HOB 30 degrees or less                Problem: Patient Education: Go to Patient Education Activity  Goal: Patient/Family Education  Outcome: Progressing Towards Goal     Problem: Falls - Risk of  Goal: *Absence of Falls  Description: Document Kandy Fall Risk and appropriate interventions in the flowsheet.   Outcome: Progressing Towards Goal  Note: Fall Risk Interventions:  Mobility Interventions: Bed/chair exit alarm, Strengthening exercises (ROM-active/passive)    Mentation Interventions: Adequate sleep, hydration, pain control, Bed/chair exit alarm    Medication Interventions: Teach patient to arise slowly    Elimination Interventions: Bed/chair exit alarm, Call light in reach    History of Falls Interventions: Bed/chair exit alarm, Room close to nurse's station, Door open when patient unattended         Problem: Patient Education: Go to Patient Education Activity  Goal: Patient/Family Education  Outcome: Progressing Towards Goal     Problem: Seizure Disorder (Adult)  Goal: *STG: Remains free of seizure activity  Outcome: Progressing Towards Goal  Goal: *STG: Maintains lab values within therapeutic range  Outcome: Progressing Towards Goal  Goal: *STG/LTG: Complies with medication therapy  Outcome: Progressing Towards Goal  Goal: *STG: Remains free of injury during seizure activity  Outcome: Progressing Towards Goal  Goal: *STG: Remains safe in hospital  Outcome: Progressing Towards Goal  Goal: Interventions  Outcome: Progressing Towards Goal     Problem: Nutrition Deficit  Goal: *Optimize nutritional status  Outcome: Progressing Towards Goal

## 2021-09-13 NOTE — ROUTINE PROCESS
Bedside and Verbal shift change report given to Hamzah Noguera RN (oncoming nurse) by Huey Vanegas (offgoing nurse). Report included the following information SBAR, Kardex and Recent Results.

## 2021-09-13 NOTE — PROGRESS NOTES
Speech Pathology Note:    ST eval/tx orders acknowledged. F/u dysphagia management/tx session completed. Rec: NPO with consideration for alt means of nutrition hydration.  D/W RN Ilsa Moon      Formal note pending    Thank You for this referral,  Claude Akin, [id number] [facility]  Speech Language Pathologist

## 2021-09-13 NOTE — PROGRESS NOTES
Spoke to son and sister at bedside. Both are in agreement to SNF, requesting [facility]. Instructed that we will restart authorization and inform them with discharge date and time. Sister also stated that patient will have personal care once she returns home.

## 2021-09-13 NOTE — ROUTINE PROCESS
Assumed care of patient waiting for KUB after NG tube placement --     NG tube in stomach -- gave keppra and water flush    2335-- patient pulled out ng tube -- talked more than she has in 4 days to this writer - \"I will take my seizure medication-- I do not want that back\"  \"I took my seizue medicine 3 times today\"  \"please no\" she just kept repeating herself over and over--    Patient aroused and talking more-- asking for bedpan    UDS sent     Bedside and Verbal shift change report given to Felicia Huntley RN (oncoming nurse) by Nicolás Valdez RN (offgoing nurse). Report given with SBAR, Kardex, Intake/Output, MAR, Accordion and Recent Results.

## 2021-09-13 NOTE — PROGRESS NOTES
Per Dr. Candy Erazo, plan is not SNF rehab and no need for PEG. Updated notes sent via [facility] to [facility] with request to resubmit authorization.     Joaquina Rivera, MSN, RN, ACM-RN  Care Management  [phone]

## 2021-09-13 NOTE — PROGRESS NOTES
Progress Note         Patient: Jeff Knapp MRN: [id number]  CSN: [id number]    YOB: 1955  Age: 78 y.o. Sex: female    DOA: 9/6/2021 LOS:  LOS: 1 day                    Subjective:     Jeff Knapp is a 78 y.o. female with a PMHx of seizure disorder, status epilepticus, stroke, PE, HLD, and hallucinations who is admitted for recurrent breakthrough seizures and acute metabolic encephalopathy. For the past 3 days, patient has essentially been completely unresponsive  Had refused to eat, drink or take her medicines    NG tube placed last night  A few hours later, she pulled it out herself  Afterwards, she stated that she would take her seizure medication, and was able to carry on conversations  This morning she was reportedly sitting up in bed, having conversations and attempting to eat and drink by herself    Seen later today in room  Lying in bed, NAD  Again patient is essentially unresponsive  She will barely open eyes, mumbles indecipherable sounds  Is either unable to or refuses to communicate and interact    Discussed with her son Shahzad Tsai and her sister at the bedside  Discussed how she may be severely depressed due to the recent death of one of her other sons, some of this may be behavioral  They both state that her son Adri Lee is out of the picture, they want her to go to SNF    Received call from nursing later on  Patient was either too lethargic to participate in SLP exam or developed severe dysphagia, it is unclear which  Recommendations were for n.p.o. by SLP      Objective:     Physical Exam:  Visit Vitals  /74 (BP 1 Location: Left upper arm, BP Patient Position: At rest)   Pulse 87   Temp 98.4 °F (36.9 °C)   Resp 18   Ht 5' 3\" (1.6 m)   Wt 51.3 kg (113 lb)   SpO2 98%   BMI 20.02 kg/m²        General:         Awake, opens eyes, mumbles indecipherable sounds, uncooperative, no further interaction from pt    HEENT: NC, Atraumatic. Anicteric sclerae. Lungs: CTA Bilaterally.  No Wheezing/Rhonchi/Rales. Heart:  Regular  rhythm,  No murmur, No Rubs, No Gallops  Abdomen: Soft, Non distended, Non tender. +Bowel sounds, no HSM  Extremities: No c/c/e  Neurologic:  Patient either cannot or will not participate in exam    Intake and Output:  Current Shift:  No intake/output data recorded. Last three shifts:  09/12 0701 - 09/13 1900  In: 5560 [P.O.:510; I.V.:2355]  Out: -     Labs: Results:       Chemistry Recent Labs     09/13/21  0435 09/11/21  0330   GLU 91 103*    141   K 4.5 3.9   * 113*   CO2 18* 23   BUN 5* 8   CREA 0.98 1.24   CA 9.0 9.1   AGAP 6 5   BUCR 5* 6*   AP  --  56   TP  --  6.6   ALB  --  3.4   GLOB  --  3.2   AGRAT  --  1.1      CBC w/Diff Recent Labs     09/11/21 0330   WBC 6.6   RBC 3.07*   HGB 9.2*   HCT 28.1*      GRANS 64   LYMPH 25   EOS 0      Cardiac Enzymes No results for input(s): CPK, CKND1, GWENDOLYN in the last 72 hours. No lab exists for component: CKRMB, TROIP   Coagulation No results for input(s): PTP, INR, APTT, INREXT, INREXT in the last 72 hours. Lipid Panel Lab Results   Component Value Date/Time    Cholesterol, total 144 05/14/2021 01:22 PM    HDL Cholesterol 46 05/14/2021 01:22 PM    LDL, calculated 77.6 05/14/2021 01:22 PM    VLDL, calculated 20.4 05/14/2021 01:22 PM    Triglyceride 102 05/14/2021 01:22 PM    CHOL/HDL Ratio 3.1 05/14/2021 01:22 PM      BNP No results for input(s): BNPP in the last 72 hours. Liver Enzymes Recent Labs     09/11/21  0330   TP 6.6   ALB 3.4   AP 56      Thyroid Studies Lab Results   Component Value Date/Time    TSH 0.53 09/11/2021 03:30 AM                Assessment and Plan:     Faith Thomas is a 78 y.o. female with a PMHx of seizure disorder, status epilepticus, stroke, PE, HLD, and hallucinations who is admitted for recurrent breakthrough seizures and acute metabolic encephalopathy. 1. Breakthrough seizures x2  2. Acute metabolic encephalopathy-delirium  3. Fall  4.  Right periorbital contusion/edema  5. Abdominal pain-resolved  6. KEYLA on CKD stage III-resolved  7. Asymptomatic hypoglycemia-resolved  8. Seizure disorder  9. Hx status epilepticus  10. Hx stroke  11. Hx PE  12. Concern for significant depression  13. Hx medical noncompliance  14. Extremely poor oral intake  15. Failure to thrive    Neurology consulted and signed off  Since patient has been made n.p.o. again, will order another NG tube, have discussed with nursing   We will start tube feeds in a.m. CT head from 9/9 reviewed and shows no acute changes  Continue IVF-D5 normal saline with 20 mEq KCl at 75 mL/h  Continue Keppra and oxcarbazepine  Continue other home meds  Duo nebs as needed  Follow-up MP, mag, Phos  PT, OT-recommending SNF/rehab  Palliative care consulted    Discussed case with patient's son (Anita Herrera) and sister at the bedside. Have made multiple attempts to discuss with patient's other son Sudhakar Slaughter without success. Per Anita Herrera and patient's sister, Sudhakar Slaughter is out of the picture. They would like her to go to SNF. Case discussed with:  [x]Patient  [x]Family  [x]Nursing  [x]Case Management  DVT prophylaxis: Lovenox  Diet: NPO, NG tube    Contact:  Sarai Yany (son)      [phone]    Giuseppe Monge (son)   [phone]  Code Status: Full  Disposition: Likely discharge in 2-3 days to SNF       CASIE Montalvo,   9/13/2021       Dragon medical dictation software was used for portions of this report. Unintended errors may occur.

## 2021-09-13 NOTE — PROGRESS NOTES
Chart reviewed. Patient now with NGT for feedings. Disposition is tentatively SNF but dependent on GOCs. Care management will continue to follow for transitional care needs.     Naya Farias, MSN, RN, ACM-RN  Care Management  [phone]

## 2021-09-13 NOTE — PROGRESS NOTES
Palliative Medicine    ADDENDUM: Return call received from pt's son Maegan Quiroga. Brief medical update was provided. Discussed that per Attending, pt no longer potentially needs a PEG tube. Devi Fuller states that he and his brother rotate caring for their mother at home. States that she was fairly independent up until about a week ago when she started putting her clothes on backwards. Devi Fuller and his brother are requesting that the pt come home vs going to SNF. Discussed benefits and burdens of CPR and intubation with Devi Fuller. He stated that he would like the opportunity to have these discussions with his mother before making any changes to her goals of care. Encouraged further goals of care discussions between the pt and her sons once the pt is able to participate in conversation. Son was agreeable to further discussions with the pt. For now the pt remains FULL code with FULL aggressive medical management.  __________________________________________________________________________________    Palliative Medicine team Santiago Garcia NP and Harjinder Duarte RN met at the pt's bedside. Pt was sitting up in bed but had her eyes closed. She was very slow to respond to verbal stimuli. Pt's speech was fairly incomprehensible and it was very difficult to understand the few words that were audible. Pt was able to tell us that she has two sons. Team has spoken with Attending and there is no longer a need for the pt to potentially have a PEG tube placed as she is now eating/drinking. Telephone call was made to the pt's son Maegan Quiroga; no answer - VM left asking for return call. Thank you for the Palliative Medicine consult and allowing us to participate in the care of Ms. Jessica Batres. Will continue to monitor and provide support.     Harjinder Duarte RN, BSN  Palliative Medicine Inpatient RN  [facility] \[facility] [place] [facility]\""  Palliative COPE Line: [phone] ([phone])

## 2021-09-13 NOTE — CONSULTS
[facility]: [phone]  [facility]: [phone]     Patient Name: Gold Dave  YOB: 1955    Date of Consult : 2021  Reason for Consult: Establish goals of care  Requesting Provider: Tony Mendez DO   Primary Care Physician: Josisa Barnhart NP      SUMMARY:   Gold Dave is a 78 y.o. female with a past history of dementia, seizure disorder, CVA, DM 2, who was admitted on 2021 from home with a diagnosis of altered mental status and unspecified neurological disorder  Current medical issues leading to Palliative Medicine involvement include: Establish goals of care    CHIEF COMPLAINT: Altered mental status    HPI/SUBJECTIVE:    Patient is a 44-year-old -American female with known history of seizure disorders and multiple falls. She also has a history of a large right parietal stroke she has 2 living sons and 1 who is  reported that she was very close to the son that , and has been very negatively affected by his death. It is reported the patient lives alone with both of her sons Sohail Gibbons and Judy Phillips rotating in and out to assist in her care. The patient is:   [x] Verbal and participatory minimally  [] Non-participatory due to:     GOALS OF CARE:  Patient/Health Care Proxy Stated Goals: Prolong life      TREATMENT PREFERENCES:   Code Status: Full Code         PALLIATIVE DIAGNOSES:   1. Goals of care/ACP  2. Altered mental status  3. Seizure disorder  4. Debility       PLAN:   1. Goals of care/ACP  This NP and Deidra Carmona RN in to see patient at the bedside. She was easily roused with verbal stimulation but quickly went back to being disengaged after a few questions. She was able to state that she has 2 living sons but had 3 sons at one time. Much of her discussion was incomprehensible. Our team has reached out to patient's sons to discuss goals of care and future decision making issues. Message left for them to contact us back. At this time patient remains full code with full interventions  ADDENDUM:  Spoke with Christian Mcgee patient's son. Christian Mcgee and his brother are requesting that the pt come home vs going to SNF. Discussed benefits and burdens of CPR and intubation with Christian Mcgee. He stated that he would like the opportunity to have these discussions with his mother before making any changes to her goals of care. Encouraged further goals of care discussions between the pt and her sons once the pt is able to participate in conversation. Son was agreeable to further discussions with the pt. 2.  Altered mental status  Intermittent periods of lucidity mixed with lethargy. 3.  Seizure disorder  Evaluated by neurology and treated outpatient with 3 different antiseizure agents. Neurologist found her levels needing adjustment and likely patient noncompliant with her home regime. Adjustments were made and patient is followed by medical team.  4.  Debility  Per evaluation by physical therapy patient's palliative performance score appears to be around 50% mainly chair to bed existence, unable to do any work due to extensive disease progression, considerable assistance needed for all functional ADLs and self-care. 2. Initial consult note routed to primary continuity provider  3. Communicated plan of care with: Palliative IDT      Advance Care Planning:  [] [facility] Interdisciplinary Team has updated the ACP Navigator with [address] and Patient Capacity    Primary Decision [address] ([address]):   Secondary Decision Maker: Sean Aubree - Son - [phone]    Medical Interventions: Full interventions   Other Instructions:         As far as possible, the palliative care team has discussed with patient / health care proxy about goals of care / treatment preferences for patient.          HISTORY:     History obtained from: Chart review   Principal Problem:    Breakthrough seizure ([address] (9/6/2021)    Active Problems:    Dehydration (9/6/2021)      Anemia (9/6/2021)      KEYLA (acute kidney injury) ([facility] (9/6/2021)      Past Medical History:   Diagnosis Date    Abnormal coordination 9/21/2020    Hallucinations 9/21/2020    Hypertension     Neurological disorder     Seizures ([facility] [address]     Stroke ([facility] 2009      No past surgical history on file. Family History   Problem Relation Age of Onset    Diabetes Other     Stroke Other      History reviewed, no pertinent family history. Social History     Tobacco Use    Smoking status: Never Smoker    Smokeless tobacco: Never Used   Substance Use Topics    Alcohol use: Yes     Comment: Socially      Allergies   Allergen Reactions    Tomato Hives     Allergic to eating tomato.     Aspirin Nausea and Vomiting    Ciprofloxacin Rash    Pcn [Penicillins] Rash and Hives    Sulfa (Sulfonamide Antibiotics) Hives and Rash      Current Facility-Administered Medications   Medication Dose Route Frequency    famotidine (PEPCID) tablet 20 mg  20 mg Oral QPM    hydrALAZINE (APRESOLINE) 20 mg/mL injection 10 mg  10 mg IntraVENous Q6H PRN    dextrose 5% - 0.9% NaCl with KCl 20 mEq/L infusion  100 mL/hr IntraVENous CONTINUOUS    enoxaparin (LOVENOX) injection 40 mg  40 mg SubCUTAneous Q24H    albuterol-ipratropium (DUO-NEB) 2.5 MG-0.5 MG/3 ML  3 mL Nebulization BID RT    levETIRAcetam (KEPPRA) oral solution 1,000 mg  1,000 mg Oral BID    glucose chewable tablet 16 g  4 Tablet Oral PRN    glucagon (GLUCAGEN) injection 1 mg  1 mg IntraMUSCular PRN    dextrose (D50W) injection syrg 12.5-25 g  25-50 mL IntraVENous PRN    glucose chewable tablet 16 g  16 g Oral QHS    OXcarbazepine (TRILEPTAL) tablet 900 mg  900 mg Oral BID    lisinopriL (PRINIVIL, ZESTRIL) tablet 10 mg  10 mg Oral DAILY    folic acid (FOLVITE) tablet 1 mg  1 mg Oral DAILY    multivitamin, tx-iron-ca-min (THERA-M w/ IRON) tablet 1 Tablet  1 Tablet Oral DAILY    sodium chloride (NS) flush 5-40 mL  5-40 mL IntraVENous Q8H    sodium chloride (NS) flush 5-40 mL  5-40 mL IntraVENous PRN    acetaminophen (TYLENOL) tablet 650 mg  650 mg Oral Q6H PRN    Or    acetaminophen (TYLENOL) suppository 650 mg  650 mg Rectal Q6H PRN    polyethylene glycol (MIRALAX) packet 17 g  17 g Oral DAILY PRN    ondansetron (ZOFRAN ODT) tablet 4 mg  4 mg Oral Q8H PRN    Or    ondansetron (ZOFRAN) injection 4 mg  4 mg IntraVENous Q6H PRN    amLODIPine (NORVASC) tablet 10 mg  10 mg Oral DAILY    atorvastatin (LIPITOR) tablet 40 mg  40 mg Oral DAILY    clopidogreL (PLAVIX) tablet 75 mg  75 mg Oral DAILY    cyanocobalamin tablet 500 mcg  500 mcg Oral DAILY    [Held by provider] spironolactone (ALDACTONE) tablet 25 mg  25 mg Oral DAILY    tamsulosin (FLOMAX) capsule 0.4 mg  0.4 mg Oral QHS          Clinical Pain Assessment (nonverbal scale for nonverbal patients): Clinical Pain Assessment  Severity: 0          Duration: for how long has pt been experiencing pain (e.g., 2 days, 1 month, years)  Frequency: how often pain is an issue (e.g., several times per day, once every few days, constant)     FUNCTIONAL ASSESSMENT:     Palliative Performance Scale (PPS):  PPS: 40    ECOG  ECOG Status : Completely disabled     PSYCHOSOCIAL/SPIRITUAL SCREENING:      Any spiritual / Buddhism concerns:  [] Yes /  [x] No    Caregiver Burnout:  [] Yes /  [] No /  [x] No Caregiver Present      Anticipatory grief assessment:   [x] Normal  / [] Maladaptive        REVIEW OF SYSTEMS:     Systems: constitutional, ears/nose/mouth/throat, respiratory, gastrointestinal, genitourinary, musculoskeletal, integumentary, neurologic, psychiatric, endocrine. Positive findings noted below. Modified ESAS Completed by: provider           Pain: 0           Dyspnea: 0               Positive and pertinent negative findings in ROS are noted above in HPI.   The following systems were [x] reviewed / [] unable to be reviewed as noted in HPI  Other findings are noted below.     PHYSICAL EXAM:     Constitutional: somnolent and intermittently interactive  Eyes: pupils equal, anicteric  ENMT: no nasal discharge, moist mucous membranes  Cardiovascular: regular rhythm, distal pulses intact  Respiratory: breathing not labored, symmetric  Gastrointestinal: soft non-tender, +bowel sounds  Musculoskeletal: no deformity, no tenderness to palpation  Skin: warm, dry  Neurologic: not following commands, moving all extremities    Other: Wt Readings from Last 3 Encounters:   09/11/21 51.3 kg (113 lb)   09/04/21 46.3 kg (102 lb)   05/05/21 62.8 kg (138 lb 8 oz)     Blood pressure (!) 151/84, pulse 63, temperature 97.9 °F (36.6 °C), resp. rate 19, height 5' 3\" (1.6 m), weight 51.3 kg (113 lb), SpO2 100 %. Pain:  Pain Scale 1: Visual  Pain Intensity 1: 0                      LAB AND IMAGING FINDINGS:     Lab Results   Component Value Date/Time    WBC 6.6 09/11/2021 03:30 AM    HGB 9.2 (L) 09/11/2021 03:30 AM    PLATELET 236 29/00/3342 03:30 AM     Lab Results   Component Value Date/Time    Sodium 138 09/13/2021 04:35 AM    Potassium 4.5 09/13/2021 04:35 AM    Chloride 114 (H) 09/13/2021 04:35 AM    CO2 18 (L) 09/13/2021 04:35 AM    BUN 5 (L) 09/13/2021 04:35 AM    Creatinine 0.98 09/13/2021 04:35 AM    Calcium 9.0 09/13/2021 04:35 AM    Magnesium 1.7 09/13/2021 04:35 AM    Phosphorus 2.1 (L) 09/13/2021 04:35 AM      Lab Results   Component Value Date/Time    Alk.  phosphatase 56 09/11/2021 03:30 AM    Protein, total 6.6 09/11/2021 03:30 AM    Albumin 3.4 09/11/2021 03:30 AM    Globulin 3.2 09/11/2021 03:30 AM     Lab Results   Component Value Date/Time    INR 1.0 03/06/2018 10:16 AM    Prothrombin time 12.4 03/06/2018 10:16 AM    aPTT <20.0 (L) 10/04/2016 02:37 PM      Lab Results   Component Value Date/Time    Iron 29 (L) 02/04/2014 03:09 AM    TIBC 114 (L) 02/04/2014 03:09 AM    Iron % saturation 25 02/04/2014 03:09 AM      No results found for: PH, PCO2, PO2  No components found for: [place] Lab Results   Component Value Date/Time     09/06/2021 06:17 PM    CK - MB <1.0 09/06/2021 06:17 PM              Total time:  30 minutes   Counseling / coordination time, spent as noted above:   > 50% counseling / coordination:  Time spent in direct consultation with the patient, medical team, and family     Prolonged service was provided for  []30 min   []75 min in face to face time in the presence of the patient, spent as noted above. Time Start:   Time End:     Disclaimer: Sections of this note are dictated using utilizing voice recognition software, which may have resulted in some phonetic based errors in grammar and contents. Even though attempts were made to correct all the mistakes, some may have been missed, and remained in the body of the document. If questions arise, please contact our department.

## 2021-09-13 NOTE — PROGRESS NOTES
Nutrition Assessment     Type and Reason for Visit: Reassess    Nutrition Recommendations/Plan:   - Continue current diet and Magic Cup TID. Encourage po intake. Nutrition Assessment:  NGT placed 9/12, pt pulled it out. More alert since NGT placed, however po intake still remains poor. Pt sleeping at time of visit. Family in room encouraging po intake; discussed supplementation with family. Calorie count posted, but not completed for today, discussed with nursing. Malnutrition Assessment:  Malnutrition Status: Mild malnutrition     Estimated Daily Nutrient Needs:  Energy (kcal):  0092-2119  Protein (g):  38-58       Fluid (ml/day):  1806-7911    Nutrition Related Findings:  BM 9/8 soft, 9/6. No edema. Meds: folic acid, MVI with iron. On dysphagia diet, SLP following. RN reported recommending to MD about starting IVF for adequate hydration      Current Nutrition Therapies:  ADULT DIET Full Liquid; Mildly Thick (Nectar)  ADULT ORAL NUTRITION SUPPLEMENT Breakfast, Lunch, Dinner; Frozen Supplement    Anthropometric Measures:  · Height:  5' 3\" (160 cm)  · Current Body Wt:  51.3 kg (113 lb 1.5 oz)  · BMI: 20    Nutrition Diagnosis:   · Inadequate oral intake related to early satiety (poor appetite) as evidenced by intake 0-25% (refusing meals)    · Mild malnutrition, In context of acute illness or injury related to inadequate protein-energy intake as evidenced by  (wt loss of 10.2% x 3 month PTA)      Nutrition Intervention:  Food and/or Nutrient Delivery: Continue current diet, Continue oral nutrition supplement, Vitamin supplement, Mineral supplement  Nutrition Education and Counseling: No recommendations at this time  Coordination of Nutrition Care: Continue to monitor while inpatient    Goals:  - Nutritional needs will be met through adequate oral intake or nutrition support within the next 7 days.        Nutrition Monitoring and Evaluation:   Behavioral-Environmental Outcomes: None identified  Food/Nutrient Intake Outcomes: Diet advancement/tolerance, Supplement intake, Vitamin/mineral intake  Physical Signs/Symptoms Outcomes: Biochemical data, Chewing or swallowing, Meal time behavior, Nutrition focused physical findings    Discharge Planning:    Continue oral nutrition supplement, Continue current diet     Electronically signed by Severino Tanner RD on 9/13/2021 at 3:56 PM    Contact Number: [phone]

## 2021-09-13 NOTE — PROGRESS NOTES
Problem: Mobility Impaired (Adult and Pediatric)  Goal: *Acute Goals and Plan of Care (Insert Text)  Description: Physical Therapy Goals  Initiated 9/7/2021, reevaluated 9/13/2021 and to be accomplished within 7 day(s)  1. Patient will move from supine to sit and sit to supine  in bed with contact guard assistance. 2.  Patient will transfer from bed to chair and chair to bed with contact guard assistance using the least restrictive device. 3.  Patient will perform sit to stand with contact guard assistance. 4.  Patient will ambulate with contact guard assistance for  feet with the least restrictive device. PLOF: Pt lives in a [facility] [place] with no JOHAN with her 2 sons rotating to be with her. She is able to amb short distances with HHA from her sons. Her sons assist with dressing/bathing. Outcome: Progressing Towards Goal     PHYSICAL THERAPY RE-EVALUATION    Patient: Willie Robert (57 y.o. female)  Date: 9/13/2021  Primary Diagnosis: Breakthrough seizure ([facility] [address] [G40.919]  KEYLA (acute kidney injury) ([facility] [address] [N17.9]  Dehydration [E86.0]  Anemia [D64.9]        Precautions:   Fall      ASSESSMENT :  Based on the objective data described below, the patient presents with decreased strength, confusion, decreased safety with mobility, and decreased activity tolerance. Patient received supine in bed, alert, and agreeable to PT. She transfer from supine to sitting with moderate A and additional time. Patient initially presents with heavy right lateral lean/ posterior/ lean and given cues to hold onto bed rail for assistance. She stands to standing at [facility] with mod/maximum assistance x3 trials. She has poor standing balance, able to unweight hips off the bed, though unable to clear the mattress. Patient incontinent of urine while sitting EOB and now bed linen soiled. Pt assisted back to bed and assisted with cleaning with fully bed linen change. Instructed patient no to get up on the own for falls risk.      Patient will benefit from skilled intervention to address the above impairments. Patient's rehabilitation potential is considered to be Fair  Factors which may influence rehabilitation potential include:   []         None noted  []         Mental ability/status  [x]         Medical condition  [x]         Home/family situation and support systems  [x]         Safety awareness  []         Pain tolerance/management  []         Other:      PLAN :  Recommendations and Planned Interventions:   [x]           Bed Mobility Training             [x]    Neuromuscular Re-Education  [x]           Transfer Training                   []    Orthotic/Prosthetic Training  [x]           Gait Training                          []    Modalities  [x]           Therapeutic Exercises           []    Edema Management/Control  [x]           Therapeutic Activities            [x]    Family Training/Education  [x]           Patient Education  []           Other (comment):    Frequency/Duration: Patient will be followed by physical therapy 1-2 times per day/4-7 days per week to address goals. Discharge Recommendations: Skilled Nursing Facility  Further Equipment Recommendations for Discharge: rolling walker and wheelchair      SUBJECTIVE:   Patient stated I am trying.     OBJECTIVE DATA SUMMARY:   Hospital course since last seen and reason for re-evaluation: Patient due for PT re-evaluation. She demonstrates increased participation this session, though continuing to need increased assistance with mobility. Past Medical History:   Diagnosis Date    Abnormal coordination 9/21/2020    Hallucinations 9/21/2020    Hypertension     Neurological disorder     Seizures ([facility] [address]     Stroke ([facility] [address] 2009   No past surgical history on file.   Barriers to Learning/Limitations: yes;  confusion  Compensate with: Visual Cues, Verbal Cues, and Tactile Cues  Home Situation:   Home Situation  Home Environment: Private residence  # Steps to Enter: 0  One/Two Story Residence: One story  Living Alone: No  Support Systems: Child(quiana), Family member(s), Spouse/Significant Other/Partner  Patient Expects to be Discharged to[de-identified] House  Current DME Used/Available at Home: Shower chair  Tub or Shower Type: Tub (does not use, only spongebathed seated/standing at sink)  Critical Behavior:  Neurologic State: Confused  Orientation Level: Oriented to person;Disoriented to time;Disoriented to situation;Disoriented to place  Cognition: Impaired decision making     Psychosocial  Patient Behaviors: Calm;Confused  Purposeful Interaction: Yes  Pt Identified Daily Priority: Clinical issues (comment); Communication issues (comment)  Lins Process: Establish trust  Caring Interventions: Reassure  Reassure: Informing;Caring rounds  Therapeutic Modalities: Humor  Other Caring Modalities: hourly rounds  Skin Condition/Temp: Warm     Skin Integrity: Intact  Skin Integumentary  Skin Color: Appropriate for ethnicity  Skin Condition/Temp: Warm  Skin Integrity: Intact  Turgor: Epidermis thin w/ loss of subcut tissue  Hair Growth: Present  Varicosities: Absent  Nails: Within Defined Limits     Strength:    Strength: Generally decreased, functional        Tone & Sensation:   Tone: Normal  Sensation: Intact       Range Of Motion:  AROM: Generally decreased, functional  PROM: Within functional limits        Functional Mobility:  Bed Mobility:  Rolling: Minimum assistance; Moderate assistance  Supine to Sit: Moderate assistance  Sit to Supine: Maximum assistance  Scooting: Moderate assistance;Bed Modified    Transfers:  Sit to Stand: Moderate assistance;Maximum assistance        Balance:   Sitting: Impaired; With support  Sitting - Static: Fair (occasional) ((-))  Sitting - Dynamic: Poor (constant support)  Standing: Impaired; With support  Standing - Static: Poor         Pain:  Pain level pre-treatment: 0/10   Pain level post-treatment: 0/10   Pain Intervention(s) : Medication (see MAR);  Rest, Ice, Repositioning   Response to intervention: Nurse notified, See doc flow    Activity Tolerance:   Fair -  Please refer to the flowsheet for vital signs taken during this treatment. After treatment:   []         Patient left in no apparent distress sitting up in chair  [x]         Patient left in no apparent distress in bed  [x]         Call bell left within reach  [x]         Nursing notified  []         Caregiver present  [x]         Bed alarm activated  []         SCDs applied    COMMUNICATION/EDUCATION:   [x]         Role of Physical Therapy in the acute care setting. [x]         Fall prevention education was provided and the patient/caregiver indicated understanding. [x]         Patient/family have participated as able in goal setting and plan of care. [x]         Patient/family agree to work toward stated goals and plan of care. []         Patient understands intent and goals of therapy, but is neutral about his/her participation. []         Patient is unable to participate in goal setting/plan of care: ongoing with therapy staff.  []         Other:     Thank you for this referral.  Jessie Riddle, PT   Time Calculation: 44 mins

## 2021-09-14 VITALS
BODY MASS INDEX: 20.02 KG/M2 | HEIGHT: 63 IN | HEART RATE: 86 BPM | OXYGEN SATURATION: 93 % | DIASTOLIC BLOOD PRESSURE: 77 MMHG | WEIGHT: 113 LBS | RESPIRATION RATE: 16 BRPM | SYSTOLIC BLOOD PRESSURE: 129 MMHG | TEMPERATURE: 98.1 F

## 2021-09-14 LAB
ANION GAP SERPL CALC-SCNC: 6 MMOL/L (ref 3–18)
BUN SERPL-MCNC: 6 MG/DL (ref 7–18)
BUN/CREAT SERPL: 5 (ref 12–20)
CALCIUM SERPL-MCNC: 9.1 MG/DL (ref 8.5–10.1)
CHLORIDE SERPL-SCNC: 115 MMOL/L (ref 100–111)
CO2 SERPL-SCNC: 19 MMOL/L (ref 21–32)
CREAT SERPL-MCNC: 1.15 MG/DL (ref 0.6–1.3)
GLUCOSE BLD STRIP.AUTO-MCNC: 84 MG/DL (ref 70–110)
GLUCOSE BLD STRIP.AUTO-MCNC: 98 MG/DL (ref 70–110)
GLUCOSE SERPL-MCNC: 87 MG/DL (ref 74–99)
MAGNESIUM SERPL-MCNC: 1.7 MG/DL (ref 1.6–2.6)
PHOSPHATE SERPL-MCNC: 2.5 MG/DL (ref 2.5–4.9)
POTASSIUM SERPL-SCNC: 4.8 MMOL/L (ref 3.5–5.5)
SODIUM SERPL-SCNC: 140 MMOL/L (ref 136–145)

## 2021-09-14 PROCEDURE — 74011000250 HC RX REV CODE- 250: Performed by: INTERNAL MEDICINE

## 2021-09-14 PROCEDURE — 74011250637 HC RX REV CODE- 250/637: Performed by: NURSE PRACTITIONER

## 2021-09-14 PROCEDURE — 74011250636 HC RX REV CODE- 250/636: Performed by: FAMILY MEDICINE

## 2021-09-14 PROCEDURE — 2709999900 HC NON-CHARGEABLE SUPPLY

## 2021-09-14 PROCEDURE — 94640 AIRWAY INHALATION TREATMENT: CPT

## 2021-09-14 PROCEDURE — 74011250637 HC RX REV CODE- 250/637: Performed by: INTERNAL MEDICINE

## 2021-09-14 PROCEDURE — 80048 BASIC METABOLIC PNL TOTAL CA: CPT

## 2021-09-14 PROCEDURE — 74011250637 HC RX REV CODE- 250/637: Performed by: PSYCHIATRY & NEUROLOGY

## 2021-09-14 PROCEDURE — 82962 GLUCOSE BLOOD TEST: CPT

## 2021-09-14 PROCEDURE — 97530 THERAPEUTIC ACTIVITIES: CPT

## 2021-09-14 PROCEDURE — 83735 ASSAY OF MAGNESIUM: CPT

## 2021-09-14 PROCEDURE — 84100 ASSAY OF PHOSPHORUS: CPT

## 2021-09-14 PROCEDURE — 99239 HOSP IP/OBS DSCHRG MGMT >30: CPT | Performed by: FAMILY MEDICINE

## 2021-09-14 PROCEDURE — 36415 COLL VENOUS BLD VENIPUNCTURE: CPT

## 2021-09-14 RX ADMIN — FOLIC ACID 1 MG: 1 TABLET ORAL at 08:37

## 2021-09-14 RX ADMIN — LEVETIRACETAM 1000 MG: 500 SOLUTION ORAL at 08:37

## 2021-09-14 RX ADMIN — POTASSIUM CHLORIDE, DEXTROSE MONOHYDRATE AND SODIUM CHLORIDE 75 ML/HR: 150; 5; 900 INJECTION, SOLUTION INTRAVENOUS at 06:36

## 2021-09-14 RX ADMIN — CLOPIDOGREL BISULFATE 75 MG: 75 TABLET ORAL at 08:37

## 2021-09-14 RX ADMIN — CYANOCOBALAMIN TAB 1000 MCG 500 MCG: 1000 TAB at 08:37

## 2021-09-14 RX ADMIN — IPRATROPIUM BROMIDE AND ALBUTEROL SULFATE 3 ML: .5; 3 SOLUTION RESPIRATORY (INHALATION) at 11:12

## 2021-09-14 RX ADMIN — LISINOPRIL 10 MG: 10 TABLET ORAL at 08:37

## 2021-09-14 RX ADMIN — Medication 1 TABLET: at 08:37

## 2021-09-14 RX ADMIN — ATORVASTATIN CALCIUM 40 MG: 40 TABLET, FILM COATED ORAL at 08:37

## 2021-09-14 RX ADMIN — AMLODIPINE BESYLATE 10 MG: 10 TABLET ORAL at 08:37

## 2021-09-14 RX ADMIN — OXCARBAZEPINE 900 MG: 300 TABLET, FILM COATED ORAL at 08:37

## 2021-09-14 NOTE — ROUTINE PROCESS
Placed call to patient's sister Ms. Halina Ross and son Mr. Pryor Nicky Valdez, but unable to reach. Message left on sons answering machine. Placed call to patient's second son Mr. Enid Mehta and told him that the time had changed from 1730 to 1400.

## 2021-09-14 NOTE — PROGRESS NOTES
Called Dr Erika Boyd to inform of transport arrangements. Dr Erika Boyd not in agreement with transport time and needs more time to assess patient PO intake. Patient is currently NPO after speech eval.      Called [facility] to change trip to later this afternoon. First available is 1730. Informed Dr Erika Boyd to update this CM if discharge is possible.      Peg Mccauley RN BSN  Care Manager  [phone]

## 2021-09-14 NOTE — ROUTINE PROCESS
Explained to the patient the importance of taking her medication and eating her meals. The patient stated, \"I have taken my medication all day. \"  Explained to the patient that she didn't work with the speech theraptist and the other therapy who came to work with her today. Explained to her that the theraptist determine what type of assistance she can received.

## 2021-09-14 NOTE — PROGRESS NOTES
Physician Progress Note      Roby Eisenberg  CSN #:                  [id number]  :                       1955  ADMIT DATE:       2021 5:37 PM  [address] DATE:  RESPONDING  PROVIDER #:        NORMAN PALMER DO          QUERY TEXT:    Dear Hospitalist  Pt admitted with Breakthrough seizures x2 Acute metabolic encephalopathy . Noted documentation of Mild malnutrition on  by ordered RD consultant. If possible, please document in progress notes and discharge summary:    The medical record reflects the following:  Risk Factors: Breakthrough seizures x2 Acute metabolic encephalopathy    Clinical Indicators: RDPN Nutrition Diagnosis: Inadequate oral intake related to early satiety (poor appetite) as evidenced by intake 0-25% (refusing meals)Mild malnutrition, In context of acute illness or injury related to inadequate protein-energy intake as evidenced by  (wt loss of 10.2% x 3 month PTA) Malnutrition Assessment:  Malnutrition Status:  Mild malnutrition  Context:  Acute illness  Findings of the 6 clinical characteristics of malnutrition:  Energy Intake:  Mild decrease in energy intake (specify) (poor/ no po intake since admission)  Weight Loss:  7.00 - Greater than 7.5% over 3 months      Treatment: Add supplement: Magic Cup TID Encourage/ monitor po intake of meals and the need for NGT placement/ supplemental EN support  Plan to start calorie count for 1 day (2021); discussed with MD and RN.  Form posted in patient's roomIf pt continues to have inadequate nutrition intake/ refusing po intake, recommend NGT placement and starting tube feeds of Jevity 1.5 at 20 mL/hr, advancing as pt tolerates by 10 mL q 8 hours to goal rate of 45 mL/hr with water flushes of 125 mL q 4 hours -- MD to address/ reach out to Nutrition as medically appropriate.  (goal EN regimen provides: 1620 kcal, 69 gm protein, 821 mL free water, 100% RDIs)  Thank you  Lawrence Recio RN      Options provided:  -- Mild malnutrition confirmed present on admission  -- Mild malnutrition ruled out  -- Other - I will add my own diagnosis  -- Disagree - Not applicable / Not valid  -- Disagree - Clinically unable to determine / Unknown  -- Refer to Clinical Documentation Reviewer    PROVIDER RESPONSE TEXT:    The diagnosis of Mild malnutrition was confirmed as present on admission. Query created by: Flower Pompa on 9/13/2021 12:15 PM      QUERY TEXT:    Dear Hospitalist  Pt admitted with seizure. 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 If possible, please document in progress notes and discharge summary if you are evaluating and/or treating any of the following: The medical record reflects the following:  Risk Aida Reshma disorderHx status  epilepticus  Hx stroke    Clinical Indicators: MRI Sequela of prior right parietal infarct, bilateral basal ganglionic lacunar infarcts and moderate to advanced chronic microvascular changes in the bilateral periventricular and deep white matter. 9/8 MDPN Breakthrough seizures x2, no recurrence. Could be due to hypoglycemia ,dx KEYLA ,  ? Asymptomatic hypoglycemia 42 in ED, chloride 114 ,blood sugars 100 to 53  ? Treatment: IVF-D5 normal saline with 20 mEq KCl at 100 mL/h Continue Keppra and oxcarbazepine Neurology consulted CT head  Thank you  Axel Torres RN     Options provided:  -- Seizure as a sequela of prior CVA  -- Seizure due to  electrolyte abnormality, ( please specify electrolytes)  . -- Seizure due to medication noncompliance with history of epilepsy  -- Other - I will add my own diagnosis  -- Disagree - Not applicable / Not valid  -- Disagree - Clinically unable to determine / Unknown  -- Refer to Clinical Documentation Reviewer    PROVIDER RESPONSE TEXT:    Seizure is a sequela of prior CVA. Query created by:  Flower Pompa on 9/13/2021 12:59 PM      Electronically signed by:  Eduin Cochran DO 9/14/2021 11:00 AM

## 2021-09-14 NOTE — DISCHARGE SUMMARY
Discharge Summary      Patient: Bogdan Landrum MRN: [id number]  CSN: [id number]    YOB: 1955  Age: 78 y.o. Sex: female    DOA: 9/6/2021 LOS:  LOS: 2 days   Discharge Date: 9/14/21     Admission Diagnoses: Breakthrough seizure ([facility] [address] [G40.919]  KEYLA (acute kidney injury) ([facility] [address] [N17.9]  Dehydration [E86.0]  Anemia [D64.9]    Discharge Diagnoses:    1. Breakthrough seizures x2  2. Acute metabolic encephalopathy-delirium  3. Fall  4. Right periorbital contusion/edema  5. Abdominal pain-resolved  6. KEYLA on CKD stage III-resolved  7. Asymptomatic hypoglycemia-resolved  8. Seizure disorder  9. Hx status epilepticus  10. Hx stroke  11. Hx PE  12. Concern for significant depression  13. Hx medical noncompliance  14. Extremely poor oral intake  15. Failure to thrive      Discharge Condition: Stable    PHYSICAL EXAM  Visit Vitals  /77   Pulse 86   Temp 98.1 °F (36.7 °C)   Resp 16   Ht 5' 3\" (1.6 m)   Wt 51.3 kg (113 lb)   SpO2 93%   BMI 20.02 kg/m²     General:         Awake, opens eyes, mumbles indecipherable sounds, uncooperative  HEENT:           NC, Atraumatic. Anicteric sclerae. Lungs:            CTA Bilaterally. No Wheezing/Rhonchi/Rales. Heart:              Regular  rhythm,  No murmur, No Rubs, No Gallops  Abdomen:      Soft, Non distended, Non tender.  +Bowel sounds, no HSM  Extremities:   No c/c/e  Neurologic:     Patient will not participate in exam      Hospital Course:   Patient presented to the hospital on September 6, 2021 with a complaint of seizures x2 as well as fall at home. Please refer hospital admission H&P for further detail. Patient's Depakote level was subtherapeutic. CT head done in the emergency room was negative for acute intracranial process. However it showed patient having mild right periorbital swelling and chronic right parieto-occipital encephalomalacia. CT C-spine was also negative for fracture.   CT orbit showed small to moderate right periorbital contusion/hematoma. MRI brain was negative for acute finding. Patient was seen by neurology service recommended to go up on carbamazepine from 600 to 900 mg 2 times daily and continue Keppra 1000 mg twice daily and discontinue Depakote. Patient was also found out to have low blood sugar in the morning. She will benefit having glucose tablet at the bedtime to prevent early morning hypoglycemia and hypoglycemia induced breakthrough seizures. Patient did not have any more seizure here. Patient was complaining abdominal pain and CT abdomen pelvis without contrast was done and it reported no bowel dilatation or obstruction or inflammation. Patient was continue her home medication for the comorbidities. She was found out to have low folic acid level was started on supplement. She was seen by PT OT recommended SNF placement.  was able to secure acceptance at [facility] and rehab. Of note, it was reported that Ms. Unruly Matta has fairly recently suffered the loss of one of her sons. They were apparently very close and there was concern over severe depression. Over the last few days of her hospitalization, Ms. Unruly Matta did not participate in her care. She refused to eat and drink and take most of her medication, and when attempting to talk with her, she would not communicate or participate in her care in any way. It was considered that she may have some ongoing encephalopathy. A thorough medical evaluation was overall reassuring. Speech therapy evaluated her and made recommendations for n.p.o. An NG tube was placed on the evening of 9/13 in order to start tube feeds, and within a few hours Ms. Unruly Matta had pulled it out herself. She then stated that she would eat and drink and take her medications and do whatever was necessary to avoid that tube. The following day she was alert and oriented, having conversations and was sitting up in bed attempting to feed herself.   She was able to eat and drink and take her medications without any problems. Later that day, she again refused to communicate and refused all medications, food and liquids. SLP made another evaluation and recommended n.p.o. again. An NG tube was ordered but when the nurse was going to insert it, she refused it, and was then able to drink and take her medications without any problems. This was discussed with her family who was in agreement that these issues were likely behavioral in issue and may be stemming from some significant depression from the loss of her son. On 9/14/2021, she had remained hemodynamically stable. Return precautions were discussed and she was given instructions and prescriptions for the medications as below. She was also given instructions to follow up with her PCP and was then discharged to SNF. Consults:   Neurology-Dr. Amber Chavis MD  Palliative care-Muna Mendez NP       Significant Diagnostic Studies:  CT head without contrast 9/6/2021:  1. No noncontrast CT evidence of acute intracranial process. Please note that  MRI is more sensitive for ischemia in the first 24 to 48 hours. 2.  Mild right periorbital swelling. 3.  Chronic right parieto-occipital encephalomalacia. Moderate burden of  presumed sequela of chronic white matter microvascular ischemic disease. Chronic basal ganglia infarcts. CT C-spine without contrast 9/6/2021:  1. No evidence of acute cervical spine fracture or traumatic malalignment. 2.  Moderate multilevel degenerative changes of the cervical spine and unchanged mild multilevel spondylolisthesis. CT orbit posterior fossa without contrast 9/6/2021:  1. Small to moderate right periorbital contusion/hematoma. 2.  Mild right-sided preseptal fat stranding. No evidence of retrobulbar  hemorrhage. 3.  No evidence of acute fracture. MRI brain without contrast 9/7/2021:  1. No acute infarct, hemorrhage or mass effect identified.   2.  Sequela of prior right parietal infarct, bilateral basal ganglionic lacunar  infarcts and moderate to advanced chronic microvascular changes in the bilateral periventricular and deep white matter. CT abdomen pelvis without contrast 9/7/2021:  1. No bowel dilatation, obstruction or inflammation. 2. No renal calculi or obstructive uropathy. 3. Moderate abdominal and pelvic  wall edema and mild mesenteric stranding. XR barium swallow 9/8/2021:  Silent aspiration with thin liquids. CT head without contrast 9/9/2021:  1. No CT evidence of acute intracranial process. Please note that MRI is more  sensitive for ischemia in the first 24 to 48 hours. 2.  Chronic right parietal-occipital infarct. Chronic bilateral basal ganglia  infarcts. Moderate to severe burden of presumed sequela of chronic white matter microvascular ischemic disease. Procedures Performed: None      Discharge Medications:  Current Discharge Medication List      START taking these medications    Details   folic acid (FOLVITE) 1 mg tablet Take 1 Tablet by mouth daily. Qty: 30 Tablet, Refills: 0  Start date: 9/9/2021      glucose 4 gram chewable tablet Take 4 Tablets by mouth nightly. Qty: 120 Tablet, Refills: 0  Start date: 9/8/2021      multivitamin, tx-iron-ca-min (THERA-M w/ IRON) 9 mg iron-400 mcg tab tablet Take 1 Tablet by mouth daily. Qty: 30 Tablet, Refills: 0  Start date: 9/9/2021         CONTINUE these medications which have CHANGED    Details   lisinopriL (PRINIVIL, ZESTRIL) 20 mg tablet Take 0.5 Tablets by mouth daily. Indications: high blood pressure  Qty: 30 Tablet, Refills: 1  Start date: 9/8/2021      OXcarbazepine (TRILEPTAL) 300 mg tablet Take 3 Tablets by mouth two (2) times a day for 30 days.  Indications: additional medication to treat partial seizures  Qty: 180 Tablet, Refills: 0  Start date: 9/8/2021, End date: 10/8/2021         CONTINUE these medications which have NOT CHANGED    Details   clonazePAM (KlonoPIN) 0.25 mg TbDi Take 0.25 mg by mouth as needed for Other (seizure). atorvastatin (LIPITOR) 40 mg tablet Take 40 mg by mouth daily. acetaminophen (TYLENOL) 500 mg tablet Take 500 mg by mouth as needed for Pain. clopidogreL (PLAVIX) 75 mg tab Take 75 mg by mouth daily. Indications: prevention for a blood clot going to the brain      tamsulosin (FLOMAX) 0.4 mg capsule Take 1 Cap by mouth nightly. Qty: 30 Cap, Refills: 0      ergocalciferol (VITAMIN D2) 50,000 unit capsule Take 50,000 Units by mouth every seven (7) days. levETIRAcetam (KEPPRA) 1,000 mg tablet Take 1 Tab by mouth two (2) times a day. Qty: 60 Tab, Refills: 0      amLODIPine (NORVASC) 10 mg tablet Take 1 Tab by mouth daily. Qty: 30 Tab, Refills: 6      cyanocobalamin (VITAMIN B12) 500 mcg tablet Take 1 Tab by mouth daily. Qty: 30 Tab, Refills: 0         STOP taking these medications       spironolactone (ALDACTONE) 25 mg tablet Comments:   Reason for Stopping:         hydrALAZINE (APRESOLINE) 100 mg tablet Comments:   Reason for Stopping:         divalproex DR (DEPAKOTE) 250 mg tablet Comments:   Reason for Stopping:                Activity: activity as tolerated    Diet: Nectar thick liquids and dysphagia- minced and moist     Wound Care: None needed      Follow-up Information     Follow up With Specialties Details Why Contact Info    Issa Garcia NP Neurology On 10/13/2021 @ 9:40 [address] [address] [address] [facility]      Jose Juan Grace NP Nurse Practitioner Schedule an appointment as soon as possible for a visit in 1 week F/U  [address]  [phone]             Minutes spent on discharge: >30 minutes spent coordinating this discharge (review instructions/follow-up, prescriptions, preparing report for sign off)          CASIE Montalvo DO   September 14, 2021       [employer] medical dictation software was used for portions of this report. Unintended errors may occur.

## 2021-09-14 NOTE — ROUTINE PROCESS
Patient refusing NG tube --     Drank 4 oz. Apple juice thickened  Took all of her medication-- no coughing or tearing       Patient given PRN miralax in 4 ounces of apple juice thickened    Bedside and Verbal shift change report given to Jazz Mir (oncoming nurse) by Waldo Yadav RN (offgoing nurse). Report given with SBAR, Kardex, Intake/Output, MAR, Accordion and Recent Results.

## 2021-09-14 NOTE — DISCHARGE INSTRUCTIONS
Patient Education        Epilepsy: Care Instructions  Your Care Instructions     Epilepsy is a common condition that causes repeated seizures. The seizures are caused by bursts of electrical activity in the brain that aren't normal. Seizures may cause problems with muscle control, movement, speech, vision, or awareness. They can be scary. Epilepsy affects each person differently. Some people have only a few seizures. Others get them more often. If you know what triggers a seizure, you may be able to avoid having one. You can take medicines to control and reduce seizures. You and your doctor will need to find the right combination, schedule, and dose of medicine. This may take time and careful changes. Seizures may get worse and happen more often over time. Follow-up care is a key part of your treatment and safety. Be sure to make and go to all appointments, and call your doctor if you are having problems. It's also a good idea to know your test results and keep a list of the medicines you take. How can you care for yourself at home? To control your seizures, you need to follow your treatment plan. If you take medicine to control seizures, you must take it exactly as prescribed. The medicine works only if you take the right amount on the schedule your doctor sets up. Following this schedule keeps the right level of medicine in your body. Even missing just a few doses can allow seizures to happen. You might be on a special ketogenic diet. If so, you'll need to follow the diet exactly for it to help prevent seizures. As you follow your treatment plan, also try to figure out and avoid things that may make you more likely to have a seizure. These may include:  · Not getting enough sleep. · Using drugs or alcohol. · Being stressed. · Skipping meals. If you keep having seizures despite treatment, keep a record of them. Note the date, time of day, and any details about the seizure that you can remember.  Your doctor can use this information to plan or adjust your medicine or other treatment. The record can also help your doctor find out what kinds of seizures you are having. If you have epilepsy:  · Be sure that any doctor who treats you knows that you have epilepsy. And let the doctor know what medicines you take, if any. · Wear a medical ID bracelet. If you have a seizure or accident that leaves you unconscious or unable to speak for yourself, the bracelet will let those who are treating you know that you have epilepsy. It will also list any medicines you take to control your seizures. That way, you won't be given any medicines that will react badly with those already in your body. · Ask your doctor if it's safe for you to do things like drive or swim. · Create a seizure first-aid plan with your friends and family. The plan will help them know how to help you. The kind of plan you need can depend on the kind of seizures you have. Your doctor can tell you more about this. When should you call for help? Call 911 anytime you think you may need emergency care. For example, call if:    · A seizure does not stop as it normally does.     · You have new symptoms such as:  ? Numbness, tingling, or weakness on one side of your body or face. ? Vision changes. ? Trouble speaking or thinking clearly. Call your doctor now or seek immediate medical care if:    · You have a fever.     · You have a severe headache. Watch closely for changes in your health, and be sure to contact your doctor if:    · The normal pattern or features of your seizures change. Where can you learn more? Go to http://www.gray.com/  Enter X141 in the search box to learn more about \"Epilepsy: Care Instructions. \"  Current as of: August 4, 2020               Content Version: 12.8  © 2006-2021 [employer].    Care instructions adapted under license by [employer] (which disclaims liability or warranty for this information). If you have questions about a medical condition or this instruction, always ask your healthcare professional. [address] any warranty or liability for your use of this information. Patient armband removed and shredded  MyChart Activation    Thank you for requesting access to [employer]. Please follow the instructions below to securely access and download your online medical record. [employer] allows you to send messages to your doctor, view your test results, renew your prescriptions, schedule appointments, and more. How Do I Sign Up? 1. In your internet browser, go to [email or web address]  2. Click on the First Time User? Click Here link in the Sign In box. You will be redirect to the New Member Sign Up page. 3. Enter your [employer] Access Code exactly as it appears below. You will not need to use this code after youve completed the sign-up process. If you do not sign up before the expiration date, you must request a new code. [employer] Access Code: [email or web address]  Expires: 10/19/2021  3:15 PM (This is the date your [employer] access code will )    4. Enter the last four digits of your Social Security Number (xxxx) and Date of Birth (mm/dd/yyyy) as indicated and click Submit. You will be taken to the next sign-up page. 5. Create a [employer] ID. This will be your [employer] login ID and cannot be changed, so think of one that is secure and easy to remember. 6. Create a [employer] password. You can change your password at any time. 7. Enter your Password Reset Question and Answer. This can be used at a later time if you forget your password. 8. Enter your e-mail address. You will receive e-mail notification when new information is available in [address] 9. Click Sign Up. You can now view and download portions of your medical record. 10. Click the Download Summary menu link to download a portable copy of your medical information.     Additional Information    If you have questions, please visit the Frequently Asked Questions section of the [employer] website at [email or web address] Remember, MyChart is NOT to be used for urgent needs. For medical emergencies, dial 911. DISCHARGE SUMMARY from Nurse    PATIENT INSTRUCTIONS:    After general anesthesia or intravenous sedation, for 24 hours or while taking prescription Narcotics:  · Limit your activities  · Do not drive and operate hazardous machinery  · Do not make important personal or business decisions  · Do  not drink alcoholic beverages  · If you have not urinated within 8 hours after discharge, please contact your surgeon on call. Report the following to your surgeon:  · Excessive pain, swelling, redness or odor of or around the surgical area  · Temperature over 100.5  · Nausea and vomiting lasting longer than 4 hours or if unable to take medications  · Any signs of decreased circulation or nerve impairment to extremity: change in color, persistent  numbness, tingling, coldness or increase pain  · Any questions    What to do at Home:  Recommended activity: Activity as tolerated,     If you experience any of the following symptoms shortness of breath, chest pain, signs of seizures, loss of consciousness slurred speech, please follow up with . *  Please give a list of your current medications to your Primary Care Provider. *  Please update this list whenever your medications are discontinued, doses are      changed, or new medications (including over-the-counter products) are added. *  Please carry medication information at all times in case of emergency situations. These are general instructions for a healthy lifestyle:    No smoking/ No tobacco products/ Avoid exposure to second hand smoke  Surgeon General's Warning:  Quitting smoking now greatly reduces serious risk to your health.     Obesity, smoking, and sedentary lifestyle greatly increases your risk for illness    A healthy diet, regular physical exercise & weight monitoring are important for maintaining a healthy lifestyle    You may be retaining fluid if you have a history of heart failure or if you experience any of the following symptoms:  Weight gain of 3 pounds or more overnight or 5 pounds in a week, increased swelling in our hands or feet or shortness of breath while lying flat in bed. Please call your doctor as soon as you notice any of these symptoms; do not wait until your next office visit. The discharge information has been reviewed with the patient. The patient verbalized understanding. Discharge medications reviewed with the patient  Patient Education        Altered Mental Status: Care Instructions  Your Care Instructions     Altered mental status is a change in how well your brain is working. As a result, you may be confused, be less alert than usual, or act in odd ways. This may include seeing or hearing things that aren't really there (hallucinations). A mental status change has many possible causes. For example, it may be the result of an infection, an imbalance of chemicals in the body, or a chronic disease such as diabetes or COPD. It can also be caused by things such as a head injury, taking certain medicines, or using alcohol or drugs. The doctor may do tests to look for the cause. These tests may include urine tests, blood tests, and imaging tests such as a CT scan. Sometimes a clear cause isn't found. But tests can help the doctor rule out a serious cause of your symptoms. A change in mental status can be scary. But mental status will often return to normal when the cause is treated. So it is important to get any follow-up testing or treatment the doctor has suggested. The doctor has checked you carefully, but problems can develop later. If you notice any problems or new symptoms, get medical treatment right away. Follow-up care is a key part of your treatment and safety.  Be sure to make and go to all appointments, and call your doctor if you are having problems. It's also a good idea to know your test results and keep a list of the medicines you take. How can you care for yourself at home? · Be safe with medicines. Take your medicines exactly as prescribed. Call your doctor if you think you are having a problem with your medicine. · Have another adult stay with you until you are better. This can help keep you safe. Ask that person to watch for signs that your mental status is getting worse. When should you call for help? Call 911 anytime you think you may need emergency care. For example, call if:    · You passed out (lost consciousness). Call your doctor now or seek immediate medical care if:    · Your mental status is getting worse.     · You have new symptoms, such as a fever, chills, or shortness of breath.     · You do not feel safe. Watch closely for changes in your health, and be sure to contact your doctor if:    · You do not get better as expected. Where can you learn more? Go to http://www.[employer].com/  Enter J452 in the search box to learn more about \"Altered Mental Status: Care Instructions. \"  Current as of: August 4, 2020               Content Version: 12.8  © 2006-2021 [employer]. Care instructions adapted under license by [employer] (which disclaims liability or warranty for this information). If you have questions about a medical condition or this instruction, always ask your healthcare professional. [address] any warranty or liability for your use of this information. and appropriate educational materials and side effects teaching were provided.   ___________________________________________________________________________________________________________________________________

## 2021-09-14 NOTE — PROGRESS NOTES
Problem: Dysphagia (Adult)  Goal: *Acute Goals and Plan of Care (Insert Text)  Description: Recommendations:  NPO with consideration for alt means of nutrition  Meds: non orally  Aspiration Precautions  Oral Care TID  Other: PEG vs QOL diet    Goals:  Patient will:  1. Tolerate diet and/or diet upgrade without overt s/sx of aspiration under SLP supervision  2. Utilize compensatory swallow strategies of small bite/sip, alternate liquid/solid with min cues   3. Perform oral-motor and laryngeal elevation exercises 10 reps/each to increase oropharyngeal swallow function with min cues  4. Complete an objective swallow study (i.e., MBSS) to assess swallow integrity, r/o aspiration, and determine of safest LRD, min A    Outcome: Not Progressing Towards Goal   SPEECH LANGUAGE PATHOLOGY DYSPHAGIA TREATMENT    Patient: Chivo Kim (48 y.o. female)  Date: 9/13/2021  Diagnosis: Breakthrough seizure ([facility] [address] [G40.919]  KEYLA (acute kidney injury) ([facility] [address] [N17.9]  Dehydration [E86.0]  Anemia [D64.9] Breakthrough seizure ([facility] [address]       Precautions: Aspiration; Fall  PLOF:Per H&P     ASSESSMENT:  Pt seen at bedside for dysphagia management/tx follow up. Pt aroused from sleep with verbal/tactile cues. Max encouragement warranted to participate. Pt administered po trials of nectar/mildly thick liquids, honey/moderately thick liquids, and pudding. Pt with restricted mandible only accepting scant amounts at a time. Pt noted to oral hold all po, with delayed swallow initiation ~5-7 seconds with weak laryngeal elevation and displaying immediate wet/weak cough after all po trials. MaxA encouragement provided to try to attempt laryngeal ex/ comp strategies however pt unwilling to further participate. Rec change top NPO with consideration for alt means of nutrition. SANDRA RN Ruddy Cotto, whom reports pt waxes and wanes with willingness of participation, but did fine earlier when fed. She further acknowledged SLP's recs.      Progression toward goals:  []         Improving appropriately and progressing toward goals  [x]         Improving slowly and progressing toward goals  []         Not making progress toward goals and plan of care will be adjusted     PLAN:  Recommendations and Planned Interventions:  See above  Patient continues to benefit from skilled intervention to address the above impairments. Continue treatment per established plan of care. Discharge Recommendations: To Be Determined     SUBJECTIVE:   Patient stated NO, NO .    OBJECTIVE:   Cognitive and Communication Status:  Neurologic State: Confused  Orientation Level: Oriented to person  Cognition: Decreased attention/concentration, Decreased command following  Perception: Appears intact  Perseveration: No perseveration noted  Safety/Judgement: Fall prevention, Decreased awareness of environment  Dysphagia Treatment:  Oral Assessment:  Oral Assessment  Labial: Decreased rate, Decreased seal  Dentition: Limited  Oral Hygiene: Fair  Lingual: Decreased rate, Decreased strength  Velum: No impairment  Mandible: No impairment  P.O. Trials:   Patient Position: hob 60   Vocal quality prior to P.O.: Low volume   Consistency Presented: Thin liquid, Solid, Puree, Nectar thick liquid   How Presented: Self-fed/presented, SLP-fed/presented, Straw, Successive swallows       Bolus Acceptance: No impairment   Bolus Formation/Control: Impaired   Type of Impairment: Delayed, Mastication   Propulsion: Delayed (# of seconds), Lingual tremors, Tongue pumping   Oral Residue: None   Initiation of Swallow: Delayed (# of seconds)   Laryngeal Elevation: Decreased   Aspiration Signs/Symptoms: None   Pharyngeal Phase Characteristics: Feeling of discomfort, Foreign body sensation, Painful swallow   Effective Modifications: Small sips and bites   Cues for Modifications:  Moderate         Oral Phase Severity: Mild   Pharyngeal Phase Severity : Moderate   Oral Motor Exercises:            Exercises:  Laryngeal Exercises:         PAIN:  Pain level pre-treatment: 0/10   Pain level post-treatment: 0/10   Pain Intervention(s): Medication (see MAR); Rest, Ice, Repositioning   Response to intervention: Nurse notified, See doc flow    After treatment:   []              Patient left in no apparent distress sitting up in chair  [x]              Patient left in no apparent distress in bed  [x]              Call bell left within reach  [x]              Nursing notified  []              Family present  []              Caregiver present  []              Bed alarm activated      COMMUNICATION/EDUCATION:   [x] Aspiration precautions; swallow safety; compensatory techniques  []        Patient unable to participate in education; education ongoing with staff  [x]  Posted safety precautions in patient's room.   [x] Oral-motor/laryngeal strengthening exercises      TERESA Wrorell MA, CCC-SLP  Speech-Language Pathologist    Time Calculation: 10 mins

## 2021-09-14 NOTE — PROGRESS NOTES
Problem: Patient Education: Go to Patient Education Activity  Goal: Patient/Family Education  Outcome: Progressing Towards Goal     Problem: Patient Education: Go to Patient Education Activity  Goal: Patient/Family Education  Outcome: Progressing Towards Goal     Problem: Patient Education: Go to Patient Education Activity  Goal: Patient/Family Education  Outcome: Progressing Towards Goal     Problem: Pressure Injury - Risk of  Goal: *Prevention of pressure injury  Description: Document John Scale and appropriate interventions in the flowsheet. Outcome: Progressing Towards Goal  Note: Pressure Injury Interventions:  Sensory Interventions: Assess changes in LOC    Moisture Interventions: Absorbent underpads    Activity Interventions: Pressure redistribution bed/mattress(bed type)    Mobility Interventions: HOB 30 degrees or less    Nutrition Interventions: Document food/fluid/supplement intake    Friction and Shear Interventions: HOB 30 degrees or less                Problem: Patient Education: Go to Patient Education Activity  Goal: Patient/Family Education  Outcome: Progressing Towards Goal     Problem: Falls - Risk of  Goal: *Absence of Falls  Description: Document Kandy Fall Risk and appropriate interventions in the flowsheet.   Outcome: Progressing Towards Goal  Note: Fall Risk Interventions:  Mobility Interventions: Bed/chair exit alarm, OT consult for ADLs, Patient to call before getting OOB, PT Consult for mobility concerns    Mentation Interventions: Bed/chair exit alarm, Reorient patient, Update white board    Medication Interventions: Bed/chair exit alarm    Elimination Interventions: Bed/chair exit alarm, Call light in reach    History of Falls Interventions: Door open when patient unattended         Problem: Patient Education: Go to Patient Education Activity  Goal: Patient/Family Education  Outcome: Progressing Towards Goal     Problem: Seizure Disorder (Adult)  Goal: *STG: Remains free of seizure activity  Outcome: Progressing Towards Goal  Goal: *STG: Maintains lab values within therapeutic range  Outcome: Progressing Towards Goal  Goal: *STG/LTG: Complies with medication therapy  Outcome: Progressing Towards Goal  Goal: *STG: Remains free of injury during seizure activity  Outcome: Progressing Towards Goal  Goal: *STG: Remains safe in hospital  Outcome: Progressing Towards Goal  Goal: Interventions  Outcome: Progressing Towards Goal     Problem: Nutrition Deficit  Goal: *Optimize nutritional status  Outcome: Progressing Towards Goal

## 2021-09-14 NOTE — ROUTINE PROCESS
Pt took all of her medications crushed in applesauce without difficulty. No coughing, gagging, or tearing noted.

## 2021-09-14 NOTE — PROGRESS NOTES
Transition of Care Plan to SNF/Rehab    SNF/Rehab Transition:  Patient has been accepted to [facility] and meets criteria for admission. Patient will  be transported by [facility] and expected to leave at 2pm.    Communication to Patient/Family:  Met with patient and Son (identified care giver) and they are agreeable to the transition plan. Communication to SNF/Rehab:  Bedside RN, has been notified of the transition plan to the facility and to call report (phone number [phone]). Discharge information has been updated on the AVS. And communicated to facility via [facility], or CC link. SNF/Rehab Transition:    PCP/Specialist:     Nursing Please include all hard scripts for controlled substances, med rec and dc summary, and AVS in packet. Reviewed and confirmed with facility representative, Mylene Lisa  at [facility] they can manage the patient care needs for the following:     Media Cerda with (X) only those applicable:        Medication:  [x]  Medications will be available at the facility  []  IV Antibiotics   []  Controlled Substance - hard copy to be sent with patient   []  Weekly Labs    Documents:  [] Hard RX  Number sent   [] MAR  [] Kardex  [] AVS  [] Wound care note  [x]Transfer Summary/Discharge Summary    Equipment:  []  CPAP/BiPAP  []  Wound Vacuum  []  Cain or Urinary Device  []  PICC/Central Line  []  Nebulizer  []  Ventilator    Treatment:  []Isolation (for MRSA, VRE, etc.)  []Surgical Drain Management  []Tracheostomy Care  []Dressing Changes  []Dialysis with transportation and chair time  Center Mode of tranpsort   []PEG Care  []Oxygen  []Daily Weights for Heart Failure    Dietary:  []Any diet limitations  []Tube Feedings   []Total Parenteral Management (TPN)    Eligible for Medicaid Long Term Services and Supports  Yes:  [] Eligible for medical assistance or will become eligible within 180 days and LTSS completed. [] Provider/Patient and/or support system has requested screening.   [x] LTSS copy provided to patient or responsible party, .  [] LTSS unavailable at discharge will send once processed to SNF provider.  [] LTSS  unavailable at discharged mailed to patient  [] LTSS performed by outside agency  on  with tracking number   No:   [] Private pay and is not financially eligible for Medicaid within the next 180 days. [] Reside out-of-state.   [] A residents of a state owned/operated facility that is licensed  by [address] [facility] or [facility]  [] Enrollment in [facility] hospice services  [] [address] [place] [address]  [] Patient /Family declines to have screening completed or provide financial information for screening          Financial Resources:  Medicaid    [] Initiated and application pending   [x] Full coverage      Advanced Care Plan:  []Surrogate Decision Maker of Care  []POA  []Communicated Code Status/ sent (DDNR, POST, Advance Directive)     Sanya Bnods RN BSN  Care Manager  [phone]

## 2021-09-14 NOTE — PROGRESS NOTES
Problem: Mobility Impaired (Adult and Pediatric)  Goal: *Acute Goals and Plan of Care (Insert Text)  Description: Physical Therapy Goals  Initiated 9/7/2021, reevaluated 9/13/2021 and to be accomplished within 7 day(s)  1. Patient will move from supine to sit and sit to supine  in bed with contact guard assistance. 2.  Patient will transfer from bed to chair and chair to bed with contact guard assistance using the least restrictive device. 3.  Patient will perform sit to stand with contact guard assistance. 4.  Patient will ambulate with contact guard assistance for  feet with the least restrictive device. PLOF: Pt lives in a [facility] [place] with no JOHAN with her 2 sons rotating to be with her. She is able to amb short distances with HHA from her sons. Her sons assist with dressing/bathing. Outcome: Progressing Towards Goal     PHYSICAL THERAPY TREATMENT    Patient: Anand Montgomery (58 y.o. female)  Date: 9/14/2021  Diagnosis: Breakthrough seizure ([facility] [address] [G40.919]  KEYLA (acute kidney injury) ([address] [N17.9]  Dehydration [E86.0]  Anemia [D64.9] Breakthrough seizure ([address]       Precautions: Fall  PLOF: see above     ASSESSMENT:  Pt received in bed in NAD and agreeable. Pt with decreased command following this date as well as increased time for initiation of movement. Pt is able to minimally move legs laterally along EOB but requires max A to come to sitting. Pt instructed in hand placement for sit <> stand with use of RW with max A, strong posterior  lean noted with cues for anterior weight shifting in order to maintain upright consistently. Pt with difficulty weight shifting in order to off load LE to take steps this date and thus only 2 standing trials completed. At end of session, pt positioned back in supine with all needs met and call bell within reach. Will continue to follow per POC and recommend rehab upon discharge.      Progression toward goals:   [x]      Improving appropriately and progressing toward goals  []      Improving slowly and progressing toward goals  []      Not making progress toward goals and plan of care will be adjusted     PLAN:  Patient continues to benefit from skilled intervention to address the above impairments. Continue treatment per established plan of care. Discharge Recommendations:  Rehab  Further Equipment Recommendations for Discharge:  TBD at next level of care     SUBJECTIVE:   Patient does not verbalize other than soft mumbling intermittently. OBJECTIVE DATA SUMMARY:   Critical Behavior:  Neurologic State: Confused  Orientation Level: Oriented to person, Disoriented to time, Oriented to situation  Cognition: Impaired decision making  Safety/Judgement: Fall prevention, Decreased awareness of environment  Functional Mobility Training:  Bed Mobility:  Rolling: Moderate assistance  Supine to Sit: Maximum assistance  Sit to Supine: Maximum assistance  Scooting: Maximum assistance         Transfers:  Sit to Stand: Maximum assistance  Stand to Sit: Moderate assistance    Balance:  Sitting: Impaired; With support  Sitting - Static: Fair (occasional)  Sitting - Dynamic: Fair (occasional)  Standing: Impaired; With support  Standing - Static: Fair;Poor    Pain:  Pain level pre-treatment: unable to verbalized /10  Pain level post-treatment: \"   \" /10   Pain Intervention(s): Medication (see MAR); Rest, Ice, Repositioning   Response to intervention: Nurse notified    Activity Tolerance:   Fair    Please refer to the flowsheet for vital signs taken during this treatment. After treatment:   [] Patient left in no apparent distress sitting up in chair  [x] Patient left in no apparent distress in bed  [x] Call bell left within reach  [x] Nursing notified  [] Caregiver present  [x] Bed alarm activated  [] SCDs applied      COMMUNICATION/EDUCATION:   [x]         Role of Physical Therapy in the acute care setting.   [x]         Fall prevention education was provided and the patient/caregiver indicated understanding. [x]         Patient/family have participated as able in working toward goals and plan of care. [x]         Patient/family agree to work toward stated goals and plan of care. []         Patient understands intent and goals of therapy, but is neutral about his/her participation.   []         Patient is unable to participate in stated goals/plan of care: ongoing with therapy staff.  []         Other:        Karla Thompson   Time Calculation: 23 mins

## 2021-09-15 NOTE — ADT AUTH CERT NOTES
Seizure - Care Day 8 (9/13/2021) by Seb Blum RN       Review Status Review Entered   Completed 9/13/2021 13:32      Criteria Review      Care Day: 8 Care Date: 9/13/2021 Level of Care: Inpatient Floor    Guideline Day 2    Level Of Care    (X) Neurologic unit or floor to discharge    9/13/2021 13:32:23 EDT by Luisa Liao BED. (X) Complete discharge planning    9/13/2021 13:32:23 EDT by Candelario [facility]. Clinical Status    (X) * Hemodynamic stability    9/13/2021 13:32:23 EDT by Seb Blum      97.9 AX. P 63. RR 19 /84. SPO2 100% ROOM AIR. ( ) * Mental status at baseline    ( ) * No evidence of CNS bleeding or infection    ( ) * No new neurologic deficits    ( ) * Seizures absent or managed    ( ) * No evidence of seizure etiology requiring inpatient care    ( ) * Discharge plans and education understood    Activity    ( ) * Ambulatory or acceptable for next level of care    9/13/2021 13:32:23 EDT by Yanci Cook PT CONSULTS ORDERED. Routes    ( ) * Oral hydration    (X) * Oral medications or regimen acceptable for next level of care    9/13/2021 13:32:23 EDT by Seb Blum      NORVASC 10MG PO QD.  LIPITOR 40MG PO QD. PLAVIX 75MG PO QD. CYANOCOBALAMIN 500MCG PO QD. FOLVITE 1MG PO QD.  ZESTRIL 10MG PO QD.  MVI ONE PO QD.  TRILEPTAL 900MG PO BID. ( ) * Oral diet or acceptable for next level of care    9/13/2021 13:32:23 EDT by Scooter PEREZ [facility]. AWAITING NUTRITION NOTE. Interventions    (X) Neurologic checks and seizure monitoring    9/13/2021 13:32:23 EDT by Seb Blum      CONTINUOUS VS AND NEURO ASSESSMENTS. Medications    (X) * Antiepileptic regimen suitable for next level of care in place, if indicated    9/13/2021 13:32:23 EDT by Seb Blum      KEPPRA 1 G PO BID. * Milestone   Additional Notes   9/13/21   DAY TWO IP CLINICAL      . CO2 18. BUN 5. PHOS 2.1. POC GLUCOSE 92-. FULL CODE.   SCDS. I+O. CONTINUOUS VS.   DUONEB BID. IV D5NS WITH 20 MEQ KCL @ 100ML/HOUR. LOVENOX 40MG SC Q24H. PT>   ASSESSMENT :   Based on the objective data described below, the patient presents with decreased strength, confusion, decreased safety with mobility, and decreased activity tolerance. Patient received supine in bed, alert, and agreeable to PT. She transfer from supine to sitting with moderate A and additional time. Patient initially presents with heavy right lateral lean/ posterior/ lean and given cues to hold onto bed rail for assistance. She stands to standing at [facility] [place] mod/maximum assistance x3 trials. She has poor standing balance, able to unweight hips off the bed, though unable to clear the mattress. Patient incontinent of urine while sitting EOB and now bed linen soiled. Pt assisted back to bed and assisted with cleaning with fully bed linen change. Instructed patient no to get up on the own for falls risk.           Seizure - Care Day 7 (9/12/2021) by Ye Guzman RN       Review Status Review Entered   Completed 9/13/2021 09:46      Criteria Review      Care Day: 7 Care Date: 9/12/2021 Level of Care: Inpatient Floor    Guideline Day 1    Level Of Care    (X) ICU, neurologic unit, or floor    9/13/2021 09:45:22 EDT by Leighann Sahil BED. Clinical Status    (X) * Clinical Indications met    9/13/2021 09:45:22 EDT by Rony RUIZ 9/12. SEE NOTES. Activity    (X) Possible ambulation with assistance    9/13/2021 09:45:22 EDT by Ye Guzman      OT AND PT EVAL AND TREAT. Routes    (X) Oral medications as tolerated    9/13/2021 09:45:22 EDT by Ye Guzman      NORVASC 10MG PO QD.  LIPITOR 40MG PO QD. PLAVIX 75MG PO QD. CYANOCOBALAMIN 500MCG PO QD. FOLVITE 1MG PO QD.  ZESTRIL 10MG PO QD.  MVI ONE PO QD.  TRILEPTAL 900MG PO BID.    ( ) Oral diet as tolerated    9/13/2021 09:45:22 EDT by Ye Guzman      NGT INSERTION ORDERED.     Interventions    (X) Lab tests    9/13/2021 09:46:16 EDT by Laly Kennedy [id number] [phone] (X) Neurologic checks and seizure monitoring    9/13/2021 09:46:16 EDT by [facility]      CONTINUOUS VS AND NEURO ASSESSMENTS. (X) Possible pulse oximetry    9/13/2021 09:46:16 EDT by [facility]      DONE WITH VS.    Medications    (X) Possible antiepileptic drug    9/13/2021 09:45:22 EDT by [facility]      KEPPRA 1 G PO BID. * Milestone   Additional Notes   9/12/21      DAY ONE IP CLINICAL      97.5 AX. P 93 RR 16 /90 SPO2 97% ROOM AIR. ABD XR>   There is a nasogastric tube world in the left upper abdomen consistent with gastric displacement. There is a small amount contrast identified in the right upper abdomen within the colon.     Evaluation of the chest demonstrates no focal abnormality; however evaluation is limited given technique. FULL CODE.   SCDS. I+O. DUONEB BID. IV D5NS WITH 20 MEQ KCL @ 100ML/HOUR. LOVENOX 40MG SC Q24H. Subjective:       Jeff Knapp is a 78 y.o. female with a PMHx of seizure disorder, status epilepticus, stroke, PE, HLD, and hallucinations who is admitted for recurrent breakthrough seizures and acute metabolic encephalopathy.       No significant change in patient condition for the past 3 days   Seen in room today   Lying in bed, lethargic, barely opens eyes   Mumbles indecipherable sounds   Further evaluation and HPI extremely limited as patient is either not able to or refuses to participate       From previous discussion with patient's son:   He reports that she is compliant with her seizure medications at home, she is talkative and interactive, and can basically complete almost all of her ADLs without any assistance       General:         Awake, alert, mumbles indecipherable sounds, uncooperative, no acute distress     HEENT:           NC, Atraumatic. Anicteric sclerae. Lungs:            CTA Bilaterally. No Wheezing/Rhonchi/Rales.    Heart:              Regular  rhythm,  No murmur, No Rubs, No Gallops   Abdomen:      Soft, Non distended, Non tender.  +Bowel sounds, no HSM   Extremities:   No c/c/e   Neurologic:     Patient either cannot or will not participate in exam          Assessment and Plan:       Kendra Nunn is a 78 y.o. female with a PMHx of seizure disorder, status epilepticus, stroke, PE, HLD, and hallucinations who is admitted for recurrent breakthrough seizures and acute metabolic encephalopathy.       1. Breakthrough seizures x2   2. Acute metabolic encephalopathy-delirium   3. Fall   4. Right periorbital contusion/edema   5. Abdominal pain-resolved   6. KEYLA on CKD stage III-resolved   7. Asymptomatic hypoglycemia-resolved   8. Seizure disorder   9. Hx status epilepticus   10. Hx stroke   11. Hx PE   12. Concern for significant depression   13. Hx medical noncompliance   14. Extremely poor oral intake   15. Failure to thrive       Neurology consulted and signed off   Nutrition consulted for calorie count    Order placed for NG tube today   We will start tube feeds in a.m.    CT head from 9/9 reviewed and shows no acute changes   Continue IVF-D5 normal saline with 20 mEq KCl at 100 mL/h   Continue Keppra and oxcarbazepine   Continue other home meds   Duo nebs as needed   Follow-up MP, mag, Phos   PT, OT-recommending SNF/rehab   Palliative care consulted

## 2021-09-29 NOTE — PROGRESS NOTES
Physician Progress Note      Jordyn Washington  CSN #:                  [id number]  :                       1955  ADMIT DATE:       2021 5:37 PM  [address] [place] DATE:        2021 3:47 PM  RESPONDING  PROVIDER #:        Coco PALMER DO          QUERY TEXT:    Dear Dr Mai Hamlin  Pt admitted with Breakthrough seizures x2 . Pt noted to have Acute metabolic encephalopathy-delirium . If possible, please document in   discharge summary the relationship, if any, between Acute metabolic encephalopathy and significant depression from the loss of her son. The medical record reflects the following:  Risk Factors:  significant depression from the loss of her son    Clinical Indicators: MDPN Acute metabolic encephalopathy-delirium ,significant depression ,Extremely poor oral intake and  Failure to thrive    Treatment: IVF-D5 normal saline with 20 mEq KCl at 100 mL/h Continue Keppra and oxcarbazepine Neurology consulted CT head Labs carefully monitored  Thank you  Helena Foote RN    Options provided:  -- Acute metabolic encephalopathy due to metabolic imbalances and KEYLA stemming from significant depression/failure to thrive  -- Delirium due to metabolic imbalances and KEYLA stemming from significant depression/failure to thrive and metabolic encephalopathy ruled out  -- Acute metabolic encephalopathy  due to metabolic imbalances and KEYLA unrelated to significant depression/failure to thrive  -- Other - I will add my own diagnosis  -- Disagree - Not applicable / Not valid  -- Disagree - Clinically unable to determine / Unknown  -- Refer to Clinical Documentation Reviewer    PROVIDER RESPONSE TEXT:    This patient has Acute metabolic encephalopathy due to metabolic imbalances and KEYLA stemming from significant depression/failure to thrive . Query created by:  Yokasta Sears on 2021 9:36 AM      Electronically signed by:  Darlene Cronin DO 2021 11:30 AM

## 2021-11-01 ENCOUNTER — OFFICE VISIT (OUTPATIENT)
Dept: NEUROLOGY | Age: 66
End: 2021-11-01
Payer: MEDICARE

## 2021-11-01 VITALS
SYSTOLIC BLOOD PRESSURE: 100 MMHG | WEIGHT: 97 LBS | HEIGHT: 61 IN | RESPIRATION RATE: 18 BRPM | HEART RATE: 92 BPM | OXYGEN SATURATION: 96 % | DIASTOLIC BLOOD PRESSURE: 40 MMHG | BODY MASS INDEX: 18.31 KG/M2

## 2021-11-01 DIAGNOSIS — G40.919 BREAKTHROUGH SEIZURE (HCC): Primary | ICD-10-CM

## 2021-11-01 PROCEDURE — 99214 OFFICE O/P EST MOD 30 MIN: CPT | Performed by: NURSE PRACTITIONER

## 2021-11-01 RX ORDER — HYDRALAZINE HYDROCHLORIDE 100 MG/1
TABLET, FILM COATED ORAL
COMMUNITY
End: 2022-01-13

## 2021-11-01 RX ORDER — OXCARBAZEPINE 300 MG/1
300 TABLET, FILM COATED ORAL 2 TIMES DAILY
COMMUNITY

## 2021-11-01 RX ORDER — DIVALPROEX SODIUM 250 MG/1
750 TABLET, DELAYED RELEASE ORAL
Status: ON HOLD | COMMUNITY
Start: 2021-01-25 | End: 2022-01-13 | Stop reason: SDUPTHER

## 2021-11-01 RX ORDER — PAROXETINE 30 MG/1
30 TABLET, FILM COATED ORAL DAILY
COMMUNITY

## 2021-11-01 RX ORDER — SPIRONOLACTONE 25 MG/1
TABLET ORAL DAILY
COMMUNITY
End: 2022-01-13

## 2021-11-01 NOTE — PROGRESS NOTES
Era Hammans presents today for   Chief Complaint   Patient presents with   [facility] follow up       Is someone accompanying this pt? Yes, Son    Is the patient using any DME equipment during OV? no    Depression Screening:  No flowsheet data found. Learning Assessment:  No flowsheet data found. Abuse Screening:  No flowsheet data found. Fall Risk  Fall Risk Assessment, last 12 mths 11/1/2021   Able to walk? Yes   Fall in past 12 months? 0   Do you feel unsteady? 1   Are you worried about falling 1   Is TUG test greater than 12 seconds? 1   Is the gait abnormal? 1   Number of falls in past 12 months 0         Coordination of Care:  1. Have you been to the ER, urgent care clinic since your last visit? Hospitalized since your last visit? Yes, [facility]    2. Have you seen or consulted any other health care providers outside of the [address] [place] [address] since your last visit? Include any pap smears or colon screening.  no

## 2021-11-01 NOTE — PROGRESS NOTES
[facility]  [address], [address], [facility], [address]  [address] [place], [address]  Office:  [phone]  Fax: [phone]  Chief Complaint   Patient presents with   [facility] follow up     This is a 70-year-old female who presents for hospital follow-up seizures. Significant history of CVA, HTN, and CKD. She is accompanied in office today by son Levar Rayo. Was seen at [facility] on September 6th for breakthrough seizure. Per documentation seizure witnessed and EMS recorded to tonic-clonic seizures one lasting 2 minutes and the second lasting 5 minutes. Patient is followed by [facility] and has been for over 10 years. Last seen in the office in January of 2021 by MING Garrison. While in [facility] hospital this last admission her medications were adjusted by neurology in the hospital.  Her valproic acid was discontinued. Her Keppra is at 1000 mg twice daily. Her Trileptal was increased to 900 mg twice daily. Her son Levar Rayo said her Depakote was recently filled by her PCP in October. There is some confusion regarding medications and dosing. She denies breakthrough seizures since leaving the hospital. No other concerns at this time. Past Medical History:   Diagnosis Date    Abnormal coordination 9/21/2020    Hallucinations 9/21/2020    Hypertension     Neurological disorder     Seizures ([facility]     Stroke ([facility] [facility] 2009       No past surgical history on file. Current Outpatient Medications   Medication Sig Dispense Refill    divalproex DR (DEPAKOTE) 250 mg tablet Take 750 mg by mouth nightly.  spironolactone (ALDACTONE) 25 mg tablet Take  by mouth daily.  PARoxetine (PAXIL) 30 mg tablet Take 30 mg by mouth daily.  hydrALAZINE (APRESOLINE) 100 mg tablet Take  by mouth.  OXcarbazepine (TRILEPTAL) 300 mg tablet Take 300 mg by mouth two (2) times a day.       lisinopriL (PRINIVIL, ZESTRIL) 20 mg tablet Take 0.5 Tablets by mouth daily. Indications: high blood pressure 30 Tablet 1    atorvastatin (LIPITOR) 40 mg tablet Take 40 mg by mouth daily.  tamsulosin (FLOMAX) 0.4 mg capsule Take 1 Cap by mouth nightly. 30 Cap 0    ergocalciferol (VITAMIN D2) 50,000 unit capsule Take 50,000 Units by mouth every seven (7) days.  amLODIPine (NORVASC) 10 mg tablet Take 1 Tab by mouth daily. 30 Tab 6    folic acid (FOLVITE) 1 mg tablet Take 1 Tablet by mouth daily. 30 Tablet 0    glucose 4 gram chewable tablet Take 4 Tablets by mouth nightly. 120 Tablet 0    multivitamin, tx-iron-ca-min (THERA-M w/ IRON) 9 mg iron-400 mcg tab tablet Take 1 Tablet by mouth daily. (Patient not taking: Reported on 11/1/2021) 30 Tablet 0    clonazePAM (KlonoPIN) 0.25 mg TbDi Take 0.25 mg by mouth as needed for Other (seizure).  acetaminophen (TYLENOL) 500 mg tablet Take 500 mg by mouth as needed for Pain.  clopidogreL (PLAVIX) 75 mg tab Take 75 mg by mouth daily. Indications: prevention for a blood clot going to the brain      levETIRAcetam (KEPPRA) 1,000 mg tablet Take 1 Tab by mouth two (2) times a day. 60 Tab 0    cyanocobalamin (VITAMIN B12) 500 mcg tablet Take 1 Tab by mouth daily. 30 Tab 0        Allergies   Allergen Reactions    Tomato Hives     Allergic to eating tomato.     Aspirin Nausea and Vomiting    Ciprofloxacin Rash    Pcn [Penicillins] Rash and Hives    Sulfa (Sulfonamide Antibiotics) Hives and Rash       Social History     Tobacco Use    Smoking status: Never Smoker    Smokeless tobacco: Never Used   Vaping Use    Vaping Use: Never used   Substance Use Topics    Alcohol use: Yes     Comment: Socially     Drug use: No       Family History   Problem Relation Age of Onset    Diabetes Other     Stroke Other        Review of Systems  GENERAL: Denies fever or fatigue  CARDIAC: No CP or SOB  PULMONARY: No cough of SOB  MUSCULOSKELETAL: No new joint pain  NEURO: SEE HPI    Examination  Visit Vitals  BP (!) 100/40   Pulse 92   Resp 18   Ht 5' 1\" (1.549 m)   Wt 44 kg (97 lb)   SpO2 96%   BMI 18.33 kg/m²       This is a pleasant 67-year-old female. She is alert in no apparent distress. Poor historian. No facial asymmetry. Speech positive mumbling at times and adequate comprehension to simple commands. PERRL. Strength >4/5 throughout and symmetric. No abnormal movements. DTR: 3+ throughout; toes down going bilaterally. Able to tandem walk. Results  MRI BRAIN WO CONT (Accession [id number]) (Order [id number])  Allergies     Medium: Tomato   Not Specified: Aspirin;  Ciprofloxacin;  Pcn [Penicillins];  Sulfa (Sulfonamide Antibiotics)   Exam Information    Status Exam Begun  Exam Ended    Final [99] 9/07/2021 10:45 9/07/2021 11:20 AM [id number] 11:20 AM   Result Information    Status: Final result (Exam End: 9/7/2021 11:20) Provider Status: Open   Study Result    Narrative & Impression   MR BRAIN WITHOUT CONTRAST     HISTORY: Seizure and fall at home     COMPARISON: CT 9/6/2021, MRI 9/21/2020     TECHNIQUE: Brain scanned with axial and sagittal T1W scans, axial T2W scans,  axial FLAIR, axial swi, and axial diffusion weighted images.     FINDINGS: Multiple sequences are markedly degraded due to patient motion with  repeat motion reduction sequences were performed.     Brain parenchyma: Encephalomalacia in the right parietal and occipital lobe  related to prior infarct, similar in appearance to prior examination. There is  extensive periventricular and deep white matter T2 FLAIR hyperintensity. There  is a redemonstrated small focus of susceptibility in the left thalamus which may  represent sequela of prior small hemorrhage. Small lacunar infarcts are  identified within the bilateral basal ganglia.     Extra-axial spaces, ventricles, basal cisterns: No discrete extra-axial fluid  collections are identified.  There is prominence of the ventricles and sulci with  ex vacuo dilatation of the temporal and occipital horn of the right lateral  ventricle.       Orbits and paranasal sinuses: The orbits are grossly within normal limits for  technique however examination and evaluation is limited by patient motion. Bilateral ocular lens replacements.     Calvarium and skull base: Calvarium is normal limits.      Visualized upper cervical spine: Craniocervical junction is within normal  limits.      IMPRESSION  Motion degraded examination however within the limits of this exam:  1. No acute infarct, hemorrhage or mass effect identified. 2.  Sequela of prior right parietal infarct, bilateral basal ganglionic lacunar  infarcts and moderate to advanced chronic microvascular changes in the bilateral  periventricular and deep white matter. Impression/Plan  Venson Rubinstein is a 78 y.o. female whose history and physical are consistent with history of seizures with recent breakthrough. Patient seen at [facility] emergency room for breakthrough seizure. She is accompanied in office today by her son Ed Howell. Patient has been followed by [facility] for over 10 years. Patient's son was not aware of neurologist name or location. Provided this information. She was last seen per care everywhere, in January 2021. While in the hospital, her medications were adjusted including discontinuing her Depakote due to subtherapeutic level. Patient is a poor historian and it appears her primary care provider had recently refilled Depakote. Also on Keppra and Trileptal.  Due to confusion regarding medications encourage them to follow-up with her neurologist in order to take over medication management and refills. Unfortunately there can be some dose and medication confusion when several providers are handling antiepileptic therapy. No medication adjustments recommended at this time. No routine follow-ups recommended.   They were provided information regarding her neurologist office and will contact them to further discuss follow-up care.  All questions addressed and patient and patient's son are agreeable with plan of care. Diagnoses and all orders for this visit:    1. Breakthrough seizure ([facility]        Signed By: Verona Gregg NP        PLEASE NOTE:   Portions of this document may have been produced using voice recognition software. Unrecognized errors in transcription may be present.

## 2021-11-01 NOTE — PATIENT INSTRUCTIONS
Patient is followed by [facility]. Last seen by provider Ana María Gillespie on 1/2021.     Office location:  [address]   Phone: [phone]

## 2022-01-06 ENCOUNTER — APPOINTMENT (OUTPATIENT)
Dept: CT IMAGING | Age: 67
DRG: 682 | End: 2022-01-06
Attending: STUDENT IN AN ORGANIZED HEALTH CARE EDUCATION/TRAINING PROGRAM
Payer: MEDICARE

## 2022-01-06 ENCOUNTER — APPOINTMENT (OUTPATIENT)
Dept: GENERAL RADIOLOGY | Age: 67
DRG: 682 | End: 2022-01-06
Attending: STUDENT IN AN ORGANIZED HEALTH CARE EDUCATION/TRAINING PROGRAM
Payer: MEDICARE

## 2022-01-06 ENCOUNTER — HOSPITAL ENCOUNTER (INPATIENT)
Age: 67
LOS: 7 days | Discharge: NURSING, REHAB OR LONG-TERM CARE | DRG: 682 | End: 2022-01-13
Attending: STUDENT IN AN ORGANIZED HEALTH CARE EDUCATION/TRAINING PROGRAM | Admitting: EMERGENCY MEDICINE
Payer: MEDICARE

## 2022-01-06 DIAGNOSIS — Z71.89 GOALS OF CARE, COUNSELING/DISCUSSION: ICD-10-CM

## 2022-01-06 DIAGNOSIS — N17.9 AKI (ACUTE KIDNEY INJURY) (HCC): Primary | ICD-10-CM

## 2022-01-06 DIAGNOSIS — G40.909 SEIZURE DISORDER (HCC): ICD-10-CM

## 2022-01-06 DIAGNOSIS — Z93.1 S/P PERCUTANEOUS ENDOSCOPIC GASTROSTOMY (PEG) TUBE PLACEMENT (HCC): ICD-10-CM

## 2022-01-06 DIAGNOSIS — R13.10 DYSPHAGIA, UNSPECIFIED TYPE: ICD-10-CM

## 2022-01-06 DIAGNOSIS — R62.7 FAILURE TO THRIVE IN ADULT: ICD-10-CM

## 2022-01-06 DIAGNOSIS — R41.82 ALTERED MENTAL STATUS, UNSPECIFIED ALTERED MENTAL STATUS TYPE: ICD-10-CM

## 2022-01-06 DIAGNOSIS — E86.0 DEHYDRATION: ICD-10-CM

## 2022-01-06 DIAGNOSIS — E87.5 ACUTE HYPERKALEMIA: ICD-10-CM

## 2022-01-06 LAB
ALBUMIN SERPL-MCNC: 3.5 G/DL (ref 3.4–5)
ALBUMIN/GLOB SERPL: 0.9 {RATIO} (ref 0.8–1.7)
ALP SERPL-CCNC: 70 U/L (ref 45–117)
ALT SERPL-CCNC: 126 U/L (ref 13–56)
ANION GAP SERPL CALC-SCNC: 6 MMOL/L (ref 3–18)
AST SERPL-CCNC: 140 U/L (ref 10–38)
ATRIAL RATE: 75 BPM
BASOPHILS # BLD: 0 K/UL (ref 0–0.1)
BASOPHILS NFR BLD: 0 % (ref 0–2)
BILIRUB SERPL-MCNC: 0.6 MG/DL (ref 0.2–1)
BUN SERPL-MCNC: 46 MG/DL (ref 7–18)
BUN/CREAT SERPL: 19 (ref 12–20)
CALCIUM SERPL-MCNC: 10 MG/DL (ref 8.5–10.1)
CALCULATED P AXIS, ECG09: 65 DEGREES
CALCULATED R AXIS, ECG10: -23 DEGREES
CALCULATED T AXIS, ECG11: 99 DEGREES
CHLORIDE SERPL-SCNC: 109 MMOL/L (ref 100–111)
CK SERPL-CCNC: 218 U/L (ref 26–192)
CO2 SERPL-SCNC: 22 MMOL/L (ref 21–32)
CREAT SERPL-MCNC: 2.41 MG/DL (ref 0.6–1.3)
DIAGNOSIS, 93000: NORMAL
DIFFERENTIAL METHOD BLD: ABNORMAL
EOSINOPHIL # BLD: 0 K/UL (ref 0–0.4)
EOSINOPHIL NFR BLD: 0 % (ref 0–5)
ERYTHROCYTE [DISTWIDTH] IN BLOOD BY AUTOMATED COUNT: 20.2 % (ref 11.6–14.5)
FLUAV RNA SPEC QL NAA+PROBE: NOT DETECTED
FLUBV RNA SPEC QL NAA+PROBE: NOT DETECTED
GLOBULIN SER CALC-MCNC: 4 G/DL (ref 2–4)
GLUCOSE BLD STRIP.AUTO-MCNC: 86 MG/DL (ref 70–110)
GLUCOSE SERPL-MCNC: 82 MG/DL (ref 74–99)
HCT VFR BLD AUTO: 29.2 % (ref 35–45)
HGB BLD-MCNC: 9.4 G/DL (ref 12–16)
IMM GRANULOCYTES # BLD AUTO: 0 K/UL (ref 0–0.04)
IMM GRANULOCYTES NFR BLD AUTO: 0 % (ref 0–0.5)
LYMPHOCYTES # BLD: 1.5 K/UL (ref 0.9–3.6)
LYMPHOCYTES NFR BLD: 14 % (ref 21–52)
MAGNESIUM SERPL-MCNC: 2.5 MG/DL (ref 1.6–2.6)
MCH RBC QN AUTO: 30.1 PG (ref 24–34)
MCHC RBC AUTO-ENTMCNC: 32.2 G/DL (ref 31–37)
MCV RBC AUTO: 93.6 FL (ref 78–100)
MONOCYTES # BLD: 0.8 K/UL (ref 0.05–1.2)
MONOCYTES NFR BLD: 8 % (ref 3–10)
NEUTS SEG # BLD: 7.8 K/UL (ref 1.8–8)
NEUTS SEG NFR BLD: 77 % (ref 40–73)
NRBC # BLD: 0 K/UL (ref 0–0.01)
NRBC BLD-RTO: 0 PER 100 WBC
P-R INTERVAL, ECG05: 152 MS
PHOSPHATE SERPL-MCNC: 3.9 MG/DL (ref 2.5–4.9)
PLATELET # BLD AUTO: 120 K/UL (ref 135–420)
PMV BLD AUTO: 13.2 FL (ref 9.2–11.8)
POTASSIUM SERPL-SCNC: 6.3 MMOL/L (ref 3.5–5.5)
PROT SERPL-MCNC: 7.5 G/DL (ref 6.4–8.2)
Q-T INTERVAL, ECG07: 402 MS
QRS DURATION, ECG06: 80 MS
QTC CALCULATION (BEZET), ECG08: 448 MS
RBC # BLD AUTO: 3.12 M/UL (ref 4.2–5.3)
SARS-COV-2, COV2: NOT DETECTED
SODIUM SERPL-SCNC: 137 MMOL/L (ref 136–145)
TROPONIN-HIGH SENSITIVITY: 29 NG/L (ref 0–54)
VALPROATE SERPL-MCNC: 78 UG/ML (ref 50–100)
VENTRICULAR RATE, ECG03: 75 BPM
WBC # BLD AUTO: 10.1 K/UL (ref 4.6–13.2)

## 2022-01-06 PROCEDURE — 82962 GLUCOSE BLOOD TEST: CPT

## 2022-01-06 PROCEDURE — APPSS180 APP SPLIT SHARED TIME > 60 MINUTES: Performed by: NURSE PRACTITIONER

## 2022-01-06 PROCEDURE — 84100 ASSAY OF PHOSPHORUS: CPT

## 2022-01-06 PROCEDURE — 65660000000 HC RM CCU STEPDOWN

## 2022-01-06 PROCEDURE — 99223 1ST HOSP IP/OBS HIGH 75: CPT | Performed by: EMERGENCY MEDICINE

## 2022-01-06 PROCEDURE — 74011000250 HC RX REV CODE- 250: Performed by: STUDENT IN AN ORGANIZED HEALTH CARE EDUCATION/TRAINING PROGRAM

## 2022-01-06 PROCEDURE — 83735 ASSAY OF MAGNESIUM: CPT

## 2022-01-06 PROCEDURE — 74011636637 HC RX REV CODE- 636/637: Performed by: STUDENT IN AN ORGANIZED HEALTH CARE EDUCATION/TRAINING PROGRAM

## 2022-01-06 PROCEDURE — 74011250636 HC RX REV CODE- 250/636: Performed by: STUDENT IN AN ORGANIZED HEALTH CARE EDUCATION/TRAINING PROGRAM

## 2022-01-06 PROCEDURE — 85025 COMPLETE CBC W/AUTO DIFF WBC: CPT

## 2022-01-06 PROCEDURE — 74011000258 HC RX REV CODE- 258: Performed by: STUDENT IN AN ORGANIZED HEALTH CARE EDUCATION/TRAINING PROGRAM

## 2022-01-06 PROCEDURE — 80177 DRUG SCRN QUAN LEVETIRACETAM: CPT

## 2022-01-06 PROCEDURE — 96360 HYDRATION IV INFUSION INIT: CPT

## 2022-01-06 PROCEDURE — 82550 ASSAY OF CK (CPK): CPT

## 2022-01-06 PROCEDURE — 93005 ELECTROCARDIOGRAM TRACING: CPT

## 2022-01-06 PROCEDURE — 87636 SARSCOV2 & INF A&B AMP PRB: CPT

## 2022-01-06 PROCEDURE — 71045 X-RAY EXAM CHEST 1 VIEW: CPT

## 2022-01-06 PROCEDURE — 84484 ASSAY OF TROPONIN QUANT: CPT

## 2022-01-06 PROCEDURE — 51798 US URINE CAPACITY MEASURE: CPT

## 2022-01-06 PROCEDURE — 80053 COMPREHEN METABOLIC PANEL: CPT

## 2022-01-06 PROCEDURE — 80164 ASSAY DIPROPYLACETIC ACD TOT: CPT

## 2022-01-06 PROCEDURE — 99284 EMERGENCY DEPT VISIT MOD MDM: CPT

## 2022-01-06 PROCEDURE — 70450 CT HEAD/BRAIN W/O DYE: CPT

## 2022-01-06 RX ORDER — SODIUM CHLORIDE 9 MG/ML
100 INJECTION, SOLUTION INTRAVENOUS CONTINUOUS
Status: DISCONTINUED | OUTPATIENT
Start: 2022-01-06 | End: 2022-01-06

## 2022-01-06 RX ORDER — DEXTROSE 50 % IN WATER (D50W) INTRAVENOUS SYRINGE
25 ONCE
Status: COMPLETED | OUTPATIENT
Start: 2022-01-06 | End: 2022-01-06

## 2022-01-06 RX ORDER — SODIUM CHLORIDE 0.9 % (FLUSH) 0.9 %
5-40 SYRINGE (ML) INJECTION EVERY 8 HOURS
Status: DISCONTINUED | OUTPATIENT
Start: 2022-01-06 | End: 2022-01-13 | Stop reason: HOSPADM

## 2022-01-06 RX ORDER — LEVETIRACETAM 10 MG/ML
1000 INJECTION INTRAVASCULAR EVERY 12 HOURS
Status: DISCONTINUED | OUTPATIENT
Start: 2022-01-06 | End: 2022-01-07

## 2022-01-06 RX ORDER — SODIUM CHLORIDE 0.9 % (FLUSH) 0.9 %
5-40 SYRINGE (ML) INJECTION AS NEEDED
Status: DISCONTINUED | OUTPATIENT
Start: 2022-01-06 | End: 2022-01-07

## 2022-01-06 RX ORDER — ACETAMINOPHEN 650 MG/1
650 SUPPOSITORY RECTAL
Status: DISCONTINUED | OUTPATIENT
Start: 2022-01-06 | End: 2022-01-13 | Stop reason: HOSPADM

## 2022-01-06 RX ORDER — ONDANSETRON 4 MG/1
4 TABLET, ORALLY DISINTEGRATING ORAL
Status: DISCONTINUED | OUTPATIENT
Start: 2022-01-06 | End: 2022-01-13 | Stop reason: HOSPADM

## 2022-01-06 RX ORDER — SODIUM CHLORIDE 0.9 % (FLUSH) 0.9 %
5-40 SYRINGE (ML) INJECTION EVERY 8 HOURS
Status: DISCONTINUED | OUTPATIENT
Start: 2022-01-06 | End: 2022-01-07

## 2022-01-06 RX ORDER — SODIUM CHLORIDE 0.9 % (FLUSH) 0.9 %
5-40 SYRINGE (ML) INJECTION AS NEEDED
Status: DISCONTINUED | OUTPATIENT
Start: 2022-01-06 | End: 2022-01-13 | Stop reason: HOSPADM

## 2022-01-06 RX ORDER — POLYETHYLENE GLYCOL 3350 17 G/17G
17 POWDER, FOR SOLUTION ORAL DAILY PRN
Status: DISCONTINUED | OUTPATIENT
Start: 2022-01-06 | End: 2022-01-13 | Stop reason: HOSPADM

## 2022-01-06 RX ORDER — LORAZEPAM 2 MG/ML
1 INJECTION INTRAMUSCULAR
Status: DISCONTINUED | OUTPATIENT
Start: 2022-01-06 | End: 2022-01-12

## 2022-01-06 RX ORDER — ONDANSETRON 2 MG/ML
4 INJECTION INTRAMUSCULAR; INTRAVENOUS
Status: DISCONTINUED | OUTPATIENT
Start: 2022-01-06 | End: 2022-01-13 | Stop reason: HOSPADM

## 2022-01-06 RX ORDER — ACETAMINOPHEN 325 MG/1
650 TABLET ORAL
Status: DISCONTINUED | OUTPATIENT
Start: 2022-01-06 | End: 2022-01-13 | Stop reason: HOSPADM

## 2022-01-06 RX ORDER — HYDRALAZINE HYDROCHLORIDE 20 MG/ML
10 INJECTION INTRAMUSCULAR; INTRAVENOUS
Status: DISCONTINUED | OUTPATIENT
Start: 2022-01-06 | End: 2022-01-13 | Stop reason: HOSPADM

## 2022-01-06 RX ORDER — DEXTROSE MONOHYDRATE AND SODIUM CHLORIDE 5; .9 G/100ML; G/100ML
100 INJECTION, SOLUTION INTRAVENOUS CONTINUOUS
Status: DISCONTINUED | OUTPATIENT
Start: 2022-01-06 | End: 2022-01-08

## 2022-01-06 RX ADMIN — CALCIUM GLUCONATE 1 G: 98 INJECTION, SOLUTION INTRAVENOUS at 16:38

## 2022-01-06 RX ADMIN — SODIUM CHLORIDE 1000 ML: 900 INJECTION, SOLUTION INTRAVENOUS at 14:52

## 2022-01-06 RX ADMIN — DEXTROSE MONOHYDRATE 25 G: 25 INJECTION, SOLUTION INTRAVENOUS at 16:38

## 2022-01-06 RX ADMIN — INSULIN HUMAN 10 UNITS: 100 INJECTION, SOLUTION PARENTERAL at 16:38

## 2022-01-06 RX ADMIN — SODIUM CHLORIDE, PRESERVATIVE FREE 10 ML: 5 INJECTION INTRAVENOUS at 16:42

## 2022-01-06 NOTE — ED NOTES
ED tech attempted to get IV access but unable and pt is currently in CT. Will get EKG and labs upon return.

## 2022-01-06 NOTE — PROGRESS NOTES
RENAL DAILY PROGRESS NOTE    Patient: Rafael Stewart               Sex: female          DOA: 1/6/2022 12:12 PM        YOB: 1955      Age:  78 y.o.        LOS:  LOS: 0 days     Subjective:     Rafael Stewart is a 78 y.o.  who presents with KEYLA (acute kidney injury) ([facility] [address] [N17.9]  Hyperkalemia [E87.5]. Asked to evaluate for arf.admitted with hx of poor po intake,altered mental status. hx of htn on aldactone lisinopril,hx of seizures disorder  Chief complains:   - Reviewed last 24 hrs events     Current Facility-Administered Medications   Medication Dose Route Frequency    sodium chloride (NS) flush 5-40 mL  5-40 mL IntraVENous Q8H    sodium chloride (NS) flush 5-40 mL  5-40 mL IntraVENous PRN    sodium chloride (NS) flush 5-40 mL  5-40 mL IntraVENous Q8H    sodium chloride (NS) flush 5-40 mL  5-40 mL IntraVENous PRN    sodium zirconium cyclosilicate (LOKELMA) powder packet 10 g  10 g Oral Q4H    levETIRAcetam in saline (iso-os) (KEPPRA) infusion 1,000 mg  1,000 mg IntraVENous Q12H    dextrose 5% and 0.9% NaCl infusion  100 mL/hr IntraVENous CONTINUOUS    LORazepam (ATIVAN) injection 1 mg  1 mg IntraVENous Q4H PRN    hydrALAZINE (APRESOLINE) 20 mg/mL injection 10 mg  10 mg IntraVENous Q6H PRN    sodium chloride (NS) flush 5-40 mL  5-40 mL IntraVENous Q8H    sodium chloride (NS) flush 5-40 mL  5-40 mL IntraVENous PRN    acetaminophen (TYLENOL) tablet 650 mg  650 mg Oral Q6H PRN    Or    acetaminophen (TYLENOL) suppository 650 mg  650 mg Rectal Q6H PRN    polyethylene glycol (MIRALAX) packet 17 g  17 g Oral DAILY PRN    ondansetron (ZOFRAN ODT) tablet 4 mg  4 mg Oral Q8H PRN    Or    ondansetron (ZOFRAN) injection 4 mg  4 mg IntraVENous Q6H PRN     Current Outpatient Medications   Medication Sig    divalproex DR (DEPAKOTE) 250 mg tablet Take 750 mg by mouth nightly.  spironolactone (ALDACTONE) 25 mg tablet Take  by mouth daily.     PARoxetine (PAXIL) 30 mg tablet Take 30 mg by mouth daily.  hydrALAZINE (APRESOLINE) 100 mg tablet Take  by mouth.  OXcarbazepine (TRILEPTAL) 300 mg tablet Take 300 mg by mouth two (2) times a day.  lisinopriL (PRINIVIL, ZESTRIL) 20 mg tablet Take 0.5 Tablets by mouth daily. Indications: high blood pressure    multivitamin, tx-iron-ca-min (THERA-M w/ IRON) 9 mg iron-400 mcg tab tablet Take 1 Tablet by mouth daily.  atorvastatin (LIPITOR) 40 mg tablet Take 40 mg by mouth daily.  acetaminophen (TYLENOL) 500 mg tablet Take 500 mg by mouth as needed for Pain.  clopidogreL (PLAVIX) 75 mg tab Take 75 mg by mouth daily. Indications: prevention for a blood clot going to the brain    tamsulosin (FLOMAX) 0.4 mg capsule Take 1 Cap by mouth nightly.  ergocalciferol (VITAMIN D2) 50,000 unit capsule Take 50,000 Units by mouth two (2) times a week.  levETIRAcetam (KEPPRA) 1,000 mg tablet Take 1 Tab by mouth two (2) times a day.  amLODIPine (NORVASC) 10 mg tablet Take 1 Tab by mouth daily.  folic acid (FOLVITE) 1 mg tablet Take 1 Tablet by mouth daily. (Patient not taking: Reported on 1/6/2022)    glucose 4 gram chewable tablet Take 4 Tablets by mouth nightly. (Patient not taking: Reported on 1/6/2022)    clonazePAM (KlonoPIN) 0.25 mg TbDi Take 0.25 mg by mouth as needed for Other (seizure). (Patient not taking: Reported on 1/6/2022)    cyanocobalamin (VITAMIN B12) 500 mcg tablet Take 1 Tab by mouth daily. Objective:     Visit Vitals  BP (!) 141/80   Pulse 76   Temp 97.6 °F (36.4 °C)   Resp 10   Ht 5' 1\" (1.549 m)   Wt 44 kg (97 lb)   SpO2 100%   BMI 18.33 kg/m²     No intake or output data in the 24 hours ending 01/06/22 1840    Physical Examination:     RS: Chest is bilateral equal, no wheezing / rales / crackles  CVS: S1-S2 heard,   Abdomen: Soft, Non tender,   Extremities: No edema, no cyanosis, skin is warm on touch  HEENT: Head is atraumatic, PERRLA, conjunctiva pink & non icteric.  No JVD or carotid bruit       Data Review:      Labs:     Hematology:   Recent Labs     01/06/22  1409   WBC 10.1   HGB 9.4*   HCT 29.2*     Chemistry:   Recent Labs     01/06/22  1409   BUN 46*   CREA 2.41*   CA 10.0   ALB 3.5   K 6.3*         CO2 22   PHOS 3.9   GLU 82        Images:    XR (Most Recent). CXR reviewed by me and compared with previous CXR Results from Hospital Encounter encounter on 01/06/22    XR CHEST PORT    Narrative  EXAMINATION: Chest single view    INDICATION: Altered mental status    COMPARISON: 9/6/2021    FINDINGS: Single frontal view the chest obtained. Mediastinal silhouette and  pulmonary vasculature unremarkable. No confluent consolidation. No evidence of  pneumothorax. No obvious acute osseous findings. Impression  No acute radiographic findings. CT (Most Recent) Results from Hospital Encounter encounter on 01/06/22    CT HEAD WO CONT    Narrative  CT OF THE BRAIN    CPT CODE: 52630    COMPARISON: 9 September 2021. INDICATIONS: Altered mental status. TECHNIQUE: Axial sections through the brain at 5 mm collimation without IV  contrast are obtained. FINDINGS:    The ventricles and CSF spaces are enlarged consistent with atrophy somewhat  prominent for age. Hydrocephalus that vacuo is noted in the right temporal and  the occipital horn  The brain parenchyma is of generally normal attenuation. Gray-white  differentiation is satisfactory. . There is a large burden of microvascular  change in the subcortical white matter. Previous posterior division right middle  cerebral artery infarction. There is a moderate burden of presumed small vessel ischemic change resulting in  decreased attenuation in the white matter particularly in the periventricular  region. .  There is no hemorrhage. .  There is no mass lesion. .  There are no pathologic fluid collections. There are no pathologic calcifications evident. .    The visible portions of the paranasal sinuses are clear.     Impression  No acute intracranial process. Chronic changes of premature atrophy, severe microvascular disease, and previous  infarction noted      All CT scans at this facility are performed using dose optimization technique as  appropriate to the performed exam, to include automated exposure control,  adjustment of the mA and/or kV according to patient's size (Including  appropriate matching for site-specific examinations), or use of iterative  reconstruction technique. EKG No results found for this or any previous visit.      I have personally reviewed the old medical records and patient's labs    Plan / Recommendation:      1. arf related to dehydration,poor po intake,aldactone and lisinopril.agree with hydration,check spot urine for lytes,urinalysis,protein,check renal ultrasound  2.hyperkalemia,blood is partially hemolyzed,arf ,aldactone and lisinopril would cause hyperkalemia.give sreedhar RUIZ/biju Mercado MD  Nephrology  1/6/2022

## 2022-01-06 NOTE — ED PROVIDER NOTES
EMERGENCY DEPARTMENT HISTORY AND PHYSICAL EXAM    I have evaluated the patient at 12:58 PM      Date: 1/6/2022  Patient Name: Ivory Najjar    History of Presenting Illness     Chief Complaint   Patient presents with    Weight Loss         History Provided By: EMS  Location/Duration/Severity/Modifying factors   Patient is a 60-year-old female with history of epilepsy, CAD, hypertension presenting to the emergency department for evaluation of altered mental status and general decline over the past 2 months. She presents via EMS from her PCP office PCP was concerned with her decreased verbal appetite recently. Patient has a history is limited given her altered mental status. Occasionally nods or shakes her head to question with occasional one-word answers but not reliably so. PCP: Virgil VALDEZ NP    Current Facility-Administered Medications   Medication Dose Route Frequency Provider Last Rate Last Admin    sodium chloride (NS) flush 5-40 mL  5-40 mL IntraVENous Q8H Clinton Wright,         sodium chloride (NS) flush 5-40 mL  5-40 mL IntraVENous PRN Joelle De La Cruz, DO        sodium chloride 0.9 % bolus infusion 1,000 mL  1,000 mL IntraVENous ONCE Joelle Einstein, DO 1,000 mL/hr at 01/06/22 1452 1,000 mL at 01/06/22 1452    sodium chloride (NS) flush 5-40 mL  5-40 mL IntraVENous Q8H Joelle Einstein, DO        sodium chloride (NS) flush 5-40 mL  5-40 mL IntraVENous PRN Joelle Hazelnstein, DO        dextrose (D50W) injection syrg 25 g  25 g IntraVENous ONCE Pedro Wright DO        insulin regular (NOVOLIN R, HUMULIN R) injection 10 Units  10 Units IntraVENous ONCE Joelle Marquistein, DO        calcium gluconate 1 g in 0.9% sodium chloride (MBP/ADV) 110 mL MBP  1 g IntraVENous ONCE Joelle Marquistein, DO         Current Outpatient Medications   Medication Sig Dispense Refill    divalproex DR (DEPAKOTE) 250 mg tablet Take 750 mg by mouth nightly.       spironolactone (ALDACTONE) 25 mg tablet Take  by mouth daily.  PARoxetine (PAXIL) 30 mg tablet Take 30 mg by mouth daily.  hydrALAZINE (APRESOLINE) 100 mg tablet Take  by mouth.  OXcarbazepine (TRILEPTAL) 300 mg tablet Take 300 mg by mouth two (2) times a day.  lisinopriL (PRINIVIL, ZESTRIL) 20 mg tablet Take 0.5 Tablets by mouth daily. Indications: high blood pressure 30 Tablet 1    folic acid (FOLVITE) 1 mg tablet Take 1 Tablet by mouth daily. 30 Tablet 0    glucose 4 gram chewable tablet Take 4 Tablets by mouth nightly. 120 Tablet 0    multivitamin, tx-iron-ca-min (THERA-M w/ IRON) 9 mg iron-400 mcg tab tablet Take 1 Tablet by mouth daily. (Patient not taking: Reported on 11/1/2021) 30 Tablet 0    clonazePAM (KlonoPIN) 0.25 mg TbDi Take 0.25 mg by mouth as needed for Other (seizure).  atorvastatin (LIPITOR) 40 mg tablet Take 40 mg by mouth daily.  acetaminophen (TYLENOL) 500 mg tablet Take 500 mg by mouth as needed for Pain.  clopidogreL (PLAVIX) 75 mg tab Take 75 mg by mouth daily. Indications: prevention for a blood clot going to the brain      tamsulosin (FLOMAX) 0.4 mg capsule Take 1 Cap by mouth nightly. 30 Cap 0    ergocalciferol (VITAMIN D2) 50,000 unit capsule Take 50,000 Units by mouth every seven (7) days.  levETIRAcetam (KEPPRA) 1,000 mg tablet Take 1 Tab by mouth two (2) times a day. 60 Tab 0    amLODIPine (NORVASC) 10 mg tablet Take 1 Tab by mouth daily. 30 Tab 6    cyanocobalamin (VITAMIN B12) 500 mcg tablet Take 1 Tab by mouth daily. 30 Tab 0       Past History     Past Medical History:  Past Medical History:   Diagnosis Date    Abnormal coordination 9/21/2020    Hallucinations 9/21/2020    Hypertension     Neurological disorder     Seizures ([facility] [address]     Stroke ([facility] [address] 2009       Past Surgical History:  No past surgical history on file.     Family History:  Family History   Problem Relation Age of Onset    Diabetes Other     Stroke Other        Social History:  Social History     Tobacco Use    Smoking status: Never Smoker    Smokeless tobacco: Never Used   Vaping Use    Vaping Use: Never used   Substance Use Topics    Alcohol use: Yes     Comment: Socially     Drug use: No       Allergies: Allergies   Allergen Reactions    Tomato Hives     Allergic to eating tomato.  Aspirin Nausea and Vomiting    Ciprofloxacin Rash    Pcn [Penicillins] Rash and Hives    Sulfa (Sulfonamide Antibiotics) Hives and Rash         Review of Systems       Review of Systems   Unable to perform ROS: Mental status change         Physical Exam     Visit Vitals  BP (!) 159/90   Pulse 77   Temp 97.6 °F (36.4 °C)   Resp 18   Ht 5' 1\" (1.549 m)   Wt 44 kg (97 lb)   SpO2 100%   BMI 18.33 kg/m²         Physical Exam  Constitutional:       General: She is not in acute distress. Appearance: She is not toxic-appearing. HENT:      Head: Normocephalic and atraumatic. Mouth/Throat:      Mouth: Mucous membranes are moist.   Eyes:      Extraocular Movements: Extraocular movements intact. Pupils: Pupils are equal, round, and reactive to light. Cardiovascular:      Rate and Rhythm: Normal rate and regular rhythm. Heart sounds: Normal heart sounds. No murmur heard. No friction rub. No gallop. Pulmonary:      Effort: Pulmonary effort is normal.      Breath sounds: Normal breath sounds. Abdominal:      General: There is no distension. Palpations: Abdomen is soft. There is no mass. Tenderness: There is no abdominal tenderness. There is no guarding. Hernia: No hernia is present. Musculoskeletal:         General: No swelling, tenderness or deformity. Cervical back: Normal range of motion and neck supple. Skin:     General: Skin is warm and dry. Findings: No rash. Neurological:      General: No focal deficit present. Mental Status: She is alert. She is disoriented. Sensory: No sensory deficit. Motor: No weakness.            Diagnostic Study Results     Labs -  Recent Results (from the past 12 hour(s))   EKG, 12 LEAD, INITIAL    Collection Time: 01/06/22  1:37 PM   Result Value Ref Range    Ventricular Rate 75 BPM    Atrial Rate 75 BPM    P-R Interval 152 ms    QRS Duration 80 ms    Q-T Interval 402 ms    QTC Calculation (Bezet) 448 ms    Calculated P Axis 65 degrees    Calculated R Axis -23 degrees    Calculated T Axis 99 degrees    Diagnosis       Normal sinus rhythm  Septal infarct (cited on or before 06-SEP-2021)  Abnormal ECG  When compared with ECG of 06-SEP-2021 17:45,  Nonspecific T wave abnormality now evident in Anterior leads     GLUCOSE, POC    Collection Time: 01/06/22  1:42 PM   Result Value Ref Range    Glucose (POC) 86 70 - 110 mg/dL   COVID-19 WITH INFLUENZA A/B    Collection Time: 01/06/22  2:00 PM   Result Value Ref Range    SARS-CoV-2 Not detected NOTD      Influenza A by PCR Not detected NOTD      Influenza B by PCR Not detected NOTD     VALPROIC ACID    Collection Time: 01/06/22  2:09 PM   Result Value Ref Range    Valproic acid 78 50 - 507 ug/ml   METABOLIC PANEL, COMPREHENSIVE    Collection Time: 01/06/22  2:09 PM   Result Value Ref Range    Sodium 137 136 - 145 mmol/L    Potassium 6.3 (HH) 3.5 - 5.5 mmol/L    Chloride 109 100 - 111 mmol/L    CO2 22 21 - 32 mmol/L    Anion gap 6 3.0 - 18 mmol/L    Glucose 82 74 - 99 mg/dL    BUN 46 (H) 7.0 - 18 MG/DL    Creatinine 2.41 (H) 0.6 - 1.3 MG/DL    BUN/Creatinine ratio 19 12 - 20      GFR est AA 24 (L) >60 ml/min/1.73m2    GFR est non-AA 20 (L) >60 ml/min/1.73m2    Calcium 10.0 8.5 - 10.1 MG/DL    Bilirubin, total 0.6 0.2 - 1.0 MG/DL    ALT (SGPT) 126 (H) 13 - 56 U/L    AST (SGOT) 140 (H) 10 - 38 U/L    Alk.  phosphatase 70 45 - 117 U/L    Protein, total 7.5 6.4 - 8.2 g/dL    Albumin 3.5 3.4 - 5.0 g/dL    Globulin 4.0 2.0 - 4.0 g/dL    A-G Ratio 0.9 0.8 - 1.7     MAGNESIUM    Collection Time: 01/06/22  2:09 PM   Result Value Ref Range    Magnesium 2.5 1.6 - 2.6 mg/dL   PHOSPHORUS    Collection Time: 01/06/22  2:09 PM   Result Value Ref Range    Phosphorus 3.9 2.5 - 4.9 MG/DL   CBC WITH AUTOMATED DIFF    Collection Time: 01/06/22  2:09 PM   Result Value Ref Range    WBC 10.1 4.6 - 13.2 K/uL    RBC 3.12 (L) 4.20 - 5.30 M/uL    HGB 9.4 (L) 12.0 - 16.0 g/dL    HCT 29.2 (L) 35.0 - 45.0 %    MCV 93.6 78.0 - 100.0 FL    MCH 30.1 24.0 - 34.0 PG    MCHC 32.2 31.0 - 37.0 g/dL    RDW 20.2 (H) 11.6 - 14.5 %    PLATELET 607 (L) 553 - 420 K/uL    MPV 13.2 (H) 9.2 - 11.8 FL    NRBC 0.0 0  WBC    ABSOLUTE NRBC 0.00 0.00 - 0.01 K/uL    NEUTROPHILS 77 (H) 40 - 73 %    LYMPHOCYTES 14 (L) 21 - 52 %    MONOCYTES 8 3 - 10 %    EOSINOPHILS 0 0 - 5 %    BASOPHILS 0 0 - 2 %    IMMATURE GRANULOCYTES 0 0.0 - 0.5 %    ABS. NEUTROPHILS 7.8 1.8 - 8.0 K/UL    ABS. LYMPHOCYTES 1.5 0.9 - 3.6 K/UL    ABS. MONOCYTES 0.8 0.05 - 1.2 K/UL    ABS. EOSINOPHILS 0.0 0.0 - 0.4 K/UL    ABS. BASOPHILS 0.0 0.0 - 0.1 K/UL    ABS. IMM. GRANS. 0.0 0.00 - 0.04 K/UL    DF AUTOMATED     CK    Collection Time: 01/06/22  2:09 PM   Result Value Ref Range     (H) 26 - 192 U/L       Radiologic Studies -   CT HEAD WO CONT   Final Result      No acute intracranial process. Chronic changes of premature atrophy, severe microvascular disease, and previous   infarction noted         All CT scans at this facility are performed using dose optimization technique as   appropriate to the performed exam, to include automated exposure control,   adjustment of the mA and/or kV according to patient's size (Including   appropriate matching for site-specific examinations), or use of iterative   reconstruction technique. XR CHEST PORT   Final Result      No acute radiographic findings. Medical Decision Making   I am the first provider for this patient. I reviewed the vital signs, available nursing notes, past medical history, past surgical history, family history and social history. Vital Signs-Reviewed the patient's vital signs.       EKG: Normal sinus rhythm without evidence of ST elevation or depression. T-wave flattening the anterior leads. Records Reviewed: Nursing Notes, Old Medical Records, Previous electrocardiograms, Previous Radiology Studies and Previous Laboratory Studies (Time of Review: 12:58 PM)    ED Course: Progress Notes, Reevaluation, and Consults:         Provider Notes (Medical Decision Making):   MDM  Number of Diagnoses or Management Options  Acute hyperkalemia  KEYLA (acute kidney injury) ([facility] [address]  Altered mental status, unspecified altered mental status type  Dehydration  Failure to thrive in adult  Diagnosis management comments: 79-year-old female presenting for evaluation of altered mental status and failure to thrive x2 months. She is hemodynamically stable. However will has a benign physical exam other than for oriented. She has no focal neurological symptoms. She appears somewhat dehydrated. We will give her IV fluids. Will obtain screening lab work for altered mental status as well as CT head. Additionally, will obtain levels for Her antiepileptics. Likely will be admitted for further work-up of her altered mental status. 1515:  Patient's imaging is overall negative for acute findings. Patient does have findings consistent with KEYLA with creatinine of 2.41 as well as hyperkalemia potassium of 6.3. The specimen was slightly hemolyzed. Will be given insulin and dextrose as well as calcium gluconate and IV fluids. There are no EKG changes. Patient will be admitted for KEYLA, hyperkalemia, altered mental status, dehydration, failure to thrive.         Critical Care Time:  The services I provided to this patient were to treat and/or prevent clinically significant deterioration that could result in the failure of one or more body systems and/or organ systems due to hyperkalemia     Services included the following:  -reviewing nursing notes and old charts  -vital sign assessments  -direct patient care  -medication orders and management  -interpreting and reviewing diagnostic studies/labs  -re-evaluations  -documentation time    Aggregate critical care time was 43 minutes, which includes only time during which I was engaged in work directly related to the patient's care as described above, whether I was at bedside or elsewhere in the Emergency Department. It did not include time spent performing other reported procedures or the services of residents, students, nurses, or advance practice providers. Mariam Lema DO    4:08 PM        Diagnosis     Clinical Impression:   1. KEYLA (acute kidney injury) ([facility] [address]    2. Acute hyperkalemia    3. Altered mental status, unspecified altered mental status type    4. Dehydration    5. Failure to thrive in adult        Disposition: admitted, telemetry    Follow-up Information    None          Patient's Medications   Start Taking    No medications on file   Continue Taking    ACETAMINOPHEN (TYLENOL) 500 MG TABLET    Take 500 mg by mouth as needed for Pain. AMLODIPINE (NORVASC) 10 MG TABLET    Take 1 Tab by mouth daily. ATORVASTATIN (LIPITOR) 40 MG TABLET    Take 40 mg by mouth daily. CLONAZEPAM (KLONOPIN) 0.25 MG TBDI    Take 0.25 mg by mouth as needed for Other (seizure). CLOPIDOGREL (PLAVIX) 75 MG TAB    Take 75 mg by mouth daily. Indications: prevention for a blood clot going to the brain    CYANOCOBALAMIN (VITAMIN B12) 500 MCG TABLET    Take 1 Tab by mouth daily. DIVALPROEX DR (DEPAKOTE) 250 MG TABLET    Take 750 mg by mouth nightly. ERGOCALCIFEROL (VITAMIN D2) 50,000 UNIT CAPSULE    Take 50,000 Units by mouth every seven (7) days. FOLIC ACID (FOLVITE) 1 MG TABLET    Take 1 Tablet by mouth daily. GLUCOSE 4 GRAM CHEWABLE TABLET    Take 4 Tablets by mouth nightly. HYDRALAZINE (APRESOLINE) 100 MG TABLET    Take  by mouth. LEVETIRACETAM (KEPPRA) 1,000 MG TABLET    Take 1 Tab by mouth two (2) times a day.     LISINOPRIL (PRINIVIL, ZESTRIL) 20 MG TABLET    Take 0.5 Tablets by mouth daily. Indications: high blood pressure    MULTIVITAMIN, TX-IRON-CA-MIN (THERA-M W/ IRON) 9 MG IRON-400 MCG TAB TABLET    Take 1 Tablet by mouth daily. OXCARBAZEPINE (TRILEPTAL) 300 MG TABLET    Take 300 mg by mouth two (2) times a day. PAROXETINE (PAXIL) 30 MG TABLET    Take 30 mg by mouth daily. SPIRONOLACTONE (ALDACTONE) 25 MG TABLET    Take  by mouth daily. TAMSULOSIN (FLOMAX) 0.4 MG CAPSULE    Take 1 Cap by mouth nightly. These Medications have changed    No medications on file   Stop Taking    No medications on file     Disclaimer: Sections of this note are dictated using utilizing voice recognition software. Minor typographical errors may be present. If questions arise, please do not hesitate to contact me or call our department.

## 2022-01-06 NOTE — H&P
Hospitalist Admission History and Physical    NAME:  Carlos Erwin   :   1955   MRN:   [id number]     PCP:  Arnoldo Nogueira NP  Date/Time:  2022 4:55 PM    Subjective:   CHIEF COMPLAINT: 2-month history of worsening condition, not eating for the past 1 week    HISTORY OF PRESENT ILLNESS:     Charles Jung is a 78 y.o.  female with PMH of hypertension, seizures, TIA, history of noncompliance who presents with 2-month history of worsening condition, not eating for the past 1 week. Family reports patient has had overall decline poor appetite and frequently pocketing her food present occurring since, generalized weakness Thanksgi2021. Progressively worsening with no oral intake for the past 1 week. Family reports that patient has been compliant with her medications including her seizure medicines. Patient had ultimately gone to her PCP for follow-up visit today who recommended she come to ED for further evaluation. Is noted that patient was previously felt to be depressed over the death of her son last year, family denies any apparent worsening of depressive symptoms, no significant losses since death of son in 2021. Patient is overall very poor historian and very soft-spoken, she is without particular pain complaints when asked.       Patient Active Problem List   Diagnosis Code    Pulmonary embolism (HCC) I26.99    Chest pain R07.9    Other and unspecified noninfectious gastroenteritis and colitis(558.9) K52.9    CVA (cerebral infarction) I63.9    Non compliance w medication regimen Z91.14    Elevated Dilantin level R78.89    Convulsion ([address] R56.9    Essential hypertension I10    Left-sided weakness R53.1    Dyslipidemia E78.5    TIA (transient ischemic attack) G45.9    Ataxia R27.0    Headache R51.9    Status epilepticus ([address] G40.901    Seizure ([address] R56.9    Recurrent seizures (HCC) G40.909    Encephalopathy G93.40    Hallucinations R44.3    Abnormal coordination R27.8    Dehydration E86.0    Anemia D64.9    KEYLA (acute kidney injury) ([facility] [address] N17.9    Breakthrough seizure ([facility] [address] G40.919    Hyperkalemia E87.5       Past Medical History:   Diagnosis Date    Abnormal coordination 9/21/2020    Hallucinations 9/21/2020    Hypertension     Neurological disorder     Seizures ([facility] [address]     Stroke ([facility] 2009       No past surgical history on file. Social History     Tobacco Use    Smoking status: Never Smoker    Smokeless tobacco: Never Used   Substance Use Topics    Alcohol use: Yes     Comment: Socially         Family History   Problem Relation Age of Onset    Diabetes Other     Stroke Other         Allergies   Allergen Reactions    Tomato Hives     Allergic to eating tomato.  Aspirin Nausea and Vomiting    Ciprofloxacin Rash    Pcn [Penicillins] Rash and Hives    Sulfa (Sulfonamide Antibiotics) Hives and Rash        Prior to Admission Medications   Prescriptions Last Dose Informant Patient Reported? Taking? OXcarbazepine (TRILEPTAL) 300 mg tablet   Yes No   Sig: Take 300 mg by mouth two (2) times a day. PARoxetine (PAXIL) 30 mg tablet   Yes No   Sig: Take 30 mg by mouth daily. acetaminophen (TYLENOL) 500 mg tablet   Yes No   Sig: Take 500 mg by mouth as needed for Pain. amLODIPine (NORVASC) 10 mg tablet   No No   Sig: Take 1 Tab by mouth daily. atorvastatin (LIPITOR) 40 mg tablet   Yes No   Sig: Take 40 mg by mouth daily. clonazePAM (KlonoPIN) 0.25 mg TbDi   Yes No   Sig: Take 0.25 mg by mouth as needed for Other (seizure). clopidogreL (PLAVIX) 75 mg tab   Yes No   Sig: Take 75 mg by mouth daily. Indications: prevention for a blood clot going to the brain   cyanocobalamin (VITAMIN B12) 500 mcg tablet   No No   Sig: Take 1 Tab by mouth daily.    divalproex DR (DEPAKOTE) 250 mg tablet   Yes No   Sig: Take 750 mg by mouth nightly.   ergocalciferol (VITAMIN D2) 50,000 unit capsule   Yes No   Sig: Take 50,000 Units by mouth every seven (7) days.   folic acid (FOLVITE) 1 mg tablet   No No   Sig: Take 1 Tablet by mouth daily. glucose 4 gram chewable tablet   No No   Sig: Take 4 Tablets by mouth nightly. hydrALAZINE (APRESOLINE) 100 mg tablet   Yes No   Sig: Take  by mouth.   levETIRAcetam (KEPPRA) 1,000 mg tablet   No No   Sig: Take 1 Tab by mouth two (2) times a day. lisinopriL (PRINIVIL, ZESTRIL) 20 mg tablet   No No   Sig: Take 0.5 Tablets by mouth daily. Indications: high blood pressure   multivitamin, tx-iron-ca-min (THERA-M w/ IRON) 9 mg iron-400 mcg tab tablet   No No   Sig: Take 1 Tablet by mouth daily. Patient not taking: Reported on 2021   spironolactone (ALDACTONE) 25 mg tablet   Yes No   Sig: Take  by mouth daily. tamsulosin (FLOMAX) 0.4 mg capsule   No No   Sig: Take 1 Cap by mouth nightly. Facility-Administered Medications: None       REVIEW OF SYSTEMS:     [x] Unable to obtain  ROS due to  [x]mental status change  []sedated   []intubated   []Total of 12 systems reviewed as follows:    Patient is a very poor historian denies any particular pain complaints or shortness of breath     Objective:   VITALS:    Visit Vitals  BP (!) 159/90   Pulse 77   Temp 97.6 °F (36.4 °C)   Resp 18   Ht 5' 1\" (1.549 m)   Wt 44 kg (97 lb)   SpO2 100%   BMI 18.33 kg/m²     Temp (24hrs), Av.6 °F (36.4 °C), Min:97.6 °F (36.4 °C), Max:97.6 °F (36.4 °C)    PHYSICAL EXAM:   General:           Drowsy, laying in bed no apparent distress, frail-appearing  HEENT:           PERRLA, Sclera anicteric. Conjunctiva pink. Mucous membranes                          Moist, no ear or nasal discharge  Neck:               Supple. Trachea midline. No accessory muscle use. No jugular venous distention, no carotid bruit  CV:                  Regular rate and rhythm. S1S2+  Lungs:             Clear to auscultation bilaterally. No Wheezing or Rhonchi. No rales. Abdomen:        Soft, non-tender. Not distended.   Bowel sounds normal. Extremities:     No cyanosis. No edema. Pulses 2+ b/l  Neurologic:      Alert and oriented X name only simple commands. Follows simple commands, responds appropriately. Globally weak but appears equal throughout, no facial droop  Skin:                Warm and dry. No rashes. LAB DATA REVIEWED:    No components found for: [place]  Recent Labs     01/06/22  1409      K 6.3*      CO2 22   BUN 46*   CREA 2.41*   GLU 82   PHOS 3.9   CA 10.0   ALB 3.5   WBC 10.1   HGB 9.4*   HCT 29.2*   *       IMAGING RESULTS:     [x]  I have personally reviewed the actual   []CXR  []CT scan    Assessment/Plan:      Active Problems:    KEYLA (acute kidney injury) ([facility] (9/6/2021)      Hyperkalemia (1/6/2022)       ___________________________________________________  PLAN:    1. Acute kidney failure -nephrology consulted by ED (per Dr. Bella Owens, discussed with Dr. Hao Harrington),  2. Altered mental status -worsening gradually over the past 1 to 2 months. ? effect of worsening hydration, check ammonia level / urinalysis / UDS, head ct negative  3. Hyperkalemia - tx given in ED, noted to have some hemolysis. Recheck K at 8pm  4. Elevated LFTs - RUQ ultrasound, hepatitis panel in AM, recheck CMP, hold statin   5. Seizure disorder - cont IV keppra, sz precautions and IV ativan PRN Sz only. Resume oral meds when able. 6. Dysphagia -given reduced alertness leave n.p.o. for now, SLP consulted, and passed patient has required thickened liquids / NG tube placement. Await SLP eval - if fails swallow eval, consider early NG tube placement for resumption of oral meds including depakote / trileptal    7. Acute Mild thrombocytopenia - SCDs for now, monitor  8. History of mild malnutrition -dietitian consulted, noted weight loss of approximately 15 pounds in last several months. 9. HTN -oral meds for now in setting of #2/#6, hydralazine IV as needed  10. Goals of care - code status was discussed with wilber Butts via phone call. Per son's request cont full code for now. Discussed with worsening weight loss risk of rib fracture, etc.  Muriel Quintanilla plans to discuss further w/ his siblings. Palliative consult requested. Please follow in AM for ability to swallow oral meds vs. NG tube placement. In past patient has pulled NG tube out. Plan of care was discussed with son Muriel Quintanilla at phone number [phone] -discussed plan for admission and treatment.       Consults called / follow up -nephrology, palliative medicine    Prophylaxis:  []Lovenox  []Coumadin  [x]Hep SQ  []SCDs  []H2B/PPI    Discussed Code Status:    [x]Full Code      []DNR     ___________________________________________________  Admitting Provider: Quinton Resendiz NP

## 2022-01-06 NOTE — ED TRIAGE NOTES
Pt arrived via EMS coming from PCP office. Per EMS pt has had decrease in talking, eating and drinking x 2 months. PT will shake her to answer questions and does answer some questions with a verbal response. Pt has hx of strokes.

## 2022-01-07 ENCOUNTER — APPOINTMENT (OUTPATIENT)
Dept: GENERAL RADIOLOGY | Age: 67
DRG: 682 | End: 2022-01-07
Attending: EMERGENCY MEDICINE
Payer: MEDICARE

## 2022-01-07 ENCOUNTER — HOSPITAL ENCOUNTER (INPATIENT)
Dept: ULTRASOUND IMAGING | Age: 67
Discharge: HOME | DRG: 682 | End: 2022-01-07
Attending: NURSE PRACTITIONER
Payer: MEDICARE

## 2022-01-07 ENCOUNTER — APPOINTMENT (OUTPATIENT)
Dept: ULTRASOUND IMAGING | Age: 67
DRG: 682 | End: 2022-01-07
Attending: INTERNAL MEDICINE
Payer: MEDICARE

## 2022-01-07 PROBLEM — E43 SEVERE PROTEIN-CALORIE MALNUTRITION (HCC): Status: ACTIVE | Noted: 2022-01-07

## 2022-01-07 LAB
ALBUMIN SERPL-MCNC: 3.6 G/DL (ref 3.4–5)
ALBUMIN/GLOB SERPL: 1 {RATIO} (ref 0.8–1.7)
ALP SERPL-CCNC: 74 U/L (ref 45–117)
ALT SERPL-CCNC: 128 U/L (ref 13–56)
ANION GAP SERPL CALC-SCNC: 8 MMOL/L (ref 3–18)
AST SERPL-CCNC: 120 U/L (ref 10–38)
BASOPHILS # BLD: 0 K/UL (ref 0–0.1)
BASOPHILS NFR BLD: 0 % (ref 0–2)
BILIRUB SERPL-MCNC: 0.6 MG/DL (ref 0.2–1)
BUN SERPL-MCNC: 48 MG/DL (ref 7–18)
BUN/CREAT SERPL: 28 (ref 12–20)
CALCIUM SERPL-MCNC: 10.3 MG/DL (ref 8.5–10.1)
CHLORIDE SERPL-SCNC: 112 MMOL/L (ref 100–111)
CO2 SERPL-SCNC: 22 MMOL/L (ref 21–32)
CREAT SERPL-MCNC: 1.7 MG/DL (ref 0.6–1.3)
DIFFERENTIAL METHOD BLD: ABNORMAL
EOSINOPHIL # BLD: 0.1 K/UL (ref 0–0.4)
EOSINOPHIL NFR BLD: 1 % (ref 0–5)
ERYTHROCYTE [DISTWIDTH] IN BLOOD BY AUTOMATED COUNT: 19.9 % (ref 11.6–14.5)
GLOBULIN SER CALC-MCNC: 3.5 G/DL (ref 2–4)
GLUCOSE SERPL-MCNC: 65 MG/DL (ref 74–99)
HAV IGM SER QL: NEGATIVE
HBV CORE IGM SER QL: NEGATIVE
HBV SURFACE AG SER QL: <0.1 INDEX
HBV SURFACE AG SER QL: NEGATIVE
HCT VFR BLD AUTO: 31.2 % (ref 35–45)
HCV AB SER IA-ACNC: 0.03 INDEX
HCV AB SERPL QL IA: NEGATIVE
HCV COMMENT,HCGAC: NORMAL
HGB BLD-MCNC: 10 G/DL (ref 12–16)
IMM GRANULOCYTES # BLD AUTO: 0 K/UL (ref 0–0.04)
IMM GRANULOCYTES NFR BLD AUTO: 0 % (ref 0–0.5)
LYMPHOCYTES # BLD: 1.9 K/UL (ref 0.9–3.6)
LYMPHOCYTES NFR BLD: 23 % (ref 21–52)
MAGNESIUM SERPL-MCNC: 2.5 MG/DL (ref 1.6–2.6)
MCH RBC QN AUTO: 30.7 PG (ref 24–34)
MCHC RBC AUTO-ENTMCNC: 32.1 G/DL (ref 31–37)
MCV RBC AUTO: 95.7 FL (ref 78–100)
MONOCYTES # BLD: 0.9 K/UL (ref 0.05–1.2)
MONOCYTES NFR BLD: 11 % (ref 3–10)
NEUTS SEG # BLD: 5.4 K/UL (ref 1.8–8)
NEUTS SEG NFR BLD: 65 % (ref 40–73)
NRBC # BLD: 0 K/UL (ref 0–0.01)
NRBC BLD-RTO: 0 PER 100 WBC
PHOSPHATE SERPL-MCNC: 2.8 MG/DL (ref 2.5–4.9)
PLATELET # BLD AUTO: 103 K/UL (ref 135–420)
PMV BLD AUTO: 12.4 FL (ref 9.2–11.8)
POTASSIUM SERPL-SCNC: 4.1 MMOL/L (ref 3.5–5.5)
PROT SERPL-MCNC: 7.1 G/DL (ref 6.4–8.2)
RBC # BLD AUTO: 3.26 M/UL (ref 4.2–5.3)
SODIUM SERPL-SCNC: 142 MMOL/L (ref 136–145)
SP1: NORMAL
SP2: NORMAL
SP3: NORMAL
WBC # BLD AUTO: 8.4 K/UL (ref 4.6–13.2)

## 2022-01-07 PROCEDURE — 74011250637 HC RX REV CODE- 250/637: Performed by: NURSE PRACTITIONER

## 2022-01-07 PROCEDURE — 76770 US EXAM ABDO BACK WALL COMP: CPT

## 2022-01-07 PROCEDURE — 99233 SBSQ HOSP IP/OBS HIGH 50: CPT | Performed by: STUDENT IN AN ORGANIZED HEALTH CARE EDUCATION/TRAINING PROGRAM

## 2022-01-07 PROCEDURE — 97166 OT EVAL MOD COMPLEX 45 MIN: CPT

## 2022-01-07 PROCEDURE — 74011000258 HC RX REV CODE- 258: Performed by: STUDENT IN AN ORGANIZED HEALTH CARE EDUCATION/TRAINING PROGRAM

## 2022-01-07 PROCEDURE — 74011250636 HC RX REV CODE- 250/636: Performed by: STUDENT IN AN ORGANIZED HEALTH CARE EDUCATION/TRAINING PROGRAM

## 2022-01-07 PROCEDURE — 80074 ACUTE HEPATITIS PANEL: CPT

## 2022-01-07 PROCEDURE — 74011250636 HC RX REV CODE- 250/636: Performed by: EMERGENCY MEDICINE

## 2022-01-07 PROCEDURE — 65660000000 HC RM CCU STEPDOWN

## 2022-01-07 PROCEDURE — 74230 X-RAY XM SWLNG FUNCJ C+: CPT

## 2022-01-07 PROCEDURE — 36415 COLL VENOUS BLD VENIPUNCTURE: CPT

## 2022-01-07 PROCEDURE — 83735 ASSAY OF MAGNESIUM: CPT

## 2022-01-07 PROCEDURE — 92611 MOTION FLUOROSCOPY/SWALLOW: CPT

## 2022-01-07 PROCEDURE — 84100 ASSAY OF PHOSPHORUS: CPT

## 2022-01-07 PROCEDURE — 74011000250 HC RX REV CODE- 250: Performed by: STUDENT IN AN ORGANIZED HEALTH CARE EDUCATION/TRAINING PROGRAM

## 2022-01-07 PROCEDURE — 97530 THERAPEUTIC ACTIVITIES: CPT

## 2022-01-07 PROCEDURE — 92526 ORAL FUNCTION THERAPY: CPT

## 2022-01-07 PROCEDURE — 80053 COMPREHEN METABOLIC PANEL: CPT

## 2022-01-07 PROCEDURE — 74011000258 HC RX REV CODE- 258: Performed by: EMERGENCY MEDICINE

## 2022-01-07 PROCEDURE — 76705 ECHO EXAM OF ABDOMEN: CPT

## 2022-01-07 PROCEDURE — 85025 COMPLETE CBC W/AUTO DIFF WBC: CPT

## 2022-01-07 PROCEDURE — 74011000250 HC RX REV CODE- 250: Performed by: NURSE PRACTITIONER

## 2022-01-07 PROCEDURE — 74011000258 HC RX REV CODE- 258: Performed by: NURSE PRACTITIONER

## 2022-01-07 PROCEDURE — 97162 PT EVAL MOD COMPLEX 30 MIN: CPT

## 2022-01-07 PROCEDURE — 97535 SELF CARE MNGMENT TRAINING: CPT

## 2022-01-07 RX ADMIN — DEXTROSE MONOHYDRATE AND SODIUM CHLORIDE 100 ML/HR: 5; .9 INJECTION, SOLUTION INTRAVENOUS at 11:23

## 2022-01-07 RX ADMIN — BARIUM SULFATE 15 ML: 400 PASTE ORAL at 09:12

## 2022-01-07 RX ADMIN — THIAMINE HYDROCHLORIDE 200 MG: 100 INJECTION, SOLUTION INTRAMUSCULAR; INTRAVENOUS at 09:48

## 2022-01-07 RX ADMIN — LEVETIRACETAM 500 MG: 100 INJECTION INTRAVENOUS at 18:01

## 2022-01-07 RX ADMIN — LEVETIRACETAM 500 MG: 100 INJECTION INTRAVENOUS at 10:47

## 2022-01-07 RX ADMIN — SODIUM CHLORIDE, PRESERVATIVE FREE 10 ML: 5 INJECTION INTRAVENOUS at 23:35

## 2022-01-07 RX ADMIN — SODIUM CHLORIDE, PRESERVATIVE FREE 10 ML: 5 INJECTION INTRAVENOUS at 14:00

## 2022-01-07 RX ADMIN — BARIUM SULFATE 30 G: 960 POWDER, FOR SUSPENSION ORAL at 09:12

## 2022-01-07 RX ADMIN — ACETAMINOPHEN 650 MG: 325 TABLET ORAL at 10:51

## 2022-01-07 NOTE — ED NOTES
Pt was seen to be grimacing, asked if she is in pain and she nodded.   She said that her buttocks hurt, gave PRN pain medication

## 2022-01-07 NOTE — PROGRESS NOTES
Problem: Mobility Impaired (Adult and Pediatric)  Goal: *Acute Goals and Plan of Care (Insert Text)  Description: Physical Therapy Goals  Initiated 1/7/2022 and to be accomplished within 7 day(s)  1. Patient will move from supine to sit and sit to supine  in bed with moderate assistance . 2.  Patient will transfer from bed to chair and chair to bed with moderate assistance  using the least restrictive device. 3.  Patient will perform sit to stand with moderate assistance . 4. Patient will ambulate with moderate assistance  for 3 feet with the least restrictive device. PLOF: unclear of true PLOF patient states that she walks a little with her sons helping her     Outcome: Progressing Towards Goal   PHYSICAL THERAPY EVALUATION    Patient: Arnold Cartwright (89 y.o. female)  Date: 1/7/2022  Primary Diagnosis: KEYLA (acute kidney injury) ([facility] [address] [N17.9]  Hyperkalemia [E87.5]        Precautions:   Fall    PLOF: see above    ASSESSMENT :  Based on the objective data described below, the patient presents with decreased strength, balance and activity tolerance resulting in decreased independence with functional mobility. Pt initially required assistance with all LE movement however after PROM and assisted mobility she was noted to be actively moving her LEs through partial ROM. Pt required 2 person/total assistance with mobility however does appear to have the potential to decrease her assistance levels. At the end of the evaluation patient was able to bridge her hip in order for soiled clothing to be removed and pericare to be performed. Pt was left on the stretcher with needs in reach and nursing notified of assistance levels. Patient will benefit from skilled intervention to address the above impairments.   Patient's rehabilitation potential is considered to be Fair  Factors which may influence rehabilitation potential include:   []         None noted  [x]         Mental ability/status  [x]         Medical condition  []         Home/family situation and support systems  []         Safety awareness  []         Pain tolerance/management  []         Other:      PLAN :  Recommendations and Planned Interventions:   [x]           Bed Mobility Training             []    Neuromuscular Re-Education  [x]           Transfer Training                   []    Orthotic/Prosthetic Training  [x]           Gait Training                          []    Modalities  [x]           Therapeutic Exercises           []    Edema Management/Control  [x]           Therapeutic Activities            []    Family Training/Education  [x]           Patient Education  []           Other (comment):    Frequency/Duration: Patient will be followed by physical therapy 1-2 times per day/4-7 days per week to address goals. Discharge Recommendations: [facility] versus home with 24 hour care  Further Equipment Recommendations for Discharge: if patient transfers home will need: bedside commode, hospital bed, and wheelchair      SUBJECTIVE:   Patient stated I'm tired.     OBJECTIVE DATA SUMMARY:     Past Medical History:   Diagnosis Date    Abnormal coordination 9/21/2020    Hallucinations 9/21/2020    Hypertension     Neurological disorder     Seizures ([facility] [address]     Stroke ([facility] [address] 2009   No past surgical history on file. Barriers to Learning/Limitations: yes;  altered mental status (i.e.Sedation, Confusion)  Compensate with: Verbal Cues and Tactile Cues  Home Situation:  Home Situation  Living Alone: No  Support Systems: Child(quiana)  Lives with her 2 sons  Critical Behavior:  Neurologic State: Eyes open spontaneously; Alert  Orientation Level: Oriented X4  Cognition: Follows commands  Safety/Judgement: Fall prevention     Strength:    Strength: Grossly decreased, non-functional      Tone & Sensation:   Tone: Normal       Range Of Motion:  AROM: Grossly decreased, non-functional   PROM: Generally decreased, functional      Functional Mobility:  Bed Mobility:  Rolling: Total assistance;Assist x2  Supine to Sit: Total assistance;Assist x2  Sit to Supine: Total assistance;Assist x2  Scooting: Total assistance;Assist x2  Transfers:  Sit to Stand: Total assistance;Assist x2  Stand to Sit: Total assistance;Assist x2     Balance:   Sitting: Impaired; With support;High guard  Sitting - Static: Poor (constant support)  Sitting - Dynamic: Poor (constant support)  Standing: Impaired;Pull to stand; With support  Standing - Static: Poor  Standing - Dynamic : Poor  Therapeutic Exercises: Active assisted heel slides and hip abd/adduction  Pain:  Pain level pre-treatment: 3/10   Pain level post-treatment: 3/10   Pain Intervention(s) :  Rest, Repositioning  Response to intervention: Nurse notified, See doc flow    Activity Tolerance:   poor  Please refer to the flowsheet for vital signs taken during this treatment. After treatment:   []         Patient left in no apparent distress sitting up in chair  [x]         Patient left in no apparent distress in bed  [x]         Call bell left within reach  [x]         Nursing notified  []         Caregiver present  []         Bed alarm activated  []         SCDs applied    COMMUNICATION/EDUCATION:   [x]         Role of Physical Therapy in the acute care setting. [x]         Fall prevention education was provided and the patient/caregiver indicated understanding. [x]         Patient/family have participated as able in goal setting and plan of care. [x]         Patient/family agree to work toward stated goals and plan of care. []         Patient understands intent and goals of therapy, but is neutral about his/her participation. []         Patient is unable to participate in goal setting/plan of care: ongoing with therapy staff.  []         Other:     Thank you for this referral.  Miranda Moulton, PT   Time Calculation: 27 mins      Eval Complexity: History: HIGH Complexity :3+ comorbidities / personal factors will impact the outcome/ POC Exam:MEDIUM Complexity : 3 Standardized tests and measures addressing body structure, function, activity limitation and / or participation in recreation  Presentation: MEDIUM Complexity : Evolving with changing characteristics  Clinical Decision Making:Medium Complexity    Overall Complexity:MEDIUM

## 2022-01-07 NOTE — ED NOTES
TRANSFER - OUT REPORT:    Verbal report given to Andrewla Iraj on [employer]  being transferred to [address] [place] [address] [phone]) for routine progression of care       Report consisted of patients Situation, Background, Assessment and   Recommendations(SBAR). Information from the following report(s) SBAR, ED Summary, Procedure Summary, MAR, Recent Results and Quality Measures was reviewed with the receiving nurse. Lines:   Peripheral IV 01/06/22 Right Hand (Active)        Opportunity for questions and clarification was provided.

## 2022-01-07 NOTE — PROGRESS NOTES
Problem: Patient Education: Go to Patient Education Activity  Goal: Patient/Family Education  Outcome: Progressing Towards Goal     Problem: Falls - Risk of  Goal: *Absence of Falls  Description: Document Clearence Skates Fall Risk and appropriate interventions in the flowsheet. Outcome: Progressing Towards Goal  Note: Fall Risk Interventions:                 Elimination Interventions: Bed/chair exit alarm,Call light in reach,Toileting schedule/hourly rounds              Problem: Pressure Injury - Risk of  Goal: *Prevention of pressure injury  Description: Document John Scale and appropriate interventions in the flowsheet. Outcome: Progressing Towards Goal  Note: Pressure Injury Interventions:  Sensory Interventions: Assess changes in LOC,Keep linens dry and wrinkle-free,Minimize linen layers    Moisture Interventions: Absorbent underpads,Apply protective barrier, creams and emollients,Offer toileting Q_hr    Activity Interventions: Assess need for specialty bed,Pressure redistribution bed/mattress(bed type),PT/OT evaluation    Mobility Interventions: Pressure redistribution bed/mattress (bed type),HOB 30 degrees or less,Turn and reposition approx.  every two hours(pillow and wedges)    Nutrition Interventions: Document food/fluid/supplement intake,Offer support with meals,snacks and hydration    Friction and Shear Interventions: Apply protective barrier, creams and emollients,HOB 30 degrees or less

## 2022-01-07 NOTE — PROGRESS NOTES
Speech Pathology:     Orders received. Pt with hx of airway compromise in September 2021. Will order MBS.      Thank you for this referral.    Luisa Glover M.S. CCC-SLP/L  Speech-Language Pathologist

## 2022-01-07 NOTE — PROGRESS NOTES
Problem: Dysphagia (Adult)  Goal: *Acute Goals and Plan of Care (Insert Text)  Description:   Patient will:  1. Tolerate PO trials with 0 s/s overt distress in 4/5 trials  2. Utilize compensatory swallow strategies/maneuvers (decrease bite/sip, size/rate, alt. liq/sol) with min cues in 4/5 trials  3. Complete an objective swallow study (i.e., MBSS) to assess swallow integrity, r/o aspiration, and determine of safest LRD, min A as indicated/ordered by MD  - met 1/7/2022    Rec:     Full thin liquid diet  May benefit from an alternate means of nutrition/hydration for adequate intake per MD discretion  Aspiration precautions  HOB >45 during po intake, remain >30 for 30-45 minutes after po   Small bites/sips; alternate liquid/solid with slow feeding rate   Oral care TID  Meds per pt preference  Outcome: Progressing Towards Goal   SPEECH PATHOLOGY MODIFIED BARIUM SWALLOW STUDY & TREATMENT    Patient: Ivett Prescott (7 y.o. female)  Date: 1/7/2022  Primary Diagnosis: KEYLA (acute kidney injury) ([facility] [address] [N17.9]  Hyperkalemia [E87.5]        Precautions: aspiration       ASSESSMENT :  Based on the objective data described below, the patient presents with mod-sev oral and min pharyngeal dysphagia. Pt tolerated one thin liquid trial via straw with no aspiration/penetration events. Premature spillage observed secondary to decreased tongue base retraction. Laryngeal elevation and pharyngeal motility appeared timely/functional with positive pharyngeal clearance. Pt with oral holding of puree trial with eventual oral expulsion. Recommend full thin liquid diet, aspiration precautions, oral care TID, and meds crushed. Suspect limited intake. She may benefit from an alternate means of nutrition/hydration per MD discretion? TREATMENT :  Treatment provided post diagnostic testing including oropharyngeal anatomy/physiology, MBS results, diet recommendations and compensatory strategies/positioning.   Pt able to verbalize understanding - suspect limited carryover. Will continue to follow. Patient will benefit from skilled intervention to address the above impairments. Patient's rehabilitation potential is considered to be Fair  Factors which may influence rehabilitation potential include:   []              None noted  [x]              Mental ability/status  [x]              Medical condition  []              Home/family situation and support systems  [x]              Safety awareness  []              Pain tolerance/management  []              Other:      PLAN :  Recommendations and Planned Interventions: See above  Frequency/Duration: Patient will be followed by speech-language pathology 1-2 times per day/3-5 days per week to address goals. Discharge Recommendations: [facility] and To Be Determined     SUBJECTIVE:   Patient stated Hi. OBJECTIVE:     Past Medical History:   Diagnosis Date    Abnormal coordination 9/21/2020    Hallucinations 9/21/2020    Hypertension     Neurological disorder     Seizures ([facility] [address]     Stroke ([facility] [address] 2009   No past surgical history on file. Prior Level of Function/Home Situation: Pt with pmhx of dysphagia in previous hospitalizations. Most recent MBS in September 2022 with recs of nectar-thick liquids. Diet prior to admission: unknown  Current Diet:  full thin liquids   Radiologist:    Film Views: Lateral;Fluoro  Patient Position: 90 in fluoro chair    Trial 1: Trial 2:   Consistency Presented:  Thin liquid Consistency Presented: Puree   How Presented: SLP-fed/presented;Straw How Presented: SLP-fed/presented;Spoon         Bolus Acceptance: No impairment Bolus Acceptance: Impaired   Bolus Formation/Control: Impaired: Delayed;Premature spillage Bolus Formation/Control: Impaired:  (absent manipulation of bolus)   Propulsion: Delayed (# of seconds) Propulsion: Absent (orally expelled)   Oral Residue: None     Initiation of Swallow: Triggered at vallecula     Timing: No impairment     Penetration: None     Aspiration/Timing: No evidence of aspiration     Pharyngeal Clearance: No residue                             Decreased Tongue Base Retraction?: Yes  Laryngeal Elevation: WFL (within functional limits)  Aspiration/Penetration Score: 1 (No penetration or aspiration-Contrast does not enter the airway)  Pharyngeal Symmetry: Not assessed  Pharyngeal-Esophageal Segment: No impairment  Pharyngeal Dysfunction: Decreased tongue base retraction    Oral Phase Severity: Moderate-severe  Pharyngeal Phase Severity: Minimal    8-point Penetration-Aspiration Scale: Score 1    PAIN:  Pt reports 0/10 pain or discomfort prior to MBS. Pt reports 0/10 pain or discomfort post MBS. COMMUNICATION/EDUCATION:   [x]  Patient educated regarding MBS results and diet recommendations. [x]  Patient/family have participated as able in goal setting and plan of care. []  Patient/family agree to work toward stated goals and plan of care. []  Patient understands intent and goals of therapy, but is neutral about his/her participation. []  Patient is unable to participate in goal setting and plan of care.     Thank you for this referral.    Anais Mcknight M.S. CCC-SLP/L  Speech-Language Pathologist

## 2022-01-07 NOTE — PROGRESS NOTES
MBS completed with recs of full thin liquids, meds crushed. Suspect limited intake; may benefit from an alternate means of nutrition/hydration vs comfort feeds. Full report to follow.      Thank you for this referral.    Rc Velez, M.S. CCC-SLP/L  Speech-Language Pathologist 1.65

## 2022-01-07 NOTE — PROGRESS NOTES
Patient's son [facility]  [phone] said patient has been Covid Vaccinated, received 2 shots.            Aditi Fontenot, RN  Case Management [phone]

## 2022-01-07 NOTE — PROGRESS NOTES
CM called and spoke with patient's son Luisito Velez [phone], he is agreeable to SNF, wants to talk to family, to discuss SNF in area, and will let CM know decision to SNF. Phone number for CM given to patient's son.          Deonte Phillips, RN  Case Management [phone]

## 2022-01-07 NOTE — PROGRESS NOTES
RENAL DAILY PROGRESS NOTE    Patient: Randall Veronica               Sex: female          DOA: 1/6/2022 12:12 PM        YOB: 1955      Age:  78 y.o.        LOS:  LOS: 1 day     Subjective:     Randall Veronica is a 78 y.o.  who presents with KEYLA (acute kidney injury) ([facility] [address] [N17.9]  Hyperkalemia [E87.5]. Asked to evaluate for arf.admitted with hx of poor po intake,altered mental status. hx of htn on aldactone lisinopril,hx of seizures disorder  Chief complains:   - Reviewed last 24 hrs events     Current Facility-Administered Medications   Medication Dose Route Frequency    levETIRAcetam (KEPPRA) 500 mg in 0.9% sodium chloride (MBP/ADV) 100 mL MBP  500 mg IntraVENous BID    dextrose 5% and 0.9% NaCl infusion  100 mL/hr IntraVENous CONTINUOUS    LORazepam (ATIVAN) injection 1 mg  1 mg IntraVENous Q4H PRN    hydrALAZINE (APRESOLINE) 20 mg/mL injection 10 mg  10 mg IntraVENous Q6H PRN    sodium chloride (NS) flush 5-40 mL  5-40 mL IntraVENous Q8H    sodium chloride (NS) flush 5-40 mL  5-40 mL IntraVENous PRN    acetaminophen (TYLENOL) tablet 650 mg  650 mg Oral Q6H PRN    Or    acetaminophen (TYLENOL) suppository 650 mg  650 mg Rectal Q6H PRN    polyethylene glycol (MIRALAX) packet 17 g  17 g Oral DAILY PRN    ondansetron (ZOFRAN ODT) tablet 4 mg  4 mg Oral Q8H PRN    Or    ondansetron (ZOFRAN) injection 4 mg  4 mg IntraVENous Q6H PRN    thiamine (B-1) 200 mg in 0.9% sodium chloride 50 mL IVPB  200 mg IntraVENous BID     Current Outpatient Medications   Medication Sig    divalproex DR (DEPAKOTE) 250 mg tablet Take 750 mg by mouth nightly.  spironolactone (ALDACTONE) 25 mg tablet Take  by mouth daily.  PARoxetine (PAXIL) 30 mg tablet Take 30 mg by mouth daily.  hydrALAZINE (APRESOLINE) 100 mg tablet Take  by mouth.  OXcarbazepine (TRILEPTAL) 300 mg tablet Take 300 mg by mouth two (2) times a day.     lisinopriL (PRINIVIL, ZESTRIL) 20 mg tablet Take 0.5 Tablets by mouth daily. Indications: high blood pressure    multivitamin, tx-iron-ca-min (THERA-M w/ IRON) 9 mg iron-400 mcg tab tablet Take 1 Tablet by mouth daily.  atorvastatin (LIPITOR) 40 mg tablet Take 40 mg by mouth daily.  acetaminophen (TYLENOL) 500 mg tablet Take 500 mg by mouth as needed for Pain.  clopidogreL (PLAVIX) 75 mg tab Take 75 mg by mouth daily. Indications: prevention for a blood clot going to the brain    tamsulosin (FLOMAX) 0.4 mg capsule Take 1 Cap by mouth nightly.  ergocalciferol (VITAMIN D2) 50,000 unit capsule Take 50,000 Units by mouth two (2) times a week.  levETIRAcetam (KEPPRA) 1,000 mg tablet Take 1 Tab by mouth two (2) times a day.  amLODIPine (NORVASC) 10 mg tablet Take 1 Tab by mouth daily.  folic acid (FOLVITE) 1 mg tablet Take 1 Tablet by mouth daily. (Patient not taking: Reported on 1/6/2022)    glucose 4 gram chewable tablet Take 4 Tablets by mouth nightly. (Patient not taking: Reported on 1/6/2022)    clonazePAM (KlonoPIN) 0.25 mg TbDi Take 0.25 mg by mouth as needed for Other (seizure). (Patient not taking: Reported on 1/6/2022)    cyanocobalamin (VITAMIN B12) 500 mcg tablet Take 1 Tab by mouth daily. Objective:     Visit Vitals  BP (!) 157/70   Pulse (!) 6   Temp 97.6 °F (36.4 °C)   Resp 10   Ht 5' 1\" (1.549 m)   Wt 44 kg (97 lb)   SpO2 99%   BMI 18.33 kg/m²     No intake or output data in the 24 hours ending 01/07/22 1203    Physical Examination:     RS: Chest is bilateral equal, no wheezing / rales / crackles  CVS: S1-S2 heard,   Abdomen: Soft, Non tender,   Extremities: No edema, no cyanosis, skin is warm on touch  HEENT: Head is atraumatic, PERRLA, conjunctiva pink & non icteric.  No JVD or carotid bruit       Data Review:      Labs:     Hematology:   Recent Labs     01/07/22  0438 01/06/22  1409   WBC 8.4 10.1   HGB 10.0* 9.4*   HCT 31.2* 29.2*     Chemistry:   Recent Labs     01/07/22  0438 01/06/22  1409   BUN 48* 46*   CREA 1.70* 2.41*   CA 10.3* 10.0   ALB 3.6 3.5   K 4.1 6.3*    137   * 109   CO2 22 22   PHOS 2.8 3.9   GLU 65* 82        Images:    XR (Most Recent). CXR reviewed by me and compared with previous CXR Results from Hospital Encounter encounter on 01/06/22    XR CHEST PORT    Narrative  EXAMINATION: Chest single view    INDICATION: Altered mental status    COMPARISON: 9/6/2021    FINDINGS: Single frontal view the chest obtained. Mediastinal silhouette and  pulmonary vasculature unremarkable. No confluent consolidation. No evidence of  pneumothorax. No obvious acute osseous findings. Impression  No acute radiographic findings. CT (Most Recent) Results from Hospital Encounter encounter on 01/06/22    CT HEAD WO CONT    Narrative  CT OF THE BRAIN    CPT CODE: 70815    COMPARISON: 9 September 2021. INDICATIONS: Altered mental status. TECHNIQUE: Axial sections through the brain at 5 mm collimation without IV  contrast are obtained. FINDINGS:    The ventricles and CSF spaces are enlarged consistent with atrophy somewhat  prominent for age. Hydrocephalus that vacuo is noted in the right temporal and  the occipital horn  The brain parenchyma is of generally normal attenuation. Gray-white  differentiation is satisfactory. . There is a large burden of microvascular  change in the subcortical white matter. Previous posterior division right middle  cerebral artery infarction. There is a moderate burden of presumed small vessel ischemic change resulting in  decreased attenuation in the white matter particularly in the periventricular  region. .  There is no hemorrhage. .  There is no mass lesion. .  There are no pathologic fluid collections. There are no pathologic calcifications evident. .    The visible portions of the paranasal sinuses are clear. Impression  No acute intracranial process.   Chronic changes of premature atrophy, severe microvascular disease, and previous  infarction noted      All CT scans at this facility are performed using dose optimization technique as  appropriate to the performed exam, to include automated exposure control,  adjustment of the mA and/or kV according to patient's size (Including  appropriate matching for site-specific examinations), or use of iterative  reconstruction technique. EKG No results found for this or any previous visit. I have personally reviewed the old medical records and patient's labs    Plan / Recommendation:      1. arf related to dehydration,poor po intake,aldactone and lisinopril.agree with hydration,check spot urine for lytes,urinalysis,protein, renal ultrasound no obstruction. improving  2.hyperkalemia,blood is partially hemolyzed,arf ,aldactone and lisinopril would cause hyperkalemia.gave lokelma. improved    D/w Dr. Joie Rangel MD  Nephrology  1/7/2022

## 2022-01-07 NOTE — PROGRESS NOTES
Nutrition Assessment (Disaster Mode)    Type and Reason for Visit: Initial,Consult    Nutrition Recommendations and Plan  - Add oral supplement to optimize nutrition intake:  Ensure Enlive TID  - Add lactose intolerance to diet order.  - Monitor and encourage meal intake. Anticipate pt may benefit from alternative means of nutrition to meet nutritional needs. Malnutrition Assessment:  Malnutrition Status: Severe malnutrition  Context: Chronic illness  Findings of clinical characteristics of malnutrition:   Energy Intake:  7 - 75% or less est energy requirements for 1 month or longer  Weight Loss:  7.0 - Greater than 7.5% over 3 months     Body Fat Loss:  Unable to assess,     Muscle Mass Loss:  Unable to assess,    Fluid Accumulation:  No significant fluid accumulation,     Strength:  Not performed         Nutrition Assessment:   Nutrition History and Allergies: PMH: HTN, seizures, stroke, neurological disorder. Pt presented with declined over the past several months, including appetite, with very poor intake over the last week PTA. She is noted to have been pocketing food. Pt with weight loss from 138 lb since May 2021; she weighed 113 lb 9/11/21 and 97 lb 11/1/21. Allergy to tomato and lactose intolerant. Nutrition Assessment: MBS completed, SLP recommended full thin liquid diet. Unable to determine adequacy of meal intake since admission, nutritional intake PTA appears inadequate. Pt with significant weight loss since May 2021. Concern for refeeding. Total Energy Requirements (kcals/day): [id number] Energy Requirements Based On: Kcal/kg (25-30) Weight Used for Energy Requirements: Ideal (48  kg)  Total Protein Requirements (g/day): 38-48 Weight Used for Protein Requirements: Ideal  Total Fluid Requirements (ml/day): 8033-6442 Method Used for Fluid Requirements: 1 ml/kcal    Nutrition Related Findings: Last BM unknown.  Meds: thiamine Wound Type: None    Current Nutrition Therapies:  ADULT DIET Full Liquid  Average Meal Intake: Unable to assess Average Supplement Intake: None ordered Additional Caloric Sources: D5 NS at 100 mL/hr (120 gm dextrose, 408 kcal)    Anthropometric Measures:  Height: 5' 1\" (154.9 cm) Weight: 44 kg (97 lb) Body mass index is 18.33 kg/m². Admission Body Weight: 97 lb  Ideal Body Weight (lbs) (Calculated): 105 lbs Ideal Body Weight (Kg) (Calculated): 48 kg % IBW (Calculated): 92.4 %  Usual Body Weight: 51.3 kg (113 lb) (September 2021) % Weight Change (Calculated): -14.2            Nutrition Diagnosis:   · Severe malnutrition,In context of chronic illness related to cognitive or neurological impairment,inadequate protein-energy intake as evidenced by poor intake prior to admission,weight loss 7.5% in 3 months    Nutrition Interventions:   Food and/or Nutrient Delivery: Continue current diet,Start oral nutrition supplement,Vitamin supplement,IV fluid delivery  Nutrition Education/Counseling: Education not indicated,No recommendations at this time  Coordination of Nutrition Care: Continue to monitor while inpatient     Goals: PO nutrition intake will meet >75% of patient estimated nutritional needs within the next 7 days. Nutrition Monitoring and Evaluation: Patient will be monitored per nutrition standards of care. Consult Dietitian if nutrition intervention essential to patient care is needed.    Behavioral-Environmental Outcomes: None identified  Food/Nutrient Intake Outcomes: Diet advancement/tolerance,Food and nutrient intake,Supplement intake,IVF intake,Vitamin/mineral intake  Physical Signs/Symptoms Outcomes: Biochemical data,Chewing or swallowing,Meal time behavior,Nutrition focused physical findings    Discharge Planning: Continue current diet,Continue oral nutrition supplement        Laurie Enamorado RD on 1/7/2022 at 2:29 PM  Contact: [phone]    Disaster Mode active

## 2022-01-07 NOTE — PROGRESS NOTES
Progress Note    Patient: Adolfo Briceno MRN: [id number]  SSN: [id number]    YOB: 1955  Age: 78 y.o. Sex: female      Admit Date: 1/6/2022    LOS: 1 day     Subjective:     Patient seen in ED. She is somnolent and does not answer questions. Makes eye contact but otherwise no contribution. Objective:     Vitals:    01/07/22 1130 01/07/22 1200 01/07/22 1423 01/07/22 1631   BP: (!) 150/77 (!) 145/76  (!) 159/80   Pulse:  71  70   Resp:  16  17   Temp:  98.3 °F (36.8 °C)  97.8 °F (36.6 °C)   SpO2: 99% 98%  100%   Weight:       Height:   5' 1\" (1.549 m)         Intake and Output:  Current Shift: No intake/output data recorded. Last three shifts: No intake/output data recorded. Physical Exam:   General:  Somnolent. Does not answer questions. Eyes:  Conjunctivae/corneas clear. PERRL, EOMs intact. Nose: Nares normal. Septum midline. Mucosa normal.   Mouth/Throat: Moist mucosa. Neck: Supple, symmetrical, trachea midline       Lungs:   Clear to auscultation bilaterally. Heart:  Regular rate and rhythm, S1, S2 normal, no murmur, click, rub or gallop. Abdomen:   Soft, non-tender. Non-distended. Extremities: Extremities normal, atraumatic, no cyanosis or edema. Pulses: 2+ and symmetric all extremities. Skin: Skin color, texture, turgor normal. No rashes or lesions       Neurologic: Somnolent. Does not answer questions. Lab/Data Review: All lab results for the last 24 hours reviewed. Assessment:     Active Problems:    KEYLA (acute kidney injury) ([address] (9/6/2021)      Hyperkalemia (1/6/2022)      Severe protein-calorie malnutrition ([address] (1/7/2022)    Seizure disorder  History of CVA in the past  History of dysphagia    Plan:     Hyperkalemia, now resolved. Severe protein calorie malnutrition - may require NG tube vs G tube to nutrition. Will need to discuss tomorrow with family. Patient able to manage full liquid diet at this time. KEYLA, improving.  D5-.45NS continued. Seizure disorder - continue IV Keppra now.    H/o Dysphagia - on full liquid diet         Signed By: Shani Ramirez, DO     January 7, 2022

## 2022-01-07 NOTE — PROGRESS NOTES
Reason for Admission:  KEYLA (acute kidney injury) ([facility] [address] [N17.9]  Hyperkalemia [E87.5]                 RUR Score:    16%            Plan for utilizing home health:    No, not at this time. Discharge plan is [facility]. Likelihood of Readmission:   Moderate                         Do you (patient/family) have any concerns for transition/discharge?  no, not at this time. Transition of Care Plan:       Initial assessment completed with relative(s), patient's son Paradise Pineda. Cognitive status of patient: disoriented per documented notes. Face sheet information confirmed:  yes. The patient's son Paradise Pineda [phone] agrees to participate in her discharge plan and to receive any needed information. This patient lives in a apartment with her son Roseanna Carmen, with no steps to enter. Patient is able to navigate steps as needed. Prior to hospitalization, patient was considered to be independent with ADLs/IADLS : yes . Patient has a current ACP document on file: no.      Healthcare Decision Maker:   Secondary Decision Maker: Benedicto Petersen - [phone]    Click here to complete [address] including selection of the Healthcare Decision Maker Relationship (ie \"Primary\")    Medical transport will need to be available to transport patient to SNF upon discharge. The patient already has W/C, [facility],  medical equipment available in the home. Patient is not currently active with home health. Patient has stayed in a skilled nursing facility or rehab. Was  stay within last 60 days : no. This patient is on dialysis :no.    List of available SNF agencies were provided and reviewed with the patient prior to discharge. [place] of choice verbal consent given: no, patient's family are review SNF. Currently, the discharge plan is SNF.       The patient states that she can obtain her medications from the pharmacy, and take her medications as directed. Patient's current insurance is [facility] Medicare Complete and [employer]/[place]. Care Management Interventions  PCP Verified by CM:  Yes  Mode of Transport at Discharge: BLS  Transition of Care Consult (CM Consult): SNF  Partner SNF: Yes  Discharge Durable Medical Equipment: No  Physical Therapy Consult: Yes  Occupational Therapy Consult: Yes  Speech Therapy Consult: Yes  Support Systems: Child(quiana) (Patient lives with her son Morgan Mauro)  Confirm Follow Up Transport: Family  The Plan for Transition of Care is Related to the Following Treatment Goals : Michael Howard  The Patient and/or Patient Representative was Provided with a Choice of Provider and Agrees with the Discharge Plan?: Yes  Name of the Patient Representative Who was Provided with a Choice of Provider and Agrees with the Discharge Plan: Joaquim Johnston (Patient's son)  Freedom of Choice List was Provided with Basic Dialogue that Supports the Patient's Individualized Plan of Care/Goals, Treatment Preferences and Shares the Quality Data Associated with the Providers?: Yes  Discharge Location  Discharge Placement: Skilled nursing facility        Haritha Zuluaga RN  Case Management [phone]

## 2022-01-07 NOTE — PROGRESS NOTES
Bedside shift change report given to Huan Munoz RN (oncoming nurse) by Scott Brunner RN (offgoing nurse). Report included the following information SBAR, Kardex, MAR and Quality Measures.

## 2022-01-08 LAB
ANION GAP SERPL CALC-SCNC: 5 MMOL/L (ref 3–18)
BUN SERPL-MCNC: 30 MG/DL (ref 7–18)
BUN/CREAT SERPL: 24 (ref 12–20)
CALCIUM SERPL-MCNC: 9.9 MG/DL (ref 8.5–10.1)
CHLORIDE SERPL-SCNC: 115 MMOL/L (ref 100–111)
CO2 SERPL-SCNC: 25 MMOL/L (ref 21–32)
CREAT SERPL-MCNC: 1.27 MG/DL (ref 0.6–1.3)
GLUCOSE BLD STRIP.AUTO-MCNC: 110 MG/DL (ref 70–110)
GLUCOSE SERPL-MCNC: 113 MG/DL (ref 74–99)
POTASSIUM SERPL-SCNC: 4 MMOL/L (ref 3.5–5.5)
SODIUM SERPL-SCNC: 145 MMOL/L (ref 136–145)

## 2022-01-08 PROCEDURE — 74011000250 HC RX REV CODE- 250: Performed by: NURSE PRACTITIONER

## 2022-01-08 PROCEDURE — 74011250637 HC RX REV CODE- 250/637: Performed by: NURSE PRACTITIONER

## 2022-01-08 PROCEDURE — 74011250636 HC RX REV CODE- 250/636: Performed by: STUDENT IN AN ORGANIZED HEALTH CARE EDUCATION/TRAINING PROGRAM

## 2022-01-08 PROCEDURE — 99233 SBSQ HOSP IP/OBS HIGH 50: CPT | Performed by: STUDENT IN AN ORGANIZED HEALTH CARE EDUCATION/TRAINING PROGRAM

## 2022-01-08 PROCEDURE — 82962 GLUCOSE BLOOD TEST: CPT

## 2022-01-08 PROCEDURE — 74011250637 HC RX REV CODE- 250/637: Performed by: STUDENT IN AN ORGANIZED HEALTH CARE EDUCATION/TRAINING PROGRAM

## 2022-01-08 PROCEDURE — 65660000000 HC RM CCU STEPDOWN

## 2022-01-08 PROCEDURE — 80048 BASIC METABOLIC PNL TOTAL CA: CPT

## 2022-01-08 PROCEDURE — 74011000258 HC RX REV CODE- 258: Performed by: STUDENT IN AN ORGANIZED HEALTH CARE EDUCATION/TRAINING PROGRAM

## 2022-01-08 PROCEDURE — 36415 COLL VENOUS BLD VENIPUNCTURE: CPT

## 2022-01-08 RX ORDER — SODIUM CHLORIDE 450 MG/100ML
50 INJECTION, SOLUTION INTRAVENOUS CONTINUOUS
Status: DISCONTINUED | OUTPATIENT
Start: 2022-01-08 | End: 2022-01-09

## 2022-01-08 RX ADMIN — LEVETIRACETAM 500 MG: 500 SOLUTION ORAL at 16:56

## 2022-01-08 RX ADMIN — LEVETIRACETAM 500 MG: 100 INJECTION INTRAVENOUS at 09:51

## 2022-01-08 RX ADMIN — ACETAMINOPHEN 650 MG: 325 TABLET ORAL at 08:02

## 2022-01-08 RX ADMIN — SODIUM CHLORIDE, PRESERVATIVE FREE 20 ML: 5 INJECTION INTRAVENOUS at 06:00

## 2022-01-08 RX ADMIN — ACETAMINOPHEN 650 MG: 325 TABLET ORAL at 16:56

## 2022-01-08 NOTE — PROGRESS NOTES
Nutrition Note    MST referral received. Full nutrition assessment completed 1/7. Please refer to progress note for nutrition POC. Will continue to follow.      Electronically signed by Garrison Conti RD on 1/8/2022 at 9:39 AM  Contact: [phone]  Disaster Mode Active

## 2022-01-08 NOTE — PROGRESS NOTES
RENAL DAILY PROGRESS NOTE            73y F with PMH   Subjective:       Complaint:   Overnight events noted  No documented urine output  Kidney usg did not able to locate left kidney   Confused this morning     IMPRESSION:   Acute kidney injury, pre renal ATN  Hyperkalemia, improving   HTN  Altered mental status, ? post ictal vs overdose of seizure meds   PLAN:    check lab for today   Change iv fluid to half ns. Check bladder scan. Current Facility-Administered Medications   Medication Dose Route Frequency    levETIRAcetam (KEPPRA) 500 mg in 0.9% sodium chloride (MBP/ADV) 100 mL MBP  500 mg IntraVENous BID    dextrose 5% and 0.9% NaCl infusion  100 mL/hr IntraVENous CONTINUOUS    LORazepam (ATIVAN) injection 1 mg  1 mg IntraVENous Q4H PRN    hydrALAZINE (APRESOLINE) 20 mg/mL injection 10 mg  10 mg IntraVENous Q6H PRN    sodium chloride (NS) flush 5-40 mL  5-40 mL IntraVENous Q8H    sodium chloride (NS) flush 5-40 mL  5-40 mL IntraVENous PRN    acetaminophen (TYLENOL) tablet 650 mg  650 mg Oral Q6H PRN    Or    acetaminophen (TYLENOL) suppository 650 mg  650 mg Rectal Q6H PRN    polyethylene glycol (MIRALAX) packet 17 g  17 g Oral DAILY PRN    ondansetron (ZOFRAN ODT) tablet 4 mg  4 mg Oral Q8H PRN    Or    ondansetron (ZOFRAN) injection 4 mg  4 mg IntraVENous Q6H PRN    thiamine (B-1) 200 mg in 0.9% sodium chloride 50 mL IVPB  200 mg IntraVENous BID       Review of Symptoms: comprehensive ROS negative except above.    Objective:     Patient Vitals for the past 24 hrs:   Temp Pulse Resp BP SpO2   01/08/22 1538 98 °F (36.7 °C) 81 20 (!) 146/90 100 %   01/08/22 1128 98.2 °F (36.8 °C) 72 20 (!) 161/89 100 %   01/08/22 0839 97.8 °F (36.6 °C) 70 20 (!) 146/74 100 %   01/08/22 0420 98.3 °F (36.8 °C) 80 18 128/61 100 %   01/08/22 0005 98.1 °F (36.7 °C) 76 20 131/69 100 %   01/07/22 2115 -- 70 18 -- 100 %   01/07/22 1921 98 °F (36.7 °C) 72 20 (!) 147/77 100 %   01/07/22 1631 97.8 °F (36.6 °C) 70 17 (!) 159/80 100 %        Weight change:      01/06 1901 - 01/08 0700  In: 1280 [P.O.:30; I.V.:1250]  Out: -     Intake/Output Summary (Last 24 hours) at 1/8/2022 1626  Last data filed at 1/7/2022 2333  Gross per 24 hour   Intake 1280 ml   Output --   Net 1280 ml     Physical Exam:   General: comfortable, no acute distress   HEENT sclera anicteric,   CVS: S1S2 heard,  no rub  RS: + air entry b/l,   Abd: Soft, Non tender,   Neuro:  Confused   Extrm: no edema, no cyanosis, clubbing   Skin: no visible  Rash  Musculoskeletal: No gross joints or bone deformities         Data Review:     LABS:   Hematology:   Recent Labs     01/07/22  0438 01/06/22  1409   WBC 8.4 10.1   HGB 10.0* 9.4*   HCT 31.2* 29.2*     Chemistry:   Recent Labs     01/07/22  0438 01/06/22  1409   BUN 48* 46*   CREA 1.70* 2.41*   CA 10.3* 10.0   ALB 3.6 3.5   K 4.1 6.3*    137   * 109   CO2 22 22   PHOS 2.8 3.9   GLU 65* 82            Procedures/imaging: see electronic medical records for all procedures, Xrays and details which were not copied into this note but were reviewed prior to creation of Plan          Assessment & Plan:             Gerald Elizbaeth MD  1/8/2022  4:26 PM

## 2022-01-08 NOTE — PROGRESS NOTES
Problem: Falls - Risk of  Goal: *Absence of Falls  Description: Document Azalia Shy Fall Risk and appropriate interventions in the flowsheet.   Outcome: Progressing Towards Goal  Note: Fall Risk Interventions:                 Elimination Interventions: Bed/chair exit alarm              Problem: Patient Education: Go to Patient Education Activity  Goal: Patient/Family Education  Outcome: Progressing Towards Goal     Problem: Patient Education: Go to Patient Education Activity  Goal: Patient/Family Education  Outcome: Progressing Towards Goal     Problem: Patient Education: Go to Patient Education Activity  Goal: Patient/Family Education  Outcome: Progressing Towards Goal

## 2022-01-08 NOTE — PROGRESS NOTES
Progress Note    Patient: Kim Gomez MRN: [id number]  SSN: [id number]    YOB: 1955  Age: 78 y.o. Sex: female      Admit Date: 1/6/2022    LOS: 2 days     Subjective:     Patient seen in room today. She answers more questions than yesterday. Is in no discomfort. Called son, Rod Butts to discuss goals of care with patient. Patient has required multiple admissions secondary to conditions associated with poor oral intake. Patient has required NG tubes multiple times and has pulled tubes out or required restraints in order to maintain NG. Rod Butts would like to avoid restraints and NG tube if possible. He is discussing whether they will pursue G tube with other family members today. GI consultation placed in case. Objective:     Vitals:    01/08/22 0420 01/08/22 0839 01/08/22 1128 01/08/22 1538   BP: 128/61 (!) 146/74 (!) 161/89 (!) 146/90   Pulse: 80 70 72 81   Resp: 18 20 20 20   Temp: 98.3 °F (36.8 °C) 97.8 °F (36.6 °C) 98.2 °F (36.8 °C) 98 °F (36.7 °C)   SpO2: 100% 100% 100% 100%   Weight:       Height:            Intake and Output:  Current Shift: No intake/output data recorded. Last three shifts: 01/06 1901 - 01/08 0700  In: 1280 [P.O.:30; I.V.:1250]  Out: -     Physical Exam:   General:  Somnolent. Answers few questions. Eyes:  Conjunctivae/corneas clear. PERRL, EOMs intact. Nose: Nares normal. Septum midline. Mucosa normal.   Mouth/Throat: Moist mucosa. Neck: Supple, symmetrical, trachea midline       Lungs:   Clear to auscultation bilaterally. Heart:  Regular rate and rhythm, S1, S2 normal, no murmur, click, rub or gallop. Abdomen:   Soft, non-tender. Non-distended. Extremities: Extremities normal, atraumatic, no cyanosis or edema. Pulses: 2+ and symmetric all extremities. Skin: Skin color, texture, turgor normal. No rashes or lesions       Neurologic: Somnolent. Lab/Data Review: All lab results for the last 24 hours reviewed.          Assessment: Active Problems:    KEYLA (acute kidney injury) ([facility] [address] (9/6/2021)      Hyperkalemia (1/6/2022)      Severe protein-calorie malnutrition ([facility] [address] (1/7/2022)    Seizure disorder  History of CVA in the past  History of dysphagia    Plan:     Hyperkalemia, now resolved. Severe protein calorie malnutrition - may require NG tube vs G tube to nutrition. 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 Patient able to manage full liquid diet at this time. Will switch necessary medications to IV or oral solution. Son, Reina Worrell, states he will be able to provide answer as to whether family will pursue G tube tomorrow. GI consultation placed in preparation. KEYLA, improving. D5-.45NS continued. Seizure disorder - continue oral Keppra now. H/o Dysphagia - on full liquid diet     History of mild malnutrition -dietitian consulted, noted weight loss of approximately 15 pounds in last several months.          Signed By: Augustus Anne DO     January 8, 2022

## 2022-01-08 NOTE — PROGRESS NOTES
Problem: Falls - Risk of  Goal: *Absence of Falls  Description: Document Allison Lamb Fall Risk and appropriate interventions in the flowsheet. 1/8/2022 1343 by Nelson Hernandez RN  Outcome: Progressing Towards Goal  Note: Fall Risk Interventions:                 Elimination Interventions: Bed/chair exit alarm           1/8/2022 0948 by Nelson Hernandez RN  Outcome: Progressing Towards Goal  Note: Fall Risk Interventions:                 Elimination Interventions: Bed/chair exit alarm              Problem: Pressure Injury - Risk of  Goal: *Prevention of pressure injury  Description: Document John Scale and appropriate interventions in the flowsheet.   1/8/2022 1343 by Nelson Hernandez RN  Outcome: Progressing Towards Goal  Note: Pressure Injury Interventions:  Sensory Interventions: Assess changes in LOC    Moisture Interventions: Absorbent underpads    Activity Interventions: Assess need for specialty bed    Mobility Interventions: Pressure redistribution bed/mattress (bed type)    Nutrition Interventions: Discuss nutritional consult with provider,Document food/fluid/supplement intake    Friction and Shear Interventions: Apply protective barrier, creams and emollients             1/8/2022 0948 by Nelson Hernandez RN  Outcome: Progressing Towards Goal  Note: Pressure Injury Interventions:  Sensory Interventions: Assess changes in LOC    Moisture Interventions: Absorbent underpads    Activity Interventions: Assess need for specialty bed    Mobility Interventions: Pressure redistribution bed/mattress (bed type)    Nutrition Interventions: Discuss nutritional consult with provider,Document food/fluid/supplement intake    Friction and Shear Interventions: Apply protective barrier, creams and emollients                Problem: Patient Education: Go to Patient Education Activity  Goal: Patient/Family Education  Outcome: Progressing Towards Goal

## 2022-01-09 LAB
ANION GAP SERPL CALC-SCNC: 5 MMOL/L (ref 3–18)
BUN SERPL-MCNC: 26 MG/DL (ref 7–18)
BUN/CREAT SERPL: 23 (ref 12–20)
CALCIUM SERPL-MCNC: 10 MG/DL (ref 8.5–10.1)
CHLORIDE SERPL-SCNC: 116 MMOL/L (ref 100–111)
CO2 SERPL-SCNC: 21 MMOL/L (ref 21–32)
CREAT SERPL-MCNC: 1.11 MG/DL (ref 0.6–1.3)
GLUCOSE BLD STRIP.AUTO-MCNC: 106 MG/DL (ref 70–110)
GLUCOSE BLD STRIP.AUTO-MCNC: 91 MG/DL (ref 70–110)
GLUCOSE SERPL-MCNC: 86 MG/DL (ref 74–99)
POTASSIUM SERPL-SCNC: 4.1 MMOL/L (ref 3.5–5.5)
SODIUM SERPL-SCNC: 142 MMOL/L (ref 136–145)

## 2022-01-09 PROCEDURE — 80048 BASIC METABOLIC PNL TOTAL CA: CPT

## 2022-01-09 PROCEDURE — 77030038269 HC DRN EXT URIN PURWCK BARD -A

## 2022-01-09 PROCEDURE — 74011000258 HC RX REV CODE- 258: Performed by: EMERGENCY MEDICINE

## 2022-01-09 PROCEDURE — 65660000000 HC RM CCU STEPDOWN

## 2022-01-09 PROCEDURE — 74011250636 HC RX REV CODE- 250/636: Performed by: EMERGENCY MEDICINE

## 2022-01-09 PROCEDURE — 77030040392 HC DRSG OPTIFOAM MDII -A

## 2022-01-09 PROCEDURE — 74011250637 HC RX REV CODE- 250/637: Performed by: STUDENT IN AN ORGANIZED HEALTH CARE EDUCATION/TRAINING PROGRAM

## 2022-01-09 PROCEDURE — 74011000250 HC RX REV CODE- 250: Performed by: INTERNAL MEDICINE

## 2022-01-09 PROCEDURE — 82962 GLUCOSE BLOOD TEST: CPT

## 2022-01-09 PROCEDURE — 2709999900 HC NON-CHARGEABLE SUPPLY

## 2022-01-09 PROCEDURE — 36415 COLL VENOUS BLD VENIPUNCTURE: CPT

## 2022-01-09 PROCEDURE — 74011250636 HC RX REV CODE- 250/636: Performed by: NURSE PRACTITIONER

## 2022-01-09 PROCEDURE — 74011250637 HC RX REV CODE- 250/637: Performed by: NURSE PRACTITIONER

## 2022-01-09 PROCEDURE — 74011000250 HC RX REV CODE- 250: Performed by: NURSE PRACTITIONER

## 2022-01-09 PROCEDURE — 99232 SBSQ HOSP IP/OBS MODERATE 35: CPT | Performed by: HOSPITALIST

## 2022-01-09 RX ADMIN — THIAMINE HYDROCHLORIDE 200 MG: 100 INJECTION, SOLUTION INTRAMUSCULAR; INTRAVENOUS at 08:57

## 2022-01-09 RX ADMIN — LEVETIRACETAM 500 MG: 500 SOLUTION ORAL at 08:42

## 2022-01-09 RX ADMIN — SODIUM CHLORIDE 50 ML/HR: 450 INJECTION, SOLUTION INTRAVENOUS at 04:31

## 2022-01-09 RX ADMIN — SODIUM CHLORIDE, PRESERVATIVE FREE 10 ML: 5 INJECTION INTRAVENOUS at 05:57

## 2022-01-09 RX ADMIN — HYDRALAZINE HYDROCHLORIDE 10 MG: 20 INJECTION INTRAMUSCULAR; INTRAVENOUS at 05:57

## 2022-01-09 RX ADMIN — SODIUM CHLORIDE, PRESERVATIVE FREE 10 ML: 5 INJECTION INTRAVENOUS at 22:00

## 2022-01-09 RX ADMIN — ACETAMINOPHEN 650 MG: 325 TABLET ORAL at 08:57

## 2022-01-09 RX ADMIN — LEVETIRACETAM 500 MG: 500 SOLUTION ORAL at 18:13

## 2022-01-09 NOTE — PROGRESS NOTES
Progress Note    Patient: Monika Manzanares MRN: [id number]  SSN: [id number]    YOB: 1955  Age: 78 y.o. Sex: female      Admit Date: 1/6/2022    LOS: 3 days     Subjective:     Patient lying in bed, complains of some leg pain, no nausea vomiting. Patient did not eat anything for breakfast or lunch. Patient states she has a poor appetite and does not feel like eating. Patient denies any depression. Per chart, patient had a loss of family member recent months. Tried to reach patient's son Leonor Rashid twice but no response, voicemail left. Objective:     Vitals:    01/09/22 0044 01/09/22 0504 01/09/22 0550 01/09/22 0618   BP: (!) 167/90 (!) 183/84 (!) 186/88 (!) 153/84   Pulse: 70 71     Resp: 18 18     Temp: 97.8 °F (36.6 °C) 97.6 °F (36.4 °C)     SpO2: 100% 100%     Weight:       Height:            Exam  General:  Alert, cooperative, no acute distress    Pulmonary:  CTA Bilaterally. No Wheezing/Rales. Cardiovascular: Regular rate and Rhythm. GI:  Soft, Non distended, Non tender. + Bowel sounds. Extremities:  No edema. No calf tenderness. Psych: Good insight. Not anxious or agitated. Neurologic: Alert and oriented X 2. Moves all extremities        Lab/Data Review: All lab results for the last 24 hours reviewed. Assessment:     Active Problems:    KEYLA (acute kidney injury) ([address] (9/6/2021)      Hyperkalemia (1/6/2022)      Severe protein-calorie malnutrition ([address] (1/7/2022)    Seizure disorder  History of CVA in the past  History of dysphagia    Plan:     Hyperkalemia, now resolved. 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 Family will discuss and make further decisions on PEG placement. Failure to thrive: We will get calorie count    KEYLA, improved. D5-.45NS continued. Discussed with nephrology. Seizure disorder - continue oral Keppra now.    H/o Dysphagia - on full liquid diet     PT/OT eval and treatment  Nutrition for calorie count    Discharge planning: Needs placement   already consulted. Discussed with the patient at bedside and explained in detail about my above plan care. Discussed with her about PEG placement, patient was unable to make decision.   Called and left a message to patient's son Bettie Lo By: Jani Bullock MD     January 9, 2022

## 2022-01-09 NOTE — PROGRESS NOTES
RENAL DAILY PROGRESS NOTE            73y F with PMH seizure, admitted for poor po intake, renal failure   Subjective:       Complaint:   Overnight events noted  Remain confused  Documented urine output about 300cc    IMPRESSION:   Acute kidney injury, pre renal ATN, improving renal function   Hyperkalemia, improving   HTN  Altered mental status, ? post ictal vs overdose of seizure meds   PLAN:     She remain confused. Her renal function now at baseline, dc iv fluid. Will be available if any questions or concern. Discussed with Dr. Nae Wilson. Current Facility-Administered Medications   Medication Dose Route Frequency    0.45% sodium chloride infusion  50 mL/hr IntraVENous CONTINUOUS    levETIRAcetam (KEPPRA) oral solution 500 mg  500 mg Oral BID    LORazepam (ATIVAN) injection 1 mg  1 mg IntraVENous Q4H PRN    hydrALAZINE (APRESOLINE) 20 mg/mL injection 10 mg  10 mg IntraVENous Q6H PRN    sodium chloride (NS) flush 5-40 mL  5-40 mL IntraVENous Q8H    sodium chloride (NS) flush 5-40 mL  5-40 mL IntraVENous PRN    acetaminophen (TYLENOL) tablet 650 mg  650 mg Oral Q6H PRN    Or    acetaminophen (TYLENOL) suppository 650 mg  650 mg Rectal Q6H PRN    polyethylene glycol (MIRALAX) packet 17 g  17 g Oral DAILY PRN    ondansetron (ZOFRAN ODT) tablet 4 mg  4 mg Oral Q8H PRN    Or    ondansetron (ZOFRAN) injection 4 mg  4 mg IntraVENous Q6H PRN    thiamine (B-1) 200 mg in 0.9% sodium chloride 50 mL IVPB  200 mg IntraVENous BID       Review of Symptoms: comprehensive ROS negative except above.    Objective:     Patient Vitals for the past 24 hrs:   Temp Pulse Resp BP SpO2   01/09/22 0618 -- -- -- (!) 153/84 --   01/09/22 0550 -- -- -- (!) 186/88 --   01/09/22 0504 97.6 °F (36.4 °C) 71 18 (!) 183/84 100 %   01/09/22 0044 97.8 °F (36.6 °C) 70 18 (!) 167/90 100 %   01/08/22 2141 98.1 °F (36.7 °C) 76 18 (!) 173/88 100 %   01/08/22 1538 98 °F (36.7 °C) 81 20 (!) 146/90 100 %        Weight change: 01/07 1901 - 01/09 0700  In: 1520 [P.O.:270;  I.V.:1250]  Out: 300 [Urine:300]    Intake/Output Summary (Last 24 hours) at 1/9/2022 1325  Last data filed at 1/9/2022 0618  Gross per 24 hour   Intake 240 ml   Output 300 ml   Net -60 ml     Physical Exam:   General: comfortable, no acute distress   HEENT sclera anicteric,   CVS: S1S2 heard,  no rub  RS: + air entry b/l,   Abd: Soft, Non tender,   Neuro:  Confused   Extrm: no edema, no cyanosis, clubbing   Skin: no visible  Rash  Musculoskeletal: No gross joints or bone deformities         Data Review:     LABS:   Hematology:   Recent Labs     01/07/22  0438 01/06/22  1409   WBC 8.4 10.1   HGB 10.0* 9.4*   HCT 31.2* 29.2*     Chemistry:   Recent Labs     01/09/22  0337 01/08/22  1840 01/07/22  0438 01/06/22  1409   BUN 26* 30* 48* 46*   CREA 1.11 1.27 1.70* 2.41*   CA 10.0 9.9 10.3* 10.0   ALB  --   --  3.6 3.5   K 4.1 4.0 4.1 6.3*    145 142 137   * 115* 112* 109   CO2 21 25 22 22   PHOS  --   --  2.8 3.9   GLU 86 113* 65* 82            Procedures/imaging: see electronic medical records for all procedures, Xrays and details which were not copied into this note but were reviewed prior to creation of Plan          Assessment & Plan:     As above         Francis Wong MD  1/9/2022  4:26 PM

## 2022-01-09 NOTE — PROGRESS NOTES
[email or web address]  [phone]      GASTROENTEROLOGY BRIEF NOTE  Patient not seen, chart reviewed. Family to discuss desire for PEG tomorrow and we'll be avail to discuss if desired. She has pulled out several NGs which makes PEG more risky. She did pass the swallow eval so not sure why she's unable to get PO nutrition. Impression/Recommendations:     Feeding issues, full consult to follow tomorrow after family meeting. No urgent GI needs at this time.            Sohail Cary MD

## 2022-01-09 NOTE — PROGRESS NOTES
Problem: Falls - Risk of  Goal: *Absence of Falls  Description: Document Dennie Oak Fall Risk and appropriate interventions in the flowsheet. Outcome: Progressing Towards Goal  Note: Fall Risk Interventions:                 Elimination Interventions: Bed/chair exit alarm,Call light in reach              Problem: Pressure Injury - Risk of  Goal: *Prevention of pressure injury  Description: Document John Scale and appropriate interventions in the flowsheet.   Outcome: Progressing Towards Goal  Note: Pressure Injury Interventions:  Sensory Interventions: Assess changes in LOC    Moisture Interventions: Absorbent underpads    Activity Interventions: Assess need for specialty bed    Mobility Interventions: Pressure redistribution bed/mattress (bed type)    Nutrition Interventions: Document food/fluid/supplement intake,Discuss nutritional consult with provider,Offer support with meals,snacks and hydration    Friction and Shear Interventions: Apply protective barrier, creams and emollients

## 2022-01-09 NOTE — ROUTINE PROCESS
Bedside and Verbal shift change report given to Jeannie Ulloa LPN (oncoming nurse) by Enoc Sweeney RN (offgoing nurse). Report included the following information SBAR, Kardex, MAR and Recent Results.

## 2022-01-09 NOTE — PROGRESS NOTES
Problem: Patient Education: Go to Patient Education Activity  Goal: Patient/Family Education  Outcome: Progressing Towards Goal     Problem: Patient Education: Go to Patient Education Activity  Goal: Patient/Family Education  Outcome: Progressing Towards Goal     Problem: Patient Education: Go to Patient Education Activity  Goal: Patient/Family Education  Outcome: Progressing Towards Goal     Problem: Falls - Risk of  Goal: *Absence of Falls  Description: Document Judson Blight Fall Risk and appropriate interventions in the flowsheet. Outcome: Progressing Towards Goal  Note: Fall Risk Interventions:                 Elimination Interventions: Bed/chair exit alarm,Call light in reach              Problem: Patient Education: Go to Patient Education Activity  Goal: Patient/Family Education  Outcome: Progressing Towards Goal     Problem: Pressure Injury - Risk of  Goal: *Prevention of pressure injury  Description: Document John Scale and appropriate interventions in the flowsheet.   Outcome: Progressing Towards Goal  Note: Pressure Injury Interventions:  Sensory Interventions: Assess changes in LOC    Moisture Interventions: Absorbent underpads    Activity Interventions: Assess need for specialty bed    Mobility Interventions: Pressure redistribution bed/mattress (bed type)    Nutrition Interventions: Document food/fluid/supplement intake,Discuss nutritional consult with provider,Offer support with meals,snacks and hydration    Friction and Shear Interventions: Apply protective barrier, creams and emollients                Problem: Patient Education: Go to Patient Education Activity  Goal: Patient/Family Education  Outcome: Progressing Towards Goal

## 2022-01-10 ENCOUNTER — ANESTHESIA EVENT (OUTPATIENT)
Dept: ENDOSCOPY | Age: 67
DRG: 682 | End: 2022-01-10
Payer: MEDICARE

## 2022-01-10 LAB
ANION GAP SERPL CALC-SCNC: 6 MMOL/L (ref 3–18)
BASOPHILS # BLD: 0 K/UL (ref 0–0.1)
BASOPHILS NFR BLD: 0 % (ref 0–2)
BUN SERPL-MCNC: 17 MG/DL (ref 7–18)
BUN/CREAT SERPL: 18 (ref 12–20)
CALCIUM SERPL-MCNC: 9.8 MG/DL (ref 8.5–10.1)
CHLORIDE SERPL-SCNC: 112 MMOL/L (ref 100–111)
CO2 SERPL-SCNC: 24 MMOL/L (ref 21–32)
CREAT SERPL-MCNC: 0.94 MG/DL (ref 0.6–1.3)
DIFFERENTIAL METHOD BLD: ABNORMAL
EOSINOPHIL # BLD: 0.1 K/UL (ref 0–0.4)
EOSINOPHIL NFR BLD: 1 % (ref 0–5)
ERYTHROCYTE [DISTWIDTH] IN BLOOD BY AUTOMATED COUNT: 19.6 % (ref 11.6–14.5)
GLUCOSE BLD STRIP.AUTO-MCNC: 91 MG/DL (ref 70–110)
GLUCOSE SERPL-MCNC: 93 MG/DL (ref 74–99)
HCT VFR BLD AUTO: 23.2 % (ref 35–45)
HGB BLD-MCNC: 7.5 G/DL (ref 12–16)
IMM GRANULOCYTES # BLD AUTO: 0 K/UL (ref 0–0.04)
IMM GRANULOCYTES NFR BLD AUTO: 0 % (ref 0–0.5)
LYMPHOCYTES # BLD: 1.6 K/UL (ref 0.9–3.6)
LYMPHOCYTES NFR BLD: 18 % (ref 21–52)
MAGNESIUM SERPL-MCNC: 2 MG/DL (ref 1.6–2.6)
MCH RBC QN AUTO: 29.5 PG (ref 24–34)
MCHC RBC AUTO-ENTMCNC: 32.3 G/DL (ref 31–37)
MCV RBC AUTO: 91.3 FL (ref 78–100)
MONOCYTES # BLD: 1.1 K/UL (ref 0.05–1.2)
MONOCYTES NFR BLD: 13 % (ref 3–10)
NEUTS SEG # BLD: 5.7 K/UL (ref 1.8–8)
NEUTS SEG NFR BLD: 67 % (ref 40–73)
NRBC # BLD: 0 K/UL (ref 0–0.01)
NRBC BLD-RTO: 0 PER 100 WBC
PHOSPHATE SERPL-MCNC: 2 MG/DL (ref 2.5–4.9)
PLATELET # BLD AUTO: 99 K/UL (ref 135–420)
PMV BLD AUTO: 12.8 FL (ref 9.2–11.8)
POTASSIUM SERPL-SCNC: 3.7 MMOL/L (ref 3.5–5.5)
RBC # BLD AUTO: 2.54 M/UL (ref 4.2–5.3)
SODIUM SERPL-SCNC: 142 MMOL/L (ref 136–145)
WBC # BLD AUTO: 8.5 K/UL (ref 4.6–13.2)

## 2022-01-10 PROCEDURE — 74011250637 HC RX REV CODE- 250/637: Performed by: NURSE PRACTITIONER

## 2022-01-10 PROCEDURE — 65660000000 HC RM CCU STEPDOWN

## 2022-01-10 PROCEDURE — 74011250637 HC RX REV CODE- 250/637: Performed by: STUDENT IN AN ORGANIZED HEALTH CARE EDUCATION/TRAINING PROGRAM

## 2022-01-10 PROCEDURE — 36415 COLL VENOUS BLD VENIPUNCTURE: CPT

## 2022-01-10 PROCEDURE — 82962 GLUCOSE BLOOD TEST: CPT

## 2022-01-10 PROCEDURE — 74011000250 HC RX REV CODE- 250: Performed by: NURSE PRACTITIONER

## 2022-01-10 PROCEDURE — 74011250636 HC RX REV CODE- 250/636: Performed by: HOSPITALIST

## 2022-01-10 PROCEDURE — 99232 SBSQ HOSP IP/OBS MODERATE 35: CPT | Performed by: HOSPITALIST

## 2022-01-10 PROCEDURE — 92526 ORAL FUNCTION THERAPY: CPT

## 2022-01-10 PROCEDURE — 77030040392 HC DRSG OPTIFOAM MDII -A

## 2022-01-10 PROCEDURE — 84100 ASSAY OF PHOSPHORUS: CPT

## 2022-01-10 PROCEDURE — 74011000250 HC RX REV CODE- 250: Performed by: HOSPITALIST

## 2022-01-10 PROCEDURE — 99222 1ST HOSP IP/OBS MODERATE 55: CPT | Performed by: NURSE PRACTITIONER

## 2022-01-10 PROCEDURE — 83735 ASSAY OF MAGNESIUM: CPT

## 2022-01-10 PROCEDURE — 85025 COMPLETE CBC W/AUTO DIFF WBC: CPT

## 2022-01-10 PROCEDURE — 77030038269 HC DRN EXT URIN PURWCK BARD -A

## 2022-01-10 PROCEDURE — 80048 BASIC METABOLIC PNL TOTAL CA: CPT

## 2022-01-10 RX ADMIN — LEVETIRACETAM 500 MG: 500 SOLUTION ORAL at 18:01

## 2022-01-10 RX ADMIN — POTASSIUM PHOSPHATE, MONOBASIC AND POTASSIUM PHOSPHATE, DIBASIC: 224; 236 INJECTION, SOLUTION, CONCENTRATE INTRAVENOUS at 14:27

## 2022-01-10 RX ADMIN — SODIUM CHLORIDE, PRESERVATIVE FREE 10 ML: 5 INJECTION INTRAVENOUS at 14:00

## 2022-01-10 RX ADMIN — SODIUM CHLORIDE, PRESERVATIVE FREE 10 ML: 5 INJECTION INTRAVENOUS at 22:28

## 2022-01-10 RX ADMIN — LEVETIRACETAM 500 MG: 500 SOLUTION ORAL at 08:44

## 2022-01-10 RX ADMIN — ACETAMINOPHEN 650 MG: 325 TABLET ORAL at 14:45

## 2022-01-10 RX ADMIN — SODIUM CHLORIDE, PRESERVATIVE FREE 10 ML: 5 INJECTION INTRAVENOUS at 06:36

## 2022-01-10 NOTE — PROGRESS NOTES
Progress Note    Patient: Dick Mckinney MRN: [id number]  SSN: [id number]    YOB: 1955  Age: 78 y.o. Sex: female      Admit Date: 1/6/2022    LOS: 4 days     Subjective: Patient lying in bed, pain on touching, no nausea or vomiting. Son at the bedside. Patient denies any depression, son states she has been declining after his brother's death in March 2021. Son states patient drank 1 Ensure with maximum assistance. Son wants patient to be DNR and DNI but wants PEG placement. Objective:     Vitals:    01/10/22 0045 01/10/22 0428 01/10/22 0810 01/10/22 1209   BP: (!) 165/77 (!) 155/81 (!) 152/90 133/82   Pulse: 79 76 83 83   Resp: 18 18 18 18   Temp: 98.5 °F (36.9 °C) 98.7 °F (37.1 °C) 98.9 °F (37.2 °C) 99.1 °F (37.3 °C)   SpO2: 99% 99% 99% 100%   Weight:       Height:            Exam  General:  Alert, cooperative, no acute distress    Pulmonary:  CTA Bilaterally. No Wheezing/Rales. Cardiovascular: Regular rate and Rhythm. GI:  Soft, Non distended, Non tender. + Bowel sounds. Extremities:  No edema. No calf tenderness. Neurologic: Alert and oriented X 2. Moves all extremities        Lab/Data Review: All lab results for the last 24 hours reviewed. Assessment:     Active Problems:    KEYLA (acute kidney injury) ([address] (9/6/2021)      Hyperkalemia (1/6/2022)      Severe protein-calorie malnutrition ([address] (1/7/2022)    Seizure disorder  History of CVA in the past  History of dysphagia    Plan:     Hyperkalemia, now resolved. Severe protein calorie malnutrition: Poor oral intake, son requesting for PEG placement. Discussed with GI team.  Plan for PEG tomorrow. Failure to thrive: Multifactorial    KEYLA, improved. Continue D5-.45NS. Discussed with nephrology. Seizure disorder - continue oral Keppra now. H/o Dysphagia    PT/OT eval and treatment  Nutrition for calorie count    Discharge planning: Needs placement   already consulted.     Discussed with patient and also son at the bedside and explained in detail about my above plan care. Son wants to proceed with PEG tube. I explained him risk and benefits of PEG, he completely understands and wants to proceed with the PEG tube placement.         Signed By: Dyllan Bautista MD     January 10, 2022

## 2022-01-10 NOTE — CONSULTS
[id number] [facility] 54: [id number])  [facility]: [address] [place] [address] Way: [phone]    Patient Name: Atul Tubbs  YOB: 1955    Date of Initial Consult: 1/10/2022   Reason for Consult: goals of care   Requesting Provider:  Ms Kyree Coughlin NP   Primary Care Physician: Dusty Head NP      SUMMARY:   Atul Tubbs is a 78y.o. year old with a past history of hypertension, seizures, TIA, dementia, who was admitted on 1/6/2022 from home with a diagnosis of acute kidney failure and dysphagia current medical issues leading to Palliative Medicine involvement include: 70-year-old female with a history of dementia now presents with acute kidney failure also noted to have dysphagia. Family has now agreed for PEG placement. Palliative medicine is consulted for goals of care discussions and support. PALLIATIVE DIAGNOSES:   1. Goals of care  2. Acute kidney failure  3. Dysphagia  4. Seizure disorder       PLAN:   1. Goals of care patient seen along with Ms. Russ, ROSEMARY. Patient is alert verbal although difficult to understand what she is saying. Confused. Her son Boom Mera at bedside. There are a total of 2 living children Boom Mera and his brother. Per Serjio Fournier has schizophrenia and not able to participate in medical discussions. Discussed overall medical condition with Boom Mera. Goals of care also discussed, in light of her dementia and functional abilities he would not want to subject her to resuscitation or intubation for any reason. Goals of care DNR/DNI. Post form completed and signed by Boom Mera for DNR/DNI, limited, long-term feeding tube. Family has decided on PEG placement. Bedside nurse and attending aware of goals of care changes  2. Acute kidney injury, managed by attending improving on IV fluids PEG to be placed  3. Dysphagia appreciate speech consult full thin liquid diet. However not likely to have adequate p.o. intake. Family has decided on PEG placement. 4. Seizure disorder no current seizures remains on Keppra  5. Initial consult note routed to primary continuity provider  6. Communicated plan of care with: Palliative IDT, son attending and bedside RN      Patient/Health Care Proxy Stated Goals: Prolong life      TREATMENT PREFERENCES:   Code Status: DNR/ DNI     Advance Care Planning:  [] [facility] Interdisciplinary Team has updated the ACP Navigator with [address] and Patient Capacity    Primary Decision [facility] ([address]): Son Artis Primrose   Artificially Administered Nutrition: Feeding tube long-term, if indicated     Other:  As far as possible, the palliative care team has discussed with patient / health care proxy about goals of care / treatment preferences for patient. HISTORY:     History obtained from: chart and son     CHIEF COMPLAINT: KEYLA     HPI/SUBJECTIVE:    The patient is:   [] Verbal and participatory  [x] Non-participatory due to: dementia   Please see summary     Clinical Pain Assessment (nonverbal scale for nonverbal patients): Clinical Pain Assessment  Severity: 0     Activity (Movement): Lying quietly, normal position    Duration: for how long has pt been experiencing pain (e.g., 2 days, 1 month, years)  Frequency: how often pain is an issue (e.g., several times per day, once every few days, constant)     FUNCTIONAL ASSESSMENT:     Palliative Performance Scale (PPS):  PPS: 40    ECOG  ECOG Status : Completely disabled     PSYCHOSOCIAL/SPIRITUAL SCREENING:      Any spiritual / Sabianist concerns: Unable to assess for patient  [] Yes /  [] No    Caregiver Burnout:  [] Yes /  [x] No /  [] No Caregiver Present      Anticipatory grief assessment: Unable to assess for patient  [] Normal  / [] Maladaptive        REVIEW OF SYSTEMS:     Positive and pertinent negative findings in ROS are noted above in HPI.   The following systems were [] reviewed / [x] unable to be reviewed as noted in HPI  Other findings are noted below. Systems: constitutional, ears/nose/mouth/throat, respiratory, gastrointestinal, genitourinary, musculoskeletal, integumentary, neurologic, psychiatric, endocrine. Positive findings noted below. Modified ESAS Completed by: provider           Pain: 0           Dyspnea: 0                    PHYSICAL EXAM:     Wt Readings from Last 3 Encounters:   01/06/22 44 kg (97 lb)   11/01/21 44 kg (97 lb)   09/11/21 51.3 kg (113 lb)     Blood pressure 133/82, pulse 83, temperature 99.1 °F (37.3 °C), resp. rate 18, height 5' 1\" (1.549 m), weight 44 kg (97 lb), SpO2 100 %. Pain:  Pain Scale 1: Numeric (0 - 10)  Pain Intensity 1: 0     Pain Location 1: Generalized  Pain Orientation 1: Left,Right  Pain Description 1: Aching  Pain Intervention(s) 1: Medication (see MAR)  Last bowel movement: None record    Constitutional: Chronically ill thin frail appearing female who is awake alert in no acute distress  Eyes: pupils equal  ENMT: Moist mucous membranes  Cardiovascular: Regular rate and rhythm  Respiratory: Respirations not labored  Gastrointestinal: soft non-tender  Skin: warm, dry  Neurologic: Awake, some verbalization however at times difficult to understand, confused, follows some simple commands         HISTORY:     Active Problems:    KEYLA (acute kidney injury) ([address] (9/6/2021)      Hyperkalemia (1/6/2022)      Severe protein-calorie malnutrition ([address] (1/7/2022)      Past Medical History:   Diagnosis Date    Abnormal coordination 9/21/2020    Hallucinations 9/21/2020    Hypertension     Neurological disorder     Seizures ([address]     Stroke ([address] 2009      No past surgical history on file. Family History   Problem Relation Age of Onset    Diabetes Other     Stroke Other      History reviewed, no pertinent family history.   Social History     Tobacco Use    Smoking status: Never Smoker    Smokeless tobacco: Never Used   Substance Use Topics    Alcohol use: Yes     Comment: Socially Allergies   Allergen Reactions    Tomato Hives     Allergic to eating tomato.  Aspirin Nausea and Vomiting    Ciprofloxacin Rash    Pcn [Penicillins] Rash and Hives    Sulfa (Sulfonamide Antibiotics) Hives and Rash      Current Facility-Administered Medications   Medication Dose Route Frequency    potassium phosphate 20 mmol in 0.9% sodium chloride 250 mL infusion   IntraVENous ONCE    levETIRAcetam (KEPPRA) oral solution 500 mg  500 mg Oral BID    LORazepam (ATIVAN) injection 1 mg  1 mg IntraVENous Q4H PRN    hydrALAZINE (APRESOLINE) 20 mg/mL injection 10 mg  10 mg IntraVENous Q6H PRN    sodium chloride (NS) flush 5-40 mL  5-40 mL IntraVENous Q8H    sodium chloride (NS) flush 5-40 mL  5-40 mL IntraVENous PRN    acetaminophen (TYLENOL) tablet 650 mg  650 mg Oral Q6H PRN    Or    acetaminophen (TYLENOL) suppository 650 mg  650 mg Rectal Q6H PRN    polyethylene glycol (MIRALAX) packet 17 g  17 g Oral DAILY PRN    ondansetron (ZOFRAN ODT) tablet 4 mg  4 mg Oral Q8H PRN    Or    ondansetron (ZOFRAN) injection 4 mg  4 mg IntraVENous Q6H PRN        LAB AND IMAGING FINDINGS:     Lab Results   Component Value Date/Time    WBC 8.5 01/10/2022 03:34 AM    HGB 7.5 (L) 01/10/2022 03:34 AM    PLATELET 99 (L) 83/20/3642 03:34 AM     Lab Results   Component Value Date/Time    Sodium 142 01/10/2022 03:34 AM    Potassium 3.7 01/10/2022 03:34 AM    Chloride 112 (H) 01/10/2022 03:34 AM    CO2 24 01/10/2022 03:34 AM    BUN 17 01/10/2022 03:34 AM    Creatinine 0.94 01/10/2022 03:34 AM    Calcium 9.8 01/10/2022 03:34 AM    Magnesium 2.0 01/10/2022 03:34 AM    Phosphorus 2.0 (L) 01/10/2022 03:34 AM      Lab Results   Component Value Date/Time    Alk.  phosphatase 74 01/07/2022 04:38 AM    Protein, total 7.1 01/07/2022 04:38 AM    Albumin 3.6 01/07/2022 04:38 AM    Globulin 3.5 01/07/2022 04:38 AM     Lab Results   Component Value Date/Time    INR 1.0 03/06/2018 10:16 AM    Prothrombin time 12.4 03/06/2018 10:16 AM aPTT <20.0 (L) 10/04/2016 02:37 PM      Lab Results   Component Value Date/Time    Iron 29 (L) 02/04/2014 03:09 AM    TIBC 114 (L) 02/04/2014 03:09 AM    Iron % saturation 25 02/04/2014 03:09 AM      No results found for: PH, PCO2, PO2  No components found for: [place]   Lab Results   Component Value Date/Time     (H) 01/06/2022 02:09 PM    CK - MB <1.0 09/06/2021 06:17 PM              Total time: 50 minutes   Counseling / coordination time, spent as noted above:   > 50% counseling / coordination:     Prolonged service was provided for  []30 min   []75 min in face to face time in the presence of the patient, spent as noted above.   Time Start:   Time End:

## 2022-01-10 NOTE — PROGRESS NOTES
Problem: Falls - Risk of  Goal: *Absence of Falls  Description: Document Mateo Counts Fall Risk and appropriate interventions in the flowsheet. Outcome: Progressing Towards Goal  Note: Fall Risk Interventions:       Mentation Interventions: Bed/chair exit alarm    Medication Interventions: Bed/chair exit alarm    Elimination Interventions: Bed/chair exit alarm,Call light in reach              Problem: Pressure Injury - Risk of  Goal: *Prevention of pressure injury  Description: Document John Scale and appropriate interventions in the flowsheet.   Outcome: Progressing Towards Goal  Note: Pressure Injury Interventions:  Sensory Interventions: Assess changes in LOC    Moisture Interventions: Absorbent underpads,Internal/External urinary devices    Activity Interventions: Pressure redistribution bed/mattress(bed type)    Mobility Interventions: HOB 30 degrees or less    Nutrition Interventions: Discuss nutritional consult with provider,Document food/fluid/supplement intake    Friction and Shear Interventions: HOB 30 degrees or less

## 2022-01-10 NOTE — PROGRESS NOTES
Physician Progress Note      Yuni Rivas  CSN #:                  [id number]  :                       1955  ADMIT DATE:       2022 12:12 PM  [address] [facility] [place] DATE:  RESPONDING  PROVIDER #:        Huma Ram MD          QUERY TEXT:    Dear Dr. Lauraine Hodgkin    Pt admitted with dysphagia and seizure. Pt noted to have prior CVA. If possible, please document in progress notes and discharge summary if you are evaluating and/or treating any of the following: The medical record reflects the following:  Risk Factors: malnutrition, failure to thrive    Clinical Indicators:  \"Failure to thrive  Elevated LFTs  Seizure disorder  History of CVA in the past  History of dysphagia\"  and \"Altered mental status -worsening gradually over the past 1 to 2 months. \"  per  Jacobo Zheng NP, H&P. Treatment: consult SLP, consult RD, recommend PEG    Thank you,  BRET Mckinley RN, [email or web address]  Options provided:  -- Dysphagia is a sequela of prior CVA  -- Seizure is a sequela of prior CVA  -- Altered mental status is a sequela of prior CVA  -- Altered mental status, dysphagia, and seizure are sequelae of a prior CVA  -- Other - I will add my own diagnosis  -- Disagree - Not applicable / Not valid  -- Disagree - Clinically unable to determine / Unknown  -- Refer to Clinical Documentation Reviewer    PROVIDER RESPONSE TEXT:    AMS due to dehydration    Query created by:  Kath Keenan on 1/10/2022 12:31 PM      Electronically signed by:  Huma Ram MD 1/10/2022 6:32 PM

## 2022-01-10 NOTE — CONSULTS
Nutrition Assessment (Disaster Mode)    Type and Reason for Visit: Reassess,Consult    Nutrition Recommendations/Plan:   - Continue diet, consistency per SLP recommendations, and Ensure Enlive at this time.   - Oral intake inadequate to meet patents nutritional needs, agree that pt would need enteral nutrition to obtain adequate nutrition intake. Nutrition available for management of tube feeding once PEG tube is placed. Malnutrition Assessment:  Malnutrition Status: Severe malnutrition    Nutrition Assessment:   Nutrition Assessment: Pt continues to have poor intake. Son at bedside encouraging supplement intake, pt took a few sips during visit. Discussed inadequate intake with son and Dr. Rebel Staley. Initial plan for calorie count, however noted that GI spoke to family and they want to proceed with PEG tube placement tomorrow; will discontinue plan for calorie count. Nutrition Related Findings: BM 1/10. Meds: Thiamine (), K phos 20 mmol replacement. Total Energy Requirements (kcals/day): [id number] Energy Requirements Based On: Kcal/kg (25-30) Weight Used for Energy Requirements: Ideal (48  kg)  Total Protein Requirements (g/day): 38-48 Weight Used for Protein Requirements: Ideal  Total Fluid Requirements (ml/day): 1142-6409 Method Used for Fluid Requirements: 1 ml/kcal    Current Nutrition Therapies:  ADULT DIET Full Liquid; Lactose-Controlled  ADULT ORAL NUTRITION SUPPLEMENT Breakfast, Lunch, Dinner; Standard High Calorie/High Protein  DIET NPO Sips of Water with Meds     Anthropometric Measures:  Height: 5' 1\" (154.9 cm) Weight: 44 kg (97 lb) Body mass index is 18.33 kg/m².   Admission Body Weight: 97 lb  Ideal Body Weight (lbs) (Calculated): 105 lbs Ideal Body Weight (Kg) (Calculated): 48 kg % IBW (Calculated): 92.4 %  Usual Body Weight: 51.3 kg (113 lb) (2021) % Weight Change (Calculated): -14.2            Nutrition Diagnosis:   · Severe malnutrition,In context of chronic illness related to cognitive or neurological impairment,inadequate protein-energy intake as evidenced by poor intake prior to admission,weight loss 7.5% in 3 months     Nutrition Interventions:   Food and/or Nutrient Delivery: Continue current diet,Start oral nutrition supplement,Start tube feeding  Nutrition Education/Counseling: No recommendations at this time  Coordination of Nutrition Care: Continue to monitor while inpatient    Previous Goal Met: No progress toward goal(s)  Goals: Nutritional needs will be met through adequate oral intake or nutrition support within the next 7 days. Nutrition Monitoring and Evaluation: Patient will be monitored per nutrition standards of care. Consult Dietitian if nutrition intervention essential to patient care is needed. Behavioral-Environmental Outcomes: None identified  Food/Nutrient Intake Outcomes: Food and nutrient intake,Supplement intake,Enteral nutrition intake/tolerance  Physical Signs/Symptoms Outcomes: Biochemical data,Chewing or swallowing,GI status,Meal time behavior,Nutrition focused physical findings    Discharge Planning:  Too soon to determine    Jony Andrade RD on 1/10/2022 at 1:27 PM  Contact: [phone]    Disaster Mode Active

## 2022-01-10 NOTE — ACP (ADVANCE CARE PLANNING)
[address] [facility] [phone]  [phone]    Advanced Steps Advance Care Planning [facility] POST (Physician Orders for Scope of Treatment)       Date of conversation:  01/10/2022 Location:  [facility]   Length (minutes): 20    Participants:   [x] Patient    [x] Other Surrogate Decision Maker / Next of Annita 68    Name: Paradise Pineda    Relationship to Patient:  Son/Legal N.o.K. Phone Number: [phone]       Advanced Steps® ACP Facilitator: Michael Diaz LMSW      Conversation Topics   (If Patient does not have decision making capacity, Agent/Surrogate responds based on understanding of how patient would respond if capable)    Understanding of Medical Condition/s AND Potential Complications:    Patient response: Unable to participate due to medical condition. Healthcare Agent/Other Surrogate: Pt's son verbalized understanding of pt's medical condition and potential complications thereof. Lesson [place] from Experiences Related to Serious Illness: None stated at this time. Identifies the following as important for living well: Being home     Hopes: Pt will recover enough to return home. Worries/Fears about Medical Condition: Pt's condition will not improve. Sources of support/comfort described as: Family     Cultural, Jewish, spiritual, or personal beliefs described as: None stated     Needs to discuss with spiritual/Jewish advisor: [] Yes  [x] No    Needs more information about illness and complications:  [] Yes  [x] No      Cardiopulmonary Resuscitation      \"What do you understand about CPR? \" Response: Pt would not benefit from CPR.      Order Elected for CPR:  []  Attempt Resuscitation [x]  Do Not Attempt Resuscitation      When NOT in Cardiopulmonary Arrest, Order Elected:      [] Comfort Measures  [x] Limited Additional Interventions  [] Full Interventions    Artificially Administered Nutrition, Order Elected:    [] No Feeding Tube   [] Feeding Tube for a defined trial period  [x] Feeding Tube long-term if indicated    Meeting Outcomes:   [x] ACP discussion completed   [x] [place] form completed  [x] [place] prepared for Provider review and signature   [x] Original placed on Chart, if in facility (form to be sent with patient at discharge)  [x] Copy given to healthcare agent    [x] Copy scanned to electronic medical record    Kareem Rubio NP and this LMSW met with pt and her son, Ashish Carr at bedside. Pt is confused and is only able to communicate minimally. She expressed that her back is hurting. It was determined that pt was on a bed pan. RN and aide came to bedside to remove bed pan and to attend to pt's hygiene needs. After bed pan was removed, pt was visibly more comfortable, and able to communicate that as well. Per pt's son, she had three children; one of which is . Her other son, who lives with her has schizophrenia, and is not able to make complex medical decision on pt's behalf. Pt's son Ashish Carr, will be primary decision maker. Ashish Carr reports pt was bed/chair bound prior to admission. She has an aide who cares for her 10a. m.-5p.m. Monday through Friday, and her PCP had just requested that those hours be increased, due to increased needs. Since beginning of year, pt has not been eating well, has had difficulty swallowing and has been pocketing food. NP explained this can be an indication that the dementia is progressing, and the brain is no longer able to communicate the instructions to mouth to chew and swallow. Pt's son does want to do a Peg tube, for nutrition. Burdens and benefits of this were discussed in detail, and concerns about placing peg tube in advanced dementia patient also discussed. Pt's son, verbalized understanding through teach back. NP and this LMSW addressed goals of care.   Pt's son reports he has been told that CPR is quite violent and dangerous for an older, frail person, such as his mother. He reports he would not want pt to be put through CPR/Intubation and would want her to be allowed to pass peacefully, in event of Cardiopulmonary arrest.  POST was completed, signed by pt's son and NP. Pt's son reports no other needs or concerns at this time. Thank you for this referral to Palliative Care. The palliative care team remains available to provide support for pt and her family and will re-address goals of care, if required.       CODE STATUS: DNR/DNI    Bertin Moore, [address] [facility]  Palliative Medicine Inpatient   [facility]   Palliative COPE Line: [phone] ([phone])

## 2022-01-10 NOTE — ROUTINE PROCESS
Bedside and Verbal shift change report given to Marisol Castillo RN (oncoming nurse) by Aileen Torres RN (offgoing nurse). Report included the following information SBAR, Kardex, MAR and Recent Results.

## 2022-01-10 NOTE — CONSULTS
[email or web address]  [phone]    GASTROENTEROLOGY CONSULT      Impression:   1. Severe protein calorie malnutrition/failure to thrive: poor oral intake reported- has pulled out several NGT previously and family would like to avoid. 2. H/O dysphagia on full liquid diet  3. Hyperkalemia- resolved  4. KEYLA- improved      Plan:     1. Per hospitalist notes patient's son Blayne Schofield is discussing with other family members whether to proceed with PEG tube insertion. Of note, yesterday they had difficulty reaching him. From our perspective patient could be high risk for post procedure complications as she has pulled out multiple NG tubes previously- if we proceed we will need to insure she has abd binder in place at all times and safety measures are in place to prevent from accidental premature removal.   2.  Will follow to see outcome of family discussion  3. Medical management per primary team.       Chief Complaint: poor oral intake, peg insertion      HPI:  Alia Mehta is a 78 y.o. female who I am being asked to see in consultation for an opinion regarding the above. Patient does not offer much conversation but does deny abd pain, changes in bowel habits, heartburn/dysphagia. She nods head yes when asked if she is just not hungry. She offers up no other explanation for her poor appetite. On chart review- last EGD 2014- normal; path neg BE  Last colonoscopy 2014- normal with IH; f/u in 10 years recommended (2024)    PMH:   Past Medical History:   Diagnosis Date    Abnormal coordination 9/21/2020    Hallucinations 9/21/2020    Hypertension     Neurological disorder     [facility] [address]     Stroke ([facility] [address] 2009       PSH:   No past surgical history on file.     Social HX:   Social History     Socioeconomic History    Marital status:      Spouse name: Not on file    Number of children: Not on file    Years of education: Not on file    Highest education level: Not on file   Occupational History    Not on file   Tobacco Use    Smoking status: Never Smoker    Smokeless tobacco: Never Used   Vaping Use    Vaping Use: Never used   Substance and Sexual Activity    Alcohol use: Yes     Comment: Socially     Drug use: No    Sexual activity: Not on file   Other Topics Concern    Not on file   Social History Narrative    Not on file     Social Determinants of Health     Financial Resource Strain:     Difficulty of Paying Living Expenses: Not on file   Food Insecurity:     Worried About Running Out of Food in the Last Year: Not on file    Binu of Food in the Last Year: Not on file   Transportation Needs:     Lack of Transportation (Medical): Not on file    Lack of Transportation (Non-Medical): Not on file   Physical Activity:     Days of Exercise per Week: Not on file    Minutes of Exercise per Session: Not on file   Stress:     Feeling of Stress : Not on file   Social Connections:     Frequency of Communication with Friends and Family: Not on file    Frequency of Social Gatherings with Friends and Family: Not on file    Attends Zoroastrianism Services: Not on file    Active Member of [address] [place] [address] or Organizations: Not on file    Attends Club or Organization Meetings: Not on file    Marital Status: Not on file   Intimate Partner Violence:     Fear of Current or Ex-Partner: Not on file    Emotionally Abused: Not on file    Physically Abused: Not on file    Sexually Abused: Not on file   Housing Stability:     Unable to Pay for Housing in the Last Year: Not on file    Number of [place] in the Last Year: Not on file    Unstable Housing in the Last Year: Not on file       FHX:   Family History   Problem Relation Age of Onset    Diabetes Other     Stroke Other        Allergy:   Allergies   Allergen Reactions    Tomato Hives     Allergic to eating tomato.     Aspirin Nausea and Vomiting    Ciprofloxacin Rash    Pcn [Penicillins] Rash and Hives    Sulfa (Sulfonamide Antibiotics) Hives and Rash Patient Active Problem List   Diagnosis Code    Pulmonary embolism (HCC) I26.99    Chest pain R07.9    Other and unspecified noninfectious gastroenteritis and colitis(558.9) K52.9    CVA (cerebral infarction) I63.9    Non compliance w medication regimen Z91.14    Elevated Dilantin level R78.89    Convulsion ([address] R56.9    Essential hypertension I10    Left-sided weakness R53.1    Dyslipidemia E78.5    TIA (transient ischemic attack) G45.9    Ataxia R27.0    Headache R51.9    Status epilepticus ([address] G40.901    Seizure ([facility] R56.9    Recurrent seizures ([facility] [address] G40.909    Encephalopathy G93.40    Hallucinations R44.3    Abnormal coordination R27.8    Dehydration E86.0    Anemia D64.9    KEYLA (acute kidney injury) ([facility] [address] N17.9    Breakthrough seizure (HCC) G40.919    Hyperkalemia E87.5    Severe protein-calorie malnutrition ([facility]) E43       Home Medications:     Medications Prior to Admission   Medication Sig    divalproex DR (DEPAKOTE) 250 mg tablet Take 750 mg by mouth nightly.  spironolactone (ALDACTONE) 25 mg tablet Take  by mouth daily.  PARoxetine (PAXIL) 30 mg tablet Take 30 mg by mouth daily.  hydrALAZINE (APRESOLINE) 100 mg tablet Take  by mouth.  OXcarbazepine (TRILEPTAL) 300 mg tablet Take 300 mg by mouth two (2) times a day.  lisinopriL (PRINIVIL, ZESTRIL) 20 mg tablet Take 0.5 Tablets by mouth daily. Indications: high blood pressure    multivitamin, tx-iron-ca-min (THERA-M w/ IRON) 9 mg iron-400 mcg tab tablet Take 1 Tablet by mouth daily.  atorvastatin (LIPITOR) 40 mg tablet Take 40 mg by mouth daily.  acetaminophen (TYLENOL) 500 mg tablet Take 500 mg by mouth as needed for Pain.  clopidogreL (PLAVIX) 75 mg tab Take 75 mg by mouth daily. Indications: prevention for a blood clot going to the brain    tamsulosin (FLOMAX) 0.4 mg capsule Take 1 Cap by mouth nightly.     ergocalciferol (VITAMIN D2) 50,000 unit capsule Take 50,000 Units by mouth two (2) times a week.  levETIRAcetam (KEPPRA) 1,000 mg tablet Take 1 Tab by mouth two (2) times a day.  amLODIPine (NORVASC) 10 mg tablet Take 1 Tab by mouth daily.  folic acid (FOLVITE) 1 mg tablet Take 1 Tablet by mouth daily. (Patient not taking: Reported on 1/6/2022)    glucose 4 gram chewable tablet Take 4 Tablets by mouth nightly. (Patient not taking: Reported on 1/6/2022)    clonazePAM (KlonoPIN) 0.25 mg TbDi Take 0.25 mg by mouth as needed for Other (seizure). (Patient not taking: Reported on 1/6/2022)    cyanocobalamin (VITAMIN B12) 500 mcg tablet Take 1 Tab by mouth daily. Review of Systems:     Constitutional: No fevers, chills, weight loss, fatigue. Skin: No rashes, pruritis, jaundice, ulcerations, erythema. HENT: No headaches, nosebleeds, sinus pressure, rhinorrhea, sore throat. Eyes: No visual changes, blurred vision, eye pain, photophobia, jaundice. Cardiovascular: No chest pain, heart palpitations. Respiratory: No cough, SOB, wheezing, chest discomfort, orthopnea. Gastrointestinal: Neg unless noted otherwise in H&P   Genitourinary: No dysuria, bleeding, discharge, pyuria. Musculoskeletal: No weakness, arthralgias, wasting. Endo: No sweats. Heme: No bruising, easy bleeding. Allergies: As noted. Neurological: Cranial nerves intact. Alert and oriented. Gait not assessed. Psychiatric:  No anxiety, depression, hallucinations. Visit Vitals  BP (!) 152/90 (BP 1 Location: Left upper arm, BP Patient Position: At rest)   Pulse 83   Temp 98.9 °F (37.2 °C)   Resp 18   Ht 5' 1\" (1.549 m)   Wt 44 kg (97 lb)   SpO2 99%   BMI 18.33 kg/m²       Physical Assessment:     constitutional: appearance: well developed, well nourished, normal habitus, no deformities, in no acute distress. skin: inspection: no rashes, ulcers, icterus or other lesions; no clubbing or telangiectasias. palpation: no induration or subcutaneos nodules.    eyes: inspection: normal conjunctivae and lids; no jaundice pupils: normal  ENMT: mouth: normal oral mucosa,lips and gums  neck: thyroid: normal size, consistency and position; no masses or tenderness. respiratory: effort: normal chest excursion; no intercostal retraction or accessory muscle use. cardiovascular: abdominal aorta: normal size and position; no bruits. palpation: PMI of normal size and position; normal rhythm; no thrill or murmurs. abdominal: abdomen: normal consistency; no tenderness or masses. hernias: no hernias appreciated. rectal: hemoccult/guaiac: not performed. musculoskeletal: digits and nails: no clubbing, cyanosis, petechiae or other inflammatory conditions. head and neck: normal range of motion; no pain, crepitation or contracture. spine/ribs/pelvis: normal range of motion; no pain, deformity or contracture. neurologic: cranial nerves: II-XII normal. Pupils intact. psychiatric: judgement/insight: within normal limits. memory: within normal limits for recent and remote events. Basic Metabolic Profile   Recent Labs     01/10/22  0334      K 3.7   *   CO2 24   BUN 17   GLU 93   CA 9.8   MG 2.0   PHOS 2.0*         CBC w/Diff    Recent Labs     01/10/22  0334   WBC 8.5   RBC 2.54*   HGB 7.5*   HCT 23.2*   MCV 91.3   MCH 29.5   MCHC 32.3   RDW 19.6*   PLT 99*    Recent Labs     01/10/22  0334   GRANS 67   LYMPH 18*   EOS 1        Hepatic Function   No results for input(s): ALB, TP, TBILI, AP, AML, LPSE in the last 72 hours. No lab exists for component: DBILI, GPT, SGOT     Coags   No results for input(s): PTP, INR, APTT, INREXT in the last 72 hours. Sola Martinez NP. [facility] [address] 1948, [address] [facility]  Cell: [phone]  [email or web address]

## 2022-01-10 NOTE — PROGRESS NOTES
GI Brief Progress Note    Spoke with son Jim Zimmerman at bedside regarding peg tube placement. Extensive discussion with patient and family. . Placement of this tube is safe for the most part, but rarely (~1%), there can be serious complication such as bleeding, infection, damage to internal organs, including a perforation. If any of these things were to occur, the patient may require additional treatment, including need for interventional radiology procedures or open surgery by a surgeon. Rarely, complications from a PEG placement have been known to cause death and potentially life threatening complications. Sedation has inherent risks including cardiorespiratory compromise, MI, Stroke, need for intubation, and rarely death. Discussed that post procedure it is imperative that the tube remain in place for several weeks- accidental removal could lead to peritonitis, sepsis, death. The patient and family would like to proceed with placement of PEG. Will plan for tomorrow.   Added on to endo schedule  Will have patient NPO after MN  Rapid covid negative on 1/6/22    Gisela Perez NP-C  [facility], [address] [place] [address]

## 2022-01-10 NOTE — PROGRESS NOTES
Problem: Falls - Risk of  Goal: *Absence of Falls  Description: Document [place] Fall Risk and appropriate interventions in the flowsheet. Outcome: Progressing Towards Goal  Note: Fall Risk Interventions:       Mentation Interventions: Bed/chair exit alarm    Medication Interventions: Bed/chair exit alarm    Elimination Interventions: Bed/chair exit alarm,Call light in reach              Problem: Pressure Injury - Risk of  Goal: *Prevention of pressure injury  Description: Document John Scale and appropriate interventions in the flowsheet. Outcome: Progressing Towards Goal  Note: Pressure Injury Interventions:  Sensory Interventions: Assess changes in LOC    Moisture Interventions: Absorbent underpads,Apply protective barrier, creams and emollients    Activity Interventions: Pressure redistribution bed/mattress(bed type)    Mobility Interventions: Pressure redistribution bed/mattress (bed type),HOB 30 degrees or less,Turn and reposition approx.  every two hours(pillow and wedges)    Nutrition Interventions: Document food/fluid/supplement intake,Discuss nutritional consult with provider    Friction and Shear Interventions: Apply protective barrier, creams and emollients,HOB 30 degrees or less

## 2022-01-10 NOTE — PROGRESS NOTES
Bedside shift change report given to Varun Soni RN (oncoming nurse) by Linda Miller RN (offgoing nurse). Report included the following information SBAR, Kardex, MAR and Quality Measures.

## 2022-01-10 NOTE — PROGRESS NOTES
Bedside and Verbal shift change report given to Ross WOODS (oncoming nurse) by Chance Boswell (offgoing nurse). Report included the following information SBAR, Kardex, Procedure Summary, MAR and Accordion.

## 2022-01-11 ENCOUNTER — ANESTHESIA (OUTPATIENT)
Dept: ENDOSCOPY | Age: 67
DRG: 682 | End: 2022-01-11
Payer: MEDICARE

## 2022-01-11 LAB
COVID-19 RAPID TEST, COVR: NOT DETECTED
GLUCOSE BLD STRIP.AUTO-MCNC: 108 MG/DL (ref 70–110)
GLUCOSE BLD STRIP.AUTO-MCNC: 90 MG/DL (ref 70–110)
LEVETIRACETAM SERPL-MCNC: <1 UG/ML (ref 10–40)
SOURCE, COVRS: NORMAL

## 2022-01-11 PROCEDURE — 74011250636 HC RX REV CODE- 250/636: Performed by: INTERNAL MEDICINE

## 2022-01-11 PROCEDURE — 76040000019: Performed by: INTERNAL MEDICINE

## 2022-01-11 PROCEDURE — 0DH63UZ INSERTION OF FEEDING DEVICE INTO STOMACH, PERCUTANEOUS APPROACH: ICD-10-PCS | Performed by: INTERNAL MEDICINE

## 2022-01-11 PROCEDURE — 74011000250 HC RX REV CODE- 250: Performed by: NURSE PRACTITIONER

## 2022-01-11 PROCEDURE — 77030016831 HC CATH GASTMY PEG1 BSC -B: Performed by: INTERNAL MEDICINE

## 2022-01-11 PROCEDURE — 00731 ANES UPR GI NDSC PX NOS: CPT | Performed by: NURSE ANESTHETIST, CERTIFIED REGISTERED

## 2022-01-11 PROCEDURE — 00731 ANES UPR GI NDSC PX NOS: CPT | Performed by: ANESTHESIOLOGY

## 2022-01-11 PROCEDURE — 74011250636 HC RX REV CODE- 250/636: Performed by: NURSE ANESTHETIST, CERTIFIED REGISTERED

## 2022-01-11 PROCEDURE — 99232 SBSQ HOSP IP/OBS MODERATE 35: CPT | Performed by: HOSPITALIST

## 2022-01-11 PROCEDURE — 65660000000 HC RM CCU STEPDOWN

## 2022-01-11 PROCEDURE — 74011250636 HC RX REV CODE- 250/636: Performed by: NURSE PRACTITIONER

## 2022-01-11 PROCEDURE — 82962 GLUCOSE BLOOD TEST: CPT

## 2022-01-11 PROCEDURE — 87635 SARS-COV-2 COVID-19 AMP PRB: CPT

## 2022-01-11 PROCEDURE — 74011000250 HC RX REV CODE- 250: Performed by: NURSE ANESTHETIST, CERTIFIED REGISTERED

## 2022-01-11 PROCEDURE — 74011250637 HC RX REV CODE- 250/637: Performed by: NURSE PRACTITIONER

## 2022-01-11 PROCEDURE — 76060000031 HC ANESTHESIA FIRST 0.5 HR: Performed by: INTERNAL MEDICINE

## 2022-01-11 PROCEDURE — 2709999900 HC NON-CHARGEABLE SUPPLY: Performed by: INTERNAL MEDICINE

## 2022-01-11 PROCEDURE — 74011250637 HC RX REV CODE- 250/637: Performed by: STUDENT IN AN ORGANIZED HEALTH CARE EDUCATION/TRAINING PROGRAM

## 2022-01-11 RX ORDER — FAMOTIDINE 10 MG/ML
INJECTION INTRAVENOUS
Status: DISPENSED
Start: 2022-01-11 | End: 2022-01-11

## 2022-01-11 RX ORDER — SODIUM CHLORIDE 0.9 % (FLUSH) 0.9 %
5-40 SYRINGE (ML) INJECTION EVERY 8 HOURS
Status: DISCONTINUED | OUTPATIENT
Start: 2022-01-11 | End: 2022-01-11 | Stop reason: HOSPADM

## 2022-01-11 RX ORDER — PROPOFOL 10 MG/ML
INJECTION, EMULSION INTRAVENOUS AS NEEDED
Status: DISCONTINUED | OUTPATIENT
Start: 2022-01-11 | End: 2022-01-11 | Stop reason: HOSPADM

## 2022-01-11 RX ORDER — SODIUM CHLORIDE 0.9 % (FLUSH) 0.9 %
5-40 SYRINGE (ML) INJECTION AS NEEDED
Status: DISCONTINUED | OUTPATIENT
Start: 2022-01-11 | End: 2022-01-11 | Stop reason: HOSPADM

## 2022-01-11 RX ORDER — SODIUM CHLORIDE, SODIUM LACTATE, POTASSIUM CHLORIDE, CALCIUM CHLORIDE 600; 310; 30; 20 MG/100ML; MG/100ML; MG/100ML; MG/100ML
25 INJECTION, SOLUTION INTRAVENOUS CONTINUOUS
Status: DISCONTINUED | OUTPATIENT
Start: 2022-01-11 | End: 2022-01-11 | Stop reason: HOSPADM

## 2022-01-11 RX ORDER — FAMOTIDINE 20 MG/1
20 TABLET, FILM COATED ORAL ONCE
Status: DISCONTINUED | OUTPATIENT
Start: 2022-01-11 | End: 2022-01-11 | Stop reason: HOSPADM

## 2022-01-11 RX ORDER — LIDOCAINE HYDROCHLORIDE 20 MG/ML
INJECTION, SOLUTION EPIDURAL; INFILTRATION; INTRACAUDAL; PERINEURAL AS NEEDED
Status: DISCONTINUED | OUTPATIENT
Start: 2022-01-11 | End: 2022-01-11 | Stop reason: HOSPADM

## 2022-01-11 RX ADMIN — LIDOCAINE HYDROCHLORIDE 100 MG: 20 INJECTION, SOLUTION EPIDURAL; INFILTRATION; INTRACAUDAL; PERINEURAL at 11:43

## 2022-01-11 RX ADMIN — SODIUM CHLORIDE, SODIUM LACTATE, POTASSIUM CHLORIDE, AND CALCIUM CHLORIDE 25 ML: 600; 310; 30; 20 INJECTION, SOLUTION INTRAVENOUS at 09:15

## 2022-01-11 RX ADMIN — SODIUM CHLORIDE, PRESERVATIVE FREE 10 ML: 5 INJECTION INTRAVENOUS at 15:10

## 2022-01-11 RX ADMIN — PROPOFOL 30 MG: 10 INJECTION, EMULSION INTRAVENOUS at 11:45

## 2022-01-11 RX ADMIN — PROPOFOL 70 MG: 10 INJECTION, EMULSION INTRAVENOUS at 11:43

## 2022-01-11 RX ADMIN — PROPOFOL 50 MG: 10 INJECTION, EMULSION INTRAVENOUS at 11:48

## 2022-01-11 RX ADMIN — SODIUM CHLORIDE, PRESERVATIVE FREE 20 MG: 5 INJECTION INTRAVENOUS at 11:31

## 2022-01-11 RX ADMIN — ACETAMINOPHEN 650 MG: 325 TABLET ORAL at 15:10

## 2022-01-11 RX ADMIN — HYDRALAZINE HYDROCHLORIDE 10 MG: 20 INJECTION INTRAMUSCULAR; INTRAVENOUS at 00:46

## 2022-01-11 RX ADMIN — LEVETIRACETAM 500 MG: 500 SOLUTION ORAL at 18:37

## 2022-01-11 RX ADMIN — VANCOMYCIN HYDROCHLORIDE 1000 MG: 1 INJECTION, POWDER, LYOPHILIZED, FOR SOLUTION INTRAVENOUS at 11:28

## 2022-01-11 NOTE — PROGRESS NOTES
Progress Note    Patient: Randall Veronica MRN: [id number]  SSN: [id number]    YOB: 1955  Age: 78 y.o. Sex: female      Admit Date: 1/6/2022    LOS: 5 days     Subjective: Patient seen in PACU, feeling fine. Denies any pain. Objective:     Vitals:    01/11/22 1300 01/11/22 1301 01/11/22 1302 01/11/22 1311   BP: (!) 159/82      Pulse: 73 75     Resp: 11 11     Temp:       SpO2: 100% 100% 100% 95%   Weight:       Height:            Exam  General:  Alert, cooperative, no acute distress    Pulmonary:  CTA Bilaterally. No Wheezing/Rales. Cardiovascular: Regular rate and Rhythm. GI:  Soft, Non distended, Non tender. + Bowel sounds. Extremities:  No edema. No calf tenderness. Neurologic: Alert and oriented X 2. Moves all extremities  PEG site dressing clean        Assessment:     Active Problems:    KEYLA (acute kidney injury) ([facility] [address] (9/6/2021)      Hyperkalemia (1/6/2022)      Severe protein-calorie malnutrition ([facility] [address] (1/7/2022)    Seizure disorder  History of CVA in the past  History of dysphagia    Plan:     Hyperkalemia, now resolved. Severe protein calorie malnutrition: Poor oral intake. PEG placed to the,    Failure to thrive: Multifactorial    KEYLA, improved. Continue D5-.45NS. Discussed with nephrology. Seizure disorder: continue oral Keppra now. H/o Dysphagia    Concern for depression: We will get psychiatry evaluation, discussed with Dr. Dionna Pandya      PT/OT eval and treatment  Nutrition for calorie count    Discharge planning: Needs placement per case management   already consulted. Discussed with patient at the bedside.   Called and left a message to patient's son Krystle Sood By: Megan Boone MD     January 11, 2022

## 2022-01-11 NOTE — PALLIATIVE CARE
Palliative Medicine    Goals of care have been established. POST form completed with the following measures:  DNR/DNI, limited, long term feeding tube. Goals of care are DNR/DNI, limited interventions, long term feeding tube. Palliative medicine will sign off. We remain available for reconsult if warranted for further goals of care conversations. Thank you for the opportunity to participate in the care of Ms. Hector Butterfield.     Chris Soares, [facility]  Palliative Medicine

## 2022-01-11 NOTE — PROGRESS NOTES
Problem: Patient Education: Go to Patient Education Activity  Goal: Patient/Family Education  Outcome: Progressing Towards Goal     Problem: Patient Education: Go to Patient Education Activity  Goal: Patient/Family Education  Outcome: Progressing Towards Goal     Problem: Patient Education: Go to Patient Education Activity  Goal: Patient/Family Education  Outcome: Progressing Towards Goal     Problem: Falls - Risk of  Goal: *Absence of Falls  Description: Document Ramandeep Ground Fall Risk and appropriate interventions in the flowsheet. Outcome: Progressing Towards Goal  Note: Fall Risk Interventions:       Mentation Interventions: Bed/chair exit alarm    Medication Interventions: Bed/chair exit alarm    Elimination Interventions: Bed/chair exit alarm,Call light in reach              Problem: Patient Education: Go to Patient Education Activity  Goal: Patient/Family Education  Outcome: Progressing Towards Goal     Problem: Pressure Injury - Risk of  Goal: *Prevention of pressure injury  Description: Document John Scale and appropriate interventions in the flowsheet. Outcome: Progressing Towards Goal  Note: Pressure Injury Interventions:  Sensory Interventions: Assess changes in LOC    Moisture Interventions: Absorbent underpads,Apply protective barrier, creams and emollients    Activity Interventions: Pressure redistribution bed/mattress(bed type)    Mobility Interventions: Pressure redistribution bed/mattress (bed type),HOB 30 degrees or less,Turn and reposition approx.  every two hours(pillow and wedges)    Nutrition Interventions: Document food/fluid/supplement intake,Discuss nutritional consult with provider    Friction and Shear Interventions: Apply protective barrier, creams and emollients,HOB 30 degrees or less                Problem: Patient Education: Go to Patient Education Activity  Goal: Patient/Family Education  Outcome: Progressing Towards Goal

## 2022-01-11 NOTE — ROUTINE PROCESS
TRANSFER - IN REPORT:    Verbal report received from PEGGY Espinosa on Rafael Smithter  being received from [id number] [place] [address]  for ordered procedure      Report consisted of patients Situation, Background, Assessment and   Recommendations(SBAR). Information from the following report(s) SBAR was reviewed with the receiving nurse. Opportunity for questions and clarification was provided. Assessment completed upon patients arrival to unit and care assumed.

## 2022-01-11 NOTE — PROGRESS NOTES
Ok to resume plavix on 1/13/2022. Kendy Victoria MD  [facility].  [email or web address]  Phone: [phone]  Pager: [phone]  Cell: [phone]. [email or web address]

## 2022-01-11 NOTE — PROGRESS NOTES
Damari Discussed with son and patient at length-discussed pros and cons of PEG tube versus continuing oral feeds-after extensive discussion, patient does agree to proceed with feeding tube placement. Risks, benefits, alternatives discussed with patient and son in detail. Bhupinder Modi MD  [facility].  [email or web address]  Phone: [phone]  Pager: [phone]  Cell: [phone]. [email or web address]

## 2022-01-11 NOTE — PROGRESS NOTES
CM called patient's son Jaclyn Bhardwaj [phone], received voicemail, left message, need choices for SNF. CM left phone number for a return call.            Darletta Libman, RN  Case Management [phone]

## 2022-01-11 NOTE — ROUTINE PROCESS
TRANSFER - IN REPORT:    Verbal report received from Melissa Guzmán RN(name) on Rick Ortiz  being received from [address] [place] [address](unit) for ordered procedure      Report consisted of patients Situation, Background, Assessment and   Recommendations(SBAR). Information from the following report(s) SBAR and Kardex was reviewed with the receiving nurse. Opportunity for questions and clarification was provided. Assessment completed upon patients arrival to unit and care assumed.

## 2022-01-11 NOTE — PROGRESS NOTES
Pt on caseload for functional maintenance program, chart reviewed. Pt unable to participate with rehab technician due to:    Pt is currently off the floor. Will f/u later as patient's schedule allows.  Thank you for this referral.  Zehra Arndt, Rehab Technician

## 2022-01-11 NOTE — PROGRESS NOTES
Bedside and Verbal shift change report given to Sheila Allen (oncoming nurse) by Flako Fox (offgoing nurse). Report included the following information SBAR, Kardex, Procedure Summary, MAR and Recent Results.

## 2022-01-11 NOTE — PROGRESS NOTES
CM called and spoke with patient's son Te Aguilar [phone], let him know that Dr. Sasha Vega said they may be considering home with home health, patient's son said no, they want SNF. CM asked patient's son is he was able to look at SNF list left in patient's room for him, patient's son said he didn't look for it, and where was it. CM let patient's son know that SNF list was on the tray table. Patient's son asked CM to put another SNF list there for him, he will look tonight. CM put another SNF list on patient's tray table with sticky note with patient's son's name on it. Original SNF list still on tray table from this am, now there are 2 SNF list on patient's tray table for patient's son to review.          Rosana Caceres RN  Case Management [phone]

## 2022-01-11 NOTE — PROGRESS NOTES
conducted an initial consultation and Spiritual Assessment for Ivory Najjar, who is a 78 y.o.,female. Patients Primary Language is: [place]. According to the patients EMR Latter-day Affiliation is: [facility].     The reason the Patient came to the hospital is:   Patient Active Problem List    Diagnosis Date Noted    Severe protein-calorie malnutrition ([address] 01/07/2022    Hyperkalemia 01/06/2022    Dehydration 09/06/2021    Anemia 09/06/2021    KEYLA (acute kidney injury) ([address] 09/06/2021    Breakthrough seizure ([address] 09/06/2021    Encephalopathy 09/21/2020    Hallucinations 09/21/2020    Abnormal coordination 09/21/2020    Recurrent seizures ([address] 09/08/2020    Seizure ([address] 08/03/2019    Status epilepticus ([address] 08/02/2019    TIA (transient ischemic attack) 01/07/2016    Dyslipidemia 12/01/2015    Left-sided weakness 10/30/2015    Essential hypertension 09/11/2015    Convulsion ([address] 07/21/2015    Elevated Dilantin level 02/20/2015    CVA (cerebral infarction) 03/10/2014    Non compliance w medication regimen 03/10/2014    Pulmonary embolism ([address] 02/02/2014    Chest pain 02/02/2014    Other and unspecified noninfectious gastroenteritis and colitis(558.9) 02/02/2014    Ataxia 12/30/2011    Headache 12/30/2011        The  provided the following Interventions:  Initiated a relationship of care and support. Listened empathically. Offered prayer and assurance of continued prayers on patient's behalf. Chart reviewed. The following outcomes where achieved:  Patient processed feeling about current hospitalization. Patient expressed gratitude for 's visit. Assessment:  Patient does not have any Anglican/cultural needs that will affect patients preferences in health care. There are no spiritual or Anglican issues which require intervention at this time.      Plan:  Chaplains will continue to follow and will provide pastoral care on an as needed/requested basis.   recommends bedside caregivers page  on duty if patient shows signs of acute spiritual or emotional distress.       [address] [facility]   [phone]

## 2022-01-11 NOTE — ANESTHESIA POSTPROCEDURE EVALUATION
Procedure(s):  PERCUTANEOUS ENDOSCOPIC GASTROSTOMY TUBE INSERTION. MAC    Anesthesia Post Evaluation      Multimodal analgesia: multimodal analgesia used between 6 hours prior to anesthesia start to PACU discharge  Patient location during evaluation: PACU  Patient participation: complete - patient participated  Level of consciousness: awake and alert  Pain management: adequate  Airway patency: patent  Anesthetic complications: no  Cardiovascular status: acceptable  Respiratory status: acceptable  Hydration status: acceptable  Post anesthesia nausea and vomiting:  none  Final Post Anesthesia Temperature Assessment:  Normothermia (36.0-37.5 degrees C)      INITIAL Post-op Vital signs:   Vitals Value Taken Time   /79 01/11/22 1240   Temp 36.8 °C (98.2 °F) 01/11/22 1152   Pulse 71 01/11/22 1250   Resp 13 01/11/22 1250   SpO2 100 % 01/11/22 1250   Vitals shown include unvalidated device data.

## 2022-01-11 NOTE — ANESTHESIA PREPROCEDURE EVALUATION
Relevant Problems   NEUROLOGY   (+) Breakthrough seizure (HCC)   (+) Convulsion (HCC)   (+) Headache   (+) Recurrent seizures (HCC)   (+) Seizure (HCC)   (+) Status epilepticus (HCC)   (+) TIA (transient ischemic attack)      CARDIOVASCULAR   (+) Essential hypertension      RENAL FAILURE   (+) KEYLA (acute kidney injury) ([facility] [address]      HEMATOLOGY   (+) Anemia       Anesthetic History   No history of anesthetic complications            Review of Systems / Medical History  Patient summary reviewed and pertinent labs reviewed    Pulmonary                Comments: Hx of PE in past.   Neuro/Psych     seizures: poorly controlled  CVA      Comments: Hx of encephalopathy in past,  No POA, pt states she doesn't want PEG, will wait on son to arrive. Cardiovascular    Hypertension              Exercise tolerance: <4 METS     GI/Hepatic/Renal               Comments: Weight loss, failure to thrive. Endo/Other             Other Findings   Comments: Admission for failure to thrive, breakthrough seizures. Physical Exam    Airway  Mallampati: III  TM Distance: 4 - 6 cm  Neck ROM: short neck   Mouth opening: Diminished (comment)     Cardiovascular    Rhythm: regular  Rate: normal         Dental    Dentition: Poor dentition     Pulmonary  Breath sounds clear to auscultation               Abdominal  GI exam deferred       Other Findings            Anesthetic Plan    ASA: 4  Anesthesia type: MAC          Induction: Intravenous  Anesthetic plan and risks discussed with: Patient      Await son to discuss procedure with pt. Currently no POA exists to my knowledge, she declines procedure.

## 2022-01-11 NOTE — ROUTINE PROCESS
8450: I went to speak with the patient upon her arrival to [facility]. She immediately stated, \"I changed my mind, I do not want this procedure. \" I asked her to tell me what procedure she was referring to but I was unable to understand her. I explained that the procedure was for a PEG. She stated, \"There's nothing wrong with me, I don't want it. \"    Called son Rosa Cota several times, no answer at this time, I left a . I called [facility] and spoke to the pt's primary bedside RN, \"Ty\". She stated that consent has been obtained for the patient for other reasons from her son via phone call. Ty stated that the pt's mental status has improved since admission and she has been more alert and is oriented to self and place. Pt is alert to self and place per my assessment as well.    0905: Pt's son Keyon Dick called. He will come to [facility] now to discuss the PEG tube with the physician and pt.  He should be here by 10am.

## 2022-01-11 NOTE — PROGRESS NOTES
CM called patient's son Alyx Lara [phone], received voicemail, left message that CM needs SNF choices for patient. Patient's son called CM back, said he was in patient's room. CM went to patient's room, and patient's son was not in the room. CM called and spoke with patient's son, he said he was in PACU, CM let him know that CM will leave a SNF list in the patient's room upstairs. CM put SNF list in patient's room on tray table.             Sherri Gordon, RN  Case Management [phone]

## 2022-01-11 NOTE — PROGRESS NOTES
Pt not seen for skilled PT due to:  [ ]  Nausea/vomiting  [ ]  Eating  [ ]  Pain  [ ]  Pt lethargic  [x]  Off Unit  Will f/u later as schedule allows. Thank you.   Misbah Haines PT, DPT

## 2022-01-11 NOTE — H&P
History and physical has been reviewed. The patient has been examined. There have been no significant clinical changes since the completion of the originally dated History and Physical.    Addendum: All lab tests orders pertaining to the procedure have been ordered by the anesthesia personnel and results will be addressed by the anesthesia team    Lita Perea MD  [facility].  [email or web address]  Phone: [phone]  Cell: [phone]  [email or web address]

## 2022-01-11 NOTE — PROCEDURES
[email or web address]  [place] [address]      Percutaneous Endoscopic Gastroduodenoscopy Procedure Note    Urmila Solano  1955  [id number]      Date of Procedure: 1/11/2022    Preoperative diagnosis: SEVERE PROTEIN/CALORIE MALNUTRITION    Postoperative diagnosis: Peg placement    Procedure: Procedure(s):  PERCUTANEOUS ENDOSCOPIC GASTROSTOMY TUBE INSERTION    Indication: Dysphagia    :  Dr. Sourav Bansal MD    Assistant(s): Endoscopy Technician-1: Chaya Rodarte  Endoscopy RN-1: Thaddeus Schaeffer RN; Aster Cooper RN    Anesthesia/Sedation:  MAC anesthesia      Procedure Details     Appropriate pre-op antibiotics were administered    After infomed consent was obtained for the procedure, with all risks and benefits of procedure explained the patient was taken to the endoscopy suite and placed in the left lateral decubitus position. Following sequential administration of sedation as per above, the endoscope was inserted into the mouth and advanced under direct vision to the second portion of the duodenum. Findings:    Esophagus : The esohagus looked normal.      Stomach : There were no diagnostic abnormalities of the body, fundus, antrum, cardia and iscisura of the stomach. Duodenum: The first and second portion of the duodenum appeared normal.      A site was selected on the anterior abdominal wall where the light shined and where one finger easily indented the anterior abdominal wall. Lidocaine analgesia was utilized (1%). The exploring needle easily indented the stomach and penetrated it. Safe-track technique was used. The needle-trocar device was then inserted into the skin after an incision. Once the needle trocar device entered the stomach the trocar was grasped by the snare. The needle was removed and a wire was placed thorough the trocar.   The wire was grasped by the snare and removed at the mouth.  A 20 Fr [employer] tube was positioned over the wire and pushed out of the anterior abdominal wall. The tube was grabbed. The incision site was enlarged as necessary and the tube was pulled snug. A brief repeat endoscopy verified proper placement of the tube and no apparent complications. The skin exited at 2 cm marking on the PEG. Complications:   None; patient tolerated the procedure well. EBL:  None. Impression:   Peg inserted      Recommendations:    1. NPO   2. Can use PEG for feeds and medications after 4 hours if there is no abdominal pain or distention  3. Feeding formula and rate per nutrition.      Ivy Yu MD  1/11/2022  11:58 AM

## 2022-01-12 LAB
ANION GAP SERPL CALC-SCNC: 3 MMOL/L (ref 3–18)
BASOPHILS # BLD: 0 K/UL (ref 0–0.1)
BASOPHILS NFR BLD: 0 % (ref 0–2)
BUN SERPL-MCNC: 16 MG/DL (ref 7–18)
BUN/CREAT SERPL: 16 (ref 12–20)
CALCIUM SERPL-MCNC: 9.3 MG/DL (ref 8.5–10.1)
CHLORIDE SERPL-SCNC: 113 MMOL/L (ref 100–111)
CO2 SERPL-SCNC: 26 MMOL/L (ref 21–32)
CREAT SERPL-MCNC: 0.98 MG/DL (ref 0.6–1.3)
DIFFERENTIAL METHOD BLD: ABNORMAL
EOSINOPHIL # BLD: 0.2 K/UL (ref 0–0.4)
EOSINOPHIL NFR BLD: 2 % (ref 0–5)
ERYTHROCYTE [DISTWIDTH] IN BLOOD BY AUTOMATED COUNT: 19.6 % (ref 11.6–14.5)
GLUCOSE BLD STRIP.AUTO-MCNC: 114 MG/DL (ref 70–110)
GLUCOSE BLD STRIP.AUTO-MCNC: 80 MG/DL (ref 70–110)
GLUCOSE SERPL-MCNC: 84 MG/DL (ref 74–99)
HCT VFR BLD AUTO: 22 % (ref 35–45)
HGB BLD-MCNC: 7.2 G/DL (ref 12–16)
IMM GRANULOCYTES # BLD AUTO: 0 K/UL (ref 0–0.04)
IMM GRANULOCYTES NFR BLD AUTO: 0 % (ref 0–0.5)
LYMPHOCYTES # BLD: 1.8 K/UL (ref 0.9–3.6)
LYMPHOCYTES NFR BLD: 20 % (ref 21–52)
MAGNESIUM SERPL-MCNC: 1.9 MG/DL (ref 1.6–2.6)
MCH RBC QN AUTO: 30.1 PG (ref 24–34)
MCHC RBC AUTO-ENTMCNC: 32.7 G/DL (ref 31–37)
MCV RBC AUTO: 92.1 FL (ref 78–100)
MONOCYTES # BLD: 1 K/UL (ref 0.05–1.2)
MONOCYTES NFR BLD: 11 % (ref 3–10)
NEUTS SEG # BLD: 5.8 K/UL (ref 1.8–8)
NEUTS SEG NFR BLD: 67 % (ref 40–73)
NRBC # BLD: 0 K/UL (ref 0–0.01)
NRBC BLD-RTO: 0 PER 100 WBC
PHOSPHATE SERPL-MCNC: 2.6 MG/DL (ref 2.5–4.9)
PLATELET # BLD AUTO: 156 K/UL (ref 135–420)
PMV BLD AUTO: 12.3 FL (ref 9.2–11.8)
POTASSIUM SERPL-SCNC: 4.1 MMOL/L (ref 3.5–5.5)
RBC # BLD AUTO: 2.39 M/UL (ref 4.2–5.3)
SODIUM SERPL-SCNC: 142 MMOL/L (ref 136–145)
WBC # BLD AUTO: 8.8 K/UL (ref 4.6–13.2)

## 2022-01-12 PROCEDURE — 97530 THERAPEUTIC ACTIVITIES: CPT

## 2022-01-12 PROCEDURE — 77030040392 HC DRSG OPTIFOAM MDII -A

## 2022-01-12 PROCEDURE — 77030038269 HC DRN EXT URIN PURWCK BARD -A

## 2022-01-12 PROCEDURE — 85025 COMPLETE CBC W/AUTO DIFF WBC: CPT

## 2022-01-12 PROCEDURE — 80048 BASIC METABOLIC PNL TOTAL CA: CPT

## 2022-01-12 PROCEDURE — 99222 1ST HOSP IP/OBS MODERATE 55: CPT | Performed by: PSYCHIATRY & NEUROLOGY

## 2022-01-12 PROCEDURE — 99232 SBSQ HOSP IP/OBS MODERATE 35: CPT | Performed by: HOSPITALIST

## 2022-01-12 PROCEDURE — 74011000250 HC RX REV CODE- 250: Performed by: NURSE PRACTITIONER

## 2022-01-12 PROCEDURE — 83735 ASSAY OF MAGNESIUM: CPT

## 2022-01-12 PROCEDURE — 84100 ASSAY OF PHOSPHORUS: CPT

## 2022-01-12 PROCEDURE — 82962 GLUCOSE BLOOD TEST: CPT

## 2022-01-12 PROCEDURE — 74011250637 HC RX REV CODE- 250/637: Performed by: HOSPITALIST

## 2022-01-12 PROCEDURE — 65660000000 HC RM CCU STEPDOWN

## 2022-01-12 PROCEDURE — 36415 COLL VENOUS BLD VENIPUNCTURE: CPT

## 2022-01-12 PROCEDURE — 74011250637 HC RX REV CODE- 250/637: Performed by: STUDENT IN AN ORGANIZED HEALTH CARE EDUCATION/TRAINING PROGRAM

## 2022-01-12 PROCEDURE — 2709999900 HC NON-CHARGEABLE SUPPLY

## 2022-01-12 RX ORDER — CLOPIDOGREL BISULFATE 75 MG/1
75 TABLET ORAL DAILY
Status: DISCONTINUED | OUTPATIENT
Start: 2022-01-13 | End: 2022-01-13 | Stop reason: HOSPADM

## 2022-01-12 RX ORDER — LANOLIN ALCOHOL/MO/W.PET/CERES
500 CREAM (GRAM) TOPICAL DAILY
Status: DISCONTINUED | OUTPATIENT
Start: 2022-01-13 | End: 2022-01-13 | Stop reason: HOSPADM

## 2022-01-12 RX ORDER — ATORVASTATIN CALCIUM 40 MG/1
40 TABLET, FILM COATED ORAL DAILY
Status: DISCONTINUED | OUTPATIENT
Start: 2022-01-13 | End: 2022-01-13 | Stop reason: HOSPADM

## 2022-01-12 RX ORDER — ERGOCALCIFEROL 1.25 MG/1
50000 CAPSULE ORAL 2 TIMES WEEKLY
Status: DISCONTINUED | OUTPATIENT
Start: 2022-01-14 | End: 2022-01-13 | Stop reason: HOSPADM

## 2022-01-12 RX ORDER — HYDROCODONE BITARTRATE AND ACETAMINOPHEN 5; 325 MG/1; MG/1
1 TABLET ORAL
Status: DISCONTINUED | OUTPATIENT
Start: 2022-01-12 | End: 2022-01-13 | Stop reason: HOSPADM

## 2022-01-12 RX ORDER — PAROXETINE 10 MG/1
30 TABLET, FILM COATED ORAL DAILY
Status: DISCONTINUED | OUTPATIENT
Start: 2022-01-12 | End: 2022-01-13 | Stop reason: HOSPADM

## 2022-01-12 RX ADMIN — LEVETIRACETAM 500 MG: 500 SOLUTION ORAL at 09:37

## 2022-01-12 RX ADMIN — SODIUM CHLORIDE, PRESERVATIVE FREE 10 ML: 5 INJECTION INTRAVENOUS at 22:00

## 2022-01-12 RX ADMIN — PAROXETINE HYDROCHLORIDE 30 MG: 10 TABLET, FILM COATED ORAL at 19:17

## 2022-01-12 RX ADMIN — LEVETIRACETAM 500 MG: 500 SOLUTION ORAL at 17:51

## 2022-01-12 RX ADMIN — HYDROCODONE BITARTRATE AND ACETAMINOPHEN 1 TABLET: 5; 325 TABLET ORAL at 17:51

## 2022-01-12 RX ADMIN — SODIUM CHLORIDE, PRESERVATIVE FREE 10 ML: 5 INJECTION INTRAVENOUS at 19:17

## 2022-01-12 NOTE — CONSULTS
[address] [facility], [facility] [address]  Consultation Note    Date of Service:  22    Historian(s): Patient, medical record  Referral Source: Hospitalist team    Chief Complaint   Psychiatric evaluation for depression    History of Present Illness     Randall Veronica is a 78 y.o. BLACK/ female  with a history of hypertension, seizure disorder, CVA, severe protein calorie malnutrition who was admitted for failure to thrive. The patient's family reported that she has had overall decline in appetite and generalized weakness for the past 6 weeks. She was not having any oral intake for the past week prior to admission. There was concern for depression given the death of her son last year. The patient was seen lying in bed in supine position. She was a fair historian but her speech was very soft with low volume and difficult to hear at times. The patient admitted to feeling depressed ever since her son  of cancer in 2021. She says she has had loss of appetite and difficulty swallowing since then and has had a 100 pound weight loss in the past year. She also endorses anhedonia, sad mood, crying spells, difficulty sleeping and feelings of hopelessness since the death of her son. She states she has had intermittent suicidal thoughts but thinks about her other children and her grandchildren. She has not had any kind of therapy to address the grief but says she is prescribed an antidepressant. She denies suicidal ideation today or psychotic symptoms. She denies use of recreational drugs or alcohol. Psychiatric Treatment History     The patient denies history of suicide attempts. She says she has had 1 prior psychiatric hospitalization here at [facility] in  but there is no record of any inpatient psychiatric hospitalization at this facility. She currently does not have any outpatient treatment.   She states she is prescribed an antidepressant by her PCP and I see that Paxil 30 mg is listed in her medication list.    Allergies      Allergies   Allergen Reactions    Tomato Hives     Allergic to eating tomato.  Aspirin Nausea and Vomiting    Ciprofloxacin Rash    Pcn [Penicillins] Rash and Hives    Sulfa (Sulfonamide Antibiotics) Hives and Rash       Medical History     Past Medical History:   Diagnosis Date    Abnormal coordination 9/21/2020    Hallucinations 9/21/2020    Hypertension     Neurological disorder     Seizures ([facility] [address]     Stroke [facility]) 2009       Medication(s)     No current facility-administered medications on file prior to encounter. Current Outpatient Medications on File Prior to Encounter   Medication Sig Dispense Refill    divalproex DR (DEPAKOTE) 250 mg tablet Take 750 mg by mouth nightly.  spironolactone (ALDACTONE) 25 mg tablet Take  by mouth daily.  PARoxetine (PAXIL) 30 mg tablet Take 30 mg by mouth daily.  hydrALAZINE (APRESOLINE) 100 mg tablet Take  by mouth.  OXcarbazepine (TRILEPTAL) 300 mg tablet Take 300 mg by mouth two (2) times a day.  lisinopriL (PRINIVIL, ZESTRIL) 20 mg tablet Take 0.5 Tablets by mouth daily. Indications: high blood pressure 30 Tablet 1    multivitamin, tx-iron-ca-min (THERA-M w/ IRON) 9 mg iron-400 mcg tab tablet Take 1 Tablet by mouth daily. 30 Tablet 0    atorvastatin (LIPITOR) 40 mg tablet Take 40 mg by mouth daily.  acetaminophen (TYLENOL) 500 mg tablet Take 500 mg by mouth as needed for Pain.  clopidogreL (PLAVIX) 75 mg tab Take 75 mg by mouth daily. Indications: prevention for a blood clot going to the brain      tamsulosin (FLOMAX) 0.4 mg capsule Take 1 Cap by mouth nightly. 30 Cap 0    ergocalciferol (VITAMIN D2) 50,000 unit capsule Take 50,000 Units by mouth two (2) times a week.  levETIRAcetam (KEPPRA) 1,000 mg tablet Take 1 Tab by mouth two (2) times a day. 60 Tab 0    amLODIPine (NORVASC) 10 mg tablet Take 1 Tab by mouth daily.  30 Tab 6    folic acid (FOLVITE) 1 mg tablet Take 1 Tablet by mouth daily. (Patient not taking: Reported on 2022) 30 Tablet 0    glucose 4 gram chewable tablet Take 4 Tablets by mouth nightly. (Patient not taking: Reported on 2022) 120 Tablet 0    clonazePAM (KlonoPIN) 0.25 mg TbDi Take 0.25 mg by mouth as needed for Other (seizure). (Patient not taking: Reported on 2022)      cyanocobalamin (VITAMIN B12) 500 mcg tablet Take 1 Tab by mouth daily. 30 Tab 0       Substance Abuse History     Tobacco: denied  Alcohol: denied  Marijuana: denied  Cocaine: denied  Opiate: denied  Treatment hx:  Denied    Family History     Family History   Problem Relation Age of Onset    Diabetes Other     Stroke Other        Psychiatric Family History  Patient has a son diagnosed with bipolar disorder  Social History     The patient was born and raised in [place]. She is a . She has 3 sons 1 of whom is . Her other 2 sons live with her in [place]. She has 1/10 grade education and in the past worked as a . Vitals/Labs     Visit Vitals  BP (!) 144/80 (BP 1 Location: Left upper arm, BP Patient Position: At rest)   Pulse 73   Temp 99.3 °F (37.4 °C)   Resp 17   Ht 5' 1\" (1.549 m)   Wt 44 kg (97 lb)   SpO2 98%   BMI 18.33 kg/m²         Mental Status Examination     Appearance/Hygiene Thin and emaciated, dressed in hospital gown   Attitude/Behavior/Social Relatedness Appropriate   Musculoskeletal Gait/Station: not tested  Tone (flaccid, cogwheeling, spastic): not assessed  Psychomotor (hyperkinetic, hypokinetic): calm  Involuntary movements (tics, dyskinesias, akathisa, stereotypies): none   Speech   Soft speech with low volume   Mood   depressed   Affect    congruent   Thought Process Linear and concrete   Thought Content and Perceptual Disturbances Denies self-injurious behavior (SIB), suicidal ideation (SI), aggressive behavior or homicidal ideation (HI).     Denies auditory and visual hallucinations   Sensorium and Cognition  A&Ox4, attention intact, memory intact, language use appropriate, and fund of knowledge age appropriate   Insight  average   Judgment fair     Assessment and Plan     Psychiatric Diagnoses: Major depressive disorder, recurrent severe    Recommendations:  Patient reports she was taking antidepressant at home although she did not remember the name. Paxil 30 mg is listed on her prior to admission medication list which has not been continued during this hospitalization. Recommend restarting Paxil 30 mg daily for mood. I will also recommend adding low-dose Seroquel 25 mg at bedtime for augmentation of antidepressant. Patient will benefit from psychiatric follow-up at SNF. If she ends up returning home, please schedule outpatient follow-up appointment with psychiatry for further evaluation and treatment of depression. Thank you for the consult.          Deangelo Kline MD  Psychiatrist  [facility] [place]

## 2022-01-12 NOTE — PROGRESS NOTES
[email or web address]  [phone]    Gastroenterology follow up-Progress note    Impression:  1. Severe protein calorie malnutrition/failure to thrive: poor oral intake reported- s/p peg placement   2. H/O dysphagia on full liquid diet  3. Hyperkalemia- resolved  4. KEYLA- improved    Plan:  1. Routine peg care- ensure abdominal binder is in place 24/7  2. TF per nutrition  3. Medical management per primary team  4. Will sign off please let us know if there are any questions or concerns. Chief Complaint:  Needs peg      Subjective:  Shakes head no to pain- denies issues. ROS: Denies any fevers, chills, rash.      Eyes: conjunctiva normal, EOM normal   Neck: ROM normal, supple and trachea normal   Cardiovascular: heart normal,  normal rate and regular rhythm   Pulmonary/Chest Wall: Respiratory effort normal   Abdominal: appearance normal,  soft, non-acute, non-tender- peg in place with normal findings; no drainage or signs of hematoma; no tenderness; dressing and abd binder in place     Patient Active Problem List   Diagnosis Code    Pulmonary embolism (HCC) I26.99    Chest pain R07.9    Other and unspecified noninfectious gastroenteritis and colitis(558.9) K52.9    CVA (cerebral infarction) I63.9    Non compliance w medication regimen Z91.14    Elevated Dilantin level R78.89    Convulsion (HCC) R56.9    Essential hypertension I10    Left-sided weakness R53.1    Dyslipidemia E78.5    TIA (transient ischemic attack) G45.9    Ataxia R27.0    Headache R51.9    Status epilepticus ([address] G40.901    Seizure ([address] R56.9    Recurrent seizures (HCC) G40.909    Encephalopathy G93.40    Hallucinations R44.3    Abnormal coordination R27.8    Dehydration E86.0    Anemia D64.9    KEYLA (acute kidney injury) ([address] N17.9    Breakthrough seizure (HCC) G40.919    Hyperkalemia E87.5    Severe protein-calorie malnutrition (HCC) E43         Visit Vitals  BP (!) 159/77 (BP 1 Location: Left upper arm, BP Patient Position: At rest)   Pulse 70   Temp 98.5 °F (36.9 °C)   Resp 17   Ht 5' 1\" (1.549 m)   Wt 44 kg (97 lb)   SpO2 100%   BMI 18.33 kg/m²           Intake/Output Summary (Last 24 hours) at 1/12/2022 1000  Last data filed at 1/12/2022 0943  Gross per 24 hour   Intake 0 ml   Output --   Net 0 ml       CBC w/Diff    Lab Results   Component Value Date/Time    WBC 8.8 01/12/2022 03:30 AM    RBC 2.39 (L) 01/12/2022 03:30 AM    HGB 7.2 (L) 01/12/2022 03:30 AM    HCT 22.0 (L) 01/12/2022 03:30 AM    MCV 92.1 01/12/2022 03:30 AM    MCH 30.1 01/12/2022 03:30 AM    MCHC 32.7 01/12/2022 03:30 AM    RDW 19.6 (H) 01/12/2022 03:30 AM     01/12/2022 03:30 AM    Lab Results   Component Value Date/Time    GRANS 67 01/12/2022 03:30 AM    LYMPH 20 (L) 01/12/2022 03:30 AM    EOS 2 01/12/2022 03:30 AM    BASOS 0 01/12/2022 03:30 AM      Basic Metabolic Profile   Recent Labs     01/12/22  0330      K 4.1   *   CO2 26   BUN 16   CA 9.3   MG 1.9   PHOS 2.6        Hepatic Function    Lab Results   Component Value Date/Time    ALB 3.6 01/07/2022 04:38 AM    TP 7.1 01/07/2022 04:38 AM    AP 74 01/07/2022 04:38 AM    No results found for: TBIL       Coags   No results for input(s): PTP, INR, APTT, INREXT in the last 72 hours. Manual MARYBETH Guzman    [facility]. [email or web address]  Phone: [phone]  Pager: [phone]

## 2022-01-12 NOTE — PROGRESS NOTES
Pt seen and interviewed for Psych consult. Pt is severely depressed and agrees to take antidepressant. She says she is prescribed an antidepressant which she takes at home. PTA home meds has Paxil 30mg listed. If she was taking Paxil at home, this medication needs to be continued as abrupt discontinuation can cause withdrawal symptoms. Pt needs referal to outpatient psychiatry and Therapy for further treatment. She is still grieving loss of son and says her mood has worsened since his death. Full consult note to follow.

## 2022-01-12 NOTE — PROGRESS NOTES
CM received a voicemail from patient's son Pavan Tapia [phone], said he thought patient was supposed to be hospital a week and he is confused, and to call him back. CM called and spoke with patient's son Pavan Tapia [phone], let him know Doctor said patient is medically ready for discharge. Patient's son said he was told by a doctor yesterday, that patient would be in the hospital a week, so he was delaying talking with family about discharge plans. Patient's son said he wants to speak with a doctor, before he decides about a discharge plan. CM let patient's son know that CM will ask the Doctor to call him. Jocelin Haskins and updated, and gave patient's son name and phone number to contact.              Bia Chavez, RN  Case Management [phone]

## 2022-01-12 NOTE — PROGRESS NOTES
Progress Note    Patient: Pedro Luis Romo MRN: [id number]  SSN: [id number]    YOB: 1955  Age: 78 y.o. Sex: female      Admit Date: 1/6/2022    LOS: 6 days     Subjective: Patient feels fine, denies any abdominal pain. Patient does have diffuse body pain. No new complaints       Objective:     Vitals:    01/12/22 0150 01/12/22 0522 01/12/22 0832 01/12/22 1212   BP: (!) 145/79 138/77 (!) 159/77 130/78   Pulse: 81 81 70 70   Resp: 17 17 17 17   Temp: 98.5 °F (36.9 °C) 97.2 °F (36.2 °C) 98.5 °F (36.9 °C) 99.1 °F (37.3 °C)   SpO2: 100% 100% 100% 99%   Weight:       Height:            Exam  General:  Alert, cooperative, no acute distress    Pulmonary:  CTA Bilaterally. No Wheezing/Rales. Cardiovascular: Regular rate and Rhythm. GI:  Soft, Non distended, Non tender. + Bowel sounds. Extremities:  No edema. No calf tenderness. Neurologic: Alert and oriented X 2. Moves all extremities  PEG site dressing clean with the binder on        Assessment:     Active Problems:    KEYLA (acute kidney injury) ([facility] [address] (9/6/2021)      Hyperkalemia (1/6/2022)      Severe protein-calorie malnutrition ([facility] [address] (1/7/2022)    Seizure disorder  History of CVA in the past  History of dysphagia    Plan:     Hyperkalemia, now resolved. Severe protein calorie malnutrition: Poor oral intake. PEG placed on 1/11/2020,    Failure to thrive: Multifactorial    KEYLA, improved. Continue D5-.45NS. Discussed with nephrology. Seizure disorder: continue oral Keppra now. H/o Dysphagia    Concern for depression: Pending psychiatry evaluation, discussed with Dr. Riley Lu      PT/OT eval and treatment  Nutrition for calorie count    Discharge planning: Awaiting placement, discussed with case management  Discussed with patient's son Teresita Lane over the phone and explained about above plan of care, discussed about discharge planning. Son wants patient to go to SNF.   Have discussed clearly with the patient's son that patient is stable for discharge to go to SNF. He understands and he will be choosing SNF and communicating with case management. Discussed with patient at the bedside.   Discussed with RN    Addendum  RN called me that patient requesting for pain medication  Will start low-dose Norco and monitor        Signed By: Juan Jimenez MD     January 12, 2022

## 2022-01-12 NOTE — PROGRESS NOTES
CM called patient's son Te Aguilar [phone] for SNF choices. CM received voicemail, CM left message that SNF choices needed, doctor said patient is ready for discharge, and patient will need auth for SNF. CM left phone number for a return call, and asked that if patient's son gets a voicemail, to please leave CM a message with choices.          Rosana Caceres, RN  Case Management [phone]

## 2022-01-12 NOTE — CONSULTS
Nutrition Note    Pt continued to have poor/ inadequate po intake of meals and supplements during recent days. S/p PEG tube placement on 1/11/2022. Okay to start tube feeds per GI note; discussed with Dr Jose Antonio Quintero. Nutrition consult received for management of tube feeds; nutrition plan discussed with RN and tube feed order placed. BM 1/10. Poor progression towards nutrition goals        Nutrition Recommendations/Plan:   - Start tube feeds of Jevity 1.5 at 20 mL/hr, advancing as pt tolerates by 10 mL q 8 hours to goal rate of 40 mL/hr with daily liquid MVI and water flushes of 125 mL q 4 hours        (goal EN regimen provides:  1440 kcal, 61 gm protein, 730 mL free water, 96% RDIs)  - Add daily liquid MVI due to goal EN regimen does not provide 100% of RDIs  - discontinue nutrition supplements given that pt remains NPO.            Electronically signed by Danelle Guzmán RD on 1/12/2022 at 2:03 PM    Contact: [phone]    Disaster Mode Active

## 2022-01-12 NOTE — PROGRESS NOTES
Patient completed ROM as follows. Bilateral Upper Extremity Exercise:      EXERCISE   Sets   Reps   Active Active Assist   Passive Self- assisted ROM   Comments   Shoulder flexion  1 10  [] [] [x] []     Elbow flex/ext  1 10  [] [x] [] []     Wrist flexion/extension  1 10  [] [x] [] []     Hand flex/ext  1 10  [x] [] [] []           Pain:   Pain Level Before FMP: 0/10  Pain Level After FMP: 0/10    []  Alerted nurse. [x]  Call bell and phone within patient reach. [x]   Patient resting in bed with no apparent distress . NOTE: Pt agreeable to exercises. Pt actively/actively assisted in all exercises except should flexion. Pt grimaced with shoulder flexion as well as with elbow flexion/extension. Pt could benefit from an OT re-evaluation to be placed back on OT skilled caseload.      Thank you,  Lulu Downs, Rehab Technician

## 2022-01-12 NOTE — PROGRESS NOTES
Patient's son Walter Barreto [phone] called CM back, gave [facility] verbal consent for [facility]. CM let patient's son know that both of theses facilities were told are on hold for Covid at this time, but CM would double check with the facilities. CM called [facility], and received voicemail, left message with Kathy and Michelle Llamas for a return call. CM called and spoke with Gertrude at [facility], she said on hold for admissions due to Covid at this time. CM called [facility], spoke with Director said she was not sure if on hold for admissions, Jassfrank Browne just left for the day, but will be back tomorrow. CM uploaded patient and clinicals to [facility] and sent booking request to [facility]. Patient will need auth for SNF. CM called and spoke with patient's son Walter Barreto [phone], updated him that [facility] not accepting patients at this time, and Uma Liu has already left for the day, CM cannot confirm if they are taking patients or not. Patient's son said he was told by [facility] today that they are not taking Covid patient's. CM let patient's son know that CM sent clinicals to [facility] for them to review, and will follow up with Jass Browne tomorrow. CM asked for 2 mores SNF just in case [facility] cannot accept. Patient's son said he will talk to family for 2 more SNF choices if needed.            Kentrell Larkin, RN  Case Management [phone]

## 2022-01-12 NOTE — PROGRESS NOTES
Problem: Mobility Impaired (Adult and Pediatric)  Goal: *Acute Goals and Plan of Care (Insert Text)  Description: Physical Therapy Goals  Initiated 1/7/2022 and to be accomplished within 7 day(s)  1. Patient will move from supine to sit and sit to supine  in bed with moderate assistance . 2.  Patient will transfer from bed to chair and chair to bed with moderate assistance  using the least restrictive device. 3.  Patient will perform sit to stand with moderate assistance . 4. Patient will ambulate with moderate assistance  for 3 feet with the least restrictive device. PLOF: unclear of true PLOF patient states that she walks a little with her sons helping her     Outcome: Progressing Towards Goal   PHYSICAL THERAPY TREATMENT    Patient: Randall Veronica (58 y.o. female)  Date: 1/12/2022  Diagnosis: KEYLA (acute kidney injury) ([facility] [address] [N17.9]  Hyperkalemia [E87.5] <principal problem not specified>  Procedure(s) (LRB):  PERCUTANEOUS ENDOSCOPIC GASTROSTOMY TUBE INSERTION (N/A) 1 Day Post-Op  Precautions: Fall,Aspiration,Seizure    ASSESSMENT:  Pt cleared to participate in PT session, pt received semi-reclined in bed and agreeable to therapy session. Completing with rehab tech to maximize safety and mobility. Pt with limited active LE mobility to command in supine, demonstrating improvements in RLE strength compared to LLE. Pt needing maxA to roll to L, totalAx2 for supine to sit. Pt then sitting at first with total assist with posterior trunk lean. With increased time pt able to sit with SBA and improved sitting posture. Pt attempting to stand with maxAx2 to RW, pt using UEs on RW but pushing RW away during standing. Standing with hip and knee flexion, returning to sitting EOB. Pt then requesting to toilet, BSC brought to L side, totalA for SPT to [facility]. Pt sitting with trunk support on BSC for several minutes, attempting to void BM. Pt able to produce small amount and requesting to return to bed.  Pt again needing totalA to stand and dependent for pericare. TotalA for SPT back to bed and totalAx2 for sit to supine and scooting towards HOB. Pt more conversational today but continues to need encouragement for increased volume to understand pt. Pt positioned for comfort and educated to call for assist before getting up, pt verbalized understanding. Pt left with all needs met and call bell in reach. RN notified of position and participation. Bed alarm activated. Pt with limited progress towards goals since evaluation. Progression toward goals:   []      Improving appropriately and progressing toward goals  [x]      Improving slowly and progressing toward goals  []      Not making progress toward goals and plan of care will be adjusted     PLAN:  Patient continues to benefit from skilled intervention to address the above impairments. Continue treatment per established plan of care. Discharge Recommendations:  [facility]  Further Equipment Recommendations for Discharge:  currently hospital bed, wheelchair, 1 person totalA to chair vs lift     SUBJECTIVE:   Patient stated Bourgcassidy Kirk know? Luis Puga    OBJECTIVE DATA SUMMARY:   Critical Behavior:  Neurologic State: Alert  Orientation Level: Oriented to person,Oriented to place,Oriented to situation  Cognition: Follows commands  Safety/Judgement: Fall prevention  Functional Mobility Training:  Bed Mobility:  Rolling: Maximum assistance  Supine to Sit: Total assistance  Sit to Supine: Total assistance  Scooting: Total assistance    Transfers:  Sit to Stand: Maximum assistance;Assist x2  Stand to Sit: Maximum assistance;Assist x2  Stand Pivot Transfers: Total assistance       Balance:  Sitting: Impaired; With support  Sitting - Static: Fair (occasional); Good (unsupported)  Sitting - Dynamic: Fair (occasional) (-)  Standing: Impaired; With support  Standing - Static: Poor  Standing - Dynamic : Poor   Range Of Motion:   AROM: Generally decreased, functional     PROM: Generally decreased, functional      Posture:   Posture (WDL): Exceptions to WDL   Posture Assessment: Forward head;Trunk flexion     Pain:  Pain level pre-treatment: 0/10  Pain level post-treatment: 0/10   FLACC     Activity Tolerance:   Fair tolerance, limited due to pain     Please refer to the flowsheet for vital signs taken during this treatment. After treatment:   [] Patient left in no apparent distress sitting up in chair  [x] Patient left in no apparent distress in bed  [x] Call bell left within reach  [x] Nursing notified  [] Caregiver present  [x] Bed alarm activated  [] SCDs applied      COMMUNICATION/EDUCATION:   [x]         Role of Physical Therapy in the acute care setting. []         Fall prevention education was provided and the patient/caregiver indicated understanding. []         Patient/family have participated as able in working toward goals and plan of care. []         Patient/family agree to work toward stated goals and plan of care. []         Patient understands intent and goals of therapy, but is neutral about his/her participation.   [x]         Patient is unable to participate in stated goals/plan of care: ongoing with therapy staff.  []         Other:        Landry Robert, PT   Time Calculation: 44 mins

## 2022-01-12 NOTE — PROGRESS NOTES
Pt appears stable during shift post peg tube placement. Peg is clamped. Insertion site is clean, dry, and intact. Abdominal binder present for duration of shift to secure site and reduce risk of dislodgement. No drainage present. Pt has no verbal, no apparent signs of acute pain or distress. Vitals stable with the except of elevated BP. Patient Vitals for the past 12 hrs:   Temp Pulse Resp BP SpO2   01/12/22 0832 98.5 °F (36.9 °C) 70 17 (!) 159/77 100 %   01/12/22 0522 97.2 °F (36.2 °C) 81 17 138/77 100 %   01/12/22 0150 98.5 °F (36.9 °C) 81 17 (!) 145/79 100 %   pt positive for void and stool during shift. Pt incontinence of bowel and bladder. Pt turned q2 hrs. Protective foam dressing applied to sacrum. Bedside and Verbal shift change report given to [facility] (oncoming nurse) by Teresa Cannon RN   (offgoing nurse). Report included the following information SBAR, Kardex, Intake/Output, MAR and Recent Results.

## 2022-01-13 VITALS
HEART RATE: 75 BPM | RESPIRATION RATE: 16 BRPM | DIASTOLIC BLOOD PRESSURE: 68 MMHG | HEIGHT: 61 IN | OXYGEN SATURATION: 100 % | WEIGHT: 97 LBS | TEMPERATURE: 98.9 F | SYSTOLIC BLOOD PRESSURE: 118 MMHG | BODY MASS INDEX: 18.31 KG/M2

## 2022-01-13 LAB
GLUCOSE BLD STRIP.AUTO-MCNC: 138 MG/DL (ref 70–110)
GLUCOSE BLD STRIP.AUTO-MCNC: 142 MG/DL (ref 70–110)
GLUCOSE BLD STRIP.AUTO-MCNC: 149 MG/DL (ref 70–110)
GLUCOSE BLD STRIP.AUTO-MCNC: 149 MG/DL (ref 70–110)

## 2022-01-13 PROCEDURE — 82962 GLUCOSE BLOOD TEST: CPT

## 2022-01-13 PROCEDURE — 97168 OT RE-EVAL EST PLAN CARE: CPT

## 2022-01-13 PROCEDURE — 99239 HOSP IP/OBS DSCHRG MGMT >30: CPT | Performed by: HOSPITALIST

## 2022-01-13 PROCEDURE — 74011250637 HC RX REV CODE- 250/637: Performed by: HOSPITALIST

## 2022-01-13 PROCEDURE — 97535 SELF CARE MNGMENT TRAINING: CPT

## 2022-01-13 PROCEDURE — 74011250637 HC RX REV CODE- 250/637: Performed by: STUDENT IN AN ORGANIZED HEALTH CARE EDUCATION/TRAINING PROGRAM

## 2022-01-13 PROCEDURE — 2709999900 HC NON-CHARGEABLE SUPPLY

## 2022-01-13 RX ORDER — QUETIAPINE FUMARATE 25 MG/1
25 TABLET, FILM COATED ORAL
Qty: 30 TABLET | Refills: 0 | Status: SHIPPED
Start: 2022-01-13 | End: 2022-02-12

## 2022-01-13 RX ORDER — DIVALPROEX SODIUM 250 MG/1
750 TABLET, DELAYED RELEASE ORAL 2 TIMES DAILY
Qty: 180 TABLET | Refills: 0 | Status: SHIPPED
Start: 2022-01-13 | End: 2022-01-13 | Stop reason: SDUPTHER

## 2022-01-13 RX ORDER — DIVALPROEX SODIUM 250 MG/1
750 TABLET, DELAYED RELEASE ORAL
Status: DISCONTINUED | OUTPATIENT
Start: 2022-01-13 | End: 2022-01-13 | Stop reason: HOSPADM

## 2022-01-13 RX ORDER — DIVALPROEX SODIUM 250 MG/1
750 TABLET, DELAYED RELEASE ORAL 2 TIMES DAILY
Status: DISCONTINUED | OUTPATIENT
Start: 2022-01-13 | End: 2022-01-13

## 2022-01-13 RX ORDER — QUETIAPINE FUMARATE 25 MG/1
25 TABLET, FILM COATED ORAL
Status: DISCONTINUED | OUTPATIENT
Start: 2022-01-13 | End: 2022-01-13 | Stop reason: HOSPADM

## 2022-01-13 RX ORDER — DIVALPROEX SODIUM 250 MG/1
750 TABLET, DELAYED RELEASE ORAL
Qty: 90 TABLET | Refills: 0 | Status: SHIPPED
Start: 2022-01-13 | End: 2022-02-12

## 2022-01-13 RX ORDER — OXCARBAZEPINE 300 MG/1
300 TABLET, FILM COATED ORAL 2 TIMES DAILY
Status: DISCONTINUED | OUTPATIENT
Start: 2022-01-13 | End: 2022-01-13 | Stop reason: HOSPADM

## 2022-01-13 RX ADMIN — OXCARBAZEPINE 300 MG: 300 TABLET, FILM COATED ORAL at 15:43

## 2022-01-13 RX ADMIN — LEVETIRACETAM 500 MG: 500 SOLUTION ORAL at 09:38

## 2022-01-13 RX ADMIN — ATORVASTATIN CALCIUM 40 MG: 40 TABLET, FILM COATED ORAL at 09:38

## 2022-01-13 RX ADMIN — PAROXETINE HYDROCHLORIDE 30 MG: 10 TABLET, FILM COATED ORAL at 09:38

## 2022-01-13 RX ADMIN — MULTIVITAMIN 15 ML: LIQUID ORAL at 09:38

## 2022-01-13 RX ADMIN — HYDROCODONE BITARTRATE AND ACETAMINOPHEN 1 TABLET: 5; 325 TABLET ORAL at 01:56

## 2022-01-13 RX ADMIN — CYANOCOBALAMIN TAB 500 MCG 500 MCG: 500 TAB at 09:38

## 2022-01-13 RX ADMIN — CLOPIDOGREL 75 MG: 75 TABLET, FILM COATED ORAL at 09:38

## 2022-01-13 NOTE — PROGRESS NOTES
Kathy with [facility] called CM asked if UAI/LTSS was done, let her know that yes, and CM will upload to [facility]. JESSE let Jama Ngo know that CM uploaded Dietician note for Peg Tube feedings to [employer] for them. CM uploaded UAI/LTSS to \[facility]\"" for Jama Ngo at [facility].              Haritha Zuluaga, RN  Case Management [phone]

## 2022-01-13 NOTE — PROGRESS NOTES
Problem: Patient Education: Go to Patient Education Activity  Goal: Patient/Family Education  Outcome: Progressing Towards Goal     Problem: Falls - Risk of  Goal: *Absence of Falls  Description: Document [place] Fall Risk and appropriate interventions in the flowsheet. Outcome: Progressing Towards Goal  Note: Fall Risk Interventions:       Mentation Interventions: Door open when patient unattended    Medication Interventions: Evaluate medications/consider consulting pharmacy    Elimination Interventions: Call light in reach,Patient to call for help with toileting needs,Toileting schedule/hourly rounds,Toilet paper/wipes in reach              Problem: Patient Education: Go to Patient Education Activity  Goal: Patient/Family Education  Outcome: Progressing Towards Goal     Problem: Pressure Injury - Risk of  Goal: *Prevention of pressure injury  Description: Document John Scale and appropriate interventions in the flowsheet.   Outcome: Progressing Towards Goal  Note: Pressure Injury Interventions:  Sensory Interventions: Keep linens dry and wrinkle-free,Maintain/enhance activity level,Monitor skin under medical devices,Minimize linen layers    Moisture Interventions: Absorbent underpads,Internal/External urinary devices    Activity Interventions: Pressure redistribution bed/mattress(bed type)    Mobility Interventions: Pressure redistribution bed/mattress (bed type),HOB 30 degrees or less    Nutrition Interventions: Document food/fluid/supplement intake    Friction and Shear Interventions: HOB 30 degrees or less                Problem: Patient Education: Go to Patient Education Activity  Goal: Patient/Family Education  Outcome: Progressing Towards Goal

## 2022-01-13 NOTE — PROGRESS NOTES
"Subjective   62 yof c/o left shoulder pain.  He states he fell tonight. He states he slipped and fell. He wears a walking boot due to a wound to the foot.  No other injury.  He has limited ROM of the shoulder.  +PMS of the LUE present          Review of Systems   Constitutional: Negative for activity change, appetite change, fatigue and fever.   HENT: Negative for congestion, ear pain, facial swelling and sore throat.    Eyes: Negative for discharge and visual disturbance.   Respiratory: Negative for apnea, chest tightness, shortness of breath, wheezing and stridor.    Cardiovascular: Negative for chest pain and palpitations.   Gastrointestinal: Negative for abdominal distention, abdominal pain, diarrhea, nausea and vomiting.   Genitourinary: Negative for difficulty urinating and dysuria.   Musculoskeletal: Negative for arthralgias and myalgias.   Skin: Negative for rash and wound.   Neurological: Negative for dizziness and seizures.   Psychiatric/Behavioral: Negative for agitation and confusion.       Past Medical History:   Diagnosis Date   • Arthritis    • CHF (congestive heart failure) (CMS/[facility])    • Coronary artery disease    • Diabetes mellitus (CMS/[facility])    • Hyperlipidemia    • Hypertension    • Myocardial infarction (CMS/[facility])    • Pancreatitis    • Renal disorder    • Sleep apnea with use of continuous positive airway pressure (CPAP)        Allergies   Allergen Reactions   • Bactrim [Sulfamethoxazole-Trimethoprim] Other (See Comments)     \"RENAL FAILURE\"   • Vancomycin Itching       Past Surgical History:   Procedure Laterality Date   • ABDOMINAL SURGERY     • APPENDECTOMY     • BACK SURGERY     • CARDIAC SURGERY     • CATARACT EXTRACTION     • CERVICAL SPINE SURGERY     • COLONOSCOPY N/A 1/31/2017    Normal exam repeat in 5 years   • COLONOSCOPY N/A 2/11/2019    Procedure: COLONOSCOPY WITH ANESTHESIA;  Surgeon: Tyrell Smith MD;  Location: [facility] ENDOSCOPY;  Service: Gastroenterology   • COLONOSCOPY W/ " Bedside shift change report given to Dylan Euceda RN (oncoming nurse) by Martha Steve RN (offgoing nurse). Report included the following information SBAR, Kardex, MAR and Quality Measures. POLYPECTOMY  03/04/2014    Hyperplastic polyp   • CORONARY ARTERY BYPASS GRAFT  10/2015   • ENDOSCOPY  04/13/2011    Gastritis with hemorrhage   • ENDOSCOPY N/A 5/5/2017    Normal exam   • ENDOSCOPY N/A 2/11/2019    Procedure: ESOPHAGOGASTRODUODENOSCOPY WITH ANESTHESIA;  Surgeon: Tyrell Smith MD;  Location: [facility] ENDOSCOPY;  Service: Gastroenterology   • INCISION AND DRAINAGE OF WOUND Left 09/2007    spider bite       Family History   Problem Relation Age of Onset   • Colon cancer Father    • Heart disease Father    • Colon cancer Sister    • Colon polyps Sister    • Alzheimer's disease Mother    • Coronary artery disease Sister    • Coronary artery disease Sister        Social History     Socioeconomic History   • Marital status:      Spouse name: Not on file   • Number of children: Not on file   • Years of education: Not on file   • Highest education level: Not on file   Tobacco Use   • Smoking status: Never Smoker   • Smokeless tobacco: Never Used   • Tobacco comment: smoked in highschool   Substance and Sexual Activity   • Alcohol use: No   • Drug use: No   • Sexual activity: Defer           Objective   Physical Exam   Constitutional: He is oriented to person, place, and time. He appears well-developed.   HENT:   Head: Normocephalic.   Eyes: Pupils are equal, round, and reactive to light. EOM are normal.   Neck: Normal range of motion. Neck supple.   Cardiovascular: Normal rate and regular rhythm.   No murmur heard.  Pulmonary/Chest: Effort normal and breath sounds normal.   Abdominal: Soft. Bowel sounds are normal.   Musculoskeletal:        Left shoulder: He exhibits decreased range of motion, tenderness and pain. He exhibits no swelling, no effusion, no deformity, no spasm, normal pulse and normal strength.        Arms:  Neurological: He is alert and oriented to person, place, and time.   Skin: Skin is warm and dry.   Psychiatric: He has a normal mood and affect.       Procedures           ED  Course  ED Course as of Jan 20 0301   Mon Jan 20, 2020   0230 Xray negative per Dr Page.  I discussed the patient's testing results and d'c instructions with him.  He voiced understanding of both.    [KS]      ED Course User Index  [KS] Shoulders, Stephandc Lomeli, APRN                                               MDM  Number of Diagnoses or Management Options  Acute pain of left shoulder: minor     Amount and/or Complexity of Data Reviewed  Tests in the radiology section of CPT®: ordered and reviewed  Discuss the patient with other providers: yes    Risk of Complications, Morbidity, and/or Mortality  Presenting problems: minimal  Diagnostic procedures: minimal  Management options: minimal    Patient Progress  Patient progress: improved      Final diagnoses:   Acute pain of left shoulder            Lucho, Susi Lomeli, SUSAN  01/20/20 0301

## 2022-01-13 NOTE — PROGRESS NOTES
CM updated [facility], [address] RN with discharge plan [facility]. CM placed Transport Envelope with PCS form, 2 facesheets, and copy of POST form on top on patient's chart. CM could not find original POST in patient's chart.            Pamela Beyer, RN  Case Management [phone]

## 2022-01-13 NOTE — PROGRESS NOTES
[facility] [facility].            Lawyer Denise, RN  Case Management [phone]

## 2022-01-13 NOTE — PROGRESS NOTES
Kathy with [facility] called CM, said they can accept patient, and will start auth for SNF. JESSE called and spoke with patient's son Luisito Velez [phone], updated him that [facility] accepted patient and will start auth with insurance company today.            Deonte Phillips, RN  Case Management [phone]

## 2022-01-13 NOTE — PROGRESS NOTES
Discharge Summary placed in Transport Envelope to go with patient to SN[facility].            Charles Manning, RN  Case Management [phone]

## 2022-01-13 NOTE — PROGRESS NOTES
Transition of Care Plan to SNF/Rehab    SNF/Rehab Transition:  Patient has been accepted to [facility] and meets criteria for admission. Patient will transported by [facility] transport and expected to leave in 3-4 hours. Communication to Patient/Family:  Spoke with patient's son Boom Rivas (identified care giver) and he is agreeable to the transition plan. Communication to SNF/Rehab:  Bedside RN, Dylan Euceda, has been notified to update the transition plan to the facility and call report (phone number [phone]). Discharge information has been updated on the AVS.     Discharge Summary has been uploaded to [facility] [employer] [facility], and placed in Transport Envelope to go with patient to SNF. Nursing Please include all hard scripts for controlled substances, med rec and dc summary, and AVS in packet. Reviewed and confirmed with facility, [facility], can manage the patient care needs for the following:     Mayi Castañeda with (X) only those applicable:    Medication:  [x]  Medications will be available at the facility  []  IV Antibiotics   []  Controlled Substance - hard copy to be sent with patient   []  Weekly Labs   Documents:  [] Hard RX  [] MAR  [] Kardex  [] AVS  []Transfer Summary  [x]Discharge Summary   Equipment:  []  CPAP/BiPAP  []  Wound Vacuum  []  Cain or Urinary Device  []  PICC/Central Line  []  Nebulizer  []  Ventilator   Treatment:  []Isolation (for MRSA, VRE, etc.)  []Surgical Drain Management  []Tracheostomy Care  []Dressing Changes  []Dialysis with transportation and chair time. [x]PEG Care  []Oxygen  []Daily Weights for Heart Failure   Dietary:  []Any diet limitations  [x]Tube Feedings per Discharge Summary  []Total Parenteral Management (TPN)   Eligible for Medicaid Long Term Services and Supports  Yes:  [] Eligible for medical assistance or will become eligible within 180 days and UAI completed.    [] Provider/Patient and/or support system has requested screening. [x] UAI copy uploaded to [place] for SNF. [] UAI unavailable at discharge will send once processed to SNF provider. [] UAI unavailable at discharged mailed to patient  No:   [] Private pay and is not financially eligible for Medicaid within the next 180 days. [] Reside out-of-state. [] A residents of a state owned/operated facility that is licensed  by [address] [employer] [facility] or [facility]  [] Enrollment in [facility] hospice services  [] [address] [place] [address] [facility]  [] Patient /Family declines to have screening completed or provide financial information for screening     Financial Resources:  Medicaid    [] Initiated and application pending   [x] Full coverage     Advanced Care Plan:  []Surrogate Decision Maker of Care  []POA  [x]Communicated Code Status DNR   Other  Copy of POST form in Transport Envelope, original is not in patient's chart.

## 2022-01-13 NOTE — PROGRESS NOTES
CM called and spoke with patient's son Stanley Cooper [phone], updated him that [facility] was approved for [facility], and CM will work on transport, will be Medicaid, should be 3-4 hour window, once transport is setup. Patient's son is agreeable to the discharge plan for today.            Maritza Chen, RN  Case Management [phone]

## 2022-01-13 NOTE — PROGRESS NOTES
Call made to [address] [place] [address] Dr hope, [phone], spoke with [facility], dispatch will call nurses station with ETA. Trip # [id number].

## 2022-01-13 NOTE — DISCHARGE INSTRUCTIONS
DISCHARGE SUMMARY from Nurse    PATIENT INSTRUCTIONS:    After general anesthesia or intravenous sedation, for 24 hours or while taking prescription Narcotics:  · Limit your activities  · Do not drive and operate hazardous machinery  · Do not make important personal or business decisions  · Do  not drink alcoholic beverages  · If you have not urinated within 8 hours after discharge, please contact your surgeon on call. Report the following to your surgeon:  · Excessive pain, swelling, redness or odor of or around the surgical area  · Temperature over 100.5  · Nausea and vomiting lasting longer than 4 hours or if unable to take medications  · Any signs of decreased circulation or nerve impairment to extremity: change in color, persistent  numbness, tingling, coldness or increase pain  · Any questions    What to do at Home:  Recommended activity: Activity as tolerated, ***    If you experience any of the following symptoms chest pain, shortness of breath, fever greater than 100.5, nausea, vomiting, pain unrelieved by medication, please follow up with [facility] NP. *  Please give a list of your current medications to your Primary Care Provider. *  Please update this list whenever your medications are discontinued, doses are      changed, or new medications (including over-the-counter products) are added. *  Please carry medication information at all times in case of emergency situations. These are general instructions for a healthy lifestyle:    No smoking/ No tobacco products/ Avoid exposure to second hand smoke  Surgeon General's Warning:  Quitting smoking now greatly reduces serious risk to your health.     Obesity, smoking, and sedentary lifestyle greatly increases your risk for illness    A healthy diet, regular physical exercise & weight monitoring are important for maintaining a healthy lifestyle    You may be retaining fluid if you have a history of heart failure or if you experience any of the following symptoms:  Weight gain of 3 pounds or more overnight or 5 pounds in a week, increased swelling in our hands or feet or shortness of breath while lying flat in bed. Please call your doctor as soon as you notice any of these symptoms; do not wait until your next office visit. Patient armband removed and shredded  MyChart Activation    Thank you for requesting access to [employer]. Please follow the instructions below to securely access and download your online medical record. [employer] allows you to send messages to your doctor, view your test results, renew your prescriptions, schedule appointments, and more. How Do I Sign Up? 1. In your internet browser, go to [email or web address]  2. Click on the First Time User? Click Here link in the Sign In box. You will be redirect to the New Member Sign Up page. 3. Enter your [employer] Access Code exactly as it appears below. You will not need to use this code after youve completed the sign-up process. If you do not sign up before the expiration date, you must request a new code. [employer] Access Code: [email or web address]  Expires: 2022 12:20 PM (This is the date your [employer] access code will )    4. Enter the last four digits of your Social Security Number (xxxx) and Date of Birth (mm/dd/yyyy) as indicated and click Submit. You will be taken to the next sign-up page. 5. Create a [employer] ID. This will be your [employer] login ID and cannot be changed, so think of one that is secure and easy to remember. 6. Create a [employer] password. You can change your password at any time. 7. Enter your Password Reset Question and Answer. This can be used at a later time if you forget your password. 8. Enter your e-mail address. You will receive e-mail notification when new information is available in [address] 9. Click Sign Up. You can now view and download portions of your medical record.   10. Click the Download Summary menu link to download a portable copy of your medical information. Additional Information    If you have questions, please visit the Frequently Asked Questions section of the [employer] website at [email or web address]. [email or web address] Remember, [email or web address] is NOT to be used for urgent needs. For medical emergencies, dial 911. The discharge information has been reviewed with the {PATIENT PARENT GUARDIAN:03613}. The {PATIENT PARENT GUARDIAN:34001} verbalized understanding. Discharge medications reviewed with the {[facility]ge meds reviewed SYQU:28233} and appropriate educational materials and side effects teaching were provided.   ___________________________________________________________________________________________________________________________________

## 2022-01-13 NOTE — PROGRESS NOTES
Requested Case Management specialist to assist with transportation to:      [facility]         Address is:    [address] [place] [address] [facility]          and phone number is:      [phone]      Patient will require BLS transport. Pt requires Stretcher If stretcher, reason: Hx of CVA, Hx of Dysphagia, Seizure Disorder, Impaired Mobility  Patient is currently requiring oxygen No   Height: 5\"1   Weight: 97 lbs  Pt is on isolation: No   Is the pt ready now? yes  Requested time: Next Available  PCS Faxed: no  Insurance verified on face sheet: yes  Auth needed for transport: yes  CM completed PCS/ Envelope and placed on chart.

## 2022-01-13 NOTE — PROGRESS NOTES
Kathy with [facility] called CM checking on Discharge Summary, CM checked, discharge summary is in, let Gisell Malave know CM would upload to [facility] for her. CM uploaded Discharge Summary to [facility] for Gisell Malave with [facility], and let her know that Medicaid transport was sent up at 1:44pm, and should be here in 3-4 hours to transport patient to SNF.            Lakeisha Mc, RN  Case Management [phone]

## 2022-01-13 NOTE — DISCHARGE SUMMARY
Transfer Summary    Patient: Dick Mckinney MRN: [id number]  CSN: [id number]    YOB: 1955  Age: 78 y.o. Sex: female    DOA: 1/6/2022 LOS:  LOS: 7 days   Discharge Date:      Disposition: Transfer to SNF    Admission Diagnoses: KEYLA (acute kidney injury) ([facility] [address] [N17.9]  Hyperkalemia [E87.5]    Discharge Diagnoses:   1. KEYLA, improved. 2. Hyperkalemia, now resolved. 3. Severe protein calorie malnutrition, PEG placed on 1/11/2020,  4. Failure to thrive   5. Seizure disorder. 6. Dysphagia  7. Depression   8. Under weight     Discharge Condition: Stable    PHYSICAL EXAM  Visit Vitals  /77 (BP 1 Location: Left upper arm, BP Patient Position: At rest)   Pulse 74   Temp 99.5 °F (37.5 °C)   Resp 16   Ht 5' 1\" (1.549 m)   Wt 44 kg (97 lb)   SpO2 100%   BMI 18.33 kg/m²       General: Alert, cooperative, no acute distress, slow to respond and talk. HEENT: PERRLA, EOMI. Anicteric sclerae. Lungs:  CTA Bilaterally. No Wheezing/Rales. Heart:             Regular rate and Rhythm. Abdomen: Soft, Non distended, Non tender. + Bowel sounds. Extremities: No edema. Psych:   Not anxious or agitated. Neurologic:  AA, oriented X 2. Moves all ext   PEG site dressing clean with the binder on                            Hospital Course:   Georges Smart is a 78 y.o.  female with PMH of hypertension, seizures, TIA, history of noncompliance who presents with 2-month history of worsening condition, not eating for the past 1 week. Family reports patient has had overall decline poor appetite and frequently pocketing her food present occurring since, generalized weakness Thanksgiving 2021. Progressively worsening with no oral intake for the past 1 week. Family reports that patient has been compliant with her medications including her seizure medicines. Patient had ultimately gone to her PCP for follow-up visit today who recommended she come to ED for further evaluation.   Is noted that patient was previously felt to be depressed over the death of her son last year, family denies any apparent worsening of depressive symptoms, no significant losses since death of son in March 2021. Patient was admitted to hospital was started on IV fluids, nephrology was consulted. Patient's creatinine improved with hydration. Hyperkalemia resolved. There is no signs of infection. Patient noted to be underweight with severe protein calorie malnutrition. SLP eval was done, patient has mild dysphagia but patient does have very poor oral intake and failure to thrive. Patient continued to be having poor oral intake in the hospital.  After discussion with the family decision was made to consult GI for PEG placement. There was also concerned about depression. Psychiatry was consulted. Psychiatry recommended to start Seroquel and continue Paxil. GI saw the patient and underwent PEG placement. Patient tolerated procedure well. No complications. Post PEG placement patient has been started on tube feeds. Patient is currently getting p.o. feeds as tolerated along with tube feeds. Patient was seen and evaluated by PT/OT recommended SNF placement. Patient is stable for SNF placement. Discussed with the patient and also with her son Inga Salter over the phone about discharge plan, follow-up appointments, new medications and side effects. Both of them understood and agreed with my plan. I also reviewed patient's neurologist follow-up in November 2021, patient is no longer on Keppra. I have discontinued Keppra. Patient will be continued on Trileptal and Depakote. Procedures: PEG placement by GI    Consults:   Dr. Victorino Raines, gastroenterology  Dr. Monroe Cristobal, nephrology      Imaging studies:     Current Discharge Medication List      START taking these medications    Details   QUEtiapine (SEROquel) 25 mg tablet Take 1 Tablet by mouth nightly for 30 days.   Qty: 30 Tablet, Refills: 0  Start date: 1/13/2022, End date: 2/12/2022 CONTINUE these medications which have CHANGED    Details   divalproex DR (DEPAKOTE) 250 mg tablet Take 3 Tablets by mouth nightly for 30 days. Qty: 90 Tablet, Refills: 0  Start date: 1/13/2022, End date: 2/12/2022         CONTINUE these medications which have NOT CHANGED    Details   PARoxetine (PAXIL) 30 mg tablet Take 30 mg by mouth daily. OXcarbazepine (TRILEPTAL) 300 mg tablet Take 300 mg by mouth two (2) times a day. multivitamin, tx-iron-ca-min (THERA-M w/ IRON) 9 mg iron-400 mcg tab tablet Take 1 Tablet by mouth daily. Qty: 30 Tablet, Refills: 0      atorvastatin (LIPITOR) 40 mg tablet Take 40 mg by mouth daily. acetaminophen (TYLENOL) 500 mg tablet Take 500 mg by mouth as needed for Pain. clopidogreL (PLAVIX) 75 mg tab Take 75 mg by mouth daily. Indications: prevention for a blood clot going to the brain      ergocalciferol (VITAMIN D2) 50,000 unit capsule Take 50,000 Units by mouth two (2) times a week. cyanocobalamin (VITAMIN B12) 500 mcg tablet Take 1 Tab by mouth daily.   Qty: 30 Tab, Refills: 0         STOP taking these medications       spironolactone (ALDACTONE) 25 mg tablet Comments:   Reason for Stopping:         hydrALAZINE (APRESOLINE) 100 mg tablet Comments:   Reason for Stopping:         lisinopriL (PRINIVIL, ZESTRIL) 20 mg tablet Comments:   Reason for Stopping:         tamsulosin (FLOMAX) 0.4 mg capsule Comments:   Reason for Stopping:         levETIRAcetam (KEPPRA) 1,000 mg tablet Comments:   Reason for Stopping:         amLODIPine (NORVASC) 10 mg tablet Comments:   Reason for Stopping:         folic acid (FOLVITE) 1 mg tablet Comments:   Reason for Stopping:         glucose 4 gram chewable tablet Comments:   Reason for Stopping:         clonazePAM (KlonoPIN) 0.25 mg TbDi Comments:   Reason for Stopping:                 DIET:  Cardiac Diet and Tube feeds Jevity 1.5 at 40 mL/hr with daily liquid MVI and water flushes of 125 mL q 4 hours    ACTIVITY: Activity as tolerated  Patient needs to be on Fall, aspiration, decubitus precaution. ·    PT/OT consult  ·             Speech consult  ·  PEG care     ADDITIONAL INFORMATION: If you experience any of the following symptoms but not limited to Fever, chills, nausea, vomiting, diarrhea, change in mentation, falling, bleeding, shortness of breath, chest pain, please call your primary care physician or return to the emergency room if you cannot get hold of your doctor:     FOLLOW UP CARE:  Follow-up with 1. Physician at SNF in 1-2 days with Cbc with diff, bmp, mg.                             2. MING Ashford, neurology in 1 month                              Pt's PCP: Shashi Florence NP. Minutes spent on discharge: >40 minutes spent coordinating this discharge (review instructions/follow-up, prescriptions, preparing report for sign off)    Shannon Gray MD  1/13/2022 2:17 PM    Disclaimer: Sections of this note are dictated using utilizing voice recognition software. Minor typographical errors may be present. If questions arise, please do not hesitate to contact me or call our department.

## 2022-01-13 NOTE — PROGRESS NOTES
Discharge order noted for today. Patient has been accepted to [facility] skilled nursing facility. Confirmed with Kathy that bed is available today. Spoke with patient's son Te Aguilar and he is agreeable to the transition plan today. [facility] authorization has been obtained. ** Transport to facility has been arranged through Medicaid transport in 3-4 hours time. Patient's discharge summary has been forwarded to skilled nursing facility via [place] and placed in Transport Envelope to go with patient to SNF. Bedside RN, Geovanna Curiel, has been updated to the transition plan. Discharge information has been updated on the AVS.  Please call report to [phone].           Rosana Caceres RN  Case Management [phone]

## 2022-01-13 NOTE — PROGRESS NOTES
Report called to [facility], given to [facility] by Geovanna Curiel RN (offgoing nurse). Report included the following information SBAR, Kardex, MAR and Quality Measures. Macy Hitchcock (son) at bedside and updated. Awaiting medical transport.

## 2022-01-13 NOTE — PROGRESS NOTES
Problem: Self Care Deficits Care Plan (Adult)  Goal: *Acute Goals and Plan of Care (Insert Text)  Description: Occupational Therapy Goals  Re-evaluation 1/13/2022-pt s/p Peg tube placement, making slow progress with increased alertness and ability to participate in OT tx intervention    1. Patient will perform grooming with contact guard assist seated edge of bed with Fair balance. 2.  Patient will perform upper body bathing with moderate assistance seated edge of bed with Fair balance. 3.  Patient will perform upper body dressing with moderate assistance seated edge of bed with Fair balance. 4.  Patient will perform bed mobility for supine to sit edge of bed with Mod A in prep for ADL routine. 5.  Patient will participate in upper extremity therapeutic exercise/activities with supervision/set-up for 8 minutes. 6.  Patient will utilize energy conservation techniques during functional activities with min verbal cues. Prior Level of Function: pt reports that her children assist with ADLs and functional mobility, difficult to obtain full and accurate PLOF d/t pt very soft spoken, appears confused and very weak     Outcome: Progressing Towards Goal   OCCUPATIONAL THERAPY RE-EVALUATION    Patient: Celso Tadeo (6 y.o. female)  Date: 1/13/2022  Primary Diagnosis: KEYLA (acute kidney injury) ([facility] [address] [N17.9]  Hyperkalemia [E87.5]  Procedure(s) (LRB):  PERCUTANEOUS ENDOSCOPIC GASTROSTOMY TUBE INSERTION (N/A) 2 Days Post-Op   Precautions:   Fall,Skin,Other (comment) (Peg tube)  PLOF: pt reports that her children assist with ADLs and functional mobility, difficult to obtain full and accurate PLOF d/t pt very soft spoken, appears confused and very weak    ASSESSMENT :  Nursing/RN cleared for pt to participate in OT tx session. Patient presents lying semi-reclined in bed, alert and motivated to participate in ADLs e.g. wash face, UB and LB dress.  Pt noted with decreased BUE elbow extension shoulder flexion/abduction L > R, PROM WFL although tightness noted d/t guarding behavior with BUE flexion since evaluation and s/p Peg tube placement with pt report of 10/10 abdominal pain. Pt tolerated BUEs elevated and prolonged gentle stretch to promote elbow extension, shoulder flexion and abduction via pillow splinting x 4-nursing instruction provided to increase carryover with good pt tolerance. Pt unable to tolerate supine to sit edge of bed d/t abdominal pain, dep x 2 rolling R <-> L for bed pan placement per pt request, appreciate nursing assist, call bell within reach & pt verbalized understanding and provided return demonstration to utilize for assist e.g. functional transfers in order to prevent falls. Patient will benefit from skilled intervention to address the above impairments. Patient's rehabilitation potential is considered to be Good  Factors which may influence rehabilitation potential include:   []             None noted  []             Mental ability/status  [x]             Medical condition  []             Home/family situation and support systems  []             Safety awareness  []             Pain tolerance/management  []             Other:      PLAN :  Recommendations and Planned Interventions:   [x]               Self Care Training                  [x]      Therapeutic Activities  [x]               Functional Mobility Training   []      Cognitive Retraining  [x]               Therapeutic Exercises           [x]      Endurance Activities  [x]               Balance Training                    [x]      Neuromuscular Re-Education  []               Visual/Perceptual Training     [x]      Home Safety Training  [x]               Patient Education                   []      Family Training/Education  []               Other (comment):    Frequency/Duration: Patient will be followed by occupational therapy 3-5 times a week to address goals.   Discharge Recommendations: Michael Barajas Equipment Recommendations for Discharge: to be determined as progress is made with therapy     SUBJECTIVE:   Patient stated I need to do something with my hair.     OBJECTIVE DATA SUMMARY:   Hospital course since last seen and reason for reevaluation: pt s/p Peg tube placement, making slow progress with increased alertness and ability to participate in OT tx intervention  Past Medical History:   Diagnosis Date    Abnormal coordination 9/21/2020    Hallucinations 9/21/2020    Hypertension     Neurological disorder     Seizures ([facility] [address]     Stroke ([facility] [address] 2009     Past Surgical History:   Procedure Laterality Date    PLACE PERCUT GASTROSTOMY TUBE  1/11/2022          Barriers to Learning/Limitations: yes;  physical  Compensate with: visual, verbal, tactile, kinesthetic cues/model    Home Situation:   Home Situation  Home Environment: Apartment  # Steps to Enter: 0  One/Two Story Residence: One story  Living Alone: No  Support Systems: Child(quiana) (Patient lives with her son Cindy Santiago.)  Patient Expects to be Discharged to[de-identified] Apartment  Current DME Used/Available at Home: None  []  Right hand dominant   [x]  Left hand dominant    Cognitive/Behavioral Status:  Neurologic State: Alert  Orientation Level: Oriented to place;Oriented to person;Oriented to situation  Cognition: Follows commands  Safety/Judgement: Fall prevention    Skin: s/p peg tube placement  Edema: none noted    Vision/Perceptual:  appears intact    Coordination: BUE  Coordination: Grossly decreased, non-functional  Fine Motor Skills-Upper: Left Impaired;Right Impaired    Gross Motor Skills-Upper: Left Impaired;Right Impaired      Strength: BUE  Strength: Grossly decreased, non-functional     Tone & Sensation: BUE  Tone: Normal        Range of Motion: BUE  AROM: Grossly decreased, non-functional  PROM: Generally decreased, functional     Functional Mobility and Transfers for ADLs:  Bed Mobility:  Rolling:  Total assistance;Assist x2       ADL Assessment: Feeding: Total assistance    Oral Facial Hygiene/Grooming: Minimum assistance    Bathing: Total assistance    Upper Body Dressing: Maximum assistance    Lower Body Dressing: Total assistance    Toileting: Total assistance     ADL Intervention:  Grooming  Washing Face: Minimum assistance    Upper Body [address] Gown: Maximum assistance    Lower Body Dressing Assistance  Socks: Total assistance (dependent)    Toileting  Toileting Assistance: Total assistance(dependent)    Cognitive Retraining  Safety/Judgement: Fall prevention    Pain:  Pain level pre-treatment: 10/10   Pain level post-treatment: 8/10  Pain Intervention(s): Medication (see MAR); Rest, Repositioning   Response to intervention: Nurse notified, See doc flow    Activity Tolerance:   poor  Please refer to the flowsheet for vital signs taken during this treatment. After treatment:   [] Patient left in no apparent distress sitting up in chair  [x] Patient left in no apparent distress in bed  [x] Call bell left within reach  [x] Nursing notified  [] Caregiver present  [x] Bed alarm activated    COMMUNICATION/EDUCATION:   [x] Role of Occupational Therapy in the acute care setting  [x] Home safety education was provided and the patient/caregiver indicated understanding. [x] Patient/family have participated as able in goal setting and plan of care. [x] Patient/family agree to work toward stated goals and plan of care. [] Patient understands intent and goals of therapy, but is neutral about his/her participation. [] Patient is unable to participate in goal setting and plan of care.     Thank you for this referral.  Jn Massey  Time Calculation: 36 mins

## 2022-01-13 NOTE — PROGRESS NOTES
Kathy with [facility] called CM, Navin Freed is back and approved, and they can take patient today. Deborah Guzmán and updated, and requested discharge summary when possible.            Arpit Aguilar RN  Case Management [phone]

## 2022-01-14 NOTE — PROGRESS NOTES
Late Entry 01/14/2022 3:30pm      Kathy with [facility] called CM, patient has Neurology followup with Dr. Chandan Mason in [place], asking if correct. Maggie Gehrig Dr. Zulema Brunner, updated him. Dr. Nick Hatfield, updated that  [facility] in [place]. CM called and spoke with Radha with [facility], updated her with Neurologist name, address and phone number in [place], [place].              Charles Manning, RN  Case Management [phone]

## 2022-03-18 PROBLEM — G93.40 ENCEPHALOPATHY: Status: ACTIVE | Noted: 2020-09-21

## 2022-03-18 PROBLEM — E86.0 DEHYDRATION: Status: ACTIVE | Noted: 2021-09-06

## 2022-03-18 PROBLEM — R27.8 ABNORMAL COORDINATION: Status: ACTIVE | Noted: 2020-09-21

## 2022-03-18 PROBLEM — E43 SEVERE PROTEIN-CALORIE MALNUTRITION (HCC): Status: ACTIVE | Noted: 2022-01-07

## 2022-03-19 PROBLEM — E87.5 HYPERKALEMIA: Status: ACTIVE | Noted: 2022-01-06

## 2022-03-19 PROBLEM — G40.901 STATUS EPILEPTICUS (HCC): Status: ACTIVE | Noted: 2019-08-02

## 2022-03-19 PROBLEM — N17.9 AKI (ACUTE KIDNEY INJURY) (HCC): Status: ACTIVE | Noted: 2021-09-06

## 2022-03-19 PROBLEM — R44.3 HALLUCINATIONS: Status: ACTIVE | Noted: 2020-09-21

## 2022-03-19 PROBLEM — G40.919 BREAKTHROUGH SEIZURE (HCC): Status: ACTIVE | Noted: 2021-09-06

## 2022-03-19 PROBLEM — R56.9 SEIZURE (HCC): Status: ACTIVE | Noted: 2019-08-03

## 2022-03-19 PROBLEM — G40.909 RECURRENT SEIZURES (HCC): Status: ACTIVE | Noted: 2020-09-08

## 2022-03-20 PROBLEM — D64.9 ANEMIA: Status: ACTIVE | Noted: 2021-09-06

## 2022-03-22 ENCOUNTER — HOME HEALTH ADMISSION (OUTPATIENT)
Dept: HOME HEALTH SERVICES | Facility: HOME HEALTH | Age: 67
End: 2022-03-22
Payer: MEDICARE

## 2022-03-24 ENCOUNTER — HOME CARE VISIT (OUTPATIENT)
Dept: SCHEDULING | Facility: HOME HEALTH | Age: 67
End: 2022-03-24
Payer: MEDICARE

## 2022-03-24 PROCEDURE — G0299 HHS/HOSPICE OF RN EA 15 MIN: HCPCS

## 2022-03-24 NOTE — Clinical Note
Pump not at home during SN visit, RN called [employer] they were waiting for son to call them back for copay approval. Emily Osorio, NP to see if she wanted to provide verbal orders for bolus feeding, however she was not aware patient had been in facility and could not provide orders. RN then called Dr. Melyssa Christensen office spoke to Irais Parr and was informed patient was discharged and they could not provide orders for bolus feedings. Patient has been eating by mouth for several weeks, patient to continue to eat and supplement with boost or ensure until pump for feeding arrives.

## 2022-03-25 ENCOUNTER — HOME CARE VISIT (OUTPATIENT)
Dept: HOME HEALTH SERVICES | Facility: HOME HEALTH | Age: 67
End: 2022-03-25

## 2022-03-25 ENCOUNTER — HOME CARE VISIT (OUTPATIENT)
Dept: SCHEDULING | Facility: HOME HEALTH | Age: 67
End: 2022-03-25
Payer: MEDICARE

## 2022-03-25 VITALS
OXYGEN SATURATION: 98 % | TEMPERATURE: 97.6 F | HEART RATE: 70 BPM | DIASTOLIC BLOOD PRESSURE: 70 MMHG | RESPIRATION RATE: 17 BRPM | OXYGEN SATURATION: 98 % | DIASTOLIC BLOOD PRESSURE: 64 MMHG | TEMPERATURE: 98.1 F | SYSTOLIC BLOOD PRESSURE: 124 MMHG | RESPIRATION RATE: 18 BRPM | SYSTOLIC BLOOD PRESSURE: 116 MMHG | HEART RATE: 74 BPM

## 2022-03-25 PROCEDURE — 400013 HH SOC

## 2022-03-25 PROCEDURE — G0299 HHS/HOSPICE OF RN EA 15 MIN: HCPCS

## 2022-03-25 NOTE — Clinical Note
Patient admitted to home health today, sn frequency 1w9 with pt/ot/slp for eval. Pump for enteral feeding delivered today, sn set machine up, however g-tube clogged. SN reported to [facility] NP office, made patient an appointment 3/30 for follow up. Patient instructed to continue eating 3 meals and 2 snacks a day and supplement with boost/ensure.

## 2022-03-26 NOTE — HOME HEALTH
Skilled reason for visit: consent forms signed, teaching for enteral pump. Pump not at home during SN visit, RN called [facility] they were waiting for son to call them back for copay approval. Ashleigh Guan, NP to see if she wanted to provide verbal orders for bolus feeding, however she was not aware patient had been in facility and could not provide orders. RN then called Dr. Rachid Diaz office spoke to [place] and was informed patient was discharged and they could not provide orders for bolus feedings. Patient has been eating by mouth for several weeks, patient to continue to eat and supplement with boost or ensure until pump for feeding arrives. Caregiver involvement:  Family assists ADL's, medications, meals, transportation, errands. Medications reviewed and all medications are available in the home this visit. The following education was provided regarding medications: Instructed in new medication Tylenol to manage mild pain or fever. In addition, warned of possible S/E such as hemolytuc anemia, neutropenia, leukopenia, pancytopenia, liver damage, jaundice, hypoglycemia, rash and urticaria. Consult prescriber before giving drug to children younger than age 3. Tylenol is only for short-term use. Consult prescriber if it is given to children for longer than 5 days or adults for longer than 10 days. Instructed not to use for marked fever (higher than 103.1 F), fever persisting longer than 3 days, or recurrent fever unless it is directed by prescriber. Warned that high doses or unsupervised long-term use can cause hepatic damage. Excessive ingestion of alcohol may increase the risk of hepatotoxicity. MD notified of any discrepancies/look a-like medications/medication interactions: na  Medications are effective at this time.       Home health supplies by type and quantity ordered/delivered this visit include: na    Patient education provided this visit: SN instructed patient / caregiver to always flush your G- tube before and after each use. This helps prevent blockage from formula or medicine. Use at least 2 tablespoons (30 ml) of water to flush the tube. Follow directions for flushing your tube. If the g-tube becomes clogged, try to unclog it as soon as you can. Flush your  tube with a 60 ml syringe filled with warm water. Never use a wire to unclog the tube. A wire can poke a hole in the tube. Your healthcare provider may have you use a special medicine or a plastic brush to help unclog your tube. [place] education provided: na    Patient level of understanding of education provided: patient/caregiver verbalized 100% understanding. Skilled Care Performed this visit: na    Patient response to procedure performed:  na    Agency Progress toward goals: na    Patient's Progress towards personal goals: na    Home exercise program: Continue all medications as prescribed, implement fall/infection/pressure ulcer interventions, monitor for abnormal signs/symptoms of infection and report to MD immediately. Keep all follow up appointments as prescribed. Read all teaching packets for education of disease process and to prevent complications.     Continued need for the following skills: Nursing, Physical Therapy, Occupational Therapy and Speech Language Pathology    Plan for next visit: start of care visit    Patient and/or caregiver notified and agrees to changes in the Plan of Care N/A      The following discharge planning was discussed with the pt/caregiver:  Discharge planning as follows: when goals met, patient/caregiver able to manage disease process, medications and pain

## 2022-03-26 NOTE — HOME HEALTH
Skilled services/Home bound verification:     Skilled Reason for admission/summary of clinical condition:  cva hemiparesis, dysphagia . This patient is homebound for the following reasons Requires considerable and taxing effort to leave the home  and Requires the assistance of 1 or more persons to leave the home . Caregiver: relative. Caregiver assists with  Family assists ADL's, medications, meals, transportation, errands. Medications reconciled and all medications are available in the home this visit. The following education was provided regarding medications: Instructed in medication Atorvastatin to reduce cholesterol levels. In addition, warned of possible S/E such as headache, asthenia, insomnia, peripheral edema, rhinitis, pharyngitis, sinusitis, abdominal pain, dyspepsia, flatulence, nausea, constipation, diarrhea, urinary tract infection, arthritis, arthralgia, myalgia, bronchitis, rash, infection, flu-like syndrome, and allergic reaction. Medications  are effective at this time. High risk medication teaching regarding anticoagulants, hyperglycemic agents or opiod narcotics performed (specify) na,    notified of any discrepancies/look a like medications/medication interactions na. Home health supplies by type and quantity ordered/delivered this visit include: na    Patient education provided this visit to include: Skilled nurse instructed patient on safety measures to avoid injuries and falls such as keeping adequate lighting during the day and night and was educated on proper use of assistive device to prevent falls. Patient level of understanding of education provided: patient/caregiver verbalized 100% understanding. Sharps Education Provided:   Patient response to procedure performed:  patient denied pain and discomfort during procedure/visit    Home exercise program/Homework provided: Sn instructed patient on pursed lip breathing.  Pursed lip breathing is one of the simplest ways to control shortness of breath. It provides a quick and easy way to slow your pace of breathing, making each breath more effective. Pursed lip breathing: Improves ventilation, releases trapped air in the lungs, keeps the airways open longer and decreases the work of breathing, prolongs exhalation to slow the breathing rate, improves breathing patterns by moving old air out of the lungs and allowing for new air to enter the lungs, relieves shortness of breath, causes general relaxation. Practice this technique 4 - 5 times a day at first so you can get the correct breathing pattern. Pursed lip breathing technique: Relax your neck and shoulder muscles, breathe in ( inhale ) slowly through your nose for two counts, keeping your mouth closed. Don't take a deep breath; a normal breath will do. It may help to count to yourself: inhale, one, two. Pucker or purse your lips as if you were going to whistle or gently flicker the flame of a candle. Breathe out ( exhale ) slowly and gently through your pursed lips while counting to four. It may help to count to yourself: exhale, one, two, three, four. Pt/Caregiver instructed on plan of care and are agreeable to plan of care at this time. Physician Rigo aSlinas notified of patient admission to home health and plan of care including anticipated frequency of 1w9 and treatments/interventions/modalities of pt/ot/slp. Discharge planning discussed with patient and caregiver. Discharge planning as follows: when goals met, patient/caregiver able to manage disease process, medications and pain. Pt/Caregiver did verbalize understanding of discharge planning. Next MD appointment kevin jimenez np 3/30/22. Patient/caregiver encouraged/instructed to keep appointment as lack of follow through with physician appointment could result in discontinuation of home care services for non-compliance.

## 2022-03-27 ENCOUNTER — HOME CARE VISIT (OUTPATIENT)
Dept: SCHEDULING | Facility: HOME HEALTH | Age: 67
End: 2022-03-27
Payer: MEDICARE

## 2022-03-27 VITALS
SYSTOLIC BLOOD PRESSURE: 118 MMHG | RESPIRATION RATE: 16 BRPM | TEMPERATURE: 98.5 F | OXYGEN SATURATION: 98 % | DIASTOLIC BLOOD PRESSURE: 78 MMHG | HEART RATE: 78 BPM

## 2022-03-27 PROCEDURE — G0151 HHCP-SERV OF PT,EA 15 MIN: HCPCS

## 2022-03-27 NOTE — HOME HEALTH
PT INITIAL EVALUATION    Past Medical Hx:   Seizure Disorder    Old cardioembolic stroke with hemiparesis    HTN    History of drepssion    Severe protein-calorie malnutrition    Dysphagia    Anemia    Dysphasia          Recent H/o current illness:  75 year old female presents with MD referral for HHPT s/p hospitalization due to CVA with dysphagia  Medication Management: son manages medications  Social hx and home eval:  Pt lives in single story apaprtment with son. Caregiver Involvement: Son assist with medical appointments, some ADL's  PLOF:  Pt PLOF is ambulation w no AD, pts base level of function independent with all ADL's  BALANCE:     Seated unsupported balance is good   Standing static balance is fair-  Standing dynamic balance is poor  Tinetti 11 /28  Patient is at risk for falls due to recent hospitalization and SNF stay  BLE Strength:  Left Hip flexion 3/5 , hip abduction 3/5, hip adduction 3/5, Knee flexion 4-/5  knee extension 4-/5, ankle dorsiflexion 4-/5  Right Hip flexion 3/5 , hip abduction 3/5, hip adduction 3/5, Knee flexion 4-/5  knee extension 4-/5, ankle dorsiflexion 4-/5  BLE ROM:  Right hip/knee/ankle: WFL  Left hip/knee/ankle: WFL  Bed mobility:  min A   Transfers:  min/mod A with sit<->stand for bed to chair transfers, with HHA AD. min cues and instruction needed for safety and sequencing  GAIT:  Patient ambulated  25 ft  with HHA  on level  surfaces min/mod A. Pt demonstrates with decreased hip and knee flexion on BLE in pre and mid swing phase of gait as well as decreased stride-length and nolan. Patient is unable to safely ambulate without assistance at this time. Pt required min cues for  safety and sequencing  Stairs: NT  Patient education provided this visit: Safety with functional mobility and instruction with HEP.    Patient level of understanding of education provided: Fair ,able to return demonstrate mobility instruction and HEP with min assistance  Patient response to treatment:  good with no c/o increased pain      Assessment: Referral for HHPT following recent hospitalization due to CVA, dysphagia. HHPT is medically necessary to address the following clinical findings: decreased BLE strength and ROM, impaired gait, decreased I and safety with transfers and gait, decreased endurance, decreased balance and decreased safety in order to improve functional mobility and decrease fall risk. Pt is a fall risk as indicated by Tinetti score of 11/28. Patient will be seen for HHPT 3w1, 2w4, 1w1 for therapeutic exercises to increase BLE strength and ROM,  training for gait, stairs and transfers to increase I and safety with daily functional mobility and balance and endurance activities to decrease fall risk, decrease impairment and increase functional mobility and independence in the home. Pt. requires skilled PT intervention to instruct Pt. with gait training with LRAD, ROM and strengthening, stair training, transfer training, balance training, manual therapy, neuromuscular re-education, patient care-giver education, HEP, endurance training, body mechanics. Instructed patient with HEP for BLE strengthening and left HEP handout for the patient. Patient was able to return demonstrate the exercises given. HEP includes:   Pt. instructed with therapeutic exercises which included:  Squats, marching in place, Hip abd/add, toe raises and hip ext.  x 10 x 3 times/day

## 2022-03-27 NOTE — Clinical Note
Therapy Functional Score Assessment  Question   Score   Grooming  2       Upper Dressing 2      Lower Dressing 2      Bathing  3      Toilet Transfer  1    Transfer  2            Ambulation  3   Dyspnea                     0       Pain Interfering with activity 4  Est number therapy visits      12

## 2022-03-28 ENCOUNTER — HOME CARE VISIT (OUTPATIENT)
Dept: SCHEDULING | Facility: HOME HEALTH | Age: 67
End: 2022-03-28
Payer: MEDICARE

## 2022-03-28 PROCEDURE — G0299 HHS/HOSPICE OF RN EA 15 MIN: HCPCS

## 2022-03-29 ENCOUNTER — HOME CARE VISIT (OUTPATIENT)
Dept: HOME HEALTH SERVICES | Facility: HOME HEALTH | Age: 67
End: 2022-03-29
Payer: MEDICARE

## 2022-03-29 VITALS
OXYGEN SATURATION: 97 % | HEART RATE: 66 BPM | DIASTOLIC BLOOD PRESSURE: 74 MMHG | TEMPERATURE: 98.2 F | SYSTOLIC BLOOD PRESSURE: 120 MMHG | RESPIRATION RATE: 18 BRPM

## 2022-03-29 NOTE — HOME HEALTH
Skilled reason for visit: skilled assessment, education, medication management, GI assessment, patient eating by mouth 3 meals, snacks and boost. MD aware gtube clogged. Caregiver involvement:  Family assists ADL's, medications, meals, transportation, errands. Medications reviewed and all medications are available in the home this visit. The following education was provided regarding medications: Instructed in medication Atorvastatin to reduce cholesterol levels. In addition, warned of possible S/E such as headache, asthenia, insomnia, peripheral edema, rhinitis, pharyngitis, sinusitis, abdominal pain, dyspepsia, flatulence, nausea, constipation, diarrhea, urinary tract infection, arthritis, arthralgia, myalgia, bronchitis, rash, infection, flu-like syndrome, and allergic reaction. MD notified of any discrepancies/look a-like medications/medication interactions: na  Medications are effective at this time. Home health supplies by type and quantity ordered/delivered this visit include: na    Patient education provided this visit: Skilled nurse instructed patient on safety measures to avoid injuries and falls such as keeping adequate lighting during the day and night and was educated on proper use of assistive device to prevent falls. Sharps education provided: na    Patient level of understanding of education provided: patient/caregiver verbalized 100% understanding to all topics discussed    Skilled Care Performed this visit: skilled assessment, education, medication management, gtube assessment    Patient response to procedure performed:  patient denied pain and discomfort during procedure/visit    Agency Progress toward goals: progressing    Patient's Progress towards personal goals: progressing    Home exercise program: Continue all medications as prescribed, implement fall/infection/pressure ulcer interventions, monitor for abnormal signs/symptoms of infection and report to MD immediately. Keep all follow up appointments as prescribed. Read all teaching packets for education of disease process and to prevent complications.     Continued need for the following skills: Nursing, Physical Therapy and Occupational Therapy    Plan for next visit: skilled assessment, education, medication management, gtube assessment    Patient and/or caregiver notified and agrees to changes in the Plan of Care N/A      The following discharge planning was discussed with the pt/caregiver:  Discharge planning as follows: when goals met, patient/caregiver able to manage disease process, medications and pain

## 2022-03-31 ENCOUNTER — HOME CARE VISIT (OUTPATIENT)
Dept: SCHEDULING | Facility: HOME HEALTH | Age: 67
End: 2022-03-31
Payer: MEDICARE

## 2022-03-31 VITALS
OXYGEN SATURATION: 98 % | RESPIRATION RATE: 20 BRPM | HEART RATE: 80 BPM | SYSTOLIC BLOOD PRESSURE: 124 MMHG | DIASTOLIC BLOOD PRESSURE: 60 MMHG | TEMPERATURE: 98.4 F

## 2022-03-31 PROCEDURE — G0157 HHC PT ASSISTANT EA 15: HCPCS

## 2022-03-31 PROCEDURE — G0153 HHCP-SVS OF S/L PATH,EA 15MN: HCPCS

## 2022-04-01 ENCOUNTER — HOME CARE VISIT (OUTPATIENT)
Dept: SCHEDULING | Facility: HOME HEALTH | Age: 67
End: 2022-04-01
Payer: MEDICARE

## 2022-04-01 VITALS
TEMPERATURE: 97.5 F | DIASTOLIC BLOOD PRESSURE: 84 MMHG | OXYGEN SATURATION: 99 % | HEART RATE: 76 BPM | SYSTOLIC BLOOD PRESSURE: 147 MMHG

## 2022-04-01 VITALS
TEMPERATURE: 97.7 F | DIASTOLIC BLOOD PRESSURE: 78 MMHG | HEART RATE: 84 BPM | OXYGEN SATURATION: 99 % | SYSTOLIC BLOOD PRESSURE: 146 MMHG

## 2022-04-01 PROCEDURE — G0157 HHC PT ASSISTANT EA 15: HCPCS

## 2022-04-01 NOTE — HOME HEALTH
NEXT DOCTOR'S APPOINTMENT:  4/4 10:00 PEG tube removed    SUBJECTIVE:  Pt reports that her back and stomach hurts a lot today; denies diarrhea and vomiting. Pt states that it is the PEG tube that hurts. Pt told son and LPTA that PCP wanted her to go to [facility] to get and xray of her back, which son states that he did not know about this. CAREGIVER INVOLVEMENT/ASSISTANCE NEEDED FOR:  Pt's sons and PCA assists w ADLs, medications, meals, and transportation. HOME HEALTH SUPPLIES by type and quantity ordered/delivered this visit include: None    OBJECTIVE: See Interventions    Observed pt's PEG tube, saw no drainage and pt's son denies drainage. Palpated T- and L-spine:  pt c/o pain w light pressure from mid thoracic to lower lumbar spine. Patient education provided this visit:  Pt and son educated on the area of pt's pain, the need for spinal eval as pain appears higher than it should be from fall a few weeks ago in SNF, and instructed both on log roll and positioning in bed for comfort w pillows under or between knees. Patient level of understanding of education provided:  Educated pt's son on log roll, need for not twisting pt's spine, and son was able to demo log roll. Due to pt's cognitive limitations, unsure of understanding. Patient response to treatment:  W positionging for comfort in supine and sidelying and ice application x 10 min, pt's affect returned to normal and she reports that her pain is better. ASSESSMENT AND PROGRESS TOWARD GOALS:    Progressing fair toward goals for bed mobility and stand & step transfer as was able to sit<>stand w Min A, needed Mod A for balance w gait belt for stepping, and was able to sit to supine w Mod A for feet up & VCs for log roll technique and Min A w step-by-step VCing for log roll to sitting.     Patient continues to have deficits in bed mobility, transfers, gait, strength, and balance due to recent CVA and unevaluated, sig back pain from prior fall..    Patient will benefit from continued intervention with progression of all PT goals to improve functional independence, prevent falls, decrease burden of care, and improve quality of life. Home Exercise Program:   Instructed in and gave handout for sitting HEP for BECKY LEs x 10-30 x 3 daily ea:  LAQ w 5 sec hold, HS curls, Marches, Hip ADD/ABD, and heel/toe raises. SHourly amb, ther ex, or ankle pumps. PLAN FOR NEXT VISIT:  Check on back pain and if was able to get xray, along w ab pain from PEG tube removal.  Gait and transfers. DISCHARGE PLANNING was discussed with the pt/caregiver:   Frequency: 3wk1, 2wk4, 1wk1 w 2wk4, 1wk1  remaining with anticipated DC on 5/2.

## 2022-04-01 NOTE — HOME HEALTH
RECENT HX OF CURRENT ILLNESS/REASON FOR REFERRAL:  Pt referred by Jw Camejo NP to [facility] for SN PT OT ST   Services to provide: SN Evaluation and Treat, Medication and Disease Management, PT OT Eval and treat   Jevity 1.5 via g-tube via feeding pump @ 40ml/hr continuously     Admitted 1/6/22 to [facility] for KEYLA, hyperkalemia  Disharge Dx:  KEYLA  Hyperkalemia  Severe protein calorie malnutrition, peg pleaced on 1/11/2020  failure to thrive  seizure disorder  dysphagia  depression  under weight    SLP consulted, mild dysphagia, poor oral intake and failure to thrive  cardiac diet and tube feeds    PLOF/DIET/COMMUNICATION:  needs assistance with bathing, dressing , prior to Sept 2021 was I, sons administer medications    PAST MEDICAL HISTORY:  multiple   CVA Dec 2021  HTN, seizures TIA    CURRENT RESIDENCE/LIVING SITUATION: Lives with son Juarez    EDUCATIONAL LEVEL:  not assessed  SUBJECTIVE (PT/FAMILY COMMENTS, THERAPIST OBSERVATIONS) Pt alert, sitting on couch. Son, Beckie Davison states Pt is now \"eating fine with no problem\" and wants peg tube removed. He reports irritation at Pts peg tube site. He further reports \"speech a little slurred, not that much\" since recent stroke in Dec 2021. Pt voices concern with her speech stating,  \"people ask, can you talk louder than that? \"    MEDICATIONS REVIEWED: NO PROBLEMS     CAREGIVER INVOLVEMENT: Has privare care aid Mon-Fri, 8 hrs daily    ASSESSMENT / [place] Glassing / PLAN: Pts speech and language skills are grossly intact. Swallow function is unremarkable with solid and liquid. Pts sons report Pt is now eating meals consistently with good po intake. Pt presents with moderate-severe dysarthria characterized by low volume and whole word dysfluencies. As a result, speech intelligbility is significantly impacted.   ST is medically necessary to improve speech for more effective communication    PATIENT RESPONSE TO TREATMENT:  Pt with full attention to tasks     PATIENT LEVEL OF UNDERSTANDING OF EDUCATION PROVIDED: Pt and sons agreed with ST POC after discussion      MD NOTIFIED OF POC AND FREQUENCY    PT GOALS: \"not stuttering\"    HOME EXERCISE PROGRAM: defer to next encounter    DISCHARGE PLANNING - (Bernarda Lexie Cai [address]): ST to d/c in 5 more visits/ wks or when goals met/max potential achieved, Pt/caregiver verbalized understanding

## 2022-04-01 NOTE — HOME HEALTH
NEXT DOCTOR'S APPT:  Neurology 4/7 1:15    SUBJECTIVE:  Pt reports that her back is hurting from a fall she had in the SNF a few weeks ago, and that they didn't take an xray. Her son reports that she doesn't move much, and that he is planning to get her a walker. PCA reports that pt push back against her when walking. CAREGIVER INVOLVEMENT/ASSISTANCE NEEDED FOR:  Pt's sons and daily PCA assists w ADLs, medications, meals, and transportation. HOME HEALTH SUPPLIES by type and quantity ordered/delivered this visit include: None    OBJECTIVE: See Interventions    W amb w LPTA, pt pushed back and was unable to lean forward wo sig back pain. Patient education provided this visit:  Educated pt, sons, and PCA on the need for seated HEP to be performed x 3 daily, that a FWW would help to retrin pt to not push back on person assisting w amb, and that pt needed to either walk, do seated ther ex, or ankle pumps every hour awake for DVT prevention and to regain function from current CVA and prior CVAs. Educated son on the need to get pt's back pain evaluated by a doctor asap as it is preventing progress w amb. Patient level of understanding of education provided:  Uncertain of pt's compresion at this time. Will consult with SLP after today's Evaluation. Patient response to treatment:  Pain increased from 6/10 for back pain in sitting that increased to 8/10 w amb that resolved w sitting. Pt reports being \"a little tired\" after 5 seated ther ex. ASSESSMENT AND PROGRESS TOWARD GOALS:    Progressing fair towards goals for strength, transfers, and amb, but was limited w transfer and amb due to sig increase in low back pain from fall a few weeks ago. Patient continues to have deficits in strength, transfers, bed mobility, balance, and amb due to impaired function from current CVA and prior CVAs that has left left side weakness and decreased proprioception.     Patient will benefit from continued intervention with progression of all PT goals to improve functional independence, prevent falls, decrease burden of care, and improve quality of life. Home Exercise Program:  Instructed in, gave handout for seated HEP for BECKY LEs x 10-30 ea:  LAQ w 5 sec hold, HS curls, Marches, Hip ADD/ABD, and heel/toe raises. SHourly amb, ther ex, or ankle pumps. PLAN FOR NEXT VISIT:  Transfers and amb. Standing squats. DISCHARGE PLANNING was discussed with the pt/caregiver:   Frequency: 3wk1, 2wk4, 1wk1 w 1wk1, 2wk4, 1wk1  remaining with anticipated DC on 5/2.

## 2022-04-02 ENCOUNTER — APPOINTMENT (OUTPATIENT)
Dept: GENERAL RADIOLOGY | Age: 67
End: 2022-04-02
Attending: STUDENT IN AN ORGANIZED HEALTH CARE EDUCATION/TRAINING PROGRAM
Payer: MEDICARE

## 2022-04-02 ENCOUNTER — APPOINTMENT (OUTPATIENT)
Dept: CT IMAGING | Age: 67
End: 2022-04-02
Attending: EMERGENCY MEDICINE
Payer: MEDICARE

## 2022-04-02 ENCOUNTER — HOSPITAL ENCOUNTER (EMERGENCY)
Age: 67
Discharge: HOME | End: 2022-04-03
Attending: EMERGENCY MEDICINE
Payer: MEDICARE

## 2022-04-02 VITALS
SYSTOLIC BLOOD PRESSURE: 163 MMHG | HEIGHT: 60 IN | HEART RATE: 82 BPM | DIASTOLIC BLOOD PRESSURE: 79 MMHG | OXYGEN SATURATION: 99 % | BODY MASS INDEX: 21.2 KG/M2 | TEMPERATURE: 98.7 F | WEIGHT: 108 LBS | RESPIRATION RATE: 15 BRPM

## 2022-04-02 DIAGNOSIS — M54.50 ACUTE BILATERAL LOW BACK PAIN WITHOUT SCIATICA: ICD-10-CM

## 2022-04-02 DIAGNOSIS — R10.84 ABDOMINAL PAIN, GENERALIZED: Primary | ICD-10-CM

## 2022-04-02 LAB
ALBUMIN SERPL-MCNC: 3.8 G/DL (ref 3.4–5)
ALBUMIN/GLOB SERPL: 1.1 {RATIO} (ref 0.8–1.7)
ALP SERPL-CCNC: 77 U/L (ref 45–117)
ALT SERPL-CCNC: 23 U/L (ref 13–56)
ANION GAP SERPL CALC-SCNC: 8 MMOL/L (ref 3–18)
APPEARANCE UR: CLEAR
AST SERPL-CCNC: 23 U/L (ref 10–38)
BASOPHILS # BLD: 0 K/UL (ref 0–0.1)
BASOPHILS NFR BLD: 0 % (ref 0–2)
BILIRUB SERPL-MCNC: 0.2 MG/DL (ref 0.2–1)
BILIRUB UR QL: NEGATIVE
BUN SERPL-MCNC: 25 MG/DL (ref 7–18)
BUN/CREAT SERPL: 23 (ref 12–20)
CALCIUM SERPL-MCNC: 9.6 MG/DL (ref 8.5–10.1)
CHLORIDE SERPL-SCNC: 106 MMOL/L (ref 100–111)
CO2 SERPL-SCNC: 25 MMOL/L (ref 21–32)
COLOR UR: YELLOW
CREAT SERPL-MCNC: 1.08 MG/DL (ref 0.6–1.3)
DIFFERENTIAL METHOD BLD: ABNORMAL
EOSINOPHIL # BLD: 0 K/UL (ref 0–0.4)
EOSINOPHIL NFR BLD: 1 % (ref 0–5)
ERYTHROCYTE [DISTWIDTH] IN BLOOD BY AUTOMATED COUNT: 13.5 % (ref 11.6–14.5)
GLOBULIN SER CALC-MCNC: 3.6 G/DL (ref 2–4)
GLUCOSE SERPL-MCNC: 91 MG/DL (ref 74–99)
GLUCOSE UR STRIP.AUTO-MCNC: NEGATIVE MG/DL
HCT VFR BLD AUTO: 32.8 % (ref 35–45)
HGB BLD-MCNC: 10.9 G/DL (ref 12–16)
HGB UR QL STRIP: NEGATIVE
IMM GRANULOCYTES # BLD AUTO: 0 K/UL (ref 0–0.04)
IMM GRANULOCYTES NFR BLD AUTO: 0 % (ref 0–0.5)
KETONES UR QL STRIP.AUTO: NEGATIVE MG/DL
LEUKOCYTE ESTERASE UR QL STRIP.AUTO: NEGATIVE
LIPASE SERPL-CCNC: 141 U/L (ref 73–393)
LYMPHOCYTES # BLD: 1.6 K/UL (ref 0.9–3.6)
LYMPHOCYTES NFR BLD: 24 % (ref 21–52)
MAGNESIUM SERPL-MCNC: 1.9 MG/DL (ref 1.6–2.6)
MCH RBC QN AUTO: 31.2 PG (ref 24–34)
MCHC RBC AUTO-ENTMCNC: 33.2 G/DL (ref 31–37)
MCV RBC AUTO: 94 FL (ref 78–100)
MONOCYTES # BLD: 0.7 K/UL (ref 0.05–1.2)
MONOCYTES NFR BLD: 10 % (ref 3–10)
NEUTS SEG # BLD: 4.4 K/UL (ref 1.8–8)
NEUTS SEG NFR BLD: 65 % (ref 40–73)
NITRITE UR QL STRIP.AUTO: NEGATIVE
NRBC # BLD: 0 K/UL (ref 0–0.01)
NRBC BLD-RTO: 0 PER 100 WBC
PH UR STRIP: 7.5 [PH] (ref 5–8)
PLATELET # BLD AUTO: 172 K/UL (ref 135–420)
PMV BLD AUTO: 12.1 FL (ref 9.2–11.8)
POTASSIUM SERPL-SCNC: 4 MMOL/L (ref 3.5–5.5)
PROT SERPL-MCNC: 7.4 G/DL (ref 6.4–8.2)
PROT UR STRIP-MCNC: NEGATIVE MG/DL
RBC # BLD AUTO: 3.49 M/UL (ref 4.2–5.3)
SODIUM SERPL-SCNC: 139 MMOL/L (ref 136–145)
SP GR UR REFRACTOMETRY: 1.01 (ref 1–1.03)
UROBILINOGEN UR QL STRIP.AUTO: 0.2 EU/DL (ref 0.2–1)
WBC # BLD AUTO: 6.7 K/UL (ref 4.6–13.2)

## 2022-04-02 PROCEDURE — 83690 ASSAY OF LIPASE: CPT

## 2022-04-02 PROCEDURE — 96374 THER/PROPH/DIAG INJ IV PUSH: CPT

## 2022-04-02 PROCEDURE — 74011000636 HC RX REV CODE- 636: Performed by: EMERGENCY MEDICINE

## 2022-04-02 PROCEDURE — 99285 EMERGENCY DEPT VISIT HI MDM: CPT

## 2022-04-02 PROCEDURE — 74011250636 HC RX REV CODE- 250/636: Performed by: EMERGENCY MEDICINE

## 2022-04-02 PROCEDURE — 72100 X-RAY EXAM L-S SPINE 2/3 VWS: CPT

## 2022-04-02 PROCEDURE — 74177 CT ABD & PELVIS W/CONTRAST: CPT

## 2022-04-02 PROCEDURE — 72070 X-RAY EXAM THORAC SPINE 2VWS: CPT

## 2022-04-02 PROCEDURE — 87086 URINE CULTURE/COLONY COUNT: CPT

## 2022-04-02 PROCEDURE — 74011000250 HC RX REV CODE- 250: Performed by: STUDENT IN AN ORGANIZED HEALTH CARE EDUCATION/TRAINING PROGRAM

## 2022-04-02 PROCEDURE — 74011250636 HC RX REV CODE- 250/636: Performed by: STUDENT IN AN ORGANIZED HEALTH CARE EDUCATION/TRAINING PROGRAM

## 2022-04-02 PROCEDURE — 81003 URINALYSIS AUTO W/O SCOPE: CPT

## 2022-04-02 PROCEDURE — 83735 ASSAY OF MAGNESIUM: CPT

## 2022-04-02 PROCEDURE — 85025 COMPLETE CBC W/AUTO DIFF WBC: CPT

## 2022-04-02 PROCEDURE — 80053 COMPREHEN METABOLIC PANEL: CPT

## 2022-04-02 RX ORDER — KETOROLAC TROMETHAMINE 15 MG/ML
15 INJECTION, SOLUTION INTRAMUSCULAR; INTRAVENOUS
Status: COMPLETED | OUTPATIENT
Start: 2022-04-02 | End: 2022-04-02

## 2022-04-02 RX ORDER — LIDOCAINE 4 G/100G
1 PATCH TOPICAL ONCE
Status: DISCONTINUED | OUTPATIENT
Start: 2022-04-02 | End: 2022-04-03 | Stop reason: HOSPADM

## 2022-04-02 RX ORDER — SODIUM CHLORIDE 9 MG/ML
125 INJECTION, SOLUTION INTRAVENOUS CONTINUOUS
Status: DISCONTINUED | OUTPATIENT
Start: 2022-04-02 | End: 2022-04-03 | Stop reason: HOSPADM

## 2022-04-02 RX ADMIN — SODIUM CHLORIDE 125 ML/HR: 900 INJECTION, SOLUTION INTRAVENOUS at 21:44

## 2022-04-02 RX ADMIN — SODIUM CHLORIDE 1000 ML: 900 INJECTION, SOLUTION INTRAVENOUS at 19:59

## 2022-04-02 RX ADMIN — KETOROLAC TROMETHAMINE 15 MG: 15 INJECTION, SOLUTION INTRAMUSCULAR; INTRAVENOUS at 23:17

## 2022-04-02 RX ADMIN — IOPAMIDOL 80 ML: 612 INJECTION, SOLUTION INTRAVENOUS at 21:24

## 2022-04-02 NOTE — PROGRESS NOTES
Maryse placed on patient for urine collection. Patient using briefs. States she has limited walking ability.

## 2022-04-02 NOTE — ED NOTES
I performed a brief evaluation, including history and physical, of the patient here in triage and I have determined that pt will need further treatment and evaluation from the main side ER physician. I have placed initial orders to help in expediting patients care. April 02, 2022 at 6:47 PM - MING Mendez        Visit Vitals  /76 (BP 1 Location: Left upper arm, BP Patient Position: Sitting)   Pulse 88   Temp 98.7 °F (37.1 °C)   Resp 18   Ht 5' (1.524 m)   Wt 49 kg (108 lb)   SpO2 100%   BMI 21.09 kg/m²        Patient reports urinary incontinence after a fall that occurred roughly a week ago. Reports continued back pain. Reports she had a x-ray done at the rehab facility which was negative however reports persistent pain.

## 2022-04-02 NOTE — ED PROVIDER NOTES
EMERGENCY DEPARTMENT HISTORY AND PHYSICAL EXAM    7:57 PM      Date: 4/2/2022  Patient Name: Miguelito Tello    History of Presenting Illness     Chief Complaint   Patient presents with    Back Pain         History Provided By: Patient  Location/Duration/Severity/Modifying factors   The patient is a 69-year-old female with a history of hypertension, seizure disorder on medications, hypertension, malnutrition, poor p.o. intake, now with G-tube from previous admission to the hospital, presents emergency department with complaint of low back pain some increasing for the past 2 weeks now with 1 week of abdominal pain. Patient notes that she was sent for evaluation of her G-tube mention something about having it taken out. The patient has no family at the bedside to assist in the history. The patient recently was in a nursing facility per reports. The patient denies any nausea, vomiting, fevers, or chills. Patient does complain of some urinary pain. Patient denies being a smoker, occasionally drinks, denies any drug use. PCP: Gudelia VALDEZ NP    Current Facility-Administered Medications   Medication Dose Route Frequency Provider Last Rate Last Admin    sodium chloride 0.9 % bolus infusion 1,000 mL  1,000 mL IntraVENous ONCE Peter VELAZCO MD 1,000 mL/hr at 04/02/22 1959 1,000 mL at 04/02/22 1959    0.9% sodium chloride infusion  125 mL/hr IntraVENous CONTINUOUS Mary Vicente MD        iopamidoL (ISOVUE 300) 61 % contrast injection  mL   mL IntraVENous RAD ONCE Mary Vicente MD         Current Outpatient Medications   Medication Sig Dispense Refill    ferrous sulfate 325 mg (65 mg iron) tablet Take 325 mg by mouth daily.  divalproex DR (DEPAKOTE) 250 mg tablet Take 250 mg by mouth three (3) times daily.  amLODIPine (NORVASC) 10 mg tablet Take 10 mg by mouth daily.  spironolactone (ALDACTONE) 25 mg tablet Take 25 mg by mouth daily.       hydrALAZINE (APRESOLINE) 100 mg tablet Take 100 mg by mouth two (2) times a day.  tamsulosin HCl (TAMSULOSIN PO) Take 0.4 mg by mouth daily. tab      PARoxetine (PAXIL) 30 mg tablet Take 30 mg by mouth daily.  OXcarbazepine (TRILEPTAL) 300 mg tablet Take 300 mg by mouth two (2) times a day.  multivitamin, tx-iron-ca-min (THERA-M w/ IRON) 9 mg iron-400 mcg tab tablet Take 1 Tablet by mouth daily. 30 Tablet 0    atorvastatin (LIPITOR) 40 mg tablet Take 40 mg by mouth daily.  acetaminophen (TYLENOL) 500 mg tablet Take 500 mg by mouth as needed for Pain.  clopidogreL (PLAVIX) 75 mg tab Take 75 mg by mouth daily. Indications: prevention for a blood clot going to the brain      ergocalciferol (VITAMIN D2) 50,000 unit capsule Take 50,000 Units by mouth two (2) times a week.  cyanocobalamin (VITAMIN B12) 500 mcg tablet Take 1 Tab by mouth daily. 27 Tab 0       Past History     Past Medical History:  Past Medical History:   Diagnosis Date    Abnormal coordination 9/21/2020    Hallucinations 9/21/2020    Hypertension     Neurological disorder     [facility] [address]     Stroke ([facility] [address] 2009       Past Surgical History:  Past Surgical History:   Procedure Laterality Date    PLACE PERCUT GASTROSTOMY TUBE  1/11/2022            Family History:  Family History   Problem Relation Age of Onset    Diabetes Other     Stroke Other        Social History:  Social History     Tobacco Use    Smoking status: Never Smoker    Smokeless tobacco: Never Used   Vaping Use    Vaping Use: Never used   Substance Use Topics    Alcohol use: Yes     Comment: Socially     Drug use: No       Allergies: Allergies   Allergen Reactions    Tomato Hives     Allergic to eating tomato.     Aspirin Nausea and Vomiting    Ciprofloxacin Rash    Pcn [Penicillins] Rash and Hives    Sulfa (Sulfonamide Antibiotics) Hives and Rash         Review of Systems       Review of Systems   Constitutional: Negative for activity change, fatigue and fever. HENT: Negative for congestion and rhinorrhea. Eyes: Negative for visual disturbance. Respiratory: Negative for shortness of breath. Cardiovascular: Negative for chest pain and palpitations. Gastrointestinal: Positive for abdominal pain and nausea. Negative for diarrhea and vomiting. Genitourinary: Negative for dysuria and hematuria. Musculoskeletal: Positive for back pain. Skin: Negative for rash. Neurological: Negative for dizziness, weakness and light-headedness. All other systems reviewed and are negative. Physical Exam     Visit Vitals  BP (!) 158/73   Pulse 82   Temp 98.7 °F (37.1 °C)   Resp 15   Ht 5' (1.524 m)   Wt 49 kg (108 lb)   SpO2 99%   BMI 21.09 kg/m²         Physical Exam  Vitals and nursing note reviewed. Constitutional:       General: She is not in acute distress. Appearance: She is well-developed. Comments: Globally weak    HENT:      Head: Normocephalic and atraumatic. Right Ear: External ear normal.      Left Ear: External ear normal.      Nose: Nose normal.   Eyes:      General: No scleral icterus. Conjunctiva/sclera: Conjunctivae normal.      Pupils: Pupils are equal, round, and reactive to light. Neck:      Thyroid: No thyromegaly. Vascular: No JVD. Trachea: No tracheal deviation. Cardiovascular:      Rate and Rhythm: Normal rate and regular rhythm. Heart sounds: Normal heart sounds. No murmur heard. No friction rub. No gallop. Pulmonary:      Effort: Pulmonary effort is normal.      Breath sounds: Normal breath sounds. Chest:      Chest wall: No tenderness. Abdominal:      General: Bowel sounds are normal. There is no distension. Palpations: Abdomen is soft. Tenderness: There is abdominal tenderness. There is no guarding or rebound. Comments: Diffusely tender, PEG tube noted, no drainage from the site, PEG site is diffusely tender   Musculoskeletal:         General: No tenderness. Cervical back: Normal range of motion and neck supple. Lymphadenopathy:      Cervical: No cervical adenopathy. Skin:     General: Skin is warm and dry. Neurological:      Mental Status: She is alert and oriented to person, place, and time. Cranial Nerves: No cranial nerve deficit. Coordination: Coordination normal.      Comments: Globally weak, follows commands, gait not observed   Psychiatric:      Comments: No family currently at the bedside           Diagnostic Study Results     Labs -  No results found for this or any previous visit (from the past 12 hour(s)). Radiologic Studies -   CT ABD PELV W CONT    (Results Pending)         Medical Decision Making   I am the first provider for this patient. I reviewed the vital signs, available nursing notes, past medical history, past surgical history, family history and social history. Vital Signs-Reviewed the patient's vital signs. Records Reviewed: Nursing Notes, Old Medical Records, Previous Radiology Studies and Previous Laboratory Studies (Time of Review: 7:57 PM)    ED Course: Progress Notes, Reevaluation, and Consults:      8:03 PM : Pt care transferred to Dr. Ирина Henderson  ,ED provider. History of patient complaint(s), available diagnostic reports and current treatment plan has been discussed thoroughly. Bedside rounding on patient occured : yes . Intended disposition of patient : TBD  Pending diagnostics reports and/or labs (please list): CT AP, labs     Dr. Ivett Weiss assistance in completion of this plan is greatly appreciated but it should be noted that I will be the provider of record for this patient. Provider Notes (Medical Decision Making):   MDM  Number of Diagnoses or Management Options  Diagnosis management comments: Patient is a 50-year-old female with a history of seizure disorder, malnutrition, hallucinations, who presents emergency department with complaint of abdominal pain and back pain.   The patient has a PEG tube and she mentions that she feels like she supposed to have a taken out. Patient on her previous notes it sounds like she needed it based on her malnutrition and she does not appear to to have gained significant amount of weight for my evaluation. We will follow her abdominal labs, renal function, CT her abdomen pelvis, hydrate, and then reevaluate. Sonja Sandoval DO 8:02 PM        Procedures      Diagnosis     Clinical Impression:   1. Abdominal pain, generalized    2. Acute bilateral low back pain without sciatica        Disposition: Pending     Follow-up Information    None          Patient's Medications   Start Taking    No medications on file   Continue Taking    ACETAMINOPHEN (TYLENOL) 500 MG TABLET    Take 500 mg by mouth as needed for Pain. AMLODIPINE (NORVASC) 10 MG TABLET    Take 10 mg by mouth daily. ATORVASTATIN (LIPITOR) 40 MG TABLET    Take 40 mg by mouth daily. CLOPIDOGREL (PLAVIX) 75 MG TAB    Take 75 mg by mouth daily. Indications: prevention for a blood clot going to the brain    CYANOCOBALAMIN (VITAMIN B12) 500 MCG TABLET    Take 1 Tab by mouth daily. DIVALPROEX DR (DEPAKOTE) 250 MG TABLET    Take 250 mg by mouth three (3) times daily. ERGOCALCIFEROL (VITAMIN D2) 50,000 UNIT CAPSULE    Take 50,000 Units by mouth two (2) times a week. FERROUS SULFATE 325 MG (65 MG IRON) TABLET    Take 325 mg by mouth daily. HYDRALAZINE (APRESOLINE) 100 MG TABLET    Take 100 mg by mouth two (2) times a day. MULTIVITAMIN, TX-IRON-CA-MIN (THERA-M W/ IRON) 9 MG IRON-400 MCG TAB TABLET    Take 1 Tablet by mouth daily. OXCARBAZEPINE (TRILEPTAL) 300 MG TABLET    Take 300 mg by mouth two (2) times a day. PAROXETINE (PAXIL) 30 MG TABLET    Take 30 mg by mouth daily. SPIRONOLACTONE (ALDACTONE) 25 MG TABLET    Take 25 mg by mouth daily. TAMSULOSIN HCL (TAMSULOSIN PO)    Take 0.4 mg by mouth daily.  tab   These Medications have changed    No medications on file   Stop Taking    No medications on file     Disclaimer: Sections of this note are dictated using utilizing voice recognition software. Minor typographical errors may be present. If questions arise, please do not hesitate to contact me or call our department.

## 2022-04-02 NOTE — ED TRIAGE NOTES
Pt arrived to ed via pov with son at bedside. Pt reports mid and lower back pain. Fall approximately 1 week ago at nursing home. Son states pt is complaining of pain in any position. Recently placed g tube  Pt alert and oriented x 3. Pt able to transfer with assistance x 2 otherwise nonambulatory.   Pt rates pain an 8 on a standard pain scale

## 2022-04-03 ENCOUNTER — HOME CARE VISIT (OUTPATIENT)
Dept: HOME HEALTH SERVICES | Facility: HOME HEALTH | Age: 67
End: 2022-04-03
Payer: MEDICARE

## 2022-04-03 NOTE — PROGRESS NOTES
Per patient son, pt lives at home with him. She does not live in a nursing home. Patient is being discharged home to son.  Waiting for his arrival.

## 2022-04-03 NOTE — DISCHARGE INSTRUCTIONS
You presented emergency department for abdominal pain and back pain. Your CT scan of your abdomen and x-ray of your back did not show any new acute findings. You can use Tylenol and ibuprofen as needed for pain. Please follow-up with your primary care doctor. Please return to the emergency department for any severe pain, difficulty eating and drinking, difficulty ambulating with loss of sensation or weakness or any other concerns.

## 2022-04-03 NOTE — PROGRESS NOTES
Pt given apple juice at bedside, able to tolerate drinking from straw with no difficulty or problems. Requesting to be sent to another nursing home or for one of her sons to get her. Does not want to go back to current nursing home due to prior incident with a nurse there.

## 2022-04-04 ENCOUNTER — HOME CARE VISIT (OUTPATIENT)
Dept: HOME HEALTH SERVICES | Facility: HOME HEALTH | Age: 67
End: 2022-04-04
Payer: MEDICARE

## 2022-04-04 ENCOUNTER — HOME CARE VISIT (OUTPATIENT)
Dept: SCHEDULING | Facility: HOME HEALTH | Age: 67
End: 2022-04-04
Payer: MEDICARE

## 2022-04-04 VITALS
HEART RATE: 83 BPM | OXYGEN SATURATION: 99 % | RESPIRATION RATE: 16 BRPM | SYSTOLIC BLOOD PRESSURE: 170 MMHG | DIASTOLIC BLOOD PRESSURE: 85 MMHG | TEMPERATURE: 97.6 F

## 2022-04-04 LAB
BACTERIA SPEC CULT: NORMAL
CC UR VC: NORMAL
SERVICE CMNT-IMP: NORMAL

## 2022-04-04 PROCEDURE — G0153 HHCP-SVS OF S/L PATH,EA 15MN: HCPCS

## 2022-04-04 NOTE — Clinical Note
Pt had a fall on 4/4/2022. Son reports he told Pt to get dressed this morning to get ready for Dr patrick. She got up from bed and tried to walk to bathroom alone. Son states he found her on floor on her buttocks in front of chair. No injuries reported or observed. SLP advised Pt and son for Pt only to walk with supervision.

## 2022-04-04 NOTE — Clinical Note
thank you   ----- Message -----  From: TERESA Mayorga  Sent: 4/4/2022  10:02 PM EDT  To: Annalee Espinoza OTR/L      Pt had a fall on 4/4/2022. Son reports he told Pt to get dressed this morning to get ready for Dr patrick. She got up from bed and tried to walk to bathroom alone. Son states he found her on floor on her buttocks in front of chair. No injuries reported or observed. SLP advised Pt and son for Pt only to walk with supervision.

## 2022-04-04 NOTE — Clinical Note
Thank you.    ----- Message -----  From: TERESA Vasquez  Sent: 4/4/2022  10:02 PM EDT  To: Chrissie Cifuentes PTA      Pt had a fall on 4/4/2022. Son reports he told Pt to get dressed this morning to get ready for Dr patrick. She got up from bed and tried to walk to bathroom alone. Son states he found her on floor on her buttocks in front of chair. No injuries reported or observed. SLP advised Pt and son for Pt only to walk with supervision.

## 2022-04-04 NOTE — Clinical Note
thank you  ----- Message -----  From: Blayne Hawkins, PTA  Sent: 4/4/2022   5:53 AM EDT  To: Kenia Lantigua, RN      Pt was in sig pain at last visit on Friday from fall in SNF, and pt said that the PCP wanted her to get an xray of her back. Looked in Chart Review and saw that she had been to [id number] ED yesterday for LBP and abdominal pain w plan to DC.

## 2022-04-04 NOTE — CASE COMMUNICATION
Pt was in sig pain at last visit on Friday from fall in SNF, and pt said that the PCP wanted her to get an xray of her back. Looked in Chart Review and saw that she had been to [facility] ED yesterday for LBP and abdominal pain w plan to DC.

## 2022-04-04 NOTE — Clinical Note
thank you   ----- Message -----  From: Meaghan Douglas PTA  Sent: 4/4/2022   5:53 AM EDT  To: Ursula Bello, OTR/L      Pt was in sig pain at last visit on Friday from fall in SNF, and pt said that the PCP wanted her to get an xray of her back. Looked in Chart Review and saw that she had been to [facility] ED yesterday for LBP and abdominal pain w plan to DC.

## 2022-04-05 ENCOUNTER — HOME CARE VISIT (OUTPATIENT)
Dept: SCHEDULING | Facility: HOME HEALTH | Age: 67
End: 2022-04-05
Payer: MEDICARE

## 2022-04-05 VITALS
DIASTOLIC BLOOD PRESSURE: 78 MMHG | HEART RATE: 87 BPM | TEMPERATURE: 97.9 F | OXYGEN SATURATION: 99 % | SYSTOLIC BLOOD PRESSURE: 150 MMHG | RESPIRATION RATE: 16 BRPM

## 2022-04-05 VITALS
SYSTOLIC BLOOD PRESSURE: 112 MMHG | RESPIRATION RATE: 18 BRPM | TEMPERATURE: 98 F | DIASTOLIC BLOOD PRESSURE: 64 MMHG | OXYGEN SATURATION: 97 % | HEART RATE: 73 BPM

## 2022-04-05 PROCEDURE — G0299 HHS/HOSPICE OF RN EA 15 MIN: HCPCS

## 2022-04-05 PROCEDURE — G0157 HHC PT ASSISTANT EA 15: HCPCS

## 2022-04-05 NOTE — HOME HEALTH
Skilled reason for visit: skilled assessment, education, medication management, GI assessment, patient eating by mouth 3 meals, snacks and boost. MD aware gtube clogged. Caregiver involvement: Family assists ADL's, medications, meals, transportation, errands. Medications reviewed and all medications are available in the home this visit. The following education was provided regarding medications: patient knowledgeable about all medications, has no questions or concerns at this time. MD notified of any discrepancies/look a-like medications/medication interactions: na   Medications are effective at this time. Home health supplies by type and quantity ordered/delivered this visit include: na   Patient education provided this visit: Instructed patient Exercise Although resting for short periods can alleviate pain, too much rest may actually increase pain and put you at greater risk of injury when you again attempt movement. Research has shown that regular exercise can diminish pain in the long term by improving muscle tone, strength, and flexibility. Exercise may also cause a release of endorphins, the body's natural painkillers. Some exercises are easier for certain chronic pain sufferers to perform than others    Sharps education provided: na   Patient level of understanding of education provided: patient/caregiver verbalized 100% understanding to all topics discussed   Skilled Care Performed this visit: skilled assessment, education, medication management, gtube assessment   Patient response to procedure performed: patient denied pain and discomfort during procedure/visit   Agency Progress toward goals: progressing   Patient's Progress towards personal goals: progressing   Home exercise program:  instructed patient on pursed lip breathing. Pursed lip breathing is one of the simplest ways to control shortness of breath.  It provides a quick and easy way to slow your pace of breathing, making each breath more effective. Pursed lip breathing: Improves ventilation, releases trapped air in the lungs, keeps the airways open longer and decreases the work of breathing, prolongs exhalation to slow the breathing rate, improves breathing patterns by moving old air out of the lungs and allowing for new air to enter the lungs, relieves shortness of breath, causes general relaxation. Practice this technique 4 - 5 times a day at first so you can get the correct breathing pattern. Pursed lip breathing technique: Relax your neck and shoulder muscles, breathe in ( inhale ) slowly through your nose for two counts, keeping your mouth closed. Don't take a deep breath; a normal breath will do. It may help to count to yourself: inhale, one, two. Pucker or purse your lips as if you were going to whistle or gently flicker the flame of a candle. Breathe out ( exhale ) slowly and gently through your pursed lips while counting to four. It may help to count to yourself: exhale, one, two, three, four.     Continued need for the following skills: Nursing, Physical Therapy and Occupational Therapy   Plan for next visit: skilled assessment, education, medication management, gtube assessment   Patient and/or caregiver notified and agrees to changes in the Plan of Care N/A   The following discharge planning was discussed with the pt/caregiver: Discharge planning as follows: when goals met, patient/caregiver able to manage disease process, medications and pain

## 2022-04-05 NOTE — HOME HEALTH
SUBJECTIVE:  Pt's son reports that pt slid down the side of the bed. Son ed on walking, back pain relief, and bathroom schedule. Son states that he walked his mother >25 feet holding 1 hand. CAREGIVER INVOLVEMENT/ASSISTANCE NEEDED FOR:    Pt's sons and PCA assists w ADLs, medications, meals, and transportation. HOME HEALTH SUPPLIES by type and quantity ordered/delivered this visit include: None         OBJECTIVE: See Interventions    While amb pt suddenly c/o intense back pain and became so flustered that couldn't speak intelligibly and had to sit. After questioning, back felt better so surmised w PCA that it appeared to be a muscle spasm. Patient education provided this visit:  Pt and son educated on the using BECKY UE and LEs side as much as possible due to poor proprioception in LLE and LUE, and instructed in games for clapping and reaching out to touching that son's can do w pt in sitting. Patient level of understanding of education provided:  Educated pt's son and he was able to tell pt's about the games that they will play. Due to pt's cognitive limitations, unsure of understanding. Patient response to treatment:  W positioning for comfort in supine and sidelying and ice application x 10 min, pt's affect returned to normal and she reports that her pain is better. ASSESSMENT AND PROGRESS TOWARD GOALS:      Progressing fair toward goals for gait w amb w Min to Mod A w gait belt due to multiple small therapist corrected LOBs, but was no longer leaning/pushing back w gait. Pt was also able to progress to 3 standing ther ex which she did w demos and very many MC/TC/VC for foot placements. Patient continues to have deficits in bed mobility, transfers, gait, strength, and balance due to recent CVA and unevaluated, sig back pain from prior fall. .      Patient will benefit from continued intervention with progression of all PT goals to improve functional independence, prevent falls, decrease burden of care, and improve quality of life. Home Exercise Program:   Instructed in and gave handout for sitting HEP for BECKY LEs x 10-30 x 3 daily ea:  LAQ w 5 sec hold, HS curls, Marches, Hip ADD/ABD, and heel/toe raises. SHourly amb, ther ex, or ankle pumps. PLAN FOR NEXT VISIT:  Cont w dynamic balance      DISCHARGE PLANNING was discussed with the pt/caregiver:     Frequency: 3wk1, 2wk4, 1wk1 w 1wk1, 2wk3, 1wk1  remaining with anticipated DC on 5/2.

## 2022-04-05 NOTE — HOME HEALTH
SUBJECTIVE: Pt just came back from DR patrick when SLP arrived. Son reports Pt saw Dr. Ruth Marrufo in [facility] physician did not reccommend to remove peg tube due to Pts weight loss. Pt states she fell this morning. Son reported details stating that Pt was getting out of bed, tried to walk to walker by herself. Pt \"got up and fell\". Fell on buttocks. Son reports he wasnt in room with Pt  CAREGIVER INVOLVEMENT / ASSISTANCE NEEDED FOR: PCA and sons assist with ADLs, meds, meals, tube feeding  [id number] ORDERED/DELIVERED THIS VISIT INCLUDE: n/a  OBJECTIVE / PATIENT RESPONSE TO TREATMENT / PATIENT LEVEL OF UNDERSTANDING OF EDUCATION PROVIDED: Pt with steady progress toward using strategies to reduced dysfluenices and to increase volume during speech.   Pt is stimulable to use strategies  ASSESSMENT OF PROGRESS TOWARD GOALS: Pt is making appropriate progress toward ST goals  CONTINUED NEED FOR THE FOLLOWING SKILLS: Pt has difficulty with communication due to dysarthria, ST is needed so Pt is able to communicate effectively  PLAN FOR NEXT VISIT: speech tasks  THE FOLLOWING DISCHARGE PLANNING WAS DISCUSSED WITH THE PATIENT/CAREGIVER: ST to d/c in 4 more visits/ wks or when goals met/max potential achieved, Pt/caregiver verbalized understanding

## 2022-04-06 ENCOUNTER — HOME CARE VISIT (OUTPATIENT)
Dept: SCHEDULING | Facility: HOME HEALTH | Age: 67
End: 2022-04-06
Payer: MEDICARE

## 2022-04-06 VITALS
DIASTOLIC BLOOD PRESSURE: 64 MMHG | RESPIRATION RATE: 16 BRPM | SYSTOLIC BLOOD PRESSURE: 128 MMHG | TEMPERATURE: 98.1 F | OXYGEN SATURATION: 98 % | HEART RATE: 85 BPM

## 2022-04-06 PROCEDURE — G0153 HHCP-SVS OF S/L PATH,EA 15MN: HCPCS

## 2022-04-06 NOTE — HOME HEALTH
SUBJECTIVE: Pt was sleeping on couch when SLP arrived. Pt reports practicing speech exercise left last visit on 1 occurrence    CAREGIVER INVOLVEMENT / ASSISTANCE NEEDED FOR: PCA and sons assist with ADLs, meds, meals, tube feeding    [id number] ORDERED/DELIVERED THIS VISIT INCLUDE: n/a    OBJECTIVE / PATIENT RESPONSE TO TREATMENT / PATIENT LEVEL OF UNDERSTANDING OF EDUCATION PROVIDED: Pt demonstrates improved speech with increased volume and no dysfluencies using speech strategies.     ASSESSMENT OF PROGRESS TOWARD GOALS: Pt is making appropriate progress toward ST goals    CONTINUED NEED FOR THE FOLLOWING SKILLS: Pt has difficulty with communication due to dysarthria, ST is needed so Pt is able to communicate effectively    PLAN FOR NEXT VISIT: speech tasks    THE FOLLOWING DISCHARGE PLANNING WAS DISCUSSED WITH THE PATIENT/CAREGIVER: ST to d/c in 3 more visits/ wks or when goals met/max potential achieved, Pt/caregiver verbalized understanding

## 2022-04-07 ENCOUNTER — HOME CARE VISIT (OUTPATIENT)
Dept: HOME HEALTH SERVICES | Facility: HOME HEALTH | Age: 67
End: 2022-04-07
Payer: MEDICARE

## 2022-04-08 ENCOUNTER — HOME CARE VISIT (OUTPATIENT)
Dept: SCHEDULING | Facility: HOME HEALTH | Age: 67
End: 2022-04-08
Payer: MEDICARE

## 2022-04-08 VITALS
SYSTOLIC BLOOD PRESSURE: 148 MMHG | DIASTOLIC BLOOD PRESSURE: 76 MMHG | RESPIRATION RATE: 16 BRPM | HEART RATE: 86 BPM | TEMPERATURE: 97.3 F | OXYGEN SATURATION: 98 %

## 2022-04-08 PROCEDURE — G0151 HHCP-SERV OF PT,EA 15 MIN: HCPCS

## 2022-04-09 NOTE — HOME HEALTH
POST FALL ASSESSMENT by TARI Murphy     SITUATION: pt. slid off the bed  Date/time: 4/4/22  in the morning   Location: bedroom   Equipment used: no walker  MD notification: yes, Werner Comment, NP  Recovery assistance: son helped her up   Injury: some back pain  Recommendations for MD follow-up ED or medical appt: pt. went to ED and received and assessment and a pain shot into her back   Medication Affects:  NA  BACKGROUND:   History of falls: 2nd fall this year   Dx: CVA with hemiparesis   Equipment: FWW with bilateral platform attachments   ASSESSMENT:   Pt. has no c/o pain now and is at her baseline again  RECOMMENDATION: pt. not to get up by herself without assistance  Referrals: NA   Equipment referrals: NA       PT VISIT NOTE  S: Pt. states that she is ready for therapy. She has had a fall this last week. Pt. has just received a FWW with platform attachments  O: Medications reconciled with the patient, medications updates as needed. PCA assists with some iADL's, medication management and medical appointments as needed. Gait with FWW AD x 25' feet with min/mod A. Pt. required max verbal cues for sequencing and coordination. Pt. demonstrates with decreased hip and knee flexion in pre and mid swing phase of gait on ilateral lower extremity. Pt. demonstrates with slow cadences and decreased stride length. Transfers are mod/max A  with sit to stand and bed to chair. This allows for improved functional mobility and independence. Pt. received therapeutic exercises which included:  Squats, marching in place, Hip abd/add, toe raises and hip ext. x 10 x 2. The need for the therex include lower extremity strengthening to facilitate safe and effective functional mobility, improvement with gait activities and to allow the patient to safely transfer within the home and allow for maximal functional independence.     A: Pt. requires skilled PT for instruction in strengthening activities, balance and coordination activities as well as gait, transfer and safety activities. Patient/Caregiver level of understanding of education provided: Pt. was able to return demonstrate all mobility training and HEP shown with mod A. Patient response to treatment: Patient tolerated treatment well, with complaint of new pain noted post treatment. Instructed the patient with safety during mobility to facilitate increased independence with all aspects of functional mobility. Instruction with gait sequencing to facilitate increase in gait quality by increasing the hip and knee flexion in pre and mid swing phase of gait. Pt. was able to return demonstrate the gait training by demonstrating an increase in hip and knee flexion in the pre and mid swing phase of gait. Pt. does not need the platform attachments on the walker. P: Next session there will be continued focus on transfer, mobility and gait as well as on safety education during all mobility including balance as well as strengthening the hip and knee musculature to allow for an increased level of functional independence with mobility.

## 2022-04-11 ENCOUNTER — HOSPITAL ENCOUNTER (OUTPATIENT)
Age: 67
Setting detail: OBSERVATION
Discharge: HOME HEALTH | End: 2022-04-21
Attending: STUDENT IN AN ORGANIZED HEALTH CARE EDUCATION/TRAINING PROGRAM | Admitting: INTERNAL MEDICINE
Payer: MEDICARE

## 2022-04-11 ENCOUNTER — HOME CARE VISIT (OUTPATIENT)
Dept: SCHEDULING | Facility: HOME HEALTH | Age: 67
End: 2022-04-11
Payer: MEDICARE

## 2022-04-11 ENCOUNTER — APPOINTMENT (OUTPATIENT)
Dept: CT IMAGING | Age: 67
End: 2022-04-11
Attending: STUDENT IN AN ORGANIZED HEALTH CARE EDUCATION/TRAINING PROGRAM
Payer: MEDICARE

## 2022-04-11 ENCOUNTER — APPOINTMENT (OUTPATIENT)
Dept: GENERAL RADIOLOGY | Age: 67
End: 2022-04-11
Attending: STUDENT IN AN ORGANIZED HEALTH CARE EDUCATION/TRAINING PROGRAM
Payer: MEDICARE

## 2022-04-11 DIAGNOSIS — R77.8 ELEVATED TROPONIN: Primary | ICD-10-CM

## 2022-04-11 DIAGNOSIS — G40.919 BREAKTHROUGH SEIZURE (HCC): ICD-10-CM

## 2022-04-11 DIAGNOSIS — G40.909 SEIZURE DISORDER (HCC): ICD-10-CM

## 2022-04-11 PROBLEM — R79.89 ELEVATED TROPONIN: Status: ACTIVE | Noted: 2022-04-11

## 2022-04-11 LAB
ANION GAP SERPL CALC-SCNC: 3 MMOL/L (ref 3–18)
APPEARANCE UR: CLEAR
ATRIAL RATE: 68 BPM
BASOPHILS # BLD: 0.1 K/UL (ref 0–0.1)
BASOPHILS NFR BLD: 1 % (ref 0–2)
BILIRUB UR QL: NEGATIVE
BUN SERPL-MCNC: 19 MG/DL (ref 7–18)
BUN/CREAT SERPL: 17 (ref 12–20)
CALCIUM SERPL-MCNC: 8.9 MG/DL (ref 8.5–10.1)
CALCULATED P AXIS, ECG09: 112 DEGREES
CALCULATED R AXIS, ECG10: -154 DEGREES
CALCULATED T AXIS, ECG11: 62 DEGREES
CHLORIDE SERPL-SCNC: 111 MMOL/L (ref 100–111)
CO2 SERPL-SCNC: 29 MMOL/L (ref 21–32)
COLOR UR: YELLOW
CREAT SERPL-MCNC: 1.09 MG/DL (ref 0.6–1.3)
DIAGNOSIS, 93000: NORMAL
DIFFERENTIAL METHOD BLD: ABNORMAL
EOSINOPHIL # BLD: 0.1 K/UL (ref 0–0.4)
EOSINOPHIL NFR BLD: 1 % (ref 0–5)
ERYTHROCYTE [DISTWIDTH] IN BLOOD BY AUTOMATED COUNT: 13.3 % (ref 11.6–14.5)
GLUCOSE BLD STRIP.AUTO-MCNC: 83 MG/DL (ref 70–110)
GLUCOSE BLD STRIP.AUTO-MCNC: 85 MG/DL (ref 70–110)
GLUCOSE SERPL-MCNC: 90 MG/DL (ref 74–99)
GLUCOSE UR STRIP.AUTO-MCNC: NEGATIVE MG/DL
HCT VFR BLD AUTO: 30.8 % (ref 35–45)
HGB BLD-MCNC: 10.1 G/DL (ref 12–16)
HGB UR QL STRIP: NEGATIVE
IMM GRANULOCYTES # BLD AUTO: 0 K/UL (ref 0–0.04)
IMM GRANULOCYTES NFR BLD AUTO: 0 % (ref 0–0.5)
KETONES UR QL STRIP.AUTO: NEGATIVE MG/DL
LEUKOCYTE ESTERASE UR QL STRIP.AUTO: NEGATIVE
LYMPHOCYTES # BLD: 2 K/UL (ref 0.9–3.6)
LYMPHOCYTES NFR BLD: 34 % (ref 21–52)
MAGNESIUM SERPL-MCNC: 2.1 MG/DL (ref 1.6–2.6)
MCH RBC QN AUTO: 31.1 PG (ref 24–34)
MCHC RBC AUTO-ENTMCNC: 32.8 G/DL (ref 31–37)
MCV RBC AUTO: 94.8 FL (ref 78–100)
MONOCYTES # BLD: 0.8 K/UL (ref 0.05–1.2)
MONOCYTES NFR BLD: 13 % (ref 3–10)
NEUTS SEG # BLD: 3.1 K/UL (ref 1.8–8)
NEUTS SEG NFR BLD: 51 % (ref 40–73)
NITRITE UR QL STRIP.AUTO: NEGATIVE
NRBC # BLD: 0 K/UL (ref 0–0.01)
NRBC BLD-RTO: 0 PER 100 WBC
P-R INTERVAL, ECG05: 156 MS
PH UR STRIP: 7 [PH] (ref 5–8)
PLATELET # BLD AUTO: 167 K/UL (ref 135–420)
PMV BLD AUTO: 11.9 FL (ref 9.2–11.8)
POTASSIUM SERPL-SCNC: 3.7 MMOL/L (ref 3.5–5.5)
PROT UR STRIP-MCNC: NEGATIVE MG/DL
Q-T INTERVAL, ECG07: 414 MS
QRS DURATION, ECG06: 90 MS
QTC CALCULATION (BEZET), ECG08: 440 MS
RBC # BLD AUTO: 3.25 M/UL (ref 4.2–5.3)
SODIUM SERPL-SCNC: 143 MMOL/L (ref 136–145)
SP GR UR REFRACTOMETRY: 1.01 (ref 1–1.03)
TROPONIN-HIGH SENSITIVITY: 49 NG/L (ref 0–54)
TROPONIN-HIGH SENSITIVITY: 65 NG/L (ref 0–54)
UROBILINOGEN UR QL STRIP.AUTO: 0.2 EU/DL (ref 0.2–1)
VALPROATE SERPL-MCNC: 9 UG/ML (ref 50–100)
VENTRICULAR RATE, ECG03: 68 BPM
WBC # BLD AUTO: 6 K/UL (ref 4.6–13.2)

## 2022-04-11 PROCEDURE — 93005 ELECTROCARDIOGRAM TRACING: CPT

## 2022-04-11 PROCEDURE — 80164 ASSAY DIPROPYLACETIC ACD TOT: CPT

## 2022-04-11 PROCEDURE — 99285 EMERGENCY DEPT VISIT HI MDM: CPT

## 2022-04-11 PROCEDURE — 84484 ASSAY OF TROPONIN QUANT: CPT

## 2022-04-11 PROCEDURE — 81003 URINALYSIS AUTO W/O SCOPE: CPT

## 2022-04-11 PROCEDURE — 85025 COMPLETE CBC W/AUTO DIFF WBC: CPT

## 2022-04-11 PROCEDURE — G0299 HHS/HOSPICE OF RN EA 15 MIN: HCPCS

## 2022-04-11 PROCEDURE — 99223 1ST HOSP IP/OBS HIGH 75: CPT | Performed by: INTERNAL MEDICINE

## 2022-04-11 PROCEDURE — 71045 X-RAY EXAM CHEST 1 VIEW: CPT

## 2022-04-11 PROCEDURE — 83735 ASSAY OF MAGNESIUM: CPT

## 2022-04-11 PROCEDURE — 82962 GLUCOSE BLOOD TEST: CPT

## 2022-04-11 PROCEDURE — 74011250637 HC RX REV CODE- 250/637: Performed by: STUDENT IN AN ORGANIZED HEALTH CARE EDUCATION/TRAINING PROGRAM

## 2022-04-11 PROCEDURE — 70450 CT HEAD/BRAIN W/O DYE: CPT

## 2022-04-11 PROCEDURE — G0378 HOSPITAL OBSERVATION PER HR: HCPCS

## 2022-04-11 PROCEDURE — 74011250636 HC RX REV CODE- 250/636: Performed by: STUDENT IN AN ORGANIZED HEALTH CARE EDUCATION/TRAINING PROGRAM

## 2022-04-11 PROCEDURE — 80048 BASIC METABOLIC PNL TOTAL CA: CPT

## 2022-04-11 RX ORDER — GUAIFENESIN 100 MG/5ML
325 LIQUID (ML) ORAL DAILY
Status: DISCONTINUED | OUTPATIENT
Start: 2022-04-12 | End: 2022-04-11

## 2022-04-11 RX ORDER — AMLODIPINE BESYLATE 10 MG/1
10 TABLET ORAL ONCE
Status: COMPLETED | OUTPATIENT
Start: 2022-04-11 | End: 2022-04-11

## 2022-04-11 RX ORDER — GUAIFENESIN 100 MG/5ML
324 LIQUID (ML) ORAL
Status: COMPLETED | OUTPATIENT
Start: 2022-04-11 | End: 2022-04-11

## 2022-04-11 RX ORDER — DIVALPROEX SODIUM 250 MG/1
250 TABLET, DELAYED RELEASE ORAL ONCE
Status: COMPLETED | OUTPATIENT
Start: 2022-04-11 | End: 2022-04-11

## 2022-04-11 RX ORDER — HYDRALAZINE HYDROCHLORIDE 50 MG/1
100 TABLET, FILM COATED ORAL
Status: COMPLETED | OUTPATIENT
Start: 2022-04-11 | End: 2022-04-11

## 2022-04-11 RX ORDER — ACETAMINOPHEN 325 MG/1
650 TABLET ORAL ONCE
Status: COMPLETED | OUTPATIENT
Start: 2022-04-11 | End: 2022-04-11

## 2022-04-11 RX ADMIN — ACETAMINOPHEN 650 MG: 325 TABLET ORAL at 16:21

## 2022-04-11 RX ADMIN — SODIUM CHLORIDE 1000 ML: 900 INJECTION, SOLUTION INTRAVENOUS at 12:24

## 2022-04-11 RX ADMIN — DIVALPROEX SODIUM 250 MG: 250 TABLET, DELAYED RELEASE ORAL at 16:21

## 2022-04-11 RX ADMIN — AMLODIPINE BESYLATE 10 MG: 10 TABLET ORAL at 18:35

## 2022-04-11 RX ADMIN — ASPIRIN 81 MG 324 MG: 81 TABLET ORAL at 18:35

## 2022-04-11 RX ADMIN — HYDRALAZINE HYDROCHLORIDE 100 MG: 50 TABLET, FILM COATED ORAL at 18:35

## 2022-04-11 NOTE — H&P
Date of Admission: 4/11/2022      Assessment:   Elevated troponin: Increased from 45-69. Cardiology has been consulted by the ER and recommends admission for follow-up. Last TTE was in 2015. Hypertension: Poorly controlled  Anemia of chronic disease  Seizure disorder with witnessed seizure today with prolonged postictal state: CT head negative for any acute bleeds  Depression-psychiatry was consulted during her admission in January  Failure to thrive with unintentional weight loss-reportedly had lost 100 pounds over the course of the last year. PEG tube was placed with initiation of tube feeds in January of this year  Plan:   Cardiology, Dr. Laly Aparicio, was consulted by the ED-  Further recommendations to be provided in a.m. Trend troponins overnight  CBC, BMP, TSH, T4 in a.m.  TTE ordered  Consider neurology evaluation for adjustment of medications for breakthrough seizures  Continue Paxil and Seroquel for depression  Nutrition consult for tube feeds  Restart Norvasc-this was DC'd at her prior admission-continue to adjust blood pressure medicine occasions for optimization    Verl Bio D.O. Internal Medicine and Infectious Diseases      Subjective:    Patient is a 75 y. o.female who is being evaluated for elevated troponin. Ms. Sanya Gipson is a 50-year-old female with a past medical history significant for hypertension, prior seizures, history of CVA who was brought in by her family following a witnessed seizure. She has a known seizure diagnosis and family gave her a dose of her medication and her seizure had stopped. She had a somewhat prolonged postictal phase which prompted them to bring her to the ED for further evaluation. Throughout the course of her stay she is continued to improve and is more conversant and talkative at the time of my visit. She does not remember much of about what happened other than that she was at home and had not been feeling ill at the time.   She remarks that she has some abdominal pain but denies having any chest pain or shortness of breath. She has no headaches vision changes or new weakness. She is generally tired and does not want to get out of bed and feels listless. Notably she had recently been admitted to [facility] in January for failure to thrive as well as KEYLA at which time she had a PEG tube placed and tube feeds were started. She was discharged to skilled nursing facility per recommendations of PT and OT and has just gotten home within the last week and has been undergoing PT OT with home health. At that time Trileptal and Depakote were continued and her Keppra was discontinued. UA was negative. Creatinine 1.09 which is about her baseline. CT of the head without any acute findings or interval changes since January 2022. Chest x-ray with atelectasis    During the course of her work-up in the emergency department it was noted that she had an increase in her troponin to 69 from 45 as the initial reading. Emergency department had contacted Dr. Buzz Mcneill with cardiology who had recommended admission for observation and serial troponins. Heparin drip was not recommended. We were advised to give patient aspirin and to continue her normal medication regimen. Past Medical History:   Diagnosis Date    Abnormal coordination 9/21/2020    Hallucinations 9/21/2020    Hypertension     Neurological disorder     Seizures ([facility] [address]     Stroke [facility]) 2009     Past Surgical History:   Procedure Laterality Date    PLACE PERCUT GASTROSTOMY TUBE  1/11/2022          Family History   Problem Relation Age of Onset    Diabetes Other     Stroke Other      Medications reviewed as below:   Current Outpatient Medications   Medication Sig Dispense Refill    ferrous sulfate 325 mg (65 mg iron) tablet Take 325 mg by mouth daily.  divalproex DR (DEPAKOTE) 250 mg tablet Take 250 mg by mouth three (3) times daily.       amLODIPine (NORVASC) 10 mg tablet Take 10 mg by mouth daily.  spironolactone (ALDACTONE) 25 mg tablet Take 25 mg by mouth daily.  hydrALAZINE (APRESOLINE) 100 mg tablet Take 100 mg by mouth two (2) times a day.  tamsulosin HCl (TAMSULOSIN PO) Take 0.4 mg by mouth daily. tab      PARoxetine (PAXIL) 30 mg tablet Take 30 mg by mouth daily.  OXcarbazepine (TRILEPTAL) 300 mg tablet Take 300 mg by mouth two (2) times a day.  multivitamin, tx-iron-ca-min (THERA-M w/ IRON) 9 mg iron-400 mcg tab tablet Take 1 Tablet by mouth daily. 30 Tablet 0    atorvastatin (LIPITOR) 40 mg tablet Take 40 mg by mouth daily.  acetaminophen (TYLENOL) 500 mg tablet Take 500 mg by mouth every six (6) hours as needed for Pain. take 1 tab every 6 hours as needed daily for pain      clopidogreL (PLAVIX) 75 mg tab Take 75 mg by mouth daily. Indications: prevention for a blood clot going to the brain      ergocalciferol (VITAMIN D2) 50,000 unit capsule Take 50,000 Units by mouth two (2) times a week.  cyanocobalamin (VITAMIN B12) 500 mcg tablet Take 1 Tab by mouth daily. 30 Tab 0     Allergies   Allergen Reactions    Tomato Hives     Allergic to eating tomato.     Aspirin Nausea and Vomiting    Ciprofloxacin Rash    Pcn [Penicillins] Rash and Hives    Sulfa (Sulfonamide Antibiotics) Hives and Rash     Social History     Socioeconomic History    Marital status:      Spouse name: Not on file    Number of children: Not on file    Years of education: Not on file    Highest education level: Not on file   Occupational History    Not on file   Tobacco Use    Smoking status: Never Smoker    Smokeless tobacco: Never Used   Vaping Use    Vaping Use: Never used   Substance and Sexual Activity    Alcohol use: Yes     Comment: Socially     Drug use: No    Sexual activity: Not on file   Other Topics Concern    Not on file   Social History Narrative    Not on file     Social Determinants of Health     Financial Resource Strain:     Difficulty of Paying Living Expenses: Not on file   Food Insecurity:     Worried About Running Out of Food in the Last Year: Not on file    Ran Out of Food in the Last Year: Not on file   Transportation Needs:     Lack of Transportation (Medical): Not on file    Lack of Transportation (Non-Medical): Not on file   Physical Activity:     Days of Exercise per Week: Not on file    Minutes of Exercise per Session: Not on file   Stress:     Feeling of Stress : Not on file   Social Connections:     Frequency of Communication with Friends and Family: Not on file    Frequency of Social Gatherings with Friends and Family: Not on file    Attends [employer] Services: Not on file    Active Member of [address] [place] [address] or Organizations: Not on file    Attends Club or Organization Meetings: Not on file    Marital Status: Not on file   Intimate Partner Violence:     Fear of Current or Ex-Partner: Not on file    Emotionally Abused: Not on file    Physically Abused: Not on file    Sexually Abused: Not on file   Housing Stability:     Unable to Pay for Housing in the Last Year: Not on file    Number of [place] in the Last Year: Not on file    Unstable Housing in the Last Year: Not on file        Review of Systems    Negative Unless BOLDED    General: fevers, chills, myalgias, arthralgias, unexplained weight loss, malaise, fatigue. HEENT:  headaches,sinus pain or presure, recent URI, recent dental procedures;  tinnitus, hearing loss , visual changes, catarats, dizziness or blurred vision  PUlMONARY:  cough , shortness of breath, sputum production, hx of asthma or COPD. previous treatement for TB or PPD. Cardiovascular: chest pain, previous CAD/MI, vavlular heart disease,  murmurs  GI:   nausea, vomiting, diarrhea, abdominal pain, prior C.diff  :  urinary frequency, dysuria, hematuria, bladder incontinence.    Neurologic:  seizures, syncope or prior CVA/TIA, confusion, memory impairment, neuropathy  Musculoskeletal:  myalgias arthralgias, joint pain/ swelling,  back pain  Skin:  Purities,  recurrent cellulitis,  chronic stasis ulcer, diabetic foot ulcers  Endocrine: polyuria, polydipsia, hair loss, weight gain  Psych: depression or treatment by a psychiatrist/psycologist  Heme-Onc: prior DVT, easy bruising, fatigue, malignancy        Objective:        Visit Vitals  BP (!) 194/89   Pulse 62   Temp 96.9 °F (36.1 °C)   Resp 16   SpO2 100%     Temp (24hrs), Av.9 °F (36.1 °C), Min:96.9 °F (36.1 °C), Max:96.9 °F (36.1 °C)        General:   awake alert and oriented, slow to respond   Skin:   no rashes or skin lesions noted on limited exam   HEENT:  Normocephalic, atraumatic, PERRL, EOMI, no scleral icterus or pallor; no conjunctival hemmohage;  nasal and oral mucous are moist and without evidence of lesions. No thrush. Neck supple, no bruits. Lymph Nodes:   no cervical, axillary or inguinal adenopathy   Lungs:   non-labored, bilaterally clear to aspiration- no crackles wheezes rales or rhonchi   Heart:  RRR, s1 and s2; no murmurs rubs or gallops, no edema, + pedal pulses   Abdomen:  soft, non-distended, active bowel sounds, no hepatomegaly, no splenomegaly. PEG tube in place with mild surrounding tenderness   Genitourinary:  deferred   Extremities:   no clubbing, cyanosis; no joint effusions or swelling; Full ROM of all large joints to the upper and lower extremities; muscle mass appropriate for age   Neurologic:  No gross focal sensory abnormalities; equal muscle strength to upper and lower extremities. Has some memory gaps and mild confusion speech appropirate.  Cranial nerves intact   Psychiatric:   Calm, flat affect, poor eye contact       Labs: Results:   Chemistry Recent Labs     22  1227   GLU 90      K 3.7      CO2 29   BUN 19*   CREA 1.09   CA 8.9   AGAP 3   BUCR 17      CBC w/Diff Recent Labs     22  1227   WBC 6.0   RBC 3.25*   HGB 10.1*   HCT 30.8*      GRANS 51   LYMPH 34   EOS 1 Lab Results   Component Value Date/Time    Specimen Description: STOOL 02/06/2014 06:45 AM    Specimen Description: CLEAN CATCH 02/04/2014 03:00 PM    Specimen Description: BLOOD 02/02/2014 12:10 PM    Specimen Description: BLOOD 02/02/2014 11:48 AM    Lab Results   Component Value Date/Time    Culture result: MIXED UROGENITAL DERECK ISOLATED 04/02/2022 09:39 PM    Culture result: No growth (<1,000 CFU/ML) 09/28/2020 04:30 PM    Culture result: No growth (<1,000 CFU/ML) 09/21/2020 10:50 AM    Culture result: ENTEROCOCCUS FAECALIS (A) 09/13/2020 07:35 AM    Culture result:  02/06/2014 06:45 AM     Toxigenic C. difficile NEGATIVE                         C. difficile target DNA sequences are not detected. Imaging:      All imaging reviewed from Admission to present as per radiology interpretation in [address] [place] [address]

## 2022-04-11 NOTE — Clinical Note
Patient Class[de-identified] OBSERVATION [104]   Type of Bed: Telemetry [19]   Cardiac Monitoring Required?: Yes   Reason for Observation: elevated troponin   Admitting Diagnosis: Elevated troponin [[id number]]   Admitting Physician: Dennie Naegeli [[id number]]   Attending Physician: Dennie Naegeli [[id number]]

## 2022-04-11 NOTE — ED NOTES
Patient awake, more alert. States that the last thing she remembers is being home. Reporting dysuria. Believes it is caused by G tube.

## 2022-04-11 NOTE — ED PROVIDER NOTES
EMERGENCY DEPARTMENT HISTORY AND PHYSICAL EXAM      Date: 4/11/2022  Patient Name: Ena Gibbs    History of Presenting Illness     Chief Complaint   Patient presents with    Seizure       History Provided By: Patient and Patient's Son    HPI: Ena Gibbs, 75 y.o. female presents to the ED with cc of \"seizure. \" History obtained from Mr. Doni Mckeon, patient's son. He states that she was sitting in her wheelchair when she slumped over, eyes closed, not responding. She was breathing and she appears to be trying to grab her other son purposefully. He states that the family quickly gave her versed intranasal 5 mg. He reports that this appears to be similar to her prior seizures. He states that they usually last a few minutes and then she is at baseline again within a 2-3 minutes. He states this was the first time they had given her versed. EMS was called immediately and EMS arrived 5-7 minutes later. He denies trauma. He states that she had not taken her medications yet as they were preparing them for her when this happened. Unable to obtain history from the patient at this time. PCP: Giuseppe Finley NP    No current facility-administered medications on file prior to encounter. Current Outpatient Medications on File Prior to Encounter   Medication Sig Dispense Refill    ferrous sulfate 325 mg (65 mg iron) tablet Take 325 mg by mouth daily.  divalproex DR (DEPAKOTE) 250 mg tablet Take 250 mg by mouth three (3) times daily.  amLODIPine (NORVASC) 10 mg tablet Take 10 mg by mouth daily.  spironolactone (ALDACTONE) 25 mg tablet Take 25 mg by mouth daily.  hydrALAZINE (APRESOLINE) 100 mg tablet Take 100 mg by mouth two (2) times a day.  tamsulosin HCl (TAMSULOSIN PO) Take 0.4 mg by mouth daily. tab      PARoxetine (PAXIL) 30 mg tablet Take 30 mg by mouth daily.  OXcarbazepine (TRILEPTAL) 300 mg tablet Take 300 mg by mouth two (2) times a day.       multivitamin, tx-iron-ca-min (THERA-M w/ IRON) 9 mg iron-400 mcg tab tablet Take 1 Tablet by mouth daily. 30 Tablet 0    atorvastatin (LIPITOR) 40 mg tablet Take 40 mg by mouth daily.  acetaminophen (TYLENOL) 500 mg tablet Take 500 mg by mouth every six (6) hours as needed for Pain. take 1 tab every 6 hours as needed daily for pain      clopidogreL (PLAVIX) 75 mg tab Take 75 mg by mouth daily. Indications: prevention for a blood clot going to the brain      ergocalciferol (VITAMIN D2) 50,000 unit capsule Take 50,000 Units by mouth two (2) times a week.  cyanocobalamin (VITAMIN B12) 500 mcg tablet Take 1 Tab by mouth daily. 27 Tab 0       Past History     Past Medical History:  Past Medical History:   Diagnosis Date    Abnormal coordination 9/21/2020    Hallucinations 9/21/2020    Hypertension     Neurological disorder     [facility] [address]     Stroke ([facility] [address] 2009       Past Surgical History:  Past Surgical History:   Procedure Laterality Date    PLACE PERCUT GASTROSTOMY TUBE  1/11/2022            Family History:  Family History   Problem Relation Age of Onset    Diabetes Other     Stroke Other        Social History:  Social History     Tobacco Use    Smoking status: Never Smoker    Smokeless tobacco: Never Used   Vaping Use    Vaping Use: Never used   Substance Use Topics    Alcohol use: Yes     Comment: Socially     Drug use: No       Allergies: Allergies   Allergen Reactions    Tomato Hives     Allergic to eating tomato.  Aspirin Nausea and Vomiting    Ciprofloxacin Rash    Pcn [Penicillins] Rash and Hives    Sulfa (Sulfonamide Antibiotics) Hives and Rash         Review of Systems   Review of Systems   Unable to perform ROS: Mental status change       Physical Exam   Physical Exam  Vitals and nursing note reviewed. Constitutional:       General: She is not in acute distress. Comments: Sleeping, wakes to noxious stimuli, not following commands, opens eyes to pain.     HENT:      Head: Normocephalic and atraumatic. Nose: No congestion or rhinorrhea. Mouth/Throat:      Mouth: Mucous membranes are moist.      Pharynx: Oropharynx is clear. Eyes:      General: No scleral icterus. Pupils: Pupils are equal, round, and reactive to light. Cardiovascular:      Rate and Rhythm: Normal rate and regular rhythm. Pulses: Normal pulses. Pulmonary:      Effort: Pulmonary effort is normal.      Breath sounds: Normal breath sounds. No wheezing, rhonchi or rales. Abdominal:      General: There is no distension. Palpations: Abdomen is soft. Tenderness: There is no abdominal tenderness. Musculoskeletal:         General: No tenderness. Cervical back: Neck supple. No rigidity. Right lower leg: No edema. Left lower leg: No edema. Skin:     General: Skin is warm and dry. Capillary Refill: Capillary refill takes less than 2 seconds. Neurological:      Sensory: No sensory deficit. Motor: No weakness. Comments: Localizes all 4 extremities to pain. Opens eyes to pain, groans to pain. Diagnostic Study Results     Labs -     Recent Results (from the past 24 hour(s))   VALPROIC ACID    Collection Time: 04/11/22 12:27 PM   Result Value Ref Range    Valproic acid 9 (L) 50 - 100 ug/ml   CBC WITH AUTOMATED DIFF    Collection Time: 04/11/22 12:27 PM   Result Value Ref Range    WBC 6.0 4.6 - 13.2 K/uL    RBC 3.25 (L) 4.20 - 5.30 M/uL    HGB 10.1 (L) 12.0 - 16.0 g/dL    HCT 30.8 (L) 35.0 - 45.0 %    MCV 94.8 78.0 - 100.0 FL    MCH 31.1 24.0 - 34.0 PG    MCHC 32.8 31.0 - 37.0 g/dL    RDW 13.3 11.6 - 14.5 %    PLATELET 241 757 - 228 K/uL    MPV 11.9 (H) 9.2 - 11.8 FL    NRBC 0.0 0  WBC    ABSOLUTE NRBC 0.00 0.00 - 0.01 K/uL    NEUTROPHILS 51 40 - 73 %    LYMPHOCYTES 34 21 - 52 %    MONOCYTES 13 (H) 3 - 10 %    EOSINOPHILS 1 0 - 5 %    BASOPHILS 1 0 - 2 %    IMMATURE GRANULOCYTES 0 0.0 - 0.5 %    ABS. NEUTROPHILS 3.1 1.8 - 8.0 K/UL    ABS.  LYMPHOCYTES 2.0 0.9 - 3.6 K/UL    ABS. MONOCYTES 0.8 0.05 - 1.2 K/UL    ABS. EOSINOPHILS 0.1 0.0 - 0.4 K/UL    ABS. BASOPHILS 0.1 0.0 - 0.1 K/UL    ABS. IMM.  GRANS. 0.0 0.00 - 0.04 K/UL    DF AUTOMATED     METABOLIC PANEL, BASIC    Collection Time: 04/11/22 12:27 PM   Result Value Ref Range    Sodium 143 136 - 145 mmol/L    Potassium 3.7 3.5 - 5.5 mmol/L    Chloride 111 100 - 111 mmol/L    CO2 29 21 - 32 mmol/L    Anion gap 3 3.0 - 18 mmol/L    Glucose 90 74 - 99 mg/dL    BUN 19 (H) 7.0 - 18 MG/DL    Creatinine 1.09 0.6 - 1.3 MG/DL    BUN/Creatinine ratio 17 12 - 20      GFR est AA >60 >60 ml/min/1.73m2    GFR est non-AA 50 (L) >60 ml/min/1.73m2    Calcium 8.9 8.5 - 10.1 MG/DL   MAGNESIUM    Collection Time: 04/11/22 12:27 PM   Result Value Ref Range    Magnesium 2.1 1.6 - 2.6 mg/dL   TROPONIN-HIGH SENSITIVITY    Collection Time: 04/11/22 12:27 PM   Result Value Ref Range    Troponin-High Sensitivity 49 0 - 54 ng/L   GLUCOSE, POC    Collection Time: 04/11/22 12:47 PM   Result Value Ref Range    Glucose (POC) 83 70 - 110 mg/dL   EKG, 12 LEAD, INITIAL    Collection Time: 04/11/22 12:56 PM   Result Value Ref Range    Ventricular Rate 68 BPM    Atrial Rate 68 BPM    P-R Interval 156 ms    QRS Duration 90 ms    Q-T Interval 414 ms    QTC Calculation (Bezet) 440 ms    Calculated P Axis 112 degrees    Calculated R Axis -154 degrees    Calculated T Axis 62 degrees    Diagnosis       Suspect arm lead reversal, interpretation assumes no reversal  Normal sinus rhythm  Right superior axis deviation  Pulmonary disease pattern  Septal infarct (cited on or before 11-APR-2022)  Abnormal ECG  When compared with ECG of 06-JAN-2022 13:37,  QRS axis shifted left  T wave inversion less evident in Lateral leads  Confirmed by Alexei Sparks MD, Donn Lei ([id number]) on 4/11/2022 4:42:04 PM     URINALYSIS W/ RFLX MICROSCOPIC    Collection Time: 04/11/22  1:32 PM   Result Value Ref Range    Color YELLOW      Appearance CLEAR      Specific gravity 1.007 1.005 - 1.030 pH (UA) 7.0 5.0 - 8.0      Protein Negative NEG mg/dL    Glucose Negative NEG mg/dL    Ketone Negative NEG mg/dL    Bilirubin Negative NEG      Blood Negative NEG      Urobilinogen 0.2 0.2 - 1.0 EU/dL    Nitrites Negative NEG      Leukocyte Esterase Negative NEG     TROPONIN-HIGH SENSITIVITY    Collection Time: 04/11/22  5:00 PM   Result Value Ref Range    Troponin-High Sensitivity 65 (HH) 0 - 54 ng/L   GLUCOSE, POC    Collection Time: 04/11/22  5:06 PM   Result Value Ref Range    Glucose (POC) 85 70 - 110 mg/dL       Radiologic Studies -   CT HEAD WO CONT   Final Result      No acute findings or significant interval change since 1/6/2022. XR CHEST PORT   Final Result   Retrocardiac opacities favoring atelectasis, infiltrate not excluded. CT Results  (Last 48 hours)               04/11/22 1501  CT HEAD WO CONT Final result    Impression:      No acute findings or significant interval change since 1/6/2022. Narrative:  CT HEAD WO CONT       History: Witnessed seizure, history of seizure. Comparison: 1/6/2022       TECHNIQUE: 5 mm helical scan obtained of the head were obtained from the skull   vertex through the base of the skull without intravenous contrast.         All CT scans at this facility are performed using dose optimization technique as   appropriate to a performed exam, to include automated exposure control,   adjustment of the mA and/or kV according to patient size (including appropriate   matching first site-specific examinations), or use of iterative reconstruction   technique. BRAIN RESULT:         Similar periventricular hypodensities and encephalomalacia right   parieto-occipital region with associated ex vacuo enlargement of the right   posterior horn lateral ventricle. Similar remote left thalamic infarct. Mild   generalized volume loss. Gray-white matter differentiation is otherwise   maintained. No acute intracranial hemorrhage. No midline shift. Basilar cisterns   are patent. Status post cataract surgery. Soft tissues and osseous structures are grossly   normal. Mastoid air cells are patent. CXR Results  (Last 48 hours)               04/11/22 1309  XR CHEST PORT Final result    Impression:  Retrocardiac opacities favoring atelectasis, infiltrate not excluded. Narrative:  EXAM:  XR CHEST PORT       INDICATION:   altered mental status       COMPARISON: 1/6/2022. FINDINGS:   The cardiac and mediastinal silhouette are within normal limits. Pulmonary   vasculature is within normal limits. No pneumothorax or pleural effusions. Mild   retrocardiac opacities. No acute osseous abnormality. Medical Decision Making   I am the first provider for this patient. I reviewed the vital signs, available nursing notes, past medical history, past surgical history, family history and social history. Vital Signs-Reviewed the patient's vital signs.   Patient Vitals for the past 24 hrs:   Temp Pulse Resp BP SpO2   04/11/22 2030 -- 81 -- (!) 143/69 99 %   04/11/22 2015 -- 82 -- (!) 140/70 100 %   04/11/22 2000 -- 83 -- 138/70 100 %   04/11/22 1945 -- 83 -- (!) 142/71 100 %   04/11/22 1930 -- 81 -- 136/69 100 %   04/11/22 1845 -- -- -- (!) 177/109 100 %   04/11/22 1815 -- 67 13 (!) 177/89 97 %   04/11/22 1800 -- 61 15 (!) 164/80 100 %   04/11/22 1745 -- 66 14 (!) 145/85 100 %   04/11/22 1730 -- 62 12 (!) 162/81 100 %   04/11/22 1715 -- 64 15 (!) 182/81 99 %   04/11/22 1645 -- 65 15 (!) 169/93 100 %   04/11/22 1630 -- 62 16 (!) 194/89 100 %   04/11/22 1615 -- 64 15 (!) 191/82 100 %   04/11/22 1600 -- 60 12 (!) 175/83 100 %   04/11/22 1545 -- 62 14 (!) 173/87 100 %   04/11/22 1530 -- 60 14 (!) 177/97 100 %   04/11/22 1515 -- 62 13 (!) 176/84 99 %   04/11/22 1400 -- 69 14 (!) 161/88 96 %   04/11/22 1345 -- 62 14 (!) 148/74 98 %   04/11/22 1330 -- 62 16 (!) 158/71 98 %   04/11/22 1315 -- 66 13 (!) 171/93 95 %   04/11/22 1300 -- 69 13 (!) 164/75 99 %   04/11/22 1245 -- 68 14 (!) 163/74 98 %   04/11/22 1230 96.9 °F (36.1 °C) 75 13 (!) 169/76 96 %         Records Reviewed: Nursing Notes, Old Medical Records, Previous electrocardiograms, Previous Radiology Studies and Previous Laboratory Studies    Provider Notes (Medical Decision Making)/ED course:   68-year-old female with history of hypertension, epilepsy,   hypertension, malnutrition with G-tube (family is working on scheduling the removal, she tolerated po) presents via EMS for \"seizure,\" now with altered mental status. 1224 Initial assessment performed. Differential is broad at this time including seizure/post-ictal state, sedation from versed, intracranial lesion/hemorrhage, hypertensive emergency, PRES, infection (UTI, PNA, intra-abdominal infection), ACS, syncope, arrhythmia, valvular insufficiency, CVA. Will obtain CT head given continued altered mental status for [facility] vs other intracranial hemorrhage. EKG, labs, and imaging personally interpreted by myself and Dr. Juana Moore as:    EKG interpretation: (Preliminary)  Rhythm: normal sinus rhythm; and regular . Rate (approx.): 62; Axis: left axis deviation; VA interval: normal; QRS interval: normal ; ST/T wave: non-specific changes, some T-wave inversion, similar to prior ECGs, no signs of acute ischemia. Labs: valproic acid level 9, normocytic anemia Hb 10.1, stable, no leukocytosis, normal platelets, Normal electrolytes and stable renal function, mag 2.1, trop 49 (previously 29), U/A normal.     Imaging: CXR - no evidence of PTX, pleural effusion, focal infiltrate. CT head - no acute intracranial pathology. 1247 - glucose 83    1330 - mental status is improving, now awakening to minimal physical stimulus. Only speaking a few words.  - patient now awakes to voice, following commands, still not able to answer most questions. 1600- patient now answering questions appropriately.  She states her last seizure was 2 weeks ago. She states she cannot recall her seizures after they occur. She states she has a neurology appointment later this week. She has mild tenderness to her abdomen which she states is unchanged from prior for the past several weeks. She reports mild headache that started this morning, throbbing, left-sided, similar to prior headaches, nonradiating, no trauma. 1732 - repeat troponin 65, will give 325 mg aspirin. She denies chest pain or shortness of breath. [phone] - Discussed case with Dr. Max Lewis who recommends admission for serial troponins and likely echocardiogram. He thinks this is most likely demand ischemia in setting of hypoxia during seizure. He does not recommend heparin at this time. 1900 - Discussed case with Dr. Marv Oh, who agrees to assume care of the patient and admit for observation. 1930 - Attempted to call son, Mr Ivelisse Aranda but he did not answer the phone, voice message left. Nu Cates MD      Disposition:  Admitted  Admitted to telemetry; the case was discussed with the admitting physician, Dr. Marv Oh. Diagnosis     Clinical Impression:   1. Elevated troponin    2. Seizure disorder [facility])        Attestations:    Nu Cates MD    Please note that this dictation was completed with [employer], the computer voice recognition software. Quite often unanticipated grammatical, syntax, homophones, and other interpretive errors are inadvertently transcribed by the computer software. Please disregard these errors. Please excuse any errors that have escaped final proofreading. Thank you.

## 2022-04-11 NOTE — ED TRIAGE NOTES
Patient arrived via EMS s/p seizure. Per EMS family witnessed patient have an absent/focal seizure and they immediately administered 5mg intranasal versed. Patient has a history of seizures takes Depakote.  per EMS. Upon arrival patient resting with eyes closed. Responsive to noxious stimuli. AG85. O2 98% on RA.

## 2022-04-12 ENCOUNTER — HOME CARE VISIT (OUTPATIENT)
Dept: SCHEDULING | Facility: HOME HEALTH | Age: 67
End: 2022-04-12
Payer: MEDICARE

## 2022-04-12 ENCOUNTER — APPOINTMENT (OUTPATIENT)
Dept: NON INVASIVE DIAGNOSTICS | Age: 67
End: 2022-04-12
Attending: PHYSICIAN ASSISTANT
Payer: MEDICARE

## 2022-04-12 VITALS
RESPIRATION RATE: 17 BRPM | HEART RATE: 91 BPM | SYSTOLIC BLOOD PRESSURE: 120 MMHG | TEMPERATURE: 97.7 F | OXYGEN SATURATION: 97 % | DIASTOLIC BLOOD PRESSURE: 66 MMHG

## 2022-04-12 PROBLEM — F32.A DEPRESSION: Status: ACTIVE | Noted: 2022-04-12

## 2022-04-12 PROBLEM — R62.7 FAILURE TO THRIVE IN ADULT: Status: ACTIVE | Noted: 2022-04-12

## 2022-04-12 LAB
ANION GAP SERPL CALC-SCNC: 5 MMOL/L (ref 3–18)
BUN SERPL-MCNC: 13 MG/DL (ref 7–18)
BUN/CREAT SERPL: 15 (ref 12–20)
CALCIUM SERPL-MCNC: 9.2 MG/DL (ref 8.5–10.1)
CHLORIDE SERPL-SCNC: 110 MMOL/L (ref 100–111)
CK SERPL-CCNC: 80 U/L (ref 26–192)
CO2 SERPL-SCNC: 26 MMOL/L (ref 21–32)
CREAT SERPL-MCNC: 0.86 MG/DL (ref 0.6–1.3)
ERYTHROCYTE [DISTWIDTH] IN BLOOD BY AUTOMATED COUNT: 13.3 % (ref 11.6–14.5)
GLUCOSE SERPL-MCNC: 80 MG/DL (ref 74–99)
HCT VFR BLD AUTO: 31.9 % (ref 35–45)
HGB BLD-MCNC: 10.4 G/DL (ref 12–16)
MAGNESIUM SERPL-MCNC: 2 MG/DL (ref 1.6–2.6)
MCH RBC QN AUTO: 30.8 PG (ref 24–34)
MCHC RBC AUTO-ENTMCNC: 32.6 G/DL (ref 31–37)
MCV RBC AUTO: 94.4 FL (ref 78–100)
NRBC # BLD: 0 K/UL (ref 0–0.01)
NRBC BLD-RTO: 0 PER 100 WBC
PLATELET # BLD AUTO: 184 K/UL (ref 135–420)
PMV BLD AUTO: 12.4 FL (ref 9.2–11.8)
POTASSIUM SERPL-SCNC: 3.8 MMOL/L (ref 3.5–5.5)
RBC # BLD AUTO: 3.38 M/UL (ref 4.2–5.3)
SODIUM SERPL-SCNC: 141 MMOL/L (ref 136–145)
T4 FREE SERPL-MCNC: 0.8 NG/DL (ref 0.7–1.5)
TROPONIN-HIGH SENSITIVITY: 56 NG/L (ref 0–54)
TSH SERPL DL<=0.05 MIU/L-ACNC: 6.01 UIU/ML (ref 0.36–3.74)
WBC # BLD AUTO: 8.5 K/UL (ref 4.6–13.2)

## 2022-04-12 PROCEDURE — 36415 COLL VENOUS BLD VENIPUNCTURE: CPT

## 2022-04-12 PROCEDURE — 96372 THER/PROPH/DIAG INJ SC/IM: CPT

## 2022-04-12 PROCEDURE — APPSS45 APP SPLIT SHARED TIME 31-45 MINUTES: Performed by: PHYSICIAN ASSISTANT

## 2022-04-12 PROCEDURE — 97162 PT EVAL MOD COMPLEX 30 MIN: CPT

## 2022-04-12 PROCEDURE — 97535 SELF CARE MNGMENT TRAINING: CPT

## 2022-04-12 PROCEDURE — 74011250637 HC RX REV CODE- 250/637: Performed by: STUDENT IN AN ORGANIZED HEALTH CARE EDUCATION/TRAINING PROGRAM

## 2022-04-12 PROCEDURE — 97166 OT EVAL MOD COMPLEX 45 MIN: CPT

## 2022-04-12 PROCEDURE — 97530 THERAPEUTIC ACTIVITIES: CPT

## 2022-04-12 PROCEDURE — 74011250637 HC RX REV CODE- 250/637: Performed by: INTERNAL MEDICINE

## 2022-04-12 PROCEDURE — 74011000250 HC RX REV CODE- 250: Performed by: INTERNAL MEDICINE

## 2022-04-12 PROCEDURE — 2709999900 HC NON-CHARGEABLE SUPPLY

## 2022-04-12 PROCEDURE — 93306 TTE W/DOPPLER COMPLETE: CPT

## 2022-04-12 PROCEDURE — 74011250636 HC RX REV CODE- 250/636: Performed by: INTERNAL MEDICINE

## 2022-04-12 PROCEDURE — 84443 ASSAY THYROID STIM HORMONE: CPT

## 2022-04-12 PROCEDURE — 84484 ASSAY OF TROPONIN QUANT: CPT

## 2022-04-12 PROCEDURE — 83735 ASSAY OF MAGNESIUM: CPT

## 2022-04-12 PROCEDURE — 85027 COMPLETE CBC AUTOMATED: CPT

## 2022-04-12 PROCEDURE — 99232 SBSQ HOSP IP/OBS MODERATE 35: CPT | Performed by: PHYSICIAN ASSISTANT

## 2022-04-12 PROCEDURE — 80048 BASIC METABOLIC PNL TOTAL CA: CPT

## 2022-04-12 PROCEDURE — 99204 OFFICE O/P NEW MOD 45 MIN: CPT | Performed by: PHYSICIAN ASSISTANT

## 2022-04-12 PROCEDURE — 84439 ASSAY OF FREE THYROXINE: CPT

## 2022-04-12 PROCEDURE — 99222 1ST HOSP IP/OBS MODERATE 55: CPT | Performed by: STUDENT IN AN ORGANIZED HEALTH CARE EDUCATION/TRAINING PROGRAM

## 2022-04-12 PROCEDURE — 82550 ASSAY OF CK (CPK): CPT

## 2022-04-12 PROCEDURE — G0378 HOSPITAL OBSERVATION PER HR: HCPCS

## 2022-04-12 RX ORDER — CLOPIDOGREL BISULFATE 75 MG/1
75 TABLET ORAL DAILY
Status: DISCONTINUED | OUTPATIENT
Start: 2022-04-12 | End: 2022-04-21 | Stop reason: HOSPADM

## 2022-04-12 RX ORDER — LANOLIN ALCOHOL/MO/W.PET/CERES
325 CREAM (GRAM) TOPICAL DAILY
Status: DISCONTINUED | OUTPATIENT
Start: 2022-04-12 | End: 2022-04-21 | Stop reason: HOSPADM

## 2022-04-12 RX ORDER — ONDANSETRON 4 MG/1
4 TABLET, ORALLY DISINTEGRATING ORAL
Status: DISCONTINUED | OUTPATIENT
Start: 2022-04-12 | End: 2022-04-21 | Stop reason: HOSPADM

## 2022-04-12 RX ORDER — POLYETHYLENE GLYCOL 3350 17 G/17G
17 POWDER, FOR SOLUTION ORAL DAILY PRN
Status: DISCONTINUED | OUTPATIENT
Start: 2022-04-12 | End: 2022-04-21 | Stop reason: HOSPADM

## 2022-04-12 RX ORDER — OXCARBAZEPINE 300 MG/1
600 TABLET, FILM COATED ORAL 2 TIMES DAILY
Status: DISCONTINUED | OUTPATIENT
Start: 2022-04-12 | End: 2022-04-21 | Stop reason: HOSPADM

## 2022-04-12 RX ORDER — SODIUM CHLORIDE 0.9 % (FLUSH) 0.9 %
5-40 SYRINGE (ML) INJECTION AS NEEDED
Status: DISCONTINUED | OUTPATIENT
Start: 2022-04-12 | End: 2022-04-21 | Stop reason: HOSPADM

## 2022-04-12 RX ORDER — DIVALPROEX SODIUM 250 MG/1
250 TABLET, DELAYED RELEASE ORAL EVERY 8 HOURS
Status: DISCONTINUED | OUTPATIENT
Start: 2022-04-12 | End: 2022-04-18

## 2022-04-12 RX ORDER — ACETAMINOPHEN 650 MG/1
650 SUPPOSITORY RECTAL
Status: DISCONTINUED | OUTPATIENT
Start: 2022-04-12 | End: 2022-04-21 | Stop reason: HOSPADM

## 2022-04-12 RX ORDER — ENOXAPARIN SODIUM 100 MG/ML
40 INJECTION SUBCUTANEOUS DAILY
Status: DISCONTINUED | OUTPATIENT
Start: 2022-04-12 | End: 2022-04-18

## 2022-04-12 RX ORDER — OXCARBAZEPINE 300 MG/1
300 TABLET, FILM COATED ORAL 2 TIMES DAILY
Status: DISCONTINUED | OUTPATIENT
Start: 2022-04-12 | End: 2022-04-12

## 2022-04-12 RX ORDER — ATORVASTATIN CALCIUM 40 MG/1
40 TABLET, FILM COATED ORAL DAILY
Status: DISCONTINUED | OUTPATIENT
Start: 2022-04-12 | End: 2022-04-21 | Stop reason: HOSPADM

## 2022-04-12 RX ORDER — ERGOCALCIFEROL 1.25 MG/1
50000 CAPSULE ORAL 2 TIMES WEEKLY
Status: DISCONTINUED | OUTPATIENT
Start: 2022-04-15 | End: 2022-04-21 | Stop reason: HOSPADM

## 2022-04-12 RX ORDER — SODIUM CHLORIDE 0.9 % (FLUSH) 0.9 %
5-40 SYRINGE (ML) INJECTION EVERY 8 HOURS
Status: DISCONTINUED | OUTPATIENT
Start: 2022-04-12 | End: 2022-04-21 | Stop reason: HOSPADM

## 2022-04-12 RX ORDER — ACETAMINOPHEN 325 MG/1
650 TABLET ORAL
Status: DISCONTINUED | OUTPATIENT
Start: 2022-04-12 | End: 2022-04-21 | Stop reason: HOSPADM

## 2022-04-12 RX ORDER — PAROXETINE HYDROCHLORIDE 20 MG/1
30 TABLET, FILM COATED ORAL DAILY
Status: DISCONTINUED | OUTPATIENT
Start: 2022-04-12 | End: 2022-04-21 | Stop reason: HOSPADM

## 2022-04-12 RX ORDER — ONDANSETRON 2 MG/ML
4 INJECTION INTRAMUSCULAR; INTRAVENOUS
Status: DISCONTINUED | OUTPATIENT
Start: 2022-04-12 | End: 2022-04-21 | Stop reason: HOSPADM

## 2022-04-12 RX ORDER — AMLODIPINE BESYLATE 10 MG/1
10 TABLET ORAL DAILY
Status: DISCONTINUED | OUTPATIENT
Start: 2022-04-12 | End: 2022-04-21 | Stop reason: HOSPADM

## 2022-04-12 RX ADMIN — SODIUM CHLORIDE, PRESERVATIVE FREE 40 ML: 5 INJECTION INTRAVENOUS at 05:10

## 2022-04-12 RX ADMIN — MULTIPLE VITAMINS W/ MINERALS TAB 1 TABLET: TAB at 09:50

## 2022-04-12 RX ADMIN — SODIUM CHLORIDE, PRESERVATIVE FREE 40 ML: 5 INJECTION INTRAVENOUS at 22:18

## 2022-04-12 RX ADMIN — FERROUS SULFATE TAB 325 MG (65 MG ELEMENTAL FE) 325 MG: 325 (65 FE) TAB at 09:51

## 2022-04-12 RX ADMIN — DIVALPROEX SODIUM 250 MG: 250 TABLET, DELAYED RELEASE ORAL at 22:08

## 2022-04-12 RX ADMIN — OXCARBAZEPINE 300 MG: 300 TABLET, FILM COATED ORAL at 09:49

## 2022-04-12 RX ADMIN — ENOXAPARIN SODIUM 40 MG: 40 INJECTION SUBCUTANEOUS at 09:51

## 2022-04-12 RX ADMIN — PAROXETINE HYDROCHLORIDE 30 MG: 20 TABLET, FILM COATED ORAL at 09:50

## 2022-04-12 RX ADMIN — ATORVASTATIN CALCIUM 40 MG: 40 TABLET, FILM COATED ORAL at 09:49

## 2022-04-12 RX ADMIN — AMLODIPINE BESYLATE 10 MG: 10 TABLET ORAL at 09:50

## 2022-04-12 RX ADMIN — OXCARBAZEPINE 600 MG: 300 TABLET, FILM COATED ORAL at 18:29

## 2022-04-12 RX ADMIN — DIVALPROEX SODIUM 250 MG: 250 TABLET, DELAYED RELEASE ORAL at 18:29

## 2022-04-12 RX ADMIN — CLOPIDOGREL BISULFATE 75 MG: 75 TABLET ORAL at 09:50

## 2022-04-12 RX ADMIN — SODIUM CHLORIDE, PRESERVATIVE FREE 10 ML: 5 INJECTION INTRAVENOUS at 16:06

## 2022-04-12 RX ADMIN — SODIUM CHLORIDE, PRESERVATIVE FREE 40 ML: 5 INJECTION INTRAVENOUS at 01:02

## 2022-04-12 NOTE — PROGRESS NOTES
Problem: Self Care Deficits Care Plan (Adult)  Goal: *Acute Goals and Plan of Care (Insert Text)  Description: Occupational Therapy Goals  Initiated 4/12/2022 within 7 day(s). 1.  Patient will perform UB bathing and upper body dressing with supervision/set-up. 2.  Patient will perform bed mobility with CGA in prep for self care. 3.  Patient will perform lower body dressing and bathing with CGA   4. Patient will perform toilet transfers with contact guard assist.  5.  Patient will perform all aspects of toileting with minimal assistance/contact guard assist.  6.  Patient will participate in upper extremity therapeutic exercise/activities with modified independence for 5-8 minutes. 7.  Patient will utilize energy conservation techniques during functional activities with verbal cues. Prior Level of Function:Pt history is not clear, states she lives with son in apt and he is \"supposed to be home with her all the time,\" performs transfers to / with RW and assist. States she spends most of her time in the w/, transfers to [facility] with assist from son. Pt performed sponge baths and dressing with SUP. Outcome: Progressing Towards Goal   OCCUPATIONAL THERAPY EVALUATION    Patient: Jazlyn Lerma (38 y.o. female)  Date: 4/12/2022  Primary Diagnosis: Elevated troponin [R77.8]        Precautions:   Fall  PLOF: see above    ASSESSMENT :  Based on the objective data described below, the patient presents with confusion, decreased UB strength, decreased coordination, impaired functional activity tolerance, impaired sitting balance impacting independence in self care. Pt semi reclined in bed and agreeable to OT session. Pt reporting being wet and performed rolling R and L for gibran care with pt performing anterior gibran care set up SUP and requiring max A posterior gibran care. Pt performed supine>sit with min A and sat edge of bed with G static balance, and F+ dynamic balance.  Pt donned gown on back to simulate marnie as pt perseverating on being cold. Pt requires min A to thread BUE into sleeves as pt attempting to push arm through incorrect opening. Pt performed simple grooming tasks with SUP and set up requiring v/c for sequencing. Pt returned to supine without warning and assisted to scoot up in the bed. Pt covered up with several layers of blankets. Pt set up with ginger ale and performing bringing cup to mouth with extra time and SUP. Patient will benefit from skilled intervention to address the above impairments. Patient's rehabilitation potential is considered to be Fair  Factors which may influence rehabilitation potential include:   []             None noted  [x]             Mental ability/status  []             Medical condition  []             Home/family situation and support systems  []             Safety awareness  []             Pain tolerance/management  []             Other:      PLAN :  Recommendations and Planned Interventions:   [x]               Self Care Training                  [x]      Therapeutic Activities  [x]               Functional Mobility Training   []      Cognitive Retraining  [x]               Therapeutic Exercises           [x]      Endurance Activities  [x]               Balance Training                    [x]      Neuromuscular Re-Education  []               Visual/Perceptual Training     [x]      Home Safety Training  [x]               Patient Education                   [x]      Family Training/Education  []               Other (comment):    Frequency/Duration: Patient will be followed by occupational therapy 1-2 times per day/4-7 days per week to address goals. Discharge Recommendations: Home Health with 24 hour assist from family vs rehab if pt's family unable to provide   Further Equipment Recommendations for Discharge: bedside commode     SUBJECTIVE:   Patient stated [facility] is supposed to be there with me all the time.     OBJECTIVE DATA SUMMARY:     Past Medical History: Diagnosis Date    Abnormal coordination 9/21/2020    Hallucinations 9/21/2020    Hypertension     Neurological disorder     Seizures ([address]     Stroke [facility]) 2009     Past Surgical History:   Procedure Laterality Date    PLACE PERCUT GASTROSTOMY TUBE  1/11/2022          Barriers to Learning/Limitations: yes;  altered mental status (i.e.Sedation, Confusion)  Compensate with: visual, verbal, tactile, kinesthetic cues/model    Home Situation:   Home Situation  Home Environment: Apartment  # Steps to Enter: 0  One/Two Story Residence: One story  Living Alone: No  Support Systems: Child(quiana)  Patient Expects to be Discharged to[de-identified] Home  Current DME Used/Available at Home: Wheelchair,Walker, rolling  Tub or Shower Type:  (sponge baths)  []  Right hand dominant   [x]  Left hand dominant    Cognitive/Behavioral Status:  Neurologic State: Alert  Orientation Level: Oriented to person;Oriented to place  Cognition: Follows commands  Safety/Judgement: Fall prevention;Home safety    Skin: intact  Edema: none noted BUE    Vision/Perceptual:       Appears intact    Coordination: BUE  Coordination: Generally decreased, functional  Fine Motor Skills-Upper: Left Impaired;Right Impaired    Gross Motor Skills-Upper: Left Impaired;Right Impaired  Balance:  Sitting: Impaired  Sitting - Static: Good (unsupported)  Sitting - Dynamic: Fair (occasional) (+)  Standing: Impaired  Standing - Static: Poor  Strength: BUE  Strength: Generally decreased, functional   Tone & Sensation: BUE  Tone: Normal  Sensation: Intact   Range of Motion: BUE  AROM: Generally decreased, functional  PROM: Within functional limits   Functional Mobility and Transfers for ADLs:  Bed Mobility:  Rolling: Minimum assistance  Supine to Sit: Minimum assistance  Sit to Supine: Minimum assistance  Scooting: Minimum assistance  Transfers:  Sit to Stand: Minimum assistance  Stand to Sit: Minimum assistance  ADL Assessment:   Feeding: Setup  Oral Facial Hygiene/Grooming: Setup;Supervision  Bathing: Contact guard assistance (based on clinical judgement)  Upper Body Dressing: Minimum assistance  Lower Body Dressing: Minimum assistance (based on clinical judgement)  Toileting: Minimum assistance (based on clinical judgement)   ADL Intervention:  Feeding  Feeding Assistance: Set-up  Grooming  Washing Face: Set-up; Supervision  Brushing Teeth: Supervision;Set-up  Upper Body Dressing Assistance  Shirt simulation with hospital gown: Minimum  assistance  Toileting  Toileting Assistance: Maximum assistance (pt soiled bed )  Cognitive Retraining  Safety/Judgement: Fall prevention;Home safety    Pain:  Pain level pre-treatment: pt complaining of headache but does not quantify   Pain level post-treatment: pt complaining of headache but does not quantify   Pain Intervention(s): Medication (see MAR); Rest, Repositioning   Response to intervention: Nurse notified, See doc flow    Activity Tolerance:   fair  Please refer to the flowsheet for vital signs taken during this treatment. After treatment:   [] Patient left in no apparent distress sitting up in chair  [x] Patient left in no apparent distress in bed  [x] Call bell left within reach  [x] Nursing notified  [] Caregiver present  [x] Bed alarm activated    COMMUNICATION/EDUCATION:   [x] Role of Occupational Therapy in the acute care setting  [x] Home safety education was provided and the patient/caregiver indicated understanding. [x] Patient/family have participated as able in goal setting and plan of care. [x] Patient/family agree to work toward stated goals and plan of care. [] Patient understands intent and goals of therapy, but is neutral about his/her participation. [] Patient is unable to participate in goal setting and plan of care.     Thank you for this referral.  Mienrva Mccormick OT  Time Calculation: 32 mins    Eval Complexity: History: MEDIUM Complexity : Expanded review of history including physical, cognitive and psychosocial history ; Examination: MEDIUM Complexity : 3-5 performance deficits relating to physical, cognitive , or psychosocial skils that result in activity limitations and / or participation restrictions; Decision Making:MEDIUM Complexity : Patient may present with comorbidities that affect occupational performnce.  Miniml to moderate modification of tasks or assistance (eg, physical or verbal ) with assesment(s) is necessary to enable patient to complete evaluation

## 2022-04-12 NOTE — Clinical Note
thank you   ----- Message -----  From: Gael Najjar, PTA  Sent: 4/12/2022   2:38 PM EDT  To: Immanuel Crouch OTR/L      Arrived to find out from pt's son that pt was taken by ambulance 4/11 to [facility] ED yesterday for possibly another CVA.

## 2022-04-12 NOTE — CONSULTS
A 75years old female patient a history of stroke, HTN and seizure disorder currently on Depakote 250 mg p.o. 3 times daily and Trileptal 300 mg p.o. twice daily; previously, she has also been on Keppra 1000 mg p.o. twice daily. I called her son and he states that she is only taking the Depakote and Trileptal; he did not have the "[employer] [address] with him. She was brought to the emergency room for an episode of seizure. Patient was sitting in her wheelchair when her son noticed the seizure-like activity: She slumped over with her eyes closed and became unresponsive; was trying to grab son. She does not have any recollection of the episode. Total duration unknown. She woke up in the hospital.  She was given Versed intranasal by her family. Her son says that she is taking her medications properly. Patient is mostly on a wheelchair and less ambulatory. CT scan of the brain from yesterday did not show any acute changes. She had previous admissions to this hospital.  The last admission was in September 2021. She was seen by Dr. Romario Peng and the plan was to discontinue the Depakote and to continue taking the Keppra and oxcarbazepine with adjusted doses. She is following with outpatient [facility] neurology. She was found to have elevated troponin and being trended.        Social History     Socioeconomic History    Marital status:      Spouse name: Not on file    Number of children: Not on file    Years of education: Not on file    Highest education level: Not on file   Occupational History    Not on file   Tobacco Use    Smoking status: Never Smoker    Smokeless tobacco: Never Used   Vaping Use    Vaping Use: Never used   Substance and Sexual Activity    Alcohol use: Yes     Comment: Socially     Drug use: No    Sexual activity: Not on file   Other Topics Concern    Not on file   Social History Narrative    Not on file     Social Determinants of Health     Financial Resource Strain:     Difficulty of Paying Living Expenses: Not on file   Food Insecurity:     Worried About Running Out of Food in the Last Year: Not on file    Ran Out of Food in the Last Year: Not on file   Transportation Needs:     Lack of Transportation (Medical): Not on file    Lack of Transportation (Non-Medical):  Not on file   Physical Activity:     Days of Exercise per Week: Not on file    Minutes of Exercise per Session: Not on file   Stress:     Feeling of Stress : Not on file   Social Connections:     Frequency of Communication with Friends and Family: Not on file    Frequency of Social Gatherings with Friends and Family: Not on file    Attends Zoroastrian Services: Not on file    Active Member of [address] [place] [address] [employer] or Organizations: Not on file    Attends Club or Organization Meetings: Not on file    Marital Status: Not on file   Intimate Partner Violence:     Fear of Current or Ex-Partner: Not on file    Emotionally Abused: Not on file    Physically Abused: Not on file    Sexually Abused: Not on file   Housing Stability:     Unable to Pay for Housing in the Last Year: Not on file    Number of [place] in the Last Year: Not on file    Unstable Housing in the Last Year: Not on file       Family History   Problem Relation Age of Onset    Diabetes Other     Stroke Other         Current Facility-Administered Medications   Medication Dose Route Frequency Provider Last Rate Last Admin    amLODIPine (NORVASC) tablet 10 mg  10 mg Oral DAILY Giuliano Gee, DO   10 mg at 04/12/22 0950    atorvastatin (LIPITOR) tablet 40 mg  40 mg Oral DAILY Giuliano Gee, DO   40 mg at 04/12/22 0949    clopidogreL (PLAVIX) tablet 75 mg  75 mg Oral DAILY Giuliano Gee, DO   75 mg at 04/12/22 0950    PARoxetine (PAXIL) tablet 30 mg  30 mg Oral DAILY Giuliano Gee, DO   30 mg at 04/12/22 0950    multivitamin, tx-iron-ca-min (THERA-M w/ IRON) tablet 1 Tablet  1 Tablet Oral DAILY Alphonsus Mee, DO   1 Tablet at 04/12/22 0950    ferrous sulfate tablet 325 mg  325 mg Oral DAILY Giuliano Gee, DO   325 mg at 04/12/22 0951    [START ON 4/15/2022] ergocalciferol capsule 50,000 Units  50,000 Units Oral 2 TIMES WEEKLY Giuliano Gee,         sodium chloride (NS) flush 5-40 mL  5-40 mL IntraVENous Q8H Giuliano Gee, DO   10 mL at 04/12/22 1606    sodium chloride (NS) flush 5-40 mL  5-40 mL IntraVENous PRN Dana Tenorio,         acetaminophen (TYLENOL) tablet 650 mg  650 mg Oral Q6H PRN Dana Tenorio,         Or    acetaminophen (TYLENOL) suppository 650 mg  650 mg Rectal Q6H PRN Giuliano Gee, DO        polyethylene glycol (MIRALAX) packet 17 g  17 g Oral DAILY PRN Giuliano Hernández, DO        ondansetron (ZOFRAN ODT) tablet 4 mg  4 mg Oral Q8H PRN Giuliano Gee,         Or    [facility]ron [facility] injection 4 mg  4 mg IntraVENous Q6H PRN Giuliano Gee, DO        enoxaparin (LOVENOX) injection 40 mg  40 mg SubCUTAneous DAILY Giuliano Gee,    40 mg at 04/12/22 0951    OXcarbazepine (TRILEPTAL) tablet 600 mg  600 mg Oral BID Elisha RUIZ MD        divalproex DR (DEPAKOTE) tablet 250 mg  250 mg Oral Q8H Jeannie Cordon MD           Past Medical History:   Diagnosis Date    Abnormal coordination 9/21/2020    Hallucinations 9/21/2020    Hypertension     Neurological disorder     Seizures ([facility] [address]     Stroke [facility]) 2009       Past Surgical History:   Procedure Laterality Date    PLACE PERCUT GASTROSTOMY TUBE  1/11/2022            Allergies   Allergen Reactions    Tomato Hives     Allergic to eating tomato.     Aspirin Nausea and Vomiting    Ciprofloxacin Rash    Pcn [Penicillins] Rash and Hives    Sulfa (Sulfonamide Antibiotics) Hives and Rash       Patient Active Problem List   Diagnosis Code    Pulmonary embolism (HCC) I26.99    Chest pain R07.9    Other and unspecified noninfectious gastroenteritis and colitis(558.9) K52.9    CVA (cerebral infarction) I63.9    Non compliance w medication regimen Z91.14    Elevated Dilantin level R78.89    Convulsion ([address] R56.9    Essential hypertension I10    Left-sided weakness R53.1    Dyslipidemia E78.5    TIA (transient ischemic attack) G45.9    Ataxia R27.0    Headache R51.9    Status epilepticus (HCC) G40.901    Seizure ([address] R56.9    Recurrent seizures (HCC) G40.909    Encephalopathy G93.40    Hallucinations R44.3    Abnormal coordination R27.8    Dehydration E86.0    Anemia D64.9    KEYLA (acute kidney injury) (HCC) N17.9    Breakthrough seizure (HCC) G40.919    Hyperkalemia E87.5    Severe protein-calorie malnutrition (HCC) E43    Elevated troponin R77.8         Review of Systems:   Constitutional no fever or chills  Skin denies gael  HENT  Denies  hearing lose  Eyes denies diplopia vision lose  Respiratory denies shortness of breath  Cardiovascular denies chest pain  Gastrointestinal denies nausea, vomiting  Genitourinary denies incontinence  Musculoskeletal denies joint pain or swelling  Hematology denies easy bruising or bleeding   Neurological as above in HPI      PHYSICAL EXAMINATION:      VITAL SIGNS:    Visit Vitals  /80 (BP 1 Location: Left upper arm, BP Patient Position: At rest)   Pulse 91   Temp 97.5 °F (36.4 °C)   Resp 16   Ht 5' (1.524 m)   Wt 43.5 kg (95 lb 12.8 oz)   SpO2 98%   Breastfeeding No   BMI 18.71 kg/m²       GENERAL: In no apparent distress. EXTREMITIES: Muscle tone is normal.  HEAD:   The patient is normocephalic. NEUROLOGIC EXAMINATION  Mental status: Awake, alert, oriented to place, day, and year; did not know the president;  follows simple and complex commands. No gaze deviation. Speech and languge: fluent, coherent,  and comprehension intact  CN: ?  Impaired peripheral vision bilaterally;  EOMI, PERRLA, face sensation intact , no facial asymmetry noted, palate elevation symmetric bilat, SS+SCM 5/5 bilat, tongue midline  Motor: no pronator drift, tone normal throughout, strength 5/5 throughout except 4+/5 over the LUE  Sensory: Intact to light touch bilaterally. DTR: 3+ through out; toes down going bilaterally.   Gait: not tested        Study Result    Narrative & Impression   CT HEAD WO CONT     History: Witnessed seizure, history of seizure.        Comparison: 1/6/2022     TECHNIQUE: 5 mm helical scan obtained of the head were obtained from the skull  vertex through the base of the skull without intravenous contrast.       All CT scans at this facility are performed using dose optimization technique as  appropriate to a performed exam, to include automated exposure control,  adjustment of the mA and/or kV according to patient size (including appropriate  matching first site-specific examinations), or use of iterative reconstruction  technique.     BRAIN RESULT:       Similar periventricular hypodensities and encephalomalacia right  parieto-occipital region with associated ex vacuo enlargement of the right  posterior horn lateral ventricle. Similar remote left thalamic infarct. Mild  generalized volume loss. Gray-white matter differentiation is otherwise  maintained. No acute intracranial hemorrhage. No midline shift. Basilar cisterns  are patent.     Status post cataract surgery. Soft tissues and osseous structures are grossly  normal. Mastoid air cells are patent.     IMPRESSION     No acute findings or significant interval change since 1/6/2022.           CBC:   Lab Results   Component Value Date/Time    WBC 8.5 04/12/2022 09:51 AM    RBC 3.38 (L) 04/12/2022 09:51 AM    HGB 10.4 (L) 04/12/2022 09:51 AM    HCT 31.9 (L) 04/12/2022 09:51 AM    PLATELET 938 31/52/4231 09:51 AM     BMP:   Lab Results   Component Value Date/Time    Glucose 80 04/12/2022 09:51 AM    Sodium 141 04/12/2022 09:51 AM    Potassium 3.8 04/12/2022 09:51 AM    Chloride 110 04/12/2022 09:51 AM    CO2 26 04/12/2022 09:51 AM    BUN 13 04/12/2022 09:51 AM    Creatinine 0.86 04/12/2022 09:51 AM    Calcium 9.2 04/12/2022 09:51 AM     CMP:   Lab Results   Component Value Date/Time Glucose 80 04/12/2022 09:51 AM    Sodium 141 04/12/2022 09:51 AM    Potassium 3.8 04/12/2022 09:51 AM    Chloride 110 04/12/2022 09:51 AM    CO2 26 04/12/2022 09:51 AM    BUN 13 04/12/2022 09:51 AM    Creatinine 0.86 04/12/2022 09:51 AM    Calcium 9.2 04/12/2022 09:51 AM    Anion gap 5 04/12/2022 09:51 AM    BUN/Creatinine ratio 15 04/12/2022 09:51 AM    Alk. phosphatase 77 04/02/2022 07:30 PM    Protein, total 7.4 04/02/2022 07:30 PM    Albumin 3.8 04/02/2022 07:30 PM    Globulin 3.6 04/02/2022 07:30 PM    A-G Ratio 1.1 04/02/2022 07:30 PM     Coagulation:   Lab Results   Component Value Date/Time    Prothrombin time 12.4 03/06/2018 10:16 AM    INR 1.0 03/06/2018 10:16 AM    aPTT <20.0 (L) 10/04/2016 02:37 PM     Cardiac markers:   Lab Results   Component Value Date/Time    CK 80 04/12/2022 09:51 AM    CK-MB Index  09/06/2021 06:17 PM     CALCULATION NOT PERFORMED WHEN RESULT IS BELOW LINEAR LIMIT          Impression:      A 75years old female patient with history of right side parieto-occipital stroke and seizure disorder currently on Depakote 250 mg p.o. TID and Trileptal 300 mg p.o. twice daily; she has also been on Keppra previously at 1000 mg p.o. twice daily but not on her current medication list.  Son also do not have the medication at home. Probably discontinued by her outpatient neurologist at [facility]; she had a recent visit on April 7 and it seems that the plan is to continue with oxcarbazepine and Depakote. Admitted for possible seizure with most of confusion; description possible localization-related with impairment of awareness. Continued on her home Depakote 250 mg p.o. 3 times daily and oxcarbazepine 600 mg p.o. twice daily. [place] acid level was very low. Son says that he is giving her medications as prescribed. Will increase the dose of oxcarbazepine to her previous dose of 600 mg p.o. twice daily. She will continue with the Depakote.   If patient is taking her medications properly, might not need polypharmacy. Currently, she has elevated troponin and being followed. Plan:     -C/w Depakote 250 g p.o. 3 times daily  -Increase the dose of Trileptal: 600 mg p.o. twice daily  -Discussed with son on compliance  -She will continue follow-up with her outpatient neurologist  -Call for questions  -We will see her as needed.

## 2022-04-12 NOTE — PROGRESS NOTES
[facility] Hospitalist Group  Progress Note    Patient: Jane Monday Age: 75 y.o. : 1955 MR#: [id number] SSN: [id number]  Date: 2022         Assessment/Plan:     Hospital Problems  Date Reviewed: 2021          Codes Class Noted POA    Depression ICD-10-CM: F32. A  ICD-9-CM: 985  2022 Unknown        Failure to thrive in adult ICD-10-CM: R62.7  ICD-9-CM: 783.7  2022 Yes        Elevated troponin ICD-10-CM: R77.8  ICD-9-CM: 790.6  2022 Unknown        * (Principal) Breakthrough seizure ([facility] [address] ICD-10-CM: A74.258  ICD-9-CM: 345.91  2021 Yes        Essential hypertension ICD-10-CM: I10  ICD-9-CM: 401.9  2015 Yes            Breakthrough seizures  - appreciate neurology. Medication adjustments per them  - seizure precautions    Elevated trop: likely 2/2 seizure  - appreciate cardiology  - echo pending  - cardiac monitoring    ? Subclinical Hypothyroidism  - TSH 6   - add on T4    HTN  - cont norvasc    Failure to thrive with unintentional weight loss-reportedly had lost 100 pounds over the course of the last year. PEG tube was placed with initiation of tube feeds in January of this year  - nutrition consulted, speech consult    Depression  - continue paxil, seroquel       DVT Prophylaxis:  [x]Lovenox  []Hep SQ  []SCDs  []Coumadin   []On Heparin gtt []PO anticoagulant    Anticipated discharge: tomorrow; rehab vs home health with  assist    Subjective:     Pt s/e @ bedside. No major events overnight. Pt offers no complaints this AM. Denies CP or SOB. Denies abd pain, nvd.     Objective:   VS:   Visit Vitals  /80 (BP 1 Location: Left upper arm, BP Patient Position: At rest)   Pulse 91   Temp 97.5 °F (36.4 °C)   Resp 16   Ht 5' (1.524 m)   Wt 43.5 kg (95 lb 12.8 oz)   SpO2 98%   Breastfeeding No   BMI 18.71 kg/m²      Tmax/24hrs: Temp (24hrs), Av.6 °F (37 °C), Min:97.5 °F (36.4 °C), Max:99.3 °F (37.4 °C)  No intake or output data in the 24 hours ending 04/12/22 1540    General Appearance: NAD, conversant  HENT: normocephalic/atraumatic, moist mucus membranes  Neck: No JVD, supple  Lungs: CTA with normal respiratory effort  CV: RRR, no m/r/g  Abdomen: soft, non-tender, normal bowel sounds, no pain around PEG tube  Extremities: no cyanosis, no peripheral edema  Neuro: No focal deficits, motor/sensory intact  Skin: Normal color, intact      Current Facility-Administered Medications   Medication Dose Route Frequency    amLODIPine (NORVASC) tablet 10 mg  10 mg Oral DAILY    atorvastatin (LIPITOR) tablet 40 mg  40 mg Oral DAILY    clopidogreL (PLAVIX) tablet 75 mg  75 mg Oral DAILY    PARoxetine (PAXIL) tablet 30 mg  30 mg Oral DAILY    multivitamin, tx-iron-ca-min (THERA-M w/ IRON) tablet 1 Tablet  1 Tablet Oral DAILY    ferrous sulfate tablet 325 mg  325 mg Oral DAILY    [START ON 4/15/2022] ergocalciferol capsule 50,000 Units  50,000 Units Oral 2 TIMES WEEKLY    sodium chloride (NS) flush 5-40 mL  5-40 mL IntraVENous Q8H    sodium chloride (NS) flush 5-40 mL  5-40 mL IntraVENous PRN    acetaminophen (TYLENOL) tablet 650 mg  650 mg Oral Q6H PRN    Or    acetaminophen (TYLENOL) suppository 650 mg  650 mg Rectal Q6H PRN    polyethylene glycol (MIRALAX) packet 17 g  17 g Oral DAILY PRN    ondansetron (ZOFRAN ODT) tablet 4 mg  4 mg Oral Q8H PRN    Or    ondansetron (ZOFRAN) injection 4 mg  4 mg IntraVENous Q6H PRN    enoxaparin (LOVENOX) injection 40 mg  40 mg SubCUTAneous DAILY    OXcarbazepine (TRILEPTAL) tablet 600 mg  600 mg Oral BID    divalproex DR (DEPAKOTE) tablet 250 mg  250 mg Oral Q8H        Labs:    Recent Results (from the past 24 hour(s))   TROPONIN-HIGH SENSITIVITY    Collection Time: 04/11/22  5:00 PM   Result Value Ref Range    Troponin-High Sensitivity 65 (HH) 0 - 54 ng/L   GLUCOSE, POC    Collection Time: 04/11/22  5:06 PM   Result Value Ref Range    Glucose (POC) 85 70 - 110 mg/dL   EKG, 12 LEAD, SUBSEQUENT    Collection Time: 04/11/22  5:48 PM   Result Value Ref Range    Ventricular Rate 62 BPM    Atrial Rate 62 BPM    P-R Interval 148 ms    QRS Duration 84 ms    Q-T Interval 418 ms    QTC Calculation (Bezet) 424 ms    Calculated P Axis 11 degrees    Calculated R Axis -25 degrees    Calculated T Axis 132 degrees    Diagnosis       Normal sinus rhythm  Septal infarct (cited on or before 11-APR-2022)  T wave abnormality, consider lateral ischemia  Abnormal ECG  When compared with ECG of 11-APR-2022 12:56,  Questionable change in QRS axis  T wave inversion more evident in Lateral leads     METABOLIC PANEL, BASIC    Collection Time: 04/12/22  9:51 AM   Result Value Ref Range    Sodium 141 136 - 145 mmol/L    Potassium 3.8 3.5 - 5.5 mmol/L    Chloride 110 100 - 111 mmol/L    CO2 26 21 - 32 mmol/L    Anion gap 5 3.0 - 18 mmol/L    Glucose 80 74 - 99 mg/dL    BUN 13 7.0 - 18 MG/DL    Creatinine 0.86 0.6 - 1.3 MG/DL    BUN/Creatinine ratio 15 12 - 20      GFR est AA >60 >60 ml/min/1.73m2    GFR est non-AA >60 >60 ml/min/1.73m2    Calcium 9.2 8.5 - 10.1 MG/DL   MAGNESIUM    Collection Time: 04/12/22  9:51 AM   Result Value Ref Range    Magnesium 2.0 1.6 - 2.6 mg/dL   CBC W/O DIFF    Collection Time: 04/12/22  9:51 AM   Result Value Ref Range    WBC 8.5 4.6 - 13.2 K/uL    RBC 3.38 (L) 4.20 - 5.30 M/uL    HGB 10.4 (L) 12.0 - 16.0 g/dL    HCT 31.9 (L) 35.0 - 45.0 %    MCV 94.4 78.0 - 100.0 FL    MCH 30.8 24.0 - 34.0 PG    MCHC 32.6 31.0 - 37.0 g/dL    RDW 13.3 11.6 - 14.5 %    PLATELET 738 226 - 637 K/uL    MPV 12.4 (H) 9.2 - 11.8 FL    NRBC 0.0 0  WBC    ABSOLUTE NRBC 0.00 0.00 - 0.01 K/uL   TROPONIN-HIGH SENSITIVITY    Collection Time: 04/12/22  9:51 AM   Result Value Ref Range    Troponin-High Sensitivity 56 (HH) 0 - 54 ng/L   TSH 3RD GENERATION    Collection Time: 04/12/22  9:51 AM   Result Value Ref Range    TSH 6.01 (H) 0.36 - 3.74 uIU/mL   CK    Collection Time: 04/12/22  9:51 AM   Result Value Ref Range    CK 80 26 - 192 U/L Imaging:  No results found.       Signed By: Oli Nolan PA-C [facility]. [facility]  Hospitalist Division  Office:  [phone]  Pager: [phone]         April 12, 2022 3:40 PM

## 2022-04-12 NOTE — ED NOTES
TRANSFER - OUT REPORT:    Verbal report given to PEGGY Mercedes on [employer]  being transferred to  (unit) for routine progression of care       Report consisted of patients Situation, Background, Assessment and   Recommendations(SBAR). Information from the following report(s) SBAR, ED Summary, [place] and Recent Results was reviewed with the receiving nurse. Opportunity for questions and clarification was provided.

## 2022-04-12 NOTE — PROGRESS NOTES
Comprehensive Nutrition Assessment    Type and Reason for Visit: Initial,Consult    Nutrition Recommendations/Plan:   -Plan to update order to include 30mL of water flushes q 8 hours to patency, discussed with MD   -Plan to add Ensure Enlive TID for weight stability, discussed with MD   -Continue to monitor PO intake, weight, tolerance/compliance of diet/supplement, and PEG placement/condition. Nutrition Assessment:  Visited patient in the room was laying down in the bed; seemed to be uncomfortable but able to communicate-spoke very softly. Pt stated she had a headache but overall felt ok. Currently, pt was unable to recall how much she ate from her meal this morning but stated at home her son cooks for her and she eats 3 meals a day; PEG tube is not used. Pt was about to recall that she loss over 100# within the last year after losing her son (who she was very close to)-PEG tube was placed at that time since she was refusing meals, but she could not recall what formula she used prior. Pt reported PO intake has been adequate since her other son has been caring for her. Pt denies any d/c/n/v nor chewing difficulties. Nutrition History and Allergies: Past medical history of significant for hypertension, prior seizures, abnormal coordination, hallucination, neurological disorder, history of CVA who was brought in by her family following a witnessed seizure. Previous weight in chart kiwpna09/11/22 : 43.5 kg (95 lb 12.8 oz)04/02/22 : 49 kg (108 lb)01/06/22 : 44 kg (97 lb)11/01/21 : 44 kg (97 lb)09/11/21 : 51.3 kg (113 lb)09/04/21 : 46.3 kg (102 lb)05/05/21 : 62.8 kg (138 lb 8 oz). -44% significant change x < 180 days. Malnutrition Assessment:  Malnutrition Status:   Moderate malnutrition    Context:  Acute illness     Findings of the 6 clinical characteristics of malnutrition:   Energy Intake:  No significant decrease in energy intake  Weight Loss:  7.00 - Greater than 7.5% over 3 months     Body Fat Loss:  1 - Mild body fat loss,     Muscle Mass Loss:  No significant muscle mass loss,      Strength:  Not performed     Estimated Daily Nutrient Needs:  Energy (kcal): 6850-5483 (30-35 kcal/kg); Weight Used for Energy Requirements: Current  Protein (g): 44-57 (1.0-1.3); Weight Used for Protein Requirements: Current  Fluid (ml/day): 7519-0339; Method Used for Fluid Requirements: 1 ml/kcal      Nutrition Related Findings:  Food allergies include tomato. Last BM 4/11. Pertinent Medications: Norvasc, Lipitor, lovenox, ergocalciferol, ferrous sulfate, MVI w/ iron, Zofran, miralax, sodium chloride. Wounds:  None       Current Nutrition Therapies:  ADULT DIET Regular    Anthropometric Measures:  · Height:  5' (152.4 cm)  · Current Body Wt:  43.5 kg (95 lb 14.4 oz)   · Admission Body Wt:  95 lb 14.4 oz    · Usual Body Wt:  45.4 kg (100 lb)     · Ideal Body Wt:  100 lbs:  95.9 %    · BMI Category:  Underweight (BMI less than 22) age over 72       Nutrition Diagnosis:   · Inadequate protein-energy intake,Unintended weight loss related to cognitive or neurological impairment,psychological cause or life stress,cardiac dysfunction as evidenced by BMI,weight loss,weight loss greater than or equal to 20% in 1 year      Nutrition Interventions:   Food and/or Nutrient Delivery: Continue current diet,Mineral supplement,Vitamin supplement,Start oral nutrition supplement  Nutrition Education and Counseling: No recommendations at this time,Education not indicated  Coordination of Nutrition Care: Coordination of community care,Continue to monitor while inpatient    Goals:  PO nutrition intake will meet >75% of patient estimated nutritional needs by next follow up date.        Nutrition Monitoring and Evaluation:   Behavioral-Environmental Outcomes: None identified  Food/Nutrient Intake Outcomes: Diet advancement/tolerance,Food and nutrient intake,Vitamin/mineral intake,Supplement intake  Physical Signs/Symptoms Outcomes: Meal time behavior,Nutrition focused physical findings,Weight    Discharge Planning:    Continue current diet,Too soon to determine     Electronically signed by Gume Ruffin RD on 4/12/2022 at 3:43 PM    Contact: [phone]

## 2022-04-12 NOTE — CASE COMMUNICATION
Arrived to find out from pt's son that pt was taken by ambulance 4/11 to [facility] ED yesterday for possibly another CVA.

## 2022-04-12 NOTE — HOME HEALTH
Arrived to find out from pt's son that pt was taken by ambulance to [facility] ED yesterday for possibly another CVA. Informed by email and [address], and PT Tyrese Gleason that pt was probably being observed in [facility].

## 2022-04-12 NOTE — CONSULTS
Cardiology Initial Patient Referral Note    Cardiology referral request from Dr. Kimberley Palacios for evaluation and management/treatment of elevated troponin     Date of  Admission: 4/11/2022 12:09 PM   Primary Care Physician:  Liat Boone NP    Attending Cardiologist: Dr. Susana Alarcons:     Hospital Problems  Date Reviewed: 11/2/2021          Codes Class Noted POA    Elevated troponin ICD-10-CM: R77.8  ICD-9-CM: 790.6  4/11/2022 Unknown            -Witnessed seizure with postictal state. Known h/o of partial complex seizures. CT Head negative. Per patient's neurologist, patient averages 1-2 seizures/month.   -Indeterminate troponin, not c/w ACS. ECG shows SR with lateral T wave changes, which are seen on prior ECGs. · ECHO (2018) showed preserved LV function. No significant valvular pathology. -HTN, uncontrolled. Max /89 mmHg. Improved. -Dyslipidemia.   -Anemia. -Dementia   -H/o Right sided hemorrhagic stroke (2007)   -Failure to thrive with unintentional weight loss. S/p G Tube for anorexia and 100 lb weight loss (01/2022)        Primary cardiologist, Dr. Sully Jason. (last seen 2015)      Plan: Will order an ECHO for completeness. Continue monitoring for arrhthymias on tele while inpatient. Recommend neuro consult. Continued on Amlodipine. Further recommendations pending results. History of Present Illness: This is a 75 y.o. female admitted for Elevated troponin [R77.8]. Patient complains of: seizure. Yana Guillaume is a 75 y.o. female, pmhx as stated above, who we are seeing for elevated troponin. Patient is a poor historian and unable to recall any detailed, pertinent hx of the events surrounding her admission. Per chart review, she was brought to the ER by her family after having a witnessed seizure. The patient was sitting in her wheelchair, non responsive with her eyes closed, trying to reach for her son. This is similar in appearance to prior seizure episodes. Her seizures had stopped following administration of her medication, but her post ictal state had lasted longer than prior episodes, which prompted her family to call EMS. Currently she is without CP, SOB, N/V, dizziness, near syncope or syncope, LE edema, orthopnea, PND. She states she is unable to keep any weight on and has lost a significant amount of weight, unable to tell me exactly how much weight she has lost.     Cardiac risk factors: dyslipidemia, sedentary life style, hypertension, post-menopausal    Review of Symptoms:  Except as stated above include:  Constitutional:  negative  Respiratory:  negative  Cardiovascular:  negative  Gastrointestinal: negative  Genitourinary:  negative  Musculoskeletal:  Negative  Neurological:  As per HPI   Dermatological:  Negative  Endocrinological: Negative  Psychological:  Negative    A comprehensive review of systems was negative except for that written in the HPI. Past Medical History:     Past Medical History:   Diagnosis Date    Abnormal coordination 9/21/2020    Hallucinations 9/21/2020    Hypertension     Neurological disorder     Seizures ([facility] [address]     Stroke [facility]) 2009         Social History:     Social History     Socioeconomic History    Marital status:    Tobacco Use    Smoking status: Never Smoker    Smokeless tobacco: Never Used   Vaping Use    Vaping Use: Never used   Substance and Sexual Activity    Alcohol use: Yes     Comment: Socially     Drug use: No        Family History:     Family History   Problem Relation Age of Onset    Diabetes Other     Stroke Other         Medications: Allergies   Allergen Reactions    Tomato Hives     Allergic to eating tomato.     Aspirin Nausea and Vomiting    Ciprofloxacin Rash    Pcn [Penicillins] Rash and Hives    Sulfa (Sulfonamide Antibiotics) Hives and Rash        Current Facility-Administered Medications   Medication Dose Route Frequency    amLODIPine (NORVASC) tablet 10 mg  10 mg Oral DAILY    atorvastatin (LIPITOR) tablet 40 mg  40 mg Oral DAILY    clopidogreL (PLAVIX) tablet 75 mg  75 mg Oral DAILY    PARoxetine (PAXIL) tablet 30 mg  30 mg Oral DAILY    OXcarbazepine (TRILEPTAL) tablet 300 mg  300 mg Oral BID    multivitamin, tx-iron-ca-min (THERA-M w/ IRON) tablet 1 Tablet  1 Tablet Oral DAILY    ferrous sulfate tablet 325 mg  325 mg Oral DAILY    [START ON 4/15/2022] ergocalciferol capsule 50,000 Units  50,000 Units Oral 2 TIMES WEEKLY    sodium chloride (NS) flush 5-40 mL  5-40 mL IntraVENous Q8H    sodium chloride (NS) flush 5-40 mL  5-40 mL IntraVENous PRN    acetaminophen (TYLENOL) tablet 650 mg  650 mg Oral Q6H PRN    Or    acetaminophen (TYLENOL) suppository 650 mg  650 mg Rectal Q6H PRN    polyethylene glycol (MIRALAX) packet 17 g  17 g Oral DAILY PRN    ondansetron (ZOFRAN ODT) tablet 4 mg  4 mg Oral Q8H PRN    Or    ondansetron (ZOFRAN) injection 4 mg  4 mg IntraVENous Q6H PRN    enoxaparin (LOVENOX) injection 40 mg  40 mg SubCUTAneous DAILY         Physical Exam:     Visit Vitals  /74 (BP 1 Location: Left upper arm, BP Patient Position: At rest)   Pulse 69   Temp 98.9 °F (37.2 °C)   Resp 16   Wt 43.5 kg (95 lb 12.8 oz)   SpO2 99%   Breastfeeding No   BMI 18.71 kg/m²       TELE: normal sinus rhythm    BP Readings from Last 3 Encounters:   04/12/22 138/74   04/11/22 120/66   04/08/22 (!) 148/76     Pulse Readings from Last 3 Encounters:   04/12/22 69   04/11/22 91   04/08/22 86     Wt Readings from Last 3 Encounters:   04/11/22 43.5 kg (95 lb 12.8 oz)   04/02/22 49 kg (108 lb)   01/06/22 44 kg (97 lb)       General:  alert, cooperative, no distress, appears stated age, cachetic   Neck:  no JVD  Lungs:  clear to auscultation bilaterally  Heart:  regular rate and rhythm, S1, S2 normal, no murmur, click, rub or gallop  Abdomen:  abdomen is soft, PEG tube in place, minimal TTP near PEG tube site.    Extremities:  extremities normal, atraumatic, no cyanosis or edema  Skin: Warm and dry.  no hyperpigmentation, vitiligo, or suspicious lesions  Neuro: alert, oriented x2, affect appropriate  Psych: non focal     Data Review:     Recent Labs     04/11/22  1227   WBC 6.0   HGB 10.1*   HCT 30.8*        Recent Labs     04/11/22  1227      K 3.7      CO2 29   GLU 90   BUN 19*   CREA 1.09   CA 8.9   MG 2.1       Results for orders placed or performed during the hospital encounter of 04/11/22   EKG, 12 LEAD, INITIAL   Result Value Ref Range    Ventricular Rate 68 BPM    Atrial Rate 68 BPM    P-R Interval 156 ms    QRS Duration 90 ms    Q-T Interval 414 ms    QTC Calculation (Bezet) 440 ms    Calculated P Axis 112 degrees    Calculated R Axis -154 degrees    Calculated T Axis 62 degrees    Diagnosis       Suspect arm lead reversal, interpretation assumes no reversal  Normal sinus rhythm  Right superior axis deviation  Pulmonary disease pattern  Septal infarct (cited on or before 11-APR-2022)  Abnormal ECG  When compared with ECG of 06-JAN-2022 13:37,  QRS axis shifted left  T wave inversion less evident in Lateral leads  Confirmed by Ed Reilly MD, Destinee Gil ([id number]) on 4/11/2022 4:42:04 PM         All Cardiac Markers in the last 24 hours:  No results found for: CPK, CK, CKMMB, CKMB, RCK3, CKMBT, CKNDX, CKND1, GWENDOLYN, TROPT, TROIQ, ANGIE, TROPT, TNIPOC, BNP, BNPP    Last Lipid:    Lab Results   Component Value Date/Time    Cholesterol, total 144 05/14/2021 01:22 PM    HDL Cholesterol 46 05/14/2021 01:22 PM    LDL, calculated 77.6 05/14/2021 01:22 PM    Triglyceride 102 05/14/2021 01:22 PM    CHOL/HDL Ratio 3.1 05/14/2021 01:22 PM       Cardiographics:     EKG Results     Procedure 720 Value Units Date/Time    EKG, 12 LEAD, INITIAL [[id number]]     Order Status: Canceled     EKG, 12 LEAD, INITIAL [[id number]] Collected: 04/11/22 1256    Order Status: Completed Updated: 04/11/22 1642     Ventricular Rate 68 BPM      Atrial Rate 68 BPM      P-R Interval 156 ms      QRS Duration 90 ms      Q-T Interval 414 ms      QTC Calculation (Bezet) 440 ms      Calculated P Axis 112 degrees      Calculated R Axis -154 degrees      Calculated T Axis 62 degrees      Diagnosis --     Suspect arm lead reversal, interpretation assumes no reversal  Normal sinus rhythm  Right superior axis deviation  Pulmonary disease pattern  Septal infarct (cited on or before 11-APR-2022)  Abnormal ECG  When compared with ECG of 06-JAN-2022 13:37,  QRS axis shifted left  T wave inversion less evident in Lateral leads  Confirmed by Abigail Figueroa MD, Juan Gardner ([id number]) on 4/11/2022 4:42:04 PM                    XR Results (most recent):  Results from [place] encounter on 04/11/22    XR CHEST PORT    Narrative  EXAM:  XR CHEST PORT    INDICATION:   altered mental status    COMPARISON: 1/6/2022. FINDINGS:  The cardiac and mediastinal silhouette are within normal limits. Pulmonary  vasculature is within normal limits. No pneumothorax or pleural effusions. Mild  retrocardiac opacities. No acute osseous abnormality. Impression  Retrocardiac opacities favoring atelectasis, infiltrate not excluded.         Signed By: Orquidea Wilson PA-C     April 12, 2022

## 2022-04-12 NOTE — HOME HEALTH
Skilled reason for visit: skilled assessment, education, medication management, GI assessment, patient eating by mouth 34 meals, 2 snacks and boost. MD aware gtube clogged. Caregiver involvement: Family assists ADL's, medications, meals, transportation, errands. Medications reviewed and all medications are available in the home this visit. The following education was provided regarding medications: patient knowledgeable about all medications, has no questions or concerns at this time. MD notified of any discrepancies/look a-like medications/medication interactions: na   Medications are effective at this time. Home health supplies by type and quantity ordered/delivered this visit include: na   Patient education provided this visit: Skilled nurse instructed patient on safety measures to avoid injuries and falls such as keeping adequate lighting during the day and night and was educated on proper use of assistive device to prevent falls. [place] education provided: na   Patient level of understanding of education provided: patient/caregiver verbalized 100% understanding to all topics discussed   Skilled Care Performed this visit: skilled assessment, education, medication management, gtube assessment   Patient response to procedure performed: patient denied pain and discomfort during procedure/visit   Agency Progress toward goals: progressing   Patient's Progress towards personal goals: progressing   Home exercise program: Continue all medications as prescribed, implement fall/infection/pressure ulcer interventions, monitor for abnormal signs/symptoms of infection and report to MD immediately. Keep all follow up appointments as prescribed. Read all teaching packets for education of disease process and to prevent complications.   Continued need for the following skills: Nursing, Physical Therapy and Occupational Therapy, SLP  Plan for next visit: skilled assessment, education, medication management, gtube assessment   Patient and/or caregiver notified and agrees to changes in the Plan of Care N/A   The following discharge planning was discussed with the pt/caregiver: Discharge planning as follows: when goals met, patient/caregiver able to manage disease process, medications and pain

## 2022-04-12 NOTE — PROGRESS NOTES
conducted an initial consultation and Spiritual Assessment for Que Velez, who is a 75 y.o.,female. Patients Primary Language is: [place]. According to the patients EMR Taoist Affiliation is: [facility].     The reason the Patient came to the hospital is:   Patient Active Problem List    Diagnosis Date Noted    Elevated troponin 04/11/2022    Severe protein-calorie malnutrition ([address] 01/07/2022    Hyperkalemia 01/06/2022    Dehydration 09/06/2021    Anemia 09/06/2021    KEYLA (acute kidney injury) ([address] 09/06/2021    Breakthrough seizure ([address] 09/06/2021    Encephalopathy 09/21/2020    Hallucinations 09/21/2020    Abnormal coordination 09/21/2020    Recurrent seizures ([address] 09/08/2020    Seizure ([address] 08/03/2019    Status epilepticus ([address] 08/02/2019    TIA (transient ischemic attack) 01/07/2016    Dyslipidemia 12/01/2015    Left-sided weakness 10/30/2015    Essential hypertension 09/11/2015    Convulsion ([address] 07/21/2015    Elevated Dilantin level 02/20/2015    CVA (cerebral infarction) 03/10/2014    Non compliance w medication regimen 03/10/2014    Pulmonary embolism ([address] 02/02/2014    Chest pain 02/02/2014    Other and unspecified noninfectious gastroenteritis and colitis(558.9) 02/02/2014    Ataxia 12/30/2011    Headache 12/30/2011        The  provided the following Interventions:  Initiated a relationship of care and support. Explored issues of pushpa, belief, spirituality and Christian/ritual needs while hospitalized. Listened empathically. Patient shared that her two sons live with her. She has 12 grandchildren and 3 great grandchildren. Patient could not remember what brought her here to the hospital.  Provided chaplaincy education. Provided information about Spiritual Care Services. Offered prayer and assurance of continued prayers on patient's behalf. Chart reviewed.     The following outcomes where achieved:  Patient shared limited information about both his/her medical narrative and spiritual journey/beliefs.  confirmed Patient's [facility] Islam Affiliation. Patient has not quite processed feeling about current hospitalization. Patient expressed gratitude for 's visit and prayer. Assessment:  Patient does not have any Judaism/cultural needs that will affect patients preferences in health care. There are no spiritual or Judaism issues which require intervention at this time. Plan:  Chaplains will continue to follow and will provide pastoral care on an as needed/requested basis.  recommends bedside caregivers page  on duty if patient shows signs of acute spiritual or emotional distress.     Adrienne Platt 605 [phone]

## 2022-04-12 NOTE — PROGRESS NOTES
Problem: Mobility Impaired (Adult and Pediatric)  Goal: *Acute Goals and Plan of Care (Insert Text)  Description: Physical Therapy Goals  Initiated 4/12/2022 and to be accomplished within 7 day(s)  1. Patient will move from supine to sit and sit to supine in bed with supervision/set-up. 2.  Patient will transfer from bed to chair and chair to bed with supervision/set-up using the least restrictive device. 3.  Patient will perform sit to stand with supervision/set-up. 4.  Patient will ambulate with minimal assistance for 50 feet with the least restrictive device. PLOF: Lives with son; alone at times. One story home. Uses wheelchair or assist for amb. Outcome: Progressing Towards Goal   PHYSICAL THERAPY EVALUATION    Patient: Kendra Nunn (89 y.o. female)  Date: 4/12/2022  Primary Diagnosis: Elevated troponin [R77.8]  Precautions: Fall  ASSESSMENT :  Seated in bed. Oriented to place, time, and self. Reports amb with assist from family members or caregivers. Lives with son; however alone at times. Uses wheelchair for mobility as needed. Min A for supine to sit with HOB elevated. Seated EOB with supervision for balance. Follows one step commands easily; increased difficulty with multi-step commands. Min A for sit to stand. Poor standing balance; posterior lean on bed. Returned to seated. Min A for sit to supine. Min A for scooting with verbal cues for sequencing. Seated in bed with HOB elevated. Educated on need for RN assistance with mobility; verbalized understanding. Call bell in reach. Patient will benefit from skilled intervention to address the above impairments.   Patient's rehabilitation potential is considered to be Fair  Factors which may influence rehabilitation potential include:   []         None noted  []         Mental ability/status  [x]         Medical condition  []         Home/family situation and support systems  []         Safety awareness  []         Pain tolerance/management  []         Other:      PLAN :  Recommendations and Planned Interventions:   [x]           Bed Mobility Training             [x]    Neuromuscular Re-Education  [x]           Transfer Training                   []    Orthotic/Prosthetic Training  [x]           Gait Training                          []    Modalities  [x]           Therapeutic Exercises           []    Edema Management/Control  [x]           Therapeutic Activities            [x]    Family Training/Education  [x]           Patient Education  []           Other (comment):    Frequency/Duration: Patient will be followed by physical therapy 3-5 times a week to address goals. Discharge Recommendations: Rehab  Further Equipment Recommendations for Discharge: rolling walker and wheelchair      SUBJECTIVE:   Patient stated I like to watch [employer].     OBJECTIVE DATA SUMMARY:     Past Medical History:   Diagnosis Date    Abnormal coordination 9/21/2020    Hallucinations 9/21/2020    Hypertension     Neurological disorder     Seizures ([facility] [address]     Stroke ([facility] [address] 2009     Past Surgical History:   Procedure Laterality Date    PLACE PERCUT GASTROSTOMY TUBE  1/11/2022          Barriers to Learning/Limitations: yes;  sensory deficits-vision/hearing/speech  Compensate with: Visual Cues, Verbal Cues, Tactile Cues and Kinesthetic Cues    Home Situation:  Home Situation  Home Environment: Private residence  One/Two Story Residence: One story  Living Alone: No  Support Systems: Child(quiana)  Patient Expects to be Discharged to[de-identified] Home  Current DME Used/Available at Home: [employer], rolling    Critical Behavior:  Neurologic State: Alert  Orientation Level: Oriented to person;Oriented to place;Oriented to time  Cognition: Follows commands     Psychosocial  Patient Behaviors: Cooperative    Strength:    Manual Muscle Testing (LE)         R     L    Hip Flexion:   4/5  4-/5  Knee EXT:   4/5  4-/5  Knee FLEX:   4/5  4-/5  Ankle DF:   4/5  4-/5  _________________________________________________   Range Of Motion:  BLE AROM WFL   Functional Mobility:  Bed Mobility:  Supine to Sit: Minimum assistance  Sit to Supine: Minimum assistance  Scooting: Minimum assistance  Transfers:  Sit to Stand: Minimum assistance  Stand to Sit: Minimum assistance  Balance:   Sitting: Impaired  Sitting - Static: Good (unsupported)  Sitting - Dynamic: Good (unsupported)  Standing: Impaired  Standing - Static: Poor  Neuro Re-education:  Seated EOB 8 minutes    Pain:  Pain level pre-treatment: 0/10   Pain level post-treatment: 0/10     Activity Tolerance:   Fair    After treatment:   []         Patient left in no apparent distress sitting up in chair  [x]         Patient left in no apparent distress in bed  [x]         Call bell left within reach  [x]         Nursing notified  []         Caregiver present  []         Bed alarm activated  []         SCDs applied    COMMUNICATION/EDUCATION:   [x]         Role of physical therapy and plan of care in the acute care setting. [x]         Fall prevention education was provided and the patient/caregiver indicated understanding. [x]         Patient/family have participated as able in goal setting and plan of care. []         Patient/family agree to work toward stated goals and plan of care. []         Patient understands intent and goals of therapy, but is neutral about his/her participation. []         Patient is unable to participate in goal setting/plan of care: ongoing with therapy staff.     Thank you for this referral.  Jasper Judge, PT   Time Calculation: 18 mins    Eval Complexity: History: MEDIUM  Complexity : 1-2 comorbidities / personal factors will impact the outcome/ POC Exam:MEDIUM Complexity : 3 Standardized tests and measures addressing body structure, function, activity limitation and / or participation in recreation  Presentation: MEDIUM Complexity : Evolving with changing characteristics  Clinical Decision Making:Medium Complexity   Clinical judgement; ROM, MMT, functional mobility Overall Complexity:MEDIUM

## 2022-04-13 ENCOUNTER — HOME CARE VISIT (OUTPATIENT)
Dept: HOME HEALTH SERVICES | Facility: HOME HEALTH | Age: 67
End: 2022-04-13
Payer: MEDICARE

## 2022-04-13 PROBLEM — E03.8 SUBCLINICAL HYPOTHYROIDISM: Status: ACTIVE | Noted: 2022-04-13

## 2022-04-13 LAB
ANION GAP SERPL CALC-SCNC: 7 MMOL/L (ref 3–18)
ATRIAL RATE: 62 BPM
BUN SERPL-MCNC: 16 MG/DL (ref 7–18)
BUN/CREAT SERPL: 16 (ref 12–20)
CALCIUM SERPL-MCNC: 8.9 MG/DL (ref 8.5–10.1)
CALCULATED P AXIS, ECG09: 11 DEGREES
CALCULATED R AXIS, ECG10: -25 DEGREES
CALCULATED T AXIS, ECG11: 132 DEGREES
CHLORIDE SERPL-SCNC: 106 MMOL/L (ref 100–111)
CO2 SERPL-SCNC: 26 MMOL/L (ref 21–32)
CREAT SERPL-MCNC: 1.02 MG/DL (ref 0.6–1.3)
DIAGNOSIS, 93000: NORMAL
ECHO AO ASC DIAM: 2.9 CM
ECHO AO ASCENDING AORTA INDEX: 2.13 CM/M2
ECHO AO ROOT DIAM: 3.1 CM
ECHO AO ROOT INDEX: 2.28 CM/M2
ECHO EST RA PRESSURE: 3 MMHG
ECHO LA VOL 2C: 30 ML (ref 22–52)
ECHO LA VOL 4C: 29 ML (ref 22–52)
ECHO LA VOLUME AREA LENGTH: 34 ML
ECHO LA VOLUME INDEX A2C: 22 ML/M2 (ref 16–34)
ECHO LA VOLUME INDEX A4C: 21 ML/M2 (ref 16–34)
ECHO LA VOLUME INDEX AREA LENGTH: 25 ML/M2 (ref 16–34)
ECHO LV E' LATERAL VELOCITY: 8 CM/S
ECHO LV E' SEPTAL VELOCITY: 6 CM/S
ECHO LV FRACTIONAL SHORTENING: 38 % (ref 28–44)
ECHO LV INTERNAL DIMENSION DIASTOLE INDEX: 2.5 CM/M2
ECHO LV INTERNAL DIMENSION DIASTOLIC: 3.4 CM (ref 3.9–5.3)
ECHO LV INTERNAL DIMENSION SYSTOLIC INDEX: 1.54 CM/M2
ECHO LV INTERNAL DIMENSION SYSTOLIC: 2.1 CM
ECHO LV IVSD: 1 CM (ref 0.6–0.9)
ECHO LV MASS 2D: 106.3 G (ref 67–162)
ECHO LV MASS INDEX 2D: 78.2 G/M2 (ref 43–95)
ECHO LV POSTERIOR WALL DIASTOLIC: 1.1 CM (ref 0.6–0.9)
ECHO LV RELATIVE WALL THICKNESS RATIO: 0.65
ECHO LVOT AREA: 2.5 CM2
ECHO LVOT DIAM: 1.8 CM
ECHO LVOT MEAN GRADIENT: 4 MMHG
ECHO LVOT PEAK GRADIENT: 7 MMHG
ECHO LVOT PEAK VELOCITY: 1.3 M/S
ECHO LVOT STROKE VOLUME INDEX: 45.4 ML/M2
ECHO LVOT SV: 61.8 ML
ECHO LVOT VTI: 24.3 CM
ECHO MV A VELOCITY: 0.88 M/S
ECHO MV E DECELERATION TIME (DT): 279.8 MS
ECHO MV E VELOCITY: 0.64 M/S
ECHO MV E/A RATIO: 0.73
ECHO MV E/E' LATERAL: 8
ECHO MV E/E' RATIO (AVERAGED): 9.33
ECHO MV E/E' SEPTAL: 10.67
ECHO RIGHT VENTRICULAR SYSTOLIC PRESSURE (RVSP): 22 MMHG
ECHO RV FREE WALL PEAK S': 148 CM/S
ECHO RV TAPSE: 2 CM (ref 1.7–?)
ECHO TV REGURGITANT MAX VELOCITY: 2.19 M/S
ECHO TV REGURGITANT PEAK GRADIENT: 19 MMHG
ERYTHROCYTE [DISTWIDTH] IN BLOOD BY AUTOMATED COUNT: 13.2 % (ref 11.6–14.5)
GLUCOSE SERPL-MCNC: 86 MG/DL (ref 74–99)
HCT VFR BLD AUTO: 28.3 % (ref 35–45)
HGB BLD-MCNC: 9.6 G/DL (ref 12–16)
MAGNESIUM SERPL-MCNC: 2 MG/DL (ref 1.6–2.6)
MCH RBC QN AUTO: 31.2 PG (ref 24–34)
MCHC RBC AUTO-ENTMCNC: 33.9 G/DL (ref 31–37)
MCV RBC AUTO: 91.9 FL (ref 78–100)
NRBC # BLD: 0 K/UL (ref 0–0.01)
NRBC BLD-RTO: 0 PER 100 WBC
P-R INTERVAL, ECG05: 148 MS
PLATELET # BLD AUTO: 178 K/UL (ref 135–420)
PMV BLD AUTO: 12.3 FL (ref 9.2–11.8)
POTASSIUM SERPL-SCNC: 3.5 MMOL/L (ref 3.5–5.5)
Q-T INTERVAL, ECG07: 418 MS
QRS DURATION, ECG06: 84 MS
QTC CALCULATION (BEZET), ECG08: 424 MS
RBC # BLD AUTO: 3.08 M/UL (ref 4.2–5.3)
SODIUM SERPL-SCNC: 139 MMOL/L (ref 136–145)
VENTRICULAR RATE, ECG03: 62 BPM
WBC # BLD AUTO: 8.8 K/UL (ref 4.6–13.2)

## 2022-04-13 PROCEDURE — 74011250637 HC RX REV CODE- 250/637: Performed by: INTERNAL MEDICINE

## 2022-04-13 PROCEDURE — 83735 ASSAY OF MAGNESIUM: CPT

## 2022-04-13 PROCEDURE — 92526 ORAL FUNCTION THERAPY: CPT

## 2022-04-13 PROCEDURE — 74011000250 HC RX REV CODE- 250: Performed by: INTERNAL MEDICINE

## 2022-04-13 PROCEDURE — 36415 COLL VENOUS BLD VENIPUNCTURE: CPT

## 2022-04-13 PROCEDURE — 85027 COMPLETE CBC AUTOMATED: CPT

## 2022-04-13 PROCEDURE — 80048 BASIC METABOLIC PNL TOTAL CA: CPT

## 2022-04-13 PROCEDURE — G0378 HOSPITAL OBSERVATION PER HR: HCPCS

## 2022-04-13 PROCEDURE — 74011250637 HC RX REV CODE- 250/637: Performed by: STUDENT IN AN ORGANIZED HEALTH CARE EDUCATION/TRAINING PROGRAM

## 2022-04-13 PROCEDURE — 99232 SBSQ HOSP IP/OBS MODERATE 35: CPT | Performed by: PHYSICIAN ASSISTANT

## 2022-04-13 PROCEDURE — 92610 EVALUATE SWALLOWING FUNCTION: CPT

## 2022-04-13 PROCEDURE — APPSS45 APP SPLIT SHARED TIME 31-45 MINUTES: Performed by: PHYSICIAN ASSISTANT

## 2022-04-13 PROCEDURE — 96372 THER/PROPH/DIAG INJ SC/IM: CPT

## 2022-04-13 PROCEDURE — 74011250636 HC RX REV CODE- 250/636: Performed by: INTERNAL MEDICINE

## 2022-04-13 PROCEDURE — 99214 OFFICE O/P EST MOD 30 MIN: CPT | Performed by: INTERNAL MEDICINE

## 2022-04-13 RX ADMIN — DIVALPROEX SODIUM 250 MG: 250 TABLET, DELAYED RELEASE ORAL at 14:43

## 2022-04-13 RX ADMIN — FERROUS SULFATE TAB 325 MG (65 MG ELEMENTAL FE) 325 MG: 325 (65 FE) TAB at 09:29

## 2022-04-13 RX ADMIN — DIVALPROEX SODIUM 250 MG: 250 TABLET, DELAYED RELEASE ORAL at 06:23

## 2022-04-13 RX ADMIN — PAROXETINE HYDROCHLORIDE 30 MG: 20 TABLET, FILM COATED ORAL at 09:29

## 2022-04-13 RX ADMIN — ATORVASTATIN CALCIUM 40 MG: 40 TABLET, FILM COATED ORAL at 09:29

## 2022-04-13 RX ADMIN — CLOPIDOGREL BISULFATE 75 MG: 75 TABLET ORAL at 09:28

## 2022-04-13 RX ADMIN — SODIUM CHLORIDE, PRESERVATIVE FREE 40 ML: 5 INJECTION INTRAVENOUS at 06:26

## 2022-04-13 RX ADMIN — SODIUM CHLORIDE, PRESERVATIVE FREE 10 ML: 5 INJECTION INTRAVENOUS at 14:42

## 2022-04-13 RX ADMIN — OXCARBAZEPINE 600 MG: 300 TABLET, FILM COATED ORAL at 18:04

## 2022-04-13 RX ADMIN — SODIUM CHLORIDE, PRESERVATIVE FREE 10 ML: 5 INJECTION INTRAVENOUS at 22:16

## 2022-04-13 RX ADMIN — AMLODIPINE BESYLATE 10 MG: 10 TABLET ORAL at 09:29

## 2022-04-13 RX ADMIN — OXCARBAZEPINE 600 MG: 300 TABLET, FILM COATED ORAL at 09:29

## 2022-04-13 RX ADMIN — ENOXAPARIN SODIUM 40 MG: 40 INJECTION SUBCUTANEOUS at 09:29

## 2022-04-13 RX ADMIN — DIVALPROEX SODIUM 250 MG: 250 TABLET, DELAYED RELEASE ORAL at 22:16

## 2022-04-13 RX ADMIN — MULTIPLE VITAMINS W/ MINERALS TAB 1 TABLET: TAB at 09:29

## 2022-04-13 NOTE — CASE COMMUNICATION
I called pt's home and spoke with her son, who reports that pt may be [facility] tomorrow 4/14 from [facility]. He is not sure if she will feel like being seen tomorrow or Friday.

## 2022-04-13 NOTE — PROGRESS NOTES
[facility] Hospitalist Group  Progress Note    Patient: Que Velez Age: 75 y.o. : 1955 MR#: [id number] SSN: [id number]  Date: 2022         Assessment/Plan:     Hospital Problems  Date Reviewed: 2021          Codes Class Noted POA    Subclinical hypothyroidism ICD-10-CM: E03.8  ICD-9-CM: 244.8  2022 Unknown        Depression ICD-10-CM: F32. A  ICD-9-CM: 033  2022 Unknown        Failure to thrive in adult ICD-10-CM: R62.7  ICD-9-CM: 783.7  2022 Yes        Elevated troponin ICD-10-CM: R77.8  ICD-9-CM: 790.6  2022 Unknown        * (Principal) Breakthrough seizure ([facility] [address] ICD-10-CM: S60.174  ICD-9-CM: 345.91  2021 Yes        Essential hypertension ICD-10-CM: I10  ICD-9-CM: 401.9  2015 Yes            Breakthrough seizures  - appreciate neurology. Cont depakote and increased trileptal  - seizure precautions    Elevated trop: likely 2/2 seizure  - appreciate cardiology  - echo with normal LV and diastolic function  - cardiac monitoring  - f/u in 4 weeks with cardiology    Subclinical Hypothyroidism  - TSH 6   - T4 0.8    HTN  - cont norvasc    Failure to thrive with unintentional weight loss-reportedly had lost 100 pounds over the course of the last year. PEG tube was placed with initiation of tube feeds in January of this year  - nutrition consulted, speech consult    Depression  - continue paxil, seroquel       DVT Prophylaxis:  [x]Lovenox  []Hep SQ  []SCDs  []Coumadin   []On Heparin gtt []PO anticoagulant    Anticipated discharge: medically stable for discharge to rehab    Subjective:     Pt s/e @ bedside. No major events overnight. Pt offers no complaints this AM. Denies CP or SOB. Denies abd pain, nvd. Called patient's son Dhaval Soliz to update on discharge plans. He is in agreement, states she needs more than what she's getting at home. Will be here tomorrow morning to look over facility options.     Objective:   VS:   Visit Vitals  /72 (BP 1 Location: Left upper arm, BP Patient Position: At rest)   Pulse 71   Temp 98 °F (36.7 °C)   Resp 16   Ht 5' (1.524 m)   Wt 43.1 kg (95 lb)   SpO2 99%   Breastfeeding No   BMI 18.55 kg/m²      Tmax/24hrs: Temp (24hrs), Av.2 °F (36.8 °C), Min:97.3 °F (36.3 °C), Max:98.8 °F (37.1 °C)  No intake or output data in the 24 hours ending 22 1606    General Appearance: NAD, conversant  HENT: normocephalic/atraumatic, moist mucus membranes  Neck: No JVD, supple  Lungs: CTA with normal respiratory effort  CV: RRR, no m/r/g  Abdomen: soft, non-tender, normal bowel sounds, no pain around PEG tube  Extremities: no cyanosis, no peripheral edema  Neuro: No focal deficits, motor/sensory intact  Skin: Normal color, intact      Current Facility-Administered Medications   Medication Dose Route Frequency    amLODIPine (NORVASC) tablet 10 mg  10 mg Oral DAILY    atorvastatin (LIPITOR) tablet 40 mg  40 mg Oral DAILY    clopidogreL (PLAVIX) tablet 75 mg  75 mg Oral DAILY    PARoxetine (PAXIL) tablet 30 mg  30 mg Oral DAILY    multivitamin, tx-iron-ca-min (THERA-M w/ IRON) tablet 1 Tablet  1 Tablet Oral DAILY    ferrous sulfate tablet 325 mg  325 mg Oral DAILY    [START ON 4/15/2022] ergocalciferol capsule 50,000 Units  50,000 Units Oral 2 TIMES WEEKLY    sodium chloride (NS) flush 5-40 mL  5-40 mL IntraVENous Q8H    sodium chloride (NS) flush 5-40 mL  5-40 mL IntraVENous PRN    acetaminophen (TYLENOL) tablet 650 mg  650 mg Oral Q6H PRN    Or    acetaminophen (TYLENOL) suppository 650 mg  650 mg Rectal Q6H PRN    polyethylene glycol (MIRALAX) packet 17 g  17 g Oral DAILY PRN    ondansetron (ZOFRAN ODT) tablet 4 mg  4 mg Oral Q8H PRN    Or    ondansetron (ZOFRAN) injection 4 mg  4 mg IntraVENous Q6H PRN    enoxaparin (LOVENOX) injection 40 mg  40 mg SubCUTAneous DAILY    OXcarbazepine (TRILEPTAL) tablet 600 mg  600 mg Oral BID    divalproex DR (DEPAKOTE) tablet 250 mg  250 mg Oral Q8H        Labs:    Recent Results (from the past 24 hour(s))   ECHO ADULT COMPLETE    Collection Time: 04/12/22  5:36 PM   Result Value Ref Range    Fractional Shortening 2D 38 28 - 44 %    LVIDd Index 2.50 cm/m2    LVIDs Index 1.54 cm/m2    LV RWT Ratio 0.65     LV Mass 2D 106.3 67 - 162 g    LV Mass 2D Index 78.2 43 - 95 g/m2    MV E/A 0.73     E/E' Ratio (Averaged) 9.33     E/E' Lateral 8.00     E/E' Septal 10.67     LA Volume Index A/L 25 16 - 34 mL/m2    LVOT Stroke Volume Index 45.4 mL/m2    LVOT Area 2.5 cm2    LA Volume Index 2C 22 16 - 34 mL/m2    LA Volume Index 4C 21 16 - 34 mL/m2    Ao Root Index 2.28 cm/m2    Ascending Aorta Index 2.13 cm/m2    Est. RA Pressure 3 mmHg    RVSP 22 mmHg    IVSd 1.0 (A) 0.6 - 0.9 cm    LVIDd 3.4 (A) 3.9 - 5.3 cm    LVIDs 2.1 cm    LVOT Diameter 1.8 cm    LVPWd 1.1 (A) 0.6 - 0.9 cm    LVOT Peak Gradient 7 mmHg    LVOT Mean Gradient 4 mmHg    LVOT SV 61.8 ml    LVOT Peak Velocity 1.3 m/s    LVOT VTI 24.3 cm    RV Free Wall Peak S' 148 cm/s    LA Volume A/L 34 mL    LA Volume 2C 30 22 - 52 mL    LA Volume 4C 29 22 - 52 mL    MV A Velocity 0.88 m/s    MV E Wave Deceleration Time 279.8 ms    MV E Velocity 0.64 m/s    LV E' Lateral Velocity 8 cm/s    LV E' Septal Velocity 6 cm/s    TAPSE 2.0 1.7 cm    TR Peak Gradient 19 mmHg    TR Max Velocity 2.19 m/s    Ascending Aorta 2.9 cm    Aortic Root 3.1 cm   METABOLIC PANEL, BASIC    Collection Time: 04/13/22  1:38 AM   Result Value Ref Range    Sodium 139 136 - 145 mmol/L    Potassium 3.5 3.5 - 5.5 mmol/L    Chloride 106 100 - 111 mmol/L    CO2 26 21 - 32 mmol/L    Anion gap 7 3.0 - 18 mmol/L    Glucose 86 74 - 99 mg/dL    BUN 16 7.0 - 18 MG/DL    Creatinine 1.02 0.6 - 1.3 MG/DL    BUN/Creatinine ratio 16 12 - 20      GFR est AA >60 >60 ml/min/1.73m2    GFR est non-AA 54 (L) >60 ml/min/1.73m2    Calcium 8.9 8.5 - 10.1 MG/DL   MAGNESIUM    Collection Time: 04/13/22  1:38 AM   Result Value Ref Range    Magnesium 2.0 1.6 - 2.6 mg/dL   CBC W/O DIFF    Collection Time: 04/13/22  1:38 AM   Result Value Ref Range    WBC 8.8 4.6 - 13.2 K/uL    RBC 3.08 (L) 4.20 - 5.30 M/uL    HGB 9.6 (L) 12.0 - 16.0 g/dL    HCT 28.3 (L) 35.0 - 45.0 %    MCV 91.9 78.0 - 100.0 FL    MCH 31.2 24.0 - 34.0 PG    MCHC 33.9 31.0 - 37.0 g/dL    RDW 13.2 11.6 - 14.5 %    PLATELET 180 755 - 688 K/uL    MPV 12.3 (H) 9.2 - 11.8 FL    NRBC 0.0 0  WBC    ABSOLUTE NRBC 0.00 0.00 - 0.01 K/uL       Imaging:  ECHO ADULT COMPLETE    Result Date: 4/13/2022    Left Ventricle: Left ventricle size is normal. Increased wall thickness. Findings consistent with mild concentric hypertrophy. Normal wall motion. Normal left ventricular systolic function with a visually estimated EF of 60 - 65%. Normal diastolic function.          Signed By: Katey Orozco PA-C [facility]. [facility]  Hospitalist Division  Office:  [phone]  Pager: [phone]         April 13, 2022 3:40 PM

## 2022-04-13 NOTE — PROGRESS NOTES
Problem: Dysphagia (Adult)  Goal: *Acute Goals and Plan of Care (Insert Text)  Description: Patient will:  1. Tolerate PO trials with 0 s/s overt distress in 4/5 trials  2. Participate in training and education related to continued aspiration risk, diet recs and compensatory strategies     Recommend:   Regular diet with thin liquids  Feeding assistance as needed   Meds per pt preference   Aspiration precautions  HOB >45 degrees during all intake and for at least 30 min after po   Small bites/sips, Slow rate of intake, alternating bites/sips   Outcome: Progressing Towards Goal    SPEECH LANGUAGE PATHOLOGY BEDSIDE SWALLOW EVALUATION/TREATMENT    Patient: Miguelito Tello (89 y.o. female)  Date: 4/13/2022  Primary Diagnosis: Elevated troponin [R77.8]  Precautions: Aspiration,  Fall  PLOF: As per H&P    ASSESSMENT :  Based on the objective data described below, the patient presents with min oral and suspected pharyngeal dysphagia. Pt alert and following commands. PEG in place but not utilized for feeds as pt has been consuming 3 meals per day per chart review. Oral mech exam unremarkable. Pt tolerating thin liquids + straw with timely swallow initiation, adequate laryngeal elevation to palpation and no overt s/sx aspiration. Demo mildly increased oral prep phase with regular solids with overall poor endurance noted. Able to clear scant lingual residuals with liquid wash. Recommend continue regular diet with thin liquids with meds as tolerated. D/w pt and RN. TREATMENT :  Skilled therapy initiated; Educated pt on aspiration precautions and importance of compensatory swallow techniques to decrease aspiration risk (decrease rate of intake & sip/bite size, upright @HOB for all po intake and ~30 minutes after po). Pt able to verbalize comprehension; however, question carryover. Will follow x1-2 visits to ensure continued diet tolerance.       Patient will benefit from skilled intervention to address the above impairments. Patient's rehabilitation potential is considered to be Good  Factors which may influence rehabilitation potential include:   []            None noted  [x]            Mental ability/status  [x]            Medical condition  []            Home/family situation and support systems  []            Safety awareness  []            Pain tolerance/management  []            Other:      PLAN :  Recommendations and Planned Interventions:  As above   Frequency/Duration: Patient will be followed by speech-language pathology x1-2 visits to address goals  Discharge Recommendations: To Be Determined     SUBJECTIVE:   Patient stated Claudio Schuster did they bring me in?    OBJECTIVE:     Past Medical History:   Diagnosis Date    Abnormal coordination 9/21/2020    Depression 4/12/2022    Failure to thrive in adult 4/12/2022    Hallucinations 9/21/2020    Hypertension     Neurological disorder     Seizures ([facility] [address]     Stroke [facility]) 2009     Past Surgical History:   Procedure Laterality Date    PLACE PERCUT GASTROSTOMY TUBE  1/11/2022          Prior Level of Function/Home Situation:  Home Situation  Home Environment: Apartment  # Steps to Enter: 0  One/Two Story Residence: One story  Living Alone: No  Support Systems: Child(quiana)  Patient Expects to be Discharged to[de-identified] Home  Current DME Used/Available at Home: Wheelchair,Walker, rolling  Tub or Shower Type:  (sponge baths)  Diet prior to admission: regular/thin liquids   Current Diet:  regular/thin liquids    Cognitive and Communication Status:  Neurologic State: Alert  Orientation Level: Oriented to person  Cognition: Follows commands  Perception: Appears intact  Perseveration: No perseveration noted  Safety/Judgement: Fall prevention,Home safety  Oral Assessment:  Oral Assessment  Labial: No impairment  Dentition: Natural  Oral Hygiene: Good  Lingual: No impairment  Velum: No impairment  Mandible: No impairment  P.O.  Trials:  Patient Position: 45 at [facility]  Vocal quality prior to P.O.: Low volume  Consistency Presented: Thin liquid; Solid;Mixed consistency  How Presented: Self-fed/presented;Cup/sip;Straw;Successive swallows;Spoon;SLP-fed/presented  Bolus Acceptance: No impairment  Bolus Formation/Control: Impaired  Type of Impairment: Mastication;Delayed  Propulsion: Delayed (# of seconds)  Oral Residue: Less than 10% of bolus; Lingual  Initiation of Swallow: No impairment  Laryngeal Elevation: Functional  Aspiration Signs/Symptoms: None  Pharyngeal Phase Characteristics: Poor endurance  Effective Modifications: Small sips and bites; Alternate liquids/solids  Cues for Modifications: Moderate  Oral Phase Severity: Minimal  Pharyngeal Phase Severity : Minimal    PAIN:  Start of Eval: not reported  End of Eval:  not reported      After treatment:   []            Patient left in no apparent distress sitting up in chair  [x]            Patient left in no apparent distress in bed  [x]            Call bell left within reach  [x]            Nursing notified  []            Family present  []            Caregiver present  []            Bed alarm activated    COMMUNICATION/EDUCATION:   [x]            Aspiration precautions; swallow safety; compensatory techniques. []            Patient/family have participated as able in goal setting and plan of care. [x]            Patient/family agree to work toward stated goals and plan of care. []            Patient understands intent and goals of therapy; neutral about participation. []            Patient unable to participate in goal setting/plan of care; educ ongoing with interdisciplinary staff  [x]         Posted safety precautions in patient's room.     Thank you for this referral,  Leopoldo Hering, M.S., [address] [facility]  Speech-Language Pathologist

## 2022-04-13 NOTE — Clinical Note
thank you   ----- Message -----  From: Lewis Rivers, PTA  Sent: 4/13/2022   3:01 PM EDT  To: Lela Romero OTR/L      I called pt's home and spoke with her son, who reports that pt may be [facility] tomorrow 4/14 from [facility]. He is not sure if she will feel like being seen tomorrow or Friday.

## 2022-04-13 NOTE — PROGRESS NOTES
Cardiology Progress Note    Admit Date: 4/11/2022  Attending Cardiologist: Dr. Zachery Hernández:     Hospital Problems  Date Reviewed: 11/2/2021          Codes Class Noted POA    Depression ICD-10-CM: F32. A  ICD-9-CM: 103  4/12/2022 Unknown        Failure to thrive in adult ICD-10-CM: R62.7  ICD-9-CM: 783.7  4/12/2022 Yes        Elevated troponin ICD-10-CM: R77.8  ICD-9-CM: 790.6  4/11/2022 Unknown        * (Principal) Breakthrough seizure ([facility] [address] ICD-10-CM: W76.806  ICD-9-CM: 345.91  9/6/2021 Yes        Essential hypertension ICD-10-CM: I10  ICD-9-CM: 401.9  9/11/2015 Yes            -Witnessed seizure with postictal state. Known h/o of partial complex seizures. CT Head negative. Per patient's neurologist, patient averages 1-2 seizures/month.   -Indeterminate troponin, not c/w ACS. ECG shows SR with lateral T wave changes, which are seen on prior ECGs. § ECHO (2018) showed preserved LV function. No significant valvular pathology. § ECHO this admission with normal LV and diastolic function. No significant valvular pathology. No WMA. -HTN, uncontrolled. Max /89 mmHg. Improved. -Dyslipidemia. On statin.   -Anemia. -Dementia   -H/o Right sided hemorrhagic stroke (2007)   -Failure to thrive with unintentional weight loss. S/p G Tube for anorexia and 100 lb weight loss (01/2022)          Primary cardiologist, Dr. Jesus Resendez. (last seen 2015)     Plan:     Addendum: Independently seen and evaluated. Agree with below. Her cardiac status appears stable. No new cardiac recommendations at this time. We will be available as needed. Thank you. No significant findings on ECHO this admission. Continued on Amlodipine and statin. BP stable. Increase activity as tolerated. No further recommendations from a CV standpoint. Plan to follow up with Dr. Jesus Resendez in 4 weeks. Dispo planning per primary team.     Subjective:     No new complaints. Denies Cp or SOB.  Unsure how long she has been in the [facility].     Objective:      Patient Vitals for the past 8 hrs:   Temp Pulse Resp BP SpO2   04/13/22 1119 97.3 °F (36.3 °C) 82 16 130/70 96 %   04/13/22 0814 98.2 °F (36.8 °C) 72 16 132/78 99 %         Patient Vitals for the past 96 hrs:   Weight   04/12/22 1735 43.1 kg (95 lb)   04/11/22 2345 43.5 kg (95 lb 12.8 oz)       TELE: SR               Current Facility-Administered Medications   Medication Dose Route Frequency Last Admin    amLODIPine (NORVASC) tablet 10 mg  10 mg Oral DAILY 10 mg at 04/13/22 0929    atorvastatin (LIPITOR) tablet 40 mg  40 mg Oral DAILY 40 mg at 04/13/22 0929    clopidogreL (PLAVIX) tablet 75 mg  75 mg Oral DAILY 75 mg at 04/13/22 0928    PARoxetine (PAXIL) tablet 30 mg  30 mg Oral DAILY 30 mg at 04/13/22 0929    multivitamin, tx-iron-ca-min (THERA-M w/ IRON) tablet 1 Tablet  1 Tablet Oral DAILY 1 Tablet at 04/13/22 8006    ferrous sulfate tablet 325 mg  325 mg Oral DAILY 325 mg at 04/13/22 0929    [START ON 4/15/2022] ergocalciferol capsule 50,000 Units  50,000 Units Oral 2 TIMES WEEKLY      sodium chloride (NS) flush 5-40 mL  5-40 mL IntraVENous Q8H 40 mL at 04/13/22 0626    sodium chloride (NS) flush 5-40 mL  5-40 mL IntraVENous PRN      acetaminophen (TYLENOL) tablet 650 mg  650 mg Oral Q6H PRN      Or    acetaminophen (TYLENOL) suppository 650 mg  650 mg Rectal Q6H PRN      polyethylene glycol (MIRALAX) packet 17 g  17 g Oral DAILY PRN      ondansetron (ZOFRAN ODT) tablet 4 mg  4 mg Oral Q8H PRN      Or    ondansetron (ZOFRAN) injection 4 mg  4 mg IntraVENous Q6H PRN      enoxaparin (LOVENOX) injection 40 mg  40 mg SubCUTAneous DAILY 40 mg at 04/13/22 0929    OXcarbazepine (TRILEPTAL) tablet 600 mg  600 mg Oral  mg at 04/13/22 2027    divalproex DR (DEPAKOTE) tablet 250 mg  250 mg Oral Q8H 250 mg at 04/13/22 3047       No intake or output data in the 24 hours ending 04/13/22 8454    Physical Exam:  General:  alert, cooperative, no distress, appears stated age, alert x 2, cachetic   Neck:  no JVD  Lungs:  clear to auscultation bilaterally  Heart:  regular rate and rhythm, S1, S2 normal, no murmur, click, rub or gallop  Abdomen:  abdomen is soft without significant tenderness, masses, organomegaly or guarding  Extremities:  extremities normal, atraumatic, no cyanosis or edema    Visit Vitals  /70 (BP 1 Location: Left upper arm, BP Patient Position: At rest)   Pulse 82   Temp 97.3 °F (36.3 °C)   Resp 16   Ht 5' (1.524 m)   Wt 43.1 kg (95 lb)   SpO2 96%   Breastfeeding No   BMI 18.55 kg/m²       Data Review:     Labs: Results:       Chemistry Recent Labs     04/13/22 0138 04/12/22  0951 04/11/22  1227   GLU 86 80 90    141 143   K 3.5 3.8 3.7    110 111   CO2 26 26 29   BUN 16 13 19*   CREA 1.02 0.86 1.09   CA 8.9 9.2 8.9   MG 2.0 2.0 2.1   AGAP 7 5 3   BUCR 16 15 17      CBC w/Diff Recent Labs     04/13/22 0138 04/12/22  0951 04/11/22  1227   WBC 8.8 8.5 6.0   RBC 3.08* 3.38* 3.25*   HGB 9.6* 10.4* 10.1*   HCT 28.3* 31.9* 30.8*    184 167   GRANS  --   --  51   LYMPH  --   --  34   EOS  --   --  1      Cardiac Enzymes No results found for: CPK, CK, CKMMB, CKMB, RCK3, CKMBT, CKNDX, CKND1, GWENDOLYN, TROPT, TROIQ, ANGIE, TROPT, TNIPOC, BNP, BNPP   Coagulation No results for input(s): PTP, INR, APTT, INREXT in the last 72 hours. Lipid Panel Lab Results   Component Value Date/Time    Cholesterol, total 144 05/14/2021 01:22 PM    HDL Cholesterol 46 05/14/2021 01:22 PM    LDL, calculated 77.6 05/14/2021 01:22 PM    VLDL, calculated 20.4 05/14/2021 01:22 PM    Triglyceride 102 05/14/2021 01:22 PM    CHOL/HDL Ratio 3.1 05/14/2021 01:22 PM      BNP No results found for: BNP, BNPP, XBNPT   Liver Enzymes No results for input(s): TP, ALB, TBIL, AP in the last 72 hours.     No lab exists for component: SGOT, GPT, DBIL   Thyroid Studies Lab Results   Component Value Date/Time    TSH 6.01 (H) 04/12/2022 09:51 AM          Signed By: Kimberly Armenta PA-C     April 13, 2022

## 2022-04-13 NOTE — PROGRESS NOTES
Went back to room to see if she had chosen facility. Patient stated she wanted one in [place] but was waiting for her son to get here from work to talk to him about it.  Notified patient need a choice by Morning as need her insurance auth started early tomorrow so she is not stuck longer than needed waiting on insurance approval

## 2022-04-13 NOTE — PROGRESS NOTES
Reason for Admission:  Elevated troponin [R77.8]                 RUR Score:    n/a            Plan for utilizing home health:    n/a                      Likelihood of Readmission:   LOW                         Transition of Care Plan:              Initial assessment completed with patient. Cognitive status of patient: oriented to time, place, person and situation. Face sheet information confirmed:  yes. The patient designates sons to participate in her discharge plan and to receive any needed information. This patient lives in a apartment with other:  2 sons. Patient is not able to navigate steps as needed. Prior to hospitalization, patient was considered to be independent with ADLs/IADLS : no . If not independent,  patient needs assist with : food preparation and cooking    Patient has a current ACP document on file: yes      Healthcare Decision Maker:   Secondary Decision Maker: Jamel Petersen - [phone]    Click here to complete [address] including selection of the Healthcare Decision Maker Relationship (ie \"Primary\")    The other:  [facility] will be available to transport patient home upon discharge. The patient already has Joyce Blizzard, and  medical equipment available in the home. Patient is not currently active with home health. Patient has not stayed in a skilled nursing facility or rehab. Was  stay within last 60 days : no. This patient is on dialysis :no        List of available SNF agencies were provided and reviewed with the patient prior to discharge. Freedom of choice signed: yes, for local snf and will choose from list. CM matched patient out. Currently, the discharge plan is SNF. The patient states that she can obtain her medications from the pharmacy, and take her medications as directed. Patient's current insurance is [email or web address] and [employer]        Care Management Interventions  PCP Verified by CM:  Yes  Mode of Transport at Discharge: BLS  Transition of Care Consult (CM Consult): Discharge Planning,SNF  Support Systems: Child(quiana)  Confirm Follow Up Transport: Family  The Plan for Transition of Care is Related to the Following Treatment Goals : snf rehab  The Patient and/or Patient Representative was Provided with a Choice of Provider and Agrees with the Discharge Plan?: Yes  Freedom of Choice List was Provided with Basic Dialogue that Supports the Patient's Individualized Plan of Care/Goals, Treatment Preferences and Shares the Quality Data Associated with the Providers?: Yes  Discharge Location  Patient Expects to be Discharged to[de-identified] Skilled nursing facility

## 2022-04-14 ENCOUNTER — HOME CARE VISIT (OUTPATIENT)
Dept: HOME HEALTH SERVICES | Facility: HOME HEALTH | Age: 67
End: 2022-04-14
Payer: MEDICARE

## 2022-04-14 LAB
ANION GAP SERPL CALC-SCNC: 7 MMOL/L (ref 3–18)
BUN SERPL-MCNC: 30 MG/DL (ref 7–18)
BUN/CREAT SERPL: 22 (ref 12–20)
CALCIUM SERPL-MCNC: 8.9 MG/DL (ref 8.5–10.1)
CHLORIDE SERPL-SCNC: 106 MMOL/L (ref 100–111)
CO2 SERPL-SCNC: 26 MMOL/L (ref 21–32)
CREAT SERPL-MCNC: 1.34 MG/DL (ref 0.6–1.3)
ERYTHROCYTE [DISTWIDTH] IN BLOOD BY AUTOMATED COUNT: 13.3 % (ref 11.6–14.5)
GLUCOSE SERPL-MCNC: 118 MG/DL (ref 74–99)
HCT VFR BLD AUTO: 30.9 % (ref 35–45)
HGB BLD-MCNC: 10 G/DL (ref 12–16)
MAGNESIUM SERPL-MCNC: 2.3 MG/DL (ref 1.6–2.6)
MCH RBC QN AUTO: 30.7 PG (ref 24–34)
MCHC RBC AUTO-ENTMCNC: 32.4 G/DL (ref 31–37)
MCV RBC AUTO: 94.8 FL (ref 78–100)
NRBC # BLD: 0 K/UL (ref 0–0.01)
NRBC BLD-RTO: 0 PER 100 WBC
PLATELET # BLD AUTO: 159 K/UL (ref 135–420)
PMV BLD AUTO: 12.3 FL (ref 9.2–11.8)
POTASSIUM SERPL-SCNC: 3.7 MMOL/L (ref 3.5–5.5)
RBC # BLD AUTO: 3.26 M/UL (ref 4.2–5.3)
SODIUM SERPL-SCNC: 139 MMOL/L (ref 136–145)
WBC # BLD AUTO: 7.4 K/UL (ref 4.6–13.2)

## 2022-04-14 PROCEDURE — 74011250637 HC RX REV CODE- 250/637: Performed by: STUDENT IN AN ORGANIZED HEALTH CARE EDUCATION/TRAINING PROGRAM

## 2022-04-14 PROCEDURE — 36415 COLL VENOUS BLD VENIPUNCTURE: CPT

## 2022-04-14 PROCEDURE — 80048 BASIC METABOLIC PNL TOTAL CA: CPT

## 2022-04-14 PROCEDURE — 74011250636 HC RX REV CODE- 250/636: Performed by: INTERNAL MEDICINE

## 2022-04-14 PROCEDURE — 74011250636 HC RX REV CODE- 250/636: Performed by: PHYSICIAN ASSISTANT

## 2022-04-14 PROCEDURE — 2709999900 HC NON-CHARGEABLE SUPPLY

## 2022-04-14 PROCEDURE — 92526 ORAL FUNCTION THERAPY: CPT

## 2022-04-14 PROCEDURE — 99232 SBSQ HOSP IP/OBS MODERATE 35: CPT | Performed by: PHYSICIAN ASSISTANT

## 2022-04-14 PROCEDURE — 85027 COMPLETE CBC AUTOMATED: CPT

## 2022-04-14 PROCEDURE — 74011000250 HC RX REV CODE- 250: Performed by: INTERNAL MEDICINE

## 2022-04-14 PROCEDURE — 96372 THER/PROPH/DIAG INJ SC/IM: CPT

## 2022-04-14 PROCEDURE — G0378 HOSPITAL OBSERVATION PER HR: HCPCS

## 2022-04-14 PROCEDURE — APPSS45 APP SPLIT SHARED TIME 31-45 MINUTES: Performed by: PHYSICIAN ASSISTANT

## 2022-04-14 PROCEDURE — 83735 ASSAY OF MAGNESIUM: CPT

## 2022-04-14 PROCEDURE — 74011250637 HC RX REV CODE- 250/637: Performed by: INTERNAL MEDICINE

## 2022-04-14 RX ORDER — SODIUM CHLORIDE, SODIUM LACTATE, POTASSIUM CHLORIDE, CALCIUM CHLORIDE 600; 310; 30; 20 MG/100ML; MG/100ML; MG/100ML; MG/100ML
100 INJECTION, SOLUTION INTRAVENOUS CONTINUOUS
Status: DISCONTINUED | OUTPATIENT
Start: 2022-04-14 | End: 2022-04-15

## 2022-04-14 RX ADMIN — ENOXAPARIN SODIUM 40 MG: 40 INJECTION SUBCUTANEOUS at 08:49

## 2022-04-14 RX ADMIN — OXCARBAZEPINE 600 MG: 300 TABLET, FILM COATED ORAL at 18:56

## 2022-04-14 RX ADMIN — PAROXETINE HYDROCHLORIDE 30 MG: 20 TABLET, FILM COATED ORAL at 08:50

## 2022-04-14 RX ADMIN — DIVALPROEX SODIUM 250 MG: 250 TABLET, DELAYED RELEASE ORAL at 14:42

## 2022-04-14 RX ADMIN — DIVALPROEX SODIUM 250 MG: 250 TABLET, DELAYED RELEASE ORAL at 05:43

## 2022-04-14 RX ADMIN — OXCARBAZEPINE 600 MG: 300 TABLET, FILM COATED ORAL at 08:49

## 2022-04-14 RX ADMIN — DIVALPROEX SODIUM 250 MG: 250 TABLET, DELAYED RELEASE ORAL at 21:35

## 2022-04-14 RX ADMIN — ATORVASTATIN CALCIUM 40 MG: 40 TABLET, FILM COATED ORAL at 08:50

## 2022-04-14 RX ADMIN — SODIUM CHLORIDE, PRESERVATIVE FREE 10 ML: 5 INJECTION INTRAVENOUS at 05:42

## 2022-04-14 RX ADMIN — FERROUS SULFATE TAB 325 MG (65 MG ELEMENTAL FE) 325 MG: 325 (65 FE) TAB at 08:50

## 2022-04-14 RX ADMIN — SODIUM CHLORIDE, SODIUM LACTATE, POTASSIUM CHLORIDE, AND CALCIUM CHLORIDE 100 ML/HR: 600; 310; 30; 20 INJECTION, SOLUTION INTRAVENOUS at 11:05

## 2022-04-14 RX ADMIN — SODIUM CHLORIDE, PRESERVATIVE FREE 10 ML: 5 INJECTION INTRAVENOUS at 21:35

## 2022-04-14 RX ADMIN — AMLODIPINE BESYLATE 10 MG: 10 TABLET ORAL at 08:50

## 2022-04-14 RX ADMIN — MULTIPLE VITAMINS W/ MINERALS TAB 1 TABLET: TAB at 08:50

## 2022-04-14 RX ADMIN — SODIUM CHLORIDE, PRESERVATIVE FREE 10 ML: 5 INJECTION INTRAVENOUS at 14:42

## 2022-04-14 RX ADMIN — CLOPIDOGREL BISULFATE 75 MG: 75 TABLET ORAL at 08:50

## 2022-04-14 RX ADMIN — SODIUM CHLORIDE, SODIUM LACTATE, POTASSIUM CHLORIDE, AND CALCIUM CHLORIDE 100 ML/HR: 600; 310; 30; 20 INJECTION, SOLUTION INTRAVENOUS at 21:37

## 2022-04-14 NOTE — PROGRESS NOTES
Problem: Dysphagia (Adult)  Goal: *Acute Goals and Plan of Care (Insert Text)  Description: Patient will:  1. Tolerate PO trials with 0 s/s overt distress in 4/5 trials-met  2. Participate in training and education related to continued aspiration risk, diet recs and compensatory strategies-met    Recommend:   Regular diet with thin liquids  Meds per pt preference   General safe swallow precautions   Outcome: Resolved/Met    SPEECH LANGUAGE PATHOLOGY DYSPHAGIA TREATMENT & DISCHARGE    Patient: Shara Ramesh (38 y.o. female)  Date: 4/14/2022  Diagnosis: Elevated troponin [R77.8]   Precautions: Fall  PLOF: As per H&P     ASSESSMENT:  Pt seen for follow up dysphagia treatment, alert but confused. Pt tolerating thin liquids no overt s/sx aspiration. Requiring encouragement for trials of regular solids; demonstrating positive rotary chew and thorough oral clearance. Safe for continuation of regular diet with thin liquids with general safe swallow precautions. Will sign off. Please re-consult as indicated. Progression toward goals:  [x]         Goals met/approximated     PLAN:  Recommendations and Planned Interventions:  Maximum therapeutic gains met; safest, least restrictive diet achieved. D/C ST intervention at this time. Discharge Recommendations:  Do not anticipate further SLP needs upon discharge      SUBJECTIVE:   Patient stated I thought I was at home    OBJECTIVE:   Cognitive and Communication Status:  Neurologic State: Alert  Orientation Level: Oriented to person  Cognition: Follows commands  Perception: Appears intact  Perseveration: No perseveration noted  Safety/Judgement: Fall prevention,Home safety  Dysphagia Treatment:  Oral Assessment:  Oral Assessment  Labial: No impairment  Dentition: Natural  Oral Hygiene: Good  Lingual: No impairment  Velum: No impairment  Mandible: No impairment  P.O.  Trials:   See above   PAIN:  Start of Tx: 0  End of Tx: 0     After treatment:   [] Patient left in no apparent distress sitting up in chair  [x]              Patient left in no apparent distress in bed  [x]              Call bell left within reach  [x]              Nursing notified  []              Caregiver present  []              Bed alarm activated      COMMUNICATION/EDUCATION:   [x] Aspiration precautions; swallow safety; compensatory techniques  []        Patient/family able to participate in training and education   [x]  Patient unable to participate in education; education ongoing with staff  [] Patient understands goals/intent of therapy; neutral about participation     Thank you for this referral,  JOSÉ MIGUEL Elam.S., [id number] [facility]  Speech-Language Pathologist

## 2022-04-14 NOTE — PROGRESS NOTES
Discharge/Transition Planning    1145: Need updated PT and OT notes. Last notes 2 days ago. Patient requires frequent notes for insurance auth for SNF      [phone]: Patient apparently just discharged from [facility] around 22nd of march and was there over 2 months . 1615: Updated patient son at bedside on facility choice. Patient has had 2 of 3 covid vaccines. Notified son recommending personal care post SNF.  Notified son the screening site for medicaid ltss is [facility] and CM is working on completing the down time forms

## 2022-04-14 NOTE — PROGRESS NOTES
Discharge/Transition Planning    Spoke with patient . Difficult to hear but she stated she wants SNF closest to home . Went over [facility] addresses and locations. She said it is [facility]. Asked patient if she was covid vaccinated and she stated she didn't know. CM will call son. Left message with Baylee Ket at [facility] to verify accept and can start auth.  Acceptance for facility in CC link

## 2022-04-14 NOTE — PROGRESS NOTES
[facility] Hospitalist Group  Progress Note    Patient: Jane Monday Age: 75 y.o. : 1955 MR#: [id number] SSN: [id number]  Date: 2022         Assessment/Plan:     Hospital Problems  Date Reviewed: 2021          Codes Class Noted POA    Subclinical hypothyroidism ICD-10-CM: E03.8  ICD-9-CM: 244.8  2022 Unknown        Depression ICD-10-CM: F32. A  ICD-9-CM: 662  2022 Unknown        Failure to thrive in adult ICD-10-CM: R62.7  ICD-9-CM: 783.7  2022 Yes        Elevated troponin ICD-10-CM: R77.8  ICD-9-CM: 790.6  2022 Unknown        KEYLA (acute kidney injury) ([facility] [address] ICD-10-CM: N17.9  ICD-9-CM: 584.9  2021 Yes        * (Principal) Breakthrough seizure ([facility] [address] ICD-10-CM: J29.878  ICD-9-CM: 345.91  2021 Yes        Essential hypertension ICD-10-CM: I10  ICD-9-CM: 401.9  2015 Yes            Breakthrough seizures  - appreciate neurology. Cont depakote and increased trileptal  - seizure precautions  - no further seizures since admission    KEYLA: suspect dehydration due to poor PO intake. Not on any nephrotoxic medications. - start LR and re-evaluate BMP tomorrow morning    Elevated trop: likely 2/2 seizure  - appreciate cardiology  - echo with normal LV and diastolic function  - cardiac monitoring  - f/u in 4 weeks with cardiology    Subclinical Hypothyroidism  - TSH 6   - T4 0.8    HTN  - cont norvasc    Failure to thrive with unintentional weight loss-reportedly had lost 100 pounds over the course of the last year. PEG tube was placed with initiation of tube feeds in January of this year  - nutrition consulted  - speech recs regular diet, thin liquids    Depression  - continue paxil, seroquel       DVT Prophylaxis:  [x]Lovenox  []Hep SQ  []SCDs  []Coumadin   []On Heparin gtt []PO anticoagulant    Anticipated discharge: planning for discharge to rehab    Subjective:     Pt s/e @ bedside. No major events overnight.  Pt offers no complaints this AM. Denies CP or SOB. Denies abd pain, nvd. Still stating she wants SNF close to home. Attempted to call son - no answer.     Objective:   VS:   Visit Vitals  /64 (BP 1 Location: Left upper arm, BP Patient Position: At rest)   Pulse 80   Temp 98.4 °F (36.9 °C)   Resp 18   Ht 5' (1.524 m)   Wt 44.6 kg (98 lb 6.4 oz)   SpO2 99%   Breastfeeding No   BMI 19.22 kg/m²      Tmax/24hrs: Temp (24hrs), Av.4 °F (36.9 °C), Min:97.6 °F (36.4 °C), Max:98.9 °F (37.2 °C)      Intake/Output Summary (Last 24 hours) at 2022 1659  Last data filed at 2022 1533  Gross per 24 hour   Intake 473.33 ml   Output 200 ml   Net 273.33 ml       General Appearance: NAD, conversant  HENT: normocephalic/atraumatic, moist mucus membranes  Neck: No JVD, supple  Lungs: CTA with normal respiratory effort  CV: RRR, no m/r/g  Abdomen: soft, non-tender, normal bowel sounds, no pain around PEG tube  Extremities: no cyanosis, no peripheral edema  Neuro: No focal deficits, motor/sensory intact  Skin: Normal color, intact      Current Facility-Administered Medications   Medication Dose Route Frequency    lactated Ringers infusion  100 mL/hr IntraVENous CONTINUOUS    amLODIPine (NORVASC) tablet 10 mg  10 mg Oral DAILY    atorvastatin (LIPITOR) tablet 40 mg  40 mg Oral DAILY    clopidogreL (PLAVIX) tablet 75 mg  75 mg Oral DAILY    PARoxetine (PAXIL) tablet 30 mg  30 mg Oral DAILY    multivitamin, tx-iron-ca-min (THERA-M w/ IRON) tablet 1 Tablet  1 Tablet Oral DAILY    ferrous sulfate tablet 325 mg  325 mg Oral DAILY    [START ON 4/15/2022] ergocalciferol capsule 50,000 Units  50,000 Units Oral 2 TIMES WEEKLY    sodium chloride (NS) flush 5-40 mL  5-40 mL IntraVENous Q8H    sodium chloride (NS) flush 5-40 mL  5-40 mL IntraVENous PRN    acetaminophen (TYLENOL) tablet 650 mg  650 mg Oral Q6H PRN    Or    acetaminophen (TYLENOL) suppository 650 mg  650 mg Rectal Q6H PRN    polyethylene glycol (MIRALAX) packet 17 g  17 g Oral DAILY PRN    ondansetron (ZOFRAN ODT) tablet 4 mg  4 mg Oral Q8H PRN    Or    ondansetron (ZOFRAN) injection 4 mg  4 mg IntraVENous Q6H PRN    enoxaparin (LOVENOX) injection 40 mg  40 mg SubCUTAneous DAILY    OXcarbazepine (TRILEPTAL) tablet 600 mg  600 mg Oral BID    divalproex DR (DEPAKOTE) tablet 250 mg  250 mg Oral Q8H        Labs:    Recent Results (from the past 24 hour(s))   MAGNESIUM    Collection Time: 04/14/22  4:18 AM   Result Value Ref Range    Magnesium 2.3 1.6 - 2.6 mg/dL   CBC W/O DIFF    Collection Time: 04/14/22  4:18 AM   Result Value Ref Range    WBC 7.4 4.6 - 13.2 K/uL    RBC 3.26 (L) 4.20 - 5.30 M/uL    HGB 10.0 (L) 12.0 - 16.0 g/dL    HCT 30.9 (L) 35.0 - 45.0 %    MCV 94.8 78.0 - 100.0 FL    MCH 30.7 24.0 - 34.0 PG    MCHC 32.4 31.0 - 37.0 g/dL    RDW 13.3 11.6 - 14.5 %    PLATELET 792 702 - 501 K/uL    MPV 12.3 (H) 9.2 - 11.8 FL    NRBC 0.0 0  WBC    ABSOLUTE NRBC 0.00 0.00 - 1.70 K/uL   METABOLIC PANEL, BASIC    Collection Time: 04/14/22  4:18 AM   Result Value Ref Range    Sodium 139 136 - 145 mmol/L    Potassium 3.7 3.5 - 5.5 mmol/L    Chloride 106 100 - 111 mmol/L    CO2 26 21 - 32 mmol/L    Anion gap 7 3.0 - 18 mmol/L    Glucose 118 (H) 74 - 99 mg/dL    BUN 30 (H) 7.0 - 18 MG/DL    Creatinine 1.34 (H) 0.6 - 1.3 MG/DL    BUN/Creatinine ratio 22 (H) 12 - 20      GFR est AA 48 (L) >60 ml/min/1.73m2    GFR est non-AA 39 (L) >60 ml/min/1.73m2    Calcium 8.9 8.5 - 10.1 MG/DL       Imaging:  No results found.       Signed By: Joann Nichols PA-C DR. [facility]  Hospitalist Division  Office:  [phone]  Pager: [phone]         April 14, 2022 3:40 PM

## 2022-04-15 ENCOUNTER — HOME CARE VISIT (OUTPATIENT)
Dept: SCHEDULING | Facility: HOME HEALTH | Age: 67
End: 2022-04-15
Payer: MEDICARE

## 2022-04-15 LAB
ANION GAP SERPL CALC-SCNC: 8 MMOL/L (ref 3–18)
BUN SERPL-MCNC: 36 MG/DL (ref 7–18)
BUN/CREAT SERPL: 34 (ref 12–20)
CALCIUM SERPL-MCNC: 9.2 MG/DL (ref 8.5–10.1)
CHLORIDE SERPL-SCNC: 105 MMOL/L (ref 100–111)
CO2 SERPL-SCNC: 27 MMOL/L (ref 21–32)
CREAT SERPL-MCNC: 1.05 MG/DL (ref 0.6–1.3)
GLUCOSE SERPL-MCNC: 84 MG/DL (ref 74–99)
POTASSIUM SERPL-SCNC: 4.3 MMOL/L (ref 3.5–5.5)
SODIUM SERPL-SCNC: 140 MMOL/L (ref 136–145)

## 2022-04-15 PROCEDURE — G0378 HOSPITAL OBSERVATION PER HR: HCPCS

## 2022-04-15 PROCEDURE — 2709999900 HC NON-CHARGEABLE SUPPLY

## 2022-04-15 PROCEDURE — 74011000250 HC RX REV CODE- 250: Performed by: INTERNAL MEDICINE

## 2022-04-15 PROCEDURE — 99232 SBSQ HOSP IP/OBS MODERATE 35: CPT | Performed by: PHYSICIAN ASSISTANT

## 2022-04-15 PROCEDURE — 74011250636 HC RX REV CODE- 250/636: Performed by: INTERNAL MEDICINE

## 2022-04-15 PROCEDURE — 96372 THER/PROPH/DIAG INJ SC/IM: CPT

## 2022-04-15 PROCEDURE — 77030038269 HC DRN EXT URIN PURWCK BARD -A

## 2022-04-15 PROCEDURE — 97535 SELF CARE MNGMENT TRAINING: CPT

## 2022-04-15 PROCEDURE — 74011250637 HC RX REV CODE- 250/637: Performed by: INTERNAL MEDICINE

## 2022-04-15 PROCEDURE — 36415 COLL VENOUS BLD VENIPUNCTURE: CPT

## 2022-04-15 PROCEDURE — APPSS30 APP SPLIT SHARED TIME 16-30 MINUTES: Performed by: PHYSICIAN ASSISTANT

## 2022-04-15 PROCEDURE — 80048 BASIC METABOLIC PNL TOTAL CA: CPT

## 2022-04-15 PROCEDURE — 74011250637 HC RX REV CODE- 250/637: Performed by: STUDENT IN AN ORGANIZED HEALTH CARE EDUCATION/TRAINING PROGRAM

## 2022-04-15 RX ADMIN — DIVALPROEX SODIUM 250 MG: 250 TABLET, DELAYED RELEASE ORAL at 05:29

## 2022-04-15 RX ADMIN — FERROUS SULFATE TAB 325 MG (65 MG ELEMENTAL FE) 325 MG: 325 (65 FE) TAB at 08:54

## 2022-04-15 RX ADMIN — MULTIPLE VITAMINS W/ MINERALS TAB 1 TABLET: TAB at 08:53

## 2022-04-15 RX ADMIN — OXCARBAZEPINE 600 MG: 300 TABLET, FILM COATED ORAL at 08:54

## 2022-04-15 RX ADMIN — PAROXETINE HYDROCHLORIDE 30 MG: 20 TABLET, FILM COATED ORAL at 08:54

## 2022-04-15 RX ADMIN — ENOXAPARIN SODIUM 40 MG: 40 INJECTION SUBCUTANEOUS at 08:54

## 2022-04-15 RX ADMIN — ATORVASTATIN CALCIUM 40 MG: 40 TABLET, FILM COATED ORAL at 08:54

## 2022-04-15 RX ADMIN — SODIUM CHLORIDE, PRESERVATIVE FREE 10 ML: 5 INJECTION INTRAVENOUS at 05:29

## 2022-04-15 RX ADMIN — DIVALPROEX SODIUM 250 MG: 250 TABLET, DELAYED RELEASE ORAL at 14:46

## 2022-04-15 RX ADMIN — OXCARBAZEPINE 600 MG: 300 TABLET, FILM COATED ORAL at 17:11

## 2022-04-15 RX ADMIN — CLOPIDOGREL BISULFATE 75 MG: 75 TABLET ORAL at 08:54

## 2022-04-15 RX ADMIN — SODIUM CHLORIDE, PRESERVATIVE FREE 10 ML: 5 INJECTION INTRAVENOUS at 23:20

## 2022-04-15 RX ADMIN — SODIUM CHLORIDE, PRESERVATIVE FREE 10 ML: 5 INJECTION INTRAVENOUS at 17:11

## 2022-04-15 RX ADMIN — DIVALPROEX SODIUM 250 MG: 250 TABLET, DELAYED RELEASE ORAL at 21:38

## 2022-04-15 RX ADMIN — ERGOCALCIFEROL 50000 UNITS: 1.25 CAPSULE ORAL at 17:11

## 2022-04-15 RX ADMIN — AMLODIPINE BESYLATE 10 MG: 10 TABLET ORAL at 08:54

## 2022-04-15 NOTE — Clinical Note
I have not seen the pt. if she was admitted to the hospital she will need resumption orders and a SAMUEL will need to be completed prior to her being able to be seen. thank you.   ----- Message -----  From: Brenda Ashby PTA  Sent: 4/17/2022   7:19 PM EDT  To: Quentin Leung OTR/L      Pt's son stated that pt would be home on 4/15 but didn't want to be seen till next week. Have any of you spoke w her sons?

## 2022-04-15 NOTE — Clinical Note
ok thank you   ----- Message -----  From: Malka Evans PTA  Sent: 4/18/2022   9:43 AM EDT  To: JEISON Stanley/ROOSEVELT  Subject: RE:                                                The son told me she was just on observation. I'll check. ----- Message -----  From: JEISON Montoya/L  Sent: 4/18/2022   5:17 AM EDT  To: Chrissie Cifuentes PTA  Subject: RE:                                                I have not seen the pt. if she was admitted to the hospital she will need resumption orders and a SAMUEL will need to be completed prior to her being able to be seen. thank you.   ----- Message -----  From: Malka Evans PTA  Sent: 4/17/2022   7:19 PM EDT  To: Suzanne Duran OTR/L      Pt's son stated that pt would be home on 4/15 but didn't want to be seen till next week. Have any of you spoke w her sons?

## 2022-04-15 NOTE — PROGRESS NOTES
Problem: Falls - Risk of  Goal: *Absence of Falls  Description: Document Aliya Tena Fall Risk and appropriate interventions in the flowsheet.   Outcome: Progressing Towards Goal  Note: Fall Risk Interventions:  Mobility Interventions: Bed/chair exit alarm              Elimination Interventions: Call light in reach,Bed/chair exit alarm              Problem: Patient Education: Go to Patient Education Activity  Goal: Patient/Family Education  Outcome: Progressing Towards Goal     Problem: Aspiration - Risk of  Goal: *Absence of aspiration  Outcome: Progressing Towards Goal

## 2022-04-15 NOTE — PROGRESS NOTES
Problem: Self Care Deficits Care Plan (Adult)  Goal: *Acute Goals and Plan of Care (Insert Text)  Description: Occupational Therapy Goals  Initiated 4/12/2022 within 7 day(s). 1.  Patient will perform UB bathing and upper body dressing with supervision/set-up. 2.  Patient will perform bed mobility with CGA in prep for self care. 3.  Patient will perform lower body dressing and bathing with CGA   4. Patient will perform toilet transfers with contact guard assist.  5.  Patient will perform all aspects of toileting with minimal assistance/contact guard assist.  6.  Patient will participate in upper extremity therapeutic exercise/activities with modified independence for 5-8 minutes. 7.  Patient will utilize energy conservation techniques during functional activities with verbal cues. Prior Level of Function:Pt history is not clear, states she lives with son in apt and he is \"supposed to be home with her all the time,\" performs transfers to / with RW and assist. States she spends most of her time in the w/, transfers to [facility] with assist from son. Pt performed sponge baths and dressing with SUP. Outcome: Progressing Towards Goal   OCCUPATIONAL THERAPY TREATMENT    Patient: Farhana Lowe (28 y.o. female)  Date: 4/15/2022  Diagnosis: Elevated troponin [R77.8] Breakthrough seizure [facility])       Precautions: Fall    Chart, occupational therapy assessment, plan of care, and goals were reviewed. ASSESSMENT:  Patient alert and asking to get cleaned up upon entry into the room. She was able to stand at EOB with min assist for perineal hygiene. Verbal and tactile cues needed to maintain upright posture in standing. Patient sat briefly and returned to standing with min assist for bedding change. Extra time needed for all activities. She returned to supine in bed with HOB elevated at end of session. Purewick replaced while supine. All needs met.   Progression toward goals:  []          Improving appropriately and progressing toward goals  [x]          Improving slowly and progressing toward goals  []          Not making progress toward goals and plan of care will be adjusted     PLAN:  Patient continues to benefit from skilled intervention to address the above impairments. Continue treatment per established plan of care. Discharge Recommendations:  Home Health with 24/7 assist vs SNF  Further Equipment Recommendations for Discharge:  bedside commode     SUBJECTIVE:   Patient stated I want to get cleaned up.     OBJECTIVE DATA SUMMARY:   Cognitive/Behavioral Status:  Neurologic State: Alert  Orientation Level: Oriented to person,Oriented to place  Cognition: Follows commands  Safety/Judgement: Fall prevention    Functional Mobility and Transfers for ADLs:   Bed Mobility:  Supine to Sit: Minimum assistance  Sit to Supine: Minimum assistance      Transfers:  Sit to Stand: Minimum assistance  Stand to Sit: Minimum assistance    Balance:  Sitting: Intact; With support  Standing: Impaired  Standing - Static: Poor  Standing - Dynamic : Poor    ADL Intervention:  Toileting  Toileting Assistance: Maximum assistance  Bladder Hygiene: Maximum assistance  Clothing Management: Maximum assistance    Cognitive Retraining  Safety/Judgement: Fall prevention    Pain:  Pain level pre-treatment: 0/10   Pain level post-treatment: 0/10  Pain Intervention(s): NA  Response to intervention: NA    Activity Tolerance:    Good  Please refer to the flowsheet for vital signs taken during this treatment. After treatment:   []  Patient left in no apparent distress sitting up in chair  [x]  Patient left in no apparent distress in bed  [x]  Call bell left within reach  [x]  Nursing notified  []  Caregiver present  [x]  Bed alarm activated    COMMUNICATION/EDUCATION:   [x] Role of Occupational Therapy in the acute care setting  [x] Home safety education was provided and the patient/caregiver indicated understanding.   [x] Patient/family have participated as able in working towards goals and plan of care. [x] Patient/family agree to work toward stated goals and plan of care. [] Patient understands intent and goals of therapy, but is neutral about his/her participation. [] Patient is unable to participate in goal setting and plan of care.       Thank you for this referral.  Monica Lizarraga MS OTR/L   Time Calculation: 24 mins

## 2022-04-15 NOTE — PROGRESS NOTES
[facility] Hospitalist Group  Progress Note    Patient: bOi Pizarro Age: 75 y.o. : 1955 MR#: [id number] SSN: [id number]  Date: 4/15/2022         Assessment/Plan:     Hospital Problems  Date Reviewed: 2021          Codes Class Noted POA    Subclinical hypothyroidism ICD-10-CM: E03.8  ICD-9-CM: 244.8  2022 Unknown        Depression ICD-10-CM: F32. A  ICD-9-CM: 403  2022 Unknown        Failure to thrive in adult ICD-10-CM: R62.7  ICD-9-CM: 783.7  2022 Yes        Elevated troponin ICD-10-CM: R77.8  ICD-9-CM: 790.6  2022 Unknown        KEYLA (acute kidney injury) ([facility] [address] ICD-10-CM: N17.9  ICD-9-CM: 584.9  2021 Yes        * (Principal) Breakthrough seizure ([facility] [address] ICD-10-CM: Q93.742  ICD-9-CM: 345.91  2021 Yes        Essential hypertension ICD-10-CM: I10  ICD-9-CM: 401.9  2015 Yes            Breakthrough seizures  - appreciate neurology. Cont depakote and increased trileptal dose  - seizure precautions  - no further seizures since admission    KEYLA: suspect dehydration due to poor PO intake. Not on any nephrotoxic medications. - resolved. Monitor PO intake. Encourage hydration. Elevated trop: likely 2/2 seizure  - appreciate cardiology  - echo with normal LV and diastolic function  - cardiac monitoring  - f/u in 4 weeks with cardiology    Subclinical Hypothyroidism  - TSH 6   - T4 0.8    HTN  - cont norvasc    Failure to thrive with unintentional weight loss-reportedly had lost 100 pounds over the course of the last year. PEG tube was placed with initiation of tube feeds in January of this year  - nutrition consulted  - speech recs regular diet, thin liquids    Depression  - continue paxil, seroquel       DVT Prophylaxis:  [x]Lovenox  []Hep SQ  []SCDs  []Coumadin   []On Heparin gtt []PO anticoagulant    Anticipated discharge: stable for discharge to rehab    Subjective:     Pt s/e @ bedside. No major events overnight.  Pt offers no complaints this AM. Denies CP or SOB. Attempted to update son by phone. No answer again.     Objective:   VS:   Visit Vitals  BP (!) 151/76 (BP 1 Location: Left upper arm, BP Patient Position: At rest)   Pulse 64   Temp 98.1 °F (36.7 °C)   Resp 16   Ht 5' (1.524 m)   Wt 44.6 kg (98 lb 6.4 oz)   SpO2 100%   Breastfeeding No   BMI 19.22 kg/m²      Tmax/24hrs: Temp (24hrs), Av °F (36.7 °C), Min:97.4 °F (36.3 °C), Max:98.5 °F (36.9 °C)      Intake/Output Summary (Last 24 hours) at 4/15/2022 1540  Last data filed at 4/15/2022 0533  Gross per 24 hour   Intake 120 ml   Output 600 ml   Net -480 ml       General Appearance: NAD, conversant  HENT: normocephalic/atraumatic, moist mucus membranes  Neck: No JVD, supple  Lungs: CTA with normal respiratory effort  CV: RRR, no m/r/g  Abdomen: soft, non-tender, normal bowel sounds  Extremities: no cyanosis, no peripheral edema  Neuro: No focal deficits, motor/sensory intact  Skin: Normal color, intact      Current Facility-Administered Medications   Medication Dose Route Frequency    amLODIPine (NORVASC) tablet 10 mg  10 mg Oral DAILY    atorvastatin (LIPITOR) tablet 40 mg  40 mg Oral DAILY    clopidogreL (PLAVIX) tablet 75 mg  75 mg Oral DAILY    PARoxetine (PAXIL) tablet 30 mg  30 mg Oral DAILY    multivitamin, tx-iron-ca-min (THERA-M w/ IRON) tablet 1 Tablet  1 Tablet Oral DAILY    ferrous sulfate tablet 325 mg  325 mg Oral DAILY    ergocalciferol capsule 50,000 Units  50,000 Units Oral 2 TIMES WEEKLY    sodium chloride (NS) flush 5-40 mL  5-40 mL IntraVENous Q8H    sodium chloride (NS) flush 5-40 mL  5-40 mL IntraVENous PRN    acetaminophen (TYLENOL) tablet 650 mg  650 mg Oral Q6H PRN    Or    acetaminophen (TYLENOL) suppository 650 mg  650 mg Rectal Q6H PRN    polyethylene glycol (MIRALAX) packet 17 g  17 g Oral DAILY PRN    ondansetron (ZOFRAN ODT) tablet 4 mg  4 mg Oral Q8H PRN    Or    ondansetron (ZOFRAN) injection 4 mg  4 mg IntraVENous Q6H PRN    enoxaparin (LOVENOX) injection 40 mg  40 mg SubCUTAneous DAILY    OXcarbazepine (TRILEPTAL) tablet 600 mg  600 mg Oral BID    divalproex DR (DEPAKOTE) tablet 250 mg  250 mg Oral Q8H        Labs:    Recent Results (from the past 24 hour(s))   METABOLIC PANEL, BASIC    Collection Time: 04/15/22  2:30 AM   Result Value Ref Range    Sodium 140 136 - 145 mmol/L    Potassium 4.3 3.5 - 5.5 mmol/L    Chloride 105 100 - 111 mmol/L    CO2 27 21 - 32 mmol/L    Anion gap 8 3.0 - 18 mmol/L    Glucose 84 74 - 99 mg/dL    BUN 36 (H) 7.0 - 18 MG/DL    Creatinine 1.05 0.6 - 1.3 MG/DL    BUN/Creatinine ratio 34 (H) 12 - 20      GFR est AA >60 >60 ml/min/1.73m2    GFR est non-AA 52 (L) >60 ml/min/1.73m2    Calcium 9.2 8.5 - 10.1 MG/DL       Imaging:  No results found.       Signed By: Latosha Cervantes PA-C [facility]  [facility]  Office:  [phone]  Pager: [phone]         April 15, 2022 3:40 PM

## 2022-04-15 NOTE — Clinical Note
The son told me she was just on observation. I'll check. ----- Message -----  From: Ariana Vcitor OTR/L  Sent: 4/18/2022   5:17 AM EDT  To: Georges Abreu PTA  Subject: RE:                                                I have not seen the pt. if she was admitted to the hospital she will need resumption orders and a SAMUEL will need to be completed prior to her being able to be seen. thank you.   ----- Message -----  From: Favian David PTA  Sent: 4/17/2022   7:19 PM EDT  To: Joesph Loomis, OTR/L      Pt's son stated that pt would be home on 4/15 but didn't want to be seen till next week. Have any of you spoke w her sons?

## 2022-04-15 NOTE — PROGRESS NOTES
Chart reviewed. Called Tod Bernal of [facility] for [address]   She stated they submitted for auth and still waiting for approval.          Marcelle Emery, BSN RN  Care Management  Pager: [phone]

## 2022-04-16 LAB
ANION GAP SERPL CALC-SCNC: 7 MMOL/L (ref 3–18)
BUN SERPL-MCNC: 26 MG/DL (ref 7–18)
BUN/CREAT SERPL: 26 (ref 12–20)
CALCIUM SERPL-MCNC: 9.6 MG/DL (ref 8.5–10.1)
CHLORIDE SERPL-SCNC: 104 MMOL/L (ref 100–111)
CO2 SERPL-SCNC: 28 MMOL/L (ref 21–32)
CREAT SERPL-MCNC: 0.99 MG/DL (ref 0.6–1.3)
ERYTHROCYTE [DISTWIDTH] IN BLOOD BY AUTOMATED COUNT: 13.4 % (ref 11.6–14.5)
GLUCOSE SERPL-MCNC: 86 MG/DL (ref 74–99)
HCT VFR BLD AUTO: 30.3 % (ref 35–45)
HGB BLD-MCNC: 9.9 G/DL (ref 12–16)
MCH RBC QN AUTO: 30.6 PG (ref 24–34)
MCHC RBC AUTO-ENTMCNC: 32.7 G/DL (ref 31–37)
MCV RBC AUTO: 93.5 FL (ref 78–100)
NRBC # BLD: 0 K/UL (ref 0–0.01)
NRBC BLD-RTO: 0 PER 100 WBC
PLATELET # BLD AUTO: 183 K/UL (ref 135–420)
PMV BLD AUTO: 12.4 FL (ref 9.2–11.8)
POTASSIUM SERPL-SCNC: 4.2 MMOL/L (ref 3.5–5.5)
RBC # BLD AUTO: 3.24 M/UL (ref 4.2–5.3)
SODIUM SERPL-SCNC: 139 MMOL/L (ref 136–145)
WBC # BLD AUTO: 9 K/UL (ref 4.6–13.2)

## 2022-04-16 PROCEDURE — 74011250636 HC RX REV CODE- 250/636: Performed by: INTERNAL MEDICINE

## 2022-04-16 PROCEDURE — G0378 HOSPITAL OBSERVATION PER HR: HCPCS

## 2022-04-16 PROCEDURE — 74011250637 HC RX REV CODE- 250/637: Performed by: STUDENT IN AN ORGANIZED HEALTH CARE EDUCATION/TRAINING PROGRAM

## 2022-04-16 PROCEDURE — 85027 COMPLETE CBC AUTOMATED: CPT

## 2022-04-16 PROCEDURE — 80048 BASIC METABOLIC PNL TOTAL CA: CPT

## 2022-04-16 PROCEDURE — 99232 SBSQ HOSP IP/OBS MODERATE 35: CPT | Performed by: INTERNAL MEDICINE

## 2022-04-16 PROCEDURE — 74011000250 HC RX REV CODE- 250: Performed by: INTERNAL MEDICINE

## 2022-04-16 PROCEDURE — 36415 COLL VENOUS BLD VENIPUNCTURE: CPT

## 2022-04-16 PROCEDURE — 96372 THER/PROPH/DIAG INJ SC/IM: CPT

## 2022-04-16 PROCEDURE — 74011250637 HC RX REV CODE- 250/637: Performed by: INTERNAL MEDICINE

## 2022-04-16 RX ADMIN — OXCARBAZEPINE 600 MG: 300 TABLET, FILM COATED ORAL at 17:11

## 2022-04-16 RX ADMIN — DIVALPROEX SODIUM 250 MG: 250 TABLET, DELAYED RELEASE ORAL at 15:42

## 2022-04-16 RX ADMIN — SODIUM CHLORIDE, PRESERVATIVE FREE 10 ML: 5 INJECTION INTRAVENOUS at 15:43

## 2022-04-16 RX ADMIN — OXCARBAZEPINE 600 MG: 300 TABLET, FILM COATED ORAL at 09:42

## 2022-04-16 RX ADMIN — ATORVASTATIN CALCIUM 40 MG: 40 TABLET, FILM COATED ORAL at 09:42

## 2022-04-16 RX ADMIN — CLOPIDOGREL BISULFATE 75 MG: 75 TABLET ORAL at 09:43

## 2022-04-16 RX ADMIN — AMLODIPINE BESYLATE 10 MG: 10 TABLET ORAL at 09:42

## 2022-04-16 RX ADMIN — FERROUS SULFATE TAB 325 MG (65 MG ELEMENTAL FE) 325 MG: 325 (65 FE) TAB at 09:42

## 2022-04-16 RX ADMIN — ENOXAPARIN SODIUM 40 MG: 40 INJECTION SUBCUTANEOUS at 09:43

## 2022-04-16 RX ADMIN — PAROXETINE HYDROCHLORIDE 30 MG: 20 TABLET, FILM COATED ORAL at 09:43

## 2022-04-16 RX ADMIN — DIVALPROEX SODIUM 250 MG: 250 TABLET, DELAYED RELEASE ORAL at 07:57

## 2022-04-16 RX ADMIN — DIVALPROEX SODIUM 250 MG: 250 TABLET, DELAYED RELEASE ORAL at 22:09

## 2022-04-16 RX ADMIN — MULTIPLE VITAMINS W/ MINERALS TAB 1 TABLET: TAB at 09:43

## 2022-04-16 RX ADMIN — SODIUM CHLORIDE, PRESERVATIVE FREE 10 ML: 5 INJECTION INTRAVENOUS at 06:07

## 2022-04-16 NOTE — PROGRESS NOTES
[facility]  Progress Note    Patient: Yaan Guillaume Age: 75 y.o. : 1955 MR#: [id number] SSN: [id number]  Date 22        Assessment/Plan:     Hospital Problems  Date Reviewed: 2021          Codes Class Noted POA    Subclinical hypothyroidism ICD-10-CM: E03.8  ICD-9-CM: 244.8  2022 Unknown        Depression ICD-10-CM: F32. A  ICD-9-CM: 548  2022 Unknown        Failure to thrive in adult ICD-10-CM: R62.7  ICD-9-CM: 783.7  2022 Yes        Elevated troponin ICD-10-CM: R77.8  ICD-9-CM: 790.6  2022 Unknown        KEYLA (acute kidney injury) ([facility] ICD-10-CM: N17.9  ICD-9-CM: 584.9  2021 Yes        * (Principal) Breakthrough seizure ([facility] ICD-10-CM: W59.973  ICD-9-CM: 345.91  2021 Yes        Essential hypertension ICD-10-CM: I10  ICD-9-CM: 401.9  2015 Yes            Breakthrough seizures  - seen by neurology. Recommendations made to cont depakote and increase trileptal dose  - seizure precautions  - no further seizures reported since admission    KEYLA: suspect dehydration due to poor PO intake, resolved. Monitor PO intake. Encourage hydration. Elevated trop: likely 2/2 seizure  - appreciate cardiology  - echo with normal LV and diastolic function  - cardiac monitoring  - f/u in 4 weeks with cardiology    Subclinical Hypothyroidism  - TSH 6   - T4 0.8    HTN  - cont norvasc    Failure to thrive with unintentional weight loss-reportedly had lost 100 pounds over the course of the last year. PEG tube was placed with initiation of tube feeds in January of this year  - nutrition consulted  - speech recs regular diet, thin liquids    Depression  - continue paxil, seroquel    Dispo:in process of transferring to [address] [place] [address], awaiting approval after submitting auth.         DVT Prophylaxis:  [x]Lovenox  []Hep SQ  []SCDs  []Coumadin   []On Heparin gtt []PO anticoagulant    Anticipated discharge: stable for discharge to rehab    Subjective:     Pt w/o complaints other than wanting to urinate. Nurse alerted.     Objective:   VS:   Visit Vitals  BP (!) 149/76 (BP 1 Location: Left upper arm, BP Patient Position: Lying)   Pulse 73   Temp 97.7 °F (36.5 °C)   Resp 18   Ht 5' (1.524 m)   Wt 45.1 kg (99 lb 6.4 oz)   SpO2 100%   Breastfeeding No   BMI 19.41 kg/m²      Tmax/24hrs: Temp (24hrs), Av.4 °F (36.9 °C), Min:97.7 °F (36.5 °C), Max:99.4 °F (37.4 °C)      Intake/Output Summary (Last 24 hours) at 2022 1315  Last data filed at 2022 0219  Gross per 24 hour   Intake --   Output 1 ml   Net -1 ml       General Appearance: NAD, conversant  HENT: normocephalic/atraumatic, moist mucus membranes  Neck: No JVD, supple  Lungs: CTA with normal respiratory effort  CV: RRR, no m/r/g  Abdomen: soft, non-tender, normal bowel sounds  Extremities: no cyanosis, no peripheral edema  Neuro: No focal deficits, motor/sensory intact  Skin: Normal color, intact      Current Facility-Administered Medications   Medication Dose Route Frequency    amLODIPine (NORVASC) tablet 10 mg  10 mg Oral DAILY    atorvastatin (LIPITOR) tablet 40 mg  40 mg Oral DAILY    clopidogreL (PLAVIX) tablet 75 mg  75 mg Oral DAILY    PARoxetine (PAXIL) tablet 30 mg  30 mg Oral DAILY    multivitamin, tx-iron-ca-min (THERA-M w/ IRON) tablet 1 Tablet  1 Tablet Oral DAILY    ferrous sulfate tablet 325 mg  325 mg Oral DAILY    ergocalciferol capsule 50,000 Units  50,000 Units Oral 2 TIMES WEEKLY    sodium chloride (NS) flush 5-40 mL  5-40 mL IntraVENous Q8H    sodium chloride (NS) flush 5-40 mL  5-40 mL IntraVENous PRN    acetaminophen (TYLENOL) tablet 650 mg  650 mg Oral Q6H PRN    Or    acetaminophen (TYLENOL) suppository 650 mg  650 mg Rectal Q6H PRN    polyethylene glycol (MIRALAX) packet 17 g  17 g Oral DAILY PRN    ondansetron (ZOFRAN ODT) tablet 4 mg  4 mg Oral Q8H PRN    Or    ondansetron (ZOFRAN) injection 4 mg  4 mg IntraVENous Q6H PRN    enoxaparin (LOVENOX) injection 40 mg  40 mg SubCUTAneous DAILY    OXcarbazepine (TRILEPTAL) tablet 600 mg  600 mg Oral BID    divalproex DR (DEPAKOTE) tablet 250 mg  250 mg Oral Q8H        Labs:    Recent Results (from the past 24 hour(s))   METABOLIC PANEL, BASIC    Collection Time: 04/16/22  1:10 AM   Result Value Ref Range    Sodium 139 136 - 145 mmol/L    Potassium 4.2 3.5 - 5.5 mmol/L    Chloride 104 100 - 111 mmol/L    CO2 28 21 - 32 mmol/L    Anion gap 7 3.0 - 18 mmol/L    Glucose 86 74 - 99 mg/dL    BUN 26 (H) 7.0 - 18 MG/DL    Creatinine 0.99 0.6 - 1.3 MG/DL    BUN/Creatinine ratio 26 (H) 12 - 20      GFR est AA >60 >60 ml/min/1.73m2    GFR est non-AA 56 (L) >60 ml/min/1.73m2    Calcium 9.6 8.5 - 10.1 MG/DL   CBC W/O DIFF    Collection Time: 04/16/22  1:10 AM   Result Value Ref Range    WBC 9.0 4.6 - 13.2 K/uL    RBC 3.24 (L) 4.20 - 5.30 M/uL    HGB 9.9 (L) 12.0 - 16.0 g/dL    HCT 30.3 (L) 35.0 - 45.0 %    MCV 93.5 78.0 - 100.0 FL    MCH 30.6 24.0 - 34.0 PG    MCHC 32.7 31.0 - 37.0 g/dL    RDW 13.4 11.6 - 14.5 %    PLATELET 687 772 - 496 K/uL    MPV 12.4 (H) 9.2 - 11.8 FL    NRBC 0.0 0  WBC    ABSOLUTE NRBC 0.00 0.00 - 0.01 K/uL       Imaging:  No results found.

## 2022-04-16 NOTE — ROUTINE PROCESS
Bedside and Verbal shift change report given to Bárbara Bullard (oncoming nurse) by Marli Singh RN  (offgoing nurse). Report included the following information SBAR, Kardex, Intake/Output, MAR and Recent Results. Patient sleeping at this time. Call bell in reach.

## 2022-04-16 NOTE — PROGRESS NOTES
Discharge/Transition Planning    Physical Therapy fell off order set and needed for insurance auth. Received order back from Dr Katia Leal.  PT has not seen since 4/12

## 2022-04-16 NOTE — PROGRESS NOTES
Bedside and Verbal shift change report given to Lakesha Carbajal RN (oncoming nurse) by Jose Antonio Malave RN (offgoing nurse). Report included the following information SBAR, Kardex, Intake/Output, MAR and Recent Results.

## 2022-04-16 NOTE — PROGRESS NOTES
Problem: Falls - Risk of  Goal: *Absence of Falls  Description: Document Oly Valiente Fall Risk and appropriate interventions in the flowsheet.   Outcome: Progressing Towards Goal  Note: Fall Risk Interventions:  Mobility Interventions: Bed/chair exit alarm    Mentation Interventions: Bed/chair exit alarm    Medication Interventions: Bed/chair exit alarm    Elimination Interventions: Bed/chair exit alarm              Problem: Patient Education: Go to Patient Education Activity  Goal: Patient/Family Education  Outcome: Progressing Towards Goal     Problem: Aspiration - Risk of  Goal: *Absence of aspiration  Outcome: Progressing Towards Goal     Problem: Patient Education: Go to Patient Education Activity  Goal: Patient/Family Education  Outcome: Progressing Towards Goal     Problem: Cardiac Output -  Decreased  Goal: *Vital signs within specified parameters  Outcome: Progressing Towards Goal  Goal: *Optimal cardiac output  Outcome: Progressing Towards Goal  Goal: *Absence of hypoxia  Outcome: Progressing Towards Goal  Goal: *Absence of peripheral edema  Outcome: Progressing Towards Goal  Goal: *Intravascular fluid volume and electrolyte balance  Outcome: Progressing Towards Goal     Problem: Patient Education: Go to Patient Education Activity  Goal: Patient/Family Education  Outcome: Progressing Towards Goal     Problem: Patient Education: Go to Patient Education Activity  Goal: Patient/Family Education  Outcome: Progressing Towards Goal     Problem: Patient Education: Go to Patient Education Activity  Goal: Patient/Family Education  Outcome: Progressing Towards Goal     Problem: Nutrition Deficit  Goal: *Optimize nutritional status  Outcome: Progressing Towards Goal     Problem: Patient Education: Go to Patient Education Activity  Goal: Patient/Family Education  Outcome: Progressing Towards Goal

## 2022-04-17 LAB
ANION GAP SERPL CALC-SCNC: 6 MMOL/L (ref 3–18)
APPEARANCE UR: ABNORMAL
BACTERIA URNS QL MICRO: ABNORMAL /HPF
BILIRUB UR QL: ABNORMAL
BUN SERPL-MCNC: 23 MG/DL (ref 7–18)
BUN/CREAT SERPL: 22 (ref 12–20)
CALCIUM SERPL-MCNC: 9.8 MG/DL (ref 8.5–10.1)
CHLORIDE SERPL-SCNC: 103 MMOL/L (ref 100–111)
CO2 SERPL-SCNC: 28 MMOL/L (ref 21–32)
COLOR UR: ABNORMAL
CREAT SERPL-MCNC: 1.04 MG/DL (ref 0.6–1.3)
EPITH CASTS URNS QL MICRO: ABNORMAL /LPF (ref 0–5)
GLUCOSE SERPL-MCNC: 81 MG/DL (ref 74–99)
GLUCOSE UR STRIP.AUTO-MCNC: NEGATIVE MG/DL
HGB UR QL STRIP: ABNORMAL
KETONES UR QL STRIP.AUTO: 15 MG/DL
LEUKOCYTE ESTERASE UR QL STRIP.AUTO: ABNORMAL
NITRITE UR QL STRIP.AUTO: NEGATIVE
PH UR STRIP: 5.5 [PH] (ref 5–8)
POTASSIUM SERPL-SCNC: 3.9 MMOL/L (ref 3.5–5.5)
PROT UR STRIP-MCNC: 100 MG/DL
RBC #/AREA URNS HPF: ABNORMAL /HPF (ref 0–5)
SODIUM SERPL-SCNC: 137 MMOL/L (ref 136–145)
SP GR UR REFRACTOMETRY: 1.02 (ref 1–1.03)
UROBILINOGEN UR QL STRIP.AUTO: 1 EU/DL (ref 0.2–1)
WBC URNS QL MICRO: ABNORMAL /HPF (ref 0–4)

## 2022-04-17 PROCEDURE — 74011250637 HC RX REV CODE- 250/637: Performed by: INTERNAL MEDICINE

## 2022-04-17 PROCEDURE — 96374 THER/PROPH/DIAG INJ IV PUSH: CPT

## 2022-04-17 PROCEDURE — 97530 THERAPEUTIC ACTIVITIES: CPT

## 2022-04-17 PROCEDURE — 99232 SBSQ HOSP IP/OBS MODERATE 35: CPT | Performed by: INTERNAL MEDICINE

## 2022-04-17 PROCEDURE — 87086 URINE CULTURE/COLONY COUNT: CPT

## 2022-04-17 PROCEDURE — 74011250637 HC RX REV CODE- 250/637: Performed by: STUDENT IN AN ORGANIZED HEALTH CARE EDUCATION/TRAINING PROGRAM

## 2022-04-17 PROCEDURE — 87186 SC STD MICRODIL/AGAR DIL: CPT

## 2022-04-17 PROCEDURE — 77030019905 HC CATH URETH INTMIT MDII -A

## 2022-04-17 PROCEDURE — 81001 URINALYSIS AUTO W/SCOPE: CPT

## 2022-04-17 PROCEDURE — 87077 CULTURE AEROBIC IDENTIFY: CPT

## 2022-04-17 PROCEDURE — G0378 HOSPITAL OBSERVATION PER HR: HCPCS

## 2022-04-17 PROCEDURE — 74011250636 HC RX REV CODE- 250/636: Performed by: INTERNAL MEDICINE

## 2022-04-17 PROCEDURE — 36415 COLL VENOUS BLD VENIPUNCTURE: CPT

## 2022-04-17 PROCEDURE — 96372 THER/PROPH/DIAG INJ SC/IM: CPT

## 2022-04-17 PROCEDURE — 2709999900 HC NON-CHARGEABLE SUPPLY

## 2022-04-17 PROCEDURE — 74011000250 HC RX REV CODE- 250: Performed by: INTERNAL MEDICINE

## 2022-04-17 PROCEDURE — 80048 BASIC METABOLIC PNL TOTAL CA: CPT

## 2022-04-17 RX ORDER — NITROFURANTOIN 25; 75 MG/1; MG/1
100 CAPSULE ORAL 2 TIMES DAILY
Status: DISCONTINUED | OUTPATIENT
Start: 2022-04-17 | End: 2022-04-18

## 2022-04-17 RX ADMIN — CLOPIDOGREL BISULFATE 75 MG: 75 TABLET ORAL at 09:34

## 2022-04-17 RX ADMIN — OXCARBAZEPINE 600 MG: 300 TABLET, FILM COATED ORAL at 18:11

## 2022-04-17 RX ADMIN — MULTIPLE VITAMINS W/ MINERALS TAB 1 TABLET: TAB at 09:34

## 2022-04-17 RX ADMIN — SODIUM CHLORIDE, PRESERVATIVE FREE 40 ML: 5 INJECTION INTRAVENOUS at 22:24

## 2022-04-17 RX ADMIN — DIVALPROEX SODIUM 250 MG: 250 TABLET, DELAYED RELEASE ORAL at 06:00

## 2022-04-17 RX ADMIN — ENOXAPARIN SODIUM 40 MG: 40 INJECTION SUBCUTANEOUS at 09:34

## 2022-04-17 RX ADMIN — SODIUM CHLORIDE, PRESERVATIVE FREE 40 ML: 5 INJECTION INTRAVENOUS at 06:03

## 2022-04-17 RX ADMIN — AMLODIPINE BESYLATE 10 MG: 10 TABLET ORAL at 09:34

## 2022-04-17 RX ADMIN — DIVALPROEX SODIUM 250 MG: 250 TABLET, DELAYED RELEASE ORAL at 21:44

## 2022-04-17 RX ADMIN — PAROXETINE HYDROCHLORIDE 30 MG: 20 TABLET, FILM COATED ORAL at 09:34

## 2022-04-17 RX ADMIN — NITROFURANTOIN MONOHYDRATE/MACROCRYSTALLINE 100 MG: 25; 75 CAPSULE ORAL at 18:11

## 2022-04-17 RX ADMIN — ATORVASTATIN CALCIUM 40 MG: 40 TABLET, FILM COATED ORAL at 09:34

## 2022-04-17 RX ADMIN — SODIUM CHLORIDE, PRESERVATIVE FREE 40 ML: 5 INJECTION INTRAVENOUS at 00:14

## 2022-04-17 RX ADMIN — SODIUM CHLORIDE, PRESERVATIVE FREE 10 ML: 5 INJECTION INTRAVENOUS at 15:31

## 2022-04-17 RX ADMIN — DIVALPROEX SODIUM 250 MG: 250 TABLET, DELAYED RELEASE ORAL at 15:31

## 2022-04-17 RX ADMIN — FERROUS SULFATE TAB 325 MG (65 MG ELEMENTAL FE) 325 MG: 325 (65 FE) TAB at 09:34

## 2022-04-17 RX ADMIN — ONDANSETRON 4 MG: 2 INJECTION INTRAMUSCULAR; INTRAVENOUS at 16:32

## 2022-04-17 RX ADMIN — OXCARBAZEPINE 600 MG: 300 TABLET, FILM COATED ORAL at 09:34

## 2022-04-17 NOTE — PROGRESS NOTES
Acknowledged duplicate PT orders. Patient already on PT caseload as of 4/12/2022. Please refer to PT evaluation for discharge recommendations and plan of care. Will follow up for updated treatment notes this date as appropriate.

## 2022-04-17 NOTE — PROGRESS NOTES
Problem: Falls - Risk of  Goal: *Absence of Falls  Description: Document Marciana Distance Fall Risk and appropriate interventions in the flowsheet.   Outcome: Progressing Towards Goal  Note: Fall Risk Interventions:  Mobility Interventions: Bed/chair exit alarm    Mentation Interventions: Bed/chair exit alarm    Medication Interventions: Bed/chair exit alarm    Elimination Interventions: Call light in reach,Bed/chair exit alarm              Problem: Patient Education: Go to Patient Education Activity  Goal: Patient/Family Education  Outcome: Progressing Towards Goal     Problem: Aspiration - Risk of  Goal: *Absence of aspiration  Outcome: Progressing Towards Goal     Problem: Patient Education: Go to Patient Education Activity  Goal: Patient/Family Education  Outcome: Progressing Towards Goal     Problem: Cardiac Output -  Decreased  Goal: *Vital signs within specified parameters  Outcome: Progressing Towards Goal  Goal: *Optimal cardiac output  Outcome: Progressing Towards Goal  Goal: *Absence of hypoxia  Outcome: Progressing Towards Goal  Goal: *Absence of peripheral edema  Outcome: Progressing Towards Goal  Goal: *Intravascular fluid volume and electrolyte balance  Outcome: Progressing Towards Goal     Problem: Patient Education: Go to Patient Education Activity  Goal: Patient/Family Education  Outcome: Progressing Towards Goal     Problem: Patient Education: Go to Patient Education Activity  Goal: Patient/Family Education  Outcome: Progressing Towards Goal     Problem: Patient Education: Go to Patient Education Activity  Goal: Patient/Family Education  Outcome: Progressing Towards Goal     Problem: Nutrition Deficit  Goal: *Optimize nutritional status  Outcome: Progressing Towards Goal     Problem: Patient Education: Go to Patient Education Activity  Goal: Patient/Family Education  Outcome: Progressing Towards Goal     Problem: Pain  Goal: *Control of Pain  Outcome: Progressing Towards Goal

## 2022-04-17 NOTE — PROGRESS NOTES
Dr. Emilia Marsh at bedside. Discussed patient complaint of pain with urination. New order for UA, obtain via straight cath. Sample obtained, patient tolerated well.

## 2022-04-17 NOTE — PROGRESS NOTES
Problem: Mobility Impaired (Adult and Pediatric)  Goal: *Acute Goals and Plan of Care (Insert Text)  Description: Physical Therapy Goals  Initiated 4/12/2022 and to be accomplished within 7 day(s)  1. Patient will move from supine to sit and sit to supine in bed with supervision/set-up. 2.  Patient will transfer from bed to chair and chair to bed with supervision/set-up using the least restrictive device. 3.  Patient will perform sit to stand with supervision/set-up. 4.  Patient will ambulate with minimal assistance for 50 feet with the least restrictive device. PLOF: Lives with son; alone at times. One story home. Uses wheelchair or assist for amb. Outcome: Progressing Towards Goal   PHYSICAL THERAPY TREATMENT    Patient: Karis Schaffer (12 y.o. female)  Date: 4/17/2022  Diagnosis: Elevated troponin [R77.8] Breakthrough seizure [facility])  Precautions: Fall  ASSESSMENT:  Oriented to self and place. Re-oriented to date. Flat affect, decreased interaction. Min A for supine to sit with additional time. Seated EOB with supervision for balance. Completed BLE AROM as noted below with min/mod A for posterior support d/t posterior lean. Mod A for sit to stand. Poor standing balance with posterior lean. Cues to anteriorly weigth without change in stance. Noted urinary incontinence despite gibran-wick. Changed soiled pads. Min A for stand to sit. Poor seated balance upon return to seated. Max A for sit to supine. Max A for scooting up in bed. Poor motor planning. Appears to have visual deficit to left side with coordination tasks. Seated in bed with HOB elevated. Educated on need for RN assistance with mobility; verbalized understanding. Call bell in reach.      Progression toward goals:   []      Improving appropriately and progressing toward goals  [x]      Improving slowly and progressing toward goals  []      Not making progress toward goals and plan of care will be adjusted     PLAN:  Patient continues to benefit from skilled intervention to address the above impairments. Continue treatment per established plan of care. Discharge Recommendations:  Rehab  Further Equipment Recommendations for Discharge:  rolling walker     SUBJECTIVE:   Patient stated It was Joseph's birthday yesterday.     OBJECTIVE DATA SUMMARY:   Critical Behavior:  Neurologic State: Alert  Orientation Level: Oriented to person;Oriented to place  Cognition: Follows commands     Psychosocial  Patient Behaviors: Flat affect  Caritas Process: Attend basic human needs  Caring Interventions: Reassure  Functional Mobility:  Bed Mobility:  Supine to Sit: Minimum assistance  Sit to Supine: Maximum assistance  Scooting: Moderate assistance;Maximum assistance  Transfers:  Sit to Stand: Moderate assistance  Stand to Sit: Minimum assistance  Balance:   Sitting: Impaired  Sitting - Static: Fair (occasional)  Sitting - Dynamic: Fair (occasional); Poor (constant support)  Standing: Impaired  Standing - Static: Poor  Neuro Re-Education:  Seated EOB 8 minutes  Standing 1 minute  Therapeutic Exercises:   BLE AROM knee extension, hip flexion x10    Pain:  Pain level pre-treatment: 0/10  Pain level post-treatment: 0/10     Activity Tolerance:   Fair    After treatment:   [] Patient left in no apparent distress sitting up in chair  [x] Patient left in no apparent distress in bed  [x] Call bell left within reach  [x] Nursing notified  [] Caregiver present  [] Bed alarm activated  [] SCDs applied      COMMUNICATION/EDUCATION:   [x]         Role of physical therapy in the acute care setting. [x]         Fall prevention education was provided and the patient/caregiver indicated understanding. [x]         Patient/family have participated as able in working toward goals and plan of care. [x]         Patient/family agree to work toward stated goals and plan of care. []         Patient understands intent and goals of therapy, but is neutral about his/her participation.   [] Patient is unable to participate in stated goals/plan of care: ongoing with therapy staff.       Juancarlos Fried, PT   Time Calculation: 18 mins

## 2022-04-17 NOTE — PROGRESS NOTES
Patient had medium emesis, undigested. PRN IV zofran given. 40 min post patient had another large emesis, undigested food. 1x small BM documented in last 6 days. Reviewed with Dr. Dain Trejo. Reviewed assessment and initial UA results. Awaiting new orders.

## 2022-04-17 NOTE — CASE COMMUNICATION
Pt's son stated that pt would be home on 4/15 but didn't want to be seen till next week. Have any of you spoke w her sons?

## 2022-04-17 NOTE — PROGRESS NOTES
[facility] Hospitalist [facility]  Progress Note    Patient: Ayaz Lilly Age: 75 y.o. : 1955 MR#: [id number] SSN: [id number]  Date 22        Assessment/Plan:     Hospital Problems  Date Reviewed: 2021          Codes Class Noted POA    Subclinical hypothyroidism ICD-10-CM: E03.8  ICD-9-CM: 244.8  2022 Unknown        Depression ICD-10-CM: F32. A  ICD-9-CM: 237  2022 Unknown        Failure to thrive in adult ICD-10-CM: R62.7  ICD-9-CM: 783.7  2022 Yes        Elevated troponin ICD-10-CM: R77.8  ICD-9-CM: 790.6  2022 Unknown        KEYLA (acute [facility]) ([facility] ICD-10-CM: N17.9  ICD-9-CM: 584.9  2021 Yes        * (Principal) Breakthrough seizure ([facility] [address] ICD-10-CM: G73.682  ICD-9-CM: 345.91  2021 Yes        Essential hypertension ICD-10-CM: I10  ICD-9-CM: 401.9  2015 Yes            Breakthrough seizures  - seen by neurology. Recommendations made to cont depakote and increase trileptal dose  - seizure precautions  - no further seizures reported since admission    KEYLA: suspect dehydration due to poor PO intake, resolved. Monitor PO intake. Encourage hydration. Elevated trop: likely 2/2 seizure  - appreciate cardiology  - echo with normal LV and diastolic function  - cardiac monitoring  - f/u in 4 weeks with cardiology    Subclinical Hypothyroidism  - TSH 6   - T4 0.8    HTN  - cont norvasc    Failure to thrive with unintentional weight loss-reportedly had lost 100 pounds over the course of the last year. PEG tube was placed with initiation of tube feeds in January of this year  - nutrition consulted  - speech recs regular diet, thin liquids    Depression  - continue paxil, seroquel    Dysuria: will check ua    Dispo:in process of transferring to [address] [place] [address], awaiting approval after submitting auth.         DVT Prophylaxis:  [x]Lovenox  []Hep SQ  []SCDs  []Coumadin   []On Heparin gtt []PO anticoagulant    Anticipated discharge: stable for discharge to rehab    Subjective:     Pt c/o dysuria, confirmed by nursing. She appears comfortable.      Objective:   VS:   Visit Vitals  /81   Pulse 83   Temp 97.6 °F (36.4 °C)   Resp 16   Ht 5' (1.524 m)   Wt 45.9 kg (101 lb 3.2 oz)   SpO2 99%   Breastfeeding No   BMI 19.76 kg/m²      Tmax/24hrs: Temp (24hrs), Av.9 °F (36.6 °C), Min:97.5 °F (36.4 °C), Max:98.3 °F (36.8 °C)      Intake/Output Summary (Last 24 hours) at 2022 1621  Last data filed at 2022 5342  Gross per 24 hour   Intake 360 ml   Output 1300 ml   Net -940 ml       General Appearance: NAD, conversant  HENT: normocephalic/atraumatic, moist mucus membranes  Neck: No JVD, supple  Lungs: CTA with normal respiratory effort  CV: RRR, no m/r/g  Abdomen: soft, non-tender, normal bowel sounds  Extremities: no cyanosis, no peripheral edema  Neuro: No focal deficits, motor/sensory intact  Skin: Normal color, intact      Current Facility-Administered Medications   Medication Dose Route Frequency    amLODIPine (NORVASC) tablet 10 mg  10 mg Oral DAILY    atorvastatin (LIPITOR) tablet 40 mg  40 mg Oral DAILY    clopidogreL (PLAVIX) tablet 75 mg  75 mg Oral DAILY    PARoxetine (PAXIL) tablet 30 mg  30 mg Oral DAILY    multivitamin, tx-iron-ca-min (THERA-M w/ IRON) tablet 1 Tablet  1 Tablet Oral DAILY    ferrous sulfate tablet 325 mg  325 mg Oral DAILY    ergocalciferol capsule 50,000 Units  50,000 Units Oral 2 TIMES WEEKLY    sodium chloride (NS) flush 5-40 mL  5-40 mL IntraVENous Q8H    sodium chloride (NS) flush 5-40 mL  5-40 mL IntraVENous PRN    acetaminophen (TYLENOL) tablet 650 mg  650 mg Oral Q6H PRN    Or    acetaminophen (TYLENOL) suppository 650 mg  650 mg Rectal Q6H PRN    polyethylene glycol (MIRALAX) packet 17 g  17 g Oral DAILY PRN    ondansetron (ZOFRAN ODT) tablet 4 mg  4 mg Oral Q8H PRN    Or    ondansetron (ZOFRAN) injection 4 mg  4 mg IntraVENous Q6H PRN    enoxaparin (LOVENOX) injection 40 mg  40 mg SubCUTAneous DAILY    OXcarbazepine (TRILEPTAL) tablet 600 mg  600 mg Oral BID    divalproex DR (DEPAKOTE) tablet 250 mg  250 mg Oral Q8H        Labs:    Recent Results (from the past 24 hour(s))   METABOLIC PANEL, BASIC    Collection Time: 04/17/22  4:16 AM   Result Value Ref Range    Sodium 137 136 - 145 mmol/L    Potassium 3.9 3.5 - 5.5 mmol/L    Chloride 103 100 - 111 mmol/L    CO2 28 21 - 32 mmol/L    Anion gap 6 3.0 - 18 mmol/L    Glucose 81 74 - 99 mg/dL    BUN 23 (H) 7.0 - 18 MG/DL    Creatinine 1.04 0.6 - 1.3 MG/DL    BUN/Creatinine ratio 22 (H) 12 - 20      GFR est AA >60 >60 ml/min/1.73m2    GFR est non-AA 53 (L) >60 ml/min/1.73m2    Calcium 9.8 8.5 - 10.1 MG/DL       Imaging:  No results found.

## 2022-04-18 ENCOUNTER — APPOINTMENT (OUTPATIENT)
Dept: CT IMAGING | Age: 67
End: 2022-04-18
Attending: INTERNAL MEDICINE
Payer: MEDICARE

## 2022-04-18 LAB
ANION GAP SERPL CALC-SCNC: 5 MMOL/L (ref 3–18)
BASOPHILS # BLD: 0 K/UL (ref 0–0.1)
BASOPHILS NFR BLD: 0 % (ref 0–2)
BUN SERPL-MCNC: 36 MG/DL (ref 7–18)
BUN/CREAT SERPL: 22 (ref 12–20)
CALCIUM SERPL-MCNC: 9.5 MG/DL (ref 8.5–10.1)
CHLORIDE SERPL-SCNC: 101 MMOL/L (ref 100–111)
CO2 SERPL-SCNC: 29 MMOL/L (ref 21–32)
CREAT SERPL-MCNC: 1.64 MG/DL (ref 0.6–1.3)
DIFFERENTIAL METHOD BLD: ABNORMAL
EOSINOPHIL # BLD: 0 K/UL (ref 0–0.4)
EOSINOPHIL NFR BLD: 0 % (ref 0–5)
ERYTHROCYTE [DISTWIDTH] IN BLOOD BY AUTOMATED COUNT: 13.9 % (ref 11.6–14.5)
GLUCOSE SERPL-MCNC: 97 MG/DL (ref 74–99)
HCT VFR BLD AUTO: 30.7 % (ref 35–45)
HGB BLD-MCNC: 10.2 G/DL (ref 12–16)
IMM GRANULOCYTES # BLD AUTO: 0.1 K/UL (ref 0–0.04)
IMM GRANULOCYTES NFR BLD AUTO: 1 % (ref 0–0.5)
LYMPHOCYTES # BLD: 1.7 K/UL (ref 0.9–3.6)
LYMPHOCYTES NFR BLD: 12 % (ref 21–52)
MCH RBC QN AUTO: 31.3 PG (ref 24–34)
MCHC RBC AUTO-ENTMCNC: 33.2 G/DL (ref 31–37)
MCV RBC AUTO: 94.2 FL (ref 78–100)
MONOCYTES # BLD: 1.3 K/UL (ref 0.05–1.2)
MONOCYTES NFR BLD: 9 % (ref 3–10)
NEUTS SEG # BLD: 11.2 K/UL (ref 1.8–8)
NEUTS SEG NFR BLD: 78 % (ref 40–73)
NRBC # BLD: 0 K/UL (ref 0–0.01)
NRBC BLD-RTO: 0 PER 100 WBC
PLATELET # BLD AUTO: 197 K/UL (ref 135–420)
PMV BLD AUTO: 12.3 FL (ref 9.2–11.8)
POTASSIUM SERPL-SCNC: 4.3 MMOL/L (ref 3.5–5.5)
RBC # BLD AUTO: 3.26 M/UL (ref 4.2–5.3)
SODIUM SERPL-SCNC: 135 MMOL/L (ref 136–145)
WBC # BLD AUTO: 14.3 K/UL (ref 4.6–13.2)

## 2022-04-18 PROCEDURE — 77030018842 HC SOL IRR SOD CL 9% BAXT -A

## 2022-04-18 PROCEDURE — 74011250637 HC RX REV CODE- 250/637: Performed by: INTERNAL MEDICINE

## 2022-04-18 PROCEDURE — 85025 COMPLETE CBC W/AUTO DIFF WBC: CPT

## 2022-04-18 PROCEDURE — 74011000250 HC RX REV CODE- 250: Performed by: INTERNAL MEDICINE

## 2022-04-18 PROCEDURE — 96376 TX/PRO/DX INJ SAME DRUG ADON: CPT

## 2022-04-18 PROCEDURE — 96372 THER/PROPH/DIAG INJ SC/IM: CPT

## 2022-04-18 PROCEDURE — 99233 SBSQ HOSP IP/OBS HIGH 50: CPT | Performed by: INTERNAL MEDICINE

## 2022-04-18 PROCEDURE — 74176 CT ABD & PELVIS W/O CONTRAST: CPT

## 2022-04-18 PROCEDURE — 96375 TX/PRO/DX INJ NEW DRUG ADDON: CPT

## 2022-04-18 PROCEDURE — 77030038269 HC DRN EXT URIN PURWCK BARD -A

## 2022-04-18 PROCEDURE — 74011250636 HC RX REV CODE- 250/636: Performed by: INTERNAL MEDICINE

## 2022-04-18 PROCEDURE — 74011250637 HC RX REV CODE- 250/637: Performed by: STUDENT IN AN ORGANIZED HEALTH CARE EDUCATION/TRAINING PROGRAM

## 2022-04-18 PROCEDURE — 97535 SELF CARE MNGMENT TRAINING: CPT

## 2022-04-18 PROCEDURE — 36415 COLL VENOUS BLD VENIPUNCTURE: CPT

## 2022-04-18 PROCEDURE — 80048 BASIC METABOLIC PNL TOTAL CA: CPT

## 2022-04-18 PROCEDURE — 2709999900 HC NON-CHARGEABLE SUPPLY

## 2022-04-18 PROCEDURE — G0378 HOSPITAL OBSERVATION PER HR: HCPCS

## 2022-04-18 PROCEDURE — 97530 THERAPEUTIC ACTIVITIES: CPT

## 2022-04-18 RX ORDER — DIVALPROEX SODIUM 125 MG/1
250 CAPSULE, COATED PELLETS ORAL EVERY 8 HOURS
Status: DISCONTINUED | OUTPATIENT
Start: 2022-04-18 | End: 2022-04-21 | Stop reason: HOSPADM

## 2022-04-18 RX ORDER — HEPARIN SODIUM 5000 [USP'U]/ML
5000 INJECTION, SOLUTION INTRAVENOUS; SUBCUTANEOUS EVERY 8 HOURS
Status: DISCONTINUED | OUTPATIENT
Start: 2022-04-19 | End: 2022-04-21 | Stop reason: HOSPADM

## 2022-04-18 RX ORDER — SODIUM CHLORIDE 9 MG/ML
75 INJECTION, SOLUTION INTRAVENOUS CONTINUOUS
Status: DISCONTINUED | OUTPATIENT
Start: 2022-04-18 | End: 2022-04-21 | Stop reason: HOSPADM

## 2022-04-18 RX ORDER — DIVALPROEX SODIUM 125 MG/1
250 CAPSULE, COATED PELLETS ORAL EVERY 8 HOURS
Status: DISCONTINUED | OUTPATIENT
Start: 2022-04-18 | End: 2022-04-18

## 2022-04-18 RX ORDER — DIPHENHYDRAMINE HYDROCHLORIDE 50 MG/ML
25 INJECTION, SOLUTION INTRAMUSCULAR; INTRAVENOUS
Status: DISCONTINUED | OUTPATIENT
Start: 2022-04-18 | End: 2022-04-21 | Stop reason: HOSPADM

## 2022-04-18 RX ADMIN — CLOPIDOGREL BISULFATE 75 MG: 75 TABLET ORAL at 10:05

## 2022-04-18 RX ADMIN — OXCARBAZEPINE 600 MG: 300 TABLET, FILM COATED ORAL at 10:05

## 2022-04-18 RX ADMIN — SODIUM CHLORIDE, PRESERVATIVE FREE 10 ML: 5 INJECTION INTRAVENOUS at 10:07

## 2022-04-18 RX ADMIN — MULTIPLE VITAMINS W/ MINERALS TAB 1 TABLET: TAB at 10:05

## 2022-04-18 RX ADMIN — DIVALPROEX SODIUM 250 MG: 250 TABLET, DELAYED RELEASE ORAL at 05:24

## 2022-04-18 RX ADMIN — DIVALPROEX SODIUM 250 MG: 250 TABLET, DELAYED RELEASE ORAL at 13:17

## 2022-04-18 RX ADMIN — PAROXETINE HYDROCHLORIDE 30 MG: 20 TABLET, FILM COATED ORAL at 10:05

## 2022-04-18 RX ADMIN — AMLODIPINE BESYLATE 10 MG: 10 TABLET ORAL at 10:05

## 2022-04-18 RX ADMIN — FERROUS SULFATE TAB 325 MG (65 MG ELEMENTAL FE) 325 MG: 325 (65 FE) TAB at 10:05

## 2022-04-18 RX ADMIN — SODIUM CHLORIDE, PRESERVATIVE FREE 10 ML: 5 INJECTION INTRAVENOUS at 13:22

## 2022-04-18 RX ADMIN — ONDANSETRON 4 MG: 2 INJECTION INTRAMUSCULAR; INTRAVENOUS at 06:41

## 2022-04-18 RX ADMIN — WATER 1 G: 1 INJECTION INTRAMUSCULAR; INTRAVENOUS; SUBCUTANEOUS at 15:49

## 2022-04-18 RX ADMIN — ATORVASTATIN CALCIUM 40 MG: 40 TABLET, FILM COATED ORAL at 10:05

## 2022-04-18 RX ADMIN — ENOXAPARIN SODIUM 40 MG: 40 INJECTION SUBCUTANEOUS at 10:07

## 2022-04-18 RX ADMIN — OXCARBAZEPINE 600 MG: 300 TABLET, FILM COATED ORAL at 18:52

## 2022-04-18 RX ADMIN — ERGOCALCIFEROL 50000 UNITS: 1.25 CAPSULE ORAL at 18:58

## 2022-04-18 RX ADMIN — SODIUM CHLORIDE 75 ML/HR: 900 INJECTION, SOLUTION INTRAVENOUS at 15:31

## 2022-04-18 RX ADMIN — NITROFURANTOIN MONOHYDRATE/MACROCRYSTALLINE 100 MG: 25; 75 CAPSULE ORAL at 10:05

## 2022-04-18 NOTE — PROGRESS NOTES
Called Nickie luciano [place] for [address] She stated she will call  back once they hear form insurance for auth. 1414: Nickie called back stating Augustin Thompson was denied and number for peer to peer is [phone]. Sent text message to Dr. Dilcia Ann.           BLANCA DunhamN RN  Care Management  Pager: [phone]

## 2022-04-18 NOTE — PROGRESS NOTES
Called and spoke to GUNJAN Caldera meds tolerated crushed with apple sauce. Barium swallow to be completed tomorrow. Will continue to monitor.

## 2022-04-18 NOTE — PROGRESS NOTES
Called and spoke to MD Benton, while giving oral meds, pt had a moderate amount of vomit. Swallow eval order placed and orders given. Speech consult order placed. Will continue to monitor.

## 2022-04-18 NOTE — PROGRESS NOTES
[facility] Hospitalist Group  Progress Note    Patient: Cheo Espinoza Age: 75 y.o. : 1955 MR#: [id number] SSN: [id number]  Date 22        Assessment/Plan:     Hospital Problems  Date Reviewed: 2021          Codes Class Noted POA    Subclinical hypothyroidism ICD-10-CM: E03.8  ICD-9-CM: 244.8  2022 Unknown        Depression ICD-10-CM: F32. A  ICD-9-CM: 890  2022 Unknown        Failure to thrive in adult ICD-10-CM: R62.7  ICD-9-CM: 783.7  2022 Yes        Elevated troponin ICD-10-CM: R77.8  ICD-9-CM: 790.6  2022 Unknown        KEYLA (acute [facility]) ([facility] ICD-10-CM: N17.9  ICD-9-CM: 584.9  2021 Yes        * (Principal) Breakthrough seizure ([facility] ICD-10-CM: D15.185  ICD-9-CM: 345.91  2021 Yes        Essential hypertension ICD-10-CM: I10  ICD-9-CM: 401.9  2015 Yes            Breakthrough seizures  - seen by neurology. Recommendations made to cont depakote and increase trileptal dose  - seizure precautions  - no further seizures reported since admission    Urinary tract infection (): Patient started complaining of dysuria on . Urinalysis was abnormal.  Initially started on Macrobid however her renal function has worsened, see below. She has urine culture from 2020 positive for Enterococcus faecalis, sensitive to vancomycin. Patient with multiple allergies. Discussed with ID. She has leukocytosis today. Concern for pyelonephritis given aforementioned symptoms and signs. CT of the abdomen pelvis ordered. Patient started on Rocephin. Appreciate ID assistance. KEYLA: suspect dehydration due to poor PO intake,  Had initially resolved. Creat back up today at 1.6, the setting of UTI, nausea vomiting likely prerenal given high BUN to creatinine ratio. Started on IV fluids. Renally dose medications, avoid IV contrast and nephrotoxic agents.       Elevated trop: likely 2/2 seizure, currently no active issues  - appreciate cardiology  - echo with normal LV and diastolic function  - cardiac monitoring  - f/u in 4 weeks with cardiology    Subclinical Hypothyroidism  - TSH 6   - T4 0.8    HTN  - cont norvasc    Failure to thrive with unintentional weight loss-reportedly had lost 100 pounds over the course of the last year. PEG tube was placed with initiation of tube feeds in January of this year. She still has PEG tube but has been having oral intake. Please see above, due to query urinary tract infection she has had nausea and vomiting.  - nutrition consulted  -Barium swallow study ordered    Depression  - continue paxil, seroquel        Dispo:in process of transferring to [address] [place] [address], awaiting approval after submitting auth. Per discharge planner, authorization has been denied. Patient currently not stable for discharge due to concern for urinary tract infection and p.o. intake unable to take oral antibiotics. She may need peer to peer if the plan is to transfer her to rehab facility. DVT Prophylaxis:  [x]Lovenox  []Hep SQ  []SCDs  []Coumadin   []On Heparin gtt []PO anticoagulant        Subjective:     Pt c/o dysuria, confirmed by nursing. She appears comfortable.      Objective:   VS:   Visit Vitals  /76 (BP 1 Location: Left upper arm, BP Patient Position: At rest)   Pulse 83   Temp 98.1 °F (36.7 °C)   Resp 18   Ht 5' (1.524 m)   Wt 43.3 kg (95 lb 8 oz)   SpO2 97%   Breastfeeding No   BMI 18.65 kg/m²      Tmax/24hrs: Temp (24hrs), Av.8 °F (37.1 °C), Min:98.1 °F (36.7 °C), Max:100.2 °F (37.9 °C)    No intake or output data in the 24 hours ending 22 1628    General Appearance: NAD, conversant  HENT: normocephalic/atraumatic, moist mucus membranes  Neck: No JVD, supple  Lungs: CTA with normal respiratory effort  CV: RRR, no m/r/g  Abdomen: soft, non-tender, normal bowel sounds  Extremities: no cyanosis, no peripheral edema  Neuro: No focal deficits, motor/sensory intact  Skin: Normal color, intact      Current Facility-Administered Medications   Medication Dose Route Frequency    [START ON 4/19/2022] heparin (porcine) injection 5,000 Units  5,000 Units SubCUTAneous Q8H    0.9% sodium chloride infusion  75 mL/hr IntraVENous CONTINUOUS    cefTRIAXone (ROCEPHIN) 1 g in sterile water (preservative free) 10 mL IV syringe  1 g IntraVENous Q24H    diphenhydrAMINE (BENADRYL) injection 25 mg  25 mg IntraVENous Q4H PRN    amLODIPine (NORVASC) tablet 10 mg  10 mg Oral DAILY    atorvastatin (LIPITOR) tablet 40 mg  40 mg Oral DAILY    clopidogreL (PLAVIX) tablet 75 mg  75 mg Oral DAILY    PARoxetine (PAXIL) tablet 30 mg  30 mg Oral DAILY    multivitamin, tx-iron-ca-min (THERA-M w/ IRON) tablet 1 Tablet  1 Tablet Oral DAILY    ferrous sulfate tablet 325 mg  325 mg Oral DAILY    ergocalciferol capsule 50,000 Units  50,000 Units Oral 2 TIMES WEEKLY    sodium chloride (NS) flush 5-40 mL  5-40 mL IntraVENous Q8H    sodium chloride (NS) flush 5-40 mL  5-40 mL IntraVENous PRN    acetaminophen (TYLENOL) tablet 650 mg  650 mg Oral Q6H PRN    Or    acetaminophen (TYLENOL) suppository 650 mg  650 mg Rectal Q6H PRN    polyethylene glycol (MIRALAX) packet 17 g  17 g Oral DAILY PRN    ondansetron (ZOFRAN ODT) tablet 4 mg  4 mg Oral Q8H PRN    Or    ondansetron (ZOFRAN) injection 4 mg  4 mg IntraVENous Q6H PRN    OXcarbazepine (TRILEPTAL) tablet 600 mg  600 mg Oral BID    divalproex DR (DEPAKOTE) tablet 250 mg  250 mg Oral Q8H        Labs:    Recent Results (from the past 24 hour(s))   METABOLIC PANEL, BASIC    Collection Time: 04/18/22  4:00 AM   Result Value Ref Range    Sodium 135 (L) 136 - 145 mmol/L    Potassium 4.3 3.5 - 5.5 mmol/L    Chloride 101 100 - 111 mmol/L    CO2 29 21 - 32 mmol/L    Anion gap 5 3.0 - 18 mmol/L    Glucose 97 74 - 99 mg/dL    BUN 36 (H) 7.0 - 18 MG/DL    Creatinine 1.64 (H) 0.6 - 1.3 MG/DL    BUN/Creatinine ratio 22 (H) 12 - 20      GFR est AA 38 (L) >60 ml/min/1.73m2    GFR est non-AA 31 (L) >60 ml/min/1.73m2    Calcium 9.5 8.5 - 10.1 MG/DL   CBC WITH AUTOMATED DIFF    Collection Time: 04/18/22  4:00 AM   Result Value Ref Range    WBC 14.3 (H) 4.6 - 13.2 K/uL    RBC 3.26 (L) 4.20 - 5.30 M/uL    HGB 10.2 (L) 12.0 - 16.0 g/dL    HCT 30.7 (L) 35.0 - 45.0 %    MCV 94.2 78.0 - 100.0 FL    MCH 31.3 24.0 - 34.0 PG    MCHC 33.2 31.0 - 37.0 g/dL    RDW 13.9 11.6 - 14.5 %    PLATELET 185 344 - 612 K/uL    MPV 12.3 (H) 9.2 - 11.8 FL    NRBC 0.0 0  WBC    ABSOLUTE NRBC 0.00 0.00 - 0.01 K/uL    NEUTROPHILS 78 (H) 40 - 73 %    LYMPHOCYTES 12 (L) 21 - 52 %    MONOCYTES 9 3 - 10 %    EOSINOPHILS 0 0 - 5 %    BASOPHILS 0 0 - 2 %    IMMATURE GRANULOCYTES 1 (H) 0.0 - 0.5 %    ABS. NEUTROPHILS 11.2 (H) 1.8 - 8.0 K/UL    ABS. LYMPHOCYTES 1.7 0.9 - 3.6 K/UL    ABS. MONOCYTES 1.3 (H) 0.05 - 1.2 K/UL    ABS. EOSINOPHILS 0.0 0.0 - 0.4 K/UL    ABS. BASOPHILS 0.0 0.0 - 0.1 K/UL    ABS. IMM. GRANS. 0.1 (H) 0.00 - 0.04 K/UL    DF AUTOMATED         Imaging:  CT ABD PELV WO CONT    Result Date: 4/18/2022  EXAM:  CT Abdomen-Pelvis without Contrast. CLINICAL INDICATION:  Intractable nausea and vomiting. UTI. COMPARISON:  42/03/80 TECHNIQUE:  Helical volumetric CT imaging of the abdomen and pelvis is performed without IV or oral contrast administration. Coronal and sagittal multiplanar reconstruction images are generated for improved anatomic delineation. All CT scans at this facility are performed using dose optimization technique as appropriate to the performed exam, to include automated exposure control, adjustment of the mA and/or kV according to patient's size (Including appropriate matching for site-specific examinations), or use of iterative reconstruction technique. FINDINGS: Lung Bases:  Right lower lobe infiltrate vs less likely subsegmental atelectatic changes. Liver, Adrenal Glands, Spleen, Pancreas:  Unremarkable.  Gallbladder:  Subtle faint hyperdensities in the posterior aspect of the gallbladder, suggestive of possible gallstones. Kidneys-Ureters-Bladder:  Unremarkable kidneys. No postobstructive changes. No ureteral stones. Underdistended urinary bladder. Mild circumferential bladder wall thickening. GI Tract:  Status post percutaneous gastrostomy tube placement. Otherwise unremarkable stomach. No acute small bowel abnormalities. No acute abnormalities at the expected location for the appendix. No acute colitis or diverticulitis. Mild stool retention in the rectum. Uterus, Adnexa:  Status post hysterectomy. No adnexal mass. Nodes:  No mesenteric or retroperitoneal lymphadenopathy. Vascular:  No acute aortic abnormalities. Body Wall:  Subcutaneous air collections in the left lower quadrant (axial #51-56). Bones:  No acute bony abnormalities. 1.  No acute findings along the gastrointestinal tract. 2.  S/P percutaneous gastrostomy tube placement. 3.  Questionable gallstones vs. nonspecific debris. 4.  Subcutaneous air pockets in the left lower quadrant. Query recent subcutaneous injections. If there were no subcutaneous injections, developing infectious process may be considered as well.

## 2022-04-18 NOTE — PROGRESS NOTES
Problem: Self Care Deficits Care Plan (Adult)  Goal: *Acute Goals and Plan of Care (Insert Text)  Description: Occupational Therapy Goals  Initiated 4/12/2022 within 7 day(s). 1.  Patient will perform UB bathing and upper body dressing with supervision/set-up. 2.  Patient will perform bed mobility with CGA in prep for self care. 3.  Patient will perform lower body dressing and bathing with CGA   4. Patient will perform toilet transfers with contact guard assist.  5.  Patient will perform all aspects of toileting with minimal assistance/contact guard assist.  6.  Patient will participate in upper extremity therapeutic exercise/activities with modified independence for 5-8 minutes. 7.  Patient will utilize energy conservation techniques during functional activities with verbal cues. Prior Level of Function:Pt history is not clear, states she lives with son in apt and he is \"supposed to be home with her all the time,\" performs transfers to w/c with RW and assist. States she spends most of her time in the w/c, transfers to [facility] with assist from son. Pt performed sponge baths and dressing with SUP. Outcome: Progressing Towards Goal   OCCUPATIONAL THERAPY TREATMENT    Patient: Caryn Singleton (67 y.o. female)  Date: 4/18/2022  Diagnosis: Elevated troponin [R77.8] Breakthrough seizure ([facility]       Precautions: Fall  PLOF: Pt history is not clear, states she lives with son in apt and he is \"supposed to be home with her all the time,\" performs transfers to w/c with RW and assist. States she spends most of her time in the w/c, transfers to [facility] with assist from son. Pt performed sponge baths and dressing with SUP. Chart, occupational therapy assessment, plan of care, and goals were reviewed. ASSESSMENT:  Pt presented supine in bed upon therapist arrival, oriented to self only, and receiving medications from RN. Pt with minimal communication this date w/ flat affect with noted R lateral lean.  Pt required MAX A for drink to mouth, unable to hold cup in BECKY hands. Simple grooming task performed with SBA to wipe off mouth, pt required max cueing for thoroughness and to complete task. Pt required MIN A rolling L/R and MAX A x 2 scooting towards HOB. Pt with bouts of dry heaving, RN aware. Pt positioned for comfort and left with HOB elevated, bed alarm active, RN present, and all needs left within reach. Progression toward goals:  []          Improving appropriately and progressing toward goals  [x]          Improving slowly and progressing toward goals  []          Not making progress toward goals and plan of care will be adjusted     PLAN:  Patient continues to benefit from skilled intervention to address the above impairments. Continue treatment per established plan of care. Discharge Recommendations: Rehab   Further Equipment Recommendations for Discharge:  TBA     SUBJECTIVE:   Patient stated Ok. \"     OBJECTIVE DATA SUMMARY:   Cognitive/Behavioral Status:  Neurologic State: Alert  Orientation Level: Disoriented to place,Disoriented to situation,Disoriented to time  Cognition: Decreased attention/concentration,Decreased command following  Safety/Judgement: Fall prevention    Functional Mobility and Transfers for ADLs:   Bed Mobility:  Rolling: Minimum assistance        Scooting: Maximum assistance;Assist x2 (towards HOB )       Balance:  Sitting: Impaired    ADL Intervention:  Feeding  Drink to Mouth: Maximum assistance    Grooming  Position Performed: Other (comment) (supine in bed with HOB elevated)  Washing Face: Stand-by assistance    Pain:  Pain level pre-treatment: 0/10   Pain level post-treatment: 0/10    Activity Tolerance:    Poor   Please refer to the flowsheet for vital signs taken during this treatment.   After treatment:   []  Patient left in no apparent distress sitting up in chair  [x]  Patient left in no apparent distress in bed  [x]  Call bell left within reach  [x]  Nursing notified  [x]  RN present  [x]  Bed alarm activated    COMMUNICATION/EDUCATION:   [x] Role of Occupational Therapy in the acute care setting  [] Home safety education was provided and the patient/caregiver indicated understanding. [x] Patient/family have participated as able in working towards goals and plan of care. [x] Patient/family agree to work toward stated goals and plan of care. [] Patient understands intent and goals of therapy, but is neutral about his/her participation. [] Patient is unable to participate in goal setting and plan of care.       Thank you for this referral.  JOSE JUAN South  Time Calculation: 23 mins

## 2022-04-18 NOTE — PROGRESS NOTES
Nutrition Assessment     Type and Reason for Visit: Reassess,Consult    Nutrition Recommendations/Plan:   - Continue regular diet and Ensure Enlive TID. Assist with meals.  - Continue 30 mL q 8 hour (TID) water flushes for patency via PEG tube. - Plan to monitor meal and supplement intake and evaluate need to resuming enteral nutrition via peg tube if no improvement in the next couple days, discussed with Dr. Merle Aponte and RN. Nutrition Assessment:  Pt with poor meal intake and fair supplement noted per chart on 4/17. Per nurse, pt did not consume breakfast today, lunch currently at bedside untouched with multiple Ensure supplements at bedside unopened. Episode of large emesis yesterday. Pt appears lethargic during visit, wakes up when spoken to but responses are short and limited. Per nurse, pt took medications well with assistance, but does not tend to use her arms to assist herself. Based on this, pt appears to need total assistance with meals. Nurse to assist with lunch/supplement today to assess intake with assistance. PEG remains patent; only receiving flushes currently. Malnutrition Assessment:  Malnutrition Status: Moderate malnutrition     Estimated Daily Nutrient Needs:  Energy (kcal):  2287-7079 (30-35 kcal/kg)  Protein (g):  44-57 (1.0-1.3)       Fluid (ml/day):  8265-4133    Nutrition Related Findings:  BM: 4/17, small, per note. Pertinent Meds: MVI, ferrous sulfate, ergocalciferol. Emesis 4/17.        Current Nutrition Therapies:  ADULT DIET Regular  ADULT ORAL NUTRITION SUPPLEMENT Breakfast, Lunch, Dinner; Standard High Calorie/High Protein    Anthropometric Measures:  · Height:  5' (152.4 cm)  · Current Body Wt:  43.5 kg (95 lb 14.4 oz)  · BMI: 18.7    Nutrition Diagnosis:   · Inadequate oral intake related to cognitive or neurological impairment,acute injury/trauma as evidenced by intake 51-75%      Nutrition Intervention:  Food and/or Nutrient Delivery: Continue current diet  Nutrition Education and Counseling: No recommendations at this time  Coordination of Nutrition Care: Continue to monitor while inpatient    Goals:  PO nutrition intake will meet >75% of patient estimated nutritional needs by next follow up date.        Nutrition Monitoring and Evaluation:   Behavioral-Environmental Outcomes: None identified  Food/Nutrient Intake Outcomes: Food and nutrient intake,Supplement intake,Vitamin/mineral intake  Physical Signs/Symptoms Outcomes: Biochemical data,Chewing or swallowing    Discharge Planning:    Continue current diet,Continue oral nutrition supplement     Electronically signed by Eren Driscoll RD on 4/18/2022 at 2:01 PM    Contact Number: [phone]

## 2022-04-19 ENCOUNTER — HOME CARE VISIT (OUTPATIENT)
Dept: HOME HEALTH SERVICES | Facility: HOME HEALTH | Age: 67
End: 2022-04-19
Payer: MEDICARE

## 2022-04-19 ENCOUNTER — APPOINTMENT (OUTPATIENT)
Dept: GENERAL RADIOLOGY | Age: 67
End: 2022-04-19
Attending: INTERNAL MEDICINE
Payer: MEDICARE

## 2022-04-19 LAB
ALBUMIN SERPL-MCNC: 3.3 G/DL (ref 3.4–5)
ALBUMIN/GLOB SERPL: 0.9 {RATIO} (ref 0.8–1.7)
ALP SERPL-CCNC: 75 U/L (ref 45–117)
ALT SERPL-CCNC: 21 U/L (ref 13–56)
ANION GAP SERPL CALC-SCNC: 8 MMOL/L (ref 3–18)
AST SERPL-CCNC: 12 U/L (ref 10–38)
BASOPHILS # BLD: 0 K/UL (ref 0–0.1)
BASOPHILS NFR BLD: 0 % (ref 0–2)
BILIRUB DIRECT SERPL-MCNC: <0.1 MG/DL (ref 0–0.2)
BILIRUB SERPL-MCNC: 0.3 MG/DL (ref 0.2–1)
BUN SERPL-MCNC: 39 MG/DL (ref 7–18)
BUN/CREAT SERPL: 25 (ref 12–20)
CALCIUM SERPL-MCNC: 10 MG/DL (ref 8.5–10.1)
CHLORIDE SERPL-SCNC: 102 MMOL/L (ref 100–111)
CO2 SERPL-SCNC: 26 MMOL/L (ref 21–32)
CREAT SERPL-MCNC: 1.54 MG/DL (ref 0.6–1.3)
DIFFERENTIAL METHOD BLD: ABNORMAL
EOSINOPHIL # BLD: 0 K/UL (ref 0–0.4)
EOSINOPHIL NFR BLD: 0 % (ref 0–5)
ERYTHROCYTE [DISTWIDTH] IN BLOOD BY AUTOMATED COUNT: 13.9 % (ref 11.6–14.5)
GLOBULIN SER CALC-MCNC: 3.7 G/DL (ref 2–4)
GLUCOSE SERPL-MCNC: 90 MG/DL (ref 74–99)
HCT VFR BLD AUTO: 29.8 % (ref 35–45)
HGB BLD-MCNC: 9.8 G/DL (ref 12–16)
IMM GRANULOCYTES # BLD AUTO: 0.1 K/UL (ref 0–0.04)
IMM GRANULOCYTES NFR BLD AUTO: 0 % (ref 0–0.5)
LYMPHOCYTES # BLD: 1.4 K/UL (ref 0.9–3.6)
LYMPHOCYTES NFR BLD: 10 % (ref 21–52)
MCH RBC QN AUTO: 30.9 PG (ref 24–34)
MCHC RBC AUTO-ENTMCNC: 32.9 G/DL (ref 31–37)
MCV RBC AUTO: 94 FL (ref 78–100)
MONOCYTES # BLD: 1.2 K/UL (ref 0.05–1.2)
MONOCYTES NFR BLD: 8 % (ref 3–10)
NEUTS SEG # BLD: 12 K/UL (ref 1.8–8)
NEUTS SEG NFR BLD: 82 % (ref 40–73)
NRBC # BLD: 0 K/UL (ref 0–0.01)
NRBC BLD-RTO: 0 PER 100 WBC
PLATELET # BLD AUTO: 152 K/UL (ref 135–420)
PMV BLD AUTO: 12.4 FL (ref 9.2–11.8)
POTASSIUM SERPL-SCNC: 4.1 MMOL/L (ref 3.5–5.5)
PROT SERPL-MCNC: 7 G/DL (ref 6.4–8.2)
RBC # BLD AUTO: 3.17 M/UL (ref 4.2–5.3)
SODIUM SERPL-SCNC: 136 MMOL/L (ref 136–145)
WBC # BLD AUTO: 14.7 K/UL (ref 4.6–13.2)

## 2022-04-19 PROCEDURE — 77030038269 HC DRN EXT URIN PURWCK BARD -A

## 2022-04-19 PROCEDURE — 80048 BASIC METABOLIC PNL TOTAL CA: CPT

## 2022-04-19 PROCEDURE — 2709999900 HC NON-CHARGEABLE SUPPLY

## 2022-04-19 PROCEDURE — 77030018842 HC SOL IRR SOD CL 9% BAXT -A

## 2022-04-19 PROCEDURE — 96372 THER/PROPH/DIAG INJ SC/IM: CPT

## 2022-04-19 PROCEDURE — 99232 SBSQ HOSP IP/OBS MODERATE 35: CPT | Performed by: HOSPITALIST

## 2022-04-19 PROCEDURE — 97168 OT RE-EVAL EST PLAN CARE: CPT

## 2022-04-19 PROCEDURE — 97164 PT RE-EVAL EST PLAN CARE: CPT

## 2022-04-19 PROCEDURE — G0378 HOSPITAL OBSERVATION PER HR: HCPCS

## 2022-04-19 PROCEDURE — 80076 HEPATIC FUNCTION PANEL: CPT

## 2022-04-19 PROCEDURE — 74011250637 HC RX REV CODE- 250/637: Performed by: STUDENT IN AN ORGANIZED HEALTH CARE EDUCATION/TRAINING PROGRAM

## 2022-04-19 PROCEDURE — 74011000250 HC RX REV CODE- 250: Performed by: INTERNAL MEDICINE

## 2022-04-19 PROCEDURE — 74011250636 HC RX REV CODE- 250/636: Performed by: INTERNAL MEDICINE

## 2022-04-19 PROCEDURE — 74011000250 HC RX REV CODE- 250: Performed by: HOSPITALIST

## 2022-04-19 PROCEDURE — 96376 TX/PRO/DX INJ SAME DRUG ADON: CPT

## 2022-04-19 PROCEDURE — 85025 COMPLETE CBC W/AUTO DIFF WBC: CPT

## 2022-04-19 PROCEDURE — 92526 ORAL FUNCTION THERAPY: CPT

## 2022-04-19 PROCEDURE — 74011250637 HC RX REV CODE- 250/637: Performed by: INTERNAL MEDICINE

## 2022-04-19 PROCEDURE — 92611 MOTION FLUOROSCOPY/SWALLOW: CPT

## 2022-04-19 PROCEDURE — 74230 X-RAY XM SWLNG FUNCJ C+: CPT

## 2022-04-19 PROCEDURE — 36415 COLL VENOUS BLD VENIPUNCTURE: CPT

## 2022-04-19 RX ADMIN — OXCARBAZEPINE 600 MG: 300 TABLET, FILM COATED ORAL at 08:48

## 2022-04-19 RX ADMIN — WATER 1 G: 1 INJECTION INTRAMUSCULAR; INTRAVENOUS; SUBCUTANEOUS at 15:07

## 2022-04-19 RX ADMIN — MULTIPLE VITAMINS W/ MINERALS TAB 1 TABLET: TAB at 08:48

## 2022-04-19 RX ADMIN — DIVALPROEX SODIUM 250 MG: 125 CAPSULE, COATED PELLETS ORAL at 06:20

## 2022-04-19 RX ADMIN — PAROXETINE HYDROCHLORIDE 30 MG: 20 TABLET, FILM COATED ORAL at 08:48

## 2022-04-19 RX ADMIN — SODIUM CHLORIDE, PRESERVATIVE FREE 10 ML: 5 INJECTION INTRAVENOUS at 00:03

## 2022-04-19 RX ADMIN — HEPARIN SODIUM 5000 UNITS: 5000 INJECTION INTRAVENOUS; SUBCUTANEOUS at 18:29

## 2022-04-19 RX ADMIN — HEPARIN SODIUM 5000 UNITS: 5000 INJECTION INTRAVENOUS; SUBCUTANEOUS at 08:49

## 2022-04-19 RX ADMIN — AMLODIPINE BESYLATE 10 MG: 10 TABLET ORAL at 08:48

## 2022-04-19 RX ADMIN — SODIUM CHLORIDE, PRESERVATIVE FREE 10 ML: 5 INJECTION INTRAVENOUS at 06:20

## 2022-04-19 RX ADMIN — BARIUM SULFATE 30 G: 960 POWDER, FOR SUSPENSION ORAL at 12:30

## 2022-04-19 RX ADMIN — FERROUS SULFATE TAB 325 MG (65 MG ELEMENTAL FE) 325 MG: 325 (65 FE) TAB at 08:48

## 2022-04-19 RX ADMIN — DIVALPROEX SODIUM 250 MG: 125 CAPSULE, COATED PELLETS ORAL at 00:02

## 2022-04-19 RX ADMIN — DIVALPROEX SODIUM 250 MG: 125 CAPSULE, COATED PELLETS ORAL at 15:08

## 2022-04-19 RX ADMIN — SODIUM CHLORIDE 75 ML/HR: 900 INJECTION, SOLUTION INTRAVENOUS at 06:21

## 2022-04-19 RX ADMIN — ATORVASTATIN CALCIUM 40 MG: 40 TABLET, FILM COATED ORAL at 08:48

## 2022-04-19 RX ADMIN — BARIUM SULFATE 15 ML: 400 PASTE ORAL at 12:30

## 2022-04-19 RX ADMIN — CLOPIDOGREL BISULFATE 75 MG: 75 TABLET ORAL at 08:48

## 2022-04-19 RX ADMIN — SODIUM CHLORIDE, PRESERVATIVE FREE 10 ML: 5 INJECTION INTRAVENOUS at 15:08

## 2022-04-19 RX ADMIN — OXCARBAZEPINE 600 MG: 300 TABLET, FILM COATED ORAL at 18:30

## 2022-04-19 NOTE — PROGRESS NOTES
Problem: Mobility Impaired (Adult and Pediatric)  Goal: *Acute Goals and Plan of Care (Insert Text)  Description: Physical Therapy Goals  Re-evaluated 4/19/2022 and updated goals  1. Patient will scoot up and down in bed with total assistance to aid in positioning. 2.  Patient will maintain BLE ROM/strength via functional maintenance program to prepare for OOB activity. PLOF: Lives with son; alone at times. One story home. Uses wheelchair or assist for amb. Outcome: Progressing Towards Goal   PHYSICAL THERAPY RE-EVALUATION    Patient: Maximilian Childress (43 y.o. female)  Date: 4/19/2022  Primary Diagnosis: Elevated troponin [R77.8]  Precautions: Fall  ASSESSMENT :  Re-evaluation s/p prolonged admission; goals reviewed and updated as indicated. Co-treated with OT to maximize patient safety and participation in functional mobility. Lethargic. Minimal interaction with PT. RN Yordan Ugalde notified. Left sided inattention. Max cues to attend to left side. Does not rotate neck to left or perform visual tracking to left. Resists/tone for movement of all extremities. Noted to be incontinent of urine despite gibran-wick in place. Max A x2 for rolling for clean-up and linen change. Positioned in right side lying at end of session. Call bell in reach. Due to decline in mental status and ability to participate in skilled PT treatments, transitioning to functional maintenance program to maintain BLE ROM/strength to aid in positioning and preparation for out of bed activity. Rehab tech educated on proper technique and demonstrated understanding.       PLAN :  Recommendations and Planned Interventions:  [x]           Bed Mobility Training             [x]    Neuromuscular Re-Education  [x]           Transfer Training                   []    Orthotic/Prosthetic Training  [x]           Gait Training                          []    Modalities  [x]           Therapeutic Exercises           []    Edema Management/Control  [x] Therapeutic Activities            [x]    Family Training/Education  [x]           Patient Education  []           Other (comment):    Frequency/Duration: Patient will continue to be followed 3-5x/week via rehab tech for functional maintenance program, weekly by PT for re-assessment. Discharge Recommendations: [facility]  Further Equipment Recommendations for Discharge: hospital bed     SUBJECTIVE:   Patient stated Two.     OBJECTIVE DATA SUMMARY:     Past Medical History:   Diagnosis Date    Abnormal coordination 9/21/2020    Depression 4/12/2022    Failure to thrive in adult 4/12/2022    Hallucinations 9/21/2020    Hypertension     Neurological disorder     Se[facility]     Stroke ([facility] 2009    Subclinical hypothyroidism 4/13/2022     Past Surgical History:   Procedure Laterality Date    PLACE PERCUT GASTROSTOMY TUBE  1/11/2022          Critical Behavior:  Neurologic State: Lethargic  Orientation Level: Unable to verbalize  Cognition: Decreased attention/concentration;Decreased command following     Strength:    Unable to assess d/t mental status  Range Of Motion:  BLE PROM limited d/t resistance and possible tone  Functional Mobility:  Bed Mobility:  Rolling: Maximum assistance  Scooting: Maximum assistance;Assist x2    Pain:  Pain level pre-treatment: 0/10   Pain level post-treatment: 0/10     Activity Tolerance:   Poor     After treatment:   []         Patient left in no apparent distress sitting up in chair  [x]         Patient left in no apparent distress in bed  [x]         Call bell left within reach  [x]         Nursing notified  []         Caregiver present  []         Bed alarm activated  []         SCDs applied    COMMUNICATION/EDUCATION:   [x]         Role of physical therapy in the acute care setting. []         Fall prevention education was provided and the patient/caregiver indicated understanding.   []         Patient/family have participated as able in goal setting and plan of care. []         Patient/family agree to work toward stated goals and plan of care. []         Patient understands intent and goals of therapy, but is neutral about his/her participation. [x]         Patient is unable to participate in goal setting/plan of care: ongoing with therapy staff.     Thank you for this referral.  Poncho Irving, PT   Time Calculation: 14 mins

## 2022-04-19 NOTE — PROGRESS NOTES
Problem: Dysphagia (Adult)  Goal: *Acute Goals and Plan of Care (Insert Text)  Description: Patient will:  1. Tolerate PO trials with 0 s/s overt distress in 4/5 trials  2. Utilize compensatory swallow strategies/maneuvers (decrease bite/sip, size/rate, alt. liq/sol) with min cues in 4/5 trials  3. Perform oral-motor/laryngeal exercises to increase oropharyngeal swallow function with min cues  4. Complete an objective swallow study (i.e., MBSS) to assess swallow integrity, r/o aspiration, and determine of safest LRD, min A as indicated/ordered by MD-met 4/19/22    Recommend:   Full liquids   Meds crushed in puree   *May need to utilize PEG for supplemental feeding to meet nutrition/hydration needs*  Aspiration precautions  HOB >45 degrees during all intake and for at least 30 min after po   Small bites/sips, slow rate of intake, alternating bites/sips  Oral care post meals     Outcome: Progressing Towards Goal    SPEECH PATHOLOGY MODIFIED BARIUM SWALLOW STUDY & TREATMENT    Patient: Nii Whitten (20 y.o. female)  Date: 4/19/2022  Primary Diagnosis: Elevated troponin [R77.8]  Precautions: Aspiration, Skin,Seizure    ASSESSMENT :  Based on the objective data described below, the patient presents with mod oropharyngeal dysphagia. Pt tolerating all consistencies presented (thin liquid by cup and pudding) with no penetration or aspiration visualized across trials. Pt unable to utilize straw despite multiple attempts. Pt with moderately delayed a-p transit, spillage to the valleculae with pooling for ~20 seconds prior to swallow initiation. Able to elicit swallow and demonstrate thorough oral clearance with use of liquid wash. Recommend change diet to full liquids with meds crushed in puree. May benefit from utilization of PEG to meet nutrition/hydration needs as suspect poor intake.       TREATMENT :  Treatment provided post diagnostic testing including oropharyngeal anatomy/physiology, MBS results, diet recommendations and compensatory strategies/positioning. Attempted trials of pudding post examination. Tolerating with use of 1:1 liquid wash. Will continue to follow for further dysphagia management. Patient will benefit from skilled intervention to address the above impairments. Patient's rehabilitation potential is considered to be Fair  Factors which may influence rehabilitation potential include:   []              None noted  []              Mental ability/status  [x]              Medical condition  []              Home/family situation and support systems  [x]              Safety awareness  []              Pain tolerance/management  []              Other:      PLAN :  Recommendations and Planned Interventions:  As above   Frequency/Duration: Patient will be followed by speech-language pathology 1-2 times per day/3-7 days per week to address goals. Discharge Recommendations:  To Be Determined     SUBJECTIVE:   Patient stated ok    OBJECTIVE:     Past Medical History:   Diagnosis Date    Abnormal coordination 9/21/2020    Depression 4/12/2022    Failure to thrive in adult 4/12/2022    Hallucinations 9/21/2020    Hypertension     Neurological disorder     Seizures ([facility] [address]     Stroke [facility]) 2009    Subclinical hypothyroidism 4/13/2022     Past Surgical History:   Procedure Laterality Date    PLACE PERCUT GASTROSTOMY TUBE  1/11/2022          Prior Level of Function/Home Situation:  Home Situation  Home Environment: Apartment  # Steps to Enter: 0  One/Two Story Residence: One story  Living Alone: No  Support Systems: Child(quiana)  Patient Expects to be Discharged to[de-identified] Skilled nursing facility  Current DME Used/Available at Home: Repair Report, rolling  Tub or Shower Type:  (sponge baths)  Diet prior to admission: regular/thin liquids   Current Diet:  regular/thin liquids; recommend full liquids    Radiologist:    Film Views: Lateral;Fluoro  Patient Position: 90 in chair    Trial 1:   Consistency Presented: Thin liquid;Pudding   How Presented: SLP-fed/presented;Cup/sip;Spoon (Unable to utilize straw)   Bolus Formation/Control: Impaired: Delayed; Incomplete;Premature spillage;Mastication;Posterior   Propulsion: Discoordination;Delayed (# of seconds)   Oral Residue: Lingual   Initiation of Swallow: Triggered at pyriform sinus(es)   Timing: Prolonged pooling 11-29 sec;Valleculae    Penetration: None   Aspiration/Timing: No evidence of aspiration   Pharyngeal Clearance: Pyriform residue ;Vallecular residue; Less than 10%   Attempted Modifications: Alternate liquids/solids   Effective Modifications: Alternate liquids/solids   Cues for Modifications: Maximal     Decreased Tongue Base Retraction?: Yes  Laryngeal Elevation: WFL (within functional limits)  Aspiration/Penetration Score: 1 (No penetration or aspiration-Contrast does not enter the airway)  Pharyngeal Symmetry: Not assessed  Pharyngeal-Esophageal Segment: Decreased relaxation of upper esophageal segment  Pharyngeal Dysfunction: Decreased tongue base retraction;Decreased strength  Oral Phase Severity: Moderate  Pharyngeal Phase Severity: Moderate    8-point Penetration-Aspiration Scale: Score 1    PAIN:  Pt reports 0/10 pain or discomfort prior to MBS. Pt reports 0/10 pain or discomfort post MBS. COMMUNICATION/EDUCATION:   [x]  Patient educated regarding MBS results and diet recommendations. []  Patient/family have participated as able in goal setting and plan of care. []  Patient/family agree to work toward stated goals and plan of care. []  Patient understands intent and goals of therapy, but is neutral about his/her participation. [x]  Patient is unable to participate in goal setting and plan of care.     Thank you for this referral,   Amanda Jenkins M.S., [id number] [facility]  Speech-Language Pathologist

## 2022-04-19 NOTE — PROGRESS NOTES
SLP note:    MBS completed; full report to follow. Recommend full liquid diet.      Olamide Sykes M.S., [id number] [facility]  Speech-Language Pathologist

## 2022-04-19 NOTE — ROUTINE PROCESS
Bedside and Verbal shift change report given to Mary Mcgee (oncoming nurse) by Teja Stewart (offgoing nurse). Report included the following information SBAR, Kardex, MAR, Recent Results and Cardiac Rhythm SR. Patient lethargic, arouses with touch and verbal stimulus, HOB elevated, aspiration precautions. Bed alarm on and call light in reach.

## 2022-04-19 NOTE — PROGRESS NOTES
Discharge/Transition Planning    0945: Spoke with Libby Carter with [facility] . Insurance had declined SNF rehab stating did not meet, there was no elaboration on this. Does not appear Dr Marium Mak did peer to peer yesterday. Have asked Dr Pan Brewer to complete    Number for peer to peer is [phone].

## 2022-04-19 NOTE — PROGRESS NOTES
Problem: Self Care Deficits Care Plan (Adult)  Goal: *Acute Goals and Plan of Care (Insert Text)  Description: Occupational Therapy Goals  Re-evaluated 4/19/22 with pt to be placed on 3 day trial due to decreased level of alertness and decreased participation in therapy session. Pt may need to be transitioned to [facility] if no improvement seen within 7 day(s). Goals have been downgraded. 1.  Patient will perform grooming with min A and v/c for sequencing. 2.  Patient will perform bed mobility with min A in prep for self care. 3.  Patient will perform sitting edge of bed with min A x 2 min in prep for self care participation. 4.  Patient will participate in upper extremity therapeutic exercise/activities with modified independence for 5-8 minutes. 5.  Patient will utilize energy conservation techniques during functional activities with verbal cues. Prior Level of Function:Pt history is not clear, states she lives with son in [facility] and he is \"supposed to be home with her all the time,\" performs transfers to w/ with RW and assist. States she spends most of her time in the w/c, transfers to [facility] with assist from son. Pt performed sponge baths and dressing with SUP. Outcome: Not Progressing Towards Goal, goals updated   OCCUPATIONAL THERAPY RE-EVALUATION    Patient: Ena Gibbs (48 y.o. female)  Date: 4/19/2022  Primary Diagnosis: Elevated troponin [R77.8]        Precautions:   Skin,Seizure  PLOF: see above    ASSESSMENT :  Based on the objective data described below, the patient presents with decreased level of orientation and alertness with decreased attention and concentration and ability to follow commands as well as  decreased Ub strength/AROM, impaired functional mobility impacting independence in self care. Pt cotreated with PT to maximize pt safety and participation in session. Pt sleeping on R side with eyes closed and required tactile and verbal cues to open eyes.  Pt minimally interactive with OT/PT and stated only \"hospital\" when asked where she was. Pt provided with wet wipe to perrform gibran hygiene, however, required max a. Pt performed rolling R and L with max A to perform posterior gibran care and provide clean linen. Pt requires max A x 2 for scooting up. Pt holding BUE in flexion and difficult to reposition due to resistance. Pt provided with wet washcloth for face and required max A to perform face washing. Pt with all needs in reach at end of session. RN informed of pt's lethargy. Pt would benefit from 3 day trial to assess pt's ability to participate in OT sessions meaningfully due to recent decline in function. If pt continues with lethargy, decreased command following, would recommend transition to [facility]. Patient will benefit from skilled intervention to address the above impairments.   Patient's rehabilitation potential is considered to be Guarded  Factors which may influence rehabilitation potential include:   []             None noted  [x]             Mental ability/status  [x]             Medical condition  [x]             Home/family situation and support systems  []             Safety awareness  []             Pain tolerance/management  []             Other:      PLAN :  Recommendations and Planned Interventions:   [x]               Self Care Training                  [x]      Therapeutic Activities  [x]               Functional Mobility Training   []      Cognitive Retraining  [x]               Therapeutic Exercises           [x]      Endurance Activities  [x]               Balance Training                    [x]      Neuromuscular Re-Education  []               Visual/Perceptual Training     [x]      Home Safety Training  [x]               Patient Education                   [x]      Family Training/Education  []               Other (comment):    Frequency/Duration: Patient will be followed by occupational therapy 1-2 times per day/3-5 days per week to address goals for 3 day trial to assess pt's ability to participate. Discharge Recommendations: [facility]  Further Equipment Recommendations for Discharge: bedside commode     SUBJECTIVE:   Patient stated hospital.    OBJECTIVE DATA SUMMARY:   Hospital course since last seen and reason for reevaluation: On evaluation, pt participated in grooming, bed mobility and UB dressing requiring min A to SUP assist. Pt now with decreased awareness of environment, decreased command following, and decline in self care independence. Pt would benefit from cointinued OT services to increase independence in self care.    Past Medical History:   Diagnosis Date    Abnormal coordination 9/21/2020    Depression 4/12/2022    Failure to thrive in adult 4/12/2022    Hallucinations 9/21/2020    Hypertension     Neurological disorder     Seizures ([facility] [address]     Stroke [facility]) 2009    Subclinical hypothyroidism 4/13/2022     Past Surgical History:   Procedure Laterality Date    PLACE PERCUT GASTROSTOMY TUBE  1/11/2022          Barriers to Learning/Limitations: yes;  altered mental status (i.e.Sedation, Confusion)  Compensate with: visual, verbal, tactile, kinesthetic cues/model    Home Situation:   Home Situation  Home Environment: Apartment  # Steps to Enter: 0  One/Two Story Residence: One story  Living Alone: No  Support Systems: Child(quiana)  Patient Expects to be Discharged to[de-identified] Skilled nursing facility  Current DME Used/Available at Home: [employer] Guide, rolling  Tub or Shower Type:  (sponge baths)  [x]  Right hand dominant   []  Left hand dominant    Cognitive/Behavioral Status:  Neurologic State: Lethargic  Orientation Level: Oriented to place  Cognition: Decreased attention/concentration;Decreased command following  Safety/Judgement: Awareness of environment    Skin: intact  Edema: none noted    Vision/Perceptual:       Cues to attend to environment      Coordination: BUE  Coordination: Generally decreased, functional  Fine Motor Skills-Upper: Left Impaired;Right Impaired    Gross Motor Skills-Upper: Left Impaired;Right Impaired    Strength: BUE  Strength: Generally decreased, functional   Tone & Sensation: BUE  Tone: Normal  Sensation:  (unable to assess due to pt lethargy)   Range of Motion: BUE  AROM: Generally decreased, functional      Functional Mobility and Transfers for ADLs:  Bed Mobility:  Rolling: Maximum assistance   Scooting: Maximum assistance;Assist x2  ADL Assessment:   Feeding: Maximum assistance (based on clinical judgement)  Oral Facial Hygiene/Grooming: Maximum assistance  Bathing: Maximum assistance (based on clinical judgement)  Upper Body Dressing: Maximum assistance (based on clinical judgement)  Lower Body Dressing: Total assistance (based on clinical judgement)  Toileting: Maximum assistance   ADL Intervention:   Grooming  Washing Face: Maximum assistance  Toileting  Bladder Hygiene: Maximum assistance  Cognitive Retraining  Safety/Judgement: Awareness of environment  Pain:  Pain level pre-treatment: 0/10   Pain level post-treatment: 0/10  Pain Intervention(s): Medication (see MAR); Rest,  Repositioning   Response to intervention: Nurse notified, See doc flow    Activity Tolerance:   poor  Please refer to the flowsheet for vital signs taken during this treatment. After treatment:   [] Patient left in no apparent distress sitting up in chair  [x] Patient left in no apparent distress in bed  [x] Call bell left within reach  [x] Nursing notified  [] Caregiver present  [x] Bed alarm activated    COMMUNICATION/EDUCATION:   [x] Role of Occupational Therapy in the acute care setting  [x] Home safety education was provided and the patient/caregiver indicated understanding. [] Patient/family have participated as able in goal setting and plan of care. [] Patient/family agree to work toward stated goals and plan of care. [] Patient understands intent and goals of therapy, but is neutral about his/her participation.   [x] Patient is unable to participate in goal setting and plan of care.     Thank you for this referral.  Smooth Mandel OT  Time Calculation: 18 mins

## 2022-04-19 NOTE — PROGRESS NOTES
[facility] Hospitalist Group  Progress Note    Patient: Nick Anne Age: 75 y.o. : 1955 MR#: [id number] SSN: [id number]  Date/Time: 2022     Subjective:     Patient seen and evaluated, lying in bed, no acute distress. No new events overnight. Assessment/Plan:     1. Breakthrough seizures-seen by neurology, recommendations to continue Depakote and increased dose of Trileptal.  No reported seizures since admission. 2. UTI- patient initially started on Macrobid. Continue IV Rocephin, ID on board. 3. KEYLA-creatinine elevated at 1.6, continue IVF, monitor strict I's and O's.  4. Elevated troponin-likely secondary to seizures, appreciate cardiology assistance, echocardiogram reviewed, no further work-up at this time. 5. Subclinical hypothyroidism-continue to monitor. 6. Hypertension-continue Norvasc  7. Failure to thrive with unintentional weight loss-patient to undergo barium swallow eval today. 8. Depression-continue Paxil and Seroquel  DVT prophylaxis-Heparin  DNR    I spoke with the patient's son and updated him of current treatment plan.                 Cecil Eubanks MD  22      Case discussed with:  []Patient  []Family  []Nursing  []Case Management  DVT Prophylaxis:  []Lovenox  []Hep SQ  []SCDs  []Coumadin   []On Heparin gtt    Objective:   VS:   Visit Vitals  BP (!) 162/72 (BP 1 Location: Left upper arm, BP Patient Position: At rest)   Pulse 87   Temp 97.8 °F (36.6 °C)   Resp 17   Ht 5' (1.524 m)   Wt 42.6 kg (94 lb)   SpO2 96%   Breastfeeding No   BMI 18.36 kg/m²      Tmax/24hrs: Temp (24hrs), Av.2 °F (36.8 °C), Min:97.5 °F (36.4 °C), Max:98.9 °F (37.2 °C)  IOBRIEF    Intake/Output Summary (Last 24 hours) at 2022 1300  Last data filed at 2022 0646  Gross per 24 hour   Intake 1165 ml   Output 301 ml   Net 864 ml       General:  Alert, cooperative, no acute distress    Pulmonary: Decreased breath sounds in bases, poor effort  Cardiovascular: Regular rate and Rhythm. GI:  Soft, Non distended, Non tender. + Bowel sounds. Extremities:  No edema, cyanosis, clubbing. No calf tenderness. Neurologic: Sleepy but arousable.   Additional:    Medications:   Current Facility-Administered Medications   Medication Dose Route Frequency    barium sulfate (EZ PAQUE) 96 % (w/w) contrast suspension 30 g  30 g Oral RAD ONCE    barium sulfate (VARIBAR PUDDING) 40 % (w/v), 30% (w/w) contrast oral paste 15 mL  15 mL Oral RAD ONCE    heparin (porcine) injection 5,000 Units  5,000 Units SubCUTAneous Q8H    0.9% sodium chloride infusion  75 mL/hr IntraVENous CONTINUOUS    cefTRIAXone (ROCEPHIN) 1 g in sterile water (preservative free) 10 mL IV syringe  1 g IntraVENous Q24H    diphenhydrAMINE (BENADRYL) injection 25 mg  25 mg IntraVENous Q4H PRN    divalproex (DEPAKOTE SPRINKLE) capsule 250 mg  250 mg Per G Tube Q8H    amLODIPine (NORVASC) tablet 10 mg  10 mg Oral DAILY    atorvastatin (LIPITOR) tablet 40 mg  40 mg Oral DAILY    clopidogreL (PLAVIX) tablet 75 mg  75 mg Oral DAILY    PARoxetine (PAXIL) tablet 30 mg  30 mg Oral DAILY    multivitamin, tx-iron-ca-min (THERA-M w/ IRON) tablet 1 Tablet  1 Tablet Oral DAILY    ferrous sulfate tablet 325 mg  325 mg Oral DAILY    ergocalciferol capsule 50,000 Units  50,000 Units Oral 2 TIMES WEEKLY    sodium chloride (NS) flush 5-40 mL  5-40 mL IntraVENous Q8H    sodium chloride (NS) flush 5-40 mL  5-40 mL IntraVENous PRN    acetaminophen (TYLENOL) tablet 650 mg  650 mg Oral Q6H PRN    Or    acetaminophen (TYLENOL) suppository 650 mg  650 mg Rectal Q6H PRN    polyethylene glycol (MIRALAX) packet 17 g  17 g Oral DAILY PRN    ondansetron (ZOFRAN ODT) tablet 4 mg  4 mg Oral Q8H PRN    Or    ondansetron (ZOFRAN) injection 4 mg  4 mg IntraVENous Q6H PRN    OXcarbazepine (TRILEPTAL) tablet 600 mg  600 mg Oral BID       Imaging:   XR Results (most recent):  Results from [facility] Encounter encounter on 04/11/22    XR CHEST PORT    Narrative  EXAM:  XR CHEST PORT    INDICATION:   altered mental status    COMPARISON: 1/6/2022. FINDINGS:  The cardiac and mediastinal silhouette are within normal limits. Pulmonary  vasculature is within normal limits. No pneumothorax or pleural effusions. Mild  retrocardiac opacities. No acute osseous abnormality. Impression  Retrocardiac opacities favoring atelectasis, infiltrate not excluded. CT Results (most recent):  Results from Hospital Encounter encounter on 04/11/22    CT ABD PELV WO CONT    Narrative  EXAM:  CT Abdomen-Pelvis without Contrast.    CLINICAL INDICATION:  Intractable nausea and vomiting. UTI. COMPARISON:  04/02/22    TECHNIQUE:  Helical volumetric CT imaging of the abdomen and pelvis is performed  without IV or oral contrast administration. Coronal and sagittal multiplanar  reconstruction images are generated for improved anatomic delineation. All CT  scans at this facility are performed using dose optimization technique as  appropriate to the performed exam, to include automated exposure control,  adjustment of the mA and/or kV according to patient's size (Including  appropriate matching for site-specific examinations), or use of iterative  reconstruction technique. FINDINGS:    Lung Bases:  Right lower lobe infiltrate vs less likely subsegmental atelectatic  changes. Liver, Adrenal Glands, Spleen, Pancreas:  Unremarkable. Gallbladder:  Subtle faint hyperdensities in the posterior aspect of the  gallbladder, suggestive of possible gallstones. Kidneys-Ureters-Bladder:  Unremarkable kidneys. No postobstructive changes. No  ureteral stones. Underdistended urinary bladder. Mild circumferential bladder  wall thickening. GI Tract:  Status post percutaneous gastrostomy tube placement. Otherwise  unremarkable stomach. No acute small bowel abnormalities. No acute  abnormalities at the expected location for the appendix. No acute colitis or  diverticulitis. Mild stool retention in the rectum. Uterus, Adnexa:  Status post hysterectomy. No adnexal mass. Nodes:  No mesenteric or retroperitoneal lymphadenopathy. Vascular:  No acute aortic abnormalities. Body Wall:  Subcutaneous air collections in the left lower quadrant (axial  #51-56). Bones:  No acute bony abnormalities. Impression  1. No acute findings along the gastrointestinal tract. 2.  S/P percutaneous gastrostomy tube placement. 3.  Questionable gallstones vs. nonspecific debris. 4.  Subcutaneous air pockets in the left lower quadrant. Query recent  subcutaneous injections. If there were no subcutaneous injections, developing  infectious process may be considered as well.      04/11/22    ECHO ADULT COMPLETE 04/13/2022 4/13/2022    Interpretation Summary    Left Ventricle: Left ventricle size is normal. Increased wall thickness. Findings consistent with mild concentric hypertrophy. Normal wall motion. Normal left ventricular systolic function with a visually estimated EF of 60 - 65%. Normal diastolic function.     Signed by: Serafin Martin MD on 4/13/2022  8:25 AM       Labs:    Recent Results (from the past 50 hour(s))   URINALYSIS W/ RFLX MICROSCOPIC    Collection Time: 04/17/22  3:45 PM   Result Value Ref Range    Color DARK YELLOW      Appearance TURBID      Specific gravity 1.021 1.005 - 1.030      pH (UA) 5.5 5.0 - 8.0      Protein 100 (A) NEG mg/dL    Glucose Negative NEG mg/dL    Ketone 15 (A) NEG mg/dL    Bilirubin SMALL (A) NEG      Blood MODERATE (A) NEG      Urobilinogen 1.0 0.2 - 1.0 EU/dL    Nitrites Negative NEG      Leukocyte Esterase LARGE (A) NEG     URINE MICROSCOPIC ONLY    Collection Time: 04/17/22  3:45 PM   Result Value Ref Range    WBC TOO NUMEROUS TO COUNT 0 - 4 /hpf    RBC 3 to 8 0 - 5 /hpf    Epithelial cells 1+ 0 - 5 /lpf    Bacteria 1+ (A) NEG /hpf   CULTURE, URINE    Collection Time: 04/17/22  5:30 PM Specimen: Clean catch; Urine   Result Value Ref Range    Special Requests: NO SPECIAL REQUESTS      Olar Count >100,000  COLONIES/mL        Culture result: GRAM NEGATIVE RODS (A)     METABOLIC PANEL, BASIC    Collection Time: 04/18/22  4:00 AM   Result Value Ref Range    Sodium 135 (L) 136 - 145 mmol/L    Potassium 4.3 3.5 - 5.5 mmol/L    Chloride 101 100 - 111 mmol/L    CO2 29 21 - 32 mmol/L    Anion gap 5 3.0 - 18 mmol/L    Glucose 97 74 - 99 mg/dL    BUN 36 (H) 7.0 - 18 MG/DL    Creatinine 1.64 (H) 0.6 - 1.3 MG/DL    BUN/Creatinine ratio 22 (H) 12 - 20      GFR est AA 38 (L) >60 ml/min/1.73m2    GFR est non-AA 31 (L) >60 ml/min/1.73m2    Calcium 9.5 8.5 - 10.1 MG/DL   CBC WITH AUTOMATED DIFF    Collection Time: 04/18/22  4:00 AM   Result Value Ref Range    WBC 14.3 (H) 4.6 - 13.2 K/uL    RBC 3.26 (L) 4.20 - 5.30 M/uL    HGB 10.2 (L) 12.0 - 16.0 g/dL    HCT 30.7 (L) 35.0 - 45.0 %    MCV 94.2 78.0 - 100.0 FL    MCH 31.3 24.0 - 34.0 PG    MCHC 33.2 31.0 - 37.0 g/dL    RDW 13.9 11.6 - 14.5 %    PLATELET 355 229 - 140 K/uL    MPV 12.3 (H) 9.2 - 11.8 FL    NRBC 0.0 0  WBC    ABSOLUTE NRBC 0.00 0.00 - 0.01 K/uL    NEUTROPHILS 78 (H) 40 - 73 %    LYMPHOCYTES 12 (L) 21 - 52 %    MONOCYTES 9 3 - 10 %    EOSINOPHILS 0 0 - 5 %    BASOPHILS 0 0 - 2 %    IMMATURE GRANULOCYTES 1 (H) 0.0 - 0.5 %    ABS. NEUTROPHILS 11.2 (H) 1.8 - 8.0 K/UL    ABS. LYMPHOCYTES 1.7 0.9 - 3.6 K/UL    ABS. MONOCYTES 1.3 (H) 0.05 - 1.2 K/UL    ABS. EOSINOPHILS 0.0 0.0 - 0.4 K/UL    ABS. BASOPHILS 0.0 0.0 - 0.1 K/UL    ABS. IMM.  GRANS. 0.1 (H) 0.00 - 0.04 K/UL    DF AUTOMATED     METABOLIC PANEL, BASIC    Collection Time: 04/19/22  3:40 AM   Result Value Ref Range    Sodium 136 136 - 145 mmol/L    Potassium 4.1 3.5 - 5.5 mmol/L    Chloride 102 100 - 111 mmol/L    CO2 26 21 - 32 mmol/L    Anion gap 8 3.0 - 18 mmol/L    Glucose 90 74 - 99 mg/dL    BUN 39 (H) 7.0 - 18 MG/DL    Creatinine 1.54 (H) 0.6 - 1.3 MG/DL    BUN/Creatinine ratio 25 (H) 12 - 20      GFR est AA 41 (L) >60 ml/min/1.73m2    GFR est non-AA 34 (L) >60 ml/min/1.73m2    Calcium 10.0 8.5 - 10.1 MG/DL   CBC WITH AUTOMATED DIFF    Collection Time: 04/19/22  3:40 AM   Result Value Ref Range    WBC 14.7 (H) 4.6 - 13.2 K/uL    RBC 3.17 (L) 4.20 - 5.30 M/uL    HGB 9.8 (L) 12.0 - 16.0 g/dL    HCT 29.8 (L) 35.0 - 45.0 %    MCV 94.0 78.0 - 100.0 FL    MCH 30.9 24.0 - 34.0 PG    MCHC 32.9 31.0 - 37.0 g/dL    RDW 13.9 11.6 - 14.5 %    PLATELET 373 137 - 074 K/uL    MPV 12.4 (H) 9.2 - 11.8 FL    NRBC 0.0 0  WBC    ABSOLUTE NRBC 0.00 0.00 - 0.01 K/uL    NEUTROPHILS 82 (H) 40 - 73 %    LYMPHOCYTES 10 (L) 21 - 52 %    MONOCYTES 8 3 - 10 %    EOSINOPHILS 0 0 - 5 %    BASOPHILS 0 0 - 2 %    IMMATURE GRANULOCYTES 0 0.0 - 0.5 %    ABS. NEUTROPHILS 12.0 (H) 1.8 - 8.0 K/UL    ABS. LYMPHOCYTES 1.4 0.9 - 3.6 K/UL    ABS. MONOCYTES 1.2 0.05 - 1.2 K/UL    ABS. EOSINOPHILS 0.0 0.0 - 0.4 K/UL    ABS. BASOPHILS 0.0 0.0 - 0.1 K/UL    ABS. IMM. GRANS. 0.1 (H) 0.00 - 0.04 K/UL    DF AUTOMATED     HEPATIC FUNCTION PANEL    Collection Time: 04/19/22  3:40 AM   Result Value Ref Range    Protein, total 7.0 6.4 - 8.2 g/dL    Albumin 3.3 (L) 3.4 - 5.0 g/dL    Globulin 3.7 2.0 - 4.0 g/dL    A-G Ratio 0.9 0.8 - 1.7      Bilirubin, total 0.3 0.2 - 1.0 MG/DL    Bilirubin, direct <0.1 0.0 - 0.2 MG/DL    Alk. phosphatase 75 45 - 117 U/L    AST (SGOT) 12 10 - 38 U/L    ALT (SGPT) 21 13 - 56 U/L       Signed By: Sen Rosario MD     April 19, 2022      I spent 25 minutes with the patient in face-to-face consultation, of which greater than 50% was spent in counseling and coordination of care as described above    Disclaimer: Sections of this note are dictated using utilizing voice recognition software. Minor typographical errors may be present. If questions arise, please do not hesitate to contact me or call our department.

## 2022-04-19 NOTE — PROGRESS NOTES
Infectious Disease progress Note        Reason: evaluate for cystitis    Current abx Prior abx   Nitrofurantoin  4/17/2022-4/18/22  Ceftriaxone since 4/18/22      Lines:       Assessment :  60-year-old female with a past medical history significant for hypertension, prior seizures, history of CVA who was brought in by her family on 4/11/2022 following a witnessed seizure. Now with worsening lethargy x 2 days, fever with T-max 100.2 on 4/17, dysuria, worsening renal function, significant pyuria, abdominal pain/nausea    Clinical presentation consistent with cystitis, pyelonephritis  Urine cx 4/17- >100,000 gnr    No evidence of hydronephrosis noted on CT scan 4/18/2022. Noncontrast CT scan will likely not reveal pyelonephritis    Diffuse abdominal pain/tenderness, nausea-could be secondary to undiagnosed pyelonephritis. Improved    Antibiotic management complicated due to documented history of allergy to penicillin, ciprofloxacin-nonlife-threatening allergy to penicillin. Hence will use cephalosporins with close monitoring    Acute kidney injury-rule out obstructive uropathy, complicated UTI    Altered mental status-likely metabolic encephalopathy. Improved    Recommendations:    1. Continue ceftriaxone  2. Follow-up urine cultures and modify antibiotics accordingly  3. Add on LFTs to today's labs  4. Management of acute kidney injury per primary team  5. Further recommendations based on the above test results, clinical course     Above plan was discussed in details with RN and primary team please call me if any further questions or concerns. Will continue to participate in the care of this patient. HPI:    Feels better. Denies abdominal pain, dysuria.     Past Medical History:   Diagnosis Date    Abnormal coordination 9/21/2020    Depression 4/12/2022    Failure to thrive in adult 4/12/2022    Hallucinations 9/21/2020    Hypertension     Neurological disorder     [facility] [address]     Stroke ([facility] [address] 2009    Subclinical hypothyroidism 4/13/2022       Past Surgical History:   Procedure Laterality Date    PLACE PERCUT GASTROSTOMY TUBE  1/11/2022            Current Discharge Medication List      CONTINUE these medications which have NOT CHANGED    Details   ferrous sulfate 325 mg (65 mg iron) tablet Take 325 mg by mouth daily. divalproex DR (DEPAKOTE) 250 mg tablet Take 250 mg by mouth three (3) times daily. amLODIPine (NORVASC) 10 mg tablet Take 10 mg by mouth daily. spironolactone (ALDACTONE) 25 mg tablet Take 25 mg by mouth daily. hydrALAZINE (APRESOLINE) 100 mg tablet Take 100 mg by mouth two (2) times a day. tamsulosin HCl (TAMSULOSIN PO) Take 0.4 mg by mouth daily. tab      PARoxetine (PAXIL) 30 mg tablet Take 30 mg by mouth daily. OXcarbazepine (TRILEPTAL) 300 mg tablet Take 300 mg by mouth two (2) times a day. multivitamin, tx-iron-ca-min (THERA-M w/ IRON) 9 mg iron-400 mcg tab tablet Take 1 Tablet by mouth daily. Qty: 30 Tablet, Refills: 0      atorvastatin (LIPITOR) 40 mg tablet Take 40 mg by mouth daily. acetaminophen (TYLENOL) 500 mg tablet Take 500 mg by mouth every six (6) hours as needed for Pain. take 1 tab every 6 hours as needed daily for pain      clopidogreL (PLAVIX) 75 mg tab Take 75 mg by mouth daily. Indications: prevention for a blood clot going to the brain      ergocalciferol (VITAMIN D2) 50,000 unit capsule Take 50,000 Units by mouth two (2) times a week. cyanocobalamin (VITAMIN B12) 500 mcg tablet Take 1 Tab by mouth daily.   Qty: 30 Tab, Refills: 0             Current Facility-Administered Medications   Medication Dose Route Frequency    heparin (porcine) injection 5,000 Units  5,000 Units SubCUTAneous Q8H    0.9% sodium chloride infusion  75 mL/hr IntraVENous CONTINUOUS    cefTRIAXone (ROCEPHIN) 1 g in sterile water (preservative free) 10 mL IV syringe  1 g IntraVENous Q24H    diphenhydrAMINE (BENADRYL) injection 25 mg  25 mg IntraVENous Q4H PRN    divalproex (DEPAKOTE SPRINKLE) capsule 250 mg  250 mg Per G Tube Q8H    amLODIPine (NORVASC) tablet 10 mg  10 mg Oral DAILY    atorvastatin (LIPITOR) tablet 40 mg  40 mg Oral DAILY    clopidogreL (PLAVIX) tablet 75 mg  75 mg Oral DAILY    PARoxetine (PAXIL) tablet 30 mg  30 mg Oral DAILY    multivitamin, tx-iron-ca-min (THERA-M w/ IRON) tablet 1 Tablet  1 Tablet Oral DAILY    ferrous sulfate tablet 325 mg  325 mg Oral DAILY    ergocalciferol capsule 50,000 Units  50,000 Units Oral 2 TIMES WEEKLY    sodium chloride (NS) flush 5-40 mL  5-40 mL IntraVENous Q8H    sodium chloride (NS) flush 5-40 mL  5-40 mL IntraVENous PRN    acetaminophen (TYLENOL) tablet 650 mg  650 mg Oral Q6H PRN    Or    acetaminophen (TYLENOL) suppository 650 mg  650 mg Rectal Q6H PRN    polyethylene glycol (MIRALAX) packet 17 g  17 g Oral DAILY PRN    ondansetron (ZOFRAN ODT) tablet 4 mg  4 mg Oral Q8H PRN    Or    ondansetron (ZOFRAN) injection 4 mg  4 mg IntraVENous Q6H PRN    OXcarbazepine (TRILEPTAL) tablet 600 mg  600 mg Oral BID       Allergies: Tomato, Aspirin, Ciprofloxacin, Pcn [penicillins], and Sulfa (sulfonamide antibiotics)    Family History   Problem Relation Age of Onset    Diabetes Other     Stroke Other      Social History     Socioeconomic History    Marital status:      Spouse name: Not on file    Number of children: Not on file    Years of education: Not on file    Highest education level: Not on file   Occupational History    Not on file   Tobacco Use    Smoking status: Never Smoker    Smokeless tobacco: Never Used   Vaping Use    Vaping Use: Never used   Substance and Sexual Activity    Alcohol use: Yes     Comment: Socially     Drug use: No    Sexual activity: Not on file   Other Topics Concern    Not on file   Social History Narrative    Not on file     Social Determinants of Health     Financial Resource Strain:     Difficulty of Paying Living Expenses: Not on file   Food Insecurity:     Worried About Running Out of Food in the Last Year: Not on file    Binu of Food in the Last Year: Not on file   Transportation Needs:     Lack of Transportation (Medical): Not on file    Lack of Transportation (Non-Medical): Not on file   Physical Activity:     Days of Exercise per Week: Not on file    Minutes of Exercise per Session: Not on file   Stress:     Feeling of Stress : Not on file   Social Connections:     Frequency of Communication with Friends and Family: Not on file    Frequency of Social Gatherings with Friends and Family: Not on file    Attends Hinduism Services: Not on file    Active Member of [address] [place] [address] [id number] or Organizations: Not on file    Attends Club or Organization Meetings: Not on file    Marital Status: Not on file   Intimate Partner Violence:     Fear of Current or Ex-Partner: Not on file    Emotionally Abused: Not on file    Physically Abused: Not on file    Sexually Abused: Not on file   Housing Stability:     Unable to Pay for Housing in the Last Year: Not on file    Number of [place] in the Last Year: Not on file    Unstable Housing in the Last Year: Not on file     Social History     Tobacco Use   Smoking Status Never Smoker   Smokeless Tobacco Never Used        Temp (24hrs), Av.3 °F (36.8 °C), Min:97.5 °F (36.4 °C), Max:98.9 °F (37.2 °C)    Visit Vitals  BP (!) 156/78 (BP 1 Location: Left upper arm, BP Patient Position: At rest)   Pulse 88   Temp 98.9 °F (37.2 °C)   Resp 17   Ht 5' (1.524 m)   Wt 42.6 kg (94 lb)   SpO2 98%   Breastfeeding No   BMI 18.36 kg/m²       ROS: 12 point ROS obtained in details. Pertinent positives as mentioned in HPI,   otherwise negative    Physical Exam:    General: Female patient sitting on bed, more alert and communicative    General:   awake alert and oriented   HEENT:  Normocephalic, atraumatic,  EOMI, no scleral icterus or pallor; no conjunctival hemmohage;  Neck supple, no bruits.    Lymph Nodes:   not examined   Lungs:   non-labored, bilaterally clear to auscultation- no crackles wheezes rales or rhonchi   Heart:  RRR, s1 and s2; no rubs or gallops, no edema   Abdomen:  soft, non-distended, resolved tenderness, no guarding/rigidity, no hepatomegaly, no splenomegaly. Genitourinary:  deferred   Extremities:   no clubbing, cyanosis; no joint effusions or swelling; muscle mass appropriate for age   Neurologic:   more alert, no gross motor or sensory deficits noted                        Skin:  No rash or ulcers noted   Back:  no spinal or paraspinal muscle tenderness or rigidity, no CVA tenderness     Psychiatric:   Calm         Labs: Results:   Chemistry Recent Labs     04/19/22  0340 04/18/22  0400 04/17/22  0416   GLU 90 97 81    135* 137   K 4.1 4.3 3.9    101 103   CO2 26 29 28   BUN 39* 36* 23*   CREA 1.54* 1.64* 1.04   CA 10.0 9.5 9.8   AGAP 8 5 6   BUCR 25* 22* 22*      CBC w/Diff Recent Labs     04/19/22  0340 04/18/22  0400   WBC 14.7* 14.3*   RBC 3.17* 3.26*   HGB 9.8* 10.2*   HCT 29.8* 30.7*    197   GRANS 82* 78*   LYMPH 10* 12*   EOS 0 0      Microbiology Recent Labs     04/17/22  1730   CULT GRAM NEGATIVE RODS*          RADIOLOGY:    All available imaging studies/reports in [facility] care for this admission were reviewed      Disclaimer: Sections of this note are dictated utilizing voice recognition software, which may have resulted in some phonetic based errors in grammar and contents. Even though attempts were made to correct all the mistakes, some may have been missed, and remained in the body of the document. If questions arise, please contact our department.     Dr. Whit Oneal, Infectious Disease Specialist  [phone]  April 19, 2022  2:23 PM

## 2022-04-20 ENCOUNTER — HOME CARE VISIT (OUTPATIENT)
Dept: SCHEDULING | Facility: HOME HEALTH | Age: 67
End: 2022-04-20
Payer: MEDICARE

## 2022-04-20 LAB
BACTERIA SPEC CULT: ABNORMAL
CC UR VC: ABNORMAL
SERVICE CMNT-IMP: ABNORMAL

## 2022-04-20 PROCEDURE — 74011250637 HC RX REV CODE- 250/637: Performed by: INTERNAL MEDICINE

## 2022-04-20 PROCEDURE — 74011000250 HC RX REV CODE- 250: Performed by: INTERNAL MEDICINE

## 2022-04-20 PROCEDURE — 74011250637 HC RX REV CODE- 250/637: Performed by: STUDENT IN AN ORGANIZED HEALTH CARE EDUCATION/TRAINING PROGRAM

## 2022-04-20 PROCEDURE — 77030018842 HC SOL IRR SOD CL 9% BAXT -A

## 2022-04-20 PROCEDURE — G0378 HOSPITAL OBSERVATION PER HR: HCPCS

## 2022-04-20 PROCEDURE — 99232 SBSQ HOSP IP/OBS MODERATE 35: CPT | Performed by: HOSPITALIST

## 2022-04-20 PROCEDURE — 92526 ORAL FUNCTION THERAPY: CPT

## 2022-04-20 PROCEDURE — 96372 THER/PROPH/DIAG INJ SC/IM: CPT

## 2022-04-20 PROCEDURE — 74011250636 HC RX REV CODE- 250/636: Performed by: INTERNAL MEDICINE

## 2022-04-20 PROCEDURE — 96374 THER/PROPH/DIAG INJ IV PUSH: CPT

## 2022-04-20 RX ADMIN — DIVALPROEX SODIUM 250 MG: 125 CAPSULE, COATED PELLETS ORAL at 13:14

## 2022-04-20 RX ADMIN — SODIUM CHLORIDE 75 ML/HR: 900 INJECTION, SOLUTION INTRAVENOUS at 15:57

## 2022-04-20 RX ADMIN — HEPARIN SODIUM 5000 UNITS: 5000 INJECTION INTRAVENOUS; SUBCUTANEOUS at 18:15

## 2022-04-20 RX ADMIN — FERROUS SULFATE TAB 325 MG (65 MG ELEMENTAL FE) 325 MG: 325 (65 FE) TAB at 09:32

## 2022-04-20 RX ADMIN — SODIUM CHLORIDE, PRESERVATIVE FREE 10 ML: 5 INJECTION INTRAVENOUS at 23:00

## 2022-04-20 RX ADMIN — SODIUM CHLORIDE, PRESERVATIVE FREE 10 ML: 5 INJECTION INTRAVENOUS at 06:07

## 2022-04-20 RX ADMIN — AMLODIPINE BESYLATE 10 MG: 10 TABLET ORAL at 09:32

## 2022-04-20 RX ADMIN — ATORVASTATIN CALCIUM 40 MG: 40 TABLET, FILM COATED ORAL at 09:32

## 2022-04-20 RX ADMIN — HEPARIN SODIUM 5000 UNITS: 5000 INJECTION INTRAVENOUS; SUBCUTANEOUS at 00:28

## 2022-04-20 RX ADMIN — WATER 1 G: 1 INJECTION INTRAMUSCULAR; INTRAVENOUS; SUBCUTANEOUS at 15:56

## 2022-04-20 RX ADMIN — HEPARIN SODIUM 5000 UNITS: 5000 INJECTION INTRAVENOUS; SUBCUTANEOUS at 09:33

## 2022-04-20 RX ADMIN — DIVALPROEX SODIUM 250 MG: 125 CAPSULE, COATED PELLETS ORAL at 06:06

## 2022-04-20 RX ADMIN — DIVALPROEX SODIUM 250 MG: 125 CAPSULE, COATED PELLETS ORAL at 00:28

## 2022-04-20 RX ADMIN — OXCARBAZEPINE 600 MG: 300 TABLET, FILM COATED ORAL at 18:15

## 2022-04-20 RX ADMIN — OXCARBAZEPINE 600 MG: 300 TABLET, FILM COATED ORAL at 09:32

## 2022-04-20 RX ADMIN — DIVALPROEX SODIUM 250 MG: 125 CAPSULE, COATED PELLETS ORAL at 22:59

## 2022-04-20 RX ADMIN — SODIUM CHLORIDE 75 ML/HR: 900 INJECTION, SOLUTION INTRAVENOUS at 00:37

## 2022-04-20 RX ADMIN — PAROXETINE HYDROCHLORIDE 30 MG: 20 TABLET, FILM COATED ORAL at 09:32

## 2022-04-20 RX ADMIN — SODIUM CHLORIDE, PRESERVATIVE FREE 10 ML: 5 INJECTION INTRAVENOUS at 00:28

## 2022-04-20 RX ADMIN — SODIUM CHLORIDE, PRESERVATIVE FREE 10 ML: 5 INJECTION INTRAVENOUS at 15:57

## 2022-04-20 RX ADMIN — MULTIPLE VITAMINS W/ MINERALS TAB 1 TABLET: TAB at 09:33

## 2022-04-20 RX ADMIN — CLOPIDOGREL BISULFATE 75 MG: 75 TABLET ORAL at 09:32

## 2022-04-20 NOTE — PROGRESS NOTES
Requested Case Management specialist to assist with transportation to home . Address is [address] and phone number is [phone]  Patient will require stretcher transport. Pt requires Stretcher If stretcher, reason: failure to thrive adult, seizure disorder, break through seizure, altered mentation, non mobile , peg tube, DNR  Patient is currently requiring oxygen No   Height:5 feet    Weight: 94 lb  Pt is on isolation: No  Is the pt ready now? no  Requested time: 4/21/22 12 PM  PCS Faxed: no  Insurance verified on face sheet: yes  Auth needed for transport: yes  CM completed PCS/ Envelope and placed on chart.

## 2022-04-20 NOTE — PROGRESS NOTES
Per Kyara Freeman) called [facility] transportation at [phone] scheduled stretcher transport to patient's home ([place], [address], [facility], [address]) for 12:00 noon tomorrow (Thursday, April 21, 2022) with [facility] and received confirmation number [id number]. Informed Kyara Hernandez of transportation conversation and arrangements.

## 2022-04-20 NOTE — PROGRESS NOTES
[facility]  [facility]    Patient: Diomedes Mendez Age: 75 y.o. : 1955 MR#: [id number] SSN: [id number]  Date/Time: 2022     Subjective:     Patient seen and evaluated, lying in bed, no acute distress. No new events overnight. Assessment/Plan:     1. Breakthrough seizures-seen by neurology, recommendations to continue Depakote and increased dose of Trileptal.  No reported seizures since admission. 2. UTI- patient initially started on Macrobid. Continue IV Rocephin, ID on board. 3. KEYLA-creatinine elevated at 1.6, continue IVF, monitor strict I's and O's.  4. Elevated troponin-likely secondary to seizures, appreciate cardiology assistance, echocardiogram reviewed, no further work-up at this time. 5. Subclinical hypothyroidism-continue to monitor. 6. Hypertension-continue Norvasc  7. Failure to thrive with unintentional weight loss-patient underwent Ba Swallow & passed - will continue soft diet. 8. Depression-continue Paxil and Seroquel  DVT prophylaxis-Heparin  DNR    I spoke with the patient's son and updated him of current treatment plan. CM on board - plan for discharge in AM with [facility] - pt will be going home with son.                  Gertrudis Kent MD  22      Case discussed with:  []Patient  []Family  []Nursing  []Case Management  DVT Prophylaxis:  []Lovenox  []Hep SQ  []SCDs  []Coumadin   []On Heparin gtt    Objective:   VS:   Visit Vitals  BP (!) 150/84 (BP 1 Location: Left upper arm, BP Patient Position: At rest)   Pulse 74   Temp 98.1 °F (36.7 °C)   Resp 20   Ht 5' (1.524 m)   Wt 42.6 kg (94 lb)   SpO2 98%   Breastfeeding No   BMI 18.36 kg/m²      Tmax/24hrs: Temp (24hrs), Av.3 °F (36.8 °C), Min:98 °F (36.7 °C), Max:98.8 °F (37.1 °C)  IOBRIEF    Intake/Output Summary (Last 24 hours) at 2022 1508  Last data filed at 2022 0610  Gross per 24 hour   Intake 0 ml   Output 200 ml   Net -200 ml       General:  Alert, cooperative, no acute distress    Pulmonary: Decreased breath sounds in bases, poor effort  Cardiovascular: Regular rate and Rhythm. GI:  Soft, Non distended, Non tender. + Bowel sounds. Extremities:  No edema, cyanosis, clubbing. No calf tenderness. Neurologic: Sleepy but arousable. Additional:    Medications:   Current Facility-Administered Medications   Medication Dose Route Frequency    heparin (porcine) injection 5,000 Units  5,000 Units SubCUTAneous Q8H    0.9% sodium chloride infusion  75 mL/hr IntraVENous CONTINUOUS    cefTRIAXone (ROCEPHIN) 1 g in sterile water (preservative free) 10 mL IV syringe  1 g IntraVENous Q24H    diphenhydrAMINE (BENADRYL) injection 25 mg  25 mg IntraVENous Q4H PRN    divalproex (DEPAKOTE SPRINKLE) capsule 250 mg  250 mg Per G Tube Q8H    amLODIPine (NORVASC) tablet 10 mg  10 mg Oral DAILY    atorvastatin (LIPITOR) tablet 40 mg  40 mg Oral DAILY    clopidogreL (PLAVIX) tablet 75 mg  75 mg Oral DAILY    PARoxetine (PAXIL) tablet 30 mg  30 mg Oral DAILY    multivitamin, tx-iron-ca-min (THERA-M w/ IRON) tablet 1 Tablet  1 Tablet Oral DAILY    ferrous sulfate tablet 325 mg  325 mg Oral DAILY    ergocalciferol capsule 50,000 Units  50,000 Units Oral 2 TIMES WEEKLY    sodium chloride (NS) flush 5-40 mL  5-40 mL IntraVENous Q8H    sodium chloride (NS) flush 5-40 mL  5-40 mL IntraVENous PRN    acetaminophen (TYLENOL) tablet 650 mg  650 mg Oral Q6H PRN    Or    acetaminophen (TYLENOL) suppository 650 mg  650 mg Rectal Q6H PRN    polyethylene glycol (MIRALAX) packet 17 g  17 g Oral DAILY PRN    ondansetron (ZOFRAN ODT) tablet 4 mg  4 mg Oral Q8H PRN    Or    ondansetron (ZOFRAN) injection 4 mg  4 mg IntraVENous Q6H PRN    OXcarbazepine (TRILEPTAL) tablet 600 mg  600 mg Oral BID       Imaging:   XR Results (most recent):  Results from Hospital Encounter encounter on 04/11/22    XR SWALLOW FUNC VIDEO    Narrative  MODIFIED BARIUM SWALLOW.     CLINICAL INDICATION/HISTORY: Dysphagia. Feeding difficulties. History of CVA. Seizure disorder. Failure to thrive with unintentional weight loss. PEG tube  feedings. Evaluation for dietary considerations. COMPARISON: None. TECHNIQUE: A live videoradiographic swallowing function study was performed in  conjunction with the speech therapist.  The video is available in the department  for review by speech pathology and ancillary staff. Fluoroscopic images were not made available in PACS for radiologist review and  this report is strictly a procedural documentation by the physician assistant  with input from speech pathology. It is not considered inclusive or exclusive  of anatomic abnormalities and is not diagnostic beyond the specific  considerations regarding swallowing function as it relates to airway protection  while eating and drinking. Fluoroscopy time: 2 minutes 36 seconds    Fluoroscopic images: 0    Electronic capture cine loops: 15    FINDINGS:    Patient swallowed multiple consistencies of barium mixtures including thin  liquids and pudding. There was no temi penetration and aspiration with both tested consistencies. Oral and pharyngeal phase delay, premature spillage, and pyriform residuals were  seen with both tested consistencies. Impression  No temi penetration or aspiration with both tested consistencies. Please see speech pathologist report for additional details and recommendations. CT Results (most recent):  Results from Hospital Encounter encounter on 04/11/22    CT ABD PELV WO CONT    Narrative  EXAM:  CT Abdomen-Pelvis without Contrast.    CLINICAL INDICATION:  Intractable nausea and vomiting. UTI. COMPARISON:  04/02/22    TECHNIQUE:  Helical volumetric CT imaging of the abdomen and pelvis is performed  without IV or oral contrast administration. Coronal and sagittal multiplanar  reconstruction images are generated for improved anatomic delineation.   All CT  scans at this facility are performed using dose optimization technique as  appropriate to the performed exam, to include automated exposure control,  adjustment of the mA and/or kV according to patient's size (Including  appropriate matching for site-specific examinations), or use of iterative  reconstruction technique. FINDINGS:    Lung Bases:  Right lower lobe infiltrate vs less likely subsegmental atelectatic  changes. Liver, Adrenal Glands, Spleen, Pancreas:  Unremarkable. Gallbladder:  Subtle faint hyperdensities in the posterior aspect of the  gallbladder, suggestive of possible gallstones. Kidneys-Ureters-Bladder:  Unremarkable kidneys. No postobstructive changes. No  ureteral stones. Underdistended urinary bladder. Mild circumferential bladder  wall thickening. GI Tract:  Status post percutaneous gastrostomy tube placement. Otherwise  unremarkable stomach. No acute small bowel abnormalities. No acute  abnormalities at the expected location for the appendix. No acute colitis or  diverticulitis. Mild stool retention in the rectum. Uterus, Adnexa:  Status post hysterectomy. No adnexal mass. Nodes:  No mesenteric or retroperitoneal lymphadenopathy. Vascular:  No acute aortic abnormalities. Body Wall:  Subcutaneous air collections in the left lower quadrant (axial  #51-56). Bones:  No acute bony abnormalities. Impression  1. No acute findings along the gastrointestinal tract. 2.  S/P percutaneous gastrostomy tube placement. 3.  Questionable gallstones vs. nonspecific debris. 4.  Subcutaneous air pockets in the left lower quadrant. Query recent  subcutaneous injections. If there were no subcutaneous injections, developing  infectious process may be considered as well.      04/11/22    ECHO ADULT COMPLETE 04/13/2022 4/13/2022    Interpretation Summary    Left Ventricle: Left ventricle size is normal. Increased wall thickness.  Findings consistent with mild concentric hypertrophy. Normal wall motion. Normal left ventricular systolic function with a visually estimated EF of 60 - 65%. Normal diastolic function. Signed by: Alis Hector MD on 4/13/2022  8:25 AM       Labs:    Recent Results (from the past 50 hour(s))   METABOLIC PANEL, BASIC    Collection Time: 04/19/22  3:40 AM   Result Value Ref Range    Sodium 136 136 - 145 mmol/L    Potassium 4.1 3.5 - 5.5 mmol/L    Chloride 102 100 - 111 mmol/L    CO2 26 21 - 32 mmol/L    Anion gap 8 3.0 - 18 mmol/L    Glucose 90 74 - 99 mg/dL    BUN 39 (H) 7.0 - 18 MG/DL    Creatinine 1.54 (H) 0.6 - 1.3 MG/DL    BUN/Creatinine ratio 25 (H) 12 - 20      GFR est AA 41 (L) >60 ml/min/1.73m2    GFR est non-AA 34 (L) >60 ml/min/1.73m2    Calcium 10.0 8.5 - 10.1 MG/DL   CBC WITH AUTOMATED DIFF    Collection Time: 04/19/22  3:40 AM   Result Value Ref Range    WBC 14.7 (H) 4.6 - 13.2 K/uL    RBC 3.17 (L) 4.20 - 5.30 M/uL    HGB 9.8 (L) 12.0 - 16.0 g/dL    HCT 29.8 (L) 35.0 - 45.0 %    MCV 94.0 78.0 - 100.0 FL    MCH 30.9 24.0 - 34.0 PG    MCHC 32.9 31.0 - 37.0 g/dL    RDW 13.9 11.6 - 14.5 %    PLATELET 156 152 - 102 K/uL    MPV 12.4 (H) 9.2 - 11.8 FL    NRBC 0.0 0  WBC    ABSOLUTE NRBC 0.00 0.00 - 0.01 K/uL    NEUTROPHILS 82 (H) 40 - 73 %    LYMPHOCYTES 10 (L) 21 - 52 %    MONOCYTES 8 3 - 10 %    EOSINOPHILS 0 0 - 5 %    BASOPHILS 0 0 - 2 %    IMMATURE GRANULOCYTES 0 0.0 - 0.5 %    ABS. NEUTROPHILS 12.0 (H) 1.8 - 8.0 K/UL    ABS. LYMPHOCYTES 1.4 0.9 - 3.6 K/UL    ABS. MONOCYTES 1.2 0.05 - 1.2 K/UL    ABS. EOSINOPHILS 0.0 0.0 - 0.4 K/UL    ABS. BASOPHILS 0.0 0.0 - 0.1 K/UL    ABS. IMM.  GRANS. 0.1 (H) 0.00 - 0.04 K/UL    DF AUTOMATED     HEPATIC FUNCTION PANEL    Collection Time: 04/19/22  3:40 AM   Result Value Ref Range    Protein, total 7.0 6.4 - 8.2 g/dL    Albumin 3.3 (L) 3.4 - 5.0 g/dL    Globulin 3.7 2.0 - 4.0 g/dL    A-G Ratio 0.9 0.8 - 1.7      Bilirubin, total 0.3 0.2 - 1.0 MG/DL    Bilirubin, direct <0.1 0.0 - 0.2 MG/DL    Alk. phosphatase 75 45 - 117 U/L    AST (SGOT) 12 10 - 38 U/L    ALT (SGPT) 21 13 - 56 U/L       Signed By: Jayda Alcantar MD     April 20, 2022      I spent 25 minutes with the patient in face-to-face consultation, of which greater than 50% was spent in counseling and coordination of care as described above    Disclaimer: Sections of this note are dictated using utilizing voice recognition software. Minor typographical errors may be present. If questions arise, please do not hesitate to contact me or call our department.

## 2022-04-20 NOTE — Clinical Note
thank you   ----- Message -----  From: Alphonso Brewster, PTA  Sent: 4/20/2022   2:24 PM EDT  To: Dav Marcelo OTR/ROOSEVELT      Have called pt's son several times this week while his mother was on Observation since Monday 4/18. Was able to talk to him by phone. He reports that pt is supposed to be DC'd tomorrow 4/21 and agreed to visit on Friday, if she is feeling up to it.

## 2022-04-20 NOTE — PROGRESS NOTES
Nutrition Assessment     Type and Reason for Visit: Reassess,Consult    Nutrition Recommendations/Plan:   1. Recommend starting enteral nutrition through PEG; recommend starting Jevity 1.5 at 10 mL/hr and advance as tolerated by 10 mL q 8 hours to goal rate of 40 mL/hr with 150 mL q 4 hour water flushes( provides 1440 kcal, 61 gm protein, 730 mL free water, 96% RDIs)   2. Modify IVF once tube feeding reaches goal.   3. Monitor and replace electrolytes as needed due to concern for refeeding syndrome. Nutrition Assessment:  Patient continues with poor intake, RN reports 25% of breakfast with assistance and then she would not open her mouth for her anymore. Patient needs full assistance with meals. Patient asleep during visit. MBS performed 4/18 with SLP recommendation of Full Liquids, reflects current diet. Patient has had poor intake for quite some time now with significant weight loss and moderate malnutrition noted. Patient still has PEG. Malnutrition Assessment:  Malnutrition Status:  Moderate malnutrition     Estimated Daily Nutrient Needs:  Energy (kcal):  3947-0894 (30-35 kcal/kg)  Protein (g):  44-57 (1.0-1.3)       Fluid (ml/day):  4162-1176    Nutrition Related Findings:  BM 4/18; patient receiving IVF    Current Nutrition Therapies:  ADULT ORAL NUTRITION SUPPLEMENT Breakfast, Lunch, Dinner; Standard High Calorie/High Protein  ADULT DIET Full Liquid    Anthropometric Measures:  Height:  5' (152.4 cm)  Current Body Wt:     BMI: 18.7    Nutrition Diagnosis:   · Inadequate oral intake related to cognitive or neurological impairment,acute injury/trauma as evidenced by intake 51-75%      Nutrition Interventions:   Food and/or Nutrient Delivery: Start tube feeding,Continue current diet  Nutrition Education/Counseling: No recommendations at this time,Education not indicated  Coordination of Nutrition Care: Continue to monitor while inpatient       Goals:  Previous Goal Met: No progress toward goal(s)  Goals: Meet at least 75% of estimated needs,by next RD assessment       Nutrition Monitoring and Evaluation:   Behavioral-Environmental Outcomes: None identified  Food/Nutrient Intake Outcomes: Food and nutrient intake,Enteral nutrition intake/tolerance  Physical Signs/Symptoms Outcomes: Biochemical data,Chewing or swallowing    Discharge Planning:    Enteral nutrition    [facility]  Contact: [phone]

## 2022-04-20 NOTE — ROUTINE PROCESS
Bedside and Verbal shift change report given to Mary Mcgee (oncoming nurse) by Ilene Abreu (offgoing nurse). Report included the following information SBAR, Kardex, MAR, Recent Results and Cardiac Rhythm SR. Patient resting quietly, arouses with touch and verbal stimulus, HOB elevated, aspiration precautions. Bed alarm on and call light in reach.

## 2022-04-20 NOTE — PROGRESS NOTES
Infectious Disease progress Note        Reason: evaluate for cystitis    Current abx Prior abx   Nitrofurantoin  4/17/2022-4/18/22  Ceftriaxone since 4/18/22      Lines:       Assessment :  68-year-old female with a past medical history significant for hypertension, prior seizures, history of CVA who was brought in by her family on 4/11/2022 following a witnessed seizure. Now with worsening lethargy x 2 days, fever with T-max 100.2 on 4/17, dysuria, worsening renal function, significant pyuria, abdominal pain/nausea    Clinical presentation consistent with cystitis, pyelonephritis  Urine cx 4/17- >100,000 E. Coli-susceptibilities discussed with microbiology lab    No evidence of hydronephrosis noted on CT scan 4/18/2022. Noncontrast CT scan will likely not reveal pyelonephritis    Diffuse abdominal pain/tenderness, nausea-could be secondary to undiagnosed pyelonephritis. Improved    Antibiotic management complicated due to documented history of allergy to penicillin, ciprofloxacin-nonlife-threatening allergy to penicillin. Hence will use cephalosporins with close monitoring    Acute kidney injury-rule out obstructive uropathy, complicated UTI    Altered mental status-likely metabolic encephalopathy. Improved    Recommendations:    1. Discontinue ceftriaxone  2. Start cefuroxime till 4/25/2022  3. Management of acute kidney injury per primary team  4. Discharge planning per primary team     Above plan was discussed in details with RN and primary team please call me if any further questions or concerns. Will continue to participate in the care of this patient. HPI:    Feels better. Denies abdominal pain, dysuria.     Past Medical History:   Diagnosis Date    Abnormal coordination 9/21/2020    Depression 4/12/2022    Failure to thrive in adult 4/12/2022    Hallucinations 9/21/2020    Hypertension     Neurological disorder     Seizures ([facility] [address]     Stroke [facility]) 2009    Subclinical hypothyroidism 4/13/2022       Past Surgical History:   Procedure Laterality Date    PLACE PERCUT GASTROSTOMY TUBE  1/11/2022            Current Discharge Medication List      CONTINUE these medications which have NOT CHANGED    Details   ferrous sulfate 325 mg (65 mg iron) tablet Take 325 mg by mouth daily. divalproex DR (DEPAKOTE) 250 mg tablet Take 250 mg by mouth three (3) times daily. amLODIPine (NORVASC) 10 mg tablet Take 10 mg by mouth daily. spironolactone (ALDACTONE) 25 mg tablet Take 25 mg by mouth daily. hydrALAZINE (APRESOLINE) 100 mg tablet Take 100 mg by mouth two (2) times a day. tamsulosin HCl (TAMSULOSIN PO) Take 0.4 mg by mouth daily. tab      PARoxetine (PAXIL) 30 mg tablet Take 30 mg by mouth daily. OXcarbazepine (TRILEPTAL) 300 mg tablet Take 300 mg by mouth two (2) times a day. multivitamin, tx-iron-ca-min (THERA-M w/ IRON) 9 mg iron-400 mcg tab tablet Take 1 Tablet by mouth daily. Qty: 30 Tablet, Refills: 0      atorvastatin (LIPITOR) 40 mg tablet Take 40 mg by mouth daily. acetaminophen (TYLENOL) 500 mg tablet Take 500 mg by mouth every six (6) hours as needed for Pain. take 1 tab every 6 hours as needed daily for pain      clopidogreL (PLAVIX) 75 mg tab Take 75 mg by mouth daily. Indications: prevention for a blood clot going to the brain      ergocalciferol (VITAMIN D2) 50,000 unit capsule Take 50,000 Units by mouth two (2) times a week. cyanocobalamin (VITAMIN B12) 500 mcg tablet Take 1 Tab by mouth daily.   Qty: 30 Tab, Refills: 0             Current Facility-Administered Medications   Medication Dose Route Frequency    heparin (porcine) injection 5,000 Units  5,000 Units SubCUTAneous Q8H    0.9% sodium chloride infusion  75 mL/hr IntraVENous CONTINUOUS    cefTRIAXone (ROCEPHIN) 1 g in sterile water (preservative free) 10 mL IV syringe  1 g IntraVENous Q24H    diphenhydrAMINE (BENADRYL) injection 25 mg  25 mg IntraVENous Q4H PRN    divalproex (DEPAKOTE SPRINKLE) capsule 250 mg  250 mg Per G Tube Q8H    amLODIPine (NORVASC) tablet 10 mg  10 mg Oral DAILY    atorvastatin (LIPITOR) tablet 40 mg  40 mg Oral DAILY    clopidogreL (PLAVIX) tablet 75 mg  75 mg Oral DAILY    PARoxetine (PAXIL) tablet 30 mg  30 mg Oral DAILY    multivitamin, tx-iron-ca-min (THERA-M w/ IRON) tablet 1 Tablet  1 Tablet Oral DAILY    ferrous sulfate tablet 325 mg  325 mg Oral DAILY    ergocalciferol capsule 50,000 Units  50,000 Units Oral 2 TIMES WEEKLY    sodium chloride (NS) flush 5-40 mL  5-40 mL IntraVENous Q8H    sodium chloride (NS) flush 5-40 mL  5-40 mL IntraVENous PRN    acetaminophen (TYLENOL) tablet 650 mg  650 mg Oral Q6H PRN    Or    acetaminophen (TYLENOL) suppository 650 mg  650 mg Rectal Q6H PRN    polyethylene glycol (MIRALAX) packet 17 g  17 g Oral DAILY PRN    ondansetron (ZOFRAN ODT) tablet 4 mg  4 mg Oral Q8H PRN    Or    ondansetron (ZOFRAN) injection 4 mg  4 mg IntraVENous Q6H PRN    OXcarbazepine (TRILEPTAL) tablet 600 mg  600 mg Oral BID       Allergies: Tomato, Aspirin, Ciprofloxacin, Pcn [penicillins], and Sulfa (sulfonamide antibiotics)    Family History   Problem Relation Age of Onset    Diabetes Other     Stroke Other      Social History     Socioeconomic History    Marital status:      Spouse name: Not on file    Number of children: Not on file    Years of education: Not on file    Highest education level: Not on file   Occupational History    Not on file   Tobacco Use    Smoking status: Never Smoker    Smokeless tobacco: Never Used   Vaping Use    Vaping Use: Never used   Substance and Sexual Activity    Alcohol use: Yes     Comment: Socially     Drug use: No    Sexual activity: Not on file   Other Topics Concern    Not on file   Social History Narrative    Not on file     Social Determinants of Health     Financial Resource Strain:     Difficulty of Paying Living Expenses: Not on file   Food Insecurity:     Worried About Running Out of Food in the Last Year: Not on file    Ran Out of Food in the Last Year: Not on file   Transportation Needs:     Lack of Transportation (Medical): Not on file    Lack of Transportation (Non-Medical): Not on file   Physical Activity:     Days of Exercise per Week: Not on file    Minutes of Exercise per Session: Not on file   Stress:     Feeling of Stress : Not on file   Social Connections:     Frequency of Communication with Friends and Family: Not on file    Frequency of Social Gatherings with Friends and Family: Not on file    Attends Sabianist Services: Not on file    Active Member of [address] [place] [address] [email or web address] or Organizations: Not on file    Attends Club or Organization Meetings: Not on file    Marital Status: Not on file   Intimate Partner Violence:     Fear of Current or Ex-Partner: Not on file    Emotionally Abused: Not on file    Physically Abused: Not on file    Sexually Abused: Not on file   Housing Stability:     Unable to Pay for Housing in the Last Year: Not on file    Number of [place] in the Last Year: Not on file    Unstable Housing in the Last Year: Not on file     Social History     Tobacco Use   Smoking Status Never Smoker   Smokeless Tobacco Never Used        Temp (24hrs), Av.2 °F (36.8 °C), Min:97.8 °F (36.6 °C), Max:98.8 °F (37.1 °C)    Visit Vitals  BP (!) 159/77   Pulse 84   Temp 98.5 °F (36.9 °C)   Resp 20   Ht 5' (1.524 m)   Wt 42.6 kg (94 lb)   SpO2 96%   Breastfeeding No   BMI 18.36 kg/m²       ROS: 12 point ROS obtained in details. Pertinent positives as mentioned in HPI,   otherwise negative    Physical Exam:    General: Female patient sitting on bed, more alert and communicative    General:   awake alert and oriented   HEENT:  Normocephalic, atraumatic,  EOMI, no scleral icterus or pallor; no conjunctival hemmohage;  Neck supple, no bruits.    Lymph Nodes:   not examined   Lungs:   non-labored, bilaterally clear to auscultation- no crackles wheezes rales or rhonchi Heart:  RRR, s1 and s2; no rubs or gallops, no edema   Abdomen:  soft, non-distended, resolved tenderness, no guarding/rigidity, no hepatomegaly, no splenomegaly. Genitourinary:  deferred   Extremities:   no clubbing, cyanosis; no joint effusions or swelling; muscle mass appropriate for age   Neurologic:   more alert, no gross motor or sensory deficits noted                        Skin:  No rash or ulcers noted   Back:  no spinal or paraspinal muscle tenderness or rigidity, no CVA tenderness     Psychiatric:   Calm         Labs: Results:   Chemistry Recent Labs     04/19/22  0340 04/18/22  0400   GLU 90 97    135*   K 4.1 4.3    101   CO2 26 29   BUN 39* 36*   CREA 1.54* 1.64*   CA 10.0 9.5   AGAP 8 5   BUCR 25* 22*   AP 75  --    TP 7.0  --    ALB 3.3*  --    GLOB 3.7  --    AGRAT 0.9  --       CBC w/Diff Recent Labs     04/19/22  0340 04/18/22  0400   WBC 14.7* 14.3*   RBC 3.17* 3.26*   HGB 9.8* 10.2*   HCT 29.8* 30.7*    197   GRANS 82* 78*   LYMPH 10* 12*   EOS 0 0      Microbiology Recent Labs     04/17/22  1730   CULT GRAM NEGATIVE RODS*          RADIOLOGY:    All available imaging studies/reports in [facility] for this admission were reviewed      Disclaimer: Sections of this note are dictated utilizing voice recognition software, which may have resulted in some phonetic based errors in grammar and contents. Even though attempts were made to correct all the mistakes, some may have been missed, and remained in the body of the document. If questions arise, please contact our department.     Dr. Aundrea Quiroga, Infectious Disease Specialist  [phone]  April 20, 2022  2:23 PM

## 2022-04-20 NOTE — CASE COMMUNICATION
Have called pt's son several times this week while his mother was on Observation since Monday 4/18. Was able to talk to him by phone. He reports that pt is supposed to be DC'd tomorrow 4/21 and agreed to visit on Friday, if she is feeling up to it.

## 2022-04-20 NOTE — Clinical Note
Thanks. Yes, its been a struggle trying to reach someone to schedule!!  ----- Message -----  From: Lewis Rivers PTA  Sent: 4/20/2022   2:24 PM EDT  To: TERESA Tapia      Have called pt's son several times this week while his mother was on Observation since Monday 4/18. Was able to talk to him by phone. He reports that pt is supposed to be DC'd tomorrow 4/21 and agreed to visit on Friday, if she is feeling up to it.

## 2022-04-20 NOTE — PROGRESS NOTES
Discharge/Transition Planning    Called patient son Francisca Doan. Notified son patient Ras Ngo denied SNF rehab auth with notation that did not meet but did not get any more info than this. Notified son patient has option of long term nursing home placement or can go home with home health. Son stated they will do home health. Patient apparently has personal care 9 to 5 and 5 to 9 p so is never alone. Son wants PT/OT/ST at home. Son states he is getting ready to go to work now and no one home and asked if could dc tomorrow at around 12 pm transport.  Sent perfect serve to Dr Shaina Vega while on phone and he is agreeable

## 2022-04-20 NOTE — PROGRESS NOTES
Patient completed ROM as follows. Bilateral Lower Extremity Exercise:      EXERCISE   Sets   Reps   Active Active Assist   Passive Self ROM   Comments   Ankle Pumps 1 10  [] [] [x] []    Heel Slides 1 10  [] [] [x] []    Hip Abd/adduction 1 10 [] [] [x] []    Straight Leg Raises 1 10 [] [] [x] []        Pain:   Pain Level Before FMP: 0/10  Pain Level After FMP: 0/10    []  Alerted nurse. [x]  Call bell and phone within patient reach. [x]   Patient resting in bed with no apparent distress . NOTE: Pt in and out of sleep, nonverbal this session, and not following any commands.      Thank you,  Tasneem Aguilar, Rehab Technician

## 2022-04-20 NOTE — PROGRESS NOTES
Problem: Dysphagia (Adult)  Goal: *Acute Goals and Plan of Care (Insert Text)  Description:     Patient will:  1. Tolerate PO trials with 0 s/s overt distress in 4/5 trials  2. Utilize compensatory swallow strategies/maneuvers (decrease bite/sip, size/rate, alt. liq/sol) with min cues in 4/5 trials  3. Perform oral-motor/laryngeal exercises to increase oropharyngeal swallow function with min cues  4. Complete an objective swallow study (i.e., MBSS) to assess swallow integrity, r/o aspiration, and determine of safest LRD, min A as indicated/ordered by MD-met 4/19/22    Recommend:   Full liquids   Meds crushed in puree   *May need to utilize PEG for supplemental feeding to meet nutrition/hydration needs*  Aspiration precautions  HOB >45 degrees during all intake and for at least 30 min after po   Small bites/sips, slow rate of intake, alternating bites/sips  Oral care post meals       Outcome: Progressing Towards Goal   SPEECH LANGUAGE PATHOLOGY DYSPHAGIA TREATMENT    Patient: Gold Dave (32 y.o. female)  Date: 4/20/2022  Diagnosis: Elevated troponin [R77.8] Breakthrough seizure (HCC)       Precautions: aspiration Skin,Seizure  PLOF: As per H&P      ASSESSMENT:  Pt was seen at bedside for follow up dysphagia management. She was drowsy and required mod cues to alert for PO. She tolerated thin liquids via straw without any overt s/sx of aspiration. Laryngeal elevation was weak to palpation. Pt refused any further PO despite max cues/encouragement. Recommend to continue full thin liquid diet, aspiration precautions, oral care TID, and meds as tolerated. Rec PEG as needed for adequate nutrition/hydration. ST will continue to follow.      Progression toward goals:  []         Improving appropriately and progressing toward goals  [x]         Improving slowly and progressing toward goals  []         Not making progress toward goals and plan of care will be adjusted     PLAN:  Recommendations and Planned Interventions: See above  Patient continues to benefit from skilled intervention to address the above impairments. Continue treatment per established plan of care. Discharge Recommendations: To Be Determined     SUBJECTIVE:   Patient stated I don't want anymore.     OBJECTIVE:   Cognitive and Communication Status:  Neurologic State: Confused  Orientation Level: Oriented to person  Cognition: Decreased attention/concentration  Perception: Cues to attend left visual field,Cues to attend right visual field  Perseveration: No perseveration noted  Safety/Judgement: Awareness of environment    Dysphagia Treatment: see above    PAIN:  Start of Tx: 0  End of Tx: 0     After treatment:   []              Patient left in no apparent distress sitting up in chair  [x]              Patient left in no apparent distress in bed  [x]              Call bell left within reach  [x]              Nursing notified  []              Family present  []              Caregiver present  []              Bed alarm activated    COMMUNICATION/EDUCATION:   [x] Aspiration precautions; swallow safety; compensatory techniques  [x]        Patient/family able to participate in training and education   []  Patient unable to participate in training and education, education ongoing with staff   [] Patient understands goals and intent of therapy; neutral about participation     Thank you for this referral.    Elis Lange M.S. CCC-SLP/L  Speech-Language Pathologist

## 2022-04-21 ENCOUNTER — HOME CARE VISIT (OUTPATIENT)
Dept: HOME HEALTH SERVICES | Facility: HOME HEALTH | Age: 67
End: 2022-04-21
Payer: MEDICARE

## 2022-04-21 VITALS
RESPIRATION RATE: 18 BRPM | HEIGHT: 60 IN | TEMPERATURE: 98.3 F | SYSTOLIC BLOOD PRESSURE: 158 MMHG | OXYGEN SATURATION: 98 % | DIASTOLIC BLOOD PRESSURE: 82 MMHG | HEART RATE: 78 BPM | WEIGHT: 97 LBS | BODY MASS INDEX: 19.04 KG/M2

## 2022-04-21 LAB
ANION GAP SERPL CALC-SCNC: 6 MMOL/L (ref 3–18)
BUN SERPL-MCNC: 32 MG/DL (ref 7–18)
BUN/CREAT SERPL: 36 (ref 12–20)
CALCIUM SERPL-MCNC: 9.4 MG/DL (ref 8.5–10.1)
CHLORIDE SERPL-SCNC: 108 MMOL/L (ref 100–111)
CO2 SERPL-SCNC: 27 MMOL/L (ref 21–32)
CREAT SERPL-MCNC: 0.9 MG/DL (ref 0.6–1.3)
GLUCOSE SERPL-MCNC: 89 MG/DL (ref 74–99)
POTASSIUM SERPL-SCNC: 4.3 MMOL/L (ref 3.5–5.5)
SODIUM SERPL-SCNC: 141 MMOL/L (ref 136–145)

## 2022-04-21 PROCEDURE — 74011250637 HC RX REV CODE- 250/637: Performed by: STUDENT IN AN ORGANIZED HEALTH CARE EDUCATION/TRAINING PROGRAM

## 2022-04-21 PROCEDURE — G0378 HOSPITAL OBSERVATION PER HR: HCPCS

## 2022-04-21 PROCEDURE — 74011250636 HC RX REV CODE- 250/636: Performed by: INTERNAL MEDICINE

## 2022-04-21 PROCEDURE — 99239 HOSP IP/OBS DSCHRG MGMT >30: CPT | Performed by: HOSPITALIST

## 2022-04-21 PROCEDURE — 74011000250 HC RX REV CODE- 250: Performed by: INTERNAL MEDICINE

## 2022-04-21 PROCEDURE — 2709999900 HC NON-CHARGEABLE SUPPLY

## 2022-04-21 PROCEDURE — 74011250637 HC RX REV CODE- 250/637: Performed by: INTERNAL MEDICINE

## 2022-04-21 PROCEDURE — 97110 THERAPEUTIC EXERCISES: CPT

## 2022-04-21 PROCEDURE — 96375 TX/PRO/DX INJ NEW DRUG ADDON: CPT

## 2022-04-21 PROCEDURE — 96376 TX/PRO/DX INJ SAME DRUG ADON: CPT

## 2022-04-21 PROCEDURE — 96372 THER/PROPH/DIAG INJ SC/IM: CPT

## 2022-04-21 PROCEDURE — 36415 COLL VENOUS BLD VENIPUNCTURE: CPT

## 2022-04-21 PROCEDURE — 77030018842 HC SOL IRR SOD CL 9% BAXT -A

## 2022-04-21 PROCEDURE — 80048 BASIC METABOLIC PNL TOTAL CA: CPT

## 2022-04-21 RX ORDER — CEFUROXIME AXETIL 500 MG/1
500 TABLET ORAL 2 TIMES DAILY
Qty: 8 TABLET | Refills: 0 | Status: SHIPPED | OUTPATIENT
Start: 2022-04-21 | End: 2022-04-25

## 2022-04-21 RX ORDER — HYDRALAZINE HYDROCHLORIDE 20 MG/ML
20 INJECTION INTRAMUSCULAR; INTRAVENOUS
Status: DISCONTINUED | OUTPATIENT
Start: 2022-04-21 | End: 2022-04-21 | Stop reason: HOSPADM

## 2022-04-21 RX ADMIN — PAROXETINE HYDROCHLORIDE 30 MG: 20 TABLET, FILM COATED ORAL at 09:12

## 2022-04-21 RX ADMIN — CLOPIDOGREL BISULFATE 75 MG: 75 TABLET ORAL at 09:12

## 2022-04-21 RX ADMIN — HYDRALAZINE HYDROCHLORIDE 20 MG: 20 INJECTION, SOLUTION INTRAMUSCULAR; INTRAVENOUS at 06:36

## 2022-04-21 RX ADMIN — ATORVASTATIN CALCIUM 40 MG: 40 TABLET, FILM COATED ORAL at 09:12

## 2022-04-21 RX ADMIN — FERROUS SULFATE TAB 325 MG (65 MG ELEMENTAL FE) 325 MG: 325 (65 FE) TAB at 09:12

## 2022-04-21 RX ADMIN — DIVALPROEX SODIUM 250 MG: 125 CAPSULE, COATED PELLETS ORAL at 14:25

## 2022-04-21 RX ADMIN — SODIUM CHLORIDE, PRESERVATIVE FREE 10 ML: 5 INJECTION INTRAVENOUS at 14:29

## 2022-04-21 RX ADMIN — HEPARIN SODIUM 5000 UNITS: 5000 INJECTION INTRAVENOUS; SUBCUTANEOUS at 12:54

## 2022-04-21 RX ADMIN — AMLODIPINE BESYLATE 10 MG: 10 TABLET ORAL at 09:12

## 2022-04-21 RX ADMIN — SODIUM CHLORIDE, PRESERVATIVE FREE 10 ML: 5 INJECTION INTRAVENOUS at 05:51

## 2022-04-21 RX ADMIN — HEPARIN SODIUM 5000 UNITS: 5000 INJECTION INTRAVENOUS; SUBCUTANEOUS at 04:03

## 2022-04-21 RX ADMIN — MULTIPLE VITAMINS W/ MINERALS TAB 1 TABLET: TAB at 09:12

## 2022-04-21 RX ADMIN — SODIUM CHLORIDE 75 ML/HR: 900 INJECTION, SOLUTION INTRAVENOUS at 06:41

## 2022-04-21 RX ADMIN — OXCARBAZEPINE 600 MG: 300 TABLET, FILM COATED ORAL at 09:12

## 2022-04-21 RX ADMIN — WATER 1 G: 1 INJECTION INTRAMUSCULAR; INTRAVENOUS; SUBCUTANEOUS at 14:25

## 2022-04-21 RX ADMIN — DIVALPROEX SODIUM 250 MG: 125 CAPSULE, COATED PELLETS ORAL at 05:51

## 2022-04-21 NOTE — DISCHARGE SUMMARY
Hospitalist Discharge Summary    Patient: Diomedes Mendez MRN: [id number]  CSN: [id number]    YOB: 1955  Age: 75 y.o. Sex: female    DOA: 4/11/2022 LOS:  LOS: 0 days   Discharge Date:     Admission Diagnoses: Elevated troponin [R77.8]    Discharge Diagnoses:    1. Seizure disorder  2. UTI  3. KEYLA  4. Elevated troponin likely secondary to seizures  5. Subclinical hypothyroidism  6. History of hypertension  7. Failure to thrive with unintentional weight loss  8. History of depression    Discharge Condition: Fair    Discharge To: Home    CODE STATUS: DNR      PHYSICAL EXAM  Visit Vitals  BP (!) 151/75 (BP 1 Location: Left upper arm, BP Patient Position: At rest)   Pulse 81   Temp 97.5 °F (36.4 °C)   Resp 16   Ht 5' (1.524 m)   Wt 44 kg (97 lb)   SpO2 97%   Breastfeeding No   BMI 18.94 kg/m²       General: Alert, cooperative, no acute distress    Lungs:  Decreased breath sounds at bases  Heart:  Regular rate and Rhythm. Abdomen: Soft, Non distended, Non tender. + Bowel sounds. PEG tube in place  Extremities: No edema/ cyanosis/ clubbing  Neurologic:  Sleepy but arousable, able to answer simple questions                              HPI:  Patient is a 75 y. o.female who is being evaluated for elevated troponin.     Ms. Jorge Ram is a 41-year-old female with a past medical history significant for hypertension, prior seizures, history of CVA who was brought in by her family following a witnessed seizure. She has a known seizure diagnosis and family gave her a dose of her medication and her seizure had stopped. She had a somewhat prolonged postictal phase which prompted them to bring her to the ED for further evaluation. Throughout the course of her stay she is continued to improve and is more conversant and talkative at the time of my visit. She does not remember much of about what happened other than that she was at home and had not been feeling ill at the time.   She remarks that she has some abdominal pain but denies having any chest pain or shortness of breath. She has no headaches vision changes or new weakness. She is generally tired and does not want to get out of bed and feels listless.     Notably she had recently been admitted to [facility] in January for failure to thrive as well as KEYLA at which time she had a PEG tube placed and tube feeds were started. She was discharged to skilled nursing facility per recommendations of PT and OT and has just gotten home within the last week and has been undergoing PT OT with home health. At that time Trileptal and Depakote were continued and her Keppra was discontinued. UA was negative. Creatinine 1.09 which is about her baseline. CT of the head without any acute findings or interval changes since January 2022. Chest x-ray with atelectasis     During the course of her work-up in the emergency department it was noted that she had an increase in her troponin to 69 from 45 as the initial reading. Emergency department had contacted Dr. Remington Francisco with cardiology who had recommended admission for observation and serial troponins. Heparin drip was not recommended. We were advised to give patient aspirin and to continue her normal medication regimen. Hospital Course:   Patient admitted to the hospital secondary to elevated troponin. Patient has a known history of hypertension, prior seizure disorder, history of CVA and was brought in by family for a witnessed seizure. Patient had a prolonged postictal phase which prompted her ER evaluation. Patient was admitted to the hospital, neurology consulted and recommendations continuing Depakote and increased dose of Trileptal.  Patient has had no reported seizures since admission. Patient was also noted to have a UTI, started on IV antibiotics and will continue on Macrobid.   Patient was noted to have elevated troponin, seen by cardiology and recommended echocardiogram.  After review of echocardiogram no further work-up needed. Patient noted to have failure to thrive with unintentional weight loss. She has not been eating or drinking much per her family. Patient underwent barium swallow and passed without any evidence of aspiration. Recommended to continue soft diet. Patient was supposed to be discharged to a skilled nursing facility however insurance denied authorization. son was contacted and patient is currently being discharged home with home health. Follow up Care: With PCP in 2 weeks    Consults: Cardiology and Neurology    Significant Diagnostic Studies:     Imaging:  XR Results (most recent):  Results from Hospital Encounter encounter on 04/11/22    XR SWALLOW FUNC VIDEO    Narrative  MODIFIED BARIUM SWALLOW. CLINICAL INDICATION/HISTORY: Dysphagia. Feeding difficulties. History of CVA. Seizure disorder. Failure to thrive with unintentional weight loss. PEG tube  feedings. Evaluation for dietary considerations. COMPARISON: None. TECHNIQUE: A live videoradiographic swallowing function study was performed in  conjunction with the speech therapist.  The video is available in the department  for review by speech pathology and ancillary staff. Fluoroscopic images were not made available in PACS for radiologist review and  this report is strictly a procedural documentation by the physician assistant  with input from speech pathology. It is not considered inclusive or exclusive  of anatomic abnormalities and is not diagnostic beyond the specific  considerations regarding swallowing function as it relates to airway protection  while eating and drinking. Fluoroscopy time: 2 minutes 36 seconds    Fluoroscopic images: 0    Electronic capture cine loops: 15    FINDINGS:    Patient swallowed multiple consistencies of barium mixtures including thin  liquids and pudding. There was no temi penetration and aspiration with both tested consistencies.     Oral and pharyngeal phase delay, premature spillage, and pyriform residuals were  seen with both tested consistencies. Impression  No temi penetration or aspiration with both tested consistencies. Please see speech pathologist report for additional details and recommendations. CT Results (most recent):  Results from Hospital Encounter encounter on 04/11/22    CT ABD PELV WO CONT    Narrative  EXAM:  CT Abdomen-Pelvis without Contrast.    CLINICAL INDICATION:  Intractable nausea and vomiting. UTI. COMPARISON:  04/02/22    TECHNIQUE:  Helical volumetric CT imaging of the abdomen and pelvis is performed  without IV or oral contrast administration. Coronal and sagittal multiplanar  reconstruction images are generated for improved anatomic delineation. All CT  scans at this facility are performed using dose optimization technique as  appropriate to the performed exam, to include automated exposure control,  adjustment of the mA and/or kV according to patient's size (Including  appropriate matching for site-specific examinations), or use of iterative  reconstruction technique. FINDINGS:    Lung Bases:  Right lower lobe infiltrate vs less likely subsegmental atelectatic  changes. Liver, Adrenal Glands, Spleen, Pancreas:  Unremarkable. Gallbladder:  Subtle faint hyperdensities in the posterior aspect of the  gallbladder, suggestive of possible gallstones. Kidneys-Ureters-Bladder:  Unremarkable kidneys. No postobstructive changes. No  ureteral stones. Underdistended urinary bladder. Mild circumferential bladder  wall thickening. GI Tract:  Status post percutaneous gastrostomy tube placement. Otherwise  unremarkable stomach. No acute small bowel abnormalities. No acute  abnormalities at the expected location for the appendix. No acute colitis or  diverticulitis. Mild stool retention in the rectum. Uterus, Adnexa:  Status post hysterectomy. No adnexal mass.     Nodes:  No mesenteric or retroperitoneal lymphadenopathy. Vascular:  No acute aortic abnormalities. Body Wall:  Subcutaneous air collections in the left lower quadrant (axial  #51-56). Bones:  No acute bony abnormalities. Impression  1. No acute findings along the gastrointestinal tract. 2.  S/P percutaneous gastrostomy tube placement. 3.  Questionable gallstones vs. nonspecific debris. 4.  Subcutaneous air pockets in the left lower quadrant. Query recent  subcutaneous injections. If there were no subcutaneous injections, developing  infectious process may be considered as well.      04/11/22    ECHO ADULT COMPLETE 04/13/2022 4/13/2022    Interpretation Summary    Left Ventricle: Left ventricle size is normal. Increased wall thickness. Findings consistent with mild concentric hypertrophy. Normal wall motion. Normal left ventricular systolic function with a visually estimated EF of 60 - 65%. Normal diastolic function. Signed by: Radha Cazares MD on 4/13/2022  8:25 AM         Procedures:   None    Discharge Medications:     Current Discharge Medication List      START taking these medications    Details   cefUROXime (CEFTIN) 500 mg tablet Take 1 Tablet by mouth two (2) times a day for 4 days. Qty: 8 Tablet, Refills: 0         CONTINUE these medications which have NOT CHANGED    Details   ferrous sulfate 325 mg (65 mg iron) tablet Take 325 mg by mouth daily. divalproex DR (DEPAKOTE) 250 mg tablet Take 250 mg by mouth three (3) times daily. amLODIPine (NORVASC) 10 mg tablet Take 10 mg by mouth daily. tamsulosin HCl (TAMSULOSIN PO) Take 0.4 mg by mouth daily. tab      PARoxetine (PAXIL) 30 mg tablet Take 30 mg by mouth daily. OXcarbazepine (TRILEPTAL) 300 mg tablet Take 300 mg by mouth two (2) times a day. multivitamin, tx-iron-ca-min (THERA-M w/ IRON) 9 mg iron-400 mcg tab tablet Take 1 Tablet by mouth daily.   Qty: 30 Tablet, Refills: 0      atorvastatin (LIPITOR) 40 mg tablet Take 40 mg by mouth daily.      acetaminophen (TYLENOL) 500 mg tablet Take 500 mg by mouth every six (6) hours as needed for Pain. take 1 tab every 6 hours as needed daily for pain      clopidogreL (PLAVIX) 75 mg tab Take 75 mg by mouth daily. Indications: prevention for a blood clot going to the brain      ergocalciferol (VITAMIN D2) 50,000 unit capsule Take 50,000 Units by mouth two (2) times a week. cyanocobalamin (VITAMIN B12) 500 mcg tablet Take 1 Tab by mouth daily.   Qty: 30 Tab, Refills: 0         STOP taking these medications       spironolactone (ALDACTONE) 25 mg tablet Comments:   Reason for Stopping:         hydrALAZINE (APRESOLINE) 100 mg tablet Comments:   Reason for Stopping:               Current Facility-Administered Medications:     nitroglycerin (NITROBID) 2 % ointment 1 Inch, 1 Inch, Topical, Q6H PRN, Brad Conroy DO    hydrALAZINE (APRESOLINE) 20 mg/mL injection 20 mg, 20 mg, IntraVENous, Q6H PRN, Brad Conroy DO, 20 mg at 04/21/22 0636    heparin (porcine) injection 5,000 Units, 5,000 Units, SubCUTAneous, Q8H, Evelina Benton MD, 5,000 Units at 04/21/22 0403    0.9% sodium chloride infusion, 75 mL/hr, IntraVENous, CONTINUOUS, Kimberlee Rahman MD, Last Rate: 75 mL/hr at 04/21/22 0641, 75 mL/hr at 04/21/22 0641    cefTRIAXone (ROCEPHIN) 1 g in sterile water (preservative free) 10 mL IV syringe, 1 g, IntraVENous, Q24H, Nakita Bay MD, 1 g at 04/20/22 1556    diphenhydrAMINE (BENADRYL) injection 25 mg, 25 mg, IntraVENous, Q4H PRN, Eleanor Siemens, MD    divalproex (DEPAKOTE SPRINKLE) capsule 250 mg, 250 mg, Per G Tube, Q8H, Brad Conroy DO, 250 mg at 04/21/22 0551    amLODIPine (NORVASC) tablet 10 mg, 10 mg, Oral, DAILY, Giuliano Gee DO, 10 mg at 04/21/22 0912    atorvastatin (LIPITOR) tablet 40 mg, 40 mg, Oral, DAILY, Giuliano Gee DO, 40 mg at 04/21/22 0912    clopidogreL (PLAVIX) tablet 75 mg, 75 mg, Oral, DAILY, Giuliano Gee DO, 75 mg at 04/21/22 0912    PARoxetine (PAXIL) tablet 30 mg, 30 mg, Oral, DAILY, Giuliano Gee, DO, 30 mg at 04/21/22 0158    multivitamin, tx-iron-ca-min (THERA-M w/ IRON) tablet 1 Tablet, 1 Tablet, Oral, DAILY, Giuliano Gee, DO, 1 Tablet at 04/21/22 9918    ferrous sulfate tablet 325 mg, 325 mg, Oral, DAILY, Marlen, Donice, DO, 325 mg at 04/21/22 8248    ergocalciferol capsule 50,000 Units, 50,000 Units, Oral, 2 TIMES WEEKLY, Giuliano Gee, DO, 50,000 Units at 04/18/22 1858    sodium chloride (NS) flush 5-40 mL, 5-40 mL, IntraVENous, Q8H, Giuliano Gee, DO, 10 mL at 04/21/22 0551    sodium chloride (NS) flush 5-40 mL, 5-40 mL, IntraVENous, PRN, Giuliano Gee, DO    acetaminophen (TYLENOL) tablet 650 mg, 650 mg, Oral, Q6H PRN **OR** acetaminophen (TYLENOL) suppository 650 mg, 650 mg, Rectal, Q6H PRN, Giuliano Gee, DO    polyethylene glycol (MIRALAX) packet 17 g, 17 g, Oral, DAILY PRN, Giuliano Gee, DO    ondansetron (ZOFRAN ODT) tablet 4 mg, 4 mg, Oral, Q8H PRN **OR** ondansetron (ZOFRAN) injection 4 mg, 4 mg, IntraVENous, Q6H PRN, Giuliano Gee, DO, 4 mg at 04/18/22 0641    OXcarbazepine (TRILEPTAL) tablet 600 mg, 600 mg, Oral, BID, Shawn Bolden MD, 600 mg at 04/21/22 0718     Activity: Activity as tolerated    Diet: Cardiac Diet    Wound Care: None needed      Gonzalo Krabbe, MD  4/21/2022, 9:30 AM    Total time spent 38 mins  Disclaimer: Sections of this note are dictated using utilizing voice recognition software. Minor typographical errors may be present. If questions arise, please do not hesitate to contact me or call our department.

## 2022-04-21 NOTE — PROGRESS NOTES
Problem: Self Care Deficits Care Plan (Adult)  Goal: *Acute Goals and Plan of Care (Insert Text)  Description: Occupational Therapy Goals  Re-evaluated 4/19/22 with pt to be placed on 3 day trial due to decreased level of alertness and decreased participation in therapy session. Pt may need to be transitioned to [facility] if no improvement seen within 7 day(s). Goals have been downgraded. 1.  Patient will perform grooming with min A and v/c for sequencing. 2.  Patient will perform bed mobility with min A in prep for self care. 3.  Patient will perform sitting edge of bed with min A x 2 min in prep for self care participation. 4.  Patient will participate in upper extremity therapeutic exercise/activities with modified independence for 5-8 minutes. 5.  Patient will utilize energy conservation techniques during functional activities with verbal cues. Prior Level of Function:Pt history is not clear, states she lives with son in apt and he is \"supposed to be home with her all the time,\" performs transfers to w/ with RW and assist. States she spends most of her time in the w/c, transfers to [facility] with assist from son. Pt performed sponge baths and dressing with SUP. Outcome: Progressing Towards Goal   OCCUPATIONAL THERAPY TREATMENT    Patient: Maximilian Childress (89 y.o. female)  Date: 4/21/2022  Diagnosis: Elevated troponin [R77.8] Breakthrough seizure (HCC)       Precautions: Skin,Seizure  PLOF: Pt history is not clear, states she lives with son in apt and he is \"supposed to be home with her all the time,\" performs transfers to w with RW and assist. States she spends most of her time in the w/c, transfers to [facility] with assist from son. Pt performed sponge baths and dressing with SUP. Chart, occupational therapy assessment, plan of care, and goals were reviewed. ASSESSMENT:  Pt cleared by RN for OT tx at this time. Pt presented supine in bed alert, able to follow commands, and oriented to self only. Provided pt with resistive pink block to increase instrinisic muscle strength of BECKY hands & digits/thumb e.g. gross grasp, pincer grasp, flexor and extensor muscles. Pt was positioned for comfort in bed and left with HOB elevated, bed alarm active, and all needs left within reach. Progression toward goals:  []          Improving appropriately and progressing toward goals  [x]          Improving slowly and progressing toward goals  []          Not making progress toward goals and plan of care will be adjusted     PLAN:  Patient continues to benefit from skilled intervention to address the above impairments. Continue treatment per established plan of care. Discharge Recommendations:  SNF/LTC  Further Equipment Recommendations for Discharge:  TBA     SUBJECTIVE:   Patient stated  My arm is sore. \"     OBJECTIVE DATA SUMMARY:   Cognitive/Behavioral Status:  Neurologic State: Confused  Orientation Level: Oriented to person  Cognition: Impaired decision making  Safety/Judgement: Awareness of environment    Balance:  Sitting: Impaired    Pain:  Pain level pre-treatment: 0/10   Pain level post-treatment: 0/10    Activity Tolerance:    Poor   Please refer to the flowsheet for vital signs taken during this treatment. After treatment:   []  Patient left in no apparent distress sitting up in chair  [x]  Patient left in no apparent distress in bed  [x]  Call bell left within reach  [x]  Nursing notified  []  Caregiver present  [x]  Bed alarm activated    COMMUNICATION/EDUCATION:   [x] Role of Occupational Therapy in the acute care setting  [] Home safety education was provided and the patient/caregiver indicated understanding. [x] Patient/family have participated as able in working towards goals and plan of care. [x] Patient/family agree to work toward stated goals and plan of care. [] Patient understands intent and goals of therapy, but is neutral about his/her participation.   [] Patient is unable to participate in goal setting and plan of care.       Thank you for this referral.  JOSE JUAN Martinez  Time Calculation: 8 mins

## 2022-04-21 NOTE — PROGRESS NOTES
called [facility] at [phone] and requested updated on stretcher transportation for today at 12 pm as no one has showed or called . CM notified trip was cancelled yesterday 545 pm stating reason as patient no show. Notified rep medical transport was for today discharge , stretcher . Rep rebooked trip .  CM verified it is medical transport  from hospital on [address] and going to home address     Confirmation [id number]

## 2022-04-21 NOTE — PROGRESS NOTES
Transport arrived to take patient back home. Patient in NAD, Resp even and unlabored. IV removed telemetry removed.

## 2022-04-21 NOTE — DISCHARGE INSTRUCTIONS
DISCHARGE SUMMARY from Nurse    PATIENT INSTRUCTIONS:    After general anesthesia or intravenous sedation, for 24 hours or while taking prescription Narcotics:  · Limit your activities  · Do not drive and operate hazardous machinery  · Do not make important personal or business decisions  · Do  not drink alcoholic beverages  · If you have not urinated within 8 hours after discharge, please contact your surgeon on call. Report the following to your surgeon:  · Excessive pain, swelling, redness or odor of or around the surgical area  · Temperature over 100.5  · Nausea and vomiting lasting longer than 4 hours or if unable to take medications  · Any signs of decreased circulation or nerve impairment to extremity: change in color, persistent  numbness, tingling, coldness or increase pain  · Any questions    What to do at Home:  Recommended activity: Activity as tolerated, ***    If you experience any of the following symptoms chest pain, shortness of breath, fever greater than 100.5,nausea, vomiting, pain unrelieved by medication, please follow up with [facility] NP. *  Please give a list of your current medications to your Primary Care Provider. *  Please update this list whenever your medications are discontinued, doses are      changed, or new medications (including over-the-counter products) are added. *  Please carry medication information at all times in case of emergency situations. These are general instructions for a healthy lifestyle:    No smoking/ No tobacco products/ Avoid exposure to second hand smoke  Surgeon General's Warning:  Quitting smoking now greatly reduces serious risk to your health.     Obesity, smoking, and sedentary lifestyle greatly increases your risk for illness    A healthy diet, regular physical exercise & weight monitoring are important for maintaining a healthy lifestyle    You may be retaining fluid if you have a history of heart failure or if you experience any of the following symptoms:  Weight gain of 3 pounds or more overnight or 5 pounds in a week, increased swelling in our hands or feet or shortness of breath while lying flat in bed. Please call your doctor as soon as you notice any of these symptoms; do not wait until your next office visit. Patient armband removed and shredded  MyChart Activation    Thank you for requesting access to [facility]. Please follow the instructions below to securely access and download your online medical record. [facility] allows you to send messages to your doctor, view your test results, renew your prescriptions, schedule appointments, and more. How Do I Sign Up? 1. In your internet browser, go to [email or web address]  2. Click on the First Time User? Click Here link in the Sign In box. You will be redirect to the New Member Sign Up page. 3. Enter your [facility] Access Code exactly as it appears below. You will not need to use this code after youve completed the sign-up process. If you do not sign up before the expiration date, you must request a new code. [facility] Access Code: [email or web address]  Expires: 2022 12:28 PM (This is the date your [facility] access code will )    4. Enter the last four digits of your Social Security Number (xxxx) and Date of Birth (mm/dd/yyyy) as indicated and click Submit. You will be taken to the next sign-up page. 5. Create a [facility] ID. This will be your [facility] login ID and cannot be changed, so think of one that is secure and easy to remember. 6. Create a [facility] password. You can change your password at any time. 7. Enter your Password Reset Question and Answer. This can be used at a later time if you forget your password. 8. Enter your e-mail address. You will receive e-mail notification when new information is available in [address] 9. Click Sign Up. You can now view and download portions of your medical record.   10. Click the Download Summary menu link to download a portable copy of your medical information. Additional Information    If you have questions, please visit the Frequently Asked Questions section of the [employer] website at [email or web address] Remember, "2,10E+07"hart is NOT to be used for urgent needs. For medical emergencies, dial 911. The discharge information has been reviewed with the {PATIENT PARENT GUARDIAN:04316}. The {PATIENT PARENT GUARDIAN:48728} verbalized understanding. Discharge medications reviewed with the {Dishcarge meds reviewed WSUB:99486} and appropriate educational materials and side effects teaching were provided.   ___________________________________________________________________________________________________________________________________

## 2022-04-21 NOTE — PROGRESS NOTES
4/21/2022  06:00- B/P=186/96 on left upper arm, rechecked on the right upper arm and was not any lower 192/98      Dr Katelynn Patino paged and returned a call back at 06:21-- High B/P was reported to Dr Conroy==see new orders. 06:36-- Med with hydralazine 20 mg IV for B/P=186/96    07:20- B/P rechecked= 157/78  07:30-- Bedside report given to oncoming nurse, Noel Cartagena, [facility], [id number] [facility].

## 2022-04-21 NOTE — PROGRESS NOTES
Discharge/Transition Planning    Patient has 3 weeks of tube feeds still at home of Jevity 1.5  Verbal , read back for resume home health orders.  Patient active with [facility]

## 2022-04-21 NOTE — PROGRESS NOTES
Nutrition Assessment     Type and Reason for Visit: Reassess,Consult    Nutrition Recommendations/Plan:   1. Continue tube feeding of Jevity 1.5, 240 mL (1 container) 4 times daily with 75 mL free water before and after each bolus. Start with 120 mL x  first 2 boluses and advance to 240 mL if tolerated. Concern for refeeding syndrome. (Provides 1420 kcal, 60 gm protein, 720 mL free water, 100% RDIs)   Suggested Schedule:0800, 1200, 1600, 2000      Nutrition Assessment:  Patient restarted on tube feeds yesterday by PEG due to very poor intake. RN reports patient consumed no breakfast this morning only Pepsi. Discussed last BM with RN reported as 4/18, patient wth very little intake recently, tube feeds starting may help, previous BM was 4/11. BMP ordered yesterday, WNL. Patient to be discharged home today on bolus feeds of Jevity 1.5. Malnutrition Assessment:  Malnutrition Status: Moderate malnutrition     Estimated Daily Nutrient Needs:  Energy (kcal):  1017-7750 (30-35 kcal/kg)  Protein (g):  44-57 (1.0-1.3)       Fluid (ml/day):  5040-0978    Nutrition Related Findings:  BM 4/18; patient receiving IVF    Current Nutrition Therapies:  ADULT ORAL NUTRITION SUPPLEMENT Breakfast, Lunch, Dinner; Standard High Calorie/High Protein  ADULT DIET Full Liquid  ADULT TUBE FEEDING PEG; Standard with Fiber; Delivery Method: Bolus; Bolus Frequency: 4 Times Daily; Bolus Volume (mL): 240; Water Flush Volume (mL): 75;  Water Flush Frequency: Before and after each bolus    Anthropometric Measures:  Height:  5' (152.4 cm)  Current Body Wt:     BMI: 18.9    Nutrition Diagnosis:   · Inadequate oral intake related to cognitive or neurological impairment,acute injury/trauma as evidenced by intake 51-75%      Nutrition Interventions:   Food and/or Nutrient Delivery: Continue current diet,Continue tube feeding,Vitamin supplement,IV fluid delivery  Nutrition Education/Counseling: No recommendations at this time,Education not indicated  Coordination of Nutrition Care: Continue to monitor while inpatient       Goals:  Previous Goal Met: Goal(s) achieved  Goals: Meet at least 75% of estimated needs,prior to discharge       Nutrition Monitoring and Evaluation:   Behavioral-Environmental Outcomes: None identified  Food/Nutrient Intake Outcomes: Food and nutrient intake,Enteral nutrition intake/tolerance,Vitamin/mineral intake,IVF intake  Physical Signs/Symptoms Outcomes: Biochemical data,Chewing or swallowing,Constipation    Discharge Planning:    Continue current diet,Enteral nutrition    Diamond GOLDBERGUX:[phone]

## 2022-04-22 ENCOUNTER — HOME CARE VISIT (OUTPATIENT)
Dept: SCHEDULING | Facility: HOME HEALTH | Age: 67
End: 2022-04-22
Payer: MEDICARE

## 2022-04-22 NOTE — Clinical Note
thank you   ----- Message -----  From: Amy Mcmanus, PTA  Sent: 4/24/2022   7:41 PM EDT  To: Jeremi Hanna, OTR/L      Called pt's son today to ask if pt would be agreeable to a PT visit, heard him ask pt over the phone, and she said \"not today\".

## 2022-04-24 NOTE — CASE COMMUNICATION
ST visit missed. SLP spoke with Pts son, Divya Baird the night before and scheduled Nica Partida 48 visit for 4/22. SLP arrived at home at scheduled time. No answer at door. SLP called and was unable to reach both sons.  SLP left messages on both numbers listed

## 2022-04-24 NOTE — CASE COMMUNICATION
Called pt's son today to ask if pt would be agreeable to a PT visit, heard him ask pt over the phone, and she said \"not today\".

## 2022-04-25 ENCOUNTER — HOME CARE VISIT (OUTPATIENT)
Dept: SCHEDULING | Facility: HOME HEALTH | Age: 67
End: 2022-04-25
Payer: MEDICARE

## 2022-04-25 VITALS
SYSTOLIC BLOOD PRESSURE: 124 MMHG | RESPIRATION RATE: 17 BRPM | TEMPERATURE: 97.7 F | HEART RATE: 88 BPM | DIASTOLIC BLOOD PRESSURE: 70 MMHG | OXYGEN SATURATION: 97 %

## 2022-04-25 PROCEDURE — 400013 HH SOC

## 2022-04-25 PROCEDURE — G0152 HHCP-SERV OF OT,EA 15 MIN: HCPCS

## 2022-04-25 PROCEDURE — G0153 HHCP-SVS OF S/L PATH,EA 15MN: HCPCS

## 2022-04-25 PROCEDURE — G0299 HHS/HOSPICE OF RN EA 15 MIN: HCPCS

## 2022-04-25 NOTE — Clinical Note
dx: CVA Hemiparesis, Dysphagia, zip: [id number], freq: 2w4, focus: increased safety and independence with ADL tasks, ambulation, and transfers, increased RUE strength, increased functional use LUE/AROM/MMT, caregiver education. pt presents with broken speech and hard to hear due to dysphasia. I talked to them about a shower chair or tub bench for the shower however the pt declined it. please follow up on that because I really think she could benefit from it. I think her son is lifting her in the tub right now. thank you.

## 2022-04-25 NOTE — HOME HEALTH
Skilled reason for visit: skilled assessment, education, medication management, GI assessment, patient eating by mouth 3-4 meals, 2 snacks and boost. MD aware gtube clogged. Patient to see MD on Wednesday to have gtube assessed. Caregiver involvement: Family assists ADL's, medications, meals, transportation, errands. Medications reviewed and all medications are available in the home this visit. The following education was provided regarding medications: patient knowledgeable about all medications, has no questions or concerns at this time. MD notified of any discrepancies/look a-like medications/medication interactions: na   Medications are effective at this time. Home health supplies by type and quantity ordered/delivered this visit include: na   Patient education provided this visit: Skilled nurse instructed patient on safety measures to avoid injuries and falls such as keeping adequate lighting during the day and night and was educated on proper use of assistive device to prevent falls. SN educated patient on some ways to cope with stress and anxiety. explained to the patient that anxiety can be a normal part of life when faced with stressors such as changes in relationships, presenting in front of a crowd, or making decisions. There is no one right answer to eliminate anxiety. It is important to find healthy coping skills that will work for you. Consult with your primary care physician when anxiety becomes persistent or unmanageable. SN Instructed patient about some coping skills to consider when struggling with anxiety include: Reaching out to support system (i.e. family/friends, counselors, psychiatrists, or support groups). Deep breathing Meditation, Yoga, Avoiding caffeinated beverages, Healthy diet, and Calming music. Patient Verbalized fair understanding of anxiety teaching.      Sharps education provided: na   Patient level of understanding of education provided: patient/caregiver verbalized 100% understanding to all topics discussed   Skilled Care Performed this visit: skilled assessment, education, medication management, gtube assessment   Patient response to procedure performed: patient denied pain and discomfort during procedure/visit   Agency Progress toward goals: progressing   Patient's Progress towards personal goals: progressing   Home exercise program:  Sn instructed patient on pursed lip breathing. Pursed lip breathing is one of the simplest ways to control shortness of breath. It provides a quick and easy way to slow your pace of breathing, making each breath more effective. Pursed lip breathing: Improves ventilation, releases trapped air in the lungs, keeps the airways open longer and decreases the work of breathing, prolongs exhalation to slow the breathing rate, improves breathing patterns by moving old air out of the lungs and allowing for new air to enter the lungs, relieves shortness of breath, causes general relaxation. Practice this technique 4 - 5 times a day at first so you can get the correct breathing pattern. Pursed lip breathing technique: Relax your neck and shoulder muscles, breathe in ( inhale ) slowly through your nose for two counts, keeping your mouth closed. Don't take a deep breath; a normal breath will do. It may help to count to yourself: inhale, one, two. Pucker or purse your lips as if you were going to whistle or gently flicker the flame of a candle. Breathe out ( exhale ) slowly and gently through your pursed lips while counting to four. It may help to count to yourself: exhale, one, two, three, four.   Continued need for the following skills: Nursing, Physical Therapy and Occupational Therapy, SLP   Plan for next visit: skilled assessment, education, medication management, gtube assessment   Patient and/or caregiver notified and agrees to changes in the Plan of Care N/A   The following discharge planning was discussed with the pt/caregiver: Discharge planning as follows: when goals met, patient/caregiver able to manage disease process, medications and pain

## 2022-04-26 ENCOUNTER — HOME CARE VISIT (OUTPATIENT)
Dept: SCHEDULING | Facility: HOME HEALTH | Age: 67
End: 2022-04-26
Payer: MEDICARE

## 2022-04-26 VITALS
SYSTOLIC BLOOD PRESSURE: 139 MMHG | RESPIRATION RATE: 16 BRPM | HEART RATE: 72 BPM | TEMPERATURE: 98.6 F | DIASTOLIC BLOOD PRESSURE: 72 MMHG | OXYGEN SATURATION: 97 %

## 2022-04-26 VITALS
HEART RATE: 73 BPM | OXYGEN SATURATION: 96 % | RESPIRATION RATE: 20 BRPM | SYSTOLIC BLOOD PRESSURE: 129 MMHG | DIASTOLIC BLOOD PRESSURE: 71 MMHG | TEMPERATURE: 98.2 F

## 2022-04-26 PROCEDURE — G0158 HHC OT ASSISTANT EA 15: HCPCS

## 2022-04-26 PROCEDURE — G0157 HHC PT ASSISTANT EA 15: HCPCS

## 2022-04-26 NOTE — HOME HEALTH
SUBJECTIVE: Patient son present doing OT [place] tx. Pt son stated he wants his mother to being able to walk and become more independent. Vanessa Cantor CAREGIVER INVOLVEMENT/ASSISTANCE NEEDED FOR: The patient has an Son and aide that helps with all I/ADL's due to the inabilty to do so. Vanessa Cantor HOME HEALTH SUPPLIES BY TYPE AND QUANTITY ORDERED/DELIVERED THIS VISIT INCLUDE: NONE  . OBJECTIVE:  See interventions. .    Patient education provided this visit: MANZANO Role, energy conservation techniques, and purse-lip breathing   . Patient level of understanding of education provided: Patient able to peform education with 70% accurancy. Pt able to demostrate purse-lip beathing correctly with 70% accuracy 2/3 trails. .  RESPONSE TO TREATMENT: Pt required extended time for tx due to self-limiting behavior. Mutiple breaks provided due to patients decreased activity tolerace. Patient reports fatigue level an 8/10 on fatigue scale. .   ASSESSMENT OF PROGRESS TOWARD GOALS: Pt presents decreased activiy tolerance while conducting functional balance retraining tasks. Pt able to perform sit to stand transfers with use of an FWW and gait belt with Min A. Pt has decreased BUE/BLE strength and endurance and would benefit from complete carry-over on HEP regeium addressing BUE gross to inceased acitivty standing tolerance when conducting functional transfers. .  CONTINUED NEED FOR THE FOLLOWING SKILLS:  Patient continues to have deficits in bed mobility, functioanl transfers, BUE strength, and balance due to recent CVA and unevaluated  . PLAN FOR NEXT VISIT:MANZANO will addressing functional transfers and gross BUE exericises and training. .  THE FOLLOWING DISCHARGE PLANNING WAS DISCUSSED WITH THE PATIENT/CAREGIVER: Patient current frequency is 2w4.

## 2022-04-26 NOTE — HOME HEALTH
Clinical Condition per EPIC:  \"Subjective: Patient is a 75 y. o.female who is being evaluated for elevated troponin. Ms. Lynn Campbell is a 74-year-old female with a past medical history significant for hypertension, prior seizures, history of CVA who was brought in by her family following a witnessed seizure. She has a known seizure diagnosis and family gave her a dose of her medication and her seizure had stopped. She had a somewhat prolonged postictal phase which prompted them to bring her to the ED for further evaluation. Throughout the course of her stay she is continued to improve and is more conversant and talkative at the time of my visit. She does not remember much of about what happened other than that she was at home and had not been feeling ill at the time. She remarks that she has some abdominal pain but denies having any chest pain or shortness of breath. She has no headaches vision changes or new weakness. She is generally tired and does not want to get out of bed and feels listless. Notably she had recently been admitted to [facility] in January for failure to thrive as well as KEYLA at which time she had a PEG tube placed and tube feeds were started. She was discharged to skilled nursing facility per recommendations of PT and OT and has just gotten home within the last week and has been undergoing PT OT with home health. At that time Trileptal and Depakote were continued and her Keppra was discontinued. UA was negative. Creatinine 1.09 which is about her baseline. CT of the head without any acute findings or interval changes since January 2022. Chest x-ray with atelectasis     During the course of her work-up in the emergency department it was noted that she had an increase in her troponin to 69 from 45 as the initial reading. Emergency department had contacted Dr. Carlos Hammer with cardiology who had recommended admission for observation and serial troponins. Heparin drip was not recommended. We were advised to give patient aspirin and to continue her normal medication regimen. Hospital Course:   Patient admitted to the hospital secondary to elevated troponin. Patient has a known history of hypertension, prior seizure disorder, history of CVA and was brought in by family for a witnessed seizure. Patient had a prolonged postictal phase which prompted her ER evaluation. Patient was admitted to the hospital, neurology consulted and recommendations continuing Depakote and increased dose of Trileptal. Patient has had no reported seizures since admission. Patient was also noted to have a UTI, started on IV antibiotics and will continue on Macrobid. Patient was noted to have elevated troponin, seen by cardiology and recommended echocardiogram. After review of echocardiogram no further work-up needed. Patient noted to have failure to thrive with unintentional weight loss. She has not been eating or drinking much per her family. Patient underwent barium swallow and passed without any evidence of aspiration. Recommended to continue soft diet. Patient was supposed to be discharged to a skilled nursing facility however insurance denied authorization. son was contacted and patient is currently being discharged home with home health. Past Medical History:  Diagnosis Date  o Abnormal coordination 9/21/2020  o Hallucinations 9/21/2020  o Hypertension    o Neurological disorder    o Seizures ([facility] [address]    o Stroke ([facility] [address] 2009        Past Surgical History:  Procedure Laterality Date  o PLACE PERCUT GASTROSTOMY TUBE   1/11/2022  . SUBJECTIVE:  Pt stated \"I want this tube out of me. \" pt sitting couch sleeping upon OT arrival. pt agreed tx. pt family reports pt appetite and eating has improved. pt anxious regarding getting the feeding tube removed. pt presents with broken speech and hard to understanding due to dysphasia.    CAREGIVER INVOLVEMENT: pt family and hired aide provides A ADL tasks, transfers, and ambulation PRN, performs all IADL tasks, shopping, transportation, and A with medications. MEDICATION RECONCILIATION: OT reconcilled medications with pt with no changes   DME ORDERED/RECOMMENEDED: NA, pt has elevated toilet seat with B handrails, FWW  . OBJECTIVE:  BATHING: max A to D via sponge bath, decreased ability to stand. OT instructed pt/aide regarding shower chair/tub bench however declined at this time due to pt reports wants to get into the tub. TOILETING: max A to D due to impaired balance, decreased ability to stand  UB DRESSING: max A  LB DRESSING: D due to impaired balance, decreased ability to stand  GROOMING: max A  FEEDING: setup A  .  pt reports Modified Carly RPE 9/10 after performing transfers and ADL assessment. .  OT instructed/demonstrated pt and caregiver the following with good understanding:    - energy conservation while performing bathing, dressing, and setup such as set clothing out night before, gather items required to perform task in one trip, sit while performing tasks, take rest breaks as needed, perform shower/bathing on days with no other appointments or activities scheduled, etc.     - pt is to perform bathing/dressing tasks sitting, when possible, for safety/fall prevention.     - while sitting perform weight shift technique bringing LE contra laterally onto opposite LE while performing LB bathing/dressing for safety and fall prevention. pt unable to perform technique. - pt instructed/demonstrated one handed technique while standing to perform functional tasks with use FWW for increased stability, fall prevention, and safety while standing. -ADL training performed for improved body mechanics, safety, and technique for reduced risk of falls and improved functional level and participation of tasks. Astrid Nicolas IADL: NT, pt did not perform IADL tasks prior to illness  . BALANCE:  STATIC STANDING: poor+  DYNAMIC STANDING: poor  .   STATIC SITTING:poor+, pt required min A with max v/c correct LOB posterior while sitting edge of couch. pt with poor follow through correction LOB. pt tolerated sitting unsupported x 3 minutes. DYNAMIC SITTING: poor+  . AMBULATION: NT due to safety   . EOB/BED TRANSFER: NT per pt decline due to fatigue  COUCH: sit to stand max A. pt performed SPT to transport chair D with max v/c step and shift LLE with no follow through per pt. OT has to manually move LLE. TOILET: NT per pt decline due to fatigue  TUB SHOWER: NT per pt decline due to fatigue  .  -transfer training performed for improved body mechanics and technique for fall prevention and safety while performing ADL tasks. Martinezld Bankstejal PATIENT RESPONSE TO TREATMENT:  pt reports no increased pain or discomfort with activity throughout tx. PATIENT EDUCATION PROVIDED THIS VISIT: UB HEP, OT role, energy conservation, fall prevention/safety training ADL tasks, transfer training, caregiver training. Martinezld Bankstejal PATIENT LEVEL OF UNDERSTANDING OF EDUCATION PROVIDED: pt and caregiver verbalized good understanding energy conservation and UB HEP  REHAB POTENTIAL: good- for stated goals   HOME EXERCISE PROGRAM: Written HEP of the following issued. MANZANO will instruct/re-instruct pt next treatment. UB HEP includes the following: BUE shoulder press, shoulder flexion, shoulder abduction, shoulder extension, chest press, elbow flexion/extension x 10, x 2 per day. pt aware MANZANO will update HEP throughout POC. UB HEP performed for pt increased endurance and strength to perform ADL tasks and ambulation/transfers to perform tasks with improved safety, increased functional level, and for fall prevention.    CONTINUED NEED FOR THE FOLLOWING SKILLS: HH OT is medically necessary to address pain, decreased ROM, decreased functional strength, increased swelling, impaired bed mobility to perform ADL tasks, decreased independence and safety with functional transfers, decreased independence and safety performing ADL tasks, decreased activity and standing tolerance, decreased functional endurance, and impaired balance in order to improve functional independence, obtain set goals, reduce risk of falls, reduce pain, improve quality of life, and return to PLOF. ASSESSMENT: pt presents with decreased endurance, decreased functional strength, decreased safety transfers, increased A with transfers, decreased safety/participation ADL tasks, decreased balance, increased burden of care, and no UB HEP. PLAN: pt will be seen to address ther ex: establish and upgrade HEP, ther act: activity and standing tolerance training, endurance training, balance training, safety training, energy conservation training, transfer training, ADL/IADL training, and pt/caregiver education. DISCHARGE PLANNING DISCUSSED WITH PT/CAREGIVER: Anticipate D/C skilled OT week of 5.16.22 when goals met or when pt obtained maximum benefit. pt frequency 2w4. discharge to self and family care under MD supervision once all goals have been met or patient has reached max potential. pt and caregiver verbalized understanding and agree POC.

## 2022-04-26 NOTE — HOME HEALTH
SUBJECTIVE: Pt exhibited significantly low volume of speech. SLP and caregiver had difficulty understanding Pt    CAREGIVER INVOLVEMENT / ASSISTANCE NEEDED FOR: PCA and sons assist with ADLs, meds, meals, tube feeding         HOME HEALTH SUPPLIES BY TYPE AND QUANTITY ORDERED/DELIVERED THIS VISIT INCLUDE: n/a         OBJECTIVE / PATIENT RESPONSE TO TREATMENT / PATIENT LEVEL OF UNDERSTANDING OF EDUCATION PROVIDED: Pt demonstrates low  volume of speech in verbal interactions. Pt is stimulable to improve speech given cues for speech strategies. Pt demonstrates intelligible speech in structured tasks versus spontaneous conversation.            ASSESSMENT OF PROGRESS TOWARD GOALS: Pt is making appropriate progress toward ST goals         CONTINUED NEED FOR THE FOLLOWING SKILLS: Pt has difficulty with communication due to dysarthria, ST is needed so Pt is able to communicate effectively         PLAN FOR NEXT VISIT: speech tasks         THE FOLLOWING DISCHARGE PLANNING WAS DISCUSSED WITH THE PATIENT/CAREGIVER: ST to d/c in 1 more visit, informed Pt and son regarding d/c next week, [facility] signed

## 2022-04-27 NOTE — HOME HEALTH
NEXT DOCTOR'S APPT:  TBD    SUBJECTIVE:  Pt reports that her PCA has been having her stand and do exercises, but her back hurts. Pt reports that they didn't let her out of bed the whole time she was in the hospital.    CAREGIVER INVOLVEMENT/ASSISTANCE NEEDED FOR:  Pt's sons and PCA assists w ADLs, medications, meals, and transportation. HOME HEALTH SUPPLIES by type and quantity ordered/delivered this visit include: None    OBJECTIVE: See Interventions      DISTANCE AMB :  Pt unable to amb today    ADDITIONAL Yeni Joe:   As was treating pt before the last 2 observation periods in the hospital, noted that pt moving both sides of her body much slower that before and left hemiparetic UE tucked into her side, and she appeared to forget about it. Patient education provided this visit:  Pt and PCA educated on the need to make sure pt does HEP x 3 daily for stroke rehab and DVT prevention. Patient level of understanding of education provided:  Pt appeared not to understand, but PCA stated that she would help her. Patient response to treatment:  Pt states that LBP not made worse. ASSESSMENT AND PROGRESS TOWARD GOALS:    Progressing poor toward goals for strength, transfers, gait, and balance due to sig weakness from apx 2 weeks of enforced bedrest in the hospital wo PT intervention. Patient continues to have deficits in strength, balance, gait, transfers, bed mobility, and ROM for LUE due to CVA and suizures. Patient will benefit from continued intervention with progression of all PT goals to improve functional independence, prevent falls, decrease burden of care, and improve quality of life. Home Exercise Program:  Instructed in, gave handout for, and pt performed sitting HEP for BECKY LEs x 10-30 ea:  LAQ w 5 sec hold, HS curls, Marches, Hip ADD/ABD, and heel/toe raises, and seated trunk ex w leaning w arm across chest A<>P and R<>L x 5-10.     PLAN FOR NEXT VISIT:  Standing pivot transfer w RW and standing ther ex. DISCHARGE PLANNING was discussed with the pt/caregiver:   Frequency: 2wk4, 1wk1, w 1wk2 remaining with anticipated DC or reassessment for extension on 5/3.

## 2022-04-28 ENCOUNTER — HOME CARE VISIT (OUTPATIENT)
Dept: SCHEDULING | Facility: HOME HEALTH | Age: 67
End: 2022-04-28
Payer: MEDICARE

## 2022-04-28 VITALS
TEMPERATURE: 97.6 F | DIASTOLIC BLOOD PRESSURE: 71 MMHG | OXYGEN SATURATION: 98 % | SYSTOLIC BLOOD PRESSURE: 121 MMHG | RESPIRATION RATE: 15 BRPM | HEART RATE: 74 BPM

## 2022-04-28 PROCEDURE — G0157 HHC PT ASSISTANT EA 15: HCPCS

## 2022-04-28 NOTE — HOME HEALTH
NEXT DOCTOR'S APPT:  TBD    SUBJECTIVE:  Pt's son states that he walked pt to the bathroom just holding her hand, but then she went back to having problems w gait. CAREGIVER INVOLVEMENT/ASSISTANCE NEEDED FOR:  Pt's sons and PCA assists w ADLs, medications, meals, and transportation. HOME HEALTH SUPPLIES by type and quantity ordered/delivered this visit include: None    OBJECTIVE: See Interventions    Pt practiced scooting forward and backwards on couch and in WC x 4 each direction w Min to Mod A to lean to the side and advance the deweighted hip/leg. Pt sit<>stand w Mod I w gait belt for standing, and MC/VC for placing hands on armrests before sitting. Instructed pt in and she performed standing pivot transfer w gait belt w RW w sig effort and increased time w cont VCs for technique. Pt pull up on sink w gait belt w Min A and stood x 1 min and 2 min holding sink w CGA to prevent falls. Pt reports no increased pain. VCs for looking to left due to poor vision. TCs for leaning forward into therapist's hand and not pushing back. Pt sat w poor eccentric control despite VCs to lower herself w BUE. Patient education provided this visit:  Pt, son, and PCA educated on the need to have pt stand as long as she can as often as she can to build up endurance abd BLE strength. Patient level of understanding of education provided: All verbalized understanding. Patient response to treatment:  Pt reports increased fatigue but no increased LBP. ASSESSMENT AND PROGRESS TOWARD GOALS:    Progressing fair toward goals for transfers and balance as she was able to stand for 2 min holding sink w CGA and . Patient continues to have deficits in strength, balance, gait, transfers, and bed mobility due to CVA and hospital stay x 2 weeks for observation due to seizures and elevated troponin.     Patient will benefit from continued intervention with progression of all PT goals to improve functional independence, prevent falls, decrease burden of care, and improve quality of life. Home Exercise Program:  Standing at sink as long as possible as often as possible. PLAN FOR NEXT VISIT:  Reassessment    DISCHARGE PLANNING was discussed with the pt/caregiver:   Frequency: 2wk4, 1wk1, w 1wk1 remaining with anticipated DC or reassessment for extension on 5/3.

## 2022-05-01 VITALS
DIASTOLIC BLOOD PRESSURE: 82 MMHG | OXYGEN SATURATION: 98 % | TEMPERATURE: 97.9 F | RESPIRATION RATE: 18 BRPM | HEART RATE: 70 BPM | SYSTOLIC BLOOD PRESSURE: 129 MMHG

## 2022-05-01 NOTE — CASE COMMUNICATION
Chief Complaint   Patient presents with     Recheck Medication     Depression, unspecified depression type      Patient unable to stand for weight     Pt was in the hospital for 2 weeks and was not allowed out of bed, so has had sig decline in function. Please reassess at next visit.

## 2022-05-02 ENCOUNTER — HOME CARE VISIT (OUTPATIENT)
Dept: SCHEDULING | Facility: HOME HEALTH | Age: 67
End: 2022-05-02
Payer: MEDICARE

## 2022-05-02 VITALS
RESPIRATION RATE: 18 BRPM | DIASTOLIC BLOOD PRESSURE: 70 MMHG | SYSTOLIC BLOOD PRESSURE: 124 MMHG | TEMPERATURE: 98.1 F | OXYGEN SATURATION: 97 % | HEART RATE: 77 BPM

## 2022-05-02 VITALS
OXYGEN SATURATION: 96 % | HEART RATE: 68 BPM | RESPIRATION RATE: 16 BRPM | TEMPERATURE: 98.7 F | SYSTOLIC BLOOD PRESSURE: 130 MMHG | DIASTOLIC BLOOD PRESSURE: 70 MMHG

## 2022-05-02 PROCEDURE — G0151 HHCP-SERV OF PT,EA 15 MIN: HCPCS

## 2022-05-02 PROCEDURE — G0299 HHS/HOSPICE OF RN EA 15 MIN: HCPCS

## 2022-05-02 PROCEDURE — G0158 HHC OT ASSISTANT EA 15: HCPCS

## 2022-05-02 NOTE — Clinical Note
thank you   ----- Message -----  From: Matilde Thompson PT  Sent: 5/2/2022   6:55 PM EDT  To: Loreto Mir OTR/ROOSEVELT      Ms. Bruna Benson has been  clinically discharged and documentation finalized for completion of PT discharge. Caregiver involvement: Pt PCA is primary caregiver at this time and has been assisting with mobility and medication management  Medications reconciled and all medications are available in the home this visit. The following education was provided regarding medications, medication interactions, and look a like medications: NA  Medications  are effective at this time. Home health supplies by type and quantity ordered/delivered this visit include: NA  Patient education provided this visit: safety with functional mobility  Current Functional Status and progress towards goals:  Strength: Pt. BLE strength is 4-/5 which is an improvement from the initial evaluation strength of 3/5. This allows the patient increased functional independence and mobility  BED MOBILITY: Pt. is CGA with all bed mobility  which demonstrates an improvement from the initial evaluation of min A. This allows for the patient to be functionally more independent. TRANSFERS: Pt. is min/CGA with all transfers which demonstrates and improvement from the initial evaluation score of min/mod A. This allows the patient increased functional independence and mobility  GAIT/WC MOBILITY: Pt. is able to ambulate >75 feet outside over even and uneven surfaces with min A with HHA AD. This represents an improvement from the initial evaluation of 25 feet with HHA AD on level surfaces with min/mod A. STAIRS: NA  BALANCE: Patient's final tinetti is 8/28 which is a decline from the initial evaluation score of 11/28. Progress toward goals: Patient has met all goals, see interventions for details. Pt. was able to return demonstrate all mobility training and HEP shown with min A.    Patient response to treatment and education: Patient tolerated treatment well, with no complaint of new pain noted post treatment. Home exercise program: Patient has been given a HEP and patient/caregiver understand the HEP.  Patient is doing the HEP 2/day as able  Continued need for the following skills:  NA patient is final DC from PT today   The following discharge planning was discussed with the pt/caregiver: DC from PT

## 2022-05-02 NOTE — Clinical Note
Thanks  ----- Message -----  From: Matilde Thompson PT  Sent: 5/2/2022   6:55 PM EDT  To: Reynaldo Owen, SLP      Ms. Bruna Benson has been  clinically discharged and documentation finalized for completion of PT discharge. Caregiver involvement: Pt PCA is primary caregiver at this time and has been assisting with mobility and medication management  Medications reconciled and all medications are available in the home this visit. The following education was provided regarding medications, medication interactions, and look a like medications: NA  Medications  are effective at this time. Home health supplies by type and quantity ordered/delivered this visit include: NA  Patient education provided this visit: safety with functional mobility  Current Functional Status and progress towards goals:  Strength: Pt. BLE strength is 4-/5 which is an improvement from the initial evaluation strength of 3/5. This allows the patient increased functional independence and mobility  BED MOBILITY: Pt. is CGA with all bed mobility  which demonstrates an improvement from the initial evaluation of min A. This allows for the patient to be functionally more independent. TRANSFERS: Pt. is min/CGA with all transfers which demonstrates and improvement from the initial evaluation score of min/mod A. This allows the patient increased functional independence and mobility  GAIT/WC MOBILITY: Pt. is able to ambulate >75 feet outside over even and uneven surfaces with min A with HHA AD. This represents an improvement from the initial evaluation of 25 feet with HHA AD on level surfaces with min/mod A. STAIRS: NA  BALANCE: Patient's final tinetti is 8/28 which is a decline from the initial evaluation score of 11/28. Progress toward goals: Patient has met all goals, see interventions for details. Pt. was able to return demonstrate all mobility training and HEP shown with min A.    Patient response to treatment and education: Patient tolerated treatment well, with no complaint of new pain noted post treatment. Home exercise program: Patient has been given a HEP and patient/caregiver understand the HEP.  Patient is doing the HEP 2/day as able  Continued need for the following skills:  NA patient is final DC from PT today   The following discharge planning was discussed with the pt/caregiver: DC from PT

## 2022-05-02 NOTE — HOME HEALTH
DC ACTIONS NARRATIVE  Pt. clinically discharged and documentation finalized for completion of PT discharge. Caregiver involvement: Pt PCA is primary caregiver at this time and has been assisting with mobility and medication management  Medications reconciled and all medications are available in the home this visit. The following education was provided regarding medications, medication interactions, and look a like medications: NA  Medications  are effective at this time. Home health supplies by type and quantity ordered/delivered this visit include: NA  Patient education provided this visit: safety with functional mobility  Current Functional Status and progress towards goals:  Strength: Pt. BLE strength is 4-/5 which is an improvement from the initial evaluation strength of 3/5. This allows the patient increased functional independence and mobility  BED MOBILITY: Pt. is CGA with all bed mobility  which demonstrates an improvement from the initial evaluation of min A. This allows for the patient to be functionally more independent. TRANSFERS: Pt. is min/CGA with all transfers which demonstrates and improvement from the initial evaluation score of min/mod A. This allows the patient increased functional independence and mobility  GAIT/WC MOBILITY: Pt. is able to ambulate >75 feet outside over even and uneven surfaces with min A with HHA AD. This represents an improvement from the initial evaluation of 25 feet with HHA AD on level surfaces with min/mod A. STAIRS: NA  BALANCE: Patient's final tinetti is 8/28 which is an improvement from the initial evaluation score of 11/28. This allows the patient to be functionally more independent and have a decreased fall risk  Progress toward goals: Patient has met all goals, see interventions for details. Pt. was able to return demonstrate all mobility training and HEP shown with min A.    Patient response to treatment and education: Patient tolerated treatment well, with no complaint of new pain noted post treatment. Home exercise program: Patient has been given a HEP and patient/caregiver understand the HEP.  Patient is doing the HEP 2/day as able  Continued need for the following skills:  NA patient is final DC from PT today   The following discharge planning was discussed with the pt/caregiver: DC from PT

## 2022-05-02 NOTE — HOME HEALTH
Skilled reason for visit: skilled assessment, education, medication management, GI assessment, patient eating by mouth 3-4 meals, 2 snacks and boost.  Caregiver involvement: Family assists ADL's, medications, meals, transportation, errands. Medications reviewed and all medications are available in the home this visit. The following education was provided regarding medications: patient knowledgeable about all medications, has no questions or concerns at this time. MD notified of any discrepancies/look a-like medications/medication interactions: na   Medications are effective at this time. Home health supplies by type and quantity ordered/delivered this visit include: na   Patient education provided this visit: Continue all medications as prescribed, implement fall/infection/pressure ulcer interventions, monitor for abnormal signs/symptoms of infection and report to MD immediately. Keep all follow up appointments as prescribed. Read all teaching packets for education of disease process and to prevent complications.     Sharps education provided: na   Patient level of understanding of education provided: patient/caregiver verbalized 100% understanding to all topics discussed   Skilled Care Performed this visit: skilled assessment, education, medication management, gtube assessment   Patient response to procedure performed: patient denied pain and discomfort during procedure/visit   Agency Progress toward goals: progressing   Patient's Progress towards personal goals: progressing   Home exercise program: prescribed by therapy  Continued need for the following skills: Nursing, Physical Therapy and Occupational Therapy  Plan for next visit: skilled assessment, education, medication management, gtube assessment   Patient and/or caregiver notified and agrees to changes in the Plan of Care N/A   The following discharge planning was discussed with the pt/caregiver: Discharge planning as follows: when goals met, patient/caregiver able to manage disease process, medications and pain

## 2022-05-02 NOTE — Clinical Note
Ms. Heriberto Be has been  clinically discharged and documentation finalized for completion of PT discharge. Caregiver involvement: Pt PCA is primary caregiver at this time and has been assisting with mobility and medication management  Medications reconciled and all medications are available in the home this visit. The following education was provided regarding medications, medication interactions, and look a like medications: NA  Medications  are effective at this time. Home health supplies by type and quantity ordered/delivered this visit include: NA  Patient education provided this visit: safety with functional mobility  Current Functional Status and progress towards goals:  Strength: Pt. BLE strength is 4-/5 which is an improvement from the initial evaluation strength of 3/5. This allows the patient increased functional independence and mobility  BED MOBILITY: Pt. is CGA with all bed mobility  which demonstrates an improvement from the initial evaluation of min A. This allows for the patient to be functionally more independent. TRANSFERS: Pt. is min/CGA with all transfers which demonstrates and improvement from the initial evaluation score of min/mod A. This allows the patient increased functional independence and mobility  GAIT/WC MOBILITY: Pt. is able to ambulate >75 feet outside over even and uneven surfaces with min A with HHA AD. This represents an improvement from the initial evaluation of 25 feet with HHA AD on level surfaces with min/mod A. STAIRS: NA  BALANCE: Patient's final tinetti is 8/28 which is a decline from the initial evaluation score of 11/28. Progress toward goals: Patient has met all goals, see interventions for details. Pt. was able to return demonstrate all mobility training and HEP shown with min A. Patient response to treatment and education: Patient tolerated treatment well, with no complaint of new pain noted post treatment.  Home exercise program: Patient has been given a HEP and patient/caregiver understand the HEP.  Patient is doing the HEP 2/day as able  Continued need for the following skills:  NA patient is final DC from PT today   The following discharge planning was discussed with the pt/caregiver: DC from PT

## 2022-05-03 VITALS
DIASTOLIC BLOOD PRESSURE: 84 MMHG | RESPIRATION RATE: 17 BRPM | SYSTOLIC BLOOD PRESSURE: 132 MMHG | TEMPERATURE: 98 F | HEART RATE: 87 BPM | OXYGEN SATURATION: 98 %

## 2022-05-03 NOTE — HOME HEALTH
SUBJECTIVE: Patient caregiver [facility]) present, Pt reports no concerns and complaints. Rahul Soni CAREGIVER INVOLVEMENT/ASSISTANCE NEEDED FOR: The patient has an Son and aide that helps with all I/ADL's due to the inabilty to do so. Rahul Soni HOME HEALTH SUPPLIES BY TYPE AND QUANTITY ORDERED/DELIVERED THIS VISIT INCLUDE: NONE    . OBJECTIVE:  See interventions. .         Patient education provided this visit: MANZANO Role, energy conservation techniques, and purse-lip breathing     . Patient level of understanding of education provided: Patient able to peform education with 80% accurancy. Pt able to demostrate purse-lip beathing correctly with 80% accuracy 3/3 trails. .    RESPONSE TO TREATMENT: Pt required extended time for tx due to self-limiting behavior. Mutiple breaks provided due to patients decreased activity tolerace. Patient reports fatigue level an 8/10 on fatigue scale. .     ASSESSMENT OF PROGRESS TOWARD GOALS: Pt presents decreased activiy tolerance while conducting functional balance retraining tasks and sit to stand from couch with Max A x1 with use of FWW with use of gait belt. Extended time permitted due to self-limiting behavior and functional impairments. Pt has decreased BUE/BLE strength and endurance and would benefit from complete carry-over on HEP regeium addressing BUE gross to inceased acitivty standing tolerance when conducting functional transfers. .    CONTINUED NEED FOR THE FOLLOWING SKILLS:  Patient continues to have deficits in bed mobility, functioanl transfers, BUE strength, and balance due to recent CVA and unevaluated    . PLAN FOR NEXT VISIT:MANZANO will addressing functional transfers and gross BUE exericises and training. .    THE FOLLOWING DISCHARGE PLANNING WAS DISCUSSED WITH THE PATIENT/CAREGIVER: Patient current frequency is 2w4.

## 2022-05-04 ENCOUNTER — HOME CARE VISIT (OUTPATIENT)
Dept: SCHEDULING | Facility: HOME HEALTH | Age: 67
End: 2022-05-04
Payer: MEDICARE

## 2022-05-04 PROCEDURE — G0153 HHCP-SVS OF S/L PATH,EA 15MN: HCPCS

## 2022-05-05 VITALS
RESPIRATION RATE: 16 BRPM | DIASTOLIC BLOOD PRESSURE: 72 MMHG | OXYGEN SATURATION: 97 % | HEART RATE: 71 BPM | SYSTOLIC BLOOD PRESSURE: 156 MMHG | TEMPERATURE: 98.3 F

## 2022-05-05 NOTE — HOME HEALTH
SUBJECTIVE: Pt demonstrated low volume of speech and stuttering when SLP arrived. SLP reminded Pt to take a deep breath and talk loud, Pt was able to eliminate stuttering and speak with adequate volume     CAREGIVER INVOLVEMENT / ASSISTANCE NEEDED FOR: PCA and sons assist with ADLs, meds, meals, tube feeding    HOME HEALTH SUPPLIES BY TYPE AND QUANTITY ORDERED/DELIVERED THIS VISIT INCLUDE: n/a    OBJECTIVE / PATIENT RESPONSE TO TREATMENT / PATIENT LEVEL OF UNDERSTANDING OF EDUCATION PROVIDED: Pt demonstrates low  volume of speech in verbal interactions. Pt is stimulable to improve speech given cues for speech strategies. Pt demonstrates intelligible speech in structured tasks versus spontaneous conversation.

## 2022-05-06 ENCOUNTER — HOME CARE VISIT (OUTPATIENT)
Dept: SCHEDULING | Facility: HOME HEALTH | Age: 67
End: 2022-05-06
Payer: MEDICARE

## 2022-05-06 VITALS
SYSTOLIC BLOOD PRESSURE: 132 MMHG | RESPIRATION RATE: 17 BRPM | OXYGEN SATURATION: 98 % | DIASTOLIC BLOOD PRESSURE: 84 MMHG | TEMPERATURE: 98 F | HEART RATE: 84 BPM

## 2022-05-06 PROCEDURE — G0158 HHC OT ASSISTANT EA 15: HCPCS

## 2022-05-07 NOTE — HOME HEALTH
SUBJECTIVE: Patient family member (Son) present upon [address] arrival , Pt reports no concerns and complaints. Sabrina Rued CAREGIVER INVOLVEMENT/ASSISTANCE NEEDED FOR: The patient has an Son and aide that helps with all I/ADL's due to the inabilty to do so. Sabrina Rued HOME HEALTH SUPPLIES BY TYPE AND QUANTITY ORDERED/DELIVERED THIS VISIT INCLUDE: NONE         . OBJECTIVE:  See interventions. .         Patient education provided this visit: Occupational therapy and the benefits,MANZANO Role, energy conservation techniques, and purse-lip breathing          . Patient level of understanding of education provided: Caregiver (Son) states the client could benefit from occupational therapy,howver the patient was not in agreeance to education provided and decliened therapy on this date. .       RESPONSE TO TREATMENT: Extended time permitted for verbal encourgment for paticipation due to self-limiting behavior. Patient was not agreeable to therapy on this date. .          ASSESSMENT OF PROGRESS TOWARD GOALS: Extended time permitted due to self-limiting behavior and functional impairments for activity particpation. Patient declined occupational therapy tx on this date. However patient was in agreeance to education provided and vitials. Patient vitials were WNL on this visit. Patient reported she was feeling well. Just sleepy and wanted some rest. Pt stated she would be open to tx on the week of 05.09.22. Pt has decreased BUE/BLE strength and endurance and would benefit from complete carry-over on HEP regeium addressing BUE gross to inceased acitivty standing tolerance when conducting functional transfers.           .         CONTINUED NEED FOR THE FOLLOWING SKILLS: [facility] OT is medically necessary to address pain, decreased ROM, decreased functional strength, increased swelling, impaired bed mobility to perform ADL tasks, decreased independence and safety with functional transfers, decreased independence and safety performing ADL tasks, decreased activity and standing tolerance, decreased functional endurance, and impaired balance in order to improve functional independence, obtain set goals, reduce risk of falls, reduce pain, improve quality of life, and return to PLOF. ASSESSMENT: pt presents with decreased endurance, decreased functional strength, decreased safety transfers, increased A with transfers, decreased safety/participation ADL tasks, decreased balance, increased burden of care, and no UB HEP. Martinez Ken PLAN FOR NEXT VISIT:MANZANO will addressing functional transfers and gross BUE exericises and training. .         THE FOLLOWING DISCHARGE PLANNING WAS DISCUSSED WITH THE PATIENT/CAREGIVER: Patient current frequency is 2w2.

## 2022-05-09 ENCOUNTER — HOME CARE VISIT (OUTPATIENT)
Dept: SCHEDULING | Facility: HOME HEALTH | Age: 67
End: 2022-05-09
Payer: MEDICARE

## 2022-05-09 PROCEDURE — G0158 HHC OT ASSISTANT EA 15: HCPCS

## 2022-05-09 PROCEDURE — G0299 HHS/HOSPICE OF RN EA 15 MIN: HCPCS

## 2022-05-09 NOTE — HOME HEALTH
Reviewed with patient /PCG s/s of disease exacerbation that need to be reported to health care providers including steps on what to do in an event of an emergency. Medication pill box set up checked and emphasized the importance of timely refill of medications to prevent missing or skipping doses, pain management, continue following prescribed diet regimen. Re-instructed on infection control measures and practicing standard precautions, most importantly, frequent proper hand washing to prevent disease complications. medications reconciled  Skilled nurse instructed patient on safety measures to avoid injuries and falls such as keeping adequate lighting during the day and night and was educated on proper use of assistive device to prevent falls.   discontinue skilled nursing, continue OT

## 2022-05-10 ENCOUNTER — HOME CARE VISIT (OUTPATIENT)
Dept: SCHEDULING | Facility: HOME HEALTH | Age: 67
End: 2022-05-10
Payer: MEDICARE

## 2022-05-10 VITALS
DIASTOLIC BLOOD PRESSURE: 84 MMHG | TEMPERATURE: 98 F | HEART RATE: 87 BPM | SYSTOLIC BLOOD PRESSURE: 132 MMHG | TEMPERATURE: 98 F | HEART RATE: 78 BPM | OXYGEN SATURATION: 98 % | RESPIRATION RATE: 14 BRPM | SYSTOLIC BLOOD PRESSURE: 138 MMHG | DIASTOLIC BLOOD PRESSURE: 87 MMHG | RESPIRATION RATE: 14 BRPM | OXYGEN SATURATION: 98 %

## 2022-05-10 PROCEDURE — G0158 HHC OT ASSISTANT EA 15: HCPCS

## 2022-05-10 NOTE — Clinical Note
Pass OFF:   ADL:  GOAL NOT MET She declined participating in any ADL goal. She stated \" I have an caregiver to help assist me with bathing, dressing, and tolieiting. Balance:  GOAL NOT MET. Client able to employ static/dynamic standing tasks ( Attempting to fix pants, clean counter and grab cup) for the longest duration 3 min's with use of FWW with Poor +balance with Mod I with BUE support and one period of LOB. BUE Exercises: GOAL NOT MET. Pt not in compliance to HEP regime provided. Caregiver and son reports she does not participate in exercises, walking, etc when therapy is not present in home. All measurements in regards to BUE remain the same as SOC (RUE 4-/5 MMT grossly, L shoulder AROM flex 80*, and 70* 3+/5 MMT, L elbow AROM flex 90*, 3+/5 MMT, B gross grasp 3+/5 MMT)    Energy conservation techniques: GOAL NOT MET. Pt caregivers able to demonstrate/verbalize  energy conservation bathing, dressing, and setup with MOD I. Pt perform's UB HEP with  2 rest breaks . Pt  able to  demonstrate improved activity tolerance as evidence by performing functional tasks standing 4 min's without rest. Pt is currently has an PRE of  4/10 on Modified Carly level. Functional mobility: GOAL NOT MET . Therapist facialted transnational transfers (Sit to stand from couch ) to promote safety and educate caregiver on safety; in return client able to demo transfers with Max A x1. Pt declined participating in transfers to and from toilet and tub.

## 2022-05-10 NOTE — HOME HEALTH
SUBJECTIVE: Patient upset about her son going to the ED due to his diabetes being 600. Pt reports no concerns and complaints. Franco Case CAREGIVER INVOLVEMENT/ASSISTANCE NEEDED FOR: The patient has an Son and aide that helps with all I/ADL's due to the inabilty to do so. [facility] HOME HEALTH SUPPLIES BY TYPE AND QUANTITY ORDERED/DELIVERED THIS VISIT INCLUDE: NONE            OBJECTIVE:  See interventions. .         Patient education provided this visit: Occupational therapy and the benefits,MANZANO Role, energy conservation techniques, and purse-lip breathing   . Patient level of understanding of education provided: Caregiver (Son) states the client could benefit from occupational therapy,howver the patient was not in agreeance to education provided and decliened therapy on this date. RESPONSE TO TREATMENT: Patient able to demostrate AROM exercises with SUP. Patient reports an fatigue on scale an 8/10. .       ASSESSMENT OF PROGRESS TOWARD GOALS: Extended time permitted due to self-limiting behavior and functional impairments for activity particpation. Patient vitials were WNL on this visit Pt able to demostrate all BUE gross exercises with MOD I with use of energy conservation technques with SUP. Pt has extreme functional impairments and required moderate cuing for corrrect use of BUE Regiem. Pt has decreased BUE/BLE strength and endurance and would benefit from complete carry-over on HEP regeium addressing BUE gross to inceased acitivty standing tolerance when conducting functional transfers.       .    CONTINUED NEED FOR THE FOLLOWING SKILLS: [facility] is medically necessary to address pain, decreased ROM, decreased functional strength, increased swelling, impaired bed mobility to perform ADL tasks, decreased independence and safety with functional transfers, decreased independence and safety performing ADL tasks, decreased activity and standing tolerance, decreased functional endurance, and impaired balance in order to improve functional independence, obtain set goals, reduce risk of falls, reduce pain, improve quality of life, and return to PLOF. Marnell Records PLAN FOR NEXT VISIT:MANZANO will addressing functional transfers and gross BUE exericises and training.  ,         THE FOLLOWING DISCHARGE PLANNING WAS DISCUSSED WITH THE PATIENT/CAREGIVER: Patient current frequency is 2w1.

## 2022-05-10 NOTE — Clinical Note
thank you!  ----- Message -----  From: Teresa Jack  Sent: 5/18/2022  12:30 AM EDT  To: Zak Sutton, OTR/L      Pass OFF:   ADL:  GOAL NOT MET She declined participating in any ADL goal. She stated \" I have an caregiver to help assist me with bathing, dressing, and tolieiting. Balance:  GOAL NOT MET. Client able to employ static/dynamic standing tasks ( Attempting to fix pants, clean counter and grab cup) for the longest duration 3 min's with use of FWW with Poor +balance with Mod I with BUE support and one period of LOB. BUE Exercises: GOAL NOT MET. Pt not in compliance to HEP regime provided. Caregiver and son reports she does not participate in exercises, walking, etc when therapy is not present in home. All measurements in regards to BUE remain the same as SOC (RUE 4-/5 MMT grossly, L shoulder AROM flex 80*, and 70* 3+/5 MMT, L elbow AROM flex 90*, 3+/5 MMT, B gross grasp 3+/5 MMT)    Energy conservation techniques: GOAL NOT MET. Pt caregivers able to demonstrate/verbalize  energy conservation bathing, dressing, and setup with MOD I. Pt perform's UB HEP with  2 rest breaks . Pt  able to  demonstrate improved activity tolerance as evidence by performing functional tasks standing 4 min's without rest. Pt is currently has an PRE of  4/10 on Modified Carly level. Functional mobility: GOAL NOT MET . Therapist facialted transnational transfers (Sit to stand from couch ) to promote safety and educate caregiver on safety; in return client able to demo transfers with Max A x1. Pt declined participating in transfers to and from toilet and tub.

## 2022-05-10 NOTE — HOME HEALTH
SUBJECTIVE: Pt reports no concerns and complaints. Chaparrita Records CAREGIVER INVOLVEMENT/ASSISTANCE NEEDED FOR: The patient has an Son and aide that helps with all I/ADL's due to the inabilty to do so. Chaparrita Records HOME HEALTH SUPPLIES BY TYPE AND QUANTITY ORDERED/DELIVERED THIS VISIT INCLUDE: NONE                  OBJECTIVE:  See interventions. .             Patient education pr ovided this visit: Occupational therapy and the benefits,MANZANO Role, energy conservation techniques, and purse-lip breathing    . Patient level of understanding of education provided: Caregiver (Son) states the client could benefit from occupational therapy,howver the patient was not in agreeance to education provided and decliened therapy on this date. RESPONSE TO TREATMENT: Patient able to demostrate AROM exercises with S UP. Patient reports an fatigue on scale an 8/10. .           ASSESSMENT OF PROGRESS TOWARD GOALS: Extended time permitted due to self-limiting behavior and functional impairments for activity particpation. Patient vitials were WNL on this visit Pt able to demostrate all BUE gross exercises with MOD I with use of energy conservation technques with SUP, functional transfers with Min A. Pt has extreme functional impairments and required moderate cuing for corrrect use of  BUE Regiem. Pt has decreased BUE/BLE strength and endurance and would benefit from complete carry-over on HEP regeium addressing BUE gross to inceased acitivty standing tolerance when conducting functional transfers.        .      CONTINUED NEED FOR THE FOLLOWING SKILLS: [place] OT is medically necessary to address pain, decreased ROM, decreased functional strength, increased swelling, impaired bed mobility to perform ADL tasks, decreased indep endence and safety with functional transfers, decreased independence and safety performing ADL tasks, decreased activity and standing tolerance, decreased functional endurance, and impaired balance in order to improve functional independence, obtain set goals, reduce risk of falls, reduce pain, improve quality of life, and return to PLOF. Juan Neighbours PLAN FOR NEXT VISIT:MANZANO will addressing functional transfers and gross BUE exericises and  training.    ,             THE FOLLOWING DISCHARGE PLANNING WAS DISCUSSED WITH THE PATIENT/CAREGIVER: Patient current frequency is 2w1.

## 2022-05-11 PROBLEM — R79.89 ELEVATED TROPONIN: Status: RESOLVED | Noted: 2022-04-11 | Resolved: 2022-05-11

## 2022-05-11 PROBLEM — R77.8 ELEVATED TROPONIN: Status: RESOLVED | Noted: 2022-04-11 | Resolved: 2022-05-11

## 2022-05-13 ENCOUNTER — APPOINTMENT (OUTPATIENT)
Dept: GENERAL RADIOLOGY | Age: 67
End: 2022-05-13
Attending: PHYSICIAN ASSISTANT
Payer: MEDICARE

## 2022-05-13 ENCOUNTER — HOSPITAL ENCOUNTER (EMERGENCY)
Age: 67
Discharge: HOME | End: 2022-05-13
Attending: STUDENT IN AN ORGANIZED HEALTH CARE EDUCATION/TRAINING PROGRAM
Payer: MEDICARE

## 2022-05-13 VITALS
SYSTOLIC BLOOD PRESSURE: 152 MMHG | TEMPERATURE: 97.5 F | DIASTOLIC BLOOD PRESSURE: 77 MMHG | HEART RATE: 64 BPM | BODY MASS INDEX: 18.94 KG/M2 | WEIGHT: 97 LBS | RESPIRATION RATE: 16 BRPM | OXYGEN SATURATION: 97 %

## 2022-05-13 DIAGNOSIS — R56.9 SEIZURE (HCC): Primary | ICD-10-CM

## 2022-05-13 LAB
ALBUMIN SERPL-MCNC: 3.5 G/DL (ref 3.4–5)
ALBUMIN/GLOB SERPL: 1 {RATIO} (ref 0.8–1.7)
ALP SERPL-CCNC: 58 U/L (ref 45–117)
ALT SERPL-CCNC: 24 U/L (ref 13–56)
ANION GAP SERPL CALC-SCNC: 2 MMOL/L (ref 3–18)
AST SERPL-CCNC: 42 U/L (ref 10–38)
BASOPHILS # BLD: 0 K/UL (ref 0–0.1)
BASOPHILS NFR BLD: 1 % (ref 0–2)
BILIRUB SERPL-MCNC: 0.4 MG/DL (ref 0.2–1)
BUN SERPL-MCNC: 15 MG/DL (ref 7–18)
BUN/CREAT SERPL: 15 (ref 12–20)
CALCIUM SERPL-MCNC: 9.5 MG/DL (ref 8.5–10.1)
CHLORIDE SERPL-SCNC: 109 MMOL/L (ref 100–111)
CO2 SERPL-SCNC: 30 MMOL/L (ref 21–32)
CREAT SERPL-MCNC: 1.03 MG/DL (ref 0.6–1.3)
DIFFERENTIAL METHOD BLD: ABNORMAL
EOSINOPHIL # BLD: 0.1 K/UL (ref 0–0.4)
EOSINOPHIL NFR BLD: 2 % (ref 0–5)
ERYTHROCYTE [DISTWIDTH] IN BLOOD BY AUTOMATED COUNT: 14.7 % (ref 11.6–14.5)
GLOBULIN SER CALC-MCNC: 3.4 G/DL (ref 2–4)
GLUCOSE SERPL-MCNC: 83 MG/DL (ref 74–99)
HCT VFR BLD AUTO: 30.8 % (ref 35–45)
HGB BLD-MCNC: 10.1 G/DL (ref 12–16)
IMM GRANULOCYTES # BLD AUTO: 0 K/UL (ref 0–0.04)
IMM GRANULOCYTES NFR BLD AUTO: 0 % (ref 0–0.5)
LYMPHOCYTES # BLD: 1.7 K/UL (ref 0.9–3.6)
LYMPHOCYTES NFR BLD: 35 % (ref 21–52)
MCH RBC QN AUTO: 30.4 PG (ref 24–34)
MCHC RBC AUTO-ENTMCNC: 32.8 G/DL (ref 31–37)
MCV RBC AUTO: 92.8 FL (ref 78–100)
MONOCYTES # BLD: 0.5 K/UL (ref 0.05–1.2)
MONOCYTES NFR BLD: 10 % (ref 3–10)
NEUTS SEG # BLD: 2.6 K/UL (ref 1.8–8)
NEUTS SEG NFR BLD: 52 % (ref 40–73)
NRBC # BLD: 0 K/UL (ref 0–0.01)
NRBC BLD-RTO: 0 PER 100 WBC
PLATELET # BLD AUTO: 139 K/UL (ref 135–420)
PMV BLD AUTO: 12.6 FL (ref 9.2–11.8)
POTASSIUM SERPL-SCNC: 4.5 MMOL/L (ref 3.5–5.5)
PROT SERPL-MCNC: 6.9 G/DL (ref 6.4–8.2)
RBC # BLD AUTO: 3.32 M/UL (ref 4.2–5.3)
SODIUM SERPL-SCNC: 141 MMOL/L (ref 136–145)
WBC # BLD AUTO: 5 K/UL (ref 4.6–13.2)

## 2022-05-13 PROCEDURE — 85025 COMPLETE CBC W/AUTO DIFF WBC: CPT

## 2022-05-13 PROCEDURE — 74011250637 HC RX REV CODE- 250/637: Performed by: PHYSICIAN ASSISTANT

## 2022-05-13 PROCEDURE — 72100 X-RAY EXAM L-S SPINE 2/3 VWS: CPT

## 2022-05-13 PROCEDURE — 80053 COMPREHEN METABOLIC PANEL: CPT

## 2022-05-13 PROCEDURE — 99284 EMERGENCY DEPT VISIT MOD MDM: CPT

## 2022-05-13 RX ORDER — DIVALPROEX SODIUM 250 MG/1
250 TABLET, DELAYED RELEASE ORAL
Status: COMPLETED | OUTPATIENT
Start: 2022-05-13 | End: 2022-05-13

## 2022-05-13 RX ORDER — OXCARBAZEPINE 300 MG/1
300 TABLET, FILM COATED ORAL
Status: COMPLETED | OUTPATIENT
Start: 2022-05-13 | End: 2022-05-13

## 2022-05-13 RX ADMIN — DIVALPROEX SODIUM 250 MG: 250 TABLET, DELAYED RELEASE ORAL at 16:05

## 2022-05-13 RX ADMIN — OXCARBAZEPINE 300 MG: 300 TABLET, FILM COATED ORAL at 16:05

## 2022-05-13 NOTE — ED PROVIDER NOTES
[address] [facility],[address]  [facility] EMERGENCY DEPT    Date: 5/13/2022  Patient Name: Erik Armenta    History of Presenting Illness     Chief Complaint   Patient presents with    Seizure     75 y.o. female with a past medical history of Hypertension, Seizures, and Stroke presents the ED via EMS for complaints of seizure this morning. Son states patient had 2 seizures while laying in her bed, longest the last was 2 minutes. Patient takes Depakote and Trileptal, has not yet had them this morning. Patient was postictal for quite some time, when she became more alert she was complaining of midline low back pain. She denies any head injury, numbness or weakness, other pain, other symptoms. Patient denies any other associated signs or symptoms. Patient denies any other complaints. Nursing notes regarding the HPI and triage nursing notes were reviewed. Prior medical records were reviewed. Current Facility-Administered Medications   Medication Dose Route Frequency Provider Last Rate Last Admin    OXcarbazepine (TRILEPTAL) tablet 300 mg  300 mg Oral NOW Jeny Dewitt PA        divalproex DR (DEPAKOTE) tablet 250 mg  250 mg Oral NOW MING Escamilla         Current Outpatient Medications   Medication Sig Dispense Refill    ferrous sulfate 325 mg (65 mg iron) tablet Take 325 mg by mouth daily.  divalproex DR (DEPAKOTE) 250 mg tablet Take 250 mg by mouth three (3) times daily.  amLODIPine (NORVASC) 10 mg tablet Take 10 mg by mouth daily.  tamsulosin HCl (TAMSULOSIN PO) Take 0.4 mg by mouth daily. tab      PARoxetine (PAXIL) 30 mg tablet Take 30 mg by mouth daily.  OXcarbazepine (TRILEPTAL) 300 mg tablet Take 300 mg by mouth two (2) times a day.  multivitamin, tx-iron-ca-min (THERA-M w/ IRON) 9 mg iron-400 mcg tab tablet Take 1 Tablet by mouth daily. 30 Tablet 0    atorvastatin (LIPITOR) 40 mg tablet Take 40 mg by mouth daily.       acetaminophen (TYLENOL) 500 mg tablet Take 500 mg by mouth every six (6) hours as needed for Pain. take 1 tab every 6 hours as needed daily for pain      clopidogreL (PLAVIX) 75 mg tab Take 75 mg by mouth daily. Indications: prevention for a blood clot going to the brain      ergocalciferol (VITAMIN D2) 50,000 unit capsule Take 50,000 Units by mouth two (2) times a week.  cyanocobalamin (VITAMIN B12) 500 mcg tablet Take 1 Tab by mouth daily. 27 Tab 0       Past History     Past Medical History:  Past Medical History:   Diagnosis Date    Abnormal coordination 9/21/2020    Depression 4/12/2022    Failure to thrive in adult 4/12/2022    Hallucinations 9/21/2020    Hypertension     Neurological disorder     [facility]ures [facility])     Stroke ([facility]) 2009    Subclinical hypothyroidism 4/13/2022       Past Surgical History:  Past Surgical History:   Procedure Laterality Date    PLACE PERCUT GASTROSTOMY TUBE  1/11/2022            Family History:  Family History   Problem Relation Age of Onset    Diabetes Other     Stroke Other        Social History:  Social History     Tobacco Use    Smoking status: Never Smoker    Smokeless tobacco: Never Used   Vaping Use    Vaping Use: Never used   Substance Use Topics    Alcohol use: Yes     Comment: Socially     Drug use: No       Allergies: Allergies   Allergen Reactions    Tomato Hives     Allergic to eating tomato.  Aspirin Nausea and Vomiting    Ciprofloxacin Rash    Pcn [Penicillins] Rash and Hives    Sulfa (Sulfonamide Antibiotics) Hives and Rash       Patient's primary care provider (as noted in EPIC):  Linda Webb NP    Review of Systems   Constitutional:  Denies malaise, fever, chills. Head:  Denies injury. Face:  Denies injury or pain. ENMT:  Denies sore throat. Neck:  Denies injury or pain. Respiratory:  Denies cough, wheezing, difficulty breathing, shortness of breath. GI/ABD:  Denies injury, pain, distention, nausea, vomiting, diarrhea.    :  Denies injury, pain, dysuria or urgency. Back:  + Low back pain. Extremity/MS:  Denies injury or pain. Neuro:  + Seizure. Denies headache, dizziness, neurologic symptoms/deficits/paresthesias. Skin: Denies injury, rash, itching or skin changes. All other systems negative as reviewed. Visit Vitals  BP (!) 166/75 (BP 1 Location: Right upper arm, BP Patient Position: At rest;Lying)   Pulse 66   Temp 97.5 °F (36.4 °C)   Resp 13   Wt 44 kg (97 lb)   SpO2 99%   BMI 18.94 kg/m²       PHYSICAL EXAM:    CONSTITUTIONAL:  Alert, in no apparent distress;  well developed;  well nourished. HEAD:  Normocephalic, atraumatic. EYES:  EOMI. Non-icteric sclera. Normal conjunctiva. ENTM:  Nose:  no rhinorrhea. Throat:  no erythema or exudate, mucous membranes moist.  NECK:  Supple  RESPIRATORY:  Chest clear, equal breath sounds, good air movement. Without wheezes, rhonchi, or rales. CARDIOVASCULAR:  Regular rate and rhythm. No murmurs, rubs, or gallops. GI:  Normal bowel sounds, abdomen soft and non-tender. No rebound or guarding. BACK: Midline L-spine bony tenderness palpation, without step-off. No other bony tenderness. UPPER EXT:  Normal inspection. LOWER EXT:  No edema, no calf tenderness. Distal pulses intact. NEURO:  Moves all four extremities. Normal motor exam and sensation in all four extremities. Normal CN II-XII exam.   SKIN:  No rashes;  Normal for age. PSYCH:  Alert and normal affect.      ED COURSE:      Recent Results (from the past 12 hour(s))   CBC WITH AUTOMATED DIFF    Collection Time: 05/13/22  1:53 PM   Result Value Ref Range    WBC 5.0 4.6 - 13.2 K/uL    RBC 3.32 (L) 4.20 - 5.30 M/uL    HGB 10.1 (L) 12.0 - 16.0 g/dL    HCT 30.8 (L) 35.0 - 45.0 %    MCV 92.8 78.0 - 100.0 FL    MCH 30.4 24.0 - 34.0 PG    MCHC 32.8 31.0 - 37.0 g/dL    RDW 14.7 (H) 11.6 - 14.5 %    PLATELET 916 832 - 454 K/uL    MPV 12.6 (H) 9.2 - 11.8 FL    NRBC 0.0 0  WBC    ABSOLUTE NRBC 0.00 0.00 - 0.01 K/uL    NEUTROPHILS 52 40 - 73 % LYMPHOCYTES 35 21 - 52 %    MONOCYTES 10 3 - 10 %    EOSINOPHILS 2 0 - 5 %    BASOPHILS 1 0 - 2 %    IMMATURE GRANULOCYTES 0 0.0 - 0.5 %    ABS. NEUTROPHILS 2.6 1.8 - 8.0 K/UL    ABS. LYMPHOCYTES 1.7 0.9 - 3.6 K/UL    ABS. MONOCYTES 0.5 0.05 - 1.2 K/UL    ABS. EOSINOPHILS 0.1 0.0 - 0.4 K/UL    ABS. BASOPHILS 0.0 0.0 - 0.1 K/UL    ABS. IMM. GRANS. 0.0 0.00 - 0.04 K/UL    DF AUTOMATED        XR SPINE LUMB 2 OR 3 V    Result Date: 5/13/2022  Lumbar spine, AP and lateral views HISTORY: Low back pain. Comparison 4/2/2022 Mild rotoscoliosis suspected, right convexity. Evaluation limited due to overlying bowel gas. G-tube in place. No spondylolisthesis. No focal compression deformity. Facets are intact. Spinous processes grossly intact. No significant loss of discal height. Mild rotoscoliosis with no acute findings     Son states patient tends to have seizures when she is stressed, she states she was unable to get a hold of one of her sons, he believes this was causing her anxiety. Patient had 2 seizures, no longer than 2 minutes each. She had not taken her morning meds. She was given her meds here, labs are unremarkable. She is complaining of low back pain, has bony tenderness, x-ray unremarkable. Plan for discharge home, may return for any acute worsening. Diagnosis:   1.  Seizure [facility])      Disposition: Discharge    Follow-up Information     Follow up With Specialties Details Why [address], NP Nurse Practitioner In 3 days  [address] [facility]  [address] [facility] EMERGENCY DEPT Emergency Medicine  If symptoms worsen [address]  [phone]          Patient's Medications   Start Taking    No medications on file   Continue Taking    ACETAMINOPHEN (TYLENOL) 500 MG TABLET    Take 500 mg by mouth every six (6) hours as needed for Pain. take 1 tab every 6 hours as needed daily for pain    AMLODIPINE (NORVASC) 10 MG TABLET    Take 10 mg by mouth daily. ATORVASTATIN (LIPITOR) 40 MG TABLET    Take 40 mg by mouth daily. CLOPIDOGREL (PLAVIX) 75 MG TAB    Take 75 mg by mouth daily. Indications: prevention for a blood clot going to the brain    CYANOCOBALAMIN (VITAMIN B12) 500 MCG TABLET    Take 1 Tab by mouth daily. DIVALPROEX DR (DEPAKOTE) 250 MG TABLET    Take 250 mg by mouth three (3) times daily. ERGOCALCIFEROL (VITAMIN D2) 50,000 UNIT CAPSULE    Take 50,000 Units by mouth two (2) times a week. FERROUS SULFATE 325 MG (65 MG IRON) TABLET    Take 325 mg by mouth daily. MULTIVITAMIN, TX-IRON-CA-MIN (THERA-M W/ IRON) 9 MG IRON-400 MCG TAB TABLET    Take 1 Tablet by mouth daily. OXCARBAZEPINE (TRILEPTAL) 300 MG TABLET    Take 300 mg by mouth two (2) times a day. PAROXETINE (PAXIL) 30 MG TABLET    Take 30 mg by mouth daily. TAMSULOSIN HCL (TAMSULOSIN PO)    Take 0.4 mg by mouth daily.  tab   These Medications have changed    No medications on file   Stop Taking    No medications on file     MING Cardona

## 2022-05-13 NOTE — ED NOTES
Patient's son arrived to take her home. IV removed and discharged instructions reviewed with patient and her son. Patient is alert and oriented at time of discharge. Patient is talking and drinking apple juice.

## 2022-05-13 NOTE — ED TRIAGE NOTES
Patient picked up from home for seizure. Per EMS patient's family stated that she takes her medications every day and has not missed any doses of her seizure medication. Per EMS when they arrived to the home seizure activity had stopped and patient was postictal .  Per EMS patient's family states that she can be postictal for several hours after having a seizure. Patient's vital signs are stable at the time of triage.

## 2022-05-18 ENCOUNTER — HOME CARE VISIT (OUTPATIENT)
Dept: SCHEDULING | Facility: HOME HEALTH | Age: 67
End: 2022-05-18
Payer: MEDICARE

## 2022-05-18 VITALS
OXYGEN SATURATION: 97 % | DIASTOLIC BLOOD PRESSURE: 76 MMHG | RESPIRATION RATE: 16 BRPM | TEMPERATURE: 99.1 F | HEART RATE: 72 BPM | SYSTOLIC BLOOD PRESSURE: 143 MMHG

## 2022-05-18 PROCEDURE — G0152 HHCP-SERV OF OT,EA 15 MIN: HCPCS

## 2022-05-18 NOTE — HOME HEALTH
SUBJECTIVE: Pt stated \"I'm good. I feel good. \" pt sitting transport chair upon OT arrival. pt agreed tx. Jose Maria Haines CAREGIVER INVOLVEMENT/ASSISTANCE NEEDED FOR: pt family and hired aide provides A ADL tasks and transfers PRN, performs IADL tasks, transportation, shopping, and manages medications. Jose Maria Haines MEDICATIONS: OT reconciled medications with no changes. Jose Maria Haines HOME HEALTH SUPPLIES BY TYPE AND QUANTITY ORDERED/DELIVERED THIS VISIT INCLUDE: NA  .  OBJECTIVE: See Interventions. Jose Maria Haines PATIENT RESONSE TO TREATMENT: pt reports no increased pain or discomfort with activity during tx. Jose Maria Haines PROGRESS TOWARD GOALS/ASSESSMENT: fair, pt with good participation, response, and tolerance tx as evidence by pt participated in balance tasks and UB HEP. pt performed UB HEP without rest break. pt caregiver demonstrated good knowledge UB HEP. LTG caregiver UB HEP goal met. pt demonstrated improved balance with fair sitting balance, fair- static standing, and poor+ dynamic standing. pt met balance goals. pt refused to participate in ADL tasks of grooming, toileting, bathing, and dressing. pt continues to have deficits in functional strength, balance, endurance, and ability to stand creating increased risk for falls and increased need for CG support/assistance. Jose Maria Haines PATIENT LEVEL OF UNDERSTANDING OF EDUCATION PROVIDED: pt and caregiver verbalized good understanding continue UB HEP and pt participation ADL tasks for improved functional level and endurance.    .  CONTINUED NEED FOR THE FOLLOWING SKILLS: [facility] necessary to address pain, decreased ROM, decreased functional strength, impaired bed mobility to perform ADL tasks, decreased independence and safety with functional transfers, decreased independence and safety performing ADL tasks, decreased activity and standing tolerance, decreased functional endurance, and impaired balance in order to improve functional independence, obtain set goals, reduce risk of falls, reduce pain, improve quality of life, and return to PLOF. Jose Martins PLAN FOR NEXT VISIT: OT D/C and D/C HHC next tx. pt and caregiver aware. .  THE FOLLOWING DISCHARGE PLANNING WAS DISCUSSED WITH THE PT/CAREGIVER: Anticipate D/C skilled OT 5.18.22. D/C pt to self and caregiver care under MD supervision when all goals have been met or pt has obtained maximum benefit. pt and caregiver verbalized understanding and agree POC. discussed with pt/caregiver POC to continued HHOT 1w1.

## 2022-05-19 ENCOUNTER — HOME CARE VISIT (OUTPATIENT)
Dept: SCHEDULING | Facility: HOME HEALTH | Age: 67
End: 2022-05-19
Payer: MEDICARE

## 2022-05-19 VITALS
RESPIRATION RATE: 16 BRPM | SYSTOLIC BLOOD PRESSURE: 145 MMHG | OXYGEN SATURATION: 97 % | HEART RATE: 71 BPM | DIASTOLIC BLOOD PRESSURE: 71 MMHG | TEMPERATURE: 99.1 F

## 2022-05-19 PROCEDURE — G0152 HHCP-SERV OF OT,EA 15 MIN: HCPCS

## 2022-05-19 NOTE — HOME HEALTH
SUBJECTIVE: Pt stated \"I can do stuff for myself but the lady gets paid to help me so I let her. \" pt sitting transport chair upon OT arrival. pt agreed tx. pt/caregiver instructed importance pt performing functional tasks per self as able for increased functional strength, endurance, and maintain functional level. Marquis Hess CAREGIVER INVOLVEMENT/ASSISTANCE NEEDED FOR: pt spouse provides A ADL tasks, transfers, and ambulation PRN, perform IADL tasks, shopping, transportation, and medications. Marquis Hess MEDICATIONS: OT reconciled medications with no changes. Marquis Hess HOME HEALTH SUPPLIES BY TYPE AND QUANTITY ORDERED/DELIVERED THIS VISIT INCLUDE: NA  .  OBJECTIVE: See Interventions. Marquis Hess PATIENT RESONSE TO TREATMENT: pt reports no increased pain or discomfort while performing activity throughout tx. Marquis Hess PATIENT LEVEL OF UNDERSTANDING OF EDUCATION PROVIDED: pt verbalized good understanding to continue UB HEP, importance performing functional tasks, energy conservation, diet and medications as instructed per MD, consult MD or urgent care for medical assistance as opposed to ER unless situation emergent. Marquis Hess PROGRESS TOWARD GOALS/ASSESSMENT: pt s/p CVA Hemiparesis, Dysphagia seen for OT 4.25.22 through 5.19.22, total visits 8/8, for ADL training, balance training, transfer training, AE/AD training, energy conservation training, functional strengthening/UB HEP training, NMRE, and pt/caregiver education to improve functional level and quality of life. pt has declined participating in ADL tasks during episode of care reporting pt has family and aide who help her with those tasks. pt and caregivers have been educated regarding importance pt participating in ADL tasks for increased functional strength, endurance, and level. pt and caregivers have verbalized good understanding.  pt caregiver (PCG son) reports pt performs grooming tasks with min A, bathing via sponge bath with mod A, and toileting/clothing management use FWW PRN with mod A v/c as needed for safety techniques and encouragement to perform tasks. pt son reports performs bathing via tub shower mod A, pt performs UB dressing min A and LB dressing use FWW PRN with max A v/c as needed for safety techniques. Goals Met. pt static standing fair-, pt required BUE support maintain upright stance, dynamic standing poor+, required min A reach in front of her. pt demonstrated fair static and dynamic sitting balance while performing UB HEP. Goals Met. pt performs couch, transport chair, EOB, and toilet transfers use FWW PRN with CGA to min A v/c as needed for safety and technique. pt caregiver reports pt performs tub shower transfer with mod A v/c as needed for safety and technique. Goals Met. pt caregiver demonstrates good safety and technique all transfers with pt.  pt performed functional tasks standing without rest x 3:21 minutes with 3/10 fatigue level reported this day. Goal Met. pt caregiver able to verbalize and demonstrate I ECT for bathing, dressing, and self-setup. pt able to complete HEP with no rest breaks. Goals Met.  pt caregiver demonstrates I UB HEP, as well as, upgrades to provide A pt for pt increased functional endurance, strength, and improved functional use BUE. Goal Met. pt RUE 4+/5 MMT grossly, L shoulder AROM flex 98*, abd 87* 4/5 MMT, L elbow AROM flex 145*, 4/5 MMT, B gross grasp 4/5 MMT (RUE 4-/5 MMT grossly, L shoulder AROM flex 80*, abd 70* 3+/5 MMT, L elbow AROM flex 90*, 3+/5 MMT, B gross grasp 3+/5 MMT SOC) to perform item retrieval and promote increased functional use while performing ADL tasks. Goal Met. pt met all goals. D/C OT due to goals met. pt agrees D/C. D/C pt to care of [employer] CONTINUED NEED FOR THE FOLLOWING SKILLS: D/C OT and HHC  . THE FOLLOWING DISCHARGE PLANNING WAS DISCUSSED WITH THE PT/CAREGIVER: D/C OT due to goals met. pt agrees D/C.  D/C pt to care of RAY [place], NP.

## 2022-07-14 ENCOUNTER — HOSPITAL ENCOUNTER (EMERGENCY)
Age: 67
Discharge: HOME | End: 2022-07-14
Attending: STUDENT IN AN ORGANIZED HEALTH CARE EDUCATION/TRAINING PROGRAM
Payer: MEDICARE

## 2022-07-14 ENCOUNTER — APPOINTMENT (OUTPATIENT)
Dept: GENERAL RADIOLOGY | Age: 67
End: 2022-07-14
Attending: PHYSICIAN ASSISTANT
Payer: MEDICARE

## 2022-07-14 VITALS
OXYGEN SATURATION: 100 % | SYSTOLIC BLOOD PRESSURE: 145 MMHG | TEMPERATURE: 98.4 F | HEART RATE: 63 BPM | DIASTOLIC BLOOD PRESSURE: 81 MMHG | HEIGHT: 60 IN | RESPIRATION RATE: 18 BRPM | WEIGHT: 97 LBS | BODY MASS INDEX: 19.04 KG/M2

## 2022-07-14 DIAGNOSIS — R52 COMPLAINTS OF TOTAL BODY PAIN: Primary | ICD-10-CM

## 2022-07-14 LAB
ALBUMIN SERPL-MCNC: 3.3 G/DL (ref 3.4–5)
ALBUMIN/GLOB SERPL: 0.9 {RATIO} (ref 0.8–1.7)
ALP SERPL-CCNC: 59 U/L (ref 45–117)
ALT SERPL-CCNC: 66 U/L (ref 13–56)
ANION GAP SERPL CALC-SCNC: 5 MMOL/L (ref 3–18)
APPEARANCE UR: CLEAR
AST SERPL-CCNC: 83 U/L (ref 10–38)
BASOPHILS # BLD: 0 K/UL (ref 0–0.1)
BASOPHILS NFR BLD: 1 % (ref 0–2)
BILIRUB SERPL-MCNC: 0.4 MG/DL (ref 0.2–1)
BILIRUB UR QL: NEGATIVE
BNP SERPL-MCNC: 276 PG/ML (ref 0–900)
BUN SERPL-MCNC: 25 MG/DL (ref 7–18)
BUN/CREAT SERPL: 24 (ref 12–20)
CALCIUM SERPL-MCNC: 9.8 MG/DL (ref 8.5–10.1)
CHLORIDE SERPL-SCNC: 110 MMOL/L (ref 100–111)
CO2 SERPL-SCNC: 27 MMOL/L (ref 21–32)
COLOR UR: YELLOW
CREAT SERPL-MCNC: 1.04 MG/DL (ref 0.6–1.3)
DIFFERENTIAL METHOD BLD: ABNORMAL
EOSINOPHIL # BLD: 0 K/UL (ref 0–0.4)
EOSINOPHIL NFR BLD: 1 % (ref 0–5)
ERYTHROCYTE [DISTWIDTH] IN BLOOD BY AUTOMATED COUNT: 15.9 % (ref 11.6–14.5)
FLUAV RNA SPEC QL NAA+PROBE: NOT DETECTED
FLUBV RNA SPEC QL NAA+PROBE: NOT DETECTED
GLOBULIN SER CALC-MCNC: 3.7 G/DL (ref 2–4)
GLUCOSE SERPL-MCNC: 82 MG/DL (ref 74–99)
GLUCOSE UR STRIP.AUTO-MCNC: NEGATIVE MG/DL
HCT VFR BLD AUTO: 30.6 % (ref 35–45)
HGB BLD-MCNC: 10.2 G/DL (ref 12–16)
HGB UR QL STRIP: NEGATIVE
IMM GRANULOCYTES # BLD AUTO: 0 K/UL (ref 0–0.04)
IMM GRANULOCYTES NFR BLD AUTO: 0 % (ref 0–0.5)
KETONES UR QL STRIP.AUTO: 15 MG/DL
LEUKOCYTE ESTERASE UR QL STRIP.AUTO: NEGATIVE
LIPASE SERPL-CCNC: 104 U/L (ref 73–393)
LYMPHOCYTES # BLD: 2.5 K/UL (ref 0.9–3.6)
LYMPHOCYTES NFR BLD: 35 % (ref 21–52)
MAGNESIUM SERPL-MCNC: 2 MG/DL (ref 1.6–2.6)
MCH RBC QN AUTO: 30.5 PG (ref 24–34)
MCHC RBC AUTO-ENTMCNC: 33.3 G/DL (ref 31–37)
MCV RBC AUTO: 91.6 FL (ref 78–100)
MONOCYTES # BLD: 0.7 K/UL (ref 0.05–1.2)
MONOCYTES NFR BLD: 9 % (ref 3–10)
NEUTS SEG # BLD: 3.9 K/UL (ref 1.8–8)
NEUTS SEG NFR BLD: 54 % (ref 40–73)
NITRITE UR QL STRIP.AUTO: NEGATIVE
NRBC # BLD: 0 K/UL (ref 0–0.01)
NRBC BLD-RTO: 0 PER 100 WBC
PH UR STRIP: 8.5 [PH] (ref 5–8)
PLATELET # BLD AUTO: 117 K/UL (ref 135–420)
PMV BLD AUTO: 12.5 FL (ref 9.2–11.8)
POTASSIUM SERPL-SCNC: 4.9 MMOL/L (ref 3.5–5.5)
PROT SERPL-MCNC: 7 G/DL (ref 6.4–8.2)
PROT UR STRIP-MCNC: NEGATIVE MG/DL
RBC # BLD AUTO: 3.34 M/UL (ref 4.2–5.3)
SARS-COV-2, COV2: NOT DETECTED
SODIUM SERPL-SCNC: 142 MMOL/L (ref 136–145)
SP GR UR REFRACTOMETRY: 1.02 (ref 1–1.03)
TROPONIN-HIGH SENSITIVITY: 41 NG/L (ref 0–54)
UROBILINOGEN UR QL STRIP.AUTO: 1 EU/DL (ref 0.2–1)
WBC # BLD AUTO: 7.1 K/UL (ref 4.6–13.2)

## 2022-07-14 PROCEDURE — 84484 ASSAY OF TROPONIN QUANT: CPT

## 2022-07-14 PROCEDURE — 81003 URINALYSIS AUTO W/O SCOPE: CPT

## 2022-07-14 PROCEDURE — 83735 ASSAY OF MAGNESIUM: CPT

## 2022-07-14 PROCEDURE — 83880 ASSAY OF NATRIURETIC PEPTIDE: CPT

## 2022-07-14 PROCEDURE — 85025 COMPLETE CBC W/AUTO DIFF WBC: CPT

## 2022-07-14 PROCEDURE — 96374 THER/PROPH/DIAG INJ IV PUSH: CPT

## 2022-07-14 PROCEDURE — 87636 SARSCOV2 & INF A&B AMP PRB: CPT

## 2022-07-14 PROCEDURE — 99284 EMERGENCY DEPT VISIT MOD MDM: CPT

## 2022-07-14 PROCEDURE — 74011250636 HC RX REV CODE- 250/636: Performed by: PHYSICIAN ASSISTANT

## 2022-07-14 PROCEDURE — 71045 X-RAY EXAM CHEST 1 VIEW: CPT

## 2022-07-14 PROCEDURE — 80053 COMPREHEN METABOLIC PANEL: CPT

## 2022-07-14 PROCEDURE — 87086 URINE CULTURE/COLONY COUNT: CPT

## 2022-07-14 PROCEDURE — 83690 ASSAY OF LIPASE: CPT

## 2022-07-14 RX ORDER — ACETAMINOPHEN 325 MG/1
650 TABLET ORAL
Qty: 20 TABLET | Refills: 0 | Status: SHIPPED | OUTPATIENT
Start: 2022-07-14

## 2022-07-14 RX ORDER — MORPHINE SULFATE 2 MG/ML
2 INJECTION, SOLUTION INTRAMUSCULAR; INTRAVENOUS
Status: COMPLETED | OUTPATIENT
Start: 2022-07-14 | End: 2022-07-14

## 2022-07-14 RX ADMIN — SODIUM CHLORIDE 1000 ML: 900 INJECTION, SOLUTION INTRAVENOUS at 11:49

## 2022-07-14 RX ADMIN — MORPHINE SULFATE 2 MG: 2 INJECTION, SOLUTION INTRAMUSCULAR; INTRAVENOUS at 11:48

## 2022-07-14 NOTE — ED PROVIDER NOTES
EMERGENCY DEPARTMENT HISTORY AND PHYSICAL EXAM    Date: 7/14/2022  Patient Name: Sakina Thomas    History of Presenting Illness     Chief Complaint   Patient presents with    Generalized Body Aches         History Provided By: Patient    Chief Complaint: Diffuse body pain, dysuria  Duration: Couple days  Timing: Acute  Location: \"Everywhere\"  Quality: Painful  Severity: Moderate   Modifying Factors: Pain is worse when urinating  Associated Symptoms: none       Additional History (Context): Sakina Thomas is a 75 y.o. female with a history of stroke, hypertension and failure to thrive who presents today for issues listed above. Patient denies known cause for her diffuse body pain. Patient has very poor historian and very vague about multiple questions being asked. Patient states that she has been hurting all over for the past 2 days. Reports pain with urination. Denies any chest pain, shortness of breath, dyspnea, orthopnea, nausea, vomiting, diarrhea or constipation. Denies any headache or dizziness. Has not tried thing for this at home. Denies taking her daily medications. PCP: Katja VALDEZ NP    Current Outpatient Medications   Medication Sig Dispense Refill    acetaminophen (TYLENOL) 325 mg tablet Take 2 Tablets by mouth every four (4) hours as needed for Pain. 20 Tablet 0    ferrous sulfate 325 mg (65 mg iron) tablet Take 325 mg by mouth daily.  divalproex DR (DEPAKOTE) 250 mg tablet Take 250 mg by mouth three (3) times daily.  amLODIPine (NORVASC) 10 mg tablet Take 10 mg by mouth daily.  tamsulosin HCl (TAMSULOSIN PO) Take 0.4 mg by mouth daily. tab      PARoxetine (PAXIL) 30 mg tablet Take 30 mg by mouth daily.  OXcarbazepine (TRILEPTAL) 300 mg tablet Take 300 mg by mouth two (2) times a day.  multivitamin, tx-iron-ca-min (THERA-M w/ IRON) 9 mg iron-400 mcg tab tablet Take 1 Tablet by mouth daily.  30 Tablet 0    atorvastatin (LIPITOR) 40 mg tablet Take 40 mg by mouth daily.  acetaminophen (TYLENOL) 500 mg tablet Take 500 mg by mouth every six (6) hours as needed for Pain. take 1 tab every 6 hours as needed daily for pain      clopidogreL (PLAVIX) 75 mg tab Take 75 mg by mouth daily. Indications: prevention for a blood clot going to the brain      ergocalciferol (VITAMIN D2) 50,000 unit capsule Take 50,000 Units by mouth two (2) times a week.  cyanocobalamin (VITAMIN B12) 500 mcg tablet Take 1 Tab by mouth daily. 27 Tab 0       Past History     Past Medical History:  Past Medical History:   Diagnosis Date    Abnormal coordination 9/21/2020    Depression 4/12/2022    Failure to thrive in adult 4/12/2022    Hallucinations 9/21/2020    Hypertension     Neurological disorder     [facility] [address]     Stroke ([facility] [address] 2009    Subclinical hypothyroidism 4/13/2022       Past Surgical History:  Past Surgical History:   Procedure Laterality Date    PLACE PERCUT GASTROSTOMY TUBE  1/11/2022            Family History:  Family History   Problem Relation Age of Onset    Diabetes Other     Stroke Other        Social History:  Social History     Tobacco Use    Smoking status: Never Smoker    Smokeless tobacco: Never Used   Vaping Use    Vaping Use: Never used   Substance Use Topics    Alcohol use: Yes     Comment: Socially     Drug use: No       Allergies: Allergies   Allergen Reactions    Tomato Hives     Allergic to eating tomato.  Aspirin Nausea and Vomiting    Ciprofloxacin Rash    Pcn [Penicillins] Rash and Hives    Sulfa (Sulfonamide Antibiotics) Hives and Rash         Review of Systems   Review of Systems   Constitutional: Negative for chills and fever. HENT: Negative for congestion, rhinorrhea and sore throat. Respiratory: Negative for cough and shortness of breath. Cardiovascular: Negative for chest pain. Gastrointestinal: Negative for abdominal pain, blood in stool, constipation, diarrhea, nausea and vomiting.    Genitourinary: Positive for dysuria. Negative for frequency and hematuria. Musculoskeletal: Positive for myalgias. Negative for back pain. Skin: Negative for rash and wound. Neurological: Negative for dizziness and headaches. All other systems reviewed and are negative. All Other Systems Negative  Physical Exam     Vitals:    07/14/22 1013 07/14/22 1153 07/14/22 1337   BP: (!) 171/75 (!) 159/74 (!) 178/80   Pulse: 64 66 62   Resp: 19 18 18   Temp: 99.1 °F (37.3 °C) 98.9 °F (37.2 °C) 98.8 °F (37.1 °C)   SpO2: 100% 100% 100%   Weight: 44 kg (97 lb)     Height: 5' (1.524 m)       Physical Exam  Vitals and nursing note reviewed. Constitutional:       General: She is not in acute distress. Appearance: She is well-developed and underweight. She is not diaphoretic. HENT:      Head: Normocephalic and atraumatic. Eyes:      Conjunctiva/sclera: Conjunctivae normal.   Cardiovascular:      Rate and Rhythm: Normal rate and regular rhythm. Heart sounds: Normal heart sounds. Pulmonary:      Effort: Pulmonary effort is normal. No respiratory distress. Breath sounds: Normal breath sounds. Chest:      Chest wall: No tenderness. Abdominal:      General: Bowel sounds are normal. There is no distension. Palpations: Abdomen is soft. Tenderness: There is no abdominal tenderness. There is no guarding or rebound. Musculoskeletal:         General: No deformity. Cervical back: Normal range of motion and neck supple. Skin:     General: Skin is warm and dry. Neurological:      Mental Status: She is alert and oriented to person, place, and time. Deep Tendon Reflexes: Reflexes are normal and symmetric.            Diagnostic Study Results     Labs -     Recent Results (from the past 12 hour(s))   CBC WITH AUTOMATED DIFF    Collection Time: 07/14/22 10:51 AM   Result Value Ref Range    WBC 7.1 4.6 - 13.2 K/uL    RBC 3.34 (L) 4.20 - 5.30 M/uL    HGB 10.2 (L) 12.0 - 16.0 g/dL    HCT 30.6 (L) 35.0 - 45.0 %    MCV 91.6 78.0 - 100.0 FL    MCH 30.5 24.0 - 34.0 PG    MCHC 33.3 31.0 - 37.0 g/dL    RDW 15.9 (H) 11.6 - 14.5 %    PLATELET 133 (L) 674 - 420 K/uL    MPV 12.5 (H) 9.2 - 11.8 FL    NRBC 0.0 0  WBC    ABSOLUTE NRBC 0.00 0.00 - 0.01 K/uL    NEUTROPHILS 54 40 - 73 %    LYMPHOCYTES 35 21 - 52 %    MONOCYTES 9 3 - 10 %    EOSINOPHILS 1 0 - 5 %    BASOPHILS 1 0 - 2 %    IMMATURE GRANULOCYTES 0 0.0 - 0.5 %    ABS. NEUTROPHILS 3.9 1.8 - 8.0 K/UL    ABS. LYMPHOCYTES 2.5 0.9 - 3.6 K/UL    ABS. MONOCYTES 0.7 0.05 - 1.2 K/UL    ABS. EOSINOPHILS 0.0 0.0 - 0.4 K/UL    ABS. BASOPHILS 0.0 0.0 - 0.1 K/UL    ABS. IMM. GRANS. 0.0 0.00 - 0.04 K/UL    DF AUTOMATED     METABOLIC PANEL, COMPREHENSIVE    Collection Time: 07/14/22 10:51 AM   Result Value Ref Range    Sodium 142 136 - 145 mmol/L    Potassium 4.9 3.5 - 5.5 mmol/L    Chloride 110 100 - 111 mmol/L    CO2 27 21 - 32 mmol/L    Anion gap 5 3.0 - 18 mmol/L    Glucose 82 74 - 99 mg/dL    BUN 25 (H) 7.0 - 18 MG/DL    Creatinine 1.04 0.6 - 1.3 MG/DL    BUN/Creatinine ratio 24 (H) 12 - 20      GFR est AA >60 >60 ml/min/1.73m2    GFR est non-AA 53 (L) >60 ml/min/1.73m2    Calcium 9.8 8.5 - 10.1 MG/DL    Bilirubin, total 0.4 0.2 - 1.0 MG/DL    ALT (SGPT) 66 (H) 13 - 56 U/L    AST (SGOT) 83 (H) 10 - 38 U/L    Alk.  phosphatase 59 45 - 117 U/L    Protein, total 7.0 6.4 - 8.2 g/dL    Albumin 3.3 (L) 3.4 - 5.0 g/dL    Globulin 3.7 2.0 - 4.0 g/dL    A-G Ratio 0.9 0.8 - 1.7     LIPASE    Collection Time: 07/14/22 10:51 AM   Result Value Ref Range    Lipase 104 73 - 393 U/L   MAGNESIUM    Collection Time: 07/14/22 10:51 AM   Result Value Ref Range    Magnesium 2.0 1.6 - 2.6 mg/dL   TROPONIN-HIGH SENSITIVITY    Collection Time: 07/14/22 10:51 AM   Result Value Ref Range    Troponin-High Sensitivity 41 0 - 54 ng/L   NT-PRO BNP    Collection Time: 07/14/22 10:51 AM   Result Value Ref Range    NT pro- 0 - 900 PG/ML   COVID-19 WITH INFLUENZA A/B    Collection Time: 07/14/22 10:55 AM   Result Value Ref Range    SARS-CoV-2 by PCR Not detected NOTD      Influenza A by PCR Not detected NOTD      Influenza B by PCR Not detected NOTD     URINALYSIS W/ RFLX MICROSCOPIC    Collection Time: 07/14/22 11:57 AM   Result Value Ref Range    Color YELLOW      Appearance CLEAR      Specific gravity 1.017 1.005 - 1.030      pH (UA) 8.5 (H) 5.0 - 8.0      Protein Negative NEG mg/dL    Glucose Negative NEG mg/dL    Ketone 15 (A) NEG mg/dL    Bilirubin Negative NEG      Blood Negative NEG      Urobilinogen 1.0 0.2 - 1.0 EU/dL    Nitrites Negative NEG      Leukocyte Esterase Negative NEG         Radiologic Studies -   XR CHEST PORT   Final Result      No active cardiopulmonary disease. CT Results  (Last 48 hours)    None        CXR Results  (Last 48 hours)               07/14/22 1033  XR CHEST PORT Final result    Impression:      No active cardiopulmonary disease. Narrative:  EXAM: CHEST, TWO VIEWS       CLINICAL INDICATION/HISTORY: Body pain     > Additional: None. COMPARISON: CXR AP 4/11/2022, CXR PA 9/6/2021     > Reference Exam: None. TECHNIQUE: AP view of the chest was obtained.       _______________       FINDINGS:       SUPPORT DEVICES: None. BONY THORAX AND SOFT TISSUES: No acute osseous abnormality. Small stable   calcification in left breast, stable from previous radiographs. Stable   degenerative disc disease noted. HEART AND MEDIASTINUM: Cardiac mediastinal silhouette appears within normal   limits. Normal caliber thoracic aorta. No central vascular congestion. LUNGS AND PLEURAL SPACES: Lungs are well aerated with no confluent airspace   opacity. No pleural effusion or pneumothorax.                 _______________                   Medical Decision Making   I am the first provider for this patient. I reviewed the vital signs, available nursing notes, past medical history, past surgical history, family history and social history.     Vital Signs-Reviewed the patient's vital signs. Records Reviewed: Nursing Notes and Old Medical Records     Procedures: None   Procedures    Provider Notes (Medical Decision Making):     differential diagnosis: Viral illness, dehydration, electrolyte abnormality,  Uti, pyelonephritis, ACS, arrhythmia, sepsis/SIRS, muscle strain/pain      Plan: We will order full work-up to include labs, proBNP, troponin, EKG, chest x-ray, COVID screen and  UA      1:55 PM  Have discussed reassuring work-up with patient. COVID-negative. Troponin reassuring. Labs reassuring. UA reassuring. Have advised further follow-up and evaluation by primary care and have given strict return precautions. Will discharge home. MED RECONCILIATION:  No current facility-administered medications for this encounter. Current Outpatient Medications   Medication Sig    acetaminophen (TYLENOL) 325 mg tablet Take 2 Tablets by mouth every four (4) hours as needed for Pain.  ferrous sulfate 325 mg (65 mg iron) tablet Take 325 mg by mouth daily.  divalproex DR (DEPAKOTE) 250 mg tablet Take 250 mg by mouth three (3) times daily.  amLODIPine (NORVASC) 10 mg tablet Take 10 mg by mouth daily.  tamsulosin HCl (TAMSULOSIN PO) Take 0.4 mg by mouth daily. tab    PARoxetine (PAXIL) 30 mg tablet Take 30 mg by mouth daily.  OXcarbazepine (TRILEPTAL) 300 mg tablet Take 300 mg by mouth two (2) times a day.  multivitamin, tx-iron-ca-min (THERA-M w/ IRON) 9 mg iron-400 mcg tab tablet Take 1 Tablet by mouth daily.  atorvastatin (LIPITOR) 40 mg tablet Take 40 mg by mouth daily.  acetaminophen (TYLENOL) 500 mg tablet Take 500 mg by mouth every six (6) hours as needed for Pain. take 1 tab every 6 hours as needed daily for pain    clopidogreL (PLAVIX) 75 mg tab Take 75 mg by mouth daily. Indications: prevention for a blood clot going to the brain    ergocalciferol (VITAMIN D2) 50,000 unit capsule Take 50,000 Units by mouth two (2) times a week.     cyanocobalamin (VITAMIN B12) 500 mcg tablet Take 1 Tab by mouth daily. Disposition:  Home     DISCHARGE NOTE:   Pt has been reexamined. Patient has no new complaints, changes, or physical findings. Care plan outlined and precautions discussed. Results of workup were reviewed with the patient. All medications were reviewed with the patient. All of pt's questions and concerns were addressed. Patient was instructed and agrees to follow up with PCP as well as to return to the ED upon further deterioration. Patient is ready to go home. Follow-up Information     Follow up With Specialties Details Why Contact Info    [facility] EMERGENCY DEPT Emergency Medicine  As needed [address] [address] [facility]    Bandar Alvarado NP Nurse Practitioner Schedule an appointment as soon as possible for a visit   [address]  [phone]            Current Discharge Medication List      START taking these medications    Details   !! acetaminophen (TYLENOL) 325 mg tablet Take 2 Tablets by mouth every four (4) hours as needed for Pain. Qty: 20 Tablet, Refills: 0  Start date: 7/14/2022       !! - Potential duplicate medications found. Please discuss with provider. CONTINUE these medications which have NOT CHANGED    Details   !! acetaminophen (TYLENOL) 500 mg tablet Take 500 mg by mouth every six (6) hours as needed for Pain. take 1 tab every 6 hours as needed daily for pain       !! - Potential duplicate medications found. Please discuss with provider. Diagnosis     Clinical Impression:   1. Complaints of total body pain          \"Please note that this dictation was completed with [employer], the computer voice recognition software. Quite often unanticipated grammatical, syntax, homophones, and other interpretive errors are inadvertently transcribed by the computer software. Please disregard these errors.  Please excuse any errors that have escaped final proofreading. \"

## 2022-07-14 NOTE — ED TRIAGE NOTES
Pt c/o generalized body pain since last night and worsening today. Pt. Jose Martin Box taking any medications today.
Detail Level: Detailed

## 2022-07-14 NOTE — ED NOTES
Patient resting on stretcher, NAD noted, call bell in reach, bed low and locked with side rails up x 2; Reports pain 5/10. Updated on plan of care, patient verbalizes understanding. This nurse will continue to monitor.

## 2022-07-14 NOTE — ED NOTES
Patient resting on stretcher, NAD noted, call bell in reach, bed low and locked with side rails up x 2; Reports pain 4/10. Updated on plan of care, patient verbalizes understanding. This nurse will continue to monitor.

## 2022-07-14 NOTE — ED NOTES
Patient resting on stretcher, NAD noted, call bell in reach, bed low and locked with side rails up x 2; Reports pain 4/10. Updated on plan of care, patient verbalizes understanding. This nurse will continue to monitor. Waiting for transportation to come get pt.

## 2022-07-15 LAB
BACTERIA SPEC CULT: NORMAL
SERVICE CMNT-IMP: NORMAL

## 2022-08-05 ENCOUNTER — HOME HEALTH ADMISSION (OUTPATIENT)
Dept: HOME HEALTH SERVICES | Facility: HOME HEALTH | Age: 67
End: 2022-08-05
Payer: MEDICARE

## 2022-08-06 ENCOUNTER — HOME CARE VISIT (OUTPATIENT)
Dept: SCHEDULING | Facility: HOME HEALTH | Age: 67
End: 2022-08-06
Payer: MEDICARE

## 2022-08-06 PROCEDURE — 400013 HH SOC

## 2022-08-06 PROCEDURE — G0299 HHS/HOSPICE OF RN EA 15 MIN: HCPCS

## 2022-08-06 NOTE — Clinical Note
Completed [facility] AT [facility] admission 8/6/22 for disease and medication management. SN meghna 1d1, 2w1, 1w2, 2 prn. Isabell Xiong NP will be notified.

## 2022-08-07 ENCOUNTER — HOME CARE VISIT (OUTPATIENT)
Dept: SCHEDULING | Facility: HOME HEALTH | Age: 67
End: 2022-08-07
Payer: MEDICARE

## 2022-08-07 VITALS
OXYGEN SATURATION: 97 % | SYSTOLIC BLOOD PRESSURE: 110 MMHG | RESPIRATION RATE: 17 BRPM | HEART RATE: 69 BPM | TEMPERATURE: 98.9 F | DIASTOLIC BLOOD PRESSURE: 78 MMHG

## 2022-08-07 PROCEDURE — G0151 HHCP-SERV OF PT,EA 15 MIN: HCPCS

## 2022-08-07 NOTE — HOME HEALTH
PT INITIAL EVALUATION    Past Medical Hx:   Abnormal coordination 9/21/2020    Depression 4/12/2022    Failure to th rive in adult 4/12/2022    Hallucinations 9/21/2020    Hypertension      Neurological disorder      Seizures ([address] [address]      Stroke [facility]) 2009    Subclinical hypothyroidism   Recent H/o current illness:  75 year old female presents with MD referral for HHPT s/p ED visit due to CVA with ongoing weakness  Medication Management: son manages medications  Social hx and home eval:  Pt lives in apartment with son. Caregiver Involvement: pt. is total care  PLOF:  Pt PLOF is ambulation w FWW, pts base level of function needed assistance with ADL's  BALANCE:     Seated unsupported balance is poor   Standing static balance is poor  Standing dynamic balance is poor    Patient is at risk for falls due to weakness and failure to eat  BLE Strength:  Left Hip flexion 3-/5 , hip abduction 3-/5, hip adduction 2+/5, Knee flexion 2+/5  knee extension 2+/5, ankle dorsiflexion 3-/5  Right Hip flexion 3/5 , hip abduction 3/5, hip adduction 3/5, Knee flexion 3/5  knee extension 3/5, ankle dorsiflexion 3/5  BLE ROM:  Right hip/knee/ankle: WFL  Left hip/knee/ankle: WFL  Bed mobility:  max   Transfers:  max with sit<->stand for bed to chair, with HHA AD. max cues and instruction needed for safety and sequencing  GAIT:  Patient ambulated  15 ft  with HHA  on level  surfaces mod/max A. Pt has severe posterior lean during gait and must be 100% corrected to bring patient back to neutral during gait cycle. Pt demonstrates with decreased hip and knee flexion on BLE in pre and mid swing phase of gait as well as decreased stride-length and nolan. Patient is unable to safely ambulate without assistance at this time. Pt required mod cues for  safety and sequencing  Stairs: NT  Patient education provided this visit: Safety with functional mobility and instruction with HEP.    Patient level of understanding of education provided: fair ,able to return demonstrate mobility instruction and HEP with min/mod assistance  Patient response to treatment:  fair with no c/o increased pain    Assessment: Referral for HHPT following recent ED visit due to continued weakness from CVA. HHPT is medically necessary to address the following clinical findings: decreased BLE strength and ROM, impaired gait, decreased I and safety with transfers and gait, decreased endurance, decreased balance and decreased safety in order to improve functional mobility and decrease fall risk. Patient will be seen for HHPT 3w1, 2w2, 1w1 for therapeutic exercises to increase BLE strength and ROM,  training for gait, stairs and transfers to increase I and safety with daily functional mobility and balance and endurance activities to decrease fall risk, decrease impairment and increase functional mobility and independence in the home. Pt. requires skilled PT intervention to instruct Pt. with gait training with LRAD, ROM and strengthening, stair training, transfer training, balance training, manual therapy, neuromuscular re-education, patient care-giver education, HEP, endurance training, body mechanics.

## 2022-08-07 NOTE — Clinical Note
Therapy Functional Score Assessment  Question   Score   Grooming  3       Upper Dressing 3      Lower Dressing 3      Bathing  6      Toilet Transfer  4    Transfer  3            Ambulation  3   Dyspnea                     1       Pain Interfering with activity 4  Est number therapy visits      8

## 2022-08-08 VITALS
OXYGEN SATURATION: 98 % | TEMPERATURE: 97 F | HEART RATE: 76 BPM | RESPIRATION RATE: 18 BRPM | SYSTOLIC BLOOD PRESSURE: 128 MMHG | DIASTOLIC BLOOD PRESSURE: 70 MMHG

## 2022-08-08 NOTE — HOME HEALTH
Summary of clinical health condition: 76 y/o femal dx; CVA with continuous weakness, PMH: List of Comorbidities: Abnormal coordination 9/21/2020  Depression 4/12/2022    Failure to thrive in adult 4/12/2022 , Hallucinations 9/21/2020  Hypertension,  Neurological disorder, Seizures ([facility] [address]  Stroke ([facility] [address] 2009 ,Subclinical hypothyroidism. Pt referred  by Waldemar Ryan NP for disease and medication management. Medications reconciled, all medications are at home. The following education was provided regarding medications, medication interactions, and look a like medications: N/A all meds reviewed. Discussed importance of compliance, timely taking all prescribed meds, proper dosage and freq. Teaching provided with medication Plavix S/E; increased bleeding, nosebleeds, bruising, fever, muscle  pain, confusion, skin rash or itching. Medications are somewhat effective at this time. Caregiver: caregiver/son is available to assist with daily medications, run errands, groceries and MD appt. prn. PCA (9-3) assist with daily meals, ADL's prn, administer  with daily medications, assist with transfers and repositioning. Skilled care provided: Teaching disease and medication, completed assessment, performed  R buttock wound (see wound addendum) and PEG tube care; check site and patency. Completed  Pt. tolerated well during the procedure. [facility] 77 admission, explained POC, SNV freq and D/C plan to pt/CG with good understanding. (PCP/NP notified with R buttock wound and comfirmed that the cause of the wound was pressure related). Patient education provided this visit to include: Discussed intervention to prevent infection; hand washing  and avoiding sick person. Keeping PEG site dry and clean. Repositioning q 2 hrs, keeping gibran-area dry and clean. Propping pillow to bony prominences. [place] 45 degrees during meals, checking pocketing foods, providing soft foods including using pudding or apple sauce with medication.  Avoid skipping meals and good hydration. Pacing activity/energy conservation; rest in bet activity and deep breathing exercises. avoid getting up too quickly when feeling weak, dizzy, and to call for assistance prn. Monitor for S/S of infection; fever 100.4, increase pain, redness, swelling,  coughing with yellow thick sputum, purulent wound drainage with foul smell, cloudy urine with strong odor, not feeling well 2-3 days, SOB, and to call [facility] or MD for assistance if experiencing any of these S/S. To call 911 with chest pains, facial drooping, difficulty talking, non arousable/unconscious and uncontrollable bleeding. Patient/caregiver degree of understanding: Pt/CG has good understanding of the teaching provided during visit. Home health supplies by type and quantity ordered/delivered this visit include: Gauze pad, alcohol pads, split gauze, WC, EPC, Medipore    Pt./CG instructed on plan of care, PT, OT eval/treat, SN freq visit; 1d1,2w1, 1w2,  2 prn and D/C plan. Home exercise program/Homework provided: BLE flexion, BUE ROM, HEP deep breathing exercises 10x when having SOB, pain and anxiety. Plan of care and admission to home health status called to attending physician: Dr. Charlaine Meckel, NP was informed POC and admission completed 8/06/22     Discharge planning discussed with patient and caregiver. Discharge planning as follows: Pt/CG will be able to manage disease and medication independently, and health condition stable. Pt/Caregiver did verbalized understanding of discharge planning. Patient/caregiver encouraged/instructed to keep appointment as lack of follow through with physician appointment could result in discontinuation of home care services for non-compliance. The [address] of Rights was verbally reviewed and signed by pt on this visit. The patient or representative was given the opportunity to ask pertinent questions regarding the bill of rights.       COVID - 19 Screening completed before visit:       Denies and no family member  has any of these S/S:    Fever, dry cough, sore throat diarrhea, body aches, not feeling well, chills, and lost of taste.

## 2022-08-09 ENCOUNTER — HOME CARE VISIT (OUTPATIENT)
Dept: SCHEDULING | Facility: HOME HEALTH | Age: 67
End: 2022-08-09
Payer: MEDICARE

## 2022-08-09 PROCEDURE — G0300 HHS/HOSPICE OF LPN EA 15 MIN: HCPCS

## 2022-08-10 ENCOUNTER — HOME CARE VISIT (OUTPATIENT)
Dept: SCHEDULING | Facility: HOME HEALTH | Age: 67
End: 2022-08-10
Payer: MEDICARE

## 2022-08-10 ENCOUNTER — HOME CARE VISIT (OUTPATIENT)
Dept: HOME HEALTH SERVICES | Facility: HOME HEALTH | Age: 67
End: 2022-08-10
Payer: MEDICARE

## 2022-08-10 VITALS
HEART RATE: 78 BPM | SYSTOLIC BLOOD PRESSURE: 125 MMHG | TEMPERATURE: 98.9 F | DIASTOLIC BLOOD PRESSURE: 68 MMHG | RESPIRATION RATE: 18 BRPM

## 2022-08-10 VITALS
DIASTOLIC BLOOD PRESSURE: 65 MMHG | TEMPERATURE: 97.8 F | RESPIRATION RATE: 18 BRPM | SYSTOLIC BLOOD PRESSURE: 100 MMHG | HEART RATE: 65 BPM | OXYGEN SATURATION: 97 %

## 2022-08-10 PROCEDURE — G0155 HHCP-SVS OF CSW,EA 15 MIN: HCPCS

## 2022-08-10 PROCEDURE — G0157 HHC PT ASSISTANT EA 15: HCPCS

## 2022-08-10 NOTE — Clinical Note
thank you   ----- Message -----  From: Stuart Weston, MSW  Sent: 8/10/2022   5:51 PM EDT  To: Marvin Durán OTR/ROOSEVELT      Pt is currently dependent for ADLs. Pt began taking an appetite stimulant last week and a donut cushion will arrive this week per pt's son Lizzy Gandara. Pt's son Lizzy Gandara is unsure if a hospital bed has been ordered, and stated he wanted to discuss obtaining a hospital bed with his brother Lc Leal and aunt (pt's sister) before proceeding. I will contact pt's 'Care Coordinator' Ms. Neeta Paulson to obtain additional clarity on family dynamics and support. [id number] was very helpful.

## 2022-08-10 NOTE — Clinical Note
Pt is currently dependent for ADLs. Pt began taking an appetite stimulant last week and a donut cushion will arrive this week per pt's son Neo Hensley. Pt's son Neo Hensley is unsure if a hospital bed has been ordered, and stated he wanted to discuss obtaining a hospital bed with his brother Maulik Nesbitt and aunt (pt's sister) before proceeding. I will contact pt's 'Care Coordinator' Ms. Volodymyr Olson to obtain additional clarity on family dynamics and support. [id number] was very helpful.

## 2022-08-10 NOTE — Clinical Note
thank you   ----- Message -----  From: MYKEL Mooney  Sent: 8/11/2022   5:02 PM EDT  To: Ha Glover, OTR/L      I spoke with pt's [address] Coordinator, Lazaro Lemon; she will be ordering a hospital bed, trapeze bar, and slide board for the pt. APS is involved to support financial management.

## 2022-08-10 NOTE — Clinical Note
I spoke with pt's [address], Sunday Gamma; she will be ordering a hospital bed, trapeze bar, and slide board for the pt. APS is involved to support financial management.

## 2022-08-10 NOTE — HOME HEALTH
Skilled reason for visit: CVA, HTN,Peg Tube, wound care, medication management     Caregiver involvement: Patient total assist with al care, Son's are the primary care giver assist with meals, bathing, transfer, appointment and medication     Medications reviewed and all medications are available in the home this visit. The following education was provided regarding medications:  sn instructed CG t how BP meds keep veins open to allow for blood flow at lower pressure and how this can lead to dizziness and falls with sudden, quick movements    MD notified of any discrepancies/look a-like medications/medication interactions: n/a  Medications are effective at this time. Home health supplies by type and quantity ordered/delivered this visit include: n/a    Patient education provided this visit: Instruct patient/caregiver that deficits suffered from experiencing any type of CVA can vary from one person to another based on the area of the brain impacted and can have both physical and emotional effects. Common deficits associated with stroke include: speech/communication problems, weakness and movement problems, partial loss of sensation, trouble eating/swallowing, problems thinking clearly or interacting with others, depression, and problems with bladder control. Modifications needed for post-stroke care will depend on the deficits experienced by the patient and take place in the rehabilitation phase of care. Some examples of rehabilitation services include: physical therapy, occupational therapy, speech/cognitive therapy, and psychiatry/emotional therapy. These specialty disciplines help the patient/caregiver to recover as much of abilities lost as possible and give tips on how to cope with problems caused by stroke (including environmental modifications, use of equipment, exercises, etc.).       [place] education provided: n/a    Patient level of understanding of education provided: CG verbalized complete understanding of education provided     Skilled Care Performed this visit:  education ,wound assessement and peg assessed     Patient response to procedure performed: Tolerated well     Agency Progress toward goals: Continue educate son on disease management     Patient's Progress towards personal goals: Progressing well     Home exercise program: Frequent repositioning     Continued need for the following skills: Nursing    Plan for next visit: education and assessment     Patient and/or caregiver notified and agrees to changes in the Plan of Care YES/NO/NA: YES      The following discharge planning was discussed with the pt/caregiver: when patient reaches goals and medication is managed, and disease processes are understood patient agrees and understand that discharge will take place.

## 2022-08-10 NOTE — Clinical Note
FYI-Spoke to patients son Letha Arnett today and they do not want the hospital bed. I am not sure if they order has been placed at this time or not.     ----- Message -----  From: MYKEL Bello  Sent: 8/10/2022   5:51 PM EDT  To: Artemio Patton, PTA      Pt is currently dependent for ADLs. Pt began taking an appetite stimulant last week and a donut cushion will arrive this week per pt's son Letha Arnett. Pt's son Letha Arnett is unsure if a hospital bed has been ordered, and stated he wanted to discuss obtaining a hospital bed with his brother Johan Hollingsworth and aunt (pt's sister) before proceeding. I will contact pt's 'Care Coordinator' Ms. Bradly Denney to obtain additional clarity on family dynamics and support. [id number] was very helpful.

## 2022-08-11 NOTE — HOME HEALTH
MSW met with the pt, PCA Katey Schwarz, and pt's son Colmean Ennis. MSW provided the pt documentation of social service resources, [facility] (resource hotline), [facility], and support programs. MSW also discussed care assistance, POA, financial management and DME. MSW contacted pt's [facility], Lizzie Castillo ([phone]); she will be ordering a hospital bed, trapeze bar, and slide board for the pt. Additional PCA hours are being requested. APS is involved to support financial management. MSW will follow-up with pt/family as appropriate.

## 2022-08-12 ENCOUNTER — HOME CARE VISIT (OUTPATIENT)
Dept: SCHEDULING | Facility: HOME HEALTH | Age: 67
End: 2022-08-12
Payer: MEDICARE

## 2022-08-12 VITALS
SYSTOLIC BLOOD PRESSURE: 106 MMHG | RESPIRATION RATE: 18 BRPM | HEART RATE: 74 BPM | DIASTOLIC BLOOD PRESSURE: 68 MMHG | TEMPERATURE: 97.2 F | OXYGEN SATURATION: 96 %

## 2022-08-12 PROCEDURE — G0300 HHS/HOSPICE OF LPN EA 15 MIN: HCPCS

## 2022-08-12 PROCEDURE — G0157 HHC PT ASSISTANT EA 15: HCPCS

## 2022-08-14 NOTE — HOME HEALTH
SUBJECTIVE: Son/PCA present during session. Son notes they would like to proceed with hospital bed  after discussion in regards to benefits for pt. CAREGIVER INVOLVEMENT/ASSISTANCE NEEDED FOR: Patient resides with son and has PCA daily who assist with ADL's and transfers as needed. HOME HEALTH SUPPLIES BY TYPE AND QUANTITY ORDERED/DELIVERED THIS VISIT INCLUDE: none   OBJECTIVE: See interventions. PATIENT RESPONSE TO TREATMENT: Patient with frequent rest breaks during activities due to fatigue/weakness. Patient required constant encouragement by therapist and PCA to participate with activities. PATIENT/CG LEVEL OF UNDERSTANDING OF EDUCATION PROVIDED: Educated in use of AD at all times and safety with transfers and ambulation. Instructed to increase activity during day and sitting upright in w/c to eat meals. Educated in fall prevention techniques and repositioning frequently to reduce risk for pressure sores. Instructed on PROM to bilateral LE's to prevent contractures. Instructed in benefits of hospital bed. ASSESSMENT OF PROGRESS TOWARD GOALS: Patient is progressing towards goals. Patient performed stand pivot transfer from sofa<->w/c with max A x 2 due to weakness and unsteadiness. Patient transferred sit<->stand from w/c<->walker with max A x 2 due to instability and increase hip/knee flexion with posterior lean. Verbal/tactile cues for proper technique and UE placement. Patient ambulated ~6 feet with walker with max A x2 due to weakness and 2x LOB. Patient required verbal and tactile cues for proper technique. Patient required ~1 minute rest break post ambulation due to fatigue. CONTINUED NEED FOR THE FOLLOWING SKILLS: Continuation of PT to further improve patient balance, functional mobility and strength to decrease pts risk for falls. PLAN FOR NEXT VISIT: Progress with thera ex and standing activities.   THE FOLLOWING DISCHARGE PLANNING WAS DISCUSSED WITH THE PATIENT/CAREGIVER: D/C from HHPT when goals are met or max potential benefit achieved.

## 2022-08-14 NOTE — HOME HEALTH
SUBJECTIVE: Patient observed lying in sofa. PCA present during session. Patient notes she has some soreness on her bottom. CAREGIVER INVOLVEMENT/ASSISTANCE NEEDED FOR: Patient resides with son and has PCA daily who assist with ADL's and transfers as needed. HOME HEALTH SUPPLIES BY TYPE AND QUANTITY ORDERED/DELIVERED THIS VISIT INCLUDE: none   OBJECTIVE: See interventions. PATIENT RESPONSE TO TREATMENT: Patient with frequent rest breaks during activities due to fatigue/weakness. Patient required motivation and encouragement by therapist and PCA to participate with activities. PATIENT/CG LEVEL OF UNDERSTANDING OF EDUCATION PROVIDED: Educated in use of AD at all times and safety with transfers and ambulation. Instructed to increase activity during day and sitting upright in w/c to eat meals. Educated in fall prevention techniques and repositioning frequently to reduce risk for pressure sores. Instructed on PROM to bilateral LE's to prevent contractures. ASSESSMENT OF PROGRESS TOWARD GOALS: Patient is progressing towards goals. Patient transferred sit<->stand from w/c<->walker with max A x 2 due to weakness and unsteadiness. Verbal/tactile cues for proper technique and UE placement. Patient with increase hip/knee flexion and foward/flexed posturing during standing with posterior lean. Patient attempted 3x ambulation with walker with max A, however, unable to lift LE's to initiate movement. Verbal and tactile cues for proper technique, however, pt leaning backwards to sit for W/C. Patient required frequent rest breaks in between activities due to fatigue/weakness. CONTINUED NEED FOR THE FOLLOWING SKILLS: Continuation of PT to further improve patient balance, functional mobility and strength to decrease pts risk for falls. PLAN FOR NEXT VISIT: Progress with transfer training next visit.   THE FOLLOWING DISCHARGE PLANNING WAS DISCUSSED WITH THE PATIENT/CAREGIVER: D/C from HHPT when goals are met or max potential benefit achieved.

## 2022-08-15 ENCOUNTER — HOME CARE VISIT (OUTPATIENT)
Dept: HOME HEALTH SERVICES | Facility: HOME HEALTH | Age: 67
End: 2022-08-15
Payer: MEDICARE

## 2022-08-15 VITALS
OXYGEN SATURATION: 97 % | HEART RATE: 88 BPM | DIASTOLIC BLOOD PRESSURE: 68 MMHG | SYSTOLIC BLOOD PRESSURE: 101 MMHG | TEMPERATURE: 97.7 F

## 2022-08-15 VITALS
DIASTOLIC BLOOD PRESSURE: 64 MMHG | OXYGEN SATURATION: 100 % | TEMPERATURE: 98.7 F | HEART RATE: 80 BPM | SYSTOLIC BLOOD PRESSURE: 100 MMHG | RESPIRATION RATE: 18 BRPM

## 2022-08-15 PROCEDURE — G0157 HHC PT ASSISTANT EA 15: HCPCS

## 2022-08-15 NOTE — HOME HEALTH
Skilled reason for visit: CVA, HTN,Peg Tube, wound care, medication management          Caregiver involvement: Patient total assist with al care, Son's are the primary care giver assist with meals, bathing, transfer, appointment and medication          Medications reviewed and all medications are available in the home this visit. The following education was provided regarding medications:  sn instructed patient on plavix use as blood thinner and potential for unusual bleeding and bruising. MD notified of any discrepancies/look a-like medications/medication interactions: n/a    Medications are effective at this time. Home health supplies by type and quantity ordered/delivered this visit include: n/a         Patient education provided this visit:  Instruct patient/caregiver that symptoms of stroke usually come on suddenly. Signs/symptoms to watch for can easily be recalled using the phrase \"BE FAST\" each letter in the word stands for one of the things you should watch for and what to do about it:  Balance - does the person have a sudden loss of balance? Eyes - Has the person lost vision in one or both eyes? Face - Does the person's face look uneven or droop on one side? Arm - Does the person have weakness or numbness in one or both arms? Does one arm drift down if the person tries to hold both arms out? Speech - Is the person having trouble speaking? Does his or her speech sound strange? Time - If you notice any of these stroke signs, call emergency services. You need to act FAST. The sooner treatment begins, the better the chances of recovery. [place] education provided: n/a         Patient level of understanding of education provided: CG verbalized complete understanding of education provided          Skilled Care Performed this visit:  education ,wound assessement and peg assessed          Patient response to procedure performed:   Tolerated well          Agency Progress toward goals: Continue educate son on disease management          Patient's Progress towards personal goals: Progressing well          Home exercise program: Frequent repositioning          Continued need for the following skills: Nursing         Plan for next visit: education and assessment          Patient and/or caregiver notified and agrees to changes in the Plan of Care YES/NO/NA: YES           The following discharge planning was discussed with the pt/caregiver: when patient reaches goals and medication is managed, and disease processes are understood patient agrees and understand that discharge will take place.

## 2022-08-15 NOTE — HOME HEALTH
SUBJECTIVE: Patient observed lying in bed upon arrival for PT. Son in/out of home during session. CAREGIVER INVOLVEMENT/ASSISTANCE NEEDED FOR: Patient resides with son and has PCA daily who assist with ADL's and transfers as needed. HOME HEALTH SUPPLIES BY TYPE AND QUANTITY ORDERED/DELIVERED THIS VISIT INCLUDE: none   OBJECTIVE: See interventions. PATIENT RESPONSE TO TREATMENT: Patient required constant verbal/tactile cues to participate with PT and frequent moaning due to pain with movement or activity. PATIENT/CG LEVEL OF UNDERSTANDING OF EDUCATION PROVIDED: Educated in use of AD at all times and safety with transfers. Instructed to repositioning frequently to reduce risk for pressure sores and continuing PROM to LE's to prevent contractures. Educated in fall prevention techniques. ASSESSMENT OF PROGRESS TOWARD GOALS: Patient is progressing slowly towards goals due to lack of participation. Patient transferred from supine<->sit on sofa with max A due to weakness and unsteadiness. Patient unable to maintain sitting at edge of sofa requiring max A due to weakness. Patient frequently posterior lean and grimacing upon attempting to sit. Patient requested numerous times to lay down due to pain with sitting. CONTINUED NEED FOR THE FOLLOWING SKILLS: Continuation of PT to further improve patient balance, functional mobility and strength to decrease pts risk for falls. PLAN FOR NEXT VISIT: Progress with sitting balance activities. THE FOLLOWING DISCHARGE PLANNING WAS DISCUSSED WITH THE PATIENT/CAREGIVER: D/C from HHPT when goals are met or max potential benefit achieved. OTHER: Spoke to  in regards to hospital bed, trapeze bar being re ordered due to pt son agreeing to equipment.

## 2022-08-16 ENCOUNTER — HOME CARE VISIT (OUTPATIENT)
Dept: HOME HEALTH SERVICES | Facility: HOME HEALTH | Age: 67
End: 2022-08-16
Payer: MEDICARE

## 2022-08-17 ENCOUNTER — HOME CARE VISIT (OUTPATIENT)
Dept: HOME HEALTH SERVICES | Facility: HOME HEALTH | Age: 67
End: 2022-08-17
Payer: MEDICARE

## 2022-08-17 NOTE — CASE COMMUNICATION
OT called pt 8.16.22 to schedule visit for 8.17.22 however no answer. OT left message with no return call. this was OT 1st attempt.

## 2022-08-18 ENCOUNTER — HOME CARE VISIT (OUTPATIENT)
Dept: SCHEDULING | Facility: HOME HEALTH | Age: 67
End: 2022-08-18
Payer: MEDICARE

## 2022-08-18 VITALS
SYSTOLIC BLOOD PRESSURE: 97 MMHG | OXYGEN SATURATION: 97 % | TEMPERATURE: 98.2 F | HEART RATE: 89 BPM | DIASTOLIC BLOOD PRESSURE: 63 MMHG | RESPIRATION RATE: 15 BRPM

## 2022-08-18 PROCEDURE — G0152 HHCP-SERV OF OT,EA 15 MIN: HCPCS

## 2022-08-18 NOTE — HOME HEALTH
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 SUBJECTIVE:  Pt stated \"I don't know. \" pt L side lying in bed upon OT arrival. pt agreed tx. pt unable to verbalize if she wanted continued OT. pt caregiver/son requested short term OT to attempt to get pt more engaged in participating ADL tasks. pt with extremely minimal verbalizations and participation throughout evaluation. CAREGIVER INVOLVEMENT: pt family and hired aide provides A ADL tasks and transfers PRN, performs all IADL tasks, shopping, transportation, and A with medications. MEDICATION RECONCILIATION: OT reconcilled medications with pt with no changes   DME ORDERED/RECOMMENEDED: NA, pt caregiver reports pt receiving hospital bed this weekend, pt has transport chair and FWW   . OBJECTIVE:  BATHING: D via sponge bath in bed due to decreased ability to stand and cognitive deficits. TOILETING: D via brief due to impaired balance, decreased ability to stand, and cognitive deficits  UB DRESSING: D  LB DRESSING: D due to impaired balance, decreased ability to stand  GROOMING: D  FEEDING: setup A to min A   .  OT instructed/demonstrated pt and caregiver the following with good understanding:     -           energy conservation while performing bathing, dressing, and setup such as set clothing out night before, gather items required to perform task in one trip, sit while performing tasks, take rest breaks as needed, perform shower/bathing on days with no other appointments or activities scheduled, etc.      -           change position at least every two hours and use pillows prevent skin break down/pressure ulcers.       -ADL training performed for improved body mechanics, safety, increased pt participation, and technique for reduced risk of falls and improved functional level and participation of tasks. [address] IADL: NT, pt did not perform IADL tasks prior to illness  . BALANCE:  STATIC STANDING: poor, pt requires max A to maintain upright standing position  DYNAMIC STANDING: NT due to pt not able and not safe  . STATIC SITTING:poor, pt required max A with max v/c correct LOB posterior while sitting EOB with no follow through. pt tolerated sitting unsupported x 2 minutes. DYNAMIC SITTING: NT due to pt not able and not safe  . AMBULATION: NT due to safety   . EOB/BED TRANSFER: pt supine to/from sit and rolling R and L D. pt sit to stand EOB D, SPT EOB to transport chair D.   transport chair: sit to/from stand D with max v/c hand placement and sequencing with no follow through per pt. TOILET: NT due to pt not able and safety. TUB SHOWER: NT due to pt not able and safety. .  -transfer training performed for improved body mechanics and technique for fall prevention and safety while performing ADL tasks. [address] PATIENT RESPONSE TO TREATMENT: pt with no c/o increased pain or discomfort with activity throughout tx. PATIENT EDUCATION PROVIDED THIS VISIT: UB HEP, OT role, energy conservation, fall prevention/safety training ADL tasks, transfer training, caregiver training. [address] PATIENT LEVEL OF UNDERSTANDING OF EDUCATION PROVIDED: pt caregiver verbalized good understanding energy conservation and UB HEP  REHAB POTENTIAL: fair+ for stated goals   HOME EXERCISE PROGRAM: Written HEP of the following issued. MANZANO will instruct/re-instruct pt next treatment. UB HEP includes the following: BUE shoulder press, shoulder flexion, shoulder abduction, shoulder extension, chest press, elbow flexion/extension x 5-10, x 1-2 per day, as well as, P/AAROM BUE all joints and all planes to maintain joint mobility. pt caregiver verbalized good understandingj. pt caregiver aware Venkat Jimenez will update HEP throughout POC.  UB HEP performed for pt maintain joint mobility to perform ADL with increased functional level, decreased caregiver burdne, and for fall prevention. CONTINUED NEED FOR THE FOLLOWING SKILLS: HH OT is medically necessary to address pain, decreased ROM, decreased functional strength, impaired bed mobility to perform ADL tasks, decreased independence and safety with functional transfers, decreased independence and safety performing ADL tasks, decreased activity and standing tolerance, decreased functional endurance, and impaired balance in order to improve functional independence, obtain set goals, reduce risk of falls, reduce pain, improve quality of life, and return to PLOF. ASSESSMENT: pt presents with decreased endurance, decreased functional strength, decreased safety transfers, increased A with transfers, decreased safety/participation ADL tasks, decreased balance, increased burden of care, and no UB HEP. PLAN: pt will be seen to address ther ex: establish and upgrade HEP, ther act: activity and standing tolerance training, endurance training, safety training, energy conservation training, transfer training, ADL training, and pt/caregiver education. DISCHARGE PLANNING DISCUSSED WITH PT/CAREGIVER: Anticipate D/C skilled OT week of 8.29.22 when goals met or when pt obtained maximum benefit. pt frequency 1w1, 2w2. discharge to self and family care under MD supervision once all goals have been met or patient has reached max potential. pt and caregiver verbalized understanding and agree POC.

## 2022-08-18 NOTE — CASE COMMUNICATION
OT called pt morning of 8.17.22 to schedule visit however no answer. OT left message with no return call. this was OT 2nd attempt.

## 2022-08-20 NOTE — CASE COMMUNICATION
dx: CVA with continuous weakness, zip: [id number], freq: 1w1, 2w2, focus: increased participation with ADL tasks and transfers, maintain joint mobility BUE, decrease caregiver burden, caregiver training for HEP to maintain pt BUE joint mobility. pt presents with broken speech and hard to hear due to dysphasia. pt with extremely minimal to no participation throughout evaluation. thank you.

## 2022-08-22 ENCOUNTER — HOME CARE VISIT (OUTPATIENT)
Dept: HOME HEALTH SERVICES | Facility: HOME HEALTH | Age: 67
End: 2022-08-22
Payer: MEDICARE

## 2022-08-22 ENCOUNTER — HOME CARE VISIT (OUTPATIENT)
Dept: SCHEDULING | Facility: HOME HEALTH | Age: 67
End: 2022-08-22
Payer: MEDICARE

## 2022-08-22 VITALS
RESPIRATION RATE: 18 BRPM | HEART RATE: 74 BPM | OXYGEN SATURATION: 97 % | SYSTOLIC BLOOD PRESSURE: 126 MMHG | TEMPERATURE: 97.7 F | DIASTOLIC BLOOD PRESSURE: 82 MMHG

## 2022-08-22 PROCEDURE — G0158 HHC OT ASSISTANT EA 15: HCPCS

## 2022-08-22 PROCEDURE — G0157 HHC PT ASSISTANT EA 15: HCPCS

## 2022-08-22 NOTE — HOME HEALTH
SUBJECTIVE: PT and OT arrived at same time due to scheudling with pts son, co-tx agreed upon to address mobility and exercise with pt if she is able to/willing to participate. CAREGIVER ASSISTANCE NEEDED FOR: pts son present during visit for assistance and education  MEDICATIONS REVIEWED AND UPDATED: no medication changes reported this visit  . OBJECTIVE: see interventions  PATIENT RESPONSE TO TREATMENT:  Pt demonstrated fair response to treatment as she was more vocal and slightly more participatory this visit than on eval, allowing therapists to transfer and do minimal exercise training. Derik Ellison PATIENT/CAREGIVER EDUCATION PROVIDED THIS VISIT: Therapist educated CG (son) on importance of pt performing some level of exercise daily, especially to LUE, also educated on heat modalities and safety to help with pain management of L side. PATIENT LEVEL OF UNDERSTANDING OF EDUCATION PROVIDED: CG verbalized understanding of education and various heat modalities to try with pt. ASSESSMENT AND PROGRESS TOWARD GOALS:  Pt demonstrated minimal progress towards mobility and exercise goals but with better participation than eval.  Patient will benefit from continued intervention with progression of ADL, transfer and LUE strength/mobility training. CONTINUED NEED FOR THE FOLLOWING SKILLS: HH OT is medically necessary to address decreased ROM, decreased strength, impaired bed mobility, decreased independence with functional transfers, decreased I with ADLs, and impaired balance in order to improve functional independence, quality of life, return to PLOF, reduce the risk for falls, and reduce pain. PLAN: next visit will be for ADL and mobility training with CGs. DISCHARGE PLANNING DISCUSSED: Discharge to self and family under MD supervision once all goals have been met or patient has reached maximum potential.  Frequency remaininw1,, 2w1 remaining.

## 2022-08-23 ENCOUNTER — HOME CARE VISIT (OUTPATIENT)
Dept: HOME HEALTH SERVICES | Facility: HOME HEALTH | Age: 67
End: 2022-08-23
Payer: MEDICARE

## 2022-08-23 ENCOUNTER — HOME CARE VISIT (OUTPATIENT)
Dept: SCHEDULING | Facility: HOME HEALTH | Age: 67
End: 2022-08-23
Payer: MEDICARE

## 2022-08-23 VITALS
SYSTOLIC BLOOD PRESSURE: 126 MMHG | RESPIRATION RATE: 18 BRPM | TEMPERATURE: 97.7 F | OXYGEN SATURATION: 97 % | HEART RATE: 74 BPM | DIASTOLIC BLOOD PRESSURE: 82 MMHG

## 2022-08-23 PROCEDURE — G0153 HHCP-SVS OF S/L PATH,EA 15MN: HCPCS

## 2022-08-24 ENCOUNTER — HOME CARE VISIT (OUTPATIENT)
Dept: SCHEDULING | Facility: HOME HEALTH | Age: 67
End: 2022-08-24
Payer: MEDICARE

## 2022-08-24 VITALS
RESPIRATION RATE: 18 BRPM | TEMPERATURE: 97.5 F | DIASTOLIC BLOOD PRESSURE: 56 MMHG | HEART RATE: 70 BPM | SYSTOLIC BLOOD PRESSURE: 92 MMHG | OXYGEN SATURATION: 97 %

## 2022-08-24 VITALS
RESPIRATION RATE: 20 BRPM | DIASTOLIC BLOOD PRESSURE: 53 MMHG | TEMPERATURE: 97.5 F | HEART RATE: 81 BPM | SYSTOLIC BLOOD PRESSURE: 92 MMHG | OXYGEN SATURATION: 97 %

## 2022-08-24 PROCEDURE — G0157 HHC PT ASSISTANT EA 15: HCPCS

## 2022-08-24 NOTE — HOME HEALTH
RECENT HX OF CURRENT ILLNESS/REASON FOR REFERRAL:  Pt referred to [facility] per [facility] Dx:CVA with continuous weakness  Disciplines requested: SN/PT/OT/ST/MSW   Services to provide: Evaluation and Treat, Medication and Disease Management       Pt had gone to ER on 7/14/2022 for c/o generalized body aches    PLOF/DIET/COMMUNICATION: D for ADLs    PAST MEDICAL HISTORY:  Circulatory   Pulmonary embolism ([address] 2/2/2014   Essential hypertension 9/11/2015   TIA (transient ischemic attack) 1/7/2016   Endocrine   Subclinical hypothyroidism 4/13/2022   Nervous   CVA (cerebral infarction) 3/10/2014   Left-sided weakness 10/30/2015   Status epilepticus ([address] 8/2/2019   Recurrent seizures ([address] 9/8/2020   Encephalopathy 9/21/2020   Breakthrough seizure ([address] 9/6/2021   Urinary   KEYLA (acute kidney injury) ([address] 9/6/2021   Other   Chest pain 2/2/2014   Other and unspecified noninfectious gastroenteritis and colitis(558.9) 2/2/2014   Non compliance w medication regimen 3/10/2014   Elevated Dilantin level 2/20/2015   Convulsion ([address] 7/21/2015   Dyslipidemia 12/1/2015   Ataxia 12/30/2011   Headache 12/30/2011   Seizure ([address] 8/3/2019   Hallucinations 9/21/2020   Abnormal coordination 9/21/2020   Dehydration 9/6/2021   Anemia 9/6/2021   Hyperkalemia 1/6/2022   Severe protein-calorie malnutrition ([address] 1/7/2022   Elevated troponin 4/11/2022   Depression 4/12/2022   Failure to thrive in adult 4/12/2022     CURRENT RESIDENCE/LIVING SITUATION:  Lives with sons    SUBJECTIVE (PT/FAMILY COMMENTS, THERAPIST OBSERVATIONS): Pt was asleep on couch when SLP arrived. Pt required max stim to wake. Pt was drowsy. Pt spoke in very low volume. Pts grandson and son, Delisa Silva were present in home. Delisa Silva reports Pts talking/volume varies daily. He states that she talked loud with therapists yesterday. He further reports that memory/cognition is at baseline.   , sausage sandwich, appetite caries, peg tube, holds food in mouth, passed 2 months holding food, now receives food throught tube, swallows pills    CAREGIVER INVOLVEMENT:  Has PCA Mon-Fri 9:00-4:00 to assist with ADLs, meals, meds    ASSESSMENT / Triny Redmond / PLAN:  Pt is well known to SLP from prior episodes. Pt demonstrates varying volume in speech which is Pts baseline. Pts basic comprehension and verbal expression skills are grossly intact. Pt presents with cognitive and memory impairment with noted reduced time orientation, long term memory recall, recent memory recall for recent happenings. Pts cognitive and memory impairment are at Pts baseline. Pt presents with mild oral phase dysphagia related to cognitive deficits. Pt demonstrates increased mastication time and family report intermittent holding and pocketing of food. Pt with prior hx of poor po intake and has peg tube for enteral feedings. ST is medically necessary for dysphagia management (training and education of appropriate diet modifications, strategies to facilitate po intake).     PATIENT RESPONSE TO TREATMENT:  Pt requires cueing/assistance for alertness    PATIENT LEVEL OF UNDERSTANDING OF EDUCATION PROVIDED: son agrees with ST POC      MD NOTIFIED OF POC AND FREQUENCY    PT GOALS: improve     HOME EXERCISE PROGRAM: aspiration precautions    DISCHARGE PLANNING - (ANTICIPATED DC DATE, DC PLAN): ST to d/c in 2 more visits/ wks or when goals met/max potential achieved, Pt/caregiver verbalized understanding

## 2022-08-25 ENCOUNTER — HOME CARE VISIT (OUTPATIENT)
Dept: SCHEDULING | Facility: HOME HEALTH | Age: 67
End: 2022-08-25
Payer: MEDICARE

## 2022-08-25 VITALS
DIASTOLIC BLOOD PRESSURE: 82 MMHG | RESPIRATION RATE: 18 BRPM | SYSTOLIC BLOOD PRESSURE: 126 MMHG | OXYGEN SATURATION: 97 % | TEMPERATURE: 97.7 F | HEART RATE: 74 BPM

## 2022-08-25 PROCEDURE — G0158 HHC OT ASSISTANT EA 15: HCPCS

## 2022-08-25 NOTE — HOME HEALTH
SUBJECTIVE: Pt in transport chair upon arrival, she was seated at kitchen table eating eggs, pt able to self-feed with min A to get small pieces on to fork and then bring to her mouth. Pts aide reported she has been eating more since monday and using the tube less for feeding. CAREGIVER ASSISTANCE NEEDED FOR: pts aide and son present during visit for education and training. MEDICATIONS REVIEWED AND UPDATED: no medication changes reported this visit  . OBJECTIVE: see interventions  PATIENT RESPONSE TO TREATMENT:  Pt demonstrated positive response to treatment with improved participation, she as more alert, talkative and followed directions with no pain during transfers or touch like previous visit. Caity Kennedy PATIENT/CAREGIVER EDUCATION PROVIDED THIS VISIT: Therapist educated aide on v/c technique, allowing pt time to process and working on allowing her to do as much as she can before assisting her. PATIENT LEVEL OF UNDERSTANDING OF EDUCATION PROVIDED: CG verbalized good understanding and demonstrated good use of v/c during visit to simulate ADL performance. ASSESSMENT AND PROGRESS TOWARD GOALS:  Pt demonstrated fair progress with ADL and transfer goals, much more alert and good CG involvement for training to improve carry over. Patient will benefit from continued intervention with progression of ADL, transfer and HEP training. CONTINUED NEED FOR THE FOLLOWING SKILLS: HH OT is medically necessary to address pain, decreased ROM, decreased strength, impaired bed mobility, decreased independence with functional transfers, decreased I with ADLs, and impaired balance in order to improve functional independence, quality of life, return to PLOF, reduce the risk for falls, and reduce pain. PLAN: next visit will be for further HEP training attempt and ADL carry over with CG.     DISCHARGE PLANNING DISCUSSED: Discharge to self and family under MD supervision once all goals have been met or patient has reached maximum potential.  Frequency remaininw1 remaining.

## 2022-08-25 NOTE — HOME HEALTH
SUBJECTIVE: PCA notes patient has been more vocal over the last few days. MANZANO present upon arrival for PT. Son notes he is rearranging room and then will have [facility] deliver hospital bed. CAREGIVER INVOLVEMENT/ASSISTANCE NEEDED FOR: Patient resides with son and has PCA daily 9-5 to assist with ADL's and transfers. HOME HEALTH SUPPLIES BY TYPE AND QUANTITY ORDERED/DELIVERED THIS VISIT INCLUDE: none   OBJECTIVE: See interventions. PATIENT RESPONSE TO TREATMENT: Patient more vocal during session and participating with activities compared to previous PT sessions. Patient with occasional c/o pain with movement requiring rest breaks. PATIENT/CG LEVEL OF UNDERSTANDING OF EDUCATION PROVIDED:  Educated in use of AD at all times and safety with transfers and ambulation. Instructed to increase activities throughout day and sitting upright for meals. Educated in fall prevention techniques. Instructed importance of repositioning frequently to reduce risk for pressure sores. Educated in PROM to bilateral LE's to decrease muscle contractures. CG verbalized understanding with teach back to therapist.   ASSESSMENT OF PROGRESS TOWARD GOALS: Patient is progressing towards goals. Patient performed stand pivot transfer from sofa<->w/c with mod A and min A from therapist due to weakness and unsteadiness with standing. Patient with increased forward/flexed posture and increase hip/knee flexion bilaterally. Patient tends to lean posteriorly upon standing and sitting due to lack of trunk control. Patient transferred sit<->stand from w/c<->walker with max A due to weakness and increase posterior lean with standing requiring max A to remain upright. Patient able to stand for 3x ~15-20 seconds until fatigue with increase encouragement from therapist.  CONTINUED NEED FOR THE FOLLOWING SKILLS: Continuation of PT to further improve patient balance, functional mobility and strength to decrease pts risk for falls.    PLAN FOR NEXT VISIT: Progress with static balance activities in sitting and bed mobility next visit. THE FOLLOWING DISCHARGE PLANNING WAS DISCUSSED WITH THE PATIENT/CAREGIVER: D/C from HHPT in 3 visits due to goals met.

## 2022-08-26 ENCOUNTER — HOME CARE VISIT (OUTPATIENT)
Dept: SCHEDULING | Facility: HOME HEALTH | Age: 67
End: 2022-08-26
Payer: MEDICARE

## 2022-08-26 PROCEDURE — G0299 HHS/HOSPICE OF RN EA 15 MIN: HCPCS

## 2022-08-28 VITALS
SYSTOLIC BLOOD PRESSURE: 100 MMHG | RESPIRATION RATE: 18 BRPM | TEMPERATURE: 97 F | OXYGEN SATURATION: 98 % | DIASTOLIC BLOOD PRESSURE: 76 MMHG | HEART RATE: 64 BPM

## 2022-08-28 NOTE — HOME HEALTH
Skilled care provided during this visit: Disease and medication management, completed assessment, sacral wound care  Caregiver involvement: CG/son, PCA available to prepare with daily meals, assist with daily medications, assist with ADL's, transfers, cleansed PEG tube prn,  run errands, groceries and accompany to MD appt prn. Medications reviewed and all medications are available in the home this visit. Discussed to take daily medication on timely basis following proper dosage and freq. The following education was provided regarding medications, medication interactions, and look alike medications (specify): N/A. Medications are effective at this time. No new medications added at this time. Patient education provided this visit to include: Reviewed intervention to prevent infection; hand washing and avoiding sick person. Repositioning q 2 hrs, reinforced dressing with dislodge and replacing new dressing when soiled. Elevate safety transfer to [facility] to prevent falls, continue to increase protein intake; Ensure 1-2x/day, avoid skipping meals, sm. freq meals with less appetite and good hydration. Setting up 45 degrees during meals. Continue to cleansed PEG site daily/prn. (Not using at this time). Continue to monitor S/S of infection; fever 100.4, increase pain, redness, swelling, coughing with yellow thick sputum, purulent wound drainage with foul smell, cloudy urine with foul smell, not feeling well 2-3 days, SOB, and to call [facility] or MD for assistance if experiencing any of these S/S. To call 911 with chest pains, facial drooping, difficulty talking, non arousable/unconscious and uncontrollable bleeding. Patient level of understanding of education provided: Pt/CG has good understanding of the teaching provided during visit. Skilled Care Performed this visit: Completed assessment, performed sacral wound care; cleansed with WC, pat dry, measured, applied EPC then foam dressing.  Wound size increased and  no S/S of infection noted. Patient response to procedure performed: Pt tolerated well during the procedure with no C/O. Home health supplies by type and quantity ordered/delivered this visit include: Foam dressing and EPC. Agency Progress toward goals: On going tract to be met. SNV to continue to monitor sacral wound care. Patient's Progress towards personal goals: Partially met. Pt/CG's adheres POC and good understanding with current treatment. Home exercise program/Homework provided: BLE flexion, HEP deep breathing exercises 10x when having SOB, pain and anxiety, IS 10x q 1-2 hrs. Continued need for the following skills: SN, PT, OT    Plan for next visit: Continue sacral wound care. Patient and/or caregiver notified and agrees to changes in the Plan of Care; No changes at this time. Discharge planning discussed with patient and caregiver. Discharge planning as follows: Pt/CG will be able to manage disease and medication independently, sacral wound improved/healed and health condition stable. Pt/Caregiver did verbalize understanding of discharge planning. COVID - 23 Screening completed before visit:       Denies and no family member  has any of these S/S:    Fever, dry cough, sore throat diarrhea, body aches, not feeling well and lost of taste.

## 2022-08-29 ENCOUNTER — HOME CARE VISIT (OUTPATIENT)
Dept: SCHEDULING | Facility: HOME HEALTH | Age: 67
End: 2022-08-29
Payer: MEDICARE

## 2022-08-29 ENCOUNTER — HOME CARE VISIT (OUTPATIENT)
Dept: HOME HEALTH SERVICES | Facility: HOME HEALTH | Age: 67
End: 2022-08-29
Payer: MEDICARE

## 2022-08-29 NOTE — Clinical Note
Ms. Segun Carrizales Génesis's PCA was not avaialble during set time for visit and as such the final visit is a non-billable discharge visits. Pt. has been clinically discharged and documentation finalized for completion of PT discharge. Pt. [facility]. Caregiver involvement: Pt son is primary caregiver at this time and has been assisting with ADL, medication management and medical appointments  Home health supplies by type and quantity ordered/delivered this visit include: NA  Patient education provided this visit: NA  Current Functional Status and progress towards goals:  Strength:   Left Hip flexion 3-/5 , hip abduction 3-/5, hip adduction 2+/5, Knee flexion 2+/5  knee extension 2+/5, ankle dorsiflexion 3-/5    Right Hip flexion 3/5 , hip abduction 3/5, hip adduction 3/5, Knee flexion 3/5  knee extension 3/5, ankle dorsiflexion 3/5    BED MOBILITY: Pt. is mod/max with all bed mobility  which demonstrates an improvement from the initial evaluation of max A. This allows for the patient to be functionally more independent. TRANSFERS: Pt. is mod A x 2 with all transfers which demonstrates and improvement from the initial evaluation score of max A. This allows the patient increased functional independence and mobility  GAIT/WC MOBILITY: Pt. is able to ambulate 10' feet inside over even surfaces with max A with HHA AD. This represents an improvement from the initial evaluation of 15 feet with HHA AD on level surfaces with mod/max A. BALANCE:   Seated unsupported balance is poor     Standing static balance is poor    Standing dynamic balance is poor    Progress toward goals: Patient has met all goals, see interventions for details. Home exercise program: Patient has been given a HEP and patient/caregiver understand the HEP.    Continued need for the following skills:  NA patient is final DC from PT today   The following discharge planning was discussed with the pt/caregiver: DC from PT

## 2022-08-29 NOTE — Clinical Note
thank you   ----- Message -----  From: Mylene Farias, PT  Sent: 8/29/2022   7:40 PM EDT  To: Mariah Meza OTR/L      Ms. Reshma Lozano PCA was not avaialble during set time for visit and as such the final visit is a non-billable discharge visits. Pt. has been clinically discharged and documentation finalized for completion of PT discharge. [facility]. [facility]. Caregiver involvement: Pt son is primary caregiver at this time and has been assisting with ADL, medication management and medical appointments  Home health supplies by type and quantity ordered/delivered this visit include: NA  Patient education provided this visit: NA  Current Functional Status and progress towards goals:  Strength:   Left Hip flexion 3-/5 , hip abduction 3-/5, hip adduction 2+/5, Knee flexion 2+/5  knee extension 2+/5, ankle dorsiflexion 3-/5    Right Hip flexion 3/5 , hip abduction 3/5, hip adduction 3/5, Knee flexion 3/5  knee extension 3/5, ankle dorsiflexion 3/5    BED MOBILITY: Pt. is mod/max with all bed mobility  which demonstrates an improvement from the initial evaluation of max A. This allows for the patient to be functionally more independent. TRANSFERS: Pt. is mod A x 2 with all transfers which demonstrates and improvement from the initial evaluation score of max A. This allows the patient increased functional independence and mobility  GAIT/WC MOBILITY: Pt. is able to ambulate 10' feet inside over even surfaces with max A with HHA AD. This represents an improvement from the initial evaluation of 15 feet with HHA AD on level surfaces with mod/max A. BALANCE:   Seated unsupported balance is poor     Standing static balance is poor    Standing dynamic balance is poor    Progress toward goals: Patient has met all goals, see interventions for details. Home exercise program: Patient has been given a HEP and patient/caregiver understand the HEP.    Continued need for the following skills:  NA patient is final DC from PT today   The following discharge planning was discussed with the pt/caregiver: DC from PT

## 2022-08-29 NOTE — HOME HEALTH
DC ACTIONS NARRATIVE  Pt. clinically discharged and documentation finalized for completion of PT discharge. Caregiver involvement: Pt son is primary caregiver at this time and has been assisting with ADL, medication management and medical appointments  Home health supplies by type and quantity ordered/delivered this visit include: FLAVIA  Patient education provided this visit: FLAVIA  Current Functional Status and progress towards goals:  Strength:   Left Hip flexion 3-/5 , hip abduction 3-/5, hip adduction 2+/5, Knee flexion 2+/5  knee extension 2+/5, ankle dorsiflexion 3-/5    Right Hip flexion 3/5 , hip abduction 3/5, hip adduction 3/5, Knee flexion 3/5  knee extension 3/5, ankle dorsiflexion 3/5    BED MOBILITY: Pt. is mod/max with all bed mobility  which demonstrates an improvement from the initial evaluation of max A. This allows for the patient to be functionally more independent. TRANSFERS: Pt. is mod A x 2 with all transfers which demonstrates and improvement from the initial evaluation score of max A. This allows the patient increased functional independence and mobility  GAIT/WC MOBILITY: Pt. is able to ambulate 10' feet inside over even surfaces with max A with HHA AD. This represents an improvement from the initial evaluation of 15 feet with HHA AD on level surfaces with mod/max A. BALANCE:   Seated unsupported balance is poor     Standing static balance is poor    Standing dynamic balance is poor    Progress toward goals: Patient has met all goals, see interventions for details. Home exercise program: Patient has been given a HEP and patient/caregiver understand the HEP.    Continued need for the following skills:  NA patient is final DC from PT today   The following discharge planning was discussed with the pt/caregiver: DC from PT

## 2022-08-29 NOTE — HOME HEALTH
SUBJECTIVE: Patient observed sitting in w/c upon arrival for PT. Son/PCA present during session. Pt has MD appointment on 8/26/22. Son is following up on delivery time of hospital bed with [facility] but should be by the end of week. CAREGIVER INVOLVEMENT/ASSISTANCE NEEDED FOR: Patient resides with son and has PCA daily 9-5 to assist with ADL's and transfers. HOME HEALTH SUPPLIES BY TYPE AND QUANTITY ORDERED/DELIVERED THIS VISIT INCLUDE: none   OBJECTIVE: See interventions. PATIENT RESPONSE TO TREATMENT: Patient with increased participation with PT and less reports of pain throughout activities. PATIENT/CG LEVEL OF UNDERSTANDING OF EDUCATION PROVIDED: Educated in use of AD at all times and safety with transfers and ambulation. Instructed to increase activities throughout day and sitting upright for meals. Educated in fall prevention techniques. Instructed importance of repositioning frequently to reduce risk for pressure sores and pillow placement. Educated in PROM to bilateral LE's to decrease muscle contractures. CG verbalized understanding with teach back to therapist.   ASSESSMENT OF PROGRESS TOWARD GOALS: Patient is making slow progress towards goals due to inconsistent participation depending on pain and fatigue level. Patient performed stand pivot transfer from sofa<->w/c with mod A from therapist due to weakness and unsteadiness with standing. Patient with increased posterior lean upon standing requiring max A to prevent from falling backwards. Verbal and tactile cues for weightshiting anteriorly and posturing. Patient transferred sit<->stand from w/c<->walker with mod A x 2 due to weakness and instability upon standing. Patient ambulated ~10 feet with max A due to weakness and unsteadiness with increased posterior lean and hip/knee flexion bilaterally. Patient with decreased bilateral foot clearance and WB'ing through bilateral UE's during ambulation.  Patient required frequent rest breaks throughout activities due to weakness and fatigue. CONTINUED NEED FOR THE FOLLOWING SKILLS: OT will continue and agreeable to focus on bed mobility upon arrival of hospital bed at the end of the week. PLAN FOR NEXT VISIT: D/C  THE FOLLOWING DISCHARGE PLANNING WAS DISCUSSED WITH THE PATIENT/CAREGIVER: D/C from HHPT next visit due to max potential benefit achieved at this time.

## 2022-08-30 ENCOUNTER — HOME CARE VISIT (OUTPATIENT)
Dept: SCHEDULING | Facility: HOME HEALTH | Age: 67
End: 2022-08-30
Payer: MEDICARE

## 2022-08-30 VITALS
RESPIRATION RATE: 18 BRPM | OXYGEN SATURATION: 99 % | TEMPERATURE: 98.7 F | DIASTOLIC BLOOD PRESSURE: 61 MMHG | SYSTOLIC BLOOD PRESSURE: 118 MMHG | HEART RATE: 78 BPM

## 2022-08-30 PROCEDURE — G0158 HHC OT ASSISTANT EA 15: HCPCS

## 2022-08-30 PROCEDURE — G0153 HHCP-SVS OF S/L PATH,EA 15MN: HCPCS

## 2022-08-30 NOTE — HOME HEALTH
SUBJECTIVE: Upon arrival pt was balled up on couch under blankets, her hospital bed had not arrived and son reported he cancelled it due to pt panicking about it arriving and thinking it meant she was dying. Therapist explained the benefits of the bed and how it could help her, for her to reconsider. CAREGIVER ASSISTANCE NEEDED FOR: pts son home during visit but not involved, aide present but not available until later in visit due to phone call. MEDICATIONS REVIEWED AND UPDATED: no medication changes reported this visit  . OBJECTIVE: see interventions  PATIENT RESPONSE TO TREATMENT:  Pt demonstrated fairly positive response to treatment with tolerance of PROM but increased pain with transfer and difficulty followin v/c. [facility] PATIENT/CAREGIVER EDUCATION PROVIDED THIS VISIT: Therapist educated son and aide on importance of pushing pt to participate more with HEP, ADLs and mobility to continue progressing and encouraged pt to reconsider hospital bed to improve mobility and help with pressure areas. PATIENT LEVEL OF UNDERSTANDING OF EDUCATION PROVIDED: Pt and CG verbalized understanding with hospital bed and CGs verabalized good understanding to carry over education to improve pt participation. ASSESSMENT AND PROGRESS TOWARD GOALS:  Pt has demonstrated minimal progress towards goals for ADL and transfer progression, pt has CG carry over with PROM for joint mobility. PLAN: next visit will be for OTDC as pt has made very minimal progress with home care at this time and has reached her max potential, CGs educated on need to carry over exercises, mobility training and follow up with hospital bed. DISCHARGE PLANNING DISCUSSED: Discharge to self and family under MD supervision once all goals have been met or patient has reached maximum potential.  Frequency remaininw1 remaining with CG aware of OTDC on next visit.

## 2022-08-31 VITALS
SYSTOLIC BLOOD PRESSURE: 124 MMHG | HEART RATE: 87 BPM | TEMPERATURE: 97.6 F | OXYGEN SATURATION: 99 % | DIASTOLIC BLOOD PRESSURE: 62 MMHG | RESPIRATION RATE: 16 BRPM

## 2022-08-31 NOTE — HOME HEALTH
SUBJECTIVE: Pt laying on reclined couch chair when SLP arrived  CAREGIVER INVOLVEMENT / ASSISTANCE NEEDED FOR: PCA and family assist with ADLs, meals, meds  HOME HEALTH SUPPLIES BY TYPE AND QUANTITY ORDERED/DELIVERED THIS VISIT INCLUDE: n/a  OBJECTIVE / PATIENT RESPONSE TO TREATMENT / PATIENT LEVEL OF UNDERSTANDING OF EDUCATION PROVIDED:  Pt demonstrates tolerance and po acceptance of soft solids this visit. Pt's son participated and was receptive to teaching of swallow precautions, strategies, risk of aspiration. ST to continue x 1 more visit  ASSESSMENT OF PROGRESS TOWARD GOALS: Pt demonstrates steady progress  CONTINUED NEED FOR THE FOLLOWING SKILLS:   Pt presents with mild oral phase dysphagia related to cognitive deficits. Pt demonstrates increased mastication time and family report intermittent holding and pocketing of food. Pt with prior hx of poor po intake and has peg tube for enteral feedings.    ST is medically necessary for dysphagia management (training and education of appropriate diet modifications, strategies to facilitate po intake  PLAN FOR NEXT VISIT: swallow assessment, caregiver teaching/education  THE FOLLOWING DISCHARGE PLANNING WAS DISCUSSED WITH THE PATIENT/CAREGIVER: ST to d/c in 1 more visits/ wks or when goals met/max potential achieved, Pt/caregiver verbalized understanding

## 2022-09-01 ENCOUNTER — HOME CARE VISIT (OUTPATIENT)
Dept: SCHEDULING | Facility: HOME HEALTH | Age: 67
End: 2022-09-01
Payer: MEDICARE

## 2022-09-01 VITALS
RESPIRATION RATE: 16 BRPM | DIASTOLIC BLOOD PRESSURE: 62 MMHG | HEART RATE: 85 BPM | OXYGEN SATURATION: 96 % | SYSTOLIC BLOOD PRESSURE: 118 MMHG | TEMPERATURE: 98.8 F

## 2022-09-01 PROCEDURE — G0152 HHCP-SERV OF OT,EA 15 MIN: HCPCS

## 2022-09-01 NOTE — Clinical Note
thank you  ----- Message -----  From: Rahul Almeida  Sent: 9/1/2022   8:40 AM EDT  To: Ha Glover, CAROLYNR/ROOSEVELT      Mrs Sonal Jackson was seen by [address] [place] [address] from 8/18 - 9/1 to attempt ADL, HEP and transfer training with pt and CG. Pt is s/p severe CVA, present with difficulty communicating, understanding and very limited mobility and weakness. Pt has made very minimal progress and CGs have been encouraged to conitnue with daily HEP/PROM, v/c for ADL participation and positioning changes frequently for pressure relief and standing pt to improve LE strength through WB. Pt and CG refused hospital bed order after 2nd attempt and therapy was unable to train on bed mobility or set-up. Pt currently max A for all self-care to bathe, dress and toilet, CG has reported intermittent assist from pt to raise her arms during bathing/dressing but it is not consistent. Pt max A for sit<> stands and transfers from couch, transport chair and commode with pt only participating with walker use 1x very briefly before sitting herself down. Pts CGs I with daily PROM to help with joint mobility and reported pt has been a little better with participation and allowing them to do so with decreased c/o pain. CGs aware of need to carry over all education to help pt continue to build strength and mobility before attempting therapy again, they have also been encouraged to allow hospital bed to help with pt positioning and relief of pressure areas. MD and care team aware of OTDC this date.

## 2022-09-01 NOTE — HOME HEALTH
[facility], pt has reached max potential with little progress made. CGs educated on HEP, v/c techniques for ADLs and safe transfer techniques for carry over. See DC summary for details.

## 2022-09-01 NOTE — Clinical Note
thanks  ----- Message -----  From: Gaurang Alcala  Sent: 9/1/2022   8:40 AM EDT  To: Lisa Gonzales, SLP      Mrs Salome Zuñiga was seen by [facility] from 8/18 - 9/1 to attempt ADL, HEP and transfer training with pt and CG. Pt is s/p severe CVA, present with difficulty communicating, understanding and very limited mobility and weakness. Pt has made very minimal progress and CGs have been encouraged to conitnue with daily HEP/PROM, v/c for ADL participation and positioning changes frequently for pressure relief and standing pt to improve LE strength through WB. Pt and CG refused hospital bed order after 2nd attempt and therapy was unable to train on bed mobility or set-up. Pt currently max A for all self-care to bathe, dress and toilet, CG has reported intermittent assist from pt to raise her arms during bathing/dressing but it is not consistent. Pt max A for sit<> stands and transfers from couch, transport chair and commode with pt only participating with walker use 1x very briefly before sitting herself down. Pts CGs I with daily PROM to help with joint mobility and reported pt has been a little better with participation and allowing them to do so with decreased c/o pain. CGs aware of need to carry over all education to help pt continue to build strength and mobility before attempting therapy again, they have also been encouraged to allow hospital bed to help with pt positioning and relief of pressure areas. MD and care team aware of OTDC this date.

## 2022-09-01 NOTE — CASE COMMUNICATION
Mrs Adam Isaac was seen by [facility] from 8/18 - 9/1 to attempt ADL, HEP and transfer training with pt and CG. Pt is s/p severe CVA, present with difficulty communicating, understanding and very limited mobility and weakness. Pt has made very minimal progress and CGs have been encouraged to conitnue with daily HEP/PROM, v/c for ADL participation and positioning changes frequently for pressure relief and standing pt to improve LE strength through  WB. Pt and CG refused hospital bed order after 2nd attempt and therapy was unable to train on bed mobility or set-up. Pt currently max A for all self-care to bathe, dress and toilet, CG has reported intermittent assist from pt to raise her arms during bathing/dressing but it is not consistent. Pt max A for sit<> stands and transfers from couch, transport chair and commode with pt only participating with walker use 1x very briefly befor e sitting herself down. Pts CGs I with daily PROM to help with joint mobility and reported pt has been a little better with participation and allowing them to do so with decreased c/o pain. CGs aware of need to carry over all education to help pt continue to build strength and mobility before attempting therapy again, they have also been encouraged to allow hospital bed to help with pt positioning and relief of pressure areas. MD and c are team aware of OTDC this date.

## 2022-09-06 ENCOUNTER — HOME CARE VISIT (OUTPATIENT)
Dept: SCHEDULING | Facility: HOME HEALTH | Age: 67
End: 2022-09-06
Payer: MEDICARE

## 2022-09-06 PROCEDURE — 400013 HH SOC

## 2022-09-06 PROCEDURE — G0153 HHCP-SVS OF S/L PATH,EA 15MN: HCPCS

## 2022-09-07 ENCOUNTER — HOME CARE VISIT (OUTPATIENT)
Dept: HOME HEALTH SERVICES | Facility: HOME HEALTH | Age: 67
End: 2022-09-07
Payer: MEDICARE

## 2022-09-07 VITALS
OXYGEN SATURATION: 99 % | RESPIRATION RATE: 16 BRPM | TEMPERATURE: 97.7 F | DIASTOLIC BLOOD PRESSURE: 69 MMHG | SYSTOLIC BLOOD PRESSURE: 113 MMHG | HEART RATE: 87 BPM

## 2022-09-07 NOTE — CASE COMMUNICATION
Pt presented with mild oral phase dysphagia related to cognitive deficits. Pt demonstrates increased mastication time and family report intermittent holding and pocketing of food. Pt with prior hx of poor po intake and has peg tube for enteral feedings. Pt and caregivers participated in [place] [address] for dysphagia management (training and education of appropriate diet modifications, strategies to facilitate po intake). Pt demonstrated safe t olerance for soft and bite sized solids without occurrences of pocketing/holding and with appropriate bolus cohesion and motility. Pt is recommended to have soft and bite sized diet consistency and thin liquids with caregiver assistance in safe swallow precautions (sitting upright during po intake, remaining upright for 30-45 minutes after po intake, slow rate of intake, small bites, small sips, oral care with toothbrush tid). Pts son , Levar Rayo and private care aide verbalize understanding of diet recommendations and swallow precautions. Pt is discharged from [place] [address] due to all goals met.

## 2022-09-07 NOTE — HOME HEALTH
SUBJECTIVE: Pt alert and cooperative.   Pts PCA participated in visit    CAREGIVER INVOLVEMENT / ASSISTANCE NEEDED FOR: PCA and family assist with ADLs, meals, meds    HOME HEALTH SUPPLIES BY TYPE AND QUANTITY ORDERED/DELIVERED THIS VISIT INCLUDE: n/a    OBJECTIVE / PATIENT RESPONSE TO TREATMENT / PATIENT LEVEL OF UNDERSTANDING OF EDUCATION PROVIDED: d/c completed

## 2022-09-12 ENCOUNTER — HOSPITAL ENCOUNTER (OUTPATIENT)
Dept: NUTRITION | Age: 67
End: 2022-09-12

## 2022-09-19 ENCOUNTER — HOSPITAL ENCOUNTER (EMERGENCY)
Dept: CT IMAGING | Age: 67
Discharge: HOME | End: 2022-09-19
Attending: EMERGENCY MEDICINE
Payer: MEDICARE

## 2022-09-19 ENCOUNTER — HOSPITAL ENCOUNTER (EMERGENCY)
Age: 67
Discharge: HOME | End: 2022-09-20
Attending: EMERGENCY MEDICINE
Payer: MEDICARE

## 2022-09-19 DIAGNOSIS — R56.9 SEIZURE (HCC): Primary | ICD-10-CM

## 2022-09-19 LAB
ANION GAP SERPL CALC-SCNC: 5 MMOL/L (ref 3–18)
BASOPHILS # BLD: 0 K/UL (ref 0–0.1)
BASOPHILS NFR BLD: 1 % (ref 0–2)
BUN SERPL-MCNC: 22 MG/DL (ref 7–18)
BUN/CREAT SERPL: 22 (ref 12–20)
CALCIUM SERPL-MCNC: 9.6 MG/DL (ref 8.5–10.1)
CHLORIDE SERPL-SCNC: 111 MMOL/L (ref 100–111)
CO2 SERPL-SCNC: 28 MMOL/L (ref 21–32)
CREAT SERPL-MCNC: 1.02 MG/DL (ref 0.6–1.3)
DIFFERENTIAL METHOD BLD: ABNORMAL
EOSINOPHIL # BLD: 0.1 K/UL (ref 0–0.4)
EOSINOPHIL NFR BLD: 1 % (ref 0–5)
ERYTHROCYTE [DISTWIDTH] IN BLOOD BY AUTOMATED COUNT: 14.6 % (ref 11.6–14.5)
GLUCOSE SERPL-MCNC: 110 MG/DL (ref 74–99)
HCT VFR BLD AUTO: 29.5 % (ref 35–45)
HGB BLD-MCNC: 9.4 G/DL (ref 12–16)
IMM GRANULOCYTES # BLD AUTO: 0 K/UL (ref 0–0.04)
IMM GRANULOCYTES NFR BLD AUTO: 0 % (ref 0–0.5)
LACTATE BLD-SCNC: 1.12 MMOL/L (ref 0.4–2)
LYMPHOCYTES # BLD: 1.8 K/UL (ref 0.9–3.6)
LYMPHOCYTES NFR BLD: 26 % (ref 21–52)
MCH RBC QN AUTO: 29.8 PG (ref 24–34)
MCHC RBC AUTO-ENTMCNC: 31.9 G/DL (ref 31–37)
MCV RBC AUTO: 93.7 FL (ref 78–100)
MONOCYTES # BLD: 0.5 K/UL (ref 0.05–1.2)
MONOCYTES NFR BLD: 8 % (ref 3–10)
NEUTS SEG # BLD: 4.3 K/UL (ref 1.8–8)
NEUTS SEG NFR BLD: 64 % (ref 40–73)
NRBC # BLD: 0 K/UL (ref 0–0.01)
NRBC BLD-RTO: 0 PER 100 WBC
PLATELET # BLD AUTO: 156 K/UL (ref 135–420)
PMV BLD AUTO: 12.4 FL (ref 9.2–11.8)
POTASSIUM SERPL-SCNC: 3.7 MMOL/L (ref 3.5–5.5)
RBC # BLD AUTO: 3.15 M/UL (ref 4.2–5.3)
SODIUM SERPL-SCNC: 144 MMOL/L (ref 136–145)
WBC # BLD AUTO: 6.7 K/UL (ref 4.6–13.2)

## 2022-09-19 PROCEDURE — 70450 CT HEAD/BRAIN W/O DYE: CPT

## 2022-09-19 PROCEDURE — 99284 EMERGENCY DEPT VISIT MOD MDM: CPT

## 2022-09-19 PROCEDURE — 80164 ASSAY DIPROPYLACETIC ACD TOT: CPT

## 2022-09-19 PROCEDURE — 93005 ELECTROCARDIOGRAM TRACING: CPT

## 2022-09-19 PROCEDURE — 85025 COMPLETE CBC W/AUTO DIFF WBC: CPT

## 2022-09-19 PROCEDURE — 80048 BASIC METABOLIC PNL TOTAL CA: CPT

## 2022-09-19 PROCEDURE — 83605 ASSAY OF LACTIC ACID: CPT

## 2022-09-20 VITALS
OXYGEN SATURATION: 100 % | TEMPERATURE: 98.2 F | DIASTOLIC BLOOD PRESSURE: 69 MMHG | SYSTOLIC BLOOD PRESSURE: 119 MMHG | HEART RATE: 54 BPM | RESPIRATION RATE: 12 BRPM

## 2022-09-20 LAB
ATRIAL RATE: 85 BPM
CALCULATED P AXIS, ECG09: 75 DEGREES
CALCULATED R AXIS, ECG10: -15 DEGREES
CALCULATED T AXIS, ECG11: 140 DEGREES
DIAGNOSIS, 93000: NORMAL
P-R INTERVAL, ECG05: 140 MS
Q-T INTERVAL, ECG07: 380 MS
QRS DURATION, ECG06: 80 MS
QTC CALCULATION (BEZET), ECG08: 452 MS
VALPROATE SERPL-MCNC: 51 UG/ML (ref 50–100)
VENTRICULAR RATE, ECG03: 85 BPM

## 2022-09-20 PROCEDURE — 74011250637 HC RX REV CODE- 250/637: Performed by: EMERGENCY MEDICINE

## 2022-09-20 RX ORDER — ACETAMINOPHEN 325 MG/1
650 TABLET ORAL ONCE
Status: COMPLETED | OUTPATIENT
Start: 2022-09-20 | End: 2022-09-20

## 2022-09-20 RX ADMIN — ACETAMINOPHEN 650 MG: 325 TABLET, FILM COATED ORAL at 04:58

## 2022-09-20 NOTE — ED TRIAGE NOTES
Per EMS, patient had anxiety and was given anxiety med. States that she became unresponsive, however, patient is back to patient's baseline.   VSS, patient in NAD

## 2022-09-20 NOTE — ED PROVIDER NOTES
EMERGENCY DEPARTMENT HISTORY AND PHYSICAL EXAM    8:46 PM      Date: 9/19/2022  Patient Name: Coby Asif    History of Presenting Illness     Chief Complaint   Patient presents with    Fall     Nursing home stated that they had given patient her anxiety and that she fell. States that patient is back at baseline at time per EMS. NAD noted. Patientis nonverbal.  VSS         History Provided By: EMS    Additional History (Context): Coby Asif is a 75 y.o. female, history of stroke, hypertension, seizures, presenting with unresponsive episode. EMS reports that patient was intermittently unresponsive. Had received medications for anxiety and then had fallen. Unclear of any head injury. EMS states the patient is nonverbal at baseline and is at her baseline mental status. I did speak with her son on the phone, Claudette Frederic. He states that patient was watching TV. He went for a walk and when he came back patient was having a seizure versus one of her panic attacks. He did give her one of her intranasal medications. At that time patient then became unresponsive and very somnolent. However by the time this arrived, patient was more awake. He states that at baseline patient is conversant and alert. Sounds like patient is postictal at this time. PCP: Angela VALDEZ NP    Current Facility-Administered Medications   Medication Dose Route Frequency Provider Last Rate Last Admin    acetaminophen (TYLENOL) tablet 650 mg  650 mg Oral Maciej Mtz MD         Current Outpatient Medications   Medication Sig Dispense Refill    hydrALAZINE (APRESOLINE) 100 mg tablet Take 100 mg by mouth two (2) times a day. lisinopriL (PRINIVIL, ZESTRIL) 20 mg tablet Take 20 mg by mouth in the morning. spironolactone (ALDACTONE) 25 mg tablet Take 25 mg by mouth in the morning. acetaminophen (TYLENOL) 325 mg tablet Take 2 Tablets by mouth every four (4) hours as needed for Pain.  20 Tablet 0    ferrous sulfate 325 mg (65 mg iron) tablet Take 325 mg by mouth daily. divalproex DR (DEPAKOTE) 250 mg tablet Take 250 mg by mouth three (3) times daily. amLODIPine (NORVASC) 10 mg tablet Take 10 mg by mouth daily. tamsulosin HCl (TAMSULOSIN PO) Take 0.4 mg by mouth daily. tab      PARoxetine (PAXIL) 30 mg tablet Take 30 mg by mouth daily. OXcarbazepine (TRILEPTAL) 300 mg tablet Take 300 mg by mouth two (2) times a day. multivitamin, tx-iron-ca-min (THERA-M w/ IRON) 9 mg iron-400 mcg tab tablet Take 1 Tablet by mouth daily. 30 Tablet 0    atorvastatin (LIPITOR) 40 mg tablet Take 40 mg by mouth daily. acetaminophen (TYLENOL) 500 mg tablet Take 500 mg by mouth every six (6) hours as needed for Pain. take 1 tab every 6 hours as needed daily for pain      clopidogreL (PLAVIX) 75 mg tab Take 75 mg by mouth daily. Indications: prevention for a blood clot going to the brain      ergocalciferol (VITAMIN D2) 50,000 unit capsule Take 50,000 Units by mouth two (2) times a week. cyanocobalamin (VITAMIN B12) 500 mcg tablet Take 1 Tab by mouth daily. 27 Tab 0       Past History     Past Medical History:  Past Medical History:   Diagnosis Date    Abnormal coordination 9/21/2020    Depression 4/12/2022    Failure to thrive in adult 4/12/2022    Hallucinations 9/21/2020    Hypertension     Neurological disorder     Seizures ([facility] [address]     Stroke ([facility] [address] 2009    Subclinical hypothyroidism 4/13/2022       Past Surgical History:  Past Surgical History:   Procedure Laterality Date    PLACE PERCUT GASTROSTOMY TUBE  1/11/2022            Family History:  Family History   Problem Relation Age of Onset    Diabetes Other     Stroke Other        Social History:  Social History     Tobacco Use    Smoking status: Never    Smokeless tobacco: Never   Vaping Use    Vaping Use: Never used   Substance Use Topics    Alcohol use: Yes     Comment: Socially     Drug use: No       Allergies:   Allergies   Allergen Reactions    Tomato Hives Allergic to eating tomato.     Aspirin Nausea and Vomiting    Ciprofloxacin Rash    Pcn [Penicillins] Rash and Hives    Sulfa (Sulfonamide Antibiotics) Hives and Rash         Review of Systems       Review of Systems  Unable to obtain    Physical Exam   Visit Vitals  /64   Pulse (!) 56   Temp 98.2 °F (36.8 °C)   Resp 18   SpO2 99%         Physical Exam  Vital Signs: Reviewed  Constitutional: No acute distress  Head: Normocephalic, atraumatic  Eyes: No scleral injection, no scleral icterus, no conjunctival erythema  ENT: Oropharynx clear, MMM  Neck: Supple, trachea midline  Cardiovascular: RRR, no m/r/g  Pulmonary: No evidence of labored breathing, clear to auscultation bilaterally  Abdominal: Soft, nontender, nondistended  Extremities: Warm, well perfused, no edema  Neurological: Awake and does turn to me when I call her name, she is following very simple commands and moving all 4 extremities, mumbling answers and very difficult to understand what she is answering to questions  Skin: Warm, dry, no rash    Diagnostic Study Results     Labs -  Recent Results (from the past 12 hour(s))   EKG, 12 LEAD, INITIAL    Collection Time: 09/19/22  8:45 PM   Result Value Ref Range    Ventricular Rate 85 BPM    Atrial Rate 85 BPM    P-R Interval 140 ms    QRS Duration 80 ms    Q-T Interval 380 ms    QTC Calculation (Bezet) 452 ms    Calculated P Axis 75 degrees    Calculated R Axis -15 degrees    Calculated T Axis 140 degrees    Diagnosis       Normal sinus rhythm  Anterior infarct (cited on or before 11-APR-2022)  ST & T wave abnormality, consider lateral ischemia  Abnormal ECG  When compared with ECG of 11-APR-2022 17:48,  No significant change was found     CBC WITH AUTOMATED DIFF    Collection Time: 09/19/22 11:20 PM   Result Value Ref Range    WBC 6.7 4.6 - 13.2 K/uL    RBC 3.15 (L) 4.20 - 5.30 M/uL    HGB 9.4 (L) 12.0 - 16.0 g/dL    HCT 29.5 (L) 35.0 - 45.0 %    MCV 93.7 78.0 - 100.0 FL    MCH 29.8 24.0 - 34.0 PG MCHC 31.9 31.0 - 37.0 g/dL    RDW 14.6 (H) 11.6 - 14.5 %    PLATELET 347 565 - 629 K/uL    MPV 12.4 (H) 9.2 - 11.8 FL    NRBC 0.0 0  WBC    ABSOLUTE NRBC 0.00 0.00 - 0.01 K/uL    NEUTROPHILS 64 40 - 73 %    LYMPHOCYTES 26 21 - 52 %    MONOCYTES 8 3 - 10 %    EOSINOPHILS 1 0 - 5 %    BASOPHILS 1 0 - 2 %    IMMATURE GRANULOCYTES 0 0.0 - 0.5 %    ABS. NEUTROPHILS 4.3 1.8 - 8.0 K/UL    ABS. LYMPHOCYTES 1.8 0.9 - 3.6 K/UL    ABS. MONOCYTES 0.5 0.05 - 1.2 K/UL    ABS. EOSINOPHILS 0.1 0.0 - 0.4 K/UL    ABS. BASOPHILS 0.0 0.0 - 0.1 K/UL    ABS. IMM. GRANS. 0.0 0.00 - 0.04 K/UL    DF AUTOMATED     METABOLIC PANEL, BASIC    Collection Time: 09/19/22 11:20 PM   Result Value Ref Range    Sodium 144 136 - 145 mmol/L    Potassium 3.7 3.5 - 5.5 mmol/L    Chloride 111 100 - 111 mmol/L    CO2 28 21 - 32 mmol/L    Anion gap 5 3.0 - 18 mmol/L    Glucose 110 (H) 74 - 99 mg/dL    BUN 22 (H) 7.0 - 18 MG/DL    Creatinine 1.02 0.6 - 1.3 MG/DL    BUN/Creatinine ratio 22 (H) 12 - 20      GFR est AA >60 >60 ml/min/1.73m2    GFR est non-AA 54 (L) >60 ml/min/1.73m2    Calcium 9.6 8.5 - 10.1 MG/DL   VALPROIC ACID    Collection Time: 09/19/22 11:20 PM   Result Value Ref Range    Valproic acid 51 50 - 100 ug/ml   POC LACTIC ACID    Collection Time: 09/19/22 11:27 PM   Result Value Ref Range    Lactic Acid (POC) 1.12 0.40 - 2.00 mmol/L       Radiologic Studies -   CT HEAD WO CONT   Final Result   No acute injuries. Medical Decision Making   I am the first provider for this patient. I reviewed the vital signs, available nursing notes, past medical history, past surgical history, family history and social history. Vital Signs-Reviewed the patient's vital signs. EKG: Interpreted by the EP.   EKG (9/19/22, 20:45): Rate: 85, Rhythm: Sinus, Axis: Normal, Intervals: , QRS 80, QTc 452, Interpretation: No acute ischemic changes, T wave inversions in 1 and aVL, similar to prior, flattening of T wave inversions previously seen on V5 and V6, Old: 4/11/22    Radiology (Interpreted by the EP):      Records Reviewed: Old Medical Records (Time of Review: 8:46 PM)    ED Course: Progress Notes, Reevaluation, and Consults:  04:20 Patient reassessed. When asked about pain, she was able to tell me that she has a headache and would like some tylenol     Provider Notes (Medical Decision Making): Patient presenting postictal after likely seizure. Lab work here was unremarkable. Head CT was unremarkable. Patient was observed and did have improvement of her mental status and returned to baseline. Patient will be discharged home to follow-up with her primary care doctor. Diagnosis     Clinical Impression:   1. Seizure [facility])        Disposition: Discharge    Follow-up Information       Follow up With Specialties Details Why [address], NP Nurse Practitioner Schedule an appointment as soon as possible for a visit   Nell Harding   [phone]               Patient's Medications   Start Taking    No medications on file   Continue Taking    ACETAMINOPHEN (TYLENOL) 325 MG TABLET    Take 2 Tablets by mouth every four (4) hours as needed for Pain. ACETAMINOPHEN (TYLENOL) 500 MG TABLET    Take 500 mg by mouth every six (6) hours as needed for Pain. take 1 tab every 6 hours as needed daily for pain    AMLODIPINE (NORVASC) 10 MG TABLET    Take 10 mg by mouth daily. ATORVASTATIN (LIPITOR) 40 MG TABLET    Take 40 mg by mouth daily. CLOPIDOGREL (PLAVIX) 75 MG TAB    Take 75 mg by mouth daily. Indications: prevention for a blood clot going to the brain    CYANOCOBALAMIN (VITAMIN B12) 500 MCG TABLET    Take 1 Tab by mouth daily. DIVALPROEX DR (DEPAKOTE) 250 MG TABLET    Take 250 mg by mouth three (3) times daily. ERGOCALCIFEROL (VITAMIN D2) 50,000 UNIT CAPSULE    Take 50,000 Units by mouth two (2) times a week. FERROUS SULFATE 325 MG (65 MG IRON) TABLET    Take 325 mg by mouth daily. HYDRALAZINE (APRESOLINE) 100 MG TABLET    Take 100 mg by mouth two (2) times a day. LISINOPRIL (PRINIVIL, ZESTRIL) 20 MG TABLET    Take 20 mg by mouth in the morning. MULTIVITAMIN, TX-IRON-CA-MIN (THERA-M W/ IRON) 9 MG IRON-400 MCG TAB TABLET    Take 1 Tablet by mouth daily. OXCARBAZEPINE (TRILEPTAL) 300 MG TABLET    Take 300 mg by mouth two (2) times a day. PAROXETINE (PAXIL) 30 MG TABLET    Take 30 mg by mouth daily. SPIRONOLACTONE (ALDACTONE) 25 MG TABLET    Take 25 mg by mouth in the morning. TAMSULOSIN HCL (TAMSULOSIN PO)    Take 0.4 mg by mouth daily. tab   These Medications have changed    No medications on file   Stop Taking    No medications on file       Dictation disclaimer:  Please note that this dictation was completed with [employer], the computer voice recognition software. Quite often unanticipated grammatical, syntax, homophones, and other interpretive errors are inadvertently transcribed by the computer software. Please disregard these errors. Please excuse any errors that have escaped final proofreading.

## 2022-09-20 NOTE — ED NOTES
Called son about ETA to  patient, he states they do not drive and that she is bedbound and hasn't walked in months and would need transport home. Gregory Morrow to call [facility] for transport.

## 2022-09-20 NOTE — ED NOTES
Discharge teaching provided to [facility] regarding treatment received, medications prescribed, and follow-up care. [facility] verbalized understanding directions and follow up care. Pt left in stretcher by [facility] with discharge paperwork in hand.

## 2022-09-22 ENCOUNTER — HOME CARE VISIT (OUTPATIENT)
Dept: HOME HEALTH SERVICES | Facility: HOME HEALTH | Age: 67
End: 2022-09-22
Payer: MEDICARE

## 2022-11-07 ENCOUNTER — APPOINTMENT (OUTPATIENT)
Dept: NUTRITION | Age: 67
End: 2022-11-07

## 2022-11-18 ENCOUNTER — APPOINTMENT (OUTPATIENT)
Dept: CT IMAGING | Age: 67
End: 2022-11-18
Attending: EMERGENCY MEDICINE
Payer: MEDICARE

## 2022-11-18 ENCOUNTER — HOSPITAL ENCOUNTER (OUTPATIENT)
Age: 67
Setting detail: OBSERVATION
Discharge: HOME | End: 2022-11-19
Attending: EMERGENCY MEDICINE | Admitting: STUDENT IN AN ORGANIZED HEALTH CARE EDUCATION/TRAINING PROGRAM
Payer: MEDICARE

## 2022-11-18 ENCOUNTER — APPOINTMENT (OUTPATIENT)
Dept: GENERAL RADIOLOGY | Age: 67
End: 2022-11-18
Attending: EMERGENCY MEDICINE
Payer: MEDICARE

## 2022-11-18 DIAGNOSIS — R41.82 ALTERED MENTAL STATUS, UNSPECIFIED ALTERED MENTAL STATUS TYPE: Primary | ICD-10-CM

## 2022-11-18 PROBLEM — R94.31 ST SEGMENT DEPRESSION: Status: ACTIVE | Noted: 2022-11-18

## 2022-11-18 LAB
ALBUMIN SERPL-MCNC: 3.2 G/DL (ref 3.4–5)
ALBUMIN/GLOB SERPL: 0.9 {RATIO} (ref 0.8–1.7)
ALP SERPL-CCNC: 71 U/L (ref 45–117)
ALT SERPL-CCNC: 23 U/L (ref 13–56)
AMMONIA PLAS-SCNC: <10 UMOL/L (ref 11–32)
ANION GAP SERPL CALC-SCNC: 7 MMOL/L (ref 3–18)
APPEARANCE UR: CLEAR
AST SERPL-CCNC: 27 U/L (ref 10–38)
BACTERIA URNS QL MICRO: NEGATIVE /HPF
BASOPHILS # BLD: 0 K/UL (ref 0–0.1)
BASOPHILS NFR BLD: 0 % (ref 0–2)
BILIRUB SERPL-MCNC: 0.3 MG/DL (ref 0.2–1)
BILIRUB UR QL: NEGATIVE
BUN SERPL-MCNC: 19 MG/DL (ref 7–18)
BUN/CREAT SERPL: 17 (ref 12–20)
CALCIUM SERPL-MCNC: 9.5 MG/DL (ref 8.5–10.1)
CHLORIDE SERPL-SCNC: 109 MMOL/L (ref 100–111)
CO2 SERPL-SCNC: 25 MMOL/L (ref 21–32)
COLOR UR: YELLOW
CREAT SERPL-MCNC: 1.12 MG/DL (ref 0.6–1.3)
DIFFERENTIAL METHOD BLD: ABNORMAL
EOSINOPHIL # BLD: 0 K/UL (ref 0–0.4)
EOSINOPHIL NFR BLD: 0 % (ref 0–5)
EPITH CASTS URNS QL MICRO: NORMAL /LPF (ref 0–5)
ERYTHROCYTE [DISTWIDTH] IN BLOOD BY AUTOMATED COUNT: 18.5 % (ref 11.6–14.5)
GLOBULIN SER CALC-MCNC: 3.4 G/DL (ref 2–4)
GLUCOSE SERPL-MCNC: 129 MG/DL (ref 74–99)
GLUCOSE UR STRIP.AUTO-MCNC: NEGATIVE MG/DL
HCT VFR BLD AUTO: 28.3 % (ref 35–45)
HGB BLD-MCNC: 9.5 G/DL (ref 12–16)
HGB UR QL STRIP: NEGATIVE
IMM GRANULOCYTES # BLD AUTO: 0 K/UL (ref 0–0.04)
IMM GRANULOCYTES NFR BLD AUTO: 0 % (ref 0–0.5)
KETONES UR QL STRIP.AUTO: NEGATIVE MG/DL
LEUKOCYTE ESTERASE UR QL STRIP.AUTO: ABNORMAL
LYMPHOCYTES # BLD: 2.4 K/UL (ref 0.9–3.6)
LYMPHOCYTES NFR BLD: 26 % (ref 21–52)
MAGNESIUM SERPL-MCNC: 1.7 MG/DL (ref 1.6–2.6)
MCH RBC QN AUTO: 30.2 PG (ref 24–34)
MCHC RBC AUTO-ENTMCNC: 33.6 G/DL (ref 31–37)
MCV RBC AUTO: 89.8 FL (ref 78–100)
MONOCYTES # BLD: 0.7 K/UL (ref 0.05–1.2)
MONOCYTES NFR BLD: 8 % (ref 3–10)
NEUTS SEG # BLD: 5.9 K/UL (ref 1.8–8)
NEUTS SEG NFR BLD: 65 % (ref 40–73)
NITRITE UR QL STRIP.AUTO: NEGATIVE
NRBC # BLD: 0 K/UL (ref 0–0.01)
NRBC BLD-RTO: 0 PER 100 WBC
PH UR STRIP: 6 [PH] (ref 5–8)
PLATELET # BLD AUTO: 169 K/UL (ref 135–420)
PMV BLD AUTO: 12.1 FL (ref 9.2–11.8)
POTASSIUM SERPL-SCNC: 3.5 MMOL/L (ref 3.5–5.5)
PROT SERPL-MCNC: 6.6 G/DL (ref 6.4–8.2)
PROT UR STRIP-MCNC: NEGATIVE MG/DL
RBC # BLD AUTO: 3.15 M/UL (ref 4.2–5.3)
RBC #/AREA URNS HPF: NEGATIVE /HPF (ref 0–5)
SODIUM SERPL-SCNC: 141 MMOL/L (ref 136–145)
SP GR UR REFRACTOMETRY: >1.03 (ref 1–1.03)
TROPONIN-HIGH SENSITIVITY: 52 NG/L (ref 0–54)
TSH SERPL DL<=0.05 MIU/L-ACNC: 2.76 UIU/ML (ref 0.36–3.74)
UROBILINOGEN UR QL STRIP.AUTO: 1 EU/DL (ref 0.2–1)
VALPROATE SERPL-MCNC: 59 UG/ML (ref 50–100)
WBC # BLD AUTO: 9 K/UL (ref 4.6–13.2)
WBC URNS QL MICRO: NORMAL /HPF (ref 0–4)

## 2022-11-18 PROCEDURE — 85025 COMPLETE CBC W/AUTO DIFF WBC: CPT

## 2022-11-18 PROCEDURE — 94762 N-INVAS EAR/PLS OXIMTRY CONT: CPT

## 2022-11-18 PROCEDURE — 84484 ASSAY OF TROPONIN QUANT: CPT

## 2022-11-18 PROCEDURE — 80053 COMPREHEN METABOLIC PANEL: CPT

## 2022-11-18 PROCEDURE — 74011000636 HC RX REV CODE- 636: Performed by: EMERGENCY MEDICINE

## 2022-11-18 PROCEDURE — 82140 ASSAY OF AMMONIA: CPT

## 2022-11-18 PROCEDURE — 81001 URINALYSIS AUTO W/SCOPE: CPT

## 2022-11-18 PROCEDURE — 71045 X-RAY EXAM CHEST 1 VIEW: CPT

## 2022-11-18 PROCEDURE — 83735 ASSAY OF MAGNESIUM: CPT

## 2022-11-18 PROCEDURE — 70450 CT HEAD/BRAIN W/O DYE: CPT

## 2022-11-18 PROCEDURE — G0378 HOSPITAL OBSERVATION PER HR: HCPCS

## 2022-11-18 PROCEDURE — 93005 ELECTROCARDIOGRAM TRACING: CPT

## 2022-11-18 PROCEDURE — 99285 EMERGENCY DEPT VISIT HI MDM: CPT

## 2022-11-18 PROCEDURE — 80183 DRUG SCRN QUANT OXCARBAZEPIN: CPT

## 2022-11-18 PROCEDURE — 84443 ASSAY THYROID STIM HORMONE: CPT

## 2022-11-18 PROCEDURE — 70496 CT ANGIOGRAPHY HEAD: CPT

## 2022-11-18 PROCEDURE — 80164 ASSAY DIPROPYLACETIC ACD TOT: CPT

## 2022-11-18 RX ADMIN — IOPAMIDOL 100 ML: 755 INJECTION, SOLUTION INTRAVENOUS at 16:12

## 2022-11-18 NOTE — Clinical Note
Patient Class[de-identified] OBSERVATION [104]   Type of Bed: Telemetry [19]   Cardiac Monitoring Required?: Yes   Reason for Observation: ST dep   Admitting Diagnosis: Recurrent seizures [facility] [[id number]]   Admitting Diagnosis: ST segment depression [[id number]]   Admitting Physician: Destinee Aleman [[id number]]   Attending Physician: Destinee Aleman [[id number]]

## 2022-11-18 NOTE — ED NOTES
Pt arrived via EMS with acute mental status change per family; pt felt warm to the touch but orally afebrile. Pt is nonverbal at baseline; history of seizure, stroke, and HTN. 26 arrived with patient to CT following Code Stroke.

## 2022-11-18 NOTE — ED PROVIDER NOTES
EMERGENCY DEPARTMENT HISTORY AND PHYSICAL EXAM  This was created with voice recognition software and transcription errors may be present. 4:00 PM  Date: 11/18/2022  Patient Name: Ivory Najjar    History of Presenting Illness     Chief Complaint:    History Provided By:     HPI: Ivory Najjar is a 75 y.o. female past medical history of depression failure to thrive hallucinations hypertension seizures stroke who presents for confusion and altered mental status. Patient was reportedly at her PCP yesterday and they changed her Depakote from 250 3 times daily to 125 6 times a day. Unclear exactly when her last well time was but she became confused and weak today. No nausea no vomiting. Patient is normally more oriented today is quite sluggish. History is limited    PCP: Andrea Hermosillo NP      Past History     Past Medical History:  Past Medical History:   Diagnosis Date    Abnormal coordination 9/21/2020    Depression 4/12/2022    Failure to thrive in adult 4/12/2022    Hallucinations 9/21/2020    Hypertension     Neurological disorder     Seizures ([facility] [address]     Stroke ([facility] [address] 2009    Subclinical hypothyroidism 4/13/2022       Past Surgical History:  Past Surgical History:   Procedure Laterality Date    PLACE PERCUT GASTROSTOMY TUBE  1/11/2022            Family History:  Family History   Problem Relation Age of Onset    Diabetes Other     Stroke Other        Social History:  Social History     Tobacco Use    Smoking status: Never    Smokeless tobacco: Never   Vaping Use    Vaping Use: Never used   Substance Use Topics    Alcohol use: Yes     Comment: Socially     Drug use: No       Allergies: Allergies   Allergen Reactions    Tomato Hives     Allergic to eating tomato.     Aspirin Nausea and Vomiting    Ciprofloxacin Rash    Pcn [Penicillins] Rash and Hives    Sulfa (Sulfonamide Antibiotics) Hives and Rash       Review of Systems     Review of Systems   Unable to perform ROS: Mental status change   10 point review of systems otherwise negative unless noted in HPI. Physical Exam       Physical Exam  Constitutional:       Comments: Cachectic;  able to talk but in low tones able to state her name   HENT:      Head: Normocephalic. Nose: Nose normal.      Mouth/Throat:      Mouth: Mucous membranes are moist.   Cardiovascular:      Rate and Rhythm: Normal rate. Pulses: Normal pulses. Pulmonary:      Effort: Pulmonary effort is normal.   Abdominal:      General: Abdomen is flat. Musculoskeletal:         General: Normal range of motion. Cervical back: Normal range of motion. Skin:     General: Skin is warm. Capillary Refill: Capillary refill takes less than 2 seconds. Neurological:      Mental Status: She is alert. Mental status is at baseline. Psychiatric:         Mood and Affect: Mood normal.       Diagnostic Study Results     Vital Signs Visit Vitals  /69   Pulse 77   Temp 97.8 °F (36.6 °C)   Resp 12   SpO2 100%      EKG:  Labs: BC unremarkable is negative chemistry unremarkable ammonia negative CT head negative CTA negative  Imaging: cta ct head negative. Medical Decision Making     ED Course: Progress Notes, Reevaluation, and Consults:    I will be the provider of record for this patient. Provider Notes (Medical Decision Making):     Pt does not nomrally talk; pt was not responding to the nurse, pt had foam coming out her mouth, nurse and son think it was a seizure. Pt went to  yesterday. Was talking about having her hair done. Patient back to baseline. 60 5 PM was talking did not say yes she got her hair done. Discussed the case with her son I do think she is at her baseline. He thinks it was a seizure due to her change in meds yesterday I likely agree and will have her follow-up with her neurologist tomorrow.      EKG per my read shows sinus 85 normal axis normal intervals no ST elevation some borderline ST depression inferolaterally which is new compared to September    Last echo April wnl. Turned over to dr Elidia Montenegro pending delta trop. Likely dc if neg. Diagnosis     Clinical Impression:   1. Altered mental status, unspecified altered mental status type        Disposition:        Discharge Medication List as of 11/18/2022 10:55 PM        CONTINUE these medications which have NOT CHANGED    Details   hydrALAZINE (APRESOLINE) 100 mg tablet Take 100 mg by mouth two (2) times a day., Historical Med      lisinopriL (PRINIVIL, ZESTRIL) 20 mg tablet Take 20 mg by mouth in the morning., Historical Med      spironolactone (ALDACTONE) 25 mg tablet Take 25 mg by mouth in the morning., Historical Med      !! acetaminophen (TYLENOL) 325 mg tablet Take 2 Tablets by mouth every four (4) hours as needed for Pain., Normal, Disp-20 Tablet, R-0      ferrous sulfate 325 mg (65 mg iron) tablet Take 325 mg by mouth daily. , Historical Med      divalproex DR (DEPAKOTE) 250 mg tablet Take 250 mg by mouth three (3) times daily. , Historical Med      amLODIPine (NORVASC) 10 mg tablet Take 10 mg by mouth daily. , Historical Med      tamsulosin HCl (TAMSULOSIN PO) Take 0.4 mg by mouth daily. tab, Historical Med      PARoxetine (PAXIL) 30 mg tablet Take 30 mg by mouth daily. , Historical Med      OXcarbazepine (TRILEPTAL) 300 mg tablet Take 300 mg by mouth two (2) times a day., Historical Med      multivitamin, tx-iron-ca-min (THERA-M w/ IRON) 9 mg iron-400 mcg tab tablet Take 1 Tablet by mouth daily. , Print, Disp-30 Tablet, R-0      atorvastatin (LIPITOR) 40 mg tablet Take 40 mg by mouth daily. , Historical Med      !! acetaminophen (TYLENOL) 500 mg tablet Take 500 mg by mouth every six (6) hours as needed for Pain. take 1 tab every 6 hours as needed daily for pain, Historical Med      clopidogreL (PLAVIX) 75 mg tab Take 75 mg by mouth daily.  Indications: prevention for a blood clot going to the brain, Historical Med      ergocalciferol (VITAMIN D2) 50,000 unit capsule Take 50,000 Units by mouth two (2) times a week., Historical Med      cyanocobalamin (VITAMIN B12) 500 mcg tablet Take 1 Tab by mouth daily. , Print, Disp-30 Tab, R-0       !! - Potential duplicate medications found. Please discuss with provider.

## 2022-11-18 NOTE — PROGRESS NOTES
responded to Code S for  [employer], who is a 75 y.o.,female,     The  provided the following Interventions:  Provided crisis pastoral care and pastoral support. Patient has a very soft voice. At times prefers not to talk. Patient was cold and nauseous so this writer covered her with blanket and Rn gave her a bag to spit into. Chart reviewed. Per patient, she lives with her family. The following outcomes were achieved: Offered silent prayers on behalf for the patient. Offered compassionate support after being evaluated by neuro. Assessment:  There are no spiritual or Lutheran issues which require intervention at this time. Plan:  Chaplains will continue to follow and will provide pastoral care on an as needed/requested basis.  recommends bedside caregivers page  on duty if patient shows signs of acute spiritual or emotional distress.        Adrienne Monk [address]   [phone]

## 2022-11-19 VITALS
OXYGEN SATURATION: 100 % | RESPIRATION RATE: 12 BRPM | HEART RATE: 77 BPM | TEMPERATURE: 97.8 F | DIASTOLIC BLOOD PRESSURE: 69 MMHG | SYSTOLIC BLOOD PRESSURE: 133 MMHG

## 2022-11-19 LAB
ATRIAL RATE: 97 BPM
CALCULATED P AXIS, ECG09: 52 DEGREES
CALCULATED R AXIS, ECG10: -14 DEGREES
CALCULATED T AXIS, ECG11: 131 DEGREES
DIAGNOSIS, 93000: NORMAL
P-R INTERVAL, ECG05: 152 MS
Q-T INTERVAL, ECG07: 376 MS
QRS DURATION, ECG06: 72 MS
QTC CALCULATION (BEZET), ECG08: 477 MS
VENTRICULAR RATE, ECG03: 97 BPM

## 2022-11-19 PROCEDURE — G0378 HOSPITAL OBSERVATION PER HR: HCPCS

## 2022-11-19 NOTE — ED NOTES
Patient was seen by a previous provider and admitted to the hospitalist however for unclear etiologies. Hospitalist team saw the patient but believe that the patient is at her baseline. They spoke with the son and would prefer to discharge the patient home. I went and examined the patient. She talks with a slurred speech but is able to answer questions. Her main complaint to me is pain around her PEG tube site. She has increased tone throughout. Discussed this at length with the patient's son who states that this is her baseline. She always complains of pain around her PEG tube site. There is no active signs of infection, displacement or other issues. Her lab work was reviewed and found to be unremarkable. She is chronically debilitated at baseline. She lives with her son who was able to watch her, therefore transportation will be arranged patient be discharged home.

## 2022-11-19 NOTE — DISCHARGE INSTRUCTIONS
Would recommend follow-up with your doctor to discuss changes in your medications. Would recommend continuing them for now or going back to your original doses.

## 2022-11-21 LAB — OXCARBAZEPINE SERPL-MCNC: 48 UG/ML (ref 10–35)

## 2022-12-08 ENCOUNTER — HOSPITAL ENCOUNTER (EMERGENCY)
Age: 67
Discharge: HOME | End: 2022-12-08
Attending: EMERGENCY MEDICINE
Payer: MEDICARE

## 2022-12-08 ENCOUNTER — APPOINTMENT (OUTPATIENT)
Dept: CT IMAGING | Age: 67
End: 2022-12-08
Attending: PHYSICIAN ASSISTANT
Payer: MEDICARE

## 2022-12-08 ENCOUNTER — APPOINTMENT (OUTPATIENT)
Dept: GENERAL RADIOLOGY | Age: 67
End: 2022-12-08
Attending: PHYSICIAN ASSISTANT
Payer: MEDICARE

## 2022-12-08 VITALS
OXYGEN SATURATION: 99 % | HEART RATE: 81 BPM | DIASTOLIC BLOOD PRESSURE: 70 MMHG | TEMPERATURE: 99 F | RESPIRATION RATE: 16 BRPM | SYSTOLIC BLOOD PRESSURE: 115 MMHG

## 2022-12-08 DIAGNOSIS — D64.9 ANEMIA, UNSPECIFIED TYPE: ICD-10-CM

## 2022-12-08 DIAGNOSIS — E87.6 HYPOKALEMIA: ICD-10-CM

## 2022-12-08 DIAGNOSIS — K59.00 CONSTIPATION, UNSPECIFIED CONSTIPATION TYPE: Primary | ICD-10-CM

## 2022-12-08 LAB
ALBUMIN SERPL-MCNC: 2.9 G/DL (ref 3.4–5)
ALBUMIN/GLOB SERPL: 0.9 {RATIO} (ref 0.8–1.7)
ALP SERPL-CCNC: 68 U/L (ref 45–117)
ALT SERPL-CCNC: 18 U/L (ref 13–56)
ANION GAP SERPL CALC-SCNC: 7 MMOL/L (ref 3–18)
AST SERPL-CCNC: 24 U/L (ref 10–38)
BASOPHILS # BLD: 0.1 K/UL (ref 0–0.1)
BASOPHILS NFR BLD: 1 % (ref 0–2)
BILIRUB SERPL-MCNC: 0.5 MG/DL (ref 0.2–1)
BUN SERPL-MCNC: 31 MG/DL (ref 7–18)
BUN/CREAT SERPL: 27 (ref 12–20)
CALCIUM SERPL-MCNC: 9.7 MG/DL (ref 8.5–10.1)
CHLORIDE SERPL-SCNC: 110 MMOL/L (ref 100–111)
CO2 SERPL-SCNC: 23 MMOL/L (ref 21–32)
CREAT SERPL-MCNC: 1.16 MG/DL (ref 0.6–1.3)
DIFFERENTIAL METHOD BLD: ABNORMAL
EOSINOPHIL # BLD: 0 K/UL (ref 0–0.4)
EOSINOPHIL NFR BLD: 0 % (ref 0–5)
ERYTHROCYTE [DISTWIDTH] IN BLOOD BY AUTOMATED COUNT: 17.3 % (ref 11.6–14.5)
FLUAV RNA SPEC QL NAA+PROBE: NOT DETECTED
FLUBV RNA SPEC QL NAA+PROBE: NOT DETECTED
GLOBULIN SER CALC-MCNC: 3.3 G/DL (ref 2–4)
GLUCOSE SERPL-MCNC: 103 MG/DL (ref 74–99)
HCT VFR BLD AUTO: 24 % (ref 35–45)
HEMOCCULT STL QL: NEGATIVE
HGB BLD-MCNC: 7.8 G/DL (ref 12–16)
IMM GRANULOCYTES # BLD AUTO: 0 K/UL (ref 0–0.04)
IMM GRANULOCYTES NFR BLD AUTO: 0 % (ref 0–0.5)
LIPASE SERPL-CCNC: 144 U/L (ref 73–393)
LYMPHOCYTES # BLD: 1.6 K/UL (ref 0.9–3.6)
LYMPHOCYTES NFR BLD: 21 % (ref 21–52)
MCH RBC QN AUTO: 30.2 PG (ref 24–34)
MCHC RBC AUTO-ENTMCNC: 32.5 G/DL (ref 31–37)
MCV RBC AUTO: 93 FL (ref 78–100)
MONOCYTES # BLD: 0.6 K/UL (ref 0.05–1.2)
MONOCYTES NFR BLD: 8 % (ref 3–10)
NEUTS SEG # BLD: 5.4 K/UL (ref 1.8–8)
NEUTS SEG NFR BLD: 70 % (ref 40–73)
NRBC # BLD: 0 K/UL (ref 0–0.01)
NRBC BLD-RTO: 0 PER 100 WBC
PLATELET # BLD AUTO: 206 K/UL (ref 135–420)
PMV BLD AUTO: 11.9 FL (ref 9.2–11.8)
POTASSIUM SERPL-SCNC: 3.4 MMOL/L (ref 3.5–5.5)
PROT SERPL-MCNC: 6.2 G/DL (ref 6.4–8.2)
RBC # BLD AUTO: 2.58 M/UL (ref 4.2–5.3)
SARS-COV-2, COV2: NOT DETECTED
SODIUM SERPL-SCNC: 140 MMOL/L (ref 136–145)
WBC # BLD AUTO: 7.8 K/UL (ref 4.6–13.2)

## 2022-12-08 PROCEDURE — 93005 ELECTROCARDIOGRAM TRACING: CPT

## 2022-12-08 PROCEDURE — 83690 ASSAY OF LIPASE: CPT

## 2022-12-08 PROCEDURE — 85025 COMPLETE CBC W/AUTO DIFF WBC: CPT

## 2022-12-08 PROCEDURE — 87086 URINE CULTURE/COLONY COUNT: CPT

## 2022-12-08 PROCEDURE — 87636 SARSCOV2 & INF A&B AMP PRB: CPT

## 2022-12-08 PROCEDURE — 99285 EMERGENCY DEPT VISIT HI MDM: CPT

## 2022-12-08 PROCEDURE — 74176 CT ABD & PELVIS W/O CONTRAST: CPT

## 2022-12-08 PROCEDURE — 82272 OCCULT BLD FECES 1-3 TESTS: CPT

## 2022-12-08 PROCEDURE — 80053 COMPREHEN METABOLIC PANEL: CPT

## 2022-12-08 PROCEDURE — 71045 X-RAY EXAM CHEST 1 VIEW: CPT

## 2022-12-08 RX ORDER — POTASSIUM CHLORIDE 20 MEQ/1
40 TABLET, EXTENDED RELEASE ORAL
Status: DISCONTINUED | OUTPATIENT
Start: 2022-12-08 | End: 2022-12-08 | Stop reason: HOSPADM

## 2022-12-08 RX ORDER — POTASSIUM CHLORIDE 20 MEQ/1
20 TABLET, EXTENDED RELEASE ORAL 3 TIMES DAILY
Qty: 9 TABLET | Refills: 0 | Status: SHIPPED | OUTPATIENT
Start: 2022-12-08 | End: 2022-12-11

## 2022-12-08 RX ORDER — DOCUSATE SODIUM 100 MG/1
100 CAPSULE, LIQUID FILLED ORAL 2 TIMES DAILY
Qty: 60 CAPSULE | Refills: 2 | Status: SHIPPED | OUTPATIENT
Start: 2022-12-08 | End: 2023-03-08

## 2022-12-08 NOTE — ED TRIAGE NOTES
Pt arrives from home via EMS. Initial call was for generalized pain. Pt states she woke up this AM with fever of 99.9. Pt not the best historian.

## 2022-12-08 NOTE — DISCHARGE INSTRUCTIONS
Take medication as prescribed. Follow-up with your primary care physician within 2 days for reassessment. Bring the results from this visit with you for their review. Return to the ED immediately for any new, worsening, or persistent symptoms, including fever, vomiting, blood in stool, or any other medical concerns.

## 2022-12-08 NOTE — ED PROVIDER NOTES
EMERGENCY DEPARTMENT HISTORY AND PHYSICAL EXAM    12:48 PM      Date: 12/8/2022  Patient Name: Sada Parr    History of Presenting Illness     Chief Complaint   Patient presents with    Pain (Chronic)    Fever         History Provided By: Patient (hx limited), EMS     Additional History (Context): Sada Parr is a 75 y.o. female with  hx of depression, seizures, CVA, and other noted PMH  who presents with c/o generalized pain. Pt notes abdominal pain and rectal pain x unknown time. Denies fever/chills, HA, chest pain, dyspnea, cough, n/v/d, melena, BRBPR, dysuria, hematuria. Denies exacerbating or alleviating factors. Denies taking any medication for symptoms PTA. Per EMS, home health noted that patient had diffuse pain today during their visit. Sent to the ED for further assessment. PCP: Lois VALDEZ NP    Current Facility-Administered Medications   Medication Dose Route Frequency Provider Last Rate Last Admin    sodium chloride 0.9 % bolus infusion 500 mL  500 mL IntraVENous ONCE [place], [place]        iopamidoL (ISOVUE 300) 300 mg iodine /mL (61 %) contrast injection  mL   mL IntraVENous RAD ONCE Dashawn Muñiz G, DO        potassium chloride (K-DUR, KLOR-CON M20) SR tablet 40 mEq  40 mEq Oral NOW [place], [place]         Current Outpatient Medications   Medication Sig Dispense Refill    potassium chloride (K-DUR, KLOR-CON M20) 20 mEq tablet Take 1 Tablet by mouth three (3) times daily for 3 days. 9 Tablet 0    docusate sodium (Colace) 100 mg capsule Take 1 Capsule by mouth two (2) times a day for 90 days. 60 Capsule 2    hydrALAZINE (APRESOLINE) 100 mg tablet Take 100 mg by mouth two (2) times a day. lisinopriL (PRINIVIL, ZESTRIL) 20 mg tablet Take 20 mg by mouth in the morning. spironolactone (ALDACTONE) 25 mg tablet Take 25 mg by mouth in the morning. acetaminophen (TYLENOL) 325 mg tablet Take 2 Tablets by mouth every four (4) hours as needed for Pain.  21 Tablet 0    ferrous sulfate 325 mg (65 mg iron) tablet Take 325 mg by mouth daily. divalproex DR (DEPAKOTE) 250 mg tablet Take 250 mg by mouth three (3) times daily. amLODIPine (NORVASC) 10 mg tablet Take 10 mg by mouth daily. tamsulosin HCl (TAMSULOSIN PO) Take 0.4 mg by mouth daily. tab      PARoxetine (PAXIL) 30 mg tablet Take 30 mg by mouth daily. OXcarbazepine (TRILEPTAL) 300 mg tablet Take 300 mg by mouth two (2) times a day. multivitamin, tx-iron-ca-min (THERA-M w/ IRON) 9 mg iron-400 mcg tab tablet Take 1 Tablet by mouth daily. 30 Tablet 0    atorvastatin (LIPITOR) 40 mg tablet Take 40 mg by mouth daily. acetaminophen (TYLENOL) 500 mg tablet Take 500 mg by mouth every six (6) hours as needed for Pain. take 1 tab every 6 hours as needed daily for pain      clopidogreL (PLAVIX) 75 mg tab Take 75 mg by mouth daily. Indications: prevention for a blood clot going to the brain      ergocalciferol (VITAMIN D2) 50,000 unit capsule Take 50,000 Units by mouth two (2) times a week. cyanocobalamin (VITAMIN B12) 500 mcg tablet Take 1 Tab by mouth daily. 27 Tab 0       Past History     Past Medical History:  Past Medical History:   Diagnosis Date    Abnormal coordination 9/21/2020    Depression 4/12/2022    Failure to thrive in adult 4/12/2022    Hallucinations 9/21/2020    Hypertension     Neurological disorder     Seizures ([facility] [address]     Stroke ([facility] [address] 2009    Subclinical hypothyroidism 4/13/2022       Past Surgical History:  Past Surgical History:   Procedure Laterality Date    PLACE PERCUT GASTROSTOMY TUBE  1/11/2022            Family History:  Family History   Problem Relation Age of Onset    Diabetes Other     Stroke Other        Social History:  Social History     Tobacco Use    Smoking status: Never    Smokeless tobacco: Never   Vaping Use    Vaping Use: Never used   Substance Use Topics    Alcohol use: Yes     Comment: Socially     Drug use: No       Allergies:   Allergies   Allergen Reactions    Tomato Hives     Allergic to eating tomato. Aspirin Nausea and Vomiting    Ciprofloxacin Rash    Pcn [Penicillins] Rash and Hives    Sulfa (Sulfonamide Antibiotics) Hives and Rash         Review of Systems       Review of Systems   Constitutional:  Negative for chills and fever. Respiratory:  Negative for shortness of breath. Cardiovascular:  Negative for chest pain. Gastrointestinal:  Positive for abdominal pain. Negative for nausea and vomiting. Musculoskeletal:  Positive for arthralgias and myalgias. Negative for back pain. Skin:  Negative for rash. Neurological:  Negative for weakness. All other systems reviewed and are negative. Physical Exam   Visit Vitals  /70   Pulse 81   Temp 99 °F (37.2 °C)   Resp 16   SpO2 99%         Physical Exam  Vitals and nursing note reviewed. Exam conducted with a chaperone present (Nurse Amber Stokes). Constitutional:       General: She is not in acute distress. Appearance: Normal appearance. She is well-developed. She is not ill-appearing, toxic-appearing or diaphoretic. HENT:      Head: Normocephalic and atraumatic. Cardiovascular:      Rate and Rhythm: Normal rate and regular rhythm. Heart sounds: Normal heart sounds. No murmur heard. No friction rub. No gallop. Pulmonary:      Effort: Pulmonary effort is normal. No respiratory distress. Breath sounds: Normal breath sounds. No wheezing or rales. Abdominal:      General: Abdomen is flat. Palpations: Abdomen is soft. Tenderness: There is abdominal tenderness (mild generalized tenderness). Comments: G tube in place without surrounding erythema or discoloration   Genitourinary:     Rectum: Guaiac result negative (brown stool). No mass or external hemorrhoid. Normal anal tone. Comments: Small region of superficial skin breakdown R gluteal region   Musculoskeletal:         General: Normal range of motion.       Cervical back: Normal range of motion and neck supple. Skin:     General: Skin is warm. Findings: No rash. Neurological:      Mental Status: She is alert. Comments: Oriented to person and place         Diagnostic Study Results     Labs -  Recent Results (from the past 12 hour(s))   EKG, 12 LEAD, INITIAL    Collection Time: 12/08/22 12:16 PM   Result Value Ref Range    Ventricular Rate 82 BPM    Atrial Rate 82 BPM    P-R Interval 118 ms    QRS Duration 82 ms    Q-T Interval 358 ms    QTC Calculation (Bezet) 418 ms    Calculated P Axis 20 degrees    Calculated R Axis -19 degrees    Calculated T Axis 120 degrees    Diagnosis       Normal sinus rhythm  Septal infarct (cited on or before 11-APR-2022)  T wave abnormality, consider lateral ischemia  Abnormal ECG  When compared with ECG of 18-NOV-2022 15:48,  ST no longer depressed in Inferior leads  Inverted T waves have replaced nonspecific T wave abnormality in Anterior   leads  QT has shortened     CBC WITH AUTOMATED DIFF    Collection Time: 12/08/22  1:28 PM   Result Value Ref Range    WBC 7.8 4.6 - 13.2 K/uL    RBC 2.58 (L) 4.20 - 5.30 M/uL    HGB 7.8 (L) 12.0 - 16.0 g/dL    HCT 24.0 (L) 35.0 - 45.0 %    MCV 93.0 78.0 - 100.0 FL    MCH 30.2 24.0 - 34.0 PG    MCHC 32.5 31.0 - 37.0 g/dL    RDW 17.3 (H) 11.6 - 14.5 %    PLATELET 581 015 - 846 K/uL    MPV 11.9 (H) 9.2 - 11.8 FL    NRBC 0.0 0  WBC    ABSOLUTE NRBC 0.00 0.00 - 0.01 K/uL    NEUTROPHILS 70 40 - 73 %    LYMPHOCYTES 21 21 - 52 %    MONOCYTES 8 3 - 10 %    EOSINOPHILS 0 0 - 5 %    BASOPHILS 1 0 - 2 %    IMMATURE GRANULOCYTES 0 0.0 - 0.5 %    ABS. NEUTROPHILS 5.4 1.8 - 8.0 K/UL    ABS. LYMPHOCYTES 1.6 0.9 - 3.6 K/UL    ABS. MONOCYTES 0.6 0.05 - 1.2 K/UL    ABS. EOSINOPHILS 0.0 0.0 - 0.4 K/UL    ABS. BASOPHILS 0.1 0.0 - 0.1 K/UL    ABS. IMM.  GRANS. 0.0 0.00 - 0.04 K/UL    DF AUTOMATED     METABOLIC PANEL, COMPREHENSIVE    Collection Time: 12/08/22  1:28 PM   Result Value Ref Range    Sodium 140 136 - 145 mmol/L    Potassium 3.4 (L) 3.5 - 5.5 mmol/L    Chloride 110 100 - 111 mmol/L    CO2 23 21 - 32 mmol/L    Anion gap 7 3.0 - 18 mmol/L    Glucose 103 (H) 74 - 99 mg/dL    BUN 31 (H) 7.0 - 18 MG/DL    Creatinine 1.16 0.6 - 1.3 MG/DL    BUN/Creatinine ratio 27 (H) 12 - 20      eGFR 51 (L) >60 ml/min/1.73m2    Calcium 9.7 8.5 - 10.1 MG/DL    Bilirubin, total 0.5 0.2 - 1.0 MG/DL    ALT (SGPT) 18 13 - 56 U/L    AST (SGOT) 24 10 - 38 U/L    Alk. phosphatase 68 45 - 117 U/L    Protein, total 6.2 (L) 6.4 - 8.2 g/dL    Albumin 2.9 (L) 3.4 - 5.0 g/dL    Globulin 3.3 2.0 - 4.0 g/dL    A-G Ratio 0.9 0.8 - 1.7     LIPASE    Collection Time: 12/08/22  1:28 PM   Result Value Ref Range    Lipase 144 73 - 393 U/L   COVID-19 WITH INFLUENZA A/B    Collection Time: 12/08/22  1:28 PM   Result Value Ref Range    SARS-CoV-2 by PCR Not detected NOTD      Influenza A by PCR Not detected NOTD      Influenza B by PCR Not detected NOTD     OCCULT BLOOD, STOOL    Collection Time: 12/08/22  2:17 PM   Result Value Ref Range    Occult blood, stool Negative NEG         Radiologic Studies -   CT ABD PELV WO CONT   Final Result      1. Fecal stasis. 2.   Unremarkable position of gastrostomy tube. 3. Minimally improved aeration of right lung base with persistent minimal right   lung base airspace opacity. Unchanged left lung base lateral segment pulmonary   nodule. Limited evaluation. Follow-up with routine CT thorax is recommended. XR CHEST PORT   Final Result   No radiographic evidence of acute cardiopulmonary process. Medical Decision Making   I am the first provider for this patient. I reviewed the vital signs, available nursing notes, past medical history, past surgical history, family history and social history. Vital Signs-Reviewed the patient's vital signs.     Pulse Oximetry Analysis -   100% on room air    Records Reviewed: Nursing Notes, Old Medical Records, Previous electrocardiograms, Previous Radiology Studies, and Previous Laboratory Studies (Time of Review: 12:48 PM)    ED Course: Progress Notes, Reevaluation, and Consults:  3:30 PM: Sister at bedside, notes pt lives at home with son, has home health. Concern for SNF placement. Discussed case management, PT/OT consult. Note PMD may be able to further assist as well.   5:00 PM: Thorough discussion of results with patient and sister. Printed CT results and labs for sisters' records. Sister feels comfortable with discharge to home, will consult PMD for further resources and possible SNF placement. Discussed strict return precautions for any new, worsening, or persistent symptoms, including fever, vomiting, or any other medical concerns. In agreement with plan, appreciates the care today. Pt tolerating PO, apple juice and apple sauce, no distress. Provider Notes (Medical Decision Making): 65-year-old female who presents to the ED due to abdominal pain and rectal pain x unknown time. Patient is afebrile, nontoxic-appearing, looks well. Abdomen with mild generalized tenderness, no rebound or guarding. G-tube in place without evidence of infection. Labs demonstrate anemia, reduced from baseline, rectal examination demonstrates brown stool Hemoccult negative. CT without evidence of obstruction, infection, or complication. Does demonstrate constipation, chronic findings in the lungs. Printed labs and CT record for patient's sister. Discussed importance of outpatient follow-up for further assessment. Patient tolerating p.o., no distress. Stable for discharge with close outpatient follow-up for further assessment. Strict return precautions provided    Diagnosis     Clinical Impression:   1. Constipation, unspecified constipation type    2. Hypokalemia    3.  Anemia, unspecified type        Disposition: home     Follow-up Information       Follow up With Specialties Details Why [address] [facility] EMERGENCY DEPT Emergency Medicine  If symptoms worsen [address] [facility] [employer] [employer]  [address] Claudia Muniz, MARYBETH Nurse Practitioner Schedule an appointment as soon as possible for a visit   [address]  [phone]               Patient's Medications   Start Taking    DOCUSATE SODIUM (COLACE) 100 MG CAPSULE    Take 1 Capsule by mouth two (2) times a day for 90 days. POTASSIUM CHLORIDE (K-DUR, KLOR-CON M20) 20 MEQ TABLET    Take 1 Tablet by mouth three (3) times daily for 3 days. Continue Taking    ACETAMINOPHEN (TYLENOL) 325 MG TABLET    Take 2 Tablets by mouth every four (4) hours as needed for Pain. ACETAMINOPHEN (TYLENOL) 500 MG TABLET    Take 500 mg by mouth every six (6) hours as needed for Pain. take 1 tab every 6 hours as needed daily for pain    AMLODIPINE (NORVASC) 10 MG TABLET    Take 10 mg by mouth daily. ATORVASTATIN (LIPITOR) 40 MG TABLET    Take 40 mg by mouth daily. CLOPIDOGREL (PLAVIX) 75 MG TAB    Take 75 mg by mouth daily. Indications: prevention for a blood clot going to the brain    CYANOCOBALAMIN (VITAMIN B12) 500 MCG TABLET    Take 1 Tab by mouth daily. DIVALPROEX DR (DEPAKOTE) 250 MG TABLET    Take 250 mg by mouth three (3) times daily. ERGOCALCIFEROL (VITAMIN D2) 50,000 UNIT CAPSULE    Take 50,000 Units by mouth two (2) times a week. FERROUS SULFATE 325 MG (65 MG IRON) TABLET    Take 325 mg by mouth daily. HYDRALAZINE (APRESOLINE) 100 MG TABLET    Take 100 mg by mouth two (2) times a day. LISINOPRIL (PRINIVIL, ZESTRIL) 20 MG TABLET    Take 20 mg by mouth in the morning. MULTIVITAMIN, TX-IRON-CA-MIN (THERA-M W/ IRON) 9 MG IRON-400 MCG TAB TABLET    Take 1 Tablet by mouth daily. OXCARBAZEPINE (TRILEPTAL) 300 MG TABLET    Take 300 mg by mouth two (2) times a day. PAROXETINE (PAXIL) 30 MG TABLET    Take 30 mg by mouth daily. SPIRONOLACTONE (ALDACTONE) 25 MG TABLET    Take 25 mg by mouth in the morning. TAMSULOSIN HCL (TAMSULOSIN PO)    Take 0.4 mg by mouth daily.  tab   These Medications have changed    No medications on file   Stop Taking    No medications on file       Dictation disclaimer:  Please note that this dictation was completed with [employer], the computer voice recognition software. Quite often unanticipated grammatical, syntax, homophones, and other interpretive errors are inadvertently transcribed by the computer software. Please disregard these errors. Please excuse any errors that have escaped final proofreading.

## 2022-12-09 LAB
ATRIAL RATE: 82 BPM
CALCULATED P AXIS, ECG09: 20 DEGREES
CALCULATED R AXIS, ECG10: -19 DEGREES
CALCULATED T AXIS, ECG11: 120 DEGREES
DIAGNOSIS, 93000: NORMAL
P-R INTERVAL, ECG05: 118 MS
Q-T INTERVAL, ECG07: 358 MS
QRS DURATION, ECG06: 82 MS
QTC CALCULATION (BEZET), ECG08: 418 MS
VENTRICULAR RATE, ECG03: 82 BPM

## 2022-12-09 NOTE — ED NOTES
[place] [facility] supervisor Marisela Delgado called me, patient apparently had 2 IVs in her arm and had called EMS to get them removed. Feel it is in the patient's best interest to have these removed. His impression was that the patient had eloped.

## 2022-12-10 LAB
BACTERIA SPEC CULT: NORMAL
SERVICE CMNT-IMP: NORMAL

## 2022-12-16 ENCOUNTER — HOSPITAL ENCOUNTER (EMERGENCY)
Age: 67
Discharge: HOME | End: 2022-12-16
Attending: EMERGENCY MEDICINE
Payer: MEDICARE

## 2022-12-16 ENCOUNTER — APPOINTMENT (OUTPATIENT)
Dept: VASCULAR SURGERY | Age: 67
End: 2022-12-16
Attending: PHYSICIAN ASSISTANT
Payer: MEDICARE

## 2022-12-16 VITALS
BODY MASS INDEX: 18.94 KG/M2 | RESPIRATION RATE: 18 BRPM | OXYGEN SATURATION: 100 % | HEIGHT: 60 IN | DIASTOLIC BLOOD PRESSURE: 69 MMHG | HEART RATE: 73 BPM | SYSTOLIC BLOOD PRESSURE: 116 MMHG | TEMPERATURE: 97.1 F

## 2022-12-16 DIAGNOSIS — I82.622 ACUTE EMBOLISM AND THROMBOSIS OF DEEP VEIN OF LEFT UPPER EXTREMITY (HCC): Primary | ICD-10-CM

## 2022-12-16 LAB
APTT PPP: 31.6 SEC (ref 23–36.4)
BASOPHILS # BLD: 0 K/UL (ref 0–0.1)
BASOPHILS NFR BLD: 0 % (ref 0–2)
DIFFERENTIAL METHOD BLD: ABNORMAL
EOSINOPHIL # BLD: 0.1 K/UL (ref 0–0.4)
EOSINOPHIL NFR BLD: 1 % (ref 0–5)
ERYTHROCYTE [DISTWIDTH] IN BLOOD BY AUTOMATED COUNT: 17.6 % (ref 11.6–14.5)
HCT VFR BLD AUTO: 22.5 % (ref 35–45)
HEMOCCULT STL QL: NEGATIVE
HGB BLD-MCNC: 7.4 G/DL (ref 12–16)
IMM GRANULOCYTES # BLD AUTO: 0 K/UL (ref 0–0.04)
IMM GRANULOCYTES NFR BLD AUTO: 0 % (ref 0–0.5)
INR PPP: 1 (ref 0.8–1.2)
LYMPHOCYTES # BLD: 1.6 K/UL (ref 0.9–3.6)
LYMPHOCYTES NFR BLD: 18 % (ref 21–52)
MCH RBC QN AUTO: 31 PG (ref 24–34)
MCHC RBC AUTO-ENTMCNC: 32.9 G/DL (ref 31–37)
MCV RBC AUTO: 94.1 FL (ref 78–100)
MONOCYTES # BLD: 0.8 K/UL (ref 0.05–1.2)
MONOCYTES NFR BLD: 8 % (ref 3–10)
NEUTS SEG # BLD: 6.5 K/UL (ref 1.8–8)
NEUTS SEG NFR BLD: 72 % (ref 40–73)
NRBC # BLD: 0 K/UL (ref 0–0.01)
NRBC BLD-RTO: 0 PER 100 WBC
PLATELET # BLD AUTO: 226 K/UL (ref 135–420)
PMV BLD AUTO: 11.1 FL (ref 9.2–11.8)
PROTHROMBIN TIME: 13.8 SEC (ref 11.5–15.2)
RBC # BLD AUTO: 2.39 M/UL (ref 4.2–5.3)
WBC # BLD AUTO: 9 K/UL (ref 4.6–13.2)

## 2022-12-16 PROCEDURE — 85730 THROMBOPLASTIN TIME PARTIAL: CPT

## 2022-12-16 PROCEDURE — 82272 OCCULT BLD FECES 1-3 TESTS: CPT

## 2022-12-16 PROCEDURE — 85610 PROTHROMBIN TIME: CPT

## 2022-12-16 PROCEDURE — 99284 EMERGENCY DEPT VISIT MOD MDM: CPT

## 2022-12-16 PROCEDURE — 93971 EXTREMITY STUDY: CPT

## 2022-12-16 PROCEDURE — 85025 COMPLETE CBC W/AUTO DIFF WBC: CPT

## 2022-12-16 RX ORDER — RIVAROXABAN 15 MG-20MG
KIT ORAL
Qty: 1 DOSE PACK | Refills: 0 | Status: SHIPPED | OUTPATIENT
Start: 2022-12-16

## 2022-12-16 NOTE — ED PROVIDER NOTES
EMERGENCY DEPARTMENT HISTORY AND PHYSICAL EXAM    Date: 12/16/2022  Patient Name: Carlos Erwin    History of Presenting Illness     Chief Complaint   Patient presents with    Swelling     Left hand and arm           History Provided By: Patient's son    Chief Complaint: Left upper extremity swelling  Duration: 1 week  Timing: Intermittent  Location: Left upper extremity  Quality: Swollen  Severity: Mild  Modifying Factors: Worse after recently being discharged home with 2 IVs left in the left arm  Associated Symptoms: none       Additional History (Context): Carlos Erwin is a 75 y.o. female with a history of stroke and hypertension who presents today for issues listed above. Patient's son is the primary historian. Reports she lives with him and he is her primary caregiver. States that a week ago she was discharged home from the hospital with an IV lift in her left hand and left upper arm. Reports IV was removed to the next day however states since she has been having intermittent swelling on and off. Reports they are concerned for blood clot. Denies any measurable fevers or chills. Denies any chest pain or shortness of breath. Denies any other complaints or concerns at this time. PCP: Frank VALDEZ NP    Current Outpatient Medications   Medication Sig Dispense Refill    rivaroxaban (Xarelto DVT-PE Treat 30d Start) 15 mg (42)- 20 mg (9) DsPk Take one 15 mg tablet twice a day with food for the first 21 days. Then, take one 20 mg tablet once a day with food for 9 days. 1 Dose Pack 0    docusate sodium (Colace) 100 mg capsule Take 1 Capsule by mouth two (2) times a day for 90 days. 60 Capsule 2    hydrALAZINE (APRESOLINE) 100 mg tablet Take 100 mg by mouth two (2) times a day. lisinopriL (PRINIVIL, ZESTRIL) 20 mg tablet Take 20 mg by mouth in the morning. spironolactone (ALDACTONE) 25 mg tablet Take 25 mg by mouth in the morning.       acetaminophen (TYLENOL) 325 mg tablet Take 2 Tablets Yes orders placed  Please let patient know to fast and that I ordered a urine test by mouth every four (4) hours as needed for Pain. 20 Tablet 0    ferrous sulfate 325 mg (65 mg iron) tablet Take 325 mg by mouth daily. divalproex DR (DEPAKOTE) 250 mg tablet Take 250 mg by mouth three (3) times daily. amLODIPine (NORVASC) 10 mg tablet Take 10 mg by mouth daily. tamsulosin HCl (TAMSULOSIN PO) Take 0.4 mg by mouth daily. tab      PARoxetine (PAXIL) 30 mg tablet Take 30 mg by mouth daily. OXcarbazepine (TRILEPTAL) 300 mg tablet Take 300 mg by mouth two (2) times a day. multivitamin, tx-iron-ca-min (THERA-M w/ IRON) 9 mg iron-400 mcg tab tablet Take 1 Tablet by mouth daily. 30 Tablet 0    atorvastatin (LIPITOR) 40 mg tablet Take 40 mg by mouth daily. acetaminophen (TYLENOL) 500 mg tablet Take 500 mg by mouth every six (6) hours as needed for Pain. take 1 tab every 6 hours as needed daily for pain      clopidogreL (PLAVIX) 75 mg tab Take 75 mg by mouth daily. Indications: prevention for a blood clot going to the brain      ergocalciferol (VITAMIN D2) 50,000 unit capsule Take 50,000 Units by mouth two (2) times a week. cyanocobalamin (VITAMIN B12) 500 mcg tablet Take 1 Tab by mouth daily.  27 Tab 0       Past History     Past Medical History:  Past Medical History:   Diagnosis Date    Abnormal coordination 9/21/2020    Depression 4/12/2022    Failure to thrive in adult 4/12/2022    Hallucinations 9/21/2020    Hypertension     Neurological disorder     Seizures ([address]     Stroke ([address] 2009    Subclinical hypothyroidism 4/13/2022       Past Surgical History:  Past Surgical History:   Procedure Laterality Date    PLACE PERCUT GASTROSTOMY TUBE  1/11/2022            Family History:  Family History   Problem Relation Age of Onset    Diabetes Other     Stroke Other        Social History:  Social History     Tobacco Use    Smoking status: Never    Smokeless tobacco: Never   Vaping Use    Vaping Use: Never used   Substance Use Topics    Alcohol use: Yes     Comment: Socially Drug use: No       Allergies: Allergies   Allergen Reactions    Tomato Hives     Allergic to eating tomato. Aspirin Nausea and Vomiting    Ciprofloxacin Rash    Pcn [Penicillins] Rash and Hives    Sulfa (Sulfonamide Antibiotics) Hives and Rash         Review of Systems   Review of Systems   Constitutional:  Negative for chills and fever. HENT:  Negative for congestion, rhinorrhea and sore throat. Respiratory:  Negative for cough and shortness of breath. Cardiovascular:  Negative for chest pain. Gastrointestinal:  Negative for abdominal pain, blood in stool, constipation, diarrhea, nausea and vomiting. Genitourinary:  Negative for dysuria, frequency and hematuria. Musculoskeletal:  Negative for back pain and myalgias. Arm swelling      Skin:  Negative for rash and wound. Neurological:  Negative for dizziness and headaches. All other systems reviewed and are negative. All Other Systems Negative  Physical Exam     Vitals:    12/16/22 1137   BP: 116/69   Pulse: 73   Resp: 18   Temp: 97.1 °F (36.2 °C)   SpO2: 100%   Height: 5' (1.524 m)     Physical Exam  Vitals and nursing note reviewed. Constitutional:       General: She is not in acute distress. Appearance: She is well-developed and underweight. She is not diaphoretic. HENT:      Head: Normocephalic and atraumatic. Eyes:      Conjunctiva/sclera: Conjunctivae normal.   Cardiovascular:      Rate and Rhythm: Normal rate and regular rhythm. Heart sounds: Normal heart sounds. Pulmonary:      Effort: Pulmonary effort is normal. No respiratory distress. Breath sounds: Normal breath sounds. Chest:      Chest wall: No tenderness. Abdominal:      General: Bowel sounds are normal. There is no distension. Palpations: Abdomen is soft. Tenderness: There is no abdominal tenderness. There is no guarding or rebound. Musculoskeletal:         General: No deformity. Left upper arm: Swelling present.       Left hand: Swelling present. Arms:       Cervical back: Normal range of motion and neck supple. Comments: Very mild dorsal hand swelling noted. Full range of motion of all digits and joint spaces. No overlying erythema, warmth, induration or fluctuance. No active drainage. Skin appears intact. No tenderness. Strong radial pulse   Skin:     General: Skin is warm and dry. Neurological:      Mental Status: She is alert and oriented to person, place, and time. Deep Tendon Reflexes: Reflexes are normal and symmetric. Diagnostic Study Results     Labs -     Recent Results (from the past 12 hour(s))   CBC WITH AUTOMATED DIFF    Collection Time: 12/16/22  2:02 PM   Result Value Ref Range    WBC 9.0 4.6 - 13.2 K/uL    RBC 2.39 (L) 4.20 - 5.30 M/uL    HGB 7.4 (L) 12.0 - 16.0 g/dL    HCT 22.5 (L) 35.0 - 45.0 %    MCV 94.1 78.0 - 100.0 FL    MCH 31.0 24.0 - 34.0 PG    MCHC 32.9 31.0 - 37.0 g/dL    RDW 17.6 (H) 11.6 - 14.5 %    PLATELET 313 686 - 004 K/uL    MPV 11.1 9.2 - 11.8 FL    NRBC 0.0 0  WBC    ABSOLUTE NRBC 0.00 0.00 - 0.01 K/uL    NEUTROPHILS 72 40 - 73 %    LYMPHOCYTES 18 (L) 21 - 52 %    MONOCYTES 8 3 - 10 %    EOSINOPHILS 1 0 - 5 %    BASOPHILS 0 0 - 2 %    IMMATURE GRANULOCYTES 0 0.0 - 0.5 %    ABS. NEUTROPHILS 6.5 1.8 - 8.0 K/UL    ABS. LYMPHOCYTES 1.6 0.9 - 3.6 K/UL    ABS. MONOCYTES 0.8 0.05 - 1.2 K/UL    ABS. EOSINOPHILS 0.1 0.0 - 0.4 K/UL    ABS. BASOPHILS 0.0 0.0 - 0.1 K/UL    ABS. IMM.  GRANS. 0.0 0.00 - 0.04 K/UL    DF AUTOMATED     PTT    Collection Time: 12/16/22  2:02 PM   Result Value Ref Range    aPTT 31.6 23.0 - 36.4 SEC   PROTHROMBIN TIME + INR    Collection Time: 12/16/22  2:02 PM   Result Value Ref Range    Prothrombin time 13.8 11.5 - 15.2 sec    INR 1.0 0.8 - 1.2     OCCULT BLOOD, STOOL    Collection Time: 12/16/22  2:51 PM   Result Value Ref Range    Occult blood, stool Negative NEG         Radiologic Studies -   DUPLEX UPPER EXT VENOUS LEFT   Final Result        CT Results  (Last 48 hours)      None          CXR Results  (Last 48 hours)      None              Medical Decision Making   I am the first provider for this patient. I reviewed the vital signs, available nursing notes, past medical history, past surgical history, family history and social history. Vital Signs-Reviewed the patient's vital signs. Records Reviewed: Nursing Notes and Old Medical Records     Procedures: None   Procedures    Provider Notes (Medical Decision Making):     Differential: Cellulitis, abscess, DVT, phlebitis      Plan: Will order ultrasound    ED Course as of 12/16/22 1607   Fri Dec 16, 2022   1337 Discussed findings with patient and patient's son. Patient is not currently on blood thinners. Will check coags and plan to discharge home with Xarelto starter pack. Anticoagulation precautions have been given. [CS]   7212 Discussed case briefly with Dr. Dimitri Jernigan. Patient's prior hemoglobin was 7.8 and today is 7.9. Patient denies any rectal bleeding however will check guaiac prior to starting anticoagulation. Pending coags [CS]   1386 Fecal occult negative. Again have discussed blood thinner precautions. Have strongly encouraged him to call today for a close vascular follow-up. Have given strict return precautions. Will discharge home with family. Patient's family is reasonable and reliable and denies any concerns for barriers to follow-up. [CS]      ED Course User Index  [CS] Ana María Cooper         MED RECONCILIATION:  No current facility-administered medications for this encounter. Current Outpatient Medications   Medication Sig    rivaroxaban (Xarelto DVT-PE Treat 30d Start) 15 mg (42)- 20 mg (9) DsPk Take one 15 mg tablet twice a day with food for the first 21 days. Then, take one 20 mg tablet once a day with food for 9 days. docusate sodium (Colace) 100 mg capsule Take 1 Capsule by mouth two (2) times a day for 90 days.     hydrALAZINE (APRESOLINE) 100 mg tablet Take 100 mg by mouth two (2) times a day. lisinopriL (PRINIVIL, ZESTRIL) 20 mg tablet Take 20 mg by mouth in the morning. spironolactone (ALDACTONE) 25 mg tablet Take 25 mg by mouth in the morning. acetaminophen (TYLENOL) 325 mg tablet Take 2 Tablets by mouth every four (4) hours as needed for Pain. ferrous sulfate 325 mg (65 mg iron) tablet Take 325 mg by mouth daily. divalproex DR (DEPAKOTE) 250 mg tablet Take 250 mg by mouth three (3) times daily. amLODIPine (NORVASC) 10 mg tablet Take 10 mg by mouth daily. tamsulosin HCl (TAMSULOSIN PO) Take 0.4 mg by mouth daily. tab    PARoxetine (PAXIL) 30 mg tablet Take 30 mg by mouth daily. OXcarbazepine (TRILEPTAL) 300 mg tablet Take 300 mg by mouth two (2) times a day. multivitamin, tx-iron-ca-min (THERA-M w/ IRON) 9 mg iron-400 mcg tab tablet Take 1 Tablet by mouth daily. atorvastatin (LIPITOR) 40 mg tablet Take 40 mg by mouth daily. acetaminophen (TYLENOL) 500 mg tablet Take 500 mg by mouth every six (6) hours as needed for Pain. take 1 tab every 6 hours as needed daily for pain    clopidogreL (PLAVIX) 75 mg tab Take 75 mg by mouth daily. Indications: prevention for a blood clot going to the brain    ergocalciferol (VITAMIN D2) 50,000 unit capsule Take 50,000 Units by mouth two (2) times a week. cyanocobalamin (VITAMIN B12) 500 mcg tablet Take 1 Tab by mouth daily. Disposition:  Home     DISCHARGE NOTE:   Pt has been reexamined. Patient has no new complaints, changes, or physical findings. Care plan outlined and precautions discussed. Results of workup were reviewed with the patient. All medications were reviewed with the patient. All of pt's questions and concerns were addressed. Patient was instructed and agrees to follow up with PCP/Vascular as well as to return to the ED upon further deterioration. Patient is ready to go home.     Follow-up Information       Follow up With Specialties Details Why Contact Info    [facility] EMERGENCY DEPT Emergency Medicine  As needed [address] [facility]    Sania Souza NP Nurse Practitioner Schedule an appointment as soon as possible for a visit   Nell [address]ing [address]  [address] [facility]  Schedule an appointment as soon as possible for a visit   Ainsley Rojas [address]  [phone]            Current Discharge Medication List        START taking these medications    Details   rivaroxaban (Xarelto DVT-PE Treat 30d Start) 15 mg (42)- 20 mg (9) DsPk Take one 15 mg tablet twice a day with food for the first 21 days. Then, take one 20 mg tablet once a day with food for 9 days. Qty: 1 Dose Pack, Refills: 0  Start date: 12/16/2022                 Diagnosis     Clinical Impression:   1. Acute embolism and thrombosis of deep vein of left upper extremity (HCC)          \"Please note that this dictation was completed with [employer], the computer voice recognition software. Quite often unanticipated grammatical, syntax, homophones, and other interpretive errors are inadvertently transcribed by the computer software. Please disregard these errors. Please excuse any errors that have escaped final proofreading. \"

## 2023-01-09 ENCOUNTER — HOSPITAL ENCOUNTER (EMERGENCY)
Age: 68
Discharge: HOME | End: 2023-01-10
Attending: EMERGENCY MEDICINE
Payer: MEDICARE

## 2023-01-09 ENCOUNTER — APPOINTMENT (OUTPATIENT)
Dept: GENERAL RADIOLOGY | Age: 68
End: 2023-01-09
Attending: EMERGENCY MEDICINE
Payer: MEDICARE

## 2023-01-09 DIAGNOSIS — K59.00 CONSTIPATION, UNSPECIFIED CONSTIPATION TYPE: ICD-10-CM

## 2023-01-09 DIAGNOSIS — R10.84 ABDOMINAL PAIN, GENERALIZED: Primary | ICD-10-CM

## 2023-01-09 LAB
ALBUMIN SERPL-MCNC: 3.1 G/DL (ref 3.4–5)
ALBUMIN/GLOB SERPL: 0.9 (ref 0.8–1.7)
ALP SERPL-CCNC: 74 U/L (ref 45–117)
ALT SERPL-CCNC: 11 U/L (ref 13–56)
ANION GAP SERPL CALC-SCNC: 3 MMOL/L (ref 3–18)
AST SERPL-CCNC: 13 U/L (ref 10–38)
BASOPHILS # BLD: 0 K/UL (ref 0–0.1)
BASOPHILS NFR BLD: 1 % (ref 0–2)
BILIRUB SERPL-MCNC: 0.3 MG/DL (ref 0.2–1)
BUN SERPL-MCNC: 17 MG/DL (ref 7–18)
BUN/CREAT SERPL: 21 (ref 12–20)
CALCIUM SERPL-MCNC: 9.8 MG/DL (ref 8.5–10.1)
CHLORIDE SERPL-SCNC: 111 MMOL/L (ref 100–111)
CO2 SERPL-SCNC: 28 MMOL/L (ref 21–32)
CREAT SERPL-MCNC: 0.81 MG/DL (ref 0.6–1.3)
DIFFERENTIAL METHOD BLD: ABNORMAL
EOSINOPHIL # BLD: 0 K/UL (ref 0–0.4)
EOSINOPHIL NFR BLD: 0 % (ref 0–5)
ERYTHROCYTE [DISTWIDTH] IN BLOOD BY AUTOMATED COUNT: 14.4 % (ref 11.6–14.5)
GLOBULIN SER CALC-MCNC: 3.6 G/DL (ref 2–4)
GLUCOSE SERPL-MCNC: 90 MG/DL (ref 74–99)
HCT VFR BLD AUTO: 26.8 % (ref 35–45)
HGB BLD-MCNC: 8.9 G/DL (ref 12–16)
IMM GRANULOCYTES # BLD AUTO: 0 K/UL (ref 0–0.04)
IMM GRANULOCYTES NFR BLD AUTO: 0 % (ref 0–0.5)
LYMPHOCYTES # BLD: 2.3 K/UL (ref 0.9–3.6)
LYMPHOCYTES NFR BLD: 37 % (ref 21–52)
MCH RBC QN AUTO: 31 PG (ref 24–34)
MCHC RBC AUTO-ENTMCNC: 33.2 G/DL (ref 31–37)
MCV RBC AUTO: 93.4 FL (ref 78–100)
MONOCYTES # BLD: 0.4 K/UL (ref 0.05–1.2)
MONOCYTES NFR BLD: 7 % (ref 3–10)
NEUTS SEG # BLD: 3.5 K/UL (ref 1.8–8)
NEUTS SEG NFR BLD: 55 % (ref 40–73)
NRBC # BLD: 0 K/UL (ref 0–0.01)
NRBC BLD-RTO: 0 PER 100 WBC
PLATELET # BLD AUTO: 256 K/UL (ref 135–420)
PMV BLD AUTO: 10.8 FL (ref 9.2–11.8)
POTASSIUM SERPL-SCNC: 3.7 MMOL/L (ref 3.5–5.5)
PROT SERPL-MCNC: 6.7 G/DL (ref 6.4–8.2)
RBC # BLD AUTO: 2.87 M/UL (ref 4.2–5.3)
SODIUM SERPL-SCNC: 142 MMOL/L (ref 136–145)
WBC # BLD AUTO: 6.3 K/UL (ref 4.6–13.2)

## 2023-01-09 PROCEDURE — 74022 RADEX COMPL AQT ABD SERIES: CPT

## 2023-01-09 PROCEDURE — 74011250636 HC RX REV CODE- 250/636: Performed by: EMERGENCY MEDICINE

## 2023-01-09 PROCEDURE — 99284 EMERGENCY DEPT VISIT MOD MDM: CPT

## 2023-01-09 PROCEDURE — 85025 COMPLETE CBC W/AUTO DIFF WBC: CPT

## 2023-01-09 PROCEDURE — 80053 COMPREHEN METABOLIC PANEL: CPT

## 2023-01-09 RX ORDER — SODIUM CHLORIDE 9 MG/ML
125 INJECTION, SOLUTION INTRAVENOUS CONTINUOUS
Status: DISCONTINUED | OUTPATIENT
Start: 2023-01-09 | End: 2023-01-10 | Stop reason: HOSPADM

## 2023-01-09 RX ADMIN — SODIUM CHLORIDE 1000 ML: 900 INJECTION, SOLUTION INTRAVENOUS at 23:44

## 2023-01-10 VITALS
TEMPERATURE: 98.1 F | SYSTOLIC BLOOD PRESSURE: 118 MMHG | OXYGEN SATURATION: 98 % | HEART RATE: 65 BPM | DIASTOLIC BLOOD PRESSURE: 77 MMHG | RESPIRATION RATE: 18 BRPM

## 2023-01-10 LAB
APPEARANCE UR: CLEAR
BILIRUB UR QL: NEGATIVE
COLOR UR: YELLOW
GLUCOSE UR STRIP.AUTO-MCNC: NEGATIVE MG/DL
HGB UR QL STRIP: NEGATIVE
KETONES UR QL STRIP.AUTO: ABNORMAL MG/DL
LEUKOCYTE ESTERASE UR QL STRIP.AUTO: NEGATIVE
NITRITE UR QL STRIP.AUTO: NEGATIVE
PH UR STRIP: 6 (ref 5–8)
PROT UR STRIP-MCNC: NEGATIVE MG/DL
SP GR UR REFRACTOMETRY: 1.02 (ref 1–1.03)
UROBILINOGEN UR QL STRIP.AUTO: 1 EU/DL (ref 0.2–1)

## 2023-01-10 PROCEDURE — 81003 URINALYSIS AUTO W/O SCOPE: CPT

## 2023-01-10 PROCEDURE — 87086 URINE CULTURE/COLONY COUNT: CPT

## 2023-01-10 RX ORDER — POLYETHYLENE GLYCOL 3350 17 G/17G
17 POWDER, FOR SOLUTION ORAL DAILY
Qty: 235 G | Refills: 0 | Status: SHIPPED | OUTPATIENT
Start: 2023-01-10

## 2023-01-10 RX ORDER — MAGNESIUM CITRATE
SOLUTION, ORAL ORAL
Qty: 1 EACH | Refills: 0 | Status: SHIPPED | OUTPATIENT
Start: 2023-01-10

## 2023-01-10 NOTE — ED PROVIDER NOTES
EMERGENCY DEPARTMENT HISTORY AND PHYSICAL EXAM    11:49 PM      Date: 1/9/2023  Patient Name: Adolfo Briceno    History of Presenting Illness     Chief Complaint   Patient presents with    Vaginal Pain         History Provided By: {[facility]:[id number]::\"Patient\"}  Location/Duration/Severity/Modifying factors   The patient is a 29-year-old female with a history of stroke, hypertension, seizure disorder, depression, thyroid disease, failure to thrive now with a PEG tube, presents emergency department with complaint of dysuria, constipation, rectal rectal pain. The patient has been doing fairly well since being released from the hospital is been taking anticoagulation for blood clots was found to have an evaluation a month ago and patient son state that she has that. The patient's history is provided mostly by the patient's sons over the phone. The patient does live at home with her sons. The patient does not require much tube feeding anymore because she is eating by mouth. The patient has had urinary tract infections in the past.  Patient denies any other aggravating or alleviating factors. Patient is not a smoker, drinker, no drug user. Patient is not working at this time. PCP: Shelley VALDEZ NP    Current Facility-Administered Medications   Medication Dose Route Frequency Provider Last Rate Last Admin    sodium chloride 0.9 % bolus infusion 1,000 mL  1,000 mL IntraVENous ONCE Sonia Pike MD        0.9% sodium chloride infusion  125 mL/hr IntraVENous CONTINUOUS Arielle Ellis MD         Current Outpatient Medications   Medication Sig Dispense Refill    rivaroxaban (Xarelto DVT-PE Treat 30d Start) 15 mg (42)- 20 mg (9) DsPk Take one 15 mg tablet twice a day with food for the first 21 days. Then, take one 20 mg tablet once a day with food for 9 days. 1 Dose Pack 0    docusate sodium (Colace) 100 mg capsule Take 1 Capsule by mouth two (2) times a day for 90 days.  60 Capsule 2    hydrALAZINE (APRESOLINE) 100 mg tablet Take 100 mg by mouth two (2) times a day.  lisinopriL (PRINIVIL, ZESTRIL) 20 mg tablet Take 20 mg by mouth in the morning.  spironolactone (ALDACTONE) 25 mg tablet Take 25 mg by mouth in the morning.  acetaminophen (TYLENOL) 325 mg tablet Take 2 Tablets by mouth every four (4) hours as needed for Pain. 20 Tablet 0    ferrous sulfate 325 mg (65 mg iron) tablet Take 325 mg by mouth daily.  divalproex DR (DEPAKOTE) 250 mg tablet Take 250 mg by mouth three (3) times daily.  amLODIPine (NORVASC) 10 mg tablet Take 10 mg by mouth daily.  tamsulosin HCl (TAMSULOSIN PO) Take 0.4 mg by mouth daily. tab      PARoxetine (PAXIL) 30 mg tablet Take 30 mg by mouth daily.  OXcarbazepine (TRILEPTAL) 300 mg tablet Take 300 mg by mouth two (2) times a day.  multivitamin, tx-iron-ca-min (THERA-M w/ IRON) 9 mg iron-400 mcg tab tablet Take 1 Tablet by mouth daily. 30 Tablet 0    atorvastatin (LIPITOR) 40 mg tablet Take 40 mg by mouth daily.  acetaminophen (TYLENOL) 500 mg tablet Take 500 mg by mouth every six (6) hours as needed for Pain. take 1 tab every 6 hours as needed daily for pain      clopidogreL (PLAVIX) 75 mg tab Take 75 mg by mouth daily. Indications: prevention for a blood clot going to the brain      ergocalciferol (VITAMIN D2) 50,000 unit capsule Take 50,000 Units by mouth two (2) times a week.  cyanocobalamin (VITAMIN B12) 500 mcg tablet Take 1 Tab by mouth daily.  27 Tab 0       Past History     Past Medical History:  Past Medical History:   Diagnosis Date    Abnormal coordination 9/21/2020    Depression 4/12/2022    Failure to thrive in adult 4/12/2022    Hallucinations 9/21/2020    Hypertension     Neurological disorder     Seizures ([address]     Stroke [facility]) 2009    Subclinical hypothyroidism 4/13/2022       Past Surgical History:  Past Surgical History:   Procedure Laterality Date    PLACE PERCUT GASTROSTOMY TUBE  1/11/2022 Family History:  Family History   Problem Relation Age of Onset    Diabetes Other     Stroke Other        Social History:  Social History     Tobacco Use    Smoking status: Never    Smokeless tobacco: Never   Vaping Use    Vaping Use: Never used   Substance Use Topics    Alcohol use: Yes     Comment: Socially     Drug use: No       Allergies: Allergies   Allergen Reactions    Tomato Hives     Allergic to eating tomato.  Aspirin Nausea and Vomiting    Ciprofloxacin Rash    Pcn [Penicillins] Rash and Hives    Sulfa (Sulfonamide Antibiotics) Hives and Rash         Review of Systems     ***  Review of Systems   Constitutional:  Negative for activity change, fatigue and fever. HENT:  Negative for congestion and rhinorrhea. Eyes:  Negative for visual disturbance. Respiratory:  Negative for shortness of breath. Cardiovascular:  Negative for chest pain and palpitations. Gastrointestinal:  Positive for abdominal pain and constipation. Negative for diarrhea, nausea and vomiting. Genitourinary:  Negative for dysuria and hematuria. Musculoskeletal:  Negative for back pain. Skin:  Negative for rash. Neurological:  Negative for dizziness, weakness and light-headedness. All other systems reviewed and are negative. Physical Exam   Visit Vitals  /65 (BP 1 Location: Left upper arm, BP Patient Position: At rest)   Pulse 73   Temp 98.5 °F (36.9 °C)   Resp 16   SpO2 99%       ***  Physical Exam  Vitals and nursing note reviewed. Constitutional:       General: She is not in acute distress. Appearance: She is well-developed. Comments: Thin and globally weak   HENT:      Head: Normocephalic and atraumatic. Right Ear: External ear normal.      Left Ear: External ear normal.      Nose: Nose normal.   Eyes:      General: No scleral icterus. Conjunctiva/sclera: Conjunctivae normal.      Pupils: Pupils are equal, round, and reactive to light.    Neck:      Thyroid: No thyromegaly. Vascular: No JVD. Trachea: No tracheal deviation. Cardiovascular:      Rate and Rhythm: Normal rate and regular rhythm. Heart sounds: Normal heart sounds. No murmur heard. No friction rub. No gallop. Pulmonary:      Effort: Pulmonary effort is normal.      Breath sounds: Normal breath sounds. Chest:      Chest wall: No tenderness. Abdominal:      General: Bowel sounds are normal. There is no distension. Palpations: Abdomen is soft. Tenderness: There is no abdominal tenderness. There is no guarding or rebound. Comments: PEG tube noted   Musculoskeletal:         General: No tenderness. Normal range of motion. Cervical back: Normal range of motion and neck supple. Lymphadenopathy:      Cervical: No cervical adenopathy. Skin:     General: Skin is warm and dry. Neurological:      Mental Status: She is alert. Cranial Nerves: No cranial nerve deficit. Coordination: Coordination normal.      Comments: Globally weak, follows commands, gait not observed   Psychiatric:      Comments: No family currently at the bedside         Diagnostic Study Results     Labs -  Recent Results (from the past 12 hour(s))   CBC WITH AUTOMATED DIFF    Collection Time: 01/09/23 10:36 PM   Result Value Ref Range    WBC 6.3 4.6 - 13.2 K/uL    RBC 2.87 (L) 4.20 - 5.30 M/uL    HGB 8.9 (L) 12.0 - 16.0 g/dL    HCT 26.8 (L) 35.0 - 45.0 %    MCV 93.4 78.0 - 100.0 FL    MCH 31.0 24.0 - 34.0 PG    MCHC 33.2 31.0 - 37.0 g/dL    RDW 14.4 11.6 - 14.5 %    PLATELET 435 120 - 485 K/uL    MPV 10.8 9.2 - 11.8 FL    NRBC 0.0 0  WBC    ABSOLUTE NRBC 0.00 0.00 - 0.01 K/uL    NEUTROPHILS 55 40 - 73 %    LYMPHOCYTES 37 21 - 52 %    MONOCYTES 7 3 - 10 %    EOSINOPHILS 0 0 - 5 %    BASOPHILS 1 0 - 2 %    IMMATURE GRANULOCYTES 0 0.0 - 0.5 %    ABS. NEUTROPHILS 3.5 1.8 - 8.0 K/UL    ABS. LYMPHOCYTES 2.3 0.9 - 3.6 K/UL    ABS. MONOCYTES 0.4 0.05 - 1.2 K/UL    ABS.  EOSINOPHILS 0.0 0.0 - 0.4 K/UL    ABS. BASOPHILS 0.0 0.0 - 0.1 K/UL    ABS. IMM. GRANS. 0.0 0.00 - 0.04 K/UL    DF AUTOMATED     METABOLIC PANEL, COMPREHENSIVE    Collection Time: 01/09/23 10:36 PM   Result Value Ref Range    Sodium 142 136 - 145 mmol/L    Potassium 3.7 3.5 - 5.5 mmol/L    Chloride 111 100 - 111 mmol/L    CO2 28 21 - 32 mmol/L    Anion gap 3 3.0 - 18 mmol/L    Glucose 90 74 - 99 mg/dL    BUN 17 7.0 - 18 MG/DL    Creatinine 0.81 0.6 - 1.3 MG/DL    BUN/Creatinine ratio 21 (H) 12 - 20      eGFR >60 >60 ml/min/1.73m2    Calcium 9.8 8.5 - 10.1 MG/DL    Bilirubin, total 0.3 0.2 - 1.0 MG/DL    ALT (SGPT) 11 (L) 13 - 56 U/L    AST (SGOT) 13 10 - 38 U/L    Alk. phosphatase 74 45 - 117 U/L    Protein, total 6.7 6.4 - 8.2 g/dL    Albumin 3.1 (L) 3.4 - 5.0 g/dL    Globulin 3.6 2.0 - 4.0 g/dL    A-G Ratio 0.9 0.8 - 1.7         Radiologic Studies -   XR ABD ACUTE W 1 V CHEST    (Results Pending)     No acute process interpreted by me    Medical Decision Making   I am the first provider for this patient. I reviewed the vital signs, available nursing notes, past medical history, past surgical history, family history and social history. Vital Signs-Reviewed the patient's vital signs. EKG: ***    Records Reviewed: {[email or web address]:73995} (Time of Review: 11:49 PM)    ED Course: Progress Notes, Reevaluation, and Consults:    Patient's labs are reassuring, x-ray shows mild stool burden and urinalysis shows no obvious infection. The patient has a healed sacral decubitus ulcer and no other signs of irritation noted. We will start the patient on MiraLAX, magnesium citrate for constipation, and have her follow closely as an outpatient. The patient will return if at all worsened or concerned. Workup and recommendations were reviewed with the patient and all questions were answered. The patient understands the plan and will proceed with close outpatient care.   I have encouraged the patient to return if at all worsened or concerned. Jacki Zuluaga, DO 2:52 AM      Provider Notes (Medical Decision Making):   MDM  Number of Diagnoses or Management Options  Diagnosis management comments: Patient is a 77-year-old female with a history of seizure disorder, hallucinations, depression, thyroid disease, stroke, the presents emergency department with complaint of dysuria, constipation, and some rectal pain. The patient said this been going on for several weeks and son say that she has not had much as far as bowel movements. We will follow patient's abdominal x-ray for large amount of stool burden, urinalysis, basic labs, then reevaluate. Patient denies any chest pain or shortness of breath. Procedures    Critical Care Time: ***      Diagnosis     Clinical Impression: No diagnosis found. Disposition: ***    Follow-up Information    None          Patient's Medications   Start Taking    No medications on file   Continue Taking    ACETAMINOPHEN (TYLENOL) 325 MG TABLET    Take 2 Tablets by mouth every four (4) hours as needed for Pain. ACETAMINOPHEN (TYLENOL) 500 MG TABLET    Take 500 mg by mouth every six (6) hours as needed for Pain. take 1 tab every 6 hours as needed daily for pain    AMLODIPINE (NORVASC) 10 MG TABLET    Take 10 mg by mouth daily. ATORVASTATIN (LIPITOR) 40 MG TABLET    Take 40 mg by mouth daily. CLOPIDOGREL (PLAVIX) 75 MG TAB    Take 75 mg by mouth daily. Indications: prevention for a blood clot going to the brain    CYANOCOBALAMIN (VITAMIN B12) 500 MCG TABLET    Take 1 Tab by mouth daily. DIVALPROEX DR (DEPAKOTE) 250 MG TABLET    Take 250 mg by mouth three (3) times daily. DOCUSATE SODIUM (COLACE) 100 MG CAPSULE    Take 1 Capsule by mouth two (2) times a day for 90 days. ERGOCALCIFEROL (VITAMIN D2) 50,000 UNIT CAPSULE    Take 50,000 Units by mouth two (2) times a week. FERROUS SULFATE 325 MG (65 MG IRON) TABLET    Take 325 mg by mouth daily.     HYDRALAZINE (APRESOLINE) 100 MG TABLET Take 100 mg by mouth two (2) times a day. LISINOPRIL (PRINIVIL, ZESTRIL) 20 MG TABLET    Take 20 mg by mouth in the morning. MULTIVITAMIN, TX-IRON-CA-MIN (THERA-M W/ IRON) 9 MG IRON-400 MCG TAB TABLET    Take 1 Tablet by mouth daily. OXCARBAZEPINE (TRILEPTAL) 300 MG TABLET    Take 300 mg by mouth two (2) times a day. PAROXETINE (PAXIL) 30 MG TABLET    Take 30 mg by mouth daily. RIVAROXABAN (XARELTO DVT-PE TREAT 30D START) 15 MG (42)- 20 MG (9) DSPK    Take one 15 mg tablet twice a day with food for the first 21 days. Then, take one 20 mg tablet once a day with food for 9 days. SPIRONOLACTONE (ALDACTONE) 25 MG TABLET    Take 25 mg by mouth in the morning. TAMSULOSIN HCL (TAMSULOSIN PO)    Take 0.4 mg by mouth daily. tab   These Medications have changed    No medications on file   Stop Taking    No medications on file     Disclaimer: Sections of this note are dictated using utilizing voice recognition software. Minor typographical errors may be present. If questions arise, please do not hesitate to contact me or call our department.

## 2023-01-10 NOTE — ED NOTES
Report given to transport medic. PIV removed, infiltrated with NS. Swelling noted, wrapped with koban. Son called, no answer. Message left with discharge instructions and IV infiltrate instructions.

## 2023-01-10 NOTE — ED TRIAGE NOTES
Per medic: Family called stating patient has been complaining of vaginal pain when urinating. Family also says that patient is currently being treated for a UTI. Thuan Simmons

## 2023-01-10 NOTE — DISCHARGE INSTRUCTIONS
Make sure to drink plenty of fluids, and take the medication for constipation and see if it helps. If you have any worsening please return immediately.   There is no evidence of urinary tract infection or infected wound on our exam.

## 2023-01-11 LAB
BACTERIA SPEC CULT: NORMAL
SERVICE CMNT-IMP: NORMAL

## 2023-04-19 ENCOUNTER — APPOINTMENT (OUTPATIENT)
Facility: HOSPITAL | Age: 68
End: 2023-04-19
Payer: MEDICARE

## 2023-04-19 ENCOUNTER — HOSPITAL ENCOUNTER (EMERGENCY)
Facility: HOSPITAL | Age: 68
Discharge: HOME | End: 2023-04-20
Attending: EMERGENCY MEDICINE
Payer: MEDICARE

## 2023-04-19 DIAGNOSIS — R41.82 ALTERED MENTAL STATUS, UNSPECIFIED ALTERED MENTAL STATUS TYPE: Primary | ICD-10-CM

## 2023-04-19 LAB
ALBUMIN SERPL-MCNC: 2.9 G/DL (ref 3.4–5)
ALBUMIN/GLOB SERPL: 1.2 (ref 0.8–1.7)
ALP SERPL-CCNC: 76 U/L (ref 45–117)
ALT SERPL-CCNC: 17 U/L (ref 13–56)
ANION GAP SERPL CALC-SCNC: 2 MMOL/L (ref 3–18)
AST SERPL-CCNC: 17 U/L (ref 10–38)
BASOPHILS # BLD: 0 K/UL (ref 0–0.1)
BASOPHILS NFR BLD: 0 % (ref 0–2)
BILIRUB SERPL-MCNC: 0.2 MG/DL (ref 0.2–1)
BUN SERPL-MCNC: 28 MG/DL (ref 7–18)
BUN/CREAT SERPL: 29 (ref 12–20)
CALCIUM SERPL-MCNC: 8.7 MG/DL (ref 8.5–10.1)
CHLORIDE SERPL-SCNC: 111 MMOL/L (ref 100–111)
CHP ED QC CHECK: YES
CO2 SERPL-SCNC: 28 MMOL/L (ref 21–32)
CREAT SERPL-MCNC: 0.98 MG/DL (ref 0.6–1.3)
DIFFERENTIAL METHOD BLD: ABNORMAL
EOSINOPHIL # BLD: 0.1 K/UL (ref 0–0.4)
EOSINOPHIL NFR BLD: 1 % (ref 0–5)
ERYTHROCYTE [DISTWIDTH] IN BLOOD BY AUTOMATED COUNT: 15 % (ref 11.6–14.5)
GLOBULIN SER CALC-MCNC: 2.5 G/DL (ref 2–4)
GLUCOSE BLD STRIP.AUTO-MCNC: 99 MG/DL (ref 70–110)
GLUCOSE SERPL-MCNC: 90 MG/DL (ref 74–99)
HCT VFR BLD AUTO: 25 % (ref 35–45)
HGB BLD-MCNC: 8.2 G/DL (ref 12–16)
IMM GRANULOCYTES # BLD AUTO: 0 K/UL (ref 0–0.04)
IMM GRANULOCYTES NFR BLD AUTO: 0 % (ref 0–0.5)
INR PPP: 1.1 (ref 0.8–1.2)
LYMPHOCYTES # BLD: 1.4 K/UL (ref 0.9–3.6)
LYMPHOCYTES NFR BLD: 19 % (ref 21–52)
MAGNESIUM SERPL-MCNC: 2.3 MG/DL (ref 1.6–2.6)
MCH RBC QN AUTO: 30.6 PG (ref 24–34)
MCHC RBC AUTO-ENTMCNC: 32.8 G/DL (ref 31–37)
MCV RBC AUTO: 93.3 FL (ref 78–100)
MONOCYTES # BLD: 0.7 K/UL (ref 0.05–1.2)
MONOCYTES NFR BLD: 10 % (ref 3–10)
NEUTS SEG # BLD: 5 K/UL (ref 1.8–8)
NEUTS SEG NFR BLD: 69 % (ref 40–73)
NRBC # BLD: 0 K/UL (ref 0–0.01)
NRBC BLD-RTO: 0 PER 100 WBC
PLATELET # BLD AUTO: 150 K/UL (ref 135–420)
PMV BLD AUTO: 11 FL (ref 9.2–11.8)
POTASSIUM SERPL-SCNC: 4.2 MMOL/L (ref 3.5–5.5)
PROT SERPL-MCNC: 5.4 G/DL (ref 6.4–8.2)
PROTHROMBIN TIME: 14.2 SEC (ref 11.5–15.2)
RBC # BLD AUTO: 2.68 M/UL (ref 4.2–5.3)
SODIUM SERPL-SCNC: 141 MMOL/L (ref 136–145)
TROPONIN I SERPL HS-MCNC: 45 NG/L (ref 0–54)
WBC # BLD AUTO: 7.2 K/UL (ref 4.6–13.2)

## 2023-04-19 PROCEDURE — 80053 COMPREHEN METABOLIC PANEL: CPT

## 2023-04-19 PROCEDURE — 70450 CT HEAD/BRAIN W/O DYE: CPT

## 2023-04-19 PROCEDURE — 85610 PROTHROMBIN TIME: CPT

## 2023-04-19 PROCEDURE — 82962 GLUCOSE BLOOD TEST: CPT

## 2023-04-19 PROCEDURE — 85025 COMPLETE CBC W/AUTO DIFF WBC: CPT

## 2023-04-19 PROCEDURE — 83735 ASSAY OF MAGNESIUM: CPT

## 2023-04-19 PROCEDURE — 93005 ELECTROCARDIOGRAM TRACING: CPT

## 2023-04-19 PROCEDURE — 99285 EMERGENCY DEPT VISIT HI MDM: CPT

## 2023-04-19 PROCEDURE — 70498 CT ANGIOGRAPHY NECK: CPT

## 2023-04-19 PROCEDURE — 71045 X-RAY EXAM CHEST 1 VIEW: CPT

## 2023-04-19 PROCEDURE — 6360000004 HC RX CONTRAST MEDICATION

## 2023-04-19 PROCEDURE — 84484 ASSAY OF TROPONIN QUANT: CPT

## 2023-04-19 RX ORDER — ASPIRIN 81 MG/1
81 TABLET, CHEWABLE ORAL DAILY
Status: DISCONTINUED | OUTPATIENT
Start: 2023-04-19 | End: 2023-04-20 | Stop reason: HOSPADM

## 2023-04-19 RX ADMIN — IOPAMIDOL 100 ML: 755 INJECTION, SOLUTION INTRAVENOUS at 17:28

## 2023-04-19 ASSESSMENT — LIFESTYLE VARIABLES
HOW OFTEN DO YOU HAVE A DRINK CONTAINING ALCOHOL: NEVER
HOW MANY STANDARD DRINKS CONTAINING ALCOHOL DO YOU HAVE ON A TYPICAL DAY: PATIENT DOES NOT DRINK

## 2023-04-19 ASSESSMENT — PAIN SCALES - WONG BAKER: WONGBAKER_NUMERICALRESPONSE: 4

## 2023-04-19 ASSESSMENT — PAIN DESCRIPTION - PAIN TYPE: TYPE: ACUTE PAIN

## 2023-04-19 ASSESSMENT — PAIN DESCRIPTION - LOCATION: LOCATION: HEAD

## 2023-04-19 ASSESSMENT — PAIN - FUNCTIONAL ASSESSMENT: PAIN_FUNCTIONAL_ASSESSMENT: WONG-BAKER FACES

## 2023-04-19 NOTE — ED PROVIDER NOTES
slurred speech, is alert and oriented x2, is able to follow basic commands, per patient's son, patient was eating in an office and became confused, speech was slurred, and he attributed to a panic attack. Patient has prior history stroke, not on blood thinner,  [CD]   2628 Spoke with Tele neuro, Dr. Matthew Watts, regarding patient. He stated patient is TPA candiate. And he will eval patient  [CD]   1702 Per son, patient is still not at her baseline mental status, patient still having slurred speech. Per EMS, they felt patient was having anxiety and this was her normal state while she is on anxiety. However, history of sun [CD]   46 Spoke with radiologist, he said patient has had multiple prior strokes and head CT looks chronic in nature. [CD]   [phone] with Dr. Matthew Watts, and he stated that he is less concerned about stroke due to patient's lethargy. [CD]   4631 CTA- was negative for any LVO  and patient stated that nothing was critical  [CD]      ED Course User Index  [CD] Phoenix Basurto PA-C         Disposition:  Discharge     PLAN:  1.       Medication List        ASK your doctor about these medications      * acetaminophen 500 MG tablet  Commonly known as: TYLENOL     * acetaminophen 325 MG tablet  Commonly known as: TYLENOL     amLODIPine 10 MG tablet  Commonly known as: NORVASC     atorvastatin 40 MG tablet  Commonly known as: LIPITOR     clopidogrel 75 MG tablet  Commonly known as: PLAVIX     cyanocobalamin 500 MCG tablet     divalproex 250 MG DR tablet  Commonly known as: DEPAKOTE     ergocalciferol 1.25 MG (26229 UT) capsule  Commonly known as: ERGOCALCIFEROL     ferrous sulfate 325 (65 Fe) MG tablet  Commonly known as: IRON 325     hydrALAZINE 100 MG tablet  Commonly known as: APRESOLINE     lisinopril 20 MG tablet  Commonly known as: PRINIVIL;ZESTRIL     OXcarbazepine 300 MG tablet  Commonly known as: TRILEPTAL     PARoxetine 30 MG tablet  Commonly known as: PAXIL     spironolactone 25 MG

## 2023-04-19 NOTE — ED TRIAGE NOTES
Pt presents to ED via EMS d/t the pt's family stating she is having an anxiety attack. Pt resting comfortably on stretcher. No distress noted.

## 2023-04-20 VITALS
WEIGHT: 99.3 LBS | RESPIRATION RATE: 18 BRPM | DIASTOLIC BLOOD PRESSURE: 79 MMHG | BODY MASS INDEX: 19.49 KG/M2 | TEMPERATURE: 98.5 F | SYSTOLIC BLOOD PRESSURE: 139 MMHG | HEART RATE: 92 BPM | OXYGEN SATURATION: 99 % | HEIGHT: 60 IN

## 2023-04-20 LAB
EKG ATRIAL RATE: 82 BPM
EKG DIAGNOSIS: NORMAL
EKG P AXIS: 26 DEGREES
EKG P-R INTERVAL: 130 MS
EKG Q-T INTERVAL: 370 MS
EKG QRS DURATION: 76 MS
EKG QTC CALCULATION (BAZETT): 432 MS
EKG R AXIS: -19 DEGREES
EKG T AXIS: 103 DEGREES
EKG VENTRICULAR RATE: 82 BPM

## 2023-04-20 PROCEDURE — 93010 ELECTROCARDIOGRAM REPORT: CPT | Performed by: INTERNAL MEDICINE

## 2023-04-20 NOTE — ED NOTES
[facility] here to pick pt up, and transport back home.    Pt Son felix been called about arrival.     Armen Lares, RN  04/20/23 126 \[facility]\"" ALYSSA Hernandez  04/20/23 1459

## 2023-05-16 ENCOUNTER — APPOINTMENT (OUTPATIENT)
Facility: HOSPITAL | Age: 68
End: 2023-05-16
Payer: MEDICARE

## 2023-05-16 ENCOUNTER — HOSPITAL ENCOUNTER (EMERGENCY)
Facility: HOSPITAL | Age: 68
Discharge: HOME | End: 2023-05-17
Attending: STUDENT IN AN ORGANIZED HEALTH CARE EDUCATION/TRAINING PROGRAM
Payer: MEDICARE

## 2023-05-16 DIAGNOSIS — N30.00 ACUTE CYSTITIS WITHOUT HEMATURIA: ICD-10-CM

## 2023-05-16 DIAGNOSIS — R07.9 CHEST PAIN, UNSPECIFIED TYPE: Primary | ICD-10-CM

## 2023-05-16 LAB
ALBUMIN SERPL-MCNC: 3 G/DL (ref 3.4–5)
ALBUMIN/GLOB SERPL: 1 (ref 0.8–1.7)
ALP SERPL-CCNC: 83 U/L (ref 45–117)
ALT SERPL-CCNC: 19 U/L (ref 13–56)
ANION GAP SERPL CALC-SCNC: 5 MMOL/L (ref 3–18)
AST SERPL-CCNC: 17 U/L (ref 10–38)
BASOPHILS # BLD: 0.1 K/UL (ref 0–0.1)
BASOPHILS NFR BLD: 1 % (ref 0–2)
BILIRUB SERPL-MCNC: 0.2 MG/DL (ref 0.2–1)
BUN SERPL-MCNC: 23 MG/DL (ref 7–18)
BUN/CREAT SERPL: 23 (ref 12–20)
CALCIUM SERPL-MCNC: 8.8 MG/DL (ref 8.5–10.1)
CHLORIDE SERPL-SCNC: 112 MMOL/L (ref 100–111)
CO2 SERPL-SCNC: 26 MMOL/L (ref 21–32)
CREAT SERPL-MCNC: 1.02 MG/DL (ref 0.6–1.3)
DIFFERENTIAL METHOD BLD: ABNORMAL
EOSINOPHIL # BLD: 0.1 K/UL (ref 0–0.4)
EOSINOPHIL NFR BLD: 1 % (ref 0–5)
ERYTHROCYTE [DISTWIDTH] IN BLOOD BY AUTOMATED COUNT: 14.6 % (ref 11.6–14.5)
GLOBULIN SER CALC-MCNC: 3.1 G/DL (ref 2–4)
GLUCOSE SERPL-MCNC: 121 MG/DL (ref 74–99)
HCT VFR BLD AUTO: 26.7 % (ref 35–45)
HGB BLD-MCNC: 8.8 G/DL (ref 12–16)
IMM GRANULOCYTES # BLD AUTO: 0 K/UL (ref 0–0.04)
IMM GRANULOCYTES NFR BLD AUTO: 0 % (ref 0–0.5)
LYMPHOCYTES # BLD: 1.8 K/UL (ref 0.9–3.6)
LYMPHOCYTES NFR BLD: 22 % (ref 21–52)
MCH RBC QN AUTO: 30.4 PG (ref 24–34)
MCHC RBC AUTO-ENTMCNC: 33 G/DL (ref 31–37)
MCV RBC AUTO: 92.4 FL (ref 78–100)
MONOCYTES # BLD: 0.8 K/UL (ref 0.05–1.2)
MONOCYTES NFR BLD: 9 % (ref 3–10)
NEUTS SEG # BLD: 5.5 K/UL (ref 1.8–8)
NEUTS SEG NFR BLD: 67 % (ref 40–73)
NRBC # BLD: 0 K/UL (ref 0–0.01)
NRBC BLD-RTO: 0 PER 100 WBC
PLATELET # BLD AUTO: 148 K/UL (ref 135–420)
PMV BLD AUTO: 11.3 FL (ref 9.2–11.8)
POTASSIUM SERPL-SCNC: 3.8 MMOL/L (ref 3.5–5.5)
PROT SERPL-MCNC: 6.1 G/DL (ref 6.4–8.2)
RBC # BLD AUTO: 2.89 M/UL (ref 4.2–5.3)
SODIUM SERPL-SCNC: 143 MMOL/L (ref 136–145)
TROPONIN I SERPL HS-MCNC: 52 NG/L (ref 0–54)
WBC # BLD AUTO: 8.2 K/UL (ref 4.6–13.2)

## 2023-05-16 PROCEDURE — 99285 EMERGENCY DEPT VISIT HI MDM: CPT

## 2023-05-16 PROCEDURE — 94761 N-INVAS EAR/PLS OXIMETRY MLT: CPT

## 2023-05-16 PROCEDURE — 93005 ELECTROCARDIOGRAM TRACING: CPT | Performed by: STUDENT IN AN ORGANIZED HEALTH CARE EDUCATION/TRAINING PROGRAM

## 2023-05-16 PROCEDURE — 85025 COMPLETE CBC W/AUTO DIFF WBC: CPT

## 2023-05-16 PROCEDURE — 84484 ASSAY OF TROPONIN QUANT: CPT

## 2023-05-16 PROCEDURE — 71045 X-RAY EXAM CHEST 1 VIEW: CPT

## 2023-05-16 PROCEDURE — 80053 COMPREHEN METABOLIC PANEL: CPT

## 2023-05-17 ENCOUNTER — APPOINTMENT (OUTPATIENT)
Facility: HOSPITAL | Age: 68
End: 2023-05-17
Payer: MEDICARE

## 2023-05-17 VITALS
SYSTOLIC BLOOD PRESSURE: 156 MMHG | DIASTOLIC BLOOD PRESSURE: 80 MMHG | RESPIRATION RATE: 17 BRPM | HEART RATE: 73 BPM | OXYGEN SATURATION: 100 % | TEMPERATURE: 98.5 F

## 2023-05-17 LAB
APPEARANCE UR: CLEAR
BACTERIA URNS QL MICRO: ABNORMAL /HPF
BILIRUB UR QL: NEGATIVE
COLOR UR: YELLOW
EKG ATRIAL RATE: 77 BPM
EKG DIAGNOSIS: NORMAL
EKG P AXIS: 63 DEGREES
EKG P-R INTERVAL: 132 MS
EKG Q-T INTERVAL: 366 MS
EKG QRS DURATION: 76 MS
EKG QTC CALCULATION (BAZETT): 414 MS
EKG R AXIS: 5 DEGREES
EKG T AXIS: 59 DEGREES
EKG VENTRICULAR RATE: 77 BPM
EPITH CASTS URNS QL MICRO: ABNORMAL /LPF (ref 0–5)
GLUCOSE UR STRIP.AUTO-MCNC: NEGATIVE MG/DL
HGB UR QL STRIP: NEGATIVE
KETONES UR QL STRIP.AUTO: NEGATIVE MG/DL
LEUKOCYTE ESTERASE UR QL STRIP.AUTO: ABNORMAL
NITRITE UR QL STRIP.AUTO: POSITIVE
PH UR STRIP: 7 (ref 5–8)
PROT UR STRIP-MCNC: NEGATIVE MG/DL
RBC #/AREA URNS HPF: NEGATIVE /HPF (ref 0–5)
SP GR UR REFRACTOMETRY: 1.02 (ref 1–1.03)
TROPONIN I SERPL HS-MCNC: 54 NG/L (ref 0–54)
TROPONIN I SERPL HS-MCNC: 56 NG/L (ref 0–54)
UROBILINOGEN UR QL STRIP.AUTO: 0.2 EU/DL (ref 0.2–1)
WBC URNS QL MICRO: ABNORMAL /HPF (ref 0–4)

## 2023-05-17 PROCEDURE — A4216 STERILE WATER/SALINE, 10 ML: HCPCS | Performed by: STUDENT IN AN ORGANIZED HEALTH CARE EDUCATION/TRAINING PROGRAM

## 2023-05-17 PROCEDURE — 6370000000 HC RX 637 (ALT 250 FOR IP): Performed by: STUDENT IN AN ORGANIZED HEALTH CARE EDUCATION/TRAINING PROGRAM

## 2023-05-17 PROCEDURE — 6360000004 HC RX CONTRAST MEDICATION: Performed by: STUDENT IN AN ORGANIZED HEALTH CARE EDUCATION/TRAINING PROGRAM

## 2023-05-17 PROCEDURE — 84484 ASSAY OF TROPONIN QUANT: CPT

## 2023-05-17 PROCEDURE — 71275 CT ANGIOGRAPHY CHEST: CPT

## 2023-05-17 PROCEDURE — 2580000003 HC RX 258: Performed by: STUDENT IN AN ORGANIZED HEALTH CARE EDUCATION/TRAINING PROGRAM

## 2023-05-17 PROCEDURE — 96374 THER/PROPH/DIAG INJ IV PUSH: CPT

## 2023-05-17 PROCEDURE — 2500000003 HC RX 250 WO HCPCS: Performed by: STUDENT IN AN ORGANIZED HEALTH CARE EDUCATION/TRAINING PROGRAM

## 2023-05-17 PROCEDURE — 6360000002 HC RX W HCPCS: Performed by: STUDENT IN AN ORGANIZED HEALTH CARE EDUCATION/TRAINING PROGRAM

## 2023-05-17 PROCEDURE — 81001 URINALYSIS AUTO W/SCOPE: CPT

## 2023-05-17 PROCEDURE — 93010 ELECTROCARDIOGRAM REPORT: CPT | Performed by: INTERNAL MEDICINE

## 2023-05-17 PROCEDURE — 96375 TX/PRO/DX INJ NEW DRUG ADDON: CPT

## 2023-05-17 RX ORDER — DICYCLOMINE HCL 20 MG
20 TABLET ORAL 4 TIMES DAILY PRN
Qty: 20 TABLET | Refills: 0 | Status: SHIPPED | OUTPATIENT
Start: 2023-05-17

## 2023-05-17 RX ORDER — CEPHALEXIN 500 MG/1
500 CAPSULE ORAL 2 TIMES DAILY
Qty: 14 CAPSULE | Refills: 0 | Status: SHIPPED | OUTPATIENT
Start: 2023-05-17

## 2023-05-17 RX ORDER — 0.9 % SODIUM CHLORIDE 0.9 %
1000 INTRAVENOUS SOLUTION INTRAVENOUS ONCE
Status: COMPLETED | OUTPATIENT
Start: 2023-05-17 | End: 2023-05-17

## 2023-05-17 RX ORDER — FAMOTIDINE 20 MG/1
20 TABLET, FILM COATED ORAL
Status: COMPLETED | OUTPATIENT
Start: 2023-05-17 | End: 2023-05-17

## 2023-05-17 RX ORDER — FAMOTIDINE 20 MG/1
20 TABLET, FILM COATED ORAL 2 TIMES DAILY
Qty: 90 TABLET | Refills: 1 | Status: SHIPPED | OUTPATIENT
Start: 2023-05-17

## 2023-05-17 RX ORDER — DICYCLOMINE HYDROCHLORIDE 10 MG/1
20 CAPSULE ORAL
Status: COMPLETED | OUTPATIENT
Start: 2023-05-17 | End: 2023-05-17

## 2023-05-17 RX ORDER — MORPHINE SULFATE 4 MG/ML
4 INJECTION, SOLUTION INTRAMUSCULAR; INTRAVENOUS
Status: COMPLETED | OUTPATIENT
Start: 2023-05-17 | End: 2023-05-17

## 2023-05-17 RX ADMIN — MORPHINE SULFATE 4 MG: 4 INJECTION INTRAVENOUS at 02:27

## 2023-05-17 RX ADMIN — ALUMINUM HYDROXIDE AND MAGNESIUM HYDROXIDE 30 ML: 200; 200 SUSPENSION ORAL at 04:19

## 2023-05-17 RX ADMIN — SODIUM CHLORIDE 1000 ML: 900 INJECTION, SOLUTION INTRAVENOUS at 04:27

## 2023-05-17 RX ADMIN — WATER 1000 MG: 1 INJECTION INTRAMUSCULAR; INTRAVENOUS; SUBCUTANEOUS at 05:44

## 2023-05-17 RX ADMIN — FAMOTIDINE 20 MG: 10 INJECTION INTRAVENOUS at 02:27

## 2023-05-17 RX ADMIN — IOPAMIDOL 85 ML: 755 INJECTION, SOLUTION INTRAVENOUS at 03:07

## 2023-05-17 RX ADMIN — FAMOTIDINE 20 MG: 20 TABLET ORAL at 04:19

## 2023-05-17 RX ADMIN — DICYCLOMINE HYDROCHLORIDE 20 MG: 10 CAPSULE ORAL at 04:19

## 2023-05-17 NOTE — ED NOTES
[facility]  arrived for transport home  Spoke with Sara Correa pts family who is home to receive patient.      Nasreen Hays RN  05/17/23 1016

## 2023-05-17 NOTE — ED TRIAGE NOTES
Patient arrives via EMS from home c/o chest pain x2 days. No SOB. EMS gave 1 nitro en route.    Patient is alertx4

## 2023-05-17 NOTE — ED PROVIDER NOTES
[facility] EMERGENCY DEPT  EMERGENCY DEPARTMENT ENCOUNTER      Pt Name: Medardo Shaw  MRN: [id number]  [place] 1955  Date of evaluation: 5/16/2023  Provider: Neftali Montgomery MD    CHIEF COMPLAINT       Chief Complaint   Patient presents with    Chest Pain         HISTORY OF PRESENT ILLNESS   (Location/Symptom, Timing/Onset, Context/Setting, Quality, Duration, Modifying Factors, Severity)  Note limiting factors. Medardo Shaw is a 70 y.o. female who presents to the emergency department who presents to the emergency part with chest pain radiating up into her right ear. States this started approximate 2 hours prior to arrival.  She also feels the pain rating to her back and down to her belly. Has not tried anything for the pain. Not sure makes it better or worse. Denies any associated shortness of breath. Denies any nausea or vomiting. States that she has not had pain like this before. Denies any new swelling in her lower extremities. Denies any history of blood clots or take any blood thinners but she is bedbound at baseline from previous strokes. Nursing Notes were reviewed. REVIEW OF SYSTEMS    (2-9 systems for level 4, 10 or more for level 5)     Constitutional: No fever  HENT: Positive for ear pain  Eyes: No change in vision  Respiratory: No SOB  Cardio: Positive chest pain  GI: No blood in stool  : No hematuria  MSK: No back pain  Skin: No rashes  Neuro: No headache    Except as noted above the remainder of the review of systems was reviewed and negative.        PAST MEDICAL HISTORY     Past Medical History:   Diagnosis Date    Abnormal coordination 9/21/2020    Depression 4/12/2022    Failure to thrive in adult 4/12/2022    Hallucinations 9/21/2020    Hypertension     Neurological disorder     Seizures ([facility] [address]     Stroke [facility]) 2009    Subclinical hypothyroidism 4/13/2022         SURGICAL HISTORY       Past Surgical History:   Procedure Laterality Date    PLACE PERCUT GASTROSTOMY TUBE  1/11/2022

## 2023-05-17 NOTE — DISCHARGE INSTRUCTIONS
All of your test here today were normal which means I do not have a great explanation for your pain. Could still be related to heartburn and therefore you be sent home with famotidine to start taking in addition to Bentyl as needed for pain. Recommend follow-up with GI however would still recommend following up with cardiology as well.

## 2023-05-17 NOTE — ED NOTES
Patient waiting for transport to take her home     Veda Jordan [facility]  05/17/23 9842

## 2023-07-12 ENCOUNTER — APPOINTMENT (OUTPATIENT)
Facility: HOSPITAL | Age: 68
End: 2023-07-12
Payer: MEDICARE

## 2023-07-12 ENCOUNTER — HOSPITAL ENCOUNTER (EMERGENCY)
Facility: HOSPITAL | Age: 68
Discharge: HOME | End: 2023-07-12
Attending: EMERGENCY MEDICINE
Payer: MEDICARE

## 2023-07-12 VITALS
WEIGHT: 100 LBS | OXYGEN SATURATION: 99 % | TEMPERATURE: 98.6 F | HEIGHT: 63 IN | RESPIRATION RATE: 12 BRPM | HEART RATE: 74 BPM | DIASTOLIC BLOOD PRESSURE: 63 MMHG | BODY MASS INDEX: 17.72 KG/M2 | SYSTOLIC BLOOD PRESSURE: 127 MMHG

## 2023-07-12 DIAGNOSIS — R10.9 ABDOMINAL PAIN, UNSPECIFIED ABDOMINAL LOCATION: ICD-10-CM

## 2023-07-12 DIAGNOSIS — R56.9 SEIZURE (HCC): Primary | ICD-10-CM

## 2023-07-12 LAB
ALBUMIN SERPL-MCNC: 3.3 G/DL (ref 3.4–5)
ALBUMIN/GLOB SERPL: 1.1 (ref 0.8–1.7)
ALP SERPL-CCNC: 85 U/L (ref 45–117)
ALT SERPL-CCNC: 17 U/L (ref 13–56)
ANION GAP SERPL CALC-SCNC: 4 MMOL/L (ref 3–18)
APPEARANCE UR: CLEAR
AST SERPL-CCNC: 14 U/L (ref 10–38)
BASOPHILS # BLD: 0 K/UL (ref 0–0.1)
BASOPHILS NFR BLD: 1 % (ref 0–2)
BILIRUB SERPL-MCNC: 0.3 MG/DL (ref 0.2–1)
BILIRUB UR QL: NEGATIVE
BUN SERPL-MCNC: 24 MG/DL (ref 7–18)
BUN/CREAT SERPL: 18 (ref 12–20)
CALCIUM SERPL-MCNC: 8.6 MG/DL (ref 8.5–10.1)
CHLORIDE SERPL-SCNC: 116 MMOL/L (ref 100–111)
CO2 SERPL-SCNC: 24 MMOL/L (ref 21–32)
COLOR UR: YELLOW
CREAT SERPL-MCNC: 1.36 MG/DL (ref 0.6–1.3)
DIFFERENTIAL METHOD BLD: ABNORMAL
EKG ATRIAL RATE: 96 BPM
EKG DIAGNOSIS: NORMAL
EKG P AXIS: 56 DEGREES
EKG P-R INTERVAL: 146 MS
EKG Q-T INTERVAL: 354 MS
EKG QRS DURATION: 92 MS
EKG QTC CALCULATION (BAZETT): 447 MS
EKG R AXIS: -16 DEGREES
EKG T AXIS: 142 DEGREES
EKG VENTRICULAR RATE: 96 BPM
EOSINOPHIL # BLD: 0.1 K/UL (ref 0–0.4)
EOSINOPHIL NFR BLD: 1 % (ref 0–5)
ERYTHROCYTE [DISTWIDTH] IN BLOOD BY AUTOMATED COUNT: 14.8 % (ref 11.6–14.5)
GLOBULIN SER CALC-MCNC: 3 G/DL (ref 2–4)
GLUCOSE SERPL-MCNC: 103 MG/DL (ref 74–99)
GLUCOSE UR STRIP.AUTO-MCNC: NEGATIVE MG/DL
HCT VFR BLD AUTO: 29.6 % (ref 35–45)
HGB BLD-MCNC: 9.6 G/DL (ref 12–16)
HGB UR QL STRIP: NEGATIVE
IMM GRANULOCYTES # BLD AUTO: 0 K/UL (ref 0–0.04)
IMM GRANULOCYTES NFR BLD AUTO: 0 % (ref 0–0.5)
KETONES UR QL STRIP.AUTO: NEGATIVE MG/DL
LEUKOCYTE ESTERASE UR QL STRIP.AUTO: NEGATIVE
LIPASE SERPL-CCNC: 188 U/L (ref 73–393)
LYMPHOCYTES # BLD: 3.7 K/UL (ref 0.9–3.6)
LYMPHOCYTES NFR BLD: 41 % (ref 21–52)
MCH RBC QN AUTO: 30.5 PG (ref 24–34)
MCHC RBC AUTO-ENTMCNC: 32.4 G/DL (ref 31–37)
MCV RBC AUTO: 94 FL (ref 78–100)
MONOCYTES # BLD: 0.7 K/UL (ref 0.05–1.2)
MONOCYTES NFR BLD: 8 % (ref 3–10)
NEUTS SEG # BLD: 4.4 K/UL (ref 1.8–8)
NEUTS SEG NFR BLD: 49 % (ref 40–73)
NITRITE UR QL STRIP.AUTO: NEGATIVE
NRBC # BLD: 0 K/UL (ref 0–0.01)
NRBC BLD-RTO: 0 PER 100 WBC
PH UR STRIP: 6 (ref 5–8)
PLATELET # BLD AUTO: 161 K/UL (ref 135–420)
PMV BLD AUTO: 11.9 FL (ref 9.2–11.8)
POTASSIUM SERPL-SCNC: 4 MMOL/L (ref 3.5–5.5)
PROT SERPL-MCNC: 6.3 G/DL (ref 6.4–8.2)
PROT UR STRIP-MCNC: NEGATIVE MG/DL
RBC # BLD AUTO: 3.15 M/UL (ref 4.2–5.3)
SODIUM SERPL-SCNC: 144 MMOL/L (ref 136–145)
SP GR UR REFRACTOMETRY: 1.01 (ref 1–1.03)
TROPONIN I SERPL HS-MCNC: 52 NG/L (ref 0–54)
TROPONIN I SERPL HS-MCNC: 61 NG/L (ref 0–54)
UROBILINOGEN UR QL STRIP.AUTO: 0.2 EU/DL (ref 0.2–1)
WBC # BLD AUTO: 8.9 K/UL (ref 4.6–13.2)

## 2023-07-12 PROCEDURE — 96374 THER/PROPH/DIAG INJ IV PUSH: CPT

## 2023-07-12 PROCEDURE — 93010 ELECTROCARDIOGRAM REPORT: CPT | Performed by: INTERNAL MEDICINE

## 2023-07-12 PROCEDURE — 83690 ASSAY OF LIPASE: CPT

## 2023-07-12 PROCEDURE — 6360000002 HC RX W HCPCS: Performed by: EMERGENCY MEDICINE

## 2023-07-12 PROCEDURE — 85025 COMPLETE CBC W/AUTO DIFF WBC: CPT

## 2023-07-12 PROCEDURE — 99285 EMERGENCY DEPT VISIT HI MDM: CPT

## 2023-07-12 PROCEDURE — 81003 URINALYSIS AUTO W/O SCOPE: CPT

## 2023-07-12 PROCEDURE — 74177 CT ABD & PELVIS W/CONTRAST: CPT

## 2023-07-12 PROCEDURE — 70450 CT HEAD/BRAIN W/O DYE: CPT

## 2023-07-12 PROCEDURE — 84484 ASSAY OF TROPONIN QUANT: CPT

## 2023-07-12 PROCEDURE — 71045 X-RAY EXAM CHEST 1 VIEW: CPT

## 2023-07-12 PROCEDURE — 6360000004 HC RX CONTRAST MEDICATION: Performed by: EMERGENCY MEDICINE

## 2023-07-12 PROCEDURE — 80053 COMPREHEN METABOLIC PANEL: CPT

## 2023-07-12 PROCEDURE — 93005 ELECTROCARDIOGRAM TRACING: CPT | Performed by: EMERGENCY MEDICINE

## 2023-07-12 PROCEDURE — 2580000003 HC RX 258: Performed by: EMERGENCY MEDICINE

## 2023-07-12 RX ORDER — ONDANSETRON 2 MG/ML
4 INJECTION INTRAMUSCULAR; INTRAVENOUS
Status: COMPLETED | OUTPATIENT
Start: 2023-07-12 | End: 2023-07-12

## 2023-07-12 RX ORDER — 0.9 % SODIUM CHLORIDE 0.9 %
1000 INTRAVENOUS SOLUTION INTRAVENOUS ONCE
Status: COMPLETED | OUTPATIENT
Start: 2023-07-12 | End: 2023-07-12

## 2023-07-12 RX ADMIN — IOPAMIDOL 70 ML: 612 INJECTION, SOLUTION INTRAVENOUS at 07:36

## 2023-07-12 RX ADMIN — ONDANSETRON 4 MG: 2 INJECTION INTRAMUSCULAR; INTRAVENOUS at 03:55

## 2023-07-12 RX ADMIN — SODIUM CHLORIDE 1000 ML: 9 INJECTION, SOLUTION INTRAVENOUS at 04:51

## 2023-07-12 NOTE — ED NOTES
PATIENT REVIEWED AND DISCHARGE FOR HOME, SON CONTACTED, AND ASKED HOW HE WOULD WANT PATIENT TO BE TRANSPORTED, PHE VERBALIZED THAT HE DOESN'T DRIVE AT THIS TIME. TRANSPORTATION BEING ARRIVED FOR PATIENT TO GET HOME, SON MAMADOU [phone] VERBALIZED THAT HE WILL BE AT HOME TO RECEIVE HER. PATIENT IV ACCESS REMOVED, PATIENT PLACED IN DISPOSABLE SCRUBS, AWAITING TRANSPORT FOR HOME.  DISCHARGE PAPER GIVEN      John Somers RN  07/12/23 501 Lisa White RN  07/12/23 3380

## 2023-07-12 NOTE — ED NOTES
TRANSPORT PRESENT AT THIS TIME, HAND OVER REPORT GIVEN TO TEAM. PATIENT PROPERTIES. PATIENT LEFT DEPT FOR HOME, NO COMPLAINT AT THIS TIME.       Jaquelin Dowell, ALYSSA  07/12/23 2252

## 2023-07-12 NOTE — ED NOTES
PATIENT DIAPER CHANGED, PERINEAL CARE DONE.  PATIENT LEFT RESTING      Shiva Menjivar RN  07/12/23 0922

## 2023-07-12 NOTE — ED NOTES
PATIENT RETURN FROM CT AND REATTACHED TO MONITOR, PATIENT AWAKE AND SPEECH IS SLOW. PATIENT HAS 20G PERIPHERAL IV TO RIGHT HAND, PATIENT NURSED IN DIAPER. PATIENT ASKING FOR SON AT THIS TIME, PATIENT INFORMED THAT SON IS NOTE IN WAITING ROOM.  PATIENT SATURATED IN URINE, PATIENT HAD PERINEAL CARE DONE AND LEFT RESTING IN BED     Will Cuello RN  07/12/23 2233

## 2023-07-12 NOTE — ACP (ADVANCE CARE PLANNING)
Advance Care Planning     Advance Care Planning Inpatient Note  [facility] Department    Today's Date: 7/12/2023  Unit: [facility] EMERGENCY DEPT    Received request from . Upon review of chart and communication with care team, patient's decision making abilities are not in question. . Patient was/were present in the room during visit. Goals of ACP Conversation:  Discuss advance care planning documents    Health Care Decision Makers:       Secondary Decision Maker: Kanika Kuo Child - [phone]  Summary:  No Decision Maker named by patient at this time    Advance Care Planning Documents (Patient Wishes):  POLST Document     Assessment:  Patient seen as a new admit to the hospital from bed 1 of the emergency room . Patient is not responsive at this time and unable to communicate now. There is a POST form present on file.     Interventions:      Care Preferences Communicated:   No    Outcomes/Plan:      Electronically signed by Renan Fitzgerald., [facility] on 7/12/2023 at 10:54 AM

## 2023-07-12 NOTE — ED NOTES
6: 30 a.m.:Pt care assumed from Dr. Susan Martin , ED provider. Pt complaint(s), current treatment plan, progression and available diagnostic results have been discussed thoroughly. The patient was seen and evaluated on my shift. Rounding occurred: Yes  Intended Disposition: Home  Pending diagnostic reports and/or labs (please list): CT abd/pelvis, U/A    CT ABDOMEN PELVIS W IV CONTRAST Additional Contrast? None   Final Result      1. No acute findings along the gastrointestinal tract. 2.  Subcentimeter renal cortical hypodensities, favor renal cysts. No   postobstructive changes. XR CHEST PORTABLE   Final Result   1. No acute cardiopulmonary process. CT HEAD WO CONTRAST   Final Result      No clearly acute intracranial findings. Right parietal encephalomalacia similar to prior.   -Moderate burden of periventricular likely chronic microvascular ischemic change   similar to prior.   -Suspected remote basal ganglia region lacunar infarcts. 8:15 AM Upon re-evaluation the patient's symptoms have improved. Pt has non-toxic appearance and condition is stable for discharge. She was informed of her results, instructed to f/u with her PCP and return to the ED upon worsening of symptoms. All questions and concerns were addressed. Diagnosis:   1.  Seizure ([address])    2. Abdominal pain, unspecified abdominal location          Disposition:    DISPOSITION      Home or Self Care     [unfilled]         Jennifer Luna,   07/12/23 1340

## 2023-07-20 ENCOUNTER — APPOINTMENT (OUTPATIENT)
Facility: HOSPITAL | Age: 68
DRG: 871 | End: 2023-07-20
Payer: MEDICARE

## 2023-07-20 ENCOUNTER — HOSPITAL ENCOUNTER (INPATIENT)
Facility: HOSPITAL | Age: 68
LOS: 4 days | Discharge: HOME HEALTH | DRG: 871 | End: 2023-07-24
Attending: EMERGENCY MEDICINE | Admitting: INTERNAL MEDICINE
Payer: MEDICARE

## 2023-07-20 DIAGNOSIS — A41.9 SEVERE SEPSIS (HCC): ICD-10-CM

## 2023-07-20 DIAGNOSIS — K59.00 CONSTIPATION, UNSPECIFIED CONSTIPATION TYPE: ICD-10-CM

## 2023-07-20 DIAGNOSIS — R77.8 ELEVATED TROPONIN: ICD-10-CM

## 2023-07-20 DIAGNOSIS — J69.0 ASPIRATION PNEUMONIA OF BOTH LOWER LOBES, UNSPECIFIED ASPIRATION PNEUMONIA TYPE (HCC): ICD-10-CM

## 2023-07-20 DIAGNOSIS — R65.20 SEVERE SEPSIS (HCC): ICD-10-CM

## 2023-07-20 DIAGNOSIS — R56.9 SEIZURE (HCC): Primary | ICD-10-CM

## 2023-07-20 PROBLEM — G40.909 RECURRENT SEIZURES (HCC): Status: ACTIVE | Noted: 2020-09-08

## 2023-07-20 LAB
ANION GAP SERPL CALC-SCNC: 7 MMOL/L (ref 3–18)
APPEARANCE UR: CLEAR
BASOPHILS # BLD: 0 K/UL (ref 0–0.1)
BASOPHILS NFR BLD: 0 % (ref 0–2)
BILIRUB UR QL: NEGATIVE
BUN SERPL-MCNC: 15 MG/DL (ref 7–18)
BUN/CREAT SERPL: 13 (ref 12–20)
CALCIUM SERPL-MCNC: 9.4 MG/DL (ref 8.5–10.1)
CHLORIDE SERPL-SCNC: 111 MMOL/L (ref 100–111)
CO2 SERPL-SCNC: 24 MMOL/L (ref 21–32)
COLOR UR: YELLOW
CREAT SERPL-MCNC: 1.14 MG/DL (ref 0.6–1.3)
DIFFERENTIAL METHOD BLD: ABNORMAL
EKG ATRIAL RATE: 99 BPM
EKG DIAGNOSIS: NORMAL
EKG P AXIS: 27 DEGREES
EKG P-R INTERVAL: 142 MS
EKG Q-T INTERVAL: 306 MS
EKG QRS DURATION: 74 MS
EKG QTC CALCULATION (BAZETT): 392 MS
EKG R AXIS: -38 DEGREES
EKG T AXIS: 128 DEGREES
EKG VENTRICULAR RATE: 99 BPM
EOSINOPHIL # BLD: 0 K/UL (ref 0–0.4)
EOSINOPHIL NFR BLD: 0 % (ref 0–5)
ERYTHROCYTE [DISTWIDTH] IN BLOOD BY AUTOMATED COUNT: 14.6 % (ref 11.6–14.5)
GLUCOSE BLD STRIP.AUTO-MCNC: 83 MG/DL (ref 70–110)
GLUCOSE SERPL-MCNC: 111 MG/DL (ref 74–99)
GLUCOSE UR STRIP.AUTO-MCNC: NEGATIVE MG/DL
HCT VFR BLD AUTO: 33.6 % (ref 35–45)
HGB BLD-MCNC: 10.9 G/DL (ref 12–16)
HGB UR QL STRIP: NEGATIVE
IMM GRANULOCYTES # BLD AUTO: 0 K/UL (ref 0–0.04)
IMM GRANULOCYTES NFR BLD AUTO: 0 % (ref 0–0.5)
KETONES UR QL STRIP.AUTO: NEGATIVE MG/DL
LACTATE SERPL-SCNC: 1.4 MMOL/L (ref 0.4–2)
LACTATE SERPL-SCNC: 3.5 MMOL/L (ref 0.4–2)
LEUKOCYTE ESTERASE UR QL STRIP.AUTO: NEGATIVE
LYMPHOCYTES # BLD: 1.4 K/UL (ref 0.9–3.6)
LYMPHOCYTES NFR BLD: 10 % (ref 21–52)
MAGNESIUM SERPL-MCNC: 1.9 MG/DL (ref 1.6–2.6)
MCH RBC QN AUTO: 30.6 PG (ref 24–34)
MCHC RBC AUTO-ENTMCNC: 32.4 G/DL (ref 31–37)
MCV RBC AUTO: 94.4 FL (ref 78–100)
MONOCYTES # BLD: 1.2 K/UL (ref 0.05–1.2)
MONOCYTES NFR BLD: 8 % (ref 3–10)
NEUTS SEG # BLD: 11.6 K/UL (ref 1.8–8)
NEUTS SEG NFR BLD: 81 % (ref 40–73)
NITRITE UR QL STRIP.AUTO: NEGATIVE
NRBC # BLD: 0 K/UL (ref 0–0.01)
NRBC BLD-RTO: 0 PER 100 WBC
PH UR STRIP: 7.5 (ref 5–8)
PLATELET # BLD AUTO: 183 K/UL (ref 135–420)
PMV BLD AUTO: 11.2 FL (ref 9.2–11.8)
POTASSIUM SERPL-SCNC: 4.2 MMOL/L (ref 3.5–5.5)
PROCALCITONIN SERPL-MCNC: <0.05 NG/ML
PROT UR STRIP-MCNC: NEGATIVE MG/DL
RBC # BLD AUTO: 3.56 M/UL (ref 4.2–5.3)
SODIUM SERPL-SCNC: 142 MMOL/L (ref 136–145)
SP GR UR REFRACTOMETRY: 1.01 (ref 1–1.03)
TROPONIN I SERPL HS-MCNC: 66 NG/L (ref 0–54)
TROPONIN I SERPL HS-MCNC: 77 NG/L (ref 0–54)
UROBILINOGEN UR QL STRIP.AUTO: 0.2 EU/DL (ref 0.2–1)
VALPROATE SERPL-MCNC: 43 UG/ML (ref 50–100)
WBC # BLD AUTO: 14.3 K/UL (ref 4.6–13.2)

## 2023-07-20 PROCEDURE — 80183 DRUG SCRN QUANT OXCARBAZEPIN: CPT

## 2023-07-20 PROCEDURE — 96365 THER/PROPH/DIAG IV INF INIT: CPT

## 2023-07-20 PROCEDURE — 81003 URINALYSIS AUTO W/O SCOPE: CPT

## 2023-07-20 PROCEDURE — 6360000002 HC RX W HCPCS: Performed by: PHYSICIAN ASSISTANT

## 2023-07-20 PROCEDURE — 93005 ELECTROCARDIOGRAM TRACING: CPT | Performed by: PHYSICIAN ASSISTANT

## 2023-07-20 PROCEDURE — 1100000003 HC PRIVATE W/ TELEMETRY

## 2023-07-20 PROCEDURE — 83605 ASSAY OF LACTIC ACID: CPT

## 2023-07-20 PROCEDURE — 85025 COMPLETE CBC W/AUTO DIFF WBC: CPT

## 2023-07-20 PROCEDURE — 70450 CT HEAD/BRAIN W/O DYE: CPT

## 2023-07-20 PROCEDURE — 2580000003 HC RX 258: Performed by: INTERNAL MEDICINE

## 2023-07-20 PROCEDURE — 83735 ASSAY OF MAGNESIUM: CPT

## 2023-07-20 PROCEDURE — 71045 X-RAY EXAM CHEST 1 VIEW: CPT

## 2023-07-20 PROCEDURE — 84484 ASSAY OF TROPONIN QUANT: CPT

## 2023-07-20 PROCEDURE — 71260 CT THORAX DX C+: CPT

## 2023-07-20 PROCEDURE — 6370000000 HC RX 637 (ALT 250 FOR IP): Performed by: PHYSICIAN ASSISTANT

## 2023-07-20 PROCEDURE — 96375 TX/PRO/DX INJ NEW DRUG ADDON: CPT

## 2023-07-20 PROCEDURE — 6360000004 HC RX CONTRAST MEDICATION: Performed by: PHYSICIAN ASSISTANT

## 2023-07-20 PROCEDURE — 80164 ASSAY DIPROPYLACETIC ACD TOT: CPT

## 2023-07-20 PROCEDURE — 93010 ELECTROCARDIOGRAM REPORT: CPT | Performed by: INTERNAL MEDICINE

## 2023-07-20 PROCEDURE — 87040 BLOOD CULTURE FOR BACTERIA: CPT

## 2023-07-20 PROCEDURE — 99285 EMERGENCY DEPT VISIT HI MDM: CPT

## 2023-07-20 PROCEDURE — 82962 GLUCOSE BLOOD TEST: CPT

## 2023-07-20 PROCEDURE — 99221 1ST HOSP IP/OBS SF/LOW 40: CPT | Performed by: STUDENT IN AN ORGANIZED HEALTH CARE EDUCATION/TRAINING PROGRAM

## 2023-07-20 PROCEDURE — 96360 HYDRATION IV INFUSION INIT: CPT

## 2023-07-20 PROCEDURE — 80048 BASIC METABOLIC PNL TOTAL CA: CPT

## 2023-07-20 PROCEDURE — 2580000003 HC RX 258: Performed by: PHYSICIAN ASSISTANT

## 2023-07-20 PROCEDURE — 96361 HYDRATE IV INFUSION ADD-ON: CPT

## 2023-07-20 PROCEDURE — 6360000002 HC RX W HCPCS: Performed by: INTERNAL MEDICINE

## 2023-07-20 PROCEDURE — 96366 THER/PROPH/DIAG IV INF ADDON: CPT

## 2023-07-20 PROCEDURE — 84145 PROCALCITONIN (PCT): CPT

## 2023-07-20 PROCEDURE — 99223 1ST HOSP IP/OBS HIGH 75: CPT | Performed by: PHYSICIAN ASSISTANT

## 2023-07-20 RX ORDER — ATORVASTATIN CALCIUM 40 MG/1
40 TABLET, FILM COATED ORAL NIGHTLY
Status: DISCONTINUED | OUTPATIENT
Start: 2023-07-21 | End: 2023-07-24 | Stop reason: HOSPADM

## 2023-07-20 RX ORDER — OXCARBAZEPINE 300 MG/1
300 TABLET, FILM COATED ORAL 2 TIMES DAILY
Status: DISCONTINUED | OUTPATIENT
Start: 2023-07-20 | End: 2023-07-20

## 2023-07-20 RX ORDER — ACETAMINOPHEN 650 MG/1
650 SUPPOSITORY RECTAL EVERY 6 HOURS PRN
Status: DISCONTINUED | OUTPATIENT
Start: 2023-07-20 | End: 2023-07-24 | Stop reason: HOSPADM

## 2023-07-20 RX ORDER — DIVALPROEX SODIUM 125 MG/1
1000 CAPSULE, COATED PELLETS ORAL 2 TIMES DAILY
Status: DISCONTINUED | OUTPATIENT
Start: 2023-07-21 | End: 2023-07-24 | Stop reason: HOSPADM

## 2023-07-20 RX ORDER — ACETAMINOPHEN 325 MG/1
650 TABLET ORAL
Status: COMPLETED | OUTPATIENT
Start: 2023-07-20 | End: 2023-07-20

## 2023-07-20 RX ORDER — FAMOTIDINE 20 MG/1
20 TABLET, FILM COATED ORAL DAILY
Status: DISCONTINUED | OUTPATIENT
Start: 2023-07-21 | End: 2023-07-24 | Stop reason: HOSPADM

## 2023-07-20 RX ORDER — SENNA AND DOCUSATE SODIUM 50; 8.6 MG/1; MG/1
2 TABLET, FILM COATED ORAL DAILY
Status: DISCONTINUED | OUTPATIENT
Start: 2023-07-20 | End: 2023-07-24 | Stop reason: HOSPADM

## 2023-07-20 RX ORDER — OXCARBAZEPINE 300 MG/1
300 TABLET, FILM COATED ORAL 2 TIMES DAILY
Status: DISCONTINUED | OUTPATIENT
Start: 2023-07-20 | End: 2023-07-24 | Stop reason: HOSPADM

## 2023-07-20 RX ORDER — SODIUM CHLORIDE 9 MG/ML
INJECTION, SOLUTION INTRAVENOUS PRN
Status: DISCONTINUED | OUTPATIENT
Start: 2023-07-20 | End: 2023-07-24 | Stop reason: HOSPADM

## 2023-07-20 RX ORDER — CLOPIDOGREL BISULFATE 75 MG/1
75 TABLET ORAL DAILY
Status: DISCONTINUED | OUTPATIENT
Start: 2023-07-21 | End: 2023-07-24 | Stop reason: HOSPADM

## 2023-07-20 RX ORDER — SODIUM CHLORIDE, SODIUM LACTATE, POTASSIUM CHLORIDE, AND CALCIUM CHLORIDE .6; .31; .03; .02 G/100ML; G/100ML; G/100ML; G/100ML
30 INJECTION, SOLUTION INTRAVENOUS ONCE
Status: COMPLETED | OUTPATIENT
Start: 2023-07-20 | End: 2023-07-20

## 2023-07-20 RX ORDER — CLINDAMYCIN PHOSPHATE 600 MG/50ML
600 INJECTION, SOLUTION INTRAVENOUS
Status: COMPLETED | OUTPATIENT
Start: 2023-07-20 | End: 2023-07-20

## 2023-07-20 RX ORDER — ONDANSETRON 4 MG/1
4 TABLET, ORALLY DISINTEGRATING ORAL EVERY 8 HOURS PRN
Status: DISCONTINUED | OUTPATIENT
Start: 2023-07-20 | End: 2023-07-24 | Stop reason: HOSPADM

## 2023-07-20 RX ORDER — DIVALPROEX SODIUM 250 MG/1
250 TABLET, DELAYED RELEASE ORAL EVERY 8 HOURS SCHEDULED
Status: DISCONTINUED | OUTPATIENT
Start: 2023-07-20 | End: 2023-07-20

## 2023-07-20 RX ORDER — LISINOPRIL 20 MG/1
20 TABLET ORAL DAILY
COMMUNITY
Start: 2023-07-13

## 2023-07-20 RX ORDER — PAROXETINE HYDROCHLORIDE 20 MG/1
30 TABLET, FILM COATED ORAL DAILY
Status: DISCONTINUED | OUTPATIENT
Start: 2023-07-21 | End: 2023-07-24 | Stop reason: HOSPADM

## 2023-07-20 RX ORDER — BISACODYL 10 MG
10 SUPPOSITORY, RECTAL RECTAL DAILY PRN
Status: DISCONTINUED | OUTPATIENT
Start: 2023-07-20 | End: 2023-07-24 | Stop reason: HOSPADM

## 2023-07-20 RX ORDER — POLYETHYLENE GLYCOL 3350 17 G/17G
17 POWDER, FOR SOLUTION ORAL DAILY PRN
Status: DISCONTINUED | OUTPATIENT
Start: 2023-07-20 | End: 2023-07-24 | Stop reason: HOSPADM

## 2023-07-20 RX ORDER — ENOXAPARIN SODIUM 100 MG/ML
30 INJECTION SUBCUTANEOUS DAILY
Status: DISCONTINUED | OUTPATIENT
Start: 2023-07-20 | End: 2023-07-24 | Stop reason: HOSPADM

## 2023-07-20 RX ORDER — CLINDAMYCIN PHOSPHATE 600 MG/50ML
600 INJECTION, SOLUTION INTRAVENOUS
Status: DISCONTINUED | OUTPATIENT
Start: 2023-07-20 | End: 2023-07-20

## 2023-07-20 RX ORDER — ONDANSETRON 2 MG/ML
4 INJECTION INTRAMUSCULAR; INTRAVENOUS EVERY 6 HOURS PRN
Status: DISCONTINUED | OUTPATIENT
Start: 2023-07-20 | End: 2023-07-24 | Stop reason: HOSPADM

## 2023-07-20 RX ORDER — AMLODIPINE BESYLATE 10 MG/1
10 TABLET ORAL DAILY
Status: DISCONTINUED | OUTPATIENT
Start: 2023-07-21 | End: 2023-07-24 | Stop reason: HOSPADM

## 2023-07-20 RX ORDER — ACETAMINOPHEN 325 MG/1
650 TABLET ORAL EVERY 6 HOURS PRN
Status: DISCONTINUED | OUTPATIENT
Start: 2023-07-20 | End: 2023-07-24 | Stop reason: HOSPADM

## 2023-07-20 RX ORDER — DIVALPROEX SODIUM 125 MG/1
750 CAPSULE, COATED PELLETS ORAL 2 TIMES DAILY
Status: DISCONTINUED | OUTPATIENT
Start: 2023-07-20 | End: 2023-07-20

## 2023-07-20 RX ORDER — SODIUM CHLORIDE 0.9 % (FLUSH) 0.9 %
5-40 SYRINGE (ML) INJECTION PRN
Status: DISCONTINUED | OUTPATIENT
Start: 2023-07-20 | End: 2023-07-24 | Stop reason: HOSPADM

## 2023-07-20 RX ORDER — LORAZEPAM 2 MG/ML
2 INJECTION INTRAMUSCULAR EVERY 5 MIN PRN
Status: DISCONTINUED | OUTPATIENT
Start: 2023-07-20 | End: 2023-07-24 | Stop reason: HOSPADM

## 2023-07-20 RX ORDER — METRONIDAZOLE 500 MG/100ML
500 INJECTION, SOLUTION INTRAVENOUS EVERY 12 HOURS
Status: DISCONTINUED | OUTPATIENT
Start: 2023-07-20 | End: 2023-07-24 | Stop reason: HOSPADM

## 2023-07-20 RX ORDER — HYDRALAZINE HYDROCHLORIDE 100 MG/1
100 TABLET, FILM COATED ORAL 2 TIMES DAILY
COMMUNITY
Start: 2023-07-18

## 2023-07-20 RX ORDER — SODIUM CHLORIDE 0.9 % (FLUSH) 0.9 %
5-40 SYRINGE (ML) INJECTION EVERY 12 HOURS SCHEDULED
Status: DISCONTINUED | OUTPATIENT
Start: 2023-07-20 | End: 2023-07-24 | Stop reason: HOSPADM

## 2023-07-20 RX ADMIN — SODIUM CHLORIDE, POTASSIUM CHLORIDE, SODIUM LACTATE AND CALCIUM CHLORIDE 1776 ML: 600; 310; 30; 20 INJECTION, SOLUTION INTRAVENOUS at 10:58

## 2023-07-20 RX ADMIN — CLINDAMYCIN PHOSPHATE 600 MG: 600 INJECTION, SOLUTION INTRAVENOUS at 13:25

## 2023-07-20 RX ADMIN — ACETAMINOPHEN 325MG 650 MG: 325 TABLET ORAL at 17:13

## 2023-07-20 RX ADMIN — DIVALPROEX SODIUM 750 MG: 125 CAPSULE, COATED PELLETS ORAL at 21:19

## 2023-07-20 RX ADMIN — OXCARBAZEPINE 300 MG: 300 TABLET, FILM COATED ORAL at 11:23

## 2023-07-20 RX ADMIN — ENOXAPARIN SODIUM 30 MG: 100 INJECTION SUBCUTANEOUS at 14:49

## 2023-07-20 RX ADMIN — WATER 1000 MG: 1 INJECTION INTRAMUSCULAR; INTRAVENOUS; SUBCUTANEOUS at 18:06

## 2023-07-20 RX ADMIN — ACETAMINOPHEN 325MG 650 MG: 325 TABLET ORAL at 11:23

## 2023-07-20 RX ADMIN — METRONIDAZOLE 500 MG: 500 INJECTION, SOLUTION INTRAVENOUS at 18:05

## 2023-07-20 RX ADMIN — VANCOMYCIN HYDROCHLORIDE 1250 MG: 10 INJECTION, POWDER, LYOPHILIZED, FOR SOLUTION INTRAVENOUS at 10:57

## 2023-07-20 RX ADMIN — OXCARBAZEPINE 300 MG: 300 TABLET, FILM COATED ORAL at 21:19

## 2023-07-20 RX ADMIN — DOCUSATE SODIUM 50MG AND SENNOSIDES 8.6MG 2 TABLET: 8.6; 5 TABLET, FILM COATED ORAL at 21:22

## 2023-07-20 RX ADMIN — SODIUM CHLORIDE, PRESERVATIVE FREE 10 ML: 5 INJECTION INTRAVENOUS at 21:19

## 2023-07-20 RX ADMIN — CEFEPIME 2000 MG: 2 INJECTION, POWDER, FOR SOLUTION INTRAVENOUS at 10:57

## 2023-07-20 RX ADMIN — IOPAMIDOL 80 ML: 612 INJECTION, SOLUTION INTRAVENOUS at 12:17

## 2023-07-20 RX ADMIN — VANCOMYCIN HYDROCHLORIDE 750 MG: 750 INJECTION, POWDER, LYOPHILIZED, FOR SOLUTION INTRAVENOUS at 23:49

## 2023-07-20 ASSESSMENT — ENCOUNTER SYMPTOMS
VOMITING: 0
ABDOMINAL PAIN: 1
SHORTNESS OF BREATH: 0
NAUSEA: 0

## 2023-07-20 ASSESSMENT — PAIN DESCRIPTION - LOCATION: LOCATION: HEAD

## 2023-07-20 ASSESSMENT — PAIN - FUNCTIONAL ASSESSMENT: PAIN_FUNCTIONAL_ASSESSMENT: NONE - DENIES PAIN

## 2023-07-20 ASSESSMENT — PAIN SCALES - GENERAL: PAINLEVEL_OUTOF10: 6

## 2023-07-20 NOTE — ED NOTES
Pt placed in gown and left in position of most comfort with call  bell within reach. Bed in lowest position with both rails up for pt safety.  Pt given warm blankets per request for comfort, Nurse at bedside for furhter assessment

## 2023-07-20 NOTE — ED NOTES
Report called to [address] [place] [address] and given to General [employer], RN  07/20/23 3371

## 2023-07-20 NOTE — H&P
7/13/23   Historical Provider, MD   hydrALAZINE (APRESOLINE) 100 MG tablet Take 1 tablet by mouth in the morning and at bedtime 7/18/23   Historical Provider, MD   dicyclomine (BENTYL) 20 MG tablet Take 1 tablet by mouth 4 times daily as needed (pain) 5/17/23   Natividad Gonzalez MD   famotidine (PEPCID) 20 MG tablet Take 1 tablet by mouth 2 times daily 5/17/23   Natividad Gonzalez MD   acetaminophen (TYLENOL) 325 MG tablet Take 650 mg by mouth every 4 hours as needed 7/14/22   Ar Automatic Reconciliation   acetaminophen (TYLENOL) 500 MG tablet Take 500 mg by mouth every 6 hours as needed    Ar Automatic Reconciliation   amLODIPine (NORVASC) 10 MG tablet Take 10 mg by mouth daily    Ar Automatic Reconciliation   atorvastatin (LIPITOR) 40 MG tablet Take 40 mg by mouth daily    Ar Automatic Reconciliation   clopidogrel (PLAVIX) 75 MG tablet Take 75 mg by mouth daily    Ar Automatic Reconciliation   cyanocobalamin 500 MCG tablet Take 500 mcg by mouth daily 3/13/14   Ar Automatic Reconciliation   divalproex (DEPAKOTE SPRINKLE) 125 MG DR capsule Take 6 capsules by mouth in the morning and at bedtime    Ar Automatic Reconciliation   ergocalciferol (ERGOCALCIFEROL) 1.25 MG (28841 UT) capsule Take 50,000 Units by mouth Twice a Week    Ar Automatic Reconciliation   ferrous sulfate (IRON 325) 325 (65 Fe) MG tablet Take 325 mg by mouth daily    Ar Automatic Reconciliation   OXcarbazepine (TRILEPTAL) 300 MG tablet Take 300 mg by mouth 2 times daily    Ar Automatic Reconciliation   PARoxetine (PAXIL) 30 MG tablet Take 30 mg by mouth daily    Ar Automatic Reconciliation   rivaroxaban (XARELTO STARTER PACK) 15 & 20 MG Starter Pack Take one 15 mg tablet twice a day with food for the first 21 days. Then, take one 20 mg tablet once a day with food for 9 days. 12/16/22   Ar Automatic Reconciliation   spironolactone (ALDACTONE) 25 MG tablet Take 25 mg by mouth daily    Ar Automatic Reconciliation       Allergies:   Allergies   Allergen 14.5 %    Platelets 187 313 - 100 K/uL    MPV 11.2 9.2 - 11.8 FL    Nucleated RBCs 0.0 0  WBC    nRBC 0.00 0.00 - 0.01 K/uL    Neutrophils % 81 (H) 40 - 73 %    Lymphocytes % 10 (L) 21 - 52 %    Monocytes % 8 3 - 10 %    Eosinophils % 0 0 - 5 %    Basophils % 0 0 - 2 %    Immature Granulocytes 0 0.0 - 0.5 %    Neutrophils Absolute 11.6 (H) 1.8 - 8.0 K/UL    Lymphocytes Absolute 1.4 0.9 - 3.6 K/UL    Monocytes Absolute 1.2 0.05 - 1.2 K/UL    Eosinophils Absolute 0.0 0.0 - 0.4 K/UL    Basophils Absolute 0.0 0.0 - 0.1 K/UL    Absolute Immature Granulocyte 0.0 0.00 - 0.04 K/UL    Differential Type AUTOMATED     Basic Metabolic Panel    Collection Time: 07/20/23  9:28 AM   Result Value Ref Range    Sodium 142 136 - 145 mmol/L    Potassium 4.2 3.5 - 5.5 mmol/L    Chloride 111 100 - 111 mmol/L    CO2 24 21 - 32 mmol/L    Anion Gap 7 3.0 - 18 mmol/L    Glucose 111 (H) 74 - 99 mg/dL    BUN 15 7.0 - 18 MG/DL    Creatinine 1.14 0.6 - 1.3 MG/DL    Bun/Cre Ratio 13 12 - 20      Est, Glom Filt Rate 52 (L) >60 ml/min/1.73m2    Calcium 9.4 8.5 - 10.1 MG/DL   Magnesium    Collection Time: 07/20/23  9:28 AM   Result Value Ref Range    Magnesium 1.9 1.6 - 2.6 mg/dL   Procalcitonin    Collection Time: 07/20/23  9:28 AM   Result Value Ref Range    Procalcitonin <0.05 ng/mL   Troponin    Collection Time: 07/20/23  9:28 AM   Result Value Ref Range    Troponin, High Sensitivity 77 (H) 0 - 54 ng/L   Valproic Acid Level, Total    Collection Time: 07/20/23  9:28 AM   Result Value Ref Range    Valproic Acid 43 (L) 50 - 100 ug/ml   Lactate, Sepsis    Collection Time: 07/20/23  9:35 AM   Result Value Ref Range    Lactic Acid, Sepsis 3.5 (HH) 0.4 - 2.0 MMOL/L   EKG 12 Lead    Collection Time: 07/20/23  9:45 AM   Result Value Ref Range    Ventricular Rate 99 BPM    Atrial Rate 99 BPM    P-R Interval 142 ms    QRS Duration 74 ms    Q-T Interval 306 ms    QTc Calculation (Bazett) 392 ms    P Axis 27 degrees    R Axis -38 degrees    T Axis 128 inspissated stool distally. Mild anasarca. Suspect mild aspiration pneumonia. Possible constipation. Possible left atrial cardiac chamber enlargement. Assessment/Plan     Severe sepsis: elevated lactic, leukocytosis, fever 100.8. suspected source is aspiration pneumonia. Procal <0.05. CT with groundglass opacities in the posterior right upper lobe. Possible aspiration during seizure vs dysphagia as etiology. Patient previously had PEG tube, but it was removed in April and only placed due to failure to thrive. UA negative. - vanc/rocephin/flagyl  - monitor CBC, temp curve  - MRSA swab, d/c vanc if negative  - speech consult to eval for silent aspiration  - follow up blood cultures    Breakthrough seizure  - seizure precautions  - continue home medications  - PRN ativan  - add on VPA level  - appreciate neurology consult    Demand ischemia: trop minimally elevated and trending down. No sx of ACS. Likely 2/2 sepsis and recent seizure. Constipation  - pericolace started    HTN  - resume norvasc for now    Hx of CVA  - plavix, statin  - PT/OT        POST on file      Anticipated Discharge: >2 days    DVT Prophylaxis:  [x]Lovenox  []Hep SQ  []SCDs  []Coumadin []DOAC  []On Heparin gtt     I have personally reviewed all pertinent labs, films and EKGs that have officially resulted. I reviewed available electronic documentation outlining the initial presentation as well as the emergency room physician's encounter.    Time spent reviewing records, independently interpreting results, obtaining history from patient or caregiver, performing physical exam, ordering tests and medications, communicating with specialists, documenting in the chart, and coordinating overall care is 75 minutes    PRIYANKA Britton

## 2023-07-20 NOTE — ED TRIAGE NOTES
Patient arrives to ED via [facility] with c/o seizure. Patient has a HX of epilepsy. When EMS arrived, patient was post-ictal.  Patient is A&OX2. Unsure if she is oriented to time and situation. Patient has been placed on full cardiac monitor and has call bell in reach.

## 2023-07-20 NOTE — ED PROVIDER NOTES
Collection Time: 07/20/23  9:35 AM   Result Value Ref Range    Lactic Acid, Sepsis 3.5 (HH) 0.4 - 2.0 MMOL/L   EKG 12 Lead    Collection Time: 07/20/23  9:45 AM   Result Value Ref Range    Ventricular Rate 99 BPM    Atrial Rate 99 BPM    P-R Interval 142 ms    QRS Duration 74 ms    Q-T Interval 306 ms    QTc Calculation (Bazett) 392 ms    P Axis 27 degrees    R Axis -38 degrees    T Axis 128 degrees    Diagnosis       Normal sinus rhythm  Left axis deviation  T wave abnormality, consider lateral ischemia  Abnormal ECG     POCT Glucose    Collection Time: 07/20/23  1:13 PM   Result Value Ref Range    POC Glucose 83 70 - 110 mg/dL       Radiologic Studies -   CT HEAD WO CONTRAST   Final Result      No acute findings or significant interval change since CT performed 8 days ago. CT CHEST ABDOMEN PELVIS W CONTRAST Additional Contrast? None   Final Result   Suspect mild aspiration pneumonia. Possible constipation. Possible left atrial cardiac chamber enlargement. XR CHEST PORTABLE   Final Result   1. Question development of mild congestion and streaky basilar subsegmental   atelectasis. Medical Decision Making   I am the first provider for this patient. I reviewed the vital signs, available nursing notes, past medical history, past surgical history, family history and social history. Vital Signs-Reviewed the patient's vital signs. Pulse Oximetry Analysis -  96% on room air     EKG: Interpreted by the EP. Time Interpreted: 955   Rate: 99   Rhythm: Normal Sinus Rhythm, LAD, T wave inversion I, avL, v5,  v6   Interpretation: no acute ischemia    Comparison: 7/12/23, no change    Records Reviewed: Prior medical records, Previous radiology studies, Previous laboratory studies, Previous EKGs, Nursing notes, and EMS run sheet(Time of Review: 1:35 PM)    ED Course: Progress Notes, Reevaluation, and Consults:  9:29 AM: Sepsis order set placed.   Is this patient to be included in the SEP-1 core will admit. Discussed with Dr. Jason Clayton, hospitalist, accepts admission, requesting neurology consultation. Paged neurology. Pending repeat troponin, UA.     1:35 PM Discussed with Dr. Angel Mcgee, neurologist, who will consult during admission. Provider Notes (Medical Decision Making): 71-year-old female with a history of seizures, hypertension, CVA, depression, dementia who presents to the ED due to witnessed seizure at home. Pt is alert, oriented x 2, following all commands. Baseline per family. Patient febrile and tachycardic upon initial assessment, sepsis order set placed. Labs demonstrate leukocytosis, lactic elevated at 3.5. CT head without acute process, CT chest abdomen pelvis demonstrates mild aspiration pneumonia and constipation. Patient given broad-spectrum antibiotics, added clindamycin when CT resulted. Troponin elevated at 77, EKG without acute ischemia, will repeat EKG and trend troponin. Discussed with attending, hospitalist, neurologist.  Patient will be admitted to the hospital for further assessment and treatment. Critical Care Time: Critical Care Time:  The services I provided to this patient were to treat and/or prevent clinically significant deterioration that could result in the failure of one or more body systems and/or organ systems due to Sepsis    Services included the following:  -reviewing nursing notes and old charts  -vital sign assessments  -direct patient care  -medication orders and management  -interpreting and reviewing diagnostic studies/labs  -re-evaluations  -documentation time    Aggregate critical care time was 34 minutes, which includes only time during which I was engaged in work directly related to the patient's care as described above, whether I was at bedside or elsewhere in the Emergency Department. It did not include time spent performing other reported procedures or the services of residents, students, or nurses.     Delio Villaseñor PA-C    1:35 PM Diagnosis     Clinical Impression:   1. Seizure ([address])    2. Severe sepsis ([address])    3. Aspiration pneumonia of both lower lobes, unspecified aspiration pneumonia type ([address])    4. Constipation, unspecified constipation type    5. Elevated troponin        Disposition: admission    No follow-up provider specified. Medication List        ASK your doctor about these medications      * acetaminophen 500 MG tablet  Commonly known as: TYLENOL     * acetaminophen 325 MG tablet  Commonly known as: TYLENOL     amLODIPine 10 MG tablet  Commonly known as: NORVASC     atorvastatin 40 MG tablet  Commonly known as: LIPITOR     clopidogrel 75 MG tablet  Commonly known as: PLAVIX     cyanocobalamin 500 MCG tablet     dicyclomine 20 MG tablet  Commonly known as: BENTYL  Take 1 tablet by mouth 4 times daily as needed (pain)     divalproex 250 MG DR tablet  Commonly known as: DEPAKOTE     ergocalciferol 1.25 MG (15015 UT) capsule  Commonly known as: ERGOCALCIFEROL     famotidine 20 MG tablet  Commonly known as: PEPCID  Take 1 tablet by mouth 2 times daily     ferrous sulfate 325 (65 Fe) MG tablet  Commonly known as: IRON 325     OXcarbazepine 300 MG tablet  Commonly known as: TRILEPTAL     PARoxetine 30 MG tablet  Commonly known as: PAXIL     spironolactone 25 MG tablet  Commonly known as: ALDACTONE     Xarelto Starter Pack 15 & 20 MG Starter Pack  Generic drug: rivaroxaban           * This list has 2 medication(s) that are the same as other medications prescribed for you. Read the directions carefully, and ask your doctor or other care provider to review them with you. Dictation disclaimer:  Please note that this dictation was completed with [employer], the [employer] voice recognition software. Quite often unanticipated grammatical, syntax, homophones, and other interpretive errors are inadvertently transcribed by the computer software. Please disregard these errors.   Please excuse any errors that have

## 2023-07-21 LAB
ANION GAP SERPL CALC-SCNC: 4 MMOL/L (ref 3–18)
BASOPHILS # BLD: 0.1 K/UL (ref 0–0.1)
BASOPHILS NFR BLD: 0 % (ref 0–2)
BUN SERPL-MCNC: 13 MG/DL (ref 7–18)
BUN/CREAT SERPL: 13 (ref 12–20)
CALCIUM SERPL-MCNC: 8.9 MG/DL (ref 8.5–10.1)
CHLORIDE SERPL-SCNC: 111 MMOL/L (ref 100–111)
CO2 SERPL-SCNC: 22 MMOL/L (ref 21–32)
CREAT SERPL-MCNC: 0.99 MG/DL (ref 0.6–1.3)
DIFFERENTIAL METHOD BLD: ABNORMAL
EOSINOPHIL # BLD: 0.1 K/UL (ref 0–0.4)
EOSINOPHIL NFR BLD: 1 % (ref 0–5)
ERYTHROCYTE [DISTWIDTH] IN BLOOD BY AUTOMATED COUNT: 14.6 % (ref 11.6–14.5)
GLUCOSE SERPL-MCNC: 74 MG/DL (ref 74–99)
HCT VFR BLD AUTO: 34.7 % (ref 35–45)
HGB BLD-MCNC: 11.3 G/DL (ref 12–16)
IMM GRANULOCYTES # BLD AUTO: 0.1 K/UL (ref 0–0.04)
IMM GRANULOCYTES NFR BLD AUTO: 0 % (ref 0–0.5)
LYMPHOCYTES # BLD: 1.9 K/UL (ref 0.9–3.6)
LYMPHOCYTES NFR BLD: 15 % (ref 21–52)
MCH RBC QN AUTO: 31 PG (ref 24–34)
MCHC RBC AUTO-ENTMCNC: 32.6 G/DL (ref 31–37)
MCV RBC AUTO: 95.1 FL (ref 78–100)
MONOCYTES # BLD: 0.8 K/UL (ref 0.05–1.2)
MONOCYTES NFR BLD: 6 % (ref 3–10)
NEUTS SEG # BLD: 10 K/UL (ref 1.8–8)
NEUTS SEG NFR BLD: 78 % (ref 40–73)
NRBC # BLD: 0 K/UL (ref 0–0.01)
NRBC BLD-RTO: 0 PER 100 WBC
PLATELET # BLD AUTO: 148 K/UL (ref 135–420)
PMV BLD AUTO: 11.9 FL (ref 9.2–11.8)
POTASSIUM SERPL-SCNC: 4.2 MMOL/L (ref 3.5–5.5)
RBC # BLD AUTO: 3.65 M/UL (ref 4.2–5.3)
SODIUM SERPL-SCNC: 137 MMOL/L (ref 136–145)
VANCOMYCIN SERPL-MCNC: 17 UG/ML (ref 5–40)
WBC # BLD AUTO: 12.9 K/UL (ref 4.6–13.2)

## 2023-07-21 PROCEDURE — 80202 ASSAY OF VANCOMYCIN: CPT

## 2023-07-21 PROCEDURE — 97165 OT EVAL LOW COMPLEX 30 MIN: CPT

## 2023-07-21 PROCEDURE — 6360000002 HC RX W HCPCS: Performed by: PHYSICIAN ASSISTANT

## 2023-07-21 PROCEDURE — 92526 ORAL FUNCTION THERAPY: CPT

## 2023-07-21 PROCEDURE — 6370000000 HC RX 637 (ALT 250 FOR IP): Performed by: PHYSICIAN ASSISTANT

## 2023-07-21 PROCEDURE — 6360000002 HC RX W HCPCS: Performed by: INTERNAL MEDICINE

## 2023-07-21 PROCEDURE — 97535 SELF CARE MNGMENT TRAINING: CPT

## 2023-07-21 PROCEDURE — 36415 COLL VENOUS BLD VENIPUNCTURE: CPT

## 2023-07-21 PROCEDURE — 92610 EVALUATE SWALLOWING FUNCTION: CPT

## 2023-07-21 PROCEDURE — 80048 BASIC METABOLIC PNL TOTAL CA: CPT

## 2023-07-21 PROCEDURE — 99233 SBSQ HOSP IP/OBS HIGH 50: CPT | Performed by: NURSE PRACTITIONER

## 2023-07-21 PROCEDURE — 6370000000 HC RX 637 (ALT 250 FOR IP): Performed by: STUDENT IN AN ORGANIZED HEALTH CARE EDUCATION/TRAINING PROGRAM

## 2023-07-21 PROCEDURE — 94761 N-INVAS EAR/PLS OXIMETRY MLT: CPT

## 2023-07-21 PROCEDURE — 97162 PT EVAL MOD COMPLEX 30 MIN: CPT

## 2023-07-21 PROCEDURE — 6370000000 HC RX 637 (ALT 250 FOR IP): Performed by: HOSPITALIST

## 2023-07-21 PROCEDURE — 85025 COMPLETE CBC W/AUTO DIFF WBC: CPT

## 2023-07-21 PROCEDURE — 2580000003 HC RX 258: Performed by: PHYSICIAN ASSISTANT

## 2023-07-21 PROCEDURE — 1100000003 HC PRIVATE W/ TELEMETRY

## 2023-07-21 PROCEDURE — 2580000003 HC RX 258: Performed by: INTERNAL MEDICINE

## 2023-07-21 RX ORDER — M-VIT,TX,IRON,MINS/CALC/FOLIC 27MG-0.4MG
1 TABLET ORAL DAILY
Status: DISCONTINUED | OUTPATIENT
Start: 2023-07-21 | End: 2023-07-24 | Stop reason: HOSPADM

## 2023-07-21 RX ADMIN — DIVALPROEX SODIUM 1000 MG: 125 CAPSULE, COATED PELLETS ORAL at 09:38

## 2023-07-21 RX ADMIN — SODIUM CHLORIDE, PRESERVATIVE FREE 10 ML: 5 INJECTION INTRAVENOUS at 17:32

## 2023-07-21 RX ADMIN — PAROXETINE HYDROCHLORIDE 30 MG: 20 TABLET, FILM COATED ORAL at 09:39

## 2023-07-21 RX ADMIN — METRONIDAZOLE 500 MG: 500 INJECTION, SOLUTION INTRAVENOUS at 17:20

## 2023-07-21 RX ADMIN — ENOXAPARIN SODIUM 30 MG: 100 INJECTION SUBCUTANEOUS at 15:06

## 2023-07-21 RX ADMIN — DIVALPROEX SODIUM 1000 MG: 125 CAPSULE, COATED PELLETS ORAL at 22:24

## 2023-07-21 RX ADMIN — AMLODIPINE BESYLATE 10 MG: 10 TABLET ORAL at 09:39

## 2023-07-21 RX ADMIN — CLOPIDOGREL BISULFATE 75 MG: 75 TABLET ORAL at 09:41

## 2023-07-21 RX ADMIN — MULTIPLE VITAMINS W/ MINERALS TAB 1 TABLET: TAB at 18:46

## 2023-07-21 RX ADMIN — OXCARBAZEPINE 300 MG: 300 TABLET, FILM COATED ORAL at 22:24

## 2023-07-21 RX ADMIN — DOCUSATE SODIUM 50MG AND SENNOSIDES 8.6MG 2 TABLET: 8.6; 5 TABLET, FILM COATED ORAL at 09:39

## 2023-07-21 RX ADMIN — METRONIDAZOLE 500 MG: 500 INJECTION, SOLUTION INTRAVENOUS at 07:05

## 2023-07-21 RX ADMIN — FAMOTIDINE 20 MG: 20 TABLET ORAL at 09:41

## 2023-07-21 RX ADMIN — ATORVASTATIN CALCIUM 40 MG: 40 TABLET, FILM COATED ORAL at 22:24

## 2023-07-21 RX ADMIN — SODIUM CHLORIDE, PRESERVATIVE FREE 10 ML: 5 INJECTION INTRAVENOUS at 22:31

## 2023-07-21 RX ADMIN — SODIUM CHLORIDE, PRESERVATIVE FREE 10 ML: 5 INJECTION INTRAVENOUS at 09:42

## 2023-07-21 RX ADMIN — WATER 1000 MG: 1 INJECTION INTRAMUSCULAR; INTRAVENOUS; SUBCUTANEOUS at 17:19

## 2023-07-21 RX ADMIN — OXCARBAZEPINE 300 MG: 300 TABLET, FILM COATED ORAL at 09:40

## 2023-07-21 RX ADMIN — VANCOMYCIN HYDROCHLORIDE 750 MG: 750 INJECTION, POWDER, LYOPHILIZED, FOR SOLUTION INTRAVENOUS at 15:05

## 2023-07-21 NOTE — PLAN OF CARE
Problem: SLP Adult - Impaired Swallowing  Goal: By Discharge: Advance to least restrictive diet without signs or symptoms of aspiration for planned discharge setting. See evaluation for individualized goals. Description: Patient will:  1. Tolerate PO trials with 0 s/s overt distress in 4/5 trials  2. Utilize compensatory swallow strategies/maneuvers (decrease bite/sip, size/rate, alt. liq/sol) with min cues in 4/5 trials  3. Perform oral-motor/laryngeal exercises to increase oropharyngeal swallow function with min cues  4. Complete an objective swallow study (i.e., MBSS) to assess swallow integrity, r/o aspiration, and determine of safest LRD, min A    Recommend:   Easy to chew diet with thin liquids   Meds per pt preference  Set up/assist with intake a needed   Aspiration precautions  HOB >45 degrees during all intake and for at least 30 min after intake  Small bites/sips, Feed slowly, alternating bites/sips   Oral care three times daily     Outcome: Progressing     SPEECH LANGUAGE PATHOLOGY BEDSIDE SWALLOW EVALUATION/TREATMENT    Patient: Kirby Reynolds (70 y.o. female)  Date: 7/21/2023  Primary Diagnosis: Seizure ([address]) [R56.9]  Elevated troponin [R77.8]  Severe sepsis ([address]) [A41.9, R65.20]  Constipation, unspecified constipation type [K59.00]  Aspiration pneumonia of both lower lobes, unspecified aspiration pneumonia type ([address]) [J69.0]  Precautions: Aspiration  PLOF: As per H&P    ASSESSMENT:  Based on the objective data described below, the patient presents with mild oral and suspected mild pharyngeal dysphagia. Pt alert and following simple commands. Significantly reduced vocal intensity with halting speech (suspect most closely related to poor breath support) noted. Pt reporting speech is currently at baseline and reporting \"I eat what I can\". Pt was previously receiving PEG feeds due to inability to maintain adequate nutrition/hydration but it was removed in April.   Oral mech exam revealed structures positioning  Patient Education Response: Verbalizes understanding;Needs reinforcement    Frequency/Duration: Patient will be followed by speech-language pathology 1-2 times per day/3-5 days per week to address goals. Discharge Recommendations: D/C Recommendations: To be determined     SUBJECTIVE:   Patient stated, \"Don't work too hard out there\"    OBJECTIVE:     Past Medical History:   Diagnosis Date    Abnormal coordination 9/21/2020    Depression 4/12/2022    Failure to thrive in adult 4/12/2022    Hallucinations 9/21/2020    Hypertension     Neurological disorder     Seizures ([address])     Stroke [facility]) 2009    Subclinical hypothyroidism 4/13/2022     Past Surgical History:   Procedure Laterality Date    PLACE PERCUT GASTROSTOMY TUBE  1/11/2022          Prior Level of Function/Home Situation:  Social/Functional History  Lives With: Son  Type of Home: Apartment  Home Layout: One level  Home Access: Level entry  Home Equipment: Walker, rolling, Wheelchair-manual    Daily Assessment:  Baseline Assessment  Temperature Spikes Noted: No  Respiratory Status: Room air  O2 Device: None (Room air)  Communication Observation: Dysarthria  Follows Directions: Simple  Current Diet : Regular  Current Liquid Diet : Thin  Dentition: Adequate  Patient Positioning: Upright in bed  Baseline Vocal Quality: Weak    Orientation:  Person, Place, and Situation    Oral Assessment:  Oral Motor   Labial: No impairment  Dentition: Natural  Oral Hygiene: Clean  Lingual: Decreased rate  Velum: No Impairment  Mandible: No impairment  Gag: No Impairment        P.O. Trials:  PO Trials  Neuromuscular Estim Used: No  Assessment Method(s): Observation, Palpation  Patient Position: [facility] at [facility]  Vocal Quality: Low volume  Consistency Presented:  Thin, Regular  How Presented: Self-fed/presented, Cup/sip, Spoon, Straw, Successive Swallows  Bolus Acceptance: No impairment  Bolus Formation/Control: Impaired  Type of Impairment: Mastication, Delayed  Propulsion: Delayed (# of seconds)  Oral Residue: Less than 10% of bolus, Lingual  Initiation of Swallow: No impairment  Laryngeal Elevation: Functional  Aspiration Signs/Symptoms: None, Infiltrate on xray  Pharyngeal Phase Characteristics: Poor endurance, Easily fatigued  Effective Modifications: Small sips and bites, Alternate liquids/solids  Cues for Modifications: Minimal    DYSPHAGIA DIAGNOSIS: Dysphagia Diagnosis: Mild oral stage dysphagia, Mild pharyngeal stage dysphagia    PAIN:  Start of Eval: not reported   End of Eval: not reported      After treatment:   []            Patient left in no apparent distress sitting up in chair  [x]            Patient left in no apparent distress in bed  [x]            Call bell left within reach  [x]            Nursing notified  []            Family present  []            Caregiver present  []            Bed alarm activated    COMMUNICATION/EDUCATION:   [x]            Aspiration precautions; swallow safety; compensatory techniques provided via demonstration, verbalization and teach back of comprehension  []         Patient/family have participated as able in goal setting and plan of care. [x]            Patient/family agree to work toward stated goals and plan of care. []            Patient understands intent and goals of therapy, neutral about participation. []            Patient unable to participate in goal setting/plan of care secondary to cognition, hearing/vision deficits; education ongoing with interdisciplinary staff   []            Handout regarding diet recommendations and thickener instructions provided. [x]         Posted safety precautions in patient's room.     Thank you for this referral,  Heidi Gray M.S., CCC-SLP

## 2023-07-21 NOTE — CONSULTS
A 70years old female patient with history of stroke, HTN and seizure disorder currently on Depakote 750 mg p.o. 2 times daily and Trileptal 300 mg p.o. twice daily was brought to the emergency room for seizure. She was sleeping during seizure. From medical records, patient was postictal on EMS arrival.  Has mild fever. She had intermittent cough. CT scan of the chest had shown mild aspiration pneumonia. CT scan of the brain did not show any acute changes. Had elevated lactic acid. Slightly subtherapeutic valproic acid level. Other chemistry unremarkable. Patient is following at [facility] neurology; but she was last seen about a year ago. During my evaluation, patient was fully conscious; has severe dysarthria. Moving her extremities symmetrically. Started on vancomycin, ceftriaxone and Flagyl. Social History     Socioeconomic History    Marital status:       Spouse name: Not on file    Number of children: Not on file    Years of education: Not on file    Highest education level: Not on file   Occupational History    Not on file   Tobacco Use    Smoking status: Never    Smokeless tobacco: Never   Substance and Sexual Activity    Alcohol use: Yes    Drug use: No    Sexual activity: Not on file   Other Topics Concern    Not on file   Social History Narrative    Not on file     Social Determinants of Health     Financial Resource Strain: Not on file   Food Insecurity: Not on file   Transportation Needs: Not on file   Physical Activity: Not on file   Stress: Not on file   Social Connections: Not on file   Intimate Partner Violence: Not on file   Housing Stability: Not on file       Family History   Problem Relation Age of Onset    Diabetes Other     Stroke Other         Current Facility-Administered Medications   Medication Dose Route Frequency Provider Last Rate Last Admin    OXcarbazepine (TRILEPTAL) tablet 300 mg  300 mg Oral BID PRIYANKA Montgomery   300 mg at 07/20/23 0212    sodium chloride

## 2023-07-21 NOTE — PLAN OF CARE
bed mobility with supervision/set-up. 2.  Patient will perform grooming while sitting EOB with Good balance for > 5 min with supervision/set-up. 3.  Patient will perform upper body dressing with supervision/set-up. 4.  Patient will perform toilet transfers with minimal assistance/contact guard assist.  5.  Patient will perform all aspects of toileting with minimal assistance/contact guard assist.  6.  Patient will participate in upper extremity therapeutic exercise/activities with supervision/set-up for 8 minutes to improve endurance and UB strength needed for ADLs    7. Patient will utilize energy conservation techniques during functional activities with verbal cues. PLOF: Pt lives with son who assists with bathing and LB dressing. Pt uses w/c for functional mobility and requires \"some\" assistance with functional txfrs. Pt also has caregiver Monday-Friday for 4 hours in the morning assisting with ADLs.  7/21/2023 1250 by Ramya Mixon OTR/L  Outcome: Progressing     Problem: SLP Adult - Impaired Swallowing  Goal: By Discharge: Advance to least restrictive diet without signs or symptoms of aspiration for planned discharge setting. See evaluation for individualized goals. Description: Patient will:  1. Tolerate PO trials with 0 s/s overt distress in 4/5 trials  2. Utilize compensatory swallow strategies/maneuvers (decrease bite/sip, size/rate, alt. liq/sol) with min cues in 4/5 trials  3. Perform oral-motor/laryngeal exercises to increase oropharyngeal swallow function with min cues  4.  Complete an objective swallow study (i.e., MBSS) to assess swallow integrity, r/o aspiration, and determine of safest LRD, min A    Recommend:   Easy to chew diet with thin liquids   Meds per pt preference  Set up/assist with intake a needed   Aspiration precautions  HOB >45 degrees during all intake and for at least 30 min after intake  Small bites/sips, Feed slowly, alternating bites/sips   Oral care three times daily     7/21/2023 1325 by Shaunna Clements, SLP  Outcome: Progressing     Problem: Skin/Tissue Integrity  Goal: Absence of new skin breakdown  Description: 1. Monitor for areas of redness and/or skin breakdown  2. Assess vascular access sites hourly  3. Every 4-6 hours minimum:  Change oxygen saturation probe site  4. Every 4-6 hours:  If on nasal continuous positive airway pressure, respiratory therapy assess nares and determine need for appliance change or resting period.   Outcome: Progressing

## 2023-07-21 NOTE — PLAN OF CARE
Problem: Occupational Therapy - Adult  Goal: By Discharge: Performs self-care activities at highest level of function for planned discharge setting. See evaluation for individualized goals. Description:   Occupational Therapy Goals:  Initiated 7/21/2023 to be met within 7-10 days. 1.  Patient will perform bed mobility with supervision/set-up. 2.  Patient will perform grooming while sitting EOB with Good balance for > 5 min with supervision/set-up. 3.  Patient will perform upper body dressing with supervision/set-up. 4.  Patient will perform toilet transfers with minimal assistance/contact guard assist.  5.  Patient will perform all aspects of toileting with minimal assistance/contact guard assist.  6.  Patient will participate in upper extremity therapeutic exercise/activities with supervision/set-up for 8 minutes to improve endurance and UB strength needed for ADLs    7. Patient will utilize energy conservation techniques during functional activities with verbal cues. PLOF: Pt lives with son who assists with bathing and LB dressing. Pt uses w/c for functional mobility and requires \"some\" assistance with functional txfrs. Pt also has caregiver Monday-Friday for 4 hours in the morning assisting with ADLs. Outcome: Progressing  OCCUPATIONAL THERAPY EVALUATION    Patient: Alyssa Vela (89 y.o. female)  Date: 7/21/2023  Primary Diagnosis: Seizure ([address]) [R56.9]  Elevated troponin [R77.8]  Severe sepsis ([address]) [A41.9, R65.20]  Constipation, unspecified constipation type [K59.00]  Aspiration pneumonia of both lower lobes, unspecified aspiration pneumonia type ([address]) [J69.0]       Precautions: Fall Risk, Bed Alarm, Seizure    ASSESSMENT :    Pt is pleasant and cooperative, agreeable to OT evaluation in spite of just recently finishing working with PT. Pt required additional time to complete bed mobility tasks, politely refusing functional txfrs due to feeling cold.  Pt completed UB dressing and grooming ADLs BUE  Tone: Normal  Sensation: Impaired    Range of Motion: BUE  AROM: Generally decreased, functional   Functional Mobility and Transfers for ADLs:  Bed Mobility:     Bed Mobility Training  Bed Mobility Training: Yes  Rolling: Minimum assistance  Supine to Sit: Minimum assistance  Sit to Supine: Moderate assistance  Scooting: Minimum assistance  Transfers:   Transfer Training  Transfer Training: No  Sit to Stand: Assist X1  Stand to Sit: Contact-guard assistance    ADL Assessment:   Feeding: Contact guard assistance  Grooming: Minimal assistance  UE Bathing: Moderate assistance  LE Bathing: Maximum assistance  UE Dressing: Moderate assistance  LE Dressing: Maximum assistance  Toileting: Maximum assistance    ADL Intervention:  Grooming task at EOB with Min A  UB dressing with min A    Pain:  Pain level pre-treatment: none reported  Pain level post-treatment: none reported    Activity Tolerance:   Activity Tolerance: Patient limited by fatigue  Please refer to the flowsheet for vital signs taken during this treatment. After treatment:   [] Patient left in no apparent distress sitting up in chair  [x] Patient left in no apparent distress in bed  [x] Call bell left within reach  [x] Nursing notified  [] Caregiver present  [x] Bed alarm activated    COMMUNICATION/EDUCATION:   Patient Education  Education Given To: Patient  Education Provided: Role of Therapy; ADL Adaptive Strategies; Energy Conservation; Fall Prevention Strategies; Plan of Care;Orientation  Education Method: Demonstration;Verbal  Barriers to Learning: None  Education Outcome: Continued education needed    Thank you for this referral.  Serafin Mota OTR/L  Minutes: 19    Eval Complexity: Decision Making: Low Complexity

## 2023-07-21 NOTE — PROGRESS NOTES
[id number] [facility] Hospitalist Group  Progress Note    Patient: Ivon Avalos Age: 70 y.o. : 1955 MR#: [id number] SSN: [id number]  Date: 2023         Assessment/Plan:   Severe sepsis possibly aspiration pneumonia    -On admission had fever of 100.8, leukocytosis, lactic acidosis    -CT showed groundglass opacities in the posterior right upper lobe, possible aspiration during seizure versus dysphagia. Of note patient had PEG tube placed for failure to thrive and it was removed in April.    - Continue flagyl, vanco and ceftriaxone     - MRSA culture in process    - Blood cultures in process    - Monitor CBC     Breakthrough seizure    - Depakote sprinkles increased to 1000 mg BID    - Continue Trileptal 300 mg PO BID    - Brother at bedside, discussed importance of compliance    Demand ischemia    -Likely due to sepsis and recent seizure    -No complaints of chest pain    Constipation    -No bowel movement charted currently on Becky-Colace can add senna or lactulose tomorrow, if no bowel movement today    Hypertension    -Continue home medication    -Monitor blood pressure    History of CVA    -Continue [facility]    Hospital Problems             Last Modified POA    * (Principal) Severe sepsis ([address]) 2023 Yes    Recurrent seizures ([address]) 2023 Yes    Essential hypertension 2023 Yes    Aspiration pneumonia ([address]) 2023 Yes    Constipation 2023 Yes           DVT Prophylaxis:  [x]Lovenox  []Hep SQ  []SCDs  []Coumadin   []On Heparin gtt []PO anticoagulant    Anticipated discharge: 1-2 days     Subjective:     Pt s/e @ bedside. No major events overnight. Pt offers no complaints this AM. Denies CP, SOB, cough, abd pain, nvd, headache, dysuria. Hospital Course:     Pao Eaton is a 76-year-old female with a past medical history significant for seizure, depression, hypertension, stroke and failure to thrive.   Patient presented to the emergency department after having a Route Frequency    OXcarbazepine (TRILEPTAL) tablet 300 mg  300 mg Oral BID    sodium chloride flush 0.9 % injection 5-40 mL  5-40 mL IntraVENous 2 times per day    sodium chloride flush 0.9 % injection 5-40 mL  5-40 mL IntraVENous PRN    0.9 % sodium chloride infusion   IntraVENous PRN    enoxaparin Sodium (LOVENOX) injection 30 mg  30 mg SubCUTAneous Daily    ondansetron (ZOFRAN-ODT) disintegrating tablet 4 mg  4 mg Oral Q8H PRN    Or    ondansetron (ZOFRAN) injection 4 mg  4 mg IntraVENous Q6H PRN    polyethylene glycol (GLYCOLAX) packet 17 g  17 g Oral Daily PRN    bisacodyl (DULCOLAX) suppository 10 mg  10 mg Rectal Daily PRN    acetaminophen (TYLENOL) tablet 650 mg  650 mg Oral Q6H PRN    Or    acetaminophen (TYLENOL) suppository 650 mg  650 mg Rectal Q6H PRN    amLODIPine (NORVASC) tablet 10 mg  10 mg Oral Daily    atorvastatin (LIPITOR) tablet 40 mg  40 mg Oral Nightly    clopidogrel (PLAVIX) tablet 75 mg  75 mg Oral Daily    famotidine (PEPCID) tablet 20 mg  20 mg Oral Daily    PARoxetine (PAXIL) tablet 30 mg  30 mg Oral Daily    sennosides-docusate sodium (SENOKOT-S) 8.6-50 MG tablet 2 tablet  2 tablet Oral Daily    vancomycin (VANCOCIN) 750 mg in sodium chloride 0.9 % 250 mL IVPB (vial-mate)  750 mg IntraVENous Q24H    cefTRIAXone (ROCEPHIN) 1,000 mg in sterile water 10 mL IV syringe  1,000 mg IntraVENous Q24H    metronidazole (FLAGYL) 500 mg in 0.9% NaCl 100 mL IVPB premix  500 mg IntraVENous Q12H    LORazepam (ATIVAN) injection 2 mg  2 mg IntraVENous Q5 Min PRN    divalproex (DEPAKOTE SPRINKLE) DR capsule 1,000 mg  1,000 mg Oral BID        Labs:    Recent Results (from the past 24 hour(s))   Basic Metabolic Panel w/ Reflex to MG    Collection Time: 07/21/23  2:00 AM   Result Value Ref Range    Sodium 137 136 - 145 mmol/L    Potassium 4.2 3.5 - 5.5 mmol/L    Chloride 111 100 - 111 mmol/L    CO2 22 21 - 32 mmol/L    Anion Gap 4 3.0 - 18 mmol/L    Glucose 74 74 - 99 mg/dL    BUN 13 7.0 - 18 MG/DL confluent supratentorial hypodensities. Similar remote left thalamic infarct. No acute intracranial hemorrhage. No midline shift. Basilar cisterns are patent. Status post cataract surgery. Soft tissues and osseous structures are grossly normal. Limited visualized paranasal sinuses and mastoid air cells are patent. No acute findings or significant interval change since CT performed 8 days ago. XR CHEST PORTABLE    Result Date: 7/20/2023  EXAM: Chest Radiograph INDICATION:  seizure TECHNIQUE: AP view of the chest COMPARISON: 2 weeks prior FINDINGS: No pneumothorax identified. Question minimal streaky basilar opacities. No effusions identified. The cardiomediastinal silhouette is stable. The pulmonary vasculature is potentially mildly prominent. .  The osseous structures are stable. 1.  Question development of mild congestion and streaky basilar subsegmental atelectasis. CT CHEST ABDOMEN PELVIS W CONTRAST Additional Contrast? None    Result Date: 7/20/2023  CT CHEST, ABDOMEN AND PELVIS WITH ENHANCEMENT INDICATION: Seizure, postictal with diffuse abdominal pain, PEG tube removed. Sepsis, tachycardia, fever. TECHNIQUE: Axial images obtained of the chest, abdomen and pelvis following the uneventful administration of nonionic intravenous contrast.  Coronal and sagittal reformatted images of the abdomen and pelvis were obtained. All CT scans at this facility are performed using dose optimization technique as appropriate to a performed exam, to include automated exposure control, adjustment of the mA and/or kV according to patient size (including appropriate matching first site-specific examinations), or use of iterative reconstruction technique. COMPARISON: 7/12/2023. CHEST: Thyroid unremarkable. Possible left atrial cardiac chamber enlargement. No acute aortic pathology or aneurysm. Central pulmonary arteries are patent. No thoracic lymphadenopathy. No new pleural effusion or pneumothorax.  Mild dependent

## 2023-07-21 NOTE — PLAN OF CARE
Problem: Physical Therapy - Adult  Goal: By Discharge: Performs mobility at highest level of function for planned discharge setting. See evaluation for individualized goals. Description: Initiated  7/21/23  to be met within 7-10 days. 1.  Patient will move from supine to sit and sit to supine , scoot up and down, and roll side to side in bed with modified independence. 2.  Patient will transfer from bed to chair and chair to bed with contact guard assist using the least restrictive device. 3.  Patient will perform sit to stand with stand by assist.  4.  Patient will ambulate with minimal assistance/contact guard assist for 40 feet with the least restrictive device. 5.  Patient will perform BLE therex as prescribed to tolerance     PLOF: pt lives with son in a first floor apt with 0 KEN, pt reports she uses a wheelchair but also has a walker, has not been walking recently due to her \"left leg not working\", has aide 5 days a week to assist with ADLs    Outcome: Progressing     PHYSICAL THERAPY EVALUATION    Patient: Heidi Betancur (70 y.o. female)  Date: 7/21/2023  Primary Diagnosis: Seizure ([address]) [R56.9]  Elevated troponin [R77.8]  Severe sepsis ([address]) [A41.9, R65.20]  Constipation, unspecified constipation type [K59.00]  Aspiration pneumonia of both lower lobes, unspecified aspiration pneumonia type ([address]) [J69.0]       Precautions: General Precautions, Fall Risk, Bed Alarm     ASSESSMENT :  Pt received in bed in NAD and agreeable. Pt endorses no pain. Full ROM to BLE, strength grossly 4/5 to BLE. Pt endorses her L leg not working as to why she does not walk at home. Pt was able to stand with min A and amb 5 ft fwd/back with HHA with no buckling or LOB this date. Pt perseverating on needing to go to a specialist for her L leg during session. Pt declines sitting up in recliner due to her room being colder. Pt left in bed with all needs met and bed alarm on for safety.  Will benefit from acute PT during

## 2023-07-21 NOTE — PROGRESS NOTES
Comprehensive Nutrition Assessment    Type and Reason for Visit:  Initial, Wound    Nutrition Recommendations/Plan:   Add supplement: Magic Cup BID (290 kcal, 9 gm protein each), Luis Miguel BID (95 kcal, 2.5 gm collagen protein each)  Add daily MVI supplement r/t pt with malnutrition  Add food preference in diet order  Continue all other nutrition interventions. Encourage/ monitor po intake of meals and supplements. Malnutrition Assessment:  Malnutrition Status:  Mild malnutrition (07/21/23 1802)    Context:  Chronic Illness     Findings of the 6 clinical characteristics of malnutrition:  Energy Intake:  Mild decrease in energy intake (Comment) (variable)  Weight Loss:  No significant weight loss     Body Fat Loss:  Unable to assess (moderate loss in triceps)     Muscle Mass Loss:  Severe muscle mass loss Calf (gastrocnemius), Hand (interosseous)  Fluid Accumulation:  No significant fluid accumulation     Strength:  Not Performed    Nutrition History and Allergies:   Past medical hx:  depression, failure to thrive in adult, HTN, seizures, stroke, hx of G tube removed in April 2023 per notation. Pt with variable po intake PTA. Pt reported UBW of 125 lb;  did drop down to 90 lb at one point per brother report. Currently weighing 100 lb. Noted stable weight I past 1 year PTA per chart hx. Food allergies: tomatoes     Nutrition Assessment:    Pt admitted due to seizure activity. Is on po diet. SLP following. Pt underweight; agreeable to nutrition supplement; declined ensure plus, but agreeable to trying magic cup. Reported having fair/good meal intake today for breakfast and lunch. Food preference discussed. Pt seen with her brother and their ; pt reported okay to discuss nutrition hx with  present. Wt loss over the past years per pt report. Pt with pressure injury; agreeable to protein supplement.  NFPE conducted after her brother and their paster left for the day; severe muscle mass losses in assessment       Nutrition Monitoring and Evaluation:   Behavioral-Environmental Outcomes: None Identified  Food/Nutrient Intake Outcomes: Diet Advancement/Tolerance, Food and Nutrient Intake, Supplement Intake, Vitamin/Mineral Intake  Physical Signs/Symptoms Outcomes: Biochemical Data, Chewing or Swallowing, GI Status, Meal Time Behavior, Nutrition Focused Physical Findings, Weight, Skin    Discharge Planning:    Continue current diet, Continue Oral Nutrition Supplement     Julianne Andrews, [id number] [facility]  Contact: [phone]

## 2023-07-22 LAB
ANION GAP SERPL CALC-SCNC: 3 MMOL/L (ref 3–18)
BACTERIA SPEC CULT: NORMAL
BACTERIA SPEC CULT: NORMAL
BASOPHILS # BLD: 0 K/UL (ref 0–0.1)
BASOPHILS NFR BLD: 0 % (ref 0–2)
BUN SERPL-MCNC: 17 MG/DL (ref 7–18)
BUN/CREAT SERPL: 16 (ref 12–20)
CALCIUM SERPL-MCNC: 8.6 MG/DL (ref 8.5–10.1)
CHLORIDE SERPL-SCNC: 107 MMOL/L (ref 100–111)
CO2 SERPL-SCNC: 27 MMOL/L (ref 21–32)
CREAT SERPL-MCNC: 1.04 MG/DL (ref 0.6–1.3)
DIFFERENTIAL METHOD BLD: ABNORMAL
EOSINOPHIL # BLD: 0.1 K/UL (ref 0–0.4)
EOSINOPHIL NFR BLD: 1 % (ref 0–5)
ERYTHROCYTE [DISTWIDTH] IN BLOOD BY AUTOMATED COUNT: 14.5 % (ref 11.6–14.5)
GLUCOSE SERPL-MCNC: 77 MG/DL (ref 74–99)
HCT VFR BLD AUTO: 30.2 % (ref 35–45)
HGB BLD-MCNC: 9.9 G/DL (ref 12–16)
IMM GRANULOCYTES # BLD AUTO: 0 K/UL (ref 0–0.04)
IMM GRANULOCYTES NFR BLD AUTO: 0 % (ref 0–0.5)
LYMPHOCYTES # BLD: 1.9 K/UL (ref 0.9–3.6)
LYMPHOCYTES NFR BLD: 21 % (ref 21–52)
MCH RBC QN AUTO: 30.7 PG (ref 24–34)
MCHC RBC AUTO-ENTMCNC: 32.8 G/DL (ref 31–37)
MCV RBC AUTO: 93.5 FL (ref 78–100)
MONOCYTES # BLD: 0.5 K/UL (ref 0.05–1.2)
MONOCYTES NFR BLD: 6 % (ref 3–10)
NEUTS SEG # BLD: 6.1 K/UL (ref 1.8–8)
NEUTS SEG NFR BLD: 71 % (ref 40–73)
NRBC # BLD: 0 K/UL (ref 0–0.01)
NRBC BLD-RTO: 0 PER 100 WBC
PLATELET # BLD AUTO: 121 K/UL (ref 135–420)
PMV BLD AUTO: 12.2 FL (ref 9.2–11.8)
POTASSIUM SERPL-SCNC: 4.2 MMOL/L (ref 3.5–5.5)
RBC # BLD AUTO: 3.23 M/UL (ref 4.2–5.3)
SERVICE CMNT-IMP: NORMAL
SODIUM SERPL-SCNC: 137 MMOL/L (ref 136–145)
VALPROATE SERPL-MCNC: 129 UG/ML (ref 50–100)
WBC # BLD AUTO: 8.7 K/UL (ref 4.6–13.2)

## 2023-07-22 PROCEDURE — 6360000002 HC RX W HCPCS: Performed by: INTERNAL MEDICINE

## 2023-07-22 PROCEDURE — 6370000000 HC RX 637 (ALT 250 FOR IP): Performed by: PHYSICIAN ASSISTANT

## 2023-07-22 PROCEDURE — 80048 BASIC METABOLIC PNL TOTAL CA: CPT

## 2023-07-22 PROCEDURE — 99232 SBSQ HOSP IP/OBS MODERATE 35: CPT | Performed by: NURSE PRACTITIONER

## 2023-07-22 PROCEDURE — 80164 ASSAY DIPROPYLACETIC ACD TOT: CPT

## 2023-07-22 PROCEDURE — 2580000003 HC RX 258: Performed by: INTERNAL MEDICINE

## 2023-07-22 PROCEDURE — 6370000000 HC RX 637 (ALT 250 FOR IP): Performed by: STUDENT IN AN ORGANIZED HEALTH CARE EDUCATION/TRAINING PROGRAM

## 2023-07-22 PROCEDURE — 6370000000 HC RX 637 (ALT 250 FOR IP): Performed by: HOSPITALIST

## 2023-07-22 PROCEDURE — 6360000002 HC RX W HCPCS: Performed by: PHYSICIAN ASSISTANT

## 2023-07-22 PROCEDURE — 2580000003 HC RX 258: Performed by: PHYSICIAN ASSISTANT

## 2023-07-22 PROCEDURE — 85025 COMPLETE CBC W/AUTO DIFF WBC: CPT

## 2023-07-22 PROCEDURE — 94761 N-INVAS EAR/PLS OXIMETRY MLT: CPT

## 2023-07-22 PROCEDURE — 97535 SELF CARE MNGMENT TRAINING: CPT

## 2023-07-22 PROCEDURE — 97530 THERAPEUTIC ACTIVITIES: CPT

## 2023-07-22 PROCEDURE — 36415 COLL VENOUS BLD VENIPUNCTURE: CPT

## 2023-07-22 PROCEDURE — 1100000003 HC PRIVATE W/ TELEMETRY

## 2023-07-22 RX ADMIN — DIVALPROEX SODIUM 1000 MG: 125 CAPSULE, COATED PELLETS ORAL at 21:50

## 2023-07-22 RX ADMIN — VANCOMYCIN HYDROCHLORIDE 750 MG: 750 INJECTION, POWDER, LYOPHILIZED, FOR SOLUTION INTRAVENOUS at 14:18

## 2023-07-22 RX ADMIN — METRONIDAZOLE 500 MG: 500 INJECTION, SOLUTION INTRAVENOUS at 05:45

## 2023-07-22 RX ADMIN — AMLODIPINE BESYLATE 10 MG: 10 TABLET ORAL at 10:03

## 2023-07-22 RX ADMIN — MULTIPLE VITAMINS W/ MINERALS TAB 1 TABLET: TAB at 10:03

## 2023-07-22 RX ADMIN — WATER 1000 MG: 1 INJECTION INTRAMUSCULAR; INTRAVENOUS; SUBCUTANEOUS at 17:35

## 2023-07-22 RX ADMIN — FAMOTIDINE 20 MG: 20 TABLET ORAL at 10:03

## 2023-07-22 RX ADMIN — ENOXAPARIN SODIUM 30 MG: 100 INJECTION SUBCUTANEOUS at 14:19

## 2023-07-22 RX ADMIN — METRONIDAZOLE 500 MG: 500 INJECTION, SOLUTION INTRAVENOUS at 17:35

## 2023-07-22 RX ADMIN — DOCUSATE SODIUM 50MG AND SENNOSIDES 8.6MG 2 TABLET: 8.6; 5 TABLET, FILM COATED ORAL at 10:02

## 2023-07-22 RX ADMIN — OXCARBAZEPINE 300 MG: 300 TABLET, FILM COATED ORAL at 21:50

## 2023-07-22 RX ADMIN — PAROXETINE HYDROCHLORIDE 30 MG: 20 TABLET, FILM COATED ORAL at 10:02

## 2023-07-22 RX ADMIN — DIVALPROEX SODIUM 1000 MG: 125 CAPSULE, COATED PELLETS ORAL at 10:03

## 2023-07-22 RX ADMIN — CLOPIDOGREL BISULFATE 75 MG: 75 TABLET ORAL at 10:02

## 2023-07-22 RX ADMIN — ATORVASTATIN CALCIUM 40 MG: 40 TABLET, FILM COATED ORAL at 21:50

## 2023-07-22 RX ADMIN — SODIUM CHLORIDE, PRESERVATIVE FREE 10 ML: 5 INJECTION INTRAVENOUS at 10:02

## 2023-07-22 RX ADMIN — OXCARBAZEPINE 300 MG: 300 TABLET, FILM COATED ORAL at 10:03

## 2023-07-22 ASSESSMENT — PAIN DESCRIPTION - FREQUENCY: FREQUENCY: INTERMITTENT

## 2023-07-22 ASSESSMENT — PAIN SCALES - GENERAL
PAINLEVEL_OUTOF10: 4
PAINLEVEL_OUTOF10: 0

## 2023-07-22 ASSESSMENT — PAIN DESCRIPTION - LOCATION: LOCATION: VAGINA

## 2023-07-22 ASSESSMENT — PAIN DESCRIPTION - ONSET: ONSET: OTHER (COMMENT)

## 2023-07-22 ASSESSMENT — PAIN DESCRIPTION - DESCRIPTORS: DESCRIPTORS: CRAMPING

## 2023-07-22 ASSESSMENT — PAIN - FUNCTIONAL ASSESSMENT: PAIN_FUNCTIONAL_ASSESSMENT: ACTIVITIES ARE NOT PREVENTED

## 2023-07-22 NOTE — PLAN OF CARE
Problem: Occupational Therapy - Adult  Goal: By Discharge: Performs self-care activities at highest level of function for planned discharge setting. See evaluation for individualized goals. Description:   Occupational Therapy Goals:  Initiated 7/21/2023 to be met within 7-10 days. 1.  Patient will perform bed mobility with supervision/set-up. 2.  Patient will perform grooming while sitting EOB with Good balance for > 5 min with supervision/set-up. 3.  Patient will perform upper body dressing with supervision/set-up. 4.  Patient will perform toilet transfers with minimal assistance/contact guard assist.  5.  Patient will perform all aspects of toileting with minimal assistance/contact guard assist.  6.  Patient will participate in upper extremity therapeutic exercise/activities with supervision/set-up for 8 minutes to improve endurance and UB strength needed for ADLs    7. Patient will utilize energy conservation techniques during functional activities with verbal cues. PLOF: Pt lives with son who assists with bathing and LB dressing. Pt uses w/c for functional mobility and requires \"some\" assistance with functional txfrs. Pt also has caregiver Monday-Friday for 4 hours in the morning assisting with ADLs. Outcome: Progressing   OCCUPATIONAL THERAPY TREATMENT    Patient: Kirby Reynolds (89 y.o. female)  Date: 7/22/2023  Diagnosis: Seizure ([address]) [R56.9]  Elevated troponin [R77.8]  Severe sepsis ([address]) [A41.9, R65.20]  Constipation, unspecified constipation type [K59.00]  Aspiration pneumonia of both lower lobes, unspecified aspiration pneumonia type ([address]) [J69.0] Severe sepsis [facility])      Precautions: Fall Risk, Bed Alarm, Seizure,  ,  ,  ,  ,  ,  ,      Chart, occupational therapy assessment, plan of care, and goals were reviewed. ASSESSMENT:  Pt presented supine in bed upon entry and agreeable to work with OT. Pt was found to be soiled from urine. She required MAX A for lexa area care @ bed level.  She was assisted to EOB MIN A in prep for functional tasks. She required MOD A donning [facility] hospital gown. STS transfer MIN A and performed walking SPT to reclining chair MOD A w/ R lateral lean noted, simulating BSC transfer. Pt was positioned for comfort and left with BLE's elevated with all needs left within reach. RN present and aware. Progression toward goals:  []          Improving appropriately and progressing toward goals  [x]          Improving slowly and progressing toward goals  []          Not making progress toward goals and plan of care will be adjusted     PLAN:  Patient continues to benefit from skilled intervention to address the above impairments. Continue treatment per established plan of care. Further Equipment Recommendations for Discharge: N/A    AMPAC: AM-PAC Inpatient Daily Activity Raw Score: 14    Current research shows that an AM-PAC score of 17 or less is not associated with a discharge to the patient's home setting. Based on an AM-PAC score and their current ADL deficits; it is recommended that the patient have 3-5 sessions per week of Occupational Therapy at d/c to increase the patient's independence. In spite of lower AMPAC score, pt has sufficient assistance available at home for ADLs. This AMPAC score should be considered in conjunction with interdisciplinary team recommendations to determine the most appropriate discharge setting. Patient's social support, diagnosis, medical stability, and prior level of function should also be taken into consideration. SUBJECTIVE:   Patient stated, \"I am cold. \"    OBJECTIVE DATA SUMMARY:     Functional Mobility and Transfers for ADLs:   Bed Mobility:  Bed Mobility Training  Bed Mobility Training: Yes  Rolling: Minimum assistance  Supine to Sit: Minimum assistance  Scooting: Minimum assistance   Transfers:  Transfer Training  Transfer Training: Yes  Sit to Stand: Minimum assistance  Stand to Sit: Contact-guard

## 2023-07-22 NOTE — PLAN OF CARE
Problem: Safety - Adult  Goal: Free from fall injury  Outcome: Progressing     Problem: Neurosensory - Adult  Goal: Achieves stable or improved neurological status  Outcome: Progressing  Goal: Absence of seizures  Outcome: Progressing  Goal: Remains free of injury related to seizures activity  Outcome: Progressing     Problem: Neurosensory - Adult  Goal: Absence of seizures  Outcome: Progressing     Problem: Neurosensory - Adult  Goal: Remains free of injury related to seizures activity  Outcome: Progressing     Problem: Confusion  Goal: Confusion, delirium, dementia, or psychosis is improved or at baseline  Description: INTERVENTIONS:  1. Assess for possible contributors to thought disturbance, including medications, impaired vision or hearing, underlying metabolic abnormalities, dehydration, psychiatric diagnoses, and notify attending LIP  2. [place] high risk fall precautions, as indicated  3. Provide frequent short contacts to provide reality reorientation, refocusing and direction  4. Decrease environmental stimuli, including noise as appropriate  5. Monitor and intervene to maintain adequate nutrition, hydration, elimination, sleep and activity  6. If unable to ensure safety without constant attention obtain sitter and review sitter guidelines with assigned personnel  7. Initiate Psychosocial CNS and Spiritual Care consult, as indicated  Outcome: Progressing     Problem: Occupational Therapy - Adult  Goal: By Discharge: Performs self-care activities at highest level of function for planned discharge setting. See evaluation for individualized goals. Description:   Occupational Therapy Goals:  Initiated 7/21/2023 to be met within 7-10 days. 1.  Patient will perform bed mobility with supervision/set-up. 2.  Patient will perform grooming while sitting EOB with Good balance for > 5 min with supervision/set-up. 3.  Patient will perform upper body dressing with supervision/set-up.   4.  Patient will perform toilet transfers with minimal assistance/contact guard assist.  5.  Patient will perform all aspects of toileting with minimal assistance/contact guard assist.  6.  Patient will participate in upper extremity therapeutic exercise/activities with supervision/set-up for 8 minutes to improve endurance and UB strength needed for ADLs    7. Patient will utilize energy conservation techniques during functional activities with verbal cues. PLOF: Pt lives with son who assists with bathing and LB dressing. Pt uses w/c for functional mobility and requires \"some\" assistance with functional txfrs.  Pt also has caregiver Monday-Friday for 4 hours in the morning assisting with ADLs.  7/22/2023 1117 by FELICITY Carlton  Outcome: Progressing

## 2023-07-22 NOTE — PROGRESS NOTES
[id number] [facility] Hospitalist Group  Progress Note    Patient: Kirby Reynolds Age: 70 y.o. : 1955 MR#: [id number] SSN: [id number]  Date: 2023         Assessment/Plan:   Severe sepsis possibly aspiration pneumonia    -On admission had fever of 100.8, leukocytosis, lactic acidosis    -CT showed groundglass opacities in the posterior right upper lobe, possible aspiration during seizure versus dysphagia. Of note patient had PEG tube placed for failure to thrive and it was removed in April.    - Continue flagyl, vanco and ceftriaxone     - MRSA culture in process    - Blood cultures in process    - Monitor CBC     Breakthrough seizure    - Depakote sprinkles increased to 1000 mg BID    - Continue Trileptal 300 mg PO BID    - Brother at bedside, discussed importance of compliance    Demand ischemia    -Likely due to sepsis and recent seizure    -No complaints of chest pain    Lower abdominal discomfort    - Added UA     Constipation    -No bowel movement charted currently on senokot-s will add lactulose tomorrow, if no bowel movement today    Hypertension    -Continue home medication    -Monitor blood pressure    History of CVA    -Continue [facility]    Hospital Problems             Last Modified POA    * (Principal) Severe sepsis ([address]) 2023 Yes    Recurrent seizures ([address]) 2023 Yes    Essential hypertension 2023 Yes    Aspiration pneumonia ([address]) 2023 Yes    Constipation 2023 Yes         DVT Prophylaxis:  [x]Lovenox  []Hep SQ  []SCDs  []Coumadin   []On Heparin gtt []PO anticoagulant    Anticipated discharge: 1-2 days     Subjective:     Pt s/e @ bedside. No major events overnight. Pt offers no complaints this AM. Denies CP, SOB, cough, abd pain, nvd, headache, dysuria. Hospital Course:     Boris Mcgregor is a 59-year-old female with a past medical history significant for seizure, depression, hypertension, stroke and failure to thrive.   Patient presented to the left thalamic infarct. No acute intracranial hemorrhage. No midline shift. Basilar cisterns are patent. Status post cataract surgery. Soft tissues and osseous structures are grossly normal. Limited visualized paranasal sinuses and mastoid air cells are patent. No acute findings or significant interval change since CT performed 8 days ago. XR CHEST PORTABLE    Result Date: 7/20/2023  EXAM: Chest Radiograph INDICATION:  seizure TECHNIQUE: AP view of the chest COMPARISON: 2 weeks prior FINDINGS: No pneumothorax identified. Question minimal streaky basilar opacities. No effusions identified. The cardiomediastinal silhouette is stable. The pulmonary vasculature is potentially mildly prominent. .  The osseous structures are stable. 1.  Question development of mild congestion and streaky basilar subsegmental atelectasis. CT CHEST ABDOMEN PELVIS W CONTRAST Additional Contrast? None    Result Date: 7/20/2023  CT CHEST, ABDOMEN AND PELVIS WITH ENHANCEMENT INDICATION: Seizure, postictal with diffuse abdominal pain, PEG tube removed. Sepsis, tachycardia, fever. TECHNIQUE: Axial images obtained of the chest, abdomen and pelvis following the uneventful administration of nonionic intravenous contrast.  Coronal and sagittal reformatted images of the abdomen and pelvis were obtained. All CT scans at this facility are performed using dose optimization technique as appropriate to a performed exam, to include automated exposure control, adjustment of the mA and/or kV according to patient size (including appropriate matching first site-specific examinations), or use of iterative reconstruction technique. COMPARISON: 7/12/2023. CHEST: Thyroid unremarkable. Possible left atrial cardiac chamber enlargement. No acute aortic pathology or aneurysm. Central pulmonary arteries are patent. No thoracic lymphadenopathy. No new pleural effusion or pneumothorax. Mild dependent consolidations and symmetrically.  Mild bronchovascular

## 2023-07-23 LAB
ANION GAP SERPL CALC-SCNC: 7 MMOL/L (ref 3–18)
BASOPHILS # BLD: 0 K/UL (ref 0–0.1)
BASOPHILS NFR BLD: 0 % (ref 0–2)
BUN SERPL-MCNC: 19 MG/DL (ref 7–18)
BUN/CREAT SERPL: 17 (ref 12–20)
CALCIUM SERPL-MCNC: 9.1 MG/DL (ref 8.5–10.1)
CHLORIDE SERPL-SCNC: 108 MMOL/L (ref 100–111)
CO2 SERPL-SCNC: 23 MMOL/L (ref 21–32)
CREAT SERPL-MCNC: 1.09 MG/DL (ref 0.6–1.3)
DIFFERENTIAL METHOD BLD: ABNORMAL
EOSINOPHIL # BLD: 0.1 K/UL (ref 0–0.4)
EOSINOPHIL NFR BLD: 2 % (ref 0–5)
ERYTHROCYTE [DISTWIDTH] IN BLOOD BY AUTOMATED COUNT: 14.6 % (ref 11.6–14.5)
GLUCOSE SERPL-MCNC: 74 MG/DL (ref 74–99)
HCT VFR BLD AUTO: 32.9 % (ref 35–45)
HEMOCCULT STL QL: NEGATIVE
HGB BLD-MCNC: 10.8 G/DL (ref 12–16)
IMM GRANULOCYTES # BLD AUTO: 0 K/UL (ref 0–0.04)
IMM GRANULOCYTES NFR BLD AUTO: 0 % (ref 0–0.5)
LYMPHOCYTES # BLD: 2.1 K/UL (ref 0.9–3.6)
LYMPHOCYTES NFR BLD: 28 % (ref 21–52)
MCH RBC QN AUTO: 30.7 PG (ref 24–34)
MCHC RBC AUTO-ENTMCNC: 32.8 G/DL (ref 31–37)
MCV RBC AUTO: 93.5 FL (ref 78–100)
MONOCYTES # BLD: 0.5 K/UL (ref 0.05–1.2)
MONOCYTES NFR BLD: 7 % (ref 3–10)
NEUTS SEG # BLD: 4.7 K/UL (ref 1.8–8)
NEUTS SEG NFR BLD: 63 % (ref 40–73)
NRBC # BLD: 0 K/UL (ref 0–0.01)
NRBC BLD-RTO: 0 PER 100 WBC
PLATELET # BLD AUTO: 135 K/UL (ref 135–420)
PMV BLD AUTO: 12.3 FL (ref 9.2–11.8)
POTASSIUM SERPL-SCNC: 4.1 MMOL/L (ref 3.5–5.5)
RBC # BLD AUTO: 3.52 M/UL (ref 4.2–5.3)
SODIUM SERPL-SCNC: 138 MMOL/L (ref 136–145)
VANCOMYCIN SERPL-MCNC: 20.2 UG/ML (ref 5–40)
WBC # BLD AUTO: 7.5 K/UL (ref 4.6–13.2)

## 2023-07-23 PROCEDURE — 1100000003 HC PRIVATE W/ TELEMETRY

## 2023-07-23 PROCEDURE — 6370000000 HC RX 637 (ALT 250 FOR IP): Performed by: HOSPITALIST

## 2023-07-23 PROCEDURE — 82272 OCCULT BLD FECES 1-3 TESTS: CPT

## 2023-07-23 PROCEDURE — 99232 SBSQ HOSP IP/OBS MODERATE 35: CPT | Performed by: NURSE PRACTITIONER

## 2023-07-23 PROCEDURE — 80048 BASIC METABOLIC PNL TOTAL CA: CPT

## 2023-07-23 PROCEDURE — 94761 N-INVAS EAR/PLS OXIMETRY MLT: CPT

## 2023-07-23 PROCEDURE — 85025 COMPLETE CBC W/AUTO DIFF WBC: CPT

## 2023-07-23 PROCEDURE — 80202 ASSAY OF VANCOMYCIN: CPT

## 2023-07-23 PROCEDURE — 6360000002 HC RX W HCPCS: Performed by: INTERNAL MEDICINE

## 2023-07-23 PROCEDURE — 2580000003 HC RX 258: Performed by: PHYSICIAN ASSISTANT

## 2023-07-23 PROCEDURE — 2580000003 HC RX 258: Performed by: INTERNAL MEDICINE

## 2023-07-23 PROCEDURE — 6370000000 HC RX 637 (ALT 250 FOR IP): Performed by: PHYSICIAN ASSISTANT

## 2023-07-23 PROCEDURE — 6370000000 HC RX 637 (ALT 250 FOR IP): Performed by: STUDENT IN AN ORGANIZED HEALTH CARE EDUCATION/TRAINING PROGRAM

## 2023-07-23 PROCEDURE — 6360000002 HC RX W HCPCS: Performed by: PHYSICIAN ASSISTANT

## 2023-07-23 PROCEDURE — 36415 COLL VENOUS BLD VENIPUNCTURE: CPT

## 2023-07-23 RX ADMIN — FAMOTIDINE 20 MG: 20 TABLET ORAL at 08:58

## 2023-07-23 RX ADMIN — ENOXAPARIN SODIUM 30 MG: 100 INJECTION SUBCUTANEOUS at 14:31

## 2023-07-23 RX ADMIN — OXCARBAZEPINE 300 MG: 300 TABLET, FILM COATED ORAL at 21:01

## 2023-07-23 RX ADMIN — METRONIDAZOLE 500 MG: 500 INJECTION, SOLUTION INTRAVENOUS at 17:20

## 2023-07-23 RX ADMIN — VANCOMYCIN HYDROCHLORIDE 750 MG: 750 INJECTION, POWDER, LYOPHILIZED, FOR SOLUTION INTRAVENOUS at 14:30

## 2023-07-23 RX ADMIN — CLOPIDOGREL BISULFATE 75 MG: 75 TABLET ORAL at 08:59

## 2023-07-23 RX ADMIN — AMLODIPINE BESYLATE 10 MG: 10 TABLET ORAL at 09:02

## 2023-07-23 RX ADMIN — SODIUM CHLORIDE, PRESERVATIVE FREE 10 ML: 5 INJECTION INTRAVENOUS at 09:06

## 2023-07-23 RX ADMIN — SODIUM CHLORIDE, PRESERVATIVE FREE 10 ML: 5 INJECTION INTRAVENOUS at 21:02

## 2023-07-23 RX ADMIN — DIVALPROEX SODIUM 1000 MG: 125 CAPSULE, COATED PELLETS ORAL at 08:57

## 2023-07-23 RX ADMIN — PAROXETINE HYDROCHLORIDE 30 MG: 20 TABLET, FILM COATED ORAL at 08:58

## 2023-07-23 RX ADMIN — OXCARBAZEPINE 300 MG: 300 TABLET, FILM COATED ORAL at 08:58

## 2023-07-23 RX ADMIN — ACETAMINOPHEN 325MG 650 MG: 325 TABLET ORAL at 08:56

## 2023-07-23 RX ADMIN — ACETAMINOPHEN 325MG 650 MG: 325 TABLET ORAL at 23:40

## 2023-07-23 RX ADMIN — DIVALPROEX SODIUM 1000 MG: 125 CAPSULE, COATED PELLETS ORAL at 21:01

## 2023-07-23 RX ADMIN — MULTIPLE VITAMINS W/ MINERALS TAB 1 TABLET: TAB at 08:59

## 2023-07-23 RX ADMIN — ATORVASTATIN CALCIUM 40 MG: 40 TABLET, FILM COATED ORAL at 21:01

## 2023-07-23 RX ADMIN — METRONIDAZOLE 500 MG: 500 INJECTION, SOLUTION INTRAVENOUS at 05:53

## 2023-07-23 RX ADMIN — WATER 1000 MG: 1 INJECTION INTRAMUSCULAR; INTRAVENOUS; SUBCUTANEOUS at 17:19

## 2023-07-23 ASSESSMENT — PAIN SCALES - GENERAL
PAINLEVEL_OUTOF10: 5
PAINLEVEL_OUTOF10: 0

## 2023-07-23 ASSESSMENT — PAIN DESCRIPTION - DESCRIPTORS: DESCRIPTORS: ACHING

## 2023-07-23 ASSESSMENT — PAIN DESCRIPTION - ORIENTATION: ORIENTATION: ANTERIOR

## 2023-07-23 ASSESSMENT — PAIN DESCRIPTION - LOCATION: LOCATION: ABDOMEN

## 2023-07-23 NOTE — PROGRESS NOTES
[facility] [facility] Hospitalist Group  Progress Note    Patient: Ely Montenegro Age: 70 y.o. : 1955 MR#: [id number] SSN: [id number]  Date: 2023         Assessment/Plan:   Severe sepsis possibly aspiration pneumonia    -On admission had fever of 100.8, leukocytosis, lactic acidosis    -CT showed groundglass opacities in the posterior right upper lobe, possible aspiration during seizure versus dysphagia. Of note patient had PEG tube placed for failure to thrive and it was removed in April.    - Continue flagyl, vanco and ceftriaxone     - MRSA culture negative     - Blood cultures negative to date    - Monitor CBC    Anemia, possible due to sepsis, improving now     - Ordered fecal occult stool      Breakthrough seizure    - Depakote sprinkles increased to 1000 mg BID    - Valproic Acid 129     - Continue Trileptal 300 mg PO BID    - Brother at bedside, discussed importance of compliance    - Neurology assistance appreciated     Demand ischemia    -Likely due to sepsis and recent seizure    -No complaints of chest pain    Lower abdominal discomfort    - Added UA, not collected     Constipation    - Patient reports 2 large bowel movements today    Hypertension    -Continue home medication    -Monitor blood pressure    History of CVA    -Continue [facility]    Hospital Problems             Last Modified POA    * (Principal) Severe sepsis ([address]) 2023 Yes    Recurrent seizures ([address]) 2023 Yes    Essential hypertension 2023 Yes    Aspiration pneumonia ([address]) 2023 Yes    Constipation 2023 Yes       DVT Prophylaxis:  [x]Lovenox  []Hep SQ  []SCDs  []Coumadin   []On Heparin gtt []PO anticoagulant    Anticipated discharge: 1-2 days     Subjective:     Pt s/e @ bedside. No major events overnight. Pt offers no complaints this AM. Denies CP, SOB, cough, abd pain, nvd, headache, dysuria.     Hospital Course:     Tyree Rice is a 28-year-old female with a past medical history appropriate matching first site-specific examinations), or use of iterative reconstruction technique. COMPARISON: 7/12/2023. CHEST: Thyroid unremarkable. Possible left atrial cardiac chamber enlargement. No acute aortic pathology or aneurysm. Central pulmonary arteries are patent. No thoracic lymphadenopathy. No new pleural effusion or pneumothorax. Mild dependent consolidations and symmetrically. Mild bronchovascular groundglass opacities in the posterior right upper lobe. Abdominal and pelvic findings: Only changes discussed given CT performed 8 days ago: Increased moderate colonic stool burden with inspissated stool distally. Mild anasarca. Suspect mild aspiration pneumonia. Possible constipation. Possible left atrial cardiac chamber enlargement. Discussed patient and treatment plan with attending physician     I have personally reviewed all pertinent labs, films and EKGs that have officially resulted. I reviewed available electronic documentation outlining the initial presentation as well as the emergency room physician's encounter. 35 minutes.      BRYNN Negro-BC, [address] [place] [address]

## 2023-07-23 NOTE — PROGRESS NOTES
0720 -- Bedside shift change report given to [facility] (oncoming nurse) by Nely Atkinson (offgoing nurse). Report included the following information Nurse Handoff Report. 0900 -- AM meds given, assessment completed  1230 -- no change in patient condition, no distress noted  1430 -- PM meds given, no distress noted, 1600-patient resting in bed with call bell in reach  1920-Bedside and Verbal shift change report given to Eran Luna (oncoming nurse) by [facility]  (offgoing nurse). Report included the following information Nurse Handoff Report.

## 2023-07-23 NOTE — PLAN OF CARE
Problem: Safety - Adult  Goal: Free from fall injury  Outcome: Progressing     Problem: Neurosensory - Adult  Goal: Achieves stable or improved neurological status  Outcome: Progressing     Problem: Skin/Tissue Integrity  Goal: Absence of new skin breakdown  Description: 1. Monitor for areas of redness and/or skin breakdown  2. Assess vascular access sites hourly  3. Every 4-6 hours minimum:  Change oxygen saturation probe site  4. Every 4-6 hours:  If on nasal continuous positive airway pressure, respiratory therapy assess nares and determine need for appliance change or resting period.   Outcome: Progressing

## 2023-07-24 ENCOUNTER — HOME HEALTH ADMISSION (OUTPATIENT)
Age: 68
End: 2023-07-24
Payer: MEDICARE

## 2023-07-24 VITALS
WEIGHT: 102.6 LBS | SYSTOLIC BLOOD PRESSURE: 142 MMHG | TEMPERATURE: 98.2 F | DIASTOLIC BLOOD PRESSURE: 81 MMHG | BODY MASS INDEX: 17.09 KG/M2 | HEART RATE: 67 BPM | OXYGEN SATURATION: 99 % | RESPIRATION RATE: 17 BRPM | HEIGHT: 65 IN

## 2023-07-24 LAB
ANION GAP SERPL CALC-SCNC: 4 MMOL/L (ref 3–18)
BASOPHILS # BLD: 0 K/UL (ref 0–0.1)
BASOPHILS NFR BLD: 0 % (ref 0–2)
BUN SERPL-MCNC: 24 MG/DL (ref 7–18)
BUN/CREAT SERPL: 23 (ref 12–20)
CALCIUM SERPL-MCNC: 8.7 MG/DL (ref 8.5–10.1)
CHLORIDE SERPL-SCNC: 108 MMOL/L (ref 100–111)
CO2 SERPL-SCNC: 26 MMOL/L (ref 21–32)
CREAT SERPL-MCNC: 1.05 MG/DL (ref 0.6–1.3)
DIFFERENTIAL METHOD BLD: ABNORMAL
EOSINOPHIL # BLD: 0.1 K/UL (ref 0–0.4)
EOSINOPHIL NFR BLD: 2 % (ref 0–5)
ERYTHROCYTE [DISTWIDTH] IN BLOOD BY AUTOMATED COUNT: 14.6 % (ref 11.6–14.5)
GLUCOSE SERPL-MCNC: 86 MG/DL (ref 74–99)
HCT VFR BLD AUTO: 33.5 % (ref 35–45)
HGB BLD-MCNC: 10.7 G/DL (ref 12–16)
IMM GRANULOCYTES # BLD AUTO: 0 K/UL (ref 0–0.04)
IMM GRANULOCYTES NFR BLD AUTO: 0 % (ref 0–0.5)
LYMPHOCYTES # BLD: 2.3 K/UL (ref 0.9–3.6)
LYMPHOCYTES NFR BLD: 34 % (ref 21–52)
MCH RBC QN AUTO: 30 PG (ref 24–34)
MCHC RBC AUTO-ENTMCNC: 31.9 G/DL (ref 31–37)
MCV RBC AUTO: 93.8 FL (ref 78–100)
MONOCYTES # BLD: 0.8 K/UL (ref 0.05–1.2)
MONOCYTES NFR BLD: 12 % (ref 3–10)
NEUTS SEG # BLD: 3.5 K/UL (ref 1.8–8)
NEUTS SEG NFR BLD: 52 % (ref 40–73)
NRBC # BLD: 0 K/UL (ref 0–0.01)
NRBC BLD-RTO: 0 PER 100 WBC
OXCARBAZEPINE SERPL-MCNC: 46 UG/ML (ref 10–35)
PLATELET # BLD AUTO: 125 K/UL (ref 135–420)
PMV BLD AUTO: 12.3 FL (ref 9.2–11.8)
POTASSIUM SERPL-SCNC: 4.8 MMOL/L (ref 3.5–5.5)
RBC # BLD AUTO: 3.57 M/UL (ref 4.2–5.3)
SODIUM SERPL-SCNC: 138 MMOL/L (ref 136–145)
WBC # BLD AUTO: 6.8 K/UL (ref 4.6–13.2)

## 2023-07-24 PROCEDURE — 85025 COMPLETE CBC W/AUTO DIFF WBC: CPT

## 2023-07-24 PROCEDURE — 97535 SELF CARE MNGMENT TRAINING: CPT

## 2023-07-24 PROCEDURE — 6360000002 HC RX W HCPCS: Performed by: INTERNAL MEDICINE

## 2023-07-24 PROCEDURE — 2580000003 HC RX 258: Performed by: PHYSICIAN ASSISTANT

## 2023-07-24 PROCEDURE — 6370000000 HC RX 637 (ALT 250 FOR IP): Performed by: PHYSICIAN ASSISTANT

## 2023-07-24 PROCEDURE — 80048 BASIC METABOLIC PNL TOTAL CA: CPT

## 2023-07-24 PROCEDURE — 2580000003 HC RX 258: Performed by: INTERNAL MEDICINE

## 2023-07-24 PROCEDURE — 36415 COLL VENOUS BLD VENIPUNCTURE: CPT

## 2023-07-24 PROCEDURE — 94761 N-INVAS EAR/PLS OXIMETRY MLT: CPT

## 2023-07-24 PROCEDURE — 6370000000 HC RX 637 (ALT 250 FOR IP): Performed by: HOSPITALIST

## 2023-07-24 PROCEDURE — 6360000002 HC RX W HCPCS: Performed by: PHYSICIAN ASSISTANT

## 2023-07-24 PROCEDURE — 99239 HOSP IP/OBS DSCHRG MGMT >30: CPT | Performed by: NURSE PRACTITIONER

## 2023-07-24 PROCEDURE — 6370000000 HC RX 637 (ALT 250 FOR IP): Performed by: STUDENT IN AN ORGANIZED HEALTH CARE EDUCATION/TRAINING PROGRAM

## 2023-07-24 PROCEDURE — 97110 THERAPEUTIC EXERCISES: CPT

## 2023-07-24 RX ORDER — CLINDAMYCIN HYDROCHLORIDE 300 MG/1
300 CAPSULE ORAL 3 TIMES DAILY
Qty: 18 CAPSULE | Refills: 0 | Status: SHIPPED | OUTPATIENT
Start: 2023-07-24 | End: 2023-07-30

## 2023-07-24 RX ORDER — M-VIT,TX,IRON,MINS/CALC/FOLIC 27MG-0.4MG
1 TABLET ORAL DAILY
Qty: 30 TABLET | Refills: 0 | Status: SHIPPED | OUTPATIENT
Start: 2023-07-25

## 2023-07-24 RX ORDER — DIVALPROEX SODIUM 125 MG/1
1000 CAPSULE, COATED PELLETS ORAL 2 TIMES DAILY
Qty: 480 CAPSULE | Refills: 0 | Status: SHIPPED | OUTPATIENT
Start: 2023-07-24 | End: 2023-08-23

## 2023-07-24 RX ADMIN — OXCARBAZEPINE 300 MG: 300 TABLET, FILM COATED ORAL at 10:38

## 2023-07-24 RX ADMIN — MULTIPLE VITAMINS W/ MINERALS TAB 1 TABLET: TAB at 10:39

## 2023-07-24 RX ADMIN — ENOXAPARIN SODIUM 30 MG: 100 INJECTION SUBCUTANEOUS at 14:34

## 2023-07-24 RX ADMIN — PAROXETINE HYDROCHLORIDE 30 MG: 20 TABLET, FILM COATED ORAL at 10:39

## 2023-07-24 RX ADMIN — CLOPIDOGREL BISULFATE 75 MG: 75 TABLET ORAL at 10:38

## 2023-07-24 RX ADMIN — DIVALPROEX SODIUM 1000 MG: 125 CAPSULE, COATED PELLETS ORAL at 10:40

## 2023-07-24 RX ADMIN — FAMOTIDINE 20 MG: 20 TABLET ORAL at 10:39

## 2023-07-24 RX ADMIN — VANCOMYCIN HYDROCHLORIDE 750 MG: 750 INJECTION, POWDER, LYOPHILIZED, FOR SOLUTION INTRAVENOUS at 14:30

## 2023-07-24 RX ADMIN — AMLODIPINE BESYLATE 10 MG: 10 TABLET ORAL at 10:39

## 2023-07-24 RX ADMIN — METRONIDAZOLE 500 MG: 500 INJECTION, SOLUTION INTRAVENOUS at 05:48

## 2023-07-24 RX ADMIN — SODIUM CHLORIDE, PRESERVATIVE FREE 10 ML: 5 INJECTION INTRAVENOUS at 10:50

## 2023-07-24 NOTE — DISCHARGE INSTRUCTIONS
DISCHARGE SUMMARY from Nurse    PATIENT INSTRUCTIONS:      What to do at Home:  Recommended activity: activity as tolerated,     If you experience any of the following symptoms: seizures, please follow up with your primary care provider or dial 911. *  Please give a list of your current medications to your Primary Care Provider. *  Please update this list whenever your medications are discontinued, doses are      changed, or new medications (including over-the-counter products) are added. *  Please carry medication information at all times in case of emergency situations. These are general instructions for a healthy lifestyle:    No smoking/ No tobacco products/ Avoid exposure to second hand smoke  Surgeon General's Warning:  Quitting smoking now greatly reduces serious risk to your health. Obesity, smoking, and sedentary lifestyle greatly increases your risk for illness    A healthy diet, regular physical exercise & weight monitoring are important for maintaining a healthy lifestyle    You may be retaining fluid if you have a history of heart failure or if you experience any of the following symptoms:  Weight gain of 3 pounds or more overnight or 5 pounds in a week, increased swelling in our hands or feet or shortness of breath while lying flat in bed. Please call your doctor as soon as you notice any of these symptoms; do not wait until your next office visit. The discharge information has been reviewed with the patient. The patient verbalized understanding. Discharge medications reviewed with the patient and appropriate educational materials and side effects teaching were provided.   ___________________________________________________________________________________________________________________________________

## 2023-07-24 NOTE — HOME CARE
Referral received for SN / PT/ OT / SLP. Spoke with patient in room. Given VIP cards. Spoke with Orion Grimm - would like to be the primary contact for all calls. Also to have sister Colby Andres as secondary number. Requesting not to call brother Carolyn Madsen. Demographics verified.     Referral has been noted and arranged and sent to central intake and scheduling.       -   aSndra Hernandez LPN  [facility]

## 2023-07-24 NOTE — CARE COORDINATION
Call made to [facility], [phone] spoke with Brock Vicente, dispatch will call the nurses station with MEKHI.   Trip # [id number]

## 2023-07-24 NOTE — PROGRESS NOTES
CM ordered DME Bedside Com[facility] on [facility], and updated that patient is discharging today, and to please deliver DME Bedside [employer] when possible to patient's home.                Remedios Madsen RN  Case Management [phone]

## 2023-07-24 NOTE — PLAN OF CARE
Problem: Safety - Adult  Goal: Free from fall injury  7/24/2023 0003 by Jeana Agee RN  Outcome: Progressing     Problem: Confusion  Goal: Confusion, delirium, dementia, or psychosis is improved or at baseline  Description: INTERVENTIONS:  1. Assess for possible contributors to thought disturbance, including medications, impaired vision or hearing, underlying metabolic abnormalities, dehydration, psychiatric diagnoses, and notify attending LIP  2. [place] high risk fall precautions, as indicated  3. Provide frequent short contacts to provide reality reorientation, refocusing and direction  4. Decrease environmental stimuli, including noise as appropriate  5. Monitor and intervene to maintain adequate nutrition, hydration, elimination, sleep and activity  6. If unable to ensure safety without constant attention obtain sitter and review sitter guidelines with assigned personnel  7. Initiate Psychosocial CNS and Spiritual Care consult, as indicated  7/24/2023 0003 by Jeana Agee RN  Outcome: Progressing     Problem: Skin/Tissue Integrity  Goal: Absence of new skin breakdown  Description: 1. Monitor for areas of redness and/or skin breakdown  2. Assess vascular access sites hourly  3. Every 4-6 hours minimum:  Change oxygen saturation probe site  4. Every 4-6 hours:  If on nasal continuous positive airway pressure, respiratory therapy assess nares and determine need for appliance change or resting period.   7/24/2023 0003 by Jeana Agee RN  Outcome: Progressing     Problem: Chronic Conditions and Co-morbidities  Goal: Patient's chronic conditions and co-morbidity symptoms are monitored and maintained or improved  7/24/2023 0003 by Jenaa Agee RN  Outcome: Progressing     Problem: Pain  Goal: Verbalizes/displays adequate comfort level or baseline comfort level  7/24/2023 0003 by Jeana Agee RN  Outcome: Progressing

## 2023-07-24 NOTE — PLAN OF CARE
Problem: Occupational Therapy - Adult  Goal: By Discharge: Performs self-care activities at highest level of function for planned discharge setting. See evaluation for individualized goals. Description:   Occupational Therapy Goals:  Initiated 7/21/2023 to be met within 7-10 days. 1.  Patient will perform bed mobility with supervision/set-up. 2.  Patient will perform grooming while sitting EOB with Good balance for > 5 min with supervision/set-up. 3.  Patient will perform upper body dressing with supervision/set-up. 4.  Patient will perform toilet transfers with minimal assistance/contact guard assist.  5.  Patient will perform all aspects of toileting with minimal assistance/contact guard assist.  6.  Patient will participate in upper extremity therapeutic exercise/activities with supervision/set-up for 8 minutes to improve endurance and UB strength needed for ADLs    7. Patient will utilize energy conservation techniques during functional activities with verbal cues. PLOF: Pt lives with son who assists with bathing and LB dressing. Pt uses w/c for functional mobility and requires \"some\" assistance with functional txfrs. Pt also has caregiver Monday-Friday for 4 hours in the morning assisting with ADLs. Outcome: Progressing   OCCUPATIONAL THERAPY TREATMENT    Patient: Karla Chadwick (25 y.o. female)  Date: 7/24/2023  Diagnosis: Seizure ([address]) [R56.9]  Elevated troponin [R77.8]  Severe sepsis ([address]) [A41.9, R65.20]  Constipation, unspecified constipation type [K59.00]  Aspiration pneumonia of both lower lobes, unspecified aspiration pneumonia type ([address]) [J69.0] Severe sepsis [facility])      Precautions: Fall Risk, Bed Alarm, Seizure    Chart, occupational therapy assessment, plan of care, and goals were reviewed. ASSESSMENT:  Patient alert and agreeable to therapy session. She was able to sit on EOB from supine with min to mod assist in prep for functional tasks.   Intermittent to constant support needed Dynamic: Poor (constant support)    ADL Intervention:  Feeding: Setup;Supervision (bringing cup to her mouth)  Grooming: Setup;Supervision (Encouragement given to complete face washing task independently.)    UE Therapeutic Exercises:   Pt was able to perform BUE:    EXERCISE   Sets   Reps   Active Active Assist   Passive Self- assisted ROM   Comments   Shoulder horizontal abduction 1  10  [x]  []  []  []      Shoulder flexion 1  10  []  [x]  []  []      Shoulder extension 1   10 [x]  []  []  []      Forearm extension/flexion 1 10 [x]  []  []  []     Scapular protraction/retraction 1   10 []  [x]  []  []      To increase strength/endurance for ADLs. Pain:  Pain level pre-treatment: 0/10   Pain level post-treatment: 0/10  Pain Intervention(s): NA  Response to intervention: NA    Activity Tolerance:    Activity Tolerance: Patient tolerated treatment well  Please refer to the flowsheet for vital signs taken during this treatment. After treatment:   []  Patient left in no apparent distress sitting up in chair  [x]  Patient left in no apparent distress in bed  [x]  Call bell left within reach  [x]  Nursing notified  [x]  Caregiver (son) present  [x]  Bed alarm activated    COMMUNICATION/EDUCATION:   Patient Education  Education Given To: Patient; Family  Education Provided: Role of Therapy;Transfer Training;Plan of Care;Energy Conservation; Fall Prevention Strategies; ADL Adaptive Strategies; Home Exercise Program  Education Method: Teach Back;Verbal;Demonstration  Barriers to Learning: Cognition  Education Outcome: Continued education needed    Thank you for this referral.  MS BALTAZAR oLpez/SESAR   Minutes: 21

## 2023-07-24 NOTE — DISCHARGE SUMMARY
[facility] Hospitalist Group    Discharge Summary    Patient: Padmini Schmid MRN: [id number]  [facility]: [id number]    YOB: 1955  Age: 70 y.o. Sex: female    DOA: 7/20/2023 LOS:  LOS: 4 days   Discharge Date:      Admission Diagnoses: Seizure ([address]) [R56.9]  Elevated troponin [R77.8]  Severe sepsis ([address]) [A41.9, R65.20]  Constipation, unspecified constipation type [K59.00]  Aspiration pneumonia of both lower lobes, unspecified aspiration pneumonia type ([address]) [J69.0]    Discharge Diagnoses:    Hospital Problems             Last Modified POA    * (Principal) Severe sepsis ([address]) 7/20/2023 Yes    Recurrent seizures ([address]) 7/20/2023 Yes    Essential hypertension 7/20/2023 Yes    Aspiration pneumonia ([address]) 7/20/2023 Yes    Constipation 7/20/2023 Yes   Severe sepsis possibly aspiration pneumonia    -On admission had fever of 100.8, leukocytosis, lactic acidosis    -CT showed groundglass opacities in the posterior right upper lobe, possible aspiration during seizure versus dysphagia.   Of note patient had PEG tube placed for failure to thrive and it was removed in April.    - Discharge with clindamycin due to allergies to PCN and Cipro, to complete 10 days total    - MRSA culture negative     - Blood cultures negative to date    - Monitor CBC     Anemia, possible due to sepsis, improving now     - Ordered fecal occult stool, negative     Breakthrough seizure    - Depakote sprinkles increased to 1000 mg BID    - Valproic Acid 129     - Continue Trileptal 300 mg PO BID    - Follow up with [facility] Neurology      Demand ischemia    -Likely due to sepsis and recent seizure    -No complaints of chest pain     Lower abdominal discomfort, improved     - Added UA, not collected      Constipation    - Patient reports 2 large bowel movements 7/23/2023     Hypertension    -Continue home medication    -Monitor blood pressure     History of CVA    -Continue Lipitor    Discharge Condition: Stable    Discharge medications which have CHANGED    Details   divalproex (DEPAKOTE SPRINKLE) 125 MG DR capsule Take 8 capsules by mouth 2 times daily  Qty: 480 capsule, Refills: 0  Start date: 7/24/2023, End date: 8/23/2023           CONTINUE these medications which have NOT CHANGED    Details   lisinopril (PRINIVIL;ZESTRIL) 20 MG tablet Take 1 tablet by mouth daily      hydrALAZINE (APRESOLINE) 100 MG tablet Take 1 tablet by mouth in the morning and at bedtime      dicyclomine (BENTYL) 20 MG tablet Take 1 tablet by mouth 4 times daily as needed (pain)  Qty: 20 tablet, Refills: 0      famotidine (PEPCID) 20 MG tablet Take 1 tablet by mouth 2 times daily  Qty: 90 tablet, Refills: 1      acetaminophen (TYLENOL) 325 MG tablet Take 650 mg by mouth every 4 hours as needed      amLODIPine (NORVASC) 10 MG tablet Take 10 mg by mouth daily      atorvastatin (LIPITOR) 40 MG tablet Take 40 mg by mouth daily      clopidogrel (PLAVIX) 75 MG tablet Take 75 mg by mouth daily      cyanocobalamin 500 MCG tablet Take 500 mcg by mouth daily      ergocalciferol (ERGOCALCIFEROL) 1.25 MG (94081 UT) capsule Take 50,000 Units by mouth Twice a Week      ferrous sulfate (IRON 325) 325 (65 Fe) MG tablet Take 325 mg by mouth daily      OXcarbazepine (TRILEPTAL) 300 MG tablet Take 300 mg by mouth 2 times daily      PARoxetine (PAXIL) 30 MG tablet Take 30 mg by mouth daily      spironolactone (ALDACTONE) 25 MG tablet Take 25 mg by mouth daily           STOP taking these medications       rivaroxaban (XARELTO STARTER PACK) 15 & 20 MG Starter Pack Comments:   Reason for Stopping:                Activity: activity as tolerated    Diet:  Easy to chew diet    Wound Care: none needed    Follow-up:   Follow-up Information       Follow up With Specialties Details Why Contact Samantha    [facility], APRN - NP Nurse Practitioner Follow up As of now, office is closed, we will call in am. [address] [address]  [address] [place], [place] Physician Assistant Follow up in 1 week(s)  [address]               Discharge time: >30 minutes spent coordinating this discharge    Abelardo Sloan, MSN, FNP-BC, AGACNP-BC  [facility] [facility]   7/24/2023, 4:40 PM

## 2023-07-24 NOTE — CARE COORDINATION
07/24/23 1634   Service Assessment   Patient Orientation Alert and Oriented;Person;Place;Situation;Self   Cognition Alert   History Provided By Patient; Child/Family  (Kanika Kuo (Patient's son))   Support Systems Children;Family Members   PCP Verified by CM Yes   Prior Functional Level Assistance with the following:;Bathing;Dressing; Toileting;Mobility   Current Functional Level Assistance with the following:;Bathing;Dressing; Toileting;Mobility   Can patient return to prior living arrangement Yes   Ability to make needs known: Good   Family able to assist with home care needs: Yes   Financial Resources Medicaid; Medicare   Community Resources None   Social/Functional History   Lives With Son  (Patient lives with her son Kanika Kuo.)   Type of [facility] One level   Home Access Level entry   Bathroom Shower/Tub Tub/Shower unit   Bathroom Toilet Standard   Bathroom Equipment Toilet raiser   Bathroom Accessibility Accessible   Home Equipment Walker, rolling; [address] Help From Family;Personal care attendant  (Patient has a [address] M-F 10-4 pm.)   ADL Assistance Needs assistance   Toileting Needs assistance   Ambulation Assistance Needs assistance   Transfer Assistance Needs assistance   Active  No   Patient's  Info Medicaid Transport or patient's family. Occupation Retired   Discharge Planning   Type of Residence Apartment   Living Arrangements Family Members  (Patient lives with her son Kanika Kuo.)   Current Services Prior To Admission Durable Medical Equipment;Private Duty Homecare   Current DME Prior to [employer]; Wheelchair   Potential Assistance Needed Durable Medical Equipment;Home Care   Potential DME Needed Bedside Commode   DME Ordered?  Bedside commode   Potential Assistance Purchasing Medications No   Type of Home Care Services OT;PT;Skilled Therapy;Nursing Services  (Speech Therapy)   Patient expects to be discharged to: 30 days (Readmission):  No    If yes, Readmission Assessment in  Navigator will be completed. Advance Directives:      Code Status: DNR   Patient's Primary Decision Maker is:      Secondary Decision Maker: Christal Short - Child - [phone]    Discharge Planning:    Patient lives with: (P) Family Members (Patient lives with her son Christal Short.) Type of Home: (P) Apartment  Primary Care Giver:    Patient Support Systems include: (P) Children, Family Members   Current Financial resources: (P) Medicaid, Medicare  Current community resources: (P) None  Current services prior to admission: (P) Durable Medical Equipment, Private Duty Homecare            Current DME: (P) Walker, Wheelchair            Type of Home Care services:  (P) OT, PT, Skilled Therapy, Nursing Services (Speech Therapy)    ADLS  Prior functional level: (P) Assistance with the following:, Bathing, Dressing, Toileting, Mobility  Current functional level: (P) Assistance with the following:, Bathing, Dressing, Toileting, Mobility    PT AM-PAC:   /24  OT AM-PAC: 14 /24    Family can provide assistance at DC: (P) Yes  Would you like Case Management to discuss the discharge plan with any other family members/significant others, and if so, who?     Plans to Return to Present Housing: (P) Yes  Other Identified Issues/Barriers to RETURNING to current housing: None  Potential Assistance needed at discharge: (P) [facility], Home Care            Potential DME: (P) Bedside Commode  Patient expects to discharge to: (P) Apartment (with [facility] services, PCA service, and Family Assistance.)  Plan for transportation at discharge: (P) Other (see comment) (Patient's family or Medicaid Transport.)    Financial    Payor: [id number]  / Plan: [address] / Product Type: *No Product type* /         Potential assistance Purchasing Medications: (P) No  Meds-to-Beds request:        [address] [place] [address], [address] [place] [address],  [phone] [phone] Rod Clark [phone]  [address] [place] [address],[address] [address]  Phone: [phone] Fax: [phone]      Notes:    Factors facilitating achievement of predicted outcomes: Family support, Caregiver support, Motivated, Cooperative, Pleasant, Good insight into deficits, and Has needed Durable Medical Equipment at home    Barriers to discharge: None        The Plan for Transition of Care is related to the following treatment goals of Seizure ([address]) [R56.9]  Elevated troponin [R77.8]  Severe sepsis (HCC) [A41.9, R65.20]  Constipation, unspecified constipation type [K59.00]  Aspiration pneumonia of both lower lobes, unspecified aspiration pneumonia type ([address]) [X92.4]    IF APPLICABLE: The Patient and/or patient representative Loja and her family were provided with a choice of provider and agrees with the discharge plan. Freedom of choice list with basic dialogue that supports the patient's individualized plan of care/goals and shares the quality data associated with the providers was provided to: (P) Patient   Patient Representative Name:       The Patient and/or Patient Representative Agree with the Discharge Plan?  (P) Yes    Pascale Galarza  Case Management Department

## 2023-07-24 NOTE — PROGRESS NOTES
Discharge order noted for today. Pt has been accepted to [facility] agency. Met with patient and spoke with patient's son Rod Patricia and are agreeable to the transition plan today. Transport has been arranged through [employer] scheduled for 8 pm. Patient's home health  orders have been forwarded to [facility]  agency via [address].  Discharge information has been documented on the AVS.           Avila Monreal RN  Case Management [phone]

## 2023-07-24 NOTE — CARE COORDINATION
GELY spoke with patient's son Garret Terrazas [phone], confirmed patient lives with him. GELY updated on discharge order for today. FOC verbal consent given by patient, and patient's son for [facility]. Patient's son requesting a Bedside Commode for patient. Patient's son confirmed home address on facesheet as correct address, said patient will need Medical Transport home. Patient's son said he will be home all day and night to receive patient from Medical Transport. GELY spoke with Sandra Urban with [facility], said they can take patient. CM put patient in the queue for [facility].                Felipe Garcia, RN  Case Management [phone]

## 2023-07-24 NOTE — CARE COORDINATION
Requested Case Management specialist to assist with transportation to:      Patient's home address, verified. Address is:      [address] [place] [address], [address] [place] [address]           and phone number is :    Patient's son Elena Gates [phone]    Patient will require BLS transport. Pt requires Stretcher If stretcher, reason: Severe Sepsis, Hx of CVA, Seizures, Hx of CVA, Impaired Mobility. Patient is currently requiring oxygen No   Height: 5\"5   Weight: 102 lbs  Pt is on isolation:  No    Is the pt ready now? Yes  Requested time: Next Available  PCS Faxed: No  Insurance verified on face sheet: Yes  Auth needed for transport: Yes  CM completed PCS/ Envelope and placed on chart.

## 2023-07-26 ENCOUNTER — HOME CARE VISIT (OUTPATIENT)
Age: 68
End: 2023-07-26
Payer: MEDICARE

## 2023-07-26 VITALS
HEART RATE: 78 BPM | TEMPERATURE: 98 F | DIASTOLIC BLOOD PRESSURE: 70 MMHG | OXYGEN SATURATION: 99 % | RESPIRATION RATE: 14 BRPM | SYSTOLIC BLOOD PRESSURE: 136 MMHG

## 2023-07-26 LAB
BACTERIA SPEC CULT: NORMAL
BACTERIA SPEC CULT: NORMAL
SERVICE CMNT-IMP: NORMAL
SERVICE CMNT-IMP: NORMAL

## 2023-07-26 PROCEDURE — G0299 HHS/HOSPICE OF RN EA 15 MIN: HCPCS

## 2023-07-26 ASSESSMENT — ENCOUNTER SYMPTOMS
DYSPNEA ACTIVITY LEVEL: AFTER AMBULATING 10 - 20 FT
HEMOPTYSIS: 0
TROUBLE SWALLOWING: 1

## 2023-07-26 NOTE — HOME HEALTH
Skilled services/Home bound verification:  PATIENT AND CAREGIVER UNDERSTAND HOMEBOUND STATUS. Skilled Reason for admission/summary of clinical condition:  Aspiration Pneumonia. This patient is homebound for the following reasons Requires considerable and taxing effort to leave the home , Requires the assistance of 1 or more persons to leave the home , Only leaves the home for medical reasons or [employer] services and are infrequent and of short duration for other reasons  and Decreased mental status requiring 24 hour supervision . Caregiver: Merritt Dears and paid aide, help available 24/7 and they . Assists with ADLs, Medication management, Transportation to appointments, Meal prep and assists with ambulation/transfers. Medications reconciled and all medications are available in the home this visit. The following education was provided regarding medications: All medications should be given the same time daily. Medications  are effective at this time. High risk medication teaching regarding  antiplatelets, antibiotics, and antipsychotics, performed. Patient on Plaviix,  I discussed the signs of divine and occult bleeding, prevention of bleeding when taking antiplatelets and when to call MD.  She is also on Cleocin an Antibiotic,   I went over the importance of taking them as prescribed,  taking them the same time every day and taking all till they are gone. She also take Seroquel for sleep this is an Antipsychotic,  I went over the side effects, ie: heart pounding, GI upset, weakness, blurred vision increased appetitive unusual dreams or nightmares and to report any of these to MD.      Home health supplies by type and quantity ordered/delivered this visit include: n/a    Patient education provided this visit to include: Patient is a fall risk,  I walked with her with son with walker. Patient is very weak and needed a lot of support.    Patient is a high  fall risk, I recommend supervision

## 2023-07-26 NOTE — CASE COMMUNICATION
SOC completed on patient on 7/26/23. Patient is ambulatory with maximum assist with walker, for short distances 10-20 feet. She has mostly left sided weakness, weak all four. Patient is a bit forgetful, speech is garbeled and she has issues swallowing (asp pna last DX)  Son is caregiver and very attentive. Patient has aide at the home 8am-5pm,  5 days a week.

## 2023-07-27 ENCOUNTER — HOME CARE VISIT (OUTPATIENT)
Age: 68
End: 2023-07-27
Payer: MEDICARE

## 2023-07-27 PROCEDURE — G0151 HHCP-SERV OF PT,EA 15 MIN: HCPCS

## 2023-07-31 ENCOUNTER — HOME CARE VISIT (OUTPATIENT)
Age: 68
End: 2023-07-31
Payer: MEDICARE

## 2023-07-31 VITALS
HEART RATE: 73 BPM | SYSTOLIC BLOOD PRESSURE: 110 MMHG | DIASTOLIC BLOOD PRESSURE: 62 MMHG | OXYGEN SATURATION: 99 % | RESPIRATION RATE: 16 BRPM | TEMPERATURE: 98.7 F

## 2023-07-31 PROCEDURE — G0153 HHCP-SVS OF S/L PATH,EA 15MN: HCPCS

## 2023-07-31 ASSESSMENT — ENCOUNTER SYMPTOMS
DYSPNEA WITH EXERTION: 1
POSTURAL DYSPNEA: 1
PAIN LOCATION - PAIN QUALITY: DULL
DYSPNEA AT REST: 1

## 2023-07-31 NOTE — PROGRESS NOTES
Physician Progress Note      PATIENT:               Hiwot Mathur  CSN #:                  [id number]  :                       1955  ADMIT DATE:       2023 9:14 AM  DISCH DATE:        2023 7:19 PM  RESPONDING  PROVIDER #:        Anil Owen DO          QUERY TEXT:    Pt admitted with Sepsis and has malnutrition documented in Registered   Dietician note on . Please further specify type of malnutrition with   documentation in the medical record. The medical record reflects the following:  Risk Factors: 68yo female with PMH sepsis and aspiration pneumonia, seizures    Clinical Indicators:  note Malnutrition Status:  Mild malnutrition   Context chronic illness  Energy Intake:  Mild decrease in energy intake  Body Fat Loss:  Unable to assess (moderate loss in triceps)  Muscle Mass Loss:  Severe muscle mass loss Calf (gastrocnemius), Hand   (interosseous0    Treatment: ED consult, Magic cup, monitor po intake    ASPEN Criteria:    https://aspenjournals. onlinelibrary. liao. com/doi/full/10.1177/017616513382922  5  Options provided:  -- Mild Malnutrition  -- Other - I will add my own diagnosis  -- Disagree - Not applicable / Not valid  -- Disagree - Clinically unable to determine / Unknown  -- Refer to Clinical Documentation Reviewer    PROVIDER RESPONSE TEXT:    This patient has mild malnutrition.     Query created by: Isra Cruz on 2023 6:14 PM      Electronically signed by:  Anil Owen DO 2023 8:51 AM

## 2023-08-01 ENCOUNTER — HOME CARE VISIT (OUTPATIENT)
Age: 68
End: 2023-08-01
Payer: MEDICARE

## 2023-08-01 VITALS
OXYGEN SATURATION: 98 % | DIASTOLIC BLOOD PRESSURE: 74 MMHG | HEART RATE: 76 BPM | RESPIRATION RATE: 14 BRPM | SYSTOLIC BLOOD PRESSURE: 134 MMHG | TEMPERATURE: 98.1 F

## 2023-08-01 PROCEDURE — G0299 HHS/HOSPICE OF RN EA 15 MIN: HCPCS

## 2023-08-01 NOTE — HOME HEALTH
Medications reconciled. No changes in medications during this visit. Pt/Caregiver instructed on plan of care and ARE  agreeable to plan of care at this time. Plan of care and admission to home health status called to attending physician: John Arenas NP     Discharge planning discussed with patient and caregiver. Discharge planning as follows: Discharge to self, family and under the supervision of MD once patient has met all goals or reached maximum potential.   Pt/Caregiver DID  verbalize understanding.  -  Subjective:   Pain: Patient denies fall since d/c to home and  denies pain for the past 24 hours and taking pain medication and non-pharmacologic pain management. -  Objective  Balance:  Unsupported sitting: Normal  Static Standing balance: Poor  Dynamic standing balance: NA d/t safety reasons  Tinetti balance and gait test: NA d/t safety reasons  ROM: WFL on bilateral LEs. BLE MMT:  R hip flex 3-/5   R hip Abd 3-/5   R hip add 3-/5   R knee flex 3-/5  R knee ext. 3-/5    R ankle DF 3-/5  L hip flex 3-/5  L hip Abd 3-/5  L hip add 3-/5  L knee flex 3-/5  L knee ext. 3-/5  L ankle DF 3-/5    FTSTS:  unable. requires max assistance to complete sit to stand  -  Assessment:    Patient is a 71 y/o female with PMHx of Abnormal coordination 9/21/2020 Depression 4/12/2022 Failure to thrive in adult 4/12/2022 Hallucinations 9/21/2020 Hypertension Neurological disorder Seizures ([address]) Stroke [facility]) 2009 Subclinical hypothyroidism admitted to hospital on 7/20/23 due to Severe sepsis, Recurrent seizures , Essential hypertension, aspiration pneumonia  and was d/c to home on 7/24/23    Patient is now back to one level apartment located on first floor. Patient lives with son. Son is primary cg present during this visit. Discussed with patient and caregiver(s) availability and willingness to provide care.     Primary caregiver is available regular nighttime and is able to assist with  meal prep and setup,

## 2023-08-02 ENCOUNTER — HOME CARE VISIT (OUTPATIENT)
Age: 68
End: 2023-08-02
Payer: MEDICARE

## 2023-08-02 VITALS
RESPIRATION RATE: 16 BRPM | TEMPERATURE: 97.6 F | OXYGEN SATURATION: 96 % | HEART RATE: 76 BPM | DIASTOLIC BLOOD PRESSURE: 76 MMHG | SYSTOLIC BLOOD PRESSURE: 122 MMHG

## 2023-08-02 VITALS
DIASTOLIC BLOOD PRESSURE: 88 MMHG | TEMPERATURE: 97 F | SYSTOLIC BLOOD PRESSURE: 153 MMHG | RESPIRATION RATE: 18 BRPM | HEART RATE: 82 BPM | OXYGEN SATURATION: 97 %

## 2023-08-02 PROCEDURE — G0157 HHC PT ASSISTANT EA 15: HCPCS

## 2023-08-02 ASSESSMENT — ENCOUNTER SYMPTOMS
DYSPNEA ACTIVITY LEVEL: AFTER AMBULATING LESS THAN 10 FT
PAIN LOCATION - PAIN QUALITY: DULL ACHEY

## 2023-08-02 NOTE — HOME HEALTH
Skilled reason for visit: body systems assess, teach disease process and medication management  Caregiver involvement: family/ private aide assists with adl's and iadl's prn. Medications reviewed and all medications are available in the home this visit. The following education was provided regarding medications: take medications as ordered, side effects, dosages, purposes, frequencies, do not stop or start any medications without notifying MD, nstructed son/ private aide that patient is not to take ibuprofen/ advil/ motrin, or any other NSAIDs and rationale given: patient would be at increased risk of bleeding as she is taking plavix as ordered to decrease risk of blood clots. Cg stated understanding. .    MD notified of any discrepancies/look a-like medications/medication interactions: n/a  Medications are effective at this time. Home health supplies by type and quantity ordered/delivered this visit include: n/a    Patient education provided this visit: as above, INSTRUCTED PATIENT AND CG THAT SHOULD ANY NEEDS OR CONCERNS ARISE TO FIRST CALL OUR OFFICE, OR THE DR'S OFFICE OR GO TO AN URGENT CARE CENTER AND NOT TO THE ED FOR NON-LIFE THREATENING EVENTS. IF IT IS LIFE THREATENING THEN CALL 911 OR GO TO THE CLOSEST ER, frequent repositioning to decrease pressure areas, see care plan. Sharps education provided: n/a    Patient level of understanding of education provided: see above, patient, son, private aide \"Meron\" stated partial understanding and teach back    Skilled Care Performed this visit: VS, body systems assessment, teach disease process and medication management    Patient response to procedure performed: receptive to teaching, tolerated assess and teaching without adverse effects noted. Agency Progress toward goals: progressing, see care plan    Patient's Progress towards personal goals: progressing, see care plan, patient/ cgs given opportunity to voice questions and/ or concerns.  Patient/

## 2023-08-02 NOTE — CASE COMMUNICATION
Thank you!  ----- Message -----  From: Mattie Herrera RN  Sent: 8/2/2023   2:58 AM EDT  To: Fely Garcia RN, Ronal Dukes, OT, *      Please see address section of patient chart for update.   Thanks

## 2023-08-03 ENCOUNTER — APPOINTMENT (OUTPATIENT)
Facility: HOSPITAL | Age: 68
End: 2023-08-03
Payer: MEDICARE

## 2023-08-03 ENCOUNTER — HOSPITAL ENCOUNTER (EMERGENCY)
Facility: HOSPITAL | Age: 68
Discharge: HOME | End: 2023-08-03
Attending: EMERGENCY MEDICINE
Payer: MEDICARE

## 2023-08-03 ENCOUNTER — HOME CARE VISIT (OUTPATIENT)
Age: 68
End: 2023-08-03
Payer: MEDICARE

## 2023-08-03 VITALS
OXYGEN SATURATION: 98 % | BODY MASS INDEX: 18.3 KG/M2 | RESPIRATION RATE: 16 BRPM | DIASTOLIC BLOOD PRESSURE: 76 MMHG | TEMPERATURE: 97.6 F | HEART RATE: 76 BPM | WEIGHT: 110 LBS | SYSTOLIC BLOOD PRESSURE: 162 MMHG

## 2023-08-03 DIAGNOSIS — G40.909 RECURRENT SEIZURES (HCC): Primary | ICD-10-CM

## 2023-08-03 DIAGNOSIS — D72.829 LEUKOCYTOSIS, UNSPECIFIED TYPE: ICD-10-CM

## 2023-08-03 LAB
ALBUMIN SERPL-MCNC: 3.5 G/DL (ref 3.4–5)
ALBUMIN/GLOB SERPL: 1 (ref 0.8–1.7)
ALP SERPL-CCNC: 70 U/L (ref 45–117)
ALT SERPL-CCNC: 19 U/L (ref 13–56)
ANION GAP SERPL CALC-SCNC: 6 MMOL/L (ref 3–18)
APPEARANCE UR: CLEAR
AST SERPL-CCNC: 16 U/L (ref 10–38)
BASOPHILS # BLD: 0 K/UL (ref 0–0.1)
BASOPHILS NFR BLD: 0 % (ref 0–2)
BILIRUB SERPL-MCNC: 0.3 MG/DL (ref 0.2–1)
BILIRUB UR QL: NEGATIVE
BUN SERPL-MCNC: 22 MG/DL (ref 7–18)
BUN/CREAT SERPL: 15 (ref 12–20)
CALCIUM SERPL-MCNC: 9.6 MG/DL (ref 8.5–10.1)
CHLORIDE SERPL-SCNC: 113 MMOL/L (ref 100–111)
CO2 SERPL-SCNC: 25 MMOL/L (ref 21–32)
COLOR UR: YELLOW
CREAT SERPL-MCNC: 1.49 MG/DL (ref 0.6–1.3)
DIFFERENTIAL METHOD BLD: ABNORMAL
EOSINOPHIL # BLD: 0.1 K/UL (ref 0–0.4)
EOSINOPHIL NFR BLD: 0 % (ref 0–5)
ERYTHROCYTE [DISTWIDTH] IN BLOOD BY AUTOMATED COUNT: 15.6 % (ref 11.6–14.5)
GLOBULIN SER CALC-MCNC: 3.6 G/DL (ref 2–4)
GLUCOSE BLD STRIP.AUTO-MCNC: 106 MG/DL (ref 70–110)
GLUCOSE SERPL-MCNC: 103 MG/DL (ref 74–99)
GLUCOSE UR STRIP.AUTO-MCNC: NEGATIVE MG/DL
HCT VFR BLD AUTO: 32.7 % (ref 35–45)
HGB BLD-MCNC: 10.6 G/DL (ref 12–16)
HGB UR QL STRIP: NEGATIVE
IMM GRANULOCYTES # BLD AUTO: 0.1 K/UL (ref 0–0.04)
IMM GRANULOCYTES NFR BLD AUTO: 0 % (ref 0–0.5)
KETONES UR QL STRIP.AUTO: NEGATIVE MG/DL
LEUKOCYTE ESTERASE UR QL STRIP.AUTO: NEGATIVE
LYMPHOCYTES # BLD: 2 K/UL (ref 0.9–3.6)
LYMPHOCYTES NFR BLD: 12 % (ref 21–52)
MCH RBC QN AUTO: 30.8 PG (ref 24–34)
MCHC RBC AUTO-ENTMCNC: 32.4 G/DL (ref 31–37)
MCV RBC AUTO: 95.1 FL (ref 78–100)
MONOCYTES # BLD: 1 K/UL (ref 0.05–1.2)
MONOCYTES NFR BLD: 6 % (ref 3–10)
NEUTS SEG # BLD: 13 K/UL (ref 1.8–8)
NEUTS SEG NFR BLD: 81 % (ref 40–73)
NITRITE UR QL STRIP.AUTO: NEGATIVE
NRBC # BLD: 0 K/UL (ref 0–0.01)
NRBC BLD-RTO: 0 PER 100 WBC
PH UR STRIP: 6 (ref 5–8)
PLATELET # BLD AUTO: 226 K/UL (ref 135–420)
PMV BLD AUTO: 11.6 FL (ref 9.2–11.8)
POTASSIUM SERPL-SCNC: 4.4 MMOL/L (ref 3.5–5.5)
PROT SERPL-MCNC: 7.1 G/DL (ref 6.4–8.2)
PROT UR STRIP-MCNC: NEGATIVE MG/DL
RBC # BLD AUTO: 3.44 M/UL (ref 4.2–5.3)
SODIUM SERPL-SCNC: 144 MMOL/L (ref 136–145)
SP GR UR REFRACTOMETRY: 1.01 (ref 1–1.03)
TROPONIN I SERPL HS-MCNC: 56 NG/L (ref 0–54)
TROPONIN I SERPL HS-MCNC: 57 NG/L (ref 0–54)
UROBILINOGEN UR QL STRIP.AUTO: 0.2 EU/DL (ref 0.2–1)
VALPROATE SERPL-MCNC: 60 UG/ML (ref 50–100)
WBC # BLD AUTO: 16.1 K/UL (ref 4.6–13.2)

## 2023-08-03 PROCEDURE — 93005 ELECTROCARDIOGRAM TRACING: CPT | Performed by: EMERGENCY MEDICINE

## 2023-08-03 PROCEDURE — G0299 HHS/HOSPICE OF RN EA 15 MIN: HCPCS

## 2023-08-03 PROCEDURE — 80053 COMPREHEN METABOLIC PANEL: CPT

## 2023-08-03 PROCEDURE — 85025 COMPLETE CBC W/AUTO DIFF WBC: CPT

## 2023-08-03 PROCEDURE — 84484 ASSAY OF TROPONIN QUANT: CPT

## 2023-08-03 PROCEDURE — 80164 ASSAY DIPROPYLACETIC ACD TOT: CPT

## 2023-08-03 PROCEDURE — 99285 EMERGENCY DEPT VISIT HI MDM: CPT

## 2023-08-03 PROCEDURE — 70450 CT HEAD/BRAIN W/O DYE: CPT

## 2023-08-03 PROCEDURE — 82962 GLUCOSE BLOOD TEST: CPT

## 2023-08-03 PROCEDURE — 87086 URINE CULTURE/COLONY COUNT: CPT

## 2023-08-03 PROCEDURE — 80183 DRUG SCRN QUANT OXCARBAZEPIN: CPT

## 2023-08-03 PROCEDURE — 81003 URINALYSIS AUTO W/O SCOPE: CPT

## 2023-08-03 PROCEDURE — 71045 X-RAY EXAM CHEST 1 VIEW: CPT

## 2023-08-03 RX ORDER — CLINDAMYCIN HYDROCHLORIDE 300 MG/1
300 CAPSULE ORAL 3 TIMES DAILY
Qty: 21 CAPSULE | Refills: 0 | Status: SHIPPED | OUTPATIENT
Start: 2023-08-03 | End: 2023-08-10

## 2023-08-03 RX ORDER — SACCHAROMYCES BOULARDII 250 MG
250 CAPSULE ORAL 2 TIMES DAILY
Qty: 14 CAPSULE | Refills: 0 | Status: SHIPPED | OUTPATIENT
Start: 2023-08-03 | End: 2023-08-10

## 2023-08-03 ASSESSMENT — ENCOUNTER SYMPTOMS
CHEST TIGHTNESS: 0
EYES NEGATIVE: 1
ABDOMINAL PAIN: 0

## 2023-08-03 NOTE — DISCHARGE INSTRUCTIONS
Make sure to follow-up closely with your primary doctor, your neurologist, and have given you another week of antibiotics to help cover the respiratory infection that you are in the hospital for previously. You have any worsening, fevers, shortness of breath, concern for recurrent seizures, please return immediately.

## 2023-08-03 NOTE — CASE COMMUNICATION
Nurse Chloé Monk informed this SLP of message from patient's son that patient was on her way to the hospital. MD notified of missed speech therapy visit.

## 2023-08-03 NOTE — ED NOTES
1:57 PM Pt able to follow commands and eyes opening spontaneously now. Alert to self only.       Ciarra Llamas RN  08/03/23 [id number]

## 2023-08-03 NOTE — HOME HEALTH
SUBJECTIVE:Patient with no changes with medications and no falls were reported by pt/CG. Pt is just waking up and has not had her am medications. Son/PCA present during session. CAREGIVER INVOLVEMENT/ASSISTANCE NEEDED FOR: Patient resides with son and has PCA daily who assist with ADL's and transfers as needed. HOME HEALTH SUPPLIES BY TYPE AND QUANTITY ORDERED/DELIVERED THIS VISIT INCLUDE: none   OBJECTIVE: See interventions. PATIENT RESPONSE TO TREATMENT: Spo2>96% throughout activities. Patient required occasional rest breaks due to fatigue/weakness. PATIENT/CG LEVEL OF UNDERSTANDING OF EDUCATION PROVIDED: Patient/CG required reinforcement on transfer training, HEP 2-3/x a day, fall prevention techniques, w/c mobility, energy conservation, continue to monitor BP and if any changes in sx to seek medical support, proper nutritional diet and repositioning frequently to reduce risk for pressure sores. ASSESSMENT OF PROGRESS TOWARD GOALS: Patient is progressing towards goals. Patient able to stand upright holding onto countertop for increase time. Patient required verbal/tactile cueing for proper technique during activities and to stay on task. Patient transferred sit<->stand with verbal/tactile cueing for UE placement. Patient with occasional rest breaks throughout activities due to fatigue. CONTINUED NEED FOR THE FOLLOWING SKILLS: Continuation of PT to further improve patient balance, functional mobility and strength to decrease pts risk for falls. PLAN FOR NEXT VISIT: Transfer training next visit. THE FOLLOWING DISCHARGE PLANNING WAS DISCUSSED WITH THE PATIENT/CAREGIVER: D/C from HHPT when goals are met or max potential benefit achieved.

## 2023-08-03 NOTE — HOME HEALTH
Per son patient going to ER via ambulance as found unresponsive. Son and private aide, Quincy Trey with patient. Son said\"I think it was a seizure episode, not a stroke. \" All of above notified of above. Skilled reason for visit: body systems assess, teach disease process and medication management. Caregiver involvement: family assists with adl's and iadl's prn. Medications reviewed and all medications are available in the home this visit. The following education was provided regarding medications: take medications as ordered, side effects, dosages, purposes, frequencies, do not stop or start any medications without notifying MD.    MD notified of any discrepancies/look a-like medications/medication interactions: n/a  Medications are effective at this time. Home health supplies by type and quantity ordered/delivered this visit include: n/a    Patient education provided this visit: as above, INSTRUCTED PATIENT AND CG THAT SHOULD ANY NEEDS OR CONCERNS ARISE TO FIRST CALL OUR OFFICE, OR THE DR'S OFFICE OR GO TO AN URGENT CARE CENTER AND NOT TO THE ED FOR NON-LIFE THREATENING EVENTS. IF IT IS LIFE THREATENING THEN CALL 911 OR GO TO THE CLOSEST ER, see care plan. Sharps education provided: n/a    Patient level of understanding of education provided: patient with partial understanding and teach back, son and private Cg state 100% understanding and teach back. Skilled Care Performed this visit: teach disease process and medication management, when to call RN/ MD, emergency management    Patient response to procedure performed: n/a  Agency Progress toward goals: progressing, see care plan    Patient's Progress towards personal goals: progressing, see care plan, patient given opportunity to voice questions and/ or concerns. Patient states has no questions or concerns this visit.   Home exercise program: take medications as ordered, follow ordered diet, use assistive devices as instructed by PT    Continued

## 2023-08-03 NOTE — HOME HEALTH
Nurse May Murray informed this SLP of message from patient's son that patient was on her way to the hospital. MD notified of missed visit.

## 2023-08-04 ENCOUNTER — HOME CARE VISIT (OUTPATIENT)
Age: 68
End: 2023-08-04
Payer: MEDICARE

## 2023-08-04 LAB
BACTERIA SPEC CULT: NORMAL
EKG ATRIAL RATE: 81 BPM
EKG DIAGNOSIS: NORMAL
EKG P AXIS: 50 DEGREES
EKG P-R INTERVAL: 150 MS
EKG Q-T INTERVAL: 330 MS
EKG QRS DURATION: 78 MS
EKG QTC CALCULATION (BAZETT): 383 MS
EKG R AXIS: -24 DEGREES
EKG T AXIS: 130 DEGREES
EKG VENTRICULAR RATE: 81 BPM
SERVICE CMNT-IMP: NORMAL

## 2023-08-04 PROCEDURE — G0157 HHC PT ASSISTANT EA 15: HCPCS

## 2023-08-04 PROCEDURE — 93010 ELECTROCARDIOGRAM REPORT: CPT | Performed by: INTERNAL MEDICINE

## 2023-08-04 NOTE — ED NOTES
Thea Spencer (son) contacted regarding pt discharge. Updated on POC and that medical transport is arranged back to address on file.       hCeri Cartagena RN  08/03/23 7758

## 2023-08-06 VITALS
TEMPERATURE: 97.3 F | HEART RATE: 72 BPM | OXYGEN SATURATION: 98 % | SYSTOLIC BLOOD PRESSURE: 109 MMHG | DIASTOLIC BLOOD PRESSURE: 69 MMHG

## 2023-08-07 ENCOUNTER — HOME CARE VISIT (OUTPATIENT)
Age: 68
End: 2023-08-07
Payer: MEDICARE

## 2023-08-07 VITALS
DIASTOLIC BLOOD PRESSURE: 87 MMHG | OXYGEN SATURATION: 97 % | TEMPERATURE: 97.3 F | SYSTOLIC BLOOD PRESSURE: 136 MMHG | HEART RATE: 67 BPM | RESPIRATION RATE: 18 BRPM

## 2023-08-07 LAB — OXCARBAZEPINE SERPL-MCNC: 56 UG/ML (ref 10–35)

## 2023-08-07 PROCEDURE — G0157 HHC PT ASSISTANT EA 15: HCPCS

## 2023-08-08 ENCOUNTER — HOSPITAL ENCOUNTER (EMERGENCY)
Facility: HOSPITAL | Age: 68
Discharge: HOME | End: 2023-08-09
Attending: EMERGENCY MEDICINE
Payer: MEDICARE

## 2023-08-08 ENCOUNTER — HOME CARE VISIT (OUTPATIENT)
Age: 68
End: 2023-08-08
Payer: MEDICARE

## 2023-08-08 VITALS
HEART RATE: 79 BPM | TEMPERATURE: 100.2 F | RESPIRATION RATE: 16 BRPM | OXYGEN SATURATION: 100 % | SYSTOLIC BLOOD PRESSURE: 145 MMHG | DIASTOLIC BLOOD PRESSURE: 80 MMHG

## 2023-08-08 VITALS
DIASTOLIC BLOOD PRESSURE: 76 MMHG | SYSTOLIC BLOOD PRESSURE: 124 MMHG | OXYGEN SATURATION: 97 % | RESPIRATION RATE: 16 BRPM | TEMPERATURE: 99.5 F | HEART RATE: 65 BPM

## 2023-08-08 DIAGNOSIS — R22.0 NODULE OF TONGUE: Primary | ICD-10-CM

## 2023-08-08 PROCEDURE — 99283 EMERGENCY DEPT VISIT LOW MDM: CPT

## 2023-08-08 PROCEDURE — G0152 HHCP-SERV OF OT,EA 15 MIN: HCPCS

## 2023-08-08 RX ORDER — CEPHALEXIN 500 MG/1
500 CAPSULE ORAL 2 TIMES DAILY
Qty: 14 CAPSULE | Refills: 0 | Status: SHIPPED | OUTPATIENT
Start: 2023-08-08 | End: 2023-08-15

## 2023-08-08 NOTE — CASE COMMUNICATION
Thank you!  ----- Message -----  From: Diomedes Rice OT  Sent: 8/8/2023   3:53 PM EDT  To: Sri Kim RN, *      Mrs. Raquel Charles presents with fair rehab potential to increase her strength, balance and safety for increased independence with her daily routine. Pt with heavy assistance for completion of her daily ADL routine from her personal care aid and son. Pt is agreeable to continued home health occupational therapy services  to increase her safety and independence within her home environment.      PLAN: D/C 1w1, 2w3 with plans to discharge when goals are met or maximal potential achieved

## 2023-08-08 NOTE — HOME HEALTH
SUBJECTIVE:Patient with no changes with medications and no falls were reported by pt/CG. PCA notes patient went to ER yesterday due to seizure activity but was D/C home same day. Patient is fatigued today and lying in bed. CAREGIVER INVOLVEMENT/ASSISTANCE NEEDED FOR: Patient resides with son and has PCA daily who assist with ADL's and transfers as needed. HOME HEALTH SUPPLIES BY TYPE AND QUANTITY ORDERED/DELIVERED THIS VISIT INCLUDE: none   OBJECTIVE: See interventions. PATIENT RESPONSE TO TREATMENT:  Patient with increase fatigue during session requiring frequent rest breaks. Verbal/tactile cueing to stay on task and proper technique activities. PATIENT LEVEL OF UNDERSTANDING OF EDUCATION PROVIDED: Patient/CG required reinforcement on transfer training, HEP 2-3/x a day, fall prevention techniques, w/c mobility, energy conservation, use of AD at all times, proper nutritional diet and repositioning frequently to reduce risk for pressure sores. ASSESSMENT OF PROGRESS TOWARD GOALS: Patient with limited progress towards goals today due to fatigue and declining to participate further. Patient demonstrated transfer from supine<->sit with verbal/tactile cueing for proper technique and UE placement. Patient demonstrated supine thera ex with cueing to stay on task and proper technique. Patient required frequent rest breaks due to fatigue. Patient able to sit on EOB for ~3 minutes until fatigue. CONTINUED NEED FOR THE FOLLOWING SKILLS: Continuation of PT to further improve patient balance, functional mobility and strength to decrease pts risk for falls. PLAN FOR NEXT VISIT: Progress with gait training with walker next visit. THE FOLLOWING DISCHARGE PLANNING WAS DISCUSSED WITH THE PATIENT/CAREGIVER: D/C from HHPT when goals are met or max potential benefit achieved.

## 2023-08-09 ENCOUNTER — HOME CARE VISIT (OUTPATIENT)
Age: 68
End: 2023-08-09
Payer: MEDICARE

## 2023-08-09 VITALS
RESPIRATION RATE: 16 BRPM | TEMPERATURE: 97.8 F | DIASTOLIC BLOOD PRESSURE: 72 MMHG | SYSTOLIC BLOOD PRESSURE: 110 MMHG | HEART RATE: 65 BPM | OXYGEN SATURATION: 97 %

## 2023-08-09 PROCEDURE — G0157 HHC PT ASSISTANT EA 15: HCPCS

## 2023-08-09 PROCEDURE — G0153 HHCP-SVS OF S/L PATH,EA 15MN: HCPCS

## 2023-08-09 ASSESSMENT — ENCOUNTER SYMPTOMS: PAIN LOCATION - PAIN QUALITY: THROBBING

## 2023-08-09 NOTE — ED TRIAGE NOTES
Patient comes in with tongue pain and has an abnormality under her tongue. Patient states that it started hurting this morning. Patient states, \"please, don't tell me its cancer. \"

## 2023-08-09 NOTE — HOME HEALTH
SUBJECTIVE: Son reported the hospital diagnosed her with nodule on tongue and has given her antibiotics    CAREGIVER INVOLVEMENT / ASSISTANCE NEEDED FOR: transportation, ADLs, meal prep    OBJECTIVE / PATIENT RESPONSE TO TREATMENT / PATIENT LEVEL OF UNDERSTANDING OF EDUCATION PROVIDED: See interventions. ASSESSMENT OF PROGRESS TOWARD GOALS: Patient continues to progress towards  speech therapy goals. Patient demonstrated great breath support during session today. Patient was able to recount her visit to hospital with great alertness and increased fluency for 85% intelligibility. With breakfast and medication, patient demonstrated good carryover of compensatory swallow strategies and was responsive to SLP verbal cues. Patient demonstrated no overt signs or symptoms of aspiration/penetration. CONTINUED NEED FOR THE FOLLOWING SKILLS: Patient continues to require speech therapy services to implement compensatory swallow strategies to eliminate/reduce signs and symptoms of aspiration/penetration and cognitive strategies for increased recall/ participation in daily activities. PLAN FOR NEXT VISIT : Plans to continue implementing compensatory strategies to address speech and cognitive staging. HOME EXERCISE PROGRAM: implement compensatory swallow strategies. THE FOLLOWING DISCHARGE PLANNING WAS DISCUSSED WITH THE PATIENT/CAREGIVER: ST to d/c in 3 more visits/wks or when goals met/max potential achieved, Pt/caregiver verbalized understanding.

## 2023-08-09 NOTE — ED PROVIDER NOTES
EMERGENCY DEPARTMENT HISTORY AND PHYSICAL EXAM    10:04 PM EDT seen at this time in room 20        Date: 8/8/2023  Patient Name: Molly Mo    History of Presenting Illness     Chief Complaint   Patient presents with    Other         History Provided By: patient    Additional History (Context): Molly Mo is a 70 y.o. female presents with reports this morning was brushing her teeth and noted a nodule on the floor of her mouth under the tongue which is painful. As far as she knows it was not there yesterday she is concerned that it is cancerous. [facility] PCP: CIARA Frazier NP    Chief Complaint:   Duration:    Timing:    Location:   Quality:   Severity:   Modifying Factors:   Associated Symptoms:       No current facility-administered medications for this encounter. Current Outpatient Medications   Medication Sig Dispense Refill    cephALEXin (KEFLEX) 500 MG capsule Take 1 capsule by mouth 2 times daily for 7 days 14 capsule 0    clindamycin (CLEOCIN) 300 MG capsule Take 1 capsule by mouth 3 times daily for 7 days 21 capsule 0    saccharomyces boulardii (FLORASTOR) 250 MG capsule Take 1 capsule by mouth 2 times daily for 7 days 14 capsule 0    QUEtiapine (SEROQUEL) 25 MG tablet Take 25 mg by mouth nightly.       divalproex (DEPAKOTE SPRINKLE) 125 MG DR capsule Take 8 capsules by mouth 2 times daily 480 capsule 0    Multiple Vitamins-Minerals (THERAPEUTIC MULTIVITAMIN-MINERALS) tablet Take 1 tablet by mouth daily 30 tablet 0    lisinopril (PRINIVIL;ZESTRIL) 20 MG tablet Take 1 tablet by mouth daily      hydrALAZINE (APRESOLINE) 100 MG tablet Take 1 tablet by mouth in the morning and at bedtime      dicyclomine (BENTYL) 20 MG tablet Take 1 tablet by mouth 4 times daily as needed (pain) 20 tablet 0    famotidine (PEPCID) 20 MG tablet Take 1 tablet by mouth 2 times daily 90 tablet 1    acetaminophen (TYLENOL) 325 MG tablet Take 650 mg by mouth every 4 hours as needed      amLODIPine (NORVASC) 10 MG tablet

## 2023-08-10 VITALS
DIASTOLIC BLOOD PRESSURE: 72 MMHG | RESPIRATION RATE: 16 BRPM | OXYGEN SATURATION: 97 % | SYSTOLIC BLOOD PRESSURE: 110 MMHG | HEART RATE: 65 BPM | TEMPERATURE: 97.8 F

## 2023-08-10 ASSESSMENT — ENCOUNTER SYMPTOMS: PAIN LOCATION - PAIN QUALITY: SORE

## 2023-08-11 ENCOUNTER — HOME CARE VISIT (OUTPATIENT)
Age: 68
End: 2023-08-11
Payer: MEDICARE

## 2023-08-11 VITALS
TEMPERATURE: 97.2 F | OXYGEN SATURATION: 95 % | DIASTOLIC BLOOD PRESSURE: 68 MMHG | SYSTOLIC BLOOD PRESSURE: 138 MMHG | HEART RATE: 79 BPM | RESPIRATION RATE: 20 BRPM

## 2023-08-11 VITALS
RESPIRATION RATE: 20 BRPM | SYSTOLIC BLOOD PRESSURE: 138 MMHG | HEART RATE: 79 BPM | DIASTOLIC BLOOD PRESSURE: 68 MMHG | OXYGEN SATURATION: 95 % | TEMPERATURE: 97.2 F

## 2023-08-11 PROCEDURE — G0153 HHCP-SVS OF S/L PATH,EA 15MN: HCPCS

## 2023-08-11 PROCEDURE — G0299 HHS/HOSPICE OF RN EA 15 MIN: HCPCS

## 2023-08-11 ASSESSMENT — ENCOUNTER SYMPTOMS
SORE THROAT: 1
VOMITING: 1
PAIN LOCATION - PAIN QUALITY: DULL
NAUSEA: 1
STOOL DESCRIPTION: FORMED
DYSPNEA ACTIVITY LEVEL: AFTER AMBULATING LESS THAN 10 FT
BOWEL INCONTINENCE: 1

## 2023-08-11 NOTE — HOME HEALTH
SUBJECTIVE: Patient with no changes with medications and no falls were reported by pt/CG. ST present during session. Patient went to ER yesterday due to nodule on tongue but was D/C home with antibiotics. CAREGIVER INVOLVEMENT/ASSISTANCE NEEDED FOR: Patient resides with son and has PCA daily who assist with ADL's and transfers as needed. HOME HEALTH SUPPLIES BY TYPE AND QUANTITY ORDERED/DELIVERED THIS VISIT INCLUDE: none   OBJECTIVE: See interventions. PATIENT RESPONSE TO TREATMENT: Patient with occasional rest breaks throughout activities due to fatigue. Patient with increase verbalization and alert throughout session compared to prior visits. PATIENT LEVEL OF UNDERSTANDING OF EDUCATION PROVIDED: Patient/CG required reinforcement on transfer training, HEP 2-3/x a day, fall prevention techniques, w/c mobility, energy conservation, use of AD at all times, proper nutritional diet and repositioning frequently to reduce risk for pressure sores. ASSESSMENT OF PROGRESS TOWARD GOALS: Patient is progressing towards goals. Patient transferred from sit<->stand with verbal cueing for UE placement and to scoot towards edge of chair. Patient able to ambulate throughout home with verbal cueing to increase anterior WB'ing and to stay within walker. Patient demonstrated thera ex during session with verbal cueing for proper technique and occasional rest breaks. Patient with increase participation throughout activities. CONTINUED NEED FOR THE FOLLOWING SKILLS: Continuation of PT to further improve patient balance, functional mobility and strength to decrease pts risk for falls. PLAN FOR NEXT VISIT: Transfer training next visit. THE FOLLOWING DISCHARGE PLANNING WAS DISCUSSED WITH THE PATIENT/CAREGIVER: D/C from HHPT when goals are met or max potential benefit achieved.

## 2023-08-11 NOTE — HOME HEALTH
SUBJECTIVE: Patient with no changes with medications and no falls were reported by pt/CG. Patient observed sitting at kitchen table eating breakfast upon arrival for PT. CAREGIVER INVOLVEMENT/ASSISTANCE NEEDED FOR: Patient resides with son and has PCA daily who assist with ADL's and transfers as needed. HOME HEALTH SUPPLIES BY TYPE AND QUANTITY ORDERED/DELIVERED THIS VISIT INCLUDE: none   OBJECTIVE: See interventions. PATIENT RESPONSE TO TREATMENT: Patient with occasional rest breaks throughout activities due to fatigue. PATIENT LEVEL OF UNDERSTANDING OF EDUCATION PROVIDED: Patient/CG required reinforcement on transfer training, HEP 2-3/x a day, fall prevention techniques, w/c mobility, energy conservation, use of AD at all times, proper nutritional diet and repositioning frequently to reduce risk for pressure sores. ASSESSMENT OF PROGRESS TOWARD GOALS: Patient is progressing towards goals. Patient ambulated increase distances throughout home with FWW and verbal/tactile cueing to widen ABRAHAM and increase anterior WB'ing. Patient demonstrated sitting and standing thera ex to improve functional mobility and strength of LE's with verbal cueing for proper technique. Patient demonstrated sit<->stand transfers with verbal cueing for UE placement. CONTINUED NEED FOR THE FOLLOWING SKILLS: Continuation of PT to further improve patient balance, functional mobility and strength to decrease pts risk for falls. PLAN FOR NEXT VISIT: Increase ambulation distance and balance activities in sitting/standing. THE FOLLOWING DISCHARGE PLANNING WAS DISCUSSED WITH THE PATIENT/CAREGIVER: D/C from HHPT when goals are met or max potential benefit achieved.

## 2023-08-12 NOTE — HOME HEALTH
SUBJECTIVE: Upon SLP arrival, Nurse reported that patient had been vomiting this morning. CAREGIVER INVOLVEMENT / ASSISTANCE NEEDED FOR: transportation, ADLs, meal prep     OBJECTIVE / PATIENT RESPONSE TO TREATMENT / PATIENT LEVEL OF UNDERSTANDING OF EDUCATION PROVIDED: See interventions. ASSESSMENT OF PROGRESS TOWARD GOALS: Caregiver demonstrated good response to recommendations for safe swallowing habits. Session was cut short due to patient not feeling well due to nausea. CONTINUED NEED FOR THE FOLLOWING SKILLS: Patient continues to require speech therapy services to implement compensatory swallow strategies to eliminate/reduce signs and symptoms of aspiration/penetration and cognitive strategies for increased recall/ participation in daily activities. PLAN FOR NEXT VISIT : Plans to continue implementing compensatory strategies to address speech and cognitive staging. HOME EXERCISE PROGRAM: implement compensatory swallow strategies. THE FOLLOWING DISCHARGE PLANNING WAS DISCUSSED WITH THE PATIENT/CAREGIVER: ST to d/c in 2 more visits/wks or when goals met/max potential achieved, Pt/caregiver verbalized understanding.

## 2023-08-12 NOTE — HOME HEALTH
Skilled reason for visit: body systems assess, teach disease process and medication management    Caregiver involvement: family/ Cg assists with adl's and iadl's prn. Medications reviewed and all medications are available in the home this visit. The following education was provided regarding medications: take medications as ordered, Keflex side effects, dosages, purposes, frequencies, do not stop or start any medications without notifying MD.    MD notified of any discrepancies/look a-like medications/medication interactions: n/a  Medications are effective at this time. Home health supplies by type and quantity ordered/delivered this visit include: n/a    Patient education provided this visit: as above, repositioning to decrease aspiration risk, small amts bland foods po til nausea resolved, use sippy cup, INSTRUCTED PATIENT AND CG THAT SHOULD ANY NEEDS OR CONCERNS ARISE TO FIRST CALL OUR OFFICE, OR THE DR'S OFFICE OR GO TO AN URGENT CARE CENTER AND NOT TO THE ED FOR NON-LIFE THREATENING EVENTS. IF IT IS LIFE THREATENING THEN CALL 911 OR GO TO THE CLOSEST ER, see care plan. Sharps education provided: n/a    Patient level of understanding of education provided: patient/ son/ private aideCheri state partial understanding and teach back    Skilled Care Performed this visit: VS, body systems assessment, teach disease process and medication management, SLP working with patient/ Cg upon RN departure. Patient response to procedure performed: receptive to teaching, tolerated assess and teaching without adverse effects noted. Agency Progress toward goals: progressing, see care plan    Patient's Progress towards personal goals: progressing, see care plan, patient given opportunity to voice questions and/ or concerns. Patient states has no questions or concerns this visit.   Home exercise program: take medications as ordered, take antibiotic with food: bland crackers, uncarbonated ginger ale to decrease

## 2023-08-14 ENCOUNTER — HOME CARE VISIT (OUTPATIENT)
Age: 68
End: 2023-08-14
Payer: MEDICARE

## 2023-08-14 VITALS
RESPIRATION RATE: 18 BRPM | DIASTOLIC BLOOD PRESSURE: 70 MMHG | HEART RATE: 70 BPM | TEMPERATURE: 97 F | OXYGEN SATURATION: 97 % | SYSTOLIC BLOOD PRESSURE: 113 MMHG

## 2023-08-14 PROCEDURE — G0157 HHC PT ASSISTANT EA 15: HCPCS

## 2023-08-14 NOTE — CASE COMMUNICATION
SLP arrived at home at scheduled time for appointment that was confirmed by son. No answer at door. This writer contacted son and he requested cancel of appointment. No reason was provided.

## 2023-08-14 NOTE — HOME HEALTH
SLP arrived at home at scheduled time for appointment that was confirmed by son. No answer at door. This writer contacted son and son requested cancel of appointment. No reason was provided. MD notified of missed visit.

## 2023-08-15 ENCOUNTER — HOME CARE VISIT (OUTPATIENT)
Age: 68
End: 2023-08-15
Payer: MEDICARE

## 2023-08-15 VITALS
RESPIRATION RATE: 16 BRPM | OXYGEN SATURATION: 97 % | SYSTOLIC BLOOD PRESSURE: 138 MMHG | TEMPERATURE: 98.1 F | DIASTOLIC BLOOD PRESSURE: 78 MMHG | HEART RATE: 58 BPM

## 2023-08-15 PROCEDURE — G0158 HHC OT ASSISTANT EA 15: HCPCS

## 2023-08-15 ASSESSMENT — ENCOUNTER SYMPTOMS: PAIN LOCATION - PAIN QUALITY: ACHING, SORE

## 2023-08-15 NOTE — CASE COMMUNICATION
thank you!  ----- Message -----  From: FELICITY Gutierrez  Sent: 8/15/2023   7:41 AM EDT  To: Antony Rodriguez RN, Musa Cardenas, OT, *  Subject: RE:                                                I have her today, son never answered or called back but sister did and confirmed I could go and that she would try to contact son. Ill let you guys know if I get in.  ----- Message -----  From: KATELIN Hathaway  Sent: 8/14/2023    3:56 PM EDT  To: Antony Rodriguez RN, Musa Cardenas, OT, *      SLP arrived at home at scheduled time for appointment that was confirmed by son. No answer at door. This writer contacted son and he requested cancel of appointment. No reason was provided.

## 2023-08-15 NOTE — HOME HEALTH
SUBJECTIVE: Pt in transport chair at kitchen table eating cereal by herself, pts son home but was not assisting her with feeding and pt was spilling on herself. Pts PCA had run out to take something to . CAREGIVER ASSISTANCE NEEDED FOR: pts son let therapist in but then left the home during visit and was in and out, no aide present during visit for training with transfers or HEP. MEDICATIONS REVIEWED AND UPDATED: no medication changes reported this visit. .  OBJECTIVE: see interventions  PATIENT RESPONSE TO TREATMENT: Pt demonstrated fair response to treatment, reported she was cold and stiff most of visit making it difficult to engage with activity. Meño Allison PATIENT/CAREGIVER EDUCATION PROVIDED THIS VISIT:  Therapist educated pt on UE towel slides at table and gentle AAROM to help with functional mobility and strength of B arms. PATIENT LEVEL OF UNDERSTANDING OF EDUCATION PROVIDED: Pt required increased v/c for understanding but able to demo teachback and participatio, will benefit from continued training and a CG to teach for carry over. ASSESSMENT AND PROGRESS TOWARD GOALS: Pt demonstrated fair progress with HEP and transfer goals, poor balance progression due to weakness and pain. PLAN: next visit will be for ADL and further transfer training with aide present. DISCHARGE PLANNING DISCUSSED: Discharge to self and family under MD supervision once all goals have been met or patient has reached maximum potential. Frequency remaininw1, 2w2 remaining.

## 2023-08-16 ENCOUNTER — HOME CARE VISIT (OUTPATIENT)
Age: 68
End: 2023-08-16
Payer: MEDICARE

## 2023-08-16 PROCEDURE — G0157 HHC PT ASSISTANT EA 15: HCPCS

## 2023-08-16 NOTE — HOME HEALTH
SUBJECTIVE: Patient with no changes with medications and no falls were reported by pt/CG. Son and PCA present throughout session. CAREGIVER INVOLVEMENT/ASSISTANCE NEEDED FOR: Patient resides with son and has PCA daily who assist with ADL's and transfers as needed. HOME HEALTH SUPPLIES BY TYPE AND QUANTITY ORDERED/DELIVERED THIS VISIT INCLUDE: none   OBJECTIVE: See interventions. PATIENT RESPONSE TO TREATMENT: Patient with no increase in pain during activities. PATIENT LEVEL OF UNDERSTANDING OF EDUCATION PROVIDED: Patient/CG required reinforcement on transfer training, HEP 2-3/x a day, fall prevention techniques, w/c mobility, energy conservation, use of AD at all times, proper nutritional diet and repositioning frequently to reduce risk for pressure sores. ASSESSMENT OF PROGRESS TOWARD GOALS: Patient is progressing towards goals. Patient ambulated throughout home 2x ~10 feet with walker and verbal cueing to stay within walker and foot placement. Patient presents with increased posterior lean during gait training. and NBOS. Patient demonstrated sitting and standing thera ex with verbal cueing for technique. Patient required verbal cueing to scoot towards edge of charge and UE placement during transfers from sit<->stand. CONTINUED NEED FOR THE FOLLOWING SKILLS: Continuation of PT to further improve patient balance, functional mobility and strength to decrease pts risk for falls. PLAN FOR NEXT VISIT: Bed mobility activities next visit. THE FOLLOWING DISCHARGE PLANNING WAS DISCUSSED WITH THE PATIENT/CAREGIVER: D/C from HHPT when goals are met or max potential benefit achieved.

## 2023-08-17 ENCOUNTER — HOME CARE VISIT (OUTPATIENT)
Age: 68
End: 2023-08-17
Payer: MEDICARE

## 2023-08-17 VITALS
TEMPERATURE: 98.2 F | SYSTOLIC BLOOD PRESSURE: 124 MMHG | RESPIRATION RATE: 16 BRPM | HEART RATE: 71 BPM | OXYGEN SATURATION: 97 % | DIASTOLIC BLOOD PRESSURE: 66 MMHG

## 2023-08-17 VITALS
HEART RATE: 76 BPM | OXYGEN SATURATION: 99 % | DIASTOLIC BLOOD PRESSURE: 60 MMHG | RESPIRATION RATE: 20 BRPM | TEMPERATURE: 97.6 F | SYSTOLIC BLOOD PRESSURE: 124 MMHG

## 2023-08-17 VITALS
RESPIRATION RATE: 16 BRPM | OXYGEN SATURATION: 97 % | DIASTOLIC BLOOD PRESSURE: 72 MMHG | HEART RATE: 74 BPM | SYSTOLIC BLOOD PRESSURE: 130 MMHG | TEMPERATURE: 97.7 F

## 2023-08-17 PROCEDURE — G0299 HHS/HOSPICE OF RN EA 15 MIN: HCPCS

## 2023-08-17 PROCEDURE — G0153 HHCP-SVS OF S/L PATH,EA 15MN: HCPCS

## 2023-08-17 PROCEDURE — G0158 HHC OT ASSISTANT EA 15: HCPCS

## 2023-08-17 ASSESSMENT — ENCOUNTER SYMPTOMS
STOOL DESCRIPTION: SOFT
PROTECTIVE COUGH: 1
BOWEL INCONTINENCE: 1
DYSPNEA WITH EXERTION: 1
PAIN LOCATION - PAIN QUALITY: DULL, ACHEY

## 2023-08-17 NOTE — HOME HEALTH
SUBJECTIVE: Pt finishing breakfast with aide upon arrival, pt reported she was very cold and both CGs report pt stays cold and son plans to ask PCP about lowering bloood thinner dose at next appt. CAREGIVER ASSISTANCE NEEDED FOR: pts son and PCA home during visit  [facility]: no medication changes reported this visit  . OBJECTIVE: see interventions  PATIENT RESPONSE TO TREATMENT: Pt demonstrated positive response to treatment with no increased pain and good participation with transfer and ADL training with CG. Stephanie Singh PATIENT/CAREGIVER EDUCATION PROVIDED THIS VISIT:  Therpaist educated PCA and family present on importance of daily exercises, standing and proper transfer technique. Also educated on ways to increase pt participation with self-care tasks as a way to promote function. PATIENT LEVEL OF UNDERSTANDING OF EDUCATION PROVIDED: CG verbalized good understanding and able to demo teachback of exercises and transfer techniques. ASSESSMENT AND PROGRESS TOWARD GOALS: Pt demonstrated good progress with transfer, balance and ADL goals with decreased assistance and improved participation. PLAN: next visit will be for continued ADL and transfer training with CG present. DISCHARGE PLANNING DISCUSSED: Discharge to self and family under MD supervision once all goals have been met or patient has reached maximum potential. Frequency remaininw2 remaining.

## 2023-08-18 NOTE — HOME HEALTH
SUBJECTIVE: Son reported that patient will no longer have to move to an [facility] / ASSISTANCE NEEDED FOR: transportation, ADLs, meal prep     OBJECTIVE / PATIENT RESPONSE TO TREATMENT / PATIENT LEVEL OF UNDERSTANDING OF EDUCATION PROVIDED: Patient has demonstrated significant improvement with implementation of compensatory strategies to eliminate/reduce signs and symptoms of aspiration/penetration. Caregiver demonstrated good carryover of recommnedations to reduce aspiration risks. Patient met all dysphagia goals. Despite recent ED visits and missed speech therapy visits, patient was able to demonstrate some progress with cognitive linguistic skills by implementing compensatory strategies, evidenced by increased SLUMS assessment score from 7/30 (initial evaluation) to 9/30. Patient continues to demonstrate difficulty with STM recall, generative naming, problem solving, and thought organization. Family would continue to benefit from contiuned education on cognitive staging and implementation of communication supports to assist patient. Patient continues to demonstrate difficulty with overall speech intelligibility for daily conversations and exhibited low volume (50 dB). Caregiver reported MD requested appointment before patient utilizing breather device. Patient would benefit from continued speech therapy services for 2wk3 to target cognitive staging/supports and implementation of diaphragmatic breathing techniques/compensatory strategies to improve overall intelligibility for daily conversations to communicate basic needs/wants. ASSESSMENT OF PROGRESS TOWARD GOALS: Patient continues to make slow, consistent progress towards  speech therapy goals.      CONTINUED NEED FOR THE FOLLOWING SKILLS: Patient continues to require speech therapy services to implement cognitive staging and communication supports for increased recall/ participation in daily activities as well as

## 2023-08-18 NOTE — HOME HEALTH
caregiver notified and agrees to changes in the Plan of Care: N/A. The following discharge planning was discussed with the pt/caregiver: DC to self and family under MD supervision when all goals are met or maximum potential is reached. Pt did verbalize understanding of DC planning.

## 2023-08-20 VITALS
HEART RATE: 93 BPM | TEMPERATURE: 97 F | OXYGEN SATURATION: 97 % | DIASTOLIC BLOOD PRESSURE: 73 MMHG | RESPIRATION RATE: 18 BRPM | SYSTOLIC BLOOD PRESSURE: 128 MMHG

## 2023-08-21 ENCOUNTER — HOME CARE VISIT (OUTPATIENT)
Age: 68
End: 2023-08-21
Payer: MEDICARE

## 2023-08-21 VITALS
HEART RATE: 55 BPM | SYSTOLIC BLOOD PRESSURE: 118 MMHG | OXYGEN SATURATION: 99 % | DIASTOLIC BLOOD PRESSURE: 60 MMHG | RESPIRATION RATE: 16 BRPM | TEMPERATURE: 98.3 F

## 2023-08-21 VITALS
TEMPERATURE: 97.1 F | DIASTOLIC BLOOD PRESSURE: 68 MMHG | SYSTOLIC BLOOD PRESSURE: 128 MMHG | HEART RATE: 72 BPM | OXYGEN SATURATION: 98 % | RESPIRATION RATE: 12 BRPM

## 2023-08-21 PROCEDURE — G0151 HHCP-SERV OF PT,EA 15 MIN: HCPCS

## 2023-08-21 PROCEDURE — G0158 HHC OT ASSISTANT EA 15: HCPCS

## 2023-08-21 NOTE — HOME HEALTH
TRANSFERS: minimal assistance in transfers to and from bed, chair, toilet using FWW, moderate assistance in car using stand pivot technique with 75% cues for hand placements and techniques rom max assistance in sit to stand transfer using FWW with 100% verbal cues for proper hand placements and techniques during initial evaluation. GAIT: ambulate 15ft x 2 indoor with minimal assistance using FWW. Patient ambulates  with forward lean, decreased hip/knee flexion during swing phase, unequal step length, decreased trace, narrow ABRAHAM , ataxic gait. Patient requires 75%-100% verbal cues for proper FWW maneuvering  and safe distance to FWW compared to non-ambulatory  during initial evaluation. .  ASSESSMENT: Patient has met the goals established at the start of care. Patient d/c to self, family and under supervision of MD. Pt and  instructed to continue to perform HEP and recommend using FWW and assistance of another person for safe transfers and short distance indoor ambulation to prevent falls. Pt and cg  verbalized understanding.  -  PATIENT EDUCATION PROVIDED THIS VISIT TO INCLUDE:   FALL PREVENTION TECHNIQUES: monitor medication that may alter mental status, keeping walking pathways clean and clear of clutter and throw rugs, keeping night lights on for ability to see and easily, good visibility for safe transitioning thresholds and proper use and good compliance of using AD. PAIN MANAGEMENT TECHNIQUES: positioning and relaxation. PRESSURE ULCER PREVENTION TECHNIQUES: proper positioning strategies including frequency of repositioning, prevention of shear and friction, appropriate skin hygiene and protection from moisture. ENERGY CONSERVATION TECHNIQUES: rest, slow down, pacing, and complete tasks in manageable steps. PATIENT LEVEL OF UNDERSTANDING OF EDUCATION PROVIDED: Patient verbalized understanding and able to teach back information provided.     PATIENT RESPONSE TO PROCEDURE PERFORMED: Patient

## 2023-08-22 ENCOUNTER — HOME CARE VISIT (OUTPATIENT)
Age: 68
End: 2023-08-22
Payer: MEDICARE

## 2023-08-22 VITALS
HEART RATE: 58 BPM | RESPIRATION RATE: 16 BRPM | OXYGEN SATURATION: 98 % | DIASTOLIC BLOOD PRESSURE: 70 MMHG | SYSTOLIC BLOOD PRESSURE: 122 MMHG | TEMPERATURE: 98 F

## 2023-08-22 VITALS
RESPIRATION RATE: 16 BRPM | DIASTOLIC BLOOD PRESSURE: 52 MMHG | HEART RATE: 67 BPM | SYSTOLIC BLOOD PRESSURE: 110 MMHG | TEMPERATURE: 97.3 F | OXYGEN SATURATION: 99 %

## 2023-08-22 PROCEDURE — G0153 HHCP-SVS OF S/L PATH,EA 15MN: HCPCS

## 2023-08-22 PROCEDURE — G0158 HHC OT ASSISTANT EA 15: HCPCS

## 2023-08-22 NOTE — HOME HEALTH
SUBJECTIVE: \"I am so sleepy. \"    CAREGIVER INVOLVEMENT / ASSISTANCE NEEDED FOR: transportation, ADLs, meal prep     OBJECTIVE / PATIENT RESPONSE TO TREATMENT / PATIENT LEVEL OF UNDERSTANDING OF EDUCATION PROVIDED: Patient demonstrated significant improvement with implementing diaphragmatic breathing techniques and SLP model to improve overall vocal quality, volume, and intelligibility. With vocal exercises, patient was able to improve overall volume from 55 dB to 72 dB. Patient demonstrated significant improvement with intelligibility, but continued to demonstrate difficulty with carryover following exercises for daily conversations. ASSESSMENT OF PROGRESS TOWARD GOALS: Patient continues to make good  progress towards  speech therapy goals. CONTINUED NEED FOR THE FOLLOWING SKILLS: Patient continues to require speech therapy services to implement cognitive staging and communication supports for increased recall/ participation in daily activities as well as implementation of diaphragmatic breathing techniques to improve overall intelligibility for daily conversations. PLAN FOR NEXT VISIT : Plans to continue implementing compensatory strategies to address speech and cognitive staging. HOME EXERCISE PROGRAM: implement compensatory swallow strategies. THE FOLLOWING DISCHARGE PLANNING WAS DISCUSSED WITH THE PATIENT/CAREGIVER: ST to d/c in 4 more visits/wks or when goals met/max potential achieved, Pt/caregiver verbalized understanding.

## 2023-08-22 NOTE — HOME HEALTH
SUBJECTIVE: Pt in bed upon arrival as CG was late and arrived when therapist did, pt participated in getting ready and was agreeable to transfer and balance training as well as HEP review with aide. CAREGIVER ASSISTANCE NEEDED FOR: pts PCA present during visit for carry over assessment with HEP and transfer training  MEDICATIONS REVIEWED AND UPDATED: no medication changes reported this visit. .  OBJECTIVE: see interventions  PATIENT RESPONSE TO TREATMENT: Pt demonstrated positive response to treatment with no c/o increased pain and good participation with HEP and transfer/balance training  . PATIENT/CAREGIVER EDUCATION PROVIDED THIS VISIT:  Therapist educated pt and CG on v/c for positioning feet, posture and importance of wider ABRAHAM for stability in stance. PATIENT LEVEL OF UNDERSTANDING OF EDUCATION PROVIDED: CG demonstrated good understanding and pt demonstrated fair teachback with mod v/c for understanding and performance. ASSESSMENT AND PROGRESS TOWARD GOALS: Pt demonstrated good progress towards goals for mobility, balance and HEP carry over; balance goals upgraded by evaluating therapist to address further on next visit as pt progressing well. PLAN: next visit will be for final ADL and bathroom mobility training and assessment with CG. DISCHARGE PLANNING DISCUSSED: Discharge to self and family under MD supervision once all goals have been met or patient has reached maximum potential. Frequency remaininw1 remaining with pt and CG aware of OTDC next week.

## 2023-08-22 NOTE — HOME HEALTH
SUBJECTIVE: Pt in bed upon arrival, said she had slept well last night and was feeling pretty good today. Aide was running late but showed up shortly after visit started to assist with transfer out of bed. CAREGIVER ASSISTANCE NEEDED FOR: pts aide and son present during visit, aide participated in ADL and transfer training  MEDICATIONS REVIEWED AND UPDATED: no medication changes reported this visit. .  OBJECTIVE: see interventions  PATIENT RESPONSE TO TREATMENT: Pt demonstrated positive response to treatment with no increased pain and excellent participation with ADL and transfer training. Jodie Avila PATIENT/CAREGIVER EDUCATION PROVIDED THIS VISIT:  Therapist educated pt and CG on additional v/c and ways to set pt up for increased participation and encouraged pt to participate at highest level daily to help with functional endurance and use of BUE. PATIENT LEVEL OF UNDERSTANDING OF EDUCATION PROVIDED: Pt and CG verbalized good understanding and pt able to teachback increased amount of self-care tasks. ASSESSMENT AND PROGRESS TOWARD GOALS: Pt demonstrated good progress towards ADL and transfer goals with decreased assistance, improved initiation and improved balance and strength during activity. PLAN: next visit will be for further ADL and transfer training with CG, review of HEP for UE functional use. DISCHARGE PLANNING DISCUSSED: Discharge to self and family under MD supervision once all goals have been met or patient has reached maximum potential. Frequency remaininw1, 2w1 remaining with pt and CGs aware of OTDC next week on second visit.

## 2023-08-24 ENCOUNTER — HOME CARE VISIT (OUTPATIENT)
Age: 68
End: 2023-08-24
Payer: MEDICARE

## 2023-08-24 VITALS
TEMPERATURE: 98.1 F | DIASTOLIC BLOOD PRESSURE: 82 MMHG | HEART RATE: 76 BPM | OXYGEN SATURATION: 99 % | SYSTOLIC BLOOD PRESSURE: 118 MMHG | RESPIRATION RATE: 16 BRPM

## 2023-08-24 PROCEDURE — G0153 HHCP-SVS OF S/L PATH,EA 15MN: HCPCS

## 2023-08-24 PROCEDURE — G0299 HHS/HOSPICE OF RN EA 15 MIN: HCPCS

## 2023-08-24 NOTE — HOME HEALTH
SUBJECTIVE: Patient and caregiver reported no changes since last session. CAREGIVER INVOLVEMENT / ASSISTANCE NEEDED FOR: transportation, ADLs, meal prep     OBJECTIVE / PATIENT RESPONSE TO TREATMENT / PATIENT LEVEL OF UNDERSTANDING OF EDUCATION PROVIDED: Patient continued to demonstrate good diaphragmatic breathing techniques with SLP model to improve overall vocal quality, volume, and intelligibility. With vocal exercises, patient was to achieve max volume of 74 dB. Patient demonstrated significant improvement with intelligibility, but continues to demonstrate difficulty with carryover following exercises for daily conversations. ASSESSMENT OF PROGRESS TOWARD GOALS: Patient continues to make good progress towards  speech therapy goals. CONTINUED NEED FOR THE FOLLOWING SKILLS: Patient continues to require speech therapy services to implement cognitive staging/ caregiver education as well as implementation of diaphragmatic breathing techniques to improve overall intelligibility for daily conversations. PLAN FOR NEXT VISIT : Plans to continue implementing compensatory strategies to address speech and cognitive staging. HOME EXERCISE PROGRAM: implement compensatory swallow strategies and speech strategies. THE FOLLOWING DISCHARGE PLANNING WAS DISCUSSED WITH THE PATIENT/CAREGIVER: ST to d/c in 4 more visits/wks or when goals met/max potential achieved, Pt/caregiver verbalized understanding.

## 2023-08-26 VITALS
SYSTOLIC BLOOD PRESSURE: 120 MMHG | OXYGEN SATURATION: 100 % | HEART RATE: 76 BPM | DIASTOLIC BLOOD PRESSURE: 66 MMHG | TEMPERATURE: 97.4 F | RESPIRATION RATE: 18 BRPM

## 2023-08-26 ASSESSMENT — ENCOUNTER SYMPTOMS
STOOL DESCRIPTION: FORMED
CONSTIPATION: 1

## 2023-08-28 ENCOUNTER — HOME CARE VISIT (OUTPATIENT)
Age: 68
End: 2023-08-28
Payer: MEDICARE

## 2023-08-28 VITALS
DIASTOLIC BLOOD PRESSURE: 68 MMHG | SYSTOLIC BLOOD PRESSURE: 128 MMHG | HEART RATE: 62 BPM | OXYGEN SATURATION: 96 % | RESPIRATION RATE: 16 BRPM | TEMPERATURE: 97.7 F

## 2023-08-28 PROCEDURE — G0158 HHC OT ASSISTANT EA 15: HCPCS

## 2023-08-28 NOTE — HOME HEALTH
Skilled reason for visit: body systems assess, teach disease process and medication management    Caregiver involvement: family assists with adl's and iadl's prn. Medications reviewed and all medications are available in the home this visit. The following education was provided regarding medications: take medications as ordered, side effects, dosages, purposes, frequencies, do not stop or start any medications without notifying MD.    MD notified of any discrepancies/look a-like medications/medication interactions: n/a  Medications are effective at this time. Home health supplies by type and quantity ordered/delivered this visit include: n/a    Patient education provided this visit: as above, INSTRUCTED PATIENT AND CG THAT SHOULD ANY NEEDS OR CONCERNS ARISE TO FIRST CALL OUR OFFICE, OR THE DR'S OFFICE OR GO TO AN URGENT CARE CENTER AND NOT TO THE ED FOR NON-LIFE THREATENING EVENTS. IF IT IS LIFE THREATENING THEN CALL 911 OR GO TO THE CLOSEST ER, see care plan. Sharps education provided: n/a    Patient level of understanding of education provided: partial understanding and teach back    Skilled Care Performed this visit: VS, body systems assessment, teach disease process and medication management    Patient response to procedure performed: receptive to teaching, tolerated assess and teaching without adverse effects noted. Agency Progress toward goals: progressing, see care plan    Patient's Progress towards personal goals: progressing, see care plan, patient given opportunity to voice questions and/ or concerns. Patient states has no questions or concerns this visit. Home exercise program: take medications as ordered, follow ordered diet, follow through with HEP and use assistive devices as instructed by PT, follow through with HEP as instructed by OT and SLP    Continued need for the following skills: Nursing.     Plan for next visit: body systems assess, teach disease process and medication

## 2023-08-28 NOTE — HOME HEALTH
SUBJECTIVE: Pt in transport chair eating breakfast upon arrival, pt reported she had some diarreha over the weekend and CG reported a very little amount today, pts son gave her a stool softener that caused it. CAREGIVER ASSISTANCE NEEDED FOR: pts PCA present for continued balance, mobility and strength training. MEDICATIONS REVIEWED AND UPDATED: no medication changes reported this visit  . OBJECTIVE: see interventions  PATIENT RESPONSE TO TREATMENT: Pt demonstrated positive response to treatment with no increased pain and good participation. Hannah Hernández PATIENT/CAREGIVER EDUCATION PROVIDED THIS VISIT:  Therapist educated pt and CG on ways to v/c pt to improve posture and standing balance, reviewed proper transfer techniques and safety with transfers. PATIENT LEVEL OF UNDERSTANDING OF EDUCATION PROVIDED: Pt and CG demonstrated good understanding through participation and pt improvement throughout visit with some decrease in v/c needed. Pt will benefit from carry over with PCA. ASSESSMENT AND PROGRESS TOWARD GOALS: Pt demonstrated good progress towards mobility, balance and UE strength goals with decreased assistance, improved strength and stability. PLAN: next visit will be for OTDC as pt and PCA have made good progress and are on track to meet all goals at this time with good carry over. DISCHARGE PLANNING DISCUSSED: Discharge to self and family under MD supervision once all goals have been met or patient has reached maximum potential. Frequency remaininw1 remaining with pt and CG aware of OTDC next visit.

## 2023-08-29 ENCOUNTER — HOME CARE VISIT (OUTPATIENT)
Age: 68
End: 2023-08-29
Payer: MEDICARE

## 2023-08-29 VITALS
HEART RATE: 65 BPM | OXYGEN SATURATION: 95 % | RESPIRATION RATE: 16 BRPM | TEMPERATURE: 97.5 F | SYSTOLIC BLOOD PRESSURE: 120 MMHG | DIASTOLIC BLOOD PRESSURE: 62 MMHG

## 2023-08-29 PROCEDURE — G0153 HHCP-SVS OF S/L PATH,EA 15MN: HCPCS

## 2023-08-29 NOTE — HOME HEALTH
SUBJECTIVE: Patient and caregiver reported no changes since last session. CAREGIVER INVOLVEMENT / ASSISTANCE NEEDED FOR: transportation, ADLs, meal prep   OBJECTIVE / PATIENT RESPONSE TO TREATMENT / PATIENT LEVEL OF UNDERSTANDING OF EDUCATION PROVIDED: Patient continued to demonstrate good diaphragmatic breathing techniques with SLP model to improve overall vocal quality, volume, and intelligibility. With vocal exercises, patient was continually achieve max volume of 74 dB. Patient demonstrated significant improvement with intelligibility and reduced disfluencies throughout the session. Patient had fair reponse to [address] involving flower pot task, but continues to require additional staging due to difficulty with task. ASSESSMENT OF PROGRESS TOWARD GOALS: Patient continues to make good progress towards  speech therapy goals. CONTINUED NEED FOR THE FOLLOWING SKILLS: Patient continues to require speech therapy services to implement cognitive staging/ caregiver education as well as implementation of diaphragmatic breathing techniques to improve overall intelligibility for daily conversations. PLAN FOR NEXT VISIT : Plans to continue implementing compensatory strategies to address speech and cognitive staging. HOME EXERCISE PROGRAM: implement compensatory swallow strategies and speech strategies. THE FOLLOWING DISCHARGE PLANNING WAS DISCUSSED WITH THE PATIENT/CAREGIVER: ST to d/c in 3 more visits/wks or when goals met/max potential achieved, Pt/caregiver verbalized understanding.

## 2023-08-30 ENCOUNTER — HOME CARE VISIT (OUTPATIENT)
Age: 68
End: 2023-08-30
Payer: MEDICARE

## 2023-08-30 VITALS
DIASTOLIC BLOOD PRESSURE: 64 MMHG | SYSTOLIC BLOOD PRESSURE: 120 MMHG | HEART RATE: 66 BPM | OXYGEN SATURATION: 97 % | RESPIRATION RATE: 18 BRPM | TEMPERATURE: 98 F

## 2023-08-30 PROCEDURE — G0152 HHCP-SERV OF OT,EA 15 MIN: HCPCS

## 2023-08-30 ASSESSMENT — ENCOUNTER SYMPTOMS: PAIN LOCATION - PAIN QUALITY: ACHING

## 2023-08-31 ENCOUNTER — HOME CARE VISIT (OUTPATIENT)
Age: 68
End: 2023-08-31
Payer: MEDICARE

## 2023-08-31 NOTE — HOME HEALTH
Pt has met all goals at this time and has reached her max potential, pt and CG aware of importance of carry over of skills before more therapy will be warranted. See DC summary for details.

## 2023-09-01 ENCOUNTER — HOME CARE VISIT (OUTPATIENT)
Age: 68
End: 2023-09-01
Payer: MEDICARE

## 2023-09-01 VITALS
SYSTOLIC BLOOD PRESSURE: 120 MMHG | HEART RATE: 81 BPM | DIASTOLIC BLOOD PRESSURE: 62 MMHG | OXYGEN SATURATION: 99 % | TEMPERATURE: 98.3 F | RESPIRATION RATE: 16 BRPM

## 2023-09-01 PROCEDURE — G0153 HHCP-SVS OF S/L PATH,EA 15MN: HCPCS

## 2023-09-01 NOTE — HOME HEALTH
SUBJECTIVE: Patient reported no changes since last session. CAREGIVER INVOLVEMENT / ASSISTANCE NEEDED FOR: transportation, ADLs, meal prep     OBJECTIVE / PATIENT RESPONSE TO TREATMENT / PATIENT LEVEL OF UNDERSTANDING OF EDUCATION PROVIDED: Patient continued to demonstrate good diaphragmatic breathing techniques with SLP model to improve overall vocal quality, volume, and intelligibility. With vocal exercises, patient was achieve max volume of 76 dB and MPT of 12 seconds. Patient demonstrated significant improvement with intelligibility and reduced disfluency throughout the session with conversational tasks. ASSESSMENT OF PROGRESS TOWARD GOALS: Patient continues to make good progress towards  speech therapy goals with improved vocal quality with reduced disfluency. CONTINUED NEED FOR THE FOLLOWING SKILLS: Patient continues to require speech therapy services to implement cognitive staging/ caregiver education as well as implementation of diaphragmatic breathing techniques to improve overall intelligibility for daily conversations. PLAN FOR NEXT VISIT : Plans to continue implementing compensatory strategies to address speech and cognitive staging. HOME EXERCISE PROGRAM: implement compensatory swallow strategies and speech strategies. THE FOLLOWING DISCHARGE PLANNING WAS DISCUSSED WITH THE PATIENT/CAREGIVER: ST to d/c in 2 more visits/wks or when goals met/max potential achieved, Pt/caregiver verbalized understanding.

## 2023-09-05 ENCOUNTER — HOME CARE VISIT (OUTPATIENT)
Age: 68
End: 2023-09-05
Payer: MEDICARE

## 2023-09-05 VITALS
DIASTOLIC BLOOD PRESSURE: 80 MMHG | OXYGEN SATURATION: 96 % | TEMPERATURE: 98 F | RESPIRATION RATE: 16 BRPM | SYSTOLIC BLOOD PRESSURE: 160 MMHG | HEART RATE: 66 BPM

## 2023-09-05 PROCEDURE — G0153 HHCP-SVS OF S/L PATH,EA 15MN: HCPCS

## 2023-09-05 NOTE — HOME HEALTH
SUBJECTIVE: \"My throat does not hurt as long as I go slow. \"    CAREGIVER INVOLVEMENT / ASSISTANCE NEEDED FOR: transportation, ADLs, meal prep     OBJECTIVE / PATIENT RESPONSE TO TREATMENT / PATIENT LEVEL OF UNDERSTANDING OF EDUCATION PROVIDED: Patient continued to demonstrate good diaphragmatic breathing techniques with SLP model to improve overall vocal quality, volume, and intelligibility. With vocal exercises, patient was achieve max volume of 78 dB and MPT of 15 seconds. Patient demonstrated significant improvement with intelligibility and no disfluency throughout the session with conversational tasks. ASSESSMENT OF PROGRESS TOWARD GOALS: Patient continues to make good progress towards  speech therapy goals with good carryover of vocal exercises and loudness with phrases for increased intelligibility without disfluencies. CONTINUED NEED FOR THE FOLLOWING SKILLS: Patient continues to require speech therapy services to implement cognitive staging/ caregiver education for home maintanance program.    PLAN FOR NEXT VISIT : Plans for discharge    HOME EXERCISE PROGRAM: implement compensatory swallow strategies and speech strategies. THE FOLLOWING DISCHARGE PLANNING WAS DISCUSSED WITH THE PATIENT/CAREGIVER: ST to d/c in 1 more visits/wks or when goals met/max potential achieved, Pt/caregiver verbalized understanding.

## 2023-09-07 ENCOUNTER — HOME CARE VISIT (OUTPATIENT)
Age: 68
End: 2023-09-07
Payer: MEDICARE

## 2023-09-07 VITALS
OXYGEN SATURATION: 98 % | SYSTOLIC BLOOD PRESSURE: 105 MMHG | HEART RATE: 68 BPM | DIASTOLIC BLOOD PRESSURE: 62 MMHG | TEMPERATURE: 98.4 F | RESPIRATION RATE: 16 BRPM

## 2023-09-07 VITALS
TEMPERATURE: 97.3 F | HEART RATE: 64 BPM | DIASTOLIC BLOOD PRESSURE: 56 MMHG | SYSTOLIC BLOOD PRESSURE: 120 MMHG | RESPIRATION RATE: 18 BRPM | OXYGEN SATURATION: 100 %

## 2023-09-07 PROCEDURE — G0153 HHCP-SVS OF S/L PATH,EA 15MN: HCPCS

## 2023-09-07 PROCEDURE — G0299 HHS/HOSPICE OF RN EA 15 MIN: HCPCS

## 2023-09-07 ASSESSMENT — ENCOUNTER SYMPTOMS
ABDOMINAL PAIN: 1
STOOL DESCRIPTION: FORMED
CONSTIPATION: 1

## 2023-09-08 NOTE — HOME HEALTH
SUBJECTIVE: Patient reported no changes since last session. CAREGIVER INVOLVEMENT / ASSISTANCE NEEDED FOR: transportation, ADLs, meal prep     OBJECTIVE / PATIENT RESPONSE TO TREATMENT / PATIENT LEVEL OF UNDERSTANDING OF EDUCATION PROVIDED/ASSESSMENT OF PROGRESS TOWARD GOALS: Patient has demonstrated significant improvement with implementation of compensatory strategies to eliminate/reduce signs and symptoms of aspiration/penetration. Caregiver demonstrated good carryover of recommnedations to reduce aspiration risks. Patient met all dysphagia goals. Patient participated in cognitive staging with [facility] Cognitive Staging with suggested level of 3.0, educated patient and caregivers on cognitive staging and recommendations to ensure partipcipation in cognitive communication tasks and safety at home. Provided patient and family with written recommendations following discharge from  speech therapy services. Patient demonstrated plateau in improvement in SLUMS assessment score of 7/30 (initial evaluation) and 9/30 (reassessment) to 10/30. Patient continues to demonstrate difficulty with overall speech intelligibility for daily conversations as flucutations occur due to cognition and fatigue, however with verbal cueing, patient is able to demonstrate improved volume and reduced disfluency for significant improved intelligibility. Patient met all  speech therapy goals and is appropriate for  speech therapy discharge. Educated family and caregiver on discharge plans and recommendations. HOME EXERCISE PROGRAM: implement compensatory swallow strategies, cognitive staging recommnedations, and vocal exercises.

## 2023-09-08 NOTE — CASE COMMUNICATION
Patient has demonstrated significant improvement with implementation of compensatory strategies to eliminate/reduce signs and symptoms of aspiration/penetration. Caregiver demonstrated good carryover of recommnedations to reduce aspiration risks. Patient met all dysphagia goals. Patient participated in cognitive staging with [facility] Cognitive Staging with suggested level of 3.0, educated patient and caregivers on cognitive staging and rec ommendations to ensure partipcipation in cognitive communication tasks and safety at home. Provided patient and family with written recommendations following discharge from  speech therapy services. Patient demonstrated plateau in improvement in SLUMS assessment score of 7/30 (initial evaluation) and 9/30 (reassessment) to 10/30. Patient continues to demonstrate difficulty with overall speech intelligibility for daily conversations as  flucutations occur due to cognition and fatigue, however with verbal cueing, patient is able to demonstrate improved volume and reduced disfluency for significant improved intelligibility. Patient met all  speech therapy goals and is appropriate for  speech therapy discharge.

## 2023-09-09 NOTE — HOME HEALTH
Skilled reason for visit: body systems assess, teach disease process and medication management, dc teaching, dc this visit. Caregiver involvement: family assists with adl's and iadl's prn. Medications reviewed and all medications are available in the home this visit. The following education was provided regarding medications: take medications as ordered, side effects, dosages, purposes, frequencies, do not stop or start any medications without notifying MD.    MD notified of any discrepancies/look a-like medications/medication interactions: n/a  Medications are effective at this time. Home health supplies by type and quantity ordered/delivered this visit include: n/a    Patient education provided this visit: as above, INSTRUCTED PATIENT AND CG THAT SHOULD ANY NEEDS OR CONCERNS ARISE TO FIRST CALL THE DR'S OFFICE OR GO TO AN URGENT CARE CENTER AND NOT TO THE ED FOR NON-LIFE THREATENING EVENTS. IF IT IS LIFE THREATENING THEN CALL 911 OR GO TO THE CLOSEST ER, see care plan. Patient level of understanding of education provided: patient states partial understanding and teach back, CGs state 100% understanding and teach back    Skilled Care Performed this visit: VS, body systems assessment, teach disease process and medication management, dc teaching    Patient response to procedure performed: receptive to teaching, tolerated assess and teaching without adverse effects noted. Agency Progress toward goals: progressing, see care plan    Patient's Progress towards personal goals: progressing, see care plan, patient given opportunity to voice questions and/ or concerns. Patient/CGs state have no questions or concerns this visit. Home exercise program: take medications as ordered, follow ordered diet, follow through with HEP and use assistive devices as instructed by PT, follow through with HEP as instructed by SLP    Continued need for the following skills: N/A.     Plan for next visit: N/A  Patient and/or

## 2023-11-01 ENCOUNTER — HOSPITAL ENCOUNTER (EMERGENCY)
Facility: HOSPITAL | Age: 68
Discharge: HOME | End: 2023-11-02
Attending: EMERGENCY MEDICINE
Payer: MEDICARE

## 2023-11-01 ENCOUNTER — APPOINTMENT (OUTPATIENT)
Facility: HOSPITAL | Age: 68
End: 2023-11-01
Payer: MEDICARE

## 2023-11-01 DIAGNOSIS — F32.A DEPRESSION, UNSPECIFIED DEPRESSION TYPE: Primary | ICD-10-CM

## 2023-11-01 DIAGNOSIS — G24.01 TARDIVE DYSKINESIA: ICD-10-CM

## 2023-11-01 LAB
ALBUMIN SERPL-MCNC: 3.7 G/DL (ref 3.4–5)
ALBUMIN/GLOB SERPL: 1.2 (ref 0.8–1.7)
ALP SERPL-CCNC: 70 U/L (ref 45–117)
ALT SERPL-CCNC: 14 U/L (ref 13–56)
ANION GAP SERPL CALC-SCNC: 5 MMOL/L (ref 3–18)
APPEARANCE UR: CLEAR
AST SERPL-CCNC: 13 U/L (ref 10–38)
BACTERIA URNS QL MICRO: ABNORMAL /HPF
BASOPHILS # BLD: 0 K/UL (ref 0–0.1)
BASOPHILS NFR BLD: 1 % (ref 0–2)
BILIRUB SERPL-MCNC: 0.4 MG/DL (ref 0.2–1)
BILIRUB UR QL: NEGATIVE
BUN SERPL-MCNC: 24 MG/DL (ref 7–18)
BUN/CREAT SERPL: 20 (ref 12–20)
CALCIUM SERPL-MCNC: 9.3 MG/DL (ref 8.5–10.1)
CHLORIDE SERPL-SCNC: 110 MMOL/L (ref 100–111)
CO2 SERPL-SCNC: 26 MMOL/L (ref 21–32)
COLOR UR: YELLOW
CREAT SERPL-MCNC: 1.23 MG/DL (ref 0.6–1.3)
DIFFERENTIAL METHOD BLD: ABNORMAL
EOSINOPHIL # BLD: 0.1 K/UL (ref 0–0.4)
EOSINOPHIL NFR BLD: 1 % (ref 0–5)
EPITH CASTS URNS QL MICRO: NEGATIVE /LPF (ref 0–5)
ERYTHROCYTE [DISTWIDTH] IN BLOOD BY AUTOMATED COUNT: 15.1 % (ref 11.6–14.5)
GLOBULIN SER CALC-MCNC: 3.2 G/DL (ref 2–4)
GLUCOSE SERPL-MCNC: 153 MG/DL (ref 74–99)
GLUCOSE UR STRIP.AUTO-MCNC: NEGATIVE MG/DL
HCT VFR BLD AUTO: 33.6 % (ref 35–45)
HGB BLD-MCNC: 11.1 G/DL (ref 12–16)
HGB UR QL STRIP: NEGATIVE
IMM GRANULOCYTES # BLD AUTO: 0 K/UL (ref 0–0.04)
IMM GRANULOCYTES NFR BLD AUTO: 0 % (ref 0–0.5)
KETONES UR QL STRIP.AUTO: NEGATIVE MG/DL
LEUKOCYTE ESTERASE UR QL STRIP.AUTO: ABNORMAL
LYMPHOCYTES # BLD: 2 K/UL (ref 0.9–3.6)
LYMPHOCYTES NFR BLD: 28 % (ref 21–52)
MAGNESIUM SERPL-MCNC: 1.9 MG/DL (ref 1.6–2.6)
MCH RBC QN AUTO: 31.4 PG (ref 24–34)
MCHC RBC AUTO-ENTMCNC: 33 G/DL (ref 31–37)
MCV RBC AUTO: 95.2 FL (ref 78–100)
MONOCYTES # BLD: 0.5 K/UL (ref 0.05–1.2)
MONOCYTES NFR BLD: 7 % (ref 3–10)
NEUTS SEG # BLD: 4.5 K/UL (ref 1.8–8)
NEUTS SEG NFR BLD: 63 % (ref 40–73)
NITRITE UR QL STRIP.AUTO: NEGATIVE
NRBC # BLD: 0 K/UL (ref 0–0.01)
NRBC BLD-RTO: 0 PER 100 WBC
PH UR STRIP: 6.5 (ref 5–8)
PLATELET # BLD AUTO: 154 K/UL (ref 135–420)
PMV BLD AUTO: 11.9 FL (ref 9.2–11.8)
POTASSIUM SERPL-SCNC: 3.9 MMOL/L (ref 3.5–5.5)
PROT SERPL-MCNC: 6.9 G/DL (ref 6.4–8.2)
PROT UR STRIP-MCNC: NEGATIVE MG/DL
RBC # BLD AUTO: 3.53 M/UL (ref 4.2–5.3)
RBC #/AREA URNS HPF: ABNORMAL /HPF (ref 0–5)
SODIUM SERPL-SCNC: 141 MMOL/L (ref 136–145)
SP GR UR REFRACTOMETRY: 1.02 (ref 1–1.03)
TROPONIN I SERPL HS-MCNC: 60 NG/L (ref 0–54)
TROPONIN I SERPL HS-MCNC: 68 NG/L (ref 0–54)
TSH SERPL DL<=0.05 MIU/L-ACNC: 2.09 UIU/ML (ref 0.36–3.74)
UROBILINOGEN UR QL STRIP.AUTO: 0.2 EU/DL (ref 0.2–1)
WBC # BLD AUTO: 7.1 K/UL (ref 4.6–13.2)
WBC URNS QL MICRO: ABNORMAL /HPF (ref 0–4)

## 2023-11-01 PROCEDURE — 80053 COMPREHEN METABOLIC PANEL: CPT

## 2023-11-01 PROCEDURE — 84484 ASSAY OF TROPONIN QUANT: CPT

## 2023-11-01 PROCEDURE — 81001 URINALYSIS AUTO W/SCOPE: CPT

## 2023-11-01 PROCEDURE — 84443 ASSAY THYROID STIM HORMONE: CPT

## 2023-11-01 PROCEDURE — 70450 CT HEAD/BRAIN W/O DYE: CPT

## 2023-11-01 PROCEDURE — 83735 ASSAY OF MAGNESIUM: CPT

## 2023-11-01 PROCEDURE — 93005 ELECTROCARDIOGRAM TRACING: CPT | Performed by: EMERGENCY MEDICINE

## 2023-11-01 PROCEDURE — 99284 EMERGENCY DEPT VISIT MOD MDM: CPT

## 2023-11-01 PROCEDURE — 85025 COMPLETE CBC W/AUTO DIFF WBC: CPT

## 2023-11-01 NOTE — ED PROVIDER NOTES
[facility] EMERGENCY DEPT  EMERGENCY DEPARTMENT ENCOUNTER    Patient Name: Alyssa Vela  MRN: [id number]  YOB: 1955  Provider: Justen Goode DO  PCP: CIARA Reynaga NP   Time/Date of evaluation: 12:18 PM EDT on 11/1/23    History of Presenting Illness     History Provided by: Patient  History is limited by: Nothing     HISTORY:   Alyssa Vela is a 70 y.o. female in by EMS with a chief complaint of aphasia per EMS technician. Patient reports that she has been experiencing lower extremity weakness for several months. Patient states that she is experiencing chills. She denies any headache, slurred speech, chest pain, shortness of breath, back pain, flank pain, rash, abdominal pain, vomiting, diarrhea, dysuria, or melena. She has a history of depression, failure to thrive, hypertension, unspecified neurological disorder, and seizure. Nursing Notes were all reviewed and agreed with or any disagreements were addressed in the HPI. Past History     PAST MEDICAL HISTORY:  Past Medical History:   Diagnosis Date    Abnormal coordination 9/21/2020    Depression 4/12/2022    Failure to thrive in adult 4/12/2022    Hallucinations 9/21/2020    Hypertension     Neurological disorder     Seizures ([address])     Stroke ([address]) 2009    Subclinical hypothyroidism 4/13/2022       PAST SURGICAL HISTORY:  Past Surgical History:   Procedure Laterality Date    PLACE PERCUT GASTROSTOMY TUBE  1/11/2022            FAMILY HISTORY:  Family History   Problem Relation Age of Onset    Diabetes Other     Stroke Other        SOCIAL HISTORY:  Social History     Tobacco Use    Smoking status: Never    Smokeless tobacco: Never   Substance Use Topics    Alcohol use: Yes    Drug use: No       MEDICATIONS:  No current facility-administered medications for this encounter. Current Outpatient Medications   Medication Sig Dispense Refill    QUEtiapine (SEROQUEL) 25 MG tablet Take 25 mg by mouth nightly.       divalproex (DEPAKOTE

## 2023-11-01 NOTE — ED TRIAGE NOTES
Patient to ED via Ems c/o slurred speech x4d. Patient answering questions appropriately. ED attending bedside to assess patient.

## 2023-11-02 VITALS
BODY MASS INDEX: 17.28 KG/M2 | DIASTOLIC BLOOD PRESSURE: 68 MMHG | OXYGEN SATURATION: 100 % | HEIGHT: 60 IN | HEART RATE: 72 BPM | RESPIRATION RATE: 16 BRPM | WEIGHT: 88 LBS | TEMPERATURE: 97.6 F | SYSTOLIC BLOOD PRESSURE: 142 MMHG

## 2023-11-02 LAB
EKG ATRIAL RATE: 79 BPM
EKG DIAGNOSIS: NORMAL
EKG P AXIS: 50 DEGREES
EKG P-R INTERVAL: 154 MS
EKG Q-T INTERVAL: 400 MS
EKG QRS DURATION: 78 MS
EKG QTC CALCULATION (BAZETT): 458 MS
EKG R AXIS: -23 DEGREES
EKG T AXIS: 108 DEGREES
EKG VENTRICULAR RATE: 79 BPM

## 2023-11-02 PROCEDURE — 93010 ELECTROCARDIOGRAM REPORT: CPT | Performed by: INTERNAL MEDICINE

## 2023-11-06 ENCOUNTER — HOSPITAL ENCOUNTER (INPATIENT)
Facility: HOSPITAL | Age: 68
LOS: 2 days | Discharge: HOME | DRG: 066 | End: 2023-11-08
Attending: EMERGENCY MEDICINE | Admitting: STUDENT IN AN ORGANIZED HEALTH CARE EDUCATION/TRAINING PROGRAM
Payer: MEDICARE

## 2023-11-06 ENCOUNTER — APPOINTMENT (OUTPATIENT)
Facility: HOSPITAL | Age: 68
DRG: 066 | End: 2023-11-06
Payer: MEDICARE

## 2023-11-06 DIAGNOSIS — R47.01 APHASIA: Primary | ICD-10-CM

## 2023-11-06 LAB
ALBUMIN SERPL-MCNC: 3.7 G/DL (ref 3.4–5)
ALBUMIN/GLOB SERPL: 1.2 (ref 0.8–1.7)
ALP SERPL-CCNC: 69 U/L (ref 45–117)
ALT SERPL-CCNC: 20 U/L (ref 13–56)
ANION GAP SERPL CALC-SCNC: 4 MMOL/L (ref 3–18)
APPEARANCE UR: CLEAR
AST SERPL-CCNC: 17 U/L (ref 10–38)
BASOPHILS # BLD: 0 K/UL (ref 0–0.1)
BASOPHILS NFR BLD: 1 % (ref 0–2)
BILIRUB SERPL-MCNC: 0.2 MG/DL (ref 0.2–1)
BILIRUB UR QL: NEGATIVE
BUN SERPL-MCNC: 24 MG/DL (ref 7–18)
BUN/CREAT SERPL: 23 (ref 12–20)
CALCIUM SERPL-MCNC: 9.1 MG/DL (ref 8.5–10.1)
CHLORIDE SERPL-SCNC: 111 MMOL/L (ref 100–111)
CO2 SERPL-SCNC: 27 MMOL/L (ref 21–32)
COLOR UR: YELLOW
CREAT SERPL-MCNC: 1.03 MG/DL (ref 0.6–1.3)
DIFFERENTIAL METHOD BLD: ABNORMAL
EKG ATRIAL RATE: 75 BPM
EKG DIAGNOSIS: NORMAL
EKG P AXIS: 64 DEGREES
EKG P-R INTERVAL: 148 MS
EKG Q-T INTERVAL: 390 MS
EKG QRS DURATION: 76 MS
EKG QTC CALCULATION (BAZETT): 435 MS
EKG R AXIS: -18 DEGREES
EKG T AXIS: 109 DEGREES
EKG VENTRICULAR RATE: 75 BPM
EOSINOPHIL # BLD: 0.1 K/UL (ref 0–0.4)
EOSINOPHIL NFR BLD: 1 % (ref 0–5)
ERYTHROCYTE [DISTWIDTH] IN BLOOD BY AUTOMATED COUNT: 15 % (ref 11.6–14.5)
GLOBULIN SER CALC-MCNC: 3.2 G/DL (ref 2–4)
GLUCOSE SERPL-MCNC: 90 MG/DL (ref 74–99)
GLUCOSE UR STRIP.AUTO-MCNC: NEGATIVE MG/DL
HCT VFR BLD AUTO: 32.3 % (ref 35–45)
HGB BLD-MCNC: 10.5 G/DL (ref 12–16)
HGB UR QL STRIP: NEGATIVE
IMM GRANULOCYTES # BLD AUTO: 0 K/UL (ref 0–0.04)
IMM GRANULOCYTES NFR BLD AUTO: 0 % (ref 0–0.5)
INR PPP: 1 (ref 0.9–1.1)
KETONES UR QL STRIP.AUTO: NEGATIVE MG/DL
LEUKOCYTE ESTERASE UR QL STRIP.AUTO: NEGATIVE
LYMPHOCYTES # BLD: 1.8 K/UL (ref 0.9–3.6)
LYMPHOCYTES NFR BLD: 24 % (ref 21–52)
MCH RBC QN AUTO: 31.4 PG (ref 24–34)
MCHC RBC AUTO-ENTMCNC: 32.5 G/DL (ref 31–37)
MCV RBC AUTO: 96.7 FL (ref 78–100)
MONOCYTES # BLD: 0.7 K/UL (ref 0.05–1.2)
MONOCYTES NFR BLD: 10 % (ref 3–10)
NEUTS SEG # BLD: 4.8 K/UL (ref 1.8–8)
NEUTS SEG NFR BLD: 64 % (ref 40–73)
NITRITE UR QL STRIP.AUTO: NEGATIVE
NRBC # BLD: 0 K/UL (ref 0–0.01)
NRBC BLD-RTO: 0 PER 100 WBC
PH UR STRIP: 7.5 (ref 5–8)
PLATELET # BLD AUTO: 143 K/UL (ref 135–420)
PMV BLD AUTO: 11 FL (ref 9.2–11.8)
POTASSIUM SERPL-SCNC: 4.4 MMOL/L (ref 3.5–5.5)
PROT SERPL-MCNC: 6.9 G/DL (ref 6.4–8.2)
PROT UR STRIP-MCNC: NEGATIVE MG/DL
PROTHROMBIN TIME: 12.9 SEC (ref 11.9–14.7)
RBC # BLD AUTO: 3.34 M/UL (ref 4.2–5.3)
SODIUM SERPL-SCNC: 142 MMOL/L (ref 136–145)
SP GR UR REFRACTOMETRY: >1.03 (ref 1–1.03)
TROPONIN I SERPL HS-MCNC: 56 NG/L (ref 0–54)
UROBILINOGEN UR QL STRIP.AUTO: 1 EU/DL (ref 0.2–1)
WBC # BLD AUTO: 7.4 K/UL (ref 4.6–13.2)

## 2023-11-06 PROCEDURE — 6360000004 HC RX CONTRAST MEDICATION: Performed by: EMERGENCY MEDICINE

## 2023-11-06 PROCEDURE — 80053 COMPREHEN METABOLIC PANEL: CPT

## 2023-11-06 PROCEDURE — 93005 ELECTROCARDIOGRAM TRACING: CPT | Performed by: EMERGENCY MEDICINE

## 2023-11-06 PROCEDURE — 70450 CT HEAD/BRAIN W/O DYE: CPT

## 2023-11-06 PROCEDURE — 2580000003 HC RX 258: Performed by: STUDENT IN AN ORGANIZED HEALTH CARE EDUCATION/TRAINING PROGRAM

## 2023-11-06 PROCEDURE — 6370000000 HC RX 637 (ALT 250 FOR IP): Performed by: STUDENT IN AN ORGANIZED HEALTH CARE EDUCATION/TRAINING PROGRAM

## 2023-11-06 PROCEDURE — 93010 ELECTROCARDIOGRAM REPORT: CPT | Performed by: INTERNAL MEDICINE

## 2023-11-06 PROCEDURE — 70551 MRI BRAIN STEM W/O DYE: CPT

## 2023-11-06 PROCEDURE — 84484 ASSAY OF TROPONIN QUANT: CPT

## 2023-11-06 PROCEDURE — 85610 PROTHROMBIN TIME: CPT

## 2023-11-06 PROCEDURE — 85025 COMPLETE CBC W/AUTO DIFF WBC: CPT

## 2023-11-06 PROCEDURE — 99285 EMERGENCY DEPT VISIT HI MDM: CPT

## 2023-11-06 PROCEDURE — 6360000002 HC RX W HCPCS: Performed by: STUDENT IN AN ORGANIZED HEALTH CARE EDUCATION/TRAINING PROGRAM

## 2023-11-06 PROCEDURE — 81003 URINALYSIS AUTO W/O SCOPE: CPT

## 2023-11-06 PROCEDURE — 70498 CT ANGIOGRAPHY NECK: CPT

## 2023-11-06 PROCEDURE — 1100000003 HC PRIVATE W/ TELEMETRY

## 2023-11-06 PROCEDURE — 99222 1ST HOSP IP/OBS MODERATE 55: CPT | Performed by: STUDENT IN AN ORGANIZED HEALTH CARE EDUCATION/TRAINING PROGRAM

## 2023-11-06 RX ORDER — LISINOPRIL 20 MG/1
20 TABLET ORAL DAILY
Status: DISCONTINUED | OUTPATIENT
Start: 2023-11-06 | End: 2023-11-09 | Stop reason: HOSPADM

## 2023-11-06 RX ORDER — ENOXAPARIN SODIUM 100 MG/ML
30 INJECTION SUBCUTANEOUS DAILY
Status: DISCONTINUED | OUTPATIENT
Start: 2023-11-06 | End: 2023-11-09 | Stop reason: HOSPADM

## 2023-11-06 RX ORDER — CLOPIDOGREL BISULFATE 75 MG/1
75 TABLET ORAL DAILY
Status: DISCONTINUED | OUTPATIENT
Start: 2023-11-06 | End: 2023-11-09 | Stop reason: HOSPADM

## 2023-11-06 RX ORDER — LABETALOL HYDROCHLORIDE 5 MG/ML
10 INJECTION, SOLUTION INTRAVENOUS EVERY 10 MIN PRN
Status: DISCONTINUED | OUTPATIENT
Start: 2023-11-06 | End: 2023-11-09 | Stop reason: HOSPADM

## 2023-11-06 RX ORDER — ATORVASTATIN CALCIUM 40 MG/1
80 TABLET, FILM COATED ORAL NIGHTLY
Status: DISCONTINUED | OUTPATIENT
Start: 2023-11-06 | End: 2023-11-09 | Stop reason: HOSPADM

## 2023-11-06 RX ORDER — OXCARBAZEPINE 300 MG/1
300 TABLET, FILM COATED ORAL 2 TIMES DAILY
Status: DISCONTINUED | OUTPATIENT
Start: 2023-11-06 | End: 2023-11-09 | Stop reason: HOSPADM

## 2023-11-06 RX ORDER — SPIRONOLACTONE 25 MG/1
25 TABLET ORAL DAILY
Status: DISCONTINUED | OUTPATIENT
Start: 2023-11-06 | End: 2023-11-09 | Stop reason: HOSPADM

## 2023-11-06 RX ORDER — ASPIRIN 81 MG/1
81 TABLET, CHEWABLE ORAL DAILY
Status: DISCONTINUED | OUTPATIENT
Start: 2023-11-06 | End: 2023-11-09 | Stop reason: HOSPADM

## 2023-11-06 RX ORDER — ATORVASTATIN CALCIUM 40 MG/1
40 TABLET, FILM COATED ORAL DAILY
Status: DISCONTINUED | OUTPATIENT
Start: 2023-11-06 | End: 2023-11-06

## 2023-11-06 RX ORDER — SODIUM CHLORIDE 0.9 % (FLUSH) 0.9 %
5-40 SYRINGE (ML) INJECTION PRN
Status: DISCONTINUED | OUTPATIENT
Start: 2023-11-06 | End: 2023-11-09 | Stop reason: HOSPADM

## 2023-11-06 RX ORDER — SODIUM CHLORIDE 9 MG/ML
INJECTION, SOLUTION INTRAVENOUS PRN
Status: DISCONTINUED | OUTPATIENT
Start: 2023-11-06 | End: 2023-11-09 | Stop reason: HOSPADM

## 2023-11-06 RX ORDER — FAMOTIDINE 20 MG/1
20 TABLET, FILM COATED ORAL DAILY
Status: DISCONTINUED | OUTPATIENT
Start: 2023-11-06 | End: 2023-11-09 | Stop reason: HOSPADM

## 2023-11-06 RX ORDER — POLYETHYLENE GLYCOL 3350 17 G/17G
17 POWDER, FOR SOLUTION ORAL DAILY PRN
Status: DISCONTINUED | OUTPATIENT
Start: 2023-11-06 | End: 2023-11-09 | Stop reason: HOSPADM

## 2023-11-06 RX ORDER — HYDRALAZINE HYDROCHLORIDE 50 MG/1
100 TABLET, FILM COATED ORAL 2 TIMES DAILY
Status: DISCONTINUED | OUTPATIENT
Start: 2023-11-06 | End: 2023-11-09 | Stop reason: HOSPADM

## 2023-11-06 RX ORDER — QUETIAPINE FUMARATE 25 MG/1
25 TABLET, FILM COATED ORAL
Status: DISCONTINUED | OUTPATIENT
Start: 2023-11-06 | End: 2023-11-09 | Stop reason: HOSPADM

## 2023-11-06 RX ORDER — AMLODIPINE BESYLATE 10 MG/1
10 TABLET ORAL DAILY
Status: DISCONTINUED | OUTPATIENT
Start: 2023-11-06 | End: 2023-11-09 | Stop reason: HOSPADM

## 2023-11-06 RX ORDER — ASPIRIN 300 MG/1
300 SUPPOSITORY RECTAL DAILY
Status: DISCONTINUED | OUTPATIENT
Start: 2023-11-06 | End: 2023-11-09 | Stop reason: HOSPADM

## 2023-11-06 RX ORDER — SODIUM CHLORIDE 0.9 % (FLUSH) 0.9 %
5-40 SYRINGE (ML) INJECTION EVERY 12 HOURS SCHEDULED
Status: DISCONTINUED | OUTPATIENT
Start: 2023-11-06 | End: 2023-11-09 | Stop reason: HOSPADM

## 2023-11-06 RX ORDER — PAROXETINE HYDROCHLORIDE 20 MG/1
30 TABLET, FILM COATED ORAL DAILY
Status: DISCONTINUED | OUTPATIENT
Start: 2023-11-06 | End: 2023-11-09 | Stop reason: HOSPADM

## 2023-11-06 RX ORDER — ONDANSETRON 4 MG/1
4 TABLET, ORALLY DISINTEGRATING ORAL EVERY 8 HOURS PRN
Status: DISCONTINUED | OUTPATIENT
Start: 2023-11-06 | End: 2023-11-09 | Stop reason: HOSPADM

## 2023-11-06 RX ORDER — ONDANSETRON 2 MG/ML
4 INJECTION INTRAMUSCULAR; INTRAVENOUS EVERY 6 HOURS PRN
Status: DISCONTINUED | OUTPATIENT
Start: 2023-11-06 | End: 2023-11-09 | Stop reason: HOSPADM

## 2023-11-06 RX ADMIN — OXCARBAZEPINE 300 MG: 300 TABLET, FILM COATED ORAL at 22:01

## 2023-11-06 RX ADMIN — SPIRONOLACTONE 25 MG: 25 TABLET ORAL at 17:29

## 2023-11-06 RX ADMIN — QUETIAPINE FUMARATE 25 MG: 25 TABLET ORAL at 22:01

## 2023-11-06 RX ADMIN — FAMOTIDINE 20 MG: 20 TABLET, FILM COATED ORAL at 17:28

## 2023-11-06 RX ADMIN — CLOPIDOGREL BISULFATE 75 MG: 75 TABLET, FILM COATED ORAL at 17:28

## 2023-11-06 RX ADMIN — ENOXAPARIN SODIUM 30 MG: 100 INJECTION SUBCUTANEOUS at 17:28

## 2023-11-06 RX ADMIN — SODIUM CHLORIDE, PRESERVATIVE FREE 10 ML: 5 INJECTION INTRAVENOUS at 22:02

## 2023-11-06 RX ADMIN — IOPAMIDOL 73 ML: 755 INJECTION, SOLUTION INTRAVENOUS at 15:08

## 2023-11-06 RX ADMIN — ATORVASTATIN CALCIUM 80 MG: 40 TABLET, FILM COATED ORAL at 22:01

## 2023-11-06 RX ADMIN — HYDRALAZINE HYDROCHLORIDE 100 MG: 50 TABLET, FILM COATED ORAL at 22:01

## 2023-11-06 RX ADMIN — AMLODIPINE BESYLATE 10 MG: 10 TABLET ORAL at 17:31

## 2023-11-06 ASSESSMENT — ENCOUNTER SYMPTOMS
BACK PAIN: 0
EYE PAIN: 0
SHORTNESS OF BREATH: 0

## 2023-11-06 NOTE — ED NOTES
Departed CT department to ED via stretcher, accompanied by RN, in stable condition.       Braulio Calabrese RN  11/06/23 6099

## 2023-11-06 NOTE — ED PROVIDER NOTES
EMERGENCY DEPARTMENT HISTORY AND PHYSICAL EXAM      Date: 11/6/2023  Patient Name: Nubia Gu      History of Presenting Illness     Chief Complaint   Patient presents with    Aphasia       Location/Duration/Severity/Modifying factors   Chief Complaint   Patient presents with    Aphasia       HPI:  Nubia Gu is a 70 y.o. female with PMH significant for hypertension, CVA in 2009 amongst other comorbidities presents with acute change in speech pattern per the son at 79 today. Seen recently for abnormal speech pattern 4 days ago. Negative CT brain. No MRI on file however. Diagnosis charted dyskinesia and discharge home. Pt  denies complaints, not in sure why she is here, unsure who called the ambulance. Per EMS, no motor deficits, blood glucose 140.   PCP: CIARA Carvalho NP    Current Facility-Administered Medications   Medication Dose Route Frequency Provider Last Rate Last Admin    amLODIPine (NORVASC) tablet 10 mg  10 mg Oral Daily Delmi Espinoza MD   10 mg at 11/06/23 1731    clopidogrel (PLAVIX) tablet 75 mg  75 mg Oral Daily Delmi Espinoza MD   75 mg at 11/06/23 1728    famotidine (PEPCID) tablet 20 mg  20 mg Oral Daily Delmi Espinoza MD   20 mg at 11/06/23 1728    hydrALAZINE (APRESOLINE) tablet 100 mg  100 mg Oral BID Delmi Espinoza MD   100 mg at 11/06/23 2201    lisinopril (PRINIVIL;ZESTRIL) tablet 20 mg  20 mg Oral Daily Delmi Espinoza MD        OXcarbazepine (TRILEPTAL) tablet 300 mg  300 mg Oral BID Delmi Espinoza MD   300 mg at 11/06/23 2201    PARoxetine (PAXIL) tablet 30 mg  30 mg Oral Daily Delmi Espinoza MD        QUEtiapine (SEROQUEL) tablet 25 mg  25 mg Oral QHS Delmi Espinoza MD   25 mg at 11/06/23 2201    spironolactone (ALDACTONE) tablet 25 mg  25 mg Oral Daily Delmi Espinoza MD   25 mg at 11/06/23 1729    sodium chloride flush 0.9 % injection 5-40 mL  5-40 mL IntraVENous 2 times per day Delmi Espinoza MD   10 mL at 11/06/23 2202    sodium
degrees    Diagnosis       Normal sinus rhythm  T wave abnormality, consider lateral ischemia  Abnormal ECG  When compared with ECG of 01-NOV-2023 12:44,  T wave inversion more evident in Lateral leads  Confirmed by Israel Palafox MD, ----- ([id number]) on 11/6/2023 5:34:15 PM     Urinalysis    Collection Time: 11/06/23  5:30 PM   Result Value Ref Range    Color, UA YELLOW      Appearance CLEAR      Specific Gravity, UA >1.030 (H) 1.005 - 1.030    pH, Urine 7.5 5.0 - 8.0      Protein, UA Negative NEG mg/dL    Glucose, UA Negative NEG mg/dL    Ketones, Urine Negative NEG mg/dL    Bilirubin Urine Negative NEG      Blood, Urine Negative NEG      Urobilinogen, Urine 1.0 0.2 - 1.0 EU/dL    Nitrite, Urine Negative NEG      Leukocyte Esterase, Urine Negative NEG         Imaging:    CTA HEAD NECK W CONTRAST         CT HEAD WO CONTRAST   Final Result      1. Stable examination from 11/1/2023 head CT. Negative for acute infarct or   acute hemorrhage. 2. Chronic right parietal cortical infarct. 3. Chronic microvascular ischemic changes in the cerebral white matter, chronic   thalamic lacunar infarct. MRI brain without contrast    (Results Pending)          ED Course/Medical Decision Making   Per teleneurology patient to be admitted for MRI brain and stroke work-up. Patient admitted in stable condition. ED Course as of 11/06/23 1830   Mon Nov 06, 2023   1524 Tele-Neuro: Dr. Katie Sanabria rec admit for proper CVA eval, MRI Brain w/ and w/o contrast, permission /105, rectal ASA low dose, CT Head and CTA show no acute CVA or LVO. Consider seizure disorder?  Will evaluate at bedside [HARRISON]   1549 Normal sinus rhythm, T wave version V4, V5 V6, no ST segment elevations or depressions, T wave inversions in 1 and aVL [HARRISON]      ED Course User Index  [JM] Chip Olmos, DO       Medications   amLODIPine (NORVASC) tablet 10 mg (10 mg Oral Given 11/6/23 6171)   clopidogrel (PLAVIX) tablet 75 mg (75 mg Oral Given

## 2023-11-06 NOTE — ED NOTES
TRANSFER - OUT REPORT:    Verbal report given to [facility] on Jeronimo Núñez  being transferred to [phone] for routine progression of patient care       Report consisted of patient's Situation, Background, Assessment and   Recommendations(SBAR). Information from the following report(s) ED SBAR was reviewed with the receiving nurse. Kinder Fall Assessment:                           Lines:   Peripheral IV 11/06/23 Right Antecubital (Active)   Site Assessment Clean, dry & intact 11/06/23 1505   Line Status Blood return noted 11/06/23 1505   Phlebitis Assessment No symptoms 11/06/23 1505   Infiltration Assessment 0 11/06/23 1505   Alcohol Cap Used No 11/06/23 1505   Dressing Status New dressing applied 11/06/23 1505   Dressing Type Transparent 11/06/23 1505   Dressing Intervention New 11/06/23 1505        Opportunity for questions and clarification was provided.       Patient transported with:  Gloria York, ALYSSA  11/06/23 5528

## 2023-11-06 NOTE — ED NOTES
Attempted report x 2, charge nurse made aware.      [facility] flowers  11/06/23 9454

## 2023-11-06 NOTE — ED NOTES
Dr. Patricia Mccallum, tele neurologist at bedside. Assessment in progress.      Lino Sultana RN  11/06/23 7223

## 2023-11-06 NOTE — ED TRIAGE NOTES
Per medic, \"the patient is having some word-finding difficulties. Last known well was at approximately 1410. Blood glucose was 115 mg/dL. She is alert and oriented X 4 and is moving all extremities well. \"

## 2023-11-06 NOTE — ED NOTES
Pt back from Ct scan, pt placed on cardiac monitor. Pt is alert and oriented x4.        Christiano Gomez RN  11/06/23 9712

## 2023-11-07 LAB
CHOLEST SERPL-MCNC: 196 MG/DL
ERYTHROCYTE [DISTWIDTH] IN BLOOD BY AUTOMATED COUNT: 15.1 % (ref 11.6–14.5)
EST. AVERAGE GLUCOSE BLD GHB EST-MCNC: 82 MG/DL
HBA1C MFR BLD: 4.5 % (ref 4.2–5.6)
HCT VFR BLD AUTO: 33.8 % (ref 35–45)
HDLC SERPL-MCNC: 68 MG/DL (ref 40–60)
HDLC SERPL: 2.9 (ref 0–5)
HGB BLD-MCNC: 10.9 G/DL (ref 12–16)
LDLC SERPL CALC-MCNC: 117.8 MG/DL (ref 0–100)
LIPID PANEL: ABNORMAL
MCH RBC QN AUTO: 30.5 PG (ref 24–34)
MCHC RBC AUTO-ENTMCNC: 32.2 G/DL (ref 31–37)
MCV RBC AUTO: 94.7 FL (ref 78–100)
NRBC # BLD: 0 K/UL (ref 0–0.01)
NRBC BLD-RTO: 0 PER 100 WBC
PLATELET # BLD AUTO: 148 K/UL (ref 135–420)
PMV BLD AUTO: 12 FL (ref 9.2–11.8)
RBC # BLD AUTO: 3.57 M/UL (ref 4.2–5.3)
TRIGL SERPL-MCNC: 51 MG/DL
VLDLC SERPL CALC-MCNC: 10.2 MG/DL
WBC # BLD AUTO: 9.9 K/UL (ref 4.6–13.2)

## 2023-11-07 PROCEDURE — 2580000003 HC RX 258: Performed by: STUDENT IN AN ORGANIZED HEALTH CARE EDUCATION/TRAINING PROGRAM

## 2023-11-07 PROCEDURE — 6370000000 HC RX 637 (ALT 250 FOR IP): Performed by: STUDENT IN AN ORGANIZED HEALTH CARE EDUCATION/TRAINING PROGRAM

## 2023-11-07 PROCEDURE — 97162 PT EVAL MOD COMPLEX 30 MIN: CPT

## 2023-11-07 PROCEDURE — 94761 N-INVAS EAR/PLS OXIMETRY MLT: CPT

## 2023-11-07 PROCEDURE — 97166 OT EVAL MOD COMPLEX 45 MIN: CPT

## 2023-11-07 PROCEDURE — 97530 THERAPEUTIC ACTIVITIES: CPT

## 2023-11-07 PROCEDURE — 1100000003 HC PRIVATE W/ TELEMETRY

## 2023-11-07 PROCEDURE — 6360000002 HC RX W HCPCS: Performed by: STUDENT IN AN ORGANIZED HEALTH CARE EDUCATION/TRAINING PROGRAM

## 2023-11-07 PROCEDURE — 92610 EVALUATE SWALLOWING FUNCTION: CPT

## 2023-11-07 PROCEDURE — 80061 LIPID PANEL: CPT

## 2023-11-07 PROCEDURE — 83036 HEMOGLOBIN GLYCOSYLATED A1C: CPT

## 2023-11-07 PROCEDURE — 36415 COLL VENOUS BLD VENIPUNCTURE: CPT

## 2023-11-07 PROCEDURE — 85027 COMPLETE CBC AUTOMATED: CPT

## 2023-11-07 PROCEDURE — 99232 SBSQ HOSP IP/OBS MODERATE 35: CPT | Performed by: HOSPITALIST

## 2023-11-07 PROCEDURE — 92522 EVALUATE SPEECH PRODUCTION: CPT

## 2023-11-07 RX ORDER — ACETAMINOPHEN 500 MG
1000 TABLET ORAL EVERY 8 HOURS PRN
Status: DISCONTINUED | OUTPATIENT
Start: 2023-11-07 | End: 2023-11-09 | Stop reason: HOSPADM

## 2023-11-07 RX ADMIN — CLOPIDOGREL BISULFATE 75 MG: 75 TABLET, FILM COATED ORAL at 08:57

## 2023-11-07 RX ADMIN — FAMOTIDINE 20 MG: 20 TABLET, FILM COATED ORAL at 08:57

## 2023-11-07 RX ADMIN — SPIRONOLACTONE 25 MG: 25 TABLET ORAL at 08:57

## 2023-11-07 RX ADMIN — OXCARBAZEPINE 300 MG: 300 TABLET, FILM COATED ORAL at 22:52

## 2023-11-07 RX ADMIN — QUETIAPINE FUMARATE 25 MG: 25 TABLET ORAL at 22:52

## 2023-11-07 RX ADMIN — SODIUM CHLORIDE, PRESERVATIVE FREE 10 ML: 5 INJECTION INTRAVENOUS at 23:08

## 2023-11-07 RX ADMIN — ATORVASTATIN CALCIUM 80 MG: 40 TABLET, FILM COATED ORAL at 22:52

## 2023-11-07 RX ADMIN — ASPIRIN 81 MG: 81 TABLET, CHEWABLE ORAL at 08:57

## 2023-11-07 RX ADMIN — SODIUM CHLORIDE, PRESERVATIVE FREE 10 ML: 5 INJECTION INTRAVENOUS at 09:00

## 2023-11-07 RX ADMIN — ENOXAPARIN SODIUM 30 MG: 100 INJECTION SUBCUTANEOUS at 12:36

## 2023-11-07 RX ADMIN — PAROXETINE HYDROCHLORIDE 30 MG: 20 TABLET, FILM COATED ORAL at 08:57

## 2023-11-07 RX ADMIN — OXCARBAZEPINE 300 MG: 300 TABLET, FILM COATED ORAL at 08:57

## 2023-11-07 NOTE — PLAN OF CARE
Problem: SLP Adult - Impaired Swallowing  Goal: By Discharge: Advance to least restrictive diet without signs or symptoms of aspiration for planned discharge setting. See evaluation for individualized goals. Description: Patient will:  1. Tolerate PO trials with 0 s/s overt distress in 4/5 trials  2. Utilize compensatory swallow strategies/maneuvers (decrease bite/sip, size/rate, alt. liq/sol) with min cues in 4/5 trials    Rec:     Regular diet with thin liquids  Aspiration precautions  HOB >45 during po intake, remain >30 for 30-45 minutes after po   Small bites/sips; alternate liquid/solid with slow feeding rate   Oral care TID  Meds per pt preference  Outcome: Progressing     Problem: SLP Adult - Impaired Communication  Goal: By Discharge: Demonstrates communication skills at highest level of function for planned discharge setting. See evaluation for individualized goals. Description:   Pt will:  1. Identify dysfluent speech in herself given min prompts, during structured/spontaneous speaking tasks in 4/5 opportunities. 2. Use compensatory strategies for increased fluent speech to 95% during structured tasks in 4/5 opportunities given min cues. 3. Participate in cognitive-linguistic testing to further identify communicative strengths/weaknesses for improved functional independence and quality of life. Outcome: Progressing   SPEECH LANGUAGE PATHOLOGY BEDSIDE SWALLOW AND SPEECH/LANGUAGE EVALUATION    Patient: Yosi Ortiz (10 y.o. female)  Date: 11/7/2023  Primary Diagnosis: Aphasia [R47.01]       Precautions: Aspiration  PLOF: As per H&P  Evaluation Time: 0327  DYSPHAGIA ASSESSMENT:  Based on the objective data described below, the patient presents with oropharyngeal swallow fxn essentially WFL. Strength, ROM, and coordination of the orofacial musculature were all found to be [facility]. Pt tolerated reg solid, puree, and thin liquids +/- straw consecutive swallows without any overt s/sx of aspiration.

## 2023-11-07 NOTE — PLAN OF CARE
Problem: Physical Therapy - Adult  Goal: By Discharge: Performs mobility at highest level of function for planned discharge setting. See evaluation for individualized goals. Description: Initiated  11/7/23  to be met within 7-10 days. 1.  Patient will move from supine to sit and sit to supine , scoot up and down, and roll side to side in bed with modified independence. 2.  Patient will transfer from bed to chair and chair to bed with minimal assistance/contact guard assist using the least restrictive device. 3.  Patient will perform sit to stand with supervision/set-up. 4.  Patient will ambulate with minimal assistance/contact guard assist for 20 feet with the least restrictive device. 5.  Patient will perform BLE therex as prescribed to tolerance. PLOF: pt lives with son and has home care aids, pt does not walk but transfers to  via stand pivot, has wc and RW, gets assist for ADLs    Outcome: Progressing     PHYSICAL THERAPY EVALUATION    Patient: Deana Rosado (33 y.o. female)  Date: 11/7/2023  Primary Diagnosis: Aphasia [R47.01]       Precautions: Fall Risk, General Precautions      ASSESSMENT :  Pt received in bed in NAD, denies pain. Pt will full ROM and generally decreased but functional BLE strength, no acute strength changes noted. Pt sits on side of  bed with SBA, good sitting balance noted on EOB. Pt tolerated SPT bed > BSC> recliner with min A and HHA for support. Pt with good sitting balance on commode for pericare. Following toileting, pt is agreeable to sit up in recliner. Chair alarm donned for safety. Pt is close to baseline, thus will see for 2-3x per week during admission. Will continue to follow. Left in recliner with all needs met, nursing updated on session. DEFICITS/IMPAIRMENTS:    Body Structures, Functions, Activity Limitations Requiring Skilled Therapeutic Intervention: Decreased functional mobility ; Decreased endurance    Patient will benefit from skilled

## 2023-11-07 NOTE — PLAN OF CARE
Problem: Chronic Conditions and Co-morbidities  Goal: Patient's chronic conditions and co-morbidity symptoms are monitored and maintained or improved  11/7/2023 1048 by Twila Campuzano RN  Outcome: Progressing  Flowsheets (Taken 11/7/2023 1048)  Care Plan - Patient's Chronic Conditions and Co-Morbidity Symptoms are Monitored and Maintained or Improved: Monitor and assess patient's chronic conditions and comorbid symptoms for stability, deterioration, or improvement  11/7/2023 0105 by Daniel Ward RN  Outcome: Progressing     Problem: Discharge Planning  Goal: Discharge to home or other facility with appropriate resources  11/7/2023 0105 by Daniel Ward RN  Outcome: Progressing     Problem: Skin/Tissue Integrity  Goal: Absence of new skin breakdown  Description: 1. Monitor for areas of redness and/or skin breakdown  2. Assess vascular access sites hourly  3. Every 4-6 hours minimum:  Change oxygen saturation probe site  4. Every 4-6 hours:  If on nasal continuous positive airway pressure, respiratory therapy assess nares and determine need for appliance change or resting period.   Outcome: Progressing     Problem: Safety - Adult  Goal: Free from fall injury  Outcome: Progressing  Flowsheets (Taken 11/7/2023 1048)  Free From Fall Injury: Instruct family/caregiver on patient safety

## 2023-11-08 ENCOUNTER — HOME HEALTH ADMISSION (OUTPATIENT)
Age: 68
End: 2023-11-08
Payer: MEDICARE

## 2023-11-08 VITALS
HEIGHT: 60 IN | BODY MASS INDEX: 19.38 KG/M2 | WEIGHT: 98.7 LBS | TEMPERATURE: 98.5 F | RESPIRATION RATE: 16 BRPM | OXYGEN SATURATION: 98 % | DIASTOLIC BLOOD PRESSURE: 81 MMHG | SYSTOLIC BLOOD PRESSURE: 146 MMHG | HEART RATE: 72 BPM

## 2023-11-08 PROCEDURE — 2580000003 HC RX 258: Performed by: STUDENT IN AN ORGANIZED HEALTH CARE EDUCATION/TRAINING PROGRAM

## 2023-11-08 PROCEDURE — 1100000003 HC PRIVATE W/ TELEMETRY

## 2023-11-08 PROCEDURE — 6370000000 HC RX 637 (ALT 250 FOR IP): Performed by: STUDENT IN AN ORGANIZED HEALTH CARE EDUCATION/TRAINING PROGRAM

## 2023-11-08 PROCEDURE — 99223 1ST HOSP IP/OBS HIGH 75: CPT | Performed by: PSYCHIATRY & NEUROLOGY

## 2023-11-08 PROCEDURE — 6360000002 HC RX W HCPCS: Performed by: STUDENT IN AN ORGANIZED HEALTH CARE EDUCATION/TRAINING PROGRAM

## 2023-11-08 RX ORDER — ASPIRIN 81 MG/1
81 TABLET, CHEWABLE ORAL DAILY
Qty: 30 TABLET | Refills: 3 | Status: SHIPPED | OUTPATIENT
Start: 2023-11-09

## 2023-11-08 RX ORDER — ATORVASTATIN CALCIUM 80 MG/1
80 TABLET, FILM COATED ORAL NIGHTLY
Qty: 30 TABLET | Refills: 0 | Status: SHIPPED | OUTPATIENT
Start: 2023-11-08

## 2023-11-08 RX ADMIN — CLOPIDOGREL BISULFATE 75 MG: 75 TABLET, FILM COATED ORAL at 08:06

## 2023-11-08 RX ADMIN — SODIUM CHLORIDE, PRESERVATIVE FREE 10 ML: 5 INJECTION INTRAVENOUS at 08:08

## 2023-11-08 RX ADMIN — OXCARBAZEPINE 300 MG: 300 TABLET, FILM COATED ORAL at 08:06

## 2023-11-08 RX ADMIN — ATORVASTATIN CALCIUM 80 MG: 40 TABLET, FILM COATED ORAL at 20:18

## 2023-11-08 RX ADMIN — ENOXAPARIN SODIUM 30 MG: 100 INJECTION SUBCUTANEOUS at 08:07

## 2023-11-08 RX ADMIN — FAMOTIDINE 20 MG: 20 TABLET, FILM COATED ORAL at 08:06

## 2023-11-08 RX ADMIN — QUETIAPINE FUMARATE 25 MG: 25 TABLET ORAL at 20:18

## 2023-11-08 RX ADMIN — OXCARBAZEPINE 300 MG: 300 TABLET, FILM COATED ORAL at 20:18

## 2023-11-08 RX ADMIN — PAROXETINE HYDROCHLORIDE 30 MG: 20 TABLET, FILM COATED ORAL at 08:06

## 2023-11-08 RX ADMIN — ASPIRIN 81 MG: 81 TABLET, CHEWABLE ORAL at 08:06

## 2023-11-08 NOTE — DISCHARGE SUMMARY
Reason for Stopping:         hydrALAZINE (APRESOLINE) 100 MG tablet Comments:   Reason for Stopping:         dicyclomine (BENTYL) 20 MG tablet Comments:   Reason for Stopping:                  Activity: {discharge activity:19346}    Diet: {diet:12870}    Wound Care: {wound care:32179}      Ovi Fonseca MD  11/8/2023, 11:13 AM    Total time spent *** mins  Disclaimer: Sections of this note are dictated using utilizing voice recognition software. Minor typographical errors may be present. If questions arise, please do not hesitate to contact me or call our department.

## 2023-11-08 NOTE — CARE COORDINATION
Call made to [facility] transportation [phone]. Dispatch will call the nurses station with MEKHI. Trip number is [id number]. Called and spoke to the supervisor [address] She stated that the transporters showed up to the ED bed 12. They were informed that the pt was not in the facility at all. Transporters came to the floor  an the charge nurse spoke to them they came to the wring room but never ask for the pt by name an was confused an left. Called back an they stated they were not coming back because they were told in the ED that this pt was not in this hospital. After speaking to several people an the supervisor the pt still has not been picked up an has been canceled. Charge nurse will call on call  to assist. Will call the son as he is waiting at the house. Will update pt as well.
Discharge order noted for today. Home health order received. FOC obtained from patient and her son, Alex Telles, [phone], and they are agreeable with [address] [place] [address] agency and the transition plan today. Pt has been accepted to [facility] agency. The referral was sent via internal referral link to [facility] agency and via ext [phone]. Transport has been arranged through [facility].      Discharge information has been documented on the AVS.       Alejandrina Boo RN  Care Management
Requested Case Management specialist to assist with transportation to home Address is [address] [place] [address], [address] [place] [address] [facility] [address] [place] [address] and phone number is [phone]. Patient will require BLS transport. Pt requires Stretcher If stretcher, reason: history of CVA, impaired mobility, history of seizure disorder    Patient is currently requiring oxygen No.     Height:5' 0\"   Weight: 98 lbs    Pt is on isolation:  No   Isolation is for: N/A    Is the pt ready now? Yes    Requested time: Next Available    PCS Faxed: No.    Insurance verified on face sheet: Yes    Auth needed for transport: Yes. CM completed PCS/ Envelope and placed on chart.       Albania Douglass, RN  Care Management
Functional Level Assistance with the following:;Bathing;Dressing; Toileting;Mobility   Can patient return to prior living arrangement Yes   Ability to make needs known: Good   Family able to assist with home care needs: Yes   Would you like for me to discuss the discharge plan with any other family members/significant others, and if so, who? Yes  (Children)   Financial Resources Medicaid   Social/Functional History   Lives With Son   Type of [address] [facility] One level   Home Access Level entry   [address] chair with back   [address] chair   [address] [place] [address] Barron Cody Help From Family   ADL Assistance Needs assistance   Ambulation Assistance Needs assistance   Transfer Assistance Needs assistance   Active  No   Occupation Retired   Discharge Planning   [facility] [address]   DME Ordered? No   Potential Assistance Purchasing Medications No   Type of [address]   Patient expects to be discharged to: [address] Discharge   [address] Provided? No   Mode of Transport at Discharge BLS  (Family vs. Medicaid Stretcher)   Confirm Follow Up Transport Family   Condition of Participation: Discharge Planning   The Plan for Transition of Care is related to the following treatment goals: Discharge home or to other facility with appropriate resources in place.      Alexander Huerta LMSW   Case Management

## 2023-11-08 NOTE — CONSULTS
[facility]  Department of Neurology  Chief Complaint   Patient presents with    Aphasia       HPI:   Miki Vigil is a 70 y.o. 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 On the admission day 11/6, patient had throbbing pounding pain at bilateral temporal areas, 10/10 constantly. There were difficulties finding words, or making herself clear. She felt she was back to her baseline after hospitalization.     Past Medical History:   Diagnosis Date    Abnormal coordination 9/21/2020    Depression 4/12/2022    Failure to thrive in adult 4/12/2022    Hallucinations 9/21/2020    Hypertension     Neurological disorder     Seizures ([address])     Stroke ([address]) 2009    Subclinical hypothyroidism 4/13/2022       Past Surgical History:   Procedure Laterality Date    PLACE PERCUT GASTROSTOMY TUBE  1/11/2022            Current Facility-Administered Medications   Medication Dose Route Frequency Provider Last Rate Last Admin    acetaminophen (TYLENOL) tablet 1,000 mg  1,000 mg Oral Q8H PRN Jeff Siegel MD        [Held by provider] amLODIPine (NORVASC) tablet 10 mg  10 mg Oral Daily Sonia Sagastume MD   10 mg at 11/06/23 1731    clopidogrel (PLAVIX) tablet 75 mg  75 mg Oral Daily Sonia Sagastume MD   75 mg at 11/08/23 0806    famotidine (PEPCID) tablet 20 mg  20 mg Oral Daily Sonia Sagastume MD   20 mg at 11/08/23 7238    [Held by provider] hydrALAZINE (APRESOLINE) tablet 100 mg  100 mg Oral BID Sonia Sagastume MD   100 mg at 11/06/23 2201    [Held by provider] lisinopril (PRINIVIL;ZESTRIL) tablet 20 mg  20 mg Oral Daily Sonia Sagastume MD        OXcarbazepine (TRILEPTAL) tablet 300 mg  300 mg Oral BID Sonia Sagastume MD   300 mg at 11/08/23 0806    PARoxetine (PAXIL) tablet 30 mg  30 mg Oral Daily Sonia Sagastume MD   30 mg at 11/08/23 0806    QUEtiapine (SEROQUEL) tablet 25 mg  25 mg Oral

## 2023-11-08 NOTE — HOME CARE
Received home health referral for [facility] for (SN, PT, OT). Discharge order noted for today. Spoke with patient in room;  patient identifiers verified. Explained home health care services and routines. Demographics verified including insurance, phone and address confirmed. Patient has the following DME: per patient has available - wheelchair and rolling walker. Caregivers available lives with family - which couple of members work during daytime hours. Per patient - sister will assist during daytime hours and sons assist during evening hours. .  Orders noted and arranged and sent to central intake and scheduling.      ---   Sandra Rosales, [address]

## 2023-11-08 NOTE — DISCHARGE INSTRUCTIONS
in a week, increased swelling in our hands or feet or shortness of breath while lying flat in bed. Please call your doctor as soon as you notice any of these symptoms; do not wait until your next office visit. The discharge information has been reviewed with the caregiver. The caregiver verbalized understanding. Discharge medications reviewed with the patient and caregiver and appropriate educational materials and side effects teaching were provided.   ___________________________________________________________________________________________________________________________________

## 2023-11-10 ENCOUNTER — HOSPITAL ENCOUNTER (EMERGENCY)
Facility: HOSPITAL | Age: 68
Discharge: HOME | End: 2023-11-11
Attending: EMERGENCY MEDICINE
Payer: MEDICARE

## 2023-11-10 ENCOUNTER — APPOINTMENT (OUTPATIENT)
Facility: HOSPITAL | Age: 68
End: 2023-11-10
Payer: MEDICARE

## 2023-11-10 VITALS
OXYGEN SATURATION: 99 % | SYSTOLIC BLOOD PRESSURE: 122 MMHG | DIASTOLIC BLOOD PRESSURE: 55 MMHG | BODY MASS INDEX: 19.24 KG/M2 | HEART RATE: 88 BPM | RESPIRATION RATE: 20 BRPM | TEMPERATURE: 98.5 F | WEIGHT: 98 LBS | HEIGHT: 60 IN

## 2023-11-10 DIAGNOSIS — R07.9 CHEST PAIN, UNSPECIFIED TYPE: Primary | ICD-10-CM

## 2023-11-10 LAB
ANION GAP SERPL CALC-SCNC: 7 MMOL/L (ref 3–18)
BASOPHILS # BLD: 0 K/UL (ref 0–0.1)
BASOPHILS NFR BLD: 0 % (ref 0–2)
BUN SERPL-MCNC: 37 MG/DL (ref 7–18)
BUN/CREAT SERPL: 24 (ref 12–20)
CALCIUM SERPL-MCNC: 9 MG/DL (ref 8.5–10.1)
CHLORIDE SERPL-SCNC: 110 MMOL/L (ref 100–111)
CO2 SERPL-SCNC: 24 MMOL/L (ref 21–32)
CREAT SERPL-MCNC: 1.55 MG/DL (ref 0.6–1.3)
DIFFERENTIAL METHOD BLD: ABNORMAL
EOSINOPHIL # BLD: 0.1 K/UL (ref 0–0.4)
EOSINOPHIL NFR BLD: 1 % (ref 0–5)
ERYTHROCYTE [DISTWIDTH] IN BLOOD BY AUTOMATED COUNT: 14.9 % (ref 11.6–14.5)
GLUCOSE BLD STRIP.AUTO-MCNC: 90 MG/DL (ref 70–110)
GLUCOSE SERPL-MCNC: 66 MG/DL (ref 74–99)
HCT VFR BLD AUTO: 30.4 % (ref 35–45)
HGB BLD-MCNC: 10.1 G/DL (ref 12–16)
IMM GRANULOCYTES # BLD AUTO: 0 K/UL (ref 0–0.04)
IMM GRANULOCYTES NFR BLD AUTO: 0 % (ref 0–0.5)
LYMPHOCYTES # BLD: 2.3 K/UL (ref 0.9–3.6)
LYMPHOCYTES NFR BLD: 35 % (ref 21–52)
MCH RBC QN AUTO: 31.4 PG (ref 24–34)
MCHC RBC AUTO-ENTMCNC: 33.2 G/DL (ref 31–37)
MCV RBC AUTO: 94.4 FL (ref 78–100)
MONOCYTES # BLD: 0.9 K/UL (ref 0.05–1.2)
MONOCYTES NFR BLD: 13 % (ref 3–10)
NEUTS SEG # BLD: 3.3 K/UL (ref 1.8–8)
NEUTS SEG NFR BLD: 50 % (ref 40–73)
NRBC # BLD: 0 K/UL (ref 0–0.01)
NRBC BLD-RTO: 0 PER 100 WBC
PLATELET # BLD AUTO: 186 K/UL (ref 135–420)
PMV BLD AUTO: 11 FL (ref 9.2–11.8)
POTASSIUM SERPL-SCNC: 4.5 MMOL/L (ref 3.5–5.5)
RBC # BLD AUTO: 3.22 M/UL (ref 4.2–5.3)
SODIUM SERPL-SCNC: 141 MMOL/L (ref 136–145)
TROPONIN I SERPL HS-MCNC: 49 NG/L (ref 0–54)
WBC # BLD AUTO: 6.6 K/UL (ref 4.6–13.2)

## 2023-11-10 PROCEDURE — 80048 BASIC METABOLIC PNL TOTAL CA: CPT

## 2023-11-10 PROCEDURE — 71045 X-RAY EXAM CHEST 1 VIEW: CPT

## 2023-11-10 PROCEDURE — 82962 GLUCOSE BLOOD TEST: CPT

## 2023-11-10 PROCEDURE — 85025 COMPLETE CBC W/AUTO DIFF WBC: CPT

## 2023-11-10 PROCEDURE — 99285 EMERGENCY DEPT VISIT HI MDM: CPT

## 2023-11-10 PROCEDURE — 93005 ELECTROCARDIOGRAM TRACING: CPT | Performed by: EMERGENCY MEDICINE

## 2023-11-10 PROCEDURE — 84484 ASSAY OF TROPONIN QUANT: CPT

## 2023-11-10 ASSESSMENT — PAIN DESCRIPTION - LOCATION: LOCATION: BACK

## 2023-11-10 ASSESSMENT — PAIN SCALES - GENERAL: PAINLEVEL_OUTOF10: 8

## 2023-11-11 ENCOUNTER — HOME CARE VISIT (OUTPATIENT)
Age: 68
End: 2023-11-11
Payer: MEDICARE

## 2023-11-11 LAB
EKG ATRIAL RATE: 83 BPM
EKG DIAGNOSIS: NORMAL
EKG P AXIS: 70 DEGREES
EKG P-R INTERVAL: 156 MS
EKG Q-T INTERVAL: 388 MS
EKG QRS DURATION: 78 MS
EKG QTC CALCULATION (BAZETT): 455 MS
EKG R AXIS: -20 DEGREES
EKG T AXIS: 108 DEGREES
EKG VENTRICULAR RATE: 83 BPM
GLUCOSE BLD STRIP.AUTO-MCNC: 96 MG/DL (ref 70–110)
TROPONIN I SERPL HS-MCNC: 49 NG/L (ref 0–54)

## 2023-11-11 PROCEDURE — 84484 ASSAY OF TROPONIN QUANT: CPT

## 2023-11-11 PROCEDURE — G0299 HHS/HOSPICE OF RN EA 15 MIN: HCPCS

## 2023-11-11 PROCEDURE — 93010 ELECTROCARDIOGRAM REPORT: CPT | Performed by: INTERNAL MEDICINE

## 2023-11-11 PROCEDURE — 82962 GLUCOSE BLOOD TEST: CPT

## 2023-11-11 ASSESSMENT — ENCOUNTER SYMPTOMS: HEMOPTYSIS: 0

## 2023-11-11 NOTE — DISCHARGE INSTRUCTIONS
Activities as tolerated. Continue current medications. Please arrange follow-up with primary care in the coming week.   Return as needed

## 2023-11-11 NOTE — ED NOTES
Pt states the pain feels better in chest, still has pain in back, states she wants to go home now.      Susan Fernandez RN  11/10/23 6713

## 2023-11-11 NOTE — ED PROVIDER NOTES
ACETAMINOPHEN (TYLENOL) 325 MG TABLET    Take 2 tablets by mouth every 4 hours as needed for Fever or Pain    AMLODIPINE (NORVASC) 10 MG TABLET    Take 1 tablet by mouth daily    ASPIRIN 81 MG CHEWABLE TABLET    Take 1 tablet by mouth daily    ATORVASTATIN (LIPITOR) 80 MG TABLET    Take 1 tablet by mouth nightly    CLOPIDOGREL (PLAVIX) 75 MG TABLET    Take 1 tablet by mouth daily    CYANOCOBALAMIN 500 MCG TABLET    Take 1 tablet by mouth daily    DIVALPROEX (DEPAKOTE SPRINKLE) 125 MG DR CAPSULE    Take 8 capsules by mouth 2 times daily    ERGOCALCIFEROL (ERGOCALCIFEROL) 1.25 MG (52131 UT) CAPSULE    Take 1 capsule by mouth Twice a Week    FAMOTIDINE (PEPCID) 20 MG TABLET    Take 1 tablet by mouth 2 times daily    FERROUS SULFATE (IRON 325) 325 (65 FE) MG TABLET    Take 1 tablet by mouth daily    MULTIPLE VITAMINS-MINERALS (THERAPEUTIC MULTIVITAMIN-MINERALS) TABLET    Take 1 tablet by mouth daily    OXCARBAZEPINE (TRILEPTAL) 300 MG TABLET    Take 1 tablet by mouth 2 times daily    PAROXETINE (PAXIL) 30 MG TABLET    Take 1 tablet by mouth daily    QUETIAPINE (SEROQUEL) 25 MG TABLET    Take 1 tablet by mouth nightly    SPIRONOLACTONE (ALDACTONE) 25 MG TABLET    Take 1 tablet by mouth daily       ALLERGIES     Tomato, Aspirin, Ciprofloxacin, Penicillins, and Sulfa antibiotics    FAMILY HISTORY       Family History   Problem Relation Age of Onset    Diabetes Other     Stroke Other           SOCIAL HISTORY       Social History     Socioeconomic History    Marital status:     Tobacco Use    Smoking status: Never    Smokeless tobacco: Never   Substance and Sexual Activity    Alcohol use: Yes    Drug use: No     Social Determinants of Health     Food Insecurity: No Food Insecurity (11/6/2023)    Hunger Vital Sign     Worried About Running Out of Food in the Last Year: Never true     Ran Out of Food in the Last Year: Never true    Transportation Problems [facility]   Social Connections: Feeling Socially Integrated Clinician of Record. FINAL IMPRESSION      1.  Chest pain, unspecified type        DISPOSITION/PLAN   DISPOSITION Decision To Discharge 11/11/2023 02:59:33 AM      PATIENT REFERRED TO:  CIARA Kimbrough NP  [address] [facility] [employer] [facility]  [phone]    Schedule an appointment as soon as possible for a visit in 1 week        DISCHARGE MEDICATIONS:  New Prescriptions    No medications on file       DISCONTINUED MEDICATIONS:  Discontinued Medications    No medications on file              (Please note that portions of this note were completed with a voice recognition program.  Efforts were made to edit the dictations but occasionally words aremis-transcribed.)    Joe Chen MD (electronically signed)          Joe Chen MD  11/11/23 7196

## 2023-11-11 NOTE — ED NOTES
Pt presents to the ED with c/o chest pain that started this evening.      Tanner Tracey, [address]  11/10/23 9004

## 2023-11-12 VITALS
RESPIRATION RATE: 18 BRPM | DIASTOLIC BLOOD PRESSURE: 62 MMHG | SYSTOLIC BLOOD PRESSURE: 122 MMHG | OXYGEN SATURATION: 94 % | TEMPERATURE: 97 F | HEART RATE: 84 BPM

## 2023-11-13 ENCOUNTER — HOME CARE VISIT (OUTPATIENT)
Age: 68
End: 2023-11-13
Payer: MEDICARE

## 2023-11-13 VITALS
DIASTOLIC BLOOD PRESSURE: 70 MMHG | RESPIRATION RATE: 18 BRPM | OXYGEN SATURATION: 98 % | SYSTOLIC BLOOD PRESSURE: 128 MMHG | TEMPERATURE: 98.3 F | HEART RATE: 63 BPM

## 2023-11-13 PROCEDURE — G0151 HHCP-SERV OF PT,EA 15 MIN: HCPCS

## 2023-11-13 ASSESSMENT — ENCOUNTER SYMPTOMS
PAIN LOCATION - PAIN QUALITY: SORE ACHE
DYSPNEA ACTIVITY LEVEL: AFTER AMBULATING 10 - 20 FT

## 2023-11-14 ENCOUNTER — HOME CARE VISIT (OUTPATIENT)
Age: 68
End: 2023-11-14
Payer: MEDICARE

## 2023-11-14 VITALS
HEART RATE: 62 BPM | RESPIRATION RATE: 16 BRPM | OXYGEN SATURATION: 98 % | SYSTOLIC BLOOD PRESSURE: 127 MMHG | TEMPERATURE: 98.2 F | DIASTOLIC BLOOD PRESSURE: 71 MMHG

## 2023-11-14 VITALS
SYSTOLIC BLOOD PRESSURE: 120 MMHG | OXYGEN SATURATION: 100 % | DIASTOLIC BLOOD PRESSURE: 68 MMHG | HEART RATE: 65 BPM | TEMPERATURE: 97.6 F | RESPIRATION RATE: 17 BRPM

## 2023-11-14 PROCEDURE — G0152 HHCP-SERV OF OT,EA 15 MIN: HCPCS

## 2023-11-14 PROCEDURE — G0299 HHS/HOSPICE OF RN EA 15 MIN: HCPCS

## 2023-11-15 ENCOUNTER — HOME CARE VISIT (OUTPATIENT)
Age: 68
End: 2023-11-15
Payer: MEDICARE

## 2023-11-15 VITALS
TEMPERATURE: 98.2 F | SYSTOLIC BLOOD PRESSURE: 110 MMHG | DIASTOLIC BLOOD PRESSURE: 70 MMHG | RESPIRATION RATE: 18 BRPM | OXYGEN SATURATION: 99 % | HEART RATE: 67 BPM

## 2023-11-15 PROCEDURE — G0157 HHC PT ASSISTANT EA 15: HCPCS

## 2023-11-15 NOTE — HOME HEALTH
Patient's Subjective:She is tired. She did not sleep well. Falls since last session:   no  Trips to ER since last session? no  Pain/Discomfort ?  no  Upcoming MD appointments: no  .  Caregiver involvement/assistance needed for: The patient's caregiver assists with meals, meds, transportation, chores, HEP, amb, transfers  . Home health supplies by type and quantity ordered/delivered this visit include:  none  . Objective:  See interventions. Patient response to treatment:  RPE initially reported as mild after amb, but she presented with some SOB. After sit/stand exercises she reported moderate 5-6/10. Patient level of understanding of education provided:  -Education for being upright vs laying in reclined position . She is in reclined and semi reclined almost all day and night. She reported understanding.   -Education for safe foot wear with backs vs no heel coverage. Her CG reports that she has a pair.  -Sit/stand: Education for sequencing for energy conservation and positioning throughout. She was able to correctly complete 4/5 times. Initial standing from transport WC with extensor thrust.   - Bed rail due to almost falling out of the bed last night. Her son came in and moved her. This could decrease risks of falling out of bed. HEP: What to do and frequency. Refer to interventions. She reports understanding. Assess of progress towards goals:   Pt able to amb in the home with assist, but she is very unstable and requires constant physical assist.   She could tolerate 5 sit/stand exercises with moderate fatigue then reports of dizziness. Her vitals remained stable. Her dizziness may be due to laying nearly 24/7 since her TIA. Continued need for the following skills: strengthening, stability, gait and safe transfers. Plan for next visit:  Cont with gait training. Add standing exercises and issue written HEP.     The following discharge was discussed with the patient/caregiver : 12/5/2023 JAVIER

## 2023-11-15 NOTE — HOME HEALTH
Skilled reason for visit: Disease and medication management of TIA    Caregiver involvement: Family/PCA assist with meal prep, meds, ADLs, and transportation. Medications reviewed and all medications are available in the home this visit. The following education was provided regarding medications:  Prilosec is given for GERD and stomach upset   MD notified of any discrepancies/look a-like medications/medication interactions: none  Medications are effective at this time. Home health supplies by type and quantity ordered/delivered this visit include: n/a    Patient education provided this visit: see intervention    Sharps education provided: n/a    Patient level of understanding of education provided: Patient and caregiver verbalized understanding      Patient response to procedure performed:  Patient tolerated w/o any increased level of pain      Agency Progress toward goals: progressing    Patient's Progress towards personal goals: progressing    Home exercise program: Take all medications as prescribed, follow all fall precaution measures, attend all future medical appointments      Continued need for the following skills: Nursing and Occupational Therapy. Plan for next visit: teaching    Patient and/or caregiver notified and agrees to changes in the Plan of Care: Yes.      The following discharge planning was discussed with the pt/caregiver: Discharge when goals are met, patient/caregiver able to manage disease process, medications, and pain

## 2023-11-16 ENCOUNTER — HOME CARE VISIT (OUTPATIENT)
Age: 68
End: 2023-11-16
Payer: MEDICARE

## 2023-11-17 ENCOUNTER — HOME CARE VISIT (OUTPATIENT)
Age: 68
End: 2023-11-17
Payer: MEDICARE

## 2023-11-17 VITALS
DIASTOLIC BLOOD PRESSURE: 80 MMHG | OXYGEN SATURATION: 96 % | HEART RATE: 64 BPM | SYSTOLIC BLOOD PRESSURE: 148 MMHG | TEMPERATURE: 97.9 F | RESPIRATION RATE: 18 BRPM

## 2023-11-17 VITALS
OXYGEN SATURATION: 98 % | RESPIRATION RATE: 18 BRPM | SYSTOLIC BLOOD PRESSURE: 118 MMHG | TEMPERATURE: 97.5 F | DIASTOLIC BLOOD PRESSURE: 63 MMHG | HEART RATE: 64 BPM

## 2023-11-17 PROCEDURE — G0152 HHCP-SERV OF OT,EA 15 MIN: HCPCS

## 2023-11-17 PROCEDURE — G0299 HHS/HOSPICE OF RN EA 15 MIN: HCPCS

## 2023-11-17 ASSESSMENT — ENCOUNTER SYMPTOMS: DYSPNEA ACTIVITY LEVEL: AFTER AMBULATING 10 - 20 FT

## 2023-11-17 NOTE — HOME HEALTH
Skilled reason for visit: disease and medication management TIA. Caregiver involvement: family assist with ADLs, meal prep, meds, transportation    Medications reviewed and all medications are available in the home this visit. The following education was provided regarding medications: SN instructed on proper method of medication intake, as many people taking prescription medications do not follow their doctors orders. SN instruct on medication compliance to better control the patients disease process, to refill medication on time to prevent missed/skipped doses. do not take any medication that does not belong to you. Also ask your doctor before taking any over the counter medication to avoid interactions. Patient verbalized understanding. MD notified of any discrepancies/look a-like medications/medication interactions: none  Medications are effective at this time. Home health supplies by type and quantity ordered/delivered this visit include: n/a    Patient education provided this visit: see intervention    Sveta education provided: n/a    Patient level of understanding of education provided: Patient and caregiver verbalized understanding      Patient response to procedure performed: Patient tolerated w/o any increased level of pain      Agency Progress toward goals: progressing    Patient's Progress towards personal goals: progressing    Home exercise program: Take all medications as prescribed, follow all fall precaution measures, attend all future medical appointments      Continued need for the following skills: Nursing, Physical Therapy and [address] [place] [address] Drive. Plan for next visit:teaching    Patient and/or caregiver notified and agrees to changes in the Plan of Care: Yes.      The following discharge planning was discussed with the pt/caregiver: Discharge when goals are met, patient/caregiver able to manage disease process, medications, and pain

## 2023-11-17 NOTE — HOME HEALTH
SUBJECTIVE: Pt expressing no pain. Pt son Niranjan Code present throughout    CAREGIVER INVOLVEMENT/ASSISTANCE NEEDED FOR: Pt sons and personal care aid assist with ADLs, IADLs and functional mobility    HOME HEALTH SUPPLIES BY TYPE AND QUANTITY ORDERED/DELIVERED THIS VISIT INCLUDE: N/A    OBJECTIVE: See interventions    PATIENT RESPONSE TO TREATMENT: Pt responded well to skilled home health occupational therapy skilled services    PATIENT LEVEL OF UNDERSTANDING OF EDUCATION PROVIDED: Pt caregiver aid educated on safe sequencing for functional transfers to include scooting forward, bending knees back and pushing up with BUE opposed to pulling up on walker. Pt with increased time and cues needed to recall and trial for functional transfers. Pt educated on wider base of support for standing balance progression. ASSESSMENT OF PROGRESS TOWARD GOALS: Pt making slow progress towards her goals with pt focusing on sit to stand transfers and standing balance this session. Pt with increased cues to recall sequencing sit to stand transfer across 7:7 trials. Pt with increased diffculty with carryover in sit to stand transfers impacts her safe ability to progress functonal transfers such as toilet transfers and ADLs such as toilet/dressing. Pt with continued heavy posterior lean in standing which she is able to correct with maximal verbal and physical cues however unable to maintain safe standing balance needed for ADLs such as proximal brief adjsutments. CONTINUED NEED FOR THE FOLLOWING SKILLS: Due to reduced functional activity tolerance, BUE weakness, functional mobility, balance, and ADL function, pt would benefit from OT [address] [place] [address] services in order to maximize safety and independence in home environment.     PLAN FOR NEXT VISIT: progress with functional balance    THE FOLLOWING DISCHARGE PLANNING WAS DISCUSSED WITH THE PATIENT/CAREGIVER: 1w1, 2w1 with plan to dc to home environment once [address] [place] [address] OT goals have been met or has met max

## 2023-11-19 ENCOUNTER — HOME CARE VISIT (OUTPATIENT)
Age: 68
End: 2023-11-19
Payer: MEDICARE

## 2023-11-19 VITALS
RESPIRATION RATE: 16 BRPM | OXYGEN SATURATION: 98 % | HEART RATE: 78 BPM | DIASTOLIC BLOOD PRESSURE: 68 MMHG | TEMPERATURE: 98.3 F | SYSTOLIC BLOOD PRESSURE: 101 MMHG

## 2023-11-19 PROCEDURE — G0152 HHCP-SERV OF OT,EA 15 MIN: HCPCS

## 2023-11-20 ENCOUNTER — HOME CARE VISIT (OUTPATIENT)
Age: 68
End: 2023-11-20
Payer: MEDICARE

## 2023-11-20 VITALS
SYSTOLIC BLOOD PRESSURE: 120 MMHG | RESPIRATION RATE: 16 BRPM | DIASTOLIC BLOOD PRESSURE: 80 MMHG | OXYGEN SATURATION: 97 % | TEMPERATURE: 98.5 F | HEART RATE: 66 BPM

## 2023-11-20 PROCEDURE — G0157 HHC PT ASSISTANT EA 15: HCPCS

## 2023-11-20 NOTE — HOME HEALTH
Patient's Subjective: She reports feeling ok. HEP: She has been performing the LAQ, Getting in/out of the chair, she walks every hour when her CG is present. When her children are home, she tries to get them to amb with her. Falls since last session:  no  Trips to ER since last session? no  Pain/Discomfort ?  no  New Meds:  no  Caregiver involvement/assistance needed for: The patient's caregiver assists with meals, meds, transportation, chores, HEP, amb, transfers  . Home health supplies by type and quantity ordered/delivered this visit include:  none  . Objective:  See interventions. Patient response to treatment:  Fair tolerance to her exercises and amb. No pain throughout. Patient level of understanding of education provided:  - [place] return demonstration of her exercises, transfers and amb. Assess of progress towards goals:   She continues to deny any pain or discomfort. She denies any falls to date. She conts with unstable gait increasing her fall risk. She is currently semi compliant with her HEP. Issued written HEP and educated 2 of her CG's and the patient on how to perform and expectations. They all reported understanding. Continued need for the following skills: strengthening, stability, gait and safe transfers. Plan for next visit:  Cont with gait training. Add standing exercises and issue written HEP. The following discharge was discussed with the patient/caregiver : 12/5/2023 JAVIER Flores, PT   [facility] HEART required?

## 2023-11-22 ENCOUNTER — HOME CARE VISIT (OUTPATIENT)
Age: 68
End: 2023-11-22
Payer: MEDICARE

## 2023-11-22 VITALS
HEART RATE: 73 BPM | SYSTOLIC BLOOD PRESSURE: 110 MMHG | DIASTOLIC BLOOD PRESSURE: 60 MMHG | RESPIRATION RATE: 16 BRPM | OXYGEN SATURATION: 98 %

## 2023-11-22 PROCEDURE — G0299 HHS/HOSPICE OF RN EA 15 MIN: HCPCS

## 2023-11-22 PROCEDURE — G0157 HHC PT ASSISTANT EA 15: HCPCS

## 2023-11-22 NOTE — HOME HEALTH
Patient's Subjective: She is performing her HEP. Her son helps her count her reps and holds. Falls since last session:  no  Trips to ER since last session? no  Pain/Discomfort ?  no  New Meds: no  Upcoming MD appointments:  unknown  . Caregiver involvement/assistance needed for: The patient's caregiver assists with meals, meds, transportation, chores, HEP, amb, transfers  . Home health supplies by type and quantity ordered/delivered this visit include:   no  .  Objective:  See interventions. Patient response to treatment: Good tolerance to supine exercises. Patient level of understanding of education provided:  -Issued written HEP. Educated on expectations of amb every hour when awake with her CG's. Perform exercises indicated in written HEP, 10-20 reps, 3x/day to tolerance. Pt reports understanding.  - Re education for correct exercise technique for 75% of the exercises and reminders of a few she forgot. She was able to return demonstrate with in her available ROM and strength. Educated on new exercises with pt able to return demonstrate. Assess of progress towards goals:   She reports compliance with her HEP. She was able to safely perform her Spine exercises without pain. She denies any falls to date. She is now reporting   Gait with RW with continuous instability in the posterior direction. She requires CGA to correct throughout, education for placement within the back half of the walker. She amb ~ 45 feet x 1, 2 turns, CGA. She is unsafe to amb alone    Continued need for the following skills: strengthening, stability, gait and safe transfers. Plan for next visit:  Cont with gait training. Add standing exercises and issue written HEP. The following discharge was discussed with the patient/caregiver : 12/5/2023 JAVIER Flores, PT   [facility] HEART required?

## 2023-11-24 VITALS
RESPIRATION RATE: 16 BRPM | DIASTOLIC BLOOD PRESSURE: 64 MMHG | OXYGEN SATURATION: 99 % | TEMPERATURE: 97 F | HEART RATE: 73 BPM | SYSTOLIC BLOOD PRESSURE: 116 MMHG

## 2023-11-24 NOTE — HOME HEALTH
Skilled reason for visit: teaching    Caregiver involvement: PCA helps with medication management, meal planning, and ADLs. Medications reviewed and all medications are available in the home this visit. The following education was provided regarding medications: Take all medications as prescribed. Notify physician before stopping any medications. MD notified of any discrepancies/look a-like medications/medication interactions: NA  Medications are effective at this time. Home health supplies by type and quantity ordered/delivered this visit include: NA    Patient education provided this visit: see intervention tab for patient education      Sharps education provided: NA    Patient level of understanding of education provided:Patient/Caregiver response to education: Verbalized understanding       Patient response to procedure performed:  patient denied pain    Agency Progress toward goals: See intervention tab for progressing toward goals      Patient's Progress towards personal goals: See intervention tab for progressing toward goals      Home exercise program: ACTIVITY AS TOLERATED      Continued need for the following skills: Nursing. Plan for next visit: teaching    Patient and/or caregiver notified and agrees to changes in the Plan of Care: Yes. The following discharge planning was discussed with the pt/caregiver: Patient will be discharged when all goals are met.

## 2023-11-27 ENCOUNTER — HOME CARE VISIT (OUTPATIENT)
Age: 68
End: 2023-11-27
Payer: MEDICARE

## 2023-11-27 VITALS
RESPIRATION RATE: 16 BRPM | DIASTOLIC BLOOD PRESSURE: 80 MMHG | HEART RATE: 54 BPM | SYSTOLIC BLOOD PRESSURE: 115 MMHG | TEMPERATURE: 97.4 F | OXYGEN SATURATION: 97 %

## 2023-11-27 VITALS
OXYGEN SATURATION: 97 % | SYSTOLIC BLOOD PRESSURE: 115 MMHG | TEMPERATURE: 97.4 F | RESPIRATION RATE: 16 BRPM | DIASTOLIC BLOOD PRESSURE: 80 MMHG | HEART RATE: 54 BPM

## 2023-11-27 PROCEDURE — G0152 HHCP-SERV OF OT,EA 15 MIN: HCPCS

## 2023-11-27 PROCEDURE — G0157 HHC PT ASSISTANT EA 15: HCPCS

## 2023-11-27 NOTE — HOME HEALTH
Patient's Subjective: She was tired, but is awakes now. she has a rash from her \"pull ups\". Falls since last session: no  Trips to ER since last session? no  Pain/Discomfort ? no  New Meds: no   Upcoming MD appointments: no  .  Caregiver involvement/assistance needed for: The patient's caregiver assists with meals, meds transportation, chores, HEP, ADL's  . Home health supplies by type and quantity ordered/delivered this visit include:  none  . Objective:  See interventions. Patient response to treatment:  Pt with some demonstration of anxiety today. This is the first time she has expressed this. Patient level of understanding of education provided:  Pt with Poor ability to follow directions, especially today due to demonstration of  anxiety. Assess of progress towards goals: She reports semi compliance with her current HEP. She has assist of her CG some of the time. Continued need for the following skills: strengthening, stability, gait and safe transfers. Plan for next visit:  cont with stability activities. The following discharge was discussed with the patient/caregiver : 12/5/2023 JAVIER Layton, PT   [facility] HEART required?

## 2023-11-27 NOTE — HOME HEALTH
SUBJECTIVE: Pt with PTA upon OT arrival. Pt sons and PCA also present throughout    CAREGIVER INVOLVEMENT/ASSISTANCE NEEDED FOR: Pt sons and personal care aid assist with ADLs/IADLs and mobiliy    [address] ORDERED/DELIVERED THIS VISIT INCLUDE: N/A    OBJECTIVE: See interventions    PATIENT RESPONSE TO TREATMENT: Pt responded well to skilled home health occupational therapy session    PATIENT LEVEL OF UNDERSTANDING OF EDUCATION PROVIDED: Pt educated on keeping one hand on walker when adjusting pants proximally with the other and alterntaitng for standing clothing adjsutments to promote standing balance. Pt son educated on beside commode use with broken 2 peices and son expressing plans to obtain and new one for safety. ASSESSMENT OF PROGRESS TOWARD GOALS: Pt on track for discharged next visit. Pt continues to prosent with difficulty maintaining safe standing balnace with persistant posterior lean. With heavy cues/direction pt able to maintain fair- standing balance for ~30 second incraments however prolonged will lose balance posteriorly and mus have someone standing with with at all times for safety. Pt with difficutly with proximal clothing adjustments in standing due to her standing balance and will continue to need asstance with ADLs with her balance. CONTINUED NEED FOR THE FOLLOWING SKILLS: Due to reduced functional activity tolerance, BUE weakness, functional mobility, balance, and ADL function pt would benefit from OT [facility] services in order to maximize safety and independence in home environment.     PLAN FOR NEXT VISIT: Occupational therapy discharge    THE FOLLOWING DISCHARGE PLANNING WAS DISCUSSED WITH THE PATIENT/CAREGIVER: 1w1 with plan to dc to home environment once [facility] OT goals have been met or has met max potential.

## 2023-11-29 ENCOUNTER — HOME CARE VISIT (OUTPATIENT)
Age: 68
End: 2023-11-29
Payer: MEDICARE

## 2023-11-29 VITALS
HEART RATE: 74 BPM | SYSTOLIC BLOOD PRESSURE: 115 MMHG | RESPIRATION RATE: 16 BRPM | TEMPERATURE: 98 F | DIASTOLIC BLOOD PRESSURE: 75 MMHG

## 2023-11-29 PROCEDURE — G0152 HHCP-SERV OF OT,EA 15 MIN: HCPCS

## 2023-11-29 PROCEDURE — G0299 HHS/HOSPICE OF RN EA 15 MIN: HCPCS

## 2023-11-29 ASSESSMENT — ENCOUNTER SYMPTOMS: PAIN LOCATION - PAIN QUALITY: SORE

## 2023-11-29 NOTE — HOME HEALTH
SUBJECTIVE: Pt expressing slight soreness in her left leg. Pt son and personal careaid present throughout. CAREGIVER INVOLVEMENT/ASSISTANCE NEEDED FOR: Pt sons and Personal care aid assist with ADLs, IADLs and functional mobility    HOME HEALTH SUPPLIES BY TYPE AND QUANTITY ORDERED/DELIVERED THIS VISIT INCLUDE: N/A    OBJECTIVE: See interventions    PATIENT RESPONSE TO TREATMENT: Pt responded well to skilled home health occupational therapy services    PATIENT LEVEL OF UNDERSTANDING OF EDUCATION PROVIDED: Pt and family educated on pt only standing with someone present due to her posterior lean in standing. Pt caregiver educated on anterior weight shifting exercises to reduce her posterior lean in standing trials. OCCUPATIONAL THERAPY DISCHARGE: Mrs. Tarsha Ching has been seen by skilled home health occupational therapy skilled services to address deficits in ADLs, balance, and functional transfers. Pt has reached her maximal potential and is ready/agreeable to discharge. ADLs:Pt with minimal/moderate assistance for completing her ADLs to include toileting and dressing. Pt with increased cues and time for following commands for ADLs and decreased balance needed for standing ADLs. Pt Barthel Index for ADLs score is 35/100 at discharge compared to 10/100 on initial evaluation. Balance:  Pt able to maintain fair- standing balance for 2 minute standing trial at discharge in 2:2 attempts after heavy cues and positioning assist. Pt however inconsistent with standing balance with pt mostly with poor+ standing balance with minimal assistance due to posterior lean. Pt to only stand with assistance for safety and to reduce her risk of falls. Functional Mobility:  Pt with minimal/contact guard assistance for sit to stand transfers consistently with heavy cues for sequencing sit to stand transfers. Pt with minimal assistance for toilet transfers and pt family to obtain new bedside commode for pt to use at night/when fatigued.  Pt

## 2023-11-30 ENCOUNTER — HOME CARE VISIT (OUTPATIENT)
Age: 68
End: 2023-11-30
Payer: MEDICARE

## 2023-11-30 VITALS
RESPIRATION RATE: 18 BRPM | OXYGEN SATURATION: 99 % | SYSTOLIC BLOOD PRESSURE: 105 MMHG | DIASTOLIC BLOOD PRESSURE: 60 MMHG | HEART RATE: 58 BPM

## 2023-11-30 VITALS
OXYGEN SATURATION: 98 % | HEART RATE: 75 BPM | TEMPERATURE: 98 F | SYSTOLIC BLOOD PRESSURE: 122 MMHG | DIASTOLIC BLOOD PRESSURE: 74 MMHG | RESPIRATION RATE: 18 BRPM

## 2023-11-30 PROCEDURE — G0157 HHC PT ASSISTANT EA 15: HCPCS

## 2023-11-30 ASSESSMENT — ENCOUNTER SYMPTOMS
HEMOPTYSIS: 0
PAIN LOCATION - PAIN QUALITY: ACHE
PAIN LOCATION - PAIN QUALITY: SHARP PAINS

## 2023-11-30 NOTE — HOME HEALTH
Patient's Subjective: She reports walking a lot more yesterday. Today she walked a couple of times only because she has knee pain. Falls since last session:   no  Trips to ER since last session? no  Pain/Discomfort ? Yes, see pain page. New Meds: no  Upcoming MD appointments: unknown  . Caregiver involvement/assistance needed for: The patient's caregiver assists with meals, meds, transportation, ambulation, transfers. Due to weakness and instability, she is unsafe to perform all the above, alone. .  Home health supplies by type and quantity ordered/delivered this visit include:  none  . Objective:  See interventions. Patient response to treatment:  Poor due to thigh pain. Patient level of understanding of education provided:  - Education to decrease frequency of HEP over the weekend and amb every other hour. Cont with heat pad. Assess of progress towards goals: Pt with a set back today due to thigh pain. She is at baseline and will be DC next session. CC with PT in detail of her current status. She is in agreement for upcoming DC next session. The following discharge was discussed with the patient/caregiver : 12/5/2023 JAVIER Dunham, PT   [facility] HEART required?

## 2023-11-30 NOTE — HOME HEALTH
Skilled reason for visit: Assessment, disease and medication management. Medications reviewed and all listed medications are available at home. The following education was provided regarding medications, medication interactions, and look a like medications: all meds reviewed. Discussed importance of timely taking all meds, proper dosage and freq. Medications are effective at this time. Caregiver: CG/son who assist with daily meals, assist with ADL's, assist/administer with daily medication, run errands groceries and accompany to MD appt. prn     Patient education provided this visit to include: Reviewed infection prevention; hand washing and avoiding sick person. Encouraged to ambulate as tolerated, and toileting q 2 hrs. Continue PT exercises as recommended. Safety and fall prec; keeping walker in easy access, avoid getting too quickly when feeling dizzy and weak. Discussed importance compliance with daily medication including heart healthy diet. Avoid skipping meal and good hydration. . Skin care; apply daily moisturizing to BLE. Continue to monitor for S/S of infection; fever 100.4, increase pain, redness, increased swAelling, coughing with yellow thick sputum, cloudy urine with strong odor, not feeling well 2-3 days, SOB, and to call [facility] or MD for assistance if experiencing any of these S/S. To call 911 with chest pains, facial drooping, difficulty talking, non arousable/unconscious and uncontrollable bleeding. Sharps education provided: N/A  Patient/caregiver degree of understanding: Pt/CG has good understanding of the teaching provided during visit. Skilled care provided: Completed assessment, V/S WNR to pt, denies pain during visit, no SOB and no S/S of infection noted. Pt. tolerated well during the assessment procedure with no C/O.    Continue skilled care to provide: SN, PT  Home health supplies by type and quantity ordered/delivered this visit include: N/A     Agency Progress toward goals:

## 2023-12-05 ENCOUNTER — HOME CARE VISIT (OUTPATIENT)
Age: 68
End: 2023-12-05
Payer: MEDICARE

## 2023-12-05 VITALS
RESPIRATION RATE: 17 BRPM | DIASTOLIC BLOOD PRESSURE: 68 MMHG | OXYGEN SATURATION: 98 % | SYSTOLIC BLOOD PRESSURE: 130 MMHG | TEMPERATURE: 98.3 F | HEART RATE: 68 BPM

## 2023-12-05 PROCEDURE — G0151 HHCP-SERV OF PT,EA 15 MIN: HCPCS

## 2023-12-06 NOTE — HOME HEALTH
Pt. clinically discharged and documentation finalized for completion of PT discharge   Subjective: my nurse came early morning   Caregiver involvement: Pt's sons and daily PCA are primary caregiver at this time and has been assisting with medical appointments and some ADL support   Medications reconciled and all medications are available in the home this visit. Meds reconciled/ reviewed Medications are effective at this time. Patient education provided this visit: since all goals have been met and education has been completed, patient is medically stable and patient/caregiver are able to independently manage medications     STRENGTH: BLE strength is 3+/5. This allows the patient increased functional independence and mobility      TRANSFERS: Pt. is SBA/CGA with   transfers This allows the patient increased functional independence and mobility. GAIT/WC MOBILITY: Pt. is able to ambulate 35 to 50 ft   in the apartment with cga using rw. allows patient to access apartment with assistance of caregiver  Progress toward goals: Patient has met all goals, see interventions for details. Pt. was able to return demonstrate all mobility training and HEP shown independently. Patient response to treatment and education: Patient is aware to follow up with PCP/Surgeon as ordered. Opportunity for questions provided at this time. Patient aware to refer any questions after discharge to MD office. MD notified of DC. Home exercise program: Patient is doing the HEP 2-3/day as able using previously provided HEP with assistance of caregiver.      Continued need for the following skills: NA patient is final DC from PT today     The following discharge planning was discussed with the pt/caregiver: DC from agency

## 2023-12-26 ENCOUNTER — HOSPITAL ENCOUNTER (EMERGENCY)
Facility: HOSPITAL | Age: 68
Discharge: HOME | End: 2023-12-27
Attending: EMERGENCY MEDICINE
Payer: MEDICARE

## 2023-12-26 DIAGNOSIS — G40.919 BREAKTHROUGH SEIZURE (HCC): Primary | ICD-10-CM

## 2023-12-26 PROCEDURE — 6370000000 HC RX 637 (ALT 250 FOR IP): Performed by: EMERGENCY MEDICINE

## 2023-12-26 PROCEDURE — 99283 EMERGENCY DEPT VISIT LOW MDM: CPT

## 2023-12-26 RX ORDER — ACETAMINOPHEN 500 MG
1000 TABLET ORAL
Status: COMPLETED | OUTPATIENT
Start: 2023-12-26 | End: 2023-12-26

## 2023-12-26 RX ADMIN — ACETAMINOPHEN 1000 MG: 500 TABLET ORAL at 22:51

## 2023-12-27 VITALS
TEMPERATURE: 98.2 F | SYSTOLIC BLOOD PRESSURE: 147 MMHG | RESPIRATION RATE: 20 BRPM | OXYGEN SATURATION: 100 % | WEIGHT: 125 LBS | DIASTOLIC BLOOD PRESSURE: 79 MMHG | BODY MASS INDEX: 24.54 KG/M2 | HEART RATE: 68 BPM | HEIGHT: 60 IN

## 2023-12-27 NOTE — ED NOTES
Assumed care pt is awake and oriented x4, pt waiting for medical transport at this time, per report transport has been arranged.

## 2023-12-27 NOTE — ED TRIAGE NOTES
Ems brought pt in for seizure not witnessed when started but  found her. Pt hasn't had one since ems. Pt doesn't remember. Pt has hx of seizures since stroke and tia. Blood thinner unknown.  Pt states no pain

## 2023-12-27 NOTE — ED NOTES
Medical transport arrived, pt was given discharge instructions pt verbalized understanding, pt changed to new diaper.

## 2024-01-25 ENCOUNTER — APPOINTMENT (OUTPATIENT)
Facility: HOSPITAL | Age: 69
End: 2024-01-25
Payer: MEDICARE

## 2024-01-25 ENCOUNTER — HOSPITAL ENCOUNTER (EMERGENCY)
Facility: HOSPITAL | Age: 69
Discharge: HOME | End: 2024-01-25
Attending: EMERGENCY MEDICINE
Payer: MEDICARE

## 2024-01-25 VITALS
DIASTOLIC BLOOD PRESSURE: 78 MMHG | RESPIRATION RATE: 18 BRPM | SYSTOLIC BLOOD PRESSURE: 130 MMHG | TEMPERATURE: 98.7 F | BODY MASS INDEX: 19.29 KG/M2 | HEIGHT: 66 IN | WEIGHT: 120 LBS | OXYGEN SATURATION: 98 % | HEART RATE: 85 BPM

## 2024-01-25 DIAGNOSIS — E86.0 DEHYDRATION: ICD-10-CM

## 2024-01-25 DIAGNOSIS — G40.909 RECURRENT SEIZURES (HCC): Primary | ICD-10-CM

## 2024-01-25 LAB
ALBUMIN SERPL-MCNC: 3.5 G/DL (ref 3.4–5)
ALBUMIN/GLOB SERPL: 1.1 (ref 0.8–1.7)
ALP SERPL-CCNC: 80 U/L (ref 45–117)
ALT SERPL-CCNC: 22 U/L (ref 13–56)
ANION GAP SERPL CALC-SCNC: 1 MMOL/L (ref 3–18)
APPEARANCE UR: CLEAR
APTT PPP: 28.9 SEC (ref 23–36.4)
AST SERPL-CCNC: 16 U/L (ref 10–38)
BACTERIA URNS QL MICRO: NEGATIVE /HPF
BASOPHILS # BLD: 0.1 K/UL (ref 0–0.1)
BASOPHILS NFR BLD: 1 % (ref 0–2)
BILIRUB SERPL-MCNC: 0.2 MG/DL (ref 0.2–1)
BILIRUB UR QL: NEGATIVE
BUN SERPL-MCNC: 23 MG/DL (ref 7–18)
BUN/CREAT SERPL: 23 (ref 12–20)
CALCIUM SERPL-MCNC: 9 MG/DL (ref 8.5–10.1)
CHLORIDE SERPL-SCNC: 113 MMOL/L (ref 100–111)
CO2 SERPL-SCNC: 26 MMOL/L (ref 21–32)
COLOR UR: YELLOW
CREAT SERPL-MCNC: 0.98 MG/DL (ref 0.6–1.3)
DIFFERENTIAL METHOD BLD: ABNORMAL
EKG ATRIAL RATE: 87 BPM
EKG DIAGNOSIS: NORMAL
EKG P AXIS: 30 DEGREES
EKG P-R INTERVAL: 146 MS
EKG Q-T INTERVAL: 356 MS
EKG QRS DURATION: 82 MS
EKG QTC CALCULATION (BAZETT): 428 MS
EKG R AXIS: -22 DEGREES
EKG T AXIS: 123 DEGREES
EKG VENTRICULAR RATE: 87 BPM
EOSINOPHIL # BLD: 0 K/UL (ref 0–0.4)
EOSINOPHIL NFR BLD: 0 % (ref 0–5)
EPITH CASTS URNS QL MICRO: NORMAL /LPF (ref 0–5)
ERYTHROCYTE [DISTWIDTH] IN BLOOD BY AUTOMATED COUNT: 15 % (ref 11.6–14.5)
GLOBULIN SER CALC-MCNC: 3.3 G/DL (ref 2–4)
GLUCOSE SERPL-MCNC: 71 MG/DL (ref 74–99)
GLUCOSE UR STRIP.AUTO-MCNC: NEGATIVE MG/DL
HCT VFR BLD AUTO: 30 % (ref 35–45)
HGB BLD-MCNC: 10 G/DL (ref 12–16)
HGB UR QL STRIP: NEGATIVE
IMM GRANULOCYTES # BLD AUTO: 0 K/UL (ref 0–0.04)
IMM GRANULOCYTES NFR BLD AUTO: 0 % (ref 0–0.5)
INR PPP: 1 (ref 0.9–1.1)
KETONES UR QL STRIP.AUTO: NEGATIVE MG/DL
LEUKOCYTE ESTERASE UR QL STRIP.AUTO: ABNORMAL
LYMPHOCYTES # BLD: 2.4 K/UL (ref 0.9–3.6)
LYMPHOCYTES NFR BLD: 27 % (ref 21–52)
MAGNESIUM SERPL-MCNC: 2 MG/DL (ref 1.6–2.6)
MCH RBC QN AUTO: 31.5 PG (ref 24–34)
MCHC RBC AUTO-ENTMCNC: 33.3 G/DL (ref 31–37)
MCV RBC AUTO: 94.6 FL (ref 78–100)
MONOCYTES # BLD: 0.9 K/UL (ref 0.05–1.2)
MONOCYTES NFR BLD: 10 % (ref 3–10)
NEUTS SEG # BLD: 5.5 K/UL (ref 1.8–8)
NEUTS SEG NFR BLD: 62 % (ref 40–73)
NITRITE UR QL STRIP.AUTO: NEGATIVE
NRBC # BLD: 0 K/UL (ref 0–0.01)
NRBC BLD-RTO: 0 PER 100 WBC
NT PRO BNP: 524 PG/ML (ref 0–900)
PH UR STRIP: 7 (ref 5–8)
PLATELET # BLD AUTO: 170 K/UL (ref 135–420)
PMV BLD AUTO: 12.2 FL (ref 9.2–11.8)
POTASSIUM SERPL-SCNC: 3.9 MMOL/L (ref 3.5–5.5)
PROT SERPL-MCNC: 6.8 G/DL (ref 6.4–8.2)
PROT UR STRIP-MCNC: NEGATIVE MG/DL
PROTHROMBIN TIME: 13.6 SEC (ref 11.9–14.7)
RBC # BLD AUTO: 3.17 M/UL (ref 4.2–5.3)
RBC #/AREA URNS HPF: NEGATIVE /HPF (ref 0–5)
SODIUM SERPL-SCNC: 140 MMOL/L (ref 136–145)
SP GR UR REFRACTOMETRY: 1.01 (ref 1–1.03)
TROPONIN I SERPL HS-MCNC: 52 NG/L (ref 0–54)
TROPONIN I SERPL HS-MCNC: 55 NG/L (ref 0–54)
UROBILINOGEN UR QL STRIP.AUTO: 1 EU/DL (ref 0.2–1)
VALPROATE SERPL-MCNC: 70 UG/ML (ref 50–100)
WBC # BLD AUTO: 8.8 K/UL (ref 4.6–13.2)
WBC URNS QL MICRO: NORMAL /HPF (ref 0–4)

## 2024-01-25 PROCEDURE — 2580000003 HC RX 258: Performed by: EMERGENCY MEDICINE

## 2024-01-25 PROCEDURE — 71046 X-RAY EXAM CHEST 2 VIEWS: CPT

## 2024-01-25 PROCEDURE — 85730 THROMBOPLASTIN TIME PARTIAL: CPT

## 2024-01-25 PROCEDURE — 93005 ELECTROCARDIOGRAM TRACING: CPT | Performed by: EMERGENCY MEDICINE

## 2024-01-25 PROCEDURE — 93010 ELECTROCARDIOGRAM REPORT: CPT | Performed by: INTERNAL MEDICINE

## 2024-01-25 PROCEDURE — 84484 ASSAY OF TROPONIN QUANT: CPT

## 2024-01-25 PROCEDURE — 81001 URINALYSIS AUTO W/SCOPE: CPT

## 2024-01-25 PROCEDURE — 99285 EMERGENCY DEPT VISIT HI MDM: CPT

## 2024-01-25 PROCEDURE — 80164 ASSAY DIPROPYLACETIC ACD TOT: CPT

## 2024-01-25 PROCEDURE — 96360 HYDRATION IV INFUSION INIT: CPT

## 2024-01-25 PROCEDURE — 80183 DRUG SCRN QUANT OXCARBAZEPIN: CPT

## 2024-01-25 PROCEDURE — 83735 ASSAY OF MAGNESIUM: CPT

## 2024-01-25 PROCEDURE — 80053 COMPREHEN METABOLIC PANEL: CPT

## 2024-01-25 PROCEDURE — 85025 COMPLETE CBC W/AUTO DIFF WBC: CPT

## 2024-01-25 PROCEDURE — 83880 ASSAY OF NATRIURETIC PEPTIDE: CPT

## 2024-01-25 PROCEDURE — 96361 HYDRATE IV INFUSION ADD-ON: CPT

## 2024-01-25 PROCEDURE — 85610 PROTHROMBIN TIME: CPT

## 2024-01-25 RX ORDER — 0.9 % SODIUM CHLORIDE 0.9 %
1000 INTRAVENOUS SOLUTION INTRAVENOUS ONCE
Status: COMPLETED | OUTPATIENT
Start: 2024-01-25 | End: 2024-01-25

## 2024-01-25 RX ADMIN — SODIUM CHLORIDE 1000 ML: 9 INJECTION, SOLUTION INTRAVENOUS at 16:02

## 2024-01-25 ASSESSMENT — ENCOUNTER SYMPTOMS
CHEST TIGHTNESS: 0
ABDOMINAL PAIN: 0

## 2024-01-25 ASSESSMENT — PAIN - FUNCTIONAL ASSESSMENT: PAIN_FUNCTIONAL_ASSESSMENT: NONE - DENIES PAIN

## 2024-01-25 NOTE — ED TRIAGE NOTES
Patient arrives to ED via [facility] with c/o syncopal episode.  Patient sons saw her come out of the bathroom and he caught her as she passed out.  Patient did not hit her head.  Patient denies any CP, SOB, N/V/D at this time.  Patient is A&OX3, only disoriented to time.

## 2024-01-25 NOTE — ED PROVIDER NOTES
EMERGENCY DEPARTMENT HISTORY AND PHYSICAL EXAM    3:10 PM      Date: 1/25/2024  Patient Name: Purvi Denny    History of Presenting Illness     Chief Complaint   Patient presents with    Loss of Consciousness       History From: Patient  Patient is a 68-year-old female with a history of TIA on Plavix, seizures, pulmonary embolism, hypertension, pneumonia, hallucinations, thyroid disease, lives at home, presents emergency department after coming unresponsive according to her reports.  Patient says she was at the house and she lost time.  Patient notes that she said similar symptoms when she had a seizure.  The patient says she is compliant with all her medications including her seizure medications.  Patient denies any other aggravating or alleviating factors that she is feeling just fine today.  Patient is not a smoker, occasional alcohol, denies any drug use.  The patient denies working at this time.             Nursing Notes were all reviewed and agreed with or any disagreements were addressed in the HPI.    PCP: Deepali Montaño, APRN - NP    Current Facility-Administered Medications   Medication Dose Route Frequency Provider Last Rate Last Admin    sodium chloride 0.9 % bolus 1,000 mL  1,000 mL IntraVENous Once Phani Hensley MD         Current Outpatient Medications   Medication Sig Dispense Refill    ondansetron (ZOFRAN-ODT) 8 MG TBDP disintegrating tablet Take 1 tablet by mouth 3 times daily as needed for Nausea or Vomiting 12 tablet 1    omeprazole (PRILOSEC OTC) 20 MG tablet Take 20 mg by mouth daily.      aspirin 81 MG chewable tablet Take 1 tablet by mouth daily 30 tablet 3    atorvastatin (LIPITOR) 80 MG tablet Take 1 tablet by mouth nightly 30 tablet 0    QUEtiapine (SEROQUEL) 25 MG tablet Take 1 tablet by mouth nightly      divalproex (DEPAKOTE SPRINKLE) 125 MG DR capsule Take 8 capsules by mouth 2 times daily 480 capsule 0    Multiple Vitamins-Minerals (THERAPEUTIC MULTIVITAMIN-MINERALS) tablet  5.30 M/uL    Hemoglobin 10.0 (L) 12.0 - 16.0 g/dL    Hematocrit 30.0 (L) 35.0 - 45.0 %    MCV 94.6 78.0 - 100.0 FL    MCH 31.5 24.0 - 34.0 PG    MCHC 33.3 31.0 - 37.0 g/dL    RDW 15.0 (H) 11.6 - 14.5 %    Platelets 170 135 - 420 K/uL    MPV 12.2 (H) 9.2 - 11.8 FL    Nucleated RBCs 0.0 0  WBC    nRBC 0.00 0.00 - 0.01 K/uL    Neutrophils % 62 40 - 73 %    Lymphocytes % 27 21 - 52 %    Monocytes % 10 3 - 10 %    Eosinophils % 0 0 - 5 %    Basophils % 1 0 - 2 %    Immature Granulocytes 0 0.0 - 0.5 %    Neutrophils Absolute 5.5 1.8 - 8.0 K/UL    Lymphocytes Absolute 2.4 0.9 - 3.6 K/UL    Monocytes Absolute 0.9 0.05 - 1.2 K/UL    Eosinophils Absolute 0.0 0.0 - 0.4 K/UL    Basophils Absolute 0.1 0.0 - 0.1 K/UL    Absolute Immature Granulocyte 0.0 0.00 - 0.04 K/UL    Differential Type AUTOMATED     Comprehensive Metabolic Panel    Collection Time: 01/25/24  2:10 PM   Result Value Ref Range    Sodium 140 136 - 145 mmol/L    Potassium 3.9 3.5 - 5.5 mmol/L    Chloride 113 (H) 100 - 111 mmol/L    CO2 26 21 - 32 mmol/L    Anion Gap 1 (L) 3.0 - 18 mmol/L    Glucose 71 (L) 74 - 99 mg/dL    BUN 23 (H) 7.0 - 18 MG/DL    Creatinine 0.98 0.6 - 1.3 MG/DL    Bun/Cre Ratio 23 (H) 12 - 20      Est, Glom Filt Rate >60 >60 ml/min/1.73m2    Calcium 9.0 8.5 - 10.1 MG/DL    Total Bilirubin 0.2 0.2 - 1.0 MG/DL    ALT 22 13 - 56 U/L    AST 16 10 - 38 U/L    Alk Phosphatase 80 45 - 117 U/L    Total Protein 6.8 6.4 - 8.2 g/dL    Albumin 3.5 3.4 - 5.0 g/dL    Globulin 3.3 2.0 - 4.0 g/dL    Albumin/Globulin Ratio 1.1 0.8 - 1.7     Magnesium    Collection Time: 01/25/24  2:10 PM   Result Value Ref Range    Magnesium 2.0 1.6 - 2.6 mg/dL   Troponin    Collection Time: 01/25/24  2:10 PM   Result Value Ref Range    Troponin, High Sensitivity 55 (H) 0 - 54 ng/L   Brain Natriuretic Peptide    Collection Time: 01/25/24  2:10 PM   Result Value Ref Range    NT Pro- 0 - 900 PG/ML       Radiologic Studies -   XR CHEST (2 VW)   Final Result      1.

## 2024-01-26 NOTE — DISCHARGE INSTRUCTIONS
Make sure to take your medications as directed, continue to use your rollator when you get up and move around the house, follow-up with your primary doctor tomorrow and call your neurologist to see if there is any changes that are needed your medications.  Please return if you are at all worsened or concerned.

## 2024-01-26 NOTE — ED NOTES
Report given to [facility]. PIV removed and pt taken off of monitor.     PIV removed tip in tack. Discharge instructions given, pt verbalized understanding. All questions answered. Pt discharged with transportation.

## 2024-01-30 LAB — OXCARBAZEPINE SERPL-MCNC: 22 UG/ML (ref 10–35)

## 2024-02-06 ENCOUNTER — HOSPITAL ENCOUNTER (EMERGENCY)
Facility: HOSPITAL | Age: 69
Discharge: HOME | DRG: 101 | End: 2024-02-07
Attending: EMERGENCY MEDICINE
Payer: MEDICARE

## 2024-02-06 DIAGNOSIS — G40.919 BREAKTHROUGH SEIZURE (HCC): Primary | ICD-10-CM

## 2024-02-06 LAB
ANION GAP SERPL CALC-SCNC: 2 MMOL/L (ref 3–18)
BASOPHILS # BLD: 0 K/UL (ref 0–0.1)
BASOPHILS NFR BLD: 0 % (ref 0–2)
BUN SERPL-MCNC: 32 MG/DL (ref 7–18)
BUN/CREAT SERPL: 29 (ref 12–20)
CALCIUM SERPL-MCNC: 8.6 MG/DL (ref 8.5–10.1)
CHLORIDE SERPL-SCNC: 114 MMOL/L (ref 100–111)
CO2 SERPL-SCNC: 26 MMOL/L (ref 21–32)
CREAT SERPL-MCNC: 1.1 MG/DL (ref 0.6–1.3)
DIFFERENTIAL METHOD BLD: ABNORMAL
EOSINOPHIL # BLD: 0.1 K/UL (ref 0–0.4)
EOSINOPHIL NFR BLD: 2 % (ref 0–5)
ERYTHROCYTE [DISTWIDTH] IN BLOOD BY AUTOMATED COUNT: 14.9 % (ref 11.6–14.5)
GLUCOSE SERPL-MCNC: 118 MG/DL (ref 74–99)
HCT VFR BLD AUTO: 29.1 % (ref 35–45)
HGB BLD-MCNC: 9.6 G/DL (ref 12–16)
IMM GRANULOCYTES # BLD AUTO: 0 K/UL (ref 0–0.04)
IMM GRANULOCYTES NFR BLD AUTO: 0 % (ref 0–0.5)
LYMPHOCYTES # BLD: 2.3 K/UL (ref 0.9–3.6)
LYMPHOCYTES NFR BLD: 31 % (ref 21–52)
MCH RBC QN AUTO: 31.5 PG (ref 24–34)
MCHC RBC AUTO-ENTMCNC: 33 G/DL (ref 31–37)
MCV RBC AUTO: 95.4 FL (ref 78–100)
MONOCYTES # BLD: 0.9 K/UL (ref 0.05–1.2)
MONOCYTES NFR BLD: 12 % (ref 3–10)
NEUTS SEG # BLD: 4 K/UL (ref 1.8–8)
NEUTS SEG NFR BLD: 54 % (ref 40–73)
NRBC # BLD: 0 K/UL (ref 0–0.01)
NRBC BLD-RTO: 0 PER 100 WBC
PLATELET # BLD AUTO: 140 K/UL (ref 135–420)
PMV BLD AUTO: 11.9 FL (ref 9.2–11.8)
POTASSIUM SERPL-SCNC: 3.7 MMOL/L (ref 3.5–5.5)
RBC # BLD AUTO: 3.05 M/UL (ref 4.2–5.3)
SODIUM SERPL-SCNC: 142 MMOL/L (ref 136–145)
WBC # BLD AUTO: 7.4 K/UL (ref 4.6–13.2)

## 2024-02-06 PROCEDURE — 85025 COMPLETE CBC W/AUTO DIFF WBC: CPT

## 2024-02-06 PROCEDURE — 81001 URINALYSIS AUTO W/SCOPE: CPT

## 2024-02-06 PROCEDURE — 81003 URINALYSIS AUTO W/O SCOPE: CPT

## 2024-02-06 PROCEDURE — 99283 EMERGENCY DEPT VISIT LOW MDM: CPT

## 2024-02-06 PROCEDURE — 80048 BASIC METABOLIC PNL TOTAL CA: CPT

## 2024-02-06 ASSESSMENT — LIFESTYLE VARIABLES
HOW OFTEN DO YOU HAVE A DRINK CONTAINING ALCOHOL: MONTHLY OR LESS
HOW MANY STANDARD DRINKS CONTAINING ALCOHOL DO YOU HAVE ON A TYPICAL DAY: PATIENT DOES NOT DRINK

## 2024-02-06 ASSESSMENT — PAIN - FUNCTIONAL ASSESSMENT: PAIN_FUNCTIONAL_ASSESSMENT: NONE - DENIES PAIN

## 2024-02-07 VITALS
TEMPERATURE: 97.8 F | SYSTOLIC BLOOD PRESSURE: 126 MMHG | HEART RATE: 71 BPM | RESPIRATION RATE: 16 BRPM | DIASTOLIC BLOOD PRESSURE: 75 MMHG | OXYGEN SATURATION: 98 %

## 2024-02-07 LAB
APPEARANCE UR: CLEAR
BACTERIA URNS QL MICRO: NEGATIVE /HPF
BILIRUB UR QL: NEGATIVE
COLOR UR: YELLOW
EPITH CASTS URNS QL MICRO: NORMAL /LPF (ref 0–5)
GLUCOSE UR STRIP.AUTO-MCNC: NEGATIVE MG/DL
HGB UR QL STRIP: NEGATIVE
KETONES UR QL STRIP.AUTO: NEGATIVE MG/DL
LEUKOCYTE ESTERASE UR QL STRIP.AUTO: ABNORMAL
NITRITE UR QL STRIP.AUTO: NEGATIVE
PH UR STRIP: 6 (ref 5–8)
PROT UR STRIP-MCNC: NEGATIVE MG/DL
RBC #/AREA URNS HPF: NEGATIVE /HPF (ref 0–5)
SP GR UR REFRACTOMETRY: 1.02 (ref 1–1.03)
UROBILINOGEN UR QL STRIP.AUTO: 1 EU/DL (ref 0.2–1)
WBC URNS QL MICRO: NORMAL /HPF (ref 0–4)

## 2024-02-07 NOTE — ED PROVIDER NOTES
EMERGENCY DEPARTMENT HISTORY AND PHYSICAL EXAM    10:37 PM EST seen at this time in room 3        Date: 2/6/2024  Patient Name: Purvi Denny    History of Presenting Illness     Chief Complaint   Patient presents with    Seizures         History Provided By: patient/nursing notes    Additional History (Context): Purvi Denny is a 68 y.o. female presents with had a generalized tonic-clonic seizure at home, lasted about 2 minutes, she is alert and oriented at this time with no lasting symptoms no complaint.  She does say she finds her self little bit cold in the exam room.  Totally lucid.  Family not at bedside but may be on the way.  She states she is compliant with her seizure medications.    PCP: Deepali Montaño APRN - NP    Chief Complaint:   Duration:    Timing:    Location:   Quality:   Severity:   Modifying Factors:   Associated Symptoms:       No current facility-administered medications for this encounter.     Current Outpatient Medications   Medication Sig Dispense Refill    ondansetron (ZOFRAN-ODT) 8 MG TBDP disintegrating tablet Take 1 tablet by mouth 3 times daily as needed for Nausea or Vomiting 12 tablet 1    omeprazole (PRILOSEC OTC) 20 MG tablet Take 20 mg by mouth daily.      aspirin 81 MG chewable tablet Take 1 tablet by mouth daily 30 tablet 3    atorvastatin (LIPITOR) 80 MG tablet Take 1 tablet by mouth nightly 30 tablet 0    QUEtiapine (SEROQUEL) 25 MG tablet Take 1 tablet by mouth nightly      divalproex (DEPAKOTE SPRINKLE) 125 MG DR capsule Take 8 capsules by mouth 2 times daily 480 capsule 0    Multiple Vitamins-Minerals (THERAPEUTIC MULTIVITAMIN-MINERALS) tablet Take 1 tablet by mouth daily 30 tablet 0    famotidine (PEPCID) 20 MG tablet Take 1 tablet by mouth 2 times daily 90 tablet 1    acetaminophen (TYLENOL) 325 MG tablet Take 2 tablets by mouth every 4 hours as needed for Fever or Pain      amLODIPine (NORVASC) 10 MG tablet Take 1 tablet by mouth daily      clopidogrel (PLAVIX) 75  - 99 mg/dL    BUN 32 (H) 7.0 - 18 MG/DL    Creatinine 1.10 0.6 - 1.3 MG/DL    Bun/Cre Ratio 29 (H) 12 - 20      Est, Glom Filt Rate 54 (L) >60 ml/min/1.73m2    Calcium 8.6 8.5 - 10.1 MG/DL   Urinalysis    Collection Time: 02/06/24 11:55 PM   Result Value Ref Range    Color, UA YELLOW      Appearance CLEAR      Specific Gravity, UA 1.020 1.005 - 1.030      pH, Urine 6.0 5.0 - 8.0      Protein, UA Negative NEG mg/dL    Glucose, UA Negative NEG mg/dL    Ketones, Urine Negative NEG mg/dL    Bilirubin Urine Negative NEG      Blood, Urine Negative NEG      Urobilinogen, Urine 1.0 0.2 - 1.0 EU/dL    Nitrite, Urine Negative NEG      Leukocyte Esterase, Urine TRACE (A) NEG     Urinalysis, Micro    Collection Time: 02/06/24 11:55 PM   Result Value Ref Range    WBC, UA 4 to 6 0 - 4 /hpf    RBC, UA Negative 0 - 5 /hpf    Epithelial Cells UA 2+ 0 - 5 /lpf    BACTERIA, URINE Negative NEG /hpf       Radiologic Studies -   No orders to display     [unfilled]    Medications ordered:   Medications - No data to display           Medical Decision Making   Initial Medical Decision Making and DDx:  Check basic lab work check for UTI.  No other signs to suggest infectious process lowering her seizure threshold.  Will discuss with family.    ED Course: Progress Notes, Reevaluation, and Consults:  ED Course as of 02/07/24 0043   Tue Feb 06, 2024   2341 No family has arrived, I checked with the nurse. [CB]      ED Course User Index  [CB] Willie Lam MD     12:44 AM EST Pt reevaluated at this time.  Discussed results and findings, as well as, diagnosis and plan for discharge. Follow up with doctors/services listed.  Return to the emergency department for alarming symptoms.  Pt verbalizes understanding and agreement with plan. All questions addressed.    No evidence for infection or alarming electrolyte abnormality.  No recurrence of the seizures stable vitals, patient will be discharged.  Follow-up with neurology.    I am the first

## 2024-02-07 NOTE — ED TRIAGE NOTES
68 yrs. Old female arrived by EMS AxO4 in no distress. They stated that her family called because she was having tonic clonic seizure activity that lasted appromiately 2 minutes. When they arrived she was AxO3. During transport she became AxO4. She stated she has been taking all of her seizure meds like she is supposed to and denies any other medical problems at this time.

## 2024-02-08 ENCOUNTER — APPOINTMENT (OUTPATIENT)
Facility: HOSPITAL | Age: 69
DRG: 101 | End: 2024-02-08
Payer: MEDICARE

## 2024-02-08 ENCOUNTER — HOSPITAL ENCOUNTER (INPATIENT)
Facility: HOSPITAL | Age: 69
LOS: 2 days | Discharge: HOME | DRG: 101 | End: 2024-02-10
Attending: STUDENT IN AN ORGANIZED HEALTH CARE EDUCATION/TRAINING PROGRAM
Payer: MEDICARE

## 2024-02-08 DIAGNOSIS — I63.9 CEREBROVASCULAR ACCIDENT (CVA), UNSPECIFIED MECHANISM (HCC): Primary | ICD-10-CM

## 2024-02-08 PROBLEM — I61.9 CVA (CEREBROVASCULAR ACCIDENT DUE TO INTRACEREBRAL HEMORRHAGE) (HCC): Status: ACTIVE | Noted: 2024-02-08

## 2024-02-08 LAB
ALBUMIN SERPL-MCNC: 3.5 G/DL (ref 3.4–5)
ALBUMIN/GLOB SERPL: 1.1 (ref 0.8–1.7)
ALP SERPL-CCNC: 96 U/L (ref 45–117)
ALT SERPL-CCNC: 23 U/L (ref 13–56)
ANION GAP SERPL CALC-SCNC: 2 MMOL/L (ref 3–18)
AST SERPL-CCNC: 19 U/L (ref 10–38)
BASOPHILS # BLD: 0.1 K/UL (ref 0–0.1)
BASOPHILS NFR BLD: 1 % (ref 0–2)
BILIRUB SERPL-MCNC: 0.2 MG/DL (ref 0.2–1)
BUN SERPL-MCNC: 35 MG/DL (ref 7–18)
BUN/CREAT SERPL: 30 (ref 12–20)
CALCIUM SERPL-MCNC: 9.1 MG/DL (ref 8.5–10.1)
CHLORIDE SERPL-SCNC: 116 MMOL/L (ref 100–111)
CO2 SERPL-SCNC: 28 MMOL/L (ref 21–32)
CREAT SERPL-MCNC: 1.15 MG/DL (ref 0.6–1.3)
DIFFERENTIAL METHOD BLD: ABNORMAL
EOSINOPHIL # BLD: 0.1 K/UL (ref 0–0.4)
EOSINOPHIL NFR BLD: 1 % (ref 0–5)
ERYTHROCYTE [DISTWIDTH] IN BLOOD BY AUTOMATED COUNT: 15.4 % (ref 11.6–14.5)
GLOBULIN SER CALC-MCNC: 3.2 G/DL (ref 2–4)
GLUCOSE BLD STRIP.AUTO-MCNC: 98 MG/DL (ref 70–110)
GLUCOSE SERPL-MCNC: 89 MG/DL (ref 74–99)
HCT VFR BLD AUTO: 33.3 % (ref 35–45)
HGB BLD-MCNC: 10.5 G/DL (ref 12–16)
IMM GRANULOCYTES # BLD AUTO: 0 K/UL (ref 0–0.04)
IMM GRANULOCYTES NFR BLD AUTO: 0 % (ref 0–0.5)
INR PPP: 1 (ref 0.9–1.1)
LYMPHOCYTES # BLD: 2.5 K/UL (ref 0.9–3.6)
LYMPHOCYTES NFR BLD: 30 % (ref 21–52)
MCH RBC QN AUTO: 30.4 PG (ref 24–34)
MCHC RBC AUTO-ENTMCNC: 31.5 G/DL (ref 31–37)
MCV RBC AUTO: 96.5 FL (ref 78–100)
MONOCYTES # BLD: 0.9 K/UL (ref 0.05–1.2)
MONOCYTES NFR BLD: 11 % (ref 3–10)
NEUTS SEG # BLD: 4.9 K/UL (ref 1.8–8)
NEUTS SEG NFR BLD: 58 % (ref 40–73)
NRBC # BLD: 0 K/UL (ref 0–0.01)
NRBC BLD-RTO: 0 PER 100 WBC
PHENYTOIN SERPL-MCNC: 0.7 UG/ML (ref 10–20)
PLATELET # BLD AUTO: 176 K/UL (ref 135–420)
PMV BLD AUTO: 12.7 FL (ref 9.2–11.8)
POTASSIUM SERPL-SCNC: 4.3 MMOL/L (ref 3.5–5.5)
PROT SERPL-MCNC: 6.7 G/DL (ref 6.4–8.2)
PROTHROMBIN TIME: 13.2 SEC (ref 11.9–14.7)
RBC # BLD AUTO: 3.45 M/UL (ref 4.2–5.3)
SODIUM SERPL-SCNC: 146 MMOL/L (ref 136–145)
TROPONIN I SERPL HS-MCNC: 47 NG/L (ref 0–54)
WBC # BLD AUTO: 8.4 K/UL (ref 4.6–13.2)

## 2024-02-08 PROCEDURE — 99285 EMERGENCY DEPT VISIT HI MDM: CPT

## 2024-02-08 PROCEDURE — 80185 ASSAY OF PHENYTOIN TOTAL: CPT

## 2024-02-08 PROCEDURE — 70551 MRI BRAIN STEM W/O DYE: CPT

## 2024-02-08 PROCEDURE — 70498 CT ANGIOGRAPHY NECK: CPT

## 2024-02-08 PROCEDURE — 85610 PROTHROMBIN TIME: CPT

## 2024-02-08 PROCEDURE — 1100000003 HC PRIVATE W/ TELEMETRY

## 2024-02-08 PROCEDURE — 6360000004 HC RX CONTRAST MEDICATION: Performed by: STUDENT IN AN ORGANIZED HEALTH CARE EDUCATION/TRAINING PROGRAM

## 2024-02-08 PROCEDURE — 99222 1ST HOSP IP/OBS MODERATE 55: CPT | Performed by: HOSPITALIST

## 2024-02-08 PROCEDURE — 80164 ASSAY DIPROPYLACETIC ACD TOT: CPT

## 2024-02-08 PROCEDURE — 82962 GLUCOSE BLOOD TEST: CPT

## 2024-02-08 PROCEDURE — 6370000000 HC RX 637 (ALT 250 FOR IP): Performed by: HOSPITALIST

## 2024-02-08 PROCEDURE — 84484 ASSAY OF TROPONIN QUANT: CPT

## 2024-02-08 PROCEDURE — 93005 ELECTROCARDIOGRAM TRACING: CPT | Performed by: STUDENT IN AN ORGANIZED HEALTH CARE EDUCATION/TRAINING PROGRAM

## 2024-02-08 PROCEDURE — 70450 CT HEAD/BRAIN W/O DYE: CPT

## 2024-02-08 PROCEDURE — 85025 COMPLETE CBC W/AUTO DIFF WBC: CPT

## 2024-02-08 PROCEDURE — 80053 COMPREHEN METABOLIC PANEL: CPT

## 2024-02-08 RX ORDER — ENOXAPARIN SODIUM 100 MG/ML
30 INJECTION SUBCUTANEOUS DAILY
Status: DISCONTINUED | OUTPATIENT
Start: 2024-02-09 | End: 2024-02-10 | Stop reason: HOSPADM

## 2024-02-08 RX ORDER — ONDANSETRON 2 MG/ML
4 INJECTION INTRAMUSCULAR; INTRAVENOUS EVERY 6 HOURS PRN
Status: DISCONTINUED | OUTPATIENT
Start: 2024-02-08 | End: 2024-02-10 | Stop reason: HOSPADM

## 2024-02-08 RX ORDER — PAROXETINE HYDROCHLORIDE 20 MG/1
30 TABLET, FILM COATED ORAL DAILY
Status: DISCONTINUED | OUTPATIENT
Start: 2024-02-09 | End: 2024-02-10 | Stop reason: HOSPADM

## 2024-02-08 RX ORDER — FERROUS SULFATE 325(65) MG
325 TABLET ORAL DAILY
Status: DISCONTINUED | OUTPATIENT
Start: 2024-02-08 | End: 2024-02-10 | Stop reason: HOSPADM

## 2024-02-08 RX ORDER — PANTOPRAZOLE SODIUM 40 MG/1
40 TABLET, DELAYED RELEASE ORAL
Status: DISCONTINUED | OUTPATIENT
Start: 2024-02-09 | End: 2024-02-10 | Stop reason: HOSPADM

## 2024-02-08 RX ORDER — DIVALPROEX SODIUM 125 MG/1
1000 CAPSULE, COATED PELLETS ORAL 2 TIMES DAILY
Status: DISCONTINUED | OUTPATIENT
Start: 2024-02-08 | End: 2024-02-09

## 2024-02-08 RX ORDER — ONDANSETRON 4 MG/1
4 TABLET, ORALLY DISINTEGRATING ORAL EVERY 8 HOURS PRN
Status: DISCONTINUED | OUTPATIENT
Start: 2024-02-08 | End: 2024-02-10 | Stop reason: HOSPADM

## 2024-02-08 RX ORDER — SODIUM CHLORIDE 9 MG/ML
INJECTION, SOLUTION INTRAVENOUS PRN
Status: DISCONTINUED | OUTPATIENT
Start: 2024-02-08 | End: 2024-02-10 | Stop reason: HOSPADM

## 2024-02-08 RX ORDER — QUETIAPINE FUMARATE 25 MG/1
25 TABLET, FILM COATED ORAL
Status: DISCONTINUED | OUTPATIENT
Start: 2024-02-08 | End: 2024-02-10 | Stop reason: HOSPADM

## 2024-02-08 RX ORDER — CHOLECALCIFEROL (VITAMIN D3) 125 MCG
500 CAPSULE ORAL DAILY
Status: DISCONTINUED | OUTPATIENT
Start: 2024-02-08 | End: 2024-02-10 | Stop reason: HOSPADM

## 2024-02-08 RX ORDER — M-VIT,TX,IRON,MINS/CALC/FOLIC 27MG-0.4MG
1 TABLET ORAL DAILY
Status: DISCONTINUED | OUTPATIENT
Start: 2024-02-08 | End: 2024-02-10 | Stop reason: HOSPADM

## 2024-02-08 RX ORDER — ATORVASTATIN CALCIUM 40 MG/1
80 TABLET, FILM COATED ORAL NIGHTLY
Status: DISCONTINUED | OUTPATIENT
Start: 2024-02-08 | End: 2024-02-10 | Stop reason: HOSPADM

## 2024-02-08 RX ORDER — POLYETHYLENE GLYCOL 3350 17 G/17G
17 POWDER, FOR SOLUTION ORAL DAILY PRN
Status: DISCONTINUED | OUTPATIENT
Start: 2024-02-08 | End: 2024-02-10 | Stop reason: HOSPADM

## 2024-02-08 RX ORDER — OXCARBAZEPINE 300 MG/1
300 TABLET, FILM COATED ORAL 2 TIMES DAILY
Status: DISCONTINUED | OUTPATIENT
Start: 2024-02-08 | End: 2024-02-10 | Stop reason: HOSPADM

## 2024-02-08 RX ORDER — ASPIRIN 81 MG/1
81 TABLET, CHEWABLE ORAL DAILY
Status: DISCONTINUED | OUTPATIENT
Start: 2024-02-08 | End: 2024-02-10 | Stop reason: HOSPADM

## 2024-02-08 RX ORDER — ERGOCALCIFEROL 1.25 MG/1
50000 CAPSULE ORAL
Status: DISCONTINUED | OUTPATIENT
Start: 2024-02-08 | End: 2024-02-10 | Stop reason: HOSPADM

## 2024-02-08 RX ORDER — SODIUM CHLORIDE 0.9 % (FLUSH) 0.9 %
5-40 SYRINGE (ML) INJECTION PRN
Status: DISCONTINUED | OUTPATIENT
Start: 2024-02-08 | End: 2024-02-10 | Stop reason: HOSPADM

## 2024-02-08 RX ORDER — CLOPIDOGREL BISULFATE 75 MG/1
75 TABLET ORAL DAILY
Status: DISCONTINUED | OUTPATIENT
Start: 2024-02-08 | End: 2024-02-10 | Stop reason: HOSPADM

## 2024-02-08 RX ORDER — SODIUM CHLORIDE 0.9 % (FLUSH) 0.9 %
5-40 SYRINGE (ML) INJECTION EVERY 12 HOURS SCHEDULED
Status: DISCONTINUED | OUTPATIENT
Start: 2024-02-08 | End: 2024-02-10 | Stop reason: HOSPADM

## 2024-02-08 RX ADMIN — IOPAMIDOL 80 ML: 755 INJECTION, SOLUTION INTRAVENOUS at 16:06

## 2024-02-08 RX ADMIN — CYANOCOBALAMIN TAB 1000 MCG 500 MCG: 1000 TAB at 23:34

## 2024-02-08 RX ADMIN — Medication 1 TABLET: at 23:33

## 2024-02-08 RX ADMIN — ASPIRIN 81 MG 81 MG: 81 TABLET ORAL at 23:35

## 2024-02-08 RX ADMIN — ATORVASTATIN CALCIUM 80 MG: 40 TABLET, FILM COATED ORAL at 23:35

## 2024-02-08 RX ADMIN — QUETIAPINE FUMARATE 25 MG: 25 TABLET ORAL at 23:34

## 2024-02-08 RX ADMIN — OXCARBAZEPINE 300 MG: 300 TABLET, FILM COATED ORAL at 23:34

## 2024-02-08 RX ADMIN — FERROUS SULFATE TAB 325 MG (65 MG ELEMENTAL FE) 325 MG: 325 (65 FE) TAB at 23:34

## 2024-02-08 RX ADMIN — CLOPIDOGREL BISULFATE 75 MG: 75 TABLET ORAL at 23:34

## 2024-02-08 ASSESSMENT — ENCOUNTER SYMPTOMS
VOMITING: 0
NAUSEA: 0
SHORTNESS OF BREATH: 0
ABDOMINAL PAIN: 0
CHEST TIGHTNESS: 0
DIARRHEA: 0

## 2024-02-08 ASSESSMENT — LIFESTYLE VARIABLES
HOW MANY STANDARD DRINKS CONTAINING ALCOHOL DO YOU HAVE ON A TYPICAL DAY: PATIENT DOES NOT DRINK
HOW OFTEN DO YOU HAVE A DRINK CONTAINING ALCOHOL: NEVER

## 2024-02-08 NOTE — ED NOTES
1525 Taken to CT by this RN.  1610 Taken to ED room 1. Placed on continuous cardiac monitor.  1615 Teleneuro consult with Dr. Gallardo done, NIHSS score of 2.    Pt noted to be stuttering, pt also reports L sided numbness.    Swallow screening done, pt passed.

## 2024-02-08 NOTE — ED NOTES
Pt requested to use bedpan.   Noted with 200ml urine on bedpan. Urine sample obtained and sent to lab.

## 2024-02-08 NOTE — ED TRIAGE NOTES
1525 Pt came via EMS stretcher from home c/o aphasia. Per EMS, they were initially called for panic attack and upon their arrival to pt's home, pt was aphasic but was resolved upon transfer to ambulance.    LKW: 1330  BG per EMS: 111

## 2024-02-08 NOTE — ED PROVIDER NOTES
EMERGENCY DEPARTMENT HISTORY AND PHYSICAL EXAM      Date: 2/8/2024  Patient Name: Purvi Denny    History of Presenting Illness     Chief Complaint   Patient presents with    Aphasia    Numbness     L sided numbness       Patient is a 68-year-old female with past medical history of seizure disorder, headache, hypertension presenting to the emergency department for evaluation of possible stroke.  Last known well was approximately 1 hour prior to arrival.  Patient reportedly had some difficulty speaking and forming words.  It lasted approximately 10 minutes before resolving.  Patient states that she feels like she is speaking better.  No other complaints of weakness or sensory deficits.  Denies any headache.  No seizure-like activity reported.          PCP: Deepali Montaño, CIARA - NP    No current facility-administered medications for this encounter.     Current Outpatient Medications   Medication Sig Dispense Refill    ondansetron (ZOFRAN-ODT) 8 MG TBDP disintegrating tablet Take 1 tablet by mouth 3 times daily as needed for Nausea or Vomiting 12 tablet 1    omeprazole (PRILOSEC OTC) 20 MG tablet Take 20 mg by mouth daily.      aspirin 81 MG chewable tablet Take 1 tablet by mouth daily 30 tablet 3    atorvastatin (LIPITOR) 80 MG tablet Take 1 tablet by mouth nightly 30 tablet 0    QUEtiapine (SEROQUEL) 25 MG tablet Take 1 tablet by mouth nightly      divalproex (DEPAKOTE SPRINKLE) 125 MG DR capsule Take 8 capsules by mouth 2 times daily 480 capsule 0    Multiple Vitamins-Minerals (THERAPEUTIC MULTIVITAMIN-MINERALS) tablet Take 1 tablet by mouth daily 30 tablet 0    famotidine (PEPCID) 20 MG tablet Take 1 tablet by mouth 2 times daily 90 tablet 1    acetaminophen (TYLENOL) 325 MG tablet Take 2 tablets by mouth every 4 hours as needed for Fever or Pain      amLODIPine (NORVASC) 10 MG tablet Take 1 tablet by mouth daily      clopidogrel (PLAVIX) 75 MG tablet Take 1 tablet by mouth daily      cyanocobalamin 500 MCG  12.0 - 16.0 g/dL    Hematocrit 33.3 (L) 35.0 - 45.0 %    MCV 96.5 78.0 - 100.0 FL    MCH 30.4 24.0 - 34.0 PG    MCHC 31.5 31.0 - 37.0 g/dL    RDW 15.4 (H) 11.6 - 14.5 %    Platelets 176 135 - 420 K/uL    MPV 12.7 (H) 9.2 - 11.8 FL    Nucleated RBCs 0.0 0  WBC    nRBC 0.00 0.00 - 0.01 K/uL    Neutrophils % 58 40 - 73 %    Lymphocytes % 30 21 - 52 %    Monocytes % 11 (H) 3 - 10 %    Eosinophils % 1 0 - 5 %    Basophils % 1 0 - 2 %    Immature Granulocytes 0 0.0 - 0.5 %    Neutrophils Absolute 4.9 1.8 - 8.0 K/UL    Lymphocytes Absolute 2.5 0.9 - 3.6 K/UL    Monocytes Absolute 0.9 0.05 - 1.2 K/UL    Eosinophils Absolute 0.1 0.0 - 0.4 K/UL    Basophils Absolute 0.1 0.0 - 0.1 K/UL    Absolute Immature Granulocyte 0.0 0.00 - 0.04 K/UL    Differential Type AUTOMATED     Comprehensive Metabolic Panel    Collection Time: 02/08/24  3:40 PM   Result Value Ref Range    Sodium 146 (H) 136 - 145 mmol/L    Potassium 4.3 3.5 - 5.5 mmol/L    Chloride 116 (H) 100 - 111 mmol/L    CO2 28 21 - 32 mmol/L    Anion Gap 2 (L) 3.0 - 18 mmol/L    Glucose 89 74 - 99 mg/dL    BUN 35 (H) 7.0 - 18 MG/DL    Creatinine 1.15 0.6 - 1.3 MG/DL    Bun/Cre Ratio 30 (H) 12 - 20      Est, Glom Filt Rate 52 (L) >60 ml/min/1.73m2    Calcium 9.1 8.5 - 10.1 MG/DL    Total Bilirubin 0.2 0.2 - 1.0 MG/DL    ALT 23 13 - 56 U/L    AST 19 10 - 38 U/L    Alk Phosphatase 96 45 - 117 U/L    Total Protein 6.7 6.4 - 8.2 g/dL    Albumin 3.5 3.4 - 5.0 g/dL    Globulin 3.2 2.0 - 4.0 g/dL    Albumin/Globulin Ratio 1.1 0.8 - 1.7     Protime-INR    Collection Time: 02/08/24  3:40 PM   Result Value Ref Range    Protime 13.2 11.9 - 14.7 sec    INR 1.0 0.9 - 1.1     Troponin    Collection Time: 02/08/24  3:40 PM   Result Value Ref Range    Troponin, High Sensitivity 47 0 - 54 ng/L   Phenytoin Level, Total    Collection Time: 02/08/24  3:40 PM   Result Value Ref Range    Phenytoin Lvl 0.7 (L) 10.0 - 20.0 ug/mL   EKG 12 Lead    Collection Time: 02/08/24  4:24 PM   Result  Value Ref Range    Ventricular Rate 90 BPM    Atrial Rate 90 BPM    P-R Interval 148 ms    QRS Duration 88 ms    Q-T Interval 366 ms    QTc Calculation (Bazett) 447 ms    P Axis 61 degrees    R Axis 0 degrees    T Axis 129 degrees    Diagnosis       Normal sinus rhythm  Minimal voltage criteria for LVH, may be normal variant ( Paulo product )  T wave abnormality, consider lateral ischemia  Abnormal ECG  When compared with ECG of 25-JAN-2024 15:35,  No significant change was found         Radiologic Studies -   Non-plain film images such as CT, Ultrasound and MRI are read by the radiologist. Plain radiographic images are visualized and preliminarily interpreted by the emergency physician.    CTA HEAD NECK W WO CONTRAST         CT HEAD WO CONTRAST   Final Result   No acute intracranial findings. 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            MRI BRAIN WO CONTRAST    (Results Pending)           Medical Decision Making   I am the first provider for this patient.    I reviewed the vital signs, available nursing notes, past medical history, past surgical history, family history and social history.      Vital Signs-Reviewed the patient's vital signs.    EKG: All EKG's are interpreted by the Emergency Department Physician who either signs or Co-signs this chart in the absence of a cardiologist.  Normal sinus rhythm  T wave inversions in lateral leads. AS interpreted by me.              Interpretation per the Radiologist below, if available at the time of this note:    ED Course: Progress Notes, Reevaluation, and Consults:    Provider Notes (Medical Decision Making):       MDM  Number of Diagnoses or Management Options  Cerebrovascular accident (CVA), unspecified mechanism (HCC)  Diagnosis management comments: Patient has no obvious focal deficit on my exam.  She does have some what seems to be dysarthric speech with frequent pauses in her speech.  Her vital signs are stable.  She is moving all extremities equally.  Code

## 2024-02-09 ENCOUNTER — APPOINTMENT (OUTPATIENT)
Facility: HOSPITAL | Age: 69
DRG: 101 | End: 2024-02-09
Attending: HOSPITALIST
Payer: MEDICARE

## 2024-02-09 PROBLEM — R55 SYNCOPE: Status: ACTIVE | Noted: 2024-02-09

## 2024-02-09 LAB
CHOLEST SERPL-MCNC: 160 MG/DL
ECHO AO ASC DIAM: 2.9 CM
ECHO AO ASCENDING AORTA INDEX: 2.09 CM/M2
ECHO AO ROOT DIAM: 3 CM
ECHO AO ROOT INDEX: 2.16 CM/M2
ECHO AV AREA PEAK VELOCITY: 2.6 CM2
ECHO AV AREA/BSA PEAK VELOCITY: 1.9 CM2/M2
ECHO AV PEAK GRADIENT: 5 MMHG
ECHO AV PEAK VELOCITY: 1.1 M/S
ECHO AV VELOCITY RATIO: 1.09
ECHO BSA: 1.39 M2
ECHO LA VOL A-L A2C: 25 ML (ref 22–52)
ECHO LA VOL A-L A4C: 33 ML (ref 22–52)
ECHO LA VOL MOD A2C: 25 ML (ref 22–52)
ECHO LA VOL MOD A4C: 30 ML (ref 22–52)
ECHO LA VOLUME AREA LENGTH: 32 ML
ECHO LA VOLUME INDEX A-L A2C: 18 ML/M2 (ref 16–34)
ECHO LA VOLUME INDEX A-L A4C: 24 ML/M2 (ref 16–34)
ECHO LA VOLUME INDEX AREA LENGTH: 23 ML/M2 (ref 16–34)
ECHO LA VOLUME INDEX MOD A2C: 18 ML/M2 (ref 16–34)
ECHO LA VOLUME INDEX MOD A4C: 22 ML/M2 (ref 16–34)
ECHO LV FRACTIONAL SHORTENING: 38 % (ref 28–44)
ECHO LV INTERNAL DIMENSION DIASTOLE INDEX: 2.45 CM/M2
ECHO LV INTERNAL DIMENSION DIASTOLIC: 3.4 CM (ref 3.9–5.3)
ECHO LV INTERNAL DIMENSION SYSTOLIC INDEX: 1.51 CM/M2
ECHO LV INTERNAL DIMENSION SYSTOLIC: 2.1 CM
ECHO LV IVSD: 0.9 CM (ref 0.6–0.9)
ECHO LV MASS 2D: 78.3 G (ref 67–162)
ECHO LV MASS INDEX 2D: 56.3 G/M2 (ref 43–95)
ECHO LV POSTERIOR WALL DIASTOLIC: 0.8 CM (ref 0.6–0.9)
ECHO LV RELATIVE WALL THICKNESS RATIO: 0.47
ECHO LVOT AREA: 2.5 CM2
ECHO LVOT DIAM: 1.8 CM
ECHO LVOT PEAK GRADIENT: 5 MMHG
ECHO LVOT PEAK VELOCITY: 1.2 M/S
ECHO MV A VELOCITY: 0.78 M/S
ECHO MV E DECELERATION TIME (DT): 300.5 MS
ECHO MV E VELOCITY: 0.58 M/S
ECHO MV E/A RATIO: 0.74
ECHO TV REGURGITANT MAX VELOCITY: 2.12 M/S
ECHO TV REGURGITANT PEAK GRADIENT: 18 MMHG
EKG ATRIAL RATE: 90 BPM
EKG DIAGNOSIS: NORMAL
EKG P AXIS: 61 DEGREES
EKG P-R INTERVAL: 148 MS
EKG Q-T INTERVAL: 366 MS
EKG QRS DURATION: 88 MS
EKG QTC CALCULATION (BAZETT): 447 MS
EKG R AXIS: 0 DEGREES
EKG T AXIS: 129 DEGREES
EKG VENTRICULAR RATE: 90 BPM
ERYTHROCYTE [DISTWIDTH] IN BLOOD BY AUTOMATED COUNT: 15.6 % (ref 11.6–14.5)
EST. AVERAGE GLUCOSE BLD GHB EST-MCNC: 88 MG/DL
HBA1C MFR BLD: 4.7 % (ref 4.2–5.6)
HCT VFR BLD AUTO: 29.3 % (ref 35–45)
HDLC SERPL-MCNC: 64 MG/DL (ref 40–60)
HDLC SERPL: 2.5 (ref 0–5)
HGB BLD-MCNC: 9.5 G/DL (ref 12–16)
LDLC SERPL CALC-MCNC: 89.6 MG/DL (ref 0–100)
LIPID PANEL: ABNORMAL
MCH RBC QN AUTO: 30.6 PG (ref 24–34)
MCHC RBC AUTO-ENTMCNC: 32.4 G/DL (ref 31–37)
MCV RBC AUTO: 94.5 FL (ref 78–100)
NRBC # BLD: 0 K/UL (ref 0–0.01)
NRBC BLD-RTO: 0 PER 100 WBC
PLATELET # BLD AUTO: 157 K/UL (ref 135–420)
PMV BLD AUTO: 12.5 FL (ref 9.2–11.8)
RBC # BLD AUTO: 3.1 M/UL (ref 4.2–5.3)
TRIGL SERPL-MCNC: 32 MG/DL
VALPROATE SERPL-MCNC: 57 UG/ML (ref 50–100)
VLDLC SERPL CALC-MCNC: 6.4 MG/DL
WBC # BLD AUTO: 8.4 K/UL (ref 4.6–13.2)

## 2024-02-09 PROCEDURE — 97161 PT EVAL LOW COMPLEX 20 MIN: CPT

## 2024-02-09 PROCEDURE — 80183 DRUG SCRN QUANT OXCARBAZEPIN: CPT

## 2024-02-09 PROCEDURE — 85027 COMPLETE CBC AUTOMATED: CPT

## 2024-02-09 PROCEDURE — 93306 TTE W/DOPPLER COMPLETE: CPT

## 2024-02-09 PROCEDURE — 97530 THERAPEUTIC ACTIVITIES: CPT

## 2024-02-09 PROCEDURE — 93010 ELECTROCARDIOGRAM REPORT: CPT | Performed by: INTERNAL MEDICINE

## 2024-02-09 PROCEDURE — 92610 EVALUATE SWALLOWING FUNCTION: CPT

## 2024-02-09 PROCEDURE — 80061 LIPID PANEL: CPT

## 2024-02-09 PROCEDURE — 97166 OT EVAL MOD COMPLEX 45 MIN: CPT

## 2024-02-09 PROCEDURE — 6360000002 HC RX W HCPCS: Performed by: HOSPITALIST

## 2024-02-09 PROCEDURE — 6370000000 HC RX 637 (ALT 250 FOR IP): Performed by: HOSPITALIST

## 2024-02-09 PROCEDURE — 36415 COLL VENOUS BLD VENIPUNCTURE: CPT

## 2024-02-09 PROCEDURE — 99232 SBSQ HOSP IP/OBS MODERATE 35: CPT | Performed by: INTERNAL MEDICINE

## 2024-02-09 PROCEDURE — 1100000003 HC PRIVATE W/ TELEMETRY

## 2024-02-09 PROCEDURE — 2500000003 HC RX 250 WO HCPCS: Performed by: HOSPITALIST

## 2024-02-09 PROCEDURE — 93306 TTE W/DOPPLER COMPLETE: CPT | Performed by: INTERNAL MEDICINE

## 2024-02-09 PROCEDURE — 99222 1ST HOSP IP/OBS MODERATE 55: CPT | Performed by: PSYCHIATRY & NEUROLOGY

## 2024-02-09 PROCEDURE — 83036 HEMOGLOBIN GLYCOSYLATED A1C: CPT

## 2024-02-09 PROCEDURE — 2580000003 HC RX 258: Performed by: HOSPITALIST

## 2024-02-09 PROCEDURE — 92523 SPEECH SOUND LANG COMPREHEN: CPT

## 2024-02-09 RX ORDER — ACYCLOVIR 200 MG/1
10 CAPSULE ORAL PRN
Status: DISCONTINUED | OUTPATIENT
Start: 2024-02-09 | End: 2024-02-10 | Stop reason: HOSPADM

## 2024-02-09 RX ORDER — ACYCLOVIR 200 MG/1
10 CAPSULE ORAL
Status: DISCONTINUED | OUTPATIENT
Start: 2024-02-09 | End: 2024-02-10 | Stop reason: HOSPADM

## 2024-02-09 RX ADMIN — CLOPIDOGREL BISULFATE 75 MG: 75 TABLET ORAL at 10:47

## 2024-02-09 RX ADMIN — DIVALPROEX SODIUM 1000 MG: 125 CAPSULE, COATED PELLETS ORAL at 10:47

## 2024-02-09 RX ADMIN — SODIUM CHLORIDE 10 ML: 9 INJECTION INTRAMUSCULAR; INTRAVENOUS; SUBCUTANEOUS at 09:22

## 2024-02-09 RX ADMIN — DIVALPROEX SODIUM 1000 MG: 125 CAPSULE, COATED PELLETS ORAL at 01:22

## 2024-02-09 RX ADMIN — PAROXETINE HYDROCHLORIDE 30 MG: 20 TABLET, FILM COATED ORAL at 10:49

## 2024-02-09 RX ADMIN — FERROUS SULFATE TAB 325 MG (65 MG ELEMENTAL FE) 325 MG: 325 (65 FE) TAB at 10:51

## 2024-02-09 RX ADMIN — SODIUM CHLORIDE, PRESERVATIVE FREE 10 ML: 5 INJECTION INTRAVENOUS at 21:51

## 2024-02-09 RX ADMIN — ERGOCALCIFEROL 50000 UNITS: 1.25 CAPSULE ORAL at 01:22

## 2024-02-09 RX ADMIN — ENOXAPARIN SODIUM 30 MG: 100 INJECTION SUBCUTANEOUS at 10:50

## 2024-02-09 RX ADMIN — ATORVASTATIN CALCIUM 80 MG: 40 TABLET, FILM COATED ORAL at 21:46

## 2024-02-09 RX ADMIN — SODIUM CHLORIDE, PRESERVATIVE FREE 10 ML: 5 INJECTION INTRAVENOUS at 09:00

## 2024-02-09 RX ADMIN — ASPIRIN 81 MG 81 MG: 81 TABLET ORAL at 10:47

## 2024-02-09 RX ADMIN — PANTOPRAZOLE SODIUM 40 MG: 40 TABLET, DELAYED RELEASE ORAL at 06:56

## 2024-02-09 RX ADMIN — OXCARBAZEPINE 300 MG: 300 TABLET, FILM COATED ORAL at 10:50

## 2024-02-09 RX ADMIN — QUETIAPINE FUMARATE 25 MG: 25 TABLET ORAL at 21:46

## 2024-02-09 RX ADMIN — OXCARBAZEPINE 300 MG: 300 TABLET, FILM COATED ORAL at 21:46

## 2024-02-09 RX ADMIN — Medication 1 TABLET: at 10:47

## 2024-02-09 RX ADMIN — CYANOCOBALAMIN TAB 1000 MCG 500 MCG: 1000 TAB at 10:47

## 2024-02-09 NOTE — PLAN OF CARE
Problem: Discharge Planning  Goal: Discharge to home or other facility with appropriate resources  Outcome: Progressing  Flowsheets (Taken 2/9/2024 0800 by Pippa Ivan, RN)  Discharge to home or other facility with appropriate resources: Identify barriers to discharge with patient and caregiver     Problem: Chronic Conditions and Co-morbidities  Goal: Patient's chronic conditions and co-morbidity symptoms are monitored and maintained or improved  Outcome: Progressing  Flowsheets (Taken 2/9/2024 0800 by Pippa Ivan, RN)  Care Plan - Patient's Chronic Conditions and Co-Morbidity Symptoms are Monitored and Maintained or Improved: Monitor and assess patient's chronic conditions and comorbid symptoms for stability, deterioration, or improvement

## 2024-02-09 NOTE — PLAN OF CARE
Problem: SLP Adult - Impaired Communication  Goal: By Discharge: Demonstrates communication skills at highest level of function for planned discharge setting.  See evaluation for individualized goals.  Description: Stuttering Goals:    Pt will:  1. Identify dysfluent speech in herself given min prompts, during structured/spontaneous speaking tasks in 4/5 opportunities.   2. Use compensatory strategies for increased fluent speech to 95% during structured tasks in 4/5 opportunities given min cues.   3. Participate in cognitive-linguistic testing to further identify communicative strengths/weaknesses for improved functional independence and quality of life.    Outcome: Progressing     Problem: SLP Adult - Impaired Swallowing  Goal: By Discharge: Advance to least restrictive diet without signs or symptoms of aspiration for planned discharge setting.  See evaluation for individualized goals.  Description: Patient will:  1. Tolerate PO trials with 0 s/s overt distress in 4/5 trials  2. Utilize compensatory swallow strategies/maneuvers (decrease bite/sip, size/rate, alt. liq/sol) with min cues in 4/5 trials    Rec:     Regular diet with thin liquids  Aspiration precautions  HOB >45 during po intake, remain >30 for 30-45 minutes after po   Small bites/sips; alternate liquid/solid with slow feeding rate   Oral care TID  Meds per pt preference      Outcome: Progressing   SPEECH LANGUAGE PATHOLOGY BEDSIDE SWALLOW AND SPEECH/LANGUAGE EVALUATION    Patient: Purvi Denny (68 y.o. female)  Date: 2/9/2024  Primary Diagnosis: TIA (transient ischemic attack) [G45.9]  CVA (cerebrovascular accident due to intracerebral hemorrhage) ([facility]) [I61.9]  Cerebrovascular accident (CVA), unspecified mechanism ([facility]) [I63.9]       Precautions: Aspiration  PLOF: As per H&P    DYSPHAGIA ASSESSMENT:  Based on the objective data described below, the patient presents with oropharyngeal swallow fxn essentially WFL. Strength, ROM, and coordination of  intake;Remain upright for 30-45 minutes after meals;Small bites/sips  Recommended Form of Meds: Whole with water  Patient Education: Pt educated on aspiration precautions and safe swallow techniques.  Patient Education Response: Verbalizes understanding  Frequency/Duration:   Dysphagia: Patient will be followed by speech-language pathology 1-2 times to address goals.  Speech/Language: Patient will be followed by speech-language pathology 1-2 times to address goals.     SUBJECTIVE:   Patient stated, “I'm thirsty”.    OBJECTIVE:     Past Medical History:   Diagnosis Date    Abnormal coordination 9/21/2020    Depression 4/12/2022    Failure to thrive in adult 4/12/2022    Hallucinations 9/21/2020    Hypertension     Neurological disorder     Seizures (HCC)     Stroke (HCC) 2009    Subclinical hypothyroidism 4/13/2022     Past Surgical History:   Procedure Laterality Date    PLACE PERCUT GASTROSTOMY TUBE  1/11/2022          Prior Level of Function/Home Situation:      Daily Assessment:  Baseline Assessment  Respiratory Status: Room air  O2 Device: None (Room air)  Communication Observation: Dysarthria  Follows Directions: Simple  Current Diet : Regular  Current Liquid Diet : Thin  Dentition: Adequate  Patient Positioning: Upright in bed  Baseline Vocal Quality: Normal  Volitional Cough: Strong       Orientation:  Person and Place    Oral Assessment:  Oral Motor   Labial: No impairment  Dentition: Intact  Oral Hygiene: Moist, Clean  Lingual: No impairment  Mandible: No impairment        P.O. Trials:  PO Trials  Assessment Method(s): Observation, Palpation  Vocal Quality: No Impairment  Consistency Presented: Regular, Pureed, Thin  How Presented: Self-fed/presented, Straw, Spoon  Bolus Acceptance: No impairment  Bolus Formation/Control: No impairment  Propulsion: No impairment  Oral Residue: 10-50% of bolus  Initiation of Swallow: No impairment  Laryngeal Elevation: Functional  Aspiration Signs/Symptoms: None  Pharyngeal

## 2024-02-09 NOTE — PROGRESS NOTES
OT order received and chart reviewed.    Pt off the unit at this time, will continue to follow and see Pt as available.    Thank you for this referral,    Florian Sosa, OTS

## 2024-02-09 NOTE — PROGRESS NOTES
OCCUPATIONAL THERAPY EVALUATION/DISCHARGE    Patient: Purvi Denny (68 y.o. female)  Date: 2/9/2024  Primary Diagnosis: TIA (transient ischemic attack) [G45.9]  CVA (cerebrovascular accident due to intracerebral hemorrhage) (HCC) [I61.9]  Cerebrovascular accident (CVA), unspecified mechanism (HCC) [I63.9]  Precautions: Fall Risk  PLOF: Patient needed assist/set-up with all self-care and functional transfers/mobility PTA with RW/wheelchair. Pt reports she has a PCA that comes from 11am-5pm and sons assist in the evening/night.      ASSESSMENT AND RECOMMENDATIONS:  Pt cleared to participate in OT evaluation by RN. Upon entering the room, the pt was seated in chair, alert, and agreeable to participate in OT session. Patient with increased time for expressing thoughts, however able to get words out appropriately in conversation with stutter noted. Patient reports L side numbness has resolved, able to feel LT in 3/3 trials. Patient stand by assist for wiping face/ applying bonnet to head and stand by assist for reaching BUE to back to simulate washing her back. Based on the objective data described below, the patient presents at functional baseline for self-care tasks. Maximum therapeutic gains met at current level of care and patient will be discharged from occupational therapy at this time.    Further Equipment Recommendations for Discharge: hospital bed and mechanical lift    AMPA: AM-PAC Inpatient Daily Activity Raw Score: 16    At this time and based on an AM-PAC score, no further OT is recommended upon discharge due to patient at baseline functional status for ADLs. Recommend patient returns to prior setting with prior services.    This AMPA score should be considered in conjunction with interdisciplinary team recommendations to determine the most appropriate discharge setting. Patient's social support, diagnosis, medical stability, and prior level of function should also be taken into consideration.

## 2024-02-09 NOTE — PROGRESS NOTES
Physical Therapy    PHYSICAL THERAPY EVALUATION/DISCHARGE    Patient: Purvi Denny (68 y.o. female)  Date: 2/9/2024  Primary Diagnosis: TIA (transient ischemic attack) [G45.9]  CVA (cerebrovascular accident due to intracerebral hemorrhage) (HCC) [I61.9]  Cerebrovascular accident (CVA), unspecified mechanism (HCC) [I63.9]       Precautions: Fall Risk, General Precautions    PLOF: pt son lives with her, has PCA during the week, transfers to , has RW, gets assist with ADLs     ASSESSMENT AND RECOMMENDATIONS:  Pt in bed in NAD and agreeable. Denies pain. Good sitting balance at EOB. ROM and strength grossly intact without acute deficits in strength. Pt stands with CGA for SPT from bed to recliner next to bed, no LOB noted. Pt positioned for comfort with all needs met. No acute functional deficits warranting further acute PT, will sign off. Nursing present and aware of session.    Patient does not require further skilled physical therapy intervention at this level of care.    Further Equipment Recommendations for Discharge: RW    [facility]: AM-PAC Inpatient Mobility Raw Score : 16      At this time and based on an AM-PAC score, no further PT is recommended upon discharge due to (i.e. patient at baseline functional status…etc…).  Recommend patient returns to prior setting with prior services.    This [facility] score should be considered in conjunction with interdisciplinary team recommendations to determine the most appropriate discharge setting. Patient's social support, diagnosis, medical stability, and prior level of function should also be taken into consideration.     SUBJECTIVE:   Patient stated “My son lives with me.”    OBJECTIVE DATA SUMMARY:     Past Medical History:   Diagnosis Date    Abnormal coordination 9/21/2020    Depression 4/12/2022    Failure to thrive in adult 4/12/2022    Hallucinations 9/21/2020    Hypertension     Neurological disorder     Seizures (HCC)     Stroke (HCC) 2009    Subclinical      Balance  Sitting: Intact  Sitting - Static: Good (unsupported)  Sitting - Dynamic: Good (unsupported)  Standing: Impaired  Standing - Static: Fair  Standing - Dynamic: Fair    Pain:  Pain level pre-treatment: 0/10   Pain level post-treatment: 0/10   Pain Intervention(s): Medication (see MAR); Rest, Ice, Repositioning  Response to intervention: Nurse notified     Activity Tolerance:   Activity Tolerance: Patient tolerated evaluation without incident  Please refer to the flowsheet for vital signs taken during this treatment.    After treatment:   [x]         Patient left in no apparent distress sitting up in chair  []         Patient left in no apparent distress in bed  [x]         Call bell left within reach  [x]         Nursing notified  []         Caregiver present  []         Bed alarm activated  []         SCDs applied    COMMUNICATION/EDUCATION:   Patient Education  Education Provided: Role of Therapy;Plan of Care  Education Method: Demonstration;Verbal;Teach Back  Barriers to Learning: None  Education Outcome: Verbalized understanding    Thank you for this referral.  Holly Mathis, PT  Minutes: 18      Eval Complexity: Decision Making: Low Complexity

## 2024-02-09 NOTE — PROGRESS NOTES
[facility] Hospitalist [facility]  Progress Note    Patient: Purvi Denny Age: 68 y.o. : 1955 MR#: [id number] SSN:   Date/Time: 2024     Subjective:     Purvi Denny, 68 y.o. female, PMH prior CVA with residual dysarthria, seizure disorder, multiple ED visits without admission d/t witnessed or concerns of seizure activity, most recently 2024; admitted 1 day ago for concern of CVA.  Pt arrived to ED via EMS, per triage notes, EMS was called by a family member who observed patient having seizure-like activity lasting 2 minutes.      Patient has no memory of how she ended up in the hospital, only recalls sitting in her chair watching TV before being aware of being in the hospital. She endorses new onset intermittent chest tightness located mid-lower sternum occurring several mins at a time before resolving.  Denies chest pain, difficulty breathing, shortness of breath.  She also endorses new onset bilateral heel pain describes as dull, non-radiating exacerbated with touch.        Objective:     Vitals:   Vitals:    24 1134   BP: 124/77   Pulse: 72   Resp: 18   Temp: 98 °F (36.7 °C)   SpO2: 99%     PE:  General:  alert/orientedx3; +dysarthria  Pulmonary:  CTA Bilaterally. No Wheezing/Rhonchi/Rales.  Cardiovascular: Regular rate and Rhythm.  GI:  Soft, Non distended, Non tender. + Bowel sounds.  Extremities:  No edema, cyanosis, clubbing. No calf tenderness.   Neurologic: alert/orientedx3,  +left sided finger-to-nose test; +left UE/LE weakness  Additional: pt understands questions but has difficulty speaking, communication via yes/no questions      Assessment/Plan:     Episode LOC with unclear etiology, possible CVA vs seizure  - CT head (24): no acute findings, old right temporoparietal infarct  - CTA head/neck w/ w/o contrast (24): negative for large vessel occlusion; mild stenosis R&L proximal anterior cerebral arteries  - MRI (24): old infarct right

## 2024-02-09 NOTE — CARE COORDINATION
02/09/24 1248   Service Assessment   Patient Orientation Alert and Oriented;Person;Place;Situation;Self   Cognition Alert   History Provided By Patient   Primary Caregiver Family   Support Systems Children;Other (Comment)  (Personal Care Aides)   PCP Verified by CM Yes   Prior Functional Level Assistance with the following:;Bathing;Dressing;Toileting;Mobility;Shopping;Housework;Cooking   Current Functional Level Assistance with the following:;Bathing;Dressing;Toileting;Cooking;Housework;Shopping;Mobility   Can patient return to prior living arrangement Yes   Ability to make needs known: Good   Family able to assist with home care needs: Yes   Financial Resources Medicare   Community Resources None   Social/Functional History   Lives With Son   Type of Home Apartment   Home Layout One level   Home Access Level entry   Bathroom Shower/Tub Tub/Shower unit   Bathroom Toilet Handicap height   Bathroom Equipment Tub transfer bench;Shower chair   Bathroom Accessibility Accessible   Home Equipment Walker, rolling;Wheelchair-manual  (Shower Chair with Back.)   Receives Help From Family;Personal care attendant   ADL Assistance Needs assistance   Toileting Needs assistance   Homemaking Assistance Needs assistance   Ambulation Assistance Needs assistance   Transfer Assistance Needs assistance   Active  No   Patient's  Info Medicaid Transport or patient's family.   Occupation Retired   Discharge Planning   Type of Residence House   Living Arrangements Children  (Patient lives with her son.)   Current Services Prior To Admission Durable Medical Equipment;Private Duty Homecare  (Personal Care Aides 11 am- 5 pm daily, son provides care after hours.)   Current DME Prior to Arrival Shower Chair;Wheelchair;Walker   Potential Assistance Needed N/A   DME Ordered? No   Potential Assistance Purchasing Medications No   Type of Home Care Services None   Patient expects to be discharged to: Apartment  (with Family Assistance,  to discharge:  None at this time.         The Plan for Transition of Care is related to the following treatment goals of TIA (transient ischemic attack) [G45.9]  CVA (cerebrovascular accident due to intracerebral hemorrhage) (HCC) [I61.9]  Cerebrovascular accident (CVA), unspecified mechanism (HCC) [I63.9]    IF APPLICABLE: The Patient and/or patient representative Loja and her family were provided with a choice of provider and agrees with the discharge plan. Freedom of choice list with basic dialogue that supports the patient's individualized plan of care/goals and shares the quality data associated with the providers was provided to:     Patient Representative Name:       The Patient and/or Patient Representative Agree with the Discharge Plan?      Lexus Edwards  Case Management Department

## 2024-02-09 NOTE — CARE COORDINATION
CM called patient's son Benjamín Denny [phone], CM received voicemail, CM left a message for a return phone call.             Lexus Edwards RN  Case Management [phone]

## 2024-02-09 NOTE — H&P
HISTORY & PHYSICAL      Patient: Purvi Denny MRN: [id number]  CSN: [id number]    YOB: 1955  Age: 68 y.o.  Sex: female    DOA: 2/8/2024 LOS:  LOS: 0 days        DOA: 2/8/2024        Assessment/Plan     Principal Problem:    CVA (cerebrovascular accident due to intracerebral hemorrhage) (HCC)  Active Problems:    TIA (transient ischemic attack)  Resolved Problems:    * No resolved hospital problems. *      Patient Active Problem List   Diagnosis    Severe protein-calorie malnutrition (HCC)    Dehydration    Non compliance w medication regimen    TIA (transient ischemic attack)    Encephalopathy    Abnormal coordination    Chest pain    Convulsion (HCC)    Hallucinations    Seizure (HCC)    Breakthrough seizure (HCC)    Hyperkalemia    Ataxia    Left-sided weakness    KENDALL (acute kidney injury) (HCC)    Dyslipidemia    Status epilepticus (HCC)    Recurrent seizures (HCC)    Elevated Dilantin level    Pulmonary embolism (HCC)    Headache    Essential hypertension    Anemia    Depression    Failure to thrive in adult    Subclinical hypothyroidism    ST segment depression    Severe sepsis (HCC)    Aspiration pneumonia (HCC)    Constipation    Aphasia    CVA (cerebrovascular accident due to intracerebral hemorrhage) (HCC)         Plan:  TIA-CT head did not show any acute changes, MRI brain ordered, echocardiogram, neurology consult in AM.  Continue aspirin, Plavix, statin.  History of seizure disorder-resume chronic home medications, Depakote and Trileptal.  History of hypertension-hold blood pressure medications to allow permissive hypertension.  History of difficulty talking secondary to previous strokes  DVT prophylaxis-Lovenox  Full code          History of Presenting Illness:    68-year-old female with a history of hypertension, seizure disorder, prior CVA, neurologic disorder on antipsychotics presented to the emergency room secondary to?  Syncope versus change in speech pattern and word  2 %    Immature Granulocytes 0 0.0 - 0.5 %    Neutrophils Absolute 4.9 1.8 - 8.0 K/UL    Lymphocytes Absolute 2.5 0.9 - 3.6 K/UL    Monocytes Absolute 0.9 0.05 - 1.2 K/UL    Eosinophils Absolute 0.1 0.0 - 0.4 K/UL    Basophils Absolute 0.1 0.0 - 0.1 K/UL    Absolute Immature Granulocyte 0.0 0.00 - 0.04 K/UL    Differential Type AUTOMATED     Comprehensive Metabolic Panel    Collection Time: 02/08/24  3:40 PM   Result Value Ref Range    Sodium 146 (H) 136 - 145 mmol/L    Potassium 4.3 3.5 - 5.5 mmol/L    Chloride 116 (H) 100 - 111 mmol/L    CO2 28 21 - 32 mmol/L    Anion Gap 2 (L) 3.0 - 18 mmol/L    Glucose 89 74 - 99 mg/dL    BUN 35 (H) 7.0 - 18 MG/DL    Creatinine 1.15 0.6 - 1.3 MG/DL    Bun/Cre Ratio 30 (H) 12 - 20      Est, Glom Filt Rate 52 (L) >60 ml/min/1.73m2    Calcium 9.1 8.5 - 10.1 MG/DL    Total Bilirubin 0.2 0.2 - 1.0 MG/DL    ALT 23 13 - 56 U/L    AST 19 10 - 38 U/L    Alk Phosphatase 96 45 - 117 U/L    Total Protein 6.7 6.4 - 8.2 g/dL    Albumin 3.5 3.4 - 5.0 g/dL    Globulin 3.2 2.0 - 4.0 g/dL    Albumin/Globulin Ratio 1.1 0.8 - 1.7     Protime-INR    Collection Time: 02/08/24  3:40 PM   Result Value Ref Range    Protime 13.2 11.9 - 14.7 sec    INR 1.0 0.9 - 1.1     Troponin    Collection Time: 02/08/24  3:40 PM   Result Value Ref Range    Troponin, High Sensitivity 47 0 - 54 ng/L   Phenytoin Level, Total    Collection Time: 02/08/24  3:40 PM   Result Value Ref Range    Phenytoin Lvl 0.7 (L) 10.0 - 20.0 ug/mL   EKG 12 Lead    Collection Time: 02/08/24  4:24 PM   Result Value Ref Range    Ventricular Rate 90 BPM    Atrial Rate 90 BPM    P-R Interval 148 ms    QRS Duration 88 ms    Q-T Interval 366 ms    QTc Calculation (Bazett) 447 ms    P Axis 61 degrees    R Axis 0 degrees    T Axis 129 degrees    Diagnosis       Normal sinus rhythm  Minimal voltage criteria for LVH, may be normal variant ( Paulo product )  T wave abnormality, consider lateral ischemia  Abnormal ECG  When compared with ECG of

## 2024-02-09 NOTE — PROGRESS NOTES
Speech Pathology    SLP evaluation orders received. Unable to complete bedside swallow exam due to Pt not in room. RN reports Pt is currently receiving an echo. Will follow up later this business day.     Thank you for this referral.    Mariia Simpson M.S. CCC-SLP  Speech Language Pathologist

## 2024-02-09 NOTE — ED NOTES
Report was given to Marisela Morales RN for assumption of care. All questions and concerns regarding assumption of care were answered and the nurse assuming care verbalized understanding. Pt is currently in MRI.

## 2024-02-09 NOTE — PROGRESS NOTES
Physical Therapy    Pt not seen for skilled PT due to:    []  Nausea/vomiting  []  Eating  []  Pain  []  Pt lethargic  [x]  Off Unit (856)  Other:     Will f/u later as schedule allows. Thank you.  Holly Mathis, PT, DPT

## 2024-02-10 VITALS
HEIGHT: 60 IN | SYSTOLIC BLOOD PRESSURE: 159 MMHG | DIASTOLIC BLOOD PRESSURE: 88 MMHG | HEART RATE: 73 BPM | TEMPERATURE: 98.3 F | BODY MASS INDEX: 19.63 KG/M2 | RESPIRATION RATE: 17 BRPM | OXYGEN SATURATION: 100 % | WEIGHT: 100 LBS

## 2024-02-10 LAB
ANION GAP SERPL CALC-SCNC: 5 MMOL/L (ref 3–18)
BUN SERPL-MCNC: 31 MG/DL (ref 7–18)
BUN/CREAT SERPL: 26 (ref 12–20)
CALCIUM SERPL-MCNC: 8.7 MG/DL (ref 8.5–10.1)
CHLORIDE SERPL-SCNC: 113 MMOL/L (ref 100–111)
CO2 SERPL-SCNC: 22 MMOL/L (ref 21–32)
CREAT SERPL-MCNC: 1.17 MG/DL (ref 0.6–1.3)
GLUCOSE SERPL-MCNC: 76 MG/DL (ref 74–99)
POTASSIUM SERPL-SCNC: 4.2 MMOL/L (ref 3.5–5.5)
SODIUM SERPL-SCNC: 140 MMOL/L (ref 136–145)

## 2024-02-10 PROCEDURE — 6360000002 HC RX W HCPCS: Performed by: HOSPITALIST

## 2024-02-10 PROCEDURE — 36415 COLL VENOUS BLD VENIPUNCTURE: CPT

## 2024-02-10 PROCEDURE — 94761 N-INVAS EAR/PLS OXIMETRY MLT: CPT

## 2024-02-10 PROCEDURE — 2580000003 HC RX 258: Performed by: HOSPITALIST

## 2024-02-10 PROCEDURE — 6370000000 HC RX 637 (ALT 250 FOR IP): Performed by: HOSPITALIST

## 2024-02-10 PROCEDURE — 99231 SBSQ HOSP IP/OBS SF/LOW 25: CPT | Performed by: PSYCHIATRY & NEUROLOGY

## 2024-02-10 PROCEDURE — 80048 BASIC METABOLIC PNL TOTAL CA: CPT

## 2024-02-10 PROCEDURE — 99239 HOSP IP/OBS DSCHRG MGMT >30: CPT | Performed by: INTERNAL MEDICINE

## 2024-02-10 RX ORDER — OXCARBAZEPINE 300 MG/1
300 TABLET, FILM COATED ORAL 2 TIMES DAILY
Qty: 60 TABLET | Refills: 0 | Status: SHIPPED | OUTPATIENT
Start: 2024-02-10 | End: 2024-03-11

## 2024-02-10 RX ADMIN — OXCARBAZEPINE 300 MG: 300 TABLET, FILM COATED ORAL at 09:37

## 2024-02-10 RX ADMIN — FERROUS SULFATE TAB 325 MG (65 MG ELEMENTAL FE) 325 MG: 325 (65 FE) TAB at 09:37

## 2024-02-10 RX ADMIN — CYANOCOBALAMIN TAB 1000 MCG 500 MCG: 1000 TAB at 09:38

## 2024-02-10 RX ADMIN — PANTOPRAZOLE SODIUM 40 MG: 40 TABLET, DELAYED RELEASE ORAL at 06:58

## 2024-02-10 RX ADMIN — Medication 1 TABLET: at 09:37

## 2024-02-10 RX ADMIN — SODIUM CHLORIDE, PRESERVATIVE FREE 10 ML: 5 INJECTION INTRAVENOUS at 09:44

## 2024-02-10 RX ADMIN — CLOPIDOGREL BISULFATE 75 MG: 75 TABLET ORAL at 09:38

## 2024-02-10 RX ADMIN — PAROXETINE HYDROCHLORIDE 30 MG: 20 TABLET, FILM COATED ORAL at 09:37

## 2024-02-10 RX ADMIN — ASPIRIN 81 MG 81 MG: 81 TABLET ORAL at 09:38

## 2024-02-10 RX ADMIN — ENOXAPARIN SODIUM 30 MG: 100 INJECTION SUBCUTANEOUS at 09:39

## 2024-02-10 ASSESSMENT — PAIN SCALES - GENERAL
PAINLEVEL_OUTOF10: 0

## 2024-02-10 NOTE — CARE COORDINATION
Transportation set up through medicaid transportation [place] is [id number], the number called for this reservation is [phone]. Patient's nurse was informed.        Luisana Carlisle RN  Case Management

## 2024-02-10 NOTE — PROGRESS NOTES
0715- Bedside and Verbal shift change report given to Carina (oncoming nurse) by Emily (offgoing nurse). Report included the following information Nurse Handoff Report.     0940 -- AM meds given, assessment completed  1200- no change in patient condition, no distress noted.  1530 --no distress noted, patient resting in bed with call bell in reach. Called and spoke with Benjamín(son,) to inform of Mom's discharge.  Also spoke with Pricilla(son) to inform of mom's discharge, he stated he would be home to meet his mom.  1915-discharged home.

## 2024-02-10 NOTE — DISCHARGE INSTRUCTIONS
Ms Denny has been taken off her blood pressure medications (norvasc and spironolactone) because her blood pressures have been within acceptable limits while off her blood pressure medications during her hospital stay. It is possible that Ms Loja had an episode of passing out because her blood pressures may have been low while taking these blood pressure medications.     Your seizure medications have been refilled at the pharmacy of your choice. Please make sure you take your seizure medications.    Please make sure you measure your blood pressure at least once a day. If the top number is above 180 you need to call your doctor and if you have chest pain, headaches, nausea vomiting and high blood pressure you need to come back to the ED. If the top number is consistently above 150, start taking your blood pressure medications.    If the top number of your blood pressure is less than 100 you need to stop taking your blood pressure medications and call your doctor. You need to come back to the ED if your blood pressure is low and you also experience lightheadedness, if you pass out or have shortness of breath and chest pain you need to come back to the ED.     Patient armband removed and shredded  DISCHARGE SUMMARY from Nurse    PATIENT INSTRUCTIONS:    After general anesthesia or intravenous sedation, for 24 hours or while taking prescription Narcotics:  Limit your activities  Do not drive and operate hazardous machinery  Do not make important personal or business decisions  Do  not drink alcoholic beverages  If you have not urinated within 8 hours after discharge, please contact your surgeon on call.    Report the following to your surgeon:  Excessive pain, swelling, redness or odor of or around the surgical area  Temperature over 100.5  Nausea and vomiting lasting longer than 4 hours or if unable to take medications  Any signs of decreased circulation or nerve impairment to extremity: change in color, persistent

## 2024-02-10 NOTE — CARE COORDINATION
02/10/24 1213   IMM Letter   IMM Letter given to Patient/Family/Significant other/Guardian/POA/by: Sunil Barboza RN CM   IMM Letter date given: 02/10/24   IMM Letter time given: 1214

## 2024-02-10 NOTE — PROGRESS NOTES
[facility] Hospitalist [facility]  Progress Note    Patient: Purvi Denny Age: 68 y.o. : 1955 MR#: [id number] SSN: [id number]  Date/Time: 2024    Subjective:     Pt seen w/ nursing and physical therapist. She is a poor historian due to speech impediment    Assessment/Plan:   68 year old female w/ multiple medical conditions including seizure d/o, prior stroke admitted after she presented to the ED w/ reports of ?LOC.    -?LOC: syncope vs seizure. Neurologist input noted. MRI brain negative for acute stroke. Being monitored on tele. Echo --> nl EF, no WMA, normal diastolic function, trop negative.    HISTORY OF:  -Seizure d/o: AED adjusted by neuro  -HTN  -Dysarthria    PLAN:  -AED per neurology  -On DAPT  -Cont to monitor on tele  -Dc home tomrorow if no further events    Called and updated sister. LVM on children's phone. Son w/ following phone # not listed in chart  as given by pt's sister  Additional Notes:      Case discussed with:  [x]Patient  []Family  []Nursing  []Case Management  DVT Prophylaxis:  [x]Lovenox  []Hep SQ  []SCDs  []Coumadin   []On Heparin gtt    Objective:   VS: /71   Pulse 76   Temp 98.3 °F (36.8 °C) (Oral)   Resp 18   Ht 1.524 m (5')   Wt 45.4 kg (100 lb)   SpO2 100%   BMI 19.53 kg/m²    Tmax/24hrs: Temp (24hrs), Av.3 °F (36.8 °C), Min:98 °F (36.7 °C), Max:98.4 °F (36.9 °C)    Input/Output: No intake or output data in the 24 hours ending 24 2220    General: alert, awake, in NAD  HEENT: NCAT, sclerae anicteric,  mmm, neck supple  COR: rrr, no murmurs  PULM: CTAB  ABD: soft, nt, nd  EXT: no edema  NEURO: dysarthric, RLE w 2/5 motor strength, rest 5/5, follows commands, no gross focal abnormalities, speech abnormal, difficult to understand her words due to dysarthria, no tremors  PSYCH: non agitated      Labs:    Recent Results (from the past 24 hour(s))   CBC    Collection Time: 24  1:17 AM   Result Value Ref Range

## 2024-02-10 NOTE — PROGRESS NOTES
[unfilled]     Re:  Purvi Denny,Follow up visit     2/10/2024 11:44 AM    Subjective: No new problems overnight    Medications:    Current Facility-Administered Medications   Medication Dose Route Frequency Provider Last Rate Last Admin    sodium chloride bacteriostatic 0.9 % injection 10 mL  10 mL Injection Lunch Porfirio Santoyo MD        sodium chloride bacteriostatic 0.9 % injection 10 mL  10 mL Injection PRN Porfirio Santoyo MD   10 mL at 02/09/24 0922    aspirin chewable tablet 81 mg  81 mg Oral Daily Porfirio Santoyo MD   81 mg at 02/10/24 0938    atorvastatin (LIPITOR) tablet 80 mg  80 mg Oral Nightly Porfirio Santoyo MD   80 mg at 02/09/24 2146    clopidogrel (PLAVIX) tablet 75 mg  75 mg Oral Daily Porfirio Santoyo MD   75 mg at 02/10/24 0938    vitamin B-12 (CYANOCOBALAMIN) tablet 500 mcg  500 mcg Oral Daily Porfirio Santoyo MD   500 mcg at 02/10/24 0938    vitamin D (ERGOCALCIFEROL) capsule 50,000 Units  50,000 Units Oral Once per day on Mon Thu Porfirio Santoyo MD   50,000 Units at 02/09/24 0122    ferrous sulfate (IRON 325) tablet 325 mg  325 mg Oral Daily Porfirio Santoyo MD   325 mg at 02/10/24 0937    therapeutic multivitamin-minerals 1 tablet  1 tablet Oral Daily Porfirio Santoyo MD   1 tablet at 02/10/24 0937    pantoprazole (PROTONIX) tablet 40 mg  40 mg Oral QAM AC Porfirio Santoyo MD   40 mg at 02/10/24 0658    OXcarbazepine (TRILEPTAL) tablet 300 mg  300 mg Oral BID Porfirio Santoyo MD   300 mg at 02/10/24 0937    PARoxetine (PAXIL) tablet 30 mg  30 mg Oral Daily Porfirio Santoyo MD   30 mg at 02/10/24 0937    QUEtiapine (SEROQUEL) tablet 25 mg  25 mg Oral QHS Porfirio Santoyo MD   25 mg at 02/09/24 2146    sodium chloride flush 0.9 % injection 5-40 mL  5-40 mL IntraVENous 2 times per day Porfirio Santoyo MD   10 mL at 02/10/24 0944    sodium chloride flush 0.9 % injection 5-40 mL  5-40 mL IntraVENous PRN Porfirio Santoyo MD        0.9 % sodium  occipital horn of the right lateral  ventricle. There are old blood products in the region of infarction. There are a  few small foci of old blood products in both cerebral hemispheres. No  intracranial mass lesion or acute hemorrhage is demonstrated. Diffusion-weighted  images show no regions of restricted diffusion. No subfalcine or transtentorial  herniation is noted. There is mild cerebral atrophy. There are foci of  hyperintensity on long TR images in the periventricular and deep white matter  suggestive of moderate microvascular ischemic changes. There are lens implants  in both globes. The visualized portions of the sinuses and mastoid air cells are  clear.    Degenerative changes involve the cervical spine.  Impression: Old infarct involving the right parieto-occipital lobes. No evidence  of acute infarct. Findings suggestive of moderate microvascular ischemic  changes. Please review above for additional information.  CTA HEAD NECK W WO CONTRAST  Narrative: INDICATION:Cerebrovascular accident    TECHNIQUE: A spiral acquisition of the head and neck was obtained following the  administration of intravenous contrast. MIP and MPR images were obtained from  the data. NASCET  criteria was utilized to assess for vascular stenosis.    All CTs at this facility are performed using dose optimization techniques as  appropriate for performed exam which included adjustments in the MA and/or KV  according to the patient's size (including appropriate matching for the  site-specific examinations), or use of reconstruction technique.    FINDINGS: There is mild calcified plaque in the right carotid bulb. No  hemodynamically significant stenosis is demonstrated involving the right common  carotid artery, right internal carotid artery, and right external carotid  artery.    There is minimal soft plaque in the left carotid bulb. There is no evidence of  hemodynamically significant stenosis involving the left common carotid

## 2024-02-10 NOTE — CARE COORDINATION
D/C order noted for today. Orders reviewed. No needs identified at this time. CM remains available if needed.

## 2024-02-10 NOTE — DISCHARGE SUMMARY
[facility] Hospital[facility]  Discharge Summary       Patient: Purvi Denny Age: 68 y.o. : 1955 MR#: [id number]: [id number]  PCP on record: Deepali Montaño APRN - NP  Admit date: 2024  Discharge date: 2/10/2024    Consults:    -   Procedures:  -     Significant Diagnostic Studies:   -    Discharge Diagnoses:                                           Patient Active Problem List   Diagnosis    Severe protein-calorie malnutrition (HCC)    Dehydration    Non compliance w medication regimen    TIA (transient ischemic attack)    Encephalopathy    Abnormal coordination    Chest pain    Convulsion (HCC)    Hallucinations    Seizure (HCC)    Breakthrough seizure (HCC)    Hyperkalemia    Ataxia    Left-sided weakness    KENDALL (acute kidney injury) (HCC)    Dyslipidemia    Status epilepticus (HCC)    Recurrent seizures (HCC)    Elevated Dilantin level    Pulmonary embolism (HCC)    Headache    Essential hypertension    Anemia    Depression    Failure to thrive in adult    Subclinical hypothyroidism    ST segment depression    Severe sepsis (HCC)    Aspiration pneumonia (HCC)    Constipation    Aphasia    CVA (cerebrovascular accident due to intracerebral hemorrhage) (HCC)    Syncope       Hospital Course by Problem   1.             Today's examination of the patient revealed:     Subjective:     Objective:   VS: /79   Pulse 65   Temp 98.4 °F (36.9 °C) (Oral)   Resp 18   Ht 1.524 m (5')   Wt 45.4 kg (100 lb)   SpO2 100%   BMI 19.53 kg/m²    Tmax/24hrs: Temp (24hrs), Av.2 °F (36.8 °C), Min:98 °F (36.7 °C), Max:98.4 °F (36.9 °C)     Input/Output: No intake or output data in the 24 hours ending 02/10/24 1028    General:    Cardiovascular:    Pulmonary:    GI:    Extremities:    Additional:      Labs:    Recent Results (from the past 24 hour(s))   Basic Metabolic Panel w/ Reflex to MG    Collection Time: 02/10/24  3:25 AM   Result Value Ref Range    Sodium 140 136 - 145  mmol/L    Potassium 4.2 3.5 - 5.5 mmol/L    Chloride 113 (H) 100 - 111 mmol/L    CO2 22 21 - 32 mmol/L    Anion Gap 5 3.0 - 18 mmol/L    Glucose 76 74 - 99 mg/dL    BUN 31 (H) 7.0 - 18 MG/DL    Creatinine 1.17 0.6 - 1.3 MG/DL    Bun/Cre Ratio 26 (H) 12 - 20      Est, Glom Filt Rate 51 (L) >60 ml/min/1.73m2    Calcium 8.7 8.5 - 10.1 MG/DL     Additional Data Reviewed:     Condition:   Disposition:    []Home   []Home with Home Health   []SNF/NH   []Rehab   []Home with family   []Alternate Facility:____________________      Discharge Medications:     Current Discharge Medication List        CONTINUE these medications which have CHANGED    Details   OXcarbazepine (TRILEPTAL) 300 MG tablet Take 1 tablet by mouth 2 times daily  Qty: 60 tablet, Refills: 0           CONTINUE these medications which have NOT CHANGED    Details   ondansetron (ZOFRAN-ODT) 8 MG TBDP disintegrating tablet Take 1 tablet by mouth 3 times daily as needed for Nausea or Vomiting  Qty: 12 tablet, Refills: 1      omeprazole (PRILOSEC OTC) 20 MG tablet Take 1 tablet by mouth daily      aspirin 81 MG chewable tablet Take 1 tablet by mouth daily  Qty: 30 tablet, Refills: 3      atorvastatin (LIPITOR) 80 MG tablet Take 1 tablet by mouth nightly  Qty: 30 tablet, Refills: 0      QUEtiapine (SEROQUEL) 25 MG tablet Take 1 tablet by mouth nightly      Multiple Vitamins-Minerals (THERAPEUTIC MULTIVITAMIN-MINERALS) tablet Take 1 tablet by mouth daily  Qty: 30 tablet, Refills: 0      famotidine (PEPCID) 20 MG tablet Take 1 tablet by mouth 2 times daily  Qty: 90 tablet, Refills: 1      acetaminophen (TYLENOL) 325 MG tablet Take 2 tablets by mouth every 4 hours as needed for Fever or Pain      clopidogrel (PLAVIX) 75 MG tablet Take 1 tablet by mouth daily      cyanocobalamin 500 MCG tablet Take 1 tablet by mouth daily      ergocalciferol (ERGOCALCIFEROL) 1.25 MG (91384 UT) capsule Take 1 capsule by mouth Twice a Week      ferrous sulfate (IRON 325) 325 (65 Fe) MG

## 2024-02-10 NOTE — CONSULTS
[facility]  [address], [place] [address]  Office:  [phone]  Fax: [phone]    Referring: Dr. Loving    Chief Complaint   Patient presents with    Aphasia    Numbness     L sided numbness       HPI: The patient is a 68-year-old right-handed woman with a history of hypertension, history of stroke, seizures and depression who is currently admitted to the hospital for possible syncope and word finding difficulty.  The patient cannot elaborate on the reasons for which she is admitted and no family members are present at the bedside.  The patient has apparently been admitted for similar symptoms in the past.  Her most recent admission was in November 2023.  Her brain MRI scan at that time showed no evidence for acute ischemia though the initial radiologic interpretation suggested a small area of possible diffusion restriction along the margin of her area of known previous infarct in the right posterior MCA territory.  Her brain MRI scan this hospitalization shows no evidence for acute ischemia and an unchanged area of chronic ischemia in the right posterior MCA territory in the setting of advanced small vessel ischemic disease.  The patient tells me that she has significant pain in her feet.  She also indicates that she has severe urethral pain upon the initiation of urination which has been present for approximately 3 months.  This seems to be her primary concern at the time of my interview this evening.    Social History     Socioeconomic History    Marital status:      Spouse name: Not on file    Number of children: Not on file    Years of education: Not on file    Highest education level: Not on file   Occupational History    Not on file   Tobacco Use    Smoking status: Never    Smokeless tobacco: Never   Substance and Sexual Activity    Alcohol use: Yes    Drug use: No    Sexual activity: Not on file   Other Topics Concern    Not on file   Social History  02/09/24 0122    ferrous sulfate (IRON 325) tablet 325 mg  325 mg Oral Daily Porfirio Santoyo MD   325 mg at 02/09/24 1051    therapeutic multivitamin-minerals 1 tablet  1 tablet Oral Daily Porfirio Santoyo MD   1 tablet at 02/09/24 1047    pantoprazole (PROTONIX) tablet 40 mg  40 mg Oral QAM AC Porfirio Santoyo MD   40 mg at 02/09/24 0656    OXcarbazepine (TRILEPTAL) tablet 300 mg  300 mg Oral BID Porfirio Santoyo MD   300 mg at 02/09/24 1050    PARoxetine (PAXIL) tablet 30 mg  30 mg Oral Daily Porfirio Santoyo MD   30 mg at 02/09/24 1049    QUEtiapine (SEROQUEL) tablet 25 mg  25 mg Oral QHS Porfirio Santoyo MD   25 mg at 02/08/24 2334    sodium chloride flush 0.9 % injection 5-40 mL  5-40 mL IntraVENous 2 times per day Porfirio Santoyo MD   10 mL at 02/09/24 0900    sodium chloride flush 0.9 % injection 5-40 mL  5-40 mL IntraVENous PRN Porfirio Santoyo MD        0.9 % sodium chloride infusion   IntraVENous PRN Porfirio Santoyo MD        ondansetron (ZOFRAN-ODT) disintegrating tablet 4 mg  4 mg Oral Q8H PRN Porfirio Santoyo MD        Or    ondansetron (ZOFRAN) injection 4 mg  4 mg IntraVENous Q6H PRN Porfirio Santoyo MD        polyethylene glycol (GLYCOLAX) packet 17 g  17 g Oral Daily PRN Porfirio Santoyo MD        enoxaparin Sodium (LOVENOX) injection 30 mg  30 mg SubCUTAneous Daily Porfirio Santoyo MD   30 mg at 02/09/24 1050       Past Medical History:   Diagnosis Date    Abnormal coordination 9/21/2020    Depression 4/12/2022    Failure to thrive in adult 4/12/2022    Hallucinations 9/21/2020    Hypertension     Neurological disorder     Seizures (HCC)     Stroke (HCC) 2009    Subclinical hypothyroidism 4/13/2022       Past Surgical History:   Procedure Laterality Date    PLACE PERCUT GASTROSTOMY TUBE  1/11/2022            Allergies   Allergen Reactions    Tomato Hives     Allergic to eating tomato.    Aspirin Nausea And Vomiting    Ciprofloxacin Rash    Penicillins Hives and Rash

## 2024-02-13 LAB — OXCARBAZEPINE SERPL-MCNC: 16 UG/ML (ref 10–35)

## 2024-02-16 NOTE — PROGRESS NOTES
Physician Progress Note      PATIENT:               MARY VACA  [facility] #:                  [id number]  :                       1955  ADMIT DATE:       2024 3:28 PM  DISCH DATE:        2/10/2024 7:15 PM  RESPONDING  PROVIDER #:        Amando Loving MD          QUERY TEXT:    Hospitalist's note , \"?LOC: syncope vs seizure\". If possible, please   document in progress notes and discharge summary after study the etiology of   the syncope:    The medical record reflects the following:  Risk Factors: Seizure disorder; stroke  Clinical Indicators:   Hospitalist note:  -?LOC: syncope vs seizure.   Neurologist input noted. MRI brain negative for acute stroke. Being monitored   on tele. Echo --> nl EF, no WMA, normal diastolic function, trop negative.  Treatment:  CT, MRI; Neurology consult; telemetry    Thank you,  Deepali King RN/CCDS  [email or web address]  Options provided:  -- Syncope due to please specify, .  -- Syncope due to unknown etiology  -- Other - I will add my own diagnosis  -- Disagree - Not applicable / Not valid  -- Disagree - Clinically unable to determine / Unknown  -- Refer to Clinical Documentation Reviewer    PROVIDER RESPONSE TEXT:    seizure    Query created by: Deepali King on 2024 9:21 AM      Electronically signed by:  Amando Loving MD 2024 2:29 PM

## 2024-02-21 NOTE — DISCHARGE SUMMARY
CONTINUE these medications which have CHANGED    Details   OXcarbazepine (TRILEPTAL) 300 MG tablet Take 1 tablet by mouth 2 times daily, Disp-60 tablet, R-0Normal           CONTINUE these medications which have NOT CHANGED    Details   ondansetron (ZOFRAN-ODT) 8 MG TBDP disintegrating tablet Take 1 tablet by mouth 3 times daily as needed for Nausea or Vomiting, Disp-12 tablet, R-1Normal      omeprazole (PRILOSEC OTC) 20 MG tablet Take 1 tablet by mouth dailyHistorical Med      aspirin 81 MG chewable tablet Take 1 tablet by mouth daily, Disp-30 tablet, R-3Normal      atorvastatin (LIPITOR) 80 MG tablet Take 1 tablet by mouth nightly, Disp-30 tablet, R-0Normal      QUEtiapine (SEROQUEL) 25 MG tablet Take 1 tablet by mouth nightlyHistorical Med      Multiple Vitamins-Minerals (THERAPEUTIC MULTIVITAMIN-MINERALS) tablet Take 1 tablet by mouth daily, Disp-30 tablet, R-0Normal      famotidine (PEPCID) 20 MG tablet Take 1 tablet by mouth 2 times daily, Disp-90 tablet, R-1Normal      acetaminophen (TYLENOL) 325 MG tablet Take 2 tablets by mouth every 4 hours as needed for Fever or PainHistorical Med      clopidogrel (PLAVIX) 75 MG tablet Take 1 tablet by mouth dailyHistorical Med      cyanocobalamin 500 MCG tablet Take 1 tablet by mouth dailyHistorical Med      ergocalciferol (ERGOCALCIFEROL) 1.25 MG (76275 UT) capsule Take 1 capsule by mouth Twice a WeekHistorical Med      ferrous sulfate (IRON 325) 325 (65 Fe) MG tablet Take 1 tablet by mouth dailyHistorical Med      PARoxetine (PAXIL) 30 MG tablet Take 1 tablet by mouth dailyHistorical Med           STOP taking these medications       divalproex (DEPAKOTE SPRINKLE) 125 MG DR capsule Comments:   Reason for Stopping:         amLODIPine (NORVASC) 10 MG tablet Comments:   Reason for Stopping:         spironolactone (ALDACTONE) 25 MG tablet Comments:   Reason for Stopping:                 Follow-up Appointments:   1. Your PCP: Deepali Montaño, [facility] - NP, within

## 2024-05-28 ENCOUNTER — HOSPITAL ENCOUNTER (EMERGENCY)
Facility: HOSPITAL | Age: 69
Discharge: HOME | End: 2024-05-28
Attending: EMERGENCY MEDICINE
Payer: MEDICARE

## 2024-05-28 ENCOUNTER — APPOINTMENT (OUTPATIENT)
Facility: HOSPITAL | Age: 69
End: 2024-05-28
Payer: MEDICARE

## 2024-05-28 VITALS
RESPIRATION RATE: 17 BRPM | TEMPERATURE: 99 F | HEIGHT: 65 IN | OXYGEN SATURATION: 100 % | DIASTOLIC BLOOD PRESSURE: 73 MMHG | HEART RATE: 62 BPM | BODY MASS INDEX: 17.11 KG/M2 | WEIGHT: 102.7 LBS | SYSTOLIC BLOOD PRESSURE: 140 MMHG

## 2024-05-28 DIAGNOSIS — R56.9 SEIZURE (HCC): Primary | ICD-10-CM

## 2024-05-28 LAB
ALBUMIN SERPL-MCNC: 3.4 G/DL (ref 3.4–5)
ALBUMIN/GLOB SERPL: 1 (ref 0.8–1.7)
ALP SERPL-CCNC: 98 U/L (ref 45–117)
ALT SERPL-CCNC: 20 U/L (ref 13–56)
ANION GAP SERPL CALC-SCNC: 4 MMOL/L (ref 3–18)
APPEARANCE UR: CLEAR
AST SERPL-CCNC: 21 U/L (ref 10–38)
BACTERIA URNS QL MICRO: ABNORMAL /HPF
BASOPHILS # BLD: 0 K/UL (ref 0–0.1)
BASOPHILS NFR BLD: 0 % (ref 0–2)
BILIRUB SERPL-MCNC: 0.4 MG/DL (ref 0.2–1)
BILIRUB UR QL: NEGATIVE
BUN SERPL-MCNC: 16 MG/DL (ref 7–18)
BUN/CREAT SERPL: 16 (ref 12–20)
CALCIUM SERPL-MCNC: 9.3 MG/DL (ref 8.5–10.1)
CHLORIDE SERPL-SCNC: 114 MMOL/L (ref 100–111)
CO2 SERPL-SCNC: 26 MMOL/L (ref 21–32)
COLOR UR: YELLOW
CREAT SERPL-MCNC: 0.97 MG/DL (ref 0.6–1.3)
DIFFERENTIAL METHOD BLD: ABNORMAL
EKG ATRIAL RATE: 70 BPM
EKG DIAGNOSIS: NORMAL
EKG P AXIS: 54 DEGREES
EKG P-R INTERVAL: 152 MS
EKG Q-T INTERVAL: 394 MS
EKG QRS DURATION: 74 MS
EKG QTC CALCULATION (BAZETT): 425 MS
EKG R AXIS: -9 DEGREES
EKG T AXIS: 123 DEGREES
EKG VENTRICULAR RATE: 70 BPM
EOSINOPHIL # BLD: 0.1 K/UL (ref 0–0.4)
EOSINOPHIL NFR BLD: 1 % (ref 0–5)
EPITH CASTS URNS QL MICRO: ABNORMAL /LPF (ref 0–5)
ERYTHROCYTE [DISTWIDTH] IN BLOOD BY AUTOMATED COUNT: 14.8 % (ref 11.6–14.5)
FLUAV RNA SPEC QL NAA+PROBE: NOT DETECTED
FLUBV RNA SPEC QL NAA+PROBE: NOT DETECTED
GLOBULIN SER CALC-MCNC: 3.5 G/DL (ref 2–4)
GLUCOSE SERPL-MCNC: 90 MG/DL (ref 74–99)
GLUCOSE UR STRIP.AUTO-MCNC: NEGATIVE MG/DL
HCT VFR BLD AUTO: 38 % (ref 35–45)
HGB BLD-MCNC: 12.2 G/DL (ref 12–16)
HGB UR QL STRIP: NEGATIVE
IMM GRANULOCYTES # BLD AUTO: 0 K/UL (ref 0–0.04)
IMM GRANULOCYTES NFR BLD AUTO: 0 % (ref 0–0.5)
KETONES UR QL STRIP.AUTO: NEGATIVE MG/DL
LEUKOCYTE ESTERASE UR QL STRIP.AUTO: ABNORMAL
LYMPHOCYTES # BLD: 2.6 K/UL (ref 0.9–3.6)
LYMPHOCYTES NFR BLD: 24 % (ref 21–52)
MAGNESIUM SERPL-MCNC: 1.9 MG/DL (ref 1.6–2.6)
MCH RBC QN AUTO: 30.5 PG (ref 24–34)
MCHC RBC AUTO-ENTMCNC: 32.1 G/DL (ref 31–37)
MCV RBC AUTO: 95 FL (ref 78–100)
MONOCYTES # BLD: 1 K/UL (ref 0.05–1.2)
MONOCYTES NFR BLD: 9 % (ref 3–10)
NEUTS SEG # BLD: 7.1 K/UL (ref 1.8–8)
NEUTS SEG NFR BLD: 66 % (ref 40–73)
NITRITE UR QL STRIP.AUTO: NEGATIVE
NRBC # BLD: 0 K/UL (ref 0–0.01)
NRBC BLD-RTO: 0 PER 100 WBC
PH UR STRIP: 7 (ref 5–8)
PLATELET # BLD AUTO: 150 K/UL (ref 135–420)
PMV BLD AUTO: 12.2 FL (ref 9.2–11.8)
POTASSIUM SERPL-SCNC: 4.6 MMOL/L (ref 3.5–5.5)
PROT SERPL-MCNC: 6.9 G/DL (ref 6.4–8.2)
PROT UR STRIP-MCNC: NEGATIVE MG/DL
RBC # BLD AUTO: 4 M/UL (ref 4.2–5.3)
RBC #/AREA URNS HPF: ABNORMAL /HPF (ref 0–5)
SARS-COV-2 RNA RESP QL NAA+PROBE: NOT DETECTED
SODIUM SERPL-SCNC: 144 MMOL/L (ref 136–145)
SP GR UR REFRACTOMETRY: >1.03 (ref 1–1.03)
TROPONIN I SERPL HS-MCNC: 43 NG/L (ref 0–54)
UROBILINOGEN UR QL STRIP.AUTO: 0.2 EU/DL (ref 0.2–1)
WBC # BLD AUTO: 10.8 K/UL (ref 4.6–13.2)
WBC URNS QL MICRO: ABNORMAL /HPF (ref 0–4)

## 2024-05-28 PROCEDURE — 70450 CT HEAD/BRAIN W/O DYE: CPT

## 2024-05-28 PROCEDURE — 85025 COMPLETE CBC W/AUTO DIFF WBC: CPT

## 2024-05-28 PROCEDURE — 74177 CT ABD & PELVIS W/CONTRAST: CPT

## 2024-05-28 PROCEDURE — 80053 COMPREHEN METABOLIC PANEL: CPT

## 2024-05-28 PROCEDURE — 93005 ELECTROCARDIOGRAM TRACING: CPT | Performed by: EMERGENCY MEDICINE

## 2024-05-28 PROCEDURE — 6370000000 HC RX 637 (ALT 250 FOR IP): Performed by: EMERGENCY MEDICINE

## 2024-05-28 PROCEDURE — 99285 EMERGENCY DEPT VISIT HI MDM: CPT

## 2024-05-28 PROCEDURE — 84484 ASSAY OF TROPONIN QUANT: CPT

## 2024-05-28 PROCEDURE — 80183 DRUG SCRN QUANT OXCARBAZEPIN: CPT

## 2024-05-28 PROCEDURE — 6360000004 HC RX CONTRAST MEDICATION: Performed by: EMERGENCY MEDICINE

## 2024-05-28 PROCEDURE — 81001 URINALYSIS AUTO W/SCOPE: CPT

## 2024-05-28 PROCEDURE — 94761 N-INVAS EAR/PLS OXIMETRY MLT: CPT

## 2024-05-28 PROCEDURE — 93010 ELECTROCARDIOGRAM REPORT: CPT | Performed by: INTERNAL MEDICINE

## 2024-05-28 PROCEDURE — 87636 SARSCOV2 & INF A&B AMP PRB: CPT

## 2024-05-28 PROCEDURE — 71045 X-RAY EXAM CHEST 1 VIEW: CPT

## 2024-05-28 PROCEDURE — 83735 ASSAY OF MAGNESIUM: CPT

## 2024-05-28 RX ORDER — OXCARBAZEPINE 300 MG/1
300 TABLET, FILM COATED ORAL 2 TIMES DAILY
Qty: 120 TABLET | Refills: 0 | Status: SHIPPED | OUTPATIENT
Start: 2024-05-28 | End: 2024-07-27

## 2024-05-28 RX ORDER — ACETAMINOPHEN 325 MG/1
650 TABLET ORAL
Status: COMPLETED | OUTPATIENT
Start: 2024-05-28 | End: 2024-05-28

## 2024-05-28 RX ADMIN — IOPAMIDOL 100 ML: 612 INJECTION, SOLUTION INTRAVENOUS at 12:44

## 2024-05-28 RX ADMIN — ACETAMINOPHEN 650 MG: 325 TABLET ORAL at 12:59

## 2024-05-28 ASSESSMENT — PAIN SCALES - GENERAL: PAINLEVEL_OUTOF10: 8

## 2024-05-28 ASSESSMENT — PAIN DESCRIPTION - DESCRIPTORS: DESCRIPTORS: ACHING

## 2024-05-28 ASSESSMENT — PAIN DESCRIPTION - LOCATION: LOCATION: HEAD

## 2024-05-28 ASSESSMENT — PAIN - FUNCTIONAL ASSESSMENT: PAIN_FUNCTIONAL_ASSESSMENT: NONE - DENIES PAIN

## 2024-05-28 NOTE — ED NOTES
1145H - Patient requested to call his son to inform him that patient is in Emergency Room.  1208H - patient's son (Mr. Butts) informed and he said he is aware.

## 2024-05-28 NOTE — CONSULTS
Called by ER regarding this patient with a known seizure disorder who had a breakthrough seizure. Of note she is on a minimal/starting dose of trileptal of 300mg BID    I have simply recommended this dose be doubled over 1 week. She can start taking 450mg BID (1.5 tabs) and then in one week increase to 2 full tabs.      Tono Mayfield M.D.  Clinical Neurophysiologist  Neuromuscular Medicine

## 2024-05-28 NOTE — ED NOTES
Pt awake& alert lying in stretcher. Placed on cardiac monitor in gown and on purewick. No distress noted.

## 2024-05-28 NOTE — ED TRIAGE NOTES
Pt presents to ed bed 8 via ems with c/o seizures x45 mins ago according to family. Pt was unresponsive but was unable to tell how long. Hx stroke in 2022

## 2024-05-28 NOTE — DISCHARGE INSTRUCTIONS
Follow-up with Dr. Martinez, neurologist, in 1 to 2 days.  Taper up on Trileptal as per instruction.  Return to the ER for any concerns.  Please return to the ER for any new or worsening symptoms including but not limited to headaches, numbness or tingling, feeling faint, chest pain, heart palpitations, problems walking or talking, shortness of breath, confusional episodes, weakness, skin color changes, or any concerns.  If a bystander sees you have a seizure- call 911, lay patient on side away from any hard objects to avoid injury.  Do not attempt to place anything in the patient's mouth.    Please follow-up with the doctor provided.  Drink plenty of fluids and eat regular meals and snacks.  Call 911 if you pass out or have another seizure.      A copy of all your test results have been given to you.  Please follow up with the doctor in 1-2 days to review all the results to add to your record and to pursue any abnormal findings.

## 2024-05-28 NOTE — ED PROVIDER NOTES
EMERGENCY DEPARTMENT HISTORY AND PHYSICAL EXAM    6:01 PM      Date: 5/28/2024  Patient Name: Purvi Denny    History of Presenting Illness     Chief Complaint   Patient presents with    Seizures       History From: Patient and EMS  HPI  Purvi Denny is a 69 y.o. who female  has a past medical history of Abnormal coordination, Depression, Failure to thrive in adult, Hallucinations, Hypertension, Neurological disorder, Seizures (HCC), Stroke (HCC), and Subclinical hypothyroidism.. Purvi Denny is a 69 y.o. female presenting with a seizure that occurred 45 minutes prior to arrival per EMS.  Patient has no knowledge of the seizure.  Patient has history of seizures and she is not sure what she is taking but per her med list she is on Trileptal 300 mg twice daily.  She does say that she does take her medication twice daily and has not missed any doses and she is not aware of any recent dose changes.  She denies any recent illnesses.      Nursing Notes were all reviewed and agreed with or any disagreements were addressed in the HPI.    PCP: Deepali Montaño, [facility] - NP    No current facility-administered medications for this encounter.     Current Outpatient Medications   Medication Sig Dispense Refill    OXcarbazepine (TRILEPTAL) 300 MG tablet Take 1 tablet by mouth 2 times daily Take 1.5 tablets by mouth twice a day for one week, then take 2 tablets twice a day after that. 120 tablet 0    ondansetron (ZOFRAN-ODT) 8 MG TBDP disintegrating tablet Take 1 tablet by mouth 3 times daily as needed for Nausea or Vomiting 12 tablet 1    omeprazole (PRILOSEC OTC) 20 MG tablet Take 1 tablet by mouth daily      aspirin 81 MG chewable tablet Take 1 tablet by mouth daily 30 tablet 3    atorvastatin (LIPITOR) 80 MG tablet Take 1 tablet by mouth nightly 30 tablet 0    QUEtiapine (SEROQUEL) 25 MG tablet Take 1 tablet by mouth nightly      Multiple Vitamins-Minerals (THERAPEUTIC MULTIVITAMIN-MINERALS) tablet Take 1 tablet by mouth

## 2024-05-28 NOTE — ED NOTES
Called patient's nurse, Ms. Chance (number provided by patient's son); she said she will be meeting her at patient's house.

## 2024-05-31 LAB — OXCARBAZEPINE SERPL-MCNC: 15 UG/ML (ref 10–35)

## 2024-06-03 ENCOUNTER — HOSPITAL ENCOUNTER (EMERGENCY)
Facility: HOSPITAL | Age: 69
Discharge: HOME | End: 2024-06-04
Attending: EMERGENCY MEDICINE
Payer: MEDICARE

## 2024-06-03 VITALS
SYSTOLIC BLOOD PRESSURE: 128 MMHG | HEIGHT: 65 IN | WEIGHT: 102 LBS | TEMPERATURE: 98.4 F | HEART RATE: 64 BPM | OXYGEN SATURATION: 99 % | DIASTOLIC BLOOD PRESSURE: 61 MMHG | BODY MASS INDEX: 17 KG/M2 | RESPIRATION RATE: 16 BRPM

## 2024-06-03 DIAGNOSIS — L89.152 PRESSURE INJURY OF SACRAL REGION, STAGE 2 (HCC): Primary | ICD-10-CM

## 2024-06-03 PROCEDURE — 99283 EMERGENCY DEPT VISIT LOW MDM: CPT

## 2024-06-03 RX ORDER — ACETAMINOPHEN 325 MG/1
650 TABLET ORAL
Status: COMPLETED | OUTPATIENT
Start: 2024-06-03 | End: 2024-06-04

## 2024-06-03 ASSESSMENT — PAIN - FUNCTIONAL ASSESSMENT: PAIN_FUNCTIONAL_ASSESSMENT: NONE - DENIES PAIN

## 2024-06-03 NOTE — ED TRIAGE NOTES
Patient arrives to ED via EMS with pain to sacrum, patient thinks she may be developing a bedsore, EMS states no wound noted. No fever.

## 2024-06-03 NOTE — ED NOTES
Bedside and Verbal shift change report given to ALYSSA Montez (oncoming nurse) by ALYSSA Ochoa (offgoing nurse). Report included the following information Nurse Handoff Report and ED Encounter Summary.

## 2024-06-04 PROCEDURE — 6370000000 HC RX 637 (ALT 250 FOR IP): Performed by: EMERGENCY MEDICINE

## 2024-06-04 RX ADMIN — ACETAMINOPHEN 650 MG: 325 TABLET ORAL at 00:47

## 2024-06-04 NOTE — ED PROVIDER NOTES
son who is the primary caregiver, no other acute complaints for this visit, were primarily concerned about the sacral skin findings.  Have given patient Mepilex, zinc oxide cream.  She has a home health nurse that sees her daily who will follow-up on evaluation of the wound and dressing changes.  Additionally instructed patient's son to contact PCP for evaluation in next 1 to 2 weeks and to arrange for any further wound care needs.  She is appropriate this time for discharge and follow-up outpatient and both her and her son are in agreement with this plan.  She will require transport home as she is not ambulatory at baseline.        Procedures:  Procedures      Rhythm interpretation from monitor: Sinus rhythm      Social Determinants of Health: Health literacy, financial stressors       Supplemental Historians include: Patient's son       Documentation/Prior Results Review:  Old medical records.  Nursing notes.  Ambulance run sheet.      Discussion of Mangement with other Physicians, QHP or Appropriate Source: Not indicated            Diagnosis and Disposition     CLINICAL IMPRESSION:  1. Pressure injury of sacral region, stage 2 (HCC)         Medication List        START taking these medications      Petrolatum-Zinc Oxide 57-17 % ointment  Apply 1 each topically as needed for Dry Skin (For sacral wound, to be applied by home health nurse.)            ASK your doctor about these medications      acetaminophen 325 MG tablet  Commonly known as: TYLENOL     aspirin 81 MG chewable tablet  Take 1 tablet by mouth daily     atorvastatin 80 MG tablet  Commonly known as: LIPITOR  Take 1 tablet by mouth nightly     clopidogrel 75 MG tablet  Commonly known as: PLAVIX     cyanocobalamin 500 MCG tablet     ergocalciferol 1.25 MG (36007 UT) capsule  Commonly known as: ERGOCALCIFEROL     famotidine 20 MG tablet  Commonly known as: PEPCID  Take 1 tablet by mouth 2 times daily     ferrous sulfate 325 (65 Fe) MG tablet  Commonly

## 2024-06-04 NOTE — DISCHARGE INSTRUCTIONS
It is very important that your mother follow-up with her PCP within the next 1 to 2 weeks to be evaluated for this early sacral wound which is consistent with a stage II pressure injury.  It is important to keep the area clean and dry and to apply the dressings with sent home, please discuss with the home health nurse and have her check her wound daily.  If your mother should have any new or worsening symptoms as discussed or you have any other concern please return to the emergency department for reevaluation.

## 2024-07-11 NOTE — CONSULTS
NEUROLOGY CONSULT NOTE    Patient ID:  Daniela Herbert  [id number]  18 y.o.  1955    Date of Consultation:  August 3, 2019    Referring Physician: Misael Zuluaga MD    Reason for Consultation:  Breakthrough seizures         Subjective:       History of Present Illness:      Ms. Milton Rogers is a 70 yo AA female with hx HTN,  CVA's largest one right parieto-occipital,  and seizures on Depakote  mg/day and trileptal 600 mg BID presented yesteraday , 2nd August with 3 seizures  - one focal seizure second 2 were generalized tonic-clonic. After the second seizure EMS was called, they report patient was postictal was starting to arouse, however when brought into the ED she then began to have a second generalized tonic-clonic seizure. Last seizure was about 2 weeks ago. Today the patient is awake, states that she has pain bilat LE's, thorax. She is seeing the dentist for a right lower tooth problems. She followed with her PCM who advised to hold the Depakote and Plavix for the tooth extraction , therefore she has not been taking them for past 5 days. She has no other complaints.     Patient Active Problem List    Diagnosis Date Noted    Status epilepticus ([facility] 08/02/2019    Cerebrovascular accident (CVA) ([facility] [address] 04/04/2016    TIA (transient ischemic attack) 01/07/2016    Dyslipidemia 12/01/2015    Left-sided weakness 10/30/2015    Stroke ([facility] [address] 10/30/2015    Essential hypertension 09/11/2015    Convulsion ([facility] [address] 07/21/2015    Elevated Dilantin level 02/20/2015    HTN (hypertension) 02/20/2015    Essential hypertension, benign 03/12/2014    CVA (cerebral infarction) 03/10/2014    Non compliance w medication regimen 03/10/2014    Pulmonary embolism ([facility] [address] 02/02/2014    Chest pain 02/02/2014    Other and unspecified noninfectious gastroenteritis and colitis(558.9) 02/02/2014    Ataxia 12/30/2011    Headache 12/30/2011     Past Medical History:   Diagnosis Date    Hypertension     Neurological disorder     Seizures ([facility] [address]     Stroke ([facility] [address] 2009      No past surgical history on file. Prior to Admission medications    Medication Sig Start Date End Date Taking? Authorizing Provider   HYDROcodone-acetaminophen (NORCO) 5-325 mg per tablet Take 1 tablet PO every 6 hours as needed for pain control. If over the counter ibuprofen or acetaminophen was suggested, then only take the vicodin for pain not well controlled with the over the counter medication. 2/7/19  Yes Windy Reece PA   acetaminophen (TYLENOL) 500 mg tablet every six (6) hours as needed for Pain. Take 2 caplets every 6 hrs   Yes Provider, Historical   phenytoin ER (DILANTIN ER) 100 mg ER capsule Take 100 mg by mouth daily. Yes Provider, Historical   divalproex DR (DEPAKOTE) 250 mg tablet Take 250 mg by mouth daily. Yes Provider, Historical   hydrOXYzine HCl (ATARAX) 50 mg tablet Take 1 Tab by mouth four (4) times daily as needed for Itching. 3/6/18  Yes Deedee Heath PA-C   clopidogrel (PLAVIX) 75 mg tablet Take 75 mg by mouth daily. Yes Provider, Historical   amLODIPine (NORVASC) 10 mg tablet Take 1 Tab by mouth daily. 12/1/15  Yes Bossman Del Rosario MD   atorvastatin (LIPITOR) 40 mg tablet Take 1 Tab by mouth nightly. 11/2/15  Yes Idalmis Lane MD   hydroxypropyl methylcellulose (GENTEAL) 0.2 % ophthalmic solution Administer 2 Drops to both eyes as needed. 5/10/14  Yes Delio Pike MD   cyanocobalamin (VITAMIN B12) 500 mcg tablet Take 1 Tab by mouth daily. 3/13/14  Yes Adrienne Gomez MD   lidocaine (LIDODERM) 5 % Apply patch to the affected area for 12 hours a day and remove for 12 hours a day. 2/7/19   MING Burns   cyclobenzaprine (FLEXERIL) 10 mg tablet Take 1 Tab by mouth three (3) times daily as needed for Muscle Spasm(s). 11/20/18   Trupti Abernathy MD   acetaminophen-codeine (TYLENOL-CODEINE #3) 300-30 mg per tablet Take 1 Tab by mouth every four (4) hours as needed for Pain for up to 8 doses.  Max Daily Amount: 6 Tabs. 11/20/18   Marci Elias MD   OXcarbaxepine (TRILEPTAL) 600 mg tablet Take 600 mg by mouth two (2) times a day. Provider, Historical   OXcarbaxepine (TRILEPTAL) 600 mg tablet Take 600 mg by mouth two (2) times a day. Provider, Historical   hydrALAZINE (APRESOLINE) 50 mg tablet Take 50 mg by mouth two (2) times a day. Other, MD Yumiko   ergocalciferol (VITAMIN D2) 50,000 unit capsule Take 50,000 Units by mouth two (2) days a week. Other, MD Yumiko   levETIRAcetam (KEPPRA) 1,000 mg tablet Take 1 Tab by mouth two (2) times a day. 10/24/17   Karen Triplett MD   triamcinolone acetonide (KENALOG) 0.1 % topical cream Apply  to affected area two (2) times a day. use thin layer 7/2/17   Nadeen Gore PA-C   lisinopril (PRINIVIL, ZESTRIL) 10 mg tablet Take 10 mg by mouth daily. Provider, Historical   folic acid (FOLVITE) 1 mg tablet Take 1 Tab by mouth daily. 3/13/14   Tom Alaniz MD     Allergies   Allergen Reactions    Tomato Hives     Allergic to eating tomato.  Aspirin Nausea and Vomiting    Ciprofloxacin Rash    Pcn [Penicillins] Rash and Hives    Sulfa (Sulfonamide Antibiotics) Hives and Rash      Social History     Tobacco Use    Smoking status: Never Smoker    Smokeless tobacco: Never Used   Substance Use Topics    Alcohol use: Yes     Comment: Socially       Family History   Problem Relation Age of Onset    Diabetes Other     Stroke Other         Review of Systems    Constitutional: No recent weight change, fever,fatigue, sleep difficulties, or loss of appetite. ENT/Mouth:   right lower tooth ache, No hearing loss, ringing in the ears, chronic sinus problem, nose bleeds sore throat, voice change, hoarseness, swollen glands in neck, or difficulties with chewing and swallowing. Cardiovascular:  No chest pain/angina pectoris, palpitations,swelling of feet/ankles/hands, or calf pain while walking.    Respiratory: No chronic or frequent coughs, spitting up blood, shortness of breath, asthma, or wheezing. Gastrointestinal: No abdominal pain, heartburn, nausea, vomiting, constipation, frequent diarrhea, rectal bleeding, or blood in stool. Genitourinary: No frequent urination, burning or painful urination, blood in urine, incontinence or dribbling. Musculoskeletal:   Reports LE's muscle pain, thoracic muscle pain. + joint pain, no stiffness/swelling,  No weakness of muscles, + muscle pain/cramp, or back pain. Integument:   No rash/itching, change in skin color, change in hair/nails, or change in color/size of moles. Neurological:  No dizziness/vertigo, loss of consciousness, + left sided numbness/tingling sensation, no tremors, weakness in limbs, difficulty with balance, frequent or recurring headaches,+  memory loss or confusion, +seizures    Psychiatric:    Feels sad, depressed, no hallucinations, paranoia or suspiciousness. Endocrine: No excessive thirst or urination, heat or cold intolerance. Hematologic/Lymphatic: No bleeding/bruising tendency, phlebitis, or past transfusion. Objective:     Patient Vitals for the past 8 hrs:   BP Temp Pulse Resp SpO2   08/03/19 1000 126/65 -- -- -- --   08/03/19 0900 141/65 -- 93 17 99 %   08/03/19 0816 -- -- 84 17 100 %   08/03/19 0815 131/71 -- -- -- --   08/03/19 0814 137/64 -- -- -- --   08/03/19 0445 137/66 98.4 °F (36.9 °C) 78 15 100 %       General Exam  No acute distress, normal body habitus    HEENT: Normocephalic, atraumatic, Sclera anicteric, normal conjunctiva  Mucous membranes: normal color and hydration     CV: No carotid bruits,   Heart: regular to rate and rhythm.  No murmurs     Neurologic Exam:    Mental status:  Awake, alert, oriented to person, place, time and circumstance  No visual spatial neglect or overt apraxia  Language: intact  Fluency, s[eech is mildly dysarthric, intact comprehension    Cranial nerves: PERRL, face sensation intact bilat V1-V3,  Extraocular movements intact and full, face symmetric to movement, Tongue midline with normal strength, palat symmetric    Motor: moves all 4 extremities symmetric, no weakness, she reports muscle pain   No abnormal movements    Coordination: Normal finger-nose-finger    DTRs (R/L)  Biceps: (2/2)  Brachorad (2/2)  Triceps: (2/2)   Patellar (2/2)  Ankles (2/2)      Sensation: Diminished LT , temp left EU and LE. Gait: not tested      Data Review:    Recent Results (from the past 24 hour(s))   CBC WITH AUTOMATED DIFF    Collection Time: 08/02/19  2:25 PM   Result Value Ref Range    WBC 18.1 (H) 4.6 - 13.2 K/uL    RBC 3.99 (L) 4.20 - 5.30 M/uL    HGB 12.1 12.0 - 16.0 g/dL    HCT 37.0 35.0 - 45.0 %    MCV 92.7 74.0 - 97.0 FL    MCH 30.3 24.0 - 34.0 PG    MCHC 32.7 31.0 - 37.0 g/dL    RDW 14.7 (H) 11.6 - 14.5 %    PLATELET 775 849 - 611 K/uL    MPV 11.7 9.2 - 11.8 FL    NEUTROPHILS 68 42 - 75 %    LYMPHOCYTES 20 20 - 51 %    MONOCYTES 11 (H) 2 - 9 %    EOSINOPHILS 1 0 - 5 %    BASOPHILS 0 0 - 3 %    ABS. NEUTROPHILS 12.3 (H) 1.8 - 8.0 K/UL    ABS. LYMPHOCYTES 3.6 (H) 0.8 - 3.5 K/UL    ABS. MONOCYTES 2.0 (H) 0 - 1.0 K/UL    ABS. EOSINOPHILS 0.2 0.0 - 0.4 K/UL    ABS.  BASOPHILS 0.0 0.0 - 0.06 K/UL    DF MANUAL      PLATELET COMMENTS ADEQUATE PLATELETS      RBC COMMENTS OVALOCYTES  PRESENT        RBC COMMENTS POIKILOCYTOSIS  PRESENT        RBC COMMENTS ANISOCYTOSIS  1+       METABOLIC PANEL, COMPREHENSIVE    Collection Time: 08/02/19  2:25 PM   Result Value Ref Range    Sodium 143 136 - 145 mmol/L    Potassium 3.7 3.5 - 5.5 mmol/L    Chloride 110 100 - 111 mmol/L    CO2 11 (L) 21 - 32 mmol/L    Anion gap 22 (H) 3.0 - 18 mmol/L    Glucose 119 (H) 74 - 99 mg/dL    BUN 13 7.0 - 18 MG/DL    Creatinine 1.39 (H) 0.6 - 1.3 MG/DL    BUN/Creatinine ratio 9 (L) 12 - 20      GFR est AA 46 (L) >60 ml/min/1.73m2    GFR est non-AA 38 (L) >60 ml/min/1.73m2    Calcium 9.4 8.5 - 10.1 MG/DL    Bilirubin, total 0.4 0.2 - 1.0 MG/DL    ALT (SGPT) 11 (L) 13 - 56 U/L AST (SGOT) 11 10 - 38 U/L    Alk. phosphatase 65 45 - 117 U/L    Protein, total 7.6 6.4 - 8.2 g/dL    Albumin 4.1 3.4 - 5.0 g/dL    Globulin 3.5 2.0 - 4.0 g/dL    A-G Ratio 1.2 0.8 - 1.7     EKG, 12 LEAD, INITIAL    Collection Time: 08/02/19  3:00 PM   Result Value Ref Range    Ventricular Rate 104 BPM    Atrial Rate 104 BPM    P-R Interval 126 ms    QRS Duration 90 ms    Q-T Interval 350 ms    QTC Calculation (Bezet) 460 ms    Calculated P Axis 54 degrees    Calculated R Axis 0 degrees    Calculated T Axis 102 degrees    Diagnosis       Sinus tachycardia  Nonspecific T wave abnormality  Abnormal ECG  When compared with ECG of 06-MAR-2018 10:14,  Vent.  rate has increased BY  36 BPM  Nonspecific T wave abnormality no longer evident in Anterior leads     URINALYSIS W/ RFLX MICROSCOPIC    Collection Time: 08/02/19  3:12 PM   Result Value Ref Range    Color YELLOW      Appearance CLOUDY      Specific gravity 1.021 1.005 - 1.030      pH (UA) 5.0 5.0 - 8.0      Protein 300 (A) NEG mg/dL    Glucose NEGATIVE  NEG mg/dL    Ketone TRACE (A) NEG mg/dL    Bilirubin NEGATIVE  NEG      Blood NEGATIVE  NEG      Urobilinogen 1.0 0.2 - 1.0 EU/dL    Nitrites NEGATIVE  NEG      Leukocyte Esterase NEGATIVE  NEG     URINE MICROSCOPIC ONLY    Collection Time: 08/02/19  3:12 PM   Result Value Ref Range    WBC NONE 0 - 4 /hpf    RBC NONE 0 - 5 /hpf    Epithelial cells 1+ 0 - 5 /lpf    Bacteria FEW (A) NEG /hpf    Amorphous Crystals 4+ (A) NEG   POC LACTIC ACID    Collection Time: 08/03/19  4:04 AM   Result Value Ref Range    Lactic Acid (POC) 1.08 0.40 - 2.00 mmol/L   CBC WITH AUTOMATED DIFF    Collection Time: 08/03/19  4:30 AM   Result Value Ref Range    WBC 10.4 4.6 - 13.2 K/uL    RBC 3.85 (L) 4.20 - 5.30 M/uL    HGB 11.4 (L) 12.0 - 16.0 g/dL    HCT 34.5 (L) 35.0 - 45.0 %    MCV 89.6 74.0 - 97.0 FL    MCH 29.6 24.0 - 34.0 PG    MCHC 33.0 31.0 - 37.0 g/dL    RDW 14.7 (H) 11.6 - 14.5 %    PLATELET 001 404 - 730 K/uL    MPV 11.0 9.2 - 11.8 FL    NEUTROPHILS 70 40 - 73 %    LYMPHOCYTES 19 (L) 21 - 52 %    MONOCYTES 11 (H) 3 - 10 %    EOSINOPHILS 0 0 - 5 %    BASOPHILS 0 0 - 2 %    ABS. NEUTROPHILS 7.2 1.8 - 8.0 K/UL    ABS. LYMPHOCYTES 2.0 0.9 - 3.6 K/UL    ABS. MONOCYTES 1.1 0.05 - 1.2 K/UL    ABS. EOSINOPHILS 0.0 0.0 - 0.4 K/UL    ABS. BASOPHILS 0.0 0.0 - 0.1 K/UL    DF AUTOMATED     METABOLIC PANEL, COMPREHENSIVE    Collection Time: 08/03/19  4:30 AM   Result Value Ref Range    Sodium 143 136 - 145 mmol/L    Potassium 3.7 3.5 - 5.5 mmol/L    Chloride 112 (H) 100 - 111 mmol/L    CO2 23 21 - 32 mmol/L    Anion gap 8 3.0 - 18 mmol/L    Glucose 81 74 - 99 mg/dL    BUN 11 7.0 - 18 MG/DL    Creatinine 1.03 0.6 - 1.3 MG/DL    BUN/Creatinine ratio 11 (L) 12 - 20      GFR est AA >60 >60 ml/min/1.73m2    GFR est non-AA 54 (L) >60 ml/min/1.73m2    Calcium 9.2 8.5 - 10.1 MG/DL    Bilirubin, total 0.5 0.2 - 1.0 MG/DL    ALT (SGPT) 11 (L) 13 - 56 U/L    AST (SGOT) 16 10 - 38 U/L    Alk. phosphatase 64 45 - 117 U/L    Protein, total 7.3 6.4 - 8.2 g/dL    Albumin 3.8 3.4 - 5.0 g/dL    Globulin 3.5 2.0 - 4.0 g/dL    A-G Ratio 1.1 0.8 - 1.7           Radiology studies:       Assessment: This is a 70 yo woman with hx of  CVA, (largest right parietoccipital ), HTN, epilepsy admitted with breakthrough seizures in the setting of not taking Depakote as the recs of dentist and PCM. The patient is well today, stable . Will resume her AED's as rec by her epileptologist Dr Faith Moritz and her PA. There is no need to stop the Depakote for any procedure if there is no thrombocytopenia. If the dentist feel uncomfortable about the Plavix, it is ok to hold it until Tuesday when she has the extraction. She should take ASA and just hold it the morning of the extraction.        Active Problems:    Status epilepticus ([address] (8/2/2019)        Plan:     - resume home AED's: trileptal 600 mg BID and Depakote  mg qday  - no need to start Keppra - seems liek she was on this some time ago, I suspect was stopped due to side effects - mood changes, depression   - will sent CPK - she reports muscle pain, advise good hydration  - can be monitored on the floor and discharged home tomorrow           Liya Staton MD  Adult Neurologist  8/3/2019 impaired balance/pain/decreased ROM/decreased strength

## 2024-07-26 ENCOUNTER — APPOINTMENT (OUTPATIENT)
Facility: HOSPITAL | Age: 69
End: 2024-07-26
Payer: MEDICARE

## 2024-07-26 ENCOUNTER — HOSPITAL ENCOUNTER (EMERGENCY)
Facility: HOSPITAL | Age: 69
Discharge: HOME | End: 2024-07-27
Attending: EMERGENCY MEDICINE
Payer: MEDICARE

## 2024-07-26 DIAGNOSIS — K21.9 GASTROESOPHAGEAL REFLUX DISEASE WITHOUT ESOPHAGITIS: ICD-10-CM

## 2024-07-26 DIAGNOSIS — R11.2 NAUSEA VOMITING AND DIARRHEA: Primary | ICD-10-CM

## 2024-07-26 DIAGNOSIS — R19.7 NAUSEA VOMITING AND DIARRHEA: Primary | ICD-10-CM

## 2024-07-26 PROCEDURE — 84484 ASSAY OF TROPONIN QUANT: CPT

## 2024-07-26 PROCEDURE — 96375 TX/PRO/DX INJ NEW DRUG ADDON: CPT

## 2024-07-26 PROCEDURE — 80053 COMPREHEN METABOLIC PANEL: CPT

## 2024-07-26 PROCEDURE — 93005 ELECTROCARDIOGRAM TRACING: CPT | Performed by: EMERGENCY MEDICINE

## 2024-07-26 PROCEDURE — 99285 EMERGENCY DEPT VISIT HI MDM: CPT

## 2024-07-26 PROCEDURE — 83735 ASSAY OF MAGNESIUM: CPT

## 2024-07-26 PROCEDURE — 2580000003 HC RX 258: Performed by: EMERGENCY MEDICINE

## 2024-07-26 PROCEDURE — 6370000000 HC RX 637 (ALT 250 FOR IP): Performed by: EMERGENCY MEDICINE

## 2024-07-26 PROCEDURE — 96374 THER/PROPH/DIAG INJ IV PUSH: CPT

## 2024-07-26 PROCEDURE — 6360000002 HC RX W HCPCS: Performed by: EMERGENCY MEDICINE

## 2024-07-26 PROCEDURE — 85025 COMPLETE CBC W/AUTO DIFF WBC: CPT

## 2024-07-26 PROCEDURE — 74177 CT ABD & PELVIS W/CONTRAST: CPT

## 2024-07-26 RX ORDER — METOCLOPRAMIDE HYDROCHLORIDE 5 MG/ML
10 INJECTION INTRAMUSCULAR; INTRAVENOUS
Status: COMPLETED | OUTPATIENT
Start: 2024-07-27 | End: 2024-07-27

## 2024-07-26 RX ORDER — MORPHINE SULFATE 4 MG/ML
4 INJECTION, SOLUTION INTRAMUSCULAR; INTRAVENOUS
Status: COMPLETED | OUTPATIENT
Start: 2024-07-27 | End: 2024-07-27

## 2024-07-26 RX ORDER — ONDANSETRON 2 MG/ML
4 INJECTION INTRAMUSCULAR; INTRAVENOUS ONCE
Status: COMPLETED | OUTPATIENT
Start: 2024-07-26 | End: 2024-07-26

## 2024-07-26 RX ORDER — 0.9 % SODIUM CHLORIDE 0.9 %
1000 INTRAVENOUS SOLUTION INTRAVENOUS ONCE
Status: COMPLETED | OUTPATIENT
Start: 2024-07-26 | End: 2024-07-27

## 2024-07-26 RX ORDER — MORPHINE SULFATE 4 MG/ML
4 INJECTION, SOLUTION INTRAMUSCULAR; INTRAVENOUS
Status: COMPLETED | OUTPATIENT
Start: 2024-07-26 | End: 2024-07-26

## 2024-07-26 RX ADMIN — ONDANSETRON 4 MG: 2 INJECTION INTRAMUSCULAR; INTRAVENOUS at 21:28

## 2024-07-26 RX ADMIN — MORPHINE SULFATE 4 MG: 4 INJECTION, SOLUTION INTRAMUSCULAR; INTRAVENOUS at 21:37

## 2024-07-26 RX ADMIN — LIDOCAINE HYDROCHLORIDE 40 ML: 20 SOLUTION ORAL at 22:24

## 2024-07-26 RX ADMIN — SODIUM CHLORIDE 1000 ML: 9 INJECTION, SOLUTION INTRAVENOUS at 21:37

## 2024-07-27 VITALS
HEART RATE: 86 BPM | SYSTOLIC BLOOD PRESSURE: 152 MMHG | DIASTOLIC BLOOD PRESSURE: 81 MMHG | OXYGEN SATURATION: 96 % | RESPIRATION RATE: 17 BRPM

## 2024-07-27 LAB
ALBUMIN SERPL-MCNC: 3.6 G/DL (ref 3.4–5)
ALBUMIN/GLOB SERPL: 0.9 (ref 0.8–1.7)
ALP SERPL-CCNC: 108 U/L (ref 45–117)
ALT SERPL-CCNC: 21 U/L (ref 13–56)
ANION GAP SERPL CALC-SCNC: 8 MMOL/L (ref 3–18)
AST SERPL-CCNC: 41 U/L (ref 10–38)
BASOPHILS # BLD: 0 K/UL (ref 0–0.1)
BASOPHILS NFR BLD: 0 % (ref 0–2)
BILIRUB SERPL-MCNC: 0.4 MG/DL (ref 0.2–1)
BUN SERPL-MCNC: 17 MG/DL (ref 7–18)
BUN/CREAT SERPL: 18 (ref 12–20)
CALCIUM SERPL-MCNC: 9.5 MG/DL (ref 8.5–10.1)
CHLORIDE SERPL-SCNC: 108 MMOL/L (ref 100–111)
CO2 SERPL-SCNC: 23 MMOL/L (ref 21–32)
CREAT SERPL-MCNC: 0.93 MG/DL (ref 0.6–1.3)
DIFFERENTIAL METHOD BLD: ABNORMAL
EKG ATRIAL RATE: 77 BPM
EKG DIAGNOSIS: NORMAL
EKG P AXIS: 76 DEGREES
EKG P-R INTERVAL: 164 MS
EKG Q-T INTERVAL: 364 MS
EKG QRS DURATION: 86 MS
EKG QTC CALCULATION (BAZETT): 411 MS
EKG R AXIS: -24 DEGREES
EKG T AXIS: 123 DEGREES
EKG VENTRICULAR RATE: 77 BPM
EOSINOPHIL # BLD: 0 K/UL (ref 0–0.4)
EOSINOPHIL NFR BLD: 0 % (ref 0–5)
ERYTHROCYTE [DISTWIDTH] IN BLOOD BY AUTOMATED COUNT: 14.3 % (ref 11.6–14.5)
GLOBULIN SER CALC-MCNC: 3.8 G/DL (ref 2–4)
GLUCOSE SERPL-MCNC: 101 MG/DL (ref 74–99)
HCT VFR BLD AUTO: 39.2 % (ref 35–45)
HGB BLD-MCNC: 13.1 G/DL (ref 12–16)
IMM GRANULOCYTES # BLD AUTO: 0 K/UL (ref 0–0.04)
IMM GRANULOCYTES NFR BLD AUTO: 0 % (ref 0–0.5)
LIPASE SERPL-CCNC: 32 U/L (ref 13–75)
LYMPHOCYTES # BLD: 1.6 K/UL (ref 0.9–3.6)
LYMPHOCYTES NFR BLD: 15 % (ref 21–52)
MAGNESIUM SERPL-MCNC: 2 MG/DL (ref 1.6–2.6)
MCH RBC QN AUTO: 31.4 PG (ref 24–34)
MCHC RBC AUTO-ENTMCNC: 33.4 G/DL (ref 31–37)
MCV RBC AUTO: 94 FL (ref 78–100)
MONOCYTES # BLD: 0.8 K/UL (ref 0.05–1.2)
MONOCYTES NFR BLD: 7 % (ref 3–10)
NEUTS SEG # BLD: 8.1 K/UL (ref 1.8–8)
NEUTS SEG NFR BLD: 77 % (ref 40–73)
NRBC # BLD: 0 K/UL (ref 0–0.01)
NRBC BLD-RTO: 0 PER 100 WBC
PLATELET # BLD AUTO: 170 K/UL (ref 135–420)
PMV BLD AUTO: 11 FL (ref 9.2–11.8)
POTASSIUM SERPL-SCNC: 5.2 MMOL/L (ref 3.5–5.5)
PROT SERPL-MCNC: 7.4 G/DL (ref 6.4–8.2)
RBC # BLD AUTO: 4.17 M/UL (ref 4.2–5.3)
SODIUM SERPL-SCNC: 139 MMOL/L (ref 136–145)
TROPONIN I SERPL HS-MCNC: 52 NG/L (ref 0–54)
WBC # BLD AUTO: 10.5 K/UL (ref 4.6–13.2)

## 2024-07-27 PROCEDURE — 6360000002 HC RX W HCPCS: Performed by: EMERGENCY MEDICINE

## 2024-07-27 PROCEDURE — 93010 ELECTROCARDIOGRAM REPORT: CPT | Performed by: INTERNAL MEDICINE

## 2024-07-27 PROCEDURE — 83690 ASSAY OF LIPASE: CPT

## 2024-07-27 PROCEDURE — 6360000004 HC RX CONTRAST MEDICATION: Performed by: EMERGENCY MEDICINE

## 2024-07-27 RX ORDER — ONDANSETRON 4 MG/1
4 TABLET, ORALLY DISINTEGRATING ORAL EVERY 8 HOURS PRN
Qty: 21 TABLET | Refills: 0 | Status: SHIPPED | OUTPATIENT
Start: 2024-07-27

## 2024-07-27 RX ORDER — FAMOTIDINE 20 MG/1
20 TABLET, FILM COATED ORAL 2 TIMES DAILY
Qty: 60 TABLET | Refills: 0 | Status: SHIPPED | OUTPATIENT
Start: 2024-07-27 | End: 2024-08-26

## 2024-07-27 RX ORDER — MAG HYDROX/ALUMINUM HYD/SIMETH 400-400-40
15 SUSPENSION, ORAL (FINAL DOSE FORM) ORAL EVERY 6 HOURS PRN
Qty: 355 ML | Refills: 0 | Status: SHIPPED | OUTPATIENT
Start: 2024-07-27

## 2024-07-27 RX ORDER — DICYCLOMINE HCL 20 MG
20 TABLET ORAL 3 TIMES DAILY PRN
Qty: 21 TABLET | Refills: 0 | Status: SHIPPED | OUTPATIENT
Start: 2024-07-27

## 2024-07-27 RX ORDER — LOPERAMIDE HYDROCHLORIDE 2 MG/1
2 TABLET ORAL 4 TIMES DAILY PRN
Qty: 20 TABLET | Refills: 0 | Status: SHIPPED | OUTPATIENT
Start: 2024-07-27 | End: 2024-08-06

## 2024-07-27 RX ADMIN — METOCLOPRAMIDE HYDROCHLORIDE 10 MG: 5 INJECTION INTRAMUSCULAR; INTRAVENOUS at 02:19

## 2024-07-27 RX ADMIN — MORPHINE SULFATE 4 MG: 4 INJECTION, SOLUTION INTRAMUSCULAR; INTRAVENOUS at 02:20

## 2024-07-27 RX ADMIN — IOPAMIDOL 80 ML: 612 INJECTION, SOLUTION INTRAVENOUS at 03:15

## 2024-07-27 NOTE — ED PROVIDER NOTES
EMERGENCY DEPARTMENT HISTORY AND PHYSICAL EXAM      Date: 7/26/2024  Patient Name: Purvi Denny      History of Presenting Illness     Chief Complaint   Patient presents with    Nausea    Emesis       Location/Duration/Severity/Modifying factors   Chief Complaint   Patient presents with    Nausea    Emesis       HPI:  Purvi Denny is a 69 y.o. female with PMH significant for multiple medical comorbidities, including hypertension, seizure disorder presents with 3 days of nausea, vomiting, periumbilical pain. Pt  denies fevers, chest pain, shortness of breath, cough, diarrhea, bloody stools.  Has had small infrequent stools.  Unable to keep any fluids down.    PCP: Deepali Montaño, APRN - NP    No current facility-administered medications for this encounter.     Current Outpatient Medications   Medication Sig Dispense Refill    aluminum & magnesium hydroxide-simethicone (MAALOX MAX) 400-400-40 MG/5ML SUSP Take 15 mLs by mouth every 6 hours as needed (upper abdominal pain) 355 mL 0    ondansetron (ZOFRAN-ODT) 4 MG disintegrating tablet Place 1 tablet under the tongue every 8 hours as needed for Nausea or Vomiting 21 tablet 0    loperamide (IMODIUM A-D) 2 MG tablet Take 1 tablet by mouth 4 times daily as needed for Diarrhea 20 tablet 0    dicyclomine (BENTYL) 20 MG tablet Take 1 tablet by mouth 3 times daily as needed (abdominal pain) 21 tablet 0    famotidine (PEPCID) 20 MG tablet Take 1 tablet by mouth 2 times daily 60 tablet 0    petrolatum-zinc oxide 49-15 % OINT ointment Apply to affected area 3 times a day as needed 113 g 0    Petrolatum-Zinc Oxide 57-17 % ointment Apply 1 each topically as needed for Dry Skin (For sacral wound, to be applied by home health nurse.) 2 each 5    OXcarbazepine (TRILEPTAL) 300 MG tablet Take 1 tablet by mouth 2 times daily Take 1.5 tablets by mouth twice a day for one week, then take 2 tablets twice a day after that. 120 tablet 0    ondansetron (ZOFRAN-ODT) 8 MG TBDP

## 2024-08-06 ENCOUNTER — HOSPITAL ENCOUNTER (EMERGENCY)
Facility: HOSPITAL | Age: 69
Discharge: HOME | End: 2024-08-06
Attending: STUDENT IN AN ORGANIZED HEALTH CARE EDUCATION/TRAINING PROGRAM
Payer: MEDICARE

## 2024-08-06 ENCOUNTER — APPOINTMENT (OUTPATIENT)
Facility: HOSPITAL | Age: 69
End: 2024-08-06
Payer: MEDICARE

## 2024-08-06 VITALS
HEIGHT: 65 IN | DIASTOLIC BLOOD PRESSURE: 102 MMHG | BODY MASS INDEX: 16.66 KG/M2 | OXYGEN SATURATION: 100 % | HEART RATE: 81 BPM | TEMPERATURE: 98.5 F | RESPIRATION RATE: 19 BRPM | SYSTOLIC BLOOD PRESSURE: 133 MMHG | WEIGHT: 100 LBS

## 2024-08-06 DIAGNOSIS — F41.1 ANXIETY STATE: Primary | ICD-10-CM

## 2024-08-06 LAB
ALBUMIN SERPL-MCNC: 3.4 G/DL (ref 3.4–5)
ALBUMIN/GLOB SERPL: 1.2 (ref 0.8–1.7)
ALP SERPL-CCNC: 106 U/L (ref 45–117)
ALT SERPL-CCNC: 21 U/L (ref 13–56)
ANION GAP SERPL CALC-SCNC: 5 MMOL/L (ref 3–18)
AST SERPL-CCNC: 14 U/L (ref 10–38)
BASOPHILS # BLD: 0 K/UL (ref 0–0.1)
BASOPHILS NFR BLD: 1 % (ref 0–2)
BILIRUB SERPL-MCNC: 0.2 MG/DL (ref 0.2–1)
BUN SERPL-MCNC: 22 MG/DL (ref 7–18)
BUN/CREAT SERPL: 19 (ref 12–20)
CALCIUM SERPL-MCNC: 8.8 MG/DL (ref 8.5–10.1)
CHLORIDE SERPL-SCNC: 111 MMOL/L (ref 100–111)
CO2 SERPL-SCNC: 28 MMOL/L (ref 21–32)
CREAT SERPL-MCNC: 1.15 MG/DL (ref 0.6–1.3)
DIFFERENTIAL METHOD BLD: ABNORMAL
EOSINOPHIL # BLD: 0.1 K/UL (ref 0–0.4)
EOSINOPHIL NFR BLD: 1 % (ref 0–5)
ERYTHROCYTE [DISTWIDTH] IN BLOOD BY AUTOMATED COUNT: 14.6 % (ref 11.6–14.5)
GLOBULIN SER CALC-MCNC: 2.9 G/DL (ref 2–4)
GLUCOSE SERPL-MCNC: 97 MG/DL (ref 74–99)
HCT VFR BLD AUTO: 34.1 % (ref 35–45)
HGB BLD-MCNC: 11.2 G/DL (ref 12–16)
IMM GRANULOCYTES # BLD AUTO: 0 K/UL (ref 0–0.04)
IMM GRANULOCYTES NFR BLD AUTO: 0 % (ref 0–0.5)
LYMPHOCYTES # BLD: 1.9 K/UL (ref 0.9–3.6)
LYMPHOCYTES NFR BLD: 22 % (ref 21–52)
MAGNESIUM SERPL-MCNC: 1.8 MG/DL (ref 1.6–2.6)
MCH RBC QN AUTO: 31.1 PG (ref 24–34)
MCHC RBC AUTO-ENTMCNC: 32.8 G/DL (ref 31–37)
MCV RBC AUTO: 94.7 FL (ref 78–100)
MONOCYTES # BLD: 0.9 K/UL (ref 0.05–1.2)
MONOCYTES NFR BLD: 11 % (ref 3–10)
NEUTS SEG # BLD: 5.7 K/UL (ref 1.8–8)
NEUTS SEG NFR BLD: 66 % (ref 40–73)
NRBC # BLD: 0 K/UL (ref 0–0.01)
NRBC BLD-RTO: 0 PER 100 WBC
NT PRO BNP: 346 PG/ML (ref 0–900)
PLATELET # BLD AUTO: 179 K/UL (ref 135–420)
PMV BLD AUTO: 11.4 FL (ref 9.2–11.8)
POTASSIUM SERPL-SCNC: 4.2 MMOL/L (ref 3.5–5.5)
PROT SERPL-MCNC: 6.3 G/DL (ref 6.4–8.2)
RBC # BLD AUTO: 3.6 M/UL (ref 4.2–5.3)
SODIUM SERPL-SCNC: 144 MMOL/L (ref 136–145)
TROPONIN I SERPL HS-MCNC: 44 NG/L (ref 0–54)
WBC # BLD AUTO: 8.6 K/UL (ref 4.6–13.2)

## 2024-08-06 PROCEDURE — 6370000000 HC RX 637 (ALT 250 FOR IP): Performed by: STUDENT IN AN ORGANIZED HEALTH CARE EDUCATION/TRAINING PROGRAM

## 2024-08-06 PROCEDURE — 93005 ELECTROCARDIOGRAM TRACING: CPT | Performed by: STUDENT IN AN ORGANIZED HEALTH CARE EDUCATION/TRAINING PROGRAM

## 2024-08-06 PROCEDURE — 83880 ASSAY OF NATRIURETIC PEPTIDE: CPT

## 2024-08-06 PROCEDURE — 99285 EMERGENCY DEPT VISIT HI MDM: CPT

## 2024-08-06 PROCEDURE — 84484 ASSAY OF TROPONIN QUANT: CPT

## 2024-08-06 PROCEDURE — 83735 ASSAY OF MAGNESIUM: CPT

## 2024-08-06 PROCEDURE — 71045 X-RAY EXAM CHEST 1 VIEW: CPT

## 2024-08-06 PROCEDURE — 85025 COMPLETE CBC W/AUTO DIFF WBC: CPT

## 2024-08-06 PROCEDURE — 80053 COMPREHEN METABOLIC PANEL: CPT

## 2024-08-06 RX ORDER — HYDROXYZINE HYDROCHLORIDE 25 MG/1
25 TABLET, FILM COATED ORAL
Status: COMPLETED | OUTPATIENT
Start: 2024-08-06 | End: 2024-08-06

## 2024-08-06 RX ADMIN — HYDROXYZINE HYDROCHLORIDE 25 MG: 25 TABLET ORAL at 19:33

## 2024-08-06 NOTE — ED PROVIDER NOTES
week, then take 2 tablets twice a day after that. 120 tablet 0    ondansetron (ZOFRAN-ODT) 8 MG TBDP disintegrating tablet Take 1 tablet by mouth 3 times daily as needed for Nausea or Vomiting 12 tablet 1    omeprazole (PRILOSEC OTC) 20 MG tablet Take 1 tablet by mouth daily      aspirin 81 MG chewable tablet Take 1 tablet by mouth daily 30 tablet 3    atorvastatin (LIPITOR) 80 MG tablet Take 1 tablet by mouth nightly 30 tablet 0    QUEtiapine (SEROQUEL) 25 MG tablet Take 1 tablet by mouth nightly      Multiple Vitamins-Minerals (THERAPEUTIC MULTIVITAMIN-MINERALS) tablet Take 1 tablet by mouth daily 30 tablet 0    acetaminophen (TYLENOL) 325 MG tablet Take 2 tablets by mouth every 4 hours as needed for Fever or Pain      clopidogrel (PLAVIX) 75 MG tablet Take 1 tablet by mouth daily      cyanocobalamin 500 MCG tablet Take 1 tablet by mouth daily      ergocalciferol (ERGOCALCIFEROL) 1.25 MG (38535 UT) capsule Take 1 capsule by mouth Twice a Week      ferrous sulfate (IRON 325) 325 (65 Fe) MG tablet Take 1 tablet by mouth daily      PARoxetine (PAXIL) 30 MG tablet Take 1 tablet by mouth daily         Past History     Past Medical History:  Past Medical History:   Diagnosis Date    Abnormal coordination 9/21/2020    Depression 4/12/2022    Failure to thrive in adult 4/12/2022    Hallucinations 9/21/2020    Hypertension     Neurological disorder     Seizures ([facility])     Stroke ([facility]) 2009    Subclinical hypothyroidism 4/13/2022       Past Surgical History:  Past Surgical History:   Procedure Laterality Date    PLACE PERCUT GASTROSTOMY TUBE  1/11/2022            Family History:  Family History   Problem Relation Age of Onset    Diabetes Other     Stroke Other        Social History:  Social History     Tobacco Use    Smoking status: Never    Smokeless tobacco: Never   Substance Use Topics    Alcohol use: Yes    Drug use: No       Allergies:  Allergies   Allergen Reactions    Tomato Hives     Allergic to eating tomato.     rhythm, heart rate 77, left axis deviation, nonspecific T wave inversions lateral axis.  No signs of acute ischemic changes.      ED Course: Progress Notes, Reevaluation, and Consults:    Provider Notes (Medical Decision Making):     MDM  69-year-old female who presents with anxious state and chest tightness.  Will consider anxious state.  Will consider anxiety disorder.  Low suspicion for ACS at this time.  Extremities patient hypoxic or tachycardic.  Will obtain labs and imaging.  Patient troponin 44.  No evidence of acute ischemic changes.  No signs of metabolic derangement.  Patient feeling better after Atarax.  Discussed with patient that she needs to follow-up with PCP for refill of her antianxiety medication.  She agrees at this time.  Patient discharged.  ED Course as of 08/09/24 1239   Tue Aug 06, 2024   2011 Troponin, High Sensitivity: 44 [KS]      ED Course User Index  [KS] Nehemiah You MD       Patient was given the following medications:  Medications   hydrOXYzine HCl (ATARAX) tablet 25 mg (25 mg Oral Given 8/6/24 1933)       CONSULTS: (Who and What was discussed)  None    Chronic Conditions: TIA, seizure, PE, CVA    Social Determinants affecting Dx or Tx: None    Records Reviewed (source and summary of external notes):  none     Procedures    Critical Care Time: none     SCREENING TOOLS:  None    CLINICAL MANAGEMENT TOOLS:  Not Applicable      Diagnosis     Clinical Impression:   1. Anxiety state        Disposition: Deepali Rowe, APRN - NP  [address] [address]  [phone]    Go to   As needed, If symptoms worsen       Disclaimer: Sections of this note are dictated using utilizing voice recognition software.  Minor typographical errors may be present. If questions arise, please do not hesitate to contact me or call our department.          Nehemiah You MD  08/09/24 0204

## 2024-08-06 NOTE — ED NOTES
Assumed care of PT. Cardiac monitor in place. Pt awake, alert, and orientated. PT voiced no concerns and is not in distress at this time. Breathing even and unlabored. Call bell within reach. Pt stated she was brought in due to anxiety attack by her son.

## 2024-08-07 NOTE — ED NOTES
At request of pt talked to her son Benjamín Denny about pt care and decision to discharge. Family does not have any questions at this time.

## 2024-08-07 NOTE — DISCHARGE INSTRUCTIONS
You were evaluated for anxious state .  Based on your work-up it was deemed that she was stable for discharge.   Please follow-up with your primary care physician if you have any further concerns and go over your work-up.  If you experience any chest pain, shortness of breath, worsening abdominal pain, vomiting blood, worsening headache, seizures, or any worsening of your symptoms please return to the emergency department immediately.  If you have any pending results or any further questions please contact the emergency department at [phone].

## 2024-08-08 LAB
EKG ATRIAL RATE: 77 BPM
EKG DIAGNOSIS: NORMAL
EKG P AXIS: 45 DEGREES
EKG P-R INTERVAL: 148 MS
EKG Q-T INTERVAL: 380 MS
EKG QRS DURATION: 84 MS
EKG QTC CALCULATION (BAZETT): 430 MS
EKG R AXIS: -19 DEGREES
EKG T AXIS: 126 DEGREES
EKG VENTRICULAR RATE: 77 BPM

## 2024-08-08 PROCEDURE — 93010 ELECTROCARDIOGRAM REPORT: CPT | Performed by: INTERNAL MEDICINE

## 2024-08-21 ENCOUNTER — APPOINTMENT (OUTPATIENT)
Facility: HOSPITAL | Age: 69
End: 2024-08-21
Payer: MEDICARE

## 2024-08-21 ENCOUNTER — HOSPITAL ENCOUNTER (EMERGENCY)
Facility: HOSPITAL | Age: 69
Discharge: HOME | End: 2024-08-22
Attending: EMERGENCY MEDICINE
Payer: MEDICARE

## 2024-08-21 ENCOUNTER — HOSPITAL ENCOUNTER (EMERGENCY)
Facility: HOSPITAL | Age: 69
Discharge: HOME | End: 2024-08-21
Attending: STUDENT IN AN ORGANIZED HEALTH CARE EDUCATION/TRAINING PROGRAM
Payer: MEDICARE

## 2024-08-21 VITALS
SYSTOLIC BLOOD PRESSURE: 156 MMHG | RESPIRATION RATE: 16 BRPM | TEMPERATURE: 97.4 F | DIASTOLIC BLOOD PRESSURE: 68 MMHG | HEIGHT: 65 IN | OXYGEN SATURATION: 100 % | BODY MASS INDEX: 16.66 KG/M2 | HEART RATE: 61 BPM | WEIGHT: 100 LBS

## 2024-08-21 DIAGNOSIS — G40.919 BREAKTHROUGH SEIZURE (HCC): Primary | ICD-10-CM

## 2024-08-21 DIAGNOSIS — E16.2 HYPOGLYCEMIA: Primary | ICD-10-CM

## 2024-08-21 LAB
ALBUMIN SERPL-MCNC: 2.7 G/DL (ref 3.4–5)
ALBUMIN SERPL-MCNC: 3.6 G/DL (ref 3.4–5)
ALBUMIN/GLOB SERPL: 1 (ref 0.8–1.7)
ALBUMIN/GLOB SERPL: 1.2 (ref 0.8–1.7)
ALP SERPL-CCNC: 103 U/L (ref 45–117)
ALP SERPL-CCNC: 77 U/L (ref 45–117)
ALT SERPL-CCNC: 13 U/L (ref 13–56)
ALT SERPL-CCNC: 18 U/L (ref 13–56)
AMPHET UR QL SCN: NEGATIVE
ANION GAP SERPL CALC-SCNC: 10 MMOL/L (ref 3–18)
ANION GAP SERPL CALC-SCNC: 6 MMOL/L (ref 3–18)
APAP SERPL-MCNC: 4 UG/ML (ref 10–30)
APPEARANCE UR: CLEAR
AST SERPL-CCNC: 11 U/L (ref 10–38)
AST SERPL-CCNC: 14 U/L (ref 10–38)
BARBITURATES UR QL SCN: NEGATIVE
BASOPHILS # BLD: 0 K/UL (ref 0–0.1)
BASOPHILS NFR BLD: 1 % (ref 0–2)
BENZODIAZ UR QL: NEGATIVE
BILIRUB SERPL-MCNC: 0.2 MG/DL (ref 0.2–1)
BILIRUB SERPL-MCNC: 0.2 MG/DL (ref 0.2–1)
BILIRUB UR QL: NEGATIVE
BUN SERPL-MCNC: 19 MG/DL (ref 7–18)
BUN SERPL-MCNC: 20 MG/DL (ref 7–18)
BUN/CREAT SERPL: 16 (ref 12–20)
BUN/CREAT SERPL: 18 (ref 12–20)
CALCIUM SERPL-MCNC: 7.9 MG/DL (ref 8.5–10.1)
CALCIUM SERPL-MCNC: 9.3 MG/DL (ref 8.5–10.1)
CANNABINOIDS UR QL SCN: NEGATIVE
CHLORIDE SERPL-SCNC: 105 MMOL/L (ref 100–111)
CHLORIDE SERPL-SCNC: 110 MMOL/L (ref 100–111)
CO2 SERPL-SCNC: 22 MMOL/L (ref 21–32)
CO2 SERPL-SCNC: 26 MMOL/L (ref 21–32)
COCAINE UR QL SCN: NEGATIVE
COLOR UR: YELLOW
CREAT SERPL-MCNC: 1.04 MG/DL (ref 0.6–1.3)
CREAT SERPL-MCNC: 1.22 MG/DL (ref 0.6–1.3)
DIFFERENTIAL METHOD BLD: ABNORMAL
EKG ATRIAL RATE: 55 BPM
EKG DIAGNOSIS: NORMAL
EKG P AXIS: 79 DEGREES
EKG P-R INTERVAL: 154 MS
EKG Q-T INTERVAL: 430 MS
EKG QRS DURATION: 78 MS
EKG QTC CALCULATION (BAZETT): 411 MS
EKG R AXIS: -19 DEGREES
EKG T AXIS: 121 DEGREES
EKG VENTRICULAR RATE: 55 BPM
EOSINOPHIL # BLD: 0.2 K/UL (ref 0–0.4)
EOSINOPHIL NFR BLD: 3 % (ref 0–5)
ERYTHROCYTE [DISTWIDTH] IN BLOOD BY AUTOMATED COUNT: 13.9 % (ref 11.6–14.5)
ERYTHROCYTE [DISTWIDTH] IN BLOOD BY AUTOMATED COUNT: 14.2 % (ref 11.6–14.5)
ETHANOL SERPL-MCNC: <3 MG/DL (ref 0–3)
ETHANOL SERPL-MCNC: <3 MG/DL (ref 0–3)
GLOBULIN SER CALC-MCNC: 2.3 G/DL (ref 2–4)
GLOBULIN SER CALC-MCNC: 3.6 G/DL (ref 2–4)
GLUCOSE BLD STRIP.AUTO-MCNC: 169 MG/DL (ref 70–110)
GLUCOSE BLD STRIP.AUTO-MCNC: 183 MG/DL (ref 70–110)
GLUCOSE BLD STRIP.AUTO-MCNC: 89 MG/DL (ref 70–110)
GLUCOSE BLD STRIP.AUTO-MCNC: 98 MG/DL (ref 70–110)
GLUCOSE SERPL-MCNC: 172 MG/DL (ref 74–99)
GLUCOSE SERPL-MCNC: 189 MG/DL (ref 74–99)
GLUCOSE UR STRIP.AUTO-MCNC: NEGATIVE MG/DL
HCT VFR BLD AUTO: 31 % (ref 35–45)
HCT VFR BLD AUTO: 38.8 % (ref 35–45)
HGB BLD-MCNC: 12.3 G/DL (ref 12–16)
HGB BLD-MCNC: 9.9 G/DL (ref 12–16)
HGB UR QL STRIP: NEGATIVE
IMM GRANULOCYTES # BLD AUTO: 0.1 K/UL (ref 0–0.04)
IMM GRANULOCYTES NFR BLD AUTO: 1 % (ref 0–0.5)
KETONES UR QL STRIP.AUTO: NEGATIVE MG/DL
LEUKOCYTE ESTERASE UR QL STRIP.AUTO: NEGATIVE
LYMPHOCYTES # BLD: 2.6 K/UL (ref 0.9–3.6)
LYMPHOCYTES NFR BLD: 33 % (ref 21–52)
Lab: NORMAL
MAGNESIUM SERPL-MCNC: 1.6 MG/DL (ref 1.6–2.6)
MCH RBC QN AUTO: 30.2 PG (ref 24–34)
MCH RBC QN AUTO: 30.6 PG (ref 24–34)
MCHC RBC AUTO-ENTMCNC: 31.7 G/DL (ref 31–37)
MCHC RBC AUTO-ENTMCNC: 31.9 G/DL (ref 31–37)
MCV RBC AUTO: 95.3 FL (ref 78–100)
MCV RBC AUTO: 95.7 FL (ref 78–100)
METHADONE UR QL: NEGATIVE
MONOCYTES # BLD: 0.8 K/UL (ref 0.05–1.2)
MONOCYTES NFR BLD: 10 % (ref 3–10)
NEUTS SEG # BLD: 4.1 K/UL (ref 1.8–8)
NEUTS SEG NFR BLD: 53 % (ref 40–73)
NITRITE UR QL STRIP.AUTO: NEGATIVE
NRBC # BLD: 0 K/UL (ref 0–0.01)
NRBC # BLD: 0 K/UL (ref 0–0.01)
NRBC BLD-RTO: 0 PER 100 WBC
NRBC BLD-RTO: 0 PER 100 WBC
OPIATES UR QL: NEGATIVE
PCP UR QL: NEGATIVE
PH UR STRIP: 5.5 (ref 5–8)
PLATELET # BLD AUTO: 145 K/UL (ref 135–420)
PLATELET # BLD AUTO: 181 K/UL (ref 135–420)
PMV BLD AUTO: 11.4 FL (ref 9.2–11.8)
PMV BLD AUTO: 11.7 FL (ref 9.2–11.8)
POTASSIUM SERPL-SCNC: 3.1 MMOL/L (ref 3.5–5.5)
POTASSIUM SERPL-SCNC: 4.6 MMOL/L (ref 3.5–5.5)
PROT SERPL-MCNC: 5 G/DL (ref 6.4–8.2)
PROT SERPL-MCNC: 7.2 G/DL (ref 6.4–8.2)
PROT UR STRIP-MCNC: NEGATIVE MG/DL
RBC # BLD AUTO: 3.24 M/UL (ref 4.2–5.3)
RBC # BLD AUTO: 4.07 M/UL (ref 4.2–5.3)
SALICYLATES SERPL-MCNC: <1.7 MG/DL (ref 2.8–20)
SODIUM SERPL-SCNC: 137 MMOL/L (ref 136–145)
SODIUM SERPL-SCNC: 142 MMOL/L (ref 136–145)
SP GR UR REFRACTOMETRY: 1.02 (ref 1–1.03)
T4 FREE SERPL-MCNC: 0.5 NG/DL (ref 0.7–1.5)
TSH SERPL DL<=0.05 MIU/L-ACNC: 4.59 UIU/ML (ref 0.36–3.74)
UROBILINOGEN UR QL STRIP.AUTO: 1 EU/DL (ref 0.2–1)
VALPROATE SERPL-MCNC: 44 UG/ML (ref 50–100)
WBC # BLD AUTO: 7.6 K/UL (ref 4.6–13.2)
WBC # BLD AUTO: 7.8 K/UL (ref 4.6–13.2)

## 2024-08-21 PROCEDURE — 70450 CT HEAD/BRAIN W/O DYE: CPT

## 2024-08-21 PROCEDURE — 81003 URINALYSIS AUTO W/O SCOPE: CPT

## 2024-08-21 PROCEDURE — 94761 N-INVAS EAR/PLS OXIMETRY MLT: CPT

## 2024-08-21 PROCEDURE — 80143 DRUG ASSAY ACETAMINOPHEN: CPT

## 2024-08-21 PROCEDURE — 83735 ASSAY OF MAGNESIUM: CPT

## 2024-08-21 PROCEDURE — 99284 EMERGENCY DEPT VISIT MOD MDM: CPT

## 2024-08-21 PROCEDURE — 80053 COMPREHEN METABOLIC PANEL: CPT

## 2024-08-21 PROCEDURE — 80179 DRUG ASSAY SALICYLATE: CPT

## 2024-08-21 PROCEDURE — 84439 ASSAY OF FREE THYROXINE: CPT

## 2024-08-21 PROCEDURE — 93005 ELECTROCARDIOGRAM TRACING: CPT | Performed by: STUDENT IN AN ORGANIZED HEALTH CARE EDUCATION/TRAINING PROGRAM

## 2024-08-21 PROCEDURE — 80307 DRUG TEST PRSMV CHEM ANLYZR: CPT

## 2024-08-21 PROCEDURE — 80183 DRUG SCRN QUANT OXCARBAZEPIN: CPT

## 2024-08-21 PROCEDURE — 93005 ELECTROCARDIOGRAM TRACING: CPT | Performed by: EMERGENCY MEDICINE

## 2024-08-21 PROCEDURE — 84443 ASSAY THYROID STIM HORMONE: CPT

## 2024-08-21 PROCEDURE — 82962 GLUCOSE BLOOD TEST: CPT

## 2024-08-21 PROCEDURE — 82077 ASSAY SPEC XCP UR&BREATH IA: CPT

## 2024-08-21 PROCEDURE — 6370000000 HC RX 637 (ALT 250 FOR IP): Performed by: STUDENT IN AN ORGANIZED HEALTH CARE EDUCATION/TRAINING PROGRAM

## 2024-08-21 PROCEDURE — 93010 ELECTROCARDIOGRAM REPORT: CPT | Performed by: INTERNAL MEDICINE

## 2024-08-21 PROCEDURE — 85027 COMPLETE CBC AUTOMATED: CPT

## 2024-08-21 PROCEDURE — 85025 COMPLETE CBC W/AUTO DIFF WBC: CPT

## 2024-08-21 PROCEDURE — 80164 ASSAY DIPROPYLACETIC ACD TOT: CPT

## 2024-08-21 RX ORDER — POTASSIUM CHLORIDE 1500 MG/1
40 TABLET, EXTENDED RELEASE ORAL ONCE
Status: COMPLETED | OUTPATIENT
Start: 2024-08-21 | End: 2024-08-21

## 2024-08-21 RX ADMIN — POTASSIUM CHLORIDE 40 MEQ: 1500 TABLET, EXTENDED RELEASE ORAL at 04:23

## 2024-08-21 ASSESSMENT — PAIN - FUNCTIONAL ASSESSMENT: PAIN_FUNCTIONAL_ASSESSMENT: NONE - DENIES PAIN

## 2024-08-21 NOTE — ED PROVIDER NOTES
EMERGENCY DEPARTMENT HISTORY AND PHYSICAL EXAM    10:18 PM      Date: 8/21/2024  Patient Name: Purvi Denny    History of Presenting Illness     Chief Complaint   Patient presents with    Hypoglycemia       History From: Patient, patient's son, EMS    Purvi Denny is a 69 y.o. female   HPI  69-year-old female with history of TIA, CVA, seizures, PE not on any anticoagulation who presents altered mental status and diaphoresis.  As per patient's son he states that he was checking on his mother.  She was diaphoretic, clammy, hallucinating and stating that she was seeing things that were not there.  EMS was called.  Patient blood glucose was found to be low.  Unspecific value.  Patient was given supplemental glucose D10.  Patient returned back to baseline.  Denies any asthma, sick contact, or any other changes.  When assessing ROS she has no nausea, vomiting, chest pain, shortness of breath, abdominal pain, or any other changes.       Nursing Notes were all reviewed and agreed with or any disagreements were addressed in the HPI.    PCP: Deepali Montaño, CIARA - NP    No current facility-administered medications for this encounter.     Current Outpatient Medications   Medication Sig Dispense Refill    aluminum & magnesium hydroxide-simethicone (MAALOX MAX) 400-400-40 MG/5ML SUSP Take 15 mLs by mouth every 6 hours as needed (upper abdominal pain) 355 mL 0    ondansetron (ZOFRAN-ODT) 4 MG disintegrating tablet Place 1 tablet under the tongue every 8 hours as needed for Nausea or Vomiting 21 tablet 0    dicyclomine (BENTYL) 20 MG tablet Take 1 tablet by mouth 3 times daily as needed (abdominal pain) 21 tablet 0    famotidine (PEPCID) 20 MG tablet Take 1 tablet by mouth 2 times daily 60 tablet 0    petrolatum-zinc oxide 49-15 % OINT ointment Apply to affected area 3 times a day as needed 113 g 0    Petrolatum-Zinc Oxide 57-17 % ointment Apply 1 each topically as needed for Dry Skin (For sacral wound, to be applied by home    Final Result   1.No acute intracranial abnormality. Stable encephalomalacia in the right   parietal lobe, sequela of chronic small vessel ischemic disease, and atrophy.      Thank you for enabling us to participate in the care of this patient.      Electronically signed by Malachi Ge            Medical Decision Making   I am the first provider for this patient.    I reviewed the vital signs, available nursing notes, past medical history, past surgical history, family history and social history.    Vital Signs-Reviewed the patient's vital signs.      EKG: none       ED Course: Progress Notes, Reevaluation, and Consults:    Provider Notes (Medical Decision Making):     MDM  69-year-old female who presents with altered mental status and hypoglycemia.  Will consider dehydration versus metabolic derangement.  Low suspicion for beta-blocker toxicity.  Low suspicion for iatrogenic insulin induced hypoglycemia as patient is not a diabetic.  Low suspicion for stroke as patient is neurologically intact with no acute focal neurological deficits.  Will obtain labs and imaging.  Patient found to have a glucose of 189.  Mild hypokalemia of 3.1.  Patient given KCl repletion.  Patient CT head shows no signs of acute intracranial abnormality.  Patient at baseline.  Discussed workup with patient.  They will follow-up PCP in the outpatient setting.  Patient be discharged.        ED Course as of 08/21/24 2218   Wed Aug 21, 2024   0423 CT HEAD WO CONTRAST     IMPRESSION:  1.No acute intracranial abnormality. Stable encephalomalacia in the right  parietal lobe, sequela of chronic small vessel ischemic disease, and atrophy.     Thank you for enabling us to participate in the care of this patient.      [KS]      ED Course User Index  [KS] Nehemiah You MD       Patient was given the following medications:  Medications   potassium chloride (KLOR-CON M) extended release tablet 40 mEq (40 mEq Oral Given 8/21/24 0423)

## 2024-08-21 NOTE — ED NOTES
Pt being discharged home; transfer report given to fast track EMS transport team. Pt stable; IV removed. Son contacted and updated of discharge; states he is home to receive her. BS recheck - 98

## 2024-08-21 NOTE — ED NOTES
Report received from ALYSSA Donohue;   Pt on monitor. Resp even and unlabored. Denies pain; Glucose check -89.

## 2024-08-21 NOTE — DISCHARGE INSTRUCTIONS
You were evaluated for low blood sugar .  Based on your work-up it was deemed that she was stable for discharge.  Please follow-up with your primary care physician if you have any further concerns and go over your work-up.  If you experience any chest pain, shortness of breath, worsening abdominal pain, vomiting blood, worsening headache, seizures, or any worsening of your symptoms please return to the emergency department immediately.  If you have any pending results or any further questions please contact the emergency department at [phone].

## 2024-08-21 NOTE — ED NOTES
Son, Pricilla Denny, was contacted about Pt's discharge. He is unable to drive to the hospital. Medical transport was subsequently requested.

## 2024-08-21 NOTE — ED TRIAGE NOTES
EMS reports 911 was called d/t hypoglycemia. Son found her diaphoretic in bed. BG with EMS read \"low.\" EMS administered 125 mL D10. Repeat BG was 165. 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 EMS states Pt has been seen in the past for hyperglycemia multiple times but she denies h/o DM. EMS states Pt has meds for DM at home. Pt currently c/o dental pain Pt denies recent med changes and fever.

## 2024-08-22 VITALS
RESPIRATION RATE: 17 BRPM | BODY MASS INDEX: 16.66 KG/M2 | WEIGHT: 100 LBS | HEART RATE: 73 BPM | SYSTOLIC BLOOD PRESSURE: 147 MMHG | OXYGEN SATURATION: 100 % | HEIGHT: 65 IN | TEMPERATURE: 98.8 F | DIASTOLIC BLOOD PRESSURE: 77 MMHG

## 2024-08-22 LAB
AMPHET UR QL SCN: NEGATIVE
APPEARANCE UR: CLEAR
BACTERIA URNS QL MICRO: NEGATIVE /HPF
BARBITURATES UR QL SCN: NEGATIVE
BENZODIAZ UR QL: NEGATIVE
BILIRUB UR QL: NEGATIVE
CANNABINOIDS UR QL SCN: NEGATIVE
COCAINE UR QL SCN: NEGATIVE
COLOR UR: YELLOW
EKG ATRIAL RATE: 75 BPM
EKG DIAGNOSIS: NORMAL
EKG P AXIS: 16 DEGREES
EKG P-R INTERVAL: 150 MS
EKG Q-T INTERVAL: 392 MS
EKG QRS DURATION: 84 MS
EKG QTC CALCULATION (BAZETT): 437 MS
EKG R AXIS: 79 DEGREES
EKG T AXIS: -80 DEGREES
EKG VENTRICULAR RATE: 75 BPM
EPITH CASTS URNS QL MICRO: NORMAL /LPF (ref 0–5)
GLUCOSE UR STRIP.AUTO-MCNC: NEGATIVE MG/DL
HGB UR QL STRIP: NEGATIVE
KETONES UR QL STRIP.AUTO: NEGATIVE MG/DL
LEUKOCYTE ESTERASE UR QL STRIP.AUTO: ABNORMAL
Lab: NORMAL
METHADONE UR QL: NEGATIVE
NITRITE UR QL STRIP.AUTO: NEGATIVE
OPIATES UR QL: NEGATIVE
PCP UR QL: NEGATIVE
PH UR STRIP: 6 (ref 5–8)
PROT UR STRIP-MCNC: NEGATIVE MG/DL
RBC #/AREA URNS HPF: NEGATIVE /HPF (ref 0–5)
SP GR UR REFRACTOMETRY: 1.02 (ref 1–1.03)
UROBILINOGEN UR QL STRIP.AUTO: 0.2 EU/DL (ref 0.2–1)
WBC URNS QL MICRO: NORMAL /HPF (ref 0–4)

## 2024-08-22 PROCEDURE — 93010 ELECTROCARDIOGRAM REPORT: CPT | Performed by: INTERNAL MEDICINE

## 2024-08-22 PROCEDURE — 81001 URINALYSIS AUTO W/SCOPE: CPT

## 2024-08-22 PROCEDURE — 80307 DRUG TEST PRSMV CHEM ANLYZR: CPT

## 2024-08-22 NOTE — ED NOTES
Pt discharged at this time, provided pt with DC paperwork   Pt in no apparent distress, pain managed at this time   Pt discharged with [facility] transport    All questions and concerns answered at this time

## 2024-08-22 NOTE — ED TRIAGE NOTES
Pt arrives via EMD from home for a transient episode of AMS - EMS reports pt was returned to baseline on their arrival   In the ER pt is A&OX2 - altered to time & situation   EMS

## 2024-08-22 NOTE — ED PROVIDER NOTES
EMERGENCY DEPARTMENT HISTORY AND PHYSICAL EXAM      Patient Name: Purvi Denny  MRN: [id number]  YOB: 1955  Provider: Nahomy Goff MD  PCP: Deepali Montaño APRN - NP   Time/Date of evaluation: 10:13 PM EDT on 8/21/24    History of Presenting Illness     Chief Complaint   Patient presents with    Altered Mental Status       History Provided By: EMS and Patient     History (Narative):   Purvi Denny is a 69 y.o. female with a PMHX of depression, hypertension, seizures, CVA, hypothyroidism  who presents to the emergency department  by EMS C/O altered mental status.  Her family thought she was having another diabetic emergency.  She was seen yesterday for hypoglycemia and altered mental status.  When EMS arrived, her blood sugar was 204.  The patient is now back to her baseline mental status.  She is not really able to provide much more information.  She believes that she had a blood sugar emergency today as well.        Past History     Past Medical History:  Past Medical History:   Diagnosis Date    Abnormal coordination 9/21/2020    Depression 4/12/2022    Failure to thrive in adult 4/12/2022    Hallucinations 9/21/2020    Hypertension     Neurological disorder     Seizures (HCC)     Stroke (HCC) 2009    Subclinical hypothyroidism 4/13/2022       Past Surgical History:  Past Surgical History:   Procedure Laterality Date    PLACE PERCUT GASTROSTOMY TUBE  1/11/2022            Family History:  Family History   Problem Relation Age of Onset    Diabetes Other     Stroke Other        Social History:  Social History     Tobacco Use    Smoking status: Never    Smokeless tobacco: Never   Substance Use Topics    Alcohol use: Yes    Drug use: No       Medications:  No current facility-administered medications for this encounter.     Current Outpatient Medications   Medication Sig Dispense Refill    aluminum & magnesium hydroxide-simethicone (MAALOX MAX) 400-400-40 MG/5ML SUSP Take 15 mLs by mouth every 6 hours  Please disregard these errors.  Please excuse any errors that have escaped final proofreading.       Nahomy Goff MD  08/24/24 0026

## 2024-08-22 NOTE — ED TRIAGE NOTES
Pt arrives via EMS from home for a transient episode of AMS - EMS reports pt was return to baseline on their arrival  Altered to time & situation

## 2024-08-29 LAB — OXCARBAZEPINE SERPL-MCNC: 24 UG/ML (ref 10–35)

## 2024-11-01 ENCOUNTER — APPOINTMENT (OUTPATIENT)
Facility: HOSPITAL | Age: 69
End: 2024-11-01
Payer: MEDICARE

## 2024-11-01 ENCOUNTER — HOSPITAL ENCOUNTER (EMERGENCY)
Facility: HOSPITAL | Age: 69
Discharge: HOME | End: 2024-11-01
Payer: MEDICARE

## 2024-11-01 VITALS
TEMPERATURE: 98.1 F | RESPIRATION RATE: 19 BRPM | WEIGHT: 103 LBS | BODY MASS INDEX: 17.16 KG/M2 | DIASTOLIC BLOOD PRESSURE: 112 MMHG | OXYGEN SATURATION: 100 % | HEART RATE: 70 BPM | SYSTOLIC BLOOD PRESSURE: 138 MMHG | HEIGHT: 65 IN

## 2024-11-01 DIAGNOSIS — R06.6 HICCUPS: ICD-10-CM

## 2024-11-01 DIAGNOSIS — F41.1 ANXIETY STATE: ICD-10-CM

## 2024-11-01 DIAGNOSIS — R07.89 CHEST TIGHTNESS: Primary | ICD-10-CM

## 2024-11-01 LAB
ALBUMIN SERPL-MCNC: 3.5 G/DL (ref 3.4–5)
ALBUMIN/GLOB SERPL: 1.1 (ref 0.8–1.7)
ALP SERPL-CCNC: 109 U/L (ref 45–117)
ALT SERPL-CCNC: 16 U/L (ref 13–56)
ANION GAP SERPL CALC-SCNC: 6 MMOL/L (ref 3–18)
APPEARANCE UR: CLEAR
AST SERPL-CCNC: 14 U/L (ref 10–38)
BASOPHILS # BLD: 0.1 K/UL (ref 0–0.1)
BASOPHILS NFR BLD: 1 % (ref 0–2)
BILIRUB SERPL-MCNC: 0.2 MG/DL (ref 0.2–1)
BILIRUB UR QL: NEGATIVE
BUN SERPL-MCNC: 24 MG/DL (ref 7–18)
BUN/CREAT SERPL: 19 (ref 12–20)
CALCIUM SERPL-MCNC: 9.1 MG/DL (ref 8.5–10.1)
CHLORIDE SERPL-SCNC: 111 MMOL/L (ref 100–111)
CO2 SERPL-SCNC: 26 MMOL/L (ref 21–32)
COLOR UR: YELLOW
CREAT SERPL-MCNC: 1.24 MG/DL (ref 0.6–1.3)
DIFFERENTIAL METHOD BLD: ABNORMAL
EOSINOPHIL # BLD: 0.5 K/UL (ref 0–0.4)
EOSINOPHIL NFR BLD: 7 % (ref 0–5)
ERYTHROCYTE [DISTWIDTH] IN BLOOD BY AUTOMATED COUNT: 15.2 % (ref 11.6–14.5)
GLOBULIN SER CALC-MCNC: 3.2 G/DL (ref 2–4)
GLUCOSE SERPL-MCNC: 122 MG/DL (ref 74–99)
GLUCOSE UR STRIP.AUTO-MCNC: NEGATIVE MG/DL
HCT VFR BLD AUTO: 35.8 % (ref 35–45)
HGB BLD-MCNC: 11.6 G/DL (ref 12–16)
HGB UR QL STRIP: NEGATIVE
IMM GRANULOCYTES # BLD AUTO: 0 K/UL (ref 0–0.04)
IMM GRANULOCYTES NFR BLD AUTO: 0 % (ref 0–0.5)
KETONES UR QL STRIP.AUTO: NEGATIVE MG/DL
LEUKOCYTE ESTERASE UR QL STRIP.AUTO: NEGATIVE
LYMPHOCYTES # BLD: 2 K/UL (ref 0.9–3.6)
LYMPHOCYTES NFR BLD: 27 % (ref 21–52)
MAGNESIUM SERPL-MCNC: 1.9 MG/DL (ref 1.6–2.6)
MCH RBC QN AUTO: 30.7 PG (ref 24–34)
MCHC RBC AUTO-ENTMCNC: 32.4 G/DL (ref 31–37)
MCV RBC AUTO: 94.7 FL (ref 78–100)
MONOCYTES # BLD: 0.8 K/UL (ref 0.05–1.2)
MONOCYTES NFR BLD: 10 % (ref 3–10)
NEUTS SEG # BLD: 4 K/UL (ref 1.8–8)
NEUTS SEG NFR BLD: 55 % (ref 40–73)
NITRITE UR QL STRIP.AUTO: NEGATIVE
NRBC # BLD: 0 K/UL (ref 0–0.01)
NRBC BLD-RTO: 0 PER 100 WBC
NT PRO BNP: 516 PG/ML (ref 0–900)
PH UR STRIP: 7 (ref 5–8)
PLATELET # BLD AUTO: 174 K/UL (ref 135–420)
PMV BLD AUTO: 11.5 FL (ref 9.2–11.8)
POTASSIUM SERPL-SCNC: 4.2 MMOL/L (ref 3.5–5.5)
PROT SERPL-MCNC: 6.7 G/DL (ref 6.4–8.2)
PROT UR STRIP-MCNC: NEGATIVE MG/DL
RBC # BLD AUTO: 3.78 M/UL (ref 4.2–5.3)
SODIUM SERPL-SCNC: 143 MMOL/L (ref 136–145)
SP GR UR REFRACTOMETRY: 1.02 (ref 1–1.03)
TROPONIN I SERPL HS-MCNC: 49 NG/L (ref 0–54)
TROPONIN I SERPL HS-MCNC: 53 NG/L (ref 0–54)
UROBILINOGEN UR QL STRIP.AUTO: 1 EU/DL (ref 0.2–1)
WBC # BLD AUTO: 7.3 K/UL (ref 4.6–13.2)

## 2024-11-01 PROCEDURE — 81003 URINALYSIS AUTO W/O SCOPE: CPT

## 2024-11-01 PROCEDURE — 93005 ELECTROCARDIOGRAM TRACING: CPT | Performed by: PHYSICIAN ASSISTANT

## 2024-11-01 PROCEDURE — 85025 COMPLETE CBC W/AUTO DIFF WBC: CPT

## 2024-11-01 PROCEDURE — 71045 X-RAY EXAM CHEST 1 VIEW: CPT

## 2024-11-01 PROCEDURE — 80053 COMPREHEN METABOLIC PANEL: CPT

## 2024-11-01 PROCEDURE — 83735 ASSAY OF MAGNESIUM: CPT

## 2024-11-01 PROCEDURE — 84484 ASSAY OF TROPONIN QUANT: CPT

## 2024-11-01 PROCEDURE — 83880 ASSAY OF NATRIURETIC PEPTIDE: CPT

## 2024-11-01 PROCEDURE — 99285 EMERGENCY DEPT VISIT HI MDM: CPT

## 2024-11-01 ASSESSMENT — ENCOUNTER SYMPTOMS
VOMITING: 0
SINUS PRESSURE: 0
DIARRHEA: 0
SORE THROAT: 0
BACK PAIN: 0
WHEEZING: 0
SHORTNESS OF BREATH: 0
COUGH: 0
ABDOMINAL DISTENTION: 0
NAUSEA: 0
CHEST TIGHTNESS: 1

## 2024-11-01 ASSESSMENT — PAIN - FUNCTIONAL ASSESSMENT: PAIN_FUNCTIONAL_ASSESSMENT: NONE - DENIES PAIN

## 2024-11-01 NOTE — ED PROVIDER NOTES
EMERGENCY DEPARTMENT HISTORY AND PHYSICAL EXAM    Date: 11/1/2024  Patient Name: Purvi Denny    History of Presenting Illness     Chief Complaint   Patient presents with    Panic Attack         History Provided By: the patient    Chief Complaint: nervousness, chest tightness, hiccups   Duration: 1 day   Timing:  acute  Location: n/a  Quality: tightness   Severity: moderate  Modifying Factors: n/a  Associated Symptoms: n/a      Additional History (Context): Purvi Denny is a 69 y.o. female with PMHx of CVA, TIA, seizures, depression who is not on anticoagulation presents with chest tightness and feelings of nervousness. The patient stated that these feelings come on at random since her stroke. Pt has residual weakness of her left lower leg which makes it hard to ambulate--a result of her prior CVA. She is not able to provide much information on what exactly happened today, she states that she has had seizures before without remembering them but doesn't think she had a seizure today. Advises that her son would know more about why she was brought to the ED today. Pt's son did call the ED prior to her arrival and reported to the nurse that his mother had an anxiety attack. Pt noted to have hiccups when she was evaluated at bedside. She states she has had them off and on all day. Denies current SOB, N/V/D. No other hx obtained at this time.     PCP: Deepali Montaño APRN - NP    No current facility-administered medications for this encounter.     Current Outpatient Medications   Medication Sig Dispense Refill    aluminum & magnesium hydroxide-simethicone (MAALOX MAX) 400-400-40 MG/5ML SUSP Take 15 mLs by mouth every 6 hours as needed (upper abdominal pain) 355 mL 0    ondansetron (ZOFRAN-ODT) 4 MG disintegrating tablet Place 1 tablet under the tongue every 8 hours as needed for Nausea or Vomiting 21 tablet 0    dicyclomine (BENTYL) 20 MG tablet Take 1 tablet by mouth 3 times daily as needed (abdominal pain) 21  1 tablet by mouth 2 times daily    petrolatum-zinc oxide 49-15 % OINT ointment Apply to affected area 3 times a day as needed    Petrolatum-Zinc Oxide 57-17 % ointment Apply 1 each topically as needed for Dry Skin (For sacral wound, to be applied by home health nurse.)    OXcarbazepine (TRILEPTAL) 300 MG tablet Take 1 tablet by mouth 2 times daily Take 1.5 tablets by mouth twice a day for one week, then take 2 tablets twice a day after that.    ondansetron (ZOFRAN-ODT) 8 MG TBDP disintegrating tablet Take 1 tablet by mouth 3 times daily as needed for Nausea or Vomiting    omeprazole (PRILOSEC OTC) 20 MG tablet Take 1 tablet by mouth daily    aspirin 81 MG chewable tablet Take 1 tablet by mouth daily    atorvastatin (LIPITOR) 80 MG tablet Take 1 tablet by mouth nightly    QUEtiapine (SEROQUEL) 25 MG tablet Take 1 tablet by mouth nightly    Multiple Vitamins-Minerals (THERAPEUTIC MULTIVITAMIN-MINERALS) tablet Take 1 tablet by mouth daily    acetaminophen (TYLENOL) 325 MG tablet Take 2 tablets by mouth every 4 hours as needed for Fever or Pain    clopidogrel (PLAVIX) 75 MG tablet Take 1 tablet by mouth daily    cyanocobalamin 500 MCG tablet Take 1 tablet by mouth daily    ergocalciferol (ERGOCALCIFEROL) 1.25 MG (49885 UT) capsule Take 1 capsule by mouth Twice a Week    ferrous sulfate (IRON 325) 325 (65 Fe) MG tablet Take 1 tablet by mouth daily    PARoxetine (PAXIL) 30 MG tablet Take 1 tablet by mouth daily       Disposition:  D/       Medication List        ASK your doctor about these medications      acetaminophen 325 MG tablet  Commonly known as: TYLENOL     aspirin 81 MG chewable tablet  Take 1 tablet by mouth daily     atorvastatin 80 MG tablet  Commonly known as: LIPITOR  Take 1 tablet by mouth nightly     clopidogrel 75 MG tablet  Commonly known as: PLAVIX     cyanocobalamin 500 MCG tablet     dicyclomine 20 MG tablet  Commonly known as: BENTYL  Take 1 tablet by mouth 3 times daily as needed (abdominal pain)

## 2024-11-01 NOTE — ED TRIAGE NOTES
PT presents to the emergency department via EMS c/o a panic attack. PT presents calm and breathing equally with no distress. PT seen here several times for same. PT on several medications to manage symptoms.

## 2024-11-02 LAB
EKG ATRIAL RATE: 72 BPM
EKG DIAGNOSIS: NORMAL
EKG P AXIS: 66 DEGREES
EKG P-R INTERVAL: 142 MS
EKG Q-T INTERVAL: 396 MS
EKG QRS DURATION: 78 MS
EKG QTC CALCULATION (BAZETT): 433 MS
EKG R AXIS: -20 DEGREES
EKG T AXIS: 130 DEGREES
EKG VENTRICULAR RATE: 72 BPM

## 2024-11-02 PROCEDURE — 93010 ELECTROCARDIOGRAM REPORT: CPT | Performed by: INTERNAL MEDICINE

## 2024-11-11 ENCOUNTER — HOSPITAL ENCOUNTER (EMERGENCY)
Facility: HOSPITAL | Age: 69
Discharge: HOME | End: 2024-11-11
Attending: STUDENT IN AN ORGANIZED HEALTH CARE EDUCATION/TRAINING PROGRAM
Payer: MEDICARE

## 2024-11-11 VITALS
HEIGHT: 65 IN | RESPIRATION RATE: 27 BRPM | HEART RATE: 70 BPM | WEIGHT: 103 LBS | BODY MASS INDEX: 17.16 KG/M2 | TEMPERATURE: 98.8 F | DIASTOLIC BLOOD PRESSURE: 81 MMHG | OXYGEN SATURATION: 98 % | SYSTOLIC BLOOD PRESSURE: 144 MMHG

## 2024-11-11 DIAGNOSIS — L24.A2 IRRITANT CONTACT DERMATITIS DUE TO FECAL INCONTINENCE: Primary | ICD-10-CM

## 2024-11-11 DIAGNOSIS — R19.7 DIARRHEA, UNSPECIFIED TYPE: ICD-10-CM

## 2024-11-11 PROCEDURE — 94761 N-INVAS EAR/PLS OXIMETRY MLT: CPT

## 2024-11-11 PROCEDURE — 99283 EMERGENCY DEPT VISIT LOW MDM: CPT

## 2024-11-11 PROCEDURE — 6370000000 HC RX 637 (ALT 250 FOR IP): Performed by: STUDENT IN AN ORGANIZED HEALTH CARE EDUCATION/TRAINING PROGRAM

## 2024-11-11 RX ORDER — LOPERAMIDE HYDROCHLORIDE 2 MG/1
2 CAPSULE ORAL
Status: DISCONTINUED | OUTPATIENT
Start: 2024-11-11 | End: 2024-11-11

## 2024-11-11 RX ORDER — LOPERAMIDE HYDROCHLORIDE 2 MG/1
2 TABLET ORAL 4 TIMES DAILY PRN
Qty: 8 TABLET | Refills: 0 | Status: SHIPPED | OUTPATIENT
Start: 2024-11-11 | End: 2024-11-13

## 2024-11-11 RX ORDER — LOPERAMIDE HYDROCHLORIDE 2 MG/1
4 CAPSULE ORAL
Status: COMPLETED | OUTPATIENT
Start: 2024-11-11 | End: 2024-11-11

## 2024-11-11 RX ORDER — ACETAMINOPHEN 325 MG/1
650 TABLET ORAL
Status: COMPLETED | OUTPATIENT
Start: 2024-11-11 | End: 2024-11-11

## 2024-11-11 RX ADMIN — LOPERAMIDE HYDROCHLORIDE 4 MG: 2 CAPSULE ORAL at 08:14

## 2024-11-11 RX ADMIN — ACETAMINOPHEN 325MG 650 MG: 325 TABLET ORAL at 08:14

## 2024-11-11 ASSESSMENT — PAIN DESCRIPTION - ORIENTATION: ORIENTATION: MID

## 2024-11-11 ASSESSMENT — PAIN DESCRIPTION - LOCATION: LOCATION: ABDOMEN

## 2024-11-11 ASSESSMENT — PAIN DESCRIPTION - DESCRIPTORS: DESCRIPTORS: ACHING

## 2024-11-11 ASSESSMENT — PAIN SCALES - GENERAL: PAINLEVEL_OUTOF10: 4

## 2024-11-11 NOTE — DISCHARGE INSTRUCTIONS
You were evaluated for irration of the buttocks region and diarrhea .  Based on your work-up it was deemed that she was stable for discharge.  Please  your medication of loperamide which was prescribed to you.  Please follow-up with your primary care physician if you have any further concerns and go over your work-up.  If you experience any chest pain, shortness of breath, worsening abdominal pain, vomiting blood, worsening headache, seizures, or any worsening of your symptoms please return to the emergency department immediately.  If you have any pending results or any further questions please contact the emergency department at [phone].

## 2024-11-11 NOTE — ED PROVIDER NOTES
EMERGENCY DEPARTMENT HISTORY AND PHYSICAL EXAM    7:12 AM      Date: 11/11/2024  Patient Name: Purvi Denny    History of Presenting Illness     Chief Complaint   Patient presents with    Rash       History From: Patient    Purvi Denny is a 69 y.o. female   HPI  69-year-old female with history of TIA, seizures, hallucinations, hyperlipidemia, hypertension, stroke who presents with gluteal irritation.  Patient said that she has been having diarrhea for 4 days.  States that she has been having irritation of her buttock region.  States that this happens every year.  Denies changes in meds, sick contacts, or any other changes.  No aggravating or alleviating factors.  When assessing ROS she denies any fevers, nausea, vomiting, chest pain, abdominal pain, change bowel movement, or any other changes.         Nursing Notes were all reviewed and agreed with or any disagreements were addressed in the HPI.    PCP: Deepali Montaño, CIARA - NP    No current facility-administered medications for this encounter.     Current Outpatient Medications   Medication Sig Dispense Refill    aluminum & magnesium hydroxide-simethicone (MAALOX MAX) 400-400-40 MG/5ML SUSP Take 15 mLs by mouth every 6 hours as needed (upper abdominal pain) 355 mL 0    ondansetron (ZOFRAN-ODT) 4 MG disintegrating tablet Place 1 tablet under the tongue every 8 hours as needed for Nausea or Vomiting 21 tablet 0    dicyclomine (BENTYL) 20 MG tablet Take 1 tablet by mouth 3 times daily as needed (abdominal pain) 21 tablet 0    famotidine (PEPCID) 20 MG tablet Take 1 tablet by mouth 2 times daily 60 tablet 0    petrolatum-zinc oxide 49-15 % OINT ointment Apply to affected area 3 times a day as needed 113 g 0    Petrolatum-Zinc Oxide 57-17 % ointment Apply 1 each topically as needed for Dry Skin (For sacral wound, to be applied by home health nurse.) 2 each 5    OXcarbazepine (TRILEPTAL) 300 MG tablet Take 1 tablet by mouth 2 times daily Take 1.5 tablets by mouth  eating tomato.    Aspirin Nausea And Vomiting    Ciprofloxacin Rash    Penicillins Hives and Rash    Sulfa Antibiotics Hives and Rash         Review of Systems     All pertinent positives and negatives in the HPI     Review of Systems      Physical Exam   BP (!) 144/81   Pulse 70   Temp 98.8 °F (37.1 °C) (Oral)   Resp 27   Ht 1.651 m (5' 5\")   Wt 46.7 kg (103 lb)   SpO2 98%   BMI 17.14 kg/m²       Physical Exam  Constitutional:       Appearance: Normal appearance.   HENT:      Head: Normocephalic and atraumatic.   Eyes:      Extraocular Movements: Extraocular movements intact.      Pupils: Pupils are equal, round, and reactive to light.   Cardiovascular:      Rate and Rhythm: Normal rate and regular rhythm.   Pulmonary:      Effort: Pulmonary effort is normal. No respiratory distress.      Breath sounds: Normal breath sounds. No wheezing.   Abdominal:      General: Abdomen is flat. Bowel sounds are normal.      Palpations: Abdomen is soft.   Genitourinary:     Comments: With nurse Allison.  No signs of any skin breakdown, no signs of any sacral ulcers, erythema, rash.  Mild skin brightening.  Musculoskeletal:         General: Normal range of motion.      Cervical back: Normal range of motion and neck supple.   Skin:     General: Skin is warm and dry.   Neurological:      General: No focal deficit present.      Mental Status: She is alert and oriented to person, place, and time. Mental status is at baseline.   Psychiatric:         Mood and Affect: Mood normal.         Behavior: Behavior normal.           Diagnostic Study Results     Labs -  No results found for this or any previous visit (from the past 12 hour(s)).    Radiologic Studies -   No orders to display         Medical Decision Making   I am the first provider for this patient.    I reviewed the vital signs, available nursing notes, past medical history, past surgical history, family history and social history.    Vital Signs-Reviewed the patient's

## 2024-11-11 NOTE — ED NOTES
Pt lying awake and alert on stretcher. Placed on cardiac monitor. VS stable att.     Pt c/o rash between buttox. Barrier cream applied.

## 2024-11-19 ENCOUNTER — APPOINTMENT (OUTPATIENT)
Facility: HOSPITAL | Age: 69
End: 2024-11-19
Payer: MEDICARE

## 2024-11-19 ENCOUNTER — HOSPITAL ENCOUNTER (EMERGENCY)
Facility: HOSPITAL | Age: 69
Discharge: HOME | End: 2024-11-19
Attending: EMERGENCY MEDICINE
Payer: MEDICARE

## 2024-11-19 VITALS
HEART RATE: 67 BPM | BODY MASS INDEX: 18.99 KG/M2 | OXYGEN SATURATION: 100 % | TEMPERATURE: 98.4 F | DIASTOLIC BLOOD PRESSURE: 71 MMHG | WEIGHT: 114 LBS | RESPIRATION RATE: 19 BRPM | HEIGHT: 65 IN | SYSTOLIC BLOOD PRESSURE: 125 MMHG

## 2024-11-19 DIAGNOSIS — R10.84 GENERALIZED ABDOMINAL PAIN: Primary | ICD-10-CM

## 2024-11-19 LAB
ALBUMIN SERPL-MCNC: 3.1 G/DL (ref 3.4–5)
ALBUMIN/GLOB SERPL: 0.9 (ref 0.8–1.7)
ALP SERPL-CCNC: 91 U/L (ref 45–117)
ALT SERPL-CCNC: 15 U/L (ref 13–56)
ANION GAP SERPL CALC-SCNC: 4 MMOL/L (ref 3–18)
APPEARANCE UR: CLEAR
AST SERPL-CCNC: 12 U/L (ref 10–38)
BASOPHILS # BLD: 0 K/UL (ref 0–0.1)
BASOPHILS NFR BLD: 0 % (ref 0–2)
BILIRUB SERPL-MCNC: 0.2 MG/DL (ref 0.2–1)
BILIRUB UR QL: NEGATIVE
BUN SERPL-MCNC: 15 MG/DL (ref 7–18)
BUN/CREAT SERPL: 16 (ref 12–20)
CALCIUM SERPL-MCNC: 8.8 MG/DL (ref 8.5–10.1)
CHLORIDE SERPL-SCNC: 115 MMOL/L (ref 100–111)
CO2 SERPL-SCNC: 23 MMOL/L (ref 21–32)
COLOR UR: YELLOW
CREAT SERPL-MCNC: 0.93 MG/DL (ref 0.6–1.3)
DIFFERENTIAL METHOD BLD: ABNORMAL
EOSINOPHIL # BLD: 0.1 K/UL (ref 0–0.4)
EOSINOPHIL NFR BLD: 2 % (ref 0–5)
ERYTHROCYTE [DISTWIDTH] IN BLOOD BY AUTOMATED COUNT: 14 % (ref 11.6–14.5)
GLOBULIN SER CALC-MCNC: 3.3 G/DL (ref 2–4)
GLUCOSE SERPL-MCNC: 87 MG/DL (ref 74–99)
GLUCOSE UR STRIP.AUTO-MCNC: NEGATIVE MG/DL
HCT VFR BLD AUTO: 36.1 % (ref 35–45)
HGB BLD-MCNC: 11.8 G/DL (ref 12–16)
HGB UR QL STRIP: NEGATIVE
IMM GRANULOCYTES # BLD AUTO: 0 K/UL (ref 0–0.04)
IMM GRANULOCYTES NFR BLD AUTO: 0 % (ref 0–0.5)
KETONES UR QL STRIP.AUTO: NEGATIVE MG/DL
LEUKOCYTE ESTERASE UR QL STRIP.AUTO: NEGATIVE
LIPASE SERPL-CCNC: 38 U/L (ref 13–75)
LYMPHOCYTES # BLD: 2.1 K/UL (ref 0.9–3.6)
LYMPHOCYTES NFR BLD: 28 % (ref 21–52)
MCH RBC QN AUTO: 30.8 PG (ref 24–34)
MCHC RBC AUTO-ENTMCNC: 32.7 G/DL (ref 31–37)
MCV RBC AUTO: 94.3 FL (ref 78–100)
MONOCYTES # BLD: 0.7 K/UL (ref 0.05–1.2)
MONOCYTES NFR BLD: 10 % (ref 3–10)
NEUTS SEG # BLD: 4.6 K/UL (ref 1.8–8)
NEUTS SEG NFR BLD: 60 % (ref 40–73)
NITRITE UR QL STRIP.AUTO: NEGATIVE
NRBC # BLD: 0 K/UL (ref 0–0.01)
NRBC BLD-RTO: 0 PER 100 WBC
PH UR STRIP: 6.5 (ref 5–8)
PLATELET # BLD AUTO: 161 K/UL (ref 135–420)
PMV BLD AUTO: 11.6 FL (ref 9.2–11.8)
POTASSIUM SERPL-SCNC: 3.7 MMOL/L (ref 3.5–5.5)
PROT SERPL-MCNC: 6.4 G/DL (ref 6.4–8.2)
PROT UR STRIP-MCNC: NEGATIVE MG/DL
RBC # BLD AUTO: 3.83 M/UL (ref 4.2–5.3)
SODIUM SERPL-SCNC: 142 MMOL/L (ref 136–145)
SP GR UR REFRACTOMETRY: 1.03 (ref 1–1.03)
UROBILINOGEN UR QL STRIP.AUTO: 0.2 EU/DL (ref 0.2–1)
WBC # BLD AUTO: 7.6 K/UL (ref 4.6–13.2)

## 2024-11-19 PROCEDURE — 85025 COMPLETE CBC W/AUTO DIFF WBC: CPT

## 2024-11-19 PROCEDURE — 81003 URINALYSIS AUTO W/O SCOPE: CPT

## 2024-11-19 PROCEDURE — 74177 CT ABD & PELVIS W/CONTRAST: CPT

## 2024-11-19 PROCEDURE — 99285 EMERGENCY DEPT VISIT HI MDM: CPT

## 2024-11-19 PROCEDURE — 6360000004 HC RX CONTRAST MEDICATION: Performed by: EMERGENCY MEDICINE

## 2024-11-19 PROCEDURE — 83690 ASSAY OF LIPASE: CPT

## 2024-11-19 PROCEDURE — 80053 COMPREHEN METABOLIC PANEL: CPT

## 2024-11-19 RX ORDER — IOPAMIDOL 612 MG/ML
85 INJECTION, SOLUTION INTRAVASCULAR
Status: COMPLETED | OUTPATIENT
Start: 2024-11-19 | End: 2024-11-19

## 2024-11-19 RX ORDER — KETOROLAC TROMETHAMINE 15 MG/ML
15 INJECTION, SOLUTION INTRAMUSCULAR; INTRAVENOUS
Status: DISCONTINUED | OUTPATIENT
Start: 2024-11-19 | End: 2024-11-20 | Stop reason: HOSPADM

## 2024-11-19 RX ADMIN — IOPAMIDOL 85 ML: 612 INJECTION, SOLUTION INTRAVENOUS at 21:22

## 2024-11-19 ASSESSMENT — PAIN DESCRIPTION - DESCRIPTORS: DESCRIPTORS: SQUEEZING;PRESSURE

## 2024-11-19 ASSESSMENT — PAIN SCALES - GENERAL
PAINLEVEL_OUTOF10: 5
PAINLEVEL_OUTOF10: 0

## 2024-11-19 ASSESSMENT — PAIN DESCRIPTION - ORIENTATION: ORIENTATION: LOWER

## 2024-11-19 ASSESSMENT — PAIN DESCRIPTION - LOCATION: LOCATION: ABDOMEN

## 2024-11-19 ASSESSMENT — PAIN DESCRIPTION - PAIN TYPE: TYPE: ACUTE PAIN

## 2024-11-19 NOTE — ED TRIAGE NOTES
Pt arrived via EMS from home with family reporting AMS since Saturday, per family seeing 'red and black spots'   Has hx seizures, hx CVA,   Per EMS, , VSS and EKG was NSR  Pt alert on arrival, reports lower ab pain, 5-6/10, no dysuria and no diarrhea today but reports diarrhea yesterday.

## 2024-11-20 NOTE — ED NOTES
Spoke with Sravanthi Tucker to verify home address. Medical transport will transport patient to this address.

## 2024-11-20 NOTE — ED NOTES
I spoke with Pricilla Denny, he is supplemental Decision Maker. Cleo Baeza was given permission by Pricilla Denny to drive patient home. Benjamín Denny, Primary Decision maker is aware. I was unable to contact Benjamín for discharge.

## 2024-11-20 NOTE — ED NOTES
Patient discharged to home. IV removed. Discharge papers explained highlighted and given to patient. All personal belongings taken with patient from room. Patient escorted to ER exit via wheel chair.

## 2024-11-20 NOTE — ED PROVIDER NOTES
EMERGENCY DEPARTMENT HISTORY AND PHYSICAL EXAM    7:14 PM EST seen at this time in room 11        Date: 11/19/2024  Patient Name: Purvi Denny    History of Presenting Illness     Chief Complaint   Patient presents with    Abdominal Pain         History Provided By: patient    Additional History (Context): Purvi Denny is a 69 y.o. female presents with   poor historian, had crampy abdominal pain yesterday then today more constant, all over.  Well-healed lower abdominal surgical scar but cannot tell me what her prior surgery was.  Speaks in a very low voice some aspect of a volition.  Does not appear imminently toxic but is in pain.  Unclear if she has had a recent bowel movement.    PCP: Deepali Montaño APRN - NP    Chief Complaint:   Duration:    Timing:    Location:   Quality:   Severity:   Modifying Factors:   Associated Symptoms:       Current Facility-Administered Medications   Medication Dose Route Frequency Provider Last Rate Last Admin    ketorolac (TORADOL) injection 15 mg  15 mg IntraVENous NOW Willie Lam MD         Current Outpatient Medications   Medication Sig Dispense Refill    aluminum & magnesium hydroxide-simethicone (MAALOX MAX) 400-400-40 MG/5ML SUSP Take 15 mLs by mouth every 6 hours as needed (upper abdominal pain) 355 mL 0    ondansetron (ZOFRAN-ODT) 4 MG disintegrating tablet Place 1 tablet under the tongue every 8 hours as needed for Nausea or Vomiting 21 tablet 0    dicyclomine (BENTYL) 20 MG tablet Take 1 tablet by mouth 3 times daily as needed (abdominal pain) 21 tablet 0    famotidine (PEPCID) 20 MG tablet Take 1 tablet by mouth 2 times daily 60 tablet 0    petrolatum-zinc oxide 49-15 % OINT ointment Apply to affected area 3 times a day as needed 113 g 0    Petrolatum-Zinc Oxide 57-17 % ointment Apply 1 each topically as needed for Dry Skin (For sacral wound, to be applied by home health nurse.) 2 each 5    OXcarbazepine (TRILEPTAL) 300 MG tablet Take 1 tablet by mouth 2

## 2024-11-26 ENCOUNTER — HOSPITAL ENCOUNTER (EMERGENCY)
Facility: HOSPITAL | Age: 69
Discharge: HOME | End: 2024-11-26
Attending: STUDENT IN AN ORGANIZED HEALTH CARE EDUCATION/TRAINING PROGRAM
Payer: MEDICARE

## 2024-11-26 VITALS
HEIGHT: 60 IN | OXYGEN SATURATION: 99 % | RESPIRATION RATE: 17 BRPM | BODY MASS INDEX: 24.94 KG/M2 | HEART RATE: 87 BPM | TEMPERATURE: 98.1 F | DIASTOLIC BLOOD PRESSURE: 82 MMHG | SYSTOLIC BLOOD PRESSURE: 93 MMHG | WEIGHT: 127 LBS

## 2024-11-26 DIAGNOSIS — G40.919 BREAKTHROUGH SEIZURE (HCC): Primary | ICD-10-CM

## 2024-11-26 LAB
ALBUMIN SERPL-MCNC: 3.4 G/DL (ref 3.4–5)
ALBUMIN/GLOB SERPL: 0.9 (ref 0.8–1.7)
ALP SERPL-CCNC: 115 U/L (ref 45–117)
ALT SERPL-CCNC: 19 U/L (ref 13–56)
AMPHET UR QL SCN: NEGATIVE
ANION GAP SERPL CALC-SCNC: 5 MMOL/L (ref 3–18)
AST SERPL-CCNC: 29 U/L (ref 10–38)
BARBITURATES UR QL SCN: NEGATIVE
BASOPHILS # BLD: 0 K/UL (ref 0–0.1)
BASOPHILS NFR BLD: 0 % (ref 0–2)
BENZODIAZ UR QL: NEGATIVE
BILIRUB SERPL-MCNC: 0.2 MG/DL (ref 0.2–1)
BUN SERPL-MCNC: 24 MG/DL (ref 7–18)
BUN/CREAT SERPL: 18 (ref 12–20)
CALCIUM SERPL-MCNC: 9.4 MG/DL (ref 8.5–10.1)
CANNABINOIDS UR QL SCN: NEGATIVE
CHLORIDE SERPL-SCNC: 110 MMOL/L (ref 100–111)
CO2 SERPL-SCNC: 26 MMOL/L (ref 21–32)
COCAINE UR QL SCN: NEGATIVE
CREAT SERPL-MCNC: 1.35 MG/DL (ref 0.6–1.3)
DIFFERENTIAL METHOD BLD: NORMAL
EOSINOPHIL # BLD: 0.2 K/UL (ref 0–0.4)
EOSINOPHIL NFR BLD: 2 % (ref 0–5)
ERYTHROCYTE [DISTWIDTH] IN BLOOD BY AUTOMATED COUNT: 14.4 % (ref 11.6–14.5)
GLOBULIN SER CALC-MCNC: 3.9 G/DL (ref 2–4)
GLUCOSE SERPL-MCNC: 145 MG/DL (ref 74–99)
HCT VFR BLD AUTO: 39.8 % (ref 35–45)
HGB BLD-MCNC: 13 G/DL (ref 12–16)
IMM GRANULOCYTES # BLD AUTO: 0 K/UL (ref 0–0.04)
IMM GRANULOCYTES NFR BLD AUTO: 0 % (ref 0–0.5)
LACTATE BLD-SCNC: 2.43 MMOL/L (ref 0.4–2)
LYMPHOCYTES # BLD: 2.9 K/UL (ref 0.9–3.6)
LYMPHOCYTES NFR BLD: 29 % (ref 21–52)
Lab: NORMAL
MAGNESIUM SERPL-MCNC: 2 MG/DL (ref 1.6–2.6)
MCH RBC QN AUTO: 30.7 PG (ref 24–34)
MCHC RBC AUTO-ENTMCNC: 32.7 G/DL (ref 31–37)
MCV RBC AUTO: 94.1 FL (ref 78–100)
METHADONE UR QL: NEGATIVE
MONOCYTES # BLD: 1 K/UL (ref 0.05–1.2)
MONOCYTES NFR BLD: 10 % (ref 3–10)
NEUTS SEG # BLD: 5.9 K/UL (ref 1.8–8)
NEUTS SEG NFR BLD: 59 % (ref 40–73)
NRBC # BLD: 0 K/UL (ref 0–0.01)
NRBC BLD-RTO: 0 PER 100 WBC
OPIATES UR QL: NEGATIVE
PCP UR QL: NEGATIVE
PLATELET # BLD AUTO: 192 K/UL (ref 135–420)
PMV BLD AUTO: 11.8 FL (ref 9.2–11.8)
POTASSIUM SERPL-SCNC: 4.5 MMOL/L (ref 3.5–5.5)
PROT SERPL-MCNC: 7.3 G/DL (ref 6.4–8.2)
RBC # BLD AUTO: 4.23 M/UL (ref 4.2–5.3)
SODIUM SERPL-SCNC: 141 MMOL/L (ref 136–145)
WBC # BLD AUTO: 10 K/UL (ref 4.6–13.2)

## 2024-11-26 PROCEDURE — 93005 ELECTROCARDIOGRAM TRACING: CPT | Performed by: STUDENT IN AN ORGANIZED HEALTH CARE EDUCATION/TRAINING PROGRAM

## 2024-11-26 PROCEDURE — 80183 DRUG SCRN QUANT OXCARBAZEPIN: CPT

## 2024-11-26 PROCEDURE — 80307 DRUG TEST PRSMV CHEM ANLYZR: CPT

## 2024-11-26 PROCEDURE — 85025 COMPLETE CBC W/AUTO DIFF WBC: CPT

## 2024-11-26 PROCEDURE — 99284 EMERGENCY DEPT VISIT MOD MDM: CPT

## 2024-11-26 PROCEDURE — 80053 COMPREHEN METABOLIC PANEL: CPT

## 2024-11-26 PROCEDURE — 83605 ASSAY OF LACTIC ACID: CPT

## 2024-11-26 PROCEDURE — 83735 ASSAY OF MAGNESIUM: CPT

## 2024-11-26 PROCEDURE — 6360000002 HC RX W HCPCS: Performed by: STUDENT IN AN ORGANIZED HEALTH CARE EDUCATION/TRAINING PROGRAM

## 2024-11-26 PROCEDURE — 96374 THER/PROPH/DIAG INJ IV PUSH: CPT

## 2024-11-26 RX ORDER — LEVETIRACETAM 500 MG/5ML
1500 INJECTION, SOLUTION, CONCENTRATE INTRAVENOUS ONCE
Status: COMPLETED | OUTPATIENT
Start: 2024-11-26 | End: 2024-11-26

## 2024-11-26 RX ORDER — OXCARBAZEPINE 300 MG/1
600 TABLET, FILM COATED ORAL 2 TIMES DAILY
Qty: 148 TABLET | Refills: 0 | Status: SHIPPED | OUTPATIENT
Start: 2024-11-26 | End: 2025-01-02

## 2024-11-26 RX ADMIN — LEVETIRACETAM 1500 MG: 100 INJECTION INTRAVENOUS at 18:20

## 2024-11-26 ASSESSMENT — PAIN SCALES - GENERAL: PAINLEVEL_OUTOF10: 0

## 2024-11-26 ASSESSMENT — PAIN - FUNCTIONAL ASSESSMENT: PAIN_FUNCTIONAL_ASSESSMENT: NONE - DENIES PAIN

## 2024-11-26 NOTE — ED PROVIDER NOTES
rhythm  Septal infarct , age undetermined  T wave abnormality, consider lateral ischemia  Abnormal ECG  When compared with ECG of 01-NOV-2024 18:10,  T wave inversion more evident in Lateral leads     Urine Drug Screen    Collection Time: 11/26/24  7:50 PM   Result Value Ref Range    Benzodiazepines, Urine Negative NEG      Barbiturates, Urine Negative NEG      THC, TH-Cannabinol, Urine Negative NEG      Opiates, Urine Negative NEG      Phencyclidine, Urine Negative NEG      Cocaine, Urine Negative NEG      Amphetamine, Urine Negative NEG      Methadone, Urine Negative NEG      Comments: (NOTE)        Radiologic Studies -   No orders to display         Medical Decision Making   I am the first provider for this patient.    I reviewed the vital signs, available nursing notes, past medical history, past surgical history, family history and social history.    Vital Signs-Reviewed the patient's vital signs.      EKG: ed course       ED Course: Progress Notes, Reevaluation, and Consults:    Provider Notes (Medical Decision Making):     MDM  69-year-old female who presents with concerns for seizure.  Will consider breakthrough seizure.  Low suspicion for status epilepticus.  Will consider arrhythmia.  Will consider dehydration.  Low suspicion for hypoglycemia.  Will consider panic attack.  Will obtain labs.  Patient found to have a lactic acid of 2.43.  No other signs of metabolic derangement.  Discussed workup with neurology.  Will have Trileptal dosage increased.  Discussed with patient.  She agrees with plan.  Patient will be discharged.    ED Course as of 11/26/24 2324 Tue Nov 26, 2024   1839 EKG: Sinus rhythm, heart rate 90, MS QTc within normals, left axis deviation, no signs of acute ischemic changes. [KS]   1849 POC Lactic Acid(!!): 2.43 [KS]   1918 Paged neurology for recs  [KS]   1930 Per dr maier recommends increases trileptal to 300 in the am and 600 mg at night and then increase the trileptal to 600 mg bid

## 2024-11-26 NOTE — ED TRIAGE NOTES
Pt arrived to ED via EMS with cc of witnessed seizure; 2-3 minutes. Post Ictal for EMS on scene     Hx seizure, family repots hs takes prn meds for seizures     Bg 206    Seen last week for UTI

## 2024-11-27 LAB
EKG ATRIAL RATE: 90 BPM
EKG DIAGNOSIS: NORMAL
EKG P AXIS: 54 DEGREES
EKG P-R INTERVAL: 158 MS
EKG Q-T INTERVAL: 364 MS
EKG QRS DURATION: 82 MS
EKG QTC CALCULATION (BAZETT): 445 MS
EKG R AXIS: -18 DEGREES
EKG T AXIS: 131 DEGREES
EKG VENTRICULAR RATE: 90 BPM

## 2024-11-27 PROCEDURE — 93010 ELECTROCARDIOGRAM REPORT: CPT | Performed by: INTERNAL MEDICINE

## 2024-11-27 NOTE — ED NOTES
Assumed care of patient. Patient laying semi boyce, eyes open. Drumright pulled up to shoulders. Vital signs monitored by bedside monitor. Skin is warm and dry to touch. No respiratory distress observed. IV flushed, saline locked.

## 2024-11-27 NOTE — ED NOTES
Patient laying semi fowlers, eyes open, resting on stretcher. Blankets pulled up to shoulders. Pillow supporting head and neck. IV saline locked. Bilateral rails up for safety. No respiratory distress observed. Breathing is slow and even; unlabored. Vital signs monitored by bedside monitor. Call bell within reach.

## 2024-11-27 NOTE — DISCHARGE INSTRUCTIONS
You were evaluated for breakthrough seizures .  Based on your work-up it was deemed that she was stable for discharge.  Please  your medication of trileptal which was prescribed to you.  Please follow-up with your primary care physician if you have any further concerns and go over your work-up.  If you experience any chest pain, shortness of breath, worsening abdominal pain, vomiting blood, worsening headache, seizures, or any worsening of your symptoms please return to the emergency department immediately.  If you have any pending results or any further questions please contact the emergency department at [phone].

## 2024-12-01 LAB — OXCARBAZEPINE SERPL-MCNC: 28 UG/ML (ref 10–35)

## 2024-12-10 ENCOUNTER — HOSPITAL ENCOUNTER (EMERGENCY)
Facility: HOSPITAL | Age: 69
Discharge: HOME | End: 2024-12-10
Attending: STUDENT IN AN ORGANIZED HEALTH CARE EDUCATION/TRAINING PROGRAM
Payer: MEDICARE

## 2024-12-10 VITALS
BODY MASS INDEX: 24.8 KG/M2 | DIASTOLIC BLOOD PRESSURE: 72 MMHG | OXYGEN SATURATION: 100 % | SYSTOLIC BLOOD PRESSURE: 130 MMHG | HEIGHT: 60 IN | HEART RATE: 76 BPM | TEMPERATURE: 98.1 F | RESPIRATION RATE: 18 BRPM

## 2024-12-10 DIAGNOSIS — F41.1 ANXIETY STATE: Primary | ICD-10-CM

## 2024-12-10 LAB
ALBUMIN SERPL-MCNC: 3.5 G/DL (ref 3.4–5)
ALBUMIN/GLOB SERPL: 0.9 (ref 0.8–1.7)
ALP SERPL-CCNC: 109 U/L (ref 45–117)
ALT SERPL-CCNC: 22 U/L (ref 13–56)
ANION GAP SERPL CALC-SCNC: 2 MMOL/L (ref 3–18)
AST SERPL-CCNC: 40 U/L (ref 10–38)
BASOPHILS # BLD: 0.1 K/UL (ref 0–0.1)
BASOPHILS NFR BLD: 1 % (ref 0–2)
BILIRUB SERPL-MCNC: 0.3 MG/DL (ref 0.2–1)
BUN SERPL-MCNC: 25 MG/DL (ref 7–18)
BUN/CREAT SERPL: 18 (ref 12–20)
CALCIUM SERPL-MCNC: 9.2 MG/DL (ref 8.5–10.1)
CHLORIDE SERPL-SCNC: 110 MMOL/L (ref 100–111)
CO2 SERPL-SCNC: 26 MMOL/L (ref 21–32)
CREAT SERPL-MCNC: 1.36 MG/DL (ref 0.6–1.3)
DIFFERENTIAL METHOD BLD: ABNORMAL
EOSINOPHIL # BLD: 0.4 K/UL (ref 0–0.4)
EOSINOPHIL NFR BLD: 4 % (ref 0–5)
ERYTHROCYTE [DISTWIDTH] IN BLOOD BY AUTOMATED COUNT: 14.4 % (ref 11.6–14.5)
GLOBULIN SER CALC-MCNC: 3.9 G/DL (ref 2–4)
GLUCOSE SERPL-MCNC: 97 MG/DL (ref 74–99)
HCT VFR BLD AUTO: 38.7 % (ref 35–45)
HGB BLD-MCNC: 12.7 G/DL (ref 12–16)
IMM GRANULOCYTES # BLD AUTO: 0.1 K/UL (ref 0–0.04)
IMM GRANULOCYTES NFR BLD AUTO: 1 % (ref 0–0.5)
LACTATE SERPL-SCNC: 1.1 MMOL/L (ref 0.4–2)
LYMPHOCYTES # BLD: 3.2 K/UL (ref 0.9–3.6)
LYMPHOCYTES NFR BLD: 29 % (ref 21–52)
MAGNESIUM SERPL-MCNC: 2.1 MG/DL (ref 1.6–2.6)
MCH RBC QN AUTO: 30.8 PG (ref 24–34)
MCHC RBC AUTO-ENTMCNC: 32.8 G/DL (ref 31–37)
MCV RBC AUTO: 93.7 FL (ref 78–100)
MONOCYTES # BLD: 1.1 K/UL (ref 0.05–1.2)
MONOCYTES NFR BLD: 10 % (ref 3–10)
NEUTS SEG # BLD: 6.3 K/UL (ref 1.8–8)
NEUTS SEG NFR BLD: 57 % (ref 40–73)
NRBC # BLD: 0 K/UL (ref 0–0.01)
NRBC BLD-RTO: 0 PER 100 WBC
PLATELET # BLD AUTO: 206 K/UL (ref 135–420)
PMV BLD AUTO: 11.4 FL (ref 9.2–11.8)
POTASSIUM SERPL-SCNC: 5.5 MMOL/L (ref 3.5–5.5)
PROT SERPL-MCNC: 7.4 G/DL (ref 6.4–8.2)
RBC # BLD AUTO: 4.13 M/UL (ref 4.2–5.3)
SODIUM SERPL-SCNC: 138 MMOL/L (ref 136–145)
WBC # BLD AUTO: 11.2 K/UL (ref 4.6–13.2)

## 2024-12-10 PROCEDURE — 83605 ASSAY OF LACTIC ACID: CPT

## 2024-12-10 PROCEDURE — 93005 ELECTROCARDIOGRAM TRACING: CPT | Performed by: STUDENT IN AN ORGANIZED HEALTH CARE EDUCATION/TRAINING PROGRAM

## 2024-12-10 PROCEDURE — 80053 COMPREHEN METABOLIC PANEL: CPT

## 2024-12-10 PROCEDURE — 99284 EMERGENCY DEPT VISIT MOD MDM: CPT

## 2024-12-10 PROCEDURE — 85025 COMPLETE CBC W/AUTO DIFF WBC: CPT

## 2024-12-10 PROCEDURE — 6370000000 HC RX 637 (ALT 250 FOR IP): Performed by: STUDENT IN AN ORGANIZED HEALTH CARE EDUCATION/TRAINING PROGRAM

## 2024-12-10 PROCEDURE — 83735 ASSAY OF MAGNESIUM: CPT

## 2024-12-10 RX ORDER — HYDROXYZINE HYDROCHLORIDE 25 MG/1
25 TABLET, FILM COATED ORAL
Status: COMPLETED | OUTPATIENT
Start: 2024-12-10 | End: 2024-12-10

## 2024-12-10 RX ADMIN — HYDROXYZINE HYDROCHLORIDE 25 MG: 25 TABLET, FILM COATED ORAL at 19:19

## 2024-12-10 ASSESSMENT — PAIN - FUNCTIONAL ASSESSMENT: PAIN_FUNCTIONAL_ASSESSMENT: NONE - DENIES PAIN

## 2024-12-10 NOTE — ED TRIAGE NOTES
Pt brought by EMS from home, had a witnessed seizure by the family , hx of seizure , per EMS, family stated that pt is taking seizure medications

## 2024-12-10 NOTE — ED PROVIDER NOTES
EMERGENCY DEPARTMENT HISTORY AND PHYSICAL EXAM    1:17 AM      Date: 12/10/2024  Patient Name: Purvi Denny    History of Presenting Illness     Chief Complaint   Patient presents with    Seizures       History From: Patient and Patient's Son    Purvi Denny is a 69 y.o. female   HPI  69-year-old female with history of TIA, neuropathy, seizure, headache, hypertension, anxiety who presents with concerns for acute anxious state.  As per son patient was talking to the family member.  Family felt that she was having an anxiety attack.  Wanted her to come to the hospital for evaluation.  They deny changes in meds, sick contacts, or any other changes.  No aggravating or alleviating factors.  When assessing ROS she has no headache, nausea, vomiting, chest pain, abdominal pain, change bowel movement, or any other changes.      Nursing Notes were all reviewed and agreed with or any disagreements were addressed in the HPI.    PCP: Deepali Montaño APRN - NP    No current facility-administered medications for this encounter.     Current Outpatient Medications   Medication Sig Dispense Refill    OXcarbazepine (TRILEPTAL) 300 MG tablet Take 2 tablets by mouth 2 times daily Please take 300 mg in the morning and 600 mg in the evening for the fist week. Then take 600 mg after that 2 times a day. 148 tablet 0    aluminum & magnesium hydroxide-simethicone (MAALOX MAX) 400-400-40 MG/5ML SUSP Take 15 mLs by mouth every 6 hours as needed (upper abdominal pain) 355 mL 0    ondansetron (ZOFRAN-ODT) 4 MG disintegrating tablet Place 1 tablet under the tongue every 8 hours as needed for Nausea or Vomiting 21 tablet 0    dicyclomine (BENTYL) 20 MG tablet Take 1 tablet by mouth 3 times daily as needed (abdominal pain) 21 tablet 0    famotidine (PEPCID) 20 MG tablet Take 1 tablet by mouth 2 times daily 60 tablet 0    petrolatum-zinc oxide 49-15 % OINT ointment Apply to affected area 3 times a day as needed 113 g 0    Petrolatum-Zinc Oxide

## 2024-12-11 LAB
EKG ATRIAL RATE: 71 BPM
EKG DIAGNOSIS: NORMAL
EKG P AXIS: 40 DEGREES
EKG P-R INTERVAL: 146 MS
EKG Q-T INTERVAL: 402 MS
EKG QRS DURATION: 84 MS
EKG QTC CALCULATION (BAZETT): 436 MS
EKG R AXIS: 67 DEGREES
EKG T AXIS: -62 DEGREES
EKG VENTRICULAR RATE: 71 BPM

## 2024-12-11 PROCEDURE — 93010 ELECTROCARDIOGRAM REPORT: CPT | Performed by: INTERNAL MEDICINE

## 2024-12-11 NOTE — DISCHARGE INSTRUCTIONS
You were evaluated for anxious state .  Based on your work-up it was deemed that she was stable for discharge  Please follow-up with your primary care physician if you have any further concerns and go over your work-up.  If you experience any chest pain, shortness of breath, worsening abdominal pain, vomiting blood, worsening headache, seizures, or any worsening of your symptoms please return to the emergency department immediately.  If you have any pending results or any further questions please contact the emergency department at [phone].

## 2024-12-25 ENCOUNTER — HOSPITAL ENCOUNTER (EMERGENCY)
Facility: HOSPITAL | Age: 69
Discharge: HOME | End: 2024-12-25
Attending: STUDENT IN AN ORGANIZED HEALTH CARE EDUCATION/TRAINING PROGRAM
Payer: MEDICARE

## 2024-12-25 VITALS
TEMPERATURE: 98.2 F | OXYGEN SATURATION: 98 % | DIASTOLIC BLOOD PRESSURE: 76 MMHG | HEART RATE: 71 BPM | RESPIRATION RATE: 16 BRPM | SYSTOLIC BLOOD PRESSURE: 107 MMHG

## 2024-12-25 DIAGNOSIS — R56.9 SEIZURE-LIKE ACTIVITY (HCC): Primary | ICD-10-CM

## 2024-12-25 LAB
ALBUMIN SERPL-MCNC: 3.5 G/DL (ref 3.4–5)
ALBUMIN/GLOB SERPL: 1 (ref 0.8–1.7)
ALP SERPL-CCNC: 109 U/L (ref 45–117)
ALT SERPL-CCNC: 20 U/L (ref 13–56)
AMPHET UR QL SCN: NEGATIVE
ANION GAP SERPL CALC-SCNC: 3 MMOL/L (ref 3–18)
APPEARANCE UR: CLEAR
AST SERPL-CCNC: 19 U/L (ref 10–38)
BARBITURATES UR QL SCN: NEGATIVE
BENZODIAZ UR QL: NEGATIVE
BILIRUB SERPL-MCNC: 0.2 MG/DL (ref 0.2–1)
BILIRUB UR QL: NEGATIVE
BUN SERPL-MCNC: 27 MG/DL (ref 7–18)
BUN/CREAT SERPL: 18 (ref 12–20)
CALCIUM SERPL-MCNC: 8.7 MG/DL (ref 8.5–10.1)
CANNABINOIDS UR QL SCN: NEGATIVE
CHLORIDE SERPL-SCNC: 111 MMOL/L (ref 100–111)
CO2 SERPL-SCNC: 27 MMOL/L (ref 21–32)
COCAINE UR QL SCN: NEGATIVE
COLOR UR: YELLOW
CREAT SERPL-MCNC: 1.5 MG/DL (ref 0.6–1.3)
EKG ATRIAL RATE: 69 BPM
EKG DIAGNOSIS: NORMAL
EKG P AXIS: 43 DEGREES
EKG P-R INTERVAL: 160 MS
EKG Q-T INTERVAL: 396 MS
EKG QRS DURATION: 82 MS
EKG QTC CALCULATION (BAZETT): 424 MS
EKG R AXIS: -18 DEGREES
EKG T AXIS: 128 DEGREES
EKG VENTRICULAR RATE: 69 BPM
ERYTHROCYTE [DISTWIDTH] IN BLOOD BY AUTOMATED COUNT: 14.7 % (ref 11.6–14.5)
ETHANOL SERPL-MCNC: <3 MG/DL (ref 0–3)
GLOBULIN SER CALC-MCNC: 3.6 G/DL (ref 2–4)
GLUCOSE BLD STRIP.AUTO-MCNC: 84 MG/DL (ref 70–110)
GLUCOSE SERPL-MCNC: 73 MG/DL (ref 74–99)
GLUCOSE UR STRIP.AUTO-MCNC: NEGATIVE MG/DL
HCT VFR BLD AUTO: 41 % (ref 35–45)
HGB BLD-MCNC: 13.2 G/DL (ref 12–16)
HGB UR QL STRIP: NEGATIVE
KETONES UR QL STRIP.AUTO: NEGATIVE MG/DL
LACTATE SERPL-SCNC: 1.5 MMOL/L (ref 0.4–2)
LEUKOCYTE ESTERASE UR QL STRIP.AUTO: NEGATIVE
Lab: NORMAL
MCH RBC QN AUTO: 30.4 PG (ref 24–34)
MCHC RBC AUTO-ENTMCNC: 32.2 G/DL (ref 31–37)
MCV RBC AUTO: 94.5 FL (ref 78–100)
METHADONE UR QL: NEGATIVE
NITRITE UR QL STRIP.AUTO: NEGATIVE
NRBC # BLD: 0 K/UL (ref 0–0.01)
NRBC BLD-RTO: 0 PER 100 WBC
OPIATES UR QL: NEGATIVE
PCP UR QL: NEGATIVE
PH UR STRIP: 5.5 (ref 5–8)
PLATELET # BLD AUTO: 191 K/UL (ref 135–420)
PMV BLD AUTO: 11.5 FL (ref 9.2–11.8)
POTASSIUM SERPL-SCNC: 4.7 MMOL/L (ref 3.5–5.5)
PROT SERPL-MCNC: 7.1 G/DL (ref 6.4–8.2)
PROT UR STRIP-MCNC: NEGATIVE MG/DL
RBC # BLD AUTO: 4.34 M/UL (ref 4.2–5.3)
SODIUM SERPL-SCNC: 141 MMOL/L (ref 136–145)
SP GR UR REFRACTOMETRY: 1.02 (ref 1–1.03)
UROBILINOGEN UR QL STRIP.AUTO: 0.2 EU/DL (ref 0.2–1)
WBC # BLD AUTO: 10.1 K/UL (ref 4.6–13.2)

## 2024-12-25 PROCEDURE — 82077 ASSAY SPEC XCP UR&BREATH IA: CPT

## 2024-12-25 PROCEDURE — 99284 EMERGENCY DEPT VISIT MOD MDM: CPT

## 2024-12-25 PROCEDURE — 81003 URINALYSIS AUTO W/O SCOPE: CPT

## 2024-12-25 PROCEDURE — 80053 COMPREHEN METABOLIC PANEL: CPT

## 2024-12-25 PROCEDURE — 94761 N-INVAS EAR/PLS OXIMETRY MLT: CPT

## 2024-12-25 PROCEDURE — 83605 ASSAY OF LACTIC ACID: CPT

## 2024-12-25 PROCEDURE — 82962 GLUCOSE BLOOD TEST: CPT

## 2024-12-25 PROCEDURE — 93010 ELECTROCARDIOGRAM REPORT: CPT | Performed by: INTERNAL MEDICINE

## 2024-12-25 PROCEDURE — 85027 COMPLETE CBC AUTOMATED: CPT

## 2024-12-25 PROCEDURE — 93005 ELECTROCARDIOGRAM TRACING: CPT | Performed by: STUDENT IN AN ORGANIZED HEALTH CARE EDUCATION/TRAINING PROGRAM

## 2024-12-25 PROCEDURE — 80307 DRUG TEST PRSMV CHEM ANLYZR: CPT

## 2024-12-25 NOTE — ED PROVIDER NOTES
EMERGENCY DEPARTMENT HISTORY AND PHYSICAL EXAM    9:01 PM      Date: 12/25/2024  Patient Name: Purvi Denny    History of Presenting Illness     Chief Complaint   Patient presents with    Other     Seizure like activity       History From: Patient and medic    Purvi Denny is a 69 y.o. female   HPI     69-year-old female with history of anxiety, TIA, seizures, hypertension who presents with concerns for anxiety catatonic state.  As per medic patient was found to be in catatonic anxious state.  Lasting for about 10 minutes.  Patient back to baseline.  They deny any changes in meds, sick contacts, or any other changes.  On assessing ROS she denies any headache, nausea, vomiting, abdominal pain, change in bowel movement, or any other changes.      Nursing Notes were all reviewed and agreed with or any disagreements were addressed in the HPI.    PCP: Deepali Montaño APRN - NP    No current facility-administered medications for this encounter.     Current Outpatient Medications   Medication Sig Dispense Refill    OXcarbazepine (TRILEPTAL) 300 MG tablet Take 2 tablets by mouth 2 times daily Please take 300 mg in the morning and 600 mg in the evening for the fist week. Then take 600 mg after that 2 times a day. 148 tablet 0    aluminum & magnesium hydroxide-simethicone (MAALOX MAX) 400-400-40 MG/5ML SUSP Take 15 mLs by mouth every 6 hours as needed (upper abdominal pain) 355 mL 0    ondansetron (ZOFRAN-ODT) 4 MG disintegrating tablet Place 1 tablet under the tongue every 8 hours as needed for Nausea or Vomiting 21 tablet 0    dicyclomine (BENTYL) 20 MG tablet Take 1 tablet by mouth 3 times daily as needed (abdominal pain) 21 tablet 0    famotidine (PEPCID) 20 MG tablet Take 1 tablet by mouth 2 times daily 60 tablet 0    petrolatum-zinc oxide 49-15 % OINT ointment Apply to affected area 3 times a day as needed 113 g 0    Petrolatum-Zinc Oxide 57-17 % ointment Apply 1 each topically as needed for Dry Skin (For sacral

## 2024-12-25 NOTE — DISCHARGE INSTRUCTIONS
You were evaluated for seizure like activity .  Based on your work-up it was deemed that she was stable for discharge.    Please follow-up with your primary care physician if you have any further concerns and go over your work-up.  If you experience any chest pain, shortness of breath, worsening abdominal pain, vomiting blood, worsening headache, seizures, or any worsening of your symptoms please return to the emergency department immediately.  If you have any pending results or any further questions please contact the emergency department at [phone].

## 2024-12-25 NOTE — ED TRIAGE NOTES
Pt brought in by medic with complaint of post anxiety catatonic state lasting longer then usual, per family.  Per medic,  Pt was making chewing motions with her mouth that lasted 10 minutes.  Medics states pt slowly started responding on way to er.     On arrival pt alert, obeys commands,   speaking in a very slow whisper

## 2025-03-12 ENCOUNTER — HOSPITAL ENCOUNTER (EMERGENCY)
Facility: HOSPITAL | Age: 70
Discharge: HOME | End: 2025-03-12
Attending: STUDENT IN AN ORGANIZED HEALTH CARE EDUCATION/TRAINING PROGRAM
Payer: MEDICARE

## 2025-03-12 VITALS
HEART RATE: 69 BPM | SYSTOLIC BLOOD PRESSURE: 129 MMHG | DIASTOLIC BLOOD PRESSURE: 69 MMHG | RESPIRATION RATE: 11 BRPM | TEMPERATURE: 98 F | OXYGEN SATURATION: 100 %

## 2025-03-12 DIAGNOSIS — M79.604 BILATERAL LEG PAIN: Primary | ICD-10-CM

## 2025-03-12 DIAGNOSIS — M79.605 BILATERAL LEG PAIN: Primary | ICD-10-CM

## 2025-03-12 LAB
ALBUMIN SERPL-MCNC: 3.5 G/DL (ref 3.4–5)
ALBUMIN/GLOB SERPL: 1 (ref 0.8–1.7)
ALP SERPL-CCNC: 122 U/L (ref 45–117)
ALT SERPL-CCNC: 26 U/L (ref 13–56)
ANION GAP SERPL CALC-SCNC: 7 MMOL/L (ref 3–18)
AST SERPL-CCNC: 32 U/L (ref 10–38)
BASOPHILS # BLD: 0.04 K/UL (ref 0–0.1)
BASOPHILS NFR BLD: 0.5 % (ref 0–2)
BILIRUB SERPL-MCNC: 0.3 MG/DL (ref 0.2–1)
BUN SERPL-MCNC: 19 MG/DL (ref 7–18)
BUN/CREAT SERPL: 19 (ref 12–20)
CALCIUM SERPL-MCNC: 9 MG/DL (ref 8.5–10.1)
CHLORIDE SERPL-SCNC: 112 MMOL/L (ref 100–111)
CK SERPL-CCNC: 132 U/L (ref 26–192)
CO2 SERPL-SCNC: 22 MMOL/L (ref 21–32)
CREAT SERPL-MCNC: 1.02 MG/DL (ref 0.6–1.3)
D DIMER PPP FEU-MCNC: 0.48 UG/ML(FEU)
DIFFERENTIAL METHOD BLD: ABNORMAL
EOSINOPHIL # BLD: 0.35 K/UL (ref 0–0.4)
EOSINOPHIL NFR BLD: 4.1 % (ref 0–5)
ERYTHROCYTE [DISTWIDTH] IN BLOOD BY AUTOMATED COUNT: 16.1 % (ref 11.6–14.5)
GLOBULIN SER CALC-MCNC: 3.5 G/DL (ref 2–4)
GLUCOSE SERPL-MCNC: 71 MG/DL (ref 74–99)
HCT VFR BLD AUTO: 36.7 % (ref 35–45)
HGB BLD-MCNC: 11.8 G/DL (ref 12–16)
IMM GRANULOCYTES # BLD AUTO: 0.04 K/UL (ref 0–0.04)
IMM GRANULOCYTES NFR BLD AUTO: 0.5 % (ref 0–0.5)
INR PPP: 1 (ref 0.9–1.1)
LACTATE BLD-SCNC: 1.57 MMOL/L (ref 0.4–2)
LYMPHOCYTES # BLD: 2.96 K/UL (ref 0.9–3.6)
LYMPHOCYTES NFR BLD: 34.8 % (ref 21–52)
MAGNESIUM SERPL-MCNC: 1.9 MG/DL (ref 1.6–2.6)
MCH RBC QN AUTO: 30.4 PG (ref 24–34)
MCHC RBC AUTO-ENTMCNC: 32.2 G/DL (ref 31–37)
MCV RBC AUTO: 94.6 FL (ref 78–100)
MONOCYTES # BLD: 0.82 K/UL (ref 0.05–1.2)
MONOCYTES NFR BLD: 9.7 % (ref 3–10)
NEUTS SEG # BLD: 4.28 K/UL (ref 1.8–8)
NEUTS SEG NFR BLD: 50.4 % (ref 40–73)
NRBC # BLD: 0 K/UL (ref 0–0.01)
NRBC BLD-RTO: 0 PER 100 WBC
NT PRO BNP: 248 PG/ML (ref 0–900)
PLATELET # BLD AUTO: 224 K/UL (ref 135–420)
PLATELET COMMENT: ABNORMAL
POTASSIUM SERPL-SCNC: 4.6 MMOL/L (ref 3.5–5.5)
PROT SERPL-MCNC: 7 G/DL (ref 6.4–8.2)
PROTHROMBIN TIME: 13.1 SEC (ref 11.9–14.9)
RBC # BLD AUTO: 3.88 M/UL (ref 4.2–5.3)
RBC MORPH BLD: ABNORMAL
SODIUM SERPL-SCNC: 141 MMOL/L (ref 136–145)
TROPONIN I SERPL HS-MCNC: 58 NG/L (ref 0–54)
TSH SERPL DL<=0.05 MIU/L-ACNC: 2.02 UIU/ML (ref 0.36–3.74)
WBC # BLD AUTO: 8.5 K/UL (ref 4.6–13.2)

## 2025-03-12 PROCEDURE — 84443 ASSAY THYROID STIM HORMONE: CPT

## 2025-03-12 PROCEDURE — 96374 THER/PROPH/DIAG INJ IV PUSH: CPT

## 2025-03-12 PROCEDURE — 6370000000 HC RX 637 (ALT 250 FOR IP): Performed by: STUDENT IN AN ORGANIZED HEALTH CARE EDUCATION/TRAINING PROGRAM

## 2025-03-12 PROCEDURE — 85610 PROTHROMBIN TIME: CPT

## 2025-03-12 PROCEDURE — 80053 COMPREHEN METABOLIC PANEL: CPT

## 2025-03-12 PROCEDURE — 83605 ASSAY OF LACTIC ACID: CPT

## 2025-03-12 PROCEDURE — 94761 N-INVAS EAR/PLS OXIMETRY MLT: CPT

## 2025-03-12 PROCEDURE — 6360000002 HC RX W HCPCS: Performed by: STUDENT IN AN ORGANIZED HEALTH CARE EDUCATION/TRAINING PROGRAM

## 2025-03-12 PROCEDURE — 85379 FIBRIN DEGRADATION QUANT: CPT

## 2025-03-12 PROCEDURE — 99284 EMERGENCY DEPT VISIT MOD MDM: CPT

## 2025-03-12 PROCEDURE — 82550 ASSAY OF CK (CPK): CPT

## 2025-03-12 PROCEDURE — 83880 ASSAY OF NATRIURETIC PEPTIDE: CPT

## 2025-03-12 PROCEDURE — 83735 ASSAY OF MAGNESIUM: CPT

## 2025-03-12 PROCEDURE — 84484 ASSAY OF TROPONIN QUANT: CPT

## 2025-03-12 PROCEDURE — 2580000003 HC RX 258: Performed by: STUDENT IN AN ORGANIZED HEALTH CARE EDUCATION/TRAINING PROGRAM

## 2025-03-12 PROCEDURE — 85025 COMPLETE CBC W/AUTO DIFF WBC: CPT

## 2025-03-12 RX ORDER — GABAPENTIN 300 MG/1
300 CAPSULE ORAL
Status: COMPLETED | OUTPATIENT
Start: 2025-03-12 | End: 2025-03-12

## 2025-03-12 RX ORDER — GABAPENTIN 300 MG/1
CAPSULE ORAL
Qty: 65 CAPSULE | Refills: 0 | Status: SHIPPED | OUTPATIENT
Start: 2025-03-12 | End: 2025-04-03

## 2025-03-12 RX ORDER — KETOROLAC TROMETHAMINE 15 MG/ML
15 INJECTION, SOLUTION INTRAMUSCULAR; INTRAVENOUS ONCE
Status: COMPLETED | OUTPATIENT
Start: 2025-03-12 | End: 2025-03-12

## 2025-03-12 RX ORDER — 0.9 % SODIUM CHLORIDE 0.9 %
1000 INTRAVENOUS SOLUTION INTRAVENOUS ONCE
Status: COMPLETED | OUTPATIENT
Start: 2025-03-12 | End: 2025-03-12

## 2025-03-12 RX ADMIN — SODIUM CHLORIDE 1000 ML: 0.9 INJECTION, SOLUTION INTRAVENOUS at 15:12

## 2025-03-12 RX ADMIN — KETOROLAC TROMETHAMINE 15 MG: 15 INJECTION, SOLUTION INTRAMUSCULAR; INTRAVENOUS at 14:59

## 2025-03-12 RX ADMIN — GABAPENTIN 300 MG: 300 CAPSULE ORAL at 16:03

## 2025-03-12 ASSESSMENT — PAIN DESCRIPTION - ORIENTATION: ORIENTATION: LEFT;RIGHT

## 2025-03-12 ASSESSMENT — PAIN DESCRIPTION - LOCATION: LOCATION: LEG

## 2025-03-12 ASSESSMENT — PAIN SCALES - GENERAL: PAINLEVEL_OUTOF10: 5

## 2025-03-12 ASSESSMENT — PAIN - FUNCTIONAL ASSESSMENT
PAIN_FUNCTIONAL_ASSESSMENT: 0-10
PAIN_FUNCTIONAL_ASSESSMENT: ACTIVITIES ARE NOT PREVENTED

## 2025-03-12 ASSESSMENT — PAIN DESCRIPTION - DESCRIPTORS: DESCRIPTORS: DISCOMFORT

## 2025-03-12 NOTE — ED PROVIDER NOTES
[facility] EMERGENCY DEPARTMENT  EMERGENCY DEPARTMENT ENCOUNTER      Pt Name: Purvi Denny  MRN: [id number]  Birthdate 1955  Date of evaluation: 3/12/2025  Provider: Natividad Abreu MD    CHIEF COMPLAINT       Chief Complaint   Patient presents with    Leg Pain         HISTORY OF PRESENT ILLNESS   (Location/Symptom, Timing/Onset, Context/Setting, Quality, Duration, Modifying Factors, Severity)  Note limiting factors.   Purvi Denny is a 70 y.o. female who presents to the emergency department for bilateral leg pain for the past 2 weeks.  States that she cannot walk for the past 3 years.  She denies any significant swelling in her lower extremities, history of blood clots or hemoptysis.  She states that the pain is worse when she tries to move her legs or when she touches her legs.  When this occurs it feels sharp in nature.  Both legs hurt but the right hurts slightly more than the left.  She denies any specific trauma or injury.  Denies any change in her activity.  States that she came in today because her son was concerned because a family member lost her leg when they had something similar and they do not want that to happen to her.  She denies any back pain, saddle anesthesia, loss of bladder or bowel function.  She is unable to tell me why she has not been able to walk for the last 3 years.     Nursing Notes were reviewed.    REVIEW OF SYSTEMS    (2-9 systems for level 4, 10 or more for level 5)     Constitutional: No fever  Respiratory: No SOB  Cardio: No chest pain  GI: No blood in stool  : No hematuria  MSK: No back pain  Skin: No rashes  Neuro: No headache    Except as noted above the remainder of the review of systems was reviewed and negative.       PAST MEDICAL HISTORY     Past Medical History:   Diagnosis Date    Abnormal coordination 9/21/2020    Depression 4/12/2022    Failure to thrive in adult 4/12/2022    Hallucinations 9/21/2020    Hypertension     Neurological disorder

## 2025-03-12 NOTE — ED NOTES
"Subjective   HPI Comments: 33-year-old female who presents to the ED today and states \"I just don't feel right.\"  She states she was sitting in the outpatient surgery waiting room.  She was here to drive her friend home after a colonoscopy.  She states she had sudden onset of dizziness.  She felt like everything was spinning.  She states her face felt flushed and she was nauseated.  She states this all began approximately 45 minutes ago.  She states she was laid down in the bed and given some ice chips but it did not help.  She states she is currently having some chills.  She does report that she has not eaten today.  She denies any vomiting or diarrhea.  She denies any upper respiratory symptoms.  She denies any chest pain or shortness of breath.  She denies any abdominal pain.  She denies any urinary symptoms.  She has had a hysterectomy in the past.    Patient is a 33 y.o. female presenting with dizziness.   History provided by:  Patient  Dizziness   Quality:  Room spinning  Severity:  Moderate  Onset quality:  Sudden  Duration:  1 hour  Timing:  Constant  Progression:  Unchanged  Chronicity:  New  Relieved by:  Nothing  Worsened by:  Movement  Ineffective treatments:  Lying down  Associated symptoms: headaches and nausea    Associated symptoms: no blood in stool, no chest pain, no diarrhea, no hearing loss, no palpitations, no shortness of breath, no syncope, no tinnitus, no vision changes, no vomiting and no weakness        Review of Systems   Constitutional: Positive for chills. Negative for fever.   HENT: Negative for hearing loss and tinnitus.    Eyes: Negative.    Respiratory: Negative for shortness of breath.    Cardiovascular: Negative for chest pain, palpitations and syncope.   Gastrointestinal: Positive for nausea. Negative for blood in stool, diarrhea and vomiting.   Genitourinary: Negative.    Musculoskeletal: Negative.    Skin: Negative.    Neurological: Positive for dizziness and headaches. Negative " Per EMS patient complains of bilateral leg pain times 2 weeks.  Pt denies any injury and per EMS the patient is non ambulatory at baseline.     for syncope, weakness and light-headedness.   Psychiatric/Behavioral: Negative.    All other systems reviewed and are negative.      Past Medical History:   Diagnosis Date   • Abnormal uterine bleeding (AUB)    • Dysmenorrhea    • Dyspareunia in female    • Frequent UTI    • Graves disease    • Pelvic pain        No Known Allergies    Past Surgical History:   Procedure Laterality Date   • ABDOMINAL SURGERY      abd plasty   • ADENOIDECTOMY     • CARPAL TUNNEL RELEASE     •  SECTION     • CHOLECYSTECTOMY     • ENDOSCOPY     • LAPAROSCOPIC GASTRIC BANDING     • THYROIDECTOMY     • TONSILLECTOMY     • TOTAL LAPAROSCOPIC HYSTERECTOMY N/A 2017    Procedure: TOTAL LAPAROSCOPIC HYSTERECTOMY WITH DAVINCI SI ROBOT;  Surgeon: Charles Sheets DO;  Location: [facility];  Service:    • TUBAL ABDOMINAL LIGATION     • WISDOM TOOTH EXTRACTION         Family History   Problem Relation Age of Onset   • No Known Problems Mother    • No Known Problems Father    • No Known Problems Sister    • No Known Problems Brother    • No Known Problems Son    • No Known Problems Daughter    • No Known Problems Maternal Grandmother    • No Known Problems Maternal Grandfather    • No Known Problems Paternal Grandmother    • No Known Problems Paternal Grandfather    • No Known Problems Cousin    • Rheum arthritis Neg Hx    • Osteoarthritis Neg Hx    • Asthma Neg Hx    • Diabetes Neg Hx    • Heart failure Neg Hx    • Hyperlipidemia Neg Hx    • Hypertension Neg Hx    • Migraines Neg Hx    • Rashes / Skin problems Neg Hx    • Seizures Neg Hx    • Stroke Neg Hx    • Thyroid disease Neg Hx        Social History     Social History   • Marital status:      Spouse name: N/A   • Number of children: N/A   • Years of education: N/A     Social History Main Topics   • Smoking status: Never Smoker   • Smokeless tobacco: Never Used   • Alcohol use No   • Drug use: No   • Sexual activity: Defer     Other Topics Concern   • None     Social  History Narrative           Objective   Physical Exam   Constitutional: She is oriented to person, place, and time. She appears well-developed and well-nourished. No distress.   HENT:   Head: Normocephalic and atraumatic.   Right Ear: External ear normal.   Left Ear: External ear normal.   Nose: Nose normal.   Mouth/Throat: Oropharynx is clear and moist. No oropharyngeal exudate.   Eyes: Conjunctivae and EOM are normal. Pupils are equal, round, and reactive to light.   Patient states her dizziness worsens with eye movement, no nystagmus   Neck: Normal range of motion. Neck supple.   Cardiovascular: Normal rate, regular rhythm, normal heart sounds and intact distal pulses.    Pulmonary/Chest: Effort normal and breath sounds normal. No respiratory distress. She has no wheezes.   Abdominal: Soft. Bowel sounds are normal. There is no tenderness.   Musculoskeletal: Normal range of motion.   Neurological: She is alert and oriented to person, place, and time. No cranial nerve deficit. She exhibits normal muscle tone. Coordination normal.   Skin: Skin is warm and dry.   Psychiatric: She has a normal mood and affect. Her behavior is normal. Judgment and thought content normal.   Nursing note and vitals reviewed.      Procedures         ED Course  ED Course   Value Comment By Time   ECG 12 Lead 10:17 - sinus rhythm, rate of 67, no acute ischemia, reviewed by VALENTÍN Castro 01/03 1875    Patient states her dizziness has resolved after the meclizine.  Has a mild headache, she was given Toradol.  Recommended patient follow up with her PCP this week and discuss possible referral to ENT.  Patient states she gets these symptoms intermittently, especially if she bends down to tie her shoes.  This however is the worst it has ever been.  She will follow up outpatient. VALENTÍN Waters 01/03 1623                  MDM  Number of Diagnoses or Management Options  Vertigo:      Amount and/or Complexity of Data Reviewed  Clinical  lab tests: reviewed  Tests in the radiology section of CPT®: reviewed  Tests in the medicine section of CPT®: reviewed  Independent visualization of images, tracings, or specimens: yes    Patient Progress  Patient progress: improved      Final diagnoses:   Vertigo            VALENTÍN Waters  01/03/18 [id number]

## 2025-03-12 NOTE — DISCHARGE INSTRUCTIONS
Recommend taking extra strength Tylenol every 4-6 hours and ibuprofen 600 mg every 6 hours as needed for pain and discomfort.  We will send a prescription for a new nerve pain medication called gabapentin.  You are given a dose today.  Tomorrow take 2 pills and after that take 3 pills.  This is a controlled substance so long-term prescription will need to be prescribed by your primary care doctor or your neurologist.  See if this helps you.  Follow-up with your neurologist for continued workup of why you are having this pain.

## 2025-04-07 ENCOUNTER — HOSPITAL ENCOUNTER (EMERGENCY)
Facility: HOSPITAL | Age: 70
Discharge: HOME | End: 2025-04-07
Attending: EMERGENCY MEDICINE
Payer: MEDICARE

## 2025-04-07 VITALS
HEART RATE: 70 BPM | SYSTOLIC BLOOD PRESSURE: 130 MMHG | WEIGHT: 115 LBS | HEIGHT: 60 IN | RESPIRATION RATE: 18 BRPM | OXYGEN SATURATION: 100 % | DIASTOLIC BLOOD PRESSURE: 76 MMHG | BODY MASS INDEX: 22.58 KG/M2 | TEMPERATURE: 98.6 F

## 2025-04-07 DIAGNOSIS — R56.9 SEIZURE (HCC): Primary | ICD-10-CM

## 2025-04-07 LAB
ALBUMIN SERPL-MCNC: 3 G/DL (ref 3.4–5)
ALBUMIN/GLOB SERPL: 0.9 (ref 0.8–1.7)
ALP SERPL-CCNC: 97 U/L (ref 45–117)
ALT SERPL-CCNC: 20 U/L (ref 13–56)
ANION GAP SERPL CALC-SCNC: 4 MMOL/L (ref 3–18)
AST SERPL-CCNC: 21 U/L (ref 10–38)
BASOPHILS # BLD: 0.05 K/UL (ref 0–0.1)
BASOPHILS NFR BLD: 0.7 % (ref 0–2)
BILIRUB SERPL-MCNC: 0.2 MG/DL (ref 0.2–1)
BUN SERPL-MCNC: 24 MG/DL (ref 7–18)
BUN/CREAT SERPL: 21 (ref 12–20)
CALCIUM SERPL-MCNC: 9.2 MG/DL (ref 8.5–10.1)
CHLORIDE SERPL-SCNC: 111 MMOL/L (ref 100–111)
CO2 SERPL-SCNC: 27 MMOL/L (ref 21–32)
CREAT SERPL-MCNC: 1.12 MG/DL (ref 0.6–1.3)
DIFFERENTIAL METHOD BLD: ABNORMAL
EOSINOPHIL # BLD: 0.37 K/UL (ref 0–0.4)
EOSINOPHIL NFR BLD: 4.9 % (ref 0–5)
ERYTHROCYTE [DISTWIDTH] IN BLOOD BY AUTOMATED COUNT: 15.6 % (ref 11.6–14.5)
GLOBULIN SER CALC-MCNC: 3.2 G/DL (ref 2–4)
GLUCOSE SERPL-MCNC: 80 MG/DL (ref 74–99)
HCT VFR BLD AUTO: 32.3 % (ref 35–45)
HGB BLD-MCNC: 10.7 G/DL (ref 12–16)
IMM GRANULOCYTES # BLD AUTO: 0.02 K/UL (ref 0–0.04)
IMM GRANULOCYTES NFR BLD AUTO: 0.3 % (ref 0–0.5)
LYMPHOCYTES # BLD: 1.86 K/UL (ref 0.9–3.6)
LYMPHOCYTES NFR BLD: 24.4 % (ref 21–52)
MCH RBC QN AUTO: 31.2 PG (ref 24–34)
MCHC RBC AUTO-ENTMCNC: 33.1 G/DL (ref 31–37)
MCV RBC AUTO: 94.2 FL (ref 78–100)
MONOCYTES # BLD: 0.9 K/UL (ref 0.05–1.2)
MONOCYTES NFR BLD: 11.8 % (ref 3–10)
NEUTS SEG # BLD: 4.42 K/UL (ref 1.8–8)
NEUTS SEG NFR BLD: 57.9 % (ref 40–73)
NRBC # BLD: 0 K/UL (ref 0–0.01)
NRBC BLD-RTO: 0 PER 100 WBC
PLATELET # BLD AUTO: 157 K/UL (ref 135–420)
PMV BLD AUTO: 11.7 FL (ref 9.2–11.8)
POTASSIUM SERPL-SCNC: 4.4 MMOL/L (ref 3.5–5.5)
PROT SERPL-MCNC: 6.2 G/DL (ref 6.4–8.2)
RBC # BLD AUTO: 3.43 M/UL (ref 4.2–5.3)
SODIUM SERPL-SCNC: 142 MMOL/L (ref 136–145)
WBC # BLD AUTO: 7.6 K/UL (ref 4.6–13.2)

## 2025-04-07 PROCEDURE — 80053 COMPREHEN METABOLIC PANEL: CPT

## 2025-04-07 PROCEDURE — 85025 COMPLETE CBC W/AUTO DIFF WBC: CPT

## 2025-04-07 PROCEDURE — 99283 EMERGENCY DEPT VISIT LOW MDM: CPT

## 2025-04-07 ASSESSMENT — PAIN - FUNCTIONAL ASSESSMENT: PAIN_FUNCTIONAL_ASSESSMENT: NONE - DENIES PAIN

## 2025-04-07 NOTE — FLOWSHEET NOTE
04/07/25 1035   Vitals Assessment   Restart Vitals Timer Yes   Automatic Restart Vitals Timer Yes   Update Vitals Alert Interval 30   Vital Signs   Temp 98.1 °F (36.7 °C)   Temp Source Oral   Pulse 73   Heart Rate Source Monitor   BP (!) 173/75   BP Location Right upper arm   BP Method Automatic   MAP (Calculated) 108   Patient Position Semi fowlers   Respirations 16   SpO2 100 %  (RA)   Level of Consciousness   (Baseline: pt does not speak)   Safe Environment   Arm Bands On ID   Safety Measures Standard Safety Measures;Bed/Chair-Wheels locked;Bed in low position;Call light within reach;Side rails X 2

## 2025-04-07 NOTE — ED NOTES
Changed pts diaper, cleaned pt.  New diaper and purwick in place.  Collected full rainbow and sent to lab

## 2025-04-07 NOTE — ED NOTES
IV 20 g. In right upper forearm. Flushes well.  Pt. Tolerated well.  Rails up, call bell w/in reach  Pt. In position of comfort

## 2025-04-07 NOTE — ED PROVIDER NOTES
outlined and precautions discussed.  Results were reviewed with the patient. All medications were reviewed with the patient. All of pt's questions and concerns were addressed.  Alarm symptoms and return precautions associated with chief complaint and evaluation were reviewed with the patient in detail.  The patient demonstrated adequate understanding.  Patient was instructed to follow up with PCP, as well as given strict return precautions to the ED upon further deterioration. Patient is ready for discharge home.          Dragon Disclaimer     Please note that this dictation was completed with CrowdBouncer, the computer voice recognition software.  Quite often unanticipated grammatical, syntax, homophones, and other interpretive errors are inadvertently transcribed by the computer software.  Please disregard these errors.  Please excuse any errors that have escaped final proofreading.      MD Chelsea Ventura Mark A, MD  04/07/25 7695

## 2025-04-07 NOTE — ED TRIAGE NOTES
Pt. Arrived from home, via EMS.  0930 seizure activity witnessed by family, per EMS lasted for approx. 5-10 sec. \"Generalized activity with twitching\" by son.  Slow to respond.  Nonambulatory and pt is at baseline. per family, A and O x 2, per family  History of :Htn/seizure/ cva /tia /anxiety  110 glucose POC per EMS  90 Pulse per EMS  160/90 bp per EMS  Family states compliant with medications at home

## 2025-06-17 ENCOUNTER — HOSPITAL ENCOUNTER (EMERGENCY)
Facility: HOSPITAL | Age: 70
Discharge: HOME | End: 2025-06-18
Payer: MEDICARE

## 2025-06-17 DIAGNOSIS — M79.651 PAIN IN BOTH THIGHS: Primary | ICD-10-CM

## 2025-06-17 DIAGNOSIS — M79.652 PAIN IN BOTH THIGHS: Primary | ICD-10-CM

## 2025-06-17 LAB
ALBUMIN SERPL-MCNC: 2.9 G/DL (ref 3.4–5)
ALBUMIN/GLOB SERPL: 1 (ref 0.8–1.7)
ALP SERPL-CCNC: 96 U/L (ref 45–117)
ALT SERPL-CCNC: 13 U/L (ref 10–35)
ANION GAP SERPL CALC-SCNC: 11 MMOL/L (ref 3–18)
AST SERPL-CCNC: 21 U/L (ref 10–38)
BASOPHILS # BLD: 0.05 K/UL (ref 0–0.1)
BASOPHILS NFR BLD: 0.7 % (ref 0–2)
BILIRUB SERPL-MCNC: <0.2 MG/DL (ref 0.2–1)
BUN SERPL-MCNC: 17 MG/DL (ref 6–23)
BUN/CREAT SERPL: 15 (ref 12–20)
CALCIUM SERPL-MCNC: 9.3 MG/DL (ref 8.5–10.1)
CHLORIDE SERPL-SCNC: 106 MMOL/L (ref 98–107)
CK SERPL-CCNC: 59 U/L (ref 26–192)
CO2 SERPL-SCNC: 22 MMOL/L (ref 21–32)
CREAT SERPL-MCNC: 1.08 MG/DL (ref 0.6–1.3)
DIFFERENTIAL METHOD BLD: ABNORMAL
EOSINOPHIL # BLD: 0.37 K/UL (ref 0–0.4)
EOSINOPHIL NFR BLD: 5 % (ref 0–5)
ERYTHROCYTE [DISTWIDTH] IN BLOOD BY AUTOMATED COUNT: 14.4 % (ref 11.6–14.5)
GLOBULIN SER CALC-MCNC: 2.9 G/DL (ref 2–4)
GLUCOSE SERPL-MCNC: 149 MG/DL (ref 74–108)
HCT VFR BLD AUTO: 34.3 % (ref 35–45)
HGB BLD-MCNC: 11.2 G/DL (ref 12–16)
IMM GRANULOCYTES # BLD AUTO: 0.02 K/UL (ref 0–0.04)
IMM GRANULOCYTES NFR BLD AUTO: 0.3 % (ref 0–0.5)
LYMPHOCYTES # BLD: 2.42 K/UL (ref 0.9–3.6)
LYMPHOCYTES NFR BLD: 32.5 % (ref 21–52)
MAGNESIUM SERPL-MCNC: 1.9 MG/DL (ref 1.6–2.6)
MCH RBC QN AUTO: 29.9 PG (ref 24–34)
MCHC RBC AUTO-ENTMCNC: 32.7 G/DL (ref 31–37)
MCV RBC AUTO: 91.5 FL (ref 78–100)
MONOCYTES # BLD: 0.73 K/UL (ref 0.05–1.2)
MONOCYTES NFR BLD: 9.8 % (ref 3–10)
NEUTS SEG # BLD: 3.86 K/UL (ref 1.8–8)
NEUTS SEG NFR BLD: 51.7 % (ref 40–73)
NRBC # BLD: 0 K/UL (ref 0–0.01)
NRBC BLD-RTO: 0 PER 100 WBC
PLATELET # BLD AUTO: 154 K/UL (ref 135–420)
PMV BLD AUTO: 11.8 FL (ref 9.2–11.8)
POTASSIUM SERPL-SCNC: 4 MMOL/L (ref 3.5–5.5)
PROT SERPL-MCNC: 5.8 G/DL (ref 6.4–8.2)
RBC # BLD AUTO: 3.75 M/UL (ref 4.2–5.3)
SODIUM SERPL-SCNC: 139 MMOL/L (ref 136–145)
WBC # BLD AUTO: 7.5 K/UL (ref 4.6–13.2)

## 2025-06-17 PROCEDURE — 6360000002 HC RX W HCPCS: Performed by: PHYSICIAN ASSISTANT

## 2025-06-17 PROCEDURE — 96374 THER/PROPH/DIAG INJ IV PUSH: CPT

## 2025-06-17 PROCEDURE — 85025 COMPLETE CBC W/AUTO DIFF WBC: CPT

## 2025-06-17 PROCEDURE — 99284 EMERGENCY DEPT VISIT MOD MDM: CPT

## 2025-06-17 PROCEDURE — 80053 COMPREHEN METABOLIC PANEL: CPT

## 2025-06-17 PROCEDURE — 82550 ASSAY OF CK (CPK): CPT

## 2025-06-17 PROCEDURE — 83735 ASSAY OF MAGNESIUM: CPT

## 2025-06-17 RX ORDER — HYDROCODONE BITARTRATE AND ACETAMINOPHEN 5; 325 MG/1; MG/1
1 TABLET ORAL
Refills: 0 | Status: COMPLETED | OUTPATIENT
Start: 2025-06-17 | End: 2025-06-18

## 2025-06-17 RX ORDER — MORPHINE SULFATE 4 MG/ML
4 INJECTION, SOLUTION INTRAMUSCULAR; INTRAVENOUS
Status: COMPLETED | OUTPATIENT
Start: 2025-06-17 | End: 2025-06-17

## 2025-06-17 RX ADMIN — MORPHINE SULFATE 4 MG: 4 INJECTION, SOLUTION INTRAMUSCULAR; INTRAVENOUS at 19:29

## 2025-06-17 ASSESSMENT — ENCOUNTER SYMPTOMS
SORE THROAT: 0
SHORTNESS OF BREATH: 0
EYE DISCHARGE: 0
RHINORRHEA: 0
WHEEZING: 0
EYE REDNESS: 0
BACK PAIN: 0
VOMITING: 0
COUGH: 0
NAUSEA: 0
STRIDOR: 0
ABDOMINAL PAIN: 0

## 2025-06-17 ASSESSMENT — PAIN SCALES - GENERAL
PAINLEVEL_OUTOF10: 8
PAINLEVEL_OUTOF10: 8

## 2025-06-17 ASSESSMENT — PAIN - FUNCTIONAL ASSESSMENT: PAIN_FUNCTIONAL_ASSESSMENT: 0-10

## 2025-06-17 NOTE — ED PROVIDER NOTES
EMERGENCY DEPARTMENT HISTORY AND PHYSICAL EXAM    Date: 6/17/2025  Patient Name: Purvi Denny    History of Presenting Illness     Chief Complaint   Patient presents with    Thigh Pain         History Provided By:Patient     Chief Complaint: thigh pain   Duration: few days  Timing:  acute on chronic  Location: bilateral thighs   Quality: aching   Severity: moderate  Modifying Factors: started after stopping her vitamin D supplements, pain worse with movement   Associated Symptoms:       Additional History (Context): Purvi Denny is a 70 y.o. female with PMH depression seizures stroke (nonambulatory at baseline) and htn who presents with c/o atraumatic bilateral thigh pain that started a few days ago. Pt states she pain started after she stopped taking her vitamin D supplements. She denies fall or trauma, pt is wheelchair bound. Denies leg swelling redness rash back pain chest pain and SOB. No other complaints reported at this time.     PCP: Deepali Montaño, CIARA - NP    No current facility-administered medications for this encounter.     Current Outpatient Medications   Medication Sig Dispense Refill    gabapentin (NEURONTIN) 300 MG capsule Take 1 capsule by mouth 2 times daily for 1 day, THEN 1 capsule 3 times daily for 21 days. Max Daily Amount: 900 mg. 65 capsule 0    OXcarbazepine (TRILEPTAL) 300 MG tablet Take 2 tablets by mouth 2 times daily Please take 300 mg in the morning and 600 mg in the evening for the fist week. Then take 600 mg after that 2 times a day. 148 tablet 0    aluminum & magnesium hydroxide-simethicone (MAALOX MAX) 400-400-40 MG/5ML SUSP Take 15 mLs by mouth every 6 hours as needed (upper abdominal pain) 355 mL 0    ondansetron (ZOFRAN-ODT) 4 MG disintegrating tablet Place 1 tablet under the tongue every 8 hours as needed for Nausea or Vomiting 21 tablet 0    dicyclomine (BENTYL) 20 MG tablet Take 1 tablet by mouth 3 times daily as needed (abdominal pain) 21 tablet 0    famotidine

## 2025-06-17 NOTE — ED TRIAGE NOTES
Pt brought from home complaining of bilateral inner thigh pain x since yesterday  , no swelling noted/ Denies fall/ injury

## 2025-06-18 VITALS
SYSTOLIC BLOOD PRESSURE: 131 MMHG | RESPIRATION RATE: 16 BRPM | WEIGHT: 110 LBS | TEMPERATURE: 98.1 F | OXYGEN SATURATION: 97 % | HEART RATE: 64 BPM | BODY MASS INDEX: 21.6 KG/M2 | HEIGHT: 60 IN | DIASTOLIC BLOOD PRESSURE: 74 MMHG

## 2025-06-18 PROCEDURE — 6370000000 HC RX 637 (ALT 250 FOR IP): Performed by: PHYSICIAN ASSISTANT

## 2025-06-18 RX ORDER — HYDROCODONE BITARTRATE AND ACETAMINOPHEN 5; 325 MG/1; MG/1
1 TABLET ORAL EVERY 4 HOURS PRN
Qty: 8 TABLET | Refills: 0 | Status: SHIPPED | OUTPATIENT
Start: 2025-06-18 | End: 2025-06-21

## 2025-06-18 RX ORDER — ERGOCALCIFEROL 1.25 MG/1
50000 CAPSULE, LIQUID FILLED ORAL WEEKLY
Qty: 12 CAPSULE | Refills: 0 | Status: SHIPPED | OUTPATIENT
Start: 2025-06-18

## 2025-06-18 RX ADMIN — HYDROCODONE BITARTRATE AND ACETAMINOPHEN 1 TABLET: 5; 325 TABLET ORAL at 00:00

## 2025-07-04 ENCOUNTER — HOSPITAL ENCOUNTER (EMERGENCY)
Facility: HOSPITAL | Age: 70
Discharge: HOME | End: 2025-07-04
Payer: MEDICARE

## 2025-07-04 VITALS
BODY MASS INDEX: 21.6 KG/M2 | HEIGHT: 60 IN | RESPIRATION RATE: 18 BRPM | DIASTOLIC BLOOD PRESSURE: 76 MMHG | SYSTOLIC BLOOD PRESSURE: 159 MMHG | OXYGEN SATURATION: 100 % | WEIGHT: 110 LBS | TEMPERATURE: 98.5 F | HEART RATE: 72 BPM

## 2025-07-04 DIAGNOSIS — G40.919 BREAKTHROUGH SEIZURE (HCC): Primary | ICD-10-CM

## 2025-07-04 LAB
ALBUMIN SERPL-MCNC: 3.1 G/DL (ref 3.4–5)
ALBUMIN/GLOB SERPL: 0.9 (ref 0.8–1.7)
ALP SERPL-CCNC: 96 U/L (ref 45–117)
ALT SERPL-CCNC: 14 U/L (ref 10–35)
ANION GAP SERPL CALC-SCNC: 12 MMOL/L (ref 3–18)
AST SERPL-CCNC: 23 U/L (ref 10–38)
BASOPHILS # BLD: 0.05 K/UL (ref 0–0.1)
BASOPHILS NFR BLD: 0.7 % (ref 0–2)
BILIRUB SERPL-MCNC: 0.2 MG/DL (ref 0.2–1)
BUN SERPL-MCNC: 16 MG/DL (ref 6–23)
BUN/CREAT SERPL: 15 (ref 12–20)
CALCIUM SERPL-MCNC: 9.6 MG/DL (ref 8.5–10.1)
CHLORIDE SERPL-SCNC: 106 MMOL/L (ref 98–107)
CO2 SERPL-SCNC: 22 MMOL/L (ref 21–32)
CREAT SERPL-MCNC: 1.09 MG/DL (ref 0.6–1.3)
DIFFERENTIAL METHOD BLD: NORMAL
EOSINOPHIL # BLD: 0.32 K/UL (ref 0–0.4)
EOSINOPHIL NFR BLD: 4.4 % (ref 0–5)
ERYTHROCYTE [DISTWIDTH] IN BLOOD BY AUTOMATED COUNT: 14.3 % (ref 11.6–14.5)
GLOBULIN SER CALC-MCNC: 3.3 G/DL (ref 2–4)
GLUCOSE SERPL-MCNC: 83 MG/DL (ref 74–108)
HCT VFR BLD AUTO: 40.3 % (ref 35–45)
HGB BLD-MCNC: 12.8 G/DL (ref 12–16)
IMM GRANULOCYTES # BLD AUTO: 0.03 K/UL (ref 0–0.04)
IMM GRANULOCYTES NFR BLD AUTO: 0.4 % (ref 0–0.5)
LACTATE BLD-SCNC: 1.02 MMOL/L (ref 0.4–2)
LYMPHOCYTES # BLD: 2.02 K/UL (ref 0.9–3.6)
LYMPHOCYTES NFR BLD: 28 % (ref 21–52)
MCH RBC QN AUTO: 29.2 PG (ref 24–34)
MCHC RBC AUTO-ENTMCNC: 31.8 G/DL (ref 31–37)
MCV RBC AUTO: 91.8 FL (ref 78–100)
MONOCYTES # BLD: 0.61 K/UL (ref 0.05–1.2)
MONOCYTES NFR BLD: 8.4 % (ref 3–10)
NEUTS SEG # BLD: 4.19 K/UL (ref 1.8–8)
NEUTS SEG NFR BLD: 58.1 % (ref 40–73)
NRBC # BLD: 0 K/UL (ref 0–0.01)
NRBC BLD-RTO: 0 PER 100 WBC
PLATELET # BLD AUTO: 197 K/UL (ref 135–420)
PMV BLD AUTO: 11.7 FL (ref 9.2–11.8)
POTASSIUM SERPL-SCNC: 4.1 MMOL/L (ref 3.5–5.5)
PROT SERPL-MCNC: 6.4 G/DL (ref 6.4–8.2)
RBC # BLD AUTO: 4.39 M/UL (ref 4.2–5.3)
SODIUM SERPL-SCNC: 140 MMOL/L (ref 136–145)
VALPROATE SERPL-MCNC: 49 UG/ML (ref 50–100)
WBC # BLD AUTO: 7.2 K/UL (ref 4.6–13.2)

## 2025-07-04 PROCEDURE — 80183 DRUG SCRN QUANT OXCARBAZEPIN: CPT

## 2025-07-04 PROCEDURE — 99284 EMERGENCY DEPT VISIT MOD MDM: CPT

## 2025-07-04 PROCEDURE — 6370000000 HC RX 637 (ALT 250 FOR IP): Performed by: EMERGENCY MEDICINE

## 2025-07-04 PROCEDURE — 94761 N-INVAS EAR/PLS OXIMETRY MLT: CPT

## 2025-07-04 PROCEDURE — 85025 COMPLETE CBC W/AUTO DIFF WBC: CPT

## 2025-07-04 PROCEDURE — 80164 ASSAY DIPROPYLACETIC ACD TOT: CPT

## 2025-07-04 PROCEDURE — 2580000003 HC RX 258: Performed by: EMERGENCY MEDICINE

## 2025-07-04 PROCEDURE — 80053 COMPREHEN METABOLIC PANEL: CPT

## 2025-07-04 PROCEDURE — 83605 ASSAY OF LACTIC ACID: CPT

## 2025-07-04 RX ORDER — 0.9 % SODIUM CHLORIDE 0.9 %
1000 INTRAVENOUS SOLUTION INTRAVENOUS ONCE
Status: COMPLETED | OUTPATIENT
Start: 2025-07-04 | End: 2025-07-04

## 2025-07-04 RX ORDER — DIVALPROEX SODIUM 125 MG/1
750 CAPSULE, COATED PELLETS ORAL ONCE
Status: COMPLETED | OUTPATIENT
Start: 2025-07-04 | End: 2025-07-04

## 2025-07-04 RX ADMIN — SODIUM CHLORIDE 1000 ML: 0.9 INJECTION, SOLUTION INTRAVENOUS at 11:52

## 2025-07-04 RX ADMIN — DIVALPROEX SODIUM 750 MG: 125 CAPSULE, COATED PELLETS ORAL at 11:49

## 2025-07-04 ASSESSMENT — PAIN - FUNCTIONAL ASSESSMENT: PAIN_FUNCTIONAL_ASSESSMENT: 0-10

## 2025-07-04 ASSESSMENT — PAIN SCALES - GENERAL: PAINLEVEL_OUTOF10: 0

## 2025-07-04 NOTE — ED TRIAGE NOTES
BIB EMS from home after witnessed seizure. Hx of, compliant with medications. Undiagnosed dx dementia per EMS, pt at baseline mental status.

## 2025-07-04 NOTE — ED PROVIDER NOTES
EMERGENCY DEPARTMENT HISTORY AND PHYSICAL EXAM      Date: 7/4/2025  Patient Name: Purvi Denny    History of Presenting Illness     Chief Complaint   Patient presents with    Seizures       History (Context): Purvi Denny is a 70 y.o. female  presents to the ED today with chief complaint of witnessed seizure today.  Patient at mentation baseline feels fine.  Denies any pain.  Patient has history of stroke TIA seizures dementia anxiety.  Anticipate son's arrival per EMS.      PCP: Deepali Montaño APRN - NP    Current Facility-Administered Medications   Medication Dose Route Frequency Provider Last Rate Last Admin    divalproex (DEPAKOTE SPRINKLE) DR capsule 750 mg  750 mg Oral Once Rosetta Gasca PA        sodium chloride 0.9 % bolus 1,000 mL  1,000 mL IntraVENous Once Rosetta Gasca PA         Current Outpatient Medications   Medication Sig Dispense Refill    vitamin D (ERGOCALCIFEROL) 1.25 MG (79091 UT) CAPS capsule Take 1 capsule by mouth once a week 12 capsule 0    gabapentin (NEURONTIN) 300 MG capsule Take 1 capsule by mouth 2 times daily for 1 day, THEN 1 capsule 3 times daily for 21 days. Max Daily Amount: 900 mg. 65 capsule 0    OXcarbazepine (TRILEPTAL) 300 MG tablet Take 2 tablets by mouth 2 times daily Please take 300 mg in the morning and 600 mg in the evening for the fist week. Then take 600 mg after that 2 times a day. 148 tablet 0    aluminum & magnesium hydroxide-simethicone (MAALOX MAX) 400-400-40 MG/5ML SUSP Take 15 mLs by mouth every 6 hours as needed (upper abdominal pain) 355 mL 0    ondansetron (ZOFRAN-ODT) 4 MG disintegrating tablet Place 1 tablet under the tongue every 8 hours as needed for Nausea or Vomiting 21 tablet 0    dicyclomine (BENTYL) 20 MG tablet Take 1 tablet by mouth 3 times daily as needed (abdominal pain) 21 tablet 0    famotidine (PEPCID) 20 MG tablet Take 1 tablet by mouth 2 times daily 60 tablet 0    petrolatum-zinc oxide 49-15 % OINT ointment Apply to affected area 3

## 2025-07-10 LAB — OXCARBAZEPINE SERPL-MCNC: 7 UG/ML (ref 10–35)

## 2025-07-11 ENCOUNTER — HOSPITAL ENCOUNTER (EMERGENCY)
Facility: HOSPITAL | Age: 70
Discharge: HOME | End: 2025-07-11
Attending: EMERGENCY MEDICINE
Payer: MEDICARE

## 2025-07-11 ENCOUNTER — APPOINTMENT (OUTPATIENT)
Facility: HOSPITAL | Age: 70
End: 2025-07-11
Payer: MEDICARE

## 2025-07-11 VITALS
DIASTOLIC BLOOD PRESSURE: 71 MMHG | RESPIRATION RATE: 15 BRPM | SYSTOLIC BLOOD PRESSURE: 126 MMHG | HEART RATE: 62 BPM | OXYGEN SATURATION: 99 % | TEMPERATURE: 98.4 F

## 2025-07-11 DIAGNOSIS — R56.9 SEIZURE (HCC): Primary | ICD-10-CM

## 2025-07-11 LAB
ALBUMIN SERPL-MCNC: 3.1 G/DL (ref 3.4–5)
ALBUMIN/GLOB SERPL: 1 (ref 0.8–1.7)
ALP SERPL-CCNC: 89 U/L (ref 45–117)
ALT SERPL-CCNC: 8 U/L (ref 10–35)
ANION GAP SERPL CALC-SCNC: 11 MMOL/L (ref 3–18)
AST SERPL-CCNC: 19 U/L (ref 10–38)
BASOPHILS # BLD: 0.01 K/UL (ref 0–0.1)
BASOPHILS NFR BLD: 0.1 % (ref 0–2)
BILIRUB SERPL-MCNC: 0.2 MG/DL (ref 0.2–1)
BUN SERPL-MCNC: 19 MG/DL (ref 6–23)
BUN/CREAT SERPL: 18 (ref 12–20)
CALCIUM SERPL-MCNC: 9.3 MG/DL (ref 8.5–10.1)
CHLORIDE SERPL-SCNC: 105 MMOL/L (ref 98–107)
CO2 SERPL-SCNC: 22 MMOL/L (ref 21–32)
CREAT SERPL-MCNC: 1.07 MG/DL (ref 0.6–1.3)
DIFFERENTIAL METHOD BLD: ABNORMAL
EKG ATRIAL RATE: 69 BPM
EKG DIAGNOSIS: NORMAL
EKG P AXIS: 68 DEGREES
EKG P-R INTERVAL: 158 MS
EKG Q-T INTERVAL: 414 MS
EKG QRS DURATION: 86 MS
EKG QTC CALCULATION (BAZETT): 443 MS
EKG R AXIS: -18 DEGREES
EKG T AXIS: 137 DEGREES
EKG VENTRICULAR RATE: 69 BPM
EOSINOPHIL # BLD: 0.43 K/UL (ref 0–0.4)
EOSINOPHIL NFR BLD: 5.6 % (ref 0–5)
ERYTHROCYTE [DISTWIDTH] IN BLOOD BY AUTOMATED COUNT: 14.6 % (ref 11.6–14.5)
GLOBULIN SER CALC-MCNC: 3.2 G/DL (ref 2–4)
GLUCOSE SERPL-MCNC: 96 MG/DL (ref 74–108)
HCT VFR BLD AUTO: 35.4 % (ref 35–45)
HGB BLD-MCNC: 11.1 G/DL (ref 12–16)
IMM GRANULOCYTES # BLD AUTO: 0.04 K/UL (ref 0–0.04)
IMM GRANULOCYTES NFR BLD AUTO: 0.5 % (ref 0–0.5)
LYMPHOCYTES # BLD: 1.31 K/UL (ref 0.9–3.6)
LYMPHOCYTES NFR BLD: 16.9 % (ref 21–52)
MCH RBC QN AUTO: 29.2 PG (ref 24–34)
MCHC RBC AUTO-ENTMCNC: 31.4 G/DL (ref 31–37)
MCV RBC AUTO: 93.2 FL (ref 78–100)
MONOCYTES # BLD: 0.61 K/UL (ref 0.05–1.2)
MONOCYTES NFR BLD: 7.9 % (ref 3–10)
NEUTS SEG # BLD: 5.34 K/UL (ref 1.8–8)
NEUTS SEG NFR BLD: 69 % (ref 40–73)
NRBC # BLD: 0 K/UL (ref 0–0.01)
NRBC BLD-RTO: 0 PER 100 WBC
PLATELET # BLD AUTO: 165 K/UL (ref 135–420)
PMV BLD AUTO: 11.4 FL (ref 9.2–11.8)
POTASSIUM SERPL-SCNC: 4.1 MMOL/L (ref 3.5–5.5)
PROT SERPL-MCNC: 6.3 G/DL (ref 6.4–8.2)
RBC # BLD AUTO: 3.8 M/UL (ref 4.2–5.3)
SODIUM SERPL-SCNC: 138 MMOL/L (ref 136–145)
WBC # BLD AUTO: 7.7 K/UL (ref 4.6–13.2)

## 2025-07-11 PROCEDURE — 99285 EMERGENCY DEPT VISIT HI MDM: CPT

## 2025-07-11 PROCEDURE — 6370000000 HC RX 637 (ALT 250 FOR IP): Performed by: EMERGENCY MEDICINE

## 2025-07-11 PROCEDURE — 71045 X-RAY EXAM CHEST 1 VIEW: CPT

## 2025-07-11 PROCEDURE — 80053 COMPREHEN METABOLIC PANEL: CPT

## 2025-07-11 PROCEDURE — 93005 ELECTROCARDIOGRAM TRACING: CPT | Performed by: EMERGENCY MEDICINE

## 2025-07-11 PROCEDURE — 85025 COMPLETE CBC W/AUTO DIFF WBC: CPT

## 2025-07-11 PROCEDURE — 93010 ELECTROCARDIOGRAM REPORT: CPT | Performed by: INTERNAL MEDICINE

## 2025-07-11 PROCEDURE — 94761 N-INVAS EAR/PLS OXIMETRY MLT: CPT

## 2025-07-11 RX ORDER — ACETAMINOPHEN 500 MG
1000 TABLET ORAL
Status: COMPLETED | OUTPATIENT
Start: 2025-07-11 | End: 2025-07-11

## 2025-07-11 RX ORDER — DIVALPROEX SODIUM 250 MG/1
750 TABLET, FILM COATED, EXTENDED RELEASE ORAL DAILY
Status: DISCONTINUED | OUTPATIENT
Start: 2025-07-11 | End: 2025-07-11 | Stop reason: HOSPADM

## 2025-07-11 RX ADMIN — DIVALPROEX SODIUM 750 MG: 250 TABLET, FILM COATED, EXTENDED RELEASE ORAL at 13:38

## 2025-07-11 RX ADMIN — ACETAMINOPHEN 1000 MG: 500 TABLET ORAL at 16:00

## 2025-07-11 ASSESSMENT — PAIN DESCRIPTION - LOCATION: LOCATION: HEAD

## 2025-07-11 ASSESSMENT — PAIN SCALES - GENERAL: PAINLEVEL_OUTOF10: 4

## 2025-07-11 NOTE — DISCHARGE INSTRUCTIONS
Return to the ER for severe pain, difficulty breathing, inability tolerate food or fluid by mouth, fever, recurrent seizures, any other concerning signs or symptoms.

## 2025-07-11 NOTE — ED TRIAGE NOTES
Per EMS, had a seizure x40 minutes ago. Lasted 30 sec. Usually takes keppra, didn't take this morning. BS 92.

## 2025-07-11 NOTE — ED PROVIDER NOTES
[facility] EMERGENCY DEPARTMENT  EMERGENCY DEPARTMENT ENCOUNTER      Pt Name: Purvi Denny  MRN: [id number]  Birthdate 1955  Date of evaluation: 7/11/2025  Provider: Kwaku Ennis MD  9:04 PM    CHIEF COMPLAINT       Chief Complaint   Patient presents with    Seizures         HISTORY OF PRESENT ILLNESS    Purvi Denny is a 70 y.o. female who presents to the emergency department      70-year-old female with history of hypertension, seizures prior stroke on antiseizure medications here with a presumed seizure at home.  Brought in by EMS, they noted that she was somewhat postictal when they arrived confused but became more alert throughout the ride.  Was told by his home health aide patient had some bilateral upper extremity shaking as well as some unresponsiveness for about 40 seconds which is typical of her seizures.  No recent illness, no fevers, no vomiting or diarrhea, no abdominal pain, no chest pain or shortness of breath.  She notes that she has never had a heart attack but thinks she may have had a stroke in the past.  She notes that she did not take her medications this morning.          Nursing Notes were reviewed.    REVIEW OF SYSTEMS       Review of Systems    Except as noted above the remainder of the review of systems was reviewed and negative.       PAST MEDICAL HISTORY     Past Medical History:   Diagnosis Date    Abnormal coordination 9/21/2020    Depression 4/12/2022    Failure to thrive in adult 4/12/2022    Hallucinations 9/21/2020    Hypertension     Neurological disorder     Seizures (HCC)     Stroke (HCC) 2009    Subclinical hypothyroidism 4/13/2022         SURGICAL HISTORY       Past Surgical History:   Procedure Laterality Date    PLACE PERCUT GASTROSTOMY TUBE  1/11/2022              CURRENT MEDICATIONS       Discharge Medication List as of 7/11/2025  6:32 PM        CONTINUE these medications which have NOT CHANGED    Details   !! vitamin D (ERGOCALCIFEROL) 1.25 MG

## 2025-07-23 ENCOUNTER — APPOINTMENT (OUTPATIENT)
Facility: HOSPITAL | Age: 70
End: 2025-07-23
Payer: MEDICARE

## 2025-07-23 ENCOUNTER — HOSPITAL ENCOUNTER (EMERGENCY)
Facility: HOSPITAL | Age: 70
Discharge: HOME | End: 2025-07-23
Attending: HEALTH CARE PROVIDER
Payer: MEDICARE

## 2025-07-23 VITALS
RESPIRATION RATE: 17 BRPM | TEMPERATURE: 98.6 F | BODY MASS INDEX: 21.48 KG/M2 | HEART RATE: 77 BPM | WEIGHT: 110 LBS | SYSTOLIC BLOOD PRESSURE: 154 MMHG | OXYGEN SATURATION: 99 % | DIASTOLIC BLOOD PRESSURE: 79 MMHG

## 2025-07-23 DIAGNOSIS — R07.9 CHEST PAIN, UNSPECIFIED TYPE: Primary | ICD-10-CM

## 2025-07-23 DIAGNOSIS — N17.9 AKI (ACUTE KIDNEY INJURY): ICD-10-CM

## 2025-07-23 LAB
ALBUMIN SERPL-MCNC: 3.4 G/DL (ref 3.4–5)
ALBUMIN/GLOB SERPL: 1.1 (ref 0.8–1.7)
ALP SERPL-CCNC: 104 U/L (ref 45–117)
ALT SERPL-CCNC: 13 U/L (ref 10–35)
ANION GAP SERPL CALC-SCNC: 10 MMOL/L (ref 3–18)
ANION GAP SERPL CALC-SCNC: 12 MMOL/L (ref 3–18)
APPEARANCE UR: CLEAR
AST SERPL-CCNC: 28 U/L (ref 10–38)
BASOPHILS # BLD: 0.05 K/UL (ref 0–0.1)
BASOPHILS NFR BLD: 0.6 % (ref 0–2)
BILIRUB SERPL-MCNC: 0.2 MG/DL (ref 0.2–1)
BILIRUB UR QL: NEGATIVE
BUN SERPL-MCNC: 21 MG/DL (ref 6–23)
BUN SERPL-MCNC: 22 MG/DL (ref 6–23)
BUN/CREAT SERPL: 15 (ref 12–20)
BUN/CREAT SERPL: 16 (ref 12–20)
CALCIUM SERPL-MCNC: 9.3 MG/DL (ref 8.5–10.1)
CALCIUM SERPL-MCNC: 9.8 MG/DL (ref 8.5–10.1)
CHLORIDE SERPL-SCNC: 106 MMOL/L (ref 98–107)
CHLORIDE SERPL-SCNC: 107 MMOL/L (ref 98–107)
CO2 SERPL-SCNC: 23 MMOL/L (ref 21–32)
CO2 SERPL-SCNC: 25 MMOL/L (ref 21–32)
COLOR UR: YELLOW
CREAT SERPL-MCNC: 1.32 MG/DL (ref 0.6–1.3)
CREAT SERPL-MCNC: 1.51 MG/DL (ref 0.6–1.3)
D DIMER PPP FEU-MCNC: 0.44 UG/ML(FEU)
DIFFERENTIAL METHOD BLD: ABNORMAL
EKG ATRIAL RATE: 72 BPM
EKG DIAGNOSIS: NORMAL
EKG P-R INTERVAL: 158 MS
EKG Q-T INTERVAL: 380 MS
EKG QRS DURATION: 72 MS
EKG QTC CALCULATION (BAZETT): 416 MS
EKG R AXIS: 212 DEGREES
EKG T AXIS: 44 DEGREES
EKG VENTRICULAR RATE: 72 BPM
EOSINOPHIL # BLD: 0.55 K/UL (ref 0–0.4)
EOSINOPHIL NFR BLD: 6.1 % (ref 0–5)
ERYTHROCYTE [DISTWIDTH] IN BLOOD BY AUTOMATED COUNT: 15.3 % (ref 11.6–14.5)
GLOBULIN SER CALC-MCNC: 3.1 G/DL (ref 2–4)
GLUCOSE SERPL-MCNC: 79 MG/DL (ref 74–108)
GLUCOSE SERPL-MCNC: 94 MG/DL (ref 74–108)
GLUCOSE UR STRIP.AUTO-MCNC: NEGATIVE MG/DL
HCT VFR BLD AUTO: 35.7 % (ref 35–45)
HGB BLD-MCNC: 11.8 G/DL (ref 12–16)
HGB UR QL STRIP: NEGATIVE
IMM GRANULOCYTES # BLD AUTO: 0.05 K/UL (ref 0–0.04)
IMM GRANULOCYTES NFR BLD AUTO: 0.6 % (ref 0–0.5)
KETONES UR QL STRIP.AUTO: NEGATIVE MG/DL
LEUKOCYTE ESTERASE UR QL STRIP.AUTO: NEGATIVE
LIPASE SERPL-CCNC: 43 U/L (ref 13–75)
LYMPHOCYTES # BLD: 2.35 K/UL (ref 0.9–3.6)
LYMPHOCYTES NFR BLD: 26 % (ref 21–52)
MCH RBC QN AUTO: 30.6 PG (ref 24–34)
MCHC RBC AUTO-ENTMCNC: 33.1 G/DL (ref 31–37)
MCV RBC AUTO: 92.5 FL (ref 78–100)
MONOCYTES # BLD: 0.95 K/UL (ref 0.05–1.2)
MONOCYTES NFR BLD: 10.5 % (ref 3–10)
NEUTS SEG # BLD: 5.09 K/UL (ref 1.8–8)
NEUTS SEG NFR BLD: 56.2 % (ref 40–73)
NITRITE UR QL STRIP.AUTO: NEGATIVE
NRBC # BLD: 0 K/UL (ref 0–0.01)
NRBC BLD-RTO: 0 PER 100 WBC
PH UR STRIP: 7 (ref 5–8)
PLATELET # BLD AUTO: 167 K/UL (ref 135–420)
PMV BLD AUTO: 12.2 FL (ref 9.2–11.8)
POTASSIUM SERPL-SCNC: 4.3 MMOL/L (ref 3.5–5.5)
POTASSIUM SERPL-SCNC: 5.9 MMOL/L (ref 3.5–5.5)
PROT SERPL-MCNC: 6.4 G/DL (ref 6.4–8.2)
PROT UR STRIP-MCNC: NEGATIVE MG/DL
RBC # BLD AUTO: 3.86 M/UL (ref 4.2–5.3)
SODIUM SERPL-SCNC: 140 MMOL/L (ref 136–145)
SODIUM SERPL-SCNC: 142 MMOL/L (ref 136–145)
SP GR UR REFRACTOMETRY: 1.02 (ref 1–1.03)
TROPONIN T SERPL HS-MCNC: 10.1 NG/L (ref 0–14)
TROPONIN T SERPL HS-MCNC: 11.5 NG/L (ref 0–14)
UROBILINOGEN UR QL STRIP.AUTO: 1 EU/DL (ref 0.2–1)
WBC # BLD AUTO: 9 K/UL (ref 4.6–13.2)

## 2025-07-23 PROCEDURE — 71275 CT ANGIOGRAPHY CHEST: CPT

## 2025-07-23 PROCEDURE — 84484 ASSAY OF TROPONIN QUANT: CPT

## 2025-07-23 PROCEDURE — 6360000002 HC RX W HCPCS

## 2025-07-23 PROCEDURE — 94761 N-INVAS EAR/PLS OXIMETRY MLT: CPT

## 2025-07-23 PROCEDURE — 93010 ELECTROCARDIOGRAM REPORT: CPT | Performed by: INTERNAL MEDICINE

## 2025-07-23 PROCEDURE — 81003 URINALYSIS AUTO W/O SCOPE: CPT

## 2025-07-23 PROCEDURE — 93005 ELECTROCARDIOGRAM TRACING: CPT

## 2025-07-23 PROCEDURE — 71046 X-RAY EXAM CHEST 2 VIEWS: CPT

## 2025-07-23 PROCEDURE — 99285 EMERGENCY DEPT VISIT HI MDM: CPT

## 2025-07-23 PROCEDURE — 6360000004 HC RX CONTRAST MEDICATION

## 2025-07-23 PROCEDURE — 96374 THER/PROPH/DIAG INJ IV PUSH: CPT

## 2025-07-23 PROCEDURE — 85379 FIBRIN DEGRADATION QUANT: CPT

## 2025-07-23 PROCEDURE — 85025 COMPLETE CBC W/AUTO DIFF WBC: CPT

## 2025-07-23 PROCEDURE — 83690 ASSAY OF LIPASE: CPT

## 2025-07-23 PROCEDURE — 80053 COMPREHEN METABOLIC PANEL: CPT

## 2025-07-23 RX ORDER — FUROSEMIDE 10 MG/ML
20 INJECTION INTRAMUSCULAR; INTRAVENOUS
Status: COMPLETED | OUTPATIENT
Start: 2025-07-23 | End: 2025-07-23

## 2025-07-23 RX ORDER — IOPAMIDOL 755 MG/ML
100 INJECTION, SOLUTION INTRAVASCULAR
Status: DISCONTINUED | OUTPATIENT
Start: 2025-07-23 | End: 2025-07-23

## 2025-07-23 RX ORDER — IODIXANOL 320 MG/ML
100 INJECTION, SOLUTION INTRAVASCULAR
Status: COMPLETED | OUTPATIENT
Start: 2025-07-23 | End: 2025-07-23

## 2025-07-23 RX ADMIN — FUROSEMIDE 20 MG: 10 INJECTION, SOLUTION INTRAMUSCULAR; INTRAVENOUS at 16:49

## 2025-07-23 RX ADMIN — IODIXANOL 80 ML: 320 INJECTION, SOLUTION INTRAVASCULAR at 20:26

## 2025-07-23 NOTE — ED PROVIDER NOTES
EMERGENCY DEPARTMENT HISTORY AND PHYSICAL EXAM      Date: 7/23/2025  Patient Name: Purvi Denny    History of Presenting Illness     Chief Complaint   Patient presents with    Chest Pain       History (Context): Purvi Denny is a 70 y.o. female with past medical history that includes hypertension, seizures, stroke, pulmonary embolism, depression, presents to the ED today with chief complaint of chest pain that began at 8 AM this morning.  Patient states is constant, describes as pressure-like sensation, worsening with movement otherwise asymptomatic.  Not positional.  Nonpleuritic.  States it radiates to the back.  Did not try anything to relieve the symptoms.  Denies any fevers, cough, nausea, vomiting, diarrhea, recent trauma, recent travel, recent hospitalizations, unilateral leg sling or tenderness, history malignancies, history of DVTs or PEs.  On review of records, patient did have a PE in 2014.  Currently not on anticoagulation.      PCP: Deepali Montaño APRN - NP    No current facility-administered medications for this encounter.     Current Outpatient Medications   Medication Sig Dispense Refill    vitamin D (ERGOCALCIFEROL) 1.25 MG (50328 UT) CAPS capsule Take 1 capsule by mouth once a week 12 capsule 0    gabapentin (NEURONTIN) 300 MG capsule Take 1 capsule by mouth 2 times daily for 1 day, THEN 1 capsule 3 times daily for 21 days. Max Daily Amount: 900 mg. 65 capsule 0    OXcarbazepine (TRILEPTAL) 300 MG tablet Take 2 tablets by mouth 2 times daily Please take 300 mg in the morning and 600 mg in the evening for the fist week. Then take 600 mg after that 2 times a day. 148 tablet 0    aluminum & magnesium hydroxide-simethicone (MAALOX MAX) 400-400-40 MG/5ML SUSP Take 15 mLs by mouth every 6 hours as needed (upper abdominal pain) 355 mL 0    ondansetron (ZOFRAN-ODT) 4 MG disintegrating tablet Place 1 tablet under the tongue every 8 hours as needed for Nausea or Vomiting 21 tablet 0    dicyclomine

## 2025-07-23 NOTE — ED TRIAGE NOTES
Pt presents to ed via ems with c/o chest pain since last night. Pt lives at home with family.     Pt denies sob/n/v/headache/dizziness

## 2025-07-27 ENCOUNTER — APPOINTMENT (OUTPATIENT)
Facility: HOSPITAL | Age: 70
End: 2025-07-27
Payer: MEDICARE

## 2025-07-27 ENCOUNTER — HOSPITAL ENCOUNTER (EMERGENCY)
Facility: HOSPITAL | Age: 70
Discharge: HOME | End: 2025-07-27
Attending: EMERGENCY MEDICINE
Payer: MEDICARE

## 2025-07-27 VITALS
OXYGEN SATURATION: 100 % | DIASTOLIC BLOOD PRESSURE: 63 MMHG | TEMPERATURE: 98.4 F | RESPIRATION RATE: 16 BRPM | HEART RATE: 71 BPM | SYSTOLIC BLOOD PRESSURE: 158 MMHG

## 2025-07-27 DIAGNOSIS — R12 HEARTBURN: ICD-10-CM

## 2025-07-27 DIAGNOSIS — K44.9 HIATAL HERNIA: ICD-10-CM

## 2025-07-27 DIAGNOSIS — R07.89 ATYPICAL CHEST PAIN: Primary | ICD-10-CM

## 2025-07-27 LAB
ALBUMIN SERPL-MCNC: 3.2 G/DL (ref 3.4–5)
ALBUMIN/GLOB SERPL: 1.2 (ref 0.8–1.7)
ALP SERPL-CCNC: 88 U/L (ref 45–117)
ALT SERPL-CCNC: 14 U/L (ref 10–35)
ANION GAP SERPL CALC-SCNC: 14 MMOL/L (ref 3–18)
AST SERPL-CCNC: 25 U/L (ref 10–38)
BASOPHILS # BLD: 0.06 K/UL (ref 0–0.1)
BASOPHILS NFR BLD: 0.8 % (ref 0–2)
BILIRUB SERPL-MCNC: 0.3 MG/DL (ref 0.2–1)
BUN SERPL-MCNC: 24 MG/DL (ref 6–23)
BUN/CREAT SERPL: 18 (ref 12–20)
CALCIUM SERPL-MCNC: 9.2 MG/DL (ref 8.5–10.1)
CHLORIDE SERPL-SCNC: 106 MMOL/L (ref 98–107)
CO2 SERPL-SCNC: 20 MMOL/L (ref 21–32)
CREAT SERPL-MCNC: 1.31 MG/DL (ref 0.6–1.3)
D DIMER PPP FEU-MCNC: 0.77 UG/ML(FEU)
DIFFERENTIAL METHOD BLD: ABNORMAL
EOSINOPHIL # BLD: 0.33 K/UL (ref 0–0.4)
EOSINOPHIL NFR BLD: 4.2 % (ref 0–5)
ERYTHROCYTE [DISTWIDTH] IN BLOOD BY AUTOMATED COUNT: 15.1 % (ref 11.6–14.5)
GLOBULIN SER CALC-MCNC: 2.7 G/DL (ref 2–4)
GLUCOSE SERPL-MCNC: 60 MG/DL (ref 74–108)
HCT VFR BLD AUTO: 33.3 % (ref 35–45)
HGB BLD-MCNC: 10.8 G/DL (ref 12–16)
IMM GRANULOCYTES # BLD AUTO: 0.02 K/UL (ref 0–0.04)
IMM GRANULOCYTES NFR BLD AUTO: 0.3 % (ref 0–0.5)
INR PPP: 1 (ref 0.9–1.2)
LIPASE SERPL-CCNC: 39 U/L (ref 13–75)
LYMPHOCYTES # BLD: 2.81 K/UL (ref 0.9–3.6)
LYMPHOCYTES NFR BLD: 35.3 % (ref 21–52)
MAGNESIUM SERPL-MCNC: 1.9 MG/DL (ref 1.6–2.6)
MCH RBC QN AUTO: 29.5 PG (ref 24–34)
MCHC RBC AUTO-ENTMCNC: 32.4 G/DL (ref 31–37)
MCV RBC AUTO: 91 FL (ref 78–100)
MONOCYTES # BLD: 0.82 K/UL (ref 0.05–1.2)
MONOCYTES NFR BLD: 10.3 % (ref 3–10)
NEUTS SEG # BLD: 3.91 K/UL (ref 1.8–8)
NEUTS SEG NFR BLD: 49.1 % (ref 40–73)
NRBC # BLD: 0 K/UL (ref 0–0.01)
NRBC BLD-RTO: 0 PER 100 WBC
PLATELET # BLD AUTO: 174 K/UL (ref 135–420)
PMV BLD AUTO: 12.3 FL (ref 9.2–11.8)
POTASSIUM SERPL-SCNC: 4.3 MMOL/L (ref 3.5–5.5)
PROT SERPL-MCNC: 5.9 G/DL (ref 6.4–8.2)
PROTHROMBIN TIME: 13.2 SEC (ref 12–15.1)
RBC # BLD AUTO: 3.66 M/UL (ref 4.2–5.3)
SODIUM SERPL-SCNC: 140 MMOL/L (ref 136–145)
TROPONIN T SERPL HS-MCNC: 8.8 NG/L (ref 0–14)
TROPONIN T SERPL HS-MCNC: 9.8 NG/L (ref 0–14)
WBC # BLD AUTO: 8 K/UL (ref 4.6–13.2)

## 2025-07-27 PROCEDURE — 83735 ASSAY OF MAGNESIUM: CPT

## 2025-07-27 PROCEDURE — 2500000003 HC RX 250 WO HCPCS

## 2025-07-27 PROCEDURE — 96375 TX/PRO/DX INJ NEW DRUG ADDON: CPT

## 2025-07-27 PROCEDURE — 85610 PROTHROMBIN TIME: CPT

## 2025-07-27 PROCEDURE — 6360000004 HC RX CONTRAST MEDICATION

## 2025-07-27 PROCEDURE — 93005 ELECTROCARDIOGRAM TRACING: CPT

## 2025-07-27 PROCEDURE — 85379 FIBRIN DEGRADATION QUANT: CPT

## 2025-07-27 PROCEDURE — 85025 COMPLETE CBC W/AUTO DIFF WBC: CPT

## 2025-07-27 PROCEDURE — 71275 CT ANGIOGRAPHY CHEST: CPT

## 2025-07-27 PROCEDURE — 80053 COMPREHEN METABOLIC PANEL: CPT

## 2025-07-27 PROCEDURE — 71045 X-RAY EXAM CHEST 1 VIEW: CPT

## 2025-07-27 PROCEDURE — 84484 ASSAY OF TROPONIN QUANT: CPT

## 2025-07-27 PROCEDURE — 94761 N-INVAS EAR/PLS OXIMETRY MLT: CPT

## 2025-07-27 PROCEDURE — 99285 EMERGENCY DEPT VISIT HI MDM: CPT

## 2025-07-27 PROCEDURE — 83690 ASSAY OF LIPASE: CPT

## 2025-07-27 PROCEDURE — 6360000002 HC RX W HCPCS

## 2025-07-27 PROCEDURE — 96374 THER/PROPH/DIAG INJ IV PUSH: CPT

## 2025-07-27 PROCEDURE — 6370000000 HC RX 637 (ALT 250 FOR IP)

## 2025-07-27 RX ORDER — PANTOPRAZOLE SODIUM 20 MG/1
20 TABLET, DELAYED RELEASE ORAL
Qty: 30 TABLET | Refills: 1 | Status: SHIPPED | OUTPATIENT
Start: 2025-07-27

## 2025-07-27 RX ORDER — ACETAMINOPHEN 500 MG
1000 TABLET ORAL
Status: COMPLETED | OUTPATIENT
Start: 2025-07-27 | End: 2025-07-27

## 2025-07-27 RX ORDER — SUCRALFATE 1 G/1
1 TABLET ORAL
Status: COMPLETED | OUTPATIENT
Start: 2025-07-27 | End: 2025-07-27

## 2025-07-27 RX ORDER — MAGNESIUM HYDROXIDE/ALUMINUM HYDROXICE/SIMETHICONE 120; 1200; 1200 MG/30ML; MG/30ML; MG/30ML
30 SUSPENSION ORAL
Status: COMPLETED | OUTPATIENT
Start: 2025-07-27 | End: 2025-07-27

## 2025-07-27 RX ORDER — MAG HYDROX/ALUMINUM HYD/SIMETH 400-400-40
15 SUSPENSION, ORAL (FINAL DOSE FORM) ORAL EVERY 6 HOURS PRN
Qty: 355 ML | Refills: 0 | Status: SHIPPED | OUTPATIENT
Start: 2025-07-27

## 2025-07-27 RX ORDER — IODIXANOL 320 MG/ML
80 INJECTION, SOLUTION INTRAVASCULAR
Status: COMPLETED | OUTPATIENT
Start: 2025-07-27 | End: 2025-07-27

## 2025-07-27 RX ADMIN — IODIXANOL 80 ML: 320 INJECTION, SOLUTION INTRAVASCULAR at 17:54

## 2025-07-27 RX ADMIN — FAMOTIDINE 20 MG: 10 INJECTION, SOLUTION INTRAVENOUS at 16:07

## 2025-07-27 RX ADMIN — ACETAMINOPHEN 1000 MG: 500 TABLET ORAL at 16:07

## 2025-07-27 RX ADMIN — PANTOPRAZOLE SODIUM 40 MG: 40 INJECTION, POWDER, FOR SOLUTION INTRAVENOUS at 19:14

## 2025-07-27 RX ADMIN — ALUMINUM HYDROXIDE, MAGNESIUM HYDROXIDE, AND SIMETHICONE 30 ML: 200; 200; 20 SUSPENSION ORAL at 16:07

## 2025-07-27 RX ADMIN — SUCRALFATE 1 G: 1 TABLET ORAL at 16:07

## 2025-07-27 ASSESSMENT — PAIN SCALES - GENERAL
PAINLEVEL_OUTOF10: 4
PAINLEVEL_OUTOF10: 4

## 2025-07-27 ASSESSMENT — PAIN - FUNCTIONAL ASSESSMENT: PAIN_FUNCTIONAL_ASSESSMENT: 0-10

## 2025-07-27 NOTE — ED TRIAGE NOTES
Patient arrived via EMS from home due to chest pain. Patient states the pain started this morning when she woke up. Patient has a Hx of seizures.

## 2025-07-27 NOTE — DISCHARGE INSTRUCTIONS
evidence of aortic dissection or aneurysm.     Base of Neck, Chest Wall, Axillae:  Negative.     Esophagus:  Small hiatal hernia.     Upper Abdomen:  No acute abnormalities.     Skeletal Structures:  No lytic lesions or acute osseous findings.     IMPRESSION:     1.  No convincing evidence of pulmonary embolism.     2.  No airspace opacities bilaterally.     3.  Tracheal intraluminal mucous retention.     4.  Small hiatal hernia.

## 2025-07-27 NOTE — ED PROVIDER NOTES
0.05 - 1.20 K/UL    Eosinophils Absolute 0.33 0.00 - 0.40 K/UL    Basophils Absolute 0.06 0.00 - 0.10 K/UL    Immature Granulocytes Absolute 0.02 0.00 - 0.04 K/UL    Differential Type AUTOMATED     Comprehensive Metabolic Panel    Collection Time: 07/27/25  3:30 PM   Result Value Ref Range    Sodium 140 136 - 145 mmol/L    Potassium 4.3 3.5 - 5.5 mmol/L    Chloride 106 98 - 107 mmol/L    CO2 20 (L) 21 - 32 mmol/L    Anion Gap 14 3.0 - 18.0 mmol/L    Glucose 60 (L) 74 - 108 mg/dL    BUN 24 (H) 6 - 23 MG/DL    Creatinine 1.31 (H) 0.6 - 1.3 MG/DL    BUN/Creatinine Ratio 18 12 - 20      Est, Glom Filt Rate 44 (L) >60 ml/min/1.73m2    Calcium 9.2 8.5 - 10.1 MG/DL    Total Bilirubin 0.3 0.2 - 1.0 MG/DL    ALT 14 10 - 35 U/L    AST 25 10 - 38 U/L    Alk Phosphatase 88 45 - 117 U/L    Total Protein 5.9 (L) 6.4 - 8.2 g/dL    Albumin 3.2 (L) 3.4 - 5.0 g/dL    Globulin 2.7 2.0 - 4.0 g/dL    Albumin/Globulin Ratio 1.2 0.8 - 1.7     Lipase    Collection Time: 07/27/25  3:30 PM   Result Value Ref Range    Lipase 39 13 - 75 U/L   Magnesium    Collection Time: 07/27/25  3:30 PM   Result Value Ref Range    Magnesium 1.9 1.6 - 2.6 mg/dL   Protime-INR    Collection Time: 07/27/25  3:30 PM   Result Value Ref Range    Protime 13.2 12.0 - 15.1 sec    INR 1.0 0.9 - 1.2     Troponin Now and Q 1 Hour    Collection Time: 07/27/25  3:30 PM   Result Value Ref Range    Troponin T 9.8 0 - 14 ng/L   D-Dimer, Quantitative    Collection Time: 07/27/25  3:30 PM   Result Value Ref Range    D-Dimer, Quant 0.77 (H) <0.46 ug/ml(FEU)   Troponin Now and Q 1 Hour    Collection Time: 07/27/25  4:30 PM   Result Value Ref Range    Troponin T 8.8 0 - 14 ng/L        Radiologic Studies ordered to better evaluate patient's complaints and exam findings-   CTA CHEST W WO CONTRAST PE Eval   Final Result       1.  No convincing evidence of pulmonary embolism.      2.  No airspace opacities bilaterally.      3.  Tracheal intraluminal mucous retention.      4.  Small

## 2025-07-29 LAB
EKG ATRIAL RATE: 65 BPM
EKG DIAGNOSIS: NORMAL
EKG P AXIS: 72 DEGREES
EKG P-R INTERVAL: 156 MS
EKG Q-T INTERVAL: 418 MS
EKG QRS DURATION: 76 MS
EKG QTC CALCULATION (BAZETT): 434 MS
EKG R AXIS: -18 DEGREES
EKG T AXIS: 139 DEGREES
EKG VENTRICULAR RATE: 65 BPM

## 2025-07-29 PROCEDURE — 93010 ELECTROCARDIOGRAM REPORT: CPT | Performed by: INTERNAL MEDICINE

## (undated) DEVICE — CATH KT GASTMY PEG PUL 20FR --

## (undated) DEVICE — TAPE ADH CLTH SILK H2O REPELLENT CURAD

## (undated) DEVICE — SYR 10ML LUER LOK 1/5ML GRAD --

## (undated) DEVICE — BITE BLOCK ENDOSCP UNIV AD 6 TO 9.4 MM

## (undated) DEVICE — GAUZE,SPONGE,4"X4",16PLY,STRL,LF,10/TRAY: Brand: MEDLINE

## (undated) DEVICE — SYRINGE MED 25GA 3ML L5/8IN SUBQ PLAS W/ DETACH NDL SFTY